# Patient Record
Sex: MALE | Race: ASIAN | NOT HISPANIC OR LATINO | URBAN - METROPOLITAN AREA
[De-identification: names, ages, dates, MRNs, and addresses within clinical notes are randomized per-mention and may not be internally consistent; named-entity substitution may affect disease eponyms.]

---

## 2020-01-01 ENCOUNTER — ANESTHESIA EVENT (OUTPATIENT)
Dept: RADIOLOGY | Facility: HOSPITAL | Age: 55
DRG: 003 | End: 2020-01-01
Payer: COMMERCIAL

## 2020-01-01 ENCOUNTER — RESEARCH ENCOUNTER (OUTPATIENT)
Dept: INFECTIOUS DISEASES | Facility: CLINIC | Age: 55
End: 2020-01-01

## 2020-01-01 ENCOUNTER — TELEPHONE (OUTPATIENT)
Dept: CARDIOTHORACIC SURGERY | Facility: CLINIC | Age: 55
End: 2020-01-01

## 2020-01-01 ENCOUNTER — ANESTHESIA EVENT (OUTPATIENT)
Dept: SURGERY | Facility: HOSPITAL | Age: 55
DRG: 003 | End: 2020-01-01
Payer: COMMERCIAL

## 2020-01-01 ENCOUNTER — ANESTHESIA (OUTPATIENT)
Dept: SURGERY | Facility: HOSPITAL | Age: 55
DRG: 003 | End: 2020-01-01
Payer: COMMERCIAL

## 2020-01-01 ENCOUNTER — ANESTHESIA (OUTPATIENT)
Dept: RADIOLOGY | Facility: HOSPITAL | Age: 55
DRG: 003 | End: 2020-01-01
Payer: COMMERCIAL

## 2020-01-01 ENCOUNTER — HOSPITAL ENCOUNTER (INPATIENT)
Facility: HOSPITAL | Age: 55
LOS: 91 days | DRG: 003 | End: 2020-07-10
Attending: EMERGENCY MEDICINE | Admitting: INTERNAL MEDICINE
Payer: COMMERCIAL

## 2020-01-01 VITALS
HEART RATE: 69 BPM | WEIGHT: 143.31 LBS | HEIGHT: 64 IN | RESPIRATION RATE: 3 BRPM | SYSTOLIC BLOOD PRESSURE: 62 MMHG | BODY MASS INDEX: 24.46 KG/M2 | DIASTOLIC BLOOD PRESSURE: 44 MMHG | TEMPERATURE: 99 F | OXYGEN SATURATION: 56 %

## 2020-01-01 DIAGNOSIS — R00.0 TACHYCARDIA: ICD-10-CM

## 2020-01-01 DIAGNOSIS — I49.9 ARRHYTHMIA: ICD-10-CM

## 2020-01-01 DIAGNOSIS — Z99.11 ENCOUNTER FOR WEANING FROM VENTILATOR: ICD-10-CM

## 2020-01-01 DIAGNOSIS — R94.31 EKG ABNORMALITIES: ICD-10-CM

## 2020-01-01 DIAGNOSIS — Z78.9 ON ENTERAL NUTRITION: ICD-10-CM

## 2020-01-01 DIAGNOSIS — E83.39 HYPOPHOSPHATEMIA: ICD-10-CM

## 2020-01-01 DIAGNOSIS — J06.9 ACUTE RESPIRATORY DISEASE DUE TO COVID-19 VIRUS: ICD-10-CM

## 2020-01-01 DIAGNOSIS — Z20.822 SUSPECTED COVID-19 VIRUS INFECTION: ICD-10-CM

## 2020-01-01 DIAGNOSIS — R06.03 RESPIRATORY DISTRESS: ICD-10-CM

## 2020-01-01 DIAGNOSIS — J93.9 PNEUMOTHORAX: ICD-10-CM

## 2020-01-01 DIAGNOSIS — R00.0 SINUS TACHYCARDIA: ICD-10-CM

## 2020-01-01 DIAGNOSIS — I10 HYPERTENSION: ICD-10-CM

## 2020-01-01 DIAGNOSIS — R00.1 BRADYCARDIA: ICD-10-CM

## 2020-01-01 DIAGNOSIS — J96.01 ACUTE HYPOXEMIC RESPIRATORY FAILURE: ICD-10-CM

## 2020-01-01 DIAGNOSIS — T88.4XXA DIFFICULT INTUBATION: ICD-10-CM

## 2020-01-01 DIAGNOSIS — U07.1 COVID-19: ICD-10-CM

## 2020-01-01 DIAGNOSIS — T38.0X5A ADRENAL CORTICAL STEROIDS CAUSING ADVERSE EFFECT IN THERAPEUTIC USE: ICD-10-CM

## 2020-01-01 DIAGNOSIS — Z71.89 GOALS OF CARE, COUNSELING/DISCUSSION: ICD-10-CM

## 2020-01-01 DIAGNOSIS — M79.89 LEFT LEG SWELLING: ICD-10-CM

## 2020-01-01 DIAGNOSIS — D62 ACUTE BLOOD LOSS ANEMIA: ICD-10-CM

## 2020-01-01 DIAGNOSIS — U07.1 ACUTE RESPIRATORY DISEASE DUE TO COVID-19 VIRUS: ICD-10-CM

## 2020-01-01 DIAGNOSIS — E87.5 HYPERKALEMIA: ICD-10-CM

## 2020-01-01 DIAGNOSIS — U07.1 ACUTE RESPIRATORY DISTRESS SYNDROME (ARDS) DUE TO COVID-19 VIRUS: ICD-10-CM

## 2020-01-01 DIAGNOSIS — T88.4XXD HARD TO INTUBATE, SUBSEQUENT ENCOUNTER: ICD-10-CM

## 2020-01-01 DIAGNOSIS — G93.41 ENCEPHALOPATHY, METABOLIC: ICD-10-CM

## 2020-01-01 DIAGNOSIS — R41.89 SEDATED: ICD-10-CM

## 2020-01-01 DIAGNOSIS — E87.6 HYPOKALEMIA: ICD-10-CM

## 2020-01-01 DIAGNOSIS — R57.9 SHOCK: ICD-10-CM

## 2020-01-01 DIAGNOSIS — U07.1 COVID-19 VIRUS DETECTED: Primary | ICD-10-CM

## 2020-01-01 DIAGNOSIS — J80 ACUTE RESPIRATORY DISTRESS SYNDROME (ARDS) DUE TO COVID-19 VIRUS: ICD-10-CM

## 2020-01-01 DIAGNOSIS — E11.9 TYPE 2 DIABETES MELLITUS WITHOUT COMPLICATION, WITHOUT LONG-TERM CURRENT USE OF INSULIN: ICD-10-CM

## 2020-01-01 DIAGNOSIS — R94.31 ST ELEVATION: ICD-10-CM

## 2020-01-01 DIAGNOSIS — R09.89 AIR HUNGER: ICD-10-CM

## 2020-01-01 DIAGNOSIS — I48.91 ATRIAL FIBRILLATION WITH TACHYCARDIC VENTRICULAR RATE: ICD-10-CM

## 2020-01-01 LAB
25(OH)D3+25(OH)D2 SERPL-MCNC: 16 NG/ML (ref 30–96)
ABO + RH BLD: NORMAL
ALBUMIN SERPL BCP-MCNC: 1.3 G/DL (ref 3.5–5.2)
ALBUMIN SERPL BCP-MCNC: 1.4 G/DL (ref 3.5–5.2)
ALBUMIN SERPL BCP-MCNC: 1.5 G/DL (ref 3.5–5.2)
ALBUMIN SERPL BCP-MCNC: 1.6 G/DL (ref 3.5–5.2)
ALBUMIN SERPL BCP-MCNC: 1.7 G/DL (ref 3.5–5.2)
ALBUMIN SERPL BCP-MCNC: 1.7 G/DL (ref 3.5–5.2)
ALBUMIN SERPL BCP-MCNC: 1.8 G/DL (ref 3.5–5.2)
ALBUMIN SERPL BCP-MCNC: 1.9 G/DL (ref 3.5–5.2)
ALBUMIN SERPL BCP-MCNC: 2 G/DL (ref 3.5–5.2)
ALBUMIN SERPL BCP-MCNC: 2.1 G/DL (ref 3.5–5.2)
ALBUMIN SERPL BCP-MCNC: 2.2 G/DL (ref 3.5–5.2)
ALBUMIN SERPL BCP-MCNC: 2.3 G/DL (ref 3.5–5.2)
ALBUMIN SERPL BCP-MCNC: 2.4 G/DL (ref 3.5–5.2)
ALBUMIN SERPL BCP-MCNC: 2.5 G/DL (ref 3.5–5.2)
ALBUMIN SERPL BCP-MCNC: 2.6 G/DL (ref 3.5–5.2)
ALBUMIN SERPL BCP-MCNC: 2.7 G/DL (ref 3.5–5.2)
ALBUMIN SERPL BCP-MCNC: 2.8 G/DL (ref 3.5–5.2)
ALBUMIN SERPL BCP-MCNC: 2.9 G/DL (ref 3.5–5.2)
ALBUMIN SERPL BCP-MCNC: 3 G/DL (ref 3.5–5.2)
ALBUMIN SERPL BCP-MCNC: 3.1 G/DL (ref 3.5–5.2)
ALBUMIN SERPL BCP-MCNC: 3.2 G/DL (ref 3.5–5.2)
ALBUMIN SERPL BCP-MCNC: 3.3 G/DL (ref 3.5–5.2)
ALBUMIN SERPL BCP-MCNC: 3.4 G/DL (ref 3.5–5.2)
ALBUMIN SERPL BCP-MCNC: 3.5 G/DL (ref 3.5–5.2)
ALBUMIN SERPL BCP-MCNC: 3.6 G/DL (ref 3.5–5.2)
ALBUMIN SERPL BCP-MCNC: 3.7 G/DL (ref 3.5–5.2)
ALBUMIN SERPL BCP-MCNC: 3.8 G/DL (ref 3.5–5.2)
ALBUMIN SERPL BCP-MCNC: 3.9 G/DL (ref 3.5–5.2)
ALBUMIN SERPL BCP-MCNC: 4 G/DL (ref 3.5–5.2)
ALBUMIN SERPL BCP-MCNC: 4.1 G/DL (ref 3.5–5.2)
ALBUMIN SERPL BCP-MCNC: 4.3 G/DL (ref 3.5–5.2)
ALLENS TEST: ABNORMAL
ALLENS TEST: NORMAL
ALP SERPL-CCNC: 100 U/L (ref 55–135)
ALP SERPL-CCNC: 102 U/L (ref 55–135)
ALP SERPL-CCNC: 102 U/L (ref 55–135)
ALP SERPL-CCNC: 104 U/L (ref 55–135)
ALP SERPL-CCNC: 105 U/L (ref 55–135)
ALP SERPL-CCNC: 105 U/L (ref 55–135)
ALP SERPL-CCNC: 106 U/L (ref 55–135)
ALP SERPL-CCNC: 107 U/L (ref 55–135)
ALP SERPL-CCNC: 107 U/L (ref 55–135)
ALP SERPL-CCNC: 108 U/L (ref 55–135)
ALP SERPL-CCNC: 109 U/L (ref 55–135)
ALP SERPL-CCNC: 110 U/L (ref 55–135)
ALP SERPL-CCNC: 111 U/L (ref 55–135)
ALP SERPL-CCNC: 112 U/L (ref 55–135)
ALP SERPL-CCNC: 113 U/L (ref 55–135)
ALP SERPL-CCNC: 114 U/L (ref 55–135)
ALP SERPL-CCNC: 114 U/L (ref 55–135)
ALP SERPL-CCNC: 115 U/L (ref 55–135)
ALP SERPL-CCNC: 116 U/L (ref 55–135)
ALP SERPL-CCNC: 117 U/L (ref 55–135)
ALP SERPL-CCNC: 118 U/L (ref 55–135)
ALP SERPL-CCNC: 119 U/L (ref 55–135)
ALP SERPL-CCNC: 120 U/L (ref 55–135)
ALP SERPL-CCNC: 121 U/L (ref 55–135)
ALP SERPL-CCNC: 122 U/L (ref 55–135)
ALP SERPL-CCNC: 123 U/L (ref 55–135)
ALP SERPL-CCNC: 124 U/L (ref 55–135)
ALP SERPL-CCNC: 124 U/L (ref 55–135)
ALP SERPL-CCNC: 125 U/L (ref 55–135)
ALP SERPL-CCNC: 125 U/L (ref 55–135)
ALP SERPL-CCNC: 126 U/L (ref 55–135)
ALP SERPL-CCNC: 127 U/L (ref 55–135)
ALP SERPL-CCNC: 128 U/L (ref 55–135)
ALP SERPL-CCNC: 128 U/L (ref 55–135)
ALP SERPL-CCNC: 129 U/L (ref 55–135)
ALP SERPL-CCNC: 130 U/L (ref 55–135)
ALP SERPL-CCNC: 131 U/L (ref 55–135)
ALP SERPL-CCNC: 131 U/L (ref 55–135)
ALP SERPL-CCNC: 132 U/L (ref 55–135)
ALP SERPL-CCNC: 132 U/L (ref 55–135)
ALP SERPL-CCNC: 133 U/L (ref 55–135)
ALP SERPL-CCNC: 135 U/L (ref 55–135)
ALP SERPL-CCNC: 137 U/L (ref 55–135)
ALP SERPL-CCNC: 138 U/L (ref 55–135)
ALP SERPL-CCNC: 139 U/L (ref 55–135)
ALP SERPL-CCNC: 140 U/L (ref 55–135)
ALP SERPL-CCNC: 140 U/L (ref 55–135)
ALP SERPL-CCNC: 142 U/L (ref 55–135)
ALP SERPL-CCNC: 143 U/L (ref 55–135)
ALP SERPL-CCNC: 144 U/L (ref 55–135)
ALP SERPL-CCNC: 146 U/L (ref 55–135)
ALP SERPL-CCNC: 146 U/L (ref 55–135)
ALP SERPL-CCNC: 147 U/L (ref 55–135)
ALP SERPL-CCNC: 148 U/L (ref 55–135)
ALP SERPL-CCNC: 149 U/L (ref 55–135)
ALP SERPL-CCNC: 150 U/L (ref 55–135)
ALP SERPL-CCNC: 151 U/L (ref 55–135)
ALP SERPL-CCNC: 153 U/L (ref 55–135)
ALP SERPL-CCNC: 153 U/L (ref 55–135)
ALP SERPL-CCNC: 154 U/L (ref 55–135)
ALP SERPL-CCNC: 154 U/L (ref 55–135)
ALP SERPL-CCNC: 155 U/L (ref 55–135)
ALP SERPL-CCNC: 155 U/L (ref 55–135)
ALP SERPL-CCNC: 156 U/L (ref 55–135)
ALP SERPL-CCNC: 159 U/L (ref 55–135)
ALP SERPL-CCNC: 159 U/L (ref 55–135)
ALP SERPL-CCNC: 160 U/L (ref 55–135)
ALP SERPL-CCNC: 161 U/L (ref 55–135)
ALP SERPL-CCNC: 163 U/L (ref 55–135)
ALP SERPL-CCNC: 164 U/L (ref 55–135)
ALP SERPL-CCNC: 165 U/L (ref 55–135)
ALP SERPL-CCNC: 169 U/L (ref 55–135)
ALP SERPL-CCNC: 170 U/L (ref 55–135)
ALP SERPL-CCNC: 171 U/L (ref 55–135)
ALP SERPL-CCNC: 172 U/L (ref 55–135)
ALP SERPL-CCNC: 173 U/L (ref 55–135)
ALP SERPL-CCNC: 174 U/L (ref 55–135)
ALP SERPL-CCNC: 175 U/L (ref 55–135)
ALP SERPL-CCNC: 176 U/L (ref 55–135)
ALP SERPL-CCNC: 176 U/L (ref 55–135)
ALP SERPL-CCNC: 177 U/L (ref 55–135)
ALP SERPL-CCNC: 177 U/L (ref 55–135)
ALP SERPL-CCNC: 178 U/L (ref 55–135)
ALP SERPL-CCNC: 180 U/L (ref 55–135)
ALP SERPL-CCNC: 181 U/L (ref 55–135)
ALP SERPL-CCNC: 182 U/L (ref 55–135)
ALP SERPL-CCNC: 183 U/L (ref 55–135)
ALP SERPL-CCNC: 184 U/L (ref 55–135)
ALP SERPL-CCNC: 185 U/L (ref 55–135)
ALP SERPL-CCNC: 186 U/L (ref 55–135)
ALP SERPL-CCNC: 187 U/L (ref 55–135)
ALP SERPL-CCNC: 188 U/L (ref 55–135)
ALP SERPL-CCNC: 194 U/L (ref 55–135)
ALP SERPL-CCNC: 195 U/L (ref 55–135)
ALP SERPL-CCNC: 196 U/L (ref 55–135)
ALP SERPL-CCNC: 198 U/L (ref 55–135)
ALP SERPL-CCNC: 198 U/L (ref 55–135)
ALP SERPL-CCNC: 199 U/L (ref 55–135)
ALP SERPL-CCNC: 199 U/L (ref 55–135)
ALP SERPL-CCNC: 200 U/L (ref 55–135)
ALP SERPL-CCNC: 201 U/L (ref 55–135)
ALP SERPL-CCNC: 201 U/L (ref 55–135)
ALP SERPL-CCNC: 202 U/L (ref 55–135)
ALP SERPL-CCNC: 202 U/L (ref 55–135)
ALP SERPL-CCNC: 204 U/L (ref 55–135)
ALP SERPL-CCNC: 206 U/L (ref 55–135)
ALP SERPL-CCNC: 207 U/L (ref 55–135)
ALP SERPL-CCNC: 208 U/L (ref 55–135)
ALP SERPL-CCNC: 209 U/L (ref 55–135)
ALP SERPL-CCNC: 212 U/L (ref 55–135)
ALP SERPL-CCNC: 213 U/L (ref 55–135)
ALP SERPL-CCNC: 214 U/L (ref 55–135)
ALP SERPL-CCNC: 217 U/L (ref 55–135)
ALP SERPL-CCNC: 217 U/L (ref 55–135)
ALP SERPL-CCNC: 219 U/L (ref 55–135)
ALP SERPL-CCNC: 220 U/L (ref 55–135)
ALP SERPL-CCNC: 222 U/L (ref 55–135)
ALP SERPL-CCNC: 225 U/L (ref 55–135)
ALP SERPL-CCNC: 229 U/L (ref 55–135)
ALP SERPL-CCNC: 230 U/L (ref 55–135)
ALP SERPL-CCNC: 230 U/L (ref 55–135)
ALP SERPL-CCNC: 233 U/L (ref 55–135)
ALP SERPL-CCNC: 241 U/L (ref 55–135)
ALP SERPL-CCNC: 249 U/L (ref 55–135)
ALP SERPL-CCNC: 249 U/L (ref 55–135)
ALP SERPL-CCNC: 250 U/L (ref 55–135)
ALP SERPL-CCNC: 257 U/L (ref 55–135)
ALP SERPL-CCNC: 265 U/L (ref 55–135)
ALP SERPL-CCNC: 271 U/L (ref 55–135)
ALP SERPL-CCNC: 61 U/L (ref 55–135)
ALP SERPL-CCNC: 65 U/L (ref 55–135)
ALP SERPL-CCNC: 67 U/L (ref 55–135)
ALP SERPL-CCNC: 67 U/L (ref 55–135)
ALP SERPL-CCNC: 71 U/L (ref 55–135)
ALP SERPL-CCNC: 71 U/L (ref 55–135)
ALP SERPL-CCNC: 72 U/L (ref 55–135)
ALP SERPL-CCNC: 75 U/L (ref 55–135)
ALP SERPL-CCNC: 80 U/L (ref 55–135)
ALP SERPL-CCNC: 81 U/L (ref 55–135)
ALP SERPL-CCNC: 82 U/L (ref 55–135)
ALP SERPL-CCNC: 82 U/L (ref 55–135)
ALP SERPL-CCNC: 85 U/L (ref 55–135)
ALP SERPL-CCNC: 86 U/L (ref 55–135)
ALP SERPL-CCNC: 88 U/L (ref 55–135)
ALP SERPL-CCNC: 89 U/L (ref 55–135)
ALP SERPL-CCNC: 89 U/L (ref 55–135)
ALP SERPL-CCNC: 90 U/L (ref 55–135)
ALP SERPL-CCNC: 90 U/L (ref 55–135)
ALP SERPL-CCNC: 93 U/L (ref 55–135)
ALP SERPL-CCNC: 93 U/L (ref 55–135)
ALP SERPL-CCNC: 95 U/L (ref 55–135)
ALP SERPL-CCNC: 96 U/L (ref 55–135)
ALP SERPL-CCNC: 96 U/L (ref 55–135)
ALP SERPL-CCNC: 97 U/L (ref 55–135)
ALP SERPL-CCNC: 98 U/L (ref 55–135)
ALP SERPL-CCNC: 98 U/L (ref 55–135)
ALP SERPL-CCNC: 99 U/L (ref 55–135)
ALT SERPL W/O P-5'-P-CCNC: 100 U/L (ref 10–44)
ALT SERPL W/O P-5'-P-CCNC: 105 U/L (ref 10–44)
ALT SERPL W/O P-5'-P-CCNC: 105 U/L (ref 10–44)
ALT SERPL W/O P-5'-P-CCNC: 106 U/L (ref 10–44)
ALT SERPL W/O P-5'-P-CCNC: 108 U/L (ref 10–44)
ALT SERPL W/O P-5'-P-CCNC: 110 U/L (ref 10–44)
ALT SERPL W/O P-5'-P-CCNC: 128 U/L (ref 10–44)
ALT SERPL W/O P-5'-P-CCNC: 15 U/L (ref 10–44)
ALT SERPL W/O P-5'-P-CCNC: 16 U/L (ref 10–44)
ALT SERPL W/O P-5'-P-CCNC: 17 U/L (ref 10–44)
ALT SERPL W/O P-5'-P-CCNC: 18 U/L (ref 10–44)
ALT SERPL W/O P-5'-P-CCNC: 18 U/L (ref 10–44)
ALT SERPL W/O P-5'-P-CCNC: 19 U/L (ref 10–44)
ALT SERPL W/O P-5'-P-CCNC: 20 U/L (ref 10–44)
ALT SERPL W/O P-5'-P-CCNC: 20 U/L (ref 10–44)
ALT SERPL W/O P-5'-P-CCNC: 21 U/L (ref 10–44)
ALT SERPL W/O P-5'-P-CCNC: 22 U/L (ref 10–44)
ALT SERPL W/O P-5'-P-CCNC: 23 U/L (ref 10–44)
ALT SERPL W/O P-5'-P-CCNC: 24 U/L (ref 10–44)
ALT SERPL W/O P-5'-P-CCNC: 25 U/L (ref 10–44)
ALT SERPL W/O P-5'-P-CCNC: 26 U/L (ref 10–44)
ALT SERPL W/O P-5'-P-CCNC: 27 U/L (ref 10–44)
ALT SERPL W/O P-5'-P-CCNC: 28 U/L (ref 10–44)
ALT SERPL W/O P-5'-P-CCNC: 29 U/L (ref 10–44)
ALT SERPL W/O P-5'-P-CCNC: 30 U/L (ref 10–44)
ALT SERPL W/O P-5'-P-CCNC: 31 U/L (ref 10–44)
ALT SERPL W/O P-5'-P-CCNC: 32 U/L (ref 10–44)
ALT SERPL W/O P-5'-P-CCNC: 33 U/L (ref 10–44)
ALT SERPL W/O P-5'-P-CCNC: 34 U/L (ref 10–44)
ALT SERPL W/O P-5'-P-CCNC: 35 U/L (ref 10–44)
ALT SERPL W/O P-5'-P-CCNC: 36 U/L (ref 10–44)
ALT SERPL W/O P-5'-P-CCNC: 37 U/L (ref 10–44)
ALT SERPL W/O P-5'-P-CCNC: 38 U/L (ref 10–44)
ALT SERPL W/O P-5'-P-CCNC: 39 U/L (ref 10–44)
ALT SERPL W/O P-5'-P-CCNC: 40 U/L (ref 10–44)
ALT SERPL W/O P-5'-P-CCNC: 41 U/L (ref 10–44)
ALT SERPL W/O P-5'-P-CCNC: 42 U/L (ref 10–44)
ALT SERPL W/O P-5'-P-CCNC: 43 U/L (ref 10–44)
ALT SERPL W/O P-5'-P-CCNC: 43 U/L (ref 10–44)
ALT SERPL W/O P-5'-P-CCNC: 44 U/L (ref 10–44)
ALT SERPL W/O P-5'-P-CCNC: 45 U/L (ref 10–44)
ALT SERPL W/O P-5'-P-CCNC: 46 U/L (ref 10–44)
ALT SERPL W/O P-5'-P-CCNC: 46 U/L (ref 10–44)
ALT SERPL W/O P-5'-P-CCNC: 47 U/L (ref 10–44)
ALT SERPL W/O P-5'-P-CCNC: 48 U/L (ref 10–44)
ALT SERPL W/O P-5'-P-CCNC: 49 U/L (ref 10–44)
ALT SERPL W/O P-5'-P-CCNC: 49 U/L (ref 10–44)
ALT SERPL W/O P-5'-P-CCNC: 50 U/L (ref 10–44)
ALT SERPL W/O P-5'-P-CCNC: 51 U/L (ref 10–44)
ALT SERPL W/O P-5'-P-CCNC: 52 U/L (ref 10–44)
ALT SERPL W/O P-5'-P-CCNC: 52 U/L (ref 10–44)
ALT SERPL W/O P-5'-P-CCNC: 53 U/L (ref 10–44)
ALT SERPL W/O P-5'-P-CCNC: 53 U/L (ref 10–44)
ALT SERPL W/O P-5'-P-CCNC: 54 U/L (ref 10–44)
ALT SERPL W/O P-5'-P-CCNC: 55 U/L (ref 10–44)
ALT SERPL W/O P-5'-P-CCNC: 56 U/L (ref 10–44)
ALT SERPL W/O P-5'-P-CCNC: 57 U/L (ref 10–44)
ALT SERPL W/O P-5'-P-CCNC: 59 U/L (ref 10–44)
ALT SERPL W/O P-5'-P-CCNC: 60 U/L (ref 10–44)
ALT SERPL W/O P-5'-P-CCNC: 61 U/L (ref 10–44)
ALT SERPL W/O P-5'-P-CCNC: 64 U/L (ref 10–44)
ALT SERPL W/O P-5'-P-CCNC: 66 U/L (ref 10–44)
ALT SERPL W/O P-5'-P-CCNC: 66 U/L (ref 10–44)
ALT SERPL W/O P-5'-P-CCNC: 67 U/L (ref 10–44)
ALT SERPL W/O P-5'-P-CCNC: 68 U/L (ref 10–44)
ALT SERPL W/O P-5'-P-CCNC: 68 U/L (ref 10–44)
ALT SERPL W/O P-5'-P-CCNC: 73 U/L (ref 10–44)
ALT SERPL W/O P-5'-P-CCNC: 74 U/L (ref 10–44)
ALT SERPL W/O P-5'-P-CCNC: 75 U/L (ref 10–44)
ALT SERPL W/O P-5'-P-CCNC: 76 U/L (ref 10–44)
ALT SERPL W/O P-5'-P-CCNC: 77 U/L (ref 10–44)
ALT SERPL W/O P-5'-P-CCNC: 77 U/L (ref 10–44)
ALT SERPL W/O P-5'-P-CCNC: 78 U/L (ref 10–44)
ALT SERPL W/O P-5'-P-CCNC: 78 U/L (ref 10–44)
ALT SERPL W/O P-5'-P-CCNC: 79 U/L (ref 10–44)
ALT SERPL W/O P-5'-P-CCNC: 82 U/L (ref 10–44)
ALT SERPL W/O P-5'-P-CCNC: 84 U/L (ref 10–44)
ALT SERPL W/O P-5'-P-CCNC: 93 U/L (ref 10–44)
ALT SERPL W/O P-5'-P-CCNC: 94 U/L (ref 10–44)
ALT SERPL W/O P-5'-P-CCNC: 95 U/L (ref 10–44)
ALT SERPL W/O P-5'-P-CCNC: 98 U/L (ref 10–44)
AMMONIA PLAS-SCNC: 43 UMOL/L (ref 10–50)
AMORPH CRY UR QL COMP ASSIST: ABNORMAL
AMORPH CRY UR QL COMP ASSIST: ABNORMAL
ANION GAP SERPL CALC-SCNC: 10 MMOL/L (ref 8–16)
ANION GAP SERPL CALC-SCNC: 11 MMOL/L (ref 8–16)
ANION GAP SERPL CALC-SCNC: 12 MMOL/L (ref 8–16)
ANION GAP SERPL CALC-SCNC: 13 MMOL/L (ref 8–16)
ANION GAP SERPL CALC-SCNC: 14 MMOL/L (ref 8–16)
ANION GAP SERPL CALC-SCNC: 15 MMOL/L (ref 8–16)
ANION GAP SERPL CALC-SCNC: 15 MMOL/L (ref 8–16)
ANION GAP SERPL CALC-SCNC: 16 MMOL/L (ref 8–16)
ANION GAP SERPL CALC-SCNC: 17 MMOL/L (ref 8–16)
ANION GAP SERPL CALC-SCNC: 17 MMOL/L (ref 8–16)
ANION GAP SERPL CALC-SCNC: 18 MMOL/L (ref 8–16)
ANION GAP SERPL CALC-SCNC: 4 MMOL/L (ref 8–16)
ANION GAP SERPL CALC-SCNC: 5 MMOL/L (ref 8–16)
ANION GAP SERPL CALC-SCNC: 6 MMOL/L (ref 8–16)
ANION GAP SERPL CALC-SCNC: 7 MMOL/L (ref 8–16)
ANION GAP SERPL CALC-SCNC: 8 MMOL/L (ref 8–16)
ANION GAP SERPL CALC-SCNC: 9 MMOL/L (ref 8–16)
ANISOCYTOSIS BLD QL SMEAR: SLIGHT
APTT BLDCRRT: 21.5 SEC (ref 21–32)
APTT BLDCRRT: 23.3 SEC (ref 21–32)
APTT BLDCRRT: 23.4 SEC (ref 21–32)
APTT BLDCRRT: 23.5 SEC (ref 21–32)
APTT BLDCRRT: 23.6 SEC (ref 21–32)
APTT BLDCRRT: 23.8 SEC (ref 21–32)
APTT BLDCRRT: 23.8 SEC (ref 21–32)
APTT BLDCRRT: 23.9 SEC (ref 21–32)
APTT BLDCRRT: 24.2 SEC (ref 21–32)
APTT BLDCRRT: 24.3 SEC (ref 21–32)
APTT BLDCRRT: 24.3 SEC (ref 21–32)
APTT BLDCRRT: 24.4 SEC (ref 21–32)
APTT BLDCRRT: 24.6 SEC (ref 21–32)
APTT BLDCRRT: 24.7 SEC (ref 21–32)
APTT BLDCRRT: 24.7 SEC (ref 21–32)
APTT BLDCRRT: 25.1 SEC (ref 21–32)
APTT BLDCRRT: 25.1 SEC (ref 21–32)
APTT BLDCRRT: 25.2 SEC (ref 21–32)
APTT BLDCRRT: 25.5 SEC (ref 21–32)
APTT BLDCRRT: 25.6 SEC (ref 21–32)
APTT BLDCRRT: 25.8 SEC (ref 21–32)
APTT BLDCRRT: 25.9 SEC (ref 21–32)
APTT BLDCRRT: 25.9 SEC (ref 21–32)
APTT BLDCRRT: 26.3 SEC (ref 21–32)
APTT BLDCRRT: 26.5 SEC (ref 21–32)
APTT BLDCRRT: 26.5 SEC (ref 21–32)
APTT BLDCRRT: 26.6 SEC (ref 21–32)
APTT BLDCRRT: 26.7 SEC (ref 21–32)
APTT BLDCRRT: 26.8 SEC (ref 21–32)
APTT BLDCRRT: 26.8 SEC (ref 21–32)
APTT BLDCRRT: 26.9 SEC (ref 21–32)
APTT BLDCRRT: 27 SEC (ref 21–32)
APTT BLDCRRT: 27 SEC (ref 21–32)
APTT BLDCRRT: 27.1 SEC (ref 21–32)
APTT BLDCRRT: 27.1 SEC (ref 21–32)
APTT BLDCRRT: 27.2 SEC (ref 21–32)
APTT BLDCRRT: 27.3 SEC (ref 21–32)
APTT BLDCRRT: 27.4 SEC (ref 21–32)
APTT BLDCRRT: 27.5 SEC (ref 21–32)
APTT BLDCRRT: 27.6 SEC (ref 21–32)
APTT BLDCRRT: 27.7 SEC (ref 21–32)
APTT BLDCRRT: 27.7 SEC (ref 21–32)
APTT BLDCRRT: 27.8 SEC (ref 21–32)
APTT BLDCRRT: 27.8 SEC (ref 21–32)
APTT BLDCRRT: 27.9 SEC (ref 21–32)
APTT BLDCRRT: 28 SEC (ref 21–32)
APTT BLDCRRT: 28.1 SEC (ref 21–32)
APTT BLDCRRT: 28.2 SEC (ref 21–32)
APTT BLDCRRT: 28.3 SEC (ref 21–32)
APTT BLDCRRT: 28.4 SEC (ref 21–32)
APTT BLDCRRT: 28.4 SEC (ref 21–32)
APTT BLDCRRT: 28.5 SEC (ref 21–32)
APTT BLDCRRT: 28.6 SEC (ref 21–32)
APTT BLDCRRT: 28.7 SEC (ref 21–32)
APTT BLDCRRT: 28.8 SEC (ref 21–32)
APTT BLDCRRT: 28.8 SEC (ref 21–32)
APTT BLDCRRT: 28.9 SEC (ref 21–32)
APTT BLDCRRT: 29 SEC (ref 21–32)
APTT BLDCRRT: 29.1 SEC (ref 21–32)
APTT BLDCRRT: 29.2 SEC (ref 21–32)
APTT BLDCRRT: 29.2 SEC (ref 21–32)
APTT BLDCRRT: 29.3 SEC (ref 21–32)
APTT BLDCRRT: 29.4 SEC (ref 21–32)
APTT BLDCRRT: 29.5 SEC (ref 21–32)
APTT BLDCRRT: 29.6 SEC (ref 21–32)
APTT BLDCRRT: 29.7 SEC (ref 21–32)
APTT BLDCRRT: 29.8 SEC (ref 21–32)
APTT BLDCRRT: 29.9 SEC (ref 21–32)
APTT BLDCRRT: 30 SEC (ref 21–32)
APTT BLDCRRT: 30.1 SEC (ref 21–32)
APTT BLDCRRT: 30.2 SEC (ref 21–32)
APTT BLDCRRT: 30.3 SEC (ref 21–32)
APTT BLDCRRT: 30.5 SEC (ref 21–32)
APTT BLDCRRT: 30.6 SEC (ref 21–32)
APTT BLDCRRT: 30.7 SEC (ref 21–32)
APTT BLDCRRT: 30.8 SEC (ref 21–32)
APTT BLDCRRT: 31 SEC (ref 21–32)
APTT BLDCRRT: 31 SEC (ref 21–32)
APTT BLDCRRT: 31.1 SEC (ref 21–32)
APTT BLDCRRT: 31.2 SEC (ref 21–32)
APTT BLDCRRT: 31.2 SEC (ref 21–32)
APTT BLDCRRT: 31.3 SEC (ref 21–32)
APTT BLDCRRT: 31.4 SEC (ref 21–32)
APTT BLDCRRT: 31.6 SEC (ref 21–32)
APTT BLDCRRT: 31.6 SEC (ref 21–32)
APTT BLDCRRT: 31.7 SEC (ref 21–32)
APTT BLDCRRT: 31.7 SEC (ref 21–32)
APTT BLDCRRT: 31.8 SEC (ref 21–32)
APTT BLDCRRT: 31.9 SEC (ref 21–32)
APTT BLDCRRT: 32 SEC (ref 21–32)
APTT BLDCRRT: 32.1 SEC (ref 21–32)
APTT BLDCRRT: 32.1 SEC (ref 21–32)
APTT BLDCRRT: 32.2 SEC (ref 21–32)
APTT BLDCRRT: 32.2 SEC (ref 21–32)
APTT BLDCRRT: 32.3 SEC (ref 21–32)
APTT BLDCRRT: 32.5 SEC (ref 21–32)
APTT BLDCRRT: 32.6 SEC (ref 21–32)
APTT BLDCRRT: 32.8 SEC (ref 21–32)
APTT BLDCRRT: 32.9 SEC (ref 21–32)
APTT BLDCRRT: 33 SEC (ref 21–32)
APTT BLDCRRT: 33.1 SEC (ref 21–32)
APTT BLDCRRT: 33.6 SEC (ref 21–32)
APTT BLDCRRT: 33.8 SEC (ref 21–32)
APTT BLDCRRT: 33.8 SEC (ref 21–32)
APTT BLDCRRT: 33.9 SEC (ref 21–32)
APTT BLDCRRT: 34 SEC (ref 21–32)
APTT BLDCRRT: 34 SEC (ref 21–32)
APTT BLDCRRT: 34.5 SEC (ref 21–32)
APTT BLDCRRT: 34.5 SEC (ref 21–32)
APTT BLDCRRT: 34.8 SEC (ref 21–32)
APTT BLDCRRT: 35.1 SEC (ref 21–32)
APTT BLDCRRT: 35.4 SEC (ref 21–32)
APTT BLDCRRT: 35.5 SEC (ref 21–32)
APTT BLDCRRT: 35.5 SEC (ref 21–32)
APTT BLDCRRT: 35.7 SEC (ref 21–32)
APTT BLDCRRT: 35.9 SEC (ref 21–32)
APTT BLDCRRT: 36.1 SEC (ref 21–32)
APTT BLDCRRT: 36.1 SEC (ref 21–32)
APTT BLDCRRT: 36.2 SEC (ref 21–32)
APTT BLDCRRT: 36.4 SEC (ref 21–32)
APTT BLDCRRT: 37 SEC (ref 21–32)
APTT BLDCRRT: 37.2 SEC (ref 21–32)
APTT BLDCRRT: 37.2 SEC (ref 21–32)
APTT BLDCRRT: 37.6 SEC (ref 21–32)
APTT BLDCRRT: 38 SEC (ref 21–32)
APTT BLDCRRT: 38.1 SEC (ref 21–32)
APTT BLDCRRT: 38.5 SEC (ref 21–32)
APTT BLDCRRT: 38.8 SEC (ref 21–32)
APTT BLDCRRT: 39.8 SEC (ref 21–32)
APTT BLDCRRT: 41 SEC (ref 21–32)
APTT BLDCRRT: 41.6 SEC (ref 21–32)
APTT BLDCRRT: 41.8 SEC (ref 21–32)
APTT BLDCRRT: 42.2 SEC (ref 21–32)
APTT BLDCRRT: 42.2 SEC (ref 21–32)
APTT BLDCRRT: 42.4 SEC (ref 21–32)
APTT BLDCRRT: 42.5 SEC (ref 21–32)
APTT BLDCRRT: 42.6 SEC (ref 21–32)
APTT BLDCRRT: 44.3 SEC (ref 21–32)
APTT BLDCRRT: 44.5 SEC (ref 21–32)
APTT BLDCRRT: 46.4 SEC (ref 21–32)
APTT BLDCRRT: 50.8 SEC (ref 21–32)
APTT BLDCRRT: 51.9 SEC (ref 21–32)
APTT BLDCRRT: 55.3 SEC (ref 21–32)
APTT BLDCRRT: 55.4 SEC (ref 21–32)
APTT BLDCRRT: 60.1 SEC (ref 21–32)
APTT BLDCRRT: 62.8 SEC (ref 21–32)
APTT BLDCRRT: 65.3 SEC (ref 21–32)
APTT BLDCRRT: 74.6 SEC (ref 21–32)
APTT BLDCRRT: >150 SEC (ref 21–32)
ASCENDING AORTA: 3.18 CM
AST SERPL-CCNC: 131 U/L (ref 10–40)
AST SERPL-CCNC: 18 U/L (ref 10–40)
AST SERPL-CCNC: 19 U/L (ref 10–40)
AST SERPL-CCNC: 20 U/L (ref 10–40)
AST SERPL-CCNC: 20 U/L (ref 10–40)
AST SERPL-CCNC: 21 U/L (ref 10–40)
AST SERPL-CCNC: 22 U/L (ref 10–40)
AST SERPL-CCNC: 23 U/L (ref 10–40)
AST SERPL-CCNC: 24 U/L (ref 10–40)
AST SERPL-CCNC: 25 U/L (ref 10–40)
AST SERPL-CCNC: 26 U/L (ref 10–40)
AST SERPL-CCNC: 27 U/L (ref 10–40)
AST SERPL-CCNC: 28 U/L (ref 10–40)
AST SERPL-CCNC: 29 U/L (ref 10–40)
AST SERPL-CCNC: 30 U/L (ref 10–40)
AST SERPL-CCNC: 31 U/L (ref 10–40)
AST SERPL-CCNC: 32 U/L (ref 10–40)
AST SERPL-CCNC: 33 U/L (ref 10–40)
AST SERPL-CCNC: 34 U/L (ref 10–40)
AST SERPL-CCNC: 35 U/L (ref 10–40)
AST SERPL-CCNC: 36 U/L (ref 10–40)
AST SERPL-CCNC: 37 U/L (ref 10–40)
AST SERPL-CCNC: 38 U/L (ref 10–40)
AST SERPL-CCNC: 39 U/L (ref 10–40)
AST SERPL-CCNC: 40 U/L (ref 10–40)
AST SERPL-CCNC: 40 U/L (ref 10–40)
AST SERPL-CCNC: 41 U/L (ref 10–40)
AST SERPL-CCNC: 42 U/L (ref 10–40)
AST SERPL-CCNC: 43 U/L (ref 10–40)
AST SERPL-CCNC: 44 U/L (ref 10–40)
AST SERPL-CCNC: 45 U/L (ref 10–40)
AST SERPL-CCNC: 46 U/L (ref 10–40)
AST SERPL-CCNC: 47 U/L (ref 10–40)
AST SERPL-CCNC: 48 U/L (ref 10–40)
AST SERPL-CCNC: 48 U/L (ref 10–40)
AST SERPL-CCNC: 49 U/L (ref 10–40)
AST SERPL-CCNC: 50 U/L (ref 10–40)
AST SERPL-CCNC: 51 U/L (ref 10–40)
AST SERPL-CCNC: 51 U/L (ref 10–40)
AST SERPL-CCNC: 52 U/L (ref 10–40)
AST SERPL-CCNC: 54 U/L (ref 10–40)
AST SERPL-CCNC: 54 U/L (ref 10–40)
AST SERPL-CCNC: 55 U/L (ref 10–40)
AST SERPL-CCNC: 55 U/L (ref 10–40)
AST SERPL-CCNC: 56 U/L (ref 10–40)
AST SERPL-CCNC: 57 U/L (ref 10–40)
AST SERPL-CCNC: 58 U/L (ref 10–40)
AST SERPL-CCNC: 59 U/L (ref 10–40)
AST SERPL-CCNC: 63 U/L (ref 10–40)
AST SERPL-CCNC: 63 U/L (ref 10–40)
AST SERPL-CCNC: 64 U/L (ref 10–40)
AST SERPL-CCNC: 66 U/L (ref 10–40)
AST SERPL-CCNC: 67 U/L (ref 10–40)
AST SERPL-CCNC: 68 U/L (ref 10–40)
AST SERPL-CCNC: 69 U/L (ref 10–40)
AST SERPL-CCNC: 69 U/L (ref 10–40)
AST SERPL-CCNC: 71 U/L (ref 10–40)
AST SERPL-CCNC: 72 U/L (ref 10–40)
AV INDEX (PROSTH): 0.62
AV INDEX (PROSTH): 0.68
AV MEAN GRADIENT: 3 MMHG
AV MEAN GRADIENT: 5 MMHG
AV PEAK GRADIENT: 5 MMHG
AV PEAK GRADIENT: 9 MMHG
AV VALVE AREA: 2.17 CM2
AV VALVE AREA: 2.55 CM2
AV VELOCITY RATIO: 0.61
AV VELOCITY RATIO: 0.68
BACTERIA #/AREA URNS AUTO: ABNORMAL /HPF
BACTERIA BLD CULT: NORMAL
BACTERIA SPEC AEROBE CULT: ABNORMAL
BACTERIA SPEC AEROBE CULT: ABNORMAL
BACTERIA SPEC AEROBE CULT: NO GROWTH
BACTERIA SPEC AEROBE CULT: NORMAL
BACTERIA UR CULT: NO GROWTH
BASO STIPL BLD QL SMEAR: ABNORMAL
BASOPHILS # BLD AUTO: 0.01 K/UL (ref 0–0.2)
BASOPHILS # BLD AUTO: 0.02 K/UL (ref 0–0.2)
BASOPHILS # BLD AUTO: 0.03 K/UL (ref 0–0.2)
BASOPHILS # BLD AUTO: 0.04 K/UL (ref 0–0.2)
BASOPHILS # BLD AUTO: 0.05 K/UL (ref 0–0.2)
BASOPHILS # BLD AUTO: 0.06 K/UL (ref 0–0.2)
BASOPHILS # BLD AUTO: 0.07 K/UL (ref 0–0.2)
BASOPHILS # BLD AUTO: 0.08 K/UL (ref 0–0.2)
BASOPHILS # BLD AUTO: 0.09 K/UL (ref 0–0.2)
BASOPHILS # BLD AUTO: 0.1 K/UL (ref 0–0.2)
BASOPHILS # BLD AUTO: 0.11 K/UL (ref 0–0.2)
BASOPHILS # BLD AUTO: 0.12 K/UL (ref 0–0.2)
BASOPHILS # BLD AUTO: 0.14 K/UL (ref 0–0.2)
BASOPHILS # BLD AUTO: 0.14 K/UL (ref 0–0.2)
BASOPHILS # BLD AUTO: 0.15 K/UL (ref 0–0.2)
BASOPHILS # BLD AUTO: ABNORMAL K/UL (ref 0–0.2)
BASOPHILS NFR BLD: 0 % (ref 0–1.9)
BASOPHILS NFR BLD: 0.1 % (ref 0–1.9)
BASOPHILS NFR BLD: 0.2 % (ref 0–1.9)
BASOPHILS NFR BLD: 0.3 % (ref 0–1.9)
BASOPHILS NFR BLD: 0.4 % (ref 0–1.9)
BASOPHILS NFR BLD: 0.5 % (ref 0–1.9)
BASOPHILS NFR BLD: 0.6 % (ref 0–1.9)
BASOPHILS NFR BLD: 0.7 % (ref 0–1.9)
BASOPHILS NFR BLD: 0.8 % (ref 0–1.9)
BASOPHILS NFR BLD: 0.9 % (ref 0–1.9)
BASOPHILS NFR BLD: 1 % (ref 0–1.9)
BASOPHILS NFR BLD: 2 % (ref 0–1.9)
BASOPHILS NFR BLD: 2 % (ref 0–1.9)
BILIRUB SERPL-MCNC: 0.2 MG/DL (ref 0.1–1)
BILIRUB SERPL-MCNC: 0.3 MG/DL (ref 0.1–1)
BILIRUB SERPL-MCNC: 0.4 MG/DL (ref 0.1–1)
BILIRUB SERPL-MCNC: 0.5 MG/DL (ref 0.1–1)
BILIRUB SERPL-MCNC: 0.6 MG/DL (ref 0.1–1)
BILIRUB SERPL-MCNC: 0.7 MG/DL (ref 0.1–1)
BILIRUB SERPL-MCNC: 0.8 MG/DL (ref 0.1–1)
BILIRUB SERPL-MCNC: 0.9 MG/DL (ref 0.1–1)
BILIRUB SERPL-MCNC: 1 MG/DL (ref 0.1–1)
BILIRUB SERPL-MCNC: 1.1 MG/DL (ref 0.1–1)
BILIRUB SERPL-MCNC: 1.2 MG/DL (ref 0.1–1)
BILIRUB SERPL-MCNC: 1.3 MG/DL (ref 0.1–1)
BILIRUB SERPL-MCNC: 1.4 MG/DL (ref 0.1–1)
BILIRUB SERPL-MCNC: 1.5 MG/DL (ref 0.1–1)
BILIRUB SERPL-MCNC: 1.6 MG/DL (ref 0.1–1)
BILIRUB SERPL-MCNC: 1.7 MG/DL (ref 0.1–1)
BILIRUB SERPL-MCNC: 1.8 MG/DL (ref 0.1–1)
BILIRUB SERPL-MCNC: 1.9 MG/DL (ref 0.1–1)
BILIRUB SERPL-MCNC: 2 MG/DL (ref 0.1–1)
BILIRUB SERPL-MCNC: 2.1 MG/DL (ref 0.1–1)
BILIRUB SERPL-MCNC: 2.2 MG/DL (ref 0.1–1)
BILIRUB SERPL-MCNC: 2.4 MG/DL (ref 0.1–1)
BILIRUB SERPL-MCNC: 2.6 MG/DL (ref 0.1–1)
BILIRUB UR QL STRIP: NEGATIVE
BLD GP AB SCN CELLS X3 SERPL QL: NORMAL
BLD PROD TYP BPU: NORMAL
BLOOD GROUP ANTIBODIES SERPL: NORMAL
BLOOD UNIT EXPIRATION DATE: NORMAL
BLOOD UNIT TYPE CODE: 5100
BLOOD UNIT TYPE CODE: 6200
BLOOD UNIT TYPE CODE: 6200
BLOOD UNIT TYPE CODE: 7300
BLOOD UNIT TYPE: NORMAL
BSA FOR ECHO PROCEDURE: 1.69 M2
BSA FOR ECHO PROCEDURE: 1.69 M2
BSA FOR ECHO PROCEDURE: 1.75 M2
BUN SERPL-MCNC: 10 MG/DL (ref 6–20)
BUN SERPL-MCNC: 11 MG/DL (ref 6–20)
BUN SERPL-MCNC: 13 MG/DL (ref 6–20)
BUN SERPL-MCNC: 14 MG/DL (ref 6–20)
BUN SERPL-MCNC: 15 MG/DL (ref 6–20)
BUN SERPL-MCNC: 16 MG/DL (ref 6–20)
BUN SERPL-MCNC: 17 MG/DL (ref 6–20)
BUN SERPL-MCNC: 18 MG/DL (ref 6–20)
BUN SERPL-MCNC: 19 MG/DL (ref 6–20)
BUN SERPL-MCNC: 21 MG/DL (ref 6–20)
BUN SERPL-MCNC: 21 MG/DL (ref 6–20)
BUN SERPL-MCNC: 23 MG/DL (ref 6–20)
BUN SERPL-MCNC: 23 MG/DL (ref 6–20)
BUN SERPL-MCNC: 24 MG/DL (ref 6–20)
BUN SERPL-MCNC: 25 MG/DL (ref 6–20)
BUN SERPL-MCNC: 26 MG/DL (ref 6–20)
BUN SERPL-MCNC: 27 MG/DL (ref 6–20)
BUN SERPL-MCNC: 28 MG/DL (ref 6–20)
BUN SERPL-MCNC: 29 MG/DL (ref 6–20)
BUN SERPL-MCNC: 30 MG/DL (ref 6–20)
BUN SERPL-MCNC: 31 MG/DL (ref 6–20)
BUN SERPL-MCNC: 32 MG/DL (ref 6–20)
BUN SERPL-MCNC: 33 MG/DL (ref 6–20)
BUN SERPL-MCNC: 35 MG/DL (ref 6–20)
BUN SERPL-MCNC: 36 MG/DL (ref 6–20)
BUN SERPL-MCNC: 37 MG/DL (ref 6–20)
BUN SERPL-MCNC: 37 MG/DL (ref 6–20)
BUN SERPL-MCNC: 38 MG/DL (ref 6–20)
BUN SERPL-MCNC: 39 MG/DL (ref 6–20)
BUN SERPL-MCNC: 40 MG/DL (ref 6–20)
BUN SERPL-MCNC: 41 MG/DL (ref 6–20)
BUN SERPL-MCNC: 41 MG/DL (ref 6–20)
BUN SERPL-MCNC: 42 MG/DL (ref 6–20)
BUN SERPL-MCNC: 43 MG/DL (ref 6–20)
BUN SERPL-MCNC: 44 MG/DL (ref 6–20)
BUN SERPL-MCNC: 45 MG/DL (ref 6–20)
BUN SERPL-MCNC: 46 MG/DL (ref 6–20)
BUN SERPL-MCNC: 47 MG/DL (ref 6–20)
BUN SERPL-MCNC: 48 MG/DL (ref 6–20)
BUN SERPL-MCNC: 49 MG/DL (ref 6–20)
BUN SERPL-MCNC: 50 MG/DL (ref 6–20)
BUN SERPL-MCNC: 51 MG/DL (ref 6–20)
BUN SERPL-MCNC: 52 MG/DL (ref 6–20)
BUN SERPL-MCNC: 52 MG/DL (ref 6–20)
BUN SERPL-MCNC: 53 MG/DL (ref 6–20)
BUN SERPL-MCNC: 54 MG/DL (ref 6–20)
BUN SERPL-MCNC: 55 MG/DL (ref 6–20)
BUN SERPL-MCNC: 56 MG/DL (ref 6–20)
BUN SERPL-MCNC: 57 MG/DL (ref 6–20)
BUN SERPL-MCNC: 57 MG/DL (ref 6–20)
BUN SERPL-MCNC: 59 MG/DL (ref 6–20)
BUN SERPL-MCNC: 6 MG/DL (ref 6–20)
BUN SERPL-MCNC: 6 MG/DL (ref 6–20)
BUN SERPL-MCNC: 60 MG/DL (ref 6–20)
BUN SERPL-MCNC: 60 MG/DL (ref 6–20)
BUN SERPL-MCNC: 61 MG/DL (ref 6–20)
BUN SERPL-MCNC: 62 MG/DL (ref 6–20)
BUN SERPL-MCNC: 63 MG/DL (ref 6–20)
BUN SERPL-MCNC: 64 MG/DL (ref 6–20)
BUN SERPL-MCNC: 65 MG/DL (ref 6–20)
BUN SERPL-MCNC: 65 MG/DL (ref 6–20)
BUN SERPL-MCNC: 66 MG/DL (ref 6–20)
BUN SERPL-MCNC: 67 MG/DL (ref 6–20)
BUN SERPL-MCNC: 68 MG/DL (ref 6–20)
BUN SERPL-MCNC: 69 MG/DL (ref 6–20)
BUN SERPL-MCNC: 7 MG/DL (ref 6–20)
BUN SERPL-MCNC: 7 MG/DL (ref 6–20)
BUN SERPL-MCNC: 70 MG/DL (ref 6–20)
BUN SERPL-MCNC: 71 MG/DL (ref 6–20)
BUN SERPL-MCNC: 72 MG/DL (ref 6–20)
BUN SERPL-MCNC: 73 MG/DL (ref 6–20)
BUN SERPL-MCNC: 74 MG/DL (ref 6–20)
BUN SERPL-MCNC: 75 MG/DL (ref 6–20)
BUN SERPL-MCNC: 75 MG/DL (ref 6–20)
BUN SERPL-MCNC: 76 MG/DL (ref 6–20)
BUN SERPL-MCNC: 77 MG/DL (ref 6–20)
BUN SERPL-MCNC: 79 MG/DL (ref 6–20)
BUN SERPL-MCNC: 8 MG/DL (ref 6–20)
BUN SERPL-MCNC: 9 MG/DL (ref 6–20)
BURR CELLS BLD QL SMEAR: ABNORMAL
CA-I BLDV-SCNC: 0.95 MMOL/L (ref 1.06–1.42)
CA-I BLDV-SCNC: 0.97 MMOL/L (ref 1.06–1.42)
CA-I BLDV-SCNC: 1.03 MMOL/L (ref 1.06–1.42)
CA-I BLDV-SCNC: 1.07 MMOL/L (ref 1.06–1.42)
CA-I BLDV-SCNC: 1.08 MMOL/L (ref 1.06–1.42)
CA-I BLDV-SCNC: 1.09 MMOL/L (ref 1.06–1.42)
CA-I BLDV-SCNC: 1.1 MMOL/L (ref 1.06–1.42)
CA-I BLDV-SCNC: 1.1 MMOL/L (ref 1.06–1.42)
CA-I BLDV-SCNC: 1.11 MMOL/L (ref 1.06–1.42)
CA-I BLDV-SCNC: 1.11 MMOL/L (ref 1.06–1.42)
CA-I BLDV-SCNC: 1.13 MMOL/L (ref 1.06–1.42)
CA-I BLDV-SCNC: 1.15 MMOL/L (ref 1.06–1.42)
CA-I BLDV-SCNC: 1.15 MMOL/L (ref 1.06–1.42)
CA-I BLDV-SCNC: 1.16 MMOL/L (ref 1.06–1.42)
CA-I BLDV-SCNC: 1.17 MMOL/L (ref 1.06–1.42)
CA-I BLDV-SCNC: 1.18 MMOL/L (ref 1.06–1.42)
CA-I BLDV-SCNC: 1.2 MMOL/L (ref 1.06–1.42)
CA-I BLDV-SCNC: 1.21 MMOL/L (ref 1.06–1.42)
CA-I BLDV-SCNC: 1.21 MMOL/L (ref 1.06–1.42)
CA-I BLDV-SCNC: 1.22 MMOL/L (ref 1.06–1.42)
CA-I BLDV-SCNC: 1.22 MMOL/L (ref 1.06–1.42)
CA-I BLDV-SCNC: 1.23 MMOL/L (ref 1.06–1.42)
CA-I BLDV-SCNC: 1.24 MMOL/L (ref 1.06–1.42)
CA-I BLDV-SCNC: 1.24 MMOL/L (ref 1.06–1.42)
CA-I BLDV-SCNC: 1.25 MMOL/L (ref 1.06–1.42)
CA-I BLDV-SCNC: 1.26 MMOL/L (ref 1.06–1.42)
CA-I BLDV-SCNC: 1.27 MMOL/L (ref 1.06–1.42)
CA-I BLDV-SCNC: 1.28 MMOL/L (ref 1.06–1.42)
CA-I BLDV-SCNC: 1.29 MMOL/L (ref 1.06–1.42)
CA-I BLDV-SCNC: 1.29 MMOL/L (ref 1.06–1.42)
CA-I BLDV-SCNC: 1.31 MMOL/L (ref 1.06–1.42)
CA-I BLDV-SCNC: 1.31 MMOL/L (ref 1.06–1.42)
CA-I BLDV-SCNC: 1.33 MMOL/L (ref 1.06–1.42)
CA-I BLDV-SCNC: 1.34 MMOL/L (ref 1.06–1.42)
CALCIUM SERPL-MCNC: 10 MG/DL (ref 8.7–10.5)
CALCIUM SERPL-MCNC: 10.1 MG/DL (ref 8.7–10.5)
CALCIUM SERPL-MCNC: 10.2 MG/DL (ref 8.7–10.5)
CALCIUM SERPL-MCNC: 10.3 MG/DL (ref 8.7–10.5)
CALCIUM SERPL-MCNC: 10.4 MG/DL (ref 8.7–10.5)
CALCIUM SERPL-MCNC: 10.5 MG/DL (ref 8.7–10.5)
CALCIUM SERPL-MCNC: 10.6 MG/DL (ref 8.7–10.5)
CALCIUM SERPL-MCNC: 10.7 MG/DL (ref 8.7–10.5)
CALCIUM SERPL-MCNC: 10.8 MG/DL (ref 8.7–10.5)
CALCIUM SERPL-MCNC: 10.8 MG/DL (ref 8.7–10.5)
CALCIUM SERPL-MCNC: 6.8 MG/DL (ref 8.7–10.5)
CALCIUM SERPL-MCNC: 7.3 MG/DL (ref 8.7–10.5)
CALCIUM SERPL-MCNC: 7.4 MG/DL (ref 8.7–10.5)
CALCIUM SERPL-MCNC: 7.5 MG/DL (ref 8.7–10.5)
CALCIUM SERPL-MCNC: 7.5 MG/DL (ref 8.7–10.5)
CALCIUM SERPL-MCNC: 7.6 MG/DL (ref 8.7–10.5)
CALCIUM SERPL-MCNC: 7.7 MG/DL (ref 8.7–10.5)
CALCIUM SERPL-MCNC: 7.7 MG/DL (ref 8.7–10.5)
CALCIUM SERPL-MCNC: 7.8 MG/DL (ref 8.7–10.5)
CALCIUM SERPL-MCNC: 7.9 MG/DL (ref 8.7–10.5)
CALCIUM SERPL-MCNC: 8 MG/DL (ref 8.7–10.5)
CALCIUM SERPL-MCNC: 8.1 MG/DL (ref 8.7–10.5)
CALCIUM SERPL-MCNC: 8.2 MG/DL (ref 8.7–10.5)
CALCIUM SERPL-MCNC: 8.3 MG/DL (ref 8.7–10.5)
CALCIUM SERPL-MCNC: 8.4 MG/DL (ref 8.7–10.5)
CALCIUM SERPL-MCNC: 8.5 MG/DL (ref 8.7–10.5)
CALCIUM SERPL-MCNC: 8.6 MG/DL (ref 8.7–10.5)
CALCIUM SERPL-MCNC: 8.7 MG/DL (ref 8.7–10.5)
CALCIUM SERPL-MCNC: 8.8 MG/DL (ref 8.7–10.5)
CALCIUM SERPL-MCNC: 8.8 MG/DL (ref 8.7–10.5)
CALCIUM SERPL-MCNC: 8.9 MG/DL (ref 8.7–10.5)
CALCIUM SERPL-MCNC: 9 MG/DL (ref 8.7–10.5)
CALCIUM SERPL-MCNC: 9.1 MG/DL (ref 8.7–10.5)
CALCIUM SERPL-MCNC: 9.2 MG/DL (ref 8.7–10.5)
CALCIUM SERPL-MCNC: 9.3 MG/DL (ref 8.7–10.5)
CALCIUM SERPL-MCNC: 9.4 MG/DL (ref 8.7–10.5)
CALCIUM SERPL-MCNC: 9.5 MG/DL (ref 8.7–10.5)
CALCIUM SERPL-MCNC: 9.6 MG/DL (ref 8.7–10.5)
CALCIUM SERPL-MCNC: 9.7 MG/DL (ref 8.7–10.5)
CALCIUM SERPL-MCNC: 9.8 MG/DL (ref 8.7–10.5)
CALCIUM SERPL-MCNC: 9.9 MG/DL (ref 8.7–10.5)
CHLORIDE SERPL-SCNC: 100 MMOL/L (ref 95–110)
CHLORIDE SERPL-SCNC: 101 MMOL/L (ref 95–110)
CHLORIDE SERPL-SCNC: 102 MMOL/L (ref 95–110)
CHLORIDE SERPL-SCNC: 103 MMOL/L (ref 95–110)
CHLORIDE SERPL-SCNC: 104 MMOL/L (ref 95–110)
CHLORIDE SERPL-SCNC: 105 MMOL/L (ref 95–110)
CHLORIDE SERPL-SCNC: 106 MMOL/L (ref 95–110)
CHLORIDE SERPL-SCNC: 107 MMOL/L (ref 95–110)
CHLORIDE SERPL-SCNC: 108 MMOL/L (ref 95–110)
CHLORIDE SERPL-SCNC: 109 MMOL/L (ref 95–110)
CHLORIDE SERPL-SCNC: 110 MMOL/L (ref 95–110)
CHLORIDE SERPL-SCNC: 111 MMOL/L (ref 95–110)
CHLORIDE SERPL-SCNC: 112 MMOL/L (ref 95–110)
CHLORIDE SERPL-SCNC: 113 MMOL/L (ref 95–110)
CHLORIDE SERPL-SCNC: 114 MMOL/L (ref 95–110)
CHLORIDE SERPL-SCNC: 115 MMOL/L (ref 95–110)
CHLORIDE SERPL-SCNC: 116 MMOL/L (ref 95–110)
CHLORIDE SERPL-SCNC: 116 MMOL/L (ref 95–110)
CHLORIDE SERPL-SCNC: 117 MMOL/L (ref 95–110)
CHLORIDE SERPL-SCNC: 72 MMOL/L (ref 95–110)
CHLORIDE SERPL-SCNC: 74 MMOL/L (ref 95–110)
CHLORIDE SERPL-SCNC: 77 MMOL/L (ref 95–110)
CHLORIDE SERPL-SCNC: 77 MMOL/L (ref 95–110)
CHLORIDE SERPL-SCNC: 78 MMOL/L (ref 95–110)
CHLORIDE SERPL-SCNC: 80 MMOL/L (ref 95–110)
CHLORIDE SERPL-SCNC: 81 MMOL/L (ref 95–110)
CHLORIDE SERPL-SCNC: 81 MMOL/L (ref 95–110)
CHLORIDE SERPL-SCNC: 85 MMOL/L (ref 95–110)
CHLORIDE SERPL-SCNC: 86 MMOL/L (ref 95–110)
CHLORIDE SERPL-SCNC: 87 MMOL/L (ref 95–110)
CHLORIDE SERPL-SCNC: 88 MMOL/L (ref 95–110)
CHLORIDE SERPL-SCNC: 89 MMOL/L (ref 95–110)
CHLORIDE SERPL-SCNC: 89 MMOL/L (ref 95–110)
CHLORIDE SERPL-SCNC: 90 MMOL/L (ref 95–110)
CHLORIDE SERPL-SCNC: 91 MMOL/L (ref 95–110)
CHLORIDE SERPL-SCNC: 92 MMOL/L (ref 95–110)
CHLORIDE SERPL-SCNC: 93 MMOL/L (ref 95–110)
CHLORIDE SERPL-SCNC: 94 MMOL/L (ref 95–110)
CHLORIDE SERPL-SCNC: 95 MMOL/L (ref 95–110)
CHLORIDE SERPL-SCNC: 96 MMOL/L (ref 95–110)
CHLORIDE SERPL-SCNC: 97 MMOL/L (ref 95–110)
CHLORIDE SERPL-SCNC: 98 MMOL/L (ref 95–110)
CHLORIDE SERPL-SCNC: 99 MMOL/L (ref 95–110)
CK SERPL-CCNC: 253 U/L (ref 20–200)
CLARITY UR REFRACT.AUTO: ABNORMAL
CLARITY UR REFRACT.AUTO: CLEAR
CLARITY UR REFRACT.AUTO: CLEAR
CO2 SERPL-SCNC: 19 MMOL/L (ref 23–29)
CO2 SERPL-SCNC: 21 MMOL/L (ref 23–29)
CO2 SERPL-SCNC: 21 MMOL/L (ref 23–29)
CO2 SERPL-SCNC: 22 MMOL/L (ref 23–29)
CO2 SERPL-SCNC: 22 MMOL/L (ref 23–29)
CO2 SERPL-SCNC: 23 MMOL/L (ref 23–29)
CO2 SERPL-SCNC: 24 MMOL/L (ref 23–29)
CO2 SERPL-SCNC: 25 MMOL/L (ref 23–29)
CO2 SERPL-SCNC: 26 MMOL/L (ref 23–29)
CO2 SERPL-SCNC: 27 MMOL/L (ref 23–29)
CO2 SERPL-SCNC: 28 MMOL/L (ref 23–29)
CO2 SERPL-SCNC: 29 MMOL/L (ref 23–29)
CO2 SERPL-SCNC: 30 MMOL/L (ref 23–29)
CO2 SERPL-SCNC: 31 MMOL/L (ref 23–29)
CO2 SERPL-SCNC: 32 MMOL/L (ref 23–29)
CO2 SERPL-SCNC: 33 MMOL/L (ref 23–29)
CO2 SERPL-SCNC: 34 MMOL/L (ref 23–29)
CO2 SERPL-SCNC: 35 MMOL/L (ref 23–29)
CO2 SERPL-SCNC: 36 MMOL/L (ref 23–29)
CO2 SERPL-SCNC: 37 MMOL/L (ref 23–29)
CO2 SERPL-SCNC: 38 MMOL/L (ref 23–29)
CO2 SERPL-SCNC: 40 MMOL/L (ref 23–29)
CO2 SERPL-SCNC: 40 MMOL/L (ref 23–29)
CO2 SERPL-SCNC: 41 MMOL/L (ref 23–29)
CO2 SERPL-SCNC: 41 MMOL/L (ref 23–29)
CO2 SERPL-SCNC: 42 MMOL/L (ref 23–29)
CO2 SERPL-SCNC: 43 MMOL/L (ref 23–29)
CO2 SERPL-SCNC: 46 MMOL/L (ref 23–29)
CO2 SERPL-SCNC: 47 MMOL/L (ref 23–29)
CO2 SERPL-SCNC: 48 MMOL/L (ref 23–29)
CO2 SERPL-SCNC: 50 MMOL/L (ref 23–29)
CO2 SERPL-SCNC: 50 MMOL/L (ref 23–29)
CODING SYSTEM: NORMAL
COLOR UR AUTO: YELLOW
CORTIS SERPL-MCNC: 15.1 UG/DL (ref 4.3–22.4)
CREAT SERPL-MCNC: 0.5 MG/DL (ref 0.5–1.4)
CREAT SERPL-MCNC: 0.6 MG/DL (ref 0.5–1.4)
CREAT SERPL-MCNC: 0.7 MG/DL (ref 0.5–1.4)
CREAT SERPL-MCNC: 0.8 MG/DL (ref 0.5–1.4)
CREAT SERPL-MCNC: 0.9 MG/DL (ref 0.5–1.4)
CREAT SERPL-MCNC: 1 MG/DL (ref 0.5–1.4)
CREAT SERPL-MCNC: 1.1 MG/DL (ref 0.5–1.4)
CREAT SERPL-MCNC: 1.2 MG/DL (ref 0.5–1.4)
CRP SERPL-MCNC: 101.4 MG/L (ref 0–8.2)
CRP SERPL-MCNC: 102.9 MG/L (ref 0–8.2)
CRP SERPL-MCNC: 104.8 MG/L (ref 0–8.2)
CRP SERPL-MCNC: 104.9 MG/L (ref 0–8.2)
CRP SERPL-MCNC: 107.4 MG/L (ref 0–8.2)
CRP SERPL-MCNC: 107.8 MG/L (ref 0–3.19)
CRP SERPL-MCNC: 107.8 MG/L (ref 0–8.2)
CRP SERPL-MCNC: 108.3 MG/L (ref 0–8.2)
CRP SERPL-MCNC: 109.6 MG/L (ref 0–8.2)
CRP SERPL-MCNC: 109.6 MG/L (ref 0–8.2)
CRP SERPL-MCNC: 121.2 MG/L (ref 0–8.2)
CRP SERPL-MCNC: 121.8 MG/L (ref 0–8.2)
CRP SERPL-MCNC: 123.9 MG/L (ref 0–8.2)
CRP SERPL-MCNC: 125 MG/L (ref 0–8.2)
CRP SERPL-MCNC: 127.7 MG/L (ref 0–8.2)
CRP SERPL-MCNC: 130.2 MG/L (ref 0–8.2)
CRP SERPL-MCNC: 150.5 MG/L (ref 0–8.2)
CRP SERPL-MCNC: 150.7 MG/L (ref 0–8.2)
CRP SERPL-MCNC: 154.4 MG/L (ref 0–8.2)
CRP SERPL-MCNC: 159.3 MG/L (ref 0–8.2)
CRP SERPL-MCNC: 183.3 MG/L (ref 0–8.2)
CRP SERPL-MCNC: 184.4 MG/L (ref 0–8.2)
CRP SERPL-MCNC: 186.6 MG/L (ref 0–8.2)
CRP SERPL-MCNC: 208.2 MG/L (ref 0–8.2)
CRP SERPL-MCNC: 219.6 MG/L (ref 0–8.2)
CRP SERPL-MCNC: 233.6 MG/L (ref 0–8.2)
CRP SERPL-MCNC: 238.7 MG/L (ref 0–8.2)
CRP SERPL-MCNC: 280.7 MG/L (ref 0–8.2)
CRP SERPL-MCNC: 29.1 MG/L (ref 0–8.2)
CRP SERPL-MCNC: 30.2 MG/L (ref 0–8.2)
CRP SERPL-MCNC: 31 MG/L (ref 0–8.2)
CRP SERPL-MCNC: 32.5 MG/L (ref 0–8.2)
CRP SERPL-MCNC: 324.5 MG/L (ref 0–8.2)
CRP SERPL-MCNC: 33.3 MG/L (ref 0–8.2)
CRP SERPL-MCNC: 37.7 MG/L (ref 0–8.2)
CRP SERPL-MCNC: 38.6 MG/L (ref 0–8.2)
CRP SERPL-MCNC: 39.3 MG/L (ref 0–8.2)
CRP SERPL-MCNC: 394.8 MG/L (ref 0–8.2)
CRP SERPL-MCNC: 42 MG/L (ref 0–8.2)
CRP SERPL-MCNC: 45.31 MG/L (ref 0–3.19)
CRP SERPL-MCNC: 45.8 MG/L (ref 0–8.2)
CRP SERPL-MCNC: 46.7 MG/L (ref 0–8.2)
CRP SERPL-MCNC: 47.7 MG/L (ref 0–8.2)
CRP SERPL-MCNC: 48.9 MG/L (ref 0–8.2)
CRP SERPL-MCNC: 49.5 MG/L (ref 0–8.2)
CRP SERPL-MCNC: 50.3 MG/L (ref 0–8.2)
CRP SERPL-MCNC: 50.7 MG/L (ref 0–8.2)
CRP SERPL-MCNC: 51 MG/L (ref 0–8.2)
CRP SERPL-MCNC: 54.4 MG/L (ref 0–8.2)
CRP SERPL-MCNC: 55 MG/L (ref 0–8.2)
CRP SERPL-MCNC: 59.3 MG/L (ref 0–8.2)
CRP SERPL-MCNC: 63.4 MG/L (ref 0–8.2)
CRP SERPL-MCNC: 66 MG/L (ref 0–8.2)
CRP SERPL-MCNC: 68.5 MG/L (ref 0–8.2)
CRP SERPL-MCNC: 71.1 MG/L (ref 0–8.2)
CRP SERPL-MCNC: 72 MG/L (ref 0–8.2)
CRP SERPL-MCNC: 74.2 MG/L (ref 0–8.2)
CRP SERPL-MCNC: 77 MG/L (ref 0–8.2)
CRP SERPL-MCNC: 79 MG/L (ref 0–8.2)
CRP SERPL-MCNC: 79.6 MG/L (ref 0–8.2)
CRP SERPL-MCNC: 80 MG/L (ref 0–8.2)
CRP SERPL-MCNC: 80 MG/L (ref 0–8.2)
CRP SERPL-MCNC: 80.1 MG/L (ref 0–8.2)
CRP SERPL-MCNC: 82.3 MG/L (ref 0–8.2)
CRP SERPL-MCNC: 84 MG/L (ref 0–8.2)
CRP SERPL-MCNC: 84.8 MG/L (ref 0–8.2)
CRP SERPL-MCNC: 86.1 MG/L (ref 0–8.2)
CRP SERPL-MCNC: 90.6 MG/L (ref 0–8.2)
CRP SERPL-MCNC: 93.5 MG/L (ref 0–8.2)
CRP SERPL-MCNC: 99.6 MG/L (ref 0–8.2)
CV ECHO LV RWT: 0.32 CM
CV ECHO LV RWT: 0.39 CM
D DIMER PPP IA.FEU-MCNC: 10.32 MG/L FEU
D DIMER PPP IA.FEU-MCNC: 10.38 MG/L FEU
D DIMER PPP IA.FEU-MCNC: 10.38 MG/L FEU
D DIMER PPP IA.FEU-MCNC: 10.5 MG/L FEU
D DIMER PPP IA.FEU-MCNC: 10.59 MG/L FEU
D DIMER PPP IA.FEU-MCNC: 10.63 MG/L FEU
D DIMER PPP IA.FEU-MCNC: 11.48 MG/L FEU
D DIMER PPP IA.FEU-MCNC: 11.51 MG/L FEU
D DIMER PPP IA.FEU-MCNC: 11.68 MG/L FEU
D DIMER PPP IA.FEU-MCNC: 12.11 MG/L FEU
D DIMER PPP IA.FEU-MCNC: 12.33 MG/L FEU
D DIMER PPP IA.FEU-MCNC: 12.74 MG/L FEU
D DIMER PPP IA.FEU-MCNC: 13.36 MG/L FEU
D DIMER PPP IA.FEU-MCNC: 13.36 MG/L FEU
D DIMER PPP IA.FEU-MCNC: 13.38 MG/L FEU
D DIMER PPP IA.FEU-MCNC: 13.47 MG/L FEU
D DIMER PPP IA.FEU-MCNC: 13.61 MG/L FEU
D DIMER PPP IA.FEU-MCNC: 13.82 MG/L FEU
D DIMER PPP IA.FEU-MCNC: 14 MG/L FEU
D DIMER PPP IA.FEU-MCNC: 14.14 MG/L FEU
D DIMER PPP IA.FEU-MCNC: 14.18 MG/L FEU
D DIMER PPP IA.FEU-MCNC: 14.31 MG/L FEU
D DIMER PPP IA.FEU-MCNC: 14.38 MG/L FEU
D DIMER PPP IA.FEU-MCNC: 14.82 MG/L FEU
D DIMER PPP IA.FEU-MCNC: 15.15 MG/L FEU
D DIMER PPP IA.FEU-MCNC: 15.6 MG/L FEU
D DIMER PPP IA.FEU-MCNC: 15.87 MG/L FEU
D DIMER PPP IA.FEU-MCNC: 19.83 MG/L FEU
D DIMER PPP IA.FEU-MCNC: 19.9 MG/L FEU
D DIMER PPP IA.FEU-MCNC: 21.35 MG/L FEU
D DIMER PPP IA.FEU-MCNC: 24.79 MG/L FEU
D DIMER PPP IA.FEU-MCNC: 25.66 MG/L FEU
D DIMER PPP IA.FEU-MCNC: 25.73 MG/L FEU
D DIMER PPP IA.FEU-MCNC: 3.22 MG/L FEU
D DIMER PPP IA.FEU-MCNC: 3.47 MG/L FEU
D DIMER PPP IA.FEU-MCNC: 3.59 MG/L FEU
D DIMER PPP IA.FEU-MCNC: 3.82 MG/L FEU
D DIMER PPP IA.FEU-MCNC: 3.98 MG/L FEU
D DIMER PPP IA.FEU-MCNC: 32.21 MG/L FEU
D DIMER PPP IA.FEU-MCNC: 32.36 MG/L FEU
D DIMER PPP IA.FEU-MCNC: 4 MG/L FEU
D DIMER PPP IA.FEU-MCNC: 4.09 MG/L FEU
D DIMER PPP IA.FEU-MCNC: 4.09 MG/L FEU
D DIMER PPP IA.FEU-MCNC: 4.37 MG/L FEU
D DIMER PPP IA.FEU-MCNC: 5.27 MG/L FEU
D DIMER PPP IA.FEU-MCNC: 5.32 MG/L FEU
D DIMER PPP IA.FEU-MCNC: 5.81 MG/L FEU
D DIMER PPP IA.FEU-MCNC: 6.24 MG/L FEU
D DIMER PPP IA.FEU-MCNC: 6.41 MG/L FEU
D DIMER PPP IA.FEU-MCNC: 6.71 MG/L FEU
D DIMER PPP IA.FEU-MCNC: 6.88 MG/L FEU
D DIMER PPP IA.FEU-MCNC: 7.01 MG/L FEU
D DIMER PPP IA.FEU-MCNC: 7.43 MG/L FEU
D DIMER PPP IA.FEU-MCNC: 8.63 MG/L FEU
D DIMER PPP IA.FEU-MCNC: 8.64 MG/L FEU
D DIMER PPP IA.FEU-MCNC: 8.9 MG/L FEU
D DIMER PPP IA.FEU-MCNC: 9.34 MG/L FEU
D DIMER PPP IA.FEU-MCNC: 9.47 MG/L FEU
D DIMER PPP IA.FEU-MCNC: 9.49 MG/L FEU
D DIMER PPP IA.FEU-MCNC: 9.54 MG/L FEU
D DIMER PPP IA.FEU-MCNC: 9.66 MG/L FEU
D DIMER PPP IA.FEU-MCNC: 9.77 MG/L FEU
D DIMER PPP IA.FEU-MCNC: >33 MG/L FEU
DACRYOCYTES BLD QL SMEAR: ABNORMAL
DAT IGG-SP REAG RBC-IMP: NORMAL
DAT IGG-SP REAG RBC-IMP: NORMAL
DELSYS: ABNORMAL
DIFFERENTIAL METHOD: ABNORMAL
DISPENSE STATUS: NORMAL
DOHLE BOD BLD QL SMEAR: PRESENT
DOP CALC AO PEAK VEL: 1.13 M/S
DOP CALC AO PEAK VEL: 1.54 M/S
DOP CALC AO VTI: 18.84 CM
DOP CALC AO VTI: 27.35 CM
DOP CALC LVOT AREA: 3.5 CM2
DOP CALC LVOT AREA: 3.8 CM2
DOP CALC LVOT DIAMETER: 2.11 CM
DOP CALC LVOT DIAMETER: 2.19 CM
DOP CALC LVOT PEAK VEL: 0.69 M/S
DOP CALC LVOT PEAK VEL: 1.05 M/S
DOP CALC LVOT STROKE VOLUME: 40.93 CM3
DOP CALC LVOT STROKE VOLUME: 69.73 CM3
DOP CALCLVOT PEAK VEL VTI: 11.71 CM
DOP CALCLVOT PEAK VEL VTI: 18.52 CM
E WAVE DECELERATION TIME: 156.43 MSEC
E/A RATIO: 1.64
E/E' RATIO: 7 M/S
ECHO LV POSTERIOR WALL: 0.79 CM (ref 0.6–1.1)
ECHO LV POSTERIOR WALL: 0.79 CM (ref 0.6–1.1)
EOSINOPHIL # BLD AUTO: 0 K/UL (ref 0–0.5)
EOSINOPHIL # BLD AUTO: 0.1 K/UL (ref 0–0.5)
EOSINOPHIL # BLD AUTO: 0.2 K/UL (ref 0–0.5)
EOSINOPHIL # BLD AUTO: 0.4 K/UL (ref 0–0.5)
EOSINOPHIL # BLD AUTO: 0.5 K/UL (ref 0–0.5)
EOSINOPHIL # BLD AUTO: 0.6 K/UL (ref 0–0.5)
EOSINOPHIL # BLD AUTO: 0.7 K/UL (ref 0–0.5)
EOSINOPHIL # BLD AUTO: 0.8 K/UL (ref 0–0.5)
EOSINOPHIL # BLD AUTO: 0.9 K/UL (ref 0–0.5)
EOSINOPHIL # BLD AUTO: 1 K/UL (ref 0–0.5)
EOSINOPHIL # BLD AUTO: 1.1 K/UL (ref 0–0.5)
EOSINOPHIL # BLD AUTO: 1.2 K/UL (ref 0–0.5)
EOSINOPHIL # BLD AUTO: 1.3 K/UL (ref 0–0.5)
EOSINOPHIL # BLD AUTO: 1.4 K/UL (ref 0–0.5)
EOSINOPHIL # BLD AUTO: 1.5 K/UL (ref 0–0.5)
EOSINOPHIL # BLD AUTO: 1.5 K/UL (ref 0–0.5)
EOSINOPHIL # BLD AUTO: 1.6 K/UL (ref 0–0.5)
EOSINOPHIL # BLD AUTO: 1.7 K/UL (ref 0–0.5)
EOSINOPHIL # BLD AUTO: 1.8 K/UL (ref 0–0.5)
EOSINOPHIL # BLD AUTO: 1.9 K/UL (ref 0–0.5)
EOSINOPHIL # BLD AUTO: 2 K/UL (ref 0–0.5)
EOSINOPHIL # BLD AUTO: 2.1 K/UL (ref 0–0.5)
EOSINOPHIL # BLD AUTO: 2.2 K/UL (ref 0–0.5)
EOSINOPHIL # BLD AUTO: 2.3 K/UL (ref 0–0.5)
EOSINOPHIL # BLD AUTO: 2.4 K/UL (ref 0–0.5)
EOSINOPHIL # BLD AUTO: 2.5 K/UL (ref 0–0.5)
EOSINOPHIL # BLD AUTO: ABNORMAL K/UL (ref 0–0.5)
EOSINOPHIL NFR BLD: 0 % (ref 0–8)
EOSINOPHIL NFR BLD: 0.1 % (ref 0–8)
EOSINOPHIL NFR BLD: 0.2 % (ref 0–8)
EOSINOPHIL NFR BLD: 0.2 % (ref 0–8)
EOSINOPHIL NFR BLD: 0.4 % (ref 0–8)
EOSINOPHIL NFR BLD: 0.5 % (ref 0–8)
EOSINOPHIL NFR BLD: 1 % (ref 0–8)
EOSINOPHIL NFR BLD: 1.1 % (ref 0–8)
EOSINOPHIL NFR BLD: 1.2 % (ref 0–8)
EOSINOPHIL NFR BLD: 1.3 % (ref 0–8)
EOSINOPHIL NFR BLD: 1.3 % (ref 0–8)
EOSINOPHIL NFR BLD: 1.5 % (ref 0–8)
EOSINOPHIL NFR BLD: 1.9 % (ref 0–8)
EOSINOPHIL NFR BLD: 10 % (ref 0–8)
EOSINOPHIL NFR BLD: 10 % (ref 0–8)
EOSINOPHIL NFR BLD: 10.3 % (ref 0–8)
EOSINOPHIL NFR BLD: 10.4 % (ref 0–8)
EOSINOPHIL NFR BLD: 10.6 % (ref 0–8)
EOSINOPHIL NFR BLD: 10.7 % (ref 0–8)
EOSINOPHIL NFR BLD: 10.7 % (ref 0–8)
EOSINOPHIL NFR BLD: 10.8 % (ref 0–8)
EOSINOPHIL NFR BLD: 11 % (ref 0–8)
EOSINOPHIL NFR BLD: 11 % (ref 0–8)
EOSINOPHIL NFR BLD: 11.2 % (ref 0–8)
EOSINOPHIL NFR BLD: 11.2 % (ref 0–8)
EOSINOPHIL NFR BLD: 11.3 % (ref 0–8)
EOSINOPHIL NFR BLD: 11.3 % (ref 0–8)
EOSINOPHIL NFR BLD: 11.5 % (ref 0–8)
EOSINOPHIL NFR BLD: 11.6 % (ref 0–8)
EOSINOPHIL NFR BLD: 11.6 % (ref 0–8)
EOSINOPHIL NFR BLD: 11.7 % (ref 0–8)
EOSINOPHIL NFR BLD: 11.7 % (ref 0–8)
EOSINOPHIL NFR BLD: 11.8 % (ref 0–8)
EOSINOPHIL NFR BLD: 11.8 % (ref 0–8)
EOSINOPHIL NFR BLD: 12 % (ref 0–8)
EOSINOPHIL NFR BLD: 12.2 % (ref 0–8)
EOSINOPHIL NFR BLD: 12.2 % (ref 0–8)
EOSINOPHIL NFR BLD: 12.3 % (ref 0–8)
EOSINOPHIL NFR BLD: 12.3 % (ref 0–8)
EOSINOPHIL NFR BLD: 12.5 % (ref 0–8)
EOSINOPHIL NFR BLD: 12.6 % (ref 0–8)
EOSINOPHIL NFR BLD: 12.7 % (ref 0–8)
EOSINOPHIL NFR BLD: 12.7 % (ref 0–8)
EOSINOPHIL NFR BLD: 12.9 % (ref 0–8)
EOSINOPHIL NFR BLD: 13 % (ref 0–8)
EOSINOPHIL NFR BLD: 13.1 % (ref 0–8)
EOSINOPHIL NFR BLD: 13.2 % (ref 0–8)
EOSINOPHIL NFR BLD: 13.3 % (ref 0–8)
EOSINOPHIL NFR BLD: 13.5 % (ref 0–8)
EOSINOPHIL NFR BLD: 13.6 % (ref 0–8)
EOSINOPHIL NFR BLD: 13.7 % (ref 0–8)
EOSINOPHIL NFR BLD: 13.8 % (ref 0–8)
EOSINOPHIL NFR BLD: 13.8 % (ref 0–8)
EOSINOPHIL NFR BLD: 13.9 % (ref 0–8)
EOSINOPHIL NFR BLD: 14 % (ref 0–8)
EOSINOPHIL NFR BLD: 14 % (ref 0–8)
EOSINOPHIL NFR BLD: 14.1 % (ref 0–8)
EOSINOPHIL NFR BLD: 14.1 % (ref 0–8)
EOSINOPHIL NFR BLD: 14.2 % (ref 0–8)
EOSINOPHIL NFR BLD: 14.3 % (ref 0–8)
EOSINOPHIL NFR BLD: 14.4 % (ref 0–8)
EOSINOPHIL NFR BLD: 14.5 % (ref 0–8)
EOSINOPHIL NFR BLD: 14.5 % (ref 0–8)
EOSINOPHIL NFR BLD: 14.6 % (ref 0–8)
EOSINOPHIL NFR BLD: 14.6 % (ref 0–8)
EOSINOPHIL NFR BLD: 14.8 % (ref 0–8)
EOSINOPHIL NFR BLD: 14.8 % (ref 0–8)
EOSINOPHIL NFR BLD: 14.9 % (ref 0–8)
EOSINOPHIL NFR BLD: 15 % (ref 0–8)
EOSINOPHIL NFR BLD: 15.1 % (ref 0–8)
EOSINOPHIL NFR BLD: 15.2 % (ref 0–8)
EOSINOPHIL NFR BLD: 15.2 % (ref 0–8)
EOSINOPHIL NFR BLD: 15.3 % (ref 0–8)
EOSINOPHIL NFR BLD: 15.7 % (ref 0–8)
EOSINOPHIL NFR BLD: 15.7 % (ref 0–8)
EOSINOPHIL NFR BLD: 16 % (ref 0–8)
EOSINOPHIL NFR BLD: 16 % (ref 0–8)
EOSINOPHIL NFR BLD: 16.1 % (ref 0–8)
EOSINOPHIL NFR BLD: 16.2 % (ref 0–8)
EOSINOPHIL NFR BLD: 16.2 % (ref 0–8)
EOSINOPHIL NFR BLD: 16.3 % (ref 0–8)
EOSINOPHIL NFR BLD: 16.4 % (ref 0–8)
EOSINOPHIL NFR BLD: 16.6 % (ref 0–8)
EOSINOPHIL NFR BLD: 16.7 % (ref 0–8)
EOSINOPHIL NFR BLD: 16.9 % (ref 0–8)
EOSINOPHIL NFR BLD: 17 % (ref 0–8)
EOSINOPHIL NFR BLD: 17.2 % (ref 0–8)
EOSINOPHIL NFR BLD: 17.8 % (ref 0–8)
EOSINOPHIL NFR BLD: 18.3 % (ref 0–8)
EOSINOPHIL NFR BLD: 2 % (ref 0–8)
EOSINOPHIL NFR BLD: 2.2 % (ref 0–8)
EOSINOPHIL NFR BLD: 2.7 % (ref 0–8)
EOSINOPHIL NFR BLD: 2.7 % (ref 0–8)
EOSINOPHIL NFR BLD: 2.9 % (ref 0–8)
EOSINOPHIL NFR BLD: 3 % (ref 0–8)
EOSINOPHIL NFR BLD: 3.1 % (ref 0–8)
EOSINOPHIL NFR BLD: 3.1 % (ref 0–8)
EOSINOPHIL NFR BLD: 3.2 % (ref 0–8)
EOSINOPHIL NFR BLD: 3.5 % (ref 0–8)
EOSINOPHIL NFR BLD: 3.5 % (ref 0–8)
EOSINOPHIL NFR BLD: 3.7 % (ref 0–8)
EOSINOPHIL NFR BLD: 3.8 % (ref 0–8)
EOSINOPHIL NFR BLD: 3.9 % (ref 0–8)
EOSINOPHIL NFR BLD: 3.9 % (ref 0–8)
EOSINOPHIL NFR BLD: 4 % (ref 0–8)
EOSINOPHIL NFR BLD: 4.1 % (ref 0–8)
EOSINOPHIL NFR BLD: 4.1 % (ref 0–8)
EOSINOPHIL NFR BLD: 4.2 % (ref 0–8)
EOSINOPHIL NFR BLD: 4.3 % (ref 0–8)
EOSINOPHIL NFR BLD: 4.3 % (ref 0–8)
EOSINOPHIL NFR BLD: 4.4 % (ref 0–8)
EOSINOPHIL NFR BLD: 4.4 % (ref 0–8)
EOSINOPHIL NFR BLD: 4.6 % (ref 0–8)
EOSINOPHIL NFR BLD: 4.7 % (ref 0–8)
EOSINOPHIL NFR BLD: 4.8 % (ref 0–8)
EOSINOPHIL NFR BLD: 4.8 % (ref 0–8)
EOSINOPHIL NFR BLD: 5 % (ref 0–8)
EOSINOPHIL NFR BLD: 5.5 % (ref 0–8)
EOSINOPHIL NFR BLD: 5.6 % (ref 0–8)
EOSINOPHIL NFR BLD: 5.6 % (ref 0–8)
EOSINOPHIL NFR BLD: 5.8 % (ref 0–8)
EOSINOPHIL NFR BLD: 5.8 % (ref 0–8)
EOSINOPHIL NFR BLD: 5.9 % (ref 0–8)
EOSINOPHIL NFR BLD: 6 % (ref 0–8)
EOSINOPHIL NFR BLD: 6.1 % (ref 0–8)
EOSINOPHIL NFR BLD: 6.2 % (ref 0–8)
EOSINOPHIL NFR BLD: 6.3 % (ref 0–8)
EOSINOPHIL NFR BLD: 6.3 % (ref 0–8)
EOSINOPHIL NFR BLD: 6.4 % (ref 0–8)
EOSINOPHIL NFR BLD: 6.5 % (ref 0–8)
EOSINOPHIL NFR BLD: 6.6 % (ref 0–8)
EOSINOPHIL NFR BLD: 6.7 % (ref 0–8)
EOSINOPHIL NFR BLD: 6.7 % (ref 0–8)
EOSINOPHIL NFR BLD: 6.8 % (ref 0–8)
EOSINOPHIL NFR BLD: 6.9 % (ref 0–8)
EOSINOPHIL NFR BLD: 6.9 % (ref 0–8)
EOSINOPHIL NFR BLD: 7 % (ref 0–8)
EOSINOPHIL NFR BLD: 7.1 % (ref 0–8)
EOSINOPHIL NFR BLD: 7.3 % (ref 0–8)
EOSINOPHIL NFR BLD: 7.4 % (ref 0–8)
EOSINOPHIL NFR BLD: 7.4 % (ref 0–8)
EOSINOPHIL NFR BLD: 7.5 % (ref 0–8)
EOSINOPHIL NFR BLD: 7.5 % (ref 0–8)
EOSINOPHIL NFR BLD: 7.7 % (ref 0–8)
EOSINOPHIL NFR BLD: 7.9 % (ref 0–8)
EOSINOPHIL NFR BLD: 8 % (ref 0–8)
EOSINOPHIL NFR BLD: 8.1 % (ref 0–8)
EOSINOPHIL NFR BLD: 8.2 % (ref 0–8)
EOSINOPHIL NFR BLD: 8.3 % (ref 0–8)
EOSINOPHIL NFR BLD: 8.3 % (ref 0–8)
EOSINOPHIL NFR BLD: 8.4 % (ref 0–8)
EOSINOPHIL NFR BLD: 8.5 % (ref 0–8)
EOSINOPHIL NFR BLD: 8.6 % (ref 0–8)
EOSINOPHIL NFR BLD: 8.7 % (ref 0–8)
EOSINOPHIL NFR BLD: 8.8 % (ref 0–8)
EOSINOPHIL NFR BLD: 8.8 % (ref 0–8)
EOSINOPHIL NFR BLD: 9 % (ref 0–8)
EOSINOPHIL NFR BLD: 9.1 % (ref 0–8)
EOSINOPHIL NFR BLD: 9.2 % (ref 0–8)
EOSINOPHIL NFR BLD: 9.3 % (ref 0–8)
EOSINOPHIL NFR BLD: 9.4 % (ref 0–8)
EOSINOPHIL NFR BLD: 9.5 % (ref 0–8)
EOSINOPHIL NFR BLD: 9.6 % (ref 0–8)
EOSINOPHIL NFR BLD: 9.9 % (ref 0–8)
ERYTHROCYTE [DISTWIDTH] IN BLOOD BY AUTOMATED COUNT: 13.2 % (ref 11.5–14.5)
ERYTHROCYTE [DISTWIDTH] IN BLOOD BY AUTOMATED COUNT: 13.2 % (ref 11.5–14.5)
ERYTHROCYTE [DISTWIDTH] IN BLOOD BY AUTOMATED COUNT: 13.3 % (ref 11.5–14.5)
ERYTHROCYTE [DISTWIDTH] IN BLOOD BY AUTOMATED COUNT: 13.3 % (ref 11.5–14.5)
ERYTHROCYTE [DISTWIDTH] IN BLOOD BY AUTOMATED COUNT: 13.4 % (ref 11.5–14.5)
ERYTHROCYTE [DISTWIDTH] IN BLOOD BY AUTOMATED COUNT: 13.5 % (ref 11.5–14.5)
ERYTHROCYTE [DISTWIDTH] IN BLOOD BY AUTOMATED COUNT: 13.6 % (ref 11.5–14.5)
ERYTHROCYTE [DISTWIDTH] IN BLOOD BY AUTOMATED COUNT: 13.7 % (ref 11.5–14.5)
ERYTHROCYTE [DISTWIDTH] IN BLOOD BY AUTOMATED COUNT: 13.8 % (ref 11.5–14.5)
ERYTHROCYTE [DISTWIDTH] IN BLOOD BY AUTOMATED COUNT: 13.9 % (ref 11.5–14.5)
ERYTHROCYTE [DISTWIDTH] IN BLOOD BY AUTOMATED COUNT: 14 % (ref 11.5–14.5)
ERYTHROCYTE [DISTWIDTH] IN BLOOD BY AUTOMATED COUNT: 14.1 % (ref 11.5–14.5)
ERYTHROCYTE [DISTWIDTH] IN BLOOD BY AUTOMATED COUNT: 14.2 % (ref 11.5–14.5)
ERYTHROCYTE [DISTWIDTH] IN BLOOD BY AUTOMATED COUNT: 14.3 % (ref 11.5–14.5)
ERYTHROCYTE [DISTWIDTH] IN BLOOD BY AUTOMATED COUNT: 14.4 % (ref 11.5–14.5)
ERYTHROCYTE [DISTWIDTH] IN BLOOD BY AUTOMATED COUNT: 14.5 % (ref 11.5–14.5)
ERYTHROCYTE [DISTWIDTH] IN BLOOD BY AUTOMATED COUNT: 14.6 % (ref 11.5–14.5)
ERYTHROCYTE [DISTWIDTH] IN BLOOD BY AUTOMATED COUNT: 14.7 % (ref 11.5–14.5)
ERYTHROCYTE [DISTWIDTH] IN BLOOD BY AUTOMATED COUNT: 14.8 % (ref 11.5–14.5)
ERYTHROCYTE [DISTWIDTH] IN BLOOD BY AUTOMATED COUNT: 14.9 % (ref 11.5–14.5)
ERYTHROCYTE [DISTWIDTH] IN BLOOD BY AUTOMATED COUNT: 15 % (ref 11.5–14.5)
ERYTHROCYTE [DISTWIDTH] IN BLOOD BY AUTOMATED COUNT: 15.1 % (ref 11.5–14.5)
ERYTHROCYTE [DISTWIDTH] IN BLOOD BY AUTOMATED COUNT: 15.3 % (ref 11.5–14.5)
ERYTHROCYTE [DISTWIDTH] IN BLOOD BY AUTOMATED COUNT: 15.4 % (ref 11.5–14.5)
ERYTHROCYTE [DISTWIDTH] IN BLOOD BY AUTOMATED COUNT: 15.5 % (ref 11.5–14.5)
ERYTHROCYTE [DISTWIDTH] IN BLOOD BY AUTOMATED COUNT: 15.6 % (ref 11.5–14.5)
ERYTHROCYTE [DISTWIDTH] IN BLOOD BY AUTOMATED COUNT: 15.7 % (ref 11.5–14.5)
ERYTHROCYTE [DISTWIDTH] IN BLOOD BY AUTOMATED COUNT: 15.8 % (ref 11.5–14.5)
ERYTHROCYTE [DISTWIDTH] IN BLOOD BY AUTOMATED COUNT: 15.9 % (ref 11.5–14.5)
ERYTHROCYTE [DISTWIDTH] IN BLOOD BY AUTOMATED COUNT: 16 % (ref 11.5–14.5)
ERYTHROCYTE [DISTWIDTH] IN BLOOD BY AUTOMATED COUNT: 16.1 % (ref 11.5–14.5)
ERYTHROCYTE [DISTWIDTH] IN BLOOD BY AUTOMATED COUNT: 16.2 % (ref 11.5–14.5)
ERYTHROCYTE [DISTWIDTH] IN BLOOD BY AUTOMATED COUNT: 16.2 % (ref 11.5–14.5)
ERYTHROCYTE [DISTWIDTH] IN BLOOD BY AUTOMATED COUNT: 16.3 % (ref 11.5–14.5)
ERYTHROCYTE [DISTWIDTH] IN BLOOD BY AUTOMATED COUNT: 16.3 % (ref 11.5–14.5)
ERYTHROCYTE [DISTWIDTH] IN BLOOD BY AUTOMATED COUNT: 16.4 % (ref 11.5–14.5)
ERYTHROCYTE [DISTWIDTH] IN BLOOD BY AUTOMATED COUNT: 16.5 % (ref 11.5–14.5)
ERYTHROCYTE [DISTWIDTH] IN BLOOD BY AUTOMATED COUNT: 16.9 % (ref 11.5–14.5)
ERYTHROCYTE [DISTWIDTH] IN BLOOD BY AUTOMATED COUNT: 17.1 % (ref 11.5–14.5)
ERYTHROCYTE [DISTWIDTH] IN BLOOD BY AUTOMATED COUNT: 17.2 % (ref 11.5–14.5)
ERYTHROCYTE [DISTWIDTH] IN BLOOD BY AUTOMATED COUNT: 17.3 % (ref 11.5–14.5)
ERYTHROCYTE [DISTWIDTH] IN BLOOD BY AUTOMATED COUNT: 17.4 % (ref 11.5–14.5)
ERYTHROCYTE [SEDIMENTATION RATE] IN BLOOD BY WESTERGREN METHOD: 24 MM/H
ERYTHROCYTE [SEDIMENTATION RATE] IN BLOOD BY WESTERGREN METHOD: 24 MM/H
ERYTHROCYTE [SEDIMENTATION RATE] IN BLOOD BY WESTERGREN METHOD: 26 MM/H
ERYTHROCYTE [SEDIMENTATION RATE] IN BLOOD BY WESTERGREN METHOD: 28 MM/H
ERYTHROCYTE [SEDIMENTATION RATE] IN BLOOD BY WESTERGREN METHOD: 35 MM/H
ERYTHROCYTE [SEDIMENTATION RATE] IN BLOOD BY WESTERGREN METHOD: 36 MM/H
ERYTHROCYTE [SEDIMENTATION RATE] IN BLOOD BY WESTERGREN METHOD: 40 MM/H
EST. GFR  (AFRICAN AMERICAN): >60 ML/MIN/1.73 M^2
EST. GFR  (NON AFRICAN AMERICAN): >60 ML/MIN/1.73 M^2
ESTIMATED AVG GLUCOSE: 143 MG/DL (ref 68–131)
ETCO2: 56
FACT X PPP CHRO-ACNC: 0.1 IU/ML (ref 0.3–0.7)
FACT X PPP CHRO-ACNC: 0.11 IU/ML (ref 0.3–0.7)
FACT X PPP CHRO-ACNC: 0.12 IU/ML (ref 0.3–0.7)
FACT X PPP CHRO-ACNC: 0.13 IU/ML (ref 0.3–0.7)
FACT X PPP CHRO-ACNC: 0.14 IU/ML (ref 0.3–0.7)
FACT X PPP CHRO-ACNC: 0.14 IU/ML (ref 0.3–0.7)
FACT X PPP CHRO-ACNC: 0.15 IU/ML (ref 0.3–0.7)
FACT X PPP CHRO-ACNC: 0.16 IU/ML (ref 0.3–0.7)
FACT X PPP CHRO-ACNC: 0.17 IU/ML (ref 0.3–0.7)
FACT X PPP CHRO-ACNC: 0.18 IU/ML (ref 0.3–0.7)
FACT X PPP CHRO-ACNC: 0.19 IU/ML (ref 0.3–0.7)
FACT X PPP CHRO-ACNC: 0.2 IU/ML (ref 0.3–0.7)
FACT X PPP CHRO-ACNC: 0.21 IU/ML (ref 0.3–0.7)
FACT X PPP CHRO-ACNC: 0.22 IU/ML (ref 0.3–0.7)
FACT X PPP CHRO-ACNC: 0.23 IU/ML (ref 0.3–0.7)
FACT X PPP CHRO-ACNC: 0.24 IU/ML (ref 0.3–0.7)
FACT X PPP CHRO-ACNC: 0.25 IU/ML (ref 0.3–0.7)
FACT X PPP CHRO-ACNC: 0.26 IU/ML (ref 0.3–0.7)
FACT X PPP CHRO-ACNC: 0.27 IU/ML (ref 0.3–0.7)
FACT X PPP CHRO-ACNC: 0.28 IU/ML (ref 0.3–0.7)
FACT X PPP CHRO-ACNC: 0.29 IU/ML (ref 0.3–0.7)
FACT X PPP CHRO-ACNC: 0.3 IU/ML (ref 0.3–0.7)
FACT X PPP CHRO-ACNC: 0.31 IU/ML (ref 0.3–0.7)
FACT X PPP CHRO-ACNC: 0.32 IU/ML (ref 0.3–0.7)
FACT X PPP CHRO-ACNC: 0.33 IU/ML (ref 0.3–0.7)
FACT X PPP CHRO-ACNC: 0.34 IU/ML (ref 0.3–0.7)
FACT X PPP CHRO-ACNC: 0.35 IU/ML (ref 0.3–0.7)
FACT X PPP CHRO-ACNC: 0.35 IU/ML (ref 0.3–0.7)
FACT X PPP CHRO-ACNC: 0.36 IU/ML (ref 0.3–0.7)
FACT X PPP CHRO-ACNC: 0.37 IU/ML (ref 0.3–0.7)
FACT X PPP CHRO-ACNC: 0.38 IU/ML (ref 0.3–0.7)
FACT X PPP CHRO-ACNC: 0.38 IU/ML (ref 0.3–0.7)
FACT X PPP CHRO-ACNC: 0.4 IU/ML (ref 0.3–0.7)
FACT X PPP CHRO-ACNC: 0.41 IU/ML (ref 0.3–0.7)
FACT X PPP CHRO-ACNC: 0.42 IU/ML (ref 0.3–0.7)
FACT X PPP CHRO-ACNC: 0.43 IU/ML (ref 0.3–0.7)
FACT X PPP CHRO-ACNC: 0.44 IU/ML (ref 0.3–0.7)
FACT X PPP CHRO-ACNC: 0.45 IU/ML (ref 0.3–0.7)
FACT X PPP CHRO-ACNC: 0.47 IU/ML (ref 0.3–0.7)
FACT X PPP CHRO-ACNC: 0.5 IU/ML (ref 0.3–0.7)
FACT X PPP CHRO-ACNC: 0.63 IU/ML (ref 0.3–0.7)
FACT X PPP CHRO-ACNC: 0.76 IU/ML (ref 0.3–0.7)
FACT X PPP CHRO-ACNC: 1.07 IU/ML (ref 0.3–0.7)
FACT X PPP CHRO-ACNC: <0.1 IU/ML (ref 0.3–0.7)
FACT X PPP CHRO-ACNC: >1.5 IU/ML (ref 0.3–0.7)
FERRITIN SERPL-MCNC: 1051 NG/ML (ref 20–300)
FERRITIN SERPL-MCNC: 1078 NG/ML (ref 20–300)
FERRITIN SERPL-MCNC: 1172 NG/ML (ref 20–300)
FERRITIN SERPL-MCNC: 1206 NG/ML (ref 20–300)
FERRITIN SERPL-MCNC: 1215 NG/ML (ref 20–300)
FERRITIN SERPL-MCNC: 1215 NG/ML (ref 20–300)
FERRITIN SERPL-MCNC: 1220 NG/ML (ref 20–300)
FERRITIN SERPL-MCNC: 1260 NG/ML (ref 20–300)
FERRITIN SERPL-MCNC: 1262 NG/ML (ref 20–300)
FERRITIN SERPL-MCNC: 1284 NG/ML (ref 20–300)
FERRITIN SERPL-MCNC: 1373 NG/ML (ref 20–300)
FERRITIN SERPL-MCNC: 1388 NG/ML (ref 20–300)
FERRITIN SERPL-MCNC: 1397 NG/ML (ref 20–300)
FERRITIN SERPL-MCNC: 1401 NG/ML (ref 20–300)
FERRITIN SERPL-MCNC: 1408 NG/ML (ref 20–300)
FERRITIN SERPL-MCNC: 1440 NG/ML (ref 20–300)
FERRITIN SERPL-MCNC: 1447 NG/ML (ref 20–300)
FERRITIN SERPL-MCNC: 1458 NG/ML (ref 20–300)
FERRITIN SERPL-MCNC: 1465 NG/ML (ref 20–300)
FERRITIN SERPL-MCNC: 1473 NG/ML (ref 20–300)
FERRITIN SERPL-MCNC: 1473 NG/ML (ref 20–300)
FERRITIN SERPL-MCNC: 1487 NG/ML (ref 20–300)
FERRITIN SERPL-MCNC: 1496 NG/ML (ref 20–300)
FERRITIN SERPL-MCNC: 1509 NG/ML (ref 20–300)
FERRITIN SERPL-MCNC: 1510 NG/ML (ref 20–300)
FERRITIN SERPL-MCNC: 1531 NG/ML (ref 20–300)
FERRITIN SERPL-MCNC: 1540 NG/ML (ref 20–300)
FERRITIN SERPL-MCNC: 1540 NG/ML (ref 20–300)
FERRITIN SERPL-MCNC: 1553 NG/ML (ref 20–300)
FERRITIN SERPL-MCNC: 1579 NG/ML (ref 20–300)
FERRITIN SERPL-MCNC: 1615 NG/ML (ref 20–300)
FERRITIN SERPL-MCNC: 1642 NG/ML (ref 20–300)
FERRITIN SERPL-MCNC: 1654 NG/ML (ref 20–300)
FERRITIN SERPL-MCNC: 1658 NG/ML (ref 20–300)
FERRITIN SERPL-MCNC: 1683 NG/ML (ref 20–300)
FERRITIN SERPL-MCNC: 1689 NG/ML (ref 20–300)
FERRITIN SERPL-MCNC: 1693 NG/ML (ref 20–300)
FERRITIN SERPL-MCNC: 1694 NG/ML (ref 20–300)
FERRITIN SERPL-MCNC: 1727 NG/ML (ref 20–300)
FERRITIN SERPL-MCNC: 1824 NG/ML (ref 20–300)
FERRITIN SERPL-MCNC: 1839 NG/ML (ref 20–300)
FERRITIN SERPL-MCNC: 1841 NG/ML (ref 20–300)
FERRITIN SERPL-MCNC: 1853 NG/ML (ref 20–300)
FERRITIN SERPL-MCNC: 1873 NG/ML (ref 20–300)
FERRITIN SERPL-MCNC: 1918 NG/ML (ref 20–300)
FERRITIN SERPL-MCNC: 1951 NG/ML (ref 20–300)
FERRITIN SERPL-MCNC: 1968 NG/ML (ref 20–300)
FERRITIN SERPL-MCNC: 2040 NG/ML (ref 20–300)
FERRITIN SERPL-MCNC: 2101 NG/ML (ref 20–300)
FERRITIN SERPL-MCNC: 2115 NG/ML (ref 20–300)
FERRITIN SERPL-MCNC: 2154 NG/ML (ref 20–300)
FERRITIN SERPL-MCNC: 2174 NG/ML (ref 20–300)
FERRITIN SERPL-MCNC: 2197 NG/ML (ref 20–300)
FERRITIN SERPL-MCNC: 2224 NG/ML (ref 20–300)
FERRITIN SERPL-MCNC: 2226 NG/ML (ref 20–300)
FERRITIN SERPL-MCNC: 2245 NG/ML (ref 20–300)
FERRITIN SERPL-MCNC: 2347 NG/ML (ref 20–300)
FERRITIN SERPL-MCNC: 2362 NG/ML (ref 20–300)
FERRITIN SERPL-MCNC: 2376 NG/ML (ref 20–300)
FERRITIN SERPL-MCNC: 2377 NG/ML (ref 20–300)
FERRITIN SERPL-MCNC: 2578 NG/ML (ref 20–300)
FERRITIN SERPL-MCNC: 2627 NG/ML (ref 20–300)
FERRITIN SERPL-MCNC: 2691 NG/ML (ref 20–300)
FERRITIN SERPL-MCNC: 909 NG/ML (ref 20–300)
FERRITIN SERPL-MCNC: 955 NG/ML (ref 20–300)
FIBRINOGEN PPP-MCNC: 100 MG/DL (ref 182–366)
FIBRINOGEN PPP-MCNC: 100 MG/DL (ref 182–366)
FIBRINOGEN PPP-MCNC: 103 MG/DL (ref 182–366)
FIBRINOGEN PPP-MCNC: 105 MG/DL (ref 182–366)
FIBRINOGEN PPP-MCNC: 107 MG/DL (ref 182–366)
FIBRINOGEN PPP-MCNC: 109 MG/DL (ref 182–366)
FIBRINOGEN PPP-MCNC: 117 MG/DL (ref 182–366)
FIBRINOGEN PPP-MCNC: 126 MG/DL (ref 182–366)
FIBRINOGEN PPP-MCNC: 129 MG/DL (ref 182–366)
FIBRINOGEN PPP-MCNC: 129 MG/DL (ref 182–366)
FIBRINOGEN PPP-MCNC: 131 MG/DL (ref 182–366)
FIBRINOGEN PPP-MCNC: 135 MG/DL (ref 182–366)
FIBRINOGEN PPP-MCNC: 136 MG/DL (ref 182–366)
FIBRINOGEN PPP-MCNC: 138 MG/DL (ref 182–366)
FIBRINOGEN PPP-MCNC: 140 MG/DL (ref 182–366)
FIBRINOGEN PPP-MCNC: 140 MG/DL (ref 182–366)
FIBRINOGEN PPP-MCNC: 141 MG/DL (ref 182–366)
FIBRINOGEN PPP-MCNC: 142 MG/DL (ref 182–366)
FIBRINOGEN PPP-MCNC: 144 MG/DL (ref 182–366)
FIBRINOGEN PPP-MCNC: 150 MG/DL (ref 182–366)
FIBRINOGEN PPP-MCNC: 152 MG/DL (ref 182–366)
FIBRINOGEN PPP-MCNC: 153 MG/DL (ref 182–366)
FIBRINOGEN PPP-MCNC: 153 MG/DL (ref 182–366)
FIBRINOGEN PPP-MCNC: 156 MG/DL (ref 182–366)
FIBRINOGEN PPP-MCNC: 157 MG/DL (ref 182–366)
FIBRINOGEN PPP-MCNC: 160 MG/DL (ref 182–366)
FIBRINOGEN PPP-MCNC: 162 MG/DL (ref 182–366)
FIBRINOGEN PPP-MCNC: 162 MG/DL (ref 182–366)
FIBRINOGEN PPP-MCNC: 167 MG/DL (ref 182–366)
FIBRINOGEN PPP-MCNC: 169 MG/DL (ref 182–366)
FIBRINOGEN PPP-MCNC: 169 MG/DL (ref 182–366)
FIBRINOGEN PPP-MCNC: 171 MG/DL (ref 182–366)
FIBRINOGEN PPP-MCNC: 171 MG/DL (ref 182–366)
FIBRINOGEN PPP-MCNC: 173 MG/DL (ref 182–366)
FIBRINOGEN PPP-MCNC: 174 MG/DL (ref 182–366)
FIBRINOGEN PPP-MCNC: 176 MG/DL (ref 182–366)
FIBRINOGEN PPP-MCNC: 177 MG/DL (ref 182–366)
FIBRINOGEN PPP-MCNC: 179 MG/DL (ref 182–366)
FIBRINOGEN PPP-MCNC: 179 MG/DL (ref 182–366)
FIBRINOGEN PPP-MCNC: 182 MG/DL (ref 182–366)
FIBRINOGEN PPP-MCNC: 182 MG/DL (ref 182–366)
FIBRINOGEN PPP-MCNC: 183 MG/DL (ref 182–366)
FIBRINOGEN PPP-MCNC: 185 MG/DL (ref 182–366)
FIBRINOGEN PPP-MCNC: 185 MG/DL (ref 182–366)
FIBRINOGEN PPP-MCNC: 188 MG/DL (ref 182–366)
FIBRINOGEN PPP-MCNC: 189 MG/DL (ref 182–366)
FIBRINOGEN PPP-MCNC: 190 MG/DL (ref 182–366)
FIBRINOGEN PPP-MCNC: 190 MG/DL (ref 182–366)
FIBRINOGEN PPP-MCNC: 194 MG/DL (ref 182–366)
FIBRINOGEN PPP-MCNC: 195 MG/DL (ref 182–366)
FIBRINOGEN PPP-MCNC: 196 MG/DL (ref 182–366)
FIBRINOGEN PPP-MCNC: 199 MG/DL (ref 182–366)
FIBRINOGEN PPP-MCNC: 200 MG/DL (ref 182–366)
FIBRINOGEN PPP-MCNC: 206 MG/DL (ref 182–366)
FIBRINOGEN PPP-MCNC: 207 MG/DL (ref 182–366)
FIBRINOGEN PPP-MCNC: 207 MG/DL (ref 182–366)
FIBRINOGEN PPP-MCNC: 208 MG/DL (ref 182–366)
FIBRINOGEN PPP-MCNC: 210 MG/DL (ref 182–366)
FIBRINOGEN PPP-MCNC: 211 MG/DL (ref 182–366)
FIBRINOGEN PPP-MCNC: 214 MG/DL (ref 182–366)
FIBRINOGEN PPP-MCNC: 214 MG/DL (ref 182–366)
FIBRINOGEN PPP-MCNC: 215 MG/DL (ref 182–366)
FIBRINOGEN PPP-MCNC: 215 MG/DL (ref 182–366)
FIBRINOGEN PPP-MCNC: 217 MG/DL (ref 182–366)
FIBRINOGEN PPP-MCNC: 219 MG/DL (ref 182–366)
FIBRINOGEN PPP-MCNC: 220 MG/DL (ref 182–366)
FIBRINOGEN PPP-MCNC: 221 MG/DL (ref 182–366)
FIBRINOGEN PPP-MCNC: 223 MG/DL (ref 182–366)
FIBRINOGEN PPP-MCNC: 224 MG/DL (ref 182–366)
FIBRINOGEN PPP-MCNC: 225 MG/DL (ref 182–366)
FIBRINOGEN PPP-MCNC: 226 MG/DL (ref 182–366)
FIBRINOGEN PPP-MCNC: 226 MG/DL (ref 182–366)
FIBRINOGEN PPP-MCNC: 227 MG/DL (ref 182–366)
FIBRINOGEN PPP-MCNC: 227 MG/DL (ref 182–366)
FIBRINOGEN PPP-MCNC: 228 MG/DL (ref 182–366)
FIBRINOGEN PPP-MCNC: 228 MG/DL (ref 182–366)
FIBRINOGEN PPP-MCNC: 230 MG/DL (ref 182–366)
FIBRINOGEN PPP-MCNC: 230 MG/DL (ref 182–366)
FIBRINOGEN PPP-MCNC: 231 MG/DL (ref 182–366)
FIBRINOGEN PPP-MCNC: 232 MG/DL (ref 182–366)
FIBRINOGEN PPP-MCNC: 233 MG/DL (ref 182–366)
FIBRINOGEN PPP-MCNC: 233 MG/DL (ref 182–366)
FIBRINOGEN PPP-MCNC: 234 MG/DL (ref 182–366)
FIBRINOGEN PPP-MCNC: 235 MG/DL (ref 182–366)
FIBRINOGEN PPP-MCNC: 236 MG/DL (ref 182–366)
FIBRINOGEN PPP-MCNC: 239 MG/DL (ref 182–366)
FIBRINOGEN PPP-MCNC: 240 MG/DL (ref 182–366)
FIBRINOGEN PPP-MCNC: 241 MG/DL (ref 182–366)
FIBRINOGEN PPP-MCNC: 248 MG/DL (ref 182–366)
FIBRINOGEN PPP-MCNC: 249 MG/DL (ref 182–366)
FIBRINOGEN PPP-MCNC: 249 MG/DL (ref 182–366)
FIBRINOGEN PPP-MCNC: 251 MG/DL (ref 182–366)
FIBRINOGEN PPP-MCNC: 252 MG/DL (ref 182–366)
FIBRINOGEN PPP-MCNC: 252 MG/DL (ref 182–366)
FIBRINOGEN PPP-MCNC: 253 MG/DL (ref 182–366)
FIBRINOGEN PPP-MCNC: 255 MG/DL (ref 182–366)
FIBRINOGEN PPP-MCNC: 257 MG/DL (ref 182–366)
FIBRINOGEN PPP-MCNC: 259 MG/DL (ref 182–366)
FIBRINOGEN PPP-MCNC: 264 MG/DL (ref 182–366)
FIBRINOGEN PPP-MCNC: 270 MG/DL (ref 182–366)
FIBRINOGEN PPP-MCNC: 274 MG/DL (ref 182–366)
FIBRINOGEN PPP-MCNC: 275 MG/DL (ref 182–366)
FIBRINOGEN PPP-MCNC: 277 MG/DL (ref 182–366)
FIBRINOGEN PPP-MCNC: 277 MG/DL (ref 182–366)
FIBRINOGEN PPP-MCNC: 279 MG/DL (ref 182–366)
FIBRINOGEN PPP-MCNC: 280 MG/DL (ref 182–366)
FIBRINOGEN PPP-MCNC: 282 MG/DL (ref 182–366)
FIBRINOGEN PPP-MCNC: 283 MG/DL (ref 182–366)
FIBRINOGEN PPP-MCNC: 287 MG/DL (ref 182–366)
FIBRINOGEN PPP-MCNC: 289 MG/DL (ref 182–366)
FIBRINOGEN PPP-MCNC: 290 MG/DL (ref 182–366)
FIBRINOGEN PPP-MCNC: 291 MG/DL (ref 182–366)
FIBRINOGEN PPP-MCNC: 296 MG/DL (ref 182–366)
FIBRINOGEN PPP-MCNC: 297 MG/DL (ref 182–366)
FIBRINOGEN PPP-MCNC: 300 MG/DL (ref 182–366)
FIBRINOGEN PPP-MCNC: 301 MG/DL (ref 182–366)
FIBRINOGEN PPP-MCNC: 302 MG/DL (ref 182–366)
FIBRINOGEN PPP-MCNC: 304 MG/DL (ref 182–366)
FIBRINOGEN PPP-MCNC: 305 MG/DL (ref 182–366)
FIBRINOGEN PPP-MCNC: 307 MG/DL (ref 182–366)
FIBRINOGEN PPP-MCNC: 309 MG/DL (ref 182–366)
FIBRINOGEN PPP-MCNC: 311 MG/DL (ref 182–366)
FIBRINOGEN PPP-MCNC: 312 MG/DL (ref 182–366)
FIBRINOGEN PPP-MCNC: 313 MG/DL (ref 182–366)
FIBRINOGEN PPP-MCNC: 314 MG/DL (ref 182–366)
FIBRINOGEN PPP-MCNC: 314 MG/DL (ref 182–366)
FIBRINOGEN PPP-MCNC: 315 MG/DL (ref 182–366)
FIBRINOGEN PPP-MCNC: 320 MG/DL (ref 182–366)
FIBRINOGEN PPP-MCNC: 321 MG/DL (ref 182–366)
FIBRINOGEN PPP-MCNC: 321 MG/DL (ref 182–366)
FIBRINOGEN PPP-MCNC: 325 MG/DL (ref 182–366)
FIBRINOGEN PPP-MCNC: 326 MG/DL (ref 182–366)
FIBRINOGEN PPP-MCNC: 326 MG/DL (ref 182–366)
FIBRINOGEN PPP-MCNC: 327 MG/DL (ref 182–366)
FIBRINOGEN PPP-MCNC: 329 MG/DL (ref 182–366)
FIBRINOGEN PPP-MCNC: 330 MG/DL (ref 182–366)
FIBRINOGEN PPP-MCNC: 331 MG/DL (ref 182–366)
FIBRINOGEN PPP-MCNC: 332 MG/DL (ref 182–366)
FIBRINOGEN PPP-MCNC: 334 MG/DL (ref 182–366)
FIBRINOGEN PPP-MCNC: 335 MG/DL (ref 182–366)
FIBRINOGEN PPP-MCNC: 337 MG/DL (ref 182–366)
FIBRINOGEN PPP-MCNC: 338 MG/DL (ref 182–366)
FIBRINOGEN PPP-MCNC: 338 MG/DL (ref 182–366)
FIBRINOGEN PPP-MCNC: 339 MG/DL (ref 182–366)
FIBRINOGEN PPP-MCNC: 339 MG/DL (ref 182–366)
FIBRINOGEN PPP-MCNC: 340 MG/DL (ref 182–366)
FIBRINOGEN PPP-MCNC: 340 MG/DL (ref 182–366)
FIBRINOGEN PPP-MCNC: 341 MG/DL (ref 182–366)
FIBRINOGEN PPP-MCNC: 341 MG/DL (ref 182–366)
FIBRINOGEN PPP-MCNC: 342 MG/DL (ref 182–366)
FIBRINOGEN PPP-MCNC: 344 MG/DL (ref 182–366)
FIBRINOGEN PPP-MCNC: 344 MG/DL (ref 182–366)
FIBRINOGEN PPP-MCNC: 345 MG/DL (ref 182–366)
FIBRINOGEN PPP-MCNC: 345 MG/DL (ref 182–366)
FIBRINOGEN PPP-MCNC: 346 MG/DL (ref 182–366)
FIBRINOGEN PPP-MCNC: 346 MG/DL (ref 182–366)
FIBRINOGEN PPP-MCNC: 348 MG/DL (ref 182–366)
FIBRINOGEN PPP-MCNC: 349 MG/DL (ref 182–366)
FIBRINOGEN PPP-MCNC: 352 MG/DL (ref 182–366)
FIBRINOGEN PPP-MCNC: 353 MG/DL (ref 182–366)
FIBRINOGEN PPP-MCNC: 354 MG/DL (ref 182–366)
FIBRINOGEN PPP-MCNC: 354 MG/DL (ref 182–366)
FIBRINOGEN PPP-MCNC: 357 MG/DL (ref 182–366)
FIBRINOGEN PPP-MCNC: 358 MG/DL (ref 182–366)
FIBRINOGEN PPP-MCNC: 361 MG/DL (ref 182–366)
FIBRINOGEN PPP-MCNC: 362 MG/DL (ref 182–366)
FIBRINOGEN PPP-MCNC: 363 MG/DL (ref 182–366)
FIBRINOGEN PPP-MCNC: 364 MG/DL (ref 182–366)
FIBRINOGEN PPP-MCNC: 365 MG/DL (ref 182–366)
FIBRINOGEN PPP-MCNC: 366 MG/DL (ref 182–366)
FIBRINOGEN PPP-MCNC: 367 MG/DL (ref 182–366)
FIBRINOGEN PPP-MCNC: 368 MG/DL (ref 182–366)
FIBRINOGEN PPP-MCNC: 369 MG/DL (ref 182–366)
FIBRINOGEN PPP-MCNC: 371 MG/DL (ref 182–366)
FIBRINOGEN PPP-MCNC: 373 MG/DL (ref 182–366)
FIBRINOGEN PPP-MCNC: 375 MG/DL (ref 182–366)
FIBRINOGEN PPP-MCNC: 375 MG/DL (ref 182–366)
FIBRINOGEN PPP-MCNC: 384 MG/DL (ref 182–366)
FIBRINOGEN PPP-MCNC: 387 MG/DL (ref 182–366)
FIBRINOGEN PPP-MCNC: 392 MG/DL (ref 182–366)
FIBRINOGEN PPP-MCNC: 394 MG/DL (ref 182–366)
FIBRINOGEN PPP-MCNC: 399 MG/DL (ref 182–366)
FIBRINOGEN PPP-MCNC: 399 MG/DL (ref 182–366)
FIBRINOGEN PPP-MCNC: 406 MG/DL (ref 182–366)
FIBRINOGEN PPP-MCNC: 406 MG/DL (ref 182–366)
FIBRINOGEN PPP-MCNC: 410 MG/DL (ref 182–366)
FIBRINOGEN PPP-MCNC: 411 MG/DL (ref 182–366)
FIBRINOGEN PPP-MCNC: 428 MG/DL (ref 182–366)
FIBRINOGEN PPP-MCNC: 432 MG/DL (ref 182–366)
FIBRINOGEN PPP-MCNC: 442 MG/DL (ref 182–366)
FIBRINOGEN PPP-MCNC: 444 MG/DL (ref 182–366)
FIBRINOGEN PPP-MCNC: 447 MG/DL (ref 182–366)
FIBRINOGEN PPP-MCNC: 454 MG/DL (ref 182–366)
FIBRINOGEN PPP-MCNC: 455 MG/DL (ref 182–366)
FIBRINOGEN PPP-MCNC: 455 MG/DL (ref 182–366)
FIBRINOGEN PPP-MCNC: 466 MG/DL (ref 182–366)
FIBRINOGEN PPP-MCNC: 468 MG/DL (ref 182–366)
FIBRINOGEN PPP-MCNC: 468 MG/DL (ref 182–366)
FIBRINOGEN PPP-MCNC: 476 MG/DL (ref 182–366)
FIBRINOGEN PPP-MCNC: 495 MG/DL (ref 182–366)
FIBRINOGEN PPP-MCNC: 508 MG/DL (ref 182–366)
FIBRINOGEN PPP-MCNC: 522 MG/DL (ref 182–366)
FIBRINOGEN PPP-MCNC: 527 MG/DL (ref 182–366)
FIBRINOGEN PPP-MCNC: 528 MG/DL (ref 182–366)
FIBRINOGEN PPP-MCNC: 551 MG/DL (ref 182–366)
FIBRINOGEN PPP-MCNC: 556 MG/DL (ref 182–366)
FIBRINOGEN PPP-MCNC: 580 MG/DL (ref 182–366)
FIBRINOGEN PPP-MCNC: 580 MG/DL (ref 182–366)
FIBRINOGEN PPP-MCNC: 582 MG/DL (ref 182–366)
FIBRINOGEN PPP-MCNC: 585 MG/DL (ref 182–366)
FIBRINOGEN PPP-MCNC: 587 MG/DL (ref 182–366)
FIBRINOGEN PPP-MCNC: 600 MG/DL (ref 182–366)
FIBRINOGEN PPP-MCNC: 608 MG/DL (ref 182–366)
FIBRINOGEN PPP-MCNC: 609 MG/DL (ref 182–366)
FIBRINOGEN PPP-MCNC: 614 MG/DL (ref 182–366)
FIBRINOGEN PPP-MCNC: 614 MG/DL (ref 182–366)
FIBRINOGEN PPP-MCNC: 618 MG/DL (ref 182–366)
FIBRINOGEN PPP-MCNC: 663 MG/DL (ref 182–366)
FIO2: 100
FIO2: 40
FIO2: 40
FIO2: 50
FIO2: 60
FIO2: 70
FIO2: 80
FIO2: 90
FRACTIONAL SHORTENING: 23 % (ref 28–44)
FRACTIONAL SHORTENING: 27 % (ref 28–44)
G6PD RBC-CCNT: 11.4 U/G HGB (ref 7–20.5)
GIANT PLATELETS BLD QL SMEAR: PRESENT
GLUCOSE SERPL-MCNC: 101 MG/DL (ref 70–110)
GLUCOSE SERPL-MCNC: 105 MG/DL (ref 70–110)
GLUCOSE SERPL-MCNC: 108 MG/DL (ref 70–110)
GLUCOSE SERPL-MCNC: 115 MG/DL (ref 70–110)
GLUCOSE SERPL-MCNC: 115 MG/DL (ref 70–110)
GLUCOSE SERPL-MCNC: 121 MG/DL (ref 70–110)
GLUCOSE SERPL-MCNC: 124 MG/DL (ref 70–110)
GLUCOSE SERPL-MCNC: 125 MG/DL (ref 70–110)
GLUCOSE SERPL-MCNC: 128 MG/DL (ref 70–110)
GLUCOSE SERPL-MCNC: 129 MG/DL (ref 70–110)
GLUCOSE SERPL-MCNC: 130 MG/DL (ref 70–110)
GLUCOSE SERPL-MCNC: 130 MG/DL (ref 70–110)
GLUCOSE SERPL-MCNC: 131 MG/DL (ref 70–110)
GLUCOSE SERPL-MCNC: 131 MG/DL (ref 70–110)
GLUCOSE SERPL-MCNC: 134 MG/DL (ref 70–110)
GLUCOSE SERPL-MCNC: 136 MG/DL (ref 70–110)
GLUCOSE SERPL-MCNC: 136 MG/DL (ref 70–110)
GLUCOSE SERPL-MCNC: 138 MG/DL (ref 70–110)
GLUCOSE SERPL-MCNC: 139 MG/DL (ref 70–110)
GLUCOSE SERPL-MCNC: 141 MG/DL (ref 70–110)
GLUCOSE SERPL-MCNC: 143 MG/DL (ref 70–110)
GLUCOSE SERPL-MCNC: 146 MG/DL (ref 70–110)
GLUCOSE SERPL-MCNC: 147 MG/DL (ref 70–110)
GLUCOSE SERPL-MCNC: 148 MG/DL (ref 70–110)
GLUCOSE SERPL-MCNC: 148 MG/DL (ref 70–110)
GLUCOSE SERPL-MCNC: 149 MG/DL (ref 70–110)
GLUCOSE SERPL-MCNC: 149 MG/DL (ref 70–110)
GLUCOSE SERPL-MCNC: 150 MG/DL (ref 70–110)
GLUCOSE SERPL-MCNC: 150 MG/DL (ref 70–110)
GLUCOSE SERPL-MCNC: 151 MG/DL (ref 70–110)
GLUCOSE SERPL-MCNC: 152 MG/DL (ref 70–110)
GLUCOSE SERPL-MCNC: 153 MG/DL (ref 70–110)
GLUCOSE SERPL-MCNC: 154 MG/DL (ref 70–110)
GLUCOSE SERPL-MCNC: 154 MG/DL (ref 70–110)
GLUCOSE SERPL-MCNC: 155 MG/DL (ref 70–110)
GLUCOSE SERPL-MCNC: 155 MG/DL (ref 70–110)
GLUCOSE SERPL-MCNC: 156 MG/DL (ref 70–110)
GLUCOSE SERPL-MCNC: 157 MG/DL (ref 70–110)
GLUCOSE SERPL-MCNC: 158 MG/DL (ref 70–110)
GLUCOSE SERPL-MCNC: 159 MG/DL (ref 70–110)
GLUCOSE SERPL-MCNC: 161 MG/DL (ref 70–110)
GLUCOSE SERPL-MCNC: 162 MG/DL (ref 70–110)
GLUCOSE SERPL-MCNC: 164 MG/DL (ref 70–110)
GLUCOSE SERPL-MCNC: 164 MG/DL (ref 70–110)
GLUCOSE SERPL-MCNC: 166 MG/DL (ref 70–110)
GLUCOSE SERPL-MCNC: 167 MG/DL (ref 70–110)
GLUCOSE SERPL-MCNC: 168 MG/DL (ref 70–110)
GLUCOSE SERPL-MCNC: 169 MG/DL (ref 70–110)
GLUCOSE SERPL-MCNC: 169 MG/DL (ref 70–110)
GLUCOSE SERPL-MCNC: 170 MG/DL (ref 70–110)
GLUCOSE SERPL-MCNC: 171 MG/DL (ref 70–110)
GLUCOSE SERPL-MCNC: 172 MG/DL (ref 70–110)
GLUCOSE SERPL-MCNC: 174 MG/DL (ref 70–110)
GLUCOSE SERPL-MCNC: 175 MG/DL (ref 70–110)
GLUCOSE SERPL-MCNC: 176 MG/DL (ref 70–110)
GLUCOSE SERPL-MCNC: 177 MG/DL (ref 70–110)
GLUCOSE SERPL-MCNC: 178 MG/DL (ref 70–110)
GLUCOSE SERPL-MCNC: 179 MG/DL (ref 70–110)
GLUCOSE SERPL-MCNC: 179 MG/DL (ref 70–110)
GLUCOSE SERPL-MCNC: 180 MG/DL (ref 70–110)
GLUCOSE SERPL-MCNC: 181 MG/DL (ref 70–110)
GLUCOSE SERPL-MCNC: 182 MG/DL (ref 70–110)
GLUCOSE SERPL-MCNC: 183 MG/DL (ref 70–110)
GLUCOSE SERPL-MCNC: 184 MG/DL (ref 70–110)
GLUCOSE SERPL-MCNC: 184 MG/DL (ref 70–110)
GLUCOSE SERPL-MCNC: 185 MG/DL (ref 70–110)
GLUCOSE SERPL-MCNC: 186 MG/DL (ref 70–110)
GLUCOSE SERPL-MCNC: 186 MG/DL (ref 70–110)
GLUCOSE SERPL-MCNC: 187 MG/DL (ref 70–110)
GLUCOSE SERPL-MCNC: 188 MG/DL (ref 70–110)
GLUCOSE SERPL-MCNC: 189 MG/DL (ref 70–110)
GLUCOSE SERPL-MCNC: 189 MG/DL (ref 70–110)
GLUCOSE SERPL-MCNC: 190 MG/DL (ref 70–110)
GLUCOSE SERPL-MCNC: 191 MG/DL (ref 70–110)
GLUCOSE SERPL-MCNC: 191 MG/DL (ref 70–110)
GLUCOSE SERPL-MCNC: 192 MG/DL (ref 70–110)
GLUCOSE SERPL-MCNC: 193 MG/DL (ref 70–110)
GLUCOSE SERPL-MCNC: 194 MG/DL (ref 70–110)
GLUCOSE SERPL-MCNC: 195 MG/DL (ref 70–110)
GLUCOSE SERPL-MCNC: 196 MG/DL (ref 70–110)
GLUCOSE SERPL-MCNC: 197 MG/DL (ref 70–110)
GLUCOSE SERPL-MCNC: 198 MG/DL (ref 70–110)
GLUCOSE SERPL-MCNC: 199 MG/DL (ref 70–110)
GLUCOSE SERPL-MCNC: 200 MG/DL (ref 70–110)
GLUCOSE SERPL-MCNC: 201 MG/DL (ref 70–110)
GLUCOSE SERPL-MCNC: 202 MG/DL (ref 70–110)
GLUCOSE SERPL-MCNC: 202 MG/DL (ref 70–110)
GLUCOSE SERPL-MCNC: 203 MG/DL (ref 70–110)
GLUCOSE SERPL-MCNC: 203 MG/DL (ref 70–110)
GLUCOSE SERPL-MCNC: 204 MG/DL (ref 70–110)
GLUCOSE SERPL-MCNC: 204 MG/DL (ref 70–110)
GLUCOSE SERPL-MCNC: 205 MG/DL (ref 70–110)
GLUCOSE SERPL-MCNC: 206 MG/DL (ref 70–110)
GLUCOSE SERPL-MCNC: 207 MG/DL (ref 70–110)
GLUCOSE SERPL-MCNC: 208 MG/DL (ref 70–110)
GLUCOSE SERPL-MCNC: 209 MG/DL (ref 70–110)
GLUCOSE SERPL-MCNC: 210 MG/DL (ref 70–110)
GLUCOSE SERPL-MCNC: 211 MG/DL (ref 70–110)
GLUCOSE SERPL-MCNC: 213 MG/DL (ref 70–110)
GLUCOSE SERPL-MCNC: 214 MG/DL (ref 70–110)
GLUCOSE SERPL-MCNC: 214 MG/DL (ref 70–110)
GLUCOSE SERPL-MCNC: 215 MG/DL (ref 70–110)
GLUCOSE SERPL-MCNC: 215 MG/DL (ref 70–110)
GLUCOSE SERPL-MCNC: 216 MG/DL (ref 70–110)
GLUCOSE SERPL-MCNC: 217 MG/DL (ref 70–110)
GLUCOSE SERPL-MCNC: 218 MG/DL (ref 70–110)
GLUCOSE SERPL-MCNC: 219 MG/DL (ref 70–110)
GLUCOSE SERPL-MCNC: 220 MG/DL (ref 70–110)
GLUCOSE SERPL-MCNC: 221 MG/DL (ref 70–110)
GLUCOSE SERPL-MCNC: 221 MG/DL (ref 70–110)
GLUCOSE SERPL-MCNC: 223 MG/DL (ref 70–110)
GLUCOSE SERPL-MCNC: 223 MG/DL (ref 70–110)
GLUCOSE SERPL-MCNC: 224 MG/DL (ref 70–110)
GLUCOSE SERPL-MCNC: 225 MG/DL (ref 70–110)
GLUCOSE SERPL-MCNC: 225 MG/DL (ref 70–110)
GLUCOSE SERPL-MCNC: 226 MG/DL (ref 70–110)
GLUCOSE SERPL-MCNC: 226 MG/DL (ref 70–110)
GLUCOSE SERPL-MCNC: 227 MG/DL (ref 70–110)
GLUCOSE SERPL-MCNC: 229 MG/DL (ref 70–110)
GLUCOSE SERPL-MCNC: 232 MG/DL (ref 70–110)
GLUCOSE SERPL-MCNC: 232 MG/DL (ref 70–110)
GLUCOSE SERPL-MCNC: 233 MG/DL (ref 70–110)
GLUCOSE SERPL-MCNC: 234 MG/DL (ref 70–110)
GLUCOSE SERPL-MCNC: 235 MG/DL (ref 70–110)
GLUCOSE SERPL-MCNC: 236 MG/DL (ref 70–110)
GLUCOSE SERPL-MCNC: 236 MG/DL (ref 70–110)
GLUCOSE SERPL-MCNC: 237 MG/DL (ref 70–110)
GLUCOSE SERPL-MCNC: 237 MG/DL (ref 70–110)
GLUCOSE SERPL-MCNC: 238 MG/DL (ref 70–110)
GLUCOSE SERPL-MCNC: 239 MG/DL (ref 70–110)
GLUCOSE SERPL-MCNC: 240 MG/DL (ref 70–110)
GLUCOSE SERPL-MCNC: 241 MG/DL (ref 70–110)
GLUCOSE SERPL-MCNC: 242 MG/DL (ref 70–110)
GLUCOSE SERPL-MCNC: 243 MG/DL (ref 70–110)
GLUCOSE SERPL-MCNC: 243 MG/DL (ref 70–110)
GLUCOSE SERPL-MCNC: 245 MG/DL (ref 70–110)
GLUCOSE SERPL-MCNC: 246 MG/DL (ref 70–110)
GLUCOSE SERPL-MCNC: 246 MG/DL (ref 70–110)
GLUCOSE SERPL-MCNC: 247 MG/DL (ref 70–110)
GLUCOSE SERPL-MCNC: 247 MG/DL (ref 70–110)
GLUCOSE SERPL-MCNC: 248 MG/DL (ref 70–110)
GLUCOSE SERPL-MCNC: 249 MG/DL (ref 70–110)
GLUCOSE SERPL-MCNC: 252 MG/DL (ref 70–110)
GLUCOSE SERPL-MCNC: 253 MG/DL (ref 70–110)
GLUCOSE SERPL-MCNC: 254 MG/DL (ref 70–110)
GLUCOSE SERPL-MCNC: 255 MG/DL (ref 70–110)
GLUCOSE SERPL-MCNC: 255 MG/DL (ref 70–110)
GLUCOSE SERPL-MCNC: 258 MG/DL (ref 70–110)
GLUCOSE SERPL-MCNC: 259 MG/DL (ref 70–110)
GLUCOSE SERPL-MCNC: 260 MG/DL (ref 70–110)
GLUCOSE SERPL-MCNC: 260 MG/DL (ref 70–110)
GLUCOSE SERPL-MCNC: 262 MG/DL (ref 70–110)
GLUCOSE SERPL-MCNC: 263 MG/DL (ref 70–110)
GLUCOSE SERPL-MCNC: 264 MG/DL (ref 70–110)
GLUCOSE SERPL-MCNC: 266 MG/DL (ref 70–110)
GLUCOSE SERPL-MCNC: 267 MG/DL (ref 70–110)
GLUCOSE SERPL-MCNC: 268 MG/DL (ref 70–110)
GLUCOSE SERPL-MCNC: 268 MG/DL (ref 70–110)
GLUCOSE SERPL-MCNC: 273 MG/DL (ref 70–110)
GLUCOSE SERPL-MCNC: 279 MG/DL (ref 70–110)
GLUCOSE SERPL-MCNC: 280 MG/DL (ref 70–110)
GLUCOSE SERPL-MCNC: 280 MG/DL (ref 70–110)
GLUCOSE SERPL-MCNC: 283 MG/DL (ref 70–110)
GLUCOSE SERPL-MCNC: 290 MG/DL (ref 70–110)
GLUCOSE SERPL-MCNC: 292 MG/DL (ref 70–110)
GLUCOSE SERPL-MCNC: 293 MG/DL (ref 70–110)
GLUCOSE SERPL-MCNC: 295 MG/DL (ref 70–110)
GLUCOSE SERPL-MCNC: 299 MG/DL (ref 70–110)
GLUCOSE SERPL-MCNC: 300 MG/DL (ref 70–110)
GLUCOSE SERPL-MCNC: 300 MG/DL (ref 70–110)
GLUCOSE SERPL-MCNC: 311 MG/DL (ref 70–110)
GLUCOSE SERPL-MCNC: 316 MG/DL (ref 70–110)
GLUCOSE SERPL-MCNC: 316 MG/DL (ref 70–110)
GLUCOSE SERPL-MCNC: 317 MG/DL (ref 70–110)
GLUCOSE SERPL-MCNC: 320 MG/DL (ref 70–110)
GLUCOSE SERPL-MCNC: 321 MG/DL (ref 70–110)
GLUCOSE SERPL-MCNC: 321 MG/DL (ref 70–110)
GLUCOSE SERPL-MCNC: 322 MG/DL (ref 70–110)
GLUCOSE SERPL-MCNC: 325 MG/DL (ref 70–110)
GLUCOSE SERPL-MCNC: 325 MG/DL (ref 70–110)
GLUCOSE SERPL-MCNC: 329 MG/DL (ref 70–110)
GLUCOSE SERPL-MCNC: 335 MG/DL (ref 70–110)
GLUCOSE SERPL-MCNC: 335 MG/DL (ref 70–110)
GLUCOSE SERPL-MCNC: 342 MG/DL (ref 70–110)
GLUCOSE SERPL-MCNC: 347 MG/DL (ref 70–110)
GLUCOSE SERPL-MCNC: 355 MG/DL (ref 70–110)
GLUCOSE SERPL-MCNC: 356 MG/DL (ref 70–110)
GLUCOSE SERPL-MCNC: 362 MG/DL (ref 70–110)
GLUCOSE SERPL-MCNC: 366 MG/DL (ref 70–110)
GLUCOSE SERPL-MCNC: 371 MG/DL (ref 70–110)
GLUCOSE SERPL-MCNC: 378 MG/DL (ref 70–110)
GLUCOSE SERPL-MCNC: 383 MG/DL (ref 70–110)
GLUCOSE SERPL-MCNC: 751 MG/DL (ref 70–110)
GLUCOSE UR QL STRIP: ABNORMAL
GLUCOSE UR QL STRIP: NEGATIVE
GLUCOSE UR QL STRIP: NEGATIVE
GRAM STN SPEC: ABNORMAL
GRAM STN SPEC: NORMAL
HBA1C MFR BLD HPLC: 6.6 % (ref 4–5.6)
HBV CORE IGM SERPL QL IA: NEGATIVE
HBV SURFACE AB SER-ACNC: NEGATIVE M[IU]/ML
HCO3 UR-SCNC: 15.3 MMOL/L (ref 24–28)
HCO3 UR-SCNC: 16.4 MMOL/L (ref 24–28)
HCO3 UR-SCNC: 16.5 MMOL/L (ref 24–28)
HCO3 UR-SCNC: 20.9 MMOL/L (ref 24–28)
HCO3 UR-SCNC: 21.6 MMOL/L (ref 24–28)
HCO3 UR-SCNC: 21.8 MMOL/L (ref 24–28)
HCO3 UR-SCNC: 21.8 MMOL/L (ref 24–28)
HCO3 UR-SCNC: 22.1 MMOL/L (ref 24–28)
HCO3 UR-SCNC: 22.3 MMOL/L (ref 24–28)
HCO3 UR-SCNC: 22.5 MMOL/L (ref 24–28)
HCO3 UR-SCNC: 22.9 MMOL/L (ref 24–28)
HCO3 UR-SCNC: 23.3 MMOL/L (ref 24–28)
HCO3 UR-SCNC: 23.7 MMOL/L (ref 24–28)
HCO3 UR-SCNC: 24.4 MMOL/L (ref 24–28)
HCO3 UR-SCNC: 24.5 MMOL/L (ref 24–28)
HCO3 UR-SCNC: 24.7 MMOL/L (ref 24–28)
HCO3 UR-SCNC: 24.8 MMOL/L (ref 24–28)
HCO3 UR-SCNC: 25 MMOL/L (ref 24–28)
HCO3 UR-SCNC: 25.4 MMOL/L (ref 24–28)
HCO3 UR-SCNC: 25.5 MMOL/L (ref 24–28)
HCO3 UR-SCNC: 25.5 MMOL/L (ref 24–28)
HCO3 UR-SCNC: 25.6 MMOL/L (ref 24–28)
HCO3 UR-SCNC: 25.7 MMOL/L (ref 24–28)
HCO3 UR-SCNC: 25.9 MMOL/L (ref 24–28)
HCO3 UR-SCNC: 26 MMOL/L (ref 24–28)
HCO3 UR-SCNC: 26.1 MMOL/L (ref 24–28)
HCO3 UR-SCNC: 26.1 MMOL/L (ref 24–28)
HCO3 UR-SCNC: 26.2 MMOL/L (ref 24–28)
HCO3 UR-SCNC: 26.2 MMOL/L (ref 24–28)
HCO3 UR-SCNC: 26.3 MMOL/L (ref 24–28)
HCO3 UR-SCNC: 26.4 MMOL/L (ref 24–28)
HCO3 UR-SCNC: 26.5 MMOL/L (ref 24–28)
HCO3 UR-SCNC: 26.5 MMOL/L (ref 24–28)
HCO3 UR-SCNC: 26.6 MMOL/L (ref 24–28)
HCO3 UR-SCNC: 26.7 MMOL/L (ref 24–28)
HCO3 UR-SCNC: 26.8 MMOL/L (ref 24–28)
HCO3 UR-SCNC: 26.8 MMOL/L (ref 24–28)
HCO3 UR-SCNC: 26.9 MMOL/L (ref 24–28)
HCO3 UR-SCNC: 26.9 MMOL/L (ref 24–28)
HCO3 UR-SCNC: 27 MMOL/L (ref 24–28)
HCO3 UR-SCNC: 27 MMOL/L (ref 24–28)
HCO3 UR-SCNC: 27.1 MMOL/L (ref 24–28)
HCO3 UR-SCNC: 27.2 MMOL/L (ref 24–28)
HCO3 UR-SCNC: 27.3 MMOL/L (ref 24–28)
HCO3 UR-SCNC: 27.4 MMOL/L (ref 24–28)
HCO3 UR-SCNC: 27.5 MMOL/L (ref 24–28)
HCO3 UR-SCNC: 27.6 MMOL/L (ref 24–28)
HCO3 UR-SCNC: 27.7 MMOL/L (ref 24–28)
HCO3 UR-SCNC: 27.8 MMOL/L (ref 24–28)
HCO3 UR-SCNC: 27.9 MMOL/L (ref 24–28)
HCO3 UR-SCNC: 28 MMOL/L (ref 24–28)
HCO3 UR-SCNC: 28.1 MMOL/L (ref 24–28)
HCO3 UR-SCNC: 28.2 MMOL/L (ref 24–28)
HCO3 UR-SCNC: 28.3 MMOL/L (ref 24–28)
HCO3 UR-SCNC: 28.4 MMOL/L (ref 24–28)
HCO3 UR-SCNC: 28.5 MMOL/L (ref 24–28)
HCO3 UR-SCNC: 28.6 MMOL/L (ref 24–28)
HCO3 UR-SCNC: 28.7 MMOL/L (ref 24–28)
HCO3 UR-SCNC: 28.8 MMOL/L (ref 24–28)
HCO3 UR-SCNC: 28.9 MMOL/L (ref 24–28)
HCO3 UR-SCNC: 29 MMOL/L (ref 24–28)
HCO3 UR-SCNC: 29.1 MMOL/L (ref 24–28)
HCO3 UR-SCNC: 29.2 MMOL/L (ref 24–28)
HCO3 UR-SCNC: 29.3 MMOL/L (ref 24–28)
HCO3 UR-SCNC: 29.4 MMOL/L (ref 24–28)
HCO3 UR-SCNC: 29.5 MMOL/L (ref 24–28)
HCO3 UR-SCNC: 29.6 MMOL/L (ref 24–28)
HCO3 UR-SCNC: 29.7 MMOL/L (ref 24–28)
HCO3 UR-SCNC: 29.8 MMOL/L (ref 24–28)
HCO3 UR-SCNC: 29.9 MMOL/L (ref 24–28)
HCO3 UR-SCNC: 30 MMOL/L (ref 24–28)
HCO3 UR-SCNC: 30.1 MMOL/L (ref 24–28)
HCO3 UR-SCNC: 30.2 MMOL/L (ref 24–28)
HCO3 UR-SCNC: 30.3 MMOL/L (ref 24–28)
HCO3 UR-SCNC: 30.4 MMOL/L (ref 24–28)
HCO3 UR-SCNC: 30.5 MMOL/L (ref 24–28)
HCO3 UR-SCNC: 30.6 MMOL/L (ref 24–28)
HCO3 UR-SCNC: 30.7 MMOL/L (ref 24–28)
HCO3 UR-SCNC: 30.8 MMOL/L (ref 24–28)
HCO3 UR-SCNC: 30.9 MMOL/L (ref 24–28)
HCO3 UR-SCNC: 31 MMOL/L (ref 24–28)
HCO3 UR-SCNC: 31.1 MMOL/L (ref 24–28)
HCO3 UR-SCNC: 31.2 MMOL/L (ref 24–28)
HCO3 UR-SCNC: 31.3 MMOL/L (ref 24–28)
HCO3 UR-SCNC: 31.3 MMOL/L (ref 24–28)
HCO3 UR-SCNC: 31.4 MMOL/L (ref 24–28)
HCO3 UR-SCNC: 31.5 MMOL/L (ref 24–28)
HCO3 UR-SCNC: 31.6 MMOL/L (ref 24–28)
HCO3 UR-SCNC: 31.7 MMOL/L (ref 24–28)
HCO3 UR-SCNC: 31.8 MMOL/L (ref 24–28)
HCO3 UR-SCNC: 31.9 MMOL/L (ref 24–28)
HCO3 UR-SCNC: 32 MMOL/L (ref 24–28)
HCO3 UR-SCNC: 32.1 MMOL/L (ref 24–28)
HCO3 UR-SCNC: 32.2 MMOL/L (ref 24–28)
HCO3 UR-SCNC: 32.3 MMOL/L (ref 24–28)
HCO3 UR-SCNC: 32.4 MMOL/L (ref 24–28)
HCO3 UR-SCNC: 32.5 MMOL/L (ref 24–28)
HCO3 UR-SCNC: 32.6 MMOL/L (ref 24–28)
HCO3 UR-SCNC: 32.7 MMOL/L (ref 24–28)
HCO3 UR-SCNC: 32.8 MMOL/L (ref 24–28)
HCO3 UR-SCNC: 32.9 MMOL/L (ref 24–28)
HCO3 UR-SCNC: 33 MMOL/L (ref 24–28)
HCO3 UR-SCNC: 33.1 MMOL/L (ref 24–28)
HCO3 UR-SCNC: 33.2 MMOL/L (ref 24–28)
HCO3 UR-SCNC: 33.3 MMOL/L (ref 24–28)
HCO3 UR-SCNC: 33.3 MMOL/L (ref 24–28)
HCO3 UR-SCNC: 33.4 MMOL/L (ref 24–28)
HCO3 UR-SCNC: 33.5 MMOL/L (ref 24–28)
HCO3 UR-SCNC: 33.6 MMOL/L (ref 24–28)
HCO3 UR-SCNC: 33.7 MMOL/L (ref 24–28)
HCO3 UR-SCNC: 33.8 MMOL/L (ref 24–28)
HCO3 UR-SCNC: 33.9 MMOL/L (ref 24–28)
HCO3 UR-SCNC: 34 MMOL/L (ref 24–28)
HCO3 UR-SCNC: 34.1 MMOL/L (ref 24–28)
HCO3 UR-SCNC: 34.2 MMOL/L (ref 24–28)
HCO3 UR-SCNC: 34.3 MMOL/L (ref 24–28)
HCO3 UR-SCNC: 34.4 MMOL/L (ref 24–28)
HCO3 UR-SCNC: 34.4 MMOL/L (ref 24–28)
HCO3 UR-SCNC: 34.5 MMOL/L (ref 24–28)
HCO3 UR-SCNC: 34.6 MMOL/L (ref 24–28)
HCO3 UR-SCNC: 34.7 MMOL/L (ref 24–28)
HCO3 UR-SCNC: 34.7 MMOL/L (ref 24–28)
HCO3 UR-SCNC: 34.8 MMOL/L (ref 24–28)
HCO3 UR-SCNC: 34.9 MMOL/L (ref 24–28)
HCO3 UR-SCNC: 35 MMOL/L (ref 24–28)
HCO3 UR-SCNC: 35 MMOL/L (ref 24–28)
HCO3 UR-SCNC: 35.1 MMOL/L (ref 24–28)
HCO3 UR-SCNC: 35.2 MMOL/L (ref 24–28)
HCO3 UR-SCNC: 35.2 MMOL/L (ref 24–28)
HCO3 UR-SCNC: 35.3 MMOL/L (ref 24–28)
HCO3 UR-SCNC: 35.4 MMOL/L (ref 24–28)
HCO3 UR-SCNC: 35.6 MMOL/L (ref 24–28)
HCO3 UR-SCNC: 35.7 MMOL/L (ref 24–28)
HCO3 UR-SCNC: 35.9 MMOL/L (ref 24–28)
HCO3 UR-SCNC: 36 MMOL/L (ref 24–28)
HCO3 UR-SCNC: 36.1 MMOL/L (ref 24–28)
HCO3 UR-SCNC: 36.2 MMOL/L (ref 24–28)
HCO3 UR-SCNC: 36.3 MMOL/L (ref 24–28)
HCO3 UR-SCNC: 36.3 MMOL/L (ref 24–28)
HCO3 UR-SCNC: 36.4 MMOL/L (ref 24–28)
HCO3 UR-SCNC: 36.5 MMOL/L (ref 24–28)
HCO3 UR-SCNC: 36.7 MMOL/L (ref 24–28)
HCO3 UR-SCNC: 36.7 MMOL/L (ref 24–28)
HCO3 UR-SCNC: 36.8 MMOL/L (ref 24–28)
HCO3 UR-SCNC: 36.8 MMOL/L (ref 24–28)
HCO3 UR-SCNC: 37 MMOL/L (ref 24–28)
HCO3 UR-SCNC: 37.1 MMOL/L (ref 24–28)
HCO3 UR-SCNC: 37.2 MMOL/L (ref 24–28)
HCO3 UR-SCNC: 37.3 MMOL/L (ref 24–28)
HCO3 UR-SCNC: 37.5 MMOL/L (ref 24–28)
HCO3 UR-SCNC: 37.6 MMOL/L (ref 24–28)
HCO3 UR-SCNC: 37.6 MMOL/L (ref 24–28)
HCO3 UR-SCNC: 37.7 MMOL/L (ref 24–28)
HCO3 UR-SCNC: 37.8 MMOL/L (ref 24–28)
HCO3 UR-SCNC: 37.8 MMOL/L (ref 24–28)
HCO3 UR-SCNC: 38 MMOL/L (ref 24–28)
HCO3 UR-SCNC: 38.1 MMOL/L (ref 24–28)
HCO3 UR-SCNC: 38.1 MMOL/L (ref 24–28)
HCO3 UR-SCNC: 38.2 MMOL/L (ref 24–28)
HCO3 UR-SCNC: 38.2 MMOL/L (ref 24–28)
HCO3 UR-SCNC: 38.3 MMOL/L (ref 24–28)
HCO3 UR-SCNC: 38.3 MMOL/L (ref 24–28)
HCO3 UR-SCNC: 38.4 MMOL/L (ref 24–28)
HCO3 UR-SCNC: 38.5 MMOL/L (ref 24–28)
HCO3 UR-SCNC: 38.6 MMOL/L (ref 24–28)
HCO3 UR-SCNC: 38.7 MMOL/L (ref 24–28)
HCO3 UR-SCNC: 38.7 MMOL/L (ref 24–28)
HCO3 UR-SCNC: 38.8 MMOL/L (ref 24–28)
HCO3 UR-SCNC: 38.9 MMOL/L (ref 24–28)
HCO3 UR-SCNC: 39 MMOL/L (ref 24–28)
HCO3 UR-SCNC: 39.1 MMOL/L (ref 24–28)
HCO3 UR-SCNC: 39.2 MMOL/L (ref 24–28)
HCO3 UR-SCNC: 39.2 MMOL/L (ref 24–28)
HCO3 UR-SCNC: 39.3 MMOL/L (ref 24–28)
HCO3 UR-SCNC: 39.4 MMOL/L (ref 24–28)
HCO3 UR-SCNC: 39.5 MMOL/L (ref 24–28)
HCO3 UR-SCNC: 39.5 MMOL/L (ref 24–28)
HCO3 UR-SCNC: 39.6 MMOL/L (ref 24–28)
HCO3 UR-SCNC: 39.7 MMOL/L (ref 24–28)
HCO3 UR-SCNC: 39.8 MMOL/L (ref 24–28)
HCO3 UR-SCNC: 39.9 MMOL/L (ref 24–28)
HCO3 UR-SCNC: 39.9 MMOL/L (ref 24–28)
HCO3 UR-SCNC: 40 MMOL/L (ref 24–28)
HCO3 UR-SCNC: 40 MMOL/L (ref 24–28)
HCO3 UR-SCNC: 40.1 MMOL/L (ref 24–28)
HCO3 UR-SCNC: 40.2 MMOL/L (ref 24–28)
HCO3 UR-SCNC: 40.3 MMOL/L (ref 24–28)
HCO3 UR-SCNC: 40.4 MMOL/L (ref 24–28)
HCO3 UR-SCNC: 40.5 MMOL/L (ref 24–28)
HCO3 UR-SCNC: 40.6 MMOL/L (ref 24–28)
HCO3 UR-SCNC: 40.7 MMOL/L (ref 24–28)
HCO3 UR-SCNC: 40.7 MMOL/L (ref 24–28)
HCO3 UR-SCNC: 40.8 MMOL/L (ref 24–28)
HCO3 UR-SCNC: 41 MMOL/L (ref 24–28)
HCO3 UR-SCNC: 41.1 MMOL/L (ref 24–28)
HCO3 UR-SCNC: 41.2 MMOL/L (ref 24–28)
HCO3 UR-SCNC: 41.4 MMOL/L (ref 24–28)
HCO3 UR-SCNC: 41.6 MMOL/L (ref 24–28)
HCO3 UR-SCNC: 41.7 MMOL/L (ref 24–28)
HCO3 UR-SCNC: 41.7 MMOL/L (ref 24–28)
HCO3 UR-SCNC: 41.9 MMOL/L (ref 24–28)
HCO3 UR-SCNC: 41.9 MMOL/L (ref 24–28)
HCO3 UR-SCNC: 42 MMOL/L (ref 24–28)
HCO3 UR-SCNC: 42.1 MMOL/L (ref 24–28)
HCO3 UR-SCNC: 42.1 MMOL/L (ref 24–28)
HCO3 UR-SCNC: 42.3 MMOL/L (ref 24–28)
HCO3 UR-SCNC: 42.7 MMOL/L (ref 24–28)
HCO3 UR-SCNC: 43 MMOL/L (ref 24–28)
HCO3 UR-SCNC: 43 MMOL/L (ref 24–28)
HCO3 UR-SCNC: 43.2 MMOL/L (ref 24–28)
HCO3 UR-SCNC: 43.3 MMOL/L (ref 24–28)
HCO3 UR-SCNC: 43.5 MMOL/L (ref 24–28)
HCO3 UR-SCNC: 43.6 MMOL/L (ref 24–28)
HCO3 UR-SCNC: 43.7 MMOL/L (ref 24–28)
HCO3 UR-SCNC: 44.2 MMOL/L (ref 24–28)
HCO3 UR-SCNC: 44.3 MMOL/L (ref 24–28)
HCO3 UR-SCNC: 44.4 MMOL/L (ref 24–28)
HCO3 UR-SCNC: 46.4 MMOL/L (ref 24–28)
HCO3 UR-SCNC: 46.7 MMOL/L (ref 24–28)
HCO3 UR-SCNC: 47.1 MMOL/L (ref 24–28)
HCO3 UR-SCNC: 47.3 MMOL/L (ref 24–28)
HCO3 UR-SCNC: 47.5 MMOL/L (ref 24–28)
HCO3 UR-SCNC: 48.1 MMOL/L (ref 24–28)
HCO3 UR-SCNC: 48.7 MMOL/L (ref 24–28)
HCO3 UR-SCNC: 49.7 MMOL/L (ref 24–28)
HCO3 UR-SCNC: 49.7 MMOL/L (ref 24–28)
HCO3 UR-SCNC: 50.5 MMOL/L (ref 24–28)
HCO3 UR-SCNC: 51 MMOL/L (ref 24–28)
HCO3 UR-SCNC: 51.1 MMOL/L (ref 24–28)
HCO3 UR-SCNC: 51.3 MMOL/L (ref 24–28)
HCO3 UR-SCNC: 51.7 MMOL/L (ref 24–28)
HCO3 UR-SCNC: 51.8 MMOL/L (ref 24–28)
HCO3 UR-SCNC: 52.1 MMOL/L (ref 24–28)
HCO3 UR-SCNC: 52.6 MMOL/L (ref 24–28)
HCO3 UR-SCNC: 52.9 MMOL/L (ref 24–28)
HCO3 UR-SCNC: 53.5 MMOL/L (ref 24–28)
HCO3 UR-SCNC: 53.6 MMOL/L (ref 24–28)
HCO3 UR-SCNC: 53.8 MMOL/L (ref 24–28)
HCO3 UR-SCNC: 54.1 MMOL/L (ref 24–28)
HCO3 UR-SCNC: 54.2 MMOL/L (ref 24–28)
HCO3 UR-SCNC: 54.5 MMOL/L (ref 24–28)
HCO3 UR-SCNC: 54.8 MMOL/L (ref 24–28)
HCO3 UR-SCNC: 55.9 MMOL/L (ref 24–28)
HCO3 UR-SCNC: 56 MMOL/L (ref 24–28)
HCO3 UR-SCNC: 56.5 MMOL/L (ref 24–28)
HCO3 UR-SCNC: 56.9 MMOL/L (ref 24–28)
HCO3 UR-SCNC: 57.1 MMOL/L (ref 24–28)
HCO3 UR-SCNC: 57.1 MMOL/L (ref 24–28)
HCO3 UR-SCNC: 57.3 MMOL/L (ref 24–28)
HCO3 UR-SCNC: 64.3 MMOL/L (ref 24–28)
HCO3 UR-SCNC: ABNORMAL MMOL/L
HCT VFR BLD AUTO: 21.3 % (ref 40–54)
HCT VFR BLD AUTO: 23.2 % (ref 40–54)
HCT VFR BLD AUTO: 23.4 % (ref 40–54)
HCT VFR BLD AUTO: 23.5 % (ref 40–54)
HCT VFR BLD AUTO: 23.8 % (ref 40–54)
HCT VFR BLD AUTO: 24.2 % (ref 40–54)
HCT VFR BLD AUTO: 24.6 % (ref 40–54)
HCT VFR BLD AUTO: 24.6 % (ref 40–54)
HCT VFR BLD AUTO: 24.7 % (ref 40–54)
HCT VFR BLD AUTO: 24.9 % (ref 40–54)
HCT VFR BLD AUTO: 25 % (ref 40–54)
HCT VFR BLD AUTO: 25 % (ref 40–54)
HCT VFR BLD AUTO: 25.1 % (ref 40–54)
HCT VFR BLD AUTO: 25.5 % (ref 40–54)
HCT VFR BLD AUTO: 25.5 % (ref 40–54)
HCT VFR BLD AUTO: 25.6 % (ref 40–54)
HCT VFR BLD AUTO: 25.8 % (ref 40–54)
HCT VFR BLD AUTO: 25.9 % (ref 40–54)
HCT VFR BLD AUTO: 26 % (ref 40–54)
HCT VFR BLD AUTO: 26 % (ref 40–54)
HCT VFR BLD AUTO: 26.1 % (ref 40–54)
HCT VFR BLD AUTO: 26.2 % (ref 40–54)
HCT VFR BLD AUTO: 26.3 % (ref 40–54)
HCT VFR BLD AUTO: 26.3 % (ref 40–54)
HCT VFR BLD AUTO: 26.5 % (ref 40–54)
HCT VFR BLD AUTO: 26.5 % (ref 40–54)
HCT VFR BLD AUTO: 26.6 % (ref 40–54)
HCT VFR BLD AUTO: 26.7 % (ref 40–54)
HCT VFR BLD AUTO: 26.8 % (ref 40–54)
HCT VFR BLD AUTO: 27 % (ref 40–54)
HCT VFR BLD AUTO: 27 % (ref 40–54)
HCT VFR BLD AUTO: 27.2 % (ref 40–54)
HCT VFR BLD AUTO: 27.3 % (ref 40–54)
HCT VFR BLD AUTO: 27.3 % (ref 40–54)
HCT VFR BLD AUTO: 27.5 % (ref 40–54)
HCT VFR BLD AUTO: 27.5 % (ref 40–54)
HCT VFR BLD AUTO: 27.6 % (ref 40–54)
HCT VFR BLD AUTO: 27.8 % (ref 40–54)
HCT VFR BLD AUTO: 28 % (ref 40–54)
HCT VFR BLD AUTO: 28 % (ref 40–54)
HCT VFR BLD AUTO: 28.1 % (ref 40–54)
HCT VFR BLD AUTO: 28.3 % (ref 40–54)
HCT VFR BLD AUTO: 28.3 % (ref 40–54)
HCT VFR BLD AUTO: 28.5 % (ref 40–54)
HCT VFR BLD AUTO: 28.6 % (ref 40–54)
HCT VFR BLD AUTO: 28.8 % (ref 40–54)
HCT VFR BLD AUTO: 28.9 % (ref 40–54)
HCT VFR BLD AUTO: 29 % (ref 40–54)
HCT VFR BLD AUTO: 29 % (ref 40–54)
HCT VFR BLD AUTO: 29.1 % (ref 40–54)
HCT VFR BLD AUTO: 29.1 % (ref 40–54)
HCT VFR BLD AUTO: 29.2 % (ref 40–54)
HCT VFR BLD AUTO: 29.3 % (ref 40–54)
HCT VFR BLD AUTO: 29.3 % (ref 40–54)
HCT VFR BLD AUTO: 29.4 % (ref 40–54)
HCT VFR BLD AUTO: 29.5 % (ref 40–54)
HCT VFR BLD AUTO: 29.6 % (ref 40–54)
HCT VFR BLD AUTO: 29.7 % (ref 40–54)
HCT VFR BLD AUTO: 29.8 % (ref 40–54)
HCT VFR BLD AUTO: 29.9 % (ref 40–54)
HCT VFR BLD AUTO: 30 % (ref 40–54)
HCT VFR BLD AUTO: 30.1 % (ref 40–54)
HCT VFR BLD AUTO: 30.2 % (ref 40–54)
HCT VFR BLD AUTO: 30.2 % (ref 40–54)
HCT VFR BLD AUTO: 30.3 % (ref 40–54)
HCT VFR BLD AUTO: 30.4 % (ref 40–54)
HCT VFR BLD AUTO: 30.5 % (ref 40–54)
HCT VFR BLD AUTO: 30.6 % (ref 40–54)
HCT VFR BLD AUTO: 30.7 % (ref 40–54)
HCT VFR BLD AUTO: 30.8 % (ref 40–54)
HCT VFR BLD AUTO: 30.9 % (ref 40–54)
HCT VFR BLD AUTO: 31 % (ref 40–54)
HCT VFR BLD AUTO: 31.1 % (ref 40–54)
HCT VFR BLD AUTO: 31.2 % (ref 40–54)
HCT VFR BLD AUTO: 31.3 % (ref 40–54)
HCT VFR BLD AUTO: 31.3 % (ref 40–54)
HCT VFR BLD AUTO: 31.4 % (ref 40–54)
HCT VFR BLD AUTO: 31.5 % (ref 40–54)
HCT VFR BLD AUTO: 31.6 % (ref 40–54)
HCT VFR BLD AUTO: 31.7 % (ref 40–54)
HCT VFR BLD AUTO: 31.8 % (ref 40–54)
HCT VFR BLD AUTO: 31.9 % (ref 40–54)
HCT VFR BLD AUTO: 31.9 % (ref 40–54)
HCT VFR BLD AUTO: 32 % (ref 40–54)
HCT VFR BLD AUTO: 32.1 % (ref 40–54)
HCT VFR BLD AUTO: 32.2 % (ref 40–54)
HCT VFR BLD AUTO: 32.3 % (ref 40–54)
HCT VFR BLD AUTO: 32.4 % (ref 40–54)
HCT VFR BLD AUTO: 32.5 % (ref 40–54)
HCT VFR BLD AUTO: 32.5 % (ref 40–54)
HCT VFR BLD AUTO: 32.6 % (ref 40–54)
HCT VFR BLD AUTO: 32.7 % (ref 40–54)
HCT VFR BLD AUTO: 32.7 % (ref 40–54)
HCT VFR BLD AUTO: 32.8 % (ref 40–54)
HCT VFR BLD AUTO: 32.9 % (ref 40–54)
HCT VFR BLD AUTO: 32.9 % (ref 40–54)
HCT VFR BLD AUTO: 33 % (ref 40–54)
HCT VFR BLD AUTO: 33 % (ref 40–54)
HCT VFR BLD AUTO: 33.1 % (ref 40–54)
HCT VFR BLD AUTO: 33.2 % (ref 40–54)
HCT VFR BLD AUTO: 33.3 % (ref 40–54)
HCT VFR BLD AUTO: 33.4 % (ref 40–54)
HCT VFR BLD AUTO: 33.5 % (ref 40–54)
HCT VFR BLD AUTO: 33.6 % (ref 40–54)
HCT VFR BLD AUTO: 33.7 % (ref 40–54)
HCT VFR BLD AUTO: 33.7 % (ref 40–54)
HCT VFR BLD AUTO: 33.8 % (ref 40–54)
HCT VFR BLD AUTO: 33.9 % (ref 40–54)
HCT VFR BLD AUTO: 34 % (ref 40–54)
HCT VFR BLD AUTO: 34 % (ref 40–54)
HCT VFR BLD AUTO: 34.1 % (ref 40–54)
HCT VFR BLD AUTO: 34.2 % (ref 40–54)
HCT VFR BLD AUTO: 34.3 % (ref 40–54)
HCT VFR BLD AUTO: 34.4 % (ref 40–54)
HCT VFR BLD AUTO: 34.5 % (ref 40–54)
HCT VFR BLD AUTO: 34.6 % (ref 40–54)
HCT VFR BLD AUTO: 34.7 % (ref 40–54)
HCT VFR BLD AUTO: 34.7 % (ref 40–54)
HCT VFR BLD AUTO: 34.8 % (ref 40–54)
HCT VFR BLD AUTO: 34.9 % (ref 40–54)
HCT VFR BLD AUTO: 35 % (ref 40–54)
HCT VFR BLD AUTO: 35 % (ref 40–54)
HCT VFR BLD AUTO: 35.1 % (ref 40–54)
HCT VFR BLD AUTO: 35.1 % (ref 40–54)
HCT VFR BLD AUTO: 35.3 % (ref 40–54)
HCT VFR BLD AUTO: 35.3 % (ref 40–54)
HCT VFR BLD AUTO: 35.4 % (ref 40–54)
HCT VFR BLD AUTO: 35.4 % (ref 40–54)
HCT VFR BLD AUTO: 35.5 % (ref 40–54)
HCT VFR BLD AUTO: 35.6 % (ref 40–54)
HCT VFR BLD AUTO: 35.6 % (ref 40–54)
HCT VFR BLD AUTO: 35.8 % (ref 40–54)
HCT VFR BLD AUTO: 35.9 % (ref 40–54)
HCT VFR BLD AUTO: 36.1 % (ref 40–54)
HCT VFR BLD AUTO: 36.2 % (ref 40–54)
HCT VFR BLD AUTO: 36.2 % (ref 40–54)
HCT VFR BLD AUTO: 36.5 % (ref 40–54)
HCT VFR BLD AUTO: 36.6 % (ref 40–54)
HCT VFR BLD AUTO: 36.7 % (ref 40–54)
HCT VFR BLD AUTO: 36.9 % (ref 40–54)
HCT VFR BLD AUTO: 37.1 % (ref 40–54)
HCT VFR BLD AUTO: 37.2 % (ref 40–54)
HCT VFR BLD AUTO: 37.5 % (ref 40–54)
HCT VFR BLD AUTO: 37.5 % (ref 40–54)
HCT VFR BLD AUTO: 37.8 % (ref 40–54)
HCT VFR BLD AUTO: 38.1 % (ref 40–54)
HCT VFR BLD AUTO: 39.5 % (ref 40–54)
HCT VFR BLD AUTO: 44 % (ref 40–54)
HCT VFR BLD CALC: 15 %PCV (ref 36–54)
HCT VFR BLD CALC: 20 %PCV (ref 36–54)
HCT VFR BLD CALC: 21 %PCV (ref 36–54)
HCT VFR BLD CALC: 22 %PCV (ref 36–54)
HCT VFR BLD CALC: 23 %PCV (ref 36–54)
HCT VFR BLD CALC: 23 %PCV (ref 36–54)
HCT VFR BLD CALC: 24 %PCV (ref 36–54)
HCT VFR BLD CALC: 25 %PCV (ref 36–54)
HCT VFR BLD CALC: 26 %PCV (ref 36–54)
HCT VFR BLD CALC: 27 %PCV (ref 36–54)
HCT VFR BLD CALC: 28 %PCV (ref 36–54)
HCT VFR BLD CALC: 29 %PCV (ref 36–54)
HCT VFR BLD CALC: 30 %PCV (ref 36–54)
HCT VFR BLD CALC: 31 %PCV (ref 36–54)
HCT VFR BLD CALC: 32 %PCV (ref 36–54)
HCT VFR BLD CALC: 33 %PCV (ref 36–54)
HCT VFR BLD CALC: 34 %PCV (ref 36–54)
HCT VFR BLD CALC: 35 %PCV (ref 36–54)
HCT VFR BLD CALC: 35 %PCV (ref 36–54)
HCT VFR BLD CALC: 36 %PCV (ref 36–54)
HCT VFR BLD CALC: 43 %PCV (ref 36–54)
HCV RNA SERPL NAA+PROBE-LOG IU: <1.08 LOG (10) IU/ML
HCV RNA SERPL QL NAA+PROBE: NOT DETECTED IU/ML
HCV RNA SPEC NAA+PROBE-ACNC: <12 IU/ML
HGB BLD-MCNC: 10 G/DL (ref 14–18)
HGB BLD-MCNC: 10.1 G/DL (ref 14–18)
HGB BLD-MCNC: 10.2 G/DL (ref 14–18)
HGB BLD-MCNC: 10.3 G/DL (ref 14–18)
HGB BLD-MCNC: 10.4 G/DL (ref 14–18)
HGB BLD-MCNC: 10.5 G/DL (ref 14–18)
HGB BLD-MCNC: 10.6 G/DL (ref 14–18)
HGB BLD-MCNC: 10.7 G/DL (ref 14–18)
HGB BLD-MCNC: 10.8 G/DL (ref 14–18)
HGB BLD-MCNC: 10.9 G/DL (ref 14–18)
HGB BLD-MCNC: 11 G/DL (ref 14–18)
HGB BLD-MCNC: 11.1 G/DL (ref 14–18)
HGB BLD-MCNC: 11.2 G/DL (ref 14–18)
HGB BLD-MCNC: 11.3 G/DL (ref 14–18)
HGB BLD-MCNC: 11.4 G/DL (ref 14–18)
HGB BLD-MCNC: 11.5 G/DL (ref 14–18)
HGB BLD-MCNC: 11.6 G/DL (ref 14–18)
HGB BLD-MCNC: 11.7 G/DL (ref 14–18)
HGB BLD-MCNC: 11.8 G/DL (ref 14–18)
HGB BLD-MCNC: 11.9 G/DL (ref 14–18)
HGB BLD-MCNC: 12 G/DL (ref 14–18)
HGB BLD-MCNC: 12 G/DL (ref 14–18)
HGB BLD-MCNC: 12.1 G/DL (ref 14–18)
HGB BLD-MCNC: 12.5 G/DL (ref 14–18)
HGB BLD-MCNC: 13.6 G/DL (ref 14–18)
HGB BLD-MCNC: 6.7 G/DL (ref 14–18)
HGB BLD-MCNC: 6.8 G/DL (ref 14–18)
HGB BLD-MCNC: 7.1 G/DL (ref 14–18)
HGB BLD-MCNC: 7.2 G/DL (ref 14–18)
HGB BLD-MCNC: 7.3 G/DL (ref 14–18)
HGB BLD-MCNC: 7.4 G/DL (ref 14–18)
HGB BLD-MCNC: 7.4 G/DL (ref 14–18)
HGB BLD-MCNC: 7.5 G/DL (ref 14–18)
HGB BLD-MCNC: 7.6 G/DL (ref 14–18)
HGB BLD-MCNC: 7.7 G/DL (ref 14–18)
HGB BLD-MCNC: 7.7 G/DL (ref 14–18)
HGB BLD-MCNC: 7.8 G/DL (ref 14–18)
HGB BLD-MCNC: 7.9 G/DL (ref 14–18)
HGB BLD-MCNC: 8 G/DL (ref 14–18)
HGB BLD-MCNC: 8.1 G/DL (ref 14–18)
HGB BLD-MCNC: 8.1 G/DL (ref 14–18)
HGB BLD-MCNC: 8.2 G/DL (ref 14–18)
HGB BLD-MCNC: 8.3 G/DL (ref 14–18)
HGB BLD-MCNC: 8.4 G/DL (ref 14–18)
HGB BLD-MCNC: 8.5 G/DL (ref 14–18)
HGB BLD-MCNC: 8.6 G/DL (ref 14–18)
HGB BLD-MCNC: 8.7 G/DL (ref 14–18)
HGB BLD-MCNC: 8.7 G/DL (ref 14–18)
HGB BLD-MCNC: 8.8 G/DL (ref 14–18)
HGB BLD-MCNC: 8.9 G/DL (ref 14–18)
HGB BLD-MCNC: 9 G/DL (ref 14–18)
HGB BLD-MCNC: 9.1 G/DL (ref 14–18)
HGB BLD-MCNC: 9.2 G/DL (ref 14–18)
HGB BLD-MCNC: 9.3 G/DL (ref 14–18)
HGB BLD-MCNC: 9.4 G/DL (ref 14–18)
HGB BLD-MCNC: 9.5 G/DL (ref 14–18)
HGB BLD-MCNC: 9.6 G/DL (ref 14–18)
HGB BLD-MCNC: 9.7 G/DL (ref 14–18)
HGB BLD-MCNC: 9.8 G/DL (ref 14–18)
HGB BLD-MCNC: 9.9 G/DL (ref 14–18)
HGB FREE PLAS-MCNC: 10 MG/DL
HGB FREE PLAS-MCNC: 10 MG/DL
HGB FREE PLAS-MCNC: 2.4 MG/DL (ref 0–9.7)
HGB FREE PLAS-MCNC: 20 MG/DL
HGB FREE PLAS-MCNC: 30 MG/DL
HGB FREE PLAS-MCNC: 40 MG/DL
HGB FREE PLAS-MCNC: 50 MG/DL
HGB FREE PLAS-MCNC: 60 MG/DL
HGB FREE PLAS-MCNC: 60 MG/DL
HGB FREE PLAS-MCNC: 70 MG/DL
HGB FREE PLAS-MCNC: 80 MG/DL
HGB UR QL STRIP: ABNORMAL
HIV 1+2 AB+HIV1 P24 AG SERPL QL IA: NEGATIVE
HIV1+2 IGG SERPL QL IA.RAPID: NORMAL
HYALINE CASTS UR QL AUTO: 0 /LPF
HYALINE CASTS UR QL AUTO: 0 /LPF
HYPOCHROMIA BLD QL SMEAR: ABNORMAL
IMM GRANULOCYTES # BLD AUTO: 0.07 K/UL (ref 0–0.04)
IMM GRANULOCYTES # BLD AUTO: 0.07 K/UL (ref 0–0.04)
IMM GRANULOCYTES # BLD AUTO: 0.09 K/UL (ref 0–0.04)
IMM GRANULOCYTES # BLD AUTO: 0.09 K/UL (ref 0–0.04)
IMM GRANULOCYTES # BLD AUTO: 0.1 K/UL (ref 0–0.04)
IMM GRANULOCYTES # BLD AUTO: 0.1 K/UL (ref 0–0.04)
IMM GRANULOCYTES # BLD AUTO: 0.11 K/UL (ref 0–0.04)
IMM GRANULOCYTES # BLD AUTO: 0.12 K/UL (ref 0–0.04)
IMM GRANULOCYTES # BLD AUTO: 0.13 K/UL (ref 0–0.04)
IMM GRANULOCYTES # BLD AUTO: 0.14 K/UL (ref 0–0.04)
IMM GRANULOCYTES # BLD AUTO: 0.14 K/UL (ref 0–0.04)
IMM GRANULOCYTES # BLD AUTO: 0.15 K/UL (ref 0–0.04)
IMM GRANULOCYTES # BLD AUTO: 0.16 K/UL (ref 0–0.04)
IMM GRANULOCYTES # BLD AUTO: 0.17 K/UL (ref 0–0.04)
IMM GRANULOCYTES # BLD AUTO: 0.18 K/UL (ref 0–0.04)
IMM GRANULOCYTES # BLD AUTO: 0.19 K/UL (ref 0–0.04)
IMM GRANULOCYTES # BLD AUTO: 0.2 K/UL (ref 0–0.04)
IMM GRANULOCYTES # BLD AUTO: 0.21 K/UL (ref 0–0.04)
IMM GRANULOCYTES # BLD AUTO: 0.22 K/UL (ref 0–0.04)
IMM GRANULOCYTES # BLD AUTO: 0.23 K/UL (ref 0–0.04)
IMM GRANULOCYTES # BLD AUTO: 0.24 K/UL (ref 0–0.04)
IMM GRANULOCYTES # BLD AUTO: 0.25 K/UL (ref 0–0.04)
IMM GRANULOCYTES # BLD AUTO: 0.26 K/UL (ref 0–0.04)
IMM GRANULOCYTES # BLD AUTO: 0.27 K/UL (ref 0–0.04)
IMM GRANULOCYTES # BLD AUTO: 0.28 K/UL (ref 0–0.04)
IMM GRANULOCYTES # BLD AUTO: 0.29 K/UL (ref 0–0.04)
IMM GRANULOCYTES # BLD AUTO: 0.3 K/UL (ref 0–0.04)
IMM GRANULOCYTES # BLD AUTO: 0.31 K/UL (ref 0–0.04)
IMM GRANULOCYTES # BLD AUTO: 0.32 K/UL (ref 0–0.04)
IMM GRANULOCYTES # BLD AUTO: 0.33 K/UL (ref 0–0.04)
IMM GRANULOCYTES # BLD AUTO: 0.33 K/UL (ref 0–0.04)
IMM GRANULOCYTES # BLD AUTO: 0.34 K/UL (ref 0–0.04)
IMM GRANULOCYTES # BLD AUTO: 0.35 K/UL (ref 0–0.04)
IMM GRANULOCYTES # BLD AUTO: 0.36 K/UL (ref 0–0.04)
IMM GRANULOCYTES # BLD AUTO: 0.37 K/UL (ref 0–0.04)
IMM GRANULOCYTES # BLD AUTO: 0.38 K/UL (ref 0–0.04)
IMM GRANULOCYTES # BLD AUTO: 0.38 K/UL (ref 0–0.04)
IMM GRANULOCYTES # BLD AUTO: 0.39 K/UL (ref 0–0.04)
IMM GRANULOCYTES # BLD AUTO: 0.4 K/UL (ref 0–0.04)
IMM GRANULOCYTES # BLD AUTO: 0.42 K/UL (ref 0–0.04)
IMM GRANULOCYTES # BLD AUTO: 0.42 K/UL (ref 0–0.04)
IMM GRANULOCYTES # BLD AUTO: 0.43 K/UL (ref 0–0.04)
IMM GRANULOCYTES # BLD AUTO: 0.44 K/UL (ref 0–0.04)
IMM GRANULOCYTES # BLD AUTO: 0.45 K/UL (ref 0–0.04)
IMM GRANULOCYTES # BLD AUTO: 0.45 K/UL (ref 0–0.04)
IMM GRANULOCYTES # BLD AUTO: 0.46 K/UL (ref 0–0.04)
IMM GRANULOCYTES # BLD AUTO: 0.46 K/UL (ref 0–0.04)
IMM GRANULOCYTES # BLD AUTO: 0.47 K/UL (ref 0–0.04)
IMM GRANULOCYTES # BLD AUTO: 0.47 K/UL (ref 0–0.04)
IMM GRANULOCYTES # BLD AUTO: 0.48 K/UL (ref 0–0.04)
IMM GRANULOCYTES # BLD AUTO: 0.48 K/UL (ref 0–0.04)
IMM GRANULOCYTES # BLD AUTO: 0.49 K/UL (ref 0–0.04)
IMM GRANULOCYTES # BLD AUTO: 0.49 K/UL (ref 0–0.04)
IMM GRANULOCYTES # BLD AUTO: 0.5 K/UL (ref 0–0.04)
IMM GRANULOCYTES # BLD AUTO: 0.52 K/UL (ref 0–0.04)
IMM GRANULOCYTES # BLD AUTO: 0.53 K/UL (ref 0–0.04)
IMM GRANULOCYTES # BLD AUTO: 0.54 K/UL (ref 0–0.04)
IMM GRANULOCYTES # BLD AUTO: 0.55 K/UL (ref 0–0.04)
IMM GRANULOCYTES # BLD AUTO: 0.55 K/UL (ref 0–0.04)
IMM GRANULOCYTES # BLD AUTO: 0.56 K/UL (ref 0–0.04)
IMM GRANULOCYTES # BLD AUTO: 0.56 K/UL (ref 0–0.04)
IMM GRANULOCYTES # BLD AUTO: 0.58 K/UL (ref 0–0.04)
IMM GRANULOCYTES # BLD AUTO: 0.59 K/UL (ref 0–0.04)
IMM GRANULOCYTES # BLD AUTO: 0.6 K/UL (ref 0–0.04)
IMM GRANULOCYTES # BLD AUTO: 0.6 K/UL (ref 0–0.04)
IMM GRANULOCYTES # BLD AUTO: 0.61 K/UL (ref 0–0.04)
IMM GRANULOCYTES # BLD AUTO: 0.62 K/UL (ref 0–0.04)
IMM GRANULOCYTES # BLD AUTO: 0.63 K/UL (ref 0–0.04)
IMM GRANULOCYTES # BLD AUTO: 0.65 K/UL (ref 0–0.04)
IMM GRANULOCYTES # BLD AUTO: 0.65 K/UL (ref 0–0.04)
IMM GRANULOCYTES # BLD AUTO: 0.67 K/UL (ref 0–0.04)
IMM GRANULOCYTES # BLD AUTO: 0.67 K/UL (ref 0–0.04)
IMM GRANULOCYTES # BLD AUTO: 0.68 K/UL (ref 0–0.04)
IMM GRANULOCYTES # BLD AUTO: 0.68 K/UL (ref 0–0.04)
IMM GRANULOCYTES # BLD AUTO: 0.69 K/UL (ref 0–0.04)
IMM GRANULOCYTES # BLD AUTO: 0.72 K/UL (ref 0–0.04)
IMM GRANULOCYTES # BLD AUTO: 0.73 K/UL (ref 0–0.04)
IMM GRANULOCYTES # BLD AUTO: 0.73 K/UL (ref 0–0.04)
IMM GRANULOCYTES # BLD AUTO: 0.74 K/UL (ref 0–0.04)
IMM GRANULOCYTES # BLD AUTO: 0.74 K/UL (ref 0–0.04)
IMM GRANULOCYTES # BLD AUTO: 0.77 K/UL (ref 0–0.04)
IMM GRANULOCYTES # BLD AUTO: 0.77 K/UL (ref 0–0.04)
IMM GRANULOCYTES # BLD AUTO: 0.78 K/UL (ref 0–0.04)
IMM GRANULOCYTES # BLD AUTO: 0.78 K/UL (ref 0–0.04)
IMM GRANULOCYTES # BLD AUTO: 0.79 K/UL (ref 0–0.04)
IMM GRANULOCYTES # BLD AUTO: 0.79 K/UL (ref 0–0.04)
IMM GRANULOCYTES # BLD AUTO: 0.82 K/UL (ref 0–0.04)
IMM GRANULOCYTES # BLD AUTO: ABNORMAL K/UL (ref 0–0.04)
IMM GRANULOCYTES NFR BLD AUTO: 0.6 % (ref 0–0.5)
IMM GRANULOCYTES NFR BLD AUTO: 0.6 % (ref 0–0.5)
IMM GRANULOCYTES NFR BLD AUTO: 0.7 % (ref 0–0.5)
IMM GRANULOCYTES NFR BLD AUTO: 0.8 % (ref 0–0.5)
IMM GRANULOCYTES NFR BLD AUTO: 0.9 % (ref 0–0.5)
IMM GRANULOCYTES NFR BLD AUTO: 1 % (ref 0–0.5)
IMM GRANULOCYTES NFR BLD AUTO: 1.1 % (ref 0–0.5)
IMM GRANULOCYTES NFR BLD AUTO: 1.2 % (ref 0–0.5)
IMM GRANULOCYTES NFR BLD AUTO: 1.3 % (ref 0–0.5)
IMM GRANULOCYTES NFR BLD AUTO: 1.4 % (ref 0–0.5)
IMM GRANULOCYTES NFR BLD AUTO: 1.5 % (ref 0–0.5)
IMM GRANULOCYTES NFR BLD AUTO: 1.6 % (ref 0–0.5)
IMM GRANULOCYTES NFR BLD AUTO: 1.7 % (ref 0–0.5)
IMM GRANULOCYTES NFR BLD AUTO: 1.8 % (ref 0–0.5)
IMM GRANULOCYTES NFR BLD AUTO: 1.9 % (ref 0–0.5)
IMM GRANULOCYTES NFR BLD AUTO: 2 % (ref 0–0.5)
IMM GRANULOCYTES NFR BLD AUTO: 2.1 % (ref 0–0.5)
IMM GRANULOCYTES NFR BLD AUTO: 2.2 % (ref 0–0.5)
IMM GRANULOCYTES NFR BLD AUTO: 2.3 % (ref 0–0.5)
IMM GRANULOCYTES NFR BLD AUTO: 2.4 % (ref 0–0.5)
IMM GRANULOCYTES NFR BLD AUTO: 2.5 % (ref 0–0.5)
IMM GRANULOCYTES NFR BLD AUTO: 2.6 % (ref 0–0.5)
IMM GRANULOCYTES NFR BLD AUTO: 2.7 % (ref 0–0.5)
IMM GRANULOCYTES NFR BLD AUTO: 2.8 % (ref 0–0.5)
IMM GRANULOCYTES NFR BLD AUTO: 2.8 % (ref 0–0.5)
IMM GRANULOCYTES NFR BLD AUTO: 2.9 % (ref 0–0.5)
IMM GRANULOCYTES NFR BLD AUTO: 3 % (ref 0–0.5)
IMM GRANULOCYTES NFR BLD AUTO: 3.2 % (ref 0–0.5)
IMM GRANULOCYTES NFR BLD AUTO: 3.3 % (ref 0–0.5)
IMM GRANULOCYTES NFR BLD AUTO: 3.4 % (ref 0–0.5)
IMM GRANULOCYTES NFR BLD AUTO: 3.5 % (ref 0–0.5)
IMM GRANULOCYTES NFR BLD AUTO: 3.6 % (ref 0–0.5)
IMM GRANULOCYTES NFR BLD AUTO: 3.7 % (ref 0–0.5)
IMM GRANULOCYTES NFR BLD AUTO: 3.8 % (ref 0–0.5)
IMM GRANULOCYTES NFR BLD AUTO: 3.9 % (ref 0–0.5)
IMM GRANULOCYTES NFR BLD AUTO: 4 % (ref 0–0.5)
IMM GRANULOCYTES NFR BLD AUTO: 4.1 % (ref 0–0.5)
IMM GRANULOCYTES NFR BLD AUTO: 4.2 % (ref 0–0.5)
IMM GRANULOCYTES NFR BLD AUTO: 4.4 % (ref 0–0.5)
IMM GRANULOCYTES NFR BLD AUTO: 4.4 % (ref 0–0.5)
IMM GRANULOCYTES NFR BLD AUTO: 4.5 % (ref 0–0.5)
IMM GRANULOCYTES NFR BLD AUTO: 4.6 % (ref 0–0.5)
IMM GRANULOCYTES NFR BLD AUTO: 4.7 % (ref 0–0.5)
IMM GRANULOCYTES NFR BLD AUTO: 4.7 % (ref 0–0.5)
IMM GRANULOCYTES NFR BLD AUTO: 4.8 % (ref 0–0.5)
IMM GRANULOCYTES NFR BLD AUTO: 4.8 % (ref 0–0.5)
IMM GRANULOCYTES NFR BLD AUTO: 4.9 % (ref 0–0.5)
IMM GRANULOCYTES NFR BLD AUTO: 5 % (ref 0–0.5)
IMM GRANULOCYTES NFR BLD AUTO: ABNORMAL % (ref 0–0.5)
INR PPP: 0.9 (ref 0.8–1.2)
INR PPP: 1 (ref 0.8–1.2)
INR PPP: 1.1 (ref 0.8–1.2)
INR PPP: 1.2 (ref 0.8–1.2)
INR PPP: 1.3 (ref 0.8–1.2)
INR PPP: 1.5 (ref 0.8–1.2)
INR PPP: 1.6 (ref 0.8–1.2)
INTERVENTRICULAR SEPTUM: 0.86 CM (ref 0.6–1.1)
INTERVENTRICULAR SEPTUM: 0.87 CM (ref 0.6–1.1)
IP: 30
IP: 40
IT: 0.6
KETONES UR QL STRIP: ABNORMAL
KETONES UR QL STRIP: NEGATIVE
LA MAJOR: 4.55 CM
LA MAJOR: 4.81 CM
LA MAJOR: 4.88 CM
LA MINOR: 4.17 CM
LA MINOR: 4.57 CM
LA MINOR: 5 CM
LA WIDTH: 3.58 CM
LA WIDTH: 3.8 CM
LA WIDTH: 4.16 CM
LACTATE SERPL-SCNC: 0.8 MMOL/L (ref 0.5–2.2)
LACTATE SERPL-SCNC: 0.8 MMOL/L (ref 0.5–2.2)
LACTATE SERPL-SCNC: 1 MMOL/L (ref 0.5–2.2)
LACTATE SERPL-SCNC: 1.2 MMOL/L (ref 0.5–2.2)
LACTATE SERPL-SCNC: 1.4 MMOL/L (ref 0.5–2.2)
LACTATE SERPL-SCNC: 2.5 MMOL/L (ref 0.5–2.2)
LACTATE SERPL-SCNC: 4.6 MMOL/L (ref 0.5–2.2)
LACTATE SERPL-SCNC: 6.1 MMOL/L (ref 0.5–2.2)
LDH SERPL L TO P-CCNC: 0.3 MMOL/L (ref 0.36–1.25)
LDH SERPL L TO P-CCNC: 0.31 MMOL/L (ref 0.36–1.25)
LDH SERPL L TO P-CCNC: 0.32 MMOL/L (ref 0.36–1.25)
LDH SERPL L TO P-CCNC: 0.32 MMOL/L (ref 0.36–1.25)
LDH SERPL L TO P-CCNC: 0.33 MMOL/L (ref 0.36–1.25)
LDH SERPL L TO P-CCNC: 0.33 MMOL/L (ref 0.36–1.25)
LDH SERPL L TO P-CCNC: 0.34 MMOL/L (ref 0.36–1.25)
LDH SERPL L TO P-CCNC: 0.35 MMOL/L (ref 0.36–1.25)
LDH SERPL L TO P-CCNC: 0.36 MMOL/L (ref 0.36–1.25)
LDH SERPL L TO P-CCNC: 0.37 MMOL/L (ref 0.36–1.25)
LDH SERPL L TO P-CCNC: 0.37 MMOL/L (ref 0.36–1.25)
LDH SERPL L TO P-CCNC: 0.38 MMOL/L (ref 0.36–1.25)
LDH SERPL L TO P-CCNC: 0.39 MMOL/L (ref 0.36–1.25)
LDH SERPL L TO P-CCNC: 0.4 MMOL/L (ref 0.36–1.25)
LDH SERPL L TO P-CCNC: 0.41 MMOL/L (ref 0.36–1.25)
LDH SERPL L TO P-CCNC: 0.42 MMOL/L (ref 0.36–1.25)
LDH SERPL L TO P-CCNC: 0.42 MMOL/L (ref 0.36–1.25)
LDH SERPL L TO P-CCNC: 0.43 MMOL/L (ref 0.36–1.25)
LDH SERPL L TO P-CCNC: 0.44 MMOL/L (ref 0.36–1.25)
LDH SERPL L TO P-CCNC: 0.45 MMOL/L (ref 0.36–1.25)
LDH SERPL L TO P-CCNC: 0.46 MMOL/L (ref 0.36–1.25)
LDH SERPL L TO P-CCNC: 0.47 MMOL/L (ref 0.36–1.25)
LDH SERPL L TO P-CCNC: 0.48 MMOL/L (ref 0.36–1.25)
LDH SERPL L TO P-CCNC: 0.48 MMOL/L (ref 0.36–1.25)
LDH SERPL L TO P-CCNC: 0.49 MMOL/L (ref 0.36–1.25)
LDH SERPL L TO P-CCNC: 0.5 MMOL/L (ref 0.36–1.25)
LDH SERPL L TO P-CCNC: 0.51 MMOL/L (ref 0.36–1.25)
LDH SERPL L TO P-CCNC: 0.52 MMOL/L (ref 0.36–1.25)
LDH SERPL L TO P-CCNC: 0.53 MMOL/L (ref 0.36–1.25)
LDH SERPL L TO P-CCNC: 0.53 MMOL/L (ref 0.36–1.25)
LDH SERPL L TO P-CCNC: 0.54 MMOL/L (ref 0.36–1.25)
LDH SERPL L TO P-CCNC: 0.55 MMOL/L (ref 0.36–1.25)
LDH SERPL L TO P-CCNC: 0.56 MMOL/L (ref 0.36–1.25)
LDH SERPL L TO P-CCNC: 0.57 MMOL/L (ref 0.36–1.25)
LDH SERPL L TO P-CCNC: 0.58 MMOL/L (ref 0.36–1.25)
LDH SERPL L TO P-CCNC: 0.59 MMOL/L (ref 0.36–1.25)
LDH SERPL L TO P-CCNC: 0.6 MMOL/L (ref 0.36–1.25)
LDH SERPL L TO P-CCNC: 0.6 MMOL/L (ref 0.36–1.25)
LDH SERPL L TO P-CCNC: 0.61 MMOL/L (ref 0.36–1.25)
LDH SERPL L TO P-CCNC: 0.61 MMOL/L (ref 0.36–1.25)
LDH SERPL L TO P-CCNC: 0.62 MMOL/L (ref 0.36–1.25)
LDH SERPL L TO P-CCNC: 0.63 MMOL/L (ref 0.36–1.25)
LDH SERPL L TO P-CCNC: 0.63 MMOL/L (ref 0.36–1.25)
LDH SERPL L TO P-CCNC: 0.64 MMOL/L (ref 0.36–1.25)
LDH SERPL L TO P-CCNC: 0.64 MMOL/L (ref 0.36–1.25)
LDH SERPL L TO P-CCNC: 0.65 MMOL/L (ref 0.36–1.25)
LDH SERPL L TO P-CCNC: 0.67 MMOL/L (ref 0.36–1.25)
LDH SERPL L TO P-CCNC: 0.67 MMOL/L (ref 0.36–1.25)
LDH SERPL L TO P-CCNC: 0.68 MMOL/L (ref 0.36–1.25)
LDH SERPL L TO P-CCNC: 0.68 MMOL/L (ref 0.36–1.25)
LDH SERPL L TO P-CCNC: 0.71 MMOL/L (ref 0.36–1.25)
LDH SERPL L TO P-CCNC: 0.71 MMOL/L (ref 0.36–1.25)
LDH SERPL L TO P-CCNC: 0.72 MMOL/L (ref 0.36–1.25)
LDH SERPL L TO P-CCNC: 0.73 MMOL/L (ref 0.36–1.25)
LDH SERPL L TO P-CCNC: 0.76 MMOL/L (ref 0.36–1.25)
LDH SERPL L TO P-CCNC: 0.76 MMOL/L (ref 0.36–1.25)
LDH SERPL L TO P-CCNC: 0.77 MMOL/L (ref 0.36–1.25)
LDH SERPL L TO P-CCNC: 0.77 MMOL/L (ref 0.36–1.25)
LDH SERPL L TO P-CCNC: 0.81 MMOL/L (ref 0.36–1.25)
LDH SERPL L TO P-CCNC: 0.86 MMOL/L (ref 0.36–1.25)
LDH SERPL L TO P-CCNC: 0.87 MMOL/L (ref 0.36–1.25)
LDH SERPL L TO P-CCNC: 0.95 MMOL/L (ref 0.36–1.25)
LDH SERPL L TO P-CCNC: 1.12 MMOL/L (ref 0.36–1.25)
LDH SERPL L TO P-CCNC: 1.21 MMOL/L (ref 0.36–1.25)
LDH SERPL L TO P-CCNC: 1.28 MMOL/L (ref 0.36–1.25)
LDH SERPL L TO P-CCNC: 1.28 MMOL/L (ref 0.36–1.25)
LDH SERPL L TO P-CCNC: 1.39 MMOL/L (ref 0.36–1.25)
LDH SERPL L TO P-CCNC: 1.88 MMOL/L (ref 0.36–1.25)
LDH SERPL L TO P-CCNC: 2.69 MMOL/L (ref 0.36–1.25)
LDH SERPL L TO P-CCNC: 3.15 MMOL/L (ref 0.36–1.25)
LDH SERPL L TO P-CCNC: 3.41 MMOL/L (ref 0.36–1.25)
LDH SERPL L TO P-CCNC: 326 U/L (ref 110–260)
LDH SERPL L TO P-CCNC: 351 U/L (ref 110–260)
LDH SERPL L TO P-CCNC: 370 U/L (ref 110–260)
LDH SERPL L TO P-CCNC: 373 U/L (ref 110–260)
LDH SERPL L TO P-CCNC: 376 U/L (ref 110–260)
LDH SERPL L TO P-CCNC: 376 U/L (ref 110–260)
LDH SERPL L TO P-CCNC: 385 U/L (ref 110–260)
LDH SERPL L TO P-CCNC: 387 U/L (ref 110–260)
LDH SERPL L TO P-CCNC: 387 U/L (ref 110–260)
LDH SERPL L TO P-CCNC: 396 U/L (ref 110–260)
LDH SERPL L TO P-CCNC: 399 U/L (ref 110–260)
LDH SERPL L TO P-CCNC: 4.6 MMOL/L (ref 0.36–1.25)
LDH SERPL L TO P-CCNC: 4.94 MMOL/L (ref 0.36–1.25)
LDH SERPL L TO P-CCNC: 404 U/L (ref 110–260)
LDH SERPL L TO P-CCNC: 405 U/L (ref 110–260)
LDH SERPL L TO P-CCNC: 415 U/L (ref 110–260)
LDH SERPL L TO P-CCNC: 416 U/L (ref 110–260)
LDH SERPL L TO P-CCNC: 417 U/L (ref 110–260)
LDH SERPL L TO P-CCNC: 422 U/L (ref 110–260)
LDH SERPL L TO P-CCNC: 423 U/L (ref 110–260)
LDH SERPL L TO P-CCNC: 425 U/L (ref 110–260)
LDH SERPL L TO P-CCNC: 427 U/L (ref 110–260)
LDH SERPL L TO P-CCNC: 432 U/L (ref 110–260)
LDH SERPL L TO P-CCNC: 433 U/L (ref 110–260)
LDH SERPL L TO P-CCNC: 444 U/L (ref 110–260)
LDH SERPL L TO P-CCNC: 450 U/L (ref 110–260)
LDH SERPL L TO P-CCNC: 452 U/L (ref 110–260)
LDH SERPL L TO P-CCNC: 458 U/L (ref 110–260)
LDH SERPL L TO P-CCNC: 462 U/L (ref 110–260)
LDH SERPL L TO P-CCNC: 468 U/L (ref 110–260)
LDH SERPL L TO P-CCNC: 474 U/L (ref 110–260)
LDH SERPL L TO P-CCNC: 475 U/L (ref 110–260)
LDH SERPL L TO P-CCNC: 476 U/L (ref 110–260)
LDH SERPL L TO P-CCNC: 479 U/L (ref 110–260)
LDH SERPL L TO P-CCNC: 481 U/L (ref 110–260)
LDH SERPL L TO P-CCNC: 488 U/L (ref 110–260)
LDH SERPL L TO P-CCNC: 490 U/L (ref 110–260)
LDH SERPL L TO P-CCNC: 490 U/L (ref 110–260)
LDH SERPL L TO P-CCNC: 498 U/L (ref 110–260)
LDH SERPL L TO P-CCNC: 5.57 MMOL/L (ref 0.36–1.25)
LDH SERPL L TO P-CCNC: 501 U/L (ref 110–260)
LDH SERPL L TO P-CCNC: 505 U/L (ref 110–260)
LDH SERPL L TO P-CCNC: 511 U/L (ref 110–260)
LDH SERPL L TO P-CCNC: 516 U/L (ref 110–260)
LDH SERPL L TO P-CCNC: 522 U/L (ref 110–260)
LDH SERPL L TO P-CCNC: 525 U/L (ref 110–260)
LDH SERPL L TO P-CCNC: 526 U/L (ref 110–260)
LDH SERPL L TO P-CCNC: 527 U/L (ref 110–260)
LDH SERPL L TO P-CCNC: 530 U/L (ref 110–260)
LDH SERPL L TO P-CCNC: 532 U/L (ref 110–260)
LDH SERPL L TO P-CCNC: 534 U/L (ref 110–260)
LDH SERPL L TO P-CCNC: 576 U/L (ref 110–260)
LDH SERPL L TO P-CCNC: 580 U/L (ref 110–260)
LDH SERPL L TO P-CCNC: 583 U/L (ref 110–260)
LDH SERPL L TO P-CCNC: 592 U/L (ref 110–260)
LDH SERPL L TO P-CCNC: 6.26 MMOL/L (ref 0.36–1.25)
LDH SERPL L TO P-CCNC: 625 U/L (ref 110–260)
LDH SERPL L TO P-CCNC: 628 U/L (ref 110–260)
LDH SERPL L TO P-CCNC: 628 U/L (ref 110–260)
LDH SERPL L TO P-CCNC: 649 U/L (ref 110–260)
LDH SERPL L TO P-CCNC: 658 U/L (ref 110–260)
LDH SERPL L TO P-CCNC: 676 U/L (ref 110–260)
LDH SERPL L TO P-CCNC: 692 U/L (ref 110–260)
LDH SERPL L TO P-CCNC: 709 U/L (ref 110–260)
LDH SERPL L TO P-CCNC: 712 U/L (ref 110–260)
LDH SERPL L TO P-CCNC: 719 U/L (ref 110–260)
LDH SERPL L TO P-CCNC: 720 U/L (ref 110–260)
LDH SERPL L TO P-CCNC: 757 U/L (ref 110–260)
LDH SERPL L TO P-CCNC: <0.3 MMOL/L (ref 0.36–1.25)
LEFT ATRIUM SIZE: 3.12 CM
LEFT ATRIUM SIZE: 3.5 CM
LEFT ATRIUM VOLUME INDEX: 28.6 ML/M2
LEFT ATRIUM VOLUME INDEX: 33.1 ML/M2
LEFT ATRIUM VOLUME: 48.01 CM3
LEFT ATRIUM VOLUME: 55.43 CM3
LEFT INTERNAL DIMENSION IN SYSTOLE: 3.09 CM (ref 2.1–4)
LEFT INTERNAL DIMENSION IN SYSTOLE: 3.64 CM (ref 2.1–4)
LEFT VENTRICLE DIASTOLIC VOLUME INDEX: 42.6 ML/M2
LEFT VENTRICLE DIASTOLIC VOLUME INDEX: 70.62 ML/M2
LEFT VENTRICLE DIASTOLIC VOLUME: 118.34 ML
LEFT VENTRICLE DIASTOLIC VOLUME: 71.39 ML
LEFT VENTRICLE MASS INDEX: 59 G/M2
LEFT VENTRICLE MASS INDEX: 85 G/M2
LEFT VENTRICLE SYSTOLIC VOLUME INDEX: 22.5 ML/M2
LEFT VENTRICLE SYSTOLIC VOLUME INDEX: 33.3 ML/M2
LEFT VENTRICLE SYSTOLIC VOLUME: 37.75 ML
LEFT VENTRICLE SYSTOLIC VOLUME: 55.83 ML
LEFT VENTRICULAR INTERNAL DIMENSION IN DIASTOLE: 4.03 CM (ref 3.5–6)
LEFT VENTRICULAR INTERNAL DIMENSION IN DIASTOLE: 5 CM (ref 3.5–6)
LEFT VENTRICULAR MASS: 142.38 G
LEFT VENTRICULAR MASS: 98.61 G
LEUKOCYTE ESTERASE UR QL STRIP: ABNORMAL
LEUKOCYTE ESTERASE UR QL STRIP: NEGATIVE
LEUKOCYTE ESTERASE UR QL STRIP: NEGATIVE
LV LATERAL E/E' RATIO: 6.42 M/S
LV SEPTAL E/E' RATIO: 7.7 M/S
LYMPHOCYTES # BLD AUTO: 0.3 K/UL (ref 1–4.8)
LYMPHOCYTES # BLD AUTO: 0.4 K/UL (ref 1–4.8)
LYMPHOCYTES # BLD AUTO: 0.5 K/UL (ref 1–4.8)
LYMPHOCYTES # BLD AUTO: 0.6 K/UL (ref 1–4.8)
LYMPHOCYTES # BLD AUTO: 0.7 K/UL (ref 1–4.8)
LYMPHOCYTES # BLD AUTO: 0.8 K/UL (ref 1–4.8)
LYMPHOCYTES # BLD AUTO: 0.9 K/UL (ref 1–4.8)
LYMPHOCYTES # BLD AUTO: 1 K/UL (ref 1–4.8)
LYMPHOCYTES # BLD AUTO: 1.1 K/UL (ref 1–4.8)
LYMPHOCYTES # BLD AUTO: 1.2 K/UL (ref 1–4.8)
LYMPHOCYTES # BLD AUTO: 1.3 K/UL (ref 1–4.8)
LYMPHOCYTES # BLD AUTO: 1.4 K/UL (ref 1–4.8)
LYMPHOCYTES # BLD AUTO: 1.5 K/UL (ref 1–4.8)
LYMPHOCYTES # BLD AUTO: 1.6 K/UL (ref 1–4.8)
LYMPHOCYTES # BLD AUTO: 1.7 K/UL (ref 1–4.8)
LYMPHOCYTES # BLD AUTO: 1.8 K/UL (ref 1–4.8)
LYMPHOCYTES # BLD AUTO: 1.9 K/UL (ref 1–4.8)
LYMPHOCYTES # BLD AUTO: 2 K/UL (ref 1–4.8)
LYMPHOCYTES # BLD AUTO: 2.1 K/UL (ref 1–4.8)
LYMPHOCYTES # BLD AUTO: 2.2 K/UL (ref 1–4.8)
LYMPHOCYTES # BLD AUTO: 2.3 K/UL (ref 1–4.8)
LYMPHOCYTES # BLD AUTO: 2.4 K/UL (ref 1–4.8)
LYMPHOCYTES # BLD AUTO: 2.5 K/UL (ref 1–4.8)
LYMPHOCYTES # BLD AUTO: 2.5 K/UL (ref 1–4.8)
LYMPHOCYTES # BLD AUTO: 2.6 K/UL (ref 1–4.8)
LYMPHOCYTES # BLD AUTO: 2.6 K/UL (ref 1–4.8)
LYMPHOCYTES # BLD AUTO: 2.9 K/UL (ref 1–4.8)
LYMPHOCYTES # BLD AUTO: 3.8 K/UL (ref 1–4.8)
LYMPHOCYTES # BLD AUTO: ABNORMAL K/UL (ref 1–4.8)
LYMPHOCYTES NFR BLD: 1 % (ref 18–48)
LYMPHOCYTES NFR BLD: 10 % (ref 18–48)
LYMPHOCYTES NFR BLD: 10.1 % (ref 18–48)
LYMPHOCYTES NFR BLD: 10.2 % (ref 18–48)
LYMPHOCYTES NFR BLD: 10.5 % (ref 18–48)
LYMPHOCYTES NFR BLD: 10.6 % (ref 18–48)
LYMPHOCYTES NFR BLD: 10.7 % (ref 18–48)
LYMPHOCYTES NFR BLD: 10.9 % (ref 18–48)
LYMPHOCYTES NFR BLD: 11 % (ref 18–48)
LYMPHOCYTES NFR BLD: 11.1 % (ref 18–48)
LYMPHOCYTES NFR BLD: 11.1 % (ref 18–48)
LYMPHOCYTES NFR BLD: 11.4 % (ref 18–48)
LYMPHOCYTES NFR BLD: 11.5 % (ref 18–48)
LYMPHOCYTES NFR BLD: 11.5 % (ref 18–48)
LYMPHOCYTES NFR BLD: 11.6 % (ref 18–48)
LYMPHOCYTES NFR BLD: 11.7 % (ref 18–48)
LYMPHOCYTES NFR BLD: 11.7 % (ref 18–48)
LYMPHOCYTES NFR BLD: 11.8 % (ref 18–48)
LYMPHOCYTES NFR BLD: 11.8 % (ref 18–48)
LYMPHOCYTES NFR BLD: 11.9 % (ref 18–48)
LYMPHOCYTES NFR BLD: 12 % (ref 18–48)
LYMPHOCYTES NFR BLD: 12 % (ref 18–48)
LYMPHOCYTES NFR BLD: 12.1 % (ref 18–48)
LYMPHOCYTES NFR BLD: 12.2 % (ref 18–48)
LYMPHOCYTES NFR BLD: 12.4 % (ref 18–48)
LYMPHOCYTES NFR BLD: 12.4 % (ref 18–48)
LYMPHOCYTES NFR BLD: 12.5 % (ref 18–48)
LYMPHOCYTES NFR BLD: 12.5 % (ref 18–48)
LYMPHOCYTES NFR BLD: 12.6 % (ref 18–48)
LYMPHOCYTES NFR BLD: 12.7 % (ref 18–48)
LYMPHOCYTES NFR BLD: 12.7 % (ref 18–48)
LYMPHOCYTES NFR BLD: 12.8 % (ref 18–48)
LYMPHOCYTES NFR BLD: 12.8 % (ref 18–48)
LYMPHOCYTES NFR BLD: 12.9 % (ref 18–48)
LYMPHOCYTES NFR BLD: 12.9 % (ref 18–48)
LYMPHOCYTES NFR BLD: 13 % (ref 18–48)
LYMPHOCYTES NFR BLD: 13.3 % (ref 18–48)
LYMPHOCYTES NFR BLD: 13.3 % (ref 18–48)
LYMPHOCYTES NFR BLD: 13.4 % (ref 18–48)
LYMPHOCYTES NFR BLD: 13.6 % (ref 18–48)
LYMPHOCYTES NFR BLD: 13.7 % (ref 18–48)
LYMPHOCYTES NFR BLD: 13.8 % (ref 18–48)
LYMPHOCYTES NFR BLD: 13.9 % (ref 18–48)
LYMPHOCYTES NFR BLD: 14 % (ref 18–48)
LYMPHOCYTES NFR BLD: 14 % (ref 18–48)
LYMPHOCYTES NFR BLD: 14.1 % (ref 18–48)
LYMPHOCYTES NFR BLD: 14.3 % (ref 18–48)
LYMPHOCYTES NFR BLD: 14.4 % (ref 18–48)
LYMPHOCYTES NFR BLD: 14.5 % (ref 18–48)
LYMPHOCYTES NFR BLD: 14.6 % (ref 18–48)
LYMPHOCYTES NFR BLD: 14.7 % (ref 18–48)
LYMPHOCYTES NFR BLD: 14.8 % (ref 18–48)
LYMPHOCYTES NFR BLD: 14.8 % (ref 18–48)
LYMPHOCYTES NFR BLD: 14.9 % (ref 18–48)
LYMPHOCYTES NFR BLD: 15 % (ref 18–48)
LYMPHOCYTES NFR BLD: 15.1 % (ref 18–48)
LYMPHOCYTES NFR BLD: 15.2 % (ref 18–48)
LYMPHOCYTES NFR BLD: 15.3 % (ref 18–48)
LYMPHOCYTES NFR BLD: 15.4 % (ref 18–48)
LYMPHOCYTES NFR BLD: 15.5 % (ref 18–48)
LYMPHOCYTES NFR BLD: 15.5 % (ref 18–48)
LYMPHOCYTES NFR BLD: 15.8 % (ref 18–48)
LYMPHOCYTES NFR BLD: 15.8 % (ref 18–48)
LYMPHOCYTES NFR BLD: 15.9 % (ref 18–48)
LYMPHOCYTES NFR BLD: 16 % (ref 18–48)
LYMPHOCYTES NFR BLD: 16.1 % (ref 18–48)
LYMPHOCYTES NFR BLD: 16.1 % (ref 18–48)
LYMPHOCYTES NFR BLD: 16.2 % (ref 18–48)
LYMPHOCYTES NFR BLD: 16.2 % (ref 18–48)
LYMPHOCYTES NFR BLD: 16.3 % (ref 18–48)
LYMPHOCYTES NFR BLD: 16.4 % (ref 18–48)
LYMPHOCYTES NFR BLD: 16.6 % (ref 18–48)
LYMPHOCYTES NFR BLD: 16.7 % (ref 18–48)
LYMPHOCYTES NFR BLD: 16.8 % (ref 18–48)
LYMPHOCYTES NFR BLD: 16.8 % (ref 18–48)
LYMPHOCYTES NFR BLD: 16.9 % (ref 18–48)
LYMPHOCYTES NFR BLD: 17 % (ref 18–48)
LYMPHOCYTES NFR BLD: 17.1 % (ref 18–48)
LYMPHOCYTES NFR BLD: 17.7 % (ref 18–48)
LYMPHOCYTES NFR BLD: 17.7 % (ref 18–48)
LYMPHOCYTES NFR BLD: 17.8 % (ref 18–48)
LYMPHOCYTES NFR BLD: 18 % (ref 18–48)
LYMPHOCYTES NFR BLD: 18.3 % (ref 18–48)
LYMPHOCYTES NFR BLD: 18.3 % (ref 18–48)
LYMPHOCYTES NFR BLD: 19.1 % (ref 18–48)
LYMPHOCYTES NFR BLD: 19.1 % (ref 18–48)
LYMPHOCYTES NFR BLD: 19.7 % (ref 18–48)
LYMPHOCYTES NFR BLD: 2 % (ref 18–48)
LYMPHOCYTES NFR BLD: 2.1 % (ref 18–48)
LYMPHOCYTES NFR BLD: 2.3 % (ref 18–48)
LYMPHOCYTES NFR BLD: 2.4 % (ref 18–48)
LYMPHOCYTES NFR BLD: 2.4 % (ref 18–48)
LYMPHOCYTES NFR BLD: 2.6 % (ref 18–48)
LYMPHOCYTES NFR BLD: 21.7 % (ref 18–48)
LYMPHOCYTES NFR BLD: 23 % (ref 18–48)
LYMPHOCYTES NFR BLD: 27 % (ref 18–48)
LYMPHOCYTES NFR BLD: 3 % (ref 18–48)
LYMPHOCYTES NFR BLD: 3.3 % (ref 18–48)
LYMPHOCYTES NFR BLD: 3.7 % (ref 18–48)
LYMPHOCYTES NFR BLD: 3.9 % (ref 18–48)
LYMPHOCYTES NFR BLD: 4 % (ref 18–48)
LYMPHOCYTES NFR BLD: 4.1 % (ref 18–48)
LYMPHOCYTES NFR BLD: 4.6 % (ref 18–48)
LYMPHOCYTES NFR BLD: 4.7 % (ref 18–48)
LYMPHOCYTES NFR BLD: 4.7 % (ref 18–48)
LYMPHOCYTES NFR BLD: 4.9 % (ref 18–48)
LYMPHOCYTES NFR BLD: 5 % (ref 18–48)
LYMPHOCYTES NFR BLD: 5.1 % (ref 18–48)
LYMPHOCYTES NFR BLD: 5.2 % (ref 18–48)
LYMPHOCYTES NFR BLD: 5.7 % (ref 18–48)
LYMPHOCYTES NFR BLD: 6 % (ref 18–48)
LYMPHOCYTES NFR BLD: 6.1 % (ref 18–48)
LYMPHOCYTES NFR BLD: 6.2 % (ref 18–48)
LYMPHOCYTES NFR BLD: 6.3 % (ref 18–48)
LYMPHOCYTES NFR BLD: 6.3 % (ref 18–48)
LYMPHOCYTES NFR BLD: 6.4 % (ref 18–48)
LYMPHOCYTES NFR BLD: 6.4 % (ref 18–48)
LYMPHOCYTES NFR BLD: 6.5 % (ref 18–48)
LYMPHOCYTES NFR BLD: 6.5 % (ref 18–48)
LYMPHOCYTES NFR BLD: 6.6 % (ref 18–48)
LYMPHOCYTES NFR BLD: 6.6 % (ref 18–48)
LYMPHOCYTES NFR BLD: 6.7 % (ref 18–48)
LYMPHOCYTES NFR BLD: 6.8 % (ref 18–48)
LYMPHOCYTES NFR BLD: 6.8 % (ref 18–48)
LYMPHOCYTES NFR BLD: 6.9 % (ref 18–48)
LYMPHOCYTES NFR BLD: 7 % (ref 18–48)
LYMPHOCYTES NFR BLD: 7.1 % (ref 18–48)
LYMPHOCYTES NFR BLD: 7.2 % (ref 18–48)
LYMPHOCYTES NFR BLD: 7.3 % (ref 18–48)
LYMPHOCYTES NFR BLD: 7.3 % (ref 18–48)
LYMPHOCYTES NFR BLD: 7.4 % (ref 18–48)
LYMPHOCYTES NFR BLD: 7.4 % (ref 18–48)
LYMPHOCYTES NFR BLD: 7.5 % (ref 18–48)
LYMPHOCYTES NFR BLD: 7.5 % (ref 18–48)
LYMPHOCYTES NFR BLD: 7.6 % (ref 18–48)
LYMPHOCYTES NFR BLD: 7.7 % (ref 18–48)
LYMPHOCYTES NFR BLD: 7.7 % (ref 18–48)
LYMPHOCYTES NFR BLD: 7.8 % (ref 18–48)
LYMPHOCYTES NFR BLD: 7.9 % (ref 18–48)
LYMPHOCYTES NFR BLD: 8 % (ref 18–48)
LYMPHOCYTES NFR BLD: 8.1 % (ref 18–48)
LYMPHOCYTES NFR BLD: 8.2 % (ref 18–48)
LYMPHOCYTES NFR BLD: 8.2 % (ref 18–48)
LYMPHOCYTES NFR BLD: 8.3 % (ref 18–48)
LYMPHOCYTES NFR BLD: 8.3 % (ref 18–48)
LYMPHOCYTES NFR BLD: 8.4 % (ref 18–48)
LYMPHOCYTES NFR BLD: 8.5 % (ref 18–48)
LYMPHOCYTES NFR BLD: 8.6 % (ref 18–48)
LYMPHOCYTES NFR BLD: 9 % (ref 18–48)
LYMPHOCYTES NFR BLD: 9.2 % (ref 18–48)
LYMPHOCYTES NFR BLD: 9.5 % (ref 18–48)
LYMPHOCYTES NFR BLD: 9.5 % (ref 18–48)
LYMPHOCYTES NFR BLD: 9.6 % (ref 18–48)
LYMPHOCYTES NFR BLD: 9.6 % (ref 18–48)
LYMPHOCYTES NFR BLD: 9.9 % (ref 18–48)
LYMPHOCYTES NFR BLD: 9.9 % (ref 18–48)
MAGNESIUM SERPL-MCNC: 1.7 MG/DL (ref 1.6–2.6)
MAGNESIUM SERPL-MCNC: 1.8 MG/DL (ref 1.6–2.6)
MAGNESIUM SERPL-MCNC: 1.9 MG/DL (ref 1.6–2.6)
MAGNESIUM SERPL-MCNC: 2 MG/DL (ref 1.6–2.6)
MAGNESIUM SERPL-MCNC: 2.1 MG/DL (ref 1.6–2.6)
MAGNESIUM SERPL-MCNC: 2.2 MG/DL (ref 1.6–2.6)
MAGNESIUM SERPL-MCNC: 2.3 MG/DL (ref 1.6–2.6)
MAGNESIUM SERPL-MCNC: 2.4 MG/DL (ref 1.6–2.6)
MAGNESIUM SERPL-MCNC: 2.5 MG/DL (ref 1.6–2.6)
MAGNESIUM SERPL-MCNC: 2.6 MG/DL (ref 1.6–2.6)
MAGNESIUM SERPL-MCNC: 2.7 MG/DL (ref 1.6–2.6)
MAGNESIUM SERPL-MCNC: 2.9 MG/DL (ref 1.6–2.6)
MAGNESIUM SERPL-MCNC: 2.9 MG/DL (ref 1.6–2.6)
MAGNESIUM SERPL-MCNC: 3 MG/DL (ref 1.6–2.6)
MAGNESIUM SERPL-MCNC: 3.2 MG/DL (ref 1.6–2.6)
MAP: 18
MAP: 19
MCH RBC QN AUTO: 27.6 PG (ref 27–31)
MCH RBC QN AUTO: 27.9 PG (ref 27–31)
MCH RBC QN AUTO: 28 PG (ref 27–31)
MCH RBC QN AUTO: 28.1 PG (ref 27–31)
MCH RBC QN AUTO: 28.2 PG (ref 27–31)
MCH RBC QN AUTO: 28.3 PG (ref 27–31)
MCH RBC QN AUTO: 28.4 PG (ref 27–31)
MCH RBC QN AUTO: 28.5 PG (ref 27–31)
MCH RBC QN AUTO: 28.6 PG (ref 27–31)
MCH RBC QN AUTO: 28.6 PG (ref 27–31)
MCH RBC QN AUTO: 28.7 PG (ref 27–31)
MCH RBC QN AUTO: 28.8 PG (ref 27–31)
MCH RBC QN AUTO: 28.9 PG (ref 27–31)
MCH RBC QN AUTO: 29 PG (ref 27–31)
MCH RBC QN AUTO: 29.1 PG (ref 27–31)
MCH RBC QN AUTO: 29.2 PG (ref 27–31)
MCH RBC QN AUTO: 29.3 PG (ref 27–31)
MCH RBC QN AUTO: 29.4 PG (ref 27–31)
MCH RBC QN AUTO: 29.5 PG (ref 27–31)
MCH RBC QN AUTO: 29.6 PG (ref 27–31)
MCH RBC QN AUTO: 29.7 PG (ref 27–31)
MCH RBC QN AUTO: 29.8 PG (ref 27–31)
MCH RBC QN AUTO: 29.9 PG (ref 27–31)
MCH RBC QN AUTO: 30 PG (ref 27–31)
MCH RBC QN AUTO: 30.1 PG (ref 27–31)
MCH RBC QN AUTO: 30.2 PG (ref 27–31)
MCH RBC QN AUTO: 30.3 PG (ref 27–31)
MCH RBC QN AUTO: 30.4 PG (ref 27–31)
MCH RBC QN AUTO: 30.5 PG (ref 27–31)
MCH RBC QN AUTO: 30.6 PG (ref 27–31)
MCH RBC QN AUTO: 30.7 PG (ref 27–31)
MCH RBC QN AUTO: 30.7 PG (ref 27–31)
MCH RBC QN AUTO: 30.8 PG (ref 27–31)
MCH RBC QN AUTO: 30.9 PG (ref 27–31)
MCH RBC QN AUTO: 31 PG (ref 27–31)
MCHC RBC AUTO-ENTMCNC: 27.9 G/DL (ref 32–36)
MCHC RBC AUTO-ENTMCNC: 28.7 G/DL (ref 32–36)
MCHC RBC AUTO-ENTMCNC: 28.8 G/DL (ref 32–36)
MCHC RBC AUTO-ENTMCNC: 28.8 G/DL (ref 32–36)
MCHC RBC AUTO-ENTMCNC: 28.9 G/DL (ref 32–36)
MCHC RBC AUTO-ENTMCNC: 28.9 G/DL (ref 32–36)
MCHC RBC AUTO-ENTMCNC: 29.1 G/DL (ref 32–36)
MCHC RBC AUTO-ENTMCNC: 29.3 G/DL (ref 32–36)
MCHC RBC AUTO-ENTMCNC: 29.4 G/DL (ref 32–36)
MCHC RBC AUTO-ENTMCNC: 29.5 G/DL (ref 32–36)
MCHC RBC AUTO-ENTMCNC: 29.6 G/DL (ref 32–36)
MCHC RBC AUTO-ENTMCNC: 29.6 G/DL (ref 32–36)
MCHC RBC AUTO-ENTMCNC: 29.7 G/DL (ref 32–36)
MCHC RBC AUTO-ENTMCNC: 29.8 G/DL (ref 32–36)
MCHC RBC AUTO-ENTMCNC: 29.9 G/DL (ref 32–36)
MCHC RBC AUTO-ENTMCNC: 29.9 G/DL (ref 32–36)
MCHC RBC AUTO-ENTMCNC: 30 G/DL (ref 32–36)
MCHC RBC AUTO-ENTMCNC: 30 G/DL (ref 32–36)
MCHC RBC AUTO-ENTMCNC: 30.1 G/DL (ref 32–36)
MCHC RBC AUTO-ENTMCNC: 30.2 G/DL (ref 32–36)
MCHC RBC AUTO-ENTMCNC: 30.3 G/DL (ref 32–36)
MCHC RBC AUTO-ENTMCNC: 30.4 G/DL (ref 32–36)
MCHC RBC AUTO-ENTMCNC: 30.5 G/DL (ref 32–36)
MCHC RBC AUTO-ENTMCNC: 30.6 G/DL (ref 32–36)
MCHC RBC AUTO-ENTMCNC: 30.7 G/DL (ref 32–36)
MCHC RBC AUTO-ENTMCNC: 30.8 G/DL (ref 32–36)
MCHC RBC AUTO-ENTMCNC: 30.9 G/DL (ref 32–36)
MCHC RBC AUTO-ENTMCNC: 31 G/DL (ref 32–36)
MCHC RBC AUTO-ENTMCNC: 31.1 G/DL (ref 32–36)
MCHC RBC AUTO-ENTMCNC: 31.2 G/DL (ref 32–36)
MCHC RBC AUTO-ENTMCNC: 31.3 G/DL (ref 32–36)
MCHC RBC AUTO-ENTMCNC: 31.4 G/DL (ref 32–36)
MCHC RBC AUTO-ENTMCNC: 31.5 G/DL (ref 32–36)
MCHC RBC AUTO-ENTMCNC: 31.6 G/DL (ref 32–36)
MCHC RBC AUTO-ENTMCNC: 31.7 G/DL (ref 32–36)
MCHC RBC AUTO-ENTMCNC: 31.8 G/DL (ref 32–36)
MCHC RBC AUTO-ENTMCNC: 31.9 G/DL (ref 32–36)
MCHC RBC AUTO-ENTMCNC: 32 G/DL (ref 32–36)
MCHC RBC AUTO-ENTMCNC: 32.1 G/DL (ref 32–36)
MCHC RBC AUTO-ENTMCNC: 32.2 G/DL (ref 32–36)
MCHC RBC AUTO-ENTMCNC: 32.3 G/DL (ref 32–36)
MCHC RBC AUTO-ENTMCNC: 32.4 G/DL (ref 32–36)
MCHC RBC AUTO-ENTMCNC: 32.5 G/DL (ref 32–36)
MCHC RBC AUTO-ENTMCNC: 32.6 G/DL (ref 32–36)
MCHC RBC AUTO-ENTMCNC: 32.6 G/DL (ref 32–36)
MCHC RBC AUTO-ENTMCNC: 32.7 G/DL (ref 32–36)
MCHC RBC AUTO-ENTMCNC: 32.8 G/DL (ref 32–36)
MCHC RBC AUTO-ENTMCNC: 32.9 G/DL (ref 32–36)
MCHC RBC AUTO-ENTMCNC: 33 G/DL (ref 32–36)
MCHC RBC AUTO-ENTMCNC: 33.1 G/DL (ref 32–36)
MCHC RBC AUTO-ENTMCNC: 33.2 G/DL (ref 32–36)
MCHC RBC AUTO-ENTMCNC: 33.2 G/DL (ref 32–36)
MCHC RBC AUTO-ENTMCNC: 33.3 G/DL (ref 32–36)
MCHC RBC AUTO-ENTMCNC: 33.4 G/DL (ref 32–36)
MCHC RBC AUTO-ENTMCNC: 33.7 G/DL (ref 32–36)
MCHC RBC AUTO-ENTMCNC: 33.7 G/DL (ref 32–36)
MCV RBC AUTO: 100 FL (ref 82–98)
MCV RBC AUTO: 101 FL (ref 82–98)
MCV RBC AUTO: 102 FL (ref 82–98)
MCV RBC AUTO: 86 FL (ref 82–98)
MCV RBC AUTO: 86 FL (ref 82–98)
MCV RBC AUTO: 87 FL (ref 82–98)
MCV RBC AUTO: 88 FL (ref 82–98)
MCV RBC AUTO: 89 FL (ref 82–98)
MCV RBC AUTO: 90 FL (ref 82–98)
MCV RBC AUTO: 91 FL (ref 82–98)
MCV RBC AUTO: 92 FL (ref 82–98)
MCV RBC AUTO: 93 FL (ref 82–98)
MCV RBC AUTO: 94 FL (ref 82–98)
MCV RBC AUTO: 95 FL (ref 82–98)
MCV RBC AUTO: 96 FL (ref 82–98)
MCV RBC AUTO: 97 FL (ref 82–98)
MCV RBC AUTO: 98 FL (ref 82–98)
MCV RBC AUTO: 99 FL (ref 82–98)
MCV RBC AUTO: 99 FL (ref 82–98)
METAMYELOCYTES NFR BLD MANUAL: 0.5 %
METAMYELOCYTES NFR BLD MANUAL: 0.7 %
METAMYELOCYTES NFR BLD MANUAL: 1 %
METAMYELOCYTES NFR BLD MANUAL: 2 %
METAMYELOCYTES NFR BLD MANUAL: 3 %
METAMYELOCYTES NFR BLD MANUAL: 4 %
METHEMOGLOBIN: 0.2 % (ref 0–3)
METHEMOGLOBIN: 0.5 % (ref 0–3)
METHEMOGLOBIN: 0.6 % (ref 0–3)
METHEMOGLOBIN: 0.8 % (ref 0–3)
METHEMOGLOBIN: 0.8 % (ref 0–3)
METHEMOGLOBIN: 1 % (ref 0–3)
MICROSCOPIC COMMENT: ABNORMAL
MICROSCOPIC COMMENT: NORMAL
MIN VOL: 13
MIN VOL: 7.26
MIN VOL: 7.29
MIN VOL: 9.31
MIN VOL: 9.75
MIN VOL: 9.9
MIN VOL: 9.91
MODE: ABNORMAL
MONOCYTES # BLD AUTO: 0.2 K/UL (ref 0.3–1)
MONOCYTES # BLD AUTO: 0.2 K/UL (ref 0.3–1)
MONOCYTES # BLD AUTO: 0.3 K/UL (ref 0.3–1)
MONOCYTES # BLD AUTO: 0.3 K/UL (ref 0.3–1)
MONOCYTES # BLD AUTO: 0.4 K/UL (ref 0.3–1)
MONOCYTES # BLD AUTO: 0.5 K/UL (ref 0.3–1)
MONOCYTES # BLD AUTO: 0.6 K/UL (ref 0.3–1)
MONOCYTES # BLD AUTO: 0.7 K/UL (ref 0.3–1)
MONOCYTES # BLD AUTO: 0.8 K/UL (ref 0.3–1)
MONOCYTES # BLD AUTO: 0.9 K/UL (ref 0.3–1)
MONOCYTES # BLD AUTO: 1 K/UL (ref 0.3–1)
MONOCYTES # BLD AUTO: 1.1 K/UL (ref 0.3–1)
MONOCYTES # BLD AUTO: 1.2 K/UL (ref 0.3–1)
MONOCYTES # BLD AUTO: 1.3 K/UL (ref 0.3–1)
MONOCYTES # BLD AUTO: 1.4 K/UL (ref 0.3–1)
MONOCYTES # BLD AUTO: 1.5 K/UL (ref 0.3–1)
MONOCYTES # BLD AUTO: 1.6 K/UL (ref 0.3–1)
MONOCYTES # BLD AUTO: 1.7 K/UL (ref 0.3–1)
MONOCYTES # BLD AUTO: 1.7 K/UL (ref 0.3–1)
MONOCYTES # BLD AUTO: 1.8 K/UL (ref 0.3–1)
MONOCYTES # BLD AUTO: 1.8 K/UL (ref 0.3–1)
MONOCYTES # BLD AUTO: 2 K/UL (ref 0.3–1)
MONOCYTES # BLD AUTO: ABNORMAL K/UL (ref 0.3–1)
MONOCYTES NFR BLD: 0 % (ref 4–15)
MONOCYTES NFR BLD: 1 % (ref 4–15)
MONOCYTES NFR BLD: 1.3 % (ref 4–15)
MONOCYTES NFR BLD: 1.9 % (ref 4–15)
MONOCYTES NFR BLD: 10 % (ref 4–15)
MONOCYTES NFR BLD: 10 % (ref 4–15)
MONOCYTES NFR BLD: 10.1 % (ref 4–15)
MONOCYTES NFR BLD: 10.2 % (ref 4–15)
MONOCYTES NFR BLD: 10.3 % (ref 4–15)
MONOCYTES NFR BLD: 10.6 % (ref 4–15)
MONOCYTES NFR BLD: 10.7 % (ref 4–15)
MONOCYTES NFR BLD: 11 % (ref 4–15)
MONOCYTES NFR BLD: 11.1 % (ref 4–15)
MONOCYTES NFR BLD: 11.2 % (ref 4–15)
MONOCYTES NFR BLD: 11.4 % (ref 4–15)
MONOCYTES NFR BLD: 12 % (ref 4–15)
MONOCYTES NFR BLD: 12 % (ref 4–15)
MONOCYTES NFR BLD: 12.7 % (ref 4–15)
MONOCYTES NFR BLD: 12.7 % (ref 4–15)
MONOCYTES NFR BLD: 13.9 % (ref 4–15)
MONOCYTES NFR BLD: 13.9 % (ref 4–15)
MONOCYTES NFR BLD: 14.9 % (ref 4–15)
MONOCYTES NFR BLD: 15.1 % (ref 4–15)
MONOCYTES NFR BLD: 15.4 % (ref 4–15)
MONOCYTES NFR BLD: 15.5 % (ref 4–15)
MONOCYTES NFR BLD: 15.5 % (ref 4–15)
MONOCYTES NFR BLD: 2 % (ref 4–15)
MONOCYTES NFR BLD: 2.4 % (ref 4–15)
MONOCYTES NFR BLD: 2.4 % (ref 4–15)
MONOCYTES NFR BLD: 2.5 % (ref 4–15)
MONOCYTES NFR BLD: 3 % (ref 4–15)
MONOCYTES NFR BLD: 3.4 % (ref 4–15)
MONOCYTES NFR BLD: 3.5 % (ref 4–15)
MONOCYTES NFR BLD: 3.6 % (ref 4–15)
MONOCYTES NFR BLD: 3.7 % (ref 4–15)
MONOCYTES NFR BLD: 3.7 % (ref 4–15)
MONOCYTES NFR BLD: 3.8 % (ref 4–15)
MONOCYTES NFR BLD: 3.8 % (ref 4–15)
MONOCYTES NFR BLD: 3.9 % (ref 4–15)
MONOCYTES NFR BLD: 4 % (ref 4–15)
MONOCYTES NFR BLD: 4.1 % (ref 4–15)
MONOCYTES NFR BLD: 4.3 % (ref 4–15)
MONOCYTES NFR BLD: 4.4 % (ref 4–15)
MONOCYTES NFR BLD: 4.5 % (ref 4–15)
MONOCYTES NFR BLD: 4.6 % (ref 4–15)
MONOCYTES NFR BLD: 4.6 % (ref 4–15)
MONOCYTES NFR BLD: 4.7 % (ref 4–15)
MONOCYTES NFR BLD: 4.8 % (ref 4–15)
MONOCYTES NFR BLD: 4.9 % (ref 4–15)
MONOCYTES NFR BLD: 4.9 % (ref 4–15)
MONOCYTES NFR BLD: 5 % (ref 4–15)
MONOCYTES NFR BLD: 5.1 % (ref 4–15)
MONOCYTES NFR BLD: 5.2 % (ref 4–15)
MONOCYTES NFR BLD: 5.3 % (ref 4–15)
MONOCYTES NFR BLD: 5.3 % (ref 4–15)
MONOCYTES NFR BLD: 5.5 % (ref 4–15)
MONOCYTES NFR BLD: 5.5 % (ref 4–15)
MONOCYTES NFR BLD: 5.6 % (ref 4–15)
MONOCYTES NFR BLD: 5.7 % (ref 4–15)
MONOCYTES NFR BLD: 5.7 % (ref 4–15)
MONOCYTES NFR BLD: 5.8 % (ref 4–15)
MONOCYTES NFR BLD: 5.9 % (ref 4–15)
MONOCYTES NFR BLD: 6 % (ref 4–15)
MONOCYTES NFR BLD: 6.1 % (ref 4–15)
MONOCYTES NFR BLD: 6.2 % (ref 4–15)
MONOCYTES NFR BLD: 6.3 % (ref 4–15)
MONOCYTES NFR BLD: 6.4 % (ref 4–15)
MONOCYTES NFR BLD: 6.5 % (ref 4–15)
MONOCYTES NFR BLD: 6.6 % (ref 4–15)
MONOCYTES NFR BLD: 6.7 % (ref 4–15)
MONOCYTES NFR BLD: 6.8 % (ref 4–15)
MONOCYTES NFR BLD: 6.9 % (ref 4–15)
MONOCYTES NFR BLD: 6.9 % (ref 4–15)
MONOCYTES NFR BLD: 7 % (ref 4–15)
MONOCYTES NFR BLD: 7.1 % (ref 4–15)
MONOCYTES NFR BLD: 7.2 % (ref 4–15)
MONOCYTES NFR BLD: 7.2 % (ref 4–15)
MONOCYTES NFR BLD: 7.3 % (ref 4–15)
MONOCYTES NFR BLD: 7.4 % (ref 4–15)
MONOCYTES NFR BLD: 7.5 % (ref 4–15)
MONOCYTES NFR BLD: 7.6 % (ref 4–15)
MONOCYTES NFR BLD: 7.7 % (ref 4–15)
MONOCYTES NFR BLD: 7.8 % (ref 4–15)
MONOCYTES NFR BLD: 7.8 % (ref 4–15)
MONOCYTES NFR BLD: 7.9 % (ref 4–15)
MONOCYTES NFR BLD: 8 % (ref 4–15)
MONOCYTES NFR BLD: 8.1 % (ref 4–15)
MONOCYTES NFR BLD: 8.1 % (ref 4–15)
MONOCYTES NFR BLD: 8.2 % (ref 4–15)
MONOCYTES NFR BLD: 8.3 % (ref 4–15)
MONOCYTES NFR BLD: 8.4 % (ref 4–15)
MONOCYTES NFR BLD: 8.5 % (ref 4–15)
MONOCYTES NFR BLD: 8.5 % (ref 4–15)
MONOCYTES NFR BLD: 8.6 % (ref 4–15)
MONOCYTES NFR BLD: 8.7 % (ref 4–15)
MONOCYTES NFR BLD: 8.8 % (ref 4–15)
MONOCYTES NFR BLD: 8.9 % (ref 4–15)
MONOCYTES NFR BLD: 9 % (ref 4–15)
MONOCYTES NFR BLD: 9.2 % (ref 4–15)
MONOCYTES NFR BLD: 9.2 % (ref 4–15)
MONOCYTES NFR BLD: 9.3 % (ref 4–15)
MONOCYTES NFR BLD: 9.4 % (ref 4–15)
MONOCYTES NFR BLD: 9.4 % (ref 4–15)
MONOCYTES NFR BLD: 9.5 % (ref 4–15)
MONOCYTES NFR BLD: 9.6 % (ref 4–15)
MONOCYTES NFR BLD: 9.7 % (ref 4–15)
MV PEAK A VEL: 0.47 M/S
MV PEAK E VEL: 0.77 M/S
MYELOCYTES NFR BLD MANUAL: 0.7 %
MYELOCYTES NFR BLD MANUAL: 1 %
MYELOCYTES NFR BLD MANUAL: 1.5 %
MYELOCYTES NFR BLD MANUAL: 10 %
MYELOCYTES NFR BLD MANUAL: 12 %
MYELOCYTES NFR BLD MANUAL: 2 %
MYELOCYTES NFR BLD MANUAL: 3 %
MYELOCYTES NFR BLD MANUAL: 4 %
MYELOCYTES NFR BLD MANUAL: 4.5 %
MYELOCYTES NFR BLD MANUAL: 5 %
MYELOCYTES NFR BLD MANUAL: 6 %
MYELOCYTES NFR BLD MANUAL: 7 %
MYELOCYTES NFR BLD MANUAL: 9 %
NEUTROPHILS # BLD AUTO: 10 K/UL (ref 1.8–7.7)
NEUTROPHILS # BLD AUTO: 10.1 K/UL (ref 1.8–7.7)
NEUTROPHILS # BLD AUTO: 10.2 K/UL (ref 1.8–7.7)
NEUTROPHILS # BLD AUTO: 10.3 K/UL (ref 1.8–7.7)
NEUTROPHILS # BLD AUTO: 10.3 K/UL (ref 1.8–7.7)
NEUTROPHILS # BLD AUTO: 10.4 K/UL (ref 1.8–7.7)
NEUTROPHILS # BLD AUTO: 10.5 K/UL (ref 1.8–7.7)
NEUTROPHILS # BLD AUTO: 10.5 K/UL (ref 1.8–7.7)
NEUTROPHILS # BLD AUTO: 10.6 K/UL (ref 1.8–7.7)
NEUTROPHILS # BLD AUTO: 10.7 K/UL (ref 1.8–7.7)
NEUTROPHILS # BLD AUTO: 10.8 K/UL (ref 1.8–7.7)
NEUTROPHILS # BLD AUTO: 10.9 K/UL (ref 1.8–7.7)
NEUTROPHILS # BLD AUTO: 11 K/UL (ref 1.8–7.7)
NEUTROPHILS # BLD AUTO: 11.1 K/UL (ref 1.8–7.7)
NEUTROPHILS # BLD AUTO: 11.2 K/UL (ref 1.8–7.7)
NEUTROPHILS # BLD AUTO: 11.3 K/UL (ref 1.8–7.7)
NEUTROPHILS # BLD AUTO: 11.4 K/UL (ref 1.8–7.7)
NEUTROPHILS # BLD AUTO: 11.4 K/UL (ref 1.8–7.7)
NEUTROPHILS # BLD AUTO: 11.5 K/UL (ref 1.8–7.7)
NEUTROPHILS # BLD AUTO: 11.6 K/UL (ref 1.8–7.7)
NEUTROPHILS # BLD AUTO: 11.6 K/UL (ref 1.8–7.7)
NEUTROPHILS # BLD AUTO: 11.8 K/UL (ref 1.8–7.7)
NEUTROPHILS # BLD AUTO: 11.8 K/UL (ref 1.8–7.7)
NEUTROPHILS # BLD AUTO: 11.9 K/UL (ref 1.8–7.7)
NEUTROPHILS # BLD AUTO: 12 K/UL (ref 1.8–7.7)
NEUTROPHILS # BLD AUTO: 12.1 K/UL (ref 1.8–7.7)
NEUTROPHILS # BLD AUTO: 12.2 K/UL (ref 1.8–7.7)
NEUTROPHILS # BLD AUTO: 12.2 K/UL (ref 1.8–7.7)
NEUTROPHILS # BLD AUTO: 12.3 K/UL (ref 1.8–7.7)
NEUTROPHILS # BLD AUTO: 12.6 K/UL (ref 1.8–7.7)
NEUTROPHILS # BLD AUTO: 12.7 K/UL (ref 1.8–7.7)
NEUTROPHILS # BLD AUTO: 12.7 K/UL (ref 1.8–7.7)
NEUTROPHILS # BLD AUTO: 13.2 K/UL (ref 1.8–7.7)
NEUTROPHILS # BLD AUTO: 13.7 K/UL (ref 1.8–7.7)
NEUTROPHILS # BLD AUTO: 14.2 K/UL (ref 1.8–7.7)
NEUTROPHILS # BLD AUTO: 14.2 K/UL (ref 1.8–7.7)
NEUTROPHILS # BLD AUTO: 14.5 K/UL (ref 1.8–7.7)
NEUTROPHILS # BLD AUTO: 15.1 K/UL (ref 1.8–7.7)
NEUTROPHILS # BLD AUTO: 15.4 K/UL (ref 1.8–7.7)
NEUTROPHILS # BLD AUTO: 15.8 K/UL (ref 1.8–7.7)
NEUTROPHILS # BLD AUTO: 15.9 K/UL (ref 1.8–7.7)
NEUTROPHILS # BLD AUTO: 16.3 K/UL (ref 1.8–7.7)
NEUTROPHILS # BLD AUTO: 19.4 K/UL (ref 1.8–7.7)
NEUTROPHILS # BLD AUTO: 19.9 K/UL (ref 1.8–7.7)
NEUTROPHILS # BLD AUTO: 20.1 K/UL (ref 1.8–7.7)
NEUTROPHILS # BLD AUTO: 21.6 K/UL (ref 1.8–7.7)
NEUTROPHILS # BLD AUTO: 21.6 K/UL (ref 1.8–7.7)
NEUTROPHILS # BLD AUTO: 5.3 K/UL (ref 1.8–7.7)
NEUTROPHILS # BLD AUTO: 5.6 K/UL (ref 1.8–7.7)
NEUTROPHILS # BLD AUTO: 5.6 K/UL (ref 1.8–7.7)
NEUTROPHILS # BLD AUTO: 5.8 K/UL (ref 1.8–7.7)
NEUTROPHILS # BLD AUTO: 5.8 K/UL (ref 1.8–7.7)
NEUTROPHILS # BLD AUTO: 5.9 K/UL (ref 1.8–7.7)
NEUTROPHILS # BLD AUTO: 6 K/UL (ref 1.8–7.7)
NEUTROPHILS # BLD AUTO: 6.4 K/UL (ref 1.8–7.7)
NEUTROPHILS # BLD AUTO: 6.7 K/UL (ref 1.8–7.7)
NEUTROPHILS # BLD AUTO: 6.8 K/UL (ref 1.8–7.7)
NEUTROPHILS # BLD AUTO: 6.8 K/UL (ref 1.8–7.7)
NEUTROPHILS # BLD AUTO: 6.9 K/UL (ref 1.8–7.7)
NEUTROPHILS # BLD AUTO: 6.9 K/UL (ref 1.8–7.7)
NEUTROPHILS # BLD AUTO: 7 K/UL (ref 1.8–7.7)
NEUTROPHILS # BLD AUTO: 7.1 K/UL (ref 1.8–7.7)
NEUTROPHILS # BLD AUTO: 7.1 K/UL (ref 1.8–7.7)
NEUTROPHILS # BLD AUTO: 7.2 K/UL (ref 1.8–7.7)
NEUTROPHILS # BLD AUTO: 7.3 K/UL (ref 1.8–7.7)
NEUTROPHILS # BLD AUTO: 7.3 K/UL (ref 1.8–7.7)
NEUTROPHILS # BLD AUTO: 7.5 K/UL (ref 1.8–7.7)
NEUTROPHILS # BLD AUTO: 7.5 K/UL (ref 1.8–7.7)
NEUTROPHILS # BLD AUTO: 7.6 K/UL (ref 1.8–7.7)
NEUTROPHILS # BLD AUTO: 7.6 K/UL (ref 1.8–7.7)
NEUTROPHILS # BLD AUTO: 7.7 K/UL (ref 1.8–7.7)
NEUTROPHILS # BLD AUTO: 7.8 K/UL (ref 1.8–7.7)
NEUTROPHILS # BLD AUTO: 7.9 K/UL (ref 1.8–7.7)
NEUTROPHILS # BLD AUTO: 8 K/UL (ref 1.8–7.7)
NEUTROPHILS # BLD AUTO: 8.1 K/UL (ref 1.8–7.7)
NEUTROPHILS # BLD AUTO: 8.2 K/UL (ref 1.8–7.7)
NEUTROPHILS # BLD AUTO: 8.3 K/UL (ref 1.8–7.7)
NEUTROPHILS # BLD AUTO: 8.4 K/UL (ref 1.8–7.7)
NEUTROPHILS # BLD AUTO: 8.5 K/UL (ref 1.8–7.7)
NEUTROPHILS # BLD AUTO: 8.6 K/UL (ref 1.8–7.7)
NEUTROPHILS # BLD AUTO: 8.7 K/UL (ref 1.8–7.7)
NEUTROPHILS # BLD AUTO: 8.8 K/UL (ref 1.8–7.7)
NEUTROPHILS # BLD AUTO: 8.9 K/UL (ref 1.8–7.7)
NEUTROPHILS # BLD AUTO: 9 K/UL (ref 1.8–7.7)
NEUTROPHILS # BLD AUTO: 9.1 K/UL (ref 1.8–7.7)
NEUTROPHILS # BLD AUTO: 9.2 K/UL (ref 1.8–7.7)
NEUTROPHILS # BLD AUTO: 9.3 K/UL (ref 1.8–7.7)
NEUTROPHILS # BLD AUTO: 9.4 K/UL (ref 1.8–7.7)
NEUTROPHILS # BLD AUTO: 9.5 K/UL (ref 1.8–7.7)
NEUTROPHILS # BLD AUTO: 9.6 K/UL (ref 1.8–7.7)
NEUTROPHILS # BLD AUTO: 9.7 K/UL (ref 1.8–7.7)
NEUTROPHILS # BLD AUTO: 9.8 K/UL (ref 1.8–7.7)
NEUTROPHILS # BLD AUTO: 9.8 K/UL (ref 1.8–7.7)
NEUTROPHILS # BLD AUTO: 9.9 K/UL (ref 1.8–7.7)
NEUTROPHILS # BLD AUTO: ABNORMAL K/UL (ref 1.8–7.7)
NEUTROPHILS NFR BLD: 53 % (ref 38–73)
NEUTROPHILS NFR BLD: 56.8 % (ref 38–73)
NEUTROPHILS NFR BLD: 57 % (ref 38–73)
NEUTROPHILS NFR BLD: 57 % (ref 38–73)
NEUTROPHILS NFR BLD: 57.8 % (ref 38–73)
NEUTROPHILS NFR BLD: 58 % (ref 38–73)
NEUTROPHILS NFR BLD: 58.1 % (ref 38–73)
NEUTROPHILS NFR BLD: 60 % (ref 38–73)
NEUTROPHILS NFR BLD: 60.1 % (ref 38–73)
NEUTROPHILS NFR BLD: 60.1 % (ref 38–73)
NEUTROPHILS NFR BLD: 60.3 % (ref 38–73)
NEUTROPHILS NFR BLD: 60.3 % (ref 38–73)
NEUTROPHILS NFR BLD: 60.4 % (ref 38–73)
NEUTROPHILS NFR BLD: 60.7 % (ref 38–73)
NEUTROPHILS NFR BLD: 60.9 % (ref 38–73)
NEUTROPHILS NFR BLD: 61 % (ref 38–73)
NEUTROPHILS NFR BLD: 61 % (ref 38–73)
NEUTROPHILS NFR BLD: 61.2 % (ref 38–73)
NEUTROPHILS NFR BLD: 61.4 % (ref 38–73)
NEUTROPHILS NFR BLD: 61.4 % (ref 38–73)
NEUTROPHILS NFR BLD: 61.6 % (ref 38–73)
NEUTROPHILS NFR BLD: 61.6 % (ref 38–73)
NEUTROPHILS NFR BLD: 61.7 % (ref 38–73)
NEUTROPHILS NFR BLD: 61.7 % (ref 38–73)
NEUTROPHILS NFR BLD: 61.8 % (ref 38–73)
NEUTROPHILS NFR BLD: 61.9 % (ref 38–73)
NEUTROPHILS NFR BLD: 62 % (ref 38–73)
NEUTROPHILS NFR BLD: 62.1 % (ref 38–73)
NEUTROPHILS NFR BLD: 62.2 % (ref 38–73)
NEUTROPHILS NFR BLD: 62.2 % (ref 38–73)
NEUTROPHILS NFR BLD: 62.3 % (ref 38–73)
NEUTROPHILS NFR BLD: 62.4 % (ref 38–73)
NEUTROPHILS NFR BLD: 62.5 % (ref 38–73)
NEUTROPHILS NFR BLD: 62.9 % (ref 38–73)
NEUTROPHILS NFR BLD: 62.9 % (ref 38–73)
NEUTROPHILS NFR BLD: 63 % (ref 38–73)
NEUTROPHILS NFR BLD: 63.2 % (ref 38–73)
NEUTROPHILS NFR BLD: 63.2 % (ref 38–73)
NEUTROPHILS NFR BLD: 63.5 % (ref 38–73)
NEUTROPHILS NFR BLD: 63.6 % (ref 38–73)
NEUTROPHILS NFR BLD: 63.6 % (ref 38–73)
NEUTROPHILS NFR BLD: 63.7 % (ref 38–73)
NEUTROPHILS NFR BLD: 64 % (ref 38–73)
NEUTROPHILS NFR BLD: 64.1 % (ref 38–73)
NEUTROPHILS NFR BLD: 64.2 % (ref 38–73)
NEUTROPHILS NFR BLD: 64.3 % (ref 38–73)
NEUTROPHILS NFR BLD: 64.4 % (ref 38–73)
NEUTROPHILS NFR BLD: 64.7 % (ref 38–73)
NEUTROPHILS NFR BLD: 64.8 % (ref 38–73)
NEUTROPHILS NFR BLD: 64.8 % (ref 38–73)
NEUTROPHILS NFR BLD: 64.9 % (ref 38–73)
NEUTROPHILS NFR BLD: 65 % (ref 38–73)
NEUTROPHILS NFR BLD: 65.1 % (ref 38–73)
NEUTROPHILS NFR BLD: 65.2 % (ref 38–73)
NEUTROPHILS NFR BLD: 65.3 % (ref 38–73)
NEUTROPHILS NFR BLD: 65.5 % (ref 38–73)
NEUTROPHILS NFR BLD: 65.5 % (ref 38–73)
NEUTROPHILS NFR BLD: 65.6 % (ref 38–73)
NEUTROPHILS NFR BLD: 65.7 % (ref 38–73)
NEUTROPHILS NFR BLD: 65.8 % (ref 38–73)
NEUTROPHILS NFR BLD: 65.8 % (ref 38–73)
NEUTROPHILS NFR BLD: 66 % (ref 38–73)
NEUTROPHILS NFR BLD: 66 % (ref 38–73)
NEUTROPHILS NFR BLD: 66.1 % (ref 38–73)
NEUTROPHILS NFR BLD: 66.1 % (ref 38–73)
NEUTROPHILS NFR BLD: 66.2 % (ref 38–73)
NEUTROPHILS NFR BLD: 66.4 % (ref 38–73)
NEUTROPHILS NFR BLD: 66.7 % (ref 38–73)
NEUTROPHILS NFR BLD: 66.7 % (ref 38–73)
NEUTROPHILS NFR BLD: 66.8 % (ref 38–73)
NEUTROPHILS NFR BLD: 66.9 % (ref 38–73)
NEUTROPHILS NFR BLD: 66.9 % (ref 38–73)
NEUTROPHILS NFR BLD: 67 % (ref 38–73)
NEUTROPHILS NFR BLD: 67.1 % (ref 38–73)
NEUTROPHILS NFR BLD: 67.1 % (ref 38–73)
NEUTROPHILS NFR BLD: 67.2 % (ref 38–73)
NEUTROPHILS NFR BLD: 67.3 % (ref 38–73)
NEUTROPHILS NFR BLD: 67.4 % (ref 38–73)
NEUTROPHILS NFR BLD: 67.5 % (ref 38–73)
NEUTROPHILS NFR BLD: 67.5 % (ref 38–73)
NEUTROPHILS NFR BLD: 67.6 % (ref 38–73)
NEUTROPHILS NFR BLD: 67.8 % (ref 38–73)
NEUTROPHILS NFR BLD: 68 % (ref 38–73)
NEUTROPHILS NFR BLD: 68.1 % (ref 38–73)
NEUTROPHILS NFR BLD: 68.1 % (ref 38–73)
NEUTROPHILS NFR BLD: 68.2 % (ref 38–73)
NEUTROPHILS NFR BLD: 68.2 % (ref 38–73)
NEUTROPHILS NFR BLD: 68.3 % (ref 38–73)
NEUTROPHILS NFR BLD: 68.4 % (ref 38–73)
NEUTROPHILS NFR BLD: 68.5 % (ref 38–73)
NEUTROPHILS NFR BLD: 68.6 % (ref 38–73)
NEUTROPHILS NFR BLD: 68.8 % (ref 38–73)
NEUTROPHILS NFR BLD: 68.9 % (ref 38–73)
NEUTROPHILS NFR BLD: 68.9 % (ref 38–73)
NEUTROPHILS NFR BLD: 69 % (ref 38–73)
NEUTROPHILS NFR BLD: 69.1 % (ref 38–73)
NEUTROPHILS NFR BLD: 69.2 % (ref 38–73)
NEUTROPHILS NFR BLD: 69.2 % (ref 38–73)
NEUTROPHILS NFR BLD: 69.3 % (ref 38–73)
NEUTROPHILS NFR BLD: 69.3 % (ref 38–73)
NEUTROPHILS NFR BLD: 69.4 % (ref 38–73)
NEUTROPHILS NFR BLD: 69.5 % (ref 38–73)
NEUTROPHILS NFR BLD: 69.6 % (ref 38–73)
NEUTROPHILS NFR BLD: 69.7 % (ref 38–73)
NEUTROPHILS NFR BLD: 69.9 % (ref 38–73)
NEUTROPHILS NFR BLD: 69.9 % (ref 38–73)
NEUTROPHILS NFR BLD: 70 % (ref 38–73)
NEUTROPHILS NFR BLD: 70.1 % (ref 38–73)
NEUTROPHILS NFR BLD: 70.4 % (ref 38–73)
NEUTROPHILS NFR BLD: 70.5 % (ref 38–73)
NEUTROPHILS NFR BLD: 70.5 % (ref 38–73)
NEUTROPHILS NFR BLD: 70.7 % (ref 38–73)
NEUTROPHILS NFR BLD: 71 % (ref 38–73)
NEUTROPHILS NFR BLD: 71.4 % (ref 38–73)
NEUTROPHILS NFR BLD: 71.5 % (ref 38–73)
NEUTROPHILS NFR BLD: 71.7 % (ref 38–73)
NEUTROPHILS NFR BLD: 71.8 % (ref 38–73)
NEUTROPHILS NFR BLD: 71.8 % (ref 38–73)
NEUTROPHILS NFR BLD: 72 % (ref 38–73)
NEUTROPHILS NFR BLD: 72 % (ref 38–73)
NEUTROPHILS NFR BLD: 72.3 % (ref 38–73)
NEUTROPHILS NFR BLD: 72.5 % (ref 38–73)
NEUTROPHILS NFR BLD: 72.7 % (ref 38–73)
NEUTROPHILS NFR BLD: 73 % (ref 38–73)
NEUTROPHILS NFR BLD: 73 % (ref 38–73)
NEUTROPHILS NFR BLD: 73.3 % (ref 38–73)
NEUTROPHILS NFR BLD: 73.5 % (ref 38–73)
NEUTROPHILS NFR BLD: 74 % (ref 38–73)
NEUTROPHILS NFR BLD: 74.1 % (ref 38–73)
NEUTROPHILS NFR BLD: 74.3 % (ref 38–73)
NEUTROPHILS NFR BLD: 74.3 % (ref 38–73)
NEUTROPHILS NFR BLD: 74.6 % (ref 38–73)
NEUTROPHILS NFR BLD: 75 % (ref 38–73)
NEUTROPHILS NFR BLD: 75.4 % (ref 38–73)
NEUTROPHILS NFR BLD: 75.5 % (ref 38–73)
NEUTROPHILS NFR BLD: 75.8 % (ref 38–73)
NEUTROPHILS NFR BLD: 75.8 % (ref 38–73)
NEUTROPHILS NFR BLD: 76 % (ref 38–73)
NEUTROPHILS NFR BLD: 77 % (ref 38–73)
NEUTROPHILS NFR BLD: 77.1 % (ref 38–73)
NEUTROPHILS NFR BLD: 77.2 % (ref 38–73)
NEUTROPHILS NFR BLD: 77.4 % (ref 38–73)
NEUTROPHILS NFR BLD: 77.5 % (ref 38–73)
NEUTROPHILS NFR BLD: 77.6 % (ref 38–73)
NEUTROPHILS NFR BLD: 77.7 % (ref 38–73)
NEUTROPHILS NFR BLD: 78 % (ref 38–73)
NEUTROPHILS NFR BLD: 78.6 % (ref 38–73)
NEUTROPHILS NFR BLD: 79 % (ref 38–73)
NEUTROPHILS NFR BLD: 79.2 % (ref 38–73)
NEUTROPHILS NFR BLD: 79.5 % (ref 38–73)
NEUTROPHILS NFR BLD: 79.7 % (ref 38–73)
NEUTROPHILS NFR BLD: 79.9 % (ref 38–73)
NEUTROPHILS NFR BLD: 80 % (ref 38–73)
NEUTROPHILS NFR BLD: 80.6 % (ref 38–73)
NEUTROPHILS NFR BLD: 80.6 % (ref 38–73)
NEUTROPHILS NFR BLD: 81 % (ref 38–73)
NEUTROPHILS NFR BLD: 81.2 % (ref 38–73)
NEUTROPHILS NFR BLD: 81.3 % (ref 38–73)
NEUTROPHILS NFR BLD: 81.4 % (ref 38–73)
NEUTROPHILS NFR BLD: 82 % (ref 38–73)
NEUTROPHILS NFR BLD: 82 % (ref 38–73)
NEUTROPHILS NFR BLD: 82.4 % (ref 38–73)
NEUTROPHILS NFR BLD: 82.5 % (ref 38–73)
NEUTROPHILS NFR BLD: 82.5 % (ref 38–73)
NEUTROPHILS NFR BLD: 82.9 % (ref 38–73)
NEUTROPHILS NFR BLD: 83 % (ref 38–73)
NEUTROPHILS NFR BLD: 83.2 % (ref 38–73)
NEUTROPHILS NFR BLD: 83.5 % (ref 38–73)
NEUTROPHILS NFR BLD: 83.7 % (ref 38–73)
NEUTROPHILS NFR BLD: 83.9 % (ref 38–73)
NEUTROPHILS NFR BLD: 84 % (ref 38–73)
NEUTROPHILS NFR BLD: 84 % (ref 38–73)
NEUTROPHILS NFR BLD: 84.1 % (ref 38–73)
NEUTROPHILS NFR BLD: 84.3 % (ref 38–73)
NEUTROPHILS NFR BLD: 84.7 % (ref 38–73)
NEUTROPHILS NFR BLD: 84.7 % (ref 38–73)
NEUTROPHILS NFR BLD: 84.8 % (ref 38–73)
NEUTROPHILS NFR BLD: 85 % (ref 38–73)
NEUTROPHILS NFR BLD: 85.2 % (ref 38–73)
NEUTROPHILS NFR BLD: 86 % (ref 38–73)
NEUTROPHILS NFR BLD: 86 % (ref 38–73)
NEUTROPHILS NFR BLD: 86.6 % (ref 38–73)
NEUTROPHILS NFR BLD: 86.7 % (ref 38–73)
NEUTROPHILS NFR BLD: 87 % (ref 38–73)
NEUTROPHILS NFR BLD: 87.3 % (ref 38–73)
NEUTROPHILS NFR BLD: 87.9 % (ref 38–73)
NEUTROPHILS NFR BLD: 88 % (ref 38–73)
NEUTROPHILS NFR BLD: 89.6 % (ref 38–73)
NEUTROPHILS NFR BLD: 89.9 % (ref 38–73)
NEUTROPHILS NFR BLD: 89.9 % (ref 38–73)
NEUTROPHILS NFR BLD: 90 % (ref 38–73)
NEUTROPHILS NFR BLD: 90.3 % (ref 38–73)
NEUTROPHILS NFR BLD: 91 % (ref 38–73)
NEUTROPHILS NFR BLD: 92 % (ref 38–73)
NEUTROPHILS NFR BLD: 92.2 % (ref 38–73)
NEUTROPHILS NFR BLD: 92.4 % (ref 38–73)
NEUTROPHILS NFR BLD: 92.8 % (ref 38–73)
NEUTROPHILS NFR BLD: 92.8 % (ref 38–73)
NEUTROPHILS NFR BLD: 93 % (ref 38–73)
NEUTS BAND NFR BLD MANUAL: 1 %
NEUTS BAND NFR BLD MANUAL: 10 %
NEUTS BAND NFR BLD MANUAL: 2 %
NEUTS BAND NFR BLD MANUAL: 3 %
NEUTS BAND NFR BLD MANUAL: 3.3 %
NEUTS BAND NFR BLD MANUAL: 4 %
NEUTS BAND NFR BLD MANUAL: 4.5 %
NEUTS BAND NFR BLD MANUAL: 5 %
NEUTS BAND NFR BLD MANUAL: 6 %
NEUTS BAND NFR BLD MANUAL: 7 %
NEUTS BAND NFR BLD MANUAL: 7 %
NEUTS BAND NFR BLD MANUAL: 9 %
NITRITE UR QL STRIP: NEGATIVE
NRBC BLD-RTO: 0 /100 WBC
NRBC BLD-RTO: 1 /100 WBC
NRBC BLD-RTO: 2 /100 WBC
NRBC BLD-RTO: 3 /100 WBC
NRBC BLD-RTO: 3 /100 WBC
NUM UNITS TRANS PACKED RBC: NORMAL
OVALOCYTES BLD QL SMEAR: ABNORMAL
PAPPENHEIMER BOD BLD QL SMEAR: PRESENT
PCO2 BLDA: 101.6 MMHG (ref 35–45)
PCO2 BLDA: 102 MMHG (ref 35–45)
PCO2 BLDA: 107.2 MMHG (ref 35–45)
PCO2 BLDA: 108.2 MMHG (ref 35–45)
PCO2 BLDA: 108.5 MMHG (ref 35–45)
PCO2 BLDA: 111.6 MMHG (ref 35–45)
PCO2 BLDA: 112.7 MMHG (ref 35–45)
PCO2 BLDA: 29.7 MMHG (ref 35–45)
PCO2 BLDA: 29.7 MMHG (ref 35–45)
PCO2 BLDA: 31.7 MMHG (ref 35–45)
PCO2 BLDA: 34.5 MMHG (ref 35–45)
PCO2 BLDA: 36.2 MMHG (ref 35–45)
PCO2 BLDA: 36.5 MMHG (ref 35–45)
PCO2 BLDA: 36.6 MMHG (ref 35–45)
PCO2 BLDA: 36.6 MMHG (ref 35–45)
PCO2 BLDA: 36.7 MMHG (ref 35–45)
PCO2 BLDA: 37.5 MMHG (ref 35–45)
PCO2 BLDA: 37.6 MMHG (ref 35–45)
PCO2 BLDA: 37.8 MMHG (ref 35–45)
PCO2 BLDA: 38 MMHG (ref 35–45)
PCO2 BLDA: 38.2 MMHG (ref 35–45)
PCO2 BLDA: 38.2 MMHG (ref 35–45)
PCO2 BLDA: 38.5 MMHG (ref 35–45)
PCO2 BLDA: 38.5 MMHG (ref 35–45)
PCO2 BLDA: 38.6 MMHG (ref 35–45)
PCO2 BLDA: 39.3 MMHG (ref 35–45)
PCO2 BLDA: 39.4 MMHG (ref 35–45)
PCO2 BLDA: 39.6 MMHG (ref 35–45)
PCO2 BLDA: 39.6 MMHG (ref 35–45)
PCO2 BLDA: 39.7 MMHG (ref 35–45)
PCO2 BLDA: 39.8 MMHG (ref 35–45)
PCO2 BLDA: 39.9 MMHG (ref 35–45)
PCO2 BLDA: 40.3 MMHG (ref 35–45)
PCO2 BLDA: 40.3 MMHG (ref 35–45)
PCO2 BLDA: 40.4 MMHG (ref 35–45)
PCO2 BLDA: 40.5 MMHG (ref 35–45)
PCO2 BLDA: 40.5 MMHG (ref 35–45)
PCO2 BLDA: 40.8 MMHG (ref 35–45)
PCO2 BLDA: 41.1 MMHG (ref 35–45)
PCO2 BLDA: 41.2 MMHG (ref 35–45)
PCO2 BLDA: 41.4 MMHG (ref 35–45)
PCO2 BLDA: 41.5 MMHG (ref 35–45)
PCO2 BLDA: 41.6 MMHG (ref 35–45)
PCO2 BLDA: 41.7 MMHG (ref 35–45)
PCO2 BLDA: 41.9 MMHG (ref 35–45)
PCO2 BLDA: 41.9 MMHG (ref 35–45)
PCO2 BLDA: 42 MMHG (ref 35–45)
PCO2 BLDA: 42.2 MMHG (ref 35–45)
PCO2 BLDA: 42.3 MMHG (ref 35–45)
PCO2 BLDA: 42.3 MMHG (ref 35–45)
PCO2 BLDA: 42.4 MMHG (ref 35–45)
PCO2 BLDA: 42.5 MMHG (ref 35–45)
PCO2 BLDA: 42.5 MMHG (ref 35–45)
PCO2 BLDA: 42.6 MMHG (ref 35–45)
PCO2 BLDA: 42.7 MMHG (ref 35–45)
PCO2 BLDA: 42.8 MMHG (ref 35–45)
PCO2 BLDA: 42.9 MMHG (ref 35–45)
PCO2 BLDA: 42.9 MMHG (ref 35–45)
PCO2 BLDA: 43 MMHG (ref 35–45)
PCO2 BLDA: 43.1 MMHG (ref 35–45)
PCO2 BLDA: 43.1 MMHG (ref 35–45)
PCO2 BLDA: 43.2 MMHG (ref 35–45)
PCO2 BLDA: 43.3 MMHG (ref 35–45)
PCO2 BLDA: 43.3 MMHG (ref 35–45)
PCO2 BLDA: 43.4 MMHG (ref 35–45)
PCO2 BLDA: 43.5 MMHG (ref 35–45)
PCO2 BLDA: 43.6 MMHG (ref 35–45)
PCO2 BLDA: 43.6 MMHG (ref 35–45)
PCO2 BLDA: 43.7 MMHG (ref 35–45)
PCO2 BLDA: 43.8 MMHG (ref 35–45)
PCO2 BLDA: 43.9 MMHG (ref 35–45)
PCO2 BLDA: 44 MMHG (ref 35–45)
PCO2 BLDA: 44.1 MMHG (ref 35–45)
PCO2 BLDA: 44.1 MMHG (ref 35–45)
PCO2 BLDA: 44.2 MMHG (ref 35–45)
PCO2 BLDA: 44.3 MMHG (ref 35–45)
PCO2 BLDA: 44.3 MMHG (ref 35–45)
PCO2 BLDA: 44.4 MMHG (ref 35–45)
PCO2 BLDA: 44.4 MMHG (ref 35–45)
PCO2 BLDA: 44.5 MMHG (ref 35–45)
PCO2 BLDA: 44.6 MMHG (ref 35–45)
PCO2 BLDA: 44.7 MMHG (ref 35–45)
PCO2 BLDA: 44.8 MMHG (ref 35–45)
PCO2 BLDA: 44.9 MMHG (ref 35–45)
PCO2 BLDA: 45 MMHG (ref 35–45)
PCO2 BLDA: 45 MMHG (ref 35–45)
PCO2 BLDA: 45.1 MMHG (ref 35–45)
PCO2 BLDA: 45.2 MMHG (ref 35–45)
PCO2 BLDA: 45.3 MMHG (ref 35–45)
PCO2 BLDA: 45.4 MMHG (ref 35–45)
PCO2 BLDA: 45.5 MMHG (ref 35–45)
PCO2 BLDA: 45.6 MMHG (ref 35–45)
PCO2 BLDA: 45.7 MMHG (ref 35–45)
PCO2 BLDA: 45.8 MMHG (ref 35–45)
PCO2 BLDA: 45.9 MMHG (ref 35–45)
PCO2 BLDA: 46 MMHG (ref 35–45)
PCO2 BLDA: 46.1 MMHG (ref 35–45)
PCO2 BLDA: 46.3 MMHG (ref 35–45)
PCO2 BLDA: 46.3 MMHG (ref 35–45)
PCO2 BLDA: 46.4 MMHG (ref 35–45)
PCO2 BLDA: 46.5 MMHG (ref 35–45)
PCO2 BLDA: 46.6 MMHG (ref 35–45)
PCO2 BLDA: 46.7 MMHG (ref 35–45)
PCO2 BLDA: 46.8 MMHG (ref 35–45)
PCO2 BLDA: 46.9 MMHG (ref 35–45)
PCO2 BLDA: 47 MMHG (ref 35–45)
PCO2 BLDA: 47.1 MMHG (ref 35–45)
PCO2 BLDA: 47.2 MMHG (ref 35–45)
PCO2 BLDA: 47.3 MMHG (ref 35–45)
PCO2 BLDA: 47.4 MMHG (ref 35–45)
PCO2 BLDA: 47.5 MMHG (ref 35–45)
PCO2 BLDA: 47.6 MMHG (ref 35–45)
PCO2 BLDA: 47.7 MMHG (ref 35–45)
PCO2 BLDA: 47.7 MMHG (ref 35–45)
PCO2 BLDA: 47.8 MMHG (ref 35–45)
PCO2 BLDA: 47.9 MMHG (ref 35–45)
PCO2 BLDA: 48 MMHG (ref 35–45)
PCO2 BLDA: 48.1 MMHG (ref 35–45)
PCO2 BLDA: 48.1 MMHG (ref 35–45)
PCO2 BLDA: 48.2 MMHG (ref 35–45)
PCO2 BLDA: 48.3 MMHG (ref 35–45)
PCO2 BLDA: 48.4 MMHG (ref 35–45)
PCO2 BLDA: 48.5 MMHG (ref 35–45)
PCO2 BLDA: 48.6 MMHG (ref 35–45)
PCO2 BLDA: 48.7 MMHG (ref 35–45)
PCO2 BLDA: 48.8 MMHG (ref 35–45)
PCO2 BLDA: 48.9 MMHG (ref 35–45)
PCO2 BLDA: 49 MMHG (ref 35–45)
PCO2 BLDA: 49.1 MMHG (ref 35–45)
PCO2 BLDA: 49.2 MMHG (ref 35–45)
PCO2 BLDA: 49.3 MMHG (ref 35–45)
PCO2 BLDA: 49.4 MMHG (ref 35–45)
PCO2 BLDA: 49.5 MMHG (ref 35–45)
PCO2 BLDA: 49.6 MMHG (ref 35–45)
PCO2 BLDA: 49.7 MMHG (ref 35–45)
PCO2 BLDA: 49.8 MMHG (ref 35–45)
PCO2 BLDA: 49.9 MMHG (ref 35–45)
PCO2 BLDA: 50 MMHG (ref 35–45)
PCO2 BLDA: 50.1 MMHG (ref 35–45)
PCO2 BLDA: 50.2 MMHG (ref 35–45)
PCO2 BLDA: 50.3 MMHG (ref 35–45)
PCO2 BLDA: 50.4 MMHG (ref 35–45)
PCO2 BLDA: 50.5 MMHG (ref 35–45)
PCO2 BLDA: 50.5 MMHG (ref 35–45)
PCO2 BLDA: 50.6 MMHG (ref 35–45)
PCO2 BLDA: 50.7 MMHG (ref 35–45)
PCO2 BLDA: 50.8 MMHG (ref 35–45)
PCO2 BLDA: 50.9 MMHG (ref 35–45)
PCO2 BLDA: 51 MMHG (ref 35–45)
PCO2 BLDA: 51.1 MMHG (ref 35–45)
PCO2 BLDA: 51.2 MMHG (ref 35–45)
PCO2 BLDA: 51.3 MMHG (ref 35–45)
PCO2 BLDA: 51.4 MMHG (ref 35–45)
PCO2 BLDA: 51.5 MMHG (ref 35–45)
PCO2 BLDA: 51.6 MMHG (ref 35–45)
PCO2 BLDA: 51.7 MMHG (ref 35–45)
PCO2 BLDA: 51.8 MMHG (ref 35–45)
PCO2 BLDA: 51.9 MMHG (ref 35–45)
PCO2 BLDA: 52 MMHG (ref 35–45)
PCO2 BLDA: 52.1 MMHG (ref 35–45)
PCO2 BLDA: 52.2 MMHG (ref 35–45)
PCO2 BLDA: 52.3 MMHG (ref 35–45)
PCO2 BLDA: 52.4 MMHG (ref 35–45)
PCO2 BLDA: 52.4 MMHG (ref 35–45)
PCO2 BLDA: 52.5 MMHG (ref 35–45)
PCO2 BLDA: 52.5 MMHG (ref 35–45)
PCO2 BLDA: 52.6 MMHG (ref 35–45)
PCO2 BLDA: 52.7 MMHG (ref 35–45)
PCO2 BLDA: 52.8 MMHG (ref 35–45)
PCO2 BLDA: 52.9 MMHG (ref 35–45)
PCO2 BLDA: 53 MMHG (ref 35–45)
PCO2 BLDA: 53.1 MMHG (ref 35–45)
PCO2 BLDA: 53.2 MMHG (ref 35–45)
PCO2 BLDA: 53.3 MMHG (ref 35–45)
PCO2 BLDA: 53.4 MMHG (ref 35–45)
PCO2 BLDA: 53.5 MMHG (ref 35–45)
PCO2 BLDA: 53.6 MMHG (ref 35–45)
PCO2 BLDA: 53.7 MMHG (ref 35–45)
PCO2 BLDA: 53.8 MMHG (ref 35–45)
PCO2 BLDA: 53.9 MMHG (ref 35–45)
PCO2 BLDA: 53.9 MMHG (ref 35–45)
PCO2 BLDA: 54 MMHG (ref 35–45)
PCO2 BLDA: 54.1 MMHG (ref 35–45)
PCO2 BLDA: 54.2 MMHG (ref 35–45)
PCO2 BLDA: 54.3 MMHG (ref 35–45)
PCO2 BLDA: 54.4 MMHG (ref 35–45)
PCO2 BLDA: 54.5 MMHG (ref 35–45)
PCO2 BLDA: 54.5 MMHG (ref 35–45)
PCO2 BLDA: 54.6 MMHG (ref 35–45)
PCO2 BLDA: 54.7 MMHG (ref 35–45)
PCO2 BLDA: 54.7 MMHG (ref 35–45)
PCO2 BLDA: 54.8 MMHG (ref 35–45)
PCO2 BLDA: 54.9 MMHG (ref 35–45)
PCO2 BLDA: 54.9 MMHG (ref 35–45)
PCO2 BLDA: 55 MMHG (ref 35–45)
PCO2 BLDA: 55.1 MMHG (ref 35–45)
PCO2 BLDA: 55.2 MMHG (ref 35–45)
PCO2 BLDA: 55.3 MMHG (ref 35–45)
PCO2 BLDA: 55.3 MMHG (ref 35–45)
PCO2 BLDA: 55.4 MMHG (ref 35–45)
PCO2 BLDA: 55.5 MMHG (ref 35–45)
PCO2 BLDA: 55.5 MMHG (ref 35–45)
PCO2 BLDA: 55.6 MMHG (ref 35–45)
PCO2 BLDA: 55.7 MMHG (ref 35–45)
PCO2 BLDA: 55.8 MMHG (ref 35–45)
PCO2 BLDA: 56 MMHG (ref 35–45)
PCO2 BLDA: 56.1 MMHG (ref 35–45)
PCO2 BLDA: 56.2 MMHG (ref 35–45)
PCO2 BLDA: 56.3 MMHG (ref 35–45)
PCO2 BLDA: 56.4 MMHG (ref 35–45)
PCO2 BLDA: 56.5 MMHG (ref 35–45)
PCO2 BLDA: 56.6 MMHG (ref 35–45)
PCO2 BLDA: 56.8 MMHG (ref 35–45)
PCO2 BLDA: 56.8 MMHG (ref 35–45)
PCO2 BLDA: 56.9 MMHG (ref 35–45)
PCO2 BLDA: 56.9 MMHG (ref 35–45)
PCO2 BLDA: 57.1 MMHG (ref 35–45)
PCO2 BLDA: 57.2 MMHG (ref 35–45)
PCO2 BLDA: 57.2 MMHG (ref 35–45)
PCO2 BLDA: 57.3 MMHG (ref 35–45)
PCO2 BLDA: 57.3 MMHG (ref 35–45)
PCO2 BLDA: 57.4 MMHG (ref 35–45)
PCO2 BLDA: 57.4 MMHG (ref 35–45)
PCO2 BLDA: 57.5 MMHG (ref 35–45)
PCO2 BLDA: 57.5 MMHG (ref 35–45)
PCO2 BLDA: 57.7 MMHG (ref 35–45)
PCO2 BLDA: 57.8 MMHG (ref 35–45)
PCO2 BLDA: 57.8 MMHG (ref 35–45)
PCO2 BLDA: 57.9 MMHG (ref 35–45)
PCO2 BLDA: 57.9 MMHG (ref 35–45)
PCO2 BLDA: 58 MMHG (ref 35–45)
PCO2 BLDA: 58.1 MMHG (ref 35–45)
PCO2 BLDA: 58.2 MMHG (ref 35–45)
PCO2 BLDA: 58.3 MMHG (ref 35–45)
PCO2 BLDA: 58.3 MMHG (ref 35–45)
PCO2 BLDA: 58.4 MMHG (ref 35–45)
PCO2 BLDA: 58.5 MMHG (ref 35–45)
PCO2 BLDA: 58.6 MMHG (ref 35–45)
PCO2 BLDA: 58.7 MMHG (ref 35–45)
PCO2 BLDA: 58.9 MMHG (ref 35–45)
PCO2 BLDA: 59 MMHG (ref 35–45)
PCO2 BLDA: 59 MMHG (ref 35–45)
PCO2 BLDA: 59.1 MMHG (ref 35–45)
PCO2 BLDA: 59.2 MMHG (ref 35–45)
PCO2 BLDA: 59.3 MMHG (ref 35–45)
PCO2 BLDA: 59.3 MMHG (ref 35–45)
PCO2 BLDA: 59.4 MMHG (ref 35–45)
PCO2 BLDA: 59.8 MMHG (ref 35–45)
PCO2 BLDA: 59.9 MMHG (ref 35–45)
PCO2 BLDA: 59.9 MMHG (ref 35–45)
PCO2 BLDA: 60.1 MMHG (ref 35–45)
PCO2 BLDA: 60.2 MMHG (ref 35–45)
PCO2 BLDA: 60.4 MMHG (ref 35–45)
PCO2 BLDA: 60.6 MMHG (ref 35–45)
PCO2 BLDA: 60.6 MMHG (ref 35–45)
PCO2 BLDA: 60.7 MMHG (ref 35–45)
PCO2 BLDA: 60.8 MMHG (ref 35–45)
PCO2 BLDA: 60.9 MMHG (ref 35–45)
PCO2 BLDA: 61 MMHG (ref 35–45)
PCO2 BLDA: 61.1 MMHG (ref 35–45)
PCO2 BLDA: 61.1 MMHG (ref 35–45)
PCO2 BLDA: 61.2 MMHG (ref 35–45)
PCO2 BLDA: 61.2 MMHG (ref 35–45)
PCO2 BLDA: 61.3 MMHG (ref 35–45)
PCO2 BLDA: 61.5 MMHG (ref 35–45)
PCO2 BLDA: 61.6 MMHG (ref 35–45)
PCO2 BLDA: 61.7 MMHG (ref 35–45)
PCO2 BLDA: 61.8 MMHG (ref 35–45)
PCO2 BLDA: 61.9 MMHG (ref 35–45)
PCO2 BLDA: 61.9 MMHG (ref 35–45)
PCO2 BLDA: 62 MMHG (ref 35–45)
PCO2 BLDA: 62.1 MMHG (ref 35–45)
PCO2 BLDA: 62.2 MMHG (ref 35–45)
PCO2 BLDA: 62.3 MMHG (ref 35–45)
PCO2 BLDA: 62.6 MMHG (ref 35–45)
PCO2 BLDA: 62.7 MMHG (ref 35–45)
PCO2 BLDA: 62.8 MMHG (ref 35–45)
PCO2 BLDA: 62.9 MMHG (ref 35–45)
PCO2 BLDA: 62.9 MMHG (ref 35–45)
PCO2 BLDA: 63.2 MMHG (ref 35–45)
PCO2 BLDA: 63.4 MMHG (ref 35–45)
PCO2 BLDA: 63.4 MMHG (ref 35–45)
PCO2 BLDA: 63.5 MMHG (ref 35–45)
PCO2 BLDA: 64.1 MMHG (ref 35–45)
PCO2 BLDA: 64.4 MMHG (ref 35–45)
PCO2 BLDA: 64.4 MMHG (ref 35–45)
PCO2 BLDA: 64.7 MMHG (ref 35–45)
PCO2 BLDA: 64.8 MMHG (ref 35–45)
PCO2 BLDA: 65 MMHG (ref 35–45)
PCO2 BLDA: 65.1 MMHG (ref 35–45)
PCO2 BLDA: 65.6 MMHG (ref 35–45)
PCO2 BLDA: 65.7 MMHG (ref 35–45)
PCO2 BLDA: 65.9 MMHG (ref 35–45)
PCO2 BLDA: 66.2 MMHG (ref 35–45)
PCO2 BLDA: 66.4 MMHG (ref 35–45)
PCO2 BLDA: 66.9 MMHG (ref 35–45)
PCO2 BLDA: 67 MMHG (ref 35–45)
PCO2 BLDA: 67.3 MMHG (ref 35–45)
PCO2 BLDA: 67.4 MMHG (ref 35–45)
PCO2 BLDA: 68.5 MMHG (ref 35–45)
PCO2 BLDA: 68.5 MMHG (ref 35–45)
PCO2 BLDA: 68.8 MMHG (ref 35–45)
PCO2 BLDA: 70 MMHG (ref 35–45)
PCO2 BLDA: 70.7 MMHG (ref 35–45)
PCO2 BLDA: 71.6 MMHG (ref 35–45)
PCO2 BLDA: 72.2 MMHG (ref 35–45)
PCO2 BLDA: 76.8 MMHG (ref 35–45)
PCO2 BLDA: 77.4 MMHG (ref 35–45)
PCO2 BLDA: 78.9 MMHG (ref 35–45)
PCO2 BLDA: 79.2 MMHG (ref 35–45)
PCO2 BLDA: 87.3 MMHG (ref 35–45)
PCO2 BLDA: 87.4 MMHG (ref 35–45)
PCO2 BLDA: 87.4 MMHG (ref 35–45)
PCO2 BLDA: 89.5 MMHG (ref 35–45)
PCO2 BLDA: 91.4 MMHG (ref 35–45)
PCO2 BLDA: 96.5 MMHG (ref 35–45)
PCO2 BLDA: 97.8 MMHG (ref 35–45)
PCO2 BLDA: >130 MMHG (ref 35–45)
PEEP: 10
PEEP: 12
PEEP: 16
PEEP: 18
PEEP: 20
PEEP: 5
PEEP: 8
PH SMN: 7.09 [PH] (ref 7.35–7.45)
PH SMN: 7.12 [PH] (ref 7.35–7.45)
PH SMN: 7.2 [PH] (ref 7.35–7.45)
PH SMN: 7.26 [PH] (ref 7.35–7.45)
PH SMN: 7.26 [PH] (ref 7.35–7.45)
PH SMN: 7.27 [PH] (ref 7.35–7.45)
PH SMN: 7.28 [PH] (ref 7.35–7.45)
PH SMN: 7.29 [PH] (ref 7.35–7.45)
PH SMN: 7.3 [PH] (ref 7.35–7.45)
PH SMN: 7.3 [PH] (ref 7.35–7.45)
PH SMN: 7.31 [PH] (ref 7.35–7.45)
PH SMN: 7.32 [PH] (ref 7.35–7.45)
PH SMN: 7.33 [PH] (ref 7.35–7.45)
PH SMN: 7.34 [PH] (ref 7.35–7.45)
PH SMN: 7.35 [PH] (ref 7.35–7.45)
PH SMN: 7.36 [PH] (ref 7.35–7.45)
PH SMN: 7.37 [PH] (ref 7.35–7.45)
PH SMN: 7.38 [PH] (ref 7.35–7.45)
PH SMN: 7.39 [PH] (ref 7.35–7.45)
PH SMN: 7.4 [PH] (ref 7.35–7.45)
PH SMN: 7.41 [PH] (ref 7.35–7.45)
PH SMN: 7.42 [PH] (ref 7.35–7.45)
PH SMN: 7.43 [PH] (ref 7.35–7.45)
PH SMN: 7.44 [PH] (ref 7.35–7.45)
PH SMN: 7.45 [PH] (ref 7.35–7.45)
PH SMN: 7.46 [PH] (ref 7.35–7.45)
PH SMN: 7.47 [PH] (ref 7.35–7.45)
PH SMN: 7.48 [PH] (ref 7.35–7.45)
PH SMN: 7.49 [PH] (ref 7.35–7.45)
PH SMN: 7.5 [PH] (ref 7.35–7.45)
PH SMN: 7.51 [PH] (ref 7.35–7.45)
PH SMN: 7.52 [PH] (ref 7.35–7.45)
PH SMN: 7.53 [PH] (ref 7.35–7.45)
PH SMN: 7.54 [PH] (ref 7.35–7.45)
PH SMN: 7.55 [PH] (ref 7.35–7.45)
PH SMN: 7.55 [PH] (ref 7.35–7.45)
PH SMN: 7.57 [PH] (ref 7.35–7.45)
PH SMN: 7.57 [PH] (ref 7.35–7.45)
PH SMN: 7.58 [PH] (ref 7.35–7.45)
PH SMN: 7.58 [PH] (ref 7.35–7.45)
PH SMN: 7.59 [PH] (ref 7.35–7.45)
PH SMN: 7.6 [PH] (ref 7.35–7.45)
PH SMN: 7.6 [PH] (ref 7.35–7.45)
PH SMN: 7.61 [PH] (ref 7.35–7.45)
PH SMN: 7.66 [PH] (ref 7.35–7.45)
PH SMN: 7.66 [PH] (ref 7.35–7.45)
PH UR STRIP: 5 [PH] (ref 5–8)
PH UR STRIP: 6 [PH] (ref 5–8)
PH UR STRIP: 6 [PH] (ref 5–8)
PHENOBARB SERPL-MCNC: 6.4 UG/DL (ref 15–40)
PHOSPHATE SERPL-MCNC: 1.1 MG/DL (ref 2.7–4.5)
PHOSPHATE SERPL-MCNC: 1.5 MG/DL (ref 2.7–4.5)
PHOSPHATE SERPL-MCNC: 1.6 MG/DL (ref 2.7–4.5)
PHOSPHATE SERPL-MCNC: 1.7 MG/DL (ref 2.7–4.5)
PHOSPHATE SERPL-MCNC: 1.9 MG/DL (ref 2.7–4.5)
PHOSPHATE SERPL-MCNC: 1.9 MG/DL (ref 2.7–4.5)
PHOSPHATE SERPL-MCNC: 2 MG/DL (ref 2.7–4.5)
PHOSPHATE SERPL-MCNC: 2 MG/DL (ref 2.7–4.5)
PHOSPHATE SERPL-MCNC: 2.1 MG/DL (ref 2.7–4.5)
PHOSPHATE SERPL-MCNC: 2.2 MG/DL (ref 2.7–4.5)
PHOSPHATE SERPL-MCNC: 2.3 MG/DL (ref 2.7–4.5)
PHOSPHATE SERPL-MCNC: 2.4 MG/DL (ref 2.7–4.5)
PHOSPHATE SERPL-MCNC: 2.5 MG/DL (ref 2.7–4.5)
PHOSPHATE SERPL-MCNC: 2.6 MG/DL (ref 2.7–4.5)
PHOSPHATE SERPL-MCNC: 2.7 MG/DL (ref 2.7–4.5)
PHOSPHATE SERPL-MCNC: 2.8 MG/DL (ref 2.7–4.5)
PHOSPHATE SERPL-MCNC: 2.9 MG/DL (ref 2.7–4.5)
PHOSPHATE SERPL-MCNC: 3 MG/DL (ref 2.7–4.5)
PHOSPHATE SERPL-MCNC: 3.1 MG/DL (ref 2.7–4.5)
PHOSPHATE SERPL-MCNC: 3.2 MG/DL (ref 2.7–4.5)
PHOSPHATE SERPL-MCNC: 3.3 MG/DL (ref 2.7–4.5)
PHOSPHATE SERPL-MCNC: 3.4 MG/DL (ref 2.7–4.5)
PHOSPHATE SERPL-MCNC: 3.5 MG/DL (ref 2.7–4.5)
PHOSPHATE SERPL-MCNC: 3.6 MG/DL (ref 2.7–4.5)
PHOSPHATE SERPL-MCNC: 3.7 MG/DL (ref 2.7–4.5)
PHOSPHATE SERPL-MCNC: 3.8 MG/DL (ref 2.7–4.5)
PHOSPHATE SERPL-MCNC: 3.9 MG/DL (ref 2.7–4.5)
PHOSPHATE SERPL-MCNC: 4 MG/DL (ref 2.7–4.5)
PHOSPHATE SERPL-MCNC: 4.1 MG/DL (ref 2.7–4.5)
PHOSPHATE SERPL-MCNC: 4.2 MG/DL (ref 2.7–4.5)
PHOSPHATE SERPL-MCNC: 4.3 MG/DL (ref 2.7–4.5)
PHOSPHATE SERPL-MCNC: 4.4 MG/DL (ref 2.7–4.5)
PHOSPHATE SERPL-MCNC: 4.5 MG/DL (ref 2.7–4.5)
PHOSPHATE SERPL-MCNC: 4.5 MG/DL (ref 2.7–4.5)
PHOSPHATE SERPL-MCNC: 4.6 MG/DL (ref 2.7–4.5)
PHOSPHATE SERPL-MCNC: 4.7 MG/DL (ref 2.7–4.5)
PHOSPHATE SERPL-MCNC: 4.8 MG/DL (ref 2.7–4.5)
PHOSPHATE SERPL-MCNC: 4.9 MG/DL (ref 2.7–4.5)
PHOSPHATE SERPL-MCNC: 4.9 MG/DL (ref 2.7–4.5)
PHOSPHATE SERPL-MCNC: 5 MG/DL (ref 2.7–4.5)
PHOSPHATE SERPL-MCNC: 5.6 MG/DL (ref 2.7–4.5)
PHOSPHATE SERPL-MCNC: 5.9 MG/DL (ref 2.7–4.5)
PHOSPHATE SERPL-MCNC: 6 MG/DL (ref 2.7–4.5)
PHOSPHATE SERPL-MCNC: <1 MG/DL (ref 2.7–4.5)
PIP: 20
PIP: 32
PIP: 34
PIP: 34
PIP: 35
PIP: 37
PIP: 37
PIP: 44
PIP: 49
PISA TR MAX VEL: 3.06 M/S
PISA TR MAX VEL: 3.08 M/S
PLATELET # BLD AUTO: 100 K/UL (ref 150–350)
PLATELET # BLD AUTO: 100 K/UL (ref 150–350)
PLATELET # BLD AUTO: 101 K/UL (ref 150–350)
PLATELET # BLD AUTO: 102 K/UL (ref 150–350)
PLATELET # BLD AUTO: 103 K/UL (ref 150–350)
PLATELET # BLD AUTO: 104 K/UL (ref 150–350)
PLATELET # BLD AUTO: 105 K/UL (ref 150–350)
PLATELET # BLD AUTO: 106 K/UL (ref 150–350)
PLATELET # BLD AUTO: 107 K/UL (ref 150–350)
PLATELET # BLD AUTO: 108 K/UL (ref 150–350)
PLATELET # BLD AUTO: 109 K/UL (ref 150–350)
PLATELET # BLD AUTO: 110 K/UL (ref 150–350)
PLATELET # BLD AUTO: 110 K/UL (ref 150–350)
PLATELET # BLD AUTO: 111 K/UL (ref 150–350)
PLATELET # BLD AUTO: 111 K/UL (ref 150–350)
PLATELET # BLD AUTO: 112 K/UL (ref 150–350)
PLATELET # BLD AUTO: 112 K/UL (ref 150–350)
PLATELET # BLD AUTO: 114 K/UL (ref 150–350)
PLATELET # BLD AUTO: 115 K/UL (ref 150–350)
PLATELET # BLD AUTO: 116 K/UL (ref 150–350)
PLATELET # BLD AUTO: 117 K/UL (ref 150–350)
PLATELET # BLD AUTO: 119 K/UL (ref 150–350)
PLATELET # BLD AUTO: 121 K/UL (ref 150–350)
PLATELET # BLD AUTO: 123 K/UL (ref 150–350)
PLATELET # BLD AUTO: 123 K/UL (ref 150–350)
PLATELET # BLD AUTO: 124 K/UL (ref 150–350)
PLATELET # BLD AUTO: 124 K/UL (ref 150–350)
PLATELET # BLD AUTO: 125 K/UL (ref 150–350)
PLATELET # BLD AUTO: 125 K/UL (ref 150–350)
PLATELET # BLD AUTO: 127 K/UL (ref 150–350)
PLATELET # BLD AUTO: 128 K/UL (ref 150–350)
PLATELET # BLD AUTO: 129 K/UL (ref 150–350)
PLATELET # BLD AUTO: 130 K/UL (ref 150–350)
PLATELET # BLD AUTO: 131 K/UL (ref 150–350)
PLATELET # BLD AUTO: 132 K/UL (ref 150–350)
PLATELET # BLD AUTO: 133 K/UL (ref 150–350)
PLATELET # BLD AUTO: 134 K/UL (ref 150–350)
PLATELET # BLD AUTO: 135 K/UL (ref 150–350)
PLATELET # BLD AUTO: 136 K/UL (ref 150–350)
PLATELET # BLD AUTO: 137 K/UL (ref 150–350)
PLATELET # BLD AUTO: 138 K/UL (ref 150–350)
PLATELET # BLD AUTO: 139 K/UL (ref 150–350)
PLATELET # BLD AUTO: 139 K/UL (ref 150–350)
PLATELET # BLD AUTO: 140 K/UL (ref 150–350)
PLATELET # BLD AUTO: 141 K/UL (ref 150–350)
PLATELET # BLD AUTO: 143 K/UL (ref 150–350)
PLATELET # BLD AUTO: 144 K/UL (ref 150–350)
PLATELET # BLD AUTO: 144 K/UL (ref 150–350)
PLATELET # BLD AUTO: 145 K/UL (ref 150–350)
PLATELET # BLD AUTO: 146 K/UL (ref 150–350)
PLATELET # BLD AUTO: 147 K/UL (ref 150–350)
PLATELET # BLD AUTO: 148 K/UL (ref 150–350)
PLATELET # BLD AUTO: 148 K/UL (ref 150–350)
PLATELET # BLD AUTO: 149 K/UL (ref 150–350)
PLATELET # BLD AUTO: 150 K/UL (ref 150–350)
PLATELET # BLD AUTO: 151 K/UL (ref 150–350)
PLATELET # BLD AUTO: 151 K/UL (ref 150–350)
PLATELET # BLD AUTO: 153 K/UL (ref 150–350)
PLATELET # BLD AUTO: 155 K/UL (ref 150–350)
PLATELET # BLD AUTO: 159 K/UL (ref 150–350)
PLATELET # BLD AUTO: 160 K/UL (ref 150–350)
PLATELET # BLD AUTO: 161 K/UL (ref 150–350)
PLATELET # BLD AUTO: 162 K/UL (ref 150–350)
PLATELET # BLD AUTO: 162 K/UL (ref 150–350)
PLATELET # BLD AUTO: 163 K/UL (ref 150–350)
PLATELET # BLD AUTO: 164 K/UL (ref 150–350)
PLATELET # BLD AUTO: 166 K/UL (ref 150–350)
PLATELET # BLD AUTO: 167 K/UL (ref 150–350)
PLATELET # BLD AUTO: 168 K/UL (ref 150–350)
PLATELET # BLD AUTO: 169 K/UL (ref 150–350)
PLATELET # BLD AUTO: 171 K/UL (ref 150–350)
PLATELET # BLD AUTO: 172 K/UL (ref 150–350)
PLATELET # BLD AUTO: 174 K/UL (ref 150–350)
PLATELET # BLD AUTO: 176 K/UL (ref 150–350)
PLATELET # BLD AUTO: 177 K/UL (ref 150–350)
PLATELET # BLD AUTO: 178 K/UL (ref 150–350)
PLATELET # BLD AUTO: 179 K/UL (ref 150–350)
PLATELET # BLD AUTO: 181 K/UL (ref 150–350)
PLATELET # BLD AUTO: 183 K/UL (ref 150–350)
PLATELET # BLD AUTO: 184 K/UL (ref 150–350)
PLATELET # BLD AUTO: 185 K/UL (ref 150–350)
PLATELET # BLD AUTO: 185 K/UL (ref 150–350)
PLATELET # BLD AUTO: 186 K/UL (ref 150–350)
PLATELET # BLD AUTO: 190 K/UL (ref 150–350)
PLATELET # BLD AUTO: 191 K/UL (ref 150–350)
PLATELET # BLD AUTO: 192 K/UL (ref 150–350)
PLATELET # BLD AUTO: 196 K/UL (ref 150–350)
PLATELET # BLD AUTO: 196 K/UL (ref 150–350)
PLATELET # BLD AUTO: 200 K/UL (ref 150–350)
PLATELET # BLD AUTO: 200 K/UL (ref 150–350)
PLATELET # BLD AUTO: 202 K/UL (ref 150–350)
PLATELET # BLD AUTO: 204 K/UL (ref 150–350)
PLATELET # BLD AUTO: 205 K/UL (ref 150–350)
PLATELET # BLD AUTO: 209 K/UL (ref 150–350)
PLATELET # BLD AUTO: 210 K/UL (ref 150–350)
PLATELET # BLD AUTO: 210 K/UL (ref 150–350)
PLATELET # BLD AUTO: 211 K/UL (ref 150–350)
PLATELET # BLD AUTO: 214 K/UL (ref 150–350)
PLATELET # BLD AUTO: 216 K/UL (ref 150–350)
PLATELET # BLD AUTO: 219 K/UL (ref 150–350)
PLATELET # BLD AUTO: 222 K/UL (ref 150–350)
PLATELET # BLD AUTO: 224 K/UL (ref 150–350)
PLATELET # BLD AUTO: 226 K/UL (ref 150–350)
PLATELET # BLD AUTO: 227 K/UL (ref 150–350)
PLATELET # BLD AUTO: 231 K/UL (ref 150–350)
PLATELET # BLD AUTO: 231 K/UL (ref 150–350)
PLATELET # BLD AUTO: 239 K/UL (ref 150–350)
PLATELET # BLD AUTO: 251 K/UL (ref 150–350)
PLATELET # BLD AUTO: 259 K/UL (ref 150–350)
PLATELET # BLD AUTO: 261 K/UL (ref 150–350)
PLATELET # BLD AUTO: 266 K/UL (ref 150–350)
PLATELET # BLD AUTO: 267 K/UL (ref 150–350)
PLATELET # BLD AUTO: 268 K/UL (ref 150–350)
PLATELET # BLD AUTO: 284 K/UL (ref 150–350)
PLATELET # BLD AUTO: 288 K/UL (ref 150–350)
PLATELET # BLD AUTO: 294 K/UL (ref 150–350)
PLATELET # BLD AUTO: 298 K/UL (ref 150–350)
PLATELET # BLD AUTO: 306 K/UL (ref 150–350)
PLATELET # BLD AUTO: 315 K/UL (ref 150–350)
PLATELET # BLD AUTO: 319 K/UL (ref 150–350)
PLATELET # BLD AUTO: 331 K/UL (ref 150–350)
PLATELET # BLD AUTO: 331 K/UL (ref 150–350)
PLATELET # BLD AUTO: 336 K/UL (ref 150–350)
PLATELET # BLD AUTO: 344 K/UL (ref 150–350)
PLATELET # BLD AUTO: 349 K/UL (ref 150–350)
PLATELET # BLD AUTO: 36 K/UL (ref 150–350)
PLATELET # BLD AUTO: 366 K/UL (ref 150–350)
PLATELET # BLD AUTO: 374 K/UL (ref 150–350)
PLATELET # BLD AUTO: 38 K/UL (ref 150–350)
PLATELET # BLD AUTO: 400 K/UL (ref 150–350)
PLATELET # BLD AUTO: 44 K/UL (ref 150–350)
PLATELET # BLD AUTO: 44 K/UL (ref 150–350)
PLATELET # BLD AUTO: 50 K/UL (ref 150–350)
PLATELET # BLD AUTO: 56 K/UL (ref 150–350)
PLATELET # BLD AUTO: 57 K/UL (ref 150–350)
PLATELET # BLD AUTO: 58 K/UL (ref 150–350)
PLATELET # BLD AUTO: 59 K/UL (ref 150–350)
PLATELET # BLD AUTO: 60 K/UL (ref 150–350)
PLATELET # BLD AUTO: 60 K/UL (ref 150–350)
PLATELET # BLD AUTO: 62 K/UL (ref 150–350)
PLATELET # BLD AUTO: 63 K/UL (ref 150–350)
PLATELET # BLD AUTO: 64 K/UL (ref 150–350)
PLATELET # BLD AUTO: 65 K/UL (ref 150–350)
PLATELET # BLD AUTO: 66 K/UL (ref 150–350)
PLATELET # BLD AUTO: 67 K/UL (ref 150–350)
PLATELET # BLD AUTO: 68 K/UL (ref 150–350)
PLATELET # BLD AUTO: 68 K/UL (ref 150–350)
PLATELET # BLD AUTO: 69 K/UL (ref 150–350)
PLATELET # BLD AUTO: 69 K/UL (ref 150–350)
PLATELET # BLD AUTO: 70 K/UL (ref 150–350)
PLATELET # BLD AUTO: 71 K/UL (ref 150–350)
PLATELET # BLD AUTO: 72 K/UL (ref 150–350)
PLATELET # BLD AUTO: 73 K/UL (ref 150–350)
PLATELET # BLD AUTO: 73 K/UL (ref 150–350)
PLATELET # BLD AUTO: 74 K/UL (ref 150–350)
PLATELET # BLD AUTO: 75 K/UL (ref 150–350)
PLATELET # BLD AUTO: 76 K/UL (ref 150–350)
PLATELET # BLD AUTO: 77 K/UL (ref 150–350)
PLATELET # BLD AUTO: 77 K/UL (ref 150–350)
PLATELET # BLD AUTO: 78 K/UL (ref 150–350)
PLATELET # BLD AUTO: 79 K/UL (ref 150–350)
PLATELET # BLD AUTO: 80 K/UL (ref 150–350)
PLATELET # BLD AUTO: 81 K/UL (ref 150–350)
PLATELET # BLD AUTO: 82 K/UL (ref 150–350)
PLATELET # BLD AUTO: 82 K/UL (ref 150–350)
PLATELET # BLD AUTO: 83 K/UL (ref 150–350)
PLATELET # BLD AUTO: 84 K/UL (ref 150–350)
PLATELET # BLD AUTO: 85 K/UL (ref 150–350)
PLATELET # BLD AUTO: 87 K/UL (ref 150–350)
PLATELET # BLD AUTO: 89 K/UL (ref 150–350)
PLATELET # BLD AUTO: 90 K/UL (ref 150–350)
PLATELET # BLD AUTO: 90 K/UL (ref 150–350)
PLATELET # BLD AUTO: 91 K/UL (ref 150–350)
PLATELET # BLD AUTO: 92 K/UL (ref 150–350)
PLATELET # BLD AUTO: 93 K/UL (ref 150–350)
PLATELET # BLD AUTO: 94 K/UL (ref 150–350)
PLATELET # BLD AUTO: 94 K/UL (ref 150–350)
PLATELET # BLD AUTO: 95 K/UL (ref 150–350)
PLATELET # BLD AUTO: 96 K/UL (ref 150–350)
PLATELET # BLD AUTO: 96 K/UL (ref 150–350)
PLATELET # BLD AUTO: 97 K/UL (ref 150–350)
PLATELET # BLD AUTO: 98 K/UL (ref 150–350)
PLATELET # BLD AUTO: 99 K/UL (ref 150–350)
PLATELET # BLD AUTO: 99 K/UL (ref 150–350)
PLATELET BLD QL SMEAR: ABNORMAL
PMV BLD AUTO: 10 FL (ref 9.2–12.9)
PMV BLD AUTO: 10.1 FL (ref 9.2–12.9)
PMV BLD AUTO: 10.2 FL (ref 9.2–12.9)
PMV BLD AUTO: 10.3 FL (ref 9.2–12.9)
PMV BLD AUTO: 10.4 FL (ref 9.2–12.9)
PMV BLD AUTO: 10.5 FL (ref 9.2–12.9)
PMV BLD AUTO: 10.6 FL (ref 9.2–12.9)
PMV BLD AUTO: 10.7 FL (ref 9.2–12.9)
PMV BLD AUTO: 10.8 FL (ref 9.2–12.9)
PMV BLD AUTO: 10.9 FL (ref 9.2–12.9)
PMV BLD AUTO: 11 FL (ref 9.2–12.9)
PMV BLD AUTO: 11.1 FL (ref 9.2–12.9)
PMV BLD AUTO: 11.2 FL (ref 9.2–12.9)
PMV BLD AUTO: 11.3 FL (ref 9.2–12.9)
PMV BLD AUTO: 11.4 FL (ref 9.2–12.9)
PMV BLD AUTO: 11.5 FL (ref 9.2–12.9)
PMV BLD AUTO: 11.6 FL (ref 9.2–12.9)
PMV BLD AUTO: 11.7 FL (ref 9.2–12.9)
PMV BLD AUTO: 11.8 FL (ref 9.2–12.9)
PMV BLD AUTO: 11.9 FL (ref 9.2–12.9)
PMV BLD AUTO: 12 FL (ref 9.2–12.9)
PMV BLD AUTO: 12.1 FL (ref 9.2–12.9)
PMV BLD AUTO: 12.2 FL (ref 9.2–12.9)
PMV BLD AUTO: 12.3 FL (ref 9.2–12.9)
PMV BLD AUTO: 12.4 FL (ref 9.2–12.9)
PMV BLD AUTO: 12.4 FL (ref 9.2–12.9)
PMV BLD AUTO: 12.5 FL (ref 9.2–12.9)
PMV BLD AUTO: 12.8 FL (ref 9.2–12.9)
PMV BLD AUTO: 12.9 FL (ref 9.2–12.9)
PMV BLD AUTO: 9.6 FL (ref 9.2–12.9)
PMV BLD AUTO: 9.9 FL (ref 9.2–12.9)
PO2 BLDA: 100 MMHG (ref 80–100)
PO2 BLDA: 101 MMHG (ref 80–100)
PO2 BLDA: 101 MMHG (ref 80–100)
PO2 BLDA: 102 MMHG (ref 80–100)
PO2 BLDA: 102 MMHG (ref 80–100)
PO2 BLDA: 103 MMHG (ref 80–100)
PO2 BLDA: 104 MMHG (ref 80–100)
PO2 BLDA: 105 MMHG (ref 80–100)
PO2 BLDA: 105 MMHG (ref 80–100)
PO2 BLDA: 106 MMHG (ref 80–100)
PO2 BLDA: 107 MMHG (ref 80–100)
PO2 BLDA: 108 MMHG (ref 80–100)
PO2 BLDA: 109 MMHG (ref 80–100)
PO2 BLDA: 110 MMHG (ref 80–100)
PO2 BLDA: 111 MMHG (ref 80–100)
PO2 BLDA: 112 MMHG (ref 80–100)
PO2 BLDA: 113 MMHG (ref 80–100)
PO2 BLDA: 114 MMHG (ref 80–100)
PO2 BLDA: 115 MMHG (ref 40–60)
PO2 BLDA: 115 MMHG (ref 80–100)
PO2 BLDA: 116 MMHG (ref 80–100)
PO2 BLDA: 118 MMHG (ref 80–100)
PO2 BLDA: 119 MMHG (ref 80–100)
PO2 BLDA: 120 MMHG (ref 80–100)
PO2 BLDA: 121 MMHG (ref 80–100)
PO2 BLDA: 122 MMHG (ref 80–100)
PO2 BLDA: 123 MMHG (ref 80–100)
PO2 BLDA: 124 MMHG (ref 80–100)
PO2 BLDA: 125 MMHG (ref 80–100)
PO2 BLDA: 126 MMHG (ref 80–100)
PO2 BLDA: 127 MMHG (ref 80–100)
PO2 BLDA: 128 MMHG (ref 80–100)
PO2 BLDA: 128 MMHG (ref 80–100)
PO2 BLDA: 129 MMHG (ref 80–100)
PO2 BLDA: 129 MMHG (ref 80–100)
PO2 BLDA: 130 MMHG (ref 80–100)
PO2 BLDA: 131 MMHG (ref 80–100)
PO2 BLDA: 131 MMHG (ref 80–100)
PO2 BLDA: 132 MMHG (ref 80–100)
PO2 BLDA: 132 MMHG (ref 80–100)
PO2 BLDA: 133 MMHG (ref 80–100)
PO2 BLDA: 136 MMHG (ref 80–100)
PO2 BLDA: 137 MMHG (ref 80–100)
PO2 BLDA: 137 MMHG (ref 80–100)
PO2 BLDA: 138 MMHG (ref 80–100)
PO2 BLDA: 139 MMHG (ref 80–100)
PO2 BLDA: 139 MMHG (ref 80–100)
PO2 BLDA: 140 MMHG (ref 80–100)
PO2 BLDA: 141 MMHG (ref 80–100)
PO2 BLDA: 141 MMHG (ref 80–100)
PO2 BLDA: 142 MMHG (ref 80–100)
PO2 BLDA: 143 MMHG (ref 80–100)
PO2 BLDA: 143 MMHG (ref 80–100)
PO2 BLDA: 144 MMHG (ref 80–100)
PO2 BLDA: 145 MMHG (ref 80–100)
PO2 BLDA: 148 MMHG (ref 80–100)
PO2 BLDA: 150 MMHG (ref 80–100)
PO2 BLDA: 150 MMHG (ref 80–100)
PO2 BLDA: 152 MMHG (ref 80–100)
PO2 BLDA: 153 MMHG (ref 80–100)
PO2 BLDA: 157 MMHG (ref 80–100)
PO2 BLDA: 157 MMHG (ref 80–100)
PO2 BLDA: 159 MMHG (ref 80–100)
PO2 BLDA: 160 MMHG (ref 80–100)
PO2 BLDA: 162 MMHG (ref 80–100)
PO2 BLDA: 165 MMHG (ref 80–100)
PO2 BLDA: 168 MMHG (ref 80–100)
PO2 BLDA: 168 MMHG (ref 80–100)
PO2 BLDA: 170 MMHG (ref 80–100)
PO2 BLDA: 170 MMHG (ref 80–100)
PO2 BLDA: 171 MMHG (ref 80–100)
PO2 BLDA: 173 MMHG (ref 80–100)
PO2 BLDA: 174 MMHG (ref 80–100)
PO2 BLDA: 174 MMHG (ref 80–100)
PO2 BLDA: 175 MMHG (ref 80–100)
PO2 BLDA: 175 MMHG (ref 80–100)
PO2 BLDA: 179 MMHG (ref 80–100)
PO2 BLDA: 180 MMHG (ref 80–100)
PO2 BLDA: 182 MMHG (ref 80–100)
PO2 BLDA: 183 MMHG (ref 80–100)
PO2 BLDA: 184 MMHG (ref 80–100)
PO2 BLDA: 184 MMHG (ref 80–100)
PO2 BLDA: 186 MMHG (ref 80–100)
PO2 BLDA: 186 MMHG (ref 80–100)
PO2 BLDA: 192 MMHG (ref 80–100)
PO2 BLDA: 195 MMHG (ref 80–100)
PO2 BLDA: 201 MMHG (ref 80–100)
PO2 BLDA: 203 MMHG (ref 80–100)
PO2 BLDA: 206 MMHG (ref 80–100)
PO2 BLDA: 217 MMHG (ref 80–100)
PO2 BLDA: 234 MMHG (ref 80–100)
PO2 BLDA: 236 MMHG (ref 80–100)
PO2 BLDA: 243 MMHG (ref 80–100)
PO2 BLDA: 25 MMHG (ref 40–60)
PO2 BLDA: 261 MMHG (ref 80–100)
PO2 BLDA: 278 MMHG (ref 80–100)
PO2 BLDA: 28 MMHG (ref 40–60)
PO2 BLDA: 28 MMHG (ref 80–100)
PO2 BLDA: 280 MMHG (ref 80–100)
PO2 BLDA: 283 MMHG (ref 80–100)
PO2 BLDA: 29 MMHG (ref 40–60)
PO2 BLDA: 29 MMHG (ref 40–60)
PO2 BLDA: 295 MMHG (ref 80–100)
PO2 BLDA: 30 MMHG (ref 40–60)
PO2 BLDA: 31 MMHG (ref 40–60)
PO2 BLDA: 31 MMHG (ref 40–60)
PO2 BLDA: 31 MMHG (ref 80–100)
PO2 BLDA: 32 MMHG (ref 40–60)
PO2 BLDA: 33 MMHG (ref 40–60)
PO2 BLDA: 33 MMHG (ref 80–100)
PO2 BLDA: 33 MMHG (ref 80–100)
PO2 BLDA: 335 MMHG (ref 80–100)
PO2 BLDA: 34 MMHG (ref 40–60)
PO2 BLDA: 340 MMHG (ref 80–100)
PO2 BLDA: 343 MMHG (ref 80–100)
PO2 BLDA: 35 MMHG (ref 40–60)
PO2 BLDA: 35 MMHG (ref 80–100)
PO2 BLDA: 36 MMHG (ref 40–60)
PO2 BLDA: 36 MMHG (ref 40–60)
PO2 BLDA: 362 MMHG (ref 80–100)
PO2 BLDA: 37 MMHG (ref 40–60)
PO2 BLDA: 38 MMHG (ref 40–60)
PO2 BLDA: 38 MMHG (ref 80–100)
PO2 BLDA: 39 MMHG (ref 40–60)
PO2 BLDA: 39 MMHG (ref 40–60)
PO2 BLDA: 392 MMHG (ref 80–100)
PO2 BLDA: 393 MMHG (ref 80–100)
PO2 BLDA: 40 MMHG (ref 40–60)
PO2 BLDA: 407 MMHG (ref 80–100)
PO2 BLDA: 41 MMHG (ref 40–60)
PO2 BLDA: 412 MMHG (ref 80–100)
PO2 BLDA: 414 MMHG (ref 80–100)
PO2 BLDA: 415 MMHG (ref 80–100)
PO2 BLDA: 42 MMHG (ref 40–60)
PO2 BLDA: 42 MMHG (ref 80–100)
PO2 BLDA: 421 MMHG (ref 80–100)
PO2 BLDA: 421 MMHG (ref 80–100)
PO2 BLDA: 423 MMHG (ref 80–100)
PO2 BLDA: 428 MMHG (ref 80–100)
PO2 BLDA: 428 MMHG (ref 80–100)
PO2 BLDA: 43 MMHG (ref 40–60)
PO2 BLDA: 43 MMHG (ref 80–100)
PO2 BLDA: 431 MMHG (ref 80–100)
PO2 BLDA: 44 MMHG (ref 40–60)
PO2 BLDA: 44 MMHG (ref 80–100)
PO2 BLDA: 44 MMHG (ref 80–100)
PO2 BLDA: 440 MMHG (ref 80–100)
PO2 BLDA: 449 MMHG (ref 80–100)
PO2 BLDA: 45 MMHG (ref 40–60)
PO2 BLDA: 45 MMHG (ref 80–100)
PO2 BLDA: 45 MMHG (ref 80–100)
PO2 BLDA: 451 MMHG (ref 80–100)
PO2 BLDA: 454 MMHG (ref 80–100)
PO2 BLDA: 454 MMHG (ref 80–100)
PO2 BLDA: 457 MMHG (ref 80–100)
PO2 BLDA: 459 MMHG (ref 80–100)
PO2 BLDA: 46 MMHG (ref 40–60)
PO2 BLDA: 46 MMHG (ref 80–100)
PO2 BLDA: 46 MMHG (ref 80–100)
PO2 BLDA: 461 MMHG (ref 80–100)
PO2 BLDA: 465 MMHG (ref 80–100)
PO2 BLDA: 466 MMHG (ref 80–100)
PO2 BLDA: 466 MMHG (ref 80–100)
PO2 BLDA: 468 MMHG (ref 80–100)
PO2 BLDA: 47 MMHG (ref 40–60)
PO2 BLDA: 47 MMHG (ref 80–100)
PO2 BLDA: 47 MMHG (ref 80–100)
PO2 BLDA: 470 MMHG (ref 80–100)
PO2 BLDA: 471 MMHG (ref 80–100)
PO2 BLDA: 474 MMHG (ref 80–100)
PO2 BLDA: 476 MMHG (ref 80–100)
PO2 BLDA: 48 MMHG (ref 40–60)
PO2 BLDA: 482 MMHG (ref 80–100)
PO2 BLDA: 482 MMHG (ref 80–100)
PO2 BLDA: 489 MMHG (ref 80–100)
PO2 BLDA: 49 MMHG (ref 40–60)
PO2 BLDA: 49 MMHG (ref 80–100)
PO2 BLDA: 49 MMHG (ref 80–100)
PO2 BLDA: 492 MMHG (ref 80–100)
PO2 BLDA: 494 MMHG (ref 80–100)
PO2 BLDA: 498 MMHG (ref 80–100)
PO2 BLDA: 50 MMHG (ref 80–100)
PO2 BLDA: 507 MMHG (ref 80–100)
PO2 BLDA: 509 MMHG (ref 80–100)
PO2 BLDA: 51 MMHG (ref 40–60)
PO2 BLDA: 51 MMHG (ref 80–100)
PO2 BLDA: 510 MMHG (ref 80–100)
PO2 BLDA: 512 MMHG (ref 80–100)
PO2 BLDA: 519 MMHG (ref 80–100)
PO2 BLDA: 52 MMHG (ref 40–60)
PO2 BLDA: 52 MMHG (ref 80–100)
PO2 BLDA: 521 MMHG (ref 80–100)
PO2 BLDA: 522 MMHG (ref 80–100)
PO2 BLDA: 53 MMHG (ref 80–100)
PO2 BLDA: 539 MMHG (ref 80–100)
PO2 BLDA: 54 MMHG (ref 80–100)
PO2 BLDA: 55 MMHG (ref 80–100)
PO2 BLDA: 56 MMHG (ref 80–100)
PO2 BLDA: 57 MMHG (ref 80–100)
PO2 BLDA: 58 MMHG (ref 80–100)
PO2 BLDA: 59 MMHG (ref 80–100)
PO2 BLDA: 60 MMHG (ref 80–100)
PO2 BLDA: 61 MMHG (ref 80–100)
PO2 BLDA: 62 MMHG (ref 80–100)
PO2 BLDA: 63 MMHG (ref 80–100)
PO2 BLDA: 64 MMHG (ref 80–100)
PO2 BLDA: 65 MMHG (ref 80–100)
PO2 BLDA: 66 MMHG (ref 80–100)
PO2 BLDA: 67 MMHG (ref 80–100)
PO2 BLDA: 68 MMHG (ref 40–60)
PO2 BLDA: 68 MMHG (ref 80–100)
PO2 BLDA: 69 MMHG (ref 80–100)
PO2 BLDA: 70 MMHG (ref 80–100)
PO2 BLDA: 71 MMHG (ref 80–100)
PO2 BLDA: 72 MMHG (ref 80–100)
PO2 BLDA: 73 MMHG (ref 80–100)
PO2 BLDA: 74 MMHG (ref 80–100)
PO2 BLDA: 75 MMHG (ref 80–100)
PO2 BLDA: 76 MMHG (ref 80–100)
PO2 BLDA: 77 MMHG (ref 80–100)
PO2 BLDA: 78 MMHG (ref 80–100)
PO2 BLDA: 79 MMHG (ref 80–100)
PO2 BLDA: 80 MMHG (ref 80–100)
PO2 BLDA: 81 MMHG (ref 80–100)
PO2 BLDA: 82 MMHG (ref 80–100)
PO2 BLDA: 83 MMHG (ref 80–100)
PO2 BLDA: 84 MMHG (ref 80–100)
PO2 BLDA: 85 MMHG (ref 80–100)
PO2 BLDA: 86 MMHG (ref 80–100)
PO2 BLDA: 87 MMHG (ref 80–100)
PO2 BLDA: 88 MMHG (ref 80–100)
PO2 BLDA: 89 MMHG (ref 80–100)
PO2 BLDA: 90 MMHG (ref 80–100)
PO2 BLDA: 91 MMHG (ref 80–100)
PO2 BLDA: 92 MMHG (ref 80–100)
PO2 BLDA: 93 MMHG (ref 80–100)
PO2 BLDA: 94 MMHG (ref 80–100)
PO2 BLDA: 95 MMHG (ref 80–100)
PO2 BLDA: 96 MMHG (ref 80–100)
PO2 BLDA: 97 MMHG (ref 80–100)
PO2 BLDA: 97 MMHG (ref 80–100)
PO2 BLDA: 98 MMHG (ref 80–100)
PO2 BLDA: 99 MMHG (ref 80–100)
POC ACTIVATED CLOTTING TIME K: 213 SEC (ref 74–137)
POC ACTIVATED CLOTTING TIME K: 230 SEC (ref 74–137)
POC ACTIVATED CLOTTING TIME K: 230 SEC (ref 74–137)
POC ACTIVATED CLOTTING TIME K: 246 SEC (ref 74–137)
POC BE: -1 MMOL/L
POC BE: -10 MMOL/L
POC BE: -11 MMOL/L
POC BE: -2 MMOL/L
POC BE: -3 MMOL/L
POC BE: -3 MMOL/L
POC BE: -4 MMOL/L
POC BE: -5 MMOL/L
POC BE: -5 MMOL/L
POC BE: -9 MMOL/L
POC BE: 0 MMOL/L
POC BE: 1 MMOL/L
POC BE: 10 MMOL/L
POC BE: 11 MMOL/L
POC BE: 12 MMOL/L
POC BE: 13 MMOL/L
POC BE: 14 MMOL/L
POC BE: 15 MMOL/L
POC BE: 16 MMOL/L
POC BE: 17 MMOL/L
POC BE: 18 MMOL/L
POC BE: 19 MMOL/L
POC BE: 2 MMOL/L
POC BE: 20 MMOL/L
POC BE: 21 MMOL/L
POC BE: 22 MMOL/L
POC BE: 23 MMOL/L
POC BE: 24 MMOL/L
POC BE: 25 MMOL/L
POC BE: 27 MMOL/L
POC BE: 27 MMOL/L
POC BE: 28 MMOL/L
POC BE: 28 MMOL/L
POC BE: 29 MMOL/L
POC BE: 29 MMOL/L
POC BE: 3 MMOL/L
POC BE: 4 MMOL/L
POC BE: 5 MMOL/L
POC BE: 6 MMOL/L
POC BE: 7 MMOL/L
POC BE: 8 MMOL/L
POC BE: 9 MMOL/L
POC BE: >30 MMOL/L
POC BE: ABNORMAL MMOL/L
POC IONIZED CALCIUM: 0.95 MMOL/L (ref 1.06–1.42)
POC IONIZED CALCIUM: 0.96 MMOL/L (ref 1.06–1.42)
POC IONIZED CALCIUM: 1.01 MMOL/L (ref 1.06–1.42)
POC IONIZED CALCIUM: 1.02 MMOL/L (ref 1.06–1.42)
POC IONIZED CALCIUM: 1.03 MMOL/L (ref 1.06–1.42)
POC IONIZED CALCIUM: 1.05 MMOL/L (ref 1.06–1.42)
POC IONIZED CALCIUM: 1.07 MMOL/L (ref 1.06–1.42)
POC IONIZED CALCIUM: 1.08 MMOL/L (ref 1.06–1.42)
POC IONIZED CALCIUM: 1.09 MMOL/L (ref 1.06–1.42)
POC IONIZED CALCIUM: 1.1 MMOL/L (ref 1.06–1.42)
POC IONIZED CALCIUM: 1.1 MMOL/L (ref 1.06–1.42)
POC IONIZED CALCIUM: 1.13 MMOL/L (ref 1.06–1.42)
POC IONIZED CALCIUM: 1.13 MMOL/L (ref 1.06–1.42)
POC IONIZED CALCIUM: 1.14 MMOL/L (ref 1.06–1.42)
POC IONIZED CALCIUM: 1.14 MMOL/L (ref 1.06–1.42)
POC IONIZED CALCIUM: 1.15 MMOL/L (ref 1.06–1.42)
POC IONIZED CALCIUM: 1.16 MMOL/L (ref 1.06–1.42)
POC IONIZED CALCIUM: 1.17 MMOL/L (ref 1.06–1.42)
POC IONIZED CALCIUM: 1.18 MMOL/L (ref 1.06–1.42)
POC IONIZED CALCIUM: 1.19 MMOL/L (ref 1.06–1.42)
POC IONIZED CALCIUM: 1.2 MMOL/L (ref 1.06–1.42)
POC IONIZED CALCIUM: 1.21 MMOL/L (ref 1.06–1.42)
POC IONIZED CALCIUM: 1.22 MMOL/L (ref 1.06–1.42)
POC IONIZED CALCIUM: 1.22 MMOL/L (ref 1.06–1.42)
POC IONIZED CALCIUM: 1.23 MMOL/L (ref 1.06–1.42)
POC IONIZED CALCIUM: 1.23 MMOL/L (ref 1.06–1.42)
POC IONIZED CALCIUM: 1.24 MMOL/L (ref 1.06–1.42)
POC IONIZED CALCIUM: 1.25 MMOL/L (ref 1.06–1.42)
POC IONIZED CALCIUM: 1.26 MMOL/L (ref 1.06–1.42)
POC IONIZED CALCIUM: 1.27 MMOL/L (ref 1.06–1.42)
POC IONIZED CALCIUM: 1.28 MMOL/L (ref 1.06–1.42)
POC IONIZED CALCIUM: 1.29 MMOL/L (ref 1.06–1.42)
POC IONIZED CALCIUM: 1.3 MMOL/L (ref 1.06–1.42)
POC IONIZED CALCIUM: 1.31 MMOL/L (ref 1.06–1.42)
POC IONIZED CALCIUM: 1.32 MMOL/L (ref 1.06–1.42)
POC IONIZED CALCIUM: 1.33 MMOL/L (ref 1.06–1.42)
POC IONIZED CALCIUM: 1.34 MMOL/L (ref 1.06–1.42)
POC IONIZED CALCIUM: 1.35 MMOL/L (ref 1.06–1.42)
POC IONIZED CALCIUM: 1.36 MMOL/L (ref 1.06–1.42)
POC IONIZED CALCIUM: 1.37 MMOL/L (ref 1.06–1.42)
POC IONIZED CALCIUM: 1.38 MMOL/L (ref 1.06–1.42)
POC IONIZED CALCIUM: 1.39 MMOL/L (ref 1.06–1.42)
POC IONIZED CALCIUM: 1.4 MMOL/L (ref 1.06–1.42)
POC IONIZED CALCIUM: 1.41 MMOL/L (ref 1.06–1.42)
POC IONIZED CALCIUM: 1.42 MMOL/L (ref 1.06–1.42)
POC IONIZED CALCIUM: 1.43 MMOL/L (ref 1.06–1.42)
POC IONIZED CALCIUM: 1.44 MMOL/L (ref 1.06–1.42)
POC IONIZED CALCIUM: 1.45 MMOL/L (ref 1.06–1.42)
POC IONIZED CALCIUM: 1.46 MMOL/L (ref 1.06–1.42)
POC IONIZED CALCIUM: 1.47 MMOL/L (ref 1.06–1.42)
POC IONIZED CALCIUM: 1.49 MMOL/L (ref 1.06–1.42)
POC SATURATED O2: 100 % (ref 95–100)
POC SATURATED O2: 47 % (ref 95–100)
POC SATURATED O2: 52 % (ref 95–100)
POC SATURATED O2: 53 % (ref 95–100)
POC SATURATED O2: 55 % (ref 95–100)
POC SATURATED O2: 58 % (ref 95–100)
POC SATURATED O2: 58 % (ref 95–100)
POC SATURATED O2: 59 % (ref 95–100)
POC SATURATED O2: 60 % (ref 95–100)
POC SATURATED O2: 61 % (ref 95–100)
POC SATURATED O2: 62 % (ref 95–100)
POC SATURATED O2: 63 % (ref 95–100)
POC SATURATED O2: 64 % (ref 95–100)
POC SATURATED O2: 64 % (ref 95–100)
POC SATURATED O2: 65 % (ref 95–100)
POC SATURATED O2: 66 % (ref 95–100)
POC SATURATED O2: 67 % (ref 95–100)
POC SATURATED O2: 67 % (ref 95–100)
POC SATURATED O2: 68 % (ref 95–100)
POC SATURATED O2: 69 % (ref 95–100)
POC SATURATED O2: 70 % (ref 95–100)
POC SATURATED O2: 71 % (ref 95–100)
POC SATURATED O2: 72 % (ref 95–100)
POC SATURATED O2: 73 % (ref 95–100)
POC SATURATED O2: 74 % (ref 95–100)
POC SATURATED O2: 75 % (ref 95–100)
POC SATURATED O2: 76 % (ref 95–100)
POC SATURATED O2: 77 % (ref 95–100)
POC SATURATED O2: 78 % (ref 95–100)
POC SATURATED O2: 79 % (ref 95–100)
POC SATURATED O2: 80 % (ref 95–100)
POC SATURATED O2: 81 % (ref 95–100)
POC SATURATED O2: 82 % (ref 95–100)
POC SATURATED O2: 83 % (ref 95–100)
POC SATURATED O2: 84 % (ref 95–100)
POC SATURATED O2: 85 % (ref 95–100)
POC SATURATED O2: 86 % (ref 95–100)
POC SATURATED O2: 87 % (ref 95–100)
POC SATURATED O2: 88 % (ref 95–100)
POC SATURATED O2: 89 % (ref 95–100)
POC SATURATED O2: 90 % (ref 95–100)
POC SATURATED O2: 91 % (ref 95–100)
POC SATURATED O2: 92 % (ref 95–100)
POC SATURATED O2: 93 % (ref 95–100)
POC SATURATED O2: 94 % (ref 95–100)
POC SATURATED O2: 95 % (ref 95–100)
POC SATURATED O2: 96 % (ref 95–100)
POC SATURATED O2: 97 % (ref 95–100)
POC SATURATED O2: 98 % (ref 95–100)
POC SATURATED O2: 99 % (ref 95–100)
POC SATURATED O2: ABNORMAL %
POC TCO2: 16 MMOL/L (ref 23–27)
POC TCO2: 17 MMOL/L (ref 23–27)
POC TCO2: 17 MMOL/L (ref 23–27)
POC TCO2: 22 MMOL/L (ref 23–27)
POC TCO2: 23 MMOL/L (ref 23–27)
POC TCO2: 24 MMOL/L (ref 23–27)
POC TCO2: 24 MMOL/L (ref 23–27)
POC TCO2: 25 MMOL/L (ref 23–27)
POC TCO2: 26 MMOL/L (ref 23–27)
POC TCO2: 26 MMOL/L (ref 24–29)
POC TCO2: 27 MMOL/L (ref 23–27)
POC TCO2: 27 MMOL/L (ref 24–29)
POC TCO2: 28 MMOL/L (ref 23–27)
POC TCO2: 28 MMOL/L (ref 24–29)
POC TCO2: 29 MMOL/L (ref 23–27)
POC TCO2: 29 MMOL/L (ref 24–29)
POC TCO2: 30 MMOL/L (ref 23–27)
POC TCO2: 30 MMOL/L (ref 24–29)
POC TCO2: 31 MMOL/L (ref 23–27)
POC TCO2: 31 MMOL/L (ref 24–29)
POC TCO2: 32 MMOL/L (ref 23–27)
POC TCO2: 32 MMOL/L (ref 24–29)
POC TCO2: 33 MMOL/L (ref 23–27)
POC TCO2: 33 MMOL/L (ref 24–29)
POC TCO2: 34 MMOL/L (ref 23–27)
POC TCO2: 34 MMOL/L (ref 24–29)
POC TCO2: 35 MMOL/L (ref 23–27)
POC TCO2: 35 MMOL/L (ref 24–29)
POC TCO2: 36 MMOL/L (ref 23–27)
POC TCO2: 36 MMOL/L (ref 24–29)
POC TCO2: 37 MMOL/L (ref 23–27)
POC TCO2: 37 MMOL/L (ref 24–29)
POC TCO2: 38 MMOL/L (ref 23–27)
POC TCO2: 38 MMOL/L (ref 24–29)
POC TCO2: 39 MMOL/L (ref 23–27)
POC TCO2: 39 MMOL/L (ref 24–29)
POC TCO2: 40 MMOL/L (ref 23–27)
POC TCO2: 40 MMOL/L (ref 24–29)
POC TCO2: 41 MMOL/L (ref 23–27)
POC TCO2: 41 MMOL/L (ref 24–29)
POC TCO2: 42 MMOL/L (ref 23–27)
POC TCO2: 42 MMOL/L (ref 24–29)
POC TCO2: 43 MMOL/L (ref 23–27)
POC TCO2: 43 MMOL/L (ref 24–29)
POC TCO2: 44 MMOL/L (ref 23–27)
POC TCO2: 44 MMOL/L (ref 24–29)
POC TCO2: 44 MMOL/L (ref 24–29)
POC TCO2: 45 MMOL/L (ref 23–27)
POC TCO2: 45 MMOL/L (ref 24–29)
POC TCO2: 46 MMOL/L (ref 23–27)
POC TCO2: 47 MMOL/L (ref 23–27)
POC TCO2: 47 MMOL/L (ref 23–27)
POC TCO2: 48 MMOL/L (ref 23–27)
POC TCO2: 48 MMOL/L (ref 23–27)
POC TCO2: 49 MMOL/L (ref 23–27)
POC TCO2: 49 MMOL/L (ref 24–29)
POC TCO2: 50 MMOL/L (ref 23–27)
POC TCO2: >50 MMOL/L (ref 23–27)
POC TCO2: >50 MMOL/L (ref 24–29)
POC TCO2: ABNORMAL MMOL/L
POCT GLUCOSE: 100 MG/DL (ref 70–110)
POCT GLUCOSE: 101 MG/DL (ref 70–110)
POCT GLUCOSE: 101 MG/DL (ref 70–110)
POCT GLUCOSE: 103 MG/DL (ref 70–110)
POCT GLUCOSE: 103 MG/DL (ref 70–110)
POCT GLUCOSE: 105 MG/DL (ref 70–110)
POCT GLUCOSE: 106 MG/DL (ref 70–110)
POCT GLUCOSE: 107 MG/DL (ref 70–110)
POCT GLUCOSE: 109 MG/DL (ref 70–110)
POCT GLUCOSE: 110 MG/DL (ref 70–110)
POCT GLUCOSE: 110 MG/DL (ref 70–110)
POCT GLUCOSE: 111 MG/DL (ref 70–110)
POCT GLUCOSE: 112 MG/DL (ref 70–110)
POCT GLUCOSE: 114 MG/DL (ref 70–110)
POCT GLUCOSE: 114 MG/DL (ref 70–110)
POCT GLUCOSE: 115 MG/DL (ref 70–110)
POCT GLUCOSE: 115 MG/DL (ref 70–110)
POCT GLUCOSE: 116 MG/DL (ref 70–110)
POCT GLUCOSE: 117 MG/DL (ref 70–110)
POCT GLUCOSE: 117 MG/DL (ref 70–110)
POCT GLUCOSE: 118 MG/DL (ref 70–110)
POCT GLUCOSE: 119 MG/DL (ref 70–110)
POCT GLUCOSE: 120 MG/DL (ref 70–110)
POCT GLUCOSE: 121 MG/DL (ref 70–110)
POCT GLUCOSE: 122 MG/DL (ref 70–110)
POCT GLUCOSE: 123 MG/DL (ref 70–110)
POCT GLUCOSE: 124 MG/DL (ref 70–110)
POCT GLUCOSE: 124 MG/DL (ref 70–110)
POCT GLUCOSE: 125 MG/DL (ref 70–110)
POCT GLUCOSE: 125 MG/DL (ref 70–110)
POCT GLUCOSE: 126 MG/DL (ref 70–110)
POCT GLUCOSE: 126 MG/DL (ref 70–110)
POCT GLUCOSE: 127 MG/DL (ref 70–110)
POCT GLUCOSE: 128 MG/DL (ref 70–110)
POCT GLUCOSE: 129 MG/DL (ref 70–110)
POCT GLUCOSE: 130 MG/DL (ref 70–110)
POCT GLUCOSE: 131 MG/DL (ref 70–110)
POCT GLUCOSE: 132 MG/DL (ref 70–110)
POCT GLUCOSE: 133 MG/DL (ref 70–110)
POCT GLUCOSE: 134 MG/DL (ref 70–110)
POCT GLUCOSE: 135 MG/DL (ref 70–110)
POCT GLUCOSE: 136 MG/DL (ref 70–110)
POCT GLUCOSE: 137 MG/DL (ref 70–110)
POCT GLUCOSE: 138 MG/DL (ref 70–110)
POCT GLUCOSE: 139 MG/DL (ref 70–110)
POCT GLUCOSE: 140 MG/DL (ref 70–110)
POCT GLUCOSE: 141 MG/DL (ref 70–110)
POCT GLUCOSE: 142 MG/DL (ref 70–110)
POCT GLUCOSE: 143 MG/DL (ref 70–110)
POCT GLUCOSE: 144 MG/DL (ref 70–110)
POCT GLUCOSE: 145 MG/DL (ref 70–110)
POCT GLUCOSE: 146 MG/DL (ref 70–110)
POCT GLUCOSE: 147 MG/DL (ref 70–110)
POCT GLUCOSE: 148 MG/DL (ref 70–110)
POCT GLUCOSE: 149 MG/DL (ref 70–110)
POCT GLUCOSE: 150 MG/DL (ref 70–110)
POCT GLUCOSE: 151 MG/DL (ref 70–110)
POCT GLUCOSE: 152 MG/DL (ref 70–110)
POCT GLUCOSE: 153 MG/DL (ref 70–110)
POCT GLUCOSE: 154 MG/DL (ref 70–110)
POCT GLUCOSE: 155 MG/DL (ref 70–110)
POCT GLUCOSE: 156 MG/DL (ref 70–110)
POCT GLUCOSE: 157 MG/DL (ref 70–110)
POCT GLUCOSE: 157 MG/DL (ref 70–110)
POCT GLUCOSE: 158 MG/DL (ref 70–110)
POCT GLUCOSE: 159 MG/DL (ref 70–110)
POCT GLUCOSE: 160 MG/DL (ref 70–110)
POCT GLUCOSE: 161 MG/DL (ref 70–110)
POCT GLUCOSE: 162 MG/DL (ref 70–110)
POCT GLUCOSE: 163 MG/DL (ref 70–110)
POCT GLUCOSE: 164 MG/DL (ref 70–110)
POCT GLUCOSE: 165 MG/DL (ref 70–110)
POCT GLUCOSE: 166 MG/DL (ref 70–110)
POCT GLUCOSE: 167 MG/DL (ref 70–110)
POCT GLUCOSE: 168 MG/DL (ref 70–110)
POCT GLUCOSE: 169 MG/DL (ref 70–110)
POCT GLUCOSE: 170 MG/DL (ref 70–110)
POCT GLUCOSE: 171 MG/DL (ref 70–110)
POCT GLUCOSE: 172 MG/DL (ref 70–110)
POCT GLUCOSE: 173 MG/DL (ref 70–110)
POCT GLUCOSE: 174 MG/DL (ref 70–110)
POCT GLUCOSE: 175 MG/DL (ref 70–110)
POCT GLUCOSE: 176 MG/DL (ref 70–110)
POCT GLUCOSE: 177 MG/DL (ref 70–110)
POCT GLUCOSE: 178 MG/DL (ref 70–110)
POCT GLUCOSE: 179 MG/DL (ref 70–110)
POCT GLUCOSE: 180 MG/DL (ref 70–110)
POCT GLUCOSE: 181 MG/DL (ref 70–110)
POCT GLUCOSE: 182 MG/DL (ref 70–110)
POCT GLUCOSE: 183 MG/DL (ref 70–110)
POCT GLUCOSE: 184 MG/DL (ref 70–110)
POCT GLUCOSE: 185 MG/DL (ref 70–110)
POCT GLUCOSE: 186 MG/DL (ref 70–110)
POCT GLUCOSE: 187 MG/DL (ref 70–110)
POCT GLUCOSE: 188 MG/DL (ref 70–110)
POCT GLUCOSE: 189 MG/DL (ref 70–110)
POCT GLUCOSE: 190 MG/DL (ref 70–110)
POCT GLUCOSE: 191 MG/DL (ref 70–110)
POCT GLUCOSE: 192 MG/DL (ref 70–110)
POCT GLUCOSE: 193 MG/DL (ref 70–110)
POCT GLUCOSE: 194 MG/DL (ref 70–110)
POCT GLUCOSE: 195 MG/DL (ref 70–110)
POCT GLUCOSE: 196 MG/DL (ref 70–110)
POCT GLUCOSE: 197 MG/DL (ref 70–110)
POCT GLUCOSE: 198 MG/DL (ref 70–110)
POCT GLUCOSE: 199 MG/DL (ref 70–110)
POCT GLUCOSE: 200 MG/DL (ref 70–110)
POCT GLUCOSE: 201 MG/DL (ref 70–110)
POCT GLUCOSE: 202 MG/DL (ref 70–110)
POCT GLUCOSE: 203 MG/DL (ref 70–110)
POCT GLUCOSE: 204 MG/DL (ref 70–110)
POCT GLUCOSE: 205 MG/DL (ref 70–110)
POCT GLUCOSE: 206 MG/DL (ref 70–110)
POCT GLUCOSE: 207 MG/DL (ref 70–110)
POCT GLUCOSE: 208 MG/DL (ref 70–110)
POCT GLUCOSE: 209 MG/DL (ref 70–110)
POCT GLUCOSE: 210 MG/DL (ref 70–110)
POCT GLUCOSE: 211 MG/DL (ref 70–110)
POCT GLUCOSE: 212 MG/DL (ref 70–110)
POCT GLUCOSE: 213 MG/DL (ref 70–110)
POCT GLUCOSE: 214 MG/DL (ref 70–110)
POCT GLUCOSE: 215 MG/DL (ref 70–110)
POCT GLUCOSE: 216 MG/DL (ref 70–110)
POCT GLUCOSE: 218 MG/DL (ref 70–110)
POCT GLUCOSE: 219 MG/DL (ref 70–110)
POCT GLUCOSE: 220 MG/DL (ref 70–110)
POCT GLUCOSE: 221 MG/DL (ref 70–110)
POCT GLUCOSE: 222 MG/DL (ref 70–110)
POCT GLUCOSE: 223 MG/DL (ref 70–110)
POCT GLUCOSE: 224 MG/DL (ref 70–110)
POCT GLUCOSE: 224 MG/DL (ref 70–110)
POCT GLUCOSE: 225 MG/DL (ref 70–110)
POCT GLUCOSE: 226 MG/DL (ref 70–110)
POCT GLUCOSE: 227 MG/DL (ref 70–110)
POCT GLUCOSE: 227 MG/DL (ref 70–110)
POCT GLUCOSE: 228 MG/DL (ref 70–110)
POCT GLUCOSE: 229 MG/DL (ref 70–110)
POCT GLUCOSE: 230 MG/DL (ref 70–110)
POCT GLUCOSE: 231 MG/DL (ref 70–110)
POCT GLUCOSE: 231 MG/DL (ref 70–110)
POCT GLUCOSE: 232 MG/DL (ref 70–110)
POCT GLUCOSE: 233 MG/DL (ref 70–110)
POCT GLUCOSE: 233 MG/DL (ref 70–110)
POCT GLUCOSE: 234 MG/DL (ref 70–110)
POCT GLUCOSE: 235 MG/DL (ref 70–110)
POCT GLUCOSE: 235 MG/DL (ref 70–110)
POCT GLUCOSE: 236 MG/DL (ref 70–110)
POCT GLUCOSE: 237 MG/DL (ref 70–110)
POCT GLUCOSE: 238 MG/DL (ref 70–110)
POCT GLUCOSE: 239 MG/DL (ref 70–110)
POCT GLUCOSE: 240 MG/DL (ref 70–110)
POCT GLUCOSE: 241 MG/DL (ref 70–110)
POCT GLUCOSE: 242 MG/DL (ref 70–110)
POCT GLUCOSE: 243 MG/DL (ref 70–110)
POCT GLUCOSE: 243 MG/DL (ref 70–110)
POCT GLUCOSE: 244 MG/DL (ref 70–110)
POCT GLUCOSE: 244 MG/DL (ref 70–110)
POCT GLUCOSE: 245 MG/DL (ref 70–110)
POCT GLUCOSE: 246 MG/DL (ref 70–110)
POCT GLUCOSE: 247 MG/DL (ref 70–110)
POCT GLUCOSE: 248 MG/DL (ref 70–110)
POCT GLUCOSE: 248 MG/DL (ref 70–110)
POCT GLUCOSE: 249 MG/DL (ref 70–110)
POCT GLUCOSE: 249 MG/DL (ref 70–110)
POCT GLUCOSE: 250 MG/DL (ref 70–110)
POCT GLUCOSE: 251 MG/DL (ref 70–110)
POCT GLUCOSE: 252 MG/DL (ref 70–110)
POCT GLUCOSE: 253 MG/DL (ref 70–110)
POCT GLUCOSE: 254 MG/DL (ref 70–110)
POCT GLUCOSE: 254 MG/DL (ref 70–110)
POCT GLUCOSE: 255 MG/DL (ref 70–110)
POCT GLUCOSE: 256 MG/DL (ref 70–110)
POCT GLUCOSE: 256 MG/DL (ref 70–110)
POCT GLUCOSE: 257 MG/DL (ref 70–110)
POCT GLUCOSE: 257 MG/DL (ref 70–110)
POCT GLUCOSE: 258 MG/DL (ref 70–110)
POCT GLUCOSE: 258 MG/DL (ref 70–110)
POCT GLUCOSE: 259 MG/DL (ref 70–110)
POCT GLUCOSE: 259 MG/DL (ref 70–110)
POCT GLUCOSE: 260 MG/DL (ref 70–110)
POCT GLUCOSE: 261 MG/DL (ref 70–110)
POCT GLUCOSE: 262 MG/DL (ref 70–110)
POCT GLUCOSE: 263 MG/DL (ref 70–110)
POCT GLUCOSE: 264 MG/DL (ref 70–110)
POCT GLUCOSE: 266 MG/DL (ref 70–110)
POCT GLUCOSE: 266 MG/DL (ref 70–110)
POCT GLUCOSE: 267 MG/DL (ref 70–110)
POCT GLUCOSE: 268 MG/DL (ref 70–110)
POCT GLUCOSE: 269 MG/DL (ref 70–110)
POCT GLUCOSE: 270 MG/DL (ref 70–110)
POCT GLUCOSE: 270 MG/DL (ref 70–110)
POCT GLUCOSE: 272 MG/DL (ref 70–110)
POCT GLUCOSE: 273 MG/DL (ref 70–110)
POCT GLUCOSE: 275 MG/DL (ref 70–110)
POCT GLUCOSE: 276 MG/DL (ref 70–110)
POCT GLUCOSE: 276 MG/DL (ref 70–110)
POCT GLUCOSE: 277 MG/DL (ref 70–110)
POCT GLUCOSE: 278 MG/DL (ref 70–110)
POCT GLUCOSE: 278 MG/DL (ref 70–110)
POCT GLUCOSE: 279 MG/DL (ref 70–110)
POCT GLUCOSE: 280 MG/DL (ref 70–110)
POCT GLUCOSE: 280 MG/DL (ref 70–110)
POCT GLUCOSE: 282 MG/DL (ref 70–110)
POCT GLUCOSE: 283 MG/DL (ref 70–110)
POCT GLUCOSE: 284 MG/DL (ref 70–110)
POCT GLUCOSE: 286 MG/DL (ref 70–110)
POCT GLUCOSE: 287 MG/DL (ref 70–110)
POCT GLUCOSE: 287 MG/DL (ref 70–110)
POCT GLUCOSE: 288 MG/DL (ref 70–110)
POCT GLUCOSE: 290 MG/DL (ref 70–110)
POCT GLUCOSE: 290 MG/DL (ref 70–110)
POCT GLUCOSE: 291 MG/DL (ref 70–110)
POCT GLUCOSE: 292 MG/DL (ref 70–110)
POCT GLUCOSE: 292 MG/DL (ref 70–110)
POCT GLUCOSE: 293 MG/DL (ref 70–110)
POCT GLUCOSE: 294 MG/DL (ref 70–110)
POCT GLUCOSE: 295 MG/DL (ref 70–110)
POCT GLUCOSE: 295 MG/DL (ref 70–110)
POCT GLUCOSE: 296 MG/DL (ref 70–110)
POCT GLUCOSE: 296 MG/DL (ref 70–110)
POCT GLUCOSE: 298 MG/DL (ref 70–110)
POCT GLUCOSE: 298 MG/DL (ref 70–110)
POCT GLUCOSE: 299 MG/DL (ref 70–110)
POCT GLUCOSE: 303 MG/DL (ref 70–110)
POCT GLUCOSE: 305 MG/DL (ref 70–110)
POCT GLUCOSE: 306 MG/DL (ref 70–110)
POCT GLUCOSE: 307 MG/DL (ref 70–110)
POCT GLUCOSE: 307 MG/DL (ref 70–110)
POCT GLUCOSE: 308 MG/DL (ref 70–110)
POCT GLUCOSE: 309 MG/DL (ref 70–110)
POCT GLUCOSE: 310 MG/DL (ref 70–110)
POCT GLUCOSE: 310 MG/DL (ref 70–110)
POCT GLUCOSE: 312 MG/DL (ref 70–110)
POCT GLUCOSE: 313 MG/DL (ref 70–110)
POCT GLUCOSE: 313 MG/DL (ref 70–110)
POCT GLUCOSE: 315 MG/DL (ref 70–110)
POCT GLUCOSE: 316 MG/DL (ref 70–110)
POCT GLUCOSE: 317 MG/DL (ref 70–110)
POCT GLUCOSE: 319 MG/DL (ref 70–110)
POCT GLUCOSE: 319 MG/DL (ref 70–110)
POCT GLUCOSE: 321 MG/DL (ref 70–110)
POCT GLUCOSE: 321 MG/DL (ref 70–110)
POCT GLUCOSE: 322 MG/DL (ref 70–110)
POCT GLUCOSE: 326 MG/DL (ref 70–110)
POCT GLUCOSE: 327 MG/DL (ref 70–110)
POCT GLUCOSE: 328 MG/DL (ref 70–110)
POCT GLUCOSE: 336 MG/DL (ref 70–110)
POCT GLUCOSE: 341 MG/DL (ref 70–110)
POCT GLUCOSE: 343 MG/DL (ref 70–110)
POCT GLUCOSE: 344 MG/DL (ref 70–110)
POCT GLUCOSE: 346 MG/DL (ref 70–110)
POCT GLUCOSE: 351 MG/DL (ref 70–110)
POCT GLUCOSE: 357 MG/DL (ref 70–110)
POCT GLUCOSE: 365 MG/DL (ref 70–110)
POCT GLUCOSE: 383 MG/DL (ref 70–110)
POCT GLUCOSE: 388 MG/DL (ref 70–110)
POCT GLUCOSE: 396 MG/DL (ref 70–110)
POCT GLUCOSE: 401 MG/DL (ref 70–110)
POCT GLUCOSE: 86 MG/DL (ref 70–110)
POCT GLUCOSE: 91 MG/DL (ref 70–110)
POCT GLUCOSE: 91 MG/DL (ref 70–110)
POCT GLUCOSE: 92 MG/DL (ref 70–110)
POCT GLUCOSE: 93 MG/DL (ref 70–110)
POCT GLUCOSE: 93 MG/DL (ref 70–110)
POCT GLUCOSE: 94 MG/DL (ref 70–110)
POCT GLUCOSE: 98 MG/DL (ref 70–110)
POIKILOCYTOSIS BLD QL SMEAR: SLIGHT
POLYCHROMASIA BLD QL SMEAR: ABNORMAL
POTASSIUM BLD-SCNC: 2.3 MMOL/L (ref 3.5–5.1)
POTASSIUM BLD-SCNC: 2.4 MMOL/L (ref 3.5–5.1)
POTASSIUM BLD-SCNC: 2.5 MMOL/L (ref 3.5–5.1)
POTASSIUM BLD-SCNC: 2.5 MMOL/L (ref 3.5–5.1)
POTASSIUM BLD-SCNC: 2.7 MMOL/L (ref 3.5–5.1)
POTASSIUM BLD-SCNC: 2.7 MMOL/L (ref 3.5–5.1)
POTASSIUM BLD-SCNC: 2.8 MMOL/L (ref 3.5–5.1)
POTASSIUM BLD-SCNC: 2.9 MMOL/L (ref 3.5–5.1)
POTASSIUM BLD-SCNC: 3 MMOL/L (ref 3.5–5.1)
POTASSIUM BLD-SCNC: 3.1 MMOL/L (ref 3.5–5.1)
POTASSIUM BLD-SCNC: 3.2 MMOL/L (ref 3.5–5.1)
POTASSIUM BLD-SCNC: 3.3 MMOL/L (ref 3.5–5.1)
POTASSIUM BLD-SCNC: 3.4 MMOL/L (ref 3.5–5.1)
POTASSIUM BLD-SCNC: 3.5 MMOL/L (ref 3.5–5.1)
POTASSIUM BLD-SCNC: 3.6 MMOL/L (ref 3.5–5.1)
POTASSIUM BLD-SCNC: 3.7 MMOL/L (ref 3.5–5.1)
POTASSIUM BLD-SCNC: 3.8 MMOL/L (ref 3.5–5.1)
POTASSIUM BLD-SCNC: 3.9 MMOL/L (ref 3.5–5.1)
POTASSIUM BLD-SCNC: 4 MMOL/L (ref 3.5–5.1)
POTASSIUM BLD-SCNC: 4.1 MMOL/L (ref 3.5–5.1)
POTASSIUM BLD-SCNC: 4.2 MMOL/L (ref 3.5–5.1)
POTASSIUM BLD-SCNC: 4.3 MMOL/L (ref 3.5–5.1)
POTASSIUM BLD-SCNC: 4.4 MMOL/L (ref 3.5–5.1)
POTASSIUM BLD-SCNC: 4.5 MMOL/L (ref 3.5–5.1)
POTASSIUM BLD-SCNC: 4.6 MMOL/L (ref 3.5–5.1)
POTASSIUM BLD-SCNC: 4.7 MMOL/L (ref 3.5–5.1)
POTASSIUM BLD-SCNC: 4.8 MMOL/L (ref 3.5–5.1)
POTASSIUM BLD-SCNC: 4.9 MMOL/L (ref 3.5–5.1)
POTASSIUM BLD-SCNC: 5 MMOL/L (ref 3.5–5.1)
POTASSIUM BLD-SCNC: 5 MMOL/L (ref 3.5–5.1)
POTASSIUM BLD-SCNC: 5.1 MMOL/L (ref 3.5–5.1)
POTASSIUM BLD-SCNC: 5.3 MMOL/L (ref 3.5–5.1)
POTASSIUM BLD-SCNC: 5.6 MMOL/L (ref 3.5–5.1)
POTASSIUM SERPL-SCNC: 2.5 MMOL/L (ref 3.5–5.1)
POTASSIUM SERPL-SCNC: 2.6 MMOL/L (ref 3.5–5.1)
POTASSIUM SERPL-SCNC: 2.9 MMOL/L (ref 3.5–5.1)
POTASSIUM SERPL-SCNC: 3.1 MMOL/L (ref 3.5–5.1)
POTASSIUM SERPL-SCNC: 3.1 MMOL/L (ref 3.5–5.1)
POTASSIUM SERPL-SCNC: 3.2 MMOL/L (ref 3.5–5.1)
POTASSIUM SERPL-SCNC: 3.3 MMOL/L (ref 3.5–5.1)
POTASSIUM SERPL-SCNC: 3.4 MMOL/L (ref 3.5–5.1)
POTASSIUM SERPL-SCNC: 3.5 MMOL/L (ref 3.5–5.1)
POTASSIUM SERPL-SCNC: 3.6 MMOL/L (ref 3.5–5.1)
POTASSIUM SERPL-SCNC: 3.7 MMOL/L (ref 3.5–5.1)
POTASSIUM SERPL-SCNC: 3.8 MMOL/L (ref 3.5–5.1)
POTASSIUM SERPL-SCNC: 3.9 MMOL/L (ref 3.5–5.1)
POTASSIUM SERPL-SCNC: 4 MMOL/L (ref 3.5–5.1)
POTASSIUM SERPL-SCNC: 4.1 MMOL/L (ref 3.5–5.1)
POTASSIUM SERPL-SCNC: 4.2 MMOL/L (ref 3.5–5.1)
POTASSIUM SERPL-SCNC: 4.3 MMOL/L (ref 3.5–5.1)
POTASSIUM SERPL-SCNC: 4.4 MMOL/L (ref 3.5–5.1)
POTASSIUM SERPL-SCNC: 4.5 MMOL/L (ref 3.5–5.1)
POTASSIUM SERPL-SCNC: 4.6 MMOL/L (ref 3.5–5.1)
POTASSIUM SERPL-SCNC: 4.7 MMOL/L (ref 3.5–5.1)
POTASSIUM SERPL-SCNC: 4.8 MMOL/L (ref 3.5–5.1)
POTASSIUM SERPL-SCNC: 4.9 MMOL/L (ref 3.5–5.1)
POTASSIUM SERPL-SCNC: 5 MMOL/L (ref 3.5–5.1)
POTASSIUM SERPL-SCNC: 5.1 MMOL/L (ref 3.5–5.1)
POTASSIUM SERPL-SCNC: 5.2 MMOL/L (ref 3.5–5.1)
POTASSIUM SERPL-SCNC: 5.3 MMOL/L (ref 3.5–5.1)
POTASSIUM SERPL-SCNC: 5.4 MMOL/L (ref 3.5–5.1)
POTASSIUM SERPL-SCNC: 5.5 MMOL/L (ref 3.5–5.1)
POTASSIUM SERPL-SCNC: 5.5 MMOL/L (ref 3.5–5.1)
POTASSIUM SERPL-SCNC: 5.6 MMOL/L (ref 3.5–5.1)
POTASSIUM SERPL-SCNC: 5.7 MMOL/L (ref 3.5–5.1)
POTASSIUM SERPL-SCNC: 5.8 MMOL/L (ref 3.5–5.1)
POTASSIUM SERPL-SCNC: 5.9 MMOL/L (ref 3.5–5.1)
POTASSIUM SERPL-SCNC: 6 MMOL/L (ref 3.5–5.1)
PREALB SERPL-MCNC: 20 MG/DL (ref 20–43)
PROCALCITONIN SERPL IA-MCNC: 0.27 NG/ML
PROMYELOCYTES NFR BLD MANUAL: 0.5 %
PROMYELOCYTES NFR BLD MANUAL: 0.7 %
PROMYELOCYTES NFR BLD MANUAL: 1 %
PROMYELOCYTES NFR BLD MANUAL: 2 %
PROT SERPL-MCNC: 4.5 G/DL (ref 6–8.4)
PROT SERPL-MCNC: 4.6 G/DL (ref 6–8.4)
PROT SERPL-MCNC: 4.8 G/DL (ref 6–8.4)
PROT SERPL-MCNC: 5 G/DL (ref 6–8.4)
PROT SERPL-MCNC: 5.1 G/DL (ref 6–8.4)
PROT SERPL-MCNC: 5.1 G/DL (ref 6–8.4)
PROT SERPL-MCNC: 5.3 G/DL (ref 6–8.4)
PROT SERPL-MCNC: 5.3 G/DL (ref 6–8.4)
PROT SERPL-MCNC: 5.4 G/DL (ref 6–8.4)
PROT SERPL-MCNC: 5.5 G/DL (ref 6–8.4)
PROT SERPL-MCNC: 5.6 G/DL (ref 6–8.4)
PROT SERPL-MCNC: 5.7 G/DL (ref 6–8.4)
PROT SERPL-MCNC: 5.8 G/DL (ref 6–8.4)
PROT SERPL-MCNC: 5.9 G/DL (ref 6–8.4)
PROT SERPL-MCNC: 6 G/DL (ref 6–8.4)
PROT SERPL-MCNC: 6.1 G/DL (ref 6–8.4)
PROT SERPL-MCNC: 6.2 G/DL (ref 6–8.4)
PROT SERPL-MCNC: 6.3 G/DL (ref 6–8.4)
PROT SERPL-MCNC: 6.4 G/DL (ref 6–8.4)
PROT SERPL-MCNC: 6.5 G/DL (ref 6–8.4)
PROT SERPL-MCNC: 6.6 G/DL (ref 6–8.4)
PROT SERPL-MCNC: 6.7 G/DL (ref 6–8.4)
PROT SERPL-MCNC: 6.8 G/DL (ref 6–8.4)
PROT SERPL-MCNC: 6.9 G/DL (ref 6–8.4)
PROT SERPL-MCNC: 7 G/DL (ref 6–8.4)
PROT SERPL-MCNC: 7.1 G/DL (ref 6–8.4)
PROT SERPL-MCNC: 7.2 G/DL (ref 6–8.4)
PROT SERPL-MCNC: 7.3 G/DL (ref 6–8.4)
PROT SERPL-MCNC: 7.4 G/DL (ref 6–8.4)
PROT UR QL STRIP: ABNORMAL
PROT UR QL STRIP: ABNORMAL
PROT UR QL STRIP: NEGATIVE
PROTHROMBIN TIME: 10 SEC (ref 9–12.5)
PROTHROMBIN TIME: 10.1 SEC (ref 9–12.5)
PROTHROMBIN TIME: 10.2 SEC (ref 9–12.5)
PROTHROMBIN TIME: 10.3 SEC (ref 9–12.5)
PROTHROMBIN TIME: 10.4 SEC (ref 9–12.5)
PROTHROMBIN TIME: 10.5 SEC (ref 9–12.5)
PROTHROMBIN TIME: 10.6 SEC (ref 9–12.5)
PROTHROMBIN TIME: 10.7 SEC (ref 9–12.5)
PROTHROMBIN TIME: 10.8 SEC (ref 9–12.5)
PROTHROMBIN TIME: 10.9 SEC (ref 9–12.5)
PROTHROMBIN TIME: 11 SEC (ref 9–12.5)
PROTHROMBIN TIME: 11.1 SEC (ref 9–12.5)
PROTHROMBIN TIME: 11.2 SEC (ref 9–12.5)
PROTHROMBIN TIME: 11.3 SEC (ref 9–12.5)
PROTHROMBIN TIME: 11.4 SEC (ref 9–12.5)
PROTHROMBIN TIME: 11.5 SEC (ref 9–12.5)
PROTHROMBIN TIME: 11.6 SEC (ref 9–12.5)
PROTHROMBIN TIME: 11.7 SEC (ref 9–12.5)
PROTHROMBIN TIME: 11.8 SEC (ref 9–12.5)
PROTHROMBIN TIME: 11.9 SEC (ref 9–12.5)
PROTHROMBIN TIME: 12 SEC (ref 9–12.5)
PROTHROMBIN TIME: 12.1 SEC (ref 9–12.5)
PROTHROMBIN TIME: 12.2 SEC (ref 9–12.5)
PROTHROMBIN TIME: 12.4 SEC (ref 9–12.5)
PROTHROMBIN TIME: 12.5 SEC (ref 9–12.5)
PROTHROMBIN TIME: 12.5 SEC (ref 9–12.5)
PROTHROMBIN TIME: 12.6 SEC (ref 9–12.5)
PROTHROMBIN TIME: 12.8 SEC (ref 9–12.5)
PROTHROMBIN TIME: 12.9 SEC (ref 9–12.5)
PROTHROMBIN TIME: 12.9 SEC (ref 9–12.5)
PROTHROMBIN TIME: 13.3 SEC (ref 9–12.5)
PROTHROMBIN TIME: 15.1 SEC (ref 9–12.5)
PROTHROMBIN TIME: 15.4 SEC (ref 9–12.5)
PROTHROMBIN TIME: 9.3 SEC (ref 9–12.5)
PROTHROMBIN TIME: 9.4 SEC (ref 9–12.5)
PROTHROMBIN TIME: 9.6 SEC (ref 9–12.5)
PROTHROMBIN TIME: 9.7 SEC (ref 9–12.5)
PROTHROMBIN TIME: 9.9 SEC (ref 9–12.5)
PS: 25
PS: 30
RA MAJOR: 4.59 CM
RA MAJOR: 4.65 CM
RA MAJOR: 5.35 CM
RA PRESSURE: 8 MMHG
RA WIDTH: 3.52 CM
RA WIDTH: 3.84 CM
RA WIDTH: 4.55 CM
RBC # BLD AUTO: 2.18 M/UL (ref 4.6–6.2)
RBC # BLD AUTO: 2.39 M/UL (ref 4.6–6.2)
RBC # BLD AUTO: 2.46 M/UL (ref 4.6–6.2)
RBC # BLD AUTO: 2.5 M/UL (ref 4.6–6.2)
RBC # BLD AUTO: 2.51 M/UL (ref 4.6–6.2)
RBC # BLD AUTO: 2.52 M/UL (ref 4.6–6.2)
RBC # BLD AUTO: 2.53 M/UL (ref 4.6–6.2)
RBC # BLD AUTO: 2.53 M/UL (ref 4.6–6.2)
RBC # BLD AUTO: 2.55 M/UL (ref 4.6–6.2)
RBC # BLD AUTO: 2.61 M/UL (ref 4.6–6.2)
RBC # BLD AUTO: 2.67 M/UL (ref 4.6–6.2)
RBC # BLD AUTO: 2.68 M/UL (ref 4.6–6.2)
RBC # BLD AUTO: 2.69 M/UL (ref 4.6–6.2)
RBC # BLD AUTO: 2.71 M/UL (ref 4.6–6.2)
RBC # BLD AUTO: 2.72 M/UL (ref 4.6–6.2)
RBC # BLD AUTO: 2.73 M/UL (ref 4.6–6.2)
RBC # BLD AUTO: 2.74 M/UL (ref 4.6–6.2)
RBC # BLD AUTO: 2.76 M/UL (ref 4.6–6.2)
RBC # BLD AUTO: 2.77 M/UL (ref 4.6–6.2)
RBC # BLD AUTO: 2.77 M/UL (ref 4.6–6.2)
RBC # BLD AUTO: 2.79 M/UL (ref 4.6–6.2)
RBC # BLD AUTO: 2.8 M/UL (ref 4.6–6.2)
RBC # BLD AUTO: 2.81 M/UL (ref 4.6–6.2)
RBC # BLD AUTO: 2.83 M/UL (ref 4.6–6.2)
RBC # BLD AUTO: 2.85 M/UL (ref 4.6–6.2)
RBC # BLD AUTO: 2.86 M/UL (ref 4.6–6.2)
RBC # BLD AUTO: 2.89 M/UL (ref 4.6–6.2)
RBC # BLD AUTO: 2.89 M/UL (ref 4.6–6.2)
RBC # BLD AUTO: 2.9 M/UL (ref 4.6–6.2)
RBC # BLD AUTO: 2.91 M/UL (ref 4.6–6.2)
RBC # BLD AUTO: 2.91 M/UL (ref 4.6–6.2)
RBC # BLD AUTO: 2.93 M/UL (ref 4.6–6.2)
RBC # BLD AUTO: 2.95 M/UL (ref 4.6–6.2)
RBC # BLD AUTO: 2.96 M/UL (ref 4.6–6.2)
RBC # BLD AUTO: 2.97 M/UL (ref 4.6–6.2)
RBC # BLD AUTO: 2.98 M/UL (ref 4.6–6.2)
RBC # BLD AUTO: 3 M/UL (ref 4.6–6.2)
RBC # BLD AUTO: 3 M/UL (ref 4.6–6.2)
RBC # BLD AUTO: 3.01 M/UL (ref 4.6–6.2)
RBC # BLD AUTO: 3.01 M/UL (ref 4.6–6.2)
RBC # BLD AUTO: 3.02 M/UL (ref 4.6–6.2)
RBC # BLD AUTO: 3.03 M/UL (ref 4.6–6.2)
RBC # BLD AUTO: 3.04 M/UL (ref 4.6–6.2)
RBC # BLD AUTO: 3.04 M/UL (ref 4.6–6.2)
RBC # BLD AUTO: 3.06 M/UL (ref 4.6–6.2)
RBC # BLD AUTO: 3.08 M/UL (ref 4.6–6.2)
RBC # BLD AUTO: 3.09 M/UL (ref 4.6–6.2)
RBC # BLD AUTO: 3.1 M/UL (ref 4.6–6.2)
RBC # BLD AUTO: 3.1 M/UL (ref 4.6–6.2)
RBC # BLD AUTO: 3.11 M/UL (ref 4.6–6.2)
RBC # BLD AUTO: 3.12 M/UL (ref 4.6–6.2)
RBC # BLD AUTO: 3.12 M/UL (ref 4.6–6.2)
RBC # BLD AUTO: 3.13 M/UL (ref 4.6–6.2)
RBC # BLD AUTO: 3.14 M/UL (ref 4.6–6.2)
RBC # BLD AUTO: 3.15 M/UL (ref 4.6–6.2)
RBC # BLD AUTO: 3.16 M/UL (ref 4.6–6.2)
RBC # BLD AUTO: 3.17 M/UL (ref 4.6–6.2)
RBC # BLD AUTO: 3.18 M/UL (ref 4.6–6.2)
RBC # BLD AUTO: 3.19 M/UL (ref 4.6–6.2)
RBC # BLD AUTO: 3.2 M/UL (ref 4.6–6.2)
RBC # BLD AUTO: 3.21 M/UL (ref 4.6–6.2)
RBC # BLD AUTO: 3.22 M/UL (ref 4.6–6.2)
RBC # BLD AUTO: 3.23 M/UL (ref 4.6–6.2)
RBC # BLD AUTO: 3.24 M/UL (ref 4.6–6.2)
RBC # BLD AUTO: 3.24 M/UL (ref 4.6–6.2)
RBC # BLD AUTO: 3.25 M/UL (ref 4.6–6.2)
RBC # BLD AUTO: 3.26 M/UL (ref 4.6–6.2)
RBC # BLD AUTO: 3.27 M/UL (ref 4.6–6.2)
RBC # BLD AUTO: 3.28 M/UL (ref 4.6–6.2)
RBC # BLD AUTO: 3.29 M/UL (ref 4.6–6.2)
RBC # BLD AUTO: 3.3 M/UL (ref 4.6–6.2)
RBC # BLD AUTO: 3.31 M/UL (ref 4.6–6.2)
RBC # BLD AUTO: 3.32 M/UL (ref 4.6–6.2)
RBC # BLD AUTO: 3.33 M/UL (ref 4.6–6.2)
RBC # BLD AUTO: 3.34 M/UL (ref 4.6–6.2)
RBC # BLD AUTO: 3.35 M/UL (ref 4.6–6.2)
RBC # BLD AUTO: 3.36 M/UL (ref 4.6–6.2)
RBC # BLD AUTO: 3.37 M/UL (ref 4.6–6.2)
RBC # BLD AUTO: 3.38 M/UL (ref 4.6–6.2)
RBC # BLD AUTO: 3.38 M/UL (ref 4.6–6.2)
RBC # BLD AUTO: 3.39 M/UL (ref 4.6–6.2)
RBC # BLD AUTO: 3.4 M/UL (ref 4.6–6.2)
RBC # BLD AUTO: 3.41 M/UL (ref 4.6–6.2)
RBC # BLD AUTO: 3.42 M/UL (ref 4.6–6.2)
RBC # BLD AUTO: 3.43 M/UL (ref 4.6–6.2)
RBC # BLD AUTO: 3.43 M/UL (ref 4.6–6.2)
RBC # BLD AUTO: 3.44 M/UL (ref 4.6–6.2)
RBC # BLD AUTO: 3.45 M/UL (ref 4.6–6.2)
RBC # BLD AUTO: 3.45 M/UL (ref 4.6–6.2)
RBC # BLD AUTO: 3.46 M/UL (ref 4.6–6.2)
RBC # BLD AUTO: 3.47 M/UL (ref 4.6–6.2)
RBC # BLD AUTO: 3.47 M/UL (ref 4.6–6.2)
RBC # BLD AUTO: 3.48 M/UL (ref 4.6–6.2)
RBC # BLD AUTO: 3.48 M/UL (ref 4.6–6.2)
RBC # BLD AUTO: 3.49 M/UL (ref 4.6–6.2)
RBC # BLD AUTO: 3.49 M/UL (ref 4.6–6.2)
RBC # BLD AUTO: 3.5 M/UL (ref 4.6–6.2)
RBC # BLD AUTO: 3.51 M/UL (ref 4.6–6.2)
RBC # BLD AUTO: 3.52 M/UL (ref 4.6–6.2)
RBC # BLD AUTO: 3.53 M/UL (ref 4.6–6.2)
RBC # BLD AUTO: 3.54 M/UL (ref 4.6–6.2)
RBC # BLD AUTO: 3.55 M/UL (ref 4.6–6.2)
RBC # BLD AUTO: 3.56 M/UL (ref 4.6–6.2)
RBC # BLD AUTO: 3.57 M/UL (ref 4.6–6.2)
RBC # BLD AUTO: 3.57 M/UL (ref 4.6–6.2)
RBC # BLD AUTO: 3.58 M/UL (ref 4.6–6.2)
RBC # BLD AUTO: 3.59 M/UL (ref 4.6–6.2)
RBC # BLD AUTO: 3.59 M/UL (ref 4.6–6.2)
RBC # BLD AUTO: 3.6 M/UL (ref 4.6–6.2)
RBC # BLD AUTO: 3.61 M/UL (ref 4.6–6.2)
RBC # BLD AUTO: 3.62 M/UL (ref 4.6–6.2)
RBC # BLD AUTO: 3.63 M/UL (ref 4.6–6.2)
RBC # BLD AUTO: 3.65 M/UL (ref 4.6–6.2)
RBC # BLD AUTO: 3.66 M/UL (ref 4.6–6.2)
RBC # BLD AUTO: 3.66 M/UL (ref 4.6–6.2)
RBC # BLD AUTO: 3.67 M/UL (ref 4.6–6.2)
RBC # BLD AUTO: 3.67 M/UL (ref 4.6–6.2)
RBC # BLD AUTO: 3.68 M/UL (ref 4.6–6.2)
RBC # BLD AUTO: 3.68 M/UL (ref 4.6–6.2)
RBC # BLD AUTO: 3.69 M/UL (ref 4.6–6.2)
RBC # BLD AUTO: 3.72 M/UL (ref 4.6–6.2)
RBC # BLD AUTO: 3.72 M/UL (ref 4.6–6.2)
RBC # BLD AUTO: 3.73 M/UL (ref 4.6–6.2)
RBC # BLD AUTO: 3.74 M/UL (ref 4.6–6.2)
RBC # BLD AUTO: 3.75 M/UL (ref 4.6–6.2)
RBC # BLD AUTO: 3.76 M/UL (ref 4.6–6.2)
RBC # BLD AUTO: 3.76 M/UL (ref 4.6–6.2)
RBC # BLD AUTO: 3.78 M/UL (ref 4.6–6.2)
RBC # BLD AUTO: 3.8 M/UL (ref 4.6–6.2)
RBC # BLD AUTO: 3.8 M/UL (ref 4.6–6.2)
RBC # BLD AUTO: 3.81 M/UL (ref 4.6–6.2)
RBC # BLD AUTO: 3.83 M/UL (ref 4.6–6.2)
RBC # BLD AUTO: 3.84 M/UL (ref 4.6–6.2)
RBC # BLD AUTO: 3.85 M/UL (ref 4.6–6.2)
RBC # BLD AUTO: 3.85 M/UL (ref 4.6–6.2)
RBC # BLD AUTO: 3.86 M/UL (ref 4.6–6.2)
RBC # BLD AUTO: 3.86 M/UL (ref 4.6–6.2)
RBC # BLD AUTO: 3.87 M/UL (ref 4.6–6.2)
RBC # BLD AUTO: 3.87 M/UL (ref 4.6–6.2)
RBC # BLD AUTO: 3.88 M/UL (ref 4.6–6.2)
RBC # BLD AUTO: 3.89 M/UL (ref 4.6–6.2)
RBC # BLD AUTO: 3.9 M/UL (ref 4.6–6.2)
RBC # BLD AUTO: 3.91 M/UL (ref 4.6–6.2)
RBC # BLD AUTO: 3.96 M/UL (ref 4.6–6.2)
RBC # BLD AUTO: 3.97 M/UL (ref 4.6–6.2)
RBC # BLD AUTO: 3.98 M/UL (ref 4.6–6.2)
RBC # BLD AUTO: 3.98 M/UL (ref 4.6–6.2)
RBC # BLD AUTO: 4.01 M/UL (ref 4.6–6.2)
RBC # BLD AUTO: 4.03 M/UL (ref 4.6–6.2)
RBC # BLD AUTO: 4.06 M/UL (ref 4.6–6.2)
RBC # BLD AUTO: 4.11 M/UL (ref 4.6–6.2)
RBC # BLD AUTO: 4.19 M/UL (ref 4.6–6.2)
RBC # BLD AUTO: 4.26 M/UL (ref 4.6–6.2)
RBC # BLD AUTO: 4.83 M/UL (ref 4.6–6.2)
RBC #/AREA URNS AUTO: 1 /HPF (ref 0–4)
RBC #/AREA URNS AUTO: 13 /HPF (ref 0–4)
RBC #/AREA URNS AUTO: 15 /HPF (ref 0–4)
RBC #/AREA URNS AUTO: 25 /HPF (ref 0–4)
RBC #/AREA URNS AUTO: 89 /HPF (ref 0–4)
RIGHT VENTRICULAR END-DIASTOLIC DIMENSION: 2.64 CM
RIGHT VENTRICULAR END-DIASTOLIC DIMENSION: 3.4 CM
RIGHT VENTRICULAR END-DIASTOLIC DIMENSION: 4.57 CM
SAMPLE: ABNORMAL
SAMPLE: NORMAL
SARS-COV-2 RDRP RESP QL NAA+PROBE: POSITIVE
SARS-COV-2 RNA RESP QL NAA+PROBE: DETECTED
SARS-COV-2 RNA RESP QL NAA+PROBE: DETECTED
SARS-COV-2 RNA RESP QL NAA+PROBE: NOT DETECTED
SCHISTOCYTES BLD QL SMEAR: ABNORMAL
SINUS: 3.18 CM
SINUS: 3.68 CM
SITE: ABNORMAL
SITE: NORMAL
SMUDGE CELLS BLD QL SMEAR: PRESENT
SODIUM BLD-SCNC: 128 MMOL/L (ref 136–145)
SODIUM BLD-SCNC: 129 MMOL/L (ref 136–145)
SODIUM BLD-SCNC: 130 MMOL/L (ref 136–145)
SODIUM BLD-SCNC: 131 MMOL/L (ref 136–145)
SODIUM BLD-SCNC: 132 MMOL/L (ref 136–145)
SODIUM BLD-SCNC: 133 MMOL/L (ref 136–145)
SODIUM BLD-SCNC: 134 MMOL/L (ref 136–145)
SODIUM BLD-SCNC: 135 MMOL/L (ref 136–145)
SODIUM BLD-SCNC: 136 MMOL/L (ref 136–145)
SODIUM BLD-SCNC: 137 MMOL/L (ref 136–145)
SODIUM BLD-SCNC: 138 MMOL/L (ref 136–145)
SODIUM BLD-SCNC: 139 MMOL/L (ref 136–145)
SODIUM BLD-SCNC: 140 MMOL/L (ref 136–145)
SODIUM BLD-SCNC: 141 MMOL/L (ref 136–145)
SODIUM BLD-SCNC: 142 MMOL/L (ref 136–145)
SODIUM BLD-SCNC: 143 MMOL/L (ref 136–145)
SODIUM BLD-SCNC: 144 MMOL/L (ref 136–145)
SODIUM BLD-SCNC: 145 MMOL/L (ref 136–145)
SODIUM BLD-SCNC: 146 MMOL/L (ref 136–145)
SODIUM BLD-SCNC: 147 MMOL/L (ref 136–145)
SODIUM BLD-SCNC: 148 MMOL/L (ref 136–145)
SODIUM BLD-SCNC: 149 MMOL/L (ref 136–145)
SODIUM BLD-SCNC: 150 MMOL/L (ref 136–145)
SODIUM BLD-SCNC: 151 MMOL/L (ref 136–145)
SODIUM BLD-SCNC: 152 MMOL/L (ref 136–145)
SODIUM BLD-SCNC: 153 MMOL/L (ref 136–145)
SODIUM BLD-SCNC: 153 MMOL/L (ref 136–145)
SODIUM BLD-SCNC: 154 MMOL/L (ref 136–145)
SODIUM SERPL-SCNC: 120 MMOL/L (ref 136–145)
SODIUM SERPL-SCNC: 120 MMOL/L (ref 136–145)
SODIUM SERPL-SCNC: 123 MMOL/L (ref 136–145)
SODIUM SERPL-SCNC: 129 MMOL/L (ref 136–145)
SODIUM SERPL-SCNC: 130 MMOL/L (ref 136–145)
SODIUM SERPL-SCNC: 131 MMOL/L (ref 136–145)
SODIUM SERPL-SCNC: 131 MMOL/L (ref 136–145)
SODIUM SERPL-SCNC: 132 MMOL/L (ref 136–145)
SODIUM SERPL-SCNC: 132 MMOL/L (ref 136–145)
SODIUM SERPL-SCNC: 133 MMOL/L (ref 136–145)
SODIUM SERPL-SCNC: 134 MMOL/L (ref 136–145)
SODIUM SERPL-SCNC: 135 MMOL/L (ref 136–145)
SODIUM SERPL-SCNC: 136 MMOL/L (ref 136–145)
SODIUM SERPL-SCNC: 137 MMOL/L (ref 136–145)
SODIUM SERPL-SCNC: 138 MMOL/L (ref 136–145)
SODIUM SERPL-SCNC: 139 MMOL/L (ref 136–145)
SODIUM SERPL-SCNC: 140 MMOL/L (ref 136–145)
SODIUM SERPL-SCNC: 141 MMOL/L (ref 136–145)
SODIUM SERPL-SCNC: 142 MMOL/L (ref 136–145)
SODIUM SERPL-SCNC: 143 MMOL/L (ref 136–145)
SODIUM SERPL-SCNC: 144 MMOL/L (ref 136–145)
SODIUM SERPL-SCNC: 145 MMOL/L (ref 136–145)
SODIUM SERPL-SCNC: 146 MMOL/L (ref 136–145)
SODIUM SERPL-SCNC: 147 MMOL/L (ref 136–145)
SODIUM SERPL-SCNC: 148 MMOL/L (ref 136–145)
SODIUM SERPL-SCNC: 149 MMOL/L (ref 136–145)
SODIUM SERPL-SCNC: 150 MMOL/L (ref 136–145)
SODIUM SERPL-SCNC: 151 MMOL/L (ref 136–145)
SODIUM SERPL-SCNC: 152 MMOL/L (ref 136–145)
SODIUM SERPL-SCNC: 153 MMOL/L (ref 136–145)
SODIUM SERPL-SCNC: 154 MMOL/L (ref 136–145)
SODIUM SERPL-SCNC: 155 MMOL/L (ref 136–145)
SP GR UR STRIP: 1.01 (ref 1–1.03)
SP GR UR STRIP: 1.03 (ref 1–1.03)
SP02: 100
SP02: 100
SP02: 89
SP02: 89
SP02: 90
SP02: 90
SP02: 92
SP02: 93
SP02: 95
SP02: 95
SP02: 96
SP02: 97
SP02: 97
SP02: 98
SP02: 99
SPHEROCYTES BLD QL SMEAR: ABNORMAL
SPONT RATE: 35
SQUAMOUS #/AREA URNS AUTO: 0 /HPF
SQUAMOUS #/AREA URNS AUTO: 1 /HPF
STJ: 2.81 CM
STJ: 2.91 CM
T4 FREE SERPL-MCNC: 1.08 NG/DL (ref 0.71–1.51)
T4 SERPL-MCNC: 5.9 UG/DL (ref 4.5–11.5)
TARGETS BLD QL SMEAR: ABNORMAL
TDI LATERAL: 0.12 M/S
TDI SEPTAL: 0.1 M/S
TDI: 0.11 M/S
TOXIC GRANULES BLD QL SMEAR: PRESENT
TR MAX PG: 37 MMHG
TR MAX PG: 38 MMHG
TRANS ERYTHROCYTES VOL PATIENT: NORMAL ML
TRANS PLATPHERESIS VOL PATIENT: NORMAL ML
TRICUSPID ANNULAR PLANE SYSTOLIC EXCURSION: 2.27 CM
TRIGL SERPL-MCNC: 102 MG/DL (ref 30–150)
TRIGL SERPL-MCNC: 138 MG/DL (ref 30–150)
TRIGL SERPL-MCNC: 144 MG/DL (ref 30–150)
TRIGL SERPL-MCNC: 150 MG/DL (ref 30–150)
TRIGL SERPL-MCNC: 150 MG/DL (ref 30–150)
TRIGL SERPL-MCNC: 180 MG/DL (ref 30–150)
TRIGL SERPL-MCNC: 191 MG/DL (ref 30–150)
TRIGL SERPL-MCNC: 191 MG/DL (ref 30–150)
TRIGL SERPL-MCNC: 200 MG/DL (ref 30–150)
TRIGL SERPL-MCNC: 212 MG/DL (ref 30–150)
TRIGL SERPL-MCNC: 212 MG/DL (ref 30–150)
TRIGL SERPL-MCNC: 216 MG/DL (ref 30–150)
TRIGL SERPL-MCNC: 226 MG/DL (ref 30–150)
TRIGL SERPL-MCNC: 226 MG/DL (ref 30–150)
TRIGL SERPL-MCNC: 228 MG/DL (ref 30–150)
TRIGL SERPL-MCNC: 230 MG/DL (ref 30–150)
TRIGL SERPL-MCNC: 230 MG/DL (ref 30–150)
TRIGL SERPL-MCNC: 238 MG/DL (ref 30–150)
TRIGL SERPL-MCNC: 247 MG/DL (ref 30–150)
TRIGL SERPL-MCNC: 253 MG/DL (ref 30–150)
TRIGL SERPL-MCNC: 296 MG/DL (ref 30–150)
TRIGL SERPL-MCNC: 300 MG/DL (ref 30–150)
TRIGL SERPL-MCNC: 300 MG/DL (ref 30–150)
TRIGL SERPL-MCNC: 331 MG/DL (ref 30–150)
TRIGL SERPL-MCNC: 390 MG/DL (ref 30–150)
TROPONIN I SERPL DL<=0.01 NG/ML-MCNC: 0.01 NG/ML (ref 0–0.03)
TROPONIN I SERPL DL<=0.01 NG/ML-MCNC: 0.01 NG/ML (ref 0–0.03)
TROPONIN I SERPL DL<=0.01 NG/ML-MCNC: 0.02 NG/ML (ref 0–0.03)
TROPONIN I SERPL DL<=0.01 NG/ML-MCNC: 0.03 NG/ML (ref 0–0.03)
TROPONIN I SERPL DL<=0.01 NG/ML-MCNC: 0.06 NG/ML (ref 0–0.03)
TROPONIN I SERPL DL<=0.01 NG/ML-MCNC: 1.16 NG/ML (ref 0–0.03)
TROPONIN I SERPL DL<=0.01 NG/ML-MCNC: <0.006 NG/ML (ref 0–0.03)
TROPONIN I SERPL DL<=0.01 NG/ML-MCNC: <0.006 NG/ML (ref 0–0.03)
TSH SERPL DL<=0.005 MIU/L-ACNC: 0.15 UIU/ML (ref 0.4–4)
TV REST PULMONARY ARTERY PRESSURE: 46 MMHG
UNIT NUMBER: NORMAL
URN SPEC COLLECT METH UR: ABNORMAL
VANCOMYCIN SERPL-MCNC: 13.1 UG/ML
VANCOMYCIN SERPL-MCNC: 14.5 UG/ML
VANCOMYCIN SERPL-MCNC: 16.1 UG/ML
VANCOMYCIN SERPL-MCNC: 17.1 UG/ML
VANCOMYCIN SERPL-MCNC: 17.6 UG/ML
VANCOMYCIN SERPL-MCNC: 18.1 UG/ML
VANCOMYCIN SERPL-MCNC: 18.3 UG/ML
VANCOMYCIN SERPL-MCNC: 19.5 UG/ML
VANCOMYCIN SERPL-MCNC: 20.8 UG/ML
VANCOMYCIN SERPL-MCNC: 25.5 UG/ML
VANCOMYCIN TROUGH SERPL-MCNC: 10 UG/ML (ref 10–22)
VANCOMYCIN TROUGH SERPL-MCNC: 10.4 UG/ML (ref 10–22)
VANCOMYCIN TROUGH SERPL-MCNC: 12.4 UG/ML (ref 10–22)
VANCOMYCIN TROUGH SERPL-MCNC: 12.9 UG/ML (ref 10–22)
VANCOMYCIN TROUGH SERPL-MCNC: 13 UG/ML (ref 10–22)
VANCOMYCIN TROUGH SERPL-MCNC: 13 UG/ML (ref 10–22)
VANCOMYCIN TROUGH SERPL-MCNC: 13.5 UG/ML (ref 10–22)
VANCOMYCIN TROUGH SERPL-MCNC: 13.8 UG/ML (ref 10–22)
VANCOMYCIN TROUGH SERPL-MCNC: 14.3 UG/ML (ref 10–22)
VANCOMYCIN TROUGH SERPL-MCNC: 14.4 UG/ML (ref 10–22)
VANCOMYCIN TROUGH SERPL-MCNC: 14.9 UG/ML (ref 10–22)
VANCOMYCIN TROUGH SERPL-MCNC: 17.9 UG/ML (ref 10–22)
VANCOMYCIN TROUGH SERPL-MCNC: 18.7 UG/ML (ref 10–22)
VANCOMYCIN TROUGH SERPL-MCNC: 18.9 UG/ML (ref 10–22)
VANCOMYCIN TROUGH SERPL-MCNC: 20.5 UG/ML (ref 10–22)
VANCOMYCIN TROUGH SERPL-MCNC: 20.9 UG/ML (ref 10–22)
VANCOMYCIN TROUGH SERPL-MCNC: 30.9 UG/ML (ref 10–22)
VANCOMYCIN TROUGH SERPL-MCNC: 9.2 UG/ML (ref 10–22)
VANCOMYCIN TROUGH SERPL-MCNC: 9.7 UG/ML (ref 10–22)
VT: 250
VT: 250
VT: 260
VT: 280
VT: 300
VT: 36
VT: 360
VT: 373
VT: 409
WBC # BLD AUTO: 10.02 K/UL (ref 3.9–12.7)
WBC # BLD AUTO: 10.11 K/UL (ref 3.9–12.7)
WBC # BLD AUTO: 10.16 K/UL (ref 3.9–12.7)
WBC # BLD AUTO: 10.2 K/UL (ref 3.9–12.7)
WBC # BLD AUTO: 10.25 K/UL (ref 3.9–12.7)
WBC # BLD AUTO: 10.33 K/UL (ref 3.9–12.7)
WBC # BLD AUTO: 10.34 K/UL (ref 3.9–12.7)
WBC # BLD AUTO: 10.38 K/UL (ref 3.9–12.7)
WBC # BLD AUTO: 10.41 K/UL (ref 3.9–12.7)
WBC # BLD AUTO: 10.45 K/UL (ref 3.9–12.7)
WBC # BLD AUTO: 10.49 K/UL (ref 3.9–12.7)
WBC # BLD AUTO: 10.74 K/UL (ref 3.9–12.7)
WBC # BLD AUTO: 10.76 K/UL (ref 3.9–12.7)
WBC # BLD AUTO: 10.79 K/UL (ref 3.9–12.7)
WBC # BLD AUTO: 10.8 K/UL (ref 3.9–12.7)
WBC # BLD AUTO: 10.91 K/UL (ref 3.9–12.7)
WBC # BLD AUTO: 10.93 K/UL (ref 3.9–12.7)
WBC # BLD AUTO: 11.02 K/UL (ref 3.9–12.7)
WBC # BLD AUTO: 11.2 K/UL (ref 3.9–12.7)
WBC # BLD AUTO: 11.25 K/UL (ref 3.9–12.7)
WBC # BLD AUTO: 11.28 K/UL (ref 3.9–12.7)
WBC # BLD AUTO: 11.29 K/UL (ref 3.9–12.7)
WBC # BLD AUTO: 11.37 K/UL (ref 3.9–12.7)
WBC # BLD AUTO: 11.39 K/UL (ref 3.9–12.7)
WBC # BLD AUTO: 11.56 K/UL (ref 3.9–12.7)
WBC # BLD AUTO: 11.57 K/UL (ref 3.9–12.7)
WBC # BLD AUTO: 11.64 K/UL (ref 3.9–12.7)
WBC # BLD AUTO: 11.69 K/UL (ref 3.9–12.7)
WBC # BLD AUTO: 11.69 K/UL (ref 3.9–12.7)
WBC # BLD AUTO: 11.74 K/UL (ref 3.9–12.7)
WBC # BLD AUTO: 11.81 K/UL (ref 3.9–12.7)
WBC # BLD AUTO: 11.87 K/UL (ref 3.9–12.7)
WBC # BLD AUTO: 11.89 K/UL (ref 3.9–12.7)
WBC # BLD AUTO: 11.91 K/UL (ref 3.9–12.7)
WBC # BLD AUTO: 11.91 K/UL (ref 3.9–12.7)
WBC # BLD AUTO: 11.95 K/UL (ref 3.9–12.7)
WBC # BLD AUTO: 11.95 K/UL (ref 3.9–12.7)
WBC # BLD AUTO: 11.97 K/UL (ref 3.9–12.7)
WBC # BLD AUTO: 12.02 K/UL (ref 3.9–12.7)
WBC # BLD AUTO: 12.03 K/UL (ref 3.9–12.7)
WBC # BLD AUTO: 12.05 K/UL (ref 3.9–12.7)
WBC # BLD AUTO: 12.1 K/UL (ref 3.9–12.7)
WBC # BLD AUTO: 12.11 K/UL (ref 3.9–12.7)
WBC # BLD AUTO: 12.17 K/UL (ref 3.9–12.7)
WBC # BLD AUTO: 12.21 K/UL (ref 3.9–12.7)
WBC # BLD AUTO: 12.23 K/UL (ref 3.9–12.7)
WBC # BLD AUTO: 12.25 K/UL (ref 3.9–12.7)
WBC # BLD AUTO: 12.25 K/UL (ref 3.9–12.7)
WBC # BLD AUTO: 12.27 K/UL (ref 3.9–12.7)
WBC # BLD AUTO: 12.28 K/UL (ref 3.9–12.7)
WBC # BLD AUTO: 12.4 K/UL (ref 3.9–12.7)
WBC # BLD AUTO: 12.42 K/UL (ref 3.9–12.7)
WBC # BLD AUTO: 12.43 K/UL (ref 3.9–12.7)
WBC # BLD AUTO: 12.46 K/UL (ref 3.9–12.7)
WBC # BLD AUTO: 12.47 K/UL (ref 3.9–12.7)
WBC # BLD AUTO: 12.48 K/UL (ref 3.9–12.7)
WBC # BLD AUTO: 12.5 K/UL (ref 3.9–12.7)
WBC # BLD AUTO: 12.53 K/UL (ref 3.9–12.7)
WBC # BLD AUTO: 12.56 K/UL (ref 3.9–12.7)
WBC # BLD AUTO: 12.59 K/UL (ref 3.9–12.7)
WBC # BLD AUTO: 12.6 K/UL (ref 3.9–12.7)
WBC # BLD AUTO: 12.61 K/UL (ref 3.9–12.7)
WBC # BLD AUTO: 12.67 K/UL (ref 3.9–12.7)
WBC # BLD AUTO: 12.69 K/UL (ref 3.9–12.7)
WBC # BLD AUTO: 12.74 K/UL (ref 3.9–12.7)
WBC # BLD AUTO: 12.75 K/UL (ref 3.9–12.7)
WBC # BLD AUTO: 12.76 K/UL (ref 3.9–12.7)
WBC # BLD AUTO: 12.78 K/UL (ref 3.9–12.7)
WBC # BLD AUTO: 12.78 K/UL (ref 3.9–12.7)
WBC # BLD AUTO: 12.79 K/UL (ref 3.9–12.7)
WBC # BLD AUTO: 12.8 K/UL (ref 3.9–12.7)
WBC # BLD AUTO: 12.81 K/UL (ref 3.9–12.7)
WBC # BLD AUTO: 12.82 K/UL (ref 3.9–12.7)
WBC # BLD AUTO: 12.83 K/UL (ref 3.9–12.7)
WBC # BLD AUTO: 12.85 K/UL (ref 3.9–12.7)
WBC # BLD AUTO: 12.85 K/UL (ref 3.9–12.7)
WBC # BLD AUTO: 12.89 K/UL (ref 3.9–12.7)
WBC # BLD AUTO: 12.95 K/UL (ref 3.9–12.7)
WBC # BLD AUTO: 12.96 K/UL (ref 3.9–12.7)
WBC # BLD AUTO: 12.97 K/UL (ref 3.9–12.7)
WBC # BLD AUTO: 13 K/UL (ref 3.9–12.7)
WBC # BLD AUTO: 13.01 K/UL (ref 3.9–12.7)
WBC # BLD AUTO: 13.06 K/UL (ref 3.9–12.7)
WBC # BLD AUTO: 13.06 K/UL (ref 3.9–12.7)
WBC # BLD AUTO: 13.1 K/UL (ref 3.9–12.7)
WBC # BLD AUTO: 13.11 K/UL (ref 3.9–12.7)
WBC # BLD AUTO: 13.12 K/UL (ref 3.9–12.7)
WBC # BLD AUTO: 13.17 K/UL (ref 3.9–12.7)
WBC # BLD AUTO: 13.22 K/UL (ref 3.9–12.7)
WBC # BLD AUTO: 13.24 K/UL (ref 3.9–12.7)
WBC # BLD AUTO: 13.28 K/UL (ref 3.9–12.7)
WBC # BLD AUTO: 13.28 K/UL (ref 3.9–12.7)
WBC # BLD AUTO: 13.3 K/UL (ref 3.9–12.7)
WBC # BLD AUTO: 13.3 K/UL (ref 3.9–12.7)
WBC # BLD AUTO: 13.32 K/UL (ref 3.9–12.7)
WBC # BLD AUTO: 13.33 K/UL (ref 3.9–12.7)
WBC # BLD AUTO: 13.36 K/UL (ref 3.9–12.7)
WBC # BLD AUTO: 13.42 K/UL (ref 3.9–12.7)
WBC # BLD AUTO: 13.43 K/UL (ref 3.9–12.7)
WBC # BLD AUTO: 13.44 K/UL (ref 3.9–12.7)
WBC # BLD AUTO: 13.47 K/UL (ref 3.9–12.7)
WBC # BLD AUTO: 13.49 K/UL (ref 3.9–12.7)
WBC # BLD AUTO: 13.5 K/UL (ref 3.9–12.7)
WBC # BLD AUTO: 13.5 K/UL (ref 3.9–12.7)
WBC # BLD AUTO: 13.53 K/UL (ref 3.9–12.7)
WBC # BLD AUTO: 13.57 K/UL (ref 3.9–12.7)
WBC # BLD AUTO: 13.58 K/UL (ref 3.9–12.7)
WBC # BLD AUTO: 13.59 K/UL (ref 3.9–12.7)
WBC # BLD AUTO: 13.6 K/UL (ref 3.9–12.7)
WBC # BLD AUTO: 13.62 K/UL (ref 3.9–12.7)
WBC # BLD AUTO: 13.63 K/UL (ref 3.9–12.7)
WBC # BLD AUTO: 13.64 K/UL (ref 3.9–12.7)
WBC # BLD AUTO: 13.65 K/UL (ref 3.9–12.7)
WBC # BLD AUTO: 13.66 K/UL (ref 3.9–12.7)
WBC # BLD AUTO: 13.72 K/UL (ref 3.9–12.7)
WBC # BLD AUTO: 13.73 K/UL (ref 3.9–12.7)
WBC # BLD AUTO: 13.73 K/UL (ref 3.9–12.7)
WBC # BLD AUTO: 13.74 K/UL (ref 3.9–12.7)
WBC # BLD AUTO: 13.74 K/UL (ref 3.9–12.7)
WBC # BLD AUTO: 13.75 K/UL (ref 3.9–12.7)
WBC # BLD AUTO: 13.79 K/UL (ref 3.9–12.7)
WBC # BLD AUTO: 13.8 K/UL (ref 3.9–12.7)
WBC # BLD AUTO: 13.82 K/UL (ref 3.9–12.7)
WBC # BLD AUTO: 13.84 K/UL (ref 3.9–12.7)
WBC # BLD AUTO: 13.86 K/UL (ref 3.9–12.7)
WBC # BLD AUTO: 13.88 K/UL (ref 3.9–12.7)
WBC # BLD AUTO: 13.88 K/UL (ref 3.9–12.7)
WBC # BLD AUTO: 13.89 K/UL (ref 3.9–12.7)
WBC # BLD AUTO: 13.91 K/UL (ref 3.9–12.7)
WBC # BLD AUTO: 13.93 K/UL (ref 3.9–12.7)
WBC # BLD AUTO: 13.94 K/UL (ref 3.9–12.7)
WBC # BLD AUTO: 13.97 K/UL (ref 3.9–12.7)
WBC # BLD AUTO: 13.99 K/UL (ref 3.9–12.7)
WBC # BLD AUTO: 14.01 K/UL (ref 3.9–12.7)
WBC # BLD AUTO: 14.02 K/UL (ref 3.9–12.7)
WBC # BLD AUTO: 14.07 K/UL (ref 3.9–12.7)
WBC # BLD AUTO: 14.07 K/UL (ref 3.9–12.7)
WBC # BLD AUTO: 14.1 K/UL (ref 3.9–12.7)
WBC # BLD AUTO: 14.1 K/UL (ref 3.9–12.7)
WBC # BLD AUTO: 14.14 K/UL (ref 3.9–12.7)
WBC # BLD AUTO: 14.19 K/UL (ref 3.9–12.7)
WBC # BLD AUTO: 14.2 K/UL (ref 3.9–12.7)
WBC # BLD AUTO: 14.25 K/UL (ref 3.9–12.7)
WBC # BLD AUTO: 14.26 K/UL (ref 3.9–12.7)
WBC # BLD AUTO: 14.31 K/UL (ref 3.9–12.7)
WBC # BLD AUTO: 14.31 K/UL (ref 3.9–12.7)
WBC # BLD AUTO: 14.35 K/UL (ref 3.9–12.7)
WBC # BLD AUTO: 14.36 K/UL (ref 3.9–12.7)
WBC # BLD AUTO: 14.37 K/UL (ref 3.9–12.7)
WBC # BLD AUTO: 14.38 K/UL (ref 3.9–12.7)
WBC # BLD AUTO: 14.4 K/UL (ref 3.9–12.7)
WBC # BLD AUTO: 14.4 K/UL (ref 3.9–12.7)
WBC # BLD AUTO: 14.48 K/UL (ref 3.9–12.7)
WBC # BLD AUTO: 14.49 K/UL (ref 3.9–12.7)
WBC # BLD AUTO: 14.52 K/UL (ref 3.9–12.7)
WBC # BLD AUTO: 14.62 K/UL (ref 3.9–12.7)
WBC # BLD AUTO: 14.63 K/UL (ref 3.9–12.7)
WBC # BLD AUTO: 14.63 K/UL (ref 3.9–12.7)
WBC # BLD AUTO: 14.64 K/UL (ref 3.9–12.7)
WBC # BLD AUTO: 14.64 K/UL (ref 3.9–12.7)
WBC # BLD AUTO: 14.65 K/UL (ref 3.9–12.7)
WBC # BLD AUTO: 14.76 K/UL (ref 3.9–12.7)
WBC # BLD AUTO: 14.81 K/UL (ref 3.9–12.7)
WBC # BLD AUTO: 14.84 K/UL (ref 3.9–12.7)
WBC # BLD AUTO: 14.84 K/UL (ref 3.9–12.7)
WBC # BLD AUTO: 14.85 K/UL (ref 3.9–12.7)
WBC # BLD AUTO: 14.86 K/UL (ref 3.9–12.7)
WBC # BLD AUTO: 14.87 K/UL (ref 3.9–12.7)
WBC # BLD AUTO: 14.89 K/UL (ref 3.9–12.7)
WBC # BLD AUTO: 14.92 K/UL (ref 3.9–12.7)
WBC # BLD AUTO: 14.92 K/UL (ref 3.9–12.7)
WBC # BLD AUTO: 14.96 K/UL (ref 3.9–12.7)
WBC # BLD AUTO: 15.03 K/UL (ref 3.9–12.7)
WBC # BLD AUTO: 15.03 K/UL (ref 3.9–12.7)
WBC # BLD AUTO: 15.04 K/UL (ref 3.9–12.7)
WBC # BLD AUTO: 15.05 K/UL (ref 3.9–12.7)
WBC # BLD AUTO: 15.05 K/UL (ref 3.9–12.7)
WBC # BLD AUTO: 15.07 K/UL (ref 3.9–12.7)
WBC # BLD AUTO: 15.11 K/UL (ref 3.9–12.7)
WBC # BLD AUTO: 15.13 K/UL (ref 3.9–12.7)
WBC # BLD AUTO: 15.16 K/UL (ref 3.9–12.7)
WBC # BLD AUTO: 15.18 K/UL (ref 3.9–12.7)
WBC # BLD AUTO: 15.24 K/UL (ref 3.9–12.7)
WBC # BLD AUTO: 15.33 K/UL (ref 3.9–12.7)
WBC # BLD AUTO: 15.36 K/UL (ref 3.9–12.7)
WBC # BLD AUTO: 15.38 K/UL (ref 3.9–12.7)
WBC # BLD AUTO: 15.4 K/UL (ref 3.9–12.7)
WBC # BLD AUTO: 15.52 K/UL (ref 3.9–12.7)
WBC # BLD AUTO: 15.54 K/UL (ref 3.9–12.7)
WBC # BLD AUTO: 15.56 K/UL (ref 3.9–12.7)
WBC # BLD AUTO: 15.63 K/UL (ref 3.9–12.7)
WBC # BLD AUTO: 15.68 K/UL (ref 3.9–12.7)
WBC # BLD AUTO: 15.68 K/UL (ref 3.9–12.7)
WBC # BLD AUTO: 15.69 K/UL (ref 3.9–12.7)
WBC # BLD AUTO: 15.7 K/UL (ref 3.9–12.7)
WBC # BLD AUTO: 15.76 K/UL (ref 3.9–12.7)
WBC # BLD AUTO: 15.89 K/UL (ref 3.9–12.7)
WBC # BLD AUTO: 15.92 K/UL (ref 3.9–12.7)
WBC # BLD AUTO: 16.08 K/UL (ref 3.9–12.7)
WBC # BLD AUTO: 16.23 K/UL (ref 3.9–12.7)
WBC # BLD AUTO: 16.32 K/UL (ref 3.9–12.7)
WBC # BLD AUTO: 16.36 K/UL (ref 3.9–12.7)
WBC # BLD AUTO: 16.36 K/UL (ref 3.9–12.7)
WBC # BLD AUTO: 16.69 K/UL (ref 3.9–12.7)
WBC # BLD AUTO: 16.72 K/UL (ref 3.9–12.7)
WBC # BLD AUTO: 16.74 K/UL (ref 3.9–12.7)
WBC # BLD AUTO: 16.75 K/UL (ref 3.9–12.7)
WBC # BLD AUTO: 16.79 K/UL (ref 3.9–12.7)
WBC # BLD AUTO: 16.79 K/UL (ref 3.9–12.7)
WBC # BLD AUTO: 16.86 K/UL (ref 3.9–12.7)
WBC # BLD AUTO: 16.89 K/UL (ref 3.9–12.7)
WBC # BLD AUTO: 17.05 K/UL (ref 3.9–12.7)
WBC # BLD AUTO: 17.13 K/UL (ref 3.9–12.7)
WBC # BLD AUTO: 17.13 K/UL (ref 3.9–12.7)
WBC # BLD AUTO: 17.29 K/UL (ref 3.9–12.7)
WBC # BLD AUTO: 17.33 K/UL (ref 3.9–12.7)
WBC # BLD AUTO: 17.4 K/UL (ref 3.9–12.7)
WBC # BLD AUTO: 17.45 K/UL (ref 3.9–12.7)
WBC # BLD AUTO: 17.48 K/UL (ref 3.9–12.7)
WBC # BLD AUTO: 17.51 K/UL (ref 3.9–12.7)
WBC # BLD AUTO: 17.53 K/UL (ref 3.9–12.7)
WBC # BLD AUTO: 17.61 K/UL (ref 3.9–12.7)
WBC # BLD AUTO: 17.66 K/UL (ref 3.9–12.7)
WBC # BLD AUTO: 17.94 K/UL (ref 3.9–12.7)
WBC # BLD AUTO: 17.97 K/UL (ref 3.9–12.7)
WBC # BLD AUTO: 18.03 K/UL (ref 3.9–12.7)
WBC # BLD AUTO: 18.16 K/UL (ref 3.9–12.7)
WBC # BLD AUTO: 18.29 K/UL (ref 3.9–12.7)
WBC # BLD AUTO: 18.31 K/UL (ref 3.9–12.7)
WBC # BLD AUTO: 18.46 K/UL (ref 3.9–12.7)
WBC # BLD AUTO: 18.58 K/UL (ref 3.9–12.7)
WBC # BLD AUTO: 19.18 K/UL (ref 3.9–12.7)
WBC # BLD AUTO: 19.18 K/UL (ref 3.9–12.7)
WBC # BLD AUTO: 19.42 K/UL (ref 3.9–12.7)
WBC # BLD AUTO: 19.43 K/UL (ref 3.9–12.7)
WBC # BLD AUTO: 19.77 K/UL (ref 3.9–12.7)
WBC # BLD AUTO: 19.91 K/UL (ref 3.9–12.7)
WBC # BLD AUTO: 21.19 K/UL (ref 3.9–12.7)
WBC # BLD AUTO: 22.19 K/UL (ref 3.9–12.7)
WBC # BLD AUTO: 22.95 K/UL (ref 3.9–12.7)
WBC # BLD AUTO: 23.3 K/UL (ref 3.9–12.7)
WBC # BLD AUTO: 23.3 K/UL (ref 3.9–12.7)
WBC # BLD AUTO: 23.76 K/UL (ref 3.9–12.7)
WBC # BLD AUTO: 7.3 K/UL (ref 3.9–12.7)
WBC # BLD AUTO: 8.01 K/UL (ref 3.9–12.7)
WBC # BLD AUTO: 8.07 K/UL (ref 3.9–12.7)
WBC # BLD AUTO: 8.09 K/UL (ref 3.9–12.7)
WBC # BLD AUTO: 8.09 K/UL (ref 3.9–12.7)
WBC # BLD AUTO: 8.1 K/UL (ref 3.9–12.7)
WBC # BLD AUTO: 8.2 K/UL (ref 3.9–12.7)
WBC # BLD AUTO: 8.28 K/UL (ref 3.9–12.7)
WBC # BLD AUTO: 8.28 K/UL (ref 3.9–12.7)
WBC # BLD AUTO: 8.3 K/UL (ref 3.9–12.7)
WBC # BLD AUTO: 8.39 K/UL (ref 3.9–12.7)
WBC # BLD AUTO: 8.55 K/UL (ref 3.9–12.7)
WBC # BLD AUTO: 8.61 K/UL (ref 3.9–12.7)
WBC # BLD AUTO: 8.85 K/UL (ref 3.9–12.7)
WBC # BLD AUTO: 8.86 K/UL (ref 3.9–12.7)
WBC # BLD AUTO: 8.88 K/UL (ref 3.9–12.7)
WBC # BLD AUTO: 9 K/UL (ref 3.9–12.7)
WBC # BLD AUTO: 9.04 K/UL (ref 3.9–12.7)
WBC # BLD AUTO: 9.09 K/UL (ref 3.9–12.7)
WBC # BLD AUTO: 9.19 K/UL (ref 3.9–12.7)
WBC # BLD AUTO: 9.21 K/UL (ref 3.9–12.7)
WBC # BLD AUTO: 9.26 K/UL (ref 3.9–12.7)
WBC # BLD AUTO: 9.3 K/UL (ref 3.9–12.7)
WBC # BLD AUTO: 9.31 K/UL (ref 3.9–12.7)
WBC # BLD AUTO: 9.38 K/UL (ref 3.9–12.7)
WBC # BLD AUTO: 9.45 K/UL (ref 3.9–12.7)
WBC # BLD AUTO: 9.61 K/UL (ref 3.9–12.7)
WBC # BLD AUTO: 9.67 K/UL (ref 3.9–12.7)
WBC # BLD AUTO: 9.81 K/UL (ref 3.9–12.7)
WBC # BLD AUTO: 9.81 K/UL (ref 3.9–12.7)
WBC #/AREA URNS AUTO: 0 /HPF (ref 0–5)
WBC #/AREA URNS AUTO: 12 /HPF (ref 0–5)
WBC #/AREA URNS AUTO: 2 /HPF (ref 0–5)
WBC #/AREA URNS AUTO: 3 /HPF (ref 0–5)
WBC #/AREA URNS AUTO: 8 /HPF (ref 0–5)
WBC CLUMPS UR QL AUTO: ABNORMAL
YEAST UR QL AUTO: ABNORMAL
YEAST UR QL AUTO: ABNORMAL

## 2020-01-01 PROCEDURE — 99291 CRITICAL CARE FIRST HOUR: CPT | Mod: ,,, | Performed by: SURGERY

## 2020-01-01 PROCEDURE — 99900026 HC AIRWAY MAINTENANCE (STAT)

## 2020-01-01 PROCEDURE — 25000003 PHARM REV CODE 250: Performed by: INTERNAL MEDICINE

## 2020-01-01 PROCEDURE — 82330 ASSAY OF CALCIUM: CPT

## 2020-01-01 PROCEDURE — 94003 VENT MGMT INPAT SUBQ DAY: CPT

## 2020-01-01 PROCEDURE — 27200188 HC TRANSDUCER, ART ADULT/PEDS

## 2020-01-01 PROCEDURE — 86850 RBC ANTIBODY SCREEN: CPT

## 2020-01-01 PROCEDURE — 85379 FIBRIN DEGRADATION QUANT: CPT

## 2020-01-01 PROCEDURE — 83735 ASSAY OF MAGNESIUM: CPT

## 2020-01-01 PROCEDURE — 99291 PR CRITICAL CARE, E/M 30-74 MINUTES: ICD-10-PCS | Mod: ,,, | Performed by: NURSE PRACTITIONER

## 2020-01-01 PROCEDURE — 63700000 PHARM REV CODE 250 ALT 637 W/O HCPCS: Performed by: STUDENT IN AN ORGANIZED HEALTH CARE EDUCATION/TRAINING PROGRAM

## 2020-01-01 PROCEDURE — 25000003 PHARM REV CODE 250: Performed by: STUDENT IN AN ORGANIZED HEALTH CARE EDUCATION/TRAINING PROGRAM

## 2020-01-01 PROCEDURE — C9113 INJ PANTOPRAZOLE SODIUM, VIA: HCPCS | Performed by: STUDENT IN AN ORGANIZED HEALTH CARE EDUCATION/TRAINING PROGRAM

## 2020-01-01 PROCEDURE — 99900035 HC TECH TIME PER 15 MIN (STAT)

## 2020-01-01 PROCEDURE — 25000003 PHARM REV CODE 250: Performed by: THORACIC SURGERY (CARDIOTHORACIC VASCULAR SURGERY)

## 2020-01-01 PROCEDURE — 94761 N-INVAS EAR/PLS OXIMETRY MLT: CPT

## 2020-01-01 PROCEDURE — 63600367 HC NITRIC OXIDE PER HOUR

## 2020-01-01 PROCEDURE — 33948 PR ECMO/ECLS DAILY MGMT-VENOUS: ICD-10-PCS | Mod: ,,, | Performed by: THORACIC SURGERY (CARDIOTHORACIC VASCULAR SURGERY)

## 2020-01-01 PROCEDURE — 83735 ASSAY OF MAGNESIUM: CPT | Mod: 91

## 2020-01-01 PROCEDURE — U0003 INFECTIOUS AGENT DETECTION BY NUCLEIC ACID (DNA OR RNA); SEVERE ACUTE RESPIRATORY SYNDROME CORONAVIRUS 2 (SARS-COV-2) (CORONAVIRUS DISEASE [COVID-19]), AMPLIFIED PROBE TECHNIQUE, MAKING USE OF HIGH THROUGHPUT TECHNOLOGIES AS DESCRIBED BY CMS-2020-01-R: HCPCS

## 2020-01-01 PROCEDURE — 80053 COMPREHEN METABOLIC PANEL: CPT | Mod: 91

## 2020-01-01 PROCEDURE — 20000000 HC ICU ROOM

## 2020-01-01 PROCEDURE — 84132 ASSAY OF SERUM POTASSIUM: CPT

## 2020-01-01 PROCEDURE — 99291 PR CRITICAL CARE, E/M 30-74 MINUTES: ICD-10-PCS | Mod: ,,, | Performed by: ANESTHESIOLOGY

## 2020-01-01 PROCEDURE — 27000221 HC OXYGEN, UP TO 24 HOURS

## 2020-01-01 PROCEDURE — 37799 UNLISTED PX VASCULAR SURGERY: CPT

## 2020-01-01 PROCEDURE — 80184 ASSAY OF PHENOBARBITAL: CPT

## 2020-01-01 PROCEDURE — 36592 COLLECT BLOOD FROM PICC: CPT

## 2020-01-01 PROCEDURE — 63600175 PHARM REV CODE 636 W HCPCS: Performed by: INTERNAL MEDICINE

## 2020-01-01 PROCEDURE — 85384 FIBRINOGEN ACTIVITY: CPT | Mod: 91

## 2020-01-01 PROCEDURE — P9047 ALBUMIN (HUMAN), 25%, 50ML: HCPCS | Mod: JG | Performed by: STUDENT IN AN ORGANIZED HEALTH CARE EDUCATION/TRAINING PROGRAM

## 2020-01-01 PROCEDURE — 27201673 HC ANCILLARY CANNULA

## 2020-01-01 PROCEDURE — P9016 RBC LEUKOCYTES REDUCED: HCPCS

## 2020-01-01 PROCEDURE — 63700000 PHARM REV CODE 250 ALT 637 W/O HCPCS: Performed by: ANESTHESIOLOGY

## 2020-01-01 PROCEDURE — 99232 SBSQ HOSP IP/OBS MODERATE 35: CPT | Mod: ,,, | Performed by: NURSE PRACTITIONER

## 2020-01-01 PROCEDURE — 82803 BLOOD GASES ANY COMBINATION: CPT

## 2020-01-01 PROCEDURE — 63600175 PHARM REV CODE 636 W HCPCS: Performed by: NURSE PRACTITIONER

## 2020-01-01 PROCEDURE — 85014 HEMATOCRIT: CPT

## 2020-01-01 PROCEDURE — 85730 THROMBOPLASTIN TIME PARTIAL: CPT | Mod: 91

## 2020-01-01 PROCEDURE — 36430 TRANSFUSION BLD/BLD COMPNT: CPT

## 2020-01-01 PROCEDURE — 25000003 PHARM REV CODE 250

## 2020-01-01 PROCEDURE — 94640 AIRWAY INHALATION TREATMENT: CPT

## 2020-01-01 PROCEDURE — 83605 ASSAY OF LACTIC ACID: CPT

## 2020-01-01 PROCEDURE — 84295 ASSAY OF SERUM SODIUM: CPT

## 2020-01-01 PROCEDURE — 63600175 PHARM REV CODE 636 W HCPCS: Performed by: PEDIATRICS

## 2020-01-01 PROCEDURE — 36000706: Performed by: SURGERY

## 2020-01-01 PROCEDURE — 82728 ASSAY OF FERRITIN: CPT

## 2020-01-01 PROCEDURE — 99223 PR INITIAL HOSPITAL CARE,LEVL III: ICD-10-PCS | Mod: ,,, | Performed by: EMERGENCY MEDICINE

## 2020-01-01 PROCEDURE — 63600175 PHARM REV CODE 636 W HCPCS: Performed by: THORACIC SURGERY (CARDIOTHORACIC VASCULAR SURGERY)

## 2020-01-01 PROCEDURE — 25000003 PHARM REV CODE 250: Performed by: ANESTHESIOLOGY

## 2020-01-01 PROCEDURE — 85025 COMPLETE CBC W/AUTO DIFF WBC: CPT

## 2020-01-01 PROCEDURE — 80053 COMPREHEN METABOLIC PANEL: CPT

## 2020-01-01 PROCEDURE — 99232 PR SUBSEQUENT HOSPITAL CARE,LEVL II: ICD-10-PCS | Mod: ,,, | Performed by: NURSE PRACTITIONER

## 2020-01-01 PROCEDURE — 86140 C-REACTIVE PROTEIN: CPT

## 2020-01-01 PROCEDURE — 85025 COMPLETE CBC W/AUTO DIFF WBC: CPT | Mod: 91

## 2020-01-01 PROCEDURE — 27202057 HC OXYGENATOR - CHANGE OUT

## 2020-01-01 PROCEDURE — 63600175 PHARM REV CODE 636 W HCPCS: Performed by: STUDENT IN AN ORGANIZED HEALTH CARE EDUCATION/TRAINING PROGRAM

## 2020-01-01 PROCEDURE — 83615 LACTATE (LD) (LDH) ENZYME: CPT

## 2020-01-01 PROCEDURE — 84100 ASSAY OF PHOSPHORUS: CPT | Mod: 91

## 2020-01-01 PROCEDURE — 33948 ECMO/ECLS DAILY MGMT-VENOUS: CPT

## 2020-01-01 PROCEDURE — 85007 BL SMEAR W/DIFF WBC COUNT: CPT | Mod: 91

## 2020-01-01 PROCEDURE — 93010 ELECTROCARDIOGRAM REPORT: CPT | Mod: ,,, | Performed by: INTERNAL MEDICINE

## 2020-01-01 PROCEDURE — 63600175 PHARM REV CODE 636 W HCPCS: Performed by: ANESTHESIOLOGY

## 2020-01-01 PROCEDURE — 63600175 PHARM REV CODE 636 W HCPCS: Mod: JG | Performed by: SURGERY

## 2020-01-01 PROCEDURE — 85384 FIBRINOGEN ACTIVITY: CPT

## 2020-01-01 PROCEDURE — 27200966 HC CLOSED SUCTION SYSTEM

## 2020-01-01 PROCEDURE — 63600175 PHARM REV CODE 636 W HCPCS: Mod: JG | Performed by: STUDENT IN AN ORGANIZED HEALTH CARE EDUCATION/TRAINING PROGRAM

## 2020-01-01 PROCEDURE — 99291 PR CRITICAL CARE, E/M 30-74 MINUTES: ICD-10-PCS | Mod: ,,, | Performed by: INTERNAL MEDICINE

## 2020-01-01 PROCEDURE — 99291 CRITICAL CARE FIRST HOUR: CPT | Mod: ,,, | Performed by: ANESTHESIOLOGY

## 2020-01-01 PROCEDURE — 85730 THROMBOPLASTIN TIME PARTIAL: CPT

## 2020-01-01 PROCEDURE — 84478 ASSAY OF TRIGLYCERIDES: CPT

## 2020-01-01 PROCEDURE — 33948 ECMO/ECLS DAILY MGMT-VENOUS: CPT | Mod: ,,, | Performed by: THORACIC SURGERY (CARDIOTHORACIC VASCULAR SURGERY)

## 2020-01-01 PROCEDURE — 27200661

## 2020-01-01 PROCEDURE — 25000003 PHARM REV CODE 250: Performed by: HOSPITALIST

## 2020-01-01 PROCEDURE — 85520 HEPARIN ASSAY: CPT

## 2020-01-01 PROCEDURE — 85520 HEPARIN ASSAY: CPT | Mod: 91

## 2020-01-01 PROCEDURE — 85007 BL SMEAR W/DIFF WBC COUNT: CPT

## 2020-01-01 PROCEDURE — 25000003 PHARM REV CODE 250: Performed by: NURSE PRACTITIONER

## 2020-01-01 PROCEDURE — 86870 RBC ANTIBODY IDENTIFICATION: CPT

## 2020-01-01 PROCEDURE — 99233 PR SUBSEQUENT HOSPITAL CARE,LEVL III: ICD-10-PCS | Mod: ,,, | Performed by: SURGERY

## 2020-01-01 PROCEDURE — 99232 SBSQ HOSP IP/OBS MODERATE 35: CPT | Mod: ,,, | Performed by: SURGERY

## 2020-01-01 PROCEDURE — 85610 PROTHROMBIN TIME: CPT | Mod: 91

## 2020-01-01 PROCEDURE — 25000242 PHARM REV CODE 250 ALT 637 W/ HCPCS: Performed by: PHYSICIAN ASSISTANT

## 2020-01-01 PROCEDURE — 25000242 PHARM REV CODE 250 ALT 637 W/ HCPCS: Performed by: THORACIC SURGERY (CARDIOTHORACIC VASCULAR SURGERY)

## 2020-01-01 PROCEDURE — 85610 PROTHROMBIN TIME: CPT

## 2020-01-01 PROCEDURE — 84100 ASSAY OF PHOSPHORUS: CPT

## 2020-01-01 PROCEDURE — P9012 CRYOPRECIPITATE EACH UNIT: HCPCS

## 2020-01-01 PROCEDURE — 83051 HEMOGLOBIN PLASMA: CPT

## 2020-01-01 PROCEDURE — 87040 BLOOD CULTURE FOR BACTERIA: CPT

## 2020-01-01 PROCEDURE — 36415 COLL VENOUS BLD VENIPUNCTURE: CPT

## 2020-01-01 PROCEDURE — 99291 CRITICAL CARE FIRST HOUR: CPT | Mod: ,,, | Performed by: NURSE PRACTITIONER

## 2020-01-01 PROCEDURE — 33946 ECMO/ECLS INITIATION VENOUS: CPT

## 2020-01-01 PROCEDURE — 80202 ASSAY OF VANCOMYCIN: CPT | Mod: 91

## 2020-01-01 PROCEDURE — 27100092 HC HIGH FLOW DELIVERY CANNULA

## 2020-01-01 PROCEDURE — 99291 CRITICAL CARE FIRST HOUR: CPT | Mod: ,,, | Performed by: INTERNAL MEDICINE

## 2020-01-01 PROCEDURE — 25000003 PHARM REV CODE 250: Performed by: SURGERY

## 2020-01-01 PROCEDURE — 94770 HC EXHALED C02 TEST: CPT

## 2020-01-01 PROCEDURE — 80048 BASIC METABOLIC PNL TOTAL CA: CPT

## 2020-01-01 PROCEDURE — 25000003 PHARM REV CODE 250: Performed by: PEDIATRICS

## 2020-01-01 PROCEDURE — 85347 COAGULATION TIME ACTIVATED: CPT

## 2020-01-01 PROCEDURE — 83051 HEMOGLOBIN PLASMA: CPT | Mod: 91

## 2020-01-01 PROCEDURE — 87040 BLOOD CULTURE FOR BACTERIA: CPT | Mod: 59

## 2020-01-01 PROCEDURE — 33958 ECMO/ECLS REPOS PERPH CNULA: CPT | Mod: ,,, | Performed by: THORACIC SURGERY (CARDIOTHORACIC VASCULAR SURGERY)

## 2020-01-01 PROCEDURE — 63600175 PHARM REV CODE 636 W HCPCS: Performed by: EMERGENCY MEDICINE

## 2020-01-01 PROCEDURE — 25000003 PHARM REV CODE 250: Performed by: EMERGENCY MEDICINE

## 2020-01-01 PROCEDURE — 99291 PR CRITICAL CARE, E/M 30-74 MINUTES: ICD-10-PCS | Mod: ,,, | Performed by: SURGERY

## 2020-01-01 PROCEDURE — 84484 ASSAY OF TROPONIN QUANT: CPT | Mod: 91

## 2020-01-01 PROCEDURE — C1751 CATH, INF, PER/CENT/MIDLINE: HCPCS

## 2020-01-01 PROCEDURE — 99233 PR SUBSEQUENT HOSPITAL CARE,LEVL III: ICD-10-PCS | Mod: ,,, | Performed by: NURSE PRACTITIONER

## 2020-01-01 PROCEDURE — 85027 COMPLETE CBC AUTOMATED: CPT | Mod: 91

## 2020-01-01 PROCEDURE — 99291 CRITICAL CARE FIRST HOUR: CPT | Mod: ,,, | Performed by: PEDIATRICS

## 2020-01-01 PROCEDURE — 85027 COMPLETE CBC AUTOMATED: CPT

## 2020-01-01 PROCEDURE — P9045 ALBUMIN (HUMAN), 5%, 250 ML: HCPCS | Mod: JG

## 2020-01-01 PROCEDURE — 99233 SBSQ HOSP IP/OBS HIGH 50: CPT | Mod: ,,, | Performed by: SURGERY

## 2020-01-01 PROCEDURE — 87205 SMEAR GRAM STAIN: CPT

## 2020-01-01 PROCEDURE — 99233 SBSQ HOSP IP/OBS HIGH 50: CPT | Mod: ,,, | Performed by: NURSE PRACTITIONER

## 2020-01-01 PROCEDURE — 81001 URINALYSIS AUTO W/SCOPE: CPT

## 2020-01-01 PROCEDURE — 63600175 PHARM REV CODE 636 W HCPCS: Mod: JG | Performed by: ANESTHESIOLOGY

## 2020-01-01 PROCEDURE — 86705 HEP B CORE ANTIBODY IGM: CPT

## 2020-01-01 PROCEDURE — 95720 PR EEG, W/VIDEO, CONT RECORD, I&R, >12<26 HRS: ICD-10-PCS | Mod: ,,, | Performed by: PSYCHIATRY & NEUROLOGY

## 2020-01-01 PROCEDURE — S0028 INJECTION, FAMOTIDINE, 20 MG: HCPCS | Performed by: STUDENT IN AN ORGANIZED HEALTH CARE EDUCATION/TRAINING PROGRAM

## 2020-01-01 PROCEDURE — 36556 INSERT NON-TUNNEL CV CATH: CPT

## 2020-01-01 PROCEDURE — 93463 DRUG ADMIN & HEMODYNMIC MEAS: CPT

## 2020-01-01 PROCEDURE — P9045 ALBUMIN (HUMAN), 5%, 250 ML: HCPCS | Mod: JG | Performed by: STUDENT IN AN ORGANIZED HEALTH CARE EDUCATION/TRAINING PROGRAM

## 2020-01-01 PROCEDURE — 93010 EKG 12-LEAD: ICD-10-PCS | Mod: ,,, | Performed by: INTERNAL MEDICINE

## 2020-01-01 PROCEDURE — 63700000 PHARM REV CODE 250 ALT 637 W/O HCPCS: Performed by: THORACIC SURGERY (CARDIOTHORACIC VASCULAR SURGERY)

## 2020-01-01 PROCEDURE — 80202 ASSAY OF VANCOMYCIN: CPT

## 2020-01-01 PROCEDURE — P9035 PLATELET PHERES LEUKOREDUCED: HCPCS

## 2020-01-01 PROCEDURE — 83050 HGB METHEMOGLOBIN QUAN: CPT

## 2020-01-01 PROCEDURE — 93005 ELECTROCARDIOGRAM TRACING: CPT

## 2020-01-01 PROCEDURE — 99233 SBSQ HOSP IP/OBS HIGH 50: CPT | Mod: ,,, | Performed by: INTERNAL MEDICINE

## 2020-01-01 PROCEDURE — 63600175 PHARM REV CODE 636 W HCPCS: Performed by: SURGERY

## 2020-01-01 PROCEDURE — 82800 BLOOD PH: CPT

## 2020-01-01 PROCEDURE — 31600 PLANNED TRACHEOSTOMY: CPT | Mod: ,,, | Performed by: SURGERY

## 2020-01-01 PROCEDURE — P9047 ALBUMIN (HUMAN), 25%, 50ML: HCPCS | Mod: JG | Performed by: THORACIC SURGERY (CARDIOTHORACIC VASCULAR SURGERY)

## 2020-01-01 PROCEDURE — 36600 WITHDRAWAL OF ARTERIAL BLOOD: CPT

## 2020-01-01 PROCEDURE — 87070 CULTURE OTHR SPECIMN AEROBIC: CPT

## 2020-01-01 PROCEDURE — 99233 PR SUBSEQUENT HOSPITAL CARE,LEVL III: ICD-10-PCS | Mod: ,,, | Performed by: INTERNAL MEDICINE

## 2020-01-01 PROCEDURE — 84295 ASSAY OF SERUM SODIUM: CPT | Mod: 91

## 2020-01-01 PROCEDURE — 99291 CRITICAL CARE FIRST HOUR: CPT | Mod: ,,, | Performed by: PHYSICIAN ASSISTANT

## 2020-01-01 PROCEDURE — 82565 ASSAY OF CREATININE: CPT

## 2020-01-01 PROCEDURE — 99292 PR CRITICAL CARE, ADDL 30 MIN: ICD-10-PCS | Mod: ,,, | Performed by: INTERNAL MEDICINE

## 2020-01-01 PROCEDURE — 94002 VENT MGMT INPAT INIT DAY: CPT

## 2020-01-01 PROCEDURE — 31500 INSERT EMERGENCY AIRWAY: CPT

## 2020-01-01 PROCEDURE — 63600175 PHARM REV CODE 636 W HCPCS: Performed by: GENERAL PRACTICE

## 2020-01-01 PROCEDURE — 86965 POOLING BLOOD PLATELETS: CPT

## 2020-01-01 PROCEDURE — 33966 ECMO/ECLS RMVL PRPH CANNULA, PERC, 6 YR & OLDER: ICD-10-PCS | Mod: ,,, | Performed by: THORACIC SURGERY (CARDIOTHORACIC VASCULAR SURGERY)

## 2020-01-01 PROCEDURE — 36573 INSJ PICC RS&I 5 YR+: CPT

## 2020-01-01 PROCEDURE — 99291 PR CRITICAL CARE, E/M 30-74 MINUTES: ICD-10-PCS | Mod: ,,, | Performed by: STUDENT IN AN ORGANIZED HEALTH CARE EDUCATION/TRAINING PROGRAM

## 2020-01-01 PROCEDURE — 99291 PR CRITICAL CARE, E/M 30-74 MINUTES: ICD-10-PCS | Mod: ,,, | Performed by: PEDIATRICS

## 2020-01-01 PROCEDURE — 36620 INSERTION CATHETER ARTERY: CPT

## 2020-01-01 PROCEDURE — 99292 CRITICAL CARE ADDL 30 MIN: CPT | Mod: ,,, | Performed by: INTERNAL MEDICINE

## 2020-01-01 PROCEDURE — 86922 COMPATIBILITY TEST ANTIGLOB: CPT

## 2020-01-01 PROCEDURE — 82306 VITAMIN D 25 HYDROXY: CPT

## 2020-01-01 PROCEDURE — 25000003 PHARM REV CODE 250: Performed by: GENERAL PRACTICE

## 2020-01-01 PROCEDURE — 31622 DX BRONCHOSCOPE/WASH: CPT

## 2020-01-01 PROCEDURE — 51702 INSERT TEMP BLADDER CATH: CPT

## 2020-01-01 PROCEDURE — 63600175 PHARM REV CODE 636 W HCPCS: Performed by: CLINICAL NURSE SPECIALIST

## 2020-01-01 PROCEDURE — 99292 CRITICAL CARE ADDL 30 MIN: CPT | Mod: ,,, | Performed by: ANESTHESIOLOGY

## 2020-01-01 PROCEDURE — 94668 MNPJ CHEST WALL SBSQ: CPT

## 2020-01-01 PROCEDURE — 99223 PR INITIAL HOSPITAL CARE,LEVL III: ICD-10-PCS | Mod: ,,, | Performed by: INTERNAL MEDICINE

## 2020-01-01 PROCEDURE — 25000242 PHARM REV CODE 250 ALT 637 W/ HCPCS: Performed by: STUDENT IN AN ORGANIZED HEALTH CARE EDUCATION/TRAINING PROGRAM

## 2020-01-01 PROCEDURE — 63600175 PHARM REV CODE 636 W HCPCS: Mod: JG | Performed by: THORACIC SURGERY (CARDIOTHORACIC VASCULAR SURGERY)

## 2020-01-01 PROCEDURE — 86920 COMPATIBILITY TEST SPIN: CPT

## 2020-01-01 PROCEDURE — P9045 ALBUMIN (HUMAN), 5%, 250 ML: HCPCS | Mod: JG | Performed by: THORACIC SURGERY (CARDIOTHORACIC VASCULAR SURGERY)

## 2020-01-01 PROCEDURE — 27100171 HC OXYGEN HIGH FLOW UP TO 24 HOURS

## 2020-01-01 PROCEDURE — 31500 PR INSERT, EMERGENCY ENDOTRACH AIRWAY: ICD-10-PCS | Mod: ,,, | Performed by: EMERGENCY MEDICINE

## 2020-01-01 PROCEDURE — 33966 ECMO/ECLS RMVL PRPH CANNULA: CPT | Mod: ,,, | Performed by: THORACIC SURGERY (CARDIOTHORACIC VASCULAR SURGERY)

## 2020-01-01 PROCEDURE — 86703 HIV-1/HIV-2 1 RESULT ANTBDY: CPT

## 2020-01-01 PROCEDURE — 87086 URINE CULTURE/COLONY COUNT: CPT

## 2020-01-01 PROCEDURE — 93312 ECHO TRANSESOPHAGEAL: CPT | Performed by: ANESTHESIOLOGY

## 2020-01-01 PROCEDURE — 96365 THER/PROPH/DIAG IV INF INIT: CPT

## 2020-01-01 PROCEDURE — D9220A PRA ANESTHESIA: Mod: ,,, | Performed by: ANESTHESIOLOGY

## 2020-01-01 PROCEDURE — 95822 EEG COMA OR SLEEP ONLY: CPT | Mod: 26,,, | Performed by: PSYCHIATRY & NEUROLOGY

## 2020-01-01 PROCEDURE — 95720 EEG PHY/QHP EA INCR W/VEEG: CPT | Mod: ,,, | Performed by: PSYCHIATRY & NEUROLOGY

## 2020-01-01 PROCEDURE — 63600175 PHARM REV CODE 636 W HCPCS

## 2020-01-01 PROCEDURE — 86901 BLOOD TYPING SEROLOGIC RH(D): CPT

## 2020-01-01 PROCEDURE — 12000002 HC ACUTE/MED SURGE SEMI-PRIVATE ROOM

## 2020-01-01 PROCEDURE — C9113 INJ PANTOPRAZOLE SODIUM, VIA: HCPCS | Performed by: THORACIC SURGERY (CARDIOTHORACIC VASCULAR SURGERY)

## 2020-01-01 PROCEDURE — 63600175 PHARM REV CODE 636 W HCPCS: Mod: JG

## 2020-01-01 PROCEDURE — 99291 CRITICAL CARE FIRST HOUR: CPT | Mod: ,,, | Performed by: CLINICAL NURSE SPECIALIST

## 2020-01-01 PROCEDURE — 63600175 PHARM REV CODE 636 W HCPCS: Performed by: HOSPITALIST

## 2020-01-01 PROCEDURE — 99223 1ST HOSP IP/OBS HIGH 75: CPT | Mod: ,,, | Performed by: INTERNAL MEDICINE

## 2020-01-01 PROCEDURE — 25000003 PHARM REV CODE 250: Performed by: PHYSICIAN ASSISTANT

## 2020-01-01 PROCEDURE — P9045 ALBUMIN (HUMAN), 5%, 250 ML: HCPCS | Mod: JG | Performed by: ANESTHESIOLOGY

## 2020-01-01 PROCEDURE — 63600175 PHARM REV CODE 636 W HCPCS: Performed by: PHYSICIAN ASSISTANT

## 2020-01-01 PROCEDURE — 99292 CRITICAL CARE ADDL 30 MIN: CPT | Mod: ,,, | Performed by: NURSE PRACTITIONER

## 2020-01-01 PROCEDURE — 93010 EKG 12-LEAD: ICD-10-PCS | Mod: 76,,, | Performed by: INTERNAL MEDICINE

## 2020-01-01 PROCEDURE — 97803 MED NUTRITION INDIV SUBSEQ: CPT

## 2020-01-01 PROCEDURE — 27400108 HC CENTRIMAG BLOOD PUMP IMPLANT KIT

## 2020-01-01 PROCEDURE — 99291 PR CRITICAL CARE, E/M 30-74 MINUTES: ICD-10-PCS | Mod: ,,, | Performed by: PHYSICIAN ASSISTANT

## 2020-01-01 PROCEDURE — 96372 THER/PROPH/DIAG INJ SC/IM: CPT

## 2020-01-01 PROCEDURE — 36620 INSERTION CATHETER ARTERY: CPT | Mod: ,,, | Performed by: GENERAL PRACTICE

## 2020-01-01 PROCEDURE — 95711 VEEG 2-12 HR UNMONITORED: CPT

## 2020-01-01 PROCEDURE — 31624 PR BRONCHOSCOPY,DIAG2STIC W LAVAGE: ICD-10-PCS | Mod: ,,, | Performed by: SURGERY

## 2020-01-01 PROCEDURE — 43752 NASAL/OROGASTRIC W/TUBE PLMT: CPT

## 2020-01-01 PROCEDURE — 87522 HEPATITIS C REVRS TRNSCRPJ: CPT

## 2020-01-01 PROCEDURE — 84484 ASSAY OF TROPONIN QUANT: CPT

## 2020-01-01 PROCEDURE — 37000008 HC ANESTHESIA 1ST 15 MINUTES: Performed by: SURGERY

## 2020-01-01 PROCEDURE — 95700 EEG CONT REC W/VID EEG TECH: CPT

## 2020-01-01 PROCEDURE — 87106 FUNGI IDENTIFICATION YEAST: CPT

## 2020-01-01 PROCEDURE — 63600175 PHARM REV CODE 636 W HCPCS: Performed by: UROLOGY

## 2020-01-01 PROCEDURE — 95718 EEG PHYS/QHP 2-12 HR W/VEEG: CPT | Mod: ,,, | Performed by: PSYCHIATRY & NEUROLOGY

## 2020-01-01 PROCEDURE — C9399 UNCLASSIFIED DRUGS OR BIOLOG: HCPCS | Performed by: STUDENT IN AN ORGANIZED HEALTH CARE EDUCATION/TRAINING PROGRAM

## 2020-01-01 PROCEDURE — 31600 PLANNED TRACHEOSTOMY: CPT

## 2020-01-01 PROCEDURE — 82140 ASSAY OF AMMONIA: CPT

## 2020-01-01 PROCEDURE — 36556 CENTRAL LINE: ICD-10-PCS | Mod: 59,,, | Performed by: ANESTHESIOLOGY

## 2020-01-01 PROCEDURE — 83036 HEMOGLOBIN GLYCOSYLATED A1C: CPT

## 2020-01-01 PROCEDURE — 84145 PROCALCITONIN (PCT): CPT

## 2020-01-01 PROCEDURE — 25000003 PHARM REV CODE 250: Performed by: CLINICAL NURSE SPECIALIST

## 2020-01-01 PROCEDURE — 94660 CPAP INITIATION&MGMT: CPT

## 2020-01-01 PROCEDURE — U0002 COVID-19 LAB TEST NON-CDC: HCPCS

## 2020-01-01 PROCEDURE — C1894 INTRO/SHEATH, NON-LASER: HCPCS

## 2020-01-01 PROCEDURE — 99291 CRITICAL CARE FIRST HOUR: CPT | Mod: ,,, | Performed by: STUDENT IN AN ORGANIZED HEALTH CARE EDUCATION/TRAINING PROGRAM

## 2020-01-01 PROCEDURE — 36620 PR INSERT CATH,ART,PERCUT,SHORTTERM: ICD-10-PCS | Mod: ,,, | Performed by: NURSE PRACTITIONER

## 2020-01-01 PROCEDURE — D9220A PRA ANESTHESIA: ICD-10-PCS | Mod: ,,, | Performed by: ANESTHESIOLOGY

## 2020-01-01 PROCEDURE — 93312 TEE: ICD-10-PCS | Mod: 26,59,, | Performed by: ANESTHESIOLOGY

## 2020-01-01 PROCEDURE — 99291 PR CRITICAL CARE, E/M 30-74 MINUTES: ICD-10-PCS | Mod: 25,,, | Performed by: EMERGENCY MEDICINE

## 2020-01-01 PROCEDURE — 36620 ARTERIAL LINE: ICD-10-PCS | Mod: ,,, | Performed by: GENERAL PRACTICE

## 2020-01-01 PROCEDURE — 31628 BRONCHOSCOPY/LUNG BX EACH: CPT

## 2020-01-01 PROCEDURE — P9021 RED BLOOD CELLS UNIT: HCPCS

## 2020-01-01 PROCEDURE — 51701 INSERT BLADDER CATHETER: CPT

## 2020-01-01 PROCEDURE — 99223 PR INITIAL HOSPITAL CARE,LEVL III: ICD-10-PCS | Mod: ,,, | Performed by: NURSE PRACTITIONER

## 2020-01-01 PROCEDURE — 95714 VEEG EA 12-26 HR UNMNTR: CPT

## 2020-01-01 PROCEDURE — 99292 CRITICAL CARE ADDL 30 MIN: CPT | Mod: 25,,, | Performed by: INTERNAL MEDICINE

## 2020-01-01 PROCEDURE — 99291 PR CRITICAL CARE, E/M 30-74 MINUTES: ICD-10-PCS | Mod: 25,,, | Performed by: NURSE PRACTITIONER

## 2020-01-01 PROCEDURE — 99223 1ST HOSP IP/OBS HIGH 75: CPT | Mod: ,,, | Performed by: EMERGENCY MEDICINE

## 2020-01-01 PROCEDURE — 99223 1ST HOSP IP/OBS HIGH 75: CPT | Mod: ,,, | Performed by: NURSE PRACTITIONER

## 2020-01-01 PROCEDURE — 25000242 PHARM REV CODE 250 ALT 637 W/ HCPCS: Performed by: SURGERY

## 2020-01-01 PROCEDURE — 82330 ASSAY OF CALCIUM: CPT | Mod: 91

## 2020-01-01 PROCEDURE — 33954 ECMO/ECLS INSJ PRPH CANNULA: CPT | Mod: ,,, | Performed by: THORACIC SURGERY (CARDIOTHORACIC VASCULAR SURGERY)

## 2020-01-01 PROCEDURE — 99222 PR INITIAL HOSPITAL CARE,LEVL II: ICD-10-PCS | Mod: ,,, | Performed by: NURSE PRACTITIONER

## 2020-01-01 PROCEDURE — 37000009 HC ANESTHESIA EA ADD 15 MINS: Performed by: SURGERY

## 2020-01-01 PROCEDURE — P9040 RBC LEUKOREDUCED IRRADIATED: HCPCS

## 2020-01-01 PROCEDURE — 84134 ASSAY OF PREALBUMIN: CPT

## 2020-01-01 PROCEDURE — 99233 PR SUBSEQUENT HOSPITAL CARE,LEVL III: ICD-10-PCS | Mod: ,,, | Performed by: ANESTHESIOLOGY

## 2020-01-01 PROCEDURE — 99223 1ST HOSP IP/OBS HIGH 75: CPT | Mod: ,,, | Performed by: PSYCHIATRY & NEUROLOGY

## 2020-01-01 PROCEDURE — 43255 EGD CONTROL BLEEDING ANY: CPT | Performed by: INTERNAL MEDICINE

## 2020-01-01 PROCEDURE — 93971 EXTREMITY STUDY: CPT

## 2020-01-01 PROCEDURE — 99292 PR CRITICAL CARE, ADDL 30 MIN: ICD-10-PCS | Mod: 25,,, | Performed by: INTERNAL MEDICINE

## 2020-01-01 PROCEDURE — 86141 C-REACTIVE PROTEIN HS: CPT

## 2020-01-01 PROCEDURE — 36000707: Performed by: SURGERY

## 2020-01-01 PROCEDURE — 86703 HIV-1/HIV-2 1 RESULT ANTBDY: CPT | Mod: 91

## 2020-01-01 PROCEDURE — 33946 ECMO/ECLS INITIATION VENOUS: CPT | Mod: ,,, | Performed by: THORACIC SURGERY (CARDIOTHORACIC VASCULAR SURGERY)

## 2020-01-01 PROCEDURE — 99292 PR CRITICAL CARE, ADDL 30 MIN: ICD-10-PCS | Mod: ,,, | Performed by: ANESTHESIOLOGY

## 2020-01-01 PROCEDURE — 43255 EGD CONTROL BLEEDING ANY: CPT | Mod: ,,, | Performed by: INTERNAL MEDICINE

## 2020-01-01 PROCEDURE — 43255 PR EGD, FLEX, W/CTRL BLEED, ANY METHOD: ICD-10-PCS | Mod: ,,, | Performed by: INTERNAL MEDICINE

## 2020-01-01 PROCEDURE — 31720 CLEARANCE OF AIRWAYS: CPT

## 2020-01-01 PROCEDURE — 86880 COOMBS TEST DIRECT: CPT

## 2020-01-01 PROCEDURE — 84443 ASSAY THYROID STIM HORMONE: CPT

## 2020-01-01 PROCEDURE — 36556 INSERT NON-TUNNEL CV CATH: CPT | Mod: 59,,, | Performed by: ANESTHESIOLOGY

## 2020-01-01 PROCEDURE — 36620 INSERTION CATHETER ARTERY: CPT | Mod: ,,, | Performed by: ANESTHESIOLOGY

## 2020-01-01 PROCEDURE — 96375 TX/PRO/DX INJ NEW DRUG ADDON: CPT

## 2020-01-01 PROCEDURE — 82533 TOTAL CORTISOL: CPT

## 2020-01-01 PROCEDURE — 32556 CHEST TUBE INSERTION: ICD-10-PCS | Mod: LT,,, | Performed by: INTERNAL MEDICINE

## 2020-01-01 PROCEDURE — 84439 ASSAY OF FREE THYROXINE: CPT

## 2020-01-01 PROCEDURE — 99233 SBSQ HOSP IP/OBS HIGH 50: CPT | Mod: ,,, | Performed by: ANESTHESIOLOGY

## 2020-01-01 PROCEDURE — 86978 RBC PRETREATMENT SERUM: CPT | Mod: 91

## 2020-01-01 PROCEDURE — P9045 ALBUMIN (HUMAN), 5%, 250 ML: HCPCS | Mod: JG | Performed by: SURGERY

## 2020-01-01 PROCEDURE — 99291 PR CRITICAL CARE, E/M 30-74 MINUTES: ICD-10-PCS | Mod: ,,, | Performed by: CLINICAL NURSE SPECIALIST

## 2020-01-01 PROCEDURE — 63600175 PHARM REV CODE 636 W HCPCS: Mod: JG | Performed by: INTERNAL MEDICINE

## 2020-01-01 PROCEDURE — 36620 ARTERIAL: ICD-10-PCS | Mod: ,,, | Performed by: ANESTHESIOLOGY

## 2020-01-01 PROCEDURE — 99291 CRITICAL CARE FIRST HOUR: CPT | Mod: 25,,, | Performed by: NURSE PRACTITIONER

## 2020-01-01 PROCEDURE — 99223 PR INITIAL HOSPITAL CARE,LEVL III: ICD-10-PCS | Mod: ,,, | Performed by: PSYCHIATRY & NEUROLOGY

## 2020-01-01 PROCEDURE — 95718 PR EEG, W/VIDEO, CONT RECORD, I&R, 2-12 HRS: ICD-10-PCS | Mod: ,,, | Performed by: PSYCHIATRY & NEUROLOGY

## 2020-01-01 PROCEDURE — 36620 INSERTION CATHETER ARTERY: CPT | Mod: ,,, | Performed by: NURSE PRACTITIONER

## 2020-01-01 PROCEDURE — 99292 PR CRITICAL CARE, ADDL 30 MIN: ICD-10-PCS | Mod: ,,, | Performed by: NURSE PRACTITIONER

## 2020-01-01 PROCEDURE — 99222 1ST HOSP IP/OBS MODERATE 55: CPT | Mod: ,,, | Performed by: NURSE PRACTITIONER

## 2020-01-01 PROCEDURE — 27200997: Performed by: INTERNAL MEDICINE

## 2020-01-01 PROCEDURE — 82550 ASSAY OF CK (CPK): CPT

## 2020-01-01 PROCEDURE — 93010 ELECTROCARDIOGRAM REPORT: CPT | Mod: 76,,, | Performed by: INTERNAL MEDICINE

## 2020-01-01 PROCEDURE — 33954 PR ECMO/ECLS INSERT PERIPH CANNULA OPEN 6 YRS & OLDER: ICD-10-PCS | Mod: ,,, | Performed by: THORACIC SURGERY (CARDIOTHORACIC VASCULAR SURGERY)

## 2020-01-01 PROCEDURE — 33958: ICD-10-PCS | Mod: ,,, | Performed by: THORACIC SURGERY (CARDIOTHORACIC VASCULAR SURGERY)

## 2020-01-01 PROCEDURE — 31500 INSERT EMERGENCY AIRWAY: CPT | Mod: ,,, | Performed by: EMERGENCY MEDICINE

## 2020-01-01 PROCEDURE — 96361 HYDRATE IV INFUSION ADD-ON: CPT

## 2020-01-01 PROCEDURE — 32556 INSERT CATH PLEURA W/O IMAGE: CPT | Mod: LT,,, | Performed by: INTERNAL MEDICINE

## 2020-01-01 PROCEDURE — 31624 DX BRONCHOSCOPE/LAVAGE: CPT | Mod: ,,, | Performed by: SURGERY

## 2020-01-01 PROCEDURE — 99232 PR SUBSEQUENT HOSPITAL CARE,LEVL II: ICD-10-PCS | Mod: ,,, | Performed by: SURGERY

## 2020-01-01 PROCEDURE — 31600 PR TRACHEOSTOMY, PLANNED: ICD-10-PCS | Mod: ,,, | Performed by: SURGERY

## 2020-01-01 PROCEDURE — 99233 SBSQ HOSP IP/OBS HIGH 50: CPT | Mod: ,,, | Performed by: PSYCHIATRY & NEUROLOGY

## 2020-01-01 PROCEDURE — 33946 PR ECMO/ECLS INITIATION VENOUS: ICD-10-PCS | Mod: ,,, | Performed by: THORACIC SURGERY (CARDIOTHORACIC VASCULAR SURGERY)

## 2020-01-01 PROCEDURE — 99291 CRITICAL CARE FIRST HOUR: CPT | Mod: 25

## 2020-01-01 PROCEDURE — 27201028 HC NEEDLE, SCLERO: Performed by: INTERNAL MEDICINE

## 2020-01-01 PROCEDURE — 76937 US GUIDE VASCULAR ACCESS: CPT

## 2020-01-01 PROCEDURE — 86706 HEP B SURFACE ANTIBODY: CPT

## 2020-01-01 PROCEDURE — 99900025 HC BRONCHOSCOPY-ASST (STAT)

## 2020-01-01 PROCEDURE — 99291 CRITICAL CARE FIRST HOUR: CPT | Mod: 25,,, | Performed by: EMERGENCY MEDICINE

## 2020-01-01 PROCEDURE — 84436 ASSAY OF TOTAL THYROXINE: CPT

## 2020-01-01 PROCEDURE — 82962 GLUCOSE BLOOD TEST: CPT

## 2020-01-01 PROCEDURE — 94667 MNPJ CHEST WALL 1ST: CPT

## 2020-01-01 PROCEDURE — 95822 PR EEG,COMA/SLEEP RECORD ONLY: ICD-10-PCS | Mod: 26,,, | Performed by: PSYCHIATRY & NEUROLOGY

## 2020-01-01 PROCEDURE — 99233 PR SUBSEQUENT HOSPITAL CARE,LEVL III: ICD-10-PCS | Mod: ,,, | Performed by: PSYCHIATRY & NEUROLOGY

## 2020-01-01 PROCEDURE — 27000190 HC CPAP FULL FACE MASK W/VALVE

## 2020-01-01 PROCEDURE — 82955 ASSAY OF G6PD ENZYME: CPT

## 2020-01-01 PROCEDURE — 95813 EEG EXTND MNTR 61-119 MIN: CPT

## 2020-01-01 RX ORDER — ACETAZOLAMIDE 500 MG/5ML
250 INJECTION, POWDER, LYOPHILIZED, FOR SOLUTION INTRAVENOUS EVERY 12 HOURS
Status: COMPLETED | OUTPATIENT
Start: 2020-01-01 | End: 2020-01-01

## 2020-01-01 RX ORDER — HYDROMORPHONE HYDROCHLORIDE 1 MG/ML
2 INJECTION, SOLUTION INTRAMUSCULAR; INTRAVENOUS; SUBCUTANEOUS ONCE
Status: DISCONTINUED | OUTPATIENT
Start: 2020-01-01 | End: 2020-01-01

## 2020-01-01 RX ORDER — ALBUMIN HUMAN 250 G/1000ML
12.5 SOLUTION INTRAVENOUS ONCE
Status: DISCONTINUED | OUTPATIENT
Start: 2020-01-01 | End: 2020-01-01

## 2020-01-01 RX ORDER — HYDROCODONE BITARTRATE AND ACETAMINOPHEN 500; 5 MG/1; MG/1
TABLET ORAL
Status: DISCONTINUED | OUTPATIENT
Start: 2020-01-01 | End: 2020-01-01

## 2020-01-01 RX ORDER — INSULIN ASPART 100 [IU]/ML
0-5 INJECTION, SOLUTION INTRAVENOUS; SUBCUTANEOUS EVERY 4 HOURS PRN
Status: DISCONTINUED | OUTPATIENT
Start: 2020-01-01 | End: 2020-01-01

## 2020-01-01 RX ORDER — ALBUMIN HUMAN 50 G/1000ML
12.5 SOLUTION INTRAVENOUS ONCE
Status: COMPLETED | OUTPATIENT
Start: 2020-01-01 | End: 2020-01-01

## 2020-01-01 RX ORDER — IBUPROFEN 200 MG
16 TABLET ORAL
Status: DISCONTINUED | OUTPATIENT
Start: 2020-01-01 | End: 2020-01-01 | Stop reason: HOSPADM

## 2020-01-01 RX ORDER — LORAZEPAM 2 MG/ML
2 INJECTION INTRAMUSCULAR ONCE
Status: COMPLETED | OUTPATIENT
Start: 2020-01-01 | End: 2020-01-01

## 2020-01-01 RX ORDER — AZITHROMYCIN 250 MG/1
500 TABLET, FILM COATED ORAL ONCE
Status: DISCONTINUED | OUTPATIENT
Start: 2020-01-01 | End: 2020-01-01

## 2020-01-01 RX ORDER — ROCURONIUM BROMIDE 10 MG/ML
INJECTION, SOLUTION INTRAVENOUS
Status: DISPENSED
Start: 2020-01-01 | End: 2020-01-01

## 2020-01-01 RX ORDER — INSULIN ASPART 100 [IU]/ML
6 INJECTION, SOLUTION INTRAVENOUS; SUBCUTANEOUS
Status: DISCONTINUED | OUTPATIENT
Start: 2020-01-01 | End: 2020-01-01

## 2020-01-01 RX ORDER — INSULIN ASPART 100 [IU]/ML
2 INJECTION, SOLUTION INTRAVENOUS; SUBCUTANEOUS
Status: DISCONTINUED | OUTPATIENT
Start: 2020-01-01 | End: 2020-01-01

## 2020-01-01 RX ORDER — SODIUM,POTASSIUM PHOSPHATES 280-250MG
2 POWDER IN PACKET (EA) ORAL ONCE
Status: DISCONTINUED | OUTPATIENT
Start: 2020-01-01 | End: 2020-01-01

## 2020-01-01 RX ORDER — ACETAMINOPHEN 325 MG/1
650 TABLET ORAL EVERY 6 HOURS PRN
Status: DISCONTINUED | OUTPATIENT
Start: 2020-01-01 | End: 2020-01-01 | Stop reason: HOSPADM

## 2020-01-01 RX ORDER — INSULIN ASPART 100 [IU]/ML
4 INJECTION, SOLUTION INTRAVENOUS; SUBCUTANEOUS
Status: DISCONTINUED | OUTPATIENT
Start: 2020-01-01 | End: 2020-01-01

## 2020-01-01 RX ORDER — DEXMEDETOMIDINE HYDROCHLORIDE 4 UG/ML
0.1 INJECTION, SOLUTION INTRAVENOUS CONTINUOUS
Status: DISCONTINUED | OUTPATIENT
Start: 2020-01-01 | End: 2020-01-01

## 2020-01-01 RX ORDER — ALBUMIN HUMAN 50 G/1000ML
25 SOLUTION INTRAVENOUS ONCE
Status: COMPLETED | OUTPATIENT
Start: 2020-01-01 | End: 2020-01-01

## 2020-01-01 RX ORDER — HEPARIN SODIUM 10000 [USP'U]/100ML
700 INJECTION, SOLUTION INTRAVENOUS CONTINUOUS
Status: DISCONTINUED | OUTPATIENT
Start: 2020-01-01 | End: 2020-01-01

## 2020-01-01 RX ORDER — POTASSIUM CHLORIDE 20 MEQ/15ML
40 SOLUTION ORAL ONCE
Status: DISCONTINUED | OUTPATIENT
Start: 2020-01-01 | End: 2020-01-01

## 2020-01-01 RX ORDER — POTASSIUM CHLORIDE 20 MEQ/15ML
60 SOLUTION ORAL ONCE
Status: COMPLETED | OUTPATIENT
Start: 2020-01-01 | End: 2020-01-01

## 2020-01-01 RX ORDER — CYANOCOBALAMIN 1000 UG/ML
100 INJECTION, SOLUTION INTRAMUSCULAR; SUBCUTANEOUS ONCE
Status: COMPLETED | OUTPATIENT
Start: 2020-01-01 | End: 2020-01-01

## 2020-01-01 RX ORDER — KETAMINE HYDROCHLORIDE 50 MG/ML
100 INJECTION, SOLUTION INTRAMUSCULAR; INTRAVENOUS ONCE
Status: COMPLETED | OUTPATIENT
Start: 2020-01-01 | End: 2020-01-01

## 2020-01-01 RX ORDER — SODIUM CHLORIDE FOR INHALATION 3 %
4 VIAL, NEBULIZER (ML) INHALATION 2 TIMES DAILY
Status: DISCONTINUED | OUTPATIENT
Start: 2020-01-01 | End: 2020-01-01

## 2020-01-01 RX ORDER — INSULIN ASPART 100 [IU]/ML
3 INJECTION, SOLUTION INTRAVENOUS; SUBCUTANEOUS
Status: DISCONTINUED | OUTPATIENT
Start: 2020-01-01 | End: 2020-01-01

## 2020-01-01 RX ORDER — OXYCODONE HCL 5 MG/5 ML
5 SOLUTION, ORAL ORAL EVERY 4 HOURS
Status: DISCONTINUED | OUTPATIENT
Start: 2020-01-01 | End: 2020-01-01

## 2020-01-01 RX ORDER — SENNOSIDES 8.6 MG/1
8.6 TABLET ORAL DAILY PRN
Status: DISCONTINUED | OUTPATIENT
Start: 2020-01-01 | End: 2020-01-01

## 2020-01-01 RX ORDER — FENTANYL CITRATE 50 UG/ML
50 INJECTION, SOLUTION INTRAMUSCULAR; INTRAVENOUS ONCE
Status: COMPLETED | OUTPATIENT
Start: 2020-01-01 | End: 2020-01-01

## 2020-01-01 RX ORDER — HYDROMORPHONE HYDROCHLORIDE 1 MG/ML
0.5 INJECTION, SOLUTION INTRAMUSCULAR; INTRAVENOUS; SUBCUTANEOUS ONCE
Status: COMPLETED | OUTPATIENT
Start: 2020-01-01 | End: 2020-01-01

## 2020-01-01 RX ORDER — DIAZEPAM 5 MG/ML
5 INJECTION, SOLUTION INTRAMUSCULAR; INTRAVENOUS EVERY 12 HOURS
Status: DISCONTINUED | OUTPATIENT
Start: 2020-01-01 | End: 2020-01-01

## 2020-01-01 RX ORDER — PHENYLEPHRINE HCL IN 0.9% NACL 1 MG/10 ML
SYRINGE (ML) INTRAVENOUS
Status: COMPLETED
Start: 2020-01-01 | End: 2020-01-01

## 2020-01-01 RX ORDER — ROSUVASTATIN CALCIUM 10 MG/1
5 TABLET, COATED ORAL DAILY
COMMUNITY

## 2020-01-01 RX ORDER — POTASSIUM CHLORIDE 20 MEQ/15ML
40 SOLUTION ORAL
Status: DISCONTINUED | OUTPATIENT
Start: 2020-01-01 | End: 2020-01-01

## 2020-01-01 RX ORDER — SENNOSIDES 8.6 MG/1
8.6 TABLET ORAL DAILY
Status: DISCONTINUED | OUTPATIENT
Start: 2020-01-01 | End: 2020-01-01

## 2020-01-01 RX ORDER — DEXTROSE MONOHYDRATE 100 MG/ML
INJECTION, SOLUTION INTRAVENOUS CONTINUOUS PRN
Status: DISCONTINUED | OUTPATIENT
Start: 2020-01-01 | End: 2020-01-01

## 2020-01-01 RX ORDER — FUROSEMIDE 10 MG/ML
40 INJECTION INTRAMUSCULAR; INTRAVENOUS 2 TIMES DAILY
Status: DISCONTINUED | OUTPATIENT
Start: 2020-01-01 | End: 2020-01-01

## 2020-01-01 RX ORDER — FUROSEMIDE 10 MG/ML
2 INJECTION INTRAMUSCULAR; INTRAVENOUS ONCE
Status: COMPLETED | OUTPATIENT
Start: 2020-01-01 | End: 2020-01-01

## 2020-01-01 RX ORDER — ALBUMIN HUMAN 250 G/1000ML
25 SOLUTION INTRAVENOUS ONCE
Status: COMPLETED | OUTPATIENT
Start: 2020-01-01 | End: 2020-01-01

## 2020-01-01 RX ORDER — ALBUMIN HUMAN 50 G/1000ML
SOLUTION INTRAVENOUS
Status: COMPLETED
Start: 2020-01-01 | End: 2020-01-01

## 2020-01-01 RX ORDER — FAMOTIDINE 20 MG/1
20 TABLET, FILM COATED ORAL 2 TIMES DAILY
Status: DISCONTINUED | OUTPATIENT
Start: 2020-01-01 | End: 2020-01-01

## 2020-01-01 RX ORDER — FENTANYL CITRATE 50 UG/ML
25 INJECTION, SOLUTION INTRAMUSCULAR; INTRAVENOUS
Status: DISCONTINUED | OUTPATIENT
Start: 2020-01-01 | End: 2020-01-01 | Stop reason: HOSPADM

## 2020-01-01 RX ORDER — POTASSIUM CHLORIDE 1.5 G/1.58G
40 POWDER, FOR SOLUTION ORAL
Status: DISCONTINUED | OUTPATIENT
Start: 2020-01-01 | End: 2020-01-01

## 2020-01-01 RX ORDER — PROPOFOL 10 MG/ML
5 INJECTION, EMULSION INTRAVENOUS CONTINUOUS
Status: DISCONTINUED | OUTPATIENT
Start: 2020-01-01 | End: 2020-01-01 | Stop reason: HOSPADM

## 2020-01-01 RX ORDER — ROCURONIUM BROMIDE 10 MG/ML
100 INJECTION, SOLUTION INTRAVENOUS ONCE
Status: COMPLETED | OUTPATIENT
Start: 2020-01-01 | End: 2020-01-01

## 2020-01-01 RX ORDER — ALBUMIN HUMAN 50 G/1000ML
50 SOLUTION INTRAVENOUS ONCE
Status: COMPLETED | OUTPATIENT
Start: 2020-01-01 | End: 2020-01-01

## 2020-01-01 RX ORDER — DIAZEPAM 5 MG/ML
5 INJECTION, SOLUTION INTRAMUSCULAR; INTRAVENOUS EVERY 4 HOURS PRN
Status: DISCONTINUED | OUTPATIENT
Start: 2020-01-01 | End: 2020-01-01

## 2020-01-01 RX ORDER — DIAZEPAM 2 MG/1
2 TABLET ORAL 2 TIMES DAILY
Status: DISCONTINUED | OUTPATIENT
Start: 2020-01-01 | End: 2020-01-01

## 2020-01-01 RX ORDER — NOREPINEPHRINE BITARTRATE/D5W 4MG/250ML
0.02 PLASTIC BAG, INJECTION (ML) INTRAVENOUS CONTINUOUS
Status: DISCONTINUED | OUTPATIENT
Start: 2020-01-01 | End: 2020-01-01

## 2020-01-01 RX ORDER — IPRATROPIUM BROMIDE AND ALBUTEROL SULFATE 2.5; .5 MG/3ML; MG/3ML
3 SOLUTION RESPIRATORY (INHALATION) EVERY 6 HOURS
Status: DISCONTINUED | OUTPATIENT
Start: 2020-01-01 | End: 2020-01-01

## 2020-01-01 RX ORDER — FENTANYL CITRATE 50 UG/ML
25 INJECTION, SOLUTION INTRAMUSCULAR; INTRAVENOUS
Status: DISCONTINUED | OUTPATIENT
Start: 2020-01-01 | End: 2020-01-01

## 2020-01-01 RX ORDER — PROPOFOL 10 MG/ML
INJECTION, EMULSION INTRAVENOUS
Status: COMPLETED
Start: 2020-01-01 | End: 2020-01-01

## 2020-01-01 RX ORDER — CEFEPIME HYDROCHLORIDE 2 G/1
2 INJECTION, POWDER, FOR SOLUTION INTRAVENOUS
Status: DISCONTINUED | OUTPATIENT
Start: 2020-01-01 | End: 2020-01-01

## 2020-01-01 RX ORDER — FENTANYL CITRATE/PF 250MCG/5ML
50 SYRINGE (ML) INTRAVENOUS ONCE
Status: DISCONTINUED | OUTPATIENT
Start: 2020-01-01 | End: 2020-01-01

## 2020-01-01 RX ORDER — QUETIAPINE FUMARATE 25 MG/1
50 TABLET, FILM COATED ORAL NIGHTLY
Status: DISCONTINUED | OUTPATIENT
Start: 2020-01-01 | End: 2020-01-01

## 2020-01-01 RX ORDER — DIAZEPAM 5 MG/5ML
SOLUTION ORAL
Status: DISCONTINUED
Start: 2020-01-01 | End: 2020-01-01 | Stop reason: WASHOUT

## 2020-01-01 RX ORDER — POTASSIUM CHLORIDE 29.8 MG/ML
40 INJECTION INTRAVENOUS
Status: DISCONTINUED | OUTPATIENT
Start: 2020-01-01 | End: 2020-01-01

## 2020-01-01 RX ORDER — AMLODIPINE BESYLATE 5 MG/1
5 TABLET ORAL ONCE
Status: COMPLETED | OUTPATIENT
Start: 2020-01-01 | End: 2020-01-01

## 2020-01-01 RX ORDER — INSULIN ASPART 100 [IU]/ML
4 INJECTION, SOLUTION INTRAVENOUS; SUBCUTANEOUS
Status: CANCELLED | OUTPATIENT
Start: 2020-01-01

## 2020-01-01 RX ORDER — FENTANYL CITRATE 50 UG/ML
INJECTION, SOLUTION INTRAMUSCULAR; INTRAVENOUS
Status: COMPLETED
Start: 2020-01-01 | End: 2020-01-01

## 2020-01-01 RX ORDER — HYDRALAZINE HYDROCHLORIDE 20 MG/ML
INJECTION INTRAMUSCULAR; INTRAVENOUS
Status: DISPENSED
Start: 2020-01-01 | End: 2020-01-01

## 2020-01-01 RX ORDER — FUROSEMIDE 10 MG/ML
40 INJECTION INTRAMUSCULAR; INTRAVENOUS ONCE
Status: COMPLETED | OUTPATIENT
Start: 2020-01-01 | End: 2020-01-01

## 2020-01-01 RX ORDER — INSULIN ASPART 100 [IU]/ML
1-10 INJECTION, SOLUTION INTRAVENOUS; SUBCUTANEOUS EVERY 4 HOURS PRN
Status: DISCONTINUED | OUTPATIENT
Start: 2020-01-01 | End: 2020-01-01

## 2020-01-01 RX ORDER — SODIUM CHLORIDE 0.9 % (FLUSH) 0.9 %
10 SYRINGE (ML) INJECTION
Status: DISCONTINUED | OUTPATIENT
Start: 2020-01-01 | End: 2020-01-01

## 2020-01-01 RX ORDER — QUETIAPINE FUMARATE 100 MG/1
100 TABLET, FILM COATED ORAL EVERY 12 HOURS
Status: DISCONTINUED | OUTPATIENT
Start: 2020-01-01 | End: 2020-01-01

## 2020-01-01 RX ORDER — ALBUMIN HUMAN 50 G/1000ML
SOLUTION INTRAVENOUS
Status: DISPENSED
Start: 2020-01-01 | End: 2020-01-01

## 2020-01-01 RX ORDER — TELMISARTAN 40 MG/1
40 TABLET ORAL DAILY
COMMUNITY

## 2020-01-01 RX ORDER — LABETALOL HCL 20 MG/4 ML
10 SYRINGE (ML) INTRAVENOUS ONCE
Status: COMPLETED | OUTPATIENT
Start: 2020-01-01 | End: 2020-01-01

## 2020-01-01 RX ORDER — ACETAZOLAMIDE 500 MG/5ML
250 INJECTION, POWDER, LYOPHILIZED, FOR SOLUTION INTRAVENOUS ONCE
Status: DISCONTINUED | OUTPATIENT
Start: 2020-01-01 | End: 2020-01-01

## 2020-01-01 RX ORDER — AZITHROMYCIN 250 MG/1
250 TABLET, FILM COATED ORAL DAILY
Status: DISCONTINUED | OUTPATIENT
Start: 2020-01-01 | End: 2020-01-01

## 2020-01-01 RX ORDER — PROPOFOL 10 MG/ML
5 INJECTION, EMULSION INTRAVENOUS CONTINUOUS
Status: DISPENSED | OUTPATIENT
Start: 2020-01-01 | End: 2020-01-01

## 2020-01-01 RX ORDER — SODIUM CHLORIDE 0.9 % (FLUSH) 0.9 %
10 SYRINGE (ML) INJECTION
Status: DISCONTINUED | OUTPATIENT
Start: 2020-01-01 | End: 2020-01-01 | Stop reason: HOSPADM

## 2020-01-01 RX ORDER — INSULIN ASPART 100 [IU]/ML
8 INJECTION, SOLUTION INTRAVENOUS; SUBCUTANEOUS
Status: DISCONTINUED | OUTPATIENT
Start: 2020-01-01 | End: 2020-01-01 | Stop reason: HOSPADM

## 2020-01-01 RX ORDER — HEPARIN SODIUM 10000 [USP'U]/100ML
1200 INJECTION, SOLUTION INTRAVENOUS CONTINUOUS
Status: DISCONTINUED | OUTPATIENT
Start: 2020-01-01 | End: 2020-01-01

## 2020-01-01 RX ORDER — ROCURONIUM BROMIDE 10 MG/ML
INJECTION, SOLUTION INTRAVENOUS
Status: COMPLETED
Start: 2020-01-01 | End: 2020-01-01

## 2020-01-01 RX ORDER — VANCOMYCIN HCL IN 5 % DEXTROSE 1G/250ML
1000 PLASTIC BAG, INJECTION (ML) INTRAVENOUS
Status: DISCONTINUED | OUTPATIENT
Start: 2020-01-01 | End: 2020-01-01

## 2020-01-01 RX ORDER — POTASSIUM CHLORIDE 29.8 MG/ML
60 INJECTION INTRAVENOUS ONCE
Status: COMPLETED | OUTPATIENT
Start: 2020-01-01 | End: 2020-01-01

## 2020-01-01 RX ORDER — ROCURONIUM BROMIDE 10 MG/ML
10 INJECTION, SOLUTION INTRAVENOUS ONCE
Status: COMPLETED | OUTPATIENT
Start: 2020-01-01 | End: 2020-01-01

## 2020-01-01 RX ORDER — FENOFIBRATE 160 MG/1
160 TABLET ORAL DAILY
COMMUNITY

## 2020-01-01 RX ORDER — DIAZEPAM 5 MG/5ML
2.5 SOLUTION ORAL 2 TIMES DAILY
Status: DISCONTINUED | OUTPATIENT
Start: 2020-01-01 | End: 2020-01-01

## 2020-01-01 RX ORDER — QUETIAPINE FUMARATE 25 MG/1
50 TABLET, FILM COATED ORAL 2 TIMES DAILY
Status: DISCONTINUED | OUTPATIENT
Start: 2020-01-01 | End: 2020-01-01

## 2020-01-01 RX ORDER — GLUCAGON 1 MG
1 KIT INJECTION
Status: DISCONTINUED | OUTPATIENT
Start: 2020-01-01 | End: 2020-01-01 | Stop reason: HOSPADM

## 2020-01-01 RX ORDER — SODIUM,POTASSIUM PHOSPHATES 280-250MG
1 POWDER IN PACKET (EA) ORAL ONCE
Status: COMPLETED | OUTPATIENT
Start: 2020-01-01 | End: 2020-01-01

## 2020-01-01 RX ORDER — HYDROXYCHLOROQUINE SULFATE 200 MG/1
400 TABLET, FILM COATED ORAL DAILY
Status: COMPLETED | OUTPATIENT
Start: 2020-01-01 | End: 2020-01-01

## 2020-01-01 RX ORDER — LORAZEPAM 2 MG/ML
INJECTION INTRAMUSCULAR
Status: COMPLETED
Start: 2020-01-01 | End: 2020-01-01

## 2020-01-01 RX ORDER — QUETIAPINE FUMARATE 25 MG/1
100 TABLET, FILM COATED ORAL 2 TIMES DAILY
Status: DISCONTINUED | OUTPATIENT
Start: 2020-01-01 | End: 2020-01-01

## 2020-01-01 RX ORDER — FENTANYL CITRATE-0.9 % NACL/PF 10 MCG/ML
PLASTIC BAG, INJECTION (ML) INTRAVENOUS CONTINUOUS
Status: DISCONTINUED | OUTPATIENT
Start: 2020-01-01 | End: 2020-01-01

## 2020-01-01 RX ORDER — IBUPROFEN 200 MG
16 TABLET ORAL
Status: DISCONTINUED | OUTPATIENT
Start: 2020-01-01 | End: 2020-01-01

## 2020-01-01 RX ORDER — CEFEPIME HYDROCHLORIDE 2 G/1
2 INJECTION, POWDER, FOR SOLUTION INTRAVENOUS
Status: COMPLETED | OUTPATIENT
Start: 2020-01-01 | End: 2020-01-01

## 2020-01-01 RX ORDER — PHENYLEPHRINE HCL IN 0.9% NACL 20MG/250ML
0.5 PLASTIC BAG, INJECTION (ML) INTRAVENOUS CONTINUOUS
Status: DISCONTINUED | OUTPATIENT
Start: 2020-01-01 | End: 2020-01-01

## 2020-01-01 RX ORDER — GLUCAGON 1 MG
1 KIT INJECTION
Status: DISCONTINUED | OUTPATIENT
Start: 2020-01-01 | End: 2020-01-01

## 2020-01-01 RX ORDER — DEXTROSE MONOHYDRATE 50 MG/ML
INJECTION, SOLUTION INTRAVENOUS CONTINUOUS
Status: DISCONTINUED | OUTPATIENT
Start: 2020-01-01 | End: 2020-01-01

## 2020-01-01 RX ORDER — PANTOPRAZOLE SODIUM 40 MG/10ML
40 INJECTION, POWDER, LYOPHILIZED, FOR SOLUTION INTRAVENOUS EVERY 12 HOURS
Status: DISCONTINUED | OUTPATIENT
Start: 2020-01-01 | End: 2020-01-01

## 2020-01-01 RX ORDER — ATROPINE SULFATE 0.1 MG/ML
INJECTION INTRAVENOUS
Status: DISPENSED
Start: 2020-01-01 | End: 2020-01-01

## 2020-01-01 RX ORDER — DIAZEPAM 5 MG/ML
5 INJECTION, SOLUTION INTRAMUSCULAR; INTRAVENOUS 3 TIMES DAILY
Status: DISCONTINUED | OUTPATIENT
Start: 2020-01-01 | End: 2020-01-01

## 2020-01-01 RX ORDER — METOPROLOL TARTRATE 1 MG/ML
INJECTION, SOLUTION INTRAVENOUS
Status: DISPENSED
Start: 2020-01-01 | End: 2020-01-01

## 2020-01-01 RX ORDER — ALBUMIN HUMAN 250 G/1000ML
SOLUTION INTRAVENOUS
Status: DISCONTINUED
Start: 2020-01-01 | End: 2020-01-01 | Stop reason: WASHOUT

## 2020-01-01 RX ORDER — FUROSEMIDE 10 MG/ML
5 INJECTION INTRAMUSCULAR; INTRAVENOUS ONCE
Status: COMPLETED | OUTPATIENT
Start: 2020-01-01 | End: 2020-01-01

## 2020-01-01 RX ORDER — PROPOFOL 10 MG/ML
5 INJECTION, EMULSION INTRAVENOUS CONTINUOUS
Status: DISCONTINUED | OUTPATIENT
Start: 2020-01-01 | End: 2020-01-01

## 2020-01-01 RX ORDER — FENTANYL 12.5 UG/1
1 PATCH TRANSDERMAL
Status: DISCONTINUED | OUTPATIENT
Start: 2020-01-01 | End: 2020-01-01

## 2020-01-01 RX ORDER — INSULIN ASPART 100 [IU]/ML
5 INJECTION, SOLUTION INTRAVENOUS; SUBCUTANEOUS
Status: DISCONTINUED | OUTPATIENT
Start: 2020-01-01 | End: 2020-01-01

## 2020-01-01 RX ORDER — SUCCINYLCHOLINE CHLORIDE 20 MG/ML
1 INJECTION INTRAMUSCULAR; INTRAVENOUS ONCE
Status: DISCONTINUED | OUTPATIENT
Start: 2020-01-01 | End: 2020-01-01

## 2020-01-01 RX ORDER — AMLODIPINE BESYLATE 5 MG/1
5 TABLET ORAL DAILY
Status: DISCONTINUED | OUTPATIENT
Start: 2020-01-01 | End: 2020-01-01

## 2020-01-01 RX ORDER — HYDRALAZINE HYDROCHLORIDE 20 MG/ML
10 INJECTION INTRAMUSCULAR; INTRAVENOUS ONCE
Status: COMPLETED | OUTPATIENT
Start: 2020-01-01 | End: 2020-01-01

## 2020-01-01 RX ORDER — PANTOPRAZOLE SODIUM 40 MG/1
40 FOR SUSPENSION ORAL 2 TIMES DAILY
Status: DISCONTINUED | OUTPATIENT
Start: 2020-01-01 | End: 2020-01-01 | Stop reason: HOSPADM

## 2020-01-01 RX ORDER — METOPROLOL TARTRATE 50 MG/1
50 TABLET ORAL 2 TIMES DAILY
COMMUNITY

## 2020-01-01 RX ORDER — POLYETHYLENE GLYCOL 3350 17 G/17G
17 POWDER, FOR SOLUTION ORAL DAILY
Status: DISCONTINUED | OUTPATIENT
Start: 2020-01-01 | End: 2020-01-01 | Stop reason: HOSPADM

## 2020-01-01 RX ORDER — POTASSIUM CHLORIDE 20 MEQ/15ML
40 SOLUTION ORAL ONCE
Status: COMPLETED | OUTPATIENT
Start: 2020-01-01 | End: 2020-01-01

## 2020-01-01 RX ORDER — FENTANYL CITRATE 50 UG/ML
25 INJECTION, SOLUTION INTRAMUSCULAR; INTRAVENOUS EVERY 10 MIN PRN
Status: COMPLETED | OUTPATIENT
Start: 2020-01-01 | End: 2020-01-01

## 2020-01-01 RX ORDER — NAPROXEN SODIUM 220 MG/1
81 TABLET, FILM COATED ORAL DAILY
Status: DISCONTINUED | OUTPATIENT
Start: 2020-01-01 | End: 2020-01-01

## 2020-01-01 RX ORDER — PROPOFOL 10 MG/ML
VIAL (ML) INTRAVENOUS
Status: DISCONTINUED | OUTPATIENT
Start: 2020-01-01 | End: 2020-01-01

## 2020-01-01 RX ORDER — HEPARIN SODIUM,PORCINE/D5W 25000/250
18 INTRAVENOUS SOLUTION INTRAVENOUS CONTINUOUS
Status: DISCONTINUED | OUTPATIENT
Start: 2020-01-01 | End: 2020-01-01

## 2020-01-01 RX ORDER — DIAZEPAM 10 MG/2ML
5 INJECTION INTRAMUSCULAR ONCE
Status: COMPLETED | OUTPATIENT
Start: 2020-01-01 | End: 2020-01-01

## 2020-01-01 RX ORDER — ATORVASTATIN CALCIUM 20 MG/1
40 TABLET, FILM COATED ORAL NIGHTLY
Status: DISCONTINUED | OUTPATIENT
Start: 2020-01-01 | End: 2020-01-01

## 2020-01-01 RX ORDER — ROCURONIUM BROMIDE 10 MG/ML
50 INJECTION, SOLUTION INTRAVENOUS ONCE
Status: COMPLETED | OUTPATIENT
Start: 2020-01-01 | End: 2020-01-01

## 2020-01-01 RX ORDER — LIDOCAINE HYDROCHLORIDE AND EPINEPHRINE 10; 10 MG/ML; UG/ML
INJECTION, SOLUTION INFILTRATION; PERINEURAL
Status: COMPLETED | OUTPATIENT
Start: 2020-01-01 | End: 2020-01-01

## 2020-01-01 RX ORDER — CARBOXYMETHYLCELLULOSE SODIUM 10 MG/ML
2 GEL OPHTHALMIC
Status: DISCONTINUED | OUTPATIENT
Start: 2020-01-01 | End: 2020-01-01

## 2020-01-01 RX ORDER — GLUCAGON 1 MG
1 KIT INJECTION
Status: CANCELLED | OUTPATIENT
Start: 2020-01-01

## 2020-01-01 RX ORDER — ROCURONIUM BROMIDE 10 MG/ML
20 INJECTION, SOLUTION INTRAVENOUS ONCE
Status: COMPLETED | OUTPATIENT
Start: 2020-01-01 | End: 2020-01-01

## 2020-01-01 RX ORDER — MAGNESIUM SULFATE HEPTAHYDRATE 40 MG/ML
2 INJECTION, SOLUTION INTRAVENOUS
Status: DISCONTINUED | OUTPATIENT
Start: 2020-01-01 | End: 2020-01-01 | Stop reason: HOSPADM

## 2020-01-01 RX ORDER — PSEUDOEPHEDRINE/ACETAMINOPHEN 30MG-500MG
100 TABLET ORAL ONCE
Status: COMPLETED | OUTPATIENT
Start: 2020-01-01 | End: 2020-01-01

## 2020-01-01 RX ORDER — CHOLECALCIFEROL (VITAMIN D3) 25 MCG
1000 TABLET ORAL DAILY
Status: DISCONTINUED | OUTPATIENT
Start: 2020-01-01 | End: 2020-01-01 | Stop reason: HOSPADM

## 2020-01-01 RX ORDER — POTASSIUM CHLORIDE 20 MEQ/15ML
40 SOLUTION ORAL 2 TIMES DAILY
Status: DISCONTINUED | OUTPATIENT
Start: 2020-01-01 | End: 2020-01-01

## 2020-01-01 RX ORDER — LIDOCAINE HYDROCHLORIDE 10 MG/ML
INJECTION INFILTRATION; PERINEURAL
Status: COMPLETED
Start: 2020-01-01 | End: 2020-01-01

## 2020-01-01 RX ORDER — HEPARIN SODIUM,PORCINE/D5W 25000/250
11 INTRAVENOUS SOLUTION INTRAVENOUS CONTINUOUS
Status: DISCONTINUED | OUTPATIENT
Start: 2020-01-01 | End: 2020-01-01

## 2020-01-01 RX ORDER — LANOLIN ALCOHOL/MO/W.PET/CERES
800 CREAM (GRAM) TOPICAL
Status: DISCONTINUED | OUTPATIENT
Start: 2020-01-01 | End: 2020-01-01

## 2020-01-01 RX ORDER — DEXMEDETOMIDINE HYDROCHLORIDE 4 UG/ML
0.2 INJECTION, SOLUTION INTRAVENOUS CONTINUOUS
Status: DISCONTINUED | OUTPATIENT
Start: 2020-01-01 | End: 2020-01-01

## 2020-01-01 RX ORDER — CARBOXYMETHYLCELLULOSE SODIUM 10 MG/ML
2 GEL OPHTHALMIC 2 TIMES DAILY
Status: DISCONTINUED | OUTPATIENT
Start: 2020-01-01 | End: 2020-01-01

## 2020-01-01 RX ORDER — CEFTRIAXONE 1 G/1
1 INJECTION, POWDER, FOR SOLUTION INTRAMUSCULAR; INTRAVENOUS
Status: DISCONTINUED | OUTPATIENT
Start: 2020-01-01 | End: 2020-01-01

## 2020-01-01 RX ORDER — SODIUM CHLORIDE FOR INHALATION 3 %
VIAL, NEBULIZER (ML) INHALATION
Status: DISCONTINUED
Start: 2020-01-01 | End: 2020-01-01 | Stop reason: WASHOUT

## 2020-01-01 RX ORDER — DIAZEPAM 5 MG/5ML
0.5 SOLUTION ORAL NIGHTLY
Status: DISCONTINUED | OUTPATIENT
Start: 2020-01-01 | End: 2020-01-01

## 2020-01-01 RX ORDER — AZITHROMYCIN 250 MG/1
250 TABLET, FILM COATED ORAL NIGHTLY
Status: DISCONTINUED | OUTPATIENT
Start: 2020-01-01 | End: 2020-01-01

## 2020-01-01 RX ORDER — HYDROMORPHONE HYDROCHLORIDE 1 MG/ML
0.5 INJECTION, SOLUTION INTRAMUSCULAR; INTRAVENOUS; SUBCUTANEOUS
Status: DISCONTINUED | OUTPATIENT
Start: 2020-01-01 | End: 2020-01-01

## 2020-01-01 RX ORDER — DIAZEPAM 5 MG/5ML
1 SOLUTION ORAL 2 TIMES DAILY
Status: DISCONTINUED | OUTPATIENT
Start: 2020-01-01 | End: 2020-01-01

## 2020-01-01 RX ORDER — SODIUM,POTASSIUM PHOSPHATES 280-250MG
2 POWDER IN PACKET (EA) ORAL
Status: DISCONTINUED | OUTPATIENT
Start: 2020-01-01 | End: 2020-01-01 | Stop reason: HOSPADM

## 2020-01-01 RX ORDER — PHENYLEPHRINE HCL IN 0.9% NACL 1 MG/10 ML
SYRINGE (ML) INTRAVENOUS
Status: DISPENSED
Start: 2020-01-01 | End: 2020-01-01

## 2020-01-01 RX ORDER — LORAZEPAM 2 MG/ML
2 INJECTION INTRAMUSCULAR
Status: DISCONTINUED | OUTPATIENT
Start: 2020-01-01 | End: 2020-01-01

## 2020-01-01 RX ORDER — SUCRALFATE 1 G/10ML
1 SUSPENSION ORAL EVERY 6 HOURS
Status: DISCONTINUED | OUTPATIENT
Start: 2020-01-01 | End: 2020-01-01 | Stop reason: HOSPADM

## 2020-01-01 RX ORDER — FENTANYL CITRATE-0.9 % NACL/PF 10 MCG/ML
PLASTIC BAG, INJECTION (ML) INTRAVENOUS CONTINUOUS
Status: DISPENSED | OUTPATIENT
Start: 2020-01-01 | End: 2020-01-01

## 2020-01-01 RX ORDER — DIAZEPAM 5 MG/5ML
0.5 SOLUTION ORAL 2 TIMES DAILY
Status: DISCONTINUED | OUTPATIENT
Start: 2020-01-01 | End: 2020-01-01

## 2020-01-01 RX ORDER — FENTANYL CITRATE-0.9 % NACL/PF 10 MCG/ML
25 PLASTIC BAG, INJECTION (ML) INTRAVENOUS CONTINUOUS
Status: DISCONTINUED | OUTPATIENT
Start: 2020-01-01 | End: 2020-01-01 | Stop reason: HOSPADM

## 2020-01-01 RX ORDER — NOREPINEPHRINE BITARTRATE/D5W 4MG/250ML
PLASTIC BAG, INJECTION (ML) INTRAVENOUS
Status: COMPLETED
Start: 2020-01-01 | End: 2020-01-01

## 2020-01-01 RX ORDER — INSULIN ASPART 100 [IU]/ML
1-10 INJECTION, SOLUTION INTRAVENOUS; SUBCUTANEOUS
Status: DISCONTINUED | OUTPATIENT
Start: 2020-01-01 | End: 2020-01-01

## 2020-01-01 RX ORDER — LABETALOL HCL 20 MG/4 ML
10 SYRINGE (ML) INTRAVENOUS ONCE
Status: DISCONTINUED | OUTPATIENT
Start: 2020-01-01 | End: 2020-01-01

## 2020-01-01 RX ORDER — NAPROXEN SODIUM 220 MG/1
81 TABLET, FILM COATED ORAL DAILY
COMMUNITY

## 2020-01-01 RX ORDER — HEPARIN SODIUM 10000 [USP'U]/100ML
800 INJECTION, SOLUTION INTRAVENOUS CONTINUOUS
Status: DISCONTINUED | OUTPATIENT
Start: 2020-01-01 | End: 2020-01-01

## 2020-01-01 RX ORDER — INDOMETHACIN 25 MG/1
CAPSULE ORAL
Status: DISCONTINUED | OUTPATIENT
Start: 2020-01-01 | End: 2020-01-01

## 2020-01-01 RX ORDER — LABETALOL HCL 20 MG/4 ML
10 SYRINGE (ML) INTRAVENOUS EVERY 4 HOURS PRN
Status: DISCONTINUED | OUTPATIENT
Start: 2020-01-01 | End: 2020-01-01 | Stop reason: HOSPADM

## 2020-01-01 RX ORDER — LABETALOL HCL 20 MG/4 ML
10 SYRINGE (ML) INTRAVENOUS EVERY 4 HOURS PRN
Status: DISCONTINUED | OUTPATIENT
Start: 2020-01-01 | End: 2020-01-01

## 2020-01-01 RX ORDER — DIAZEPAM 10 MG/2ML
5 INJECTION INTRAMUSCULAR 3 TIMES DAILY
Status: DISCONTINUED | OUTPATIENT
Start: 2020-01-01 | End: 2020-01-01

## 2020-01-01 RX ORDER — NICARDIPINE HYDROCHLORIDE 0.2 MG/ML
1 INJECTION INTRAVENOUS CONTINUOUS
Status: DISCONTINUED | OUTPATIENT
Start: 2020-01-01 | End: 2020-01-01

## 2020-01-01 RX ORDER — ALBUMIN HUMAN 250 G/1000ML
250 SOLUTION INTRAVENOUS ONCE
Status: DISCONTINUED | OUTPATIENT
Start: 2020-01-01 | End: 2020-01-01

## 2020-01-01 RX ORDER — FUROSEMIDE 10 MG/ML
INJECTION INTRAMUSCULAR; INTRAVENOUS
Status: COMPLETED
Start: 2020-01-01 | End: 2020-01-01

## 2020-01-01 RX ORDER — POTASSIUM CHLORIDE 7.45 MG/ML
10 INJECTION INTRAVENOUS
Status: COMPLETED | OUTPATIENT
Start: 2020-01-01 | End: 2020-01-01

## 2020-01-01 RX ORDER — POTASSIUM CHLORIDE 29.8 MG/ML
40 INJECTION INTRAVENOUS ONCE
Status: COMPLETED | OUTPATIENT
Start: 2020-01-01 | End: 2020-01-01

## 2020-01-01 RX ORDER — DIAZEPAM 2 MG/1
2 TABLET ORAL EVERY 8 HOURS
Status: DISCONTINUED | OUTPATIENT
Start: 2020-01-01 | End: 2020-01-01

## 2020-01-01 RX ORDER — MAGNESIUM SULFATE HEPTAHYDRATE 40 MG/ML
4 INJECTION, SOLUTION INTRAVENOUS
Status: DISCONTINUED | OUTPATIENT
Start: 2020-01-01 | End: 2020-01-01

## 2020-01-01 RX ORDER — VASOPRESSIN 20 [USP'U]/ML
INJECTION, SOLUTION INTRAMUSCULAR; SUBCUTANEOUS
Status: COMPLETED
Start: 2020-01-01 | End: 2020-01-01

## 2020-01-01 RX ORDER — FLUDROCORTISONE ACETATE 0.1 MG/1
100 TABLET ORAL DAILY
Status: DISCONTINUED | OUTPATIENT
Start: 2020-01-01 | End: 2020-01-01

## 2020-01-01 RX ORDER — SODIUM CHLORIDE 9 MG/ML
500 INJECTION, SOLUTION INTRAVENOUS
Status: COMPLETED | OUTPATIENT
Start: 2020-01-01 | End: 2020-01-01

## 2020-01-01 RX ORDER — PROPOFOL 10 MG/ML
INJECTION, EMULSION INTRAVENOUS
Status: DISPENSED
Start: 2020-01-01 | End: 2020-01-01

## 2020-01-01 RX ORDER — HEPARIN SODIUM 10000 [USP'U]/100ML
600 INJECTION, SOLUTION INTRAVENOUS CONTINUOUS
Status: DISCONTINUED | OUTPATIENT
Start: 2020-01-01 | End: 2020-01-01

## 2020-01-01 RX ORDER — DEXTROSE 50 % IN WATER (D50W) INTRAVENOUS SYRINGE
25
Status: DISCONTINUED | OUTPATIENT
Start: 2020-01-01 | End: 2020-01-01

## 2020-01-01 RX ORDER — IPRATROPIUM BROMIDE AND ALBUTEROL SULFATE 2.5; .5 MG/3ML; MG/3ML
3 SOLUTION RESPIRATORY (INHALATION) ONCE
Status: COMPLETED | OUTPATIENT
Start: 2020-01-01 | End: 2020-01-01

## 2020-01-01 RX ORDER — FENTANYL CITRATE 50 UG/ML
100 INJECTION, SOLUTION INTRAMUSCULAR; INTRAVENOUS
Status: COMPLETED | OUTPATIENT
Start: 2020-01-01 | End: 2020-01-01

## 2020-01-01 RX ORDER — DEXMEDETOMIDINE HYDROCHLORIDE 4 UG/ML
0.1 INJECTION, SOLUTION INTRAVENOUS CONTINUOUS
Status: DISCONTINUED | OUTPATIENT
Start: 2020-01-01 | End: 2020-01-01 | Stop reason: HOSPADM

## 2020-01-01 RX ORDER — GLUCAGON 1 MG
KIT INJECTION
Status: COMPLETED | OUTPATIENT
Start: 2020-01-01 | End: 2020-01-01

## 2020-01-01 RX ORDER — MIDODRINE HYDROCHLORIDE 5 MG/1
10 TABLET ORAL 3 TIMES DAILY
Status: DISCONTINUED | OUTPATIENT
Start: 2020-01-01 | End: 2020-01-01 | Stop reason: HOSPADM

## 2020-01-01 RX ORDER — METOPROLOL TARTRATE 1 MG/ML
5 INJECTION, SOLUTION INTRAVENOUS ONCE
Status: DISCONTINUED | OUTPATIENT
Start: 2020-01-01 | End: 2020-01-01

## 2020-01-01 RX ORDER — FENTANYL 100 UG/H
1 PATCH TRANSDERMAL
Status: DISCONTINUED | OUTPATIENT
Start: 2020-01-01 | End: 2020-01-01

## 2020-01-01 RX ORDER — QUETIAPINE FUMARATE 100 MG/1
100 TABLET, FILM COATED ORAL ONCE
Status: COMPLETED | OUTPATIENT
Start: 2020-01-01 | End: 2020-01-01

## 2020-01-01 RX ORDER — QUETIAPINE FUMARATE 100 MG/1
100 TABLET, FILM COATED ORAL 2 TIMES DAILY
Status: DISCONTINUED | OUTPATIENT
Start: 2020-01-01 | End: 2020-01-01

## 2020-01-01 RX ORDER — INSULIN ASPART 100 [IU]/ML
0-5 INJECTION, SOLUTION INTRAVENOUS; SUBCUTANEOUS EVERY 4 HOURS PRN
Status: DISCONTINUED | OUTPATIENT
Start: 2020-01-01 | End: 2020-01-01 | Stop reason: HOSPADM

## 2020-01-01 RX ORDER — QUETIAPINE FUMARATE 200 MG/1
200 TABLET, FILM COATED ORAL 2 TIMES DAILY
Status: DISCONTINUED | OUTPATIENT
Start: 2020-01-01 | End: 2020-01-01

## 2020-01-01 RX ORDER — LACTULOSE 10 G/15ML
30 SOLUTION ORAL ONCE
Status: COMPLETED | OUTPATIENT
Start: 2020-01-01 | End: 2020-01-01

## 2020-01-01 RX ORDER — MUPIROCIN 20 MG/G
OINTMENT TOPICAL 2 TIMES DAILY
Status: COMPLETED | OUTPATIENT
Start: 2020-01-01 | End: 2020-01-01

## 2020-01-01 RX ORDER — POTASSIUM CHLORIDE 20 MEQ/15ML
20 SOLUTION ORAL 2 TIMES DAILY
Status: DISCONTINUED | OUTPATIENT
Start: 2020-01-01 | End: 2020-01-01

## 2020-01-01 RX ORDER — ONDANSETRON 2 MG/ML
INJECTION INTRAMUSCULAR; INTRAVENOUS
Status: DISCONTINUED | OUTPATIENT
Start: 2020-01-01 | End: 2020-01-01

## 2020-01-01 RX ORDER — MIDAZOLAM HYDROCHLORIDE 1 MG/ML
2 INJECTION INTRAMUSCULAR; INTRAVENOUS ONCE
Status: DISCONTINUED | OUTPATIENT
Start: 2020-01-01 | End: 2020-01-01

## 2020-01-01 RX ORDER — LORAZEPAM 2 MG/ML
2 INJECTION INTRAMUSCULAR ONCE
Status: DISCONTINUED | OUTPATIENT
Start: 2020-01-01 | End: 2020-01-01

## 2020-01-01 RX ORDER — VANCOMYCIN HCL IN 5 % DEXTROSE 1G/250ML
15 PLASTIC BAG, INJECTION (ML) INTRAVENOUS
Status: DISCONTINUED | OUTPATIENT
Start: 2020-01-01 | End: 2020-01-01

## 2020-01-01 RX ORDER — AMOXICILLIN 250 MG
1 CAPSULE ORAL 2 TIMES DAILY
Status: DISCONTINUED | OUTPATIENT
Start: 2020-01-01 | End: 2020-01-01 | Stop reason: HOSPADM

## 2020-01-01 RX ORDER — HEPARIN SODIUM,PORCINE/D5W 25000/250
12 INTRAVENOUS SOLUTION INTRAVENOUS CONTINUOUS
Status: DISCONTINUED | OUTPATIENT
Start: 2020-01-01 | End: 2020-01-01

## 2020-01-01 RX ORDER — CHLORHEXIDINE GLUCONATE ORAL RINSE 1.2 MG/ML
15 SOLUTION DENTAL 2 TIMES DAILY
Status: DISCONTINUED | OUTPATIENT
Start: 2020-01-01 | End: 2020-01-01

## 2020-01-01 RX ORDER — FUROSEMIDE 10 MG/ML
INJECTION INTRAMUSCULAR; INTRAVENOUS
Status: DISPENSED
Start: 2020-01-01 | End: 2020-01-01

## 2020-01-01 RX ORDER — DIAZEPAM 2 MG/1
2 TABLET ORAL EVERY 12 HOURS
Status: DISCONTINUED | OUTPATIENT
Start: 2020-01-01 | End: 2020-01-01

## 2020-01-01 RX ORDER — FENTANYL CITRATE 50 UG/ML
100 INJECTION, SOLUTION INTRAMUSCULAR; INTRAVENOUS ONCE
Status: COMPLETED | OUTPATIENT
Start: 2020-01-01 | End: 2020-01-01

## 2020-01-01 RX ORDER — PANTOPRAZOLE SODIUM 40 MG/10ML
40 INJECTION, POWDER, LYOPHILIZED, FOR SOLUTION INTRAVENOUS 2 TIMES DAILY
Status: DISCONTINUED | OUTPATIENT
Start: 2020-01-01 | End: 2020-01-01

## 2020-01-01 RX ORDER — ROCURONIUM BROMIDE 10 MG/ML
INJECTION, SOLUTION INTRAVENOUS
Status: DISCONTINUED | OUTPATIENT
Start: 2020-01-01 | End: 2020-01-01

## 2020-01-01 RX ORDER — TRAZODONE HYDROCHLORIDE 50 MG/1
50 TABLET ORAL NIGHTLY
Status: DISCONTINUED | OUTPATIENT
Start: 2020-01-01 | End: 2020-01-01

## 2020-01-01 RX ORDER — DIAZEPAM 5 MG/1
5 TABLET ORAL EVERY 8 HOURS
Status: DISCONTINUED | OUTPATIENT
Start: 2020-01-01 | End: 2020-01-01

## 2020-01-01 RX ORDER — PROPRANOLOL HYDROCHLORIDE 20 MG/5ML
20 SOLUTION ORAL 3 TIMES DAILY
Status: DISCONTINUED | OUTPATIENT
Start: 2020-01-01 | End: 2020-01-01

## 2020-01-01 RX ORDER — ALBUMIN HUMAN 50 G/1000ML
25 SOLUTION INTRAVENOUS ONCE
Status: DISCONTINUED | OUTPATIENT
Start: 2020-01-01 | End: 2020-01-01

## 2020-01-01 RX ORDER — HEPARIN SODIUM 10000 [USP'U]/100ML
650 INJECTION, SOLUTION INTRAVENOUS CONTINUOUS
Status: DISCONTINUED | OUTPATIENT
Start: 2020-01-01 | End: 2020-01-01

## 2020-01-01 RX ORDER — POLYETHYLENE GLYCOL 3350 17 G/17G
17 POWDER, FOR SOLUTION ORAL DAILY
Status: DISCONTINUED | OUTPATIENT
Start: 2020-01-01 | End: 2020-01-01

## 2020-01-01 RX ORDER — NICARDIPINE HYDROCHLORIDE 0.2 MG/ML
2.5 INJECTION INTRAVENOUS CONTINUOUS
Status: DISCONTINUED | OUTPATIENT
Start: 2020-01-01 | End: 2020-01-01

## 2020-01-01 RX ORDER — QUETIAPINE FUMARATE 100 MG/1
100 TABLET, FILM COATED ORAL NIGHTLY
Status: DISCONTINUED | OUTPATIENT
Start: 2020-01-01 | End: 2020-01-01

## 2020-01-01 RX ORDER — SENNOSIDES 8.6 MG/1
8.6 TABLET ORAL 2 TIMES DAILY
Status: DISCONTINUED | OUTPATIENT
Start: 2020-01-01 | End: 2020-01-01

## 2020-01-01 RX ORDER — MORPHINE SULFATE 2 MG/ML
2 INJECTION, SOLUTION INTRAMUSCULAR; INTRAVENOUS EVERY 4 HOURS PRN
Status: DISCONTINUED | OUTPATIENT
Start: 2020-01-01 | End: 2020-01-01

## 2020-01-01 RX ORDER — MODAFINIL 100 MG/1
200 TABLET ORAL ONCE
Status: COMPLETED | OUTPATIENT
Start: 2020-01-01 | End: 2020-01-01

## 2020-01-01 RX ORDER — POTASSIUM CHLORIDE 20 MEQ/1
20 TABLET, EXTENDED RELEASE ORAL 2 TIMES DAILY
Status: DISCONTINUED | OUTPATIENT
Start: 2020-01-01 | End: 2020-01-01

## 2020-01-01 RX ORDER — SYRING-NEEDL,DISP,INSUL,0.3 ML 29 G X1/2"
296 SYRINGE, EMPTY DISPOSABLE MISCELLANEOUS ONCE
Status: COMPLETED | OUTPATIENT
Start: 2020-01-01 | End: 2020-01-01

## 2020-01-01 RX ORDER — LORAZEPAM 2 MG/ML
2 INJECTION INTRAMUSCULAR EVERY 4 HOURS PRN
Status: DISCONTINUED | OUTPATIENT
Start: 2020-01-01 | End: 2020-01-01

## 2020-01-01 RX ORDER — HYDROXYCHLOROQUINE SULFATE 200 MG/1
400 TABLET, FILM COATED ORAL 2 TIMES DAILY
Status: DISPENSED | OUTPATIENT
Start: 2020-01-01 | End: 2020-01-01

## 2020-01-01 RX ORDER — MIDAZOLAM HYDROCHLORIDE 1 MG/ML
INJECTION INTRAMUSCULAR; INTRAVENOUS
Status: COMPLETED
Start: 2020-01-01 | End: 2020-01-01

## 2020-01-01 RX ORDER — LABETALOL HCL 20 MG/4 ML
20 SYRINGE (ML) INTRAVENOUS
Status: DISCONTINUED | OUTPATIENT
Start: 2020-01-01 | End: 2020-01-01

## 2020-01-01 RX ORDER — DEXTROSE 50 % IN WATER (D50W) INTRAVENOUS SYRINGE
12.5
Status: DISCONTINUED | OUTPATIENT
Start: 2020-01-01 | End: 2020-01-01

## 2020-01-01 RX ORDER — QUETIAPINE FUMARATE 25 MG/1
50 TABLET, FILM COATED ORAL EVERY 12 HOURS
Status: DISCONTINUED | OUTPATIENT
Start: 2020-01-01 | End: 2020-01-01

## 2020-01-01 RX ORDER — IBUPROFEN 200 MG
24 TABLET ORAL
Status: DISCONTINUED | OUTPATIENT
Start: 2020-01-01 | End: 2020-01-01 | Stop reason: HOSPADM

## 2020-01-01 RX ORDER — DEXTROSE MONOHYDRATE 100 MG/ML
INJECTION, SOLUTION INTRAVENOUS CONTINUOUS PRN
Status: CANCELLED | OUTPATIENT
Start: 2020-01-01

## 2020-01-01 RX ORDER — IBUPROFEN 200 MG
24 TABLET ORAL
Status: DISCONTINUED | OUTPATIENT
Start: 2020-01-01 | End: 2020-01-01

## 2020-01-01 RX ORDER — DIAZEPAM 10 MG/2ML
5 INJECTION INTRAMUSCULAR ONCE
Status: DISCONTINUED | OUTPATIENT
Start: 2020-01-01 | End: 2020-01-01

## 2020-01-01 RX ORDER — FAMOTIDINE 10 MG/ML
20 INJECTION INTRAVENOUS 2 TIMES DAILY
Status: DISCONTINUED | OUTPATIENT
Start: 2020-01-01 | End: 2020-01-01

## 2020-01-01 RX ORDER — HEPARIN SODIUM,PORCINE/D5W 25000/250
14 INTRAVENOUS SOLUTION INTRAVENOUS CONTINUOUS
Status: DISCONTINUED | OUTPATIENT
Start: 2020-01-01 | End: 2020-01-01

## 2020-01-01 RX ORDER — INSULIN ASPART 100 [IU]/ML
0-4 INJECTION, SOLUTION INTRAVENOUS; SUBCUTANEOUS
Status: DISCONTINUED | OUTPATIENT
Start: 2020-01-01 | End: 2020-01-01

## 2020-01-01 RX ORDER — LACTULOSE 10 G/15ML
20 SOLUTION ORAL 2 TIMES DAILY
Status: DISCONTINUED | OUTPATIENT
Start: 2020-01-01 | End: 2020-01-01

## 2020-01-01 RX ORDER — EPINEPHRINE 0.1 MG/ML
INJECTION INTRAVENOUS
Status: DISPENSED
Start: 2020-01-01 | End: 2020-01-01

## 2020-01-01 RX ORDER — NICARDIPINE HYDROCHLORIDE 0.2 MG/ML
INJECTION INTRAVENOUS
Status: COMPLETED
Start: 2020-01-01 | End: 2020-01-01

## 2020-01-01 RX ORDER — PHENYLEPHRINE HCL IN 0.9% NACL 1 MG/10 ML
100 SYRINGE (ML) INTRAVENOUS ONCE
Status: COMPLETED | OUTPATIENT
Start: 2020-01-01 | End: 2020-01-01

## 2020-01-01 RX ORDER — EPINEPHRINE 0.1 MG/ML
INJECTION INTRAVENOUS
Status: COMPLETED
Start: 2020-01-01 | End: 2020-01-01

## 2020-01-01 RX ORDER — TALC
6 POWDER (GRAM) TOPICAL NIGHTLY
Status: DISCONTINUED | OUTPATIENT
Start: 2020-01-01 | End: 2020-01-01

## 2020-01-01 RX ORDER — DEXMEDETOMIDINE HYDROCHLORIDE 4 UG/ML
0.4 INJECTION, SOLUTION INTRAVENOUS CONTINUOUS
Status: DISCONTINUED | OUTPATIENT
Start: 2020-01-01 | End: 2020-01-01

## 2020-01-01 RX ORDER — HEPARIN SODIUM 5000 [USP'U]/ML
5000 INJECTION, SOLUTION INTRAVENOUS; SUBCUTANEOUS EVERY 8 HOURS
Status: DISCONTINUED | OUTPATIENT
Start: 2020-01-01 | End: 2020-01-01 | Stop reason: HOSPADM

## 2020-01-01 RX ORDER — ROCURONIUM BROMIDE 10 MG/ML
1 INJECTION, SOLUTION INTRAVENOUS ONCE
Status: COMPLETED | OUTPATIENT
Start: 2020-01-01 | End: 2020-01-01

## 2020-01-01 RX ORDER — FUROSEMIDE 10 MG/ML
10 INJECTION INTRAMUSCULAR; INTRAVENOUS ONCE
Status: COMPLETED | OUTPATIENT
Start: 2020-01-01 | End: 2020-01-01

## 2020-01-01 RX ORDER — INSULIN ASPART 100 [IU]/ML
1-10 INJECTION, SOLUTION INTRAVENOUS; SUBCUTANEOUS EVERY 6 HOURS PRN
Status: DISCONTINUED | OUTPATIENT
Start: 2020-01-01 | End: 2020-01-01

## 2020-01-01 RX ORDER — HYDROMORPHONE HYDROCHLORIDE 1 MG/ML
1 INJECTION, SOLUTION INTRAMUSCULAR; INTRAVENOUS; SUBCUTANEOUS ONCE
Status: COMPLETED | OUTPATIENT
Start: 2020-01-01 | End: 2020-01-01

## 2020-01-01 RX ORDER — MAGNESIUM SULFATE HEPTAHYDRATE 40 MG/ML
4 INJECTION, SOLUTION INTRAVENOUS
Status: DISCONTINUED | OUTPATIENT
Start: 2020-01-01 | End: 2020-01-01 | Stop reason: HOSPADM

## 2020-01-01 RX ORDER — HYDROMORPHONE HYDROCHLORIDE 1 MG/ML
0.5 INJECTION, SOLUTION INTRAMUSCULAR; INTRAVENOUS; SUBCUTANEOUS ONCE
Status: DISCONTINUED | OUTPATIENT
Start: 2020-01-01 | End: 2020-01-01

## 2020-01-01 RX ORDER — FENTANYL CITRATE 50 UG/ML
25 INJECTION, SOLUTION INTRAMUSCULAR; INTRAVENOUS EVERY 4 HOURS PRN
Status: DISCONTINUED | OUTPATIENT
Start: 2020-01-01 | End: 2020-01-01

## 2020-01-01 RX ORDER — FENTANYL CITRATE 50 UG/ML
25 INJECTION, SOLUTION INTRAMUSCULAR; INTRAVENOUS ONCE
Status: COMPLETED | OUTPATIENT
Start: 2020-01-01 | End: 2020-01-01

## 2020-01-01 RX ORDER — MIDAZOLAM HYDROCHLORIDE 1 MG/ML
2 INJECTION INTRAMUSCULAR; INTRAVENOUS ONCE
Status: COMPLETED | OUTPATIENT
Start: 2020-01-01 | End: 2020-01-01

## 2020-01-01 RX ORDER — POTASSIUM CHLORIDE 14.9 MG/ML
60 INJECTION INTRAVENOUS
Status: DISCONTINUED | OUTPATIENT
Start: 2020-01-01 | End: 2020-01-01

## 2020-01-01 RX ORDER — DIAZEPAM 10 MG/2ML
5 INJECTION INTRAMUSCULAR 2 TIMES DAILY
Status: DISCONTINUED | OUTPATIENT
Start: 2020-01-01 | End: 2020-01-01

## 2020-01-01 RX ORDER — FLUCONAZOLE 2 MG/ML
200 INJECTION, SOLUTION INTRAVENOUS
Status: DISCONTINUED | OUTPATIENT
Start: 2020-01-01 | End: 2020-01-01

## 2020-01-01 RX ORDER — HEPARIN SODIUM 1000 [USP'U]/ML
INJECTION, SOLUTION INTRAVENOUS; SUBCUTANEOUS
Status: DISCONTINUED | OUTPATIENT
Start: 2020-01-01 | End: 2020-01-01

## 2020-01-01 RX ORDER — POTASSIUM CHLORIDE 14.9 MG/ML
20 INJECTION INTRAVENOUS ONCE
Status: COMPLETED | OUTPATIENT
Start: 2020-01-01 | End: 2020-01-01

## 2020-01-01 RX ORDER — ENOXAPARIN SODIUM 100 MG/ML
40 INJECTION SUBCUTANEOUS EVERY 24 HOURS
Status: DISCONTINUED | OUTPATIENT
Start: 2020-01-01 | End: 2020-01-01

## 2020-01-01 RX ORDER — CEFTRIAXONE 1 G/1
1 INJECTION, POWDER, FOR SOLUTION INTRAMUSCULAR; INTRAVENOUS
Status: COMPLETED | OUTPATIENT
Start: 2020-01-01 | End: 2020-01-01

## 2020-01-01 RX ORDER — PHENOBARBITAL 20 MG/5ML
60 ELIXIR ORAL 2 TIMES DAILY
Status: DISCONTINUED | OUTPATIENT
Start: 2020-01-01 | End: 2020-01-01

## 2020-01-01 RX ORDER — ALPRAZOLAM 0.25 MG/1
0.25 TABLET ORAL ONCE
Status: COMPLETED | OUTPATIENT
Start: 2020-01-01 | End: 2020-01-01

## 2020-01-01 RX ORDER — FENTANYL CITRATE 50 UG/ML
INJECTION, SOLUTION INTRAMUSCULAR; INTRAVENOUS
Status: DISPENSED
Start: 2020-01-01 | End: 2020-01-01

## 2020-01-01 RX ORDER — DEXTROSE MONOHYDRATE 100 MG/ML
INJECTION, SOLUTION INTRAVENOUS CONTINUOUS PRN
Status: DISCONTINUED | OUTPATIENT
Start: 2020-01-01 | End: 2020-01-01 | Stop reason: HOSPADM

## 2020-01-01 RX ORDER — ALBUMIN HUMAN 50 G/1000ML
12.5 SOLUTION INTRAVENOUS ONCE
Status: DISCONTINUED | OUTPATIENT
Start: 2020-01-01 | End: 2020-01-01

## 2020-01-01 RX ADMIN — Medication 10 ML: at 08:06

## 2020-01-01 RX ADMIN — Medication 0.14 MCG/KG/MIN: at 10:07

## 2020-01-01 RX ADMIN — FENTANYL CITRATE 25 MCG: 50 INJECTION INTRAMUSCULAR; INTRAVENOUS at 01:05

## 2020-01-01 RX ADMIN — HEPARIN SODIUM 950 UNITS/HR: 10000 INJECTION, SOLUTION INTRAVENOUS at 12:05

## 2020-01-01 RX ADMIN — FAMOTIDINE 20 MG: 20 TABLET, FILM COATED ORAL at 08:04

## 2020-01-01 RX ADMIN — Medication 1 PACKET: at 03:06

## 2020-01-01 RX ADMIN — PANTOPRAZOLE SODIUM 40 MG: 40 GRANULE, DELAYED RELEASE ORAL at 08:06

## 2020-01-01 RX ADMIN — VANCOMYCIN HYDROCHLORIDE 1000 MG: 1 INJECTION, POWDER, LYOPHILIZED, FOR SOLUTION INTRAVENOUS at 06:07

## 2020-01-01 RX ADMIN — HEPARIN SODIUM 5000 UNITS: 5000 INJECTION INTRAVENOUS; SUBCUTANEOUS at 02:07

## 2020-01-01 RX ADMIN — QUETIAPINE FUMARATE 50 MG: 25 TABLET, FILM COATED ORAL at 08:06

## 2020-01-01 RX ADMIN — VANCOMYCIN HYDROCHLORIDE 1250 MG: 1.25 INJECTION, POWDER, LYOPHILIZED, FOR SOLUTION INTRAVENOUS at 10:05

## 2020-01-01 RX ADMIN — DIAZEPAM 5 MG: 5 INJECTION, SOLUTION INTRAMUSCULAR; INTRAVENOUS at 08:04

## 2020-01-01 RX ADMIN — INSULIN ASPART 3 UNITS: 100 INJECTION, SOLUTION INTRAVENOUS; SUBCUTANEOUS at 10:04

## 2020-01-01 RX ADMIN — SENNOSIDES 8.6 MG: 8.6 TABLET, FILM COATED ORAL at 08:04

## 2020-01-01 RX ADMIN — IPRATROPIUM BROMIDE AND ALBUTEROL SULFATE 3 ML: 2.5; .5 SOLUTION RESPIRATORY (INHALATION) at 07:05

## 2020-01-01 RX ADMIN — SUCRALFATE 1 G: 1 SUSPENSION ORAL at 05:05

## 2020-01-01 RX ADMIN — HEPARIN SODIUM 5000 UNITS: 5000 INJECTION INTRAVENOUS; SUBCUTANEOUS at 09:06

## 2020-01-01 RX ADMIN — CHLORHEXIDINE GLUCONATE 0.12% ORAL RINSE 15 ML: 1.2 LIQUID ORAL at 09:04

## 2020-01-01 RX ADMIN — POTASSIUM CHLORIDE 20 MEQ: 20 SOLUTION ORAL at 08:05

## 2020-01-01 RX ADMIN — PROPOFOL 35 MCG/KG/MIN: 10 INJECTION, EMULSION INTRAVENOUS at 04:04

## 2020-01-01 RX ADMIN — Medication 10 ML: at 09:06

## 2020-01-01 RX ADMIN — CEFEPIME 2 G: 2 INJECTION, POWDER, FOR SOLUTION INTRAVENOUS at 11:05

## 2020-01-01 RX ADMIN — CHLORHEXIDINE GLUCONATE 0.12% ORAL RINSE 15 ML: 1.2 LIQUID ORAL at 08:05

## 2020-01-01 RX ADMIN — Medication 0.12 MCG/KG/MIN: at 03:04

## 2020-01-01 RX ADMIN — INSULIN ASPART 6 UNITS: 100 INJECTION, SOLUTION INTRAVENOUS; SUBCUTANEOUS at 04:06

## 2020-01-01 RX ADMIN — PANTOPRAZOLE SODIUM 40 MG: 40 INJECTION, POWDER, FOR SOLUTION INTRAVENOUS at 09:05

## 2020-01-01 RX ADMIN — CISATRACURIUM BESYLATE 1 MCG/KG/MIN: 10 INJECTION, SOLUTION INTRAVENOUS at 03:04

## 2020-01-01 RX ADMIN — POLYETHYLENE GLYCOL 3350 17 G: 17 POWDER, FOR SOLUTION ORAL at 08:05

## 2020-01-01 RX ADMIN — PIPERACILLIN AND TAZOBACTAM 4.5 G: 4; .5 INJECTION, POWDER, FOR SOLUTION INTRAVENOUS at 05:05

## 2020-01-01 RX ADMIN — POTASSIUM CHLORIDE 40 MEQ: 20 SOLUTION ORAL at 05:05

## 2020-01-01 RX ADMIN — INSULIN ASPART 3 UNITS: 100 INJECTION, SOLUTION INTRAVENOUS; SUBCUTANEOUS at 06:07

## 2020-01-01 RX ADMIN — Medication 300 MG: at 10:06

## 2020-01-01 RX ADMIN — DIAZEPAM 5 MG: 5 INJECTION, SOLUTION INTRAMUSCULAR; INTRAVENOUS at 02:04

## 2020-01-01 RX ADMIN — NICARDIPINE HYDROCHLORIDE 2 MG/HR: 0.2 INJECTION, SOLUTION INTRAVENOUS at 03:05

## 2020-01-01 RX ADMIN — Medication 800 MG: at 08:05

## 2020-01-01 RX ADMIN — ASPIRIN 81 MG CHEWABLE TABLET 81 MG: 81 TABLET CHEWABLE at 09:05

## 2020-01-01 RX ADMIN — NICARDIPINE HYDROCHLORIDE 2.5 MG/HR: 0.2 INJECTION, SOLUTION INTRAVENOUS at 02:04

## 2020-01-01 RX ADMIN — POTASSIUM CHLORIDE 40 MEQ: 20 SOLUTION ORAL at 08:05

## 2020-01-01 RX ADMIN — NICARDIPINE HYDROCHLORIDE 6 MG/HR: 0.2 INJECTION, SOLUTION INTRAVENOUS at 11:05

## 2020-01-01 RX ADMIN — QUETIAPINE FUMARATE 100 MG: 100 TABLET ORAL at 08:07

## 2020-01-01 RX ADMIN — MELATONIN 1000 UNITS: at 09:06

## 2020-01-01 RX ADMIN — DEXMEDETOMIDINE HYDROCHLORIDE IN 0.9% SODIUM CHLORIDE 0.6 MCG/KG/HR: 400 INJECTION INTRAVENOUS at 03:05

## 2020-01-01 RX ADMIN — FLUCONAZOLE 200 MG: 2 INJECTION, SOLUTION INTRAVENOUS at 01:05

## 2020-01-01 RX ADMIN — PANTOPRAZOLE SODIUM 40 MG: 40 INJECTION, POWDER, FOR SOLUTION INTRAVENOUS at 08:05

## 2020-01-01 RX ADMIN — FLUDROCORTISONE ACETATE 100 MCG: 0.1 TABLET ORAL at 08:04

## 2020-01-01 RX ADMIN — IPRATROPIUM BROMIDE AND ALBUTEROL SULFATE 3 ML: 2.5; .5 SOLUTION RESPIRATORY (INHALATION) at 08:05

## 2020-01-01 RX ADMIN — METHYLPREDNISOLONE SODIUM SUCCINATE 40 MG: 40 INJECTION, POWDER, FOR SOLUTION INTRAMUSCULAR; INTRAVENOUS at 08:04

## 2020-01-01 RX ADMIN — CARBOXYMETHYLCELLULOSE SODIUM 2 DROP: 10 GEL OPHTHALMIC at 12:04

## 2020-01-01 RX ADMIN — INSULIN ASPART 4 UNITS: 100 INJECTION, SOLUTION INTRAVENOUS; SUBCUTANEOUS at 08:04

## 2020-01-01 RX ADMIN — DIAZEPAM 2 MG: 2 TABLET ORAL at 01:05

## 2020-01-01 RX ADMIN — Medication 200 MCG/HR: at 02:07

## 2020-01-01 RX ADMIN — CEFEPIME 2 G: 2 INJECTION, POWDER, FOR SOLUTION INTRAVENOUS at 12:04

## 2020-01-01 RX ADMIN — Medication 1 PACKET: at 02:05

## 2020-01-01 RX ADMIN — POLYETHYLENE GLYCOL 3350 17 G: 17 POWDER, FOR SOLUTION ORAL at 08:06

## 2020-01-01 RX ADMIN — FENTANYL CITRATE 25 MCG: 50 INJECTION INTRAMUSCULAR; INTRAVENOUS at 01:06

## 2020-01-01 RX ADMIN — Medication 300 MG: at 08:06

## 2020-01-01 RX ADMIN — SUCRALFATE 1 G: 1 SUSPENSION ORAL at 06:06

## 2020-01-01 RX ADMIN — CARBOXYMETHYLCELLULOSE SODIUM 2 DROP: 10 GEL OPHTHALMIC at 08:04

## 2020-01-01 RX ADMIN — MAGNESIUM SULFATE HEPTAHYDRATE 2 G: 40 INJECTION, SOLUTION INTRAVENOUS at 09:05

## 2020-01-01 RX ADMIN — Medication 10 ML: at 08:05

## 2020-01-01 RX ADMIN — POTASSIUM CHLORIDE 20 MEQ: 20 SOLUTION ORAL at 09:05

## 2020-01-01 RX ADMIN — FENTANYL CITRATE 25 MCG: 50 INJECTION INTRAMUSCULAR; INTRAVENOUS at 02:06

## 2020-01-01 RX ADMIN — PIPERACILLIN AND TAZOBACTAM 4.5 G: 4; .5 INJECTION, POWDER, FOR SOLUTION INTRAVENOUS at 04:05

## 2020-01-01 RX ADMIN — HYDROCORTISONE SODIUM SUCCINATE 100 MG: 100 INJECTION, POWDER, FOR SOLUTION INTRAMUSCULAR; INTRAVENOUS at 09:06

## 2020-01-01 RX ADMIN — INSULIN ASPART 4 UNITS: 100 INJECTION, SOLUTION INTRAVENOUS; SUBCUTANEOUS at 10:04

## 2020-01-01 RX ADMIN — MINERAL OIL AND WHITE PETROLATUM: 150; 830 OINTMENT OPHTHALMIC at 09:05

## 2020-01-01 RX ADMIN — FUROSEMIDE 5 MG: 10 INJECTION, SOLUTION INTRAMUSCULAR; INTRAVENOUS at 10:05

## 2020-01-01 RX ADMIN — VANCOMYCIN HYDROCHLORIDE 1000 MG: 1 INJECTION, POWDER, LYOPHILIZED, FOR SOLUTION INTRAVENOUS at 07:04

## 2020-01-01 RX ADMIN — SUCRALFATE 1 G: 1 SUSPENSION ORAL at 11:05

## 2020-01-01 RX ADMIN — SODIUM BICARBONATE 50 ML: 84 INJECTION, SOLUTION INTRAVENOUS at 11:04

## 2020-01-01 RX ADMIN — DEXMEDETOMIDINE HYDROCHLORIDE IN 0.9% SODIUM CHLORIDE 0.9 MCG/KG/HR: 400 INJECTION INTRAVENOUS at 06:05

## 2020-01-01 RX ADMIN — ASPIRIN 81 MG CHEWABLE TABLET 81 MG: 81 TABLET CHEWABLE at 08:06

## 2020-01-01 RX ADMIN — VANCOMYCIN HYDROCHLORIDE 1250 MG: 1.25 INJECTION, POWDER, LYOPHILIZED, FOR SOLUTION INTRAVENOUS at 08:04

## 2020-01-01 RX ADMIN — CHLORHEXIDINE GLUCONATE 0.12% ORAL RINSE 15 ML: 1.2 LIQUID ORAL at 08:06

## 2020-01-01 RX ADMIN — Medication 1 PACKET: at 08:05

## 2020-01-01 RX ADMIN — SUCRALFATE 1 G: 1 SUSPENSION ORAL at 05:07

## 2020-01-01 RX ADMIN — PIPERACILLIN SODIUM,TAZOBACTAM SODIUM 4.5 G: 4; .5 INJECTION, POWDER, FOR SOLUTION INTRAVENOUS at 05:05

## 2020-01-01 RX ADMIN — INSULIN ASPART 6 UNITS: 100 INJECTION, SOLUTION INTRAVENOUS; SUBCUTANEOUS at 11:06

## 2020-01-01 RX ADMIN — DEXMEDETOMIDINE HYDROCHLORIDE 1.4 MCG/KG/HR: 100 INJECTION, SOLUTION, CONCENTRATE INTRAVENOUS at 10:04

## 2020-01-01 RX ADMIN — FENTANYL CITRATE 25 MCG: 50 INJECTION INTRAMUSCULAR; INTRAVENOUS at 05:06

## 2020-01-01 RX ADMIN — Medication 0.6 MCG: at 10:04

## 2020-01-01 RX ADMIN — PROPOFOL 35 MCG/KG/MIN: 10 INJECTION, EMULSION INTRAVENOUS at 12:04

## 2020-01-01 RX ADMIN — Medication 1 PACKET: at 02:06

## 2020-01-01 RX ADMIN — ASPIRIN 81 MG CHEWABLE TABLET 81 MG: 81 TABLET CHEWABLE at 08:05

## 2020-01-01 RX ADMIN — IPRATROPIUM BROMIDE AND ALBUTEROL SULFATE 3 ML: 2.5; .5 SOLUTION RESPIRATORY (INHALATION) at 01:04

## 2020-01-01 RX ADMIN — METOPROLOL TARTRATE 12.5 MG: 25 TABLET ORAL at 09:06

## 2020-01-01 RX ADMIN — SUCRALFATE 1 G: 1 SUSPENSION ORAL at 11:06

## 2020-01-01 RX ADMIN — MINERAL OIL AND WHITE PETROLATUM: 150; 830 OINTMENT OPHTHALMIC at 10:05

## 2020-01-01 RX ADMIN — POTASSIUM CHLORIDE 40 MEQ: 20 SOLUTION ORAL at 03:05

## 2020-01-01 RX ADMIN — VECURONIUM BROMIDE FOR INJECTION 2 MCG/KG/MIN: 1 INJECTION, POWDER, LYOPHILIZED, FOR SOLUTION INTRAVENOUS at 12:04

## 2020-01-01 RX ADMIN — Medication 1 PACKET: at 09:06

## 2020-01-01 RX ADMIN — INSULIN ASPART 6 UNITS: 100 INJECTION, SOLUTION INTRAVENOUS; SUBCUTANEOUS at 07:06

## 2020-01-01 RX ADMIN — QUETIAPINE FUMARATE 50 MG: 25 TABLET ORAL at 09:05

## 2020-01-01 RX ADMIN — SUCRALFATE 1 G: 1 SUSPENSION ORAL at 05:06

## 2020-01-01 RX ADMIN — HEPARIN SODIUM 5000 UNITS: 5000 INJECTION INTRAVENOUS; SUBCUTANEOUS at 05:07

## 2020-01-01 RX ADMIN — Medication 25 MG: at 09:05

## 2020-01-01 RX ADMIN — PIPERACILLIN SODIUM,TAZOBACTAM SODIUM 4.5 G: 4; .5 INJECTION, POWDER, FOR SOLUTION INTRAVENOUS at 04:05

## 2020-01-01 RX ADMIN — Medication 10 MG: at 03:04

## 2020-01-01 RX ADMIN — DEXTROSE: 5 SOLUTION INTRAVENOUS at 09:04

## 2020-01-01 RX ADMIN — PROPOFOL 50 MCG/KG/MIN: 10 INJECTION, EMULSION INTRAVENOUS at 11:04

## 2020-01-01 RX ADMIN — POTASSIUM CHLORIDE 40 MEQ: 20 SOLUTION ORAL at 08:06

## 2020-01-01 RX ADMIN — ACETAMINOPHEN 650 MG: 325 TABLET ORAL at 07:06

## 2020-01-01 RX ADMIN — POTASSIUM CHLORIDE 40 MEQ: 20 SOLUTION ORAL at 01:05

## 2020-01-01 RX ADMIN — SUCRALFATE 1 G: 1 SUSPENSION ORAL at 02:07

## 2020-01-01 RX ADMIN — POTASSIUM & SODIUM PHOSPHATES POWDER PACK 280-160-250 MG 2 PACKET: 280-160-250 PACK at 03:06

## 2020-01-01 RX ADMIN — CARBOXYMETHYLCELLULOSE SODIUM: 10 GEL OPHTHALMIC at 08:04

## 2020-01-01 RX ADMIN — POTASSIUM CHLORIDE 40 MEQ: 29.8 INJECTION, SOLUTION INTRAVENOUS at 06:05

## 2020-01-01 RX ADMIN — SODIUM CHLORIDE 500 ML: 0.9 INJECTION, SOLUTION INTRAVENOUS at 10:06

## 2020-01-01 RX ADMIN — POLYETHYLENE GLYCOL 3350 17 G: 17 POWDER, FOR SOLUTION ORAL at 09:06

## 2020-01-01 RX ADMIN — AZITHROMYCIN MONOHYDRATE 250 MG: 250 TABLET ORAL at 08:04

## 2020-01-01 RX ADMIN — AMLODIPINE BESYLATE 5 MG: 5 TABLET ORAL at 09:05

## 2020-01-01 RX ADMIN — Medication 0.3 MCG/KG/MIN: at 11:06

## 2020-01-01 RX ADMIN — FENTANYL CITRATE 25 MCG: 50 INJECTION INTRAMUSCULAR; INTRAVENOUS at 07:05

## 2020-01-01 RX ADMIN — FENTANYL CITRATE 25 MCG: 50 INJECTION INTRAMUSCULAR; INTRAVENOUS at 08:05

## 2020-01-01 RX ADMIN — INSULIN ASPART 3 UNITS: 100 INJECTION, SOLUTION INTRAVENOUS; SUBCUTANEOUS at 12:06

## 2020-01-01 RX ADMIN — QUETIAPINE FUMARATE 200 MG: 200 TABLET ORAL at 09:06

## 2020-01-01 RX ADMIN — VANCOMYCIN HYDROCHLORIDE 1250 MG: 1.25 INJECTION, POWDER, LYOPHILIZED, FOR SOLUTION INTRAVENOUS at 11:05

## 2020-01-01 RX ADMIN — PROPOFOL 40 MCG/KG/MIN: 10 INJECTION, EMULSION INTRAVENOUS at 02:04

## 2020-01-01 RX ADMIN — ASPIRIN 81 MG CHEWABLE TABLET 81 MG: 81 TABLET CHEWABLE at 08:04

## 2020-01-01 RX ADMIN — INSULIN ASPART 6 UNITS: 100 INJECTION, SOLUTION INTRAVENOUS; SUBCUTANEOUS at 03:06

## 2020-01-01 RX ADMIN — HYDROMORPHONE HYDROCHLORIDE 0.5 MG: 1 INJECTION, SOLUTION INTRAMUSCULAR; INTRAVENOUS; SUBCUTANEOUS at 10:06

## 2020-01-01 RX ADMIN — IPRATROPIUM BROMIDE AND ALBUTEROL SULFATE 3 ML: 2.5; .5 SOLUTION RESPIRATORY (INHALATION) at 01:05

## 2020-01-01 RX ADMIN — FENTANYL CITRATE 25 MCG: 50 INJECTION INTRAMUSCULAR; INTRAVENOUS at 04:06

## 2020-01-01 RX ADMIN — FENTANYL CITRATE 25 MCG: 50 INJECTION INTRAMUSCULAR; INTRAVENOUS at 12:06

## 2020-01-01 RX ADMIN — HYDROMORPHONE HYDROCHLORIDE 0.5 MG: 1 INJECTION, SOLUTION INTRAMUSCULAR; INTRAVENOUS; SUBCUTANEOUS at 12:06

## 2020-01-01 RX ADMIN — INSULIN ASPART 10 UNITS: 100 INJECTION, SOLUTION INTRAVENOUS; SUBCUTANEOUS at 03:05

## 2020-01-01 RX ADMIN — Medication 0.15 MCG/KG/MIN: at 02:04

## 2020-01-01 RX ADMIN — PROPOFOL 35 MCG/KG/MIN: 10 INJECTION, EMULSION INTRAVENOUS at 08:07

## 2020-01-01 RX ADMIN — NICARDIPINE HYDROCHLORIDE 10 MG/HR: 0.2 INJECTION, SOLUTION INTRAVENOUS at 11:04

## 2020-01-01 RX ADMIN — Medication 10 ML: at 08:07

## 2020-01-01 RX ADMIN — CHLORHEXIDINE GLUCONATE 0.12% ORAL RINSE 15 ML: 1.2 LIQUID ORAL at 08:04

## 2020-01-01 RX ADMIN — SODIUM CHLORIDE 1 UNITS/HR: 9 INJECTION, SOLUTION INTRAVENOUS at 10:06

## 2020-01-01 RX ADMIN — POLYETHYLENE GLYCOL 3350 17 G: 17 POWDER, FOR SOLUTION ORAL at 08:04

## 2020-01-01 RX ADMIN — Medication 300 MG: at 03:05

## 2020-01-01 RX ADMIN — PIPERACILLIN AND TAZOBACTAM 4.5 G: 4; .5 INJECTION, POWDER, FOR SOLUTION INTRAVENOUS at 06:06

## 2020-01-01 RX ADMIN — DIAZEPAM 2 MG: 2 TABLET ORAL at 10:05

## 2020-01-01 RX ADMIN — SODIUM CHLORIDE 0.8 MCG/KG/MIN: 9 INJECTION, SOLUTION INTRAVENOUS at 08:07

## 2020-01-01 RX ADMIN — CEFEPIME 2 G: 2 INJECTION, POWDER, FOR SOLUTION INTRAVENOUS at 08:06

## 2020-01-01 RX ADMIN — INSULIN ASPART 5 UNITS: 100 INJECTION, SOLUTION INTRAVENOUS; SUBCUTANEOUS at 05:06

## 2020-01-01 RX ADMIN — CEFEPIME 2 G: 2 INJECTION, POWDER, FOR SOLUTION INTRAVENOUS at 03:05

## 2020-01-01 RX ADMIN — FAMOTIDINE 20 MG: 10 INJECTION INTRAVENOUS at 09:04

## 2020-01-01 RX ADMIN — Medication 1 PACKET: at 08:06

## 2020-01-01 RX ADMIN — POTASSIUM & SODIUM PHOSPHATES POWDER PACK 280-160-250 MG 2 PACKET: 280-160-250 PACK at 05:07

## 2020-01-01 RX ADMIN — IPRATROPIUM BROMIDE AND ALBUTEROL SULFATE 3 ML: 2.5; .5 SOLUTION RESPIRATORY (INHALATION) at 12:05

## 2020-01-01 RX ADMIN — INSULIN ASPART 2 UNITS: 100 INJECTION, SOLUTION INTRAVENOUS; SUBCUTANEOUS at 02:04

## 2020-01-01 RX ADMIN — HYDROMORPHONE HYDROCHLORIDE 4 MG/HR: 2 INJECTION INTRAMUSCULAR; INTRAVENOUS; SUBCUTANEOUS at 07:04

## 2020-01-01 RX ADMIN — METOPROLOL TARTRATE 12.5 MG: 25 TABLET ORAL at 08:06

## 2020-01-01 RX ADMIN — VANCOMYCIN HYDROCHLORIDE 1000 MG: 1 INJECTION, POWDER, LYOPHILIZED, FOR SOLUTION INTRAVENOUS at 08:06

## 2020-01-01 RX ADMIN — METHYLPREDNISOLONE SODIUM SUCCINATE 40 MG: 40 INJECTION, POWDER, FOR SOLUTION INTRAMUSCULAR; INTRAVENOUS at 11:04

## 2020-01-01 RX ADMIN — INSULIN ASPART 4 UNITS: 100 INJECTION, SOLUTION INTRAVENOUS; SUBCUTANEOUS at 04:06

## 2020-01-01 RX ADMIN — CHLORHEXIDINE GLUCONATE 0.12% ORAL RINSE 15 ML: 1.2 LIQUID ORAL at 09:05

## 2020-01-01 RX ADMIN — IPRATROPIUM BROMIDE AND ALBUTEROL SULFATE 3 ML: 2.5; .5 SOLUTION RESPIRATORY (INHALATION) at 07:04

## 2020-01-01 RX ADMIN — IPRATROPIUM BROMIDE AND ALBUTEROL SULFATE 3 ML: 2.5; .5 SOLUTION RESPIRATORY (INHALATION) at 08:04

## 2020-01-01 RX ADMIN — INSULIN ASPART 5 UNITS: 100 INJECTION, SOLUTION INTRAVENOUS; SUBCUTANEOUS at 04:06

## 2020-01-01 RX ADMIN — PANTOPRAZOLE SODIUM 40 MG: 40 INJECTION, POWDER, FOR SOLUTION INTRAVENOUS at 08:04

## 2020-01-01 RX ADMIN — FENTANYL 1 PATCH: 12 PATCH, EXTENDED RELEASE TRANSDERMAL at 03:06

## 2020-01-01 RX ADMIN — PANTOPRAZOLE SODIUM 40 MG: 40 GRANULE, DELAYED RELEASE ORAL at 08:07

## 2020-01-01 RX ADMIN — HEPARIN SODIUM AND DEXTROSE 1200 UNITS/HR: 10000; 5 INJECTION INTRAVENOUS at 08:04

## 2020-01-01 RX ADMIN — FENTANYL CITRATE 50 MCG: 50 INJECTION INTRAMUSCULAR; INTRAVENOUS at 05:05

## 2020-01-01 RX ADMIN — INSULIN ASPART 8 UNITS: 100 INJECTION, SOLUTION INTRAVENOUS; SUBCUTANEOUS at 04:04

## 2020-01-01 RX ADMIN — MELATONIN 1000 UNITS: at 08:06

## 2020-01-01 RX ADMIN — HEPARIN SODIUM 600 UNITS/HR: 10000 INJECTION, SOLUTION INTRAVENOUS at 04:06

## 2020-01-01 RX ADMIN — FENTANYL CITRATE 25 MCG: 50 INJECTION INTRAMUSCULAR; INTRAVENOUS at 10:06

## 2020-01-01 RX ADMIN — HEPARIN SODIUM 5000 UNITS: 5000 INJECTION INTRAVENOUS; SUBCUTANEOUS at 10:06

## 2020-01-01 RX ADMIN — PROPOFOL 50 MCG/KG/MIN: 10 INJECTION, EMULSION INTRAVENOUS at 03:04

## 2020-01-01 RX ADMIN — HYDROXYCHLOROQUINE SULFATE 400 MG: 200 TABLET ORAL at 09:04

## 2020-01-01 RX ADMIN — HEPARIN SODIUM 5000 UNITS: 5000 INJECTION INTRAVENOUS; SUBCUTANEOUS at 02:06

## 2020-01-01 RX ADMIN — PROPOFOL 50 MCG/KG/MIN: 10 INJECTION, EMULSION INTRAVENOUS at 05:04

## 2020-01-01 RX ADMIN — FENTANYL CITRATE 25 MCG: 50 INJECTION INTRAMUSCULAR; INTRAVENOUS at 07:06

## 2020-01-01 RX ADMIN — CHLOROTHIAZIDE SODIUM 250 MG: 500 INJECTION, POWDER, LYOPHILIZED, FOR SOLUTION INTRAVENOUS at 12:05

## 2020-01-01 RX ADMIN — ACETAZOLAMIDE SODIUM 250 MG: 500 INJECTION, POWDER, LYOPHILIZED, FOR SOLUTION INTRAVENOUS at 09:05

## 2020-01-01 RX ADMIN — CEFEPIME 2 G: 2 INJECTION, POWDER, FOR SOLUTION INTRAVENOUS at 05:04

## 2020-01-01 RX ADMIN — MINERAL OIL AND WHITE PETROLATUM: 150; 830 OINTMENT OPHTHALMIC at 08:05

## 2020-01-01 RX ADMIN — HEPARIN SODIUM 5000 UNITS: 5000 INJECTION INTRAVENOUS; SUBCUTANEOUS at 09:07

## 2020-01-01 RX ADMIN — INSULIN ASPART 2 UNITS: 100 INJECTION, SOLUTION INTRAVENOUS; SUBCUTANEOUS at 08:06

## 2020-01-01 RX ADMIN — HYDROMORPHONE HYDROCHLORIDE 2 MG/HR: 2 INJECTION INTRAMUSCULAR; INTRAVENOUS; SUBCUTANEOUS at 02:04

## 2020-01-01 RX ADMIN — HYDROMORPHONE HYDROCHLORIDE 3 MG/HR: 2 INJECTION INTRAMUSCULAR; INTRAVENOUS; SUBCUTANEOUS at 07:04

## 2020-01-01 RX ADMIN — SODIUM PHOSPHATE, MONOBASIC, MONOHYDRATE 15 MMOL: 276; 142 INJECTION, SOLUTION INTRAVENOUS at 06:04

## 2020-01-01 RX ADMIN — ALBUMIN (HUMAN) 25 G: 12.5 SOLUTION INTRAVENOUS at 12:05

## 2020-01-01 RX ADMIN — FLUCONAZOLE 200 MG: 2 INJECTION, SOLUTION INTRAVENOUS at 12:05

## 2020-01-01 RX ADMIN — SUCRALFATE 1 G: 1 SUSPENSION ORAL at 12:05

## 2020-01-01 RX ADMIN — MAGNESIUM SULFATE HEPTAHYDRATE 2 G: 40 INJECTION, SOLUTION INTRAVENOUS at 11:04

## 2020-01-01 RX ADMIN — DEXMEDETOMIDINE HYDROCHLORIDE 0.7 MCG/KG/HR: 4 INJECTION, SOLUTION INTRAVENOUS at 10:06

## 2020-01-01 RX ADMIN — Medication 6 MG: at 08:05

## 2020-01-01 RX ADMIN — INSULIN ASPART 5 UNITS: 100 INJECTION, SOLUTION INTRAVENOUS; SUBCUTANEOUS at 01:06

## 2020-01-01 RX ADMIN — ACETAZOLAMIDE SODIUM 250 MG: 500 INJECTION, POWDER, LYOPHILIZED, FOR SOLUTION INTRAVENOUS at 04:05

## 2020-01-01 RX ADMIN — IPRATROPIUM BROMIDE AND ALBUTEROL SULFATE 3 ML: 2.5; .5 SOLUTION RESPIRATORY (INHALATION) at 02:05

## 2020-01-01 RX ADMIN — Medication 200 MCG/HR: at 08:07

## 2020-01-01 RX ADMIN — SUCRALFATE 1 G: 1 SUSPENSION ORAL at 01:06

## 2020-01-01 RX ADMIN — SUCRALFATE 1 G: 1 SUSPENSION ORAL at 12:06

## 2020-01-01 RX ADMIN — AMLODIPINE BESYLATE 5 MG: 5 TABLET ORAL at 08:05

## 2020-01-01 RX ADMIN — DEXMEDETOMIDINE HYDROCHLORIDE 0.4 MCG/KG/HR: 4 INJECTION, SOLUTION INTRAVENOUS at 08:06

## 2020-01-01 RX ADMIN — SENNOSIDES AND DOCUSATE SODIUM 1 TABLET: 8.6; 5 TABLET ORAL at 08:07

## 2020-01-01 RX ADMIN — INSULIN ASPART 2 UNITS: 100 INJECTION, SOLUTION INTRAVENOUS; SUBCUTANEOUS at 12:06

## 2020-01-01 RX ADMIN — PIPERACILLIN AND TAZOBACTAM 4.5 G: 4; .5 INJECTION, POWDER, LYOPHILIZED, FOR SOLUTION INTRAVENOUS at 02:07

## 2020-01-01 RX ADMIN — DEXMEDETOMIDINE HYDROCHLORIDE 1.4 MCG/KG/HR: 100 INJECTION, SOLUTION, CONCENTRATE INTRAVENOUS at 07:04

## 2020-01-01 RX ADMIN — METHYLPREDNISOLONE SODIUM SUCCINATE 40 MG: 40 INJECTION, POWDER, FOR SOLUTION INTRAMUSCULAR; INTRAVENOUS at 09:04

## 2020-01-01 RX ADMIN — DEXMEDETOMIDINE HYDROCHLORIDE 0.8 MCG/KG/HR: 4 INJECTION, SOLUTION INTRAVENOUS at 08:06

## 2020-01-01 RX ADMIN — NOREPINEPHRINE BITARTRATE 0.9 MCG/KG/MIN: 1 INJECTION, SOLUTION, CONCENTRATE INTRAVENOUS at 02:07

## 2020-01-01 RX ADMIN — FUROSEMIDE 40 MG: 10 INJECTION, SOLUTION INTRAMUSCULAR; INTRAVENOUS at 08:04

## 2020-01-01 RX ADMIN — DEXMEDETOMIDINE HYDROCHLORIDE 1.4 MCG/KG/HR: 100 INJECTION, SOLUTION, CONCENTRATE INTRAVENOUS at 09:04

## 2020-01-01 RX ADMIN — DEXMEDETOMIDINE HYDROCHLORIDE 1.4 MCG/KG/HR: 4 INJECTION, SOLUTION INTRAVENOUS at 02:07

## 2020-01-01 RX ADMIN — DEXMEDETOMIDINE HYDROCHLORIDE IN 0.9% SODIUM CHLORIDE 0.4 MCG/KG/HR: 400 INJECTION INTRAVENOUS at 01:05

## 2020-01-01 RX ADMIN — POLYETHYLENE GLYCOL 3350 17 G: 17 POWDER, FOR SOLUTION ORAL at 08:07

## 2020-01-01 RX ADMIN — SODIUM CHLORIDE 1 MCG/KG/MIN: 9 INJECTION, SOLUTION INTRAVENOUS at 05:07

## 2020-01-01 RX ADMIN — KETAMINE HYDROCHLORIDE 20 MCG/KG/MIN: 50 INJECTION INTRAMUSCULAR; INTRAVENOUS at 01:04

## 2020-01-01 RX ADMIN — QUETIAPINE FUMARATE 200 MG: 200 TABLET ORAL at 08:06

## 2020-01-01 RX ADMIN — CISATRACURIUM BESYLATE 2.5 MCG/KG/MIN: 10 INJECTION, SOLUTION INTRAVENOUS at 10:04

## 2020-01-01 RX ADMIN — PIPERACILLIN AND TAZOBACTAM 4.5 G: 4; .5 INJECTION, POWDER, FOR SOLUTION INTRAVENOUS at 12:05

## 2020-01-01 RX ADMIN — FENTANYL CITRATE 2500 MCG: 50 INJECTION INTRAMUSCULAR; INTRAVENOUS at 03:04

## 2020-01-01 RX ADMIN — Medication 6 MG: at 09:05

## 2020-01-01 RX ADMIN — DEXMEDETOMIDINE HYDROCHLORIDE 1.2 MCG/KG/HR: 4 INJECTION, SOLUTION INTRAVENOUS at 10:06

## 2020-01-01 RX ADMIN — MAGNESIUM SULFATE HEPTAHYDRATE 2 G: 40 INJECTION, SOLUTION INTRAVENOUS at 06:06

## 2020-01-01 RX ADMIN — PROPOFOL 40 MCG/KG/MIN: 10 INJECTION, EMULSION INTRAVENOUS at 01:04

## 2020-01-01 RX ADMIN — FENTANYL CITRATE 25 MCG: 50 INJECTION INTRAMUSCULAR; INTRAVENOUS at 10:05

## 2020-01-01 RX ADMIN — POTASSIUM CHLORIDE 10 MEQ: 7.46 INJECTION, SOLUTION INTRAVENOUS at 07:04

## 2020-01-01 RX ADMIN — POTASSIUM CHLORIDE 40 MEQ: 20 SOLUTION ORAL at 10:05

## 2020-01-01 RX ADMIN — NICARDIPINE HYDROCHLORIDE 5 MG/HR: 0.2 INJECTION, SOLUTION INTRAVENOUS at 03:04

## 2020-01-01 RX ADMIN — PHENOBARBITAL 60 MG: 20 ELIXIR ORAL at 08:04

## 2020-01-01 RX ADMIN — POTASSIUM CHLORIDE 40 MEQ: 20 SOLUTION ORAL at 09:06

## 2020-01-01 RX ADMIN — PIPERACILLIN AND TAZOBACTAM 4.5 G: 4; .5 INJECTION, POWDER, FOR SOLUTION INTRAVENOUS at 05:06

## 2020-01-01 RX ADMIN — SODIUM PHOSPHATE, MONOBASIC, MONOHYDRATE 30 MMOL: 276; 142 INJECTION, SOLUTION INTRAVENOUS at 06:04

## 2020-01-01 RX ADMIN — CEFEPIME 2 G: 2 INJECTION, POWDER, FOR SOLUTION INTRAVENOUS at 08:04

## 2020-01-01 RX ADMIN — VANCOMYCIN HYDROCHLORIDE 1250 MG: 1.25 INJECTION, POWDER, LYOPHILIZED, FOR SOLUTION INTRAVENOUS at 09:05

## 2020-01-01 RX ADMIN — FAMOTIDINE 20 MG: 20 TABLET, FILM COATED ORAL at 09:04

## 2020-01-01 RX ADMIN — INSULIN ASPART 5 UNITS: 100 INJECTION, SOLUTION INTRAVENOUS; SUBCUTANEOUS at 09:06

## 2020-01-01 RX ADMIN — DEXMEDETOMIDINE HYDROCHLORIDE 0.8 MCG/KG/HR: 4 INJECTION, SOLUTION INTRAVENOUS at 12:06

## 2020-01-01 RX ADMIN — DEXMEDETOMIDINE HYDROCHLORIDE 0.4 MCG/KG/HR: 4 INJECTION, SOLUTION INTRAVENOUS at 10:06

## 2020-01-01 RX ADMIN — POTASSIUM CHLORIDE 40 MEQ: 20 SOLUTION ORAL at 10:06

## 2020-01-01 RX ADMIN — Medication 0.18 MCG/KG/MIN: at 11:04

## 2020-01-01 RX ADMIN — Medication 10 MG: at 12:06

## 2020-01-01 RX ADMIN — Medication 0.2 MCG/KG/MIN: at 06:04

## 2020-01-01 RX ADMIN — INSULIN ASPART 6 UNITS: 100 INJECTION, SOLUTION INTRAVENOUS; SUBCUTANEOUS at 06:06

## 2020-01-01 RX ADMIN — Medication 800 MG: at 09:05

## 2020-01-01 RX ADMIN — INSULIN ASPART 5 UNITS: 100 INJECTION, SOLUTION INTRAVENOUS; SUBCUTANEOUS at 07:06

## 2020-01-01 RX ADMIN — NICARDIPINE HYDROCHLORIDE 5 MG/HR: 0.2 INJECTION, SOLUTION INTRAVENOUS at 10:04

## 2020-01-01 RX ADMIN — DIAZEPAM 2 MG: 2 TABLET ORAL at 05:05

## 2020-01-01 RX ADMIN — NICARDIPINE HYDROCHLORIDE 5 MG/HR: 0.2 INJECTION, SOLUTION INTRAVENOUS at 05:05

## 2020-01-01 RX ADMIN — ROCURONIUM BROMIDE 100 MG: 10 INJECTION, SOLUTION INTRAVENOUS at 02:04

## 2020-01-01 RX ADMIN — PROPOFOL 50 MCG/KG/MIN: 10 INJECTION, EMULSION INTRAVENOUS at 12:04

## 2020-01-01 RX ADMIN — DEXMEDETOMIDINE HYDROCHLORIDE 1.2 MCG/KG/HR: 4 INJECTION, SOLUTION INTRAVENOUS at 02:06

## 2020-01-01 RX ADMIN — Medication 0.06 MCG/KG/MIN: at 06:04

## 2020-01-01 RX ADMIN — DEXMEDETOMIDINE HYDROCHLORIDE 1.2 MCG/KG/HR: 4 INJECTION, SOLUTION INTRAVENOUS at 01:06

## 2020-01-01 RX ADMIN — CARBOXYMETHYLCELLULOSE SODIUM 2 DROP: 10 GEL OPHTHALMIC at 09:04

## 2020-01-01 RX ADMIN — VECURONIUM BROMIDE FOR INJECTION 2 MCG/KG/MIN: 1 INJECTION, POWDER, LYOPHILIZED, FOR SOLUTION INTRAVENOUS at 10:04

## 2020-01-01 RX ADMIN — INSULIN ASPART 4 UNITS: 100 INJECTION, SOLUTION INTRAVENOUS; SUBCUTANEOUS at 10:06

## 2020-01-01 RX ADMIN — PANTOPRAZOLE SODIUM 40 MG: 40 INJECTION, POWDER, FOR SOLUTION INTRAVENOUS at 10:05

## 2020-01-01 RX ADMIN — FUROSEMIDE 40 MG: 10 INJECTION, SOLUTION INTRAMUSCULAR; INTRAVENOUS at 09:04

## 2020-01-01 RX ADMIN — DEXMEDETOMIDINE HYDROCHLORIDE 0.4 MCG/KG/HR: 4 INJECTION, SOLUTION INTRAVENOUS at 06:06

## 2020-01-01 RX ADMIN — DIAZEPAM 2 MG: 2 TABLET ORAL at 02:05

## 2020-01-01 RX ADMIN — POLYETHYLENE GLYCOL 3350 17 G: 17 POWDER, FOR SOLUTION ORAL at 09:05

## 2020-01-01 RX ADMIN — FLUCONAZOLE 200 MG: 2 INJECTION, SOLUTION INTRAVENOUS at 01:04

## 2020-01-01 RX ADMIN — PROPOFOL 40 MCG/KG/MIN: 10 INJECTION, EMULSION INTRAVENOUS at 08:05

## 2020-01-01 RX ADMIN — ACETAZOLAMIDE SODIUM 250 MG: 500 INJECTION, POWDER, LYOPHILIZED, FOR SOLUTION INTRAVENOUS at 10:05

## 2020-01-01 RX ADMIN — DEXMEDETOMIDINE HYDROCHLORIDE 0.8 MCG/KG/HR: 4 INJECTION, SOLUTION INTRAVENOUS at 01:07

## 2020-01-01 RX ADMIN — Medication 0.26 MCG/KG/MIN: at 04:06

## 2020-01-01 RX ADMIN — INSULIN ASPART 6 UNITS: 100 INJECTION, SOLUTION INTRAVENOUS; SUBCUTANEOUS at 08:06

## 2020-01-01 RX ADMIN — HYDROCORTISONE SODIUM SUCCINATE 100 MG: 100 INJECTION, POWDER, FOR SOLUTION INTRAMUSCULAR; INTRAVENOUS at 06:04

## 2020-01-01 RX ADMIN — INSULIN ASPART 5 UNITS: 100 INJECTION, SOLUTION INTRAVENOUS; SUBCUTANEOUS at 12:06

## 2020-01-01 RX ADMIN — DEXMEDETOMIDINE HYDROCHLORIDE 0.4 MCG/KG/HR: 4 INJECTION, SOLUTION INTRAVENOUS at 07:06

## 2020-01-01 RX ADMIN — PROPOFOL 50 MCG/KG/MIN: 10 INJECTION, EMULSION INTRAVENOUS at 02:05

## 2020-01-01 RX ADMIN — HYDROCORTISONE SODIUM SUCCINATE 100 MG: 100 INJECTION, POWDER, FOR SOLUTION INTRAMUSCULAR; INTRAVENOUS at 01:06

## 2020-01-01 RX ADMIN — FENTANYL CITRATE 25 MCG: 50 INJECTION INTRAMUSCULAR; INTRAVENOUS at 09:05

## 2020-01-01 RX ADMIN — INSULIN ASPART 2 UNITS: 100 INJECTION, SOLUTION INTRAVENOUS; SUBCUTANEOUS at 05:07

## 2020-01-01 RX ADMIN — INSULIN ASPART 1 UNITS: 100 INJECTION, SOLUTION INTRAVENOUS; SUBCUTANEOUS at 08:06

## 2020-01-01 RX ADMIN — Medication 0.07 MCG/KG/MIN: at 09:04

## 2020-01-01 RX ADMIN — MIDODRINE HYDROCHLORIDE 10 MG: 5 TABLET ORAL at 09:07

## 2020-01-01 RX ADMIN — PROPRANOLOL HYDROCHLORIDE 20 MG: 20 SOLUTION ORAL at 10:06

## 2020-01-01 RX ADMIN — DEXMEDETOMIDINE HYDROCHLORIDE IN 0.9% SODIUM CHLORIDE 0.8 MCG/KG/HR: 400 INJECTION INTRAVENOUS at 12:05

## 2020-01-01 RX ADMIN — HYDROMORPHONE HYDROCHLORIDE 1 MG: 1 INJECTION, SOLUTION INTRAMUSCULAR; INTRAVENOUS; SUBCUTANEOUS at 07:06

## 2020-01-01 RX ADMIN — DEXMEDETOMIDINE HYDROCHLORIDE 1 MCG/KG/HR: 100 INJECTION, SOLUTION, CONCENTRATE INTRAVENOUS at 10:04

## 2020-01-01 RX ADMIN — FENTANYL CITRATE 25 MCG: 50 INJECTION INTRAMUSCULAR; INTRAVENOUS at 12:05

## 2020-01-01 RX ADMIN — POTASSIUM & SODIUM PHOSPHATES POWDER PACK 280-160-250 MG 2 PACKET: 280-160-250 PACK at 09:06

## 2020-01-01 RX ADMIN — INSULIN ASPART 6 UNITS: 100 INJECTION, SOLUTION INTRAVENOUS; SUBCUTANEOUS at 12:06

## 2020-01-01 RX ADMIN — VANCOMYCIN HYDROCHLORIDE 1250 MG: 1.25 INJECTION, POWDER, LYOPHILIZED, FOR SOLUTION INTRAVENOUS at 08:05

## 2020-01-01 RX ADMIN — SUCRALFATE 1 G: 1 SUSPENSION ORAL at 12:07

## 2020-01-01 RX ADMIN — SODIUM CHLORIDE 1.8 UNITS/HR: 9 INJECTION, SOLUTION INTRAVENOUS at 06:05

## 2020-01-01 RX ADMIN — FENTANYL CITRATE 25 MCG: 50 INJECTION INTRAMUSCULAR; INTRAVENOUS at 11:06

## 2020-01-01 RX ADMIN — MELATONIN 1000 UNITS: at 08:07

## 2020-01-01 RX ADMIN — POTASSIUM CHLORIDE 20 MEQ: 200 INJECTION, SOLUTION INTRAVENOUS at 08:07

## 2020-01-01 RX ADMIN — PANTOPRAZOLE SODIUM 40 MG: 40 GRANULE, DELAYED RELEASE ORAL at 09:06

## 2020-01-01 RX ADMIN — PROPRANOLOL HYDROCHLORIDE 20 MG: 20 SOLUTION ORAL at 02:06

## 2020-01-01 RX ADMIN — INSULIN ASPART 2 UNITS: 100 INJECTION, SOLUTION INTRAVENOUS; SUBCUTANEOUS at 04:06

## 2020-01-01 RX ADMIN — FENTANYL 1 PATCH: 12 PATCH, EXTENDED RELEASE TRANSDERMAL at 02:05

## 2020-01-01 RX ADMIN — PROPOFOL 30 MCG/KG/MIN: 10 INJECTION, EMULSION INTRAVENOUS at 04:05

## 2020-01-01 RX ADMIN — SODIUM CHLORIDE 5 UNITS/HR: 9 INJECTION, SOLUTION INTRAVENOUS at 09:05

## 2020-01-01 RX ADMIN — DEXTROSE MONOHYDRATE 8 MG/HR: 50 INJECTION, SOLUTION INTRAVENOUS at 05:05

## 2020-01-01 RX ADMIN — POTASSIUM CHLORIDE 40 MEQ: 29.8 INJECTION, SOLUTION INTRAVENOUS at 11:05

## 2020-01-01 RX ADMIN — INSULIN ASPART 1 UNITS: 100 INJECTION, SOLUTION INTRAVENOUS; SUBCUTANEOUS at 11:06

## 2020-01-01 RX ADMIN — DIAZEPAM 0.5 MG: 5 SOLUTION ORAL at 09:05

## 2020-01-01 RX ADMIN — INSULIN ASPART 6 UNITS: 100 INJECTION, SOLUTION INTRAVENOUS; SUBCUTANEOUS at 09:06

## 2020-01-01 RX ADMIN — CEFEPIME 2 G: 2 INJECTION, POWDER, FOR SOLUTION INTRAVENOUS at 11:04

## 2020-01-01 RX ADMIN — DEXMEDETOMIDINE HYDROCHLORIDE 0.8 MCG/KG/HR: 4 INJECTION, SOLUTION INTRAVENOUS at 11:07

## 2020-01-01 RX ADMIN — Medication 100 MG: at 09:05

## 2020-01-01 RX ADMIN — DEXMEDETOMIDINE HYDROCHLORIDE 0.6 MCG/KG/HR: 4 INJECTION, SOLUTION INTRAVENOUS at 07:06

## 2020-01-01 RX ADMIN — HYPROMELLOSE 2910 1 DROP: 5 SOLUTION OPHTHALMIC at 11:05

## 2020-01-01 RX ADMIN — VANCOMYCIN HYDROCHLORIDE 1000 MG: 1 INJECTION, POWDER, LYOPHILIZED, FOR SOLUTION INTRAVENOUS at 11:05

## 2020-01-01 RX ADMIN — POTASSIUM CHLORIDE 40 MEQ: 29.8 INJECTION, SOLUTION INTRAVENOUS at 02:04

## 2020-01-01 RX ADMIN — NICARDIPINE HYDROCHLORIDE 1 MG/HR: 0.2 INJECTION, SOLUTION INTRAVENOUS at 06:05

## 2020-01-01 RX ADMIN — INSULIN ASPART 2 UNITS: 100 INJECTION, SOLUTION INTRAVENOUS; SUBCUTANEOUS at 03:04

## 2020-01-01 RX ADMIN — Medication 1 PACKET: at 04:06

## 2020-01-01 RX ADMIN — ALBUMIN (HUMAN) 25 G: 12.5 SOLUTION INTRAVENOUS at 10:04

## 2020-01-01 RX ADMIN — ROCURONIUM BROMIDE 50 MG: 10 INJECTION, SOLUTION INTRAVENOUS at 09:04

## 2020-01-01 RX ADMIN — SUCRALFATE 1 G: 1 SUSPENSION ORAL at 06:05

## 2020-01-01 RX ADMIN — Medication 87.5 MCG/HR: at 11:06

## 2020-01-01 RX ADMIN — FOLIC ACID 1 MG: 5 INJECTION, SOLUTION INTRAMUSCULAR; INTRAVENOUS; SUBCUTANEOUS at 09:05

## 2020-01-01 RX ADMIN — HUMAN ALBUMIN MICROSPHERES AND PERFLUTREN 0.66 MG: 10; .22 INJECTION, SOLUTION INTRAVENOUS at 03:05

## 2020-01-01 RX ADMIN — SUCRALFATE 1 G: 1 SUSPENSION ORAL at 07:05

## 2020-01-01 RX ADMIN — DEXMEDETOMIDINE HYDROCHLORIDE 1 MCG/KG/HR: 4 INJECTION, SOLUTION INTRAVENOUS at 12:07

## 2020-01-01 RX ADMIN — INSULIN ASPART 5 UNITS: 100 INJECTION, SOLUTION INTRAVENOUS; SUBCUTANEOUS at 08:06

## 2020-01-01 RX ADMIN — HYDROMORPHONE HYDROCHLORIDE 4 MG/HR: 2 INJECTION INTRAMUSCULAR; INTRAVENOUS; SUBCUTANEOUS at 02:04

## 2020-01-01 RX ADMIN — ALPRAZOLAM 0.25 MG: 0.25 TABLET ORAL at 06:05

## 2020-01-01 RX ADMIN — VANCOMYCIN HYDROCHLORIDE 1000 MG: 1 INJECTION, POWDER, LYOPHILIZED, FOR SOLUTION INTRAVENOUS at 06:06

## 2020-01-01 RX ADMIN — CARBOXYMETHYLCELLULOSE SODIUM 2 DROP: 10 GEL OPHTHALMIC at 01:04

## 2020-01-01 RX ADMIN — INSULIN ASPART 2 UNITS: 100 INJECTION, SOLUTION INTRAVENOUS; SUBCUTANEOUS at 09:04

## 2020-01-01 RX ADMIN — NOREPINEPHRINE BITARTRATE 0.8 MCG/KG/MIN: 1 INJECTION, SOLUTION, CONCENTRATE INTRAVENOUS at 03:07

## 2020-01-01 RX ADMIN — POTASSIUM CHLORIDE 10 MEQ: 7.46 INJECTION, SOLUTION INTRAVENOUS at 09:04

## 2020-01-01 RX ADMIN — HYDROMORPHONE HYDROCHLORIDE 0.5 MG: 1 INJECTION, SOLUTION INTRAMUSCULAR; INTRAVENOUS; SUBCUTANEOUS at 01:06

## 2020-01-01 RX ADMIN — HEPARIN SODIUM AND DEXTROSE 950 UNITS/HR: 10000; 5 INJECTION INTRAVENOUS at 09:06

## 2020-01-01 RX ADMIN — VASOPRESSIN 0.04 UNITS/MIN: 20 INJECTION INTRAVENOUS at 07:06

## 2020-01-01 RX ADMIN — CHLORHEXIDINE GLUCONATE 0.12% ORAL RINSE 15 ML: 1.2 LIQUID ORAL at 10:04

## 2020-01-01 RX ADMIN — PIPERACILLIN AND TAZOBACTAM 4.5 G: 4; .5 INJECTION, POWDER, FOR SOLUTION INTRAVENOUS at 09:06

## 2020-01-01 RX ADMIN — Medication 0.04 MCG/KG/MIN: at 05:04

## 2020-01-01 RX ADMIN — VANCOMYCIN HYDROCHLORIDE 750 MG: 750 INJECTION, POWDER, LYOPHILIZED, FOR SOLUTION INTRAVENOUS at 09:06

## 2020-01-01 RX ADMIN — INSULIN ASPART 1 UNITS: 100 INJECTION, SOLUTION INTRAVENOUS; SUBCUTANEOUS at 12:06

## 2020-01-01 RX ADMIN — INSULIN ASPART 1 UNITS: 100 INJECTION, SOLUTION INTRAVENOUS; SUBCUTANEOUS at 12:07

## 2020-01-01 RX ADMIN — VANCOMYCIN HYDROCHLORIDE 750 MG: 750 INJECTION, POWDER, LYOPHILIZED, FOR SOLUTION INTRAVENOUS at 02:06

## 2020-01-01 RX ADMIN — VASOPRESSIN 0.03 UNITS/MIN: 20 INJECTION INTRAVENOUS at 09:05

## 2020-01-01 RX ADMIN — SODIUM CHLORIDE 2.5 MG/HR: 9 INJECTION, SOLUTION INTRAVENOUS at 07:06

## 2020-01-01 RX ADMIN — AZITHROMYCIN MONOHYDRATE 250 MG: 250 TABLET ORAL at 09:04

## 2020-01-01 RX ADMIN — HYDROCORTISONE SODIUM SUCCINATE 100 MG: 100 INJECTION, POWDER, FOR SOLUTION INTRAMUSCULAR; INTRAVENOUS at 06:06

## 2020-01-01 RX ADMIN — QUETIAPINE FUMARATE 100 MG: 100 TABLET ORAL at 09:07

## 2020-01-01 RX ADMIN — MAGNESIUM CITRATE 296 ML: 1.75 LIQUID ORAL at 03:04

## 2020-01-01 RX ADMIN — FENTANYL CITRATE 25 MCG: 50 INJECTION INTRAMUSCULAR; INTRAVENOUS at 03:05

## 2020-01-01 RX ADMIN — VANCOMYCIN HYDROCHLORIDE 1000 MG: 1 INJECTION, POWDER, LYOPHILIZED, FOR SOLUTION INTRAVENOUS at 10:05

## 2020-01-01 RX ADMIN — FENTANYL CITRATE 25 MCG: 50 INJECTION INTRAMUSCULAR; INTRAVENOUS at 06:06

## 2020-01-01 RX ADMIN — FENTANYL CITRATE 25 MCG: 50 INJECTION INTRAMUSCULAR; INTRAVENOUS at 09:06

## 2020-01-01 RX ADMIN — VANCOMYCIN HYDROCHLORIDE 1000 MG: 1 INJECTION, POWDER, LYOPHILIZED, FOR SOLUTION INTRAVENOUS at 09:04

## 2020-01-01 RX ADMIN — DEXTROSE MONOHYDRATE 8 MG/HR: 50 INJECTION, SOLUTION INTRAVENOUS at 12:05

## 2020-01-01 RX ADMIN — SENNOSIDES AND DOCUSATE SODIUM 1 TABLET: 8.6; 5 TABLET ORAL at 10:07

## 2020-01-01 RX ADMIN — PIPERACILLIN AND TAZOBACTAM 4.5 G: 4; .5 INJECTION, POWDER, FOR SOLUTION INTRAVENOUS at 01:05

## 2020-01-01 RX ADMIN — POTASSIUM CHLORIDE 20 MEQ: 20 SOLUTION ORAL at 08:06

## 2020-01-01 RX ADMIN — CALCIUM GLUCONATE 1 G: 98 INJECTION, SOLUTION INTRAVENOUS at 10:04

## 2020-01-01 RX ADMIN — INSULIN ASPART 1 UNITS: 100 INJECTION, SOLUTION INTRAVENOUS; SUBCUTANEOUS at 11:05

## 2020-01-01 RX ADMIN — Medication 200 MG: at 08:05

## 2020-01-01 RX ADMIN — Medication 800 MG: at 04:05

## 2020-01-01 RX ADMIN — HEPARIN SODIUM 850 UNITS/HR: 10000 INJECTION, SOLUTION INTRAVENOUS at 01:05

## 2020-01-01 RX ADMIN — INSULIN ASPART 6 UNITS: 100 INJECTION, SOLUTION INTRAVENOUS; SUBCUTANEOUS at 05:04

## 2020-01-01 RX ADMIN — SODIUM CHLORIDE 0.8 UNITS/HR: 9 INJECTION, SOLUTION INTRAVENOUS at 07:06

## 2020-01-01 RX ADMIN — CARBOXYMETHYLCELLULOSE SODIUM 2 DROP: 10 GEL OPHTHALMIC at 05:04

## 2020-01-01 RX ADMIN — INSULIN ASPART 4 UNITS: 100 INJECTION, SOLUTION INTRAVENOUS; SUBCUTANEOUS at 03:05

## 2020-01-01 RX ADMIN — LACTULOSE 20 G: 20 SOLUTION ORAL at 08:04

## 2020-01-01 RX ADMIN — ENOXAPARIN SODIUM 40 MG: 100 INJECTION SUBCUTANEOUS at 09:04

## 2020-01-01 RX ADMIN — SUCRALFATE 1 G: 1 SUSPENSION ORAL at 01:05

## 2020-01-01 RX ADMIN — KETAMINE HYDROCHLORIDE 100 MG: 50 INJECTION INTRAMUSCULAR; INTRAVENOUS at 10:04

## 2020-01-01 RX ADMIN — PROPOFOL 35 MCG/KG/MIN: 10 INJECTION, EMULSION INTRAVENOUS at 02:04

## 2020-01-01 RX ADMIN — INSULIN ASPART 4 UNITS: 100 INJECTION, SOLUTION INTRAVENOUS; SUBCUTANEOUS at 11:04

## 2020-01-01 RX ADMIN — CALCIUM CHLORIDE 1 G: 100 INJECTION, SOLUTION INTRAVENOUS at 11:04

## 2020-01-01 RX ADMIN — SUCRALFATE 1 G: 1 SUSPENSION ORAL at 06:07

## 2020-01-01 RX ADMIN — DIAZEPAM 5 MG: 5 INJECTION, SOLUTION INTRAMUSCULAR; INTRAVENOUS at 04:04

## 2020-01-01 RX ADMIN — HYDROMORPHONE HYDROCHLORIDE 4 MG/HR: 2 INJECTION INTRAMUSCULAR; INTRAVENOUS; SUBCUTANEOUS at 06:04

## 2020-01-01 RX ADMIN — VANCOMYCIN HYDROCHLORIDE 1000 MG: 1 INJECTION, POWDER, LYOPHILIZED, FOR SOLUTION INTRAVENOUS at 09:06

## 2020-01-01 RX ADMIN — TRAZODONE HYDROCHLORIDE 50 MG: 50 TABLET ORAL at 08:05

## 2020-01-01 RX ADMIN — VANCOMYCIN HYDROCHLORIDE 1000 MG: 1 INJECTION, POWDER, LYOPHILIZED, FOR SOLUTION INTRAVENOUS at 08:04

## 2020-01-01 RX ADMIN — FENTANYL CITRATE 25 MCG: 50 INJECTION INTRAMUSCULAR; INTRAVENOUS at 05:05

## 2020-01-01 RX ADMIN — Medication 0.26 MCG/KG/MIN: at 06:04

## 2020-01-01 RX ADMIN — POTASSIUM CHLORIDE 40 MEQ: 29.8 INJECTION, SOLUTION INTRAVENOUS at 03:05

## 2020-01-01 RX ADMIN — SODIUM CHLORIDE 2.5 MG/HR: 9 INJECTION, SOLUTION INTRAVENOUS at 10:06

## 2020-01-01 RX ADMIN — SODIUM CHLORIDE 2.5 MG/HR: 9 INJECTION, SOLUTION INTRAVENOUS at 11:06

## 2020-01-01 RX ADMIN — HEPARIN SODIUM 5000 UNITS: 5000 INJECTION INTRAVENOUS; SUBCUTANEOUS at 06:06

## 2020-01-01 RX ADMIN — VANCOMYCIN HYDROCHLORIDE 750 MG: 750 INJECTION, POWDER, LYOPHILIZED, FOR SOLUTION INTRAVENOUS at 07:06

## 2020-01-01 RX ADMIN — POTASSIUM PHOSPHATE, MONOBASIC AND POTASSIUM PHOSPHATE, DIBASIC 30 MMOL: 224; 236 INJECTION, SOLUTION, CONCENTRATE INTRAVENOUS at 03:04

## 2020-01-01 RX ADMIN — FENTANYL CITRATE 25 MCG: 50 INJECTION INTRAMUSCULAR; INTRAVENOUS at 02:05

## 2020-01-01 RX ADMIN — PROPOFOL 50 MCG/KG/MIN: 10 INJECTION, EMULSION INTRAVENOUS at 09:04

## 2020-01-01 RX ADMIN — INSULIN ASPART 4 UNITS: 100 INJECTION, SOLUTION INTRAVENOUS; SUBCUTANEOUS at 06:06

## 2020-01-01 RX ADMIN — DEXMEDETOMIDINE HYDROCHLORIDE IN 0.9% SODIUM CHLORIDE 0.8 MCG/KG/HR: 400 INJECTION INTRAVENOUS at 03:05

## 2020-01-01 RX ADMIN — FENTANYL CITRATE 25 MCG: 50 INJECTION INTRAMUSCULAR; INTRAVENOUS at 08:06

## 2020-01-01 RX ADMIN — HEPARIN SODIUM AND DEXTROSE 800 UNITS/HR: 10000; 5 INJECTION INTRAVENOUS at 07:06

## 2020-01-01 RX ADMIN — CARBOXYMETHYLCELLULOSE SODIUM 2 DROP: 10 GEL OPHTHALMIC at 06:04

## 2020-01-01 RX ADMIN — HYDROMORPHONE HYDROCHLORIDE 2 MG/HR: 2 INJECTION INTRAMUSCULAR; INTRAVENOUS; SUBCUTANEOUS at 03:04

## 2020-01-01 RX ADMIN — SODIUM PHOSPHATE, MONOBASIC, MONOHYDRATE 15 MMOL: 276; 142 INJECTION, SOLUTION INTRAVENOUS at 05:05

## 2020-01-01 RX ADMIN — CALCIUM GLUCONATE 1 G: 98 INJECTION, SOLUTION INTRAVENOUS at 05:05

## 2020-01-01 RX ADMIN — Medication 0.02 MCG/KG/MIN: at 05:04

## 2020-01-01 RX ADMIN — DEXMEDETOMIDINE HYDROCHLORIDE 0.8 MCG/KG/HR: 4 INJECTION, SOLUTION INTRAVENOUS at 03:06

## 2020-01-01 RX ADMIN — FLUDROCORTISONE ACETATE 100 MCG: 0.1 TABLET ORAL at 08:06

## 2020-01-01 RX ADMIN — POTASSIUM PHOSPHATE, MONOBASIC AND POTASSIUM PHOSPHATE, DIBASIC 20 MMOL: 224; 236 INJECTION, SOLUTION, CONCENTRATE INTRAVENOUS at 01:04

## 2020-01-01 RX ADMIN — HEPARIN SODIUM AND DEXTROSE 16 UNITS/KG/HR: 10000; 5 INJECTION INTRAVENOUS at 12:04

## 2020-01-01 RX ADMIN — ACETAMINOPHEN 650 MG: 325 TABLET ORAL at 08:04

## 2020-01-01 RX ADMIN — CISATRACURIUM BESYLATE 2.5 MCG/KG/MIN: 10 INJECTION, SOLUTION INTRAVENOUS at 05:04

## 2020-01-01 RX ADMIN — INSULIN ASPART 8 UNITS: 100 INJECTION, SOLUTION INTRAVENOUS; SUBCUTANEOUS at 09:04

## 2020-01-01 RX ADMIN — NICARDIPINE HYDROCHLORIDE 5 MG/HR: 0.2 INJECTION, SOLUTION INTRAVENOUS at 06:05

## 2020-01-01 RX ADMIN — DEXMEDETOMIDINE HYDROCHLORIDE IN 0.9% SODIUM CHLORIDE 0.5 MCG/KG/HR: 400 INJECTION INTRAVENOUS at 11:05

## 2020-01-01 RX ADMIN — PROPOFOL 50 MCG/KG/MIN: 10 INJECTION, EMULSION INTRAVENOUS at 07:04

## 2020-01-01 RX ADMIN — HEPARIN SODIUM 5000 UNITS: 5000 INJECTION INTRAVENOUS; SUBCUTANEOUS at 06:07

## 2020-01-01 RX ADMIN — INSULIN ASPART 4 UNITS: 100 INJECTION, SOLUTION INTRAVENOUS; SUBCUTANEOUS at 08:05

## 2020-01-01 RX ADMIN — DEXMEDETOMIDINE HYDROCHLORIDE 0.2 MCG/KG/HR: 100 INJECTION, SOLUTION, CONCENTRATE INTRAVENOUS at 04:04

## 2020-01-01 RX ADMIN — ACETAZOLAMIDE SODIUM 250 MG: 500 INJECTION, POWDER, LYOPHILIZED, FOR SOLUTION INTRAVENOUS at 08:04

## 2020-01-01 RX ADMIN — INSULIN ASPART 2 UNITS: 100 INJECTION, SOLUTION INTRAVENOUS; SUBCUTANEOUS at 07:06

## 2020-01-01 RX ADMIN — INSULIN ASPART 6 UNITS: 100 INJECTION, SOLUTION INTRAVENOUS; SUBCUTANEOUS at 12:04

## 2020-01-01 RX ADMIN — DEXMEDETOMIDINE HYDROCHLORIDE 1.4 MCG/KG/HR: 100 INJECTION, SOLUTION, CONCENTRATE INTRAVENOUS at 03:04

## 2020-01-01 RX ADMIN — VASOPRESSIN 0.04 UNITS/MIN: 20 INJECTION INTRAVENOUS at 06:04

## 2020-01-01 RX ADMIN — ACETAMINOPHEN 650 MG: 325 TABLET ORAL at 02:06

## 2020-01-01 RX ADMIN — NICARDIPINE HYDROCHLORIDE 5 MG/HR: 0.2 INJECTION, SOLUTION INTRAVENOUS at 01:04

## 2020-01-01 RX ADMIN — FUROSEMIDE 40 MG: 10 INJECTION, SOLUTION INTRAMUSCULAR; INTRAVENOUS at 10:04

## 2020-01-01 RX ADMIN — ACETAMINOPHEN 650 MG: 325 TABLET ORAL at 11:05

## 2020-01-01 RX ADMIN — SUCRALFATE 1 G: 1 SUSPENSION ORAL at 08:05

## 2020-01-01 RX ADMIN — MUPIROCIN: 20 OINTMENT TOPICAL at 10:04

## 2020-01-01 RX ADMIN — CEFEPIME 2 G: 2 INJECTION, POWDER, FOR SOLUTION INTRAVENOUS at 09:07

## 2020-01-01 RX ADMIN — VECURONIUM BROMIDE FOR INJECTION 1 MCG/KG/MIN: 1 INJECTION, POWDER, LYOPHILIZED, FOR SOLUTION INTRAVENOUS at 08:04

## 2020-01-01 RX ADMIN — HEPARIN SODIUM 950 UNITS/HR: 10000 INJECTION, SOLUTION INTRAVENOUS at 04:05

## 2020-01-01 RX ADMIN — HEPARIN SODIUM 600 UNITS/HR: 10000 INJECTION, SOLUTION INTRAVENOUS at 05:05

## 2020-01-01 RX ADMIN — DIAZEPAM 1 MG: 5 SOLUTION ORAL at 02:05

## 2020-01-01 RX ADMIN — QUETIAPINE FUMARATE 50 MG: 25 TABLET ORAL at 10:05

## 2020-01-01 RX ADMIN — QUETIAPINE FUMARATE 50 MG: 25 TABLET ORAL at 08:05

## 2020-01-01 RX ADMIN — INSULIN ASPART 8 UNITS: 100 INJECTION, SOLUTION INTRAVENOUS; SUBCUTANEOUS at 12:04

## 2020-01-01 RX ADMIN — CEFEPIME 2 G: 2 INJECTION, POWDER, FOR SOLUTION INTRAVENOUS at 09:04

## 2020-01-01 RX ADMIN — CEFEPIME 2 G: 2 INJECTION, POWDER, FOR SOLUTION INTRAVENOUS at 08:07

## 2020-01-01 RX ADMIN — INSULIN ASPART 8 UNITS: 100 INJECTION, SOLUTION INTRAVENOUS; SUBCUTANEOUS at 08:07

## 2020-01-01 RX ADMIN — Medication 0.04 MCG/KG/MIN: at 05:06

## 2020-01-01 RX ADMIN — INSULIN ASPART 3 UNITS: 100 INJECTION, SOLUTION INTRAVENOUS; SUBCUTANEOUS at 12:07

## 2020-01-01 RX ADMIN — VANCOMYCIN HYDROCHLORIDE 1250 MG: 1.25 INJECTION, POWDER, LYOPHILIZED, FOR SOLUTION INTRAVENOUS at 02:04

## 2020-01-01 RX ADMIN — HEPARIN SODIUM 1300 UNITS/HR: 10000 INJECTION, SOLUTION INTRAVENOUS at 04:04

## 2020-01-01 RX ADMIN — ALBUMIN HUMAN 25 G: 50 SOLUTION INTRAVENOUS at 12:04

## 2020-01-01 RX ADMIN — PANTOPRAZOLE SODIUM 40 MG: 40 GRANULE, DELAYED RELEASE ORAL at 09:07

## 2020-01-01 RX ADMIN — DEXMEDETOMIDINE HYDROCHLORIDE 0.1 MCG/KG/HR: 4 INJECTION, SOLUTION INTRAVENOUS at 07:06

## 2020-01-01 RX ADMIN — IPRATROPIUM BROMIDE AND ALBUTEROL SULFATE 3 ML: 2.5; .5 SOLUTION RESPIRATORY (INHALATION) at 10:04

## 2020-01-01 RX ADMIN — INSULIN ASPART 4 UNITS: 100 INJECTION, SOLUTION INTRAVENOUS; SUBCUTANEOUS at 07:04

## 2020-01-01 RX ADMIN — Medication 75 MCG/HR: at 08:07

## 2020-01-01 RX ADMIN — QUETIAPINE FUMARATE 100 MG: 100 TABLET ORAL at 08:06

## 2020-01-01 RX ADMIN — CISATRACURIUM BESYLATE 5 MCG/KG/MIN: 10 INJECTION, SOLUTION INTRAVENOUS at 06:04

## 2020-01-01 RX ADMIN — FENTANYL CITRATE 250 MCG/HR: 50 INJECTION INTRAMUSCULAR; INTRAVENOUS at 12:04

## 2020-01-01 RX ADMIN — HEPARIN SODIUM 900 UNITS/HR: 10000 INJECTION, SOLUTION INTRAVENOUS at 02:04

## 2020-01-01 RX ADMIN — POTASSIUM CHLORIDE 40 MEQ: 29.8 INJECTION, SOLUTION INTRAVENOUS at 10:05

## 2020-01-01 RX ADMIN — QUETIAPINE FUMARATE 50 MG: 25 TABLET, FILM COATED ORAL at 08:05

## 2020-01-01 RX ADMIN — Medication 800 MG: at 06:05

## 2020-01-01 RX ADMIN — MIDAZOLAM HYDROCHLORIDE 2 MG: 1 INJECTION, SOLUTION INTRAMUSCULAR; INTRAVENOUS at 02:04

## 2020-01-01 RX ADMIN — PROPOFOL 50 MCG/KG/MIN: 10 INJECTION, EMULSION INTRAVENOUS at 06:04

## 2020-01-01 RX ADMIN — QUETIAPINE FUMARATE 100 MG: 100 TABLET ORAL at 08:05

## 2020-01-01 RX ADMIN — POTASSIUM PHOSPHATE, MONOBASIC AND POTASSIUM PHOSPHATE, DIBASIC 20 MMOL: 224; 236 INJECTION, SOLUTION, CONCENTRATE INTRAVENOUS at 04:04

## 2020-01-01 RX ADMIN — PIPERACILLIN AND TAZOBACTAM 4.5 G: 4; .5 INJECTION, POWDER, FOR SOLUTION INTRAVENOUS at 02:06

## 2020-01-01 RX ADMIN — VANCOMYCIN HYDROCHLORIDE 1250 MG: 1.25 INJECTION, POWDER, LYOPHILIZED, FOR SOLUTION INTRAVENOUS at 06:04

## 2020-01-01 RX ADMIN — FUROSEMIDE 2 MG: 10 INJECTION, SOLUTION INTRAMUSCULAR; INTRAVENOUS at 08:04

## 2020-01-01 RX ADMIN — LACTULOSE 30 G: 20 SOLUTION ORAL at 08:04

## 2020-01-01 RX ADMIN — ALBUMIN (HUMAN) 12.5 G: 12.5 SOLUTION INTRAVENOUS at 03:05

## 2020-01-01 RX ADMIN — FUROSEMIDE 40 MG: 10 INJECTION, SOLUTION INTRAMUSCULAR; INTRAVENOUS at 11:04

## 2020-01-01 RX ADMIN — PROPOFOL 45 MCG/KG/MIN: 10 INJECTION, EMULSION INTRAVENOUS at 08:07

## 2020-01-01 RX ADMIN — LACTULOSE 20 G: 20 SOLUTION ORAL at 09:04

## 2020-01-01 RX ADMIN — HEPARIN SODIUM AND DEXTROSE 14 UNITS/KG/HR: 10000; 5 INJECTION INTRAVENOUS at 02:04

## 2020-01-01 RX ADMIN — INSULIN ASPART 1 UNITS: 100 INJECTION, SOLUTION INTRAVENOUS; SUBCUTANEOUS at 10:07

## 2020-01-01 RX ADMIN — POTASSIUM CHLORIDE 20 MEQ: 20 SOLUTION ORAL at 09:06

## 2020-01-01 RX ADMIN — MINERAL OIL AND WHITE PETROLATUM: 150; 830 OINTMENT OPHTHALMIC at 12:04

## 2020-01-01 RX ADMIN — SENNOSIDES AND DOCUSATE SODIUM 1 TABLET: 8.6; 5 TABLET ORAL at 09:07

## 2020-01-01 RX ADMIN — IPRATROPIUM BROMIDE AND ALBUTEROL SULFATE 3 ML: .5; 3 SOLUTION RESPIRATORY (INHALATION) at 04:04

## 2020-01-01 RX ADMIN — INSULIN ASPART 8 UNITS: 100 INJECTION, SOLUTION INTRAVENOUS; SUBCUTANEOUS at 08:05

## 2020-01-01 RX ADMIN — PANTOPRAZOLE SODIUM 40 MG: 40 INJECTION, POWDER, FOR SOLUTION INTRAVENOUS at 09:04

## 2020-01-01 RX ADMIN — DEXTROSE MONOHYDRATE 8 MG/HR: 50 INJECTION, SOLUTION INTRAVENOUS at 04:05

## 2020-01-01 RX ADMIN — VANCOMYCIN HYDROCHLORIDE 500 MG: 500 INJECTION, POWDER, LYOPHILIZED, FOR SOLUTION INTRAVENOUS at 08:04

## 2020-01-01 RX ADMIN — HEPARIN SODIUM AND DEXTROSE 850 UNITS/HR: 10000; 5 INJECTION INTRAVENOUS at 11:06

## 2020-01-01 RX ADMIN — DIAZEPAM 0.5 MG: 5 SOLUTION ORAL at 08:05

## 2020-01-01 RX ADMIN — DIAZEPAM 2 MG: 2 TABLET ORAL at 03:05

## 2020-01-01 RX ADMIN — HEPARIN SODIUM AND DEXTROSE 800 UNITS/HR: 10000; 5 INJECTION INTRAVENOUS at 03:06

## 2020-01-01 RX ADMIN — LIDOCAINE HYDROCHLORIDE 100 MG: 10 INJECTION, SOLUTION INFILTRATION; PERINEURAL at 01:06

## 2020-01-01 RX ADMIN — INSULIN ASPART 2 UNITS: 100 INJECTION, SOLUTION INTRAVENOUS; SUBCUTANEOUS at 02:06

## 2020-01-01 RX ADMIN — CEFEPIME 2 G: 2 INJECTION, POWDER, FOR SOLUTION INTRAVENOUS at 02:04

## 2020-01-01 RX ADMIN — HYDROMORPHONE HYDROCHLORIDE 4 MG/HR: 2 INJECTION INTRAMUSCULAR; INTRAVENOUS; SUBCUTANEOUS at 08:04

## 2020-01-01 RX ADMIN — INSULIN ASPART 1 UNITS: 100 INJECTION, SOLUTION INTRAVENOUS; SUBCUTANEOUS at 01:07

## 2020-01-01 RX ADMIN — PIPERACILLIN AND TAZOBACTAM 4.5 G: 4; .5 INJECTION, POWDER, FOR SOLUTION INTRAVENOUS at 08:05

## 2020-01-01 RX ADMIN — HEPARIN SODIUM AND DEXTROSE 22 UNITS/KG/HR: 10000; 5 INJECTION INTRAVENOUS at 12:04

## 2020-01-01 RX ADMIN — DEXMEDETOMIDINE HYDROCHLORIDE 1.4 MCG/KG/HR: 100 INJECTION, SOLUTION, CONCENTRATE INTRAVENOUS at 11:04

## 2020-01-01 RX ADMIN — DEXMEDETOMIDINE HYDROCHLORIDE IN 0.9% SODIUM CHLORIDE 1 MCG/KG/HR: 400 INJECTION INTRAVENOUS at 08:05

## 2020-01-01 RX ADMIN — Medication 0.5 MCG/KG/MIN: at 09:06

## 2020-01-01 RX ADMIN — MAGNESIUM SULFATE HEPTAHYDRATE 2 G: 40 INJECTION, SOLUTION INTRAVENOUS at 02:05

## 2020-01-01 RX ADMIN — HYDROMORPHONE HYDROCHLORIDE 2 MG/HR: 2 INJECTION INTRAMUSCULAR; INTRAVENOUS; SUBCUTANEOUS at 08:04

## 2020-01-01 RX ADMIN — INSULIN ASPART 4 UNITS: 100 INJECTION, SOLUTION INTRAVENOUS; SUBCUTANEOUS at 12:07

## 2020-01-01 RX ADMIN — DEXMEDETOMIDINE HYDROCHLORIDE 1.4 MCG/KG/HR: 100 INJECTION, SOLUTION, CONCENTRATE INTRAVENOUS at 02:04

## 2020-01-01 RX ADMIN — SODIUM PHOSPHATE, MONOBASIC, MONOHYDRATE 30 MMOL: 276; 142 INJECTION, SOLUTION INTRAVENOUS at 05:04

## 2020-01-01 RX ADMIN — PROPOFOL 40 MCG/KG/MIN: 10 INJECTION, EMULSION INTRAVENOUS at 09:04

## 2020-01-01 RX ADMIN — Medication 0.2 MCG/KG/MIN: at 03:07

## 2020-01-01 RX ADMIN — ATORVASTATIN CALCIUM 40 MG: 20 TABLET, FILM COATED ORAL at 09:04

## 2020-01-01 RX ADMIN — SUCRALFATE 1 G: 1 SUSPENSION ORAL at 02:05

## 2020-01-01 RX ADMIN — EPINEPHRINE 0.03 MCG/KG/MIN: 1 INJECTION INTRAMUSCULAR; INTRAVENOUS; SUBCUTANEOUS at 02:04

## 2020-01-01 RX ADMIN — MUPIROCIN: 20 OINTMENT TOPICAL at 08:04

## 2020-01-01 RX ADMIN — DEXMEDETOMIDINE HYDROCHLORIDE 0.1 MCG/KG/HR: 4 INJECTION, SOLUTION INTRAVENOUS at 12:06

## 2020-01-01 RX ADMIN — INSULIN ASPART 3 UNITS: 100 INJECTION, SOLUTION INTRAVENOUS; SUBCUTANEOUS at 02:06

## 2020-01-01 RX ADMIN — POTASSIUM CHLORIDE 10 MEQ: 7.46 INJECTION, SOLUTION INTRAVENOUS at 11:04

## 2020-01-01 RX ADMIN — SODIUM CHLORIDE 3.1 UNITS/HR: 9 INJECTION, SOLUTION INTRAVENOUS at 10:04

## 2020-01-01 RX ADMIN — ALBUMIN (HUMAN) 25 G: 25 SOLUTION INTRAVENOUS at 09:05

## 2020-01-01 RX ADMIN — INSULIN ASPART 2 UNITS: 100 INJECTION, SOLUTION INTRAVENOUS; SUBCUTANEOUS at 03:06

## 2020-01-01 RX ADMIN — PROPOFOL 50 MCG/KG/MIN: 10 INJECTION, EMULSION INTRAVENOUS at 02:04

## 2020-01-01 RX ADMIN — PROPOFOL 30 MCG/KG/MIN: 10 INJECTION, EMULSION INTRAVENOUS at 11:05

## 2020-01-01 RX ADMIN — INSULIN ASPART 2 UNITS: 100 INJECTION, SOLUTION INTRAVENOUS; SUBCUTANEOUS at 11:06

## 2020-01-01 RX ADMIN — MELATONIN 1000 UNITS: at 11:05

## 2020-01-01 RX ADMIN — PIPERACILLIN AND TAZOBACTAM 4.5 G: 4; .5 INJECTION, POWDER, FOR SOLUTION INTRAVENOUS at 10:05

## 2020-01-01 RX ADMIN — DEXTROSE MONOHYDRATE 8 MG/HR: 50 INJECTION, SOLUTION INTRAVENOUS at 11:05

## 2020-01-01 RX ADMIN — DEXTROSE MONOHYDRATE 30 MMOL: 50 INJECTION, SOLUTION INTRAVENOUS at 10:06

## 2020-01-01 RX ADMIN — POTASSIUM PHOSPHATE, MONOBASIC AND POTASSIUM PHOSPHATE, DIBASIC 30 MMOL: 224; 236 INJECTION, SOLUTION, CONCENTRATE INTRAVENOUS at 09:04

## 2020-01-01 RX ADMIN — AMLODIPINE BESYLATE 5 MG: 5 TABLET ORAL at 01:06

## 2020-01-01 RX ADMIN — INSULIN ASPART 1 UNITS: 100 INJECTION, SOLUTION INTRAVENOUS; SUBCUTANEOUS at 07:05

## 2020-01-01 RX ADMIN — INSULIN ASPART 1 UNITS: 100 INJECTION, SOLUTION INTRAVENOUS; SUBCUTANEOUS at 04:06

## 2020-01-01 RX ADMIN — HYDROMORPHONE HYDROCHLORIDE 4 MG/HR: 2 INJECTION INTRAMUSCULAR; INTRAVENOUS; SUBCUTANEOUS at 03:04

## 2020-01-01 RX ADMIN — DEXMEDETOMIDINE HYDROCHLORIDE 1 MCG/KG/HR: 100 INJECTION, SOLUTION, CONCENTRATE INTRAVENOUS at 04:04

## 2020-01-01 RX ADMIN — Medication 200 MCG/HR: at 03:07

## 2020-01-01 RX ADMIN — Medication 100 MCG: at 11:06

## 2020-01-01 RX ADMIN — LORAZEPAM 2 MG: 2 INJECTION, SOLUTION INTRAMUSCULAR; INTRAVENOUS at 04:04

## 2020-01-01 RX ADMIN — PIPERACILLIN AND TAZOBACTAM 4.5 G: 4; .5 INJECTION, POWDER, FOR SOLUTION INTRAVENOUS at 01:06

## 2020-01-01 RX ADMIN — DEXTROSE MONOHYDRATE 8 MG/HR: 50 INJECTION, SOLUTION INTRAVENOUS at 01:05

## 2020-01-01 RX ADMIN — HEPARIN SODIUM 900 UNITS/HR: 10000 INJECTION, SOLUTION INTRAVENOUS at 02:05

## 2020-01-01 RX ADMIN — ASPIRIN 81 MG CHEWABLE TABLET 81 MG: 81 TABLET CHEWABLE at 09:06

## 2020-01-01 RX ADMIN — Medication 1 PACKET: at 04:05

## 2020-01-01 RX ADMIN — DEXMEDETOMIDINE HYDROCHLORIDE 0.4 MCG/KG/HR: 4 INJECTION, SOLUTION INTRAVENOUS at 03:06

## 2020-01-01 RX ADMIN — DEXMEDETOMIDINE HYDROCHLORIDE 0.6 MCG/KG/HR: 4 INJECTION, SOLUTION INTRAVENOUS at 04:06

## 2020-01-01 RX ADMIN — MELATONIN 1000 UNITS: at 08:05

## 2020-01-01 RX ADMIN — INSULIN ASPART 3 UNITS: 100 INJECTION, SOLUTION INTRAVENOUS; SUBCUTANEOUS at 11:04

## 2020-01-01 RX ADMIN — INSULIN ASPART 4 UNITS: 100 INJECTION, SOLUTION INTRAVENOUS; SUBCUTANEOUS at 02:06

## 2020-01-01 RX ADMIN — PHENOBARBITAL 60 MG: 20 ELIXIR ORAL at 12:04

## 2020-01-01 RX ADMIN — FAMOTIDINE 20 MG: 10 INJECTION INTRAVENOUS at 08:04

## 2020-01-01 RX ADMIN — HYDROMORPHONE HYDROCHLORIDE 1 MG/HR: 2 INJECTION INTRAMUSCULAR; INTRAVENOUS; SUBCUTANEOUS at 03:04

## 2020-01-01 RX ADMIN — CARBOXYMETHYLCELLULOSE SODIUM 2 DROP: 10 GEL OPHTHALMIC at 04:05

## 2020-01-01 RX ADMIN — METOPROLOL TARTRATE 12.5 MG: 25 TABLET ORAL at 07:06

## 2020-01-01 RX ADMIN — MODAFINIL 200 MG: 100 TABLET ORAL at 11:05

## 2020-01-01 RX ADMIN — POTASSIUM & SODIUM PHOSPHATES POWDER PACK 280-160-250 MG 2 PACKET: 280-160-250 PACK at 09:05

## 2020-01-01 RX ADMIN — HYDROMORPHONE HYDROCHLORIDE 4 MG/HR: 2 INJECTION INTRAMUSCULAR; INTRAVENOUS; SUBCUTANEOUS at 09:04

## 2020-01-01 RX ADMIN — POTASSIUM & SODIUM PHOSPHATES POWDER PACK 280-160-250 MG 2 PACKET: 280-160-250 PACK at 08:07

## 2020-01-01 RX ADMIN — INSULIN ASPART 8 UNITS: 100 INJECTION, SOLUTION INTRAVENOUS; SUBCUTANEOUS at 12:07

## 2020-01-01 RX ADMIN — POTASSIUM CHLORIDE 20 MEQ: 200 INJECTION, SOLUTION INTRAVENOUS at 01:05

## 2020-01-01 RX ADMIN — ALBUMIN HUMAN 12.5 G: 50 SOLUTION INTRAVENOUS at 04:05

## 2020-01-01 RX ADMIN — INSULIN ASPART 2 UNITS: 100 INJECTION, SOLUTION INTRAVENOUS; SUBCUTANEOUS at 12:05

## 2020-01-01 RX ADMIN — CALCIUM GLUCONATE 1 G: 98 INJECTION, SOLUTION INTRAVENOUS at 05:07

## 2020-01-01 RX ADMIN — Medication 20 MG: at 01:04

## 2020-01-01 RX ADMIN — PIPERACILLIN SODIUM,TAZOBACTAM SODIUM 4.5 G: 4; .5 INJECTION, POWDER, FOR SOLUTION INTRAVENOUS at 08:05

## 2020-01-01 RX ADMIN — HYDROMORPHONE HYDROCHLORIDE 2 MG/HR: 2 INJECTION INTRAMUSCULAR; INTRAVENOUS; SUBCUTANEOUS at 06:04

## 2020-01-01 RX ADMIN — CARBOXYMETHYLCELLULOSE SODIUM 2 DROP: 10 GEL OPHTHALMIC at 04:04

## 2020-01-01 RX ADMIN — Medication 200 MCG/HR: at 10:06

## 2020-01-01 RX ADMIN — Medication 10 ML: at 12:06

## 2020-01-01 RX ADMIN — DEXMEDETOMIDINE HYDROCHLORIDE 1.4 MCG/KG/HR: 4 INJECTION, SOLUTION INTRAVENOUS at 04:07

## 2020-01-01 RX ADMIN — FENTANYL CITRATE 25 MCG: 50 INJECTION INTRAMUSCULAR; INTRAVENOUS at 04:05

## 2020-01-01 RX ADMIN — INSULIN ASPART 4 UNITS: 100 INJECTION, SOLUTION INTRAVENOUS; SUBCUTANEOUS at 12:06

## 2020-01-01 RX ADMIN — HEPARIN SODIUM 850 UNITS/HR: 10000 INJECTION, SOLUTION INTRAVENOUS at 05:05

## 2020-01-01 RX ADMIN — KETAMINE HYDROCHLORIDE 20 MCG/KG/MIN: 50 INJECTION INTRAMUSCULAR; INTRAVENOUS at 10:04

## 2020-01-01 RX ADMIN — DEXMEDETOMIDINE HYDROCHLORIDE IN 0.9% SODIUM CHLORIDE 0.4 MCG/KG/HR: 400 INJECTION INTRAVENOUS at 10:05

## 2020-01-01 RX ADMIN — CEFEPIME 2 G: 2 INJECTION, POWDER, FOR SOLUTION INTRAVENOUS at 04:05

## 2020-01-01 RX ADMIN — INSULIN ASPART 4 UNITS: 100 INJECTION, SOLUTION INTRAVENOUS; SUBCUTANEOUS at 07:05

## 2020-01-01 RX ADMIN — PROPOFOL 50 MCG/KG/MIN: 10 INJECTION, EMULSION INTRAVENOUS at 08:04

## 2020-01-01 RX ADMIN — SODIUM CHLORIDE 3 MG/HR: 9 INJECTION, SOLUTION INTRAVENOUS at 07:05

## 2020-01-01 RX ADMIN — HEPARIN SODIUM 600 UNITS/HR: 10000 INJECTION, SOLUTION INTRAVENOUS at 02:05

## 2020-01-01 RX ADMIN — PROPOFOL 35 MCG/KG/MIN: 10 INJECTION, EMULSION INTRAVENOUS at 10:04

## 2020-01-01 RX ADMIN — DEXMEDETOMIDINE HYDROCHLORIDE 0.6 MCG/KG/HR: 4 INJECTION, SOLUTION INTRAVENOUS at 11:06

## 2020-01-01 RX ADMIN — ALBUMIN (HUMAN) 25 G: 12.5 SOLUTION INTRAVENOUS at 12:04

## 2020-01-01 RX ADMIN — POTASSIUM & SODIUM PHOSPHATES POWDER PACK 280-160-250 MG 2 PACKET: 280-160-250 PACK at 08:05

## 2020-01-01 RX ADMIN — DIAZEPAM 5 MG: 10 INJECTION, SOLUTION INTRAMUSCULAR; INTRAVENOUS at 01:04

## 2020-01-01 RX ADMIN — Medication 100 MG: at 02:05

## 2020-01-01 RX ADMIN — POTASSIUM PHOSPHATE, MONOBASIC AND POTASSIUM PHOSPHATE, DIBASIC 20 MMOL: 224; 236 INJECTION, SOLUTION, CONCENTRATE INTRAVENOUS at 06:04

## 2020-01-01 RX ADMIN — NOREPINEPHRINE BITARTRATE 0.64 MCG/KG/MIN: 1 INJECTION, SOLUTION, CONCENTRATE INTRAVENOUS at 05:04

## 2020-01-01 RX ADMIN — HEPARIN SODIUM AND DEXTROSE 22 UNITS/KG/HR: 10000; 5 INJECTION INTRAVENOUS at 07:04

## 2020-01-01 RX ADMIN — FENTANYL CITRATE 25 MCG: 50 INJECTION INTRAMUSCULAR; INTRAVENOUS at 06:05

## 2020-01-01 RX ADMIN — PROPOFOL 50 MCG/KG/MIN: 10 INJECTION, EMULSION INTRAVENOUS at 01:04

## 2020-01-01 RX ADMIN — HYDROMORPHONE HYDROCHLORIDE 4 MG/HR: 2 INJECTION INTRAMUSCULAR; INTRAVENOUS; SUBCUTANEOUS at 01:04

## 2020-01-01 RX ADMIN — HEPARIN SODIUM AND DEXTROSE 12 UNITS/KG/HR: 10000; 5 INJECTION INTRAVENOUS at 03:04

## 2020-01-01 RX ADMIN — INSULIN ASPART 6 UNITS: 100 INJECTION, SOLUTION INTRAVENOUS; SUBCUTANEOUS at 10:04

## 2020-01-01 RX ADMIN — INSULIN ASPART 4 UNITS: 100 INJECTION, SOLUTION INTRAVENOUS; SUBCUTANEOUS at 03:07

## 2020-01-01 RX ADMIN — HEPARIN SODIUM 5000 UNITS: 5000 INJECTION INTRAVENOUS; SUBCUTANEOUS at 01:07

## 2020-01-01 RX ADMIN — CALCIUM GLUCONATE 1 G: 98 INJECTION, SOLUTION INTRAVENOUS at 06:04

## 2020-01-01 RX ADMIN — ACETAMINOPHEN 650 MG: 325 TABLET ORAL at 03:04

## 2020-01-01 RX ADMIN — INSULIN ASPART 4 UNITS: 100 INJECTION, SOLUTION INTRAVENOUS; SUBCUTANEOUS at 06:04

## 2020-01-01 RX ADMIN — NICARDIPINE HYDROCHLORIDE 10 MG/HR: 0.2 INJECTION, SOLUTION INTRAVENOUS at 09:04

## 2020-01-01 RX ADMIN — PROPOFOL 50 MCG/KG/MIN: 10 INJECTION, EMULSION INTRAVENOUS at 11:05

## 2020-01-01 RX ADMIN — Medication 0.02 MCG/KG/MIN: at 02:07

## 2020-01-01 RX ADMIN — Medication 0.06 MCG/KG/MIN: at 11:06

## 2020-01-01 RX ADMIN — INSULIN ASPART 2 UNITS: 100 INJECTION, SOLUTION INTRAVENOUS; SUBCUTANEOUS at 08:04

## 2020-01-01 RX ADMIN — KETAMINE HYDROCHLORIDE 2.5 MCG/KG/MIN: 50 INJECTION INTRAMUSCULAR; INTRAVENOUS at 12:04

## 2020-01-01 RX ADMIN — DEXMEDETOMIDINE HYDROCHLORIDE 1.4 MCG/KG/HR: 4 INJECTION, SOLUTION INTRAVENOUS at 03:07

## 2020-01-01 RX ADMIN — DIAZEPAM 5 MG: 5 INJECTION, SOLUTION INTRAMUSCULAR; INTRAVENOUS at 03:04

## 2020-01-01 RX ADMIN — INSULIN ASPART 4 UNITS: 100 INJECTION, SOLUTION INTRAVENOUS; SUBCUTANEOUS at 08:06

## 2020-01-01 RX ADMIN — INSULIN ASPART 3 UNITS: 100 INJECTION, SOLUTION INTRAVENOUS; SUBCUTANEOUS at 03:07

## 2020-01-01 RX ADMIN — DEXMEDETOMIDINE HYDROCHLORIDE IN 0.9% SODIUM CHLORIDE 0.8 MCG/KG/HR: 400 INJECTION INTRAVENOUS at 10:05

## 2020-01-01 RX ADMIN — PIPERACILLIN AND TAZOBACTAM 4.5 G: 4; .5 INJECTION, POWDER, LYOPHILIZED, FOR SOLUTION INTRAVENOUS at 11:07

## 2020-01-01 RX ADMIN — INSULIN ASPART 8 UNITS: 100 INJECTION, SOLUTION INTRAVENOUS; SUBCUTANEOUS at 05:04

## 2020-01-01 RX ADMIN — INSULIN ASPART 6 UNITS: 100 INJECTION, SOLUTION INTRAVENOUS; SUBCUTANEOUS at 08:05

## 2020-01-01 RX ADMIN — PROPOFOL 30 MCG/KG/MIN: 10 INJECTION, EMULSION INTRAVENOUS at 06:05

## 2020-01-01 RX ADMIN — CEFTRIAXONE 2 G: 2 INJECTION, SOLUTION INTRAVENOUS at 09:04

## 2020-01-01 RX ADMIN — Medication 10 ML: at 09:05

## 2020-01-01 RX ADMIN — DEXMEDETOMIDINE HYDROCHLORIDE IN 0.9% SODIUM CHLORIDE 0.4 MCG/KG/HR: 400 INJECTION INTRAVENOUS at 03:05

## 2020-01-01 RX ADMIN — INSULIN ASPART 4 UNITS: 100 INJECTION, SOLUTION INTRAVENOUS; SUBCUTANEOUS at 03:04

## 2020-01-01 RX ADMIN — DIAZEPAM 5 MG: 10 INJECTION, SOLUTION INTRAMUSCULAR; INTRAVENOUS at 10:04

## 2020-01-01 RX ADMIN — SODIUM CHLORIDE 2.5 MG/HR: 9 INJECTION, SOLUTION INTRAVENOUS at 09:06

## 2020-01-01 RX ADMIN — DEXMEDETOMIDINE HYDROCHLORIDE 1.4 MCG/KG/HR: 100 INJECTION, SOLUTION, CONCENTRATE INTRAVENOUS at 04:04

## 2020-01-01 RX ADMIN — HEPARIN SODIUM AND DEXTROSE 600 UNITS/HR: 10000; 5 INJECTION INTRAVENOUS at 05:06

## 2020-01-01 RX ADMIN — DEXMEDETOMIDINE HYDROCHLORIDE 1 MCG/KG/HR: 4 INJECTION, SOLUTION INTRAVENOUS at 02:06

## 2020-01-01 RX ADMIN — METOPROLOL TARTRATE 12.5 MG: 25 TABLET ORAL at 08:07

## 2020-01-01 RX ADMIN — Medication 300 MG: at 08:05

## 2020-01-01 RX ADMIN — POTASSIUM CHLORIDE 40 MEQ: 20 SOLUTION ORAL at 06:06

## 2020-01-01 RX ADMIN — CEFTRIAXONE SODIUM 1 G: 1 INJECTION, POWDER, FOR SOLUTION INTRAMUSCULAR; INTRAVENOUS at 06:04

## 2020-01-01 RX ADMIN — VANCOMYCIN HYDROCHLORIDE 750 MG: 750 INJECTION, POWDER, LYOPHILIZED, FOR SOLUTION INTRAVENOUS at 10:06

## 2020-01-01 RX ADMIN — Medication 25 MCG/HR: at 09:06

## 2020-01-01 RX ADMIN — POTASSIUM CHLORIDE 40 MEQ: 20 SOLUTION ORAL at 06:05

## 2020-01-01 RX ADMIN — POTASSIUM CHLORIDE 40 MEQ: 20 SOLUTION ORAL at 08:04

## 2020-01-01 RX ADMIN — INSULIN ASPART 1 UNITS: 100 INJECTION, SOLUTION INTRAVENOUS; SUBCUTANEOUS at 04:07

## 2020-01-01 RX ADMIN — POTASSIUM CHLORIDE 40 MEQ: 20 SOLUTION ORAL at 02:05

## 2020-01-01 RX ADMIN — PIPERACILLIN AND TAZOBACTAM 4.5 G: 4; .5 INJECTION, POWDER, FOR SOLUTION INTRAVENOUS at 06:05

## 2020-01-01 RX ADMIN — HEPARIN SODIUM AND DEXTROSE 14 UNITS/KG/HR: 10000; 5 INJECTION INTRAVENOUS at 05:04

## 2020-01-01 RX ADMIN — HEPARIN SODIUM 5000 UNITS: 5000 INJECTION INTRAVENOUS; SUBCUTANEOUS at 05:06

## 2020-01-01 RX ADMIN — INSULIN ASPART 1 UNITS: 100 INJECTION, SOLUTION INTRAVENOUS; SUBCUTANEOUS at 10:06

## 2020-01-01 RX ADMIN — TRAZODONE HYDROCHLORIDE 25 MG: 50 TABLET ORAL at 08:05

## 2020-01-01 RX ADMIN — DEXMEDETOMIDINE HYDROCHLORIDE IN 0.9% SODIUM CHLORIDE 0.5 MCG/KG/HR: 400 INJECTION INTRAVENOUS at 05:05

## 2020-01-01 RX ADMIN — MAGNESIUM SULFATE HEPTAHYDRATE 2 G: 40 INJECTION, SOLUTION INTRAVENOUS at 10:06

## 2020-01-01 RX ADMIN — PROPOFOL 50 MCG/KG/MIN: 10 INJECTION, EMULSION INTRAVENOUS at 10:04

## 2020-01-01 RX ADMIN — NICARDIPINE HYDROCHLORIDE 2 MG/HR: 0.2 INJECTION, SOLUTION INTRAVENOUS at 06:05

## 2020-01-01 RX ADMIN — DEXMEDETOMIDINE HYDROCHLORIDE IN 0.9% SODIUM CHLORIDE 1.4 MCG/KG/HR: 400 INJECTION INTRAVENOUS at 07:05

## 2020-01-01 RX ADMIN — Medication 1 PACKET: at 09:05

## 2020-01-01 RX ADMIN — DEXTROSE 50 ML: 10 SOLUTION INTRAVENOUS at 11:06

## 2020-01-01 RX ADMIN — INSULIN ASPART 1 UNITS: 100 INJECTION, SOLUTION INTRAVENOUS; SUBCUTANEOUS at 04:05

## 2020-01-01 RX ADMIN — FENTANYL CITRATE 25 MCG: 50 INJECTION INTRAMUSCULAR; INTRAVENOUS at 11:05

## 2020-01-01 RX ADMIN — Medication 0.18 MCG/KG/MIN: at 05:04

## 2020-01-01 RX ADMIN — HEPARIN SODIUM 10000 UNITS: 1000 INJECTION, SOLUTION INTRAVENOUS; SUBCUTANEOUS at 11:04

## 2020-01-01 RX ADMIN — VANCOMYCIN HYDROCHLORIDE 1000 MG: 1 INJECTION, POWDER, LYOPHILIZED, FOR SOLUTION INTRAVENOUS at 01:07

## 2020-01-01 RX ADMIN — HYDROMORPHONE HYDROCHLORIDE 4 MG/HR: 2 INJECTION INTRAMUSCULAR; INTRAVENOUS; SUBCUTANEOUS at 12:04

## 2020-01-01 RX ADMIN — KETAMINE HYDROCHLORIDE 20 MCG/KG/MIN: 50 INJECTION INTRAMUSCULAR; INTRAVENOUS at 05:04

## 2020-01-01 RX ADMIN — Medication 300 MG: at 09:06

## 2020-01-01 RX ADMIN — GLUCAGON HYDROCHLORIDE 0.25 MG: KIT at 11:05

## 2020-01-01 RX ADMIN — CEFEPIME 2 G: 2 INJECTION, POWDER, FOR SOLUTION INTRAVENOUS at 07:05

## 2020-01-01 RX ADMIN — ATORVASTATIN CALCIUM 40 MG: 20 TABLET, FILM COATED ORAL at 08:04

## 2020-01-01 RX ADMIN — HYPROMELLOSE 2910 1 DROP: 5 SOLUTION OPHTHALMIC at 08:05

## 2020-01-01 RX ADMIN — VANCOMYCIN HYDROCHLORIDE 1000 MG: 1 INJECTION, POWDER, LYOPHILIZED, FOR SOLUTION INTRAVENOUS at 02:04

## 2020-01-01 RX ADMIN — INSULIN ASPART 4 UNITS: 100 INJECTION, SOLUTION INTRAVENOUS; SUBCUTANEOUS at 03:06

## 2020-01-01 RX ADMIN — PROPOFOL 40 MCG/KG/MIN: 10 INJECTION, EMULSION INTRAVENOUS at 03:04

## 2020-01-01 RX ADMIN — FUROSEMIDE 5 MG: 10 INJECTION, SOLUTION INTRAMUSCULAR; INTRAVENOUS at 05:05

## 2020-01-01 RX ADMIN — ALBUMIN (HUMAN) 12.5 G: 12.5 SOLUTION INTRAVENOUS at 07:06

## 2020-01-01 RX ADMIN — KETAMINE HYDROCHLORIDE 15 MCG/KG/MIN: 50 INJECTION INTRAMUSCULAR; INTRAVENOUS at 09:04

## 2020-01-01 RX ADMIN — VASOPRESSIN 0.04 UNITS/MIN: 20 INJECTION INTRAVENOUS at 11:06

## 2020-01-01 RX ADMIN — SODIUM CHLORIDE 2.5 MG/HR: 9 INJECTION, SOLUTION INTRAVENOUS at 06:06

## 2020-01-01 RX ADMIN — INSULIN ASPART 8 UNITS: 100 INJECTION, SOLUTION INTRAVENOUS; SUBCUTANEOUS at 11:05

## 2020-01-01 RX ADMIN — POTASSIUM CHLORIDE 60 MEQ: 29.8 INJECTION, SOLUTION INTRAVENOUS at 09:06

## 2020-01-01 RX ADMIN — NICARDIPINE HYDROCHLORIDE 1 MG/HR: 0.2 INJECTION, SOLUTION INTRAVENOUS at 05:04

## 2020-01-01 RX ADMIN — HEPARIN SODIUM AND DEXTROSE 800 UNITS/HR: 10000; 5 INJECTION INTRAVENOUS at 09:06

## 2020-01-01 RX ADMIN — POTASSIUM CHLORIDE 40 MEQ: 20 SOLUTION ORAL at 12:06

## 2020-01-01 RX ADMIN — POTASSIUM CHLORIDE 40 MEQ: 20 SOLUTION ORAL at 10:04

## 2020-01-01 RX ADMIN — MAGNESIUM SULFATE HEPTAHYDRATE 2 G: 40 INJECTION, SOLUTION INTRAVENOUS at 05:07

## 2020-01-01 RX ADMIN — Medication 125 MCG/HR: at 09:06

## 2020-01-01 RX ADMIN — INSULIN ASPART 6 UNITS: 100 INJECTION, SOLUTION INTRAVENOUS; SUBCUTANEOUS at 08:04

## 2020-01-01 RX ADMIN — QUETIAPINE FUMARATE 100 MG: 100 TABLET ORAL at 09:06

## 2020-01-01 RX ADMIN — DEXMEDETOMIDINE HYDROCHLORIDE IN 0.9% SODIUM CHLORIDE 1 MCG/KG/HR: 400 INJECTION INTRAVENOUS at 06:05

## 2020-01-01 RX ADMIN — HEPARIN SODIUM 800 UNITS/HR: 10000 INJECTION, SOLUTION INTRAVENOUS at 04:05

## 2020-01-01 RX ADMIN — INSULIN ASPART 4 UNITS: 100 INJECTION, SOLUTION INTRAVENOUS; SUBCUTANEOUS at 11:05

## 2020-01-01 RX ADMIN — DEXMEDETOMIDINE HYDROCHLORIDE IN 0.9% SODIUM CHLORIDE 0.6 MCG/KG/HR: 400 INJECTION INTRAVENOUS at 05:05

## 2020-01-01 RX ADMIN — ALBUMIN (HUMAN) 12.5 G: 12.5 SOLUTION INTRAVENOUS at 09:05

## 2020-01-01 RX ADMIN — INSULIN ASPART 3 UNITS: 100 INJECTION, SOLUTION INTRAVENOUS; SUBCUTANEOUS at 08:04

## 2020-01-01 RX ADMIN — POTASSIUM CHLORIDE 40 MEQ: 29.8 INJECTION, SOLUTION INTRAVENOUS at 09:04

## 2020-01-01 RX ADMIN — POTASSIUM CHLORIDE 40 MEQ: 20 SOLUTION ORAL at 11:06

## 2020-01-01 RX ADMIN — SODIUM CHLORIDE 2.5 UNITS/HR: 9 INJECTION, SOLUTION INTRAVENOUS at 07:06

## 2020-01-01 RX ADMIN — SUCRALFATE 1 G: 1 SUSPENSION ORAL at 11:07

## 2020-01-01 RX ADMIN — IPRATROPIUM BROMIDE AND ALBUTEROL SULFATE 3 ML: 2.5; .5 SOLUTION RESPIRATORY (INHALATION) at 03:05

## 2020-01-01 RX ADMIN — VANCOMYCIN HYDROCHLORIDE 750 MG: 750 INJECTION, POWDER, LYOPHILIZED, FOR SOLUTION INTRAVENOUS at 08:06

## 2020-01-01 RX ADMIN — DEXMEDETOMIDINE HYDROCHLORIDE IN 0.9% SODIUM CHLORIDE 0.8 MCG/KG/HR: 400 INJECTION INTRAVENOUS at 06:05

## 2020-01-01 RX ADMIN — ACETAZOLAMIDE SODIUM 250 MG: 500 INJECTION, POWDER, LYOPHILIZED, FOR SOLUTION INTRAVENOUS at 09:04

## 2020-01-01 RX ADMIN — DEXMEDETOMIDINE HYDROCHLORIDE 0.1 MCG/KG/HR: 4 INJECTION, SOLUTION INTRAVENOUS at 09:06

## 2020-01-01 RX ADMIN — FENTANYL 1 PATCH: 12 PATCH, EXTENDED RELEASE TRANSDERMAL at 04:06

## 2020-01-01 RX ADMIN — Medication 200 MCG/HR: at 11:07

## 2020-01-01 RX ADMIN — INSULIN ASPART 2 UNITS: 100 INJECTION, SOLUTION INTRAVENOUS; SUBCUTANEOUS at 12:04

## 2020-01-01 RX ADMIN — SODIUM CHLORIDE 2.6 UNITS/HR: 9 INJECTION, SOLUTION INTRAVENOUS at 02:05

## 2020-01-01 RX ADMIN — DIAZEPAM 5 MG: 5 TABLET ORAL at 01:05

## 2020-01-01 RX ADMIN — Medication 0.45 MCG/KG/MIN: at 02:04

## 2020-01-01 RX ADMIN — DEXMEDETOMIDINE HYDROCHLORIDE 1.4 MCG/KG/HR: 4 INJECTION, SOLUTION INTRAVENOUS at 06:06

## 2020-01-01 RX ADMIN — DEXMEDETOMIDINE HYDROCHLORIDE 0.5 MCG/KG/HR: 4 INJECTION, SOLUTION INTRAVENOUS at 04:06

## 2020-01-01 RX ADMIN — INSULIN ASPART 2 UNITS: 100 INJECTION, SOLUTION INTRAVENOUS; SUBCUTANEOUS at 09:06

## 2020-01-01 RX ADMIN — INSULIN ASPART 2 UNITS: 100 INJECTION, SOLUTION INTRAVENOUS; SUBCUTANEOUS at 10:06

## 2020-01-01 RX ADMIN — DEXMEDETOMIDINE HYDROCHLORIDE 1.4 MCG/KG/HR: 4 INJECTION, SOLUTION INTRAVENOUS at 07:06

## 2020-01-01 RX ADMIN — CHLOROTHIAZIDE SODIUM 250 MG: 500 INJECTION, POWDER, LYOPHILIZED, FOR SOLUTION INTRAVENOUS at 06:05

## 2020-01-01 RX ADMIN — INSULIN ASPART 4 UNITS: 100 INJECTION, SOLUTION INTRAVENOUS; SUBCUTANEOUS at 05:06

## 2020-01-01 RX ADMIN — INSULIN ASPART 4 UNITS: 100 INJECTION, SOLUTION INTRAVENOUS; SUBCUTANEOUS at 12:04

## 2020-01-01 RX ADMIN — FLUCONAZOLE 200 MG: 2 INJECTION, SOLUTION INTRAVENOUS at 02:05

## 2020-01-01 RX ADMIN — VASOPRESSIN 0.04 UNITS/MIN: 20 INJECTION INTRAVENOUS at 06:06

## 2020-01-01 RX ADMIN — DIAZEPAM 2 MG: 2 TABLET ORAL at 09:05

## 2020-01-01 RX ADMIN — DEXMEDETOMIDINE HYDROCHLORIDE 1 MCG/KG/HR: 4 INJECTION, SOLUTION INTRAVENOUS at 11:06

## 2020-01-01 RX ADMIN — CHLORHEXIDINE GLUCONATE 0.12% ORAL RINSE 15 ML: 1.2 LIQUID ORAL at 09:06

## 2020-01-01 RX ADMIN — HYDROMORPHONE HYDROCHLORIDE 2 MG/HR: 2 INJECTION INTRAMUSCULAR; INTRAVENOUS; SUBCUTANEOUS at 04:04

## 2020-01-01 RX ADMIN — SODIUM CHLORIDE 1 UNITS/HR: 9 INJECTION, SOLUTION INTRAVENOUS at 02:05

## 2020-01-01 RX ADMIN — POTASSIUM CHLORIDE 40 MEQ: 20 SOLUTION ORAL at 04:05

## 2020-01-01 RX ADMIN — PIPERACILLIN AND TAZOBACTAM 4.5 G: 4; .5 INJECTION, POWDER, LYOPHILIZED, FOR SOLUTION INTRAVENOUS at 05:07

## 2020-01-01 RX ADMIN — HEPARIN SODIUM AND DEXTROSE 700 UNITS/HR: 10000; 5 INJECTION INTRAVENOUS at 08:06

## 2020-01-01 RX ADMIN — CHLORHEXIDINE GLUCONATE 0.12% ORAL RINSE 15 ML: 1.2 LIQUID ORAL at 04:04

## 2020-01-01 RX ADMIN — SODIUM CHLORIDE 2 UNITS/HR: 9 INJECTION, SOLUTION INTRAVENOUS at 11:04

## 2020-01-01 RX ADMIN — INSULIN ASPART 4 UNITS: 100 INJECTION, SOLUTION INTRAVENOUS; SUBCUTANEOUS at 11:06

## 2020-01-01 RX ADMIN — POTASSIUM CHLORIDE 40 MEQ: 29.8 INJECTION, SOLUTION INTRAVENOUS at 08:04

## 2020-01-01 RX ADMIN — VASOPRESSIN 0.04 UNITS/MIN: 20 INJECTION INTRAVENOUS at 10:06

## 2020-01-01 RX ADMIN — DEXMEDETOMIDINE HYDROCHLORIDE 0.4 MCG/KG/HR: 4 INJECTION, SOLUTION INTRAVENOUS at 02:06

## 2020-01-01 RX ADMIN — Medication 300 MG: at 09:05

## 2020-01-01 RX ADMIN — POTASSIUM CHLORIDE 40 MEQ: 29.8 INJECTION, SOLUTION INTRAVENOUS at 05:04

## 2020-01-01 RX ADMIN — DEXMEDETOMIDINE HYDROCHLORIDE 0.1 MCG/KG/HR: 4 INJECTION, SOLUTION INTRAVENOUS at 08:05

## 2020-01-01 RX ADMIN — FUROSEMIDE 5 MG: 10 INJECTION INTRAMUSCULAR; INTRAVENOUS at 05:05

## 2020-01-01 RX ADMIN — HYDROMORPHONE HYDROCHLORIDE 2 MG/HR: 2 INJECTION INTRAMUSCULAR; INTRAVENOUS; SUBCUTANEOUS at 01:04

## 2020-01-01 RX ADMIN — VASOPRESSIN 0.02 UNITS/MIN: 20 INJECTION INTRAVENOUS at 02:05

## 2020-01-01 RX ADMIN — FENTANYL 1 PATCH: 12 PATCH, EXTENDED RELEASE TRANSDERMAL at 02:06

## 2020-01-01 RX ADMIN — MELATONIN 1000 UNITS: at 09:05

## 2020-01-01 RX ADMIN — INSULIN ASPART 8 UNITS: 100 INJECTION, SOLUTION INTRAVENOUS; SUBCUTANEOUS at 04:05

## 2020-01-01 RX ADMIN — DEXMEDETOMIDINE HYDROCHLORIDE 1.2 MCG/KG/HR: 4 INJECTION, SOLUTION INTRAVENOUS at 04:06

## 2020-01-01 RX ADMIN — VANCOMYCIN HYDROCHLORIDE 1000 MG: 1 INJECTION, POWDER, LYOPHILIZED, FOR SOLUTION INTRAVENOUS at 12:04

## 2020-01-01 RX ADMIN — TRAZODONE HYDROCHLORIDE 50 MG: 50 TABLET ORAL at 06:05

## 2020-01-01 RX ADMIN — DEXMEDETOMIDINE HYDROCHLORIDE 1.4 MCG/KG/HR: 100 INJECTION, SOLUTION, CONCENTRATE INTRAVENOUS at 06:04

## 2020-01-01 RX ADMIN — TRAZODONE HYDROCHLORIDE 50 MG: 50 TABLET ORAL at 09:05

## 2020-01-01 RX ADMIN — INSULIN ASPART 4 UNITS: 100 INJECTION, SOLUTION INTRAVENOUS; SUBCUTANEOUS at 05:04

## 2020-01-01 RX ADMIN — LORAZEPAM 2 MG: 2 INJECTION, SOLUTION INTRAMUSCULAR; INTRAVENOUS at 09:04

## 2020-01-01 RX ADMIN — POTASSIUM CHLORIDE 40 MEQ: 20 SOLUTION ORAL at 09:04

## 2020-01-01 RX ADMIN — HEPARIN SODIUM 1000 UNITS/HR: 10000 INJECTION, SOLUTION INTRAVENOUS at 06:05

## 2020-01-01 RX ADMIN — DEXMEDETOMIDINE HYDROCHLORIDE 4 MCG/KG/HR: 100 INJECTION, SOLUTION, CONCENTRATE INTRAVENOUS at 12:04

## 2020-01-01 RX ADMIN — DIAZEPAM 5 MG: 5 TABLET ORAL at 10:05

## 2020-01-01 RX ADMIN — PROPOFOL 40 MCG/KG/MIN: 10 INJECTION, EMULSION INTRAVENOUS at 03:07

## 2020-01-01 RX ADMIN — DIAZEPAM 1 MG: 5 SOLUTION ORAL at 10:05

## 2020-01-01 RX ADMIN — POTASSIUM CHLORIDE 40 MEQ: 20 SOLUTION ORAL at 09:05

## 2020-01-01 RX ADMIN — DEXMEDETOMIDINE HYDROCHLORIDE 1.4 MCG/KG/HR: 100 INJECTION, SOLUTION, CONCENTRATE INTRAVENOUS at 01:04

## 2020-01-01 RX ADMIN — PROPOFOL 40 MCG/KG/MIN: 10 INJECTION, EMULSION INTRAVENOUS at 10:07

## 2020-01-01 RX ADMIN — Medication 37.5 MG: at 08:05

## 2020-01-01 RX ADMIN — DEXMEDETOMIDINE HYDROCHLORIDE 0.8 MCG/KG/HR: 4 INJECTION, SOLUTION INTRAVENOUS at 11:06

## 2020-01-01 RX ADMIN — Medication 0.22 MCG/KG/MIN: at 02:06

## 2020-01-01 RX ADMIN — MINERAL OIL AND WHITE PETROLATUM: 150; 830 OINTMENT OPHTHALMIC at 08:04

## 2020-01-01 RX ADMIN — HYPROMELLOSE 2910 1 DROP: 5 SOLUTION OPHTHALMIC at 10:05

## 2020-01-01 RX ADMIN — Medication 12.5 MG: at 08:05

## 2020-01-01 RX ADMIN — DIAZEPAM 5 MG: 5 TABLET ORAL at 05:05

## 2020-01-01 RX ADMIN — DEXTROSE: 5 SOLUTION INTRAVENOUS at 05:04

## 2020-01-01 RX ADMIN — DEXTROSE: 5 SOLUTION INTRAVENOUS at 02:04

## 2020-01-01 RX ADMIN — DEXMEDETOMIDINE HYDROCHLORIDE 1.4 MCG/KG/HR: 4 INJECTION, SOLUTION INTRAVENOUS at 12:07

## 2020-01-01 RX ADMIN — INSULIN ASPART 6 UNITS: 100 INJECTION, SOLUTION INTRAVENOUS; SUBCUTANEOUS at 03:04

## 2020-01-01 RX ADMIN — PIPERACILLIN SODIUM,TAZOBACTAM SODIUM 4.5 G: 4; .5 INJECTION, POWDER, FOR SOLUTION INTRAVENOUS at 01:05

## 2020-01-01 RX ADMIN — INSULIN DETEMIR 5 UNITS: 100 INJECTION, SOLUTION SUBCUTANEOUS at 08:04

## 2020-01-01 RX ADMIN — QUETIAPINE 100 MG: 100 TABLET, FILM COATED ORAL at 09:06

## 2020-01-01 RX ADMIN — NICARDIPINE HYDROCHLORIDE 1 MG/HR: 0.2 INJECTION, SOLUTION INTRAVENOUS at 02:04

## 2020-01-01 RX ADMIN — ALBUMIN (HUMAN) 25 G: 12.5 SOLUTION INTRAVENOUS at 08:06

## 2020-01-01 RX ADMIN — CEFEPIME 2 G: 2 INJECTION, POWDER, FOR SOLUTION INTRAVENOUS at 03:04

## 2020-01-01 RX ADMIN — ALBUMIN (HUMAN) 12.5 G: 12.5 SOLUTION INTRAVENOUS at 12:05

## 2020-01-01 RX ADMIN — Medication 0.04 MCG/KG/MIN: at 03:04

## 2020-01-01 RX ADMIN — FENTANYL 1 PATCH: 12 PATCH, EXTENDED RELEASE TRANSDERMAL at 03:05

## 2020-01-01 RX ADMIN — VANCOMYCIN HYDROCHLORIDE 1000 MG: 1 INJECTION, POWDER, LYOPHILIZED, FOR SOLUTION INTRAVENOUS at 01:04

## 2020-01-01 RX ADMIN — NICARDIPINE HYDROCHLORIDE 1 MG/HR: 0.2 INJECTION, SOLUTION INTRAVENOUS at 12:06

## 2020-01-01 RX ADMIN — Medication 0.22 MCG/KG/MIN: at 09:06

## 2020-01-01 RX ADMIN — Medication 4 MCG/KG/MIN: at 10:06

## 2020-01-01 RX ADMIN — Medication 10 ML: at 10:06

## 2020-01-01 RX ADMIN — PROPOFOL 30 MCG/KG/MIN: 10 INJECTION, EMULSION INTRAVENOUS at 08:04

## 2020-01-01 RX ADMIN — PROPOFOL 40 MCG/KG/MIN: 10 INJECTION, EMULSION INTRAVENOUS at 06:04

## 2020-01-01 RX ADMIN — PROPOFOL 35 MCG/KG/MIN: 10 INJECTION, EMULSION INTRAVENOUS at 05:07

## 2020-01-01 RX ADMIN — TRAZODONE HYDROCHLORIDE 25 MG: 50 TABLET ORAL at 09:05

## 2020-01-01 RX ADMIN — HEPARIN SODIUM 800 UNITS/HR: 10000 INJECTION, SOLUTION INTRAVENOUS at 03:05

## 2020-01-01 RX ADMIN — POLYETHYLENE GLYCOL 3350 17 G: 17 POWDER, FOR SOLUTION ORAL at 10:06

## 2020-01-01 RX ADMIN — HYDROXYCHLOROQUINE SULFATE 400 MG: 200 TABLET, FILM COATED ORAL at 09:04

## 2020-01-01 RX ADMIN — Medication 0.02 MCG/KG/MIN: at 02:06

## 2020-01-01 RX ADMIN — INSULIN ASPART 4 UNITS: 100 INJECTION, SOLUTION INTRAVENOUS; SUBCUTANEOUS at 12:05

## 2020-01-01 RX ADMIN — NICARDIPINE HYDROCHLORIDE 1 MG/HR: 0.2 INJECTION, SOLUTION INTRAVENOUS at 07:05

## 2020-01-01 RX ADMIN — HEPARIN SODIUM 700 UNITS/HR: 10000 INJECTION, SOLUTION INTRAVENOUS at 10:05

## 2020-01-01 RX ADMIN — INSULIN ASPART 3 UNITS: 100 INJECTION, SOLUTION INTRAVENOUS; SUBCUTANEOUS at 12:04

## 2020-01-01 RX ADMIN — CEFEPIME 2 G: 2 INJECTION, POWDER, FOR SOLUTION INTRAVENOUS at 01:04

## 2020-01-01 RX ADMIN — PROPOFOL 35 MCG/KG/MIN: 10 INJECTION, EMULSION INTRAVENOUS at 10:07

## 2020-01-01 RX ADMIN — NICARDIPINE HYDROCHLORIDE 7.5 MG/HR: 0.2 INJECTION, SOLUTION INTRAVENOUS at 05:05

## 2020-01-01 RX ADMIN — INSULIN ASPART 4 UNITS: 100 INJECTION, SOLUTION INTRAVENOUS; SUBCUTANEOUS at 04:04

## 2020-01-01 RX ADMIN — PROPOFOL 50 MCG/KG/MIN: 10 INJECTION, EMULSION INTRAVENOUS at 04:04

## 2020-01-01 RX ADMIN — INSULIN ASPART 3 UNITS: 100 INJECTION, SOLUTION INTRAVENOUS; SUBCUTANEOUS at 08:07

## 2020-01-01 RX ADMIN — POTASSIUM CHLORIDE 40 MEQ: 20 SOLUTION ORAL at 04:06

## 2020-01-01 RX ADMIN — KETAMINE HYDROCHLORIDE 20 MCG/KG/MIN: 50 INJECTION INTRAMUSCULAR; INTRAVENOUS at 06:04

## 2020-01-01 RX ADMIN — DEXMEDETOMIDINE HYDROCHLORIDE 0.5 MCG/KG/HR: 100 INJECTION, SOLUTION, CONCENTRATE INTRAVENOUS at 10:04

## 2020-01-01 RX ADMIN — HEPARIN SODIUM 950 UNITS/HR: 10000 INJECTION, SOLUTION INTRAVENOUS at 09:05

## 2020-01-01 RX ADMIN — DEXMEDETOMIDINE HYDROCHLORIDE IN 0.9% SODIUM CHLORIDE 0.4 MCG/KG/HR: 400 INJECTION INTRAVENOUS at 06:05

## 2020-01-01 RX ADMIN — POTASSIUM CHLORIDE 40 MEQ: 20 SOLUTION ORAL at 11:05

## 2020-01-01 RX ADMIN — FENTANYL CITRATE 2500 MCG: 50 INJECTION INTRAMUSCULAR; INTRAVENOUS at 11:04

## 2020-01-01 RX ADMIN — SODIUM CHLORIDE 1 MCG/KG/MIN: 9 INJECTION, SOLUTION INTRAVENOUS at 12:07

## 2020-01-01 RX ADMIN — INSULIN ASPART 1 UNITS: 100 INJECTION, SOLUTION INTRAVENOUS; SUBCUTANEOUS at 07:06

## 2020-01-01 RX ADMIN — HYDROMORPHONE HYDROCHLORIDE 4 MG/HR: 2 INJECTION INTRAMUSCULAR; INTRAVENOUS; SUBCUTANEOUS at 10:04

## 2020-01-01 RX ADMIN — SENNOSIDES 8.6 MG: 8.6 TABLET, FILM COATED ORAL at 09:04

## 2020-01-01 RX ADMIN — DEXMEDETOMIDINE HYDROCHLORIDE IN 0.9% SODIUM CHLORIDE 1.4 MCG/KG/HR: 400 INJECTION INTRAVENOUS at 03:05

## 2020-01-01 RX ADMIN — INSULIN ASPART 1 UNITS: 100 INJECTION, SOLUTION INTRAVENOUS; SUBCUTANEOUS at 08:07

## 2020-01-01 RX ADMIN — CEFEPIME 2 G: 2 INJECTION, POWDER, FOR SOLUTION INTRAVENOUS at 09:06

## 2020-01-01 RX ADMIN — SODIUM CHLORIDE 0.7 UNITS/HR: 9 INJECTION, SOLUTION INTRAVENOUS at 05:05

## 2020-01-01 RX ADMIN — ACETAZOLAMIDE SODIUM 250 MG: 500 INJECTION, POWDER, LYOPHILIZED, FOR SOLUTION INTRAVENOUS at 08:05

## 2020-01-01 RX ADMIN — DEXTROSE MONOHYDRATE 8 MG/HR: 50 INJECTION, SOLUTION INTRAVENOUS at 09:05

## 2020-01-01 RX ADMIN — DEXMEDETOMIDINE HYDROCHLORIDE 1 MCG/KG/HR: 4 INJECTION, SOLUTION INTRAVENOUS at 08:07

## 2020-01-01 RX ADMIN — HEPARIN SODIUM 900 UNITS/HR: 10000 INJECTION, SOLUTION INTRAVENOUS at 11:05

## 2020-01-01 RX ADMIN — CARBOXYMETHYLCELLULOSE SODIUM: 10 GEL OPHTHALMIC at 05:04

## 2020-01-01 RX ADMIN — IPRATROPIUM BROMIDE AND ALBUTEROL SULFATE 3 ML: 2.5; .5 SOLUTION RESPIRATORY (INHALATION) at 09:05

## 2020-01-01 RX ADMIN — FLUCONAZOLE 200 MG: 2 INJECTION, SOLUTION INTRAVENOUS at 05:05

## 2020-01-01 RX ADMIN — HYPROMELLOSE 2910 1 DROP: 5 SOLUTION OPHTHALMIC at 09:06

## 2020-01-01 RX ADMIN — ROCURONIUM BROMIDE 10 MG: 10 INJECTION, SOLUTION INTRAVENOUS at 10:04

## 2020-01-01 RX ADMIN — HYDROMORPHONE HYDROCHLORIDE 1 MG/HR: 2 INJECTION INTRAMUSCULAR; INTRAVENOUS; SUBCUTANEOUS at 06:04

## 2020-01-01 RX ADMIN — POTASSIUM CHLORIDE 60 MEQ: 200 INJECTION, SOLUTION INTRAVENOUS at 05:04

## 2020-01-01 RX ADMIN — DEXMEDETOMIDINE HYDROCHLORIDE 0.8 MCG/KG/HR: 4 INJECTION, SOLUTION INTRAVENOUS at 09:06

## 2020-01-01 RX ADMIN — INSULIN ASPART 4 UNITS: 100 INJECTION, SOLUTION INTRAVENOUS; SUBCUTANEOUS at 07:06

## 2020-01-01 RX ADMIN — VANCOMYCIN HYDROCHLORIDE 1250 MG: 1.25 INJECTION, POWDER, LYOPHILIZED, FOR SOLUTION INTRAVENOUS at 12:05

## 2020-01-01 RX ADMIN — PROPOFOL 45 MCG/KG/MIN: 10 INJECTION, EMULSION INTRAVENOUS at 05:04

## 2020-01-01 RX ADMIN — HEPARIN SODIUM 1100 UNITS/HR: 10000 INJECTION, SOLUTION INTRAVENOUS at 07:04

## 2020-01-01 RX ADMIN — HEPARIN SODIUM AND DEXTROSE 18 UNITS/KG/HR: 10000; 5 INJECTION INTRAVENOUS at 07:04

## 2020-01-01 RX ADMIN — HYDROCORTISONE SODIUM SUCCINATE 100 MG: 100 INJECTION, POWDER, FOR SOLUTION INTRAMUSCULAR; INTRAVENOUS at 02:06

## 2020-01-01 RX ADMIN — ROCURONIUM BROMIDE 50 MG: 10 INJECTION, SOLUTION INTRAVENOUS at 02:05

## 2020-01-01 RX ADMIN — VANCOMYCIN HYDROCHLORIDE 1250 MG: 1.25 INJECTION, POWDER, LYOPHILIZED, FOR SOLUTION INTRAVENOUS at 09:04

## 2020-01-01 RX ADMIN — ALBUMIN (HUMAN) 25 G: 12.5 SOLUTION INTRAVENOUS at 10:05

## 2020-01-01 RX ADMIN — VASOPRESSIN 0.04 UNITS/MIN: 20 INJECTION INTRAVENOUS at 12:06

## 2020-01-01 RX ADMIN — HYDROXYCHLOROQUINE SULFATE 400 MG: 200 TABLET ORAL at 08:04

## 2020-01-01 RX ADMIN — ALBUMIN (HUMAN) 12.5 G: 12.5 SOLUTION INTRAVENOUS at 06:04

## 2020-01-01 RX ADMIN — INSULIN ASPART 2 UNITS: 100 INJECTION, SOLUTION INTRAVENOUS; SUBCUTANEOUS at 04:04

## 2020-01-01 RX ADMIN — PROPOFOL 40 MCG/KG/MIN: 10 INJECTION, EMULSION INTRAVENOUS at 07:05

## 2020-01-01 RX ADMIN — MELATONIN 1000 UNITS: at 10:07

## 2020-01-01 RX ADMIN — SODIUM CHLORIDE 5 MG/HR: 9 INJECTION, SOLUTION INTRAVENOUS at 06:05

## 2020-01-01 RX ADMIN — INSULIN ASPART 1 UNITS: 100 INJECTION, SOLUTION INTRAVENOUS; SUBCUTANEOUS at 09:04

## 2020-01-01 RX ADMIN — SODIUM PHOSPHATE, MONOBASIC, MONOHYDRATE 15 MMOL: 276; 142 INJECTION, SOLUTION INTRAVENOUS at 11:04

## 2020-01-01 RX ADMIN — IPRATROPIUM BROMIDE AND ALBUTEROL SULFATE 3 ML: 2.5; .5 SOLUTION RESPIRATORY (INHALATION) at 12:04

## 2020-01-01 RX ADMIN — INSULIN ASPART 8 UNITS: 100 INJECTION, SOLUTION INTRAVENOUS; SUBCUTANEOUS at 08:04

## 2020-01-01 RX ADMIN — SUCRALFATE 1 G: 1 SUSPENSION ORAL at 08:07

## 2020-01-01 RX ADMIN — SUCRALFATE 1 G: 1 SUSPENSION ORAL at 04:07

## 2020-01-01 RX ADMIN — INSULIN ASPART 2 UNITS: 100 INJECTION, SOLUTION INTRAVENOUS; SUBCUTANEOUS at 12:07

## 2020-01-01 RX ADMIN — DEXMEDETOMIDINE HYDROCHLORIDE 0.8 MCG/KG/HR: 4 INJECTION, SOLUTION INTRAVENOUS at 01:06

## 2020-01-01 RX ADMIN — PIPERACILLIN AND TAZOBACTAM 4.5 G: 4; .5 INJECTION, POWDER, LYOPHILIZED, FOR SOLUTION INTRAVENOUS at 09:07

## 2020-01-01 RX ADMIN — HEPARIN SODIUM AND DEXTROSE 18 UNITS/KG/HR: 10000; 5 INJECTION INTRAVENOUS at 05:04

## 2020-01-01 RX ADMIN — Medication 75 MCG/HR: at 03:07

## 2020-01-01 RX ADMIN — VECURONIUM BROMIDE FOR INJECTION 1 MCG/KG/MIN: 1 INJECTION, POWDER, LYOPHILIZED, FOR SOLUTION INTRAVENOUS at 06:04

## 2020-01-01 RX ADMIN — HYDROMORPHONE HYDROCHLORIDE 3 MG/HR: 2 INJECTION INTRAMUSCULAR; INTRAVENOUS; SUBCUTANEOUS at 05:04

## 2020-01-01 RX ADMIN — QUETIAPINE FUMARATE 100 MG: 100 TABLET ORAL at 11:06

## 2020-01-01 RX ADMIN — PIPERACILLIN SODIUM,TAZOBACTAM SODIUM 4.5 G: 4; .5 INJECTION, POWDER, FOR SOLUTION INTRAVENOUS at 09:05

## 2020-01-01 RX ADMIN — INSULIN ASPART 3 UNITS: 100 INJECTION, SOLUTION INTRAVENOUS; SUBCUTANEOUS at 08:06

## 2020-01-01 RX ADMIN — INSULIN ASPART 3 UNITS: 100 INJECTION, SOLUTION INTRAVENOUS; SUBCUTANEOUS at 09:04

## 2020-01-01 RX ADMIN — POTASSIUM CHLORIDE 60 MEQ: 20 SOLUTION ORAL at 02:05

## 2020-01-01 RX ADMIN — Medication 200 MG: at 09:05

## 2020-01-01 RX ADMIN — DEXTROSE MONOHYDRATE 8 MG/HR: 50 INJECTION, SOLUTION INTRAVENOUS at 06:05

## 2020-01-01 RX ADMIN — ALBUMIN (HUMAN) 25 G: 12.5 SOLUTION INTRAVENOUS at 03:05

## 2020-01-01 RX ADMIN — CEFEPIME 2 G: 2 INJECTION, POWDER, FOR SOLUTION INTRAVENOUS at 04:04

## 2020-01-01 RX ADMIN — PROPOFOL 5 MCG/KG/MIN: 10 INJECTION, EMULSION INTRAVENOUS at 03:05

## 2020-01-01 RX ADMIN — POTASSIUM & SODIUM PHOSPHATES POWDER PACK 280-160-250 MG 2 PACKET: 280-160-250 PACK at 02:05

## 2020-01-01 RX ADMIN — POTASSIUM CHLORIDE 40 MEQ: 20 SOLUTION ORAL at 01:06

## 2020-01-01 RX ADMIN — DEXTROSE MONOHYDRATE 8 MG/HR: 50 INJECTION, SOLUTION INTRAVENOUS at 10:05

## 2020-01-01 RX ADMIN — INSULIN ASPART 1 UNITS: 100 INJECTION, SOLUTION INTRAVENOUS; SUBCUTANEOUS at 03:05

## 2020-01-01 RX ADMIN — ROCURONIUM BROMIDE 20 MG: 10 INJECTION, SOLUTION INTRAVENOUS at 08:04

## 2020-01-01 RX ADMIN — HYPROMELLOSE 2910 1 DROP: 5 SOLUTION OPHTHALMIC at 10:07

## 2020-01-01 RX ADMIN — HEPARIN SODIUM AND DEXTROSE 20 UNITS/KG/HR: 10000; 5 INJECTION INTRAVENOUS at 08:04

## 2020-01-01 RX ADMIN — DEXMEDETOMIDINE HYDROCHLORIDE 1.2 MCG/KG/HR: 4 INJECTION, SOLUTION INTRAVENOUS at 11:07

## 2020-01-01 RX ADMIN — INSULIN ASPART 2 UNITS: 100 INJECTION, SOLUTION INTRAVENOUS; SUBCUTANEOUS at 07:05

## 2020-01-01 RX ADMIN — HYDROMORPHONE HYDROCHLORIDE 2 MG/HR: 2 INJECTION INTRAMUSCULAR; INTRAVENOUS; SUBCUTANEOUS at 10:04

## 2020-01-01 RX ADMIN — INSULIN ASPART 6 UNITS: 100 INJECTION, SOLUTION INTRAVENOUS; SUBCUTANEOUS at 05:05

## 2020-01-01 RX ADMIN — CARBOXYMETHYLCELLULOSE SODIUM 2 DROP: 10 GEL OPHTHALMIC at 01:05

## 2020-01-01 RX ADMIN — PANTOPRAZOLE SODIUM 40 MG: 40 GRANULE, DELAYED RELEASE ORAL at 10:07

## 2020-01-01 RX ADMIN — POTASSIUM CHLORIDE 40 MEQ: 29.8 INJECTION, SOLUTION INTRAVENOUS at 08:05

## 2020-01-01 RX ADMIN — DEXMEDETOMIDINE HYDROCHLORIDE 0.5 MCG/KG/HR: 4 INJECTION, SOLUTION INTRAVENOUS at 02:06

## 2020-01-01 RX ADMIN — PIPERACILLIN AND TAZOBACTAM 4.5 G: 4; .5 INJECTION, POWDER, FOR SOLUTION INTRAVENOUS at 09:05

## 2020-01-01 RX ADMIN — NICARDIPINE HYDROCHLORIDE 4 MG/HR: 0.2 INJECTION, SOLUTION INTRAVENOUS at 04:05

## 2020-01-01 RX ADMIN — FENTANYL 1 PATCH: 12 PATCH, EXTENDED RELEASE TRANSDERMAL at 07:05

## 2020-01-01 RX ADMIN — MAGNESIUM SULFATE HEPTAHYDRATE 2 G: 40 INJECTION, SOLUTION INTRAVENOUS at 10:07

## 2020-01-01 RX ADMIN — Medication 0.04 MCG/KG/MIN: at 08:07

## 2020-01-01 RX ADMIN — POTASSIUM CHLORIDE 40 MEQ: 20 SOLUTION ORAL at 03:06

## 2020-01-01 RX ADMIN — HEPARIN SODIUM AND DEXTROSE 1200 UNITS/HR: 10000; 5 INJECTION INTRAVENOUS at 06:04

## 2020-01-01 RX ADMIN — ACETAMINOPHEN 650 MG: 325 TABLET ORAL at 09:06

## 2020-01-01 RX ADMIN — INSULIN ASPART 6 UNITS: 100 INJECTION, SOLUTION INTRAVENOUS; SUBCUTANEOUS at 11:04

## 2020-01-01 RX ADMIN — LORAZEPAM 2 MG: 2 INJECTION, SOLUTION INTRAMUSCULAR; INTRAVENOUS at 08:04

## 2020-01-01 RX ADMIN — DEXMEDETOMIDINE HYDROCHLORIDE 1.1 MCG/KG/HR: 4 INJECTION, SOLUTION INTRAVENOUS at 11:07

## 2020-01-01 RX ADMIN — FUROSEMIDE 5 MG/HR: 10 INJECTION, SOLUTION INTRAMUSCULAR; INTRAVENOUS at 03:04

## 2020-01-01 RX ADMIN — PROPOFOL 40 MCG/KG/MIN: 10 INJECTION, EMULSION INTRAVENOUS at 02:05

## 2020-01-01 RX ADMIN — INSULIN ASPART 4 UNITS: 100 INJECTION, SOLUTION INTRAVENOUS; SUBCUTANEOUS at 02:05

## 2020-01-01 RX ADMIN — INSULIN ASPART 3 UNITS: 100 INJECTION, SOLUTION INTRAVENOUS; SUBCUTANEOUS at 11:06

## 2020-01-01 RX ADMIN — POTASSIUM PHOSPHATE, MONOBASIC AND POTASSIUM PHOSPHATE, DIBASIC 20 MMOL: 224; 236 INJECTION, SOLUTION, CONCENTRATE INTRAVENOUS at 10:04

## 2020-01-01 RX ADMIN — INSULIN ASPART 4 UNITS: 100 INJECTION, SOLUTION INTRAVENOUS; SUBCUTANEOUS at 09:04

## 2020-01-01 RX ADMIN — INSULIN ASPART 2 UNITS: 100 INJECTION, SOLUTION INTRAVENOUS; SUBCUTANEOUS at 01:06

## 2020-01-01 RX ADMIN — DEXMEDETOMIDINE HYDROCHLORIDE 1.4 MCG/KG/HR: 100 INJECTION, SOLUTION, CONCENTRATE INTRAVENOUS at 05:04

## 2020-01-01 RX ADMIN — POTASSIUM CHLORIDE 40 MEQ: 1.5 POWDER, FOR SOLUTION ORAL at 06:07

## 2020-01-01 RX ADMIN — FENTANYL CITRATE 200 MCG/HR: 50 INJECTION INTRAMUSCULAR; INTRAVENOUS at 10:04

## 2020-01-01 RX ADMIN — DIAZEPAM 2 MG: 2 TABLET ORAL at 08:05

## 2020-01-01 RX ADMIN — SODIUM PHOSPHATE, MONOBASIC, MONOHYDRATE 15 MMOL: 276; 142 INJECTION, SOLUTION INTRAVENOUS at 08:05

## 2020-01-01 RX ADMIN — CYANOCOBALAMIN 100 MCG: 1000 INJECTION, SOLUTION INTRAMUSCULAR at 09:05

## 2020-01-01 RX ADMIN — SODIUM CHLORIDE 2 MG/HR: 9 INJECTION, SOLUTION INTRAVENOUS at 10:05

## 2020-01-01 RX ADMIN — MIDODRINE HYDROCHLORIDE 10 MG: 5 TABLET ORAL at 03:07

## 2020-01-01 RX ADMIN — INSULIN ASPART 2 UNITS: 100 INJECTION, SOLUTION INTRAVENOUS; SUBCUTANEOUS at 01:04

## 2020-01-01 RX ADMIN — Medication 0.08 MCG/KG/MIN: at 06:04

## 2020-01-01 RX ADMIN — DEXMEDETOMIDINE HYDROCHLORIDE 0.2 MCG/KG/HR: 4 INJECTION, SOLUTION INTRAVENOUS at 07:05

## 2020-01-01 RX ADMIN — AZITHROMYCIN 500 MG: 500 INJECTION, POWDER, LYOPHILIZED, FOR SOLUTION INTRAVENOUS at 06:04

## 2020-01-01 RX ADMIN — DEXMEDETOMIDINE HYDROCHLORIDE 1 MCG/KG/HR: 100 INJECTION, SOLUTION, CONCENTRATE INTRAVENOUS at 09:04

## 2020-01-01 RX ADMIN — HYDROMORPHONE HYDROCHLORIDE 4 MG/HR: 2 INJECTION INTRAMUSCULAR; INTRAVENOUS; SUBCUTANEOUS at 04:04

## 2020-01-01 RX ADMIN — FENTANYL CITRATE 25 MCG: 50 INJECTION INTRAMUSCULAR; INTRAVENOUS at 02:04

## 2020-01-01 RX ADMIN — INSULIN ASPART 5 UNITS: 100 INJECTION, SOLUTION INTRAVENOUS; SUBCUTANEOUS at 03:06

## 2020-01-01 RX ADMIN — PIPERACILLIN AND TAZOBACTAM 4.5 G: 4; .5 INJECTION, POWDER, LYOPHILIZED, FOR SOLUTION INTRAVENOUS at 01:07

## 2020-01-01 RX ADMIN — POTASSIUM & SODIUM PHOSPHATES POWDER PACK 280-160-250 MG 2 PACKET: 280-160-250 PACK at 03:05

## 2020-01-01 RX ADMIN — ACETAMINOPHEN 650 MG: 325 TABLET ORAL at 11:04

## 2020-01-01 RX ADMIN — ALBUMIN (HUMAN) 25 G: 12.5 SOLUTION INTRAVENOUS at 11:05

## 2020-01-01 RX ADMIN — HEPARIN SODIUM AND DEXTROSE 18 UNITS/KG/HR: 10000; 5 INJECTION INTRAVENOUS at 11:04

## 2020-01-01 RX ADMIN — FENTANYL CITRATE 100 MCG: 50 INJECTION, SOLUTION INTRAMUSCULAR; INTRAVENOUS at 03:05

## 2020-01-01 RX ADMIN — INSULIN ASPART 2 UNITS: 100 INJECTION, SOLUTION INTRAVENOUS; SUBCUTANEOUS at 08:07

## 2020-01-01 RX ADMIN — PROPOFOL 40 MCG/KG/MIN: 10 INJECTION, EMULSION INTRAVENOUS at 05:05

## 2020-01-01 RX ADMIN — ACETAMINOPHEN 650 MG: 325 TABLET ORAL at 01:05

## 2020-01-01 RX ADMIN — DEXMEDETOMIDINE HYDROCHLORIDE IN 0.9% SODIUM CHLORIDE 0.4 MCG/KG/HR: 400 INJECTION INTRAVENOUS at 04:05

## 2020-01-01 RX ADMIN — ALBUMIN (HUMAN) 12.5 G: 12.5 SOLUTION INTRAVENOUS at 02:06

## 2020-01-01 RX ADMIN — ACETAMINOPHEN 650 MG: 325 TABLET ORAL at 04:05

## 2020-01-01 RX ADMIN — PROPOFOL 30 MCG/KG/MIN: 10 INJECTION, EMULSION INTRAVENOUS at 04:04

## 2020-01-01 RX ADMIN — VECURONIUM BROMIDE FOR INJECTION 2 MCG/KG/MIN: 1 INJECTION, POWDER, LYOPHILIZED, FOR SOLUTION INTRAVENOUS at 07:04

## 2020-01-01 RX ADMIN — HYDROMORPHONE HYDROCHLORIDE 4 MG/HR: 2 INJECTION INTRAMUSCULAR; INTRAVENOUS; SUBCUTANEOUS at 11:04

## 2020-01-01 RX ADMIN — VASOPRESSIN 0.04 UNITS/MIN: 20 INJECTION INTRAVENOUS at 10:05

## 2020-01-01 RX ADMIN — DEXMEDETOMIDINE HYDROCHLORIDE 1.4 MCG/KG/HR: 100 INJECTION, SOLUTION, CONCENTRATE INTRAVENOUS at 08:04

## 2020-01-01 RX ADMIN — SODIUM CHLORIDE 2.5 MG/HR: 9 INJECTION, SOLUTION INTRAVENOUS at 04:06

## 2020-01-01 RX ADMIN — PROPOFOL 40 MCG/KG/MIN: 10 INJECTION, EMULSION INTRAVENOUS at 11:07

## 2020-01-01 RX ADMIN — POTASSIUM CHLORIDE 40 MEQ: 400 INJECTION, SOLUTION INTRAVENOUS at 07:06

## 2020-01-01 RX ADMIN — Medication 25 MG: at 10:05

## 2020-01-01 RX ADMIN — SODIUM CHLORIDE 2.5 MG/HR: 9 INJECTION, SOLUTION INTRAVENOUS at 05:06

## 2020-01-01 RX ADMIN — NICARDIPINE HYDROCHLORIDE 2 MG/HR: 0.2 INJECTION, SOLUTION INTRAVENOUS at 04:05

## 2020-01-01 RX ADMIN — ALBUMIN (HUMAN) 12.5 G: 12.5 SOLUTION INTRAVENOUS at 11:05

## 2020-01-01 RX ADMIN — MAGNESIUM SULFATE HEPTAHYDRATE 2 G: 40 INJECTION, SOLUTION INTRAVENOUS at 04:05

## 2020-01-01 RX ADMIN — Medication 0.16 MCG/KG/MIN: at 06:04

## 2020-01-01 RX ADMIN — CEFEPIME 2 G: 2 INJECTION, POWDER, FOR SOLUTION INTRAVENOUS at 01:05

## 2020-01-01 RX ADMIN — INSULIN ASPART 6 UNITS: 100 INJECTION, SOLUTION INTRAVENOUS; SUBCUTANEOUS at 08:07

## 2020-01-01 RX ADMIN — INSULIN ASPART 3 UNITS: 100 INJECTION, SOLUTION INTRAVENOUS; SUBCUTANEOUS at 04:06

## 2020-01-01 RX ADMIN — DEXMEDETOMIDINE HYDROCHLORIDE IN 0.9% SODIUM CHLORIDE 0.4 MCG/KG/HR: 400 INJECTION INTRAVENOUS at 09:05

## 2020-01-01 RX ADMIN — EPINEPHRINE 0.16 MCG/KG/MIN: 1 INJECTION INTRAMUSCULAR; INTRAVENOUS; SUBCUTANEOUS at 07:04

## 2020-01-01 RX ADMIN — HYDROMORPHONE HYDROCHLORIDE 3.5 MG/HR: 2 INJECTION INTRAMUSCULAR; INTRAVENOUS; SUBCUTANEOUS at 05:04

## 2020-01-01 RX ADMIN — Medication 0.04 MCG/KG/MIN: at 08:06

## 2020-01-01 RX ADMIN — ALBUMIN (HUMAN) 12.5 G: 12.5 SOLUTION INTRAVENOUS at 01:05

## 2020-01-01 RX ADMIN — ALBUMIN (HUMAN) 12.5 G: 12.5 SOLUTION INTRAVENOUS at 09:04

## 2020-01-01 RX ADMIN — PIPERACILLIN AND TAZOBACTAM 4.5 G: 4; .5 INJECTION, POWDER, FOR SOLUTION INTRAVENOUS at 02:05

## 2020-01-01 RX ADMIN — ALBUMIN (HUMAN) 12.5 G: 12.5 SOLUTION INTRAVENOUS at 04:06

## 2020-01-01 RX ADMIN — VASOPRESSIN 0.01 UNITS/MIN: 20 INJECTION INTRAVENOUS at 06:05

## 2020-01-01 RX ADMIN — FENTANYL CITRATE 50 MCG: 50 INJECTION INTRAMUSCULAR; INTRAVENOUS at 08:04

## 2020-01-01 RX ADMIN — VANCOMYCIN HYDROCHLORIDE 1000 MG: 1 INJECTION, POWDER, LYOPHILIZED, FOR SOLUTION INTRAVENOUS at 10:04

## 2020-01-01 RX ADMIN — Medication 200 MCG/HR: at 10:07

## 2020-01-01 RX ADMIN — DEXMEDETOMIDINE HYDROCHLORIDE 0.1 MCG/KG/HR: 4 INJECTION, SOLUTION INTRAVENOUS at 10:06

## 2020-01-01 RX ADMIN — PANTOPRAZOLE SODIUM 40 MG: 40 INJECTION, POWDER, FOR SOLUTION INTRAVENOUS at 08:06

## 2020-01-01 RX ADMIN — VECURONIUM BROMIDE FOR INJECTION 1.5 MCG/KG/MIN: 1 INJECTION, POWDER, LYOPHILIZED, FOR SOLUTION INTRAVENOUS at 01:04

## 2020-01-01 RX ADMIN — NICARDIPINE HYDROCHLORIDE 2.5 MG/HR: 0.2 INJECTION, SOLUTION INTRAVENOUS at 11:04

## 2020-01-01 RX ADMIN — ACETAZOLAMIDE SODIUM 250 MG: 500 INJECTION, POWDER, LYOPHILIZED, FOR SOLUTION INTRAVENOUS at 02:05

## 2020-01-01 RX ADMIN — HYDROCORTISONE SODIUM SUCCINATE 100 MG: 100 INJECTION, POWDER, FOR SOLUTION INTRAMUSCULAR; INTRAVENOUS at 01:04

## 2020-01-01 RX ADMIN — CARBOXYMETHYLCELLULOSE SODIUM 2 DROP: 10 GEL OPHTHALMIC at 02:04

## 2020-01-01 RX ADMIN — PROPOFOL 30 MCG/KG/MIN: 10 INJECTION, EMULSION INTRAVENOUS at 12:04

## 2020-01-01 RX ADMIN — DIBASIC SODIUM PHOSPHATE, MONOBASIC POTASSIUM PHOSPHATE AND MONOBASIC SODIUM PHOSPHATE 2 TABLET: 852; 155; 130 TABLET ORAL at 08:04

## 2020-01-01 RX ADMIN — INSULIN ASPART 2 UNITS: 100 INJECTION, SOLUTION INTRAVENOUS; SUBCUTANEOUS at 01:07

## 2020-01-01 RX ADMIN — VANCOMYCIN HYDROCHLORIDE 1000 MG: 1 INJECTION, POWDER, LYOPHILIZED, FOR SOLUTION INTRAVENOUS at 03:04

## 2020-01-01 RX ADMIN — MIDAZOLAM HYDROCHLORIDE 2 MG: 1 INJECTION INTRAMUSCULAR; INTRAVENOUS at 02:04

## 2020-01-01 RX ADMIN — SODIUM CHLORIDE 500 ML: 0.9 INJECTION, SOLUTION INTRAVENOUS at 09:07

## 2020-01-01 RX ADMIN — VASOPRESSIN 0.04 UNITS/MIN: 20 INJECTION INTRAVENOUS at 03:05

## 2020-01-01 RX ADMIN — POTASSIUM & SODIUM PHOSPHATES POWDER PACK 280-160-250 MG 1 PACKET: 280-160-250 PACK at 09:04

## 2020-01-01 RX ADMIN — Medication 10 MG: at 01:05

## 2020-01-01 RX ADMIN — ROCURONIUM BROMIDE 64 MG: 10 INJECTION, SOLUTION INTRAVENOUS at 10:04

## 2020-01-01 RX ADMIN — PROPOFOL 30 MCG/KG/MIN: 10 INJECTION, EMULSION INTRAVENOUS at 01:05

## 2020-01-01 RX ADMIN — LORAZEPAM 4 MG: 2 INJECTION, SOLUTION INTRAMUSCULAR; INTRAVENOUS at 06:04

## 2020-01-01 RX ADMIN — FLUCONAZOLE 200 MG: 2 INJECTION, SOLUTION INTRAVENOUS at 12:04

## 2020-01-01 RX ADMIN — INSULIN ASPART 5 UNITS: 100 INJECTION, SOLUTION INTRAVENOUS; SUBCUTANEOUS at 11:06

## 2020-01-01 RX ADMIN — HYDROMORPHONE HYDROCHLORIDE 2 MG/HR: 2 INJECTION INTRAMUSCULAR; INTRAVENOUS; SUBCUTANEOUS at 09:04

## 2020-01-01 RX ADMIN — CEFEPIME 2 G: 2 INJECTION, POWDER, FOR SOLUTION INTRAVENOUS at 08:05

## 2020-01-01 RX ADMIN — NICARDIPINE HYDROCHLORIDE 1 MG/HR: 0.2 INJECTION, SOLUTION INTRAVENOUS at 11:05

## 2020-01-01 RX ADMIN — DEXTROSE MONOHYDRATE 8 MG/HR: 50 INJECTION, SOLUTION INTRAVENOUS at 08:05

## 2020-01-01 RX ADMIN — EPINEPHRINE 0.03 MCG/KG/MIN: 1 INJECTION INTRAMUSCULAR; INTRAVENOUS; SUBCUTANEOUS at 07:06

## 2020-01-01 RX ADMIN — Medication 0.16 MCG/KG/MIN: at 01:07

## 2020-01-01 RX ADMIN — FENTANYL CITRATE 50 MCG: 50 INJECTION INTRAMUSCULAR; INTRAVENOUS at 02:05

## 2020-01-01 RX ADMIN — POTASSIUM CHLORIDE 40 MEQ: 29.8 INJECTION, SOLUTION INTRAVENOUS at 06:04

## 2020-01-01 RX ADMIN — THIAMINE HYDROCHLORIDE 100 MG: 100 INJECTION, SOLUTION INTRAMUSCULAR; INTRAVENOUS at 10:05

## 2020-01-01 RX ADMIN — CISATRACURIUM BESYLATE 5 MCG/KG/MIN: 10 INJECTION, SOLUTION INTRAVENOUS at 03:04

## 2020-01-01 RX ADMIN — PROPOFOL 5 MCG/KG/MIN: 10 INJECTION, EMULSION INTRAVENOUS at 04:05

## 2020-01-01 RX ADMIN — FUROSEMIDE 2 MG: 10 INJECTION, SOLUTION INTRAMUSCULAR; INTRAVENOUS at 10:05

## 2020-01-01 RX ADMIN — Medication 200 MG: at 05:05

## 2020-01-01 RX ADMIN — PROPOFOL 30 MCG/KG/MIN: 10 INJECTION, EMULSION INTRAVENOUS at 09:05

## 2020-01-01 RX ADMIN — MAGNESIUM SULFATE HEPTAHYDRATE 2 G: 40 INJECTION, SOLUTION INTRAVENOUS at 08:05

## 2020-01-01 RX ADMIN — CEFEPIME 2 G: 2 INJECTION, POWDER, FOR SOLUTION INTRAVENOUS at 07:04

## 2020-01-01 RX ADMIN — FUROSEMIDE 10 MG: 10 INJECTION, SOLUTION INTRAMUSCULAR; INTRAVENOUS at 06:05

## 2020-01-01 RX ADMIN — DEXMEDETOMIDINE HYDROCHLORIDE IN 0.9% SODIUM CHLORIDE 0.6 MCG/KG/HR: 400 INJECTION INTRAVENOUS at 08:05

## 2020-01-01 RX ADMIN — Medication 25 MG: at 08:05

## 2020-01-01 RX ADMIN — SODIUM CHLORIDE 1 UNITS/HR: 9 INJECTION, SOLUTION INTRAVENOUS at 04:05

## 2020-01-01 RX ADMIN — DEXMEDETOMIDINE HYDROCHLORIDE 0.1 MCG/KG/HR: 4 INJECTION, SOLUTION INTRAVENOUS at 06:05

## 2020-01-01 RX ADMIN — HEPARIN SODIUM AND DEXTROSE 16 UNITS/KG/HR: 10000; 5 INJECTION INTRAVENOUS at 03:04

## 2020-01-01 RX ADMIN — HYDROMORPHONE HYDROCHLORIDE 0.5 MG: 1 INJECTION, SOLUTION INTRAMUSCULAR; INTRAVENOUS; SUBCUTANEOUS at 03:06

## 2020-01-01 RX ADMIN — CEFTRIAXONE SODIUM 1 G: 1 INJECTION, POWDER, FOR SOLUTION INTRAMUSCULAR; INTRAVENOUS at 09:04

## 2020-01-01 RX ADMIN — NOREPINEPHRINE BITARTRATE 0.52 MCG/KG/MIN: 1 INJECTION, SOLUTION, CONCENTRATE INTRAVENOUS at 12:04

## 2020-01-01 RX ADMIN — Medication 800 MG: at 05:05

## 2020-01-01 RX ADMIN — HYDROMORPHONE HYDROCHLORIDE 3 MG/HR: 2 INJECTION INTRAMUSCULAR; INTRAVENOUS; SUBCUTANEOUS at 08:04

## 2020-01-01 RX ADMIN — DEXMEDETOMIDINE HYDROCHLORIDE 1.2 MCG/KG/HR: 4 INJECTION, SOLUTION INTRAVENOUS at 06:07

## 2020-01-01 RX ADMIN — Medication 0.3 MCG/KG/MIN: at 11:07

## 2020-01-01 RX ADMIN — FUROSEMIDE 1 MG/HR: 10 INJECTION, SOLUTION INTRAMUSCULAR; INTRAVENOUS at 03:04

## 2020-01-01 RX ADMIN — INSULIN ASPART 1 UNITS: 100 INJECTION, SOLUTION INTRAVENOUS; SUBCUTANEOUS at 09:07

## 2020-01-01 RX ADMIN — PROPOFOL 50 MCG/KG/MIN: 10 INJECTION, EMULSION INTRAVENOUS at 06:05

## 2020-01-01 RX ADMIN — MIDODRINE HYDROCHLORIDE 10 MG: 5 TABLET ORAL at 10:07

## 2020-01-01 RX ADMIN — ACETAMINOPHEN 650 MG: 325 TABLET ORAL at 09:04

## 2020-01-01 RX ADMIN — PROPOFOL 45 MCG/KG/MIN: 10 INJECTION, EMULSION INTRAVENOUS at 11:04

## 2020-01-01 RX ADMIN — CARBOXYMETHYLCELLULOSE SODIUM 2 DROP: 10 GEL OPHTHALMIC at 10:04

## 2020-01-01 RX ADMIN — FENTANYL CITRATE 50 MCG: 50 INJECTION, SOLUTION INTRAMUSCULAR; INTRAVENOUS at 08:04

## 2020-01-01 RX ADMIN — Medication 150 MCG/HR: at 10:07

## 2020-01-01 RX ADMIN — HEPARIN SODIUM 700 UNITS/HR: 10000 INJECTION, SOLUTION INTRAVENOUS at 05:05

## 2020-01-01 RX ADMIN — NICARDIPINE HYDROCHLORIDE 1.5 MG/HR: 0.2 INJECTION, SOLUTION INTRAVENOUS at 01:04

## 2020-01-01 RX ADMIN — NICARDIPINE HYDROCHLORIDE 7 MG/HR: 0.2 INJECTION, SOLUTION INTRAVENOUS at 05:05

## 2020-01-01 RX ADMIN — FUROSEMIDE 2 MG: 10 INJECTION INTRAMUSCULAR; INTRAVENOUS at 08:04

## 2020-01-01 RX ADMIN — DEXMEDETOMIDINE HYDROCHLORIDE 0.2 MCG/KG/HR: 100 INJECTION, SOLUTION, CONCENTRATE INTRAVENOUS at 05:04

## 2020-01-01 RX ADMIN — CALCIUM GLUCONATE 2 G: 94 INJECTION, SOLUTION INTRAVENOUS at 08:04

## 2020-01-01 RX ADMIN — SODIUM CHLORIDE 2.3 UNITS/HR: 9 INJECTION, SOLUTION INTRAVENOUS at 12:05

## 2020-01-01 RX ADMIN — INSULIN ASPART 3 UNITS: 100 INJECTION, SOLUTION INTRAVENOUS; SUBCUTANEOUS at 07:05

## 2020-01-01 RX ADMIN — Medication 100 ML: at 03:04

## 2020-01-01 RX ADMIN — DEXMEDETOMIDINE HYDROCHLORIDE 0.7 MCG/KG/HR: 4 INJECTION, SOLUTION INTRAVENOUS at 11:06

## 2020-01-01 RX ADMIN — DEXMEDETOMIDINE HYDROCHLORIDE 0.6 MCG/KG/HR: 4 INJECTION, SOLUTION INTRAVENOUS at 08:06

## 2020-01-01 RX ADMIN — DEXMEDETOMIDINE HYDROCHLORIDE IN 0.9% SODIUM CHLORIDE 1 MCG/KG/HR: 400 INJECTION INTRAVENOUS at 05:05

## 2020-01-01 RX ADMIN — MIDODRINE HYDROCHLORIDE 10 MG: 5 TABLET ORAL at 04:07

## 2020-01-01 RX ADMIN — SODIUM CHLORIDE 1 MG/HR: 9 INJECTION, SOLUTION INTRAVENOUS at 07:05

## 2020-01-01 RX ADMIN — NICARDIPINE HYDROCHLORIDE 2.5 MG/HR: 0.2 INJECTION, SOLUTION INTRAVENOUS at 05:05

## 2020-01-01 RX ADMIN — POTASSIUM CHLORIDE 40 MEQ: 20 SOLUTION ORAL at 04:07

## 2020-01-01 RX ADMIN — Medication 0.9 MCG/KG/MIN: at 01:07

## 2020-01-01 RX ADMIN — FENTANYL CITRATE 2500 MCG: 50 INJECTION INTRAMUSCULAR; INTRAVENOUS at 08:04

## 2020-01-01 RX ADMIN — EPINEPHRINE 0.08 MCG/KG/MIN: 1 INJECTION INTRAMUSCULAR; INTRAVENOUS; SUBCUTANEOUS at 11:04

## 2020-01-01 RX ADMIN — ALBUMIN HUMAN 25 G: 50 SOLUTION INTRAVENOUS at 10:05

## 2020-01-01 RX ADMIN — IPRATROPIUM BROMIDE AND ALBUTEROL SULFATE 3 ML: 2.5; .5 SOLUTION RESPIRATORY (INHALATION) at 02:04

## 2020-01-01 RX ADMIN — DEXMEDETOMIDINE HYDROCHLORIDE 0.2 MCG/KG/HR: 4 INJECTION, SOLUTION INTRAVENOUS at 10:05

## 2020-01-01 RX ADMIN — FENTANYL CITRATE 25 MCG: 50 INJECTION INTRAMUSCULAR; INTRAVENOUS at 10:04

## 2020-01-01 RX ADMIN — VASOPRESSIN 0.04 UNITS/MIN: 20 INJECTION INTRAVENOUS at 12:05

## 2020-01-01 RX ADMIN — VECURONIUM BROMIDE FOR INJECTION 2 MCG/KG/MIN: 1 INJECTION, POWDER, LYOPHILIZED, FOR SOLUTION INTRAVENOUS at 02:04

## 2020-01-01 RX ADMIN — DEXMEDETOMIDINE HYDROCHLORIDE 0.7 MCG/KG/HR: 4 INJECTION, SOLUTION INTRAVENOUS at 12:06

## 2020-01-01 RX ADMIN — POLYETHYLENE GLYCOL 3350 17 G: 17 POWDER, FOR SOLUTION ORAL at 10:07

## 2020-01-01 RX ADMIN — MAGNESIUM SULFATE HEPTAHYDRATE 2 G: 40 INJECTION, SOLUTION INTRAVENOUS at 05:04

## 2020-01-01 RX ADMIN — HYDRALAZINE HYDROCHLORIDE 10 MG: 20 INJECTION INTRAMUSCULAR; INTRAVENOUS at 04:04

## 2020-01-01 RX ADMIN — HEPARIN SODIUM 5000 UNITS: 5000 INJECTION INTRAVENOUS; SUBCUTANEOUS at 01:06

## 2020-01-01 RX ADMIN — MIDODRINE HYDROCHLORIDE 10 MG: 5 TABLET ORAL at 08:07

## 2020-01-01 RX ADMIN — MUPIROCIN: 20 OINTMENT TOPICAL at 09:04

## 2020-01-01 RX ADMIN — ALBUMIN HUMAN 25 G: 50 SOLUTION INTRAVENOUS at 08:06

## 2020-01-01 RX ADMIN — SODIUM CHLORIDE 1 MG/HR: 9 INJECTION, SOLUTION INTRAVENOUS at 06:05

## 2020-01-01 RX ADMIN — HYDROCORTISONE SODIUM SUCCINATE 100 MG: 100 INJECTION, POWDER, FOR SOLUTION INTRAMUSCULAR; INTRAVENOUS at 03:06

## 2020-01-01 RX ADMIN — VECURONIUM BROMIDE FOR INJECTION 0.5 MCG/KG/MIN: 1 INJECTION, POWDER, LYOPHILIZED, FOR SOLUTION INTRAVENOUS at 04:04

## 2020-01-01 RX ADMIN — Medication 200 MCG/HR: at 04:07

## 2020-01-01 RX ADMIN — DEXMEDETOMIDINE HYDROCHLORIDE 1 MCG/KG/HR: 100 INJECTION, SOLUTION, CONCENTRATE INTRAVENOUS at 02:04

## 2020-01-01 RX ADMIN — Medication 75 MCG/HR: at 02:06

## 2020-01-01 RX ADMIN — NICARDIPINE HYDROCHLORIDE 1 MG/HR: 0.2 INJECTION, SOLUTION INTRAVENOUS at 12:05

## 2020-01-01 RX ADMIN — HEPARIN SODIUM AND DEXTROSE 900 UNITS/HR: 10000; 5 INJECTION INTRAVENOUS at 07:06

## 2020-01-01 RX ADMIN — SODIUM CHLORIDE 0.9 UNITS/HR: 9 INJECTION, SOLUTION INTRAVENOUS at 06:05

## 2020-01-01 RX ADMIN — Medication 1 PACKET: at 03:05

## 2020-01-01 RX ADMIN — INSULIN ASPART 6 UNITS: 100 INJECTION, SOLUTION INTRAVENOUS; SUBCUTANEOUS at 07:04

## 2020-01-01 RX ADMIN — PHENOBARBITAL 60 MG: 20 ELIXIR ORAL at 09:04

## 2020-01-01 RX ADMIN — HEPARIN SODIUM AND DEXTROSE 20 UNITS/KG/HR: 10000; 5 INJECTION INTRAVENOUS at 05:04

## 2020-01-01 RX ADMIN — SODIUM CHLORIDE 5 MG/HR: 9 INJECTION, SOLUTION INTRAVENOUS at 07:05

## 2020-01-01 RX ADMIN — FUROSEMIDE 2 MG/HR: 10 INJECTION, SOLUTION INTRAMUSCULAR; INTRAVENOUS at 05:04

## 2020-01-01 RX ADMIN — POTASSIUM PHOSPHATE, MONOBASIC AND POTASSIUM PHOSPHATE, DIBASIC 30 MMOL: 224; 236 INJECTION, SOLUTION, CONCENTRATE INTRAVENOUS at 08:04

## 2020-01-01 RX ADMIN — INSULIN ASPART 3 UNITS: 100 INJECTION, SOLUTION INTRAVENOUS; SUBCUTANEOUS at 04:04

## 2020-01-01 RX ADMIN — FENTANYL CITRATE 25 MCG: 50 INJECTION INTRAMUSCULAR; INTRAVENOUS at 03:06

## 2020-01-01 RX ADMIN — HYDROMORPHONE HYDROCHLORIDE 2 MG/HR: 2 INJECTION INTRAMUSCULAR; INTRAVENOUS; SUBCUTANEOUS at 07:04

## 2020-01-01 RX ADMIN — HYDROCORTISONE SODIUM SUCCINATE 100 MG: 100 INJECTION, POWDER, FOR SOLUTION INTRAMUSCULAR; INTRAVENOUS at 10:06

## 2020-01-01 RX ADMIN — POTASSIUM CHLORIDE 40 MEQ: 29.8 INJECTION, SOLUTION INTRAVENOUS at 12:04

## 2020-01-01 RX ADMIN — PROPRANOLOL HYDROCHLORIDE 20 MG: 20 SOLUTION ORAL at 11:06

## 2020-01-01 RX ADMIN — DEXMEDETOMIDINE HYDROCHLORIDE 0.2 MCG/KG/HR: 100 INJECTION, SOLUTION, CONCENTRATE INTRAVENOUS at 09:04

## 2020-01-01 RX ADMIN — ACETAMINOPHEN 650 MG: 325 TABLET ORAL at 10:06

## 2020-01-01 RX ADMIN — EPINEPHRINE 0.04 MCG/KG/MIN: 1 INJECTION INTRAMUSCULAR; INTRAVENOUS; SUBCUTANEOUS at 10:06

## 2020-01-01 RX ADMIN — PROPOFOL 30 MCG/KG/MIN: 10 INJECTION, EMULSION INTRAVENOUS at 03:04

## 2020-01-01 RX ADMIN — SUCRALFATE 1 G: 1 SUSPENSION ORAL at 01:07

## 2020-01-01 RX ADMIN — HEPARIN SODIUM AND DEXTROSE 850 UNITS/HR: 10000; 5 INJECTION INTRAVENOUS at 05:06

## 2020-01-01 RX ADMIN — DEXMEDETOMIDINE HYDROCHLORIDE 1.3 MCG/KG/HR: 4 INJECTION, SOLUTION INTRAVENOUS at 07:06

## 2020-01-01 RX ADMIN — INSULIN ASPART 8 UNITS: 100 INJECTION, SOLUTION INTRAVENOUS; SUBCUTANEOUS at 06:07

## 2020-01-01 RX ADMIN — CEFTRIAXONE 2 G: 2 INJECTION, SOLUTION INTRAVENOUS at 11:04

## 2020-01-01 RX ADMIN — ALBUMIN (HUMAN) 12.5 G: 12.5 SOLUTION INTRAVENOUS at 05:05

## 2020-01-01 RX ADMIN — HEPARIN SODIUM 5000 UNITS: 5000 INJECTION INTRAVENOUS; SUBCUTANEOUS at 03:07

## 2020-01-01 RX ADMIN — FENTANYL CITRATE 50 MCG: 50 INJECTION INTRAMUSCULAR; INTRAVENOUS at 07:05

## 2020-01-01 RX ADMIN — DEXMEDETOMIDINE HYDROCHLORIDE IN 0.9% SODIUM CHLORIDE 0.8 MCG/KG/HR: 400 INJECTION INTRAVENOUS at 09:05

## 2020-01-01 RX ADMIN — ACETAZOLAMIDE SODIUM 250 MG: 500 INJECTION, POWDER, LYOPHILIZED, FOR SOLUTION INTRAVENOUS at 06:05

## 2020-01-01 RX ADMIN — Medication 800 MG: at 03:05

## 2020-01-01 RX ADMIN — CISATRACURIUM BESYLATE 2.5 MCG/KG/MIN: 10 INJECTION, SOLUTION INTRAVENOUS at 01:04

## 2020-01-01 RX ADMIN — HYDROMORPHONE HYDROCHLORIDE 0.5 MG: 1 INJECTION, SOLUTION INTRAMUSCULAR; INTRAVENOUS; SUBCUTANEOUS at 11:06

## 2020-01-01 RX ADMIN — VASOPRESSIN 0.04 UNITS/MIN: 20 INJECTION INTRAVENOUS at 02:05

## 2020-01-01 RX ADMIN — MIDAZOLAM HYDROCHLORIDE 2 MG: 1 INJECTION, SOLUTION INTRAMUSCULAR; INTRAVENOUS at 01:04

## 2020-01-01 RX ADMIN — HEPARIN SODIUM 5000 UNITS: 5000 INJECTION INTRAVENOUS; SUBCUTANEOUS at 12:06

## 2020-01-01 RX ADMIN — DEXMEDETOMIDINE HYDROCHLORIDE IN 0.9% SODIUM CHLORIDE 1 MCG/KG/HR: 400 INJECTION INTRAVENOUS at 03:05

## 2020-01-01 RX ADMIN — HYDROCORTISONE SODIUM SUCCINATE 100 MG: 100 INJECTION, POWDER, FOR SOLUTION INTRAMUSCULAR; INTRAVENOUS at 08:04

## 2020-01-01 RX ADMIN — ALBUMIN (HUMAN) 12.5 G: 12.5 SOLUTION INTRAVENOUS at 04:05

## 2020-01-01 RX ADMIN — VASOPRESSIN 0.04 UNITS/MIN: 20 INJECTION INTRAVENOUS at 10:04

## 2020-01-01 RX ADMIN — MAGNESIUM SULFATE HEPTAHYDRATE 2 G: 40 INJECTION, SOLUTION INTRAVENOUS at 01:05

## 2020-01-01 RX ADMIN — Medication 25 MCG/HR: at 05:06

## 2020-01-01 RX ADMIN — VECURONIUM BROMIDE FOR INJECTION 2 MCG/KG/MIN: 1 INJECTION, POWDER, LYOPHILIZED, FOR SOLUTION INTRAVENOUS at 04:04

## 2020-01-01 RX ADMIN — DEXMEDETOMIDINE HYDROCHLORIDE IN 0.9% SODIUM CHLORIDE 0.8 MCG/KG/HR: 400 INJECTION INTRAVENOUS at 02:05

## 2020-01-01 RX ADMIN — VECURONIUM BROMIDE FOR INJECTION 2 MCG/KG/MIN: 1 INJECTION, POWDER, LYOPHILIZED, FOR SOLUTION INTRAVENOUS at 05:04

## 2020-01-01 RX ADMIN — Medication 800 MG: at 09:06

## 2020-01-01 RX ADMIN — FUROSEMIDE 40 MG: 10 INJECTION, SOLUTION INTRAMUSCULAR; INTRAVENOUS at 05:04

## 2020-01-01 RX ADMIN — SUCRALFATE 1 G: 1 SUSPENSION ORAL at 02:06

## 2020-01-01 RX ADMIN — Medication 0.08 MCG/KG/MIN: at 10:04

## 2020-01-01 RX ADMIN — NOREPINEPHRINE BITARTRATE 0.8 MCG/KG/MIN: 1 INJECTION, SOLUTION, CONCENTRATE INTRAVENOUS at 04:07

## 2020-01-01 RX ADMIN — MINERAL OIL AND WHITE PETROLATUM: 150; 830 OINTMENT OPHTHALMIC at 09:04

## 2020-01-01 RX ADMIN — QUETIAPINE FUMARATE 50 MG: 25 TABLET ORAL at 01:05

## 2020-01-01 RX ADMIN — DEXMEDETOMIDINE HYDROCHLORIDE 0.6 MCG/KG/HR: 4 INJECTION, SOLUTION INTRAVENOUS at 03:06

## 2020-01-01 RX ADMIN — ALBUMIN (HUMAN) 25 G: 12.5 SOLUTION INTRAVENOUS at 03:06

## 2020-01-01 RX ADMIN — FENTANYL CITRATE 50 MCG: 50 INJECTION INTRAMUSCULAR; INTRAVENOUS at 11:06

## 2020-01-01 RX ADMIN — INSULIN ASPART 2 UNITS: 100 INJECTION, SOLUTION INTRAVENOUS; SUBCUTANEOUS at 04:07

## 2020-01-01 RX ADMIN — HEPARIN SODIUM AND DEXTROSE 14 UNITS/KG/HR: 10000; 5 INJECTION INTRAVENOUS at 09:04

## 2020-01-01 RX ADMIN — PROPOFOL 30 MCG/KG/MIN: 10 INJECTION, EMULSION INTRAVENOUS at 10:05

## 2020-01-01 RX ADMIN — MAGNESIUM SULFATE HEPTAHYDRATE 2 G: 40 INJECTION, SOLUTION INTRAVENOUS at 09:06

## 2020-01-01 RX ADMIN — HEPARIN SODIUM AND DEXTROSE 18 UNITS/KG/HR: 10000; 5 INJECTION INTRAVENOUS at 10:04

## 2020-01-01 RX ADMIN — PROPOFOL 5 MCG/KG/MIN: 10 INJECTION, EMULSION INTRAVENOUS at 04:04

## 2020-01-01 RX ADMIN — INSULIN ASPART 2 UNITS: 100 INJECTION, SOLUTION INTRAVENOUS; SUBCUTANEOUS at 08:05

## 2020-01-01 RX ADMIN — DEXMEDETOMIDINE HYDROCHLORIDE 1.4 MCG/KG/HR: 100 INJECTION, SOLUTION, CONCENTRATE INTRAVENOUS at 12:04

## 2020-01-01 RX ADMIN — SODIUM CHLORIDE 3 MG/HR: 9 INJECTION, SOLUTION INTRAVENOUS at 12:05

## 2020-01-01 RX ADMIN — FUROSEMIDE 40 MG: 10 INJECTION, SOLUTION INTRAMUSCULAR; INTRAVENOUS at 05:07

## 2020-01-01 RX ADMIN — PROPOFOL 30 MCG/KG/MIN: 10 INJECTION, EMULSION INTRAVENOUS at 02:05

## 2020-01-01 RX ADMIN — PROPOFOL 5 MCG/KG/MIN: 10 INJECTION, EMULSION INTRAVENOUS at 10:04

## 2020-01-01 RX ADMIN — PANTOPRAZOLE SODIUM 40 MG: 40 INJECTION, POWDER, FOR SOLUTION INTRAVENOUS at 01:04

## 2020-01-01 RX ADMIN — PROPOFOL 35 MCG/KG/MIN: 10 INJECTION, EMULSION INTRAVENOUS at 02:07

## 2020-01-01 RX ADMIN — POTASSIUM CHLORIDE 40 MEQ: 20 SOLUTION ORAL at 05:06

## 2020-01-01 RX ADMIN — INSULIN ASPART 6 UNITS: 100 INJECTION, SOLUTION INTRAVENOUS; SUBCUTANEOUS at 04:04

## 2020-01-01 RX ADMIN — Medication 0.07 MCG/KG/MIN: at 10:04

## 2020-01-01 RX ADMIN — SODIUM PHOSPHATE, MONOBASIC, MONOHYDRATE 15 MMOL: 276; 142 INJECTION, SOLUTION INTRAVENOUS at 06:05

## 2020-01-01 RX ADMIN — Medication 0.02 MCG/KG/MIN: at 07:07

## 2020-01-01 RX ADMIN — CEFEPIME 2 G: 2 INJECTION, POWDER, FOR SOLUTION INTRAVENOUS at 06:04

## 2020-01-01 RX ADMIN — SODIUM CHLORIDE 5 MG/HR: 9 INJECTION, SOLUTION INTRAVENOUS at 05:05

## 2020-01-01 RX ADMIN — DEXMEDETOMIDINE HYDROCHLORIDE 0.4 MCG/KG/HR: 4 INJECTION, SOLUTION INTRAVENOUS at 11:06

## 2020-01-01 RX ADMIN — FLUDROCORTISONE ACETATE 100 MCG: 0.1 TABLET ORAL at 09:06

## 2020-01-01 RX ADMIN — ACETAZOLAMIDE SODIUM 250 MG: 500 INJECTION, POWDER, LYOPHILIZED, FOR SOLUTION INTRAVENOUS at 11:05

## 2020-01-01 RX ADMIN — PROPOFOL 40 MCG/KG/MIN: 10 INJECTION, EMULSION INTRAVENOUS at 12:05

## 2020-01-01 RX ADMIN — DIAZEPAM 0.5 MG: 5 SOLUTION ORAL at 10:05

## 2020-01-01 RX ADMIN — HYDROMORPHONE HYDROCHLORIDE 1.5 MG/HR: 2 INJECTION INTRAMUSCULAR; INTRAVENOUS; SUBCUTANEOUS at 11:04

## 2020-01-01 RX ADMIN — ALBUMIN (HUMAN) 25 G: 12.5 SOLUTION INTRAVENOUS at 10:06

## 2020-01-01 RX ADMIN — SODIUM PHOSPHATE, MONOBASIC, MONOHYDRATE AND SODIUM PHOSPHATE, DIBASIC, ANHYDROUS 20.01 MMOL: 276; 142 INJECTION, SOLUTION INTRAVENOUS at 03:04

## 2020-01-01 RX ADMIN — DEXTROSE MONOHYDRATE 8 MG/HR: 50 INJECTION, SOLUTION INTRAVENOUS at 02:05

## 2020-01-01 RX ADMIN — ALBUMIN (HUMAN) 50 G: 12.5 SOLUTION INTRAVENOUS at 03:05

## 2020-01-01 RX ADMIN — PROPOFOL 5 MCG/KG/MIN: 10 INJECTION, EMULSION INTRAVENOUS at 12:07

## 2020-01-01 RX ADMIN — DEXMEDETOMIDINE HYDROCHLORIDE 1.1 MCG/KG/HR: 4 INJECTION, SOLUTION INTRAVENOUS at 03:07

## 2020-01-01 RX ADMIN — HEPARIN SODIUM 1000 UNITS/HR: 10000 INJECTION, SOLUTION INTRAVENOUS at 10:05

## 2020-01-01 RX ADMIN — PROPOFOL 10 MCG/KG/MIN: 10 INJECTION, EMULSION INTRAVENOUS at 08:05

## 2020-01-01 RX ADMIN — Medication 1 PACKET: at 05:06

## 2020-01-01 RX ADMIN — VASOPRESSIN 0.04 UNITS/MIN: 20 INJECTION INTRAVENOUS at 02:04

## 2020-01-01 RX ADMIN — HYDROCORTISONE SODIUM SUCCINATE 100 MG: 100 INJECTION, POWDER, FOR SOLUTION INTRAMUSCULAR; INTRAVENOUS at 05:06

## 2020-01-01 RX ADMIN — DEXMEDETOMIDINE HYDROCHLORIDE 1.3 MCG/KG/HR: 4 INJECTION, SOLUTION INTRAVENOUS at 12:06

## 2020-01-01 RX ADMIN — DEXMEDETOMIDINE HYDROCHLORIDE 0.6 MCG/KG/HR: 4 INJECTION, SOLUTION INTRAVENOUS at 05:06

## 2020-01-01 RX ADMIN — SODIUM CHLORIDE 500 ML: 0.9 INJECTION, SOLUTION INTRAVENOUS at 07:04

## 2020-01-01 RX ADMIN — AMLODIPINE BESYLATE 5 MG: 5 TABLET ORAL at 06:05

## 2020-01-01 RX ADMIN — DEXMEDETOMIDINE HYDROCHLORIDE 0.4 MCG/KG/HR: 4 INJECTION, SOLUTION INTRAVENOUS at 09:06

## 2020-01-01 RX ADMIN — DEXMEDETOMIDINE HYDROCHLORIDE IN 0.9% SODIUM CHLORIDE 0.8 MCG/KG/HR: 400 INJECTION INTRAVENOUS at 01:05

## 2020-01-01 RX ADMIN — HYDROMORPHONE HYDROCHLORIDE 3.5 MG/HR: 2 INJECTION INTRAMUSCULAR; INTRAVENOUS; SUBCUTANEOUS at 10:04

## 2020-01-01 RX ADMIN — ACETAZOLAMIDE 250 MG: 500 INJECTION, POWDER, LYOPHILIZED, FOR SOLUTION INTRAVENOUS at 12:05

## 2020-01-01 RX ADMIN — HYPROMELLOSE 2910 1 DROP: 5 SOLUTION OPHTHALMIC at 07:06

## 2020-01-01 RX ADMIN — NICARDIPINE HYDROCHLORIDE 5 MG/HR: 0.2 INJECTION, SOLUTION INTRAVENOUS at 04:06

## 2020-01-01 RX ADMIN — ALBUMIN (HUMAN) 12.5 G: 12.5 SOLUTION INTRAVENOUS at 07:04

## 2020-01-01 RX ADMIN — FENTANYL CITRATE 100 MCG: 50 INJECTION INTRAMUSCULAR; INTRAVENOUS at 12:04

## 2020-01-01 RX ADMIN — POTASSIUM CHLORIDE 40 MEQ: 1.5 POWDER, FOR SOLUTION ORAL at 10:07

## 2020-01-01 RX ADMIN — NICARDIPINE HYDROCHLORIDE 1 MG/HR: 0.2 INJECTION, SOLUTION INTRAVENOUS at 04:04

## 2020-01-01 RX ADMIN — PIPERACILLIN SODIUM,TAZOBACTAM SODIUM 4.5 G: 4; .5 INJECTION, POWDER, FOR SOLUTION INTRAVENOUS at 12:05

## 2020-01-01 RX ADMIN — VANCOMYCIN HYDROCHLORIDE 750 MG: 750 INJECTION, POWDER, LYOPHILIZED, FOR SOLUTION INTRAVENOUS at 11:06

## 2020-01-01 RX ADMIN — DEXMEDETOMIDINE HYDROCHLORIDE 0.5 MCG/KG/HR: 4 INJECTION, SOLUTION INTRAVENOUS at 10:06

## 2020-01-01 RX ADMIN — FENTANYL CITRATE 2500 MCG: 50 INJECTION INTRAMUSCULAR; INTRAVENOUS at 02:04

## 2020-01-01 RX ADMIN — CEFTRIAXONE 2 G: 2 INJECTION, SOLUTION INTRAVENOUS at 08:04

## 2020-01-01 RX ADMIN — ALBUMIN (HUMAN) 25 G: 12.5 SOLUTION INTRAVENOUS at 11:06

## 2020-01-01 RX ADMIN — HEPARIN SODIUM 5000 UNITS: 5000 INJECTION INTRAVENOUS; SUBCUTANEOUS at 10:07

## 2020-01-01 RX ADMIN — KETAMINE HYDROCHLORIDE 20 MCG/KG/MIN: 50 INJECTION INTRAMUSCULAR; INTRAVENOUS at 07:04

## 2020-01-01 RX ADMIN — PHENOBARBITAL 60 MG: 20 ELIXIR ORAL at 11:04

## 2020-01-01 RX ADMIN — OXYCODONE HYDROCHLORIDE 5 MG: 5 SOLUTION ORAL at 10:05

## 2020-01-01 RX ADMIN — DEXMEDETOMIDINE HYDROCHLORIDE 0.1 MCG/KG/HR: 4 INJECTION, SOLUTION INTRAVENOUS at 06:06

## 2020-01-01 RX ADMIN — QUETIAPINE FUMARATE 100 MG: 100 TABLET ORAL at 09:05

## 2020-01-01 RX ADMIN — DEXMEDETOMIDINE HYDROCHLORIDE IN 0.9% SODIUM CHLORIDE 0.2 MCG/KG/HR: 400 INJECTION INTRAVENOUS at 04:05

## 2020-01-01 RX ADMIN — Medication 10 ML: at 10:07

## 2020-01-01 RX ADMIN — INSULIN ASPART 6 UNITS: 100 INJECTION, SOLUTION INTRAVENOUS; SUBCUTANEOUS at 12:07

## 2020-01-01 RX ADMIN — INSULIN ASPART 3 UNITS: 100 INJECTION, SOLUTION INTRAVENOUS; SUBCUTANEOUS at 11:05

## 2020-01-01 RX ADMIN — HYDROMORPHONE HYDROCHLORIDE 4 MG/HR: 2 INJECTION INTRAMUSCULAR; INTRAVENOUS; SUBCUTANEOUS at 05:04

## 2020-01-01 RX ADMIN — NICARDIPINE HYDROCHLORIDE 5 MG/HR: 0.2 INJECTION, SOLUTION INTRAVENOUS at 07:05

## 2020-01-01 RX ADMIN — HEPARIN SODIUM AND DEXTROSE 22 UNITS/KG/HR: 10000; 5 INJECTION INTRAVENOUS at 09:04

## 2020-01-01 RX ADMIN — EPINEPHRINE 0.16 MCG/KG/MIN: 1 INJECTION INTRAMUSCULAR; INTRAVENOUS; SUBCUTANEOUS at 07:06

## 2020-01-01 RX ADMIN — PROPOFOL 50 MG: 10 INJECTION, EMULSION INTRAVENOUS at 03:05

## 2020-01-01 RX ADMIN — VECURONIUM BROMIDE FOR INJECTION 1.5 MCG/KG/MIN: 1 INJECTION, POWDER, LYOPHILIZED, FOR SOLUTION INTRAVENOUS at 05:04

## 2020-01-01 RX ADMIN — HEPARIN SODIUM AND DEXTROSE 16 UNITS/KG/HR: 10000; 5 INJECTION INTRAVENOUS at 02:04

## 2020-01-01 RX ADMIN — HYDROMORPHONE HYDROCHLORIDE 1.5 MG/HR: 2 INJECTION INTRAMUSCULAR; INTRAVENOUS; SUBCUTANEOUS at 01:04

## 2020-01-01 RX ADMIN — HEPARIN SODIUM 600 UNITS/HR: 10000 INJECTION, SOLUTION INTRAVENOUS at 08:05

## 2020-01-01 RX ADMIN — POTASSIUM & SODIUM PHOSPHATES POWDER PACK 280-160-250 MG 2 PACKET: 280-160-250 PACK at 12:06

## 2020-01-01 RX ADMIN — Medication 10 MG: at 05:06

## 2020-01-01 RX ADMIN — POTASSIUM CHLORIDE 40 MEQ: 29.8 INJECTION, SOLUTION INTRAVENOUS at 04:05

## 2020-01-01 RX ADMIN — Medication 800 MG: at 02:06

## 2020-01-01 RX ADMIN — HEPARIN SODIUM AND DEXTROSE 700 UNITS/HR: 10000; 5 INJECTION INTRAVENOUS at 06:06

## 2020-01-01 RX ADMIN — VASOPRESSIN 0.04 UNITS/MIN: 20 INJECTION INTRAVENOUS at 01:05

## 2020-01-01 RX ADMIN — DEXMEDETOMIDINE HYDROCHLORIDE 1.2 MCG/KG/HR: 4 INJECTION, SOLUTION INTRAVENOUS at 12:07

## 2020-01-01 RX ADMIN — ALBUMIN (HUMAN) 25 G: 12.5 SOLUTION INTRAVENOUS at 01:05

## 2020-01-01 RX ADMIN — SODIUM PHOSPHATE, MONOBASIC, MONOHYDRATE 15 MMOL: 276; 142 INJECTION, SOLUTION INTRAVENOUS at 03:05

## 2020-01-01 RX ADMIN — MINERAL OIL AND WHITE PETROLATUM: 150; 830 OINTMENT OPHTHALMIC at 11:05

## 2020-01-01 RX ADMIN — CEFEPIME 2 G: 2 INJECTION, POWDER, FOR SOLUTION INTRAVENOUS at 09:05

## 2020-01-01 RX ADMIN — DEXMEDETOMIDINE HYDROCHLORIDE IN 0.9% SODIUM CHLORIDE 0.8 MCG/KG/HR: 400 INJECTION INTRAVENOUS at 11:05

## 2020-01-01 RX ADMIN — HEPARIN SODIUM AND DEXTROSE 18 UNITS/KG/HR: 10000; 5 INJECTION INTRAVENOUS at 02:04

## 2020-01-01 RX ADMIN — DIAZEPAM 5 MG: 5 INJECTION, SOLUTION INTRAMUSCULAR; INTRAVENOUS at 11:04

## 2020-01-01 RX ADMIN — FENTANYL CITRATE 25 MCG: 50 INJECTION, SOLUTION INTRAMUSCULAR; INTRAVENOUS at 01:06

## 2020-01-01 RX ADMIN — DEXMEDETOMIDINE HYDROCHLORIDE 0.2 MCG/KG/HR: 4 INJECTION, SOLUTION INTRAVENOUS at 12:06

## 2020-01-01 RX ADMIN — PROPOFOL 10 MCG/KG/MIN: 10 INJECTION, EMULSION INTRAVENOUS at 10:05

## 2020-01-01 RX ADMIN — INSULIN ASPART 5 UNITS: 100 INJECTION, SOLUTION INTRAVENOUS; SUBCUTANEOUS at 05:07

## 2020-01-01 RX ADMIN — NICARDIPINE HYDROCHLORIDE 6 MG/HR: 0.2 INJECTION, SOLUTION INTRAVENOUS at 05:05

## 2020-01-01 RX ADMIN — INSULIN ASPART 10 UNITS: 100 INJECTION, SOLUTION INTRAVENOUS; SUBCUTANEOUS at 04:04

## 2020-01-01 RX ADMIN — LIDOCAINE HYDROCHLORIDE AND EPINEPHRINE 5 ML: 10; 10 INJECTION, SOLUTION INFILTRATION; PERINEURAL at 11:05

## 2020-01-01 RX ADMIN — DIAZEPAM 2 MG: 2 TABLET ORAL at 11:05

## 2020-01-01 RX ADMIN — NICARDIPINE HYDROCHLORIDE 6 MG/HR: 0.2 INJECTION, SOLUTION INTRAVENOUS at 12:05

## 2020-01-01 RX ADMIN — Medication 0.5 MCG/KG/MIN: at 04:06

## 2020-01-01 RX ADMIN — KETAMINE HYDROCHLORIDE 20 MCG/KG/MIN: 50 INJECTION INTRAMUSCULAR; INTRAVENOUS at 03:04

## 2020-01-01 RX ADMIN — QUETIAPINE FUMARATE 50 MG: 25 TABLET, FILM COATED ORAL at 09:06

## 2020-01-01 RX ADMIN — PROPOFOL 35 MCG/KG/MIN: 10 INJECTION, EMULSION INTRAVENOUS at 08:04

## 2020-01-01 RX ADMIN — NICARDIPINE HYDROCHLORIDE 2.5 MG/HR: 0.2 INJECTION, SOLUTION INTRAVENOUS at 12:05

## 2020-01-01 RX ADMIN — FUROSEMIDE 40 MG: 10 INJECTION, SOLUTION INTRAMUSCULAR; INTRAVENOUS at 08:07

## 2020-01-01 RX ADMIN — PROPOFOL 25 MG: 10 INJECTION, EMULSION INTRAVENOUS at 02:05

## 2020-01-01 RX ADMIN — HEPARIN SODIUM AND DEXTROSE 12 UNITS/KG/HR: 10000; 5 INJECTION INTRAVENOUS at 01:04

## 2020-01-01 RX ADMIN — HEPARIN SODIUM 5000 UNITS: 5000 INJECTION INTRAVENOUS; SUBCUTANEOUS at 08:07

## 2020-01-01 RX ADMIN — PROPOFOL 20 MCG/KG/MIN: 10 INJECTION, EMULSION INTRAVENOUS at 09:04

## 2020-01-01 RX ADMIN — INSULIN ASPART 10 UNITS: 100 INJECTION, SOLUTION INTRAVENOUS; SUBCUTANEOUS at 01:04

## 2020-01-01 RX ADMIN — HEPARIN SODIUM 1000 UNITS/HR: 10000 INJECTION, SOLUTION INTRAVENOUS at 07:05

## 2020-01-01 RX ADMIN — DEXMEDETOMIDINE HYDROCHLORIDE IN 0.9% SODIUM CHLORIDE 0.8 MCG/KG/HR: 400 INJECTION INTRAVENOUS at 04:05

## 2020-01-01 RX ADMIN — MINERAL OIL AND WHITE PETROLATUM: 150; 830 OINTMENT OPHTHALMIC at 10:04

## 2020-01-01 RX ADMIN — DEXMEDETOMIDINE HYDROCHLORIDE 1.1 MCG/KG/HR: 4 INJECTION, SOLUTION INTRAVENOUS at 06:07

## 2020-01-01 RX ADMIN — Medication 1 MCG/KG/MIN: at 06:06

## 2020-01-01 RX ADMIN — POTASSIUM & SODIUM PHOSPHATES POWDER PACK 280-160-250 MG 1 PACKET: 280-160-250 PACK at 09:07

## 2020-01-01 RX ADMIN — DEXMEDETOMIDINE HYDROCHLORIDE IN 0.9% SODIUM CHLORIDE 0.2 MCG/KG/HR: 400 INJECTION INTRAVENOUS at 09:05

## 2020-01-01 RX ADMIN — POTASSIUM CHLORIDE 40 MEQ: 400 INJECTION, SOLUTION INTRAVENOUS at 06:06

## 2020-01-01 RX ADMIN — Medication 10 MG: at 04:05

## 2020-01-01 RX ADMIN — ASPIRIN 81 MG CHEWABLE TABLET 81 MG: 81 TABLET CHEWABLE at 12:04

## 2020-01-01 RX ADMIN — Medication 10 MG: at 03:06

## 2020-01-01 RX ADMIN — Medication 10 MG: at 12:05

## 2020-04-10 PROBLEM — U07.1 COVID-19 VIRUS DETECTED: Status: ACTIVE | Noted: 2020-01-01

## 2020-04-10 PROBLEM — R06.03 RESPIRATORY DISTRESS: Status: ACTIVE | Noted: 2020-01-01

## 2020-04-10 PROBLEM — I10 HYPERTENSION: Status: ACTIVE | Noted: 2020-01-01

## 2020-04-10 NOTE — ED TRIAGE NOTES
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 LOC: The patient is awake, alert, and oriented to place, time, situation. Affect is appropriate.  Speech is appropriate and clear.     APPEARANCE: Patient resting comfortably in no acute distress.  Patient is clean and well groomed.    SKIN: The skin is warm and dry; color consistent with ethnicity.  Patient has normal skin turgor and moist mucus membranes.  Skin intact; no breakdown or bruising noted.     MUSCULOSKELETAL: Patient moving upper and lower extremities without difficulty.  Denies weakness.     RESPIRATORY: Airway is open and patent. Respirations spontaneous, even, and labored. SOB, on 15L NRB No accessory muscle use noted. Denies cough.     CARDIAC:  Normal rhythm and rate noted.  No peripheral edema noted. No complaints of chest pain.      ABDOMEN: Soft and non tender to palpation.  No distention noted.     NEUROLOGIC: Eyes open spontaneously.  Behavior appropriate to situation.  Follows commands; facial expression symmetrical.  Purposeful motor response noted; normal sensation in all extremities.

## 2020-04-10 NOTE — ED PROVIDER NOTES
Encounter Date: 4/10/2020  Pt seen by provider at 5:34 PM.     History     Chief Complaint   Patient presents with    Shortness of Breath     pt works on [email or web address]. Pt has been around covid + sob.  Pt reports sob for the past two days. stating 60 on rm. Pt on 9L stating 91%. Denies fever      55 y/o M with HTN presents via EMS for fever, shortness of breat hand hypoxia. Pt is from [facility], he works as a  on [employer]. 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 He was doing well until 2 days ago when he began feeling feverish and noted a dry cough. He went to medical- at that time per notes from MD on cruise ship pt was in no respiratory distress with O2 sat 98% on room air. He was tested for influenza and told to isolate in his room. Yesterday began feeling worse with cough, fever and chills. Today feeling anxious and much more short of breath- per notes from ship 86% in high flow NC (73-80% on room air)  Pt also notes diarrhea today. As well as decreased appetite. Denies decreased sense of smell.      PMH- HTN    Review of patient's allergies indicates:  No Known Allergies  Past Medical History:   Diagnosis Date    Hypertension      History reviewed. No pertinent surgical history.  History reviewed. No pertinent family history.  Social History     Tobacco Use    Smoking status: Never Smoker   Substance Use Topics    Alcohol use: Not on file    Drug use: Not on file     Review of Systems    Physical Exam     Initial Vitals   BP Pulse Resp Temp SpO2   04/10/20 1725 04/10/20 1725 04/10/20 1910 04/10/20 1725 04/10/20 1725   112/86 78 (!) 40 98 °F (36.7 °C) (!) 91 %      MAP       --                Physical Exam    Nursing note and vitals reviewed.  Constitutional: He appears well-developed and well-nourished. He appears distressed.   HENT:   Head: Normocephalic and atraumatic.   Eyes: Conjunctivae are normal.   Neck: Normal range of motion.  Neck supple. No JVD present.   Cardiovascular: Normal rate.   Pulmonary/Chest: He is in respiratory distress.   Abdominal: Soft. He exhibits no distension. There is no tenderness. There is no rebound.   Musculoskeletal: He exhibits no edema or tenderness.   Neurological: He is alert and oriented to person, place, and time. GCS score is 15. GCS eye subscore is 4. GCS verbal subscore is 5. GCS motor subscore is 6.   Skin: Skin is warm and dry.         ED Course   Critical Care  Date/Time: 4/10/2020 7:50 PM  Performed by: Lia Livingston MD  Authorized by: Lia Livingston MD   Direct patient critical care time: 20 minutes  Additional history critical care time: 5 minutes  Ordering / reviewing critical care time: 5 minutes  Documentation critical care time: 5 minutes  Consulting other physicians critical care time: 5 minutes  Total critical care time (exclusive of procedural time) : 40 minutes  Critical care was necessary to treat or prevent imminent or life-threatening deterioration of the following conditions: respiratory failure.  Critical care was time spent personally by me on the following activities: discussions with consultants, evaluation of patient's response to treatment, ordering and review of laboratory studies, pulse oximetry, re-evaluation of patient's condition and examination of patient.    Intubation  Date/Time: 4/10/2020 10:58 PM  Location procedure was performed: [facility] EMERGENCY DEPARTMENT  Performed by: Lia Livingston MD  Authorized by: Lia Livingston MD   Pre-operative diagnosis: acute hypoxic respiratory failure  Post-operative diagnosis: acute hypoxic respiratory failure  Consent Done: Yes  Consent: Verbal consent obtained.  Indications: respiratory distress,  respiratory failure and  hypoxemia  Patient status: paralyzed (RSI)  Preoxygenation: BVM  Sedatives: ketmine  Paralytic: rocuronium  Complications: Yes  Comments: Several attempts at intubation by 2 providers (WILLARD Rizvi  Carlyle) . Able to visualize cords but not able pass tube due to swelling of base of tongue. Unable to pass bougie. Pt desatted to <30% and not able to oxygenate via bagging and emergent cric performed hypoxic unable to intubate or oxygenate and pt bradying down to 50s. Transverse incision over cric membrane. Membrane palpated and incised, bougoe passed and ETT passed. Color change with Co2 detector. BS lt >rt. CXR ordered. Increasing O2 sat with with bagging.        Labs Reviewed   CBC W/ AUTO DIFFERENTIAL - Abnormal; Notable for the following components:       Result Value    WBC 12.79 (*)     Hemoglobin 13.6 (*)     Mean Corpuscular Hemoglobin Conc 30.9 (*)     Immature Granulocytes 0.8 (*)     Gran # (ANC) 11.6 (*)     Immature Grans (Abs) 0.10 (*)     Lymph # 0.9 (*)     Gran% 90.3 (*)     Lymph% 6.7 (*)     Mono% 2.0 (*)     All other components within normal limits   C-REACTIVE PROTEIN - Abnormal; Notable for the following components:    .7 (*)     All other components within normal limits    Narrative:     ADD ON D DIMER AND PROCALCITONIN PER DR JOSEFINA GARCIA/ORDER#   [id number] @ 19:55 4/10/2020   FERRITIN - Abnormal; Notable for the following components:    Ferritin 1,465 (*)     All other components within normal limits    Narrative:     ADD ON D DIMER AND PROCALCITONIN PER DR JOSEFINA GARCIA/ORDER#   [id number] AND [id number] @ 19:55 4/10/2020   LACTIC ACID, PLASMA - Abnormal; Notable for the following components:    Lactate (Lactic Acid) 4.6 (*)     All other components within normal limits   TROPONIN I - Abnormal; Notable for the following components:    Troponin I 0.031 (*)     All other components within normal limits   SARS-COV-2 RNA AMPLIFICATION, QUAL - Abnormal; Notable for the following components:    SARS-CoV-2 RNA, Amplification, Qual Positive (*)     All other components within normal limits    Narrative:     What symptom criteria does the patient meet?->Cough  What symptom  criteria does the patient meet?->Difficulty  breathing   URINALYSIS, REFLEX TO URINE CULTURE - Abnormal; Notable for the following components:    Ketones, UA Trace (*)     Occult Blood UA 1+ (*)     All other components within normal limits    Narrative:     Preferred Collection Type->Urine, Clean Catch   COMPREHENSIVE METABOLIC PANEL - Abnormal; Notable for the following components:    Sodium 120 (*)     Potassium 3.4 (*)     Chloride 87 (*)     CO2 19 (*)     Glucose 751 (*)     Calcium 6.8 (*)     Total Protein 5.4 (*)     Albumin 2.0 (*)     AST 44 (*)     All other components within normal limits   CK - Abnormal; Notable for the following components:     (*)     All other components within normal limits   D DIMER, QUANTITATIVE - Abnormal; Notable for the following components:    D-Dimer 15.15 (*)     All other components within normal limits    Narrative:     ADD ON D DIMER AND PROCALCITONIN PER DR JOSEFINA GARCIA/ORDER#   [id number] @ 19:55 4/10/2020   PROCALCITONIN - Abnormal; Notable for the following components:    Procalcitonin 0.27 (*)     All other components within normal limits    Narrative:     ADD ON D DIMER AND PROCALCITONIN PER DR JOSEFINA GARCIA/ORDER#   [id number] @ 19:55 4/10/2020   COMPREHENSIVE METABOLIC PANEL - Abnormal; Notable for the following components:    CO2 21 (*)     Glucose 161 (*)     Calcium 8.2 (*)     Albumin 2.4 (*)     AST 55 (*)     ALT 50 (*)     All other components within normal limits    Narrative:     Repeat for glucose of >700 with POCT blood sugar of 181   POCT GLUCOSE - Abnormal; Notable for the following components:    POCT Glucose 181 (*)     All other components within normal limits   CULTURE, BLOOD   CULTURE, BLOOD   D DIMER, QUANTITATIVE   PROCALCITONIN   URINALYSIS MICROSCOPIC    Narrative:     Preferred Collection Type->Urine, Clean Catch   B-TYPE NATRIURETIC PEPTIDE   G6PD,QUANTITATIVE   HIV 1 / 2 ANTIBODY   HEPATITIS B SURFACE  ANTIBODY   HEPATITIS B CORE ANTIBODY, IGM   HEPATITIS C RNA, QUANTITATIVE, PCR   ISTAT CHEM8     EKG Readings: (Independently Interpreted)   Sinustachycardia, rate 101, no acute ischemic changes       Imaging Results           X-Ray Chest AP Portable (Final result)  Result time 04/10/20 23:24:24    Final result by Yifan Sharma MD (04/10/20 23:24:24)                 Impression:      1. Improved positioning of the endotracheal tube.  Recommend retracting the endotracheal tube 2 cm for better positioning.  2. Status post NG tube placement.  3. Bilateral diffuse pulmonary infiltrates and consolidation with improved aeration of the right lung compared to the prior study.  4.  This report was flagged in Epic as abnormal.      Electronically signed by: Yifan Sharma  Date:    04/10/2020  Time:    23:24             Narrative:    EXAMINATION:  XR CHEST AP PORTABLE    CLINICAL HISTORY:  Failed or difficult intubation, initial encounter    TECHNIQUE:  Single frontal view of the chest was performed.    COMPARISON:  04/10/2020    FINDINGS:  Endotracheal tube present with tip near the almita and proximal left mainstem bronchus.  Recommend retracting the endotracheal tube 2 cm for better placement.    NG tube present.    Diffuse infiltrates and consolidation bilaterally.    No effusion or pneumothorax.                               X-Ray Chest AP Portable (Final result)  Result time 04/10/20 23:02:54    Final result by Cornell Charlton MD (04/10/20 23:02:54)                 Impression:      Endotracheal tube tip in the left mainstem bronchus 5 cm past the almita.  Recommend pull back a total of 7 cm for more optimal positioning.    Interval complete opacification of the right hemithorax consistent with right lung atelectasis following left bronchial intubation.  Recommend reassessment of right lung re-expansion following adjustment of ET tube.    COMMUNICATION  This critical result was discovered/received on 4/10/2020 at  22:51.    The critical information above was relayed directly by Cornell Charlton MD by telephone to Lia Livingston on 4/10/2020 at 23:02.      Electronically signed by: Cornell Charlton MD  Date:    04/10/2020  Time:    23:02             Narrative:    EXAMINATION:  XR CHEST AP PORTABLE    CLINICAL HISTORY:  Failed or difficult intubation, initial encounter    TECHNIQUE:  Single frontal view of the chest was performed.    COMPARISON:  April 10, 2020 at 18:13 hours.    FINDINGS:  Endotracheal tube is been placed with the tip extending down the left mainstem bronchus 5 cm past the almita.    There is been interval collapse of the right lung with complete opacification of the right hemithorax.  Left lung remains expanded with diffuse infiltrate, similar to prior.  No pneumothorax.    Cardiomediastinal silhouette appears similar to prior.                                X-Ray Chest AP Portable (Final result)  Result time 04/10/20 18:26:30    Final result by Siddhartha Chacon MD (04/10/20 18:26:30)                 Impression:      Findings concerning for bilateral multifocal pneumonia from inflammatory or infectious process including atypical bacterial or viral etiologies.    This report was flagged in Epic as abnormal.      Electronically signed by: Siddhartha Chacon MD  Date:    04/10/2020  Time:    18:26             Narrative:    EXAMINATION:  XR CHEST AP PORTABLE    CLINICAL HISTORY:  Suspected Covid-19 Virus Infection;    TECHNIQUE:  Single frontal view of the chest was performed.    COMPARISON:  None    FINDINGS:  Monitoring leads and tubing overlie the chest.  Cardiomediastinal silhouette is midline and within normal limits for age allowing for AP portable technique.    The lungs are symmetrically well expanded with bilateral diffuse patchy mixed alveolar and interstitial opacities.  No large pleural effusion or pneumothorax.  No acute osseous process seen.  PA and lateral views can be obtained.                               X-Rays:   Independently Interpreted Readings:   Chest X-Ray: Bilateral multifocal opacities, no pleural effusion, no cardiomegaly     Medical Decision Making:   History:   I obtained history from: EMS provider.       <> Summary of History: See hpi  Old Records Summarized: other records.  Initial Assessment:   55 y/o M [place] [employer] with 2 days fever, increasing sob, cough and diarrhea  Pt hypoxic despite 15L NRB, bipap started- will reassess  Likely covid, pneumonia  rapid covid sent  Sepsis work up initiated.    Independently Interpreted Test(s):   I have ordered and independently interpreted X-rays - see prior notes.  I have ordered and independently interpreted EKG Reading(s) - see prior notes  Clinical Tests:   Lab Tests: Ordered and Reviewed  Radiological Study: Ordered and Reviewed  Medical Tests: Ordered and Reviewed  ED Management:  7:30 PM  Multifocal pna on CXR. Pt improved with bipap- O2 sat 92-94%  Rocephin and ceftriaxone ordered  Critical care consulted for admission    9:14 PM  Glucose 751 on chemistry, POC glucose 181, will send istat to recheck sodium as well    9:45 PM  Pt reassess. 82% on bipap with rr 40-50s. Discussed with CC will intubate acute hypoxic respiratory failure.    11:30 PM  Propofol gtt ordered. CXR with Lt main stem. Tube pulled back 5cm. OGT placed and repeat CXR ordered. Critical care informed of events. They will follow up repeat CXR.Admit to critical care.  Other:   I have discussed this case with another health care provider.       <> Summary of the Discussion: Critical care medicine         Clinical Impression:       ICD-10-CM ICD-9-CM   1. COVID-19 virus detected U07.1    2. Suspected Covid-19 Virus Infection R68.89    3. Acute respiratory disease due to COVID-19 virus U07.1     J06.9    4. Difficult intubation T88.4XXA 999.9         Disposition:   Disposition: Admitted  Condition: Serious     ED Disposition Condition    Admit                            Lia Livingston MD  04/11/20 0144

## 2020-04-10 NOTE — LETTER
June 26, 2020         [address]  [facility] [address]  Phone: [phone]  Fax: [phone]       Patient: Ranjana Sanchez   YOB: 1965  Date of Visit: 06/26/2020    [facility] [employer]:    Patience Sanchez has been at [facility] from for 04/10/2020 to present. His hospital course has been complicated by multisystem organ failure with numerous interventions attempted to support him through his illness with no avail. His condition has progressively worsened despite optimal medical therapy and he has required multiple forms of life support by way of ECMO and intubation on ventilator, however; physically he continues to decline. In the ICU, currently he is requiring multiple pressors (i.e. vasopressin, phenylephrine, epinephrine). It is unlikely patient will survive travel back to [facility] at this time and it is advised that patient's family be allowed to travel to [place], [place] to visit their loved one prior to his expiration. If you have any questions or concerns, or if I can be of further assistance, please do not hesitate to contact me.    Sincerely,        Joseph Huang MD

## 2020-04-11 PROBLEM — R41.89 SEDATED: Status: ACTIVE | Noted: 2020-01-01

## 2020-04-11 PROBLEM — R57.9 SHOCK: Status: ACTIVE | Noted: 2020-01-01

## 2020-04-11 PROBLEM — J96.01 ACUTE HYPOXEMIC RESPIRATORY FAILURE: Status: ACTIVE | Noted: 2020-01-01

## 2020-04-11 NOTE — CONSULTS
[facility]  Otorhinolaryngology-Head & Neck Surgery  Consult Note    Patient Name: Ranjana Anderson  MRN: [id number]  Code Status: Full Code  Admission Date: 4/10/2020  Hospital Length of Stay: 1 days  Attending Physician: Jaylon Tim MD  Primary Care Provider: To Obtain Unable    Patient information was obtained from patient and ER records.     Inpatient consult to ENT  Consult performed by: Mckay Luna Jr., MD  Consult ordered by: Phil Marcial MD        Subjective:     Chief Complaint: COVID + patient s/p emergent cricothyrotomy    History of Present Illness: 53 yo male COVID+ who presented with SOB. Decompensated in the ER. Attempts at intubation were made which were ultimately unsuccessful. Patient underwent emergency cricothyrotomy by ED staff and was admitted to ICU. ENT consulted for further airway recommendations.    Medications:  Continuous Infusions:   cisatracurium (NIMBEX) infusion 1 mcg/kg/min (04/11/20 1200)    fentanyl 200 mcg/hr (04/11/20 1200)    heparin (porcine) in D5W 12 Units/kg/hr (04/11/20 1200)    nitric oxide gas      norepinephrine bitartrate-D5W 0.02 mcg/kg/min (04/11/20 1247)    propofoL 35 mcg/kg/min (04/11/20 1200)     Scheduled Meds:   azithromycin  250 mg Per NG tube QHS    carboxymethylcellulose sodium  2 drop Ophthalmic BID    ceFEPime (MAXIPIME) IVPB  2 g Intravenous Q8H    chlorhexidine  15 mL Mouth/Throat BID    famotidine (PF)  20 mg Intravenous BID    hydroxychloroquine  400 mg Oral BID    Followed by    [START ON 4/12/2020] hydroxychloroquine  400 mg Oral Daily    potassium phosphate IVPB  30 mmol Intravenous Once     PRN Meds:Dextrose 10% Bolus, Dextrose 10% Bolus, glucagon (human recombinant), glucose, glucose, heparin (PORCINE), heparin (PORCINE), sodium chloride 0.9%     No current facility-administered medications on file prior to encounter.      Current Outpatient Medications on File Prior to Encounter   Medication  Sig    metoprolol tartrate (LOPRESSOR) 50 MG tablet Take 50 mg by mouth 2 (two) times daily.       Review of patient's allergies indicates:  No Known Allergies    Past Medical History:   Diagnosis Date    Hypertension      History reviewed. No pertinent surgical history.  Family History     None        Tobacco Use    Smoking status: Never Smoker   Substance and Sexual Activity    Alcohol use: Not on file    Drug use: Not on file    Sexual activity: Not on file     Review of Systems   Unable to perform ROS: Intubated     Objective:     Vital Signs (Most Recent):  Temp: 98 °F (36.7 °C) (04/11/20 1130)  Pulse: (!) 56 (04/11/20 1200)  Resp: (!) 26 (04/11/20 1200)  BP: 119/67 (04/11/20 1200)  SpO2: 97 % (04/11/20 1200) Vital Signs (24h Range):  Temp:  [98 °F (36.7 °C)-98.8 °F (37.1 °C)] 98 °F (36.7 °C)  Pulse:  [] 56  Resp:  [20-40] 26  SpO2:  [83 %-100 %] 97 %  BP: ()/(50-86) 119/67  Arterial Line BP: ()/(47-90) 122/51     Weight: 63.5 kg (140 lb)  Body mass index is 24.03 kg/m².    Date 04/11/20 0700 - 04/12/20 0659   Shift 1368-2065 5287-4402 0835-6038 24 Hour Total   INTAKE   P.O. 0   0   I.V.(mL/kg) 395.9(6.2)   395.9(6.2)   NG/   180   IV Piggyback 500   500   Shift Total(mL/kg) 1075.9(16.9)   1075.9(16.9)   OUTPUT   Urine(mL/kg/hr) 1660   1660   Shift Total(mL/kg) 1660(26.1)   1660(26.1)   Weight (kg) 63.5 63.5 63.5 63.5       Physical Exam  deferred    Significant Labs:  All pertinent labs from the last 24 hours have been reviewed.    Significant Diagnostics:  I have reviewed and interpreted all pertinent imaging results/findings within the past 24 hours.    Assessment/Plan:     Acute hypoxemic respiratory failure  S/p emergent cricothyrotomy, now on vent. Normally, patients who undergo emergent cric are converted to a tracheostomy within 72 hours to avoid theoretical risk of subglottic stenosis. The data supporting this practice is scant and of low quality- it seems that the risk of  subglottic stenosis is probably relatively low. However, we recognize that it may be difficult to care for the patient and to prone him if necessary with his airway as is.     -recommend airway evaluation by anesthesia, possible oral intubation via glide scope if possible at the bedside to avoid a tracheostomy  -if oral intubation is not possible, recommend leaving endotracheal tube in cricothyrotomy site for the time being. If Mr. Witt looks to be improving from a respiratory stand point, we may consider converting to a tracheostomy at a later date if necessary  -discussed with Dr. العراقي, anesthesia, and ICU team  -appreciate ICU and consulting services with their help in the care of this complex patient        VTE Risk Mitigation (From admission, onward)         Ordered     heparin 25,000 units in dextrose 5% 250 mL (100 units/mL) infusion LOW INTENSITY nomogram - [facility]  Continuous     Question:  Heparin Infusion Adjustment (DO NOT MODIFY ANSWER)  Answer:  \\[email or web address]\epic\Images\Pharmacy\HeparinInfusions\heparin LOW INTENSITY nomogram for [facility] [id number].[email or web address]    04/11/20 0215     heparin 25,000 units in dextrose 5% (100 units/ml) IV bolus from bag - ADDITIONAL PRN BOLUS - 60 units/kg (max bolus 4000 units)  As needed (PRN)     Question:  Heparin Infusion Adjustment (DO NOT MODIFY ANSWER)  Answer:  \\[email or web address]\epic\Images\Pharmacy\HeparinInfusions\heparin LOW INTENSITY nomogram for [facility] [id number].pdf    04/11/20 0215     heparin 25,000 units in dextrose 5% (100 units/ml) IV bolus from bag - ADDITIONAL PRN BOLUS - 30 units/kg (max bolus 4000 units)  As needed (PRN)     Question:  Heparin Infusion Adjustment (DO NOT MODIFY ANSWER)  Answer:  [email or web address]\epic\Images\Pharmacy\HeparinInfusions\heparin LOW INTENSITY nomogram for [facility] [id number].pdf    04/11/20 0215     Place sequential compression device  Until discontinued      04/10/20 2115                Thank you for your consult. I will follow-up with patient. Please contact  us if you have any additional questions.    Mckay Luna Jr., MD  Otorhinolaryngology-Head & Neck Surgery  [facility]

## 2020-04-11 NOTE — EICU
Rounding (Video Assessment):  Yes    Intervention Initiated From:  Bedside    Vicki Communicated with Bedside Nurse regarding:  Time-Out    Comments:     eLert for TLC and arterial line placement. Privileges verified, time out performed. Successful placement of R Femoral TLC performed. While attempting to place R radial A-line, pt begam coughing and became dyssynchronous with vent, pt O2 sats 60s and required Ambu-bag oxygenation for respiratory support. Pt paralyzed with Rocuronium. Nimbex gtt and Nitric Oxide initiated. Once stabilized, L radial A-line successfully placed. See LDA/time out flowsheet for additional information.     Start: 0121  End: 0245

## 2020-04-11 NOTE — SUBJECTIVE & OBJECTIVE
Interval History/Significant Events: remains intubated sedated and paralyzed unable to prone     Review of Systems   Unable to perform ROS: Intubated     Objective:     Vital Signs (Most Recent):  Temp: 98.2 °F (36.8 °C) (04/11/20 0730)  Pulse: 75 (04/11/20 0800)  Resp: (!) 26 (04/11/20 0800)  BP: 113/68 (04/11/20 0800)  SpO2: 100 % (04/11/20 0807) Vital Signs (24h Range):  Temp:  [98 °F (36.7 °C)-98.8 °F (37.1 °C)] 98.2 °F (36.8 °C)  Pulse:  [] 75  Resp:  [20-40] 26  SpO2:  [83 %-100 %] 100 %  BP: ()/(50-86) 113/68  Arterial Line BP: ()/(53-70) 108/53   Weight: 63.5 kg (140 lb)  Body mass index is 24.03 kg/m².      Intake/Output Summary (Last 24 hours) at 4/11/2020 0818  Last data filed at 4/11/2020 0800  Gross per 24 hour   Intake 1075.6 ml   Output --   Net 1075.6 ml       Physical Exam   Constitutional: He appears well-developed and well-nourished.   HENT:   Head: Normocephalic.   crich in place    Eyes: EOM are normal.   Cardiovascular: Normal rate and regular rhythm.   Pulmonary/Chest: Effort normal.   Abdominal: Soft.   Neurological:   Sedated    Skin: Skin is warm.   Nursing note and vitals reviewed.      Vents:  Vent Mode: A/C (04/11/20 0010)  Ventilator Initiated: Yes (04/10/20 2247)  Set Rate: 20 BPM (04/11/20 0010)  Vt Set: 430 mL (04/11/20 0010)  PEEP/CPAP: 14 cmH20 (04/11/20 0010)  Oxygen Concentration (%): 80 (04/11/20 0800)  Peak Airway Pressure: 55 cmH2O (04/11/20 0010)  Plateau Pressure: 0 cmH20 (04/11/20 0010)  Total Ve: 13.9 mL (04/11/20 0010)  F/VT Ratio<105 (RSBI): (!) 51.55 (04/10/20 2247)  Lines/Drains/Airways     Central Venous Catheter Line            Percutaneous Central Line Insertion/Assessment - Triple Lumen  04/11/20 0137 right femoral vein less than 1 day          Drain                 NG/OG Tube 04/10/20 2306 orogastric 14 Fr. Left mouth less than 1 day         Urethral Catheter 04/10/20 2329 Double-lumen less than 1 day          Airway                 Airway -  Non-Surgical 04/10/20 2302 Endotracheal Tube less than 1 day          Arterial Line                 Arterial Line 04/11/20 0242 Left Radial less than 1 day          Peripheral Intravenous Line                 Peripheral IV - Single Lumen 04/10/20 0000 20 G Left Forearm 1 day         Peripheral IV - Single Lumen 04/10/20 1810 20 G Left Antecubital less than 1 day              Significant Labs:    CBC/Anemia Profile:  Recent Labs   Lab 04/10/20  1810 04/11/20  0420   WBC 12.79* 13.65*   HGB 13.6* 11.6*   HCT 44.0 36.2*    251   MCV 91 91   RDW 13.2 13.5   FERRITIN 1,465*  --         Chemistries:  Recent Labs   Lab 04/10/20  1950 04/10/20  2139 04/11/20  0420   * 136 136   K 3.4* 3.7 4.1   CL 87* 101 101   CO2 19* 21* 21*   BUN 8 8 11   CREATININE 0.8 0.8 0.8   CALCIUM 6.8* 8.2* 7.6*   ALBUMIN 2.0* 2.4* 2.1*   PROT 5.4* 6.7 6.0   BILITOT 0.4 0.5 0.3   ALKPHOS 67 88 89   ALT 42 50* 93*   AST 44* 55* 131*   MG  --   --  2.0   PHOS  --   --  1.6*       All pertinent labs within the past 24 hours have been reviewed.    Significant Imaging:  I have reviewed all pertinent imaging results/findings within the past 24 hours.  I have reviewed and interpreted all pertinent imaging results/findings within the past 24 hours.

## 2020-04-11 NOTE — PROGRESS NOTES
[facility]  Critical Care Medicine  Progress Note    Patient Name: Ranjana Anderson  MRN: [id number]  Admission Date: 4/10/2020  Hospital Length of Stay: 1 days  Code Status: Full Code  Attending Provider: Jaylon Tim MD  Primary Care Provider: To Obtain Unable   Principal Problem: COVID-19 virus detected    Subjective:     HPI:  54y.o male with PMH of HTN brought to ER via EMS for increasing SOB with known covid-19 exposure. 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 They have not had travelers since 3/14. 2 days ago he started having fever and sob. He was seen by the ship's physician and was found to have crackles in his bases bilaterally, but his oxygen saturation was in the 90's. He had a flu test which was negative. He was quarantined in a room and states that he got worse with increasing SOB. He was brought to the ER and found to be 78% on RA. He was started on NRB and initially was doing much better, but his tachypnea did not improve, so he was placed on bipap. His sat is currently 94%.    Hospital/ICU Course:  Patient was admitted to the MICU agyer a difficult intubation in the ER s/p crich. Covid positive with ards, sedated and paralyzed  Unable to prone secondary to crich. Left ainsyem intubation which was pulled off, on NO and levophed.     Interval History/Significant Events: remains intubated sedated and paralyzed unable to prone     Review of Systems   Unable to perform ROS: Intubated     Objective:     Vital Signs (Most Recent):  Temp: 98.2 °F (36.8 °C) (04/11/20 0730)  Pulse: 75 (04/11/20 0800)  Resp: (!) 26 (04/11/20 0800)  BP: 113/68 (04/11/20 0800)  SpO2: 100 % (04/11/20 0807) Vital Signs (24h Range):  Temp:  [98 °F (36.7 °C)-98.8 °F (37.1 °C)] 98.2 °F (36.8 °C)  Pulse:  [] 75  Resp:  [20-40] 26  SpO2:  [83 %-100 %] 100 %  BP: ()/(50-86) 113/68  Arterial Line BP: ()/(53-70) 108/53   Weight: 63.5 kg (140 lb)  Body mass index is 24.03 kg/m².      Intake/Output  Summary (Last 24 hours) at 4/11/2020 0818  Last data filed at 4/11/2020 0800  Gross per 24 hour   Intake 1075.6 ml   Output --   Net 1075.6 ml       Physical Exam   Constitutional: He appears well-developed and well-nourished.   HENT:   Head: Normocephalic.   crich in place    Eyes: EOM are normal.   Cardiovascular: Normal rate and regular rhythm.   Pulmonary/Chest: Effort normal.   Abdominal: Soft.   Neurological:   Sedated    Skin: Skin is warm.   Nursing note and vitals reviewed.      Vents:  Vent Mode: A/C (04/11/20 0010)  Ventilator Initiated: Yes (04/10/20 2247)  Set Rate: 20 BPM (04/11/20 0010)  Vt Set: 430 mL (04/11/20 0010)  PEEP/CPAP: 14 cmH20 (04/11/20 0010)  Oxygen Concentration (%): 80 (04/11/20 0800)  Peak Airway Pressure: 55 cmH2O (04/11/20 0010)  Plateau Pressure: 0 cmH20 (04/11/20 0010)  Total Ve: 13.9 mL (04/11/20 0010)  F/VT Ratio<105 (RSBI): (!) 51.55 (04/10/20 2247)  Lines/Drains/Airways     Central Venous Catheter Line            Percutaneous Central Line Insertion/Assessment - Triple Lumen  04/11/20 0137 right femoral vein less than 1 day          Drain                 NG/OG Tube 04/10/20 2306 orogastric 14 Fr. Left mouth less than 1 day         Urethral Catheter 04/10/20 2329 Double-lumen less than 1 day          Airway                 Airway - Non-Surgical 04/10/20 2302 Endotracheal Tube less than 1 day          Arterial Line                 Arterial Line 04/11/20 0242 Left Radial less than 1 day          Peripheral Intravenous Line                 Peripheral IV - Single Lumen 04/10/20 0000 20 G Left Forearm 1 day         Peripheral IV - Single Lumen 04/10/20 1810 20 G Left Antecubital less than 1 day              Significant Labs:    CBC/Anemia Profile:  Recent Labs   Lab 04/10/20  1810 04/11/20  0420   WBC 12.79* 13.65*   HGB 13.6* 11.6*   HCT 44.0 36.2*    251   MCV 91 91   RDW 13.2 13.5   FERRITIN 1,465*  --         Chemistries:  Recent Labs   Lab 04/10/20  1950 04/10/20  9049  04/11/20  0420   * 136 136   K 3.4* 3.7 4.1   CL 87* 101 101   CO2 19* 21* 21*   BUN 8 8 11   CREATININE 0.8 0.8 0.8   CALCIUM 6.8* 8.2* 7.6*   ALBUMIN 2.0* 2.4* 2.1*   PROT 5.4* 6.7 6.0   BILITOT 0.4 0.5 0.3   ALKPHOS 67 88 89   ALT 42 50* 93*   AST 44* 55* 131*   MG  --   --  2.0   PHOS  --   --  1.6*       All pertinent labs within the past 24 hours have been reviewed.    Significant Imaging:  I have reviewed all pertinent imaging results/findings within the past 24 hours.  I have reviewed and interpreted all pertinent imaging results/findings within the past 24 hours.      ABG  Recent Labs   Lab 04/11/20  0906   PH 7.334*   PO2 143*   PCO2 43.9   HCO3 23.3*   BE -3     Assessment/Plan:     Neuro  Sedated  - on propfol and fentanyl and nimbex  - daily sbt/ sat       Cardiac/Vascular  Hypertension  - now in shock after intubation  - hold metoprolol     Other  Shock  - on pressors  .-cefepime started will follow cultures   - MAP goal > 65       COVID-19 virus detected  Respiratory distress   Acute hypoxemic respiratory failure    - Patient intubated with crich, will call ENT today for a better airway securement   - sedated and paralyzed unable to prone due to crich  -Initiate PLAQUENIL and check for compassionate use of anti-retrovirals.   -Initiate Methylprednisolone 40 mg daily for five days.   -Daily CRP, d-dimer, ferritin, and CK-MB to trend inflammatory response.   -Strict I/O's and goal net neutral status to help optimize vent mechanics.   -Special isolation and aspiration precautions ordered.   - will wean off NO and get ABG this am           Critical Care Daily Checklist:    A: Awake: RASS Goal/Actual Goal:    Actual: Barrow Agitation Sedation Scale (RASS): Restless   B: Spontaneous Breathing Trial Performed?     C: SAT & SBT Coordinated?  yes                      D: Delirium: CAM-ICU Overall CAM-ICU: Positive   E: Early Mobility Performed? Yes   F: Feeding Goal:    Status:     Current Diet Order    Procedures    Diet NPO      AS: Analgesia/Sedation propogol and fentanyl    T: Thromboembolic Prophylaxis heparin   H: HOB > 300 Yes   U: Stress Ulcer Prophylaxis (if needed) famotidine   G: Glucose Control    B: Bowel Function     I: Indwelling Catheter (Lines & Torres) Necessity Torres and central and dave    D: De-escalation of Antimicrobials/Pharmacotherapies non    Plan for the day/ETD Wean NO, ENT consult,     Code Status:  Family/Goals of Care: Full Code  No family informations will try to find       Critical secondary to Patient has a condition that poses threat to life and bodily function: Severe Respiratory Distress      Critical care was time spent personally by me on the following activities: development of treatment plan with patient or surrogate and bedside caregivers, discussions with consultants, evaluation of patient's response to treatment, examination of patient, ordering and performing treatments and interventions, ordering and review of laboratory studies, ordering and review of radiographic studies, pulse oximetry, re-evaluation of patient's condition. This critical care time did not overlap with that of any other provider or involve time for any procedures.    Patient seen and examined this morning. Discussed with Team - staff attestation to follow.    Patient with no emergency contact in the chart, MD from cruise ship supposed to call today will ask for emergency contact from them if able        Dottie Andrade MD  Critical Care Medicine  [facility]

## 2020-04-11 NOTE — CONSULTS
Consult acknowledged. Pt to be admitted to CMICU. H&P to follow.    Kalee Mei,   PGY2  Critical Care Medicine

## 2020-04-11 NOTE — PROCEDURES
"Ranjana Anderson is a 54 y.o. male patient.    Temp: 98 °F (36.7 °C) (04/11/20 1130)  Pulse: 70 (04/11/20 1500)  Resp: (!) 26 (04/11/20 1500)  BP: 119/67 (04/11/20 1200)  SpO2: (!) 90 % (04/11/20 1503)  Weight: 63.5 kg (140 lb) (04/10/20 1725)  Height: 5' 4" (162.6 cm) (04/10/20 1725)       Intubation  Performed by: Richi Houser MD  Authorized by: Richi Houser MD     Intubation:     Induction:  Intravenous    Intubated:  Postinduction    Mask Ventilation:  N/a    Method of Intubation:  Video laryngoscopy, bougie and fiberoptic    Blade:  Glidescope 3    Laryngeal View Grade: Grade I - full view of chords      Difficult Airway Encountered?: Yes      Complications:  None    Airway Device:  Oral endotracheal tube    Airway Device Size:  7.5    Style/Cuff Inflation:  Cuffed    Inflation Amount (mL):  5    Tube secured:  25    Secured at:  The lips    Placement Verified By:  Revisualization with laryngoscopy    Complicating Factors:  None  Notes:      Very complex case.  Pt with cricothyrotomy due to unable to intubate with Glidescope in ED last PM.  With Dr. العراقي at bedside, glottis visualized with Glidescope per Dr. Houser.  Airway suctioned, attempted to view below cords with fiberoptic.  Unable to make bend.  Bougie placed in glottis below cords, ETT advanced over bougie.  Cricothyrotomy removed by Dr. اعلراقي.  ETT placed at ~25 cm.  Good return of tidal volumes.  Brochoscope passed down tube to confirm placement.          Richi Houser  4/11/2020  "

## 2020-04-11 NOTE — HPI
Mr. LUANA Sanchez is a 54 y/o M with PMHx of HTN brought to ER via EMS for increasing SOB with known COVID-19 exposure. 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 They have not had travelers since 3/14. 2 days ago he started having fever and SOB. He was seen by the ship's physician and was found to have crackles in his bases bilaterally, but his oxygen saturation was in the 90's. He had a flu test which was negative. He was quarantined in a room and states that he got worse with increasing SOB. He was brought to the ER and found to be 78% on RA. He was started on NRB and initially was doing much better, but his tachypnea did not improve, so he was placed on BiPAP with oxygen saturation improvement.

## 2020-04-11 NOTE — HPI
55 yo male COVID+ who presented with SOB. Decompensated in the ER. Attempts at intubation were made which were ultimately unsuccessful. Patient underwent emergency cricothyrotomy by ED staff and was admitted to ICU. ENT consulted for further airway recommendations.

## 2020-04-11 NOTE — H&P
[facility]  COVID-19 or Persons Under Investigation ICU H&P:    Patient Name: Ranjana Anderson  MRN: [id number]  Admission Date: 4/10/2020   Code Status:   Attending: Lia Livingston MD     HPI:  54y.o male with PMH of HTN brought to ER via EMS for increasing SOB with known covid-19 exposure. 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 They have not had travelers since 3/14. 2 days ago he started having fever and sob. He was seen by the ship's physician and was found to have crackles in his bases bilaterally, but his oxygen saturation was in the 90's. He had a flu test which was negative. He was quarantined in a room and states that he got worse with increasing SOB. He was brought to the ER and found to be 78% on RA. He was started on NRB and initially was doing much better, but his tachypnea did not improve, so he was placed on bipap. His sat is currently 94%.      Disclaimer: Limited review of systems and physical exam per team attending and/or nursing staff due to patient being in special isolation.     Review of Systems:  Constitutional: c/o fever and chills over the past 2 days  Eyes: no visual changes  ENT: no nasal congestion or sore throat  Respiratory: c/o dry cough and increasing shortness of breath  Cardiovascular: no chest pain or palpitations  Gastrointestinal: no nausea or vomiting, no abdominal pain, c/o diarrhea x1 this morning  Genitourinary: no hematuria or dysuria  Integument/Breast: no rash or pruritis  Hematologic/Lymphatic: no easy bruising or lymphadenopathy  Musculoskeletal: no arthralgias or myalgias  Neurological: no seizures or tremors  Endocrine: no heat or cold intolerance    Vitals:   Vitals:    04/10/20 1725 04/10/20 1910 04/10/20 1915 04/10/20 2015   BP: 112/86  130/66 (!) 140/70   BP Location: Right arm      Pulse: 78 106 108 96   Resp:  (!) 40     Temp: 98 °F (36.7 °C)      TempSrc: Oral      SpO2: (!) 91% (!) 93% (!) 93% (!) 93%   Weight: 63.5 kg (140  "lb)      Height: 5' 4" (1.626 m)          Oxygen Concentration (%):  [60] 60     Date 04/10/20 0700 - 04/11/20 0659   Shift 1678-7213 5386-7446 9019-5479 24 Hour Total   INTAKE   IV Piggyback  250  250   Shift Total(mL/kg)  250(3.9)  250(3.9)   OUTPUT   Shift Total(mL/kg)       Weight (kg)  63.5 63.5 63.5         Physical Exam:  General appearance: sitting up in bed, anxious appearing and tachypneic with bipap in use  Head: PERRL, MMM  Neck: no JVD, no bruits  Lungs:  Crackles mid to base, tachypnea with RR 32  Heart: rrr, no m/r/g  Abdomen: SNTND: BS+  Extremities: no cyanosis or edema, or clubbing  Pulses: 2+ BL DP/PT  Skin: No rashes, no erythema   Neurologic: AAOx3    Labs: All labs within the last 24 hours were reviewed.     Imaging: All imaging within the last 24 hours was reviewed.     ASSESSMENT & PLAN:   This is a 54 y.o. admitted on 4/10/2020 for respiratory failure secondary to confirmed/suspected COVID-19 currently requiring bipap for respiratory support.     Neuro:   - awake and alert     Cardiac/Circulatory:   -Maintain MAP's >65 with vasopressor support if necessary.   - on metoprolol at home for management of HTN     Pulmonary:   -Continue bipap with ARDS targeted strategies.  -Initiate PLAQUENIL and check for compassionate use of anti-retrovirals.   -Initiate Methylprednisolone 40 mg daily for five days.   -Daily CRP, d-dimer, ferritin, and CK-MB to trend inflammatory response.   -Strict I/O's and goal net neutral status to help optimize vent mechanics.   -Special isolation and aspiration precautions ordered.     Renal:   -Monitoring urine output with consideration for dialysis initiation if necessary.    -Trending renal function daily.   -Strict I/O's.     Gastrointestinal:   -Trending liver function daily.     Endocrine:   - repeat CMP for elevated glucose with blood sugar of 181 and no history of DM  -Goal glucose 140-180  -POCT glucose checks Q6H while NPO.   -Sliding scale insulin and/or " insulin drip ordered, if necessary for goal.       Hematology/Oncology:   -Trending CBC daily and monitor for signs of bleeding.   -Transfuse for Hgb <7.       Critical Care Time: 40 minutes  Critical care was time spent personally by me on the following activities: evaluating this patient's organ dysfunction, development of treatment plan, discussing treatment plan with patient or surrogate and bedside caregivers, discussions with consultants, evaluation of patient's response to treatment, examination of patient, ordering and performing treatments and interventions, ordering and review of laboratory studies, ordering and review of radiographic studies, re-evaluation of patient's condition. This critical care time did not overlap with that of any other provider or involve time for any procedures.

## 2020-04-11 NOTE — PROCEDURES
"Ranjana Anderson is a 54 y.o. male patient.    Temp: 98.8 °F (37.1 °C) (04/11/20 0015)  Pulse: 91 (04/11/20 0315)  Resp: (!) 26 (04/11/20 0315)  BP: 115/72 (04/11/20 0245)  SpO2: 97 % (04/11/20 0315)  Weight: 63.5 kg (140 lb) (04/10/20 1725)  Height: 5' 4" (162.6 cm) (04/10/20 1725)       Arterial Line  Date/Time: 4/11/2020 3:30 AM  Location procedure was performed: [facility] CARDIAC MEDICAL ICU ([facility])  Performed by: Noel Kasper DO  Authorized by: Noel Kasper DO   Assisting provider: Jaylon Tim MD  Pre-op Diagnosis: Acute Hypoxemic Respiratory Failure  Post-operative diagnosis: Acute Hypoxemic Respiratory Failure  Consent Done: Emergent Situation  Preparation: Patient was prepped and draped in the usual sterile fashion.  Indications: multiple ABGs, respiratory failure and hemodynamic monitoring  Location: left radial  Patient sedated: yes  Analgesia: see MAR for details  Needle gauge: 20  Number of attempts: 2  Complications: Yes (Patient became severely hypoxic (60s) during attempt at right radial A-line; procedure aborted and patient stabilized prior to attempt on left radial a-line)  Estimated blood loss (mL): 2  Specimens: No  Implants: No  Post-procedure: line sutured and dressing applied  Post-procedure CMS: unchanged  Patient tolerance: Patient tolerated the procedure well with no immediate complications        Noel Kasper DO  PGY-2 Emergency Medicine  3:32 AM    "

## 2020-04-11 NOTE — ASSESSMENT & PLAN NOTE
Respiratory distress   Acute hypoxemic respiratory failure    - Patient intubated with crich, will call ENT today for a better airway securement   - sedated and paralyzed unable to prone due to crich  -Initiate PLAQUENIL and check for compassionate use of anti-retrovirals.   -Initiate Methylprednisolone 40 mg daily for five days.   -Daily CRP, d-dimer, ferritin, and CK-MB to trend inflammatory response.   -Strict I/O's and goal net neutral status to help optimize vent mechanics.   -Special isolation and aspiration precautions ordered.   - will wean off NO and get ABG this am

## 2020-04-11 NOTE — ASSESSMENT & PLAN NOTE
S/p emergent cricothyrotomy, now on vent. Normally, patients who undergo emergent cric are converted to a tracheostomy within 72 hours to avoid theoretical risk of subglottic stenosis. The data supporting this practice is scant and of low quality- it seems that the risk of subglottic stenosis is probably relatively low. However, we recognize that it may be difficult to care for the patient and to prone him if necessary with his airway as is.     -recommend airway evaluation by anesthesia, possible oral intubation via glide scope if possible at the bedside to avoid a tracheostomy  -if oral intubation is not possible, recommend leaving endotracheal tube in cricothyrotomy site for the time being. If Mr. Witt looks to be improving from a respiratory stand point, we may consider converting to a tracheostomy at a later date if necessary  -discussed with Dr. العراقي, anesthesia, and ICU team  -appreciate ICU and consulting services with their help in the care of this complex patient

## 2020-04-11 NOTE — PROCEDURES
"Ranjana Anderson is a 54 y.o. male patient admitted for acute hypoxemic respiratory failure 2/2 covid-19    Temp: 98.8 °F (37.1 °C) (04/11/20 0015)  Pulse: 91 (04/11/20 0315)  Resp: (!) 26 (04/11/20 0315)  BP: 115/72 (04/11/20 0245)  SpO2: 97 % (04/11/20 0315)  Weight: 63.5 kg (140 lb) (04/10/20 1725)  Height: 5' 4" (162.6 cm) (04/10/20 1725)       Central Line  Date/Time: 4/11/2020 3:27 AM  Location procedure was performed: [facility] CARDIAC MEDICAL ICU ([facility])  Performed by: Noel Kasper DO  Supervising provider: nicolette  Assisting provider: Jaylon Tim MD  Pre-operative Diagnosis: Acute Hypoxemic Respiratory Failure  Post-operative diagnosis: Acute Hypoxemic Respiratory Failure  Consent Done: Emergent Situation  Time out: Immediately prior to procedure a "time out" was called to verify the correct patient, procedure, equipment, support staff and site/side marked as required.  Indications: med administration and vascular access  Anesthesia: see MAR for details  Preparation: skin prepped with ChloraPrep  Skin prep agent dried: skin prep agent completely dried prior to procedure  Sterile barriers: all five maximum sterile barriers used - cap, mask, sterile gown, sterile gloves, and large sterile sheet  Hand hygiene: hand hygiene performed prior to central venous catheter insertion  Location details: right femoral  Site selection rationale: Patient with cricothyrodectomy for intubation making IJ/subclavian poor choic  Catheter type: triple lumen  Catheter size: 7.5 Fr  Catheter Length: 20cm    Ultrasound guidance: yes  Vessel Caliber: patent, compressibility normal  Needle advanced into vessel with real time Ultrasound guidance.  Guidewire confirmed in vessel.  Manometry: No   Number of attempts: 1  Complications: none  Estimated blood loss (mL): 1  Specimens: No  Implants: No  Post-procedure: line sutured  Complications: No        Noel Kasper DO  PGY-2 Emergency Medicine  3:29 AM    "

## 2020-04-11 NOTE — HOSPITAL COURSE
Patient was admitted to the MICU after a difficult intubation in the ER s/p crich. COVID + with ARDS, sedated and paralyzed. Unable to prone secondary to crich. Left mainstem intubation which was pulled off, on NO and levophed. ET tube replaced on 4/11 family contacted in [place]. Proned on 4/12 tolerated well and continued to prone on 4/19. 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 He was on and off ketamine as well.  EEG ordered to rule out seizures as he also had seizure like activity that resolved with ativan. Currently changing sedation, was placed on phenobarbital which should start having effect 4/20. Patient proned x 10 (last on 4/22). Still not tolerating ventilator weaning. Remains on sedation, vasopressors, and paralytic. ID now following patient for remdesivir administration; family aware and consented. Patient not significantly improving despite efforts. Son contacted, patient made DNR. Patient evaluated for ECMO on 4/24. Placed on ECMO on 4/25 and transferred to Dr Ayon's service with DNR rescinded.    Some physiologic improvement on VV ECMO. Oxygenation at goal, lactate clearing, hemodynamics at goal without vasoactive support currently. Tolerated wean of vent Fi and Monika thus far. Peeling back sedatives steadily with goal of neuro exam ASAP. Metabolic alkalosis correcting with encouragement from diamox. Patient was weaned off from ECMO support on 06/24/2020. However, during this process he has undergone a stroke and currently is not responding to verbal commands. The patient is able to open his eyes and track, however no purposeful movements can be ascertained from the patient. Extensive notes from neurology have been documented in the patient's medical record. Extensive discussions had been carried out between the family members and periodic updates were provided. Periodic  updates were also provided to [employer] which is his employer. The family was also notified that if any further deterioration happens after completion of ECMO we would not be providing new ECMO support to this patient.    The patient was transferred to the MICU team on 6/25 after ECMO decannulation. Fever with worsening shock. Cultured and started on broad spectrum antibiotics. Frequent episodes of bradycardia to the 20's but recovers without intervention. Heart rate variability ( Up to 160 at times) continues and intermittently requiring beta blocker. Acute worsening on 7/7 with new fevers concerning for new underlying infection. Vasopressor requirements drastically increasing. Patient with very poor lung compliance and CXR with worsening bilateral infiltrates. Consulted palliative care for assistance with GOC discussion. Family unable to travel from [place] to visit due to flight restrictions in place by their home country. Patient too unstable to travel back to [place] either via boat or plane. Discussed with son that patient is dying despite everything we are doing. He states understanding. Plan to not escalate care but continue management for now.

## 2020-04-11 NOTE — RESPIRATORY THERAPY
MD had multiple attempts intubating difficult airway. MD unable to intubate orally so decision made to perform cricothyrotomy. Pt intubated with 7.0 et tube originally placed 19@skin. Post cxr, et tube pulled back to 14cm per MD request. MD then pulled et tube back to 13cm@skin. Pt placed on vent at documented settings. Will continue to monitor.

## 2020-04-11 NOTE — CARE UPDATE
Received patient from ED and placed on CMICU ventilator, see flow sheet for details. ETT secured and patent. AMBU bag and mask at bedside. Awaiting orders and will continue to monitor.

## 2020-04-11 NOTE — PLAN OF CARE
Cm unable to obtain discharge planning assessment.  Patient intubated and paralyzed.  No emergency contact information available other then [employer].

## 2020-04-11 NOTE — CARE UPDATE
Patient wife contacted through Facebook with RN haylee , patient son tena only person who can speak english wife is navin royal we used her account to contact them . Son and wife are unaware of patient being intubated or admitted, they were very surprised but appreciative. Update was given that he is intubated and being treated for acute respiratory failure 2/2 to [facility]. Haylee RN will update chart for facetime number and she is having a facetime call with family now.     Will call patient family daily

## 2020-04-11 NOTE — SUBJECTIVE & OBJECTIVE
Medications:  Continuous Infusions:   cisatracurium (NIMBEX) infusion 1 mcg/kg/min (04/11/20 1200)    fentanyl 200 mcg/hr (04/11/20 1200)    heparin (porcine) in D5W 12 Units/kg/hr (04/11/20 1200)    nitric oxide gas      norepinephrine bitartrate-D5W 0.02 mcg/kg/min (04/11/20 1247)    propofoL 35 mcg/kg/min (04/11/20 1200)     Scheduled Meds:   azithromycin  250 mg Per NG tube QHS    carboxymethylcellulose sodium  2 drop Ophthalmic BID    ceFEPime (MAXIPIME) IVPB  2 g Intravenous Q8H    chlorhexidine  15 mL Mouth/Throat BID    famotidine (PF)  20 mg Intravenous BID    hydroxychloroquine  400 mg Oral BID    Followed by    [START ON 4/12/2020] hydroxychloroquine  400 mg Oral Daily    potassium phosphate IVPB  30 mmol Intravenous Once     PRN Meds:Dextrose 10% Bolus, Dextrose 10% Bolus, glucagon (human recombinant), glucose, glucose, heparin (PORCINE), heparin (PORCINE), sodium chloride 0.9%     No current facility-administered medications on file prior to encounter.      Current Outpatient Medications on File Prior to Encounter   Medication Sig    metoprolol tartrate (LOPRESSOR) 50 MG tablet Take 50 mg by mouth 2 (two) times daily.       Review of patient's allergies indicates:  No Known Allergies    Past Medical History:   Diagnosis Date    Hypertension      History reviewed. No pertinent surgical history.  Family History     None        Tobacco Use    Smoking status: Never Smoker   Substance and Sexual Activity    Alcohol use: Not on file    Drug use: Not on file    Sexual activity: Not on file     Review of Systems   Unable to perform ROS: Intubated     Objective:     Vital Signs (Most Recent):  Temp: 98 °F (36.7 °C) (04/11/20 1130)  Pulse: (!) 56 (04/11/20 1200)  Resp: (!) 26 (04/11/20 1200)  BP: 119/67 (04/11/20 1200)  SpO2: 97 % (04/11/20 1200) Vital Signs (24h Range):  Temp:  [98 °F (36.7 °C)-98.8 °F (37.1 °C)] 98 °F (36.7 °C)  Pulse:  [] 56  Resp:  [20-40] 26  SpO2:  [83 %-100 %] 97  %  BP: ()/(50-86) 119/67  Arterial Line BP: ()/(47-90) 122/51     Weight: 63.5 kg (140 lb)  Body mass index is 24.03 kg/m².    Date 04/11/20 0700 - 04/12/20 0659   Shift 4663-5708 3549-5114 9726-0034 24 Hour Total   INTAKE   P.O. 0   0   I.V.(mL/kg) 395.9(6.2)   395.9(6.2)   NG/   180   IV Piggyback 500   500   Shift Total(mL/kg) 1075.9(16.9)   1075.9(16.9)   OUTPUT   Urine(mL/kg/hr) 1660   1660   Shift Total(mL/kg) 1660(26.1)   1660(26.1)   Weight (kg) 63.5 63.5 63.5 63.5       Physical Exam  deferred    Significant Labs:  All pertinent labs from the last 24 hours have been reviewed.    Significant Diagnostics:  I have reviewed and interpreted all pertinent imaging results/findings within the past 24 hours.

## 2020-04-11 NOTE — RESPIRATORY THERAPY
Pt transported from ED to 6074 on transport vent. Et tube secured by commercial tube alcaraz. Ambu bag and spare O2 tank at bedside. Report given to Mary ÁLVAREZ RRT.

## 2020-04-12 NOTE — NURSING
Patient proned.  Anesthesia and RT @ the [facility] monitoring the ETT.  1 MD, 2 RNs and 1 PCT present. Patient tolerated proning well.  Current vent settings - PEEP 8, FiO2 60%.

## 2020-04-12 NOTE — SUBJECTIVE & OBJECTIVE
Interval History/Significant Events: Patient overnight with no acute vents, was able to wean vent setting mildly , remains paralyzed and sedated  Review of Systems   Unable to perform ROS: Intubated     Objective:     Vital Signs (Most Recent):  Temp: 98.8 °F (37.1 °C) (04/12/20 0200)  Pulse: (!) 126 (04/12/20 0700)  Resp: (!) 26 (04/12/20 0700)  BP: 137/82 (04/12/20 0400)  SpO2: 96 % (04/12/20 0700) Vital Signs (24h Range):  Temp:  [97.7 °F (36.5 °C)-98.8 °F (37.1 °C)] 98.8 °F (37.1 °C)  Pulse:  [] 126  Resp:  [15-26] 26  SpO2:  [83 %-100 %] 96 %  BP: (102-137)/(63-82) 137/82  Arterial Line BP: ()/(46-90) 108/57   Weight: 63.5 kg (140 lb)  Body mass index is 24.03 kg/m².      Intake/Output Summary (Last 24 hours) at 4/12/2020 0803  Last data filed at 4/12/2020 0700  Gross per 24 hour   Intake 2648.32 ml   Output 3010 ml   Net -361.68 ml       Physical Exam   Constitutional: He appears well-developed and well-nourished.   HENT:   Head: Normocephalic.   crich removed and dressing clean dry and intact    Eyes: EOM are normal.   Cardiovascular: Normal rate and regular rhythm.   Pulmonary/Chest: Effort normal.   Abdominal: Soft.   Neurological:   Sedated    Skin: Skin is warm.   Nursing note and vitals reviewed.      Vents:  Vent Mode: A/C (04/12/20 0200)  Ventilator Initiated: Yes (04/10/20 2247)  Set Rate: 26 BPM (04/12/20 0200)  Vt Set: 360 mL (04/12/20 0200)  PEEP/CPAP: 14 cmH20 (04/12/20 0200)  Oxygen Concentration (%): 60 (04/12/20 0700)  Peak Airway Pressure: 38 cmH2O (04/12/20 0200)  Plateau Pressure: 42 cmH20 (04/12/20 0200)  Total Ve: 9.5 mL (04/12/20 0200)  F/VT Ratio<105 (RSBI): (!) 70.84 (04/12/20 0200)  Lines/Drains/Airways     Central Venous Catheter Line            Percutaneous Central Line Insertion/Assessment - Triple Lumen  04/11/20 0137 right femoral vein 1 day          Drain                 Urethral Catheter 04/10/20 2329 Double-lumen 1 day          Airway                 Airway -  Non-Surgical 04/11/20 1525 Endotracheal Tube less than 1 day          Arterial Line                 Arterial Line 04/11/20 0242 Left Radial 1 day          Peripheral Intravenous Line                 Peripheral IV - Single Lumen 04/10/20 0000 20 G Left Forearm 2 days         Peripheral IV - Single Lumen 04/10/20 1810 20 G Left Antecubital 1 day              Significant Labs:    CBC/Anemia Profile:  Recent Labs   Lab 04/10/20  1810 04/11/20  0420 04/12/20  0143   WBC 12.79* 13.65* 10.91   HGB 13.6* 11.6* 12.1*   HCT 44.0 36.2* 36.9*    251 266   MCV 91 91 88   RDW 13.2 13.5 13.6   FERRITIN 1,465*  --   --         Chemistries:  Recent Labs   Lab 04/10/20  2139 04/11/20  0420 04/11/20  2233 04/12/20  0143 04/12/20  0520    136 137  --  137   K 3.7 4.1 3.3*  --  4.5    101 102  --  101   CO2 21* 21* 26  --  25   BUN 8 11 7  --  7   CREATININE 0.8 0.8 0.6  --  0.7   CALCIUM 8.2* 7.6* 7.9*  --  7.4*   ALBUMIN 2.4* 2.1*  --   --  1.8*   PROT 6.7 6.0  --   --  6.1   BILITOT 0.5 0.3  --   --  0.3   ALKPHOS 88 89  --   --  81   ALT 50* 93*  --   --  55*   AST 55* 131*  --   --  30   MG  --  2.0  --  2.0  --    PHOS  --  1.6* 2.2*  --  2.6*       CMP:   Recent Labs   Lab 04/10/20  2139 04/11/20  0420 04/11/20  2233 04/12/20  0520    136 137 137   K 3.7 4.1 3.3* 4.5    101 102 101   CO2 21* 21* 26 25   * 273* 227* 258*   BUN 8 11 7 7   CREATININE 0.8 0.8 0.6 0.7   CALCIUM 8.2* 7.6* 7.9* 7.4*   PROT 6.7 6.0  --  6.1   ALBUMIN 2.4* 2.1*  --  1.8*   BILITOT 0.5 0.3  --  0.3   ALKPHOS 88 89  --  81   AST 55* 131*  --  30   ALT 50* 93*  --  55*   ANIONGAP 14 14 9 11   EGFRNONAA >60.0 >60.0 >60.0 >60.0       Significant Imaging:  I have reviewed all pertinent imaging results/findings within the past 24 hours.  I have reviewed and interpreted all pertinent imaging results/findings within the past 24 hours.

## 2020-04-12 NOTE — ASSESSMENT & PLAN NOTE
- of pressors 4/12  .-On cefepime, and azithromycin and plaquenil, WBC trending down   - MAP goal > 65

## 2020-04-12 NOTE — PROGRESS NOTES
[facility]  Critical Care Medicine  Progress Note    Patient Name: Ranjana Anderson  MRN: [id number]  Admission Date: 4/10/2020  Hospital Length of Stay: 2 days  Code Status: Full Code  Attending Provider: Maricarmen Chavez MD  Primary Care Provider: To Obtain Unable   Principal Problem: COVID-19 virus detected    Subjective:     HPI:  54y.o male with PMH of HTN brought to ER via EMS for increasing SOB with known covid-19 exposure. 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 They have not had travelers since 3/14. 2 days ago he started having fever and sob. He was seen by the ship's physician and was found to have crackles in his bases bilaterally, but his oxygen saturation was in the 90's. He had a flu test which was negative. He was quarantined in a room and states that he got worse with increasing SOB. He was brought to the ER and found to be 78% on RA. He was started on NRB and initially was doing much better, but his tachypnea did not improve, so he was placed on bipap. His sat is currently 94%.    Hospital/ICU Course:  Patient was admitted to the MICU agyer a difficult intubation in the ER s/p crich. Covid positive with ards, sedated and paralyzed  Unable to prone secondary to crich. Left mainsyem intubation which was pulled off, on NO and levophed. ET tube replaced on 4/11 family contacted in [place].     Interval History/Significant Events: Patient overnight with no acute vents, was able to wean vent setting mildly , remains paralyzed and sedated  Review of Systems   Unable to perform ROS: Intubated     Objective:     Vital Signs (Most Recent):  Temp: 98.8 °F (37.1 °C) (04/12/20 0200)  Pulse: (!) 126 (04/12/20 0700)  Resp: (!) 26 (04/12/20 0700)  BP: 137/82 (04/12/20 0400)  SpO2: 96 % (04/12/20 0700) Vital Signs (24h Range):  Temp:  [97.7 °F (36.5 °C)-98.8 °F (37.1 °C)] 98.8 °F (37.1 °C)  Pulse:  [] 126  Resp:  [15-26] 26  SpO2:  [83 %-100 %] 96 %  BP: (102-137)/(63-82) 137/82  Arterial  Line BP: ()/(46-90) 108/57   Weight: 63.5 kg (140 lb)  Body mass index is 24.03 kg/m².      Intake/Output Summary (Last 24 hours) at 4/12/2020 0803  Last data filed at 4/12/2020 0700  Gross per 24 hour   Intake 2648.32 ml   Output 3010 ml   Net -361.68 ml       Physical Exam   Constitutional: He appears well-developed and well-nourished.   HENT:   Head: Normocephalic.   crich removed and dressing clean dry and intact    Eyes: EOM are normal.   Cardiovascular: Normal rate and regular rhythm.   Pulmonary/Chest: Effort normal.   Abdominal: Soft.   Neurological:   Sedated    Skin: Skin is warm.   Nursing note and vitals reviewed.      Vents:  Vent Mode: A/C (04/12/20 0200)  Ventilator Initiated: Yes (04/10/20 2247)  Set Rate: 26 BPM (04/12/20 0200)  Vt Set: 360 mL (04/12/20 0200)  PEEP/CPAP: 14 cmH20 (04/12/20 0200)  Oxygen Concentration (%): 60 (04/12/20 0700)  Peak Airway Pressure: 38 cmH2O (04/12/20 0200)  Plateau Pressure: 42 cmH20 (04/12/20 0200)  Total Ve: 9.5 mL (04/12/20 0200)  F/VT Ratio<105 (RSBI): (!) 70.84 (04/12/20 0200)  Lines/Drains/Airways     Central Venous Catheter Line            Percutaneous Central Line Insertion/Assessment - Triple Lumen  04/11/20 0137 right femoral vein 1 day          Drain                 Urethral Catheter 04/10/20 2329 Double-lumen 1 day          Airway                 Airway - Non-Surgical 04/11/20 1525 Endotracheal Tube less than 1 day          Arterial Line                 Arterial Line 04/11/20 0242 Left Radial 1 day          Peripheral Intravenous Line                 Peripheral IV - Single Lumen 04/10/20 0000 20 G Left Forearm 2 days         Peripheral IV - Single Lumen 04/10/20 1810 20 G Left Antecubital 1 day              Significant Labs:    CBC/Anemia Profile:  Recent Labs   Lab 04/10/20  1810 04/11/20  0420 04/12/20  0143   WBC 12.79* 13.65* 10.91   HGB 13.6* 11.6* 12.1*   HCT 44.0 36.2* 36.9*    251 266   MCV 91 91 88   RDW 13.2 13.5 13.6   FERRITIN  1,465*  --   --         Chemistries:  Recent Labs   Lab 04/10/20  2139 04/11/20  0420 04/11/20  2233 04/12/20  0143 04/12/20  0520    136 137  --  137   K 3.7 4.1 3.3*  --  4.5    101 102  --  101   CO2 21* 21* 26  --  25   BUN 8 11 7  --  7   CREATININE 0.8 0.8 0.6  --  0.7   CALCIUM 8.2* 7.6* 7.9*  --  7.4*   ALBUMIN 2.4* 2.1*  --   --  1.8*   PROT 6.7 6.0  --   --  6.1   BILITOT 0.5 0.3  --   --  0.3   ALKPHOS 88 89  --   --  81   ALT 50* 93*  --   --  55*   AST 55* 131*  --   --  30   MG  --  2.0  --  2.0  --    PHOS  --  1.6* 2.2*  --  2.6*       CMP:   Recent Labs   Lab 04/10/20  2139 04/11/20  0420 04/11/20  2233 04/12/20  0520    136 137 137   K 3.7 4.1 3.3* 4.5    101 102 101   CO2 21* 21* 26 25   * 273* 227* 258*   BUN 8 11 7 7   CREATININE 0.8 0.8 0.6 0.7   CALCIUM 8.2* 7.6* 7.9* 7.4*   PROT 6.7 6.0  --  6.1   ALBUMIN 2.4* 2.1*  --  1.8*   BILITOT 0.5 0.3  --  0.3   ALKPHOS 88 89  --  81   AST 55* 131*  --  30   ALT 50* 93*  --  55*   ANIONGAP 14 14 9 11   EGFRNONAA >60.0 >60.0 >60.0 >60.0       Significant Imaging:  I have reviewed all pertinent imaging results/findings within the past 24 hours.  I have reviewed and interpreted all pertinent imaging results/findings within the past 24 hours.      ABG  Recent Labs   Lab 04/11/20 2032   PH 7.413   PO2 88   PCO2 39.8   HCO3 25.4   BE 1     Assessment/Plan:     Neuro  Sedated  - on propfol and fentanyl and nimbex  - daily sbt/ sat       Cardiac/Vascular  Hypertension  -was in shock after intubation   - he is off levophed this am    Other  * COVID-19 virus detected  Respiratory distress   Acute hypoxemic respiratory failure    - Patient intubated with crich which was switched to ETT on 4/11  - sedated and paralyzed off NO   - on  PLAQUENIL   -was not started on Methylprednisolone if any signs of septic shock will startt  -Daily CRP, d-dimer, ferritin, and CK-MB to trend inflammatory response.   -Strict I/O's and goal net  neutral status to help optimize vent mechanics.   -Special isolation and aspiration precautions ordered.   - plan to place NGT tube today and start trickle feeds  - will prone today        Shock  - of pressors 4/12  -On cefepime, and azithromycin and plaquenil, WBC trending down   - however had fever today will broaden to vancomycin and get new cultures  - MAP goal > 65          Critical Care Daily Checklist:    A: Awake: RASS Goal/Actual Goal:    Actual: [place] Agitation Sedation Scale (RASS): Unarousable   B: Spontaneous Breathing Trial Performed?     C: SAT & SBT Coordinated?  yes                      D: Delirium: CAM-ICU Overall CAM-ICU: Positive   E: Early Mobility Performed? Yes   F: Feeding Goal:    Status:     Current Diet Order   Procedures    Diet NPO      AS: Analgesia/Sedation Propofol and fentanyl    T: Thromboembolic Prophylaxis Heparin    H: HOB > 300 Yes   U: Stress Ulcer Prophylaxis (if needed) famotidine   G: Glucose Control SSI   B: Bowel Function     I: Indwelling Catheter (Lines & Torres) Necessity Torres and IJ and dave    D: De-escalation of Antimicrobials/Pharmacotherapies None     Plan for the day/ETD update family wean vent     Code Status:  Family/Goals of Care: Full Code  Patient family discussing code status        Critical secondary to Patient has a condition that poses threat to life and bodily function: Severe Respiratory Distress      Critical care was time spent personally by me on the following activities: development of treatment plan with patient or surrogate and bedside caregivers, discussions with consultants, evaluation of patient's response to treatment, examination of patient, ordering and performing treatments and interventions, ordering and review of laboratory studies, ordering and review of radiographic studies, pulse oximetry, re-evaluation of patient's condition. This critical care time did not overlap with that of any other provider or involve time for any  procedures.       Patient seen and examined this morning. Discussed with Dr. Chavez  - staff attestation to follow.      Dottie Andrade MD  Critical Care Medicine  [facility]

## 2020-04-12 NOTE — PROGRESS NOTES
Pharmacokinetic Initial Assessment: IV Vancomycin    Assessment/Plan:    - Initiate intravenous vancomycin with a loading dose of 1250 mg once followed by a maintenance dose of vancomycin 1000mg IV every 12 hours  - Desired empiric serum trough concentration is 15 to 20 mcg/mL.  - Draw vancomycin trough level 30 min prior to third dose on 4/13 at approximately 11:00.    Pharmacy will continue to follow and monitor vancomycin.      Please contact pharmacy at extension [phone] with any questions regarding this assessment.     Thank you for the consult,   Daiana Vega       Patient brief summary:  Ranjana Anderson is a 54 y.o. male initiated on antimicrobial therapy with IV Vancomycin for treatment of suspected bacteremia.    Drug Allergies:   Review of patient's allergies indicates:  No Known Allergies    Actual Body Weight:   63.5 kg    Renal Function:   Estimated Creatinine Clearance: 98.7 mL/min (based on SCr of 0.7 mg/dL).,     Dialysis Method (if applicable):  N/A    CBC (last 72 hours):  Recent Labs   Lab Result Units 04/10/20  1810 04/11/20  0420 04/12/20  0143 04/12/20  0819   WBC K/uL 12.79* 13.65* 10.91  --    Hemoglobin g/dL 13.6* 11.6* 12.1*  --    Hemoglobin A1C %  --   --   --  6.6*   Hematocrit % 44.0 36.2* 36.9*  --    Platelets K/uL 268 251 266  --    Gran% % 90.3* 92.4* 89.6*  --    Lymph% % 6.7* 3.3* 7.1*  --    Mono% % 2.0* 2.4* 1.9*  --    Eosinophil% % 0.0 0.0 0.1  --    Basophil% % 0.2 0.1 0.2  --    Differential Method  Automated Automated Automated  --        Metabolic Panel (last 72 hours):  Recent Labs   Lab Result Units 04/10/20  1810 04/10/20  1950 04/10/20  2139 04/11/20 0420 04/11/20 2233 04/12/20  0143 04/12/20  0520   Sodium mmol/L  --  120* 136 136 137  --  137   Potassium mmol/L  --  3.4* 3.7 4.1 3.3*  --  4.5   Chloride mmol/L  --  87* 101 101 102  --  101   CO2 mmol/L  --  19* 21* 21* 26  --  25   Glucose mg/dL  --  751* 161* 273* 227*  --  258*   Glucose, UA  Negative  --    --   --   --   --   --    BUN, Bld mg/dL  --  8 8 11 7  --  7   Creatinine mg/dL  --  0.8 0.8 0.8 0.6  --  0.7   Albumin g/dL  --  2.0* 2.4* 2.1*  --   --  1.8*   Total Bilirubin mg/dL  --  0.4 0.5 0.3  --   --  0.3   Alkaline Phosphatase U/L  --  67 88 89  --   --  81   AST U/L  --  44* 55* 131*  --   --  30   ALT U/L  --  42 50* 93*  --   --  55*   Magnesium mg/dL  --   --   --  2.0  --  2.0  --    Phosphorus mg/dL  --   --   --  1.6* 2.2*  --  2.6*       Drug levels (last 3 results):  No results for input(s): VANCOMYCINRA, VANCOMYCINPE, VANCOMYCINTR in the last 72 hours.    Microbiologic Results:  Microbiology Results (last 7 days)     Procedure Component Value Units Date/Time    Culture, Respiratory with Gram Stain [[id number]]     Order Status:  No result Specimen:  Respiratory from Sputum     Blood culture [[id number]]     Order Status:  No result Specimen:  Blood     Blood culture [[id number]]     Order Status:  No result Specimen:  Blood     Blood culture (site 2) [[id number]] Collected:  04/10/20 2139    Order Status:  Completed Specimen:  Blood from Peripheral, Hand, Right Updated:  04/11/20 2222     Blood Culture, Routine No Growth to date      No Growth to date    Narrative:       Site # 2, aerobic only    Blood culture (site 1) [[id number]] Collected:  04/10/20 2139    Order Status:  Completed Specimen:  Blood from Peripheral, Hand, Left Updated:  04/11/20 2222     Blood Culture, Routine No Growth to date      No Growth to date    Narrative:       Site # 1, aerobic and anaerobic

## 2020-04-12 NOTE — NURSING
Patient over-breathing the vent.  Current RR 28.  HR also increasing to 117 bpm.  Fentanyl increased to 250mcg/hr.

## 2020-04-12 NOTE — ASSESSMENT & PLAN NOTE
Respiratory distress   Acute hypoxemic respiratory failure    - Patient intubated with crich which was switched to ETT on 4/11  - sedated and paralyzed off NO   - on  PLAQUENIL   -was not started on Methylprednisolone if any signs of septic shock will startt  -Daily CRP, d-dimer, ferritin, and CK-MB to trend inflammatory response.   -Strict I/O's and goal net neutral status to help optimize vent mechanics.   -Special isolation and aspiration precautions ordered.   - plan to place NGT tube today and start trickle feeds

## 2020-04-12 NOTE — NURSING
End of shift note -     Patient proned at 12:20pm.  Current vent settings - PEEP 8, FiO2 60% with an O2 sat of 91%.     Patient SR/ST today (mostly ST with HRs in the 100s-110s).  Levo on for a short period of time prior to proning patient (see MAR).  Propofol/fentanyl/heparin/nimbex/ABX infusing per eMAR.      Temp this AM.  BC x 2 and UA with reflex ordered and sent.  Still need a respiratory culture, but scant secretions noted from ETT.  No BM but patient passing gas.  Torres intact with adequate UO - slightly concentrated in color.  BBGs elevated today - insulin given per SS.  Trickle TF started at 10cc/hr to right nare NGT.      CC team spoke with family today and updated them on POC.

## 2020-04-12 NOTE — CARE UPDATE
Internal Medicine Telemed Plan of Care Note:     Called Mr. Ranjana Anderson  family member ( tena )listed in chart to discuss patient's current clinical status. Briefly, patient is currently admitted for hypoxic respiratory failure, positive COVID-19 infection.      Addressed family's questions and concerns in addition to updating the current clinical status of the patient.  Family member verbalized understanding of situation and plan. Mr. Ranjana Anderson wife was close to son and all her questions were answered as well. They would like to facetime him again once he is supinated'       Dottie Andrade M.D.  Internal Medicine PGY-3

## 2020-04-12 NOTE — NURSING
VSS at the end of the shift. Patient remained to be paralyzed and sedated. On sinus tachycardia. Patient is off levo. Mechanical ventilator set at 60% FIO2, 26 BUR, 360 TV, PEEP 14. No desaturations within the shift. FC in place. No BM within the shift. Will continue to monitor.

## 2020-04-13 NOTE — PROGRESS NOTES
"  [facility]  Adult Nutrition  Consult Note    SUMMARY     Recommendations    1. As tolerated, increase TF rate (of Isosource 1.5) to 50 mL/hr to provide 1800 kcals, 82 g of protein, 917 mL fluid.   2. Noted pt's A1C 6.6. If diabetic formula warranted, change TF regimen to Glucerna 1.5 @ 50 mL/hr to provide 1800 kcals, 99 g of protein, 911 mL fluid.   3. RD to monitor & follow-up.    Goals: Meet % EEN, EPN by RD f/u date  Nutrition Goal Status: new  Communication of RD Recs: reviewed with RN    Reason for Assessment    Reason For Assessment: new tube feeding  Diagnosis: other (see comments)(COVID19)  Relevant Medical History: HTN, DM  Interdisciplinary Rounds: did not attend    General Information Comments: RD working remotely, +COVID19. Intubated/sedated, planning to supine this AM. TFs initiated yeseterday, pt tolerating at trickle. Planning to increase rate once pt supine. Unsure of PO intake PTA/UBW, no weight history in chart. Due to recent hospital wide restrictions to limit the transfer of COVID-19, no NFPE performed at this time. NFPE to be performed at a later date.  Nutrition Discharge Planning: Unable to determine    Nutrition/Diet History    Factors Affecting Nutritional Intake: NPO, on mechanical ventilation    Anthropometrics    Temp: 100 °F (37.8 °C)  Height Method: Stated  Height: 5' 4" (162.6 cm)  Height (inches): 64 in  Weight Method: Stated  Weight: 63.5 kg (139 lb 15.9 oz)  Weight (lb): 139.99 lb  Ideal Body Weight (IBW), Male: 130 lb  % Ideal Body Weight, Male (lb): 107.68 %  BMI (Calculated): 24  BMI Grade: 18.5-24.9 - normal  Usual Body Weight (UBW), kg: (JAMMIE)    Lab/Procedures/Meds    Pertinent Labs Reviewed: reviewed  Pertinent Labs Comments: Na 134, A1C 6.6  Pertinent Medications Reviewed: reviewed  Pertinent Medications Comments: Nimbex, Fentanyl, Heparin, Levophed, Dilaudid, Propofol    Estimated/Assessed Needs    Weight Used For Calorie Calculations: 63.5 kg " (139 lb 15.9 oz)     Energy Calorie Requirements (kcal): 1851 kcal/d  Energy Need Method: [facility]     Protein Requirements: 76-95 g/d (1.2-1.5 g/kg)  Weight Used For Protein Calculations: 63.5 kg (139 lb 15.9 oz)     Estimated Fluid Requirement Method: other (see comments)(Per MD or 1 mL/kcal)     CHO Requirement: 50% total kcals    Nutrition Prescription Ordered    Current Diet Order: NPO  Current Nutrition Support Formula Ordered: Isosource 1.5  Current Nutrition Support Rate Ordered: 10 mL/hr    Evaluation of Received Nutrient/Fluid Intake    Enteral Calories (kcal): 360  Enteral Protein (gm): 17  Enteral (Free Water) Fluid (mL): 184    Other Calories (kcal): 504 (Propofol)    Energy Calories Required: not meeting needs  Protein Required: not meeting needs  Fluid Required: other (see comments)(Per MD or 1 mL/kcal)    Comments: LBM: unknown    Tolerance: tolerating    Nutrition Risk    Level of Risk/Frequency of Follow-up: (2x/week)     Assessment and Plan    Nutrition Problem  Inadequate energy intake    Related to (etiology):   Inability to consume sufficient energy     Signs and Symptoms (as evidenced by):   NPO    Interventions(treatment strategy):  Collaboration of nutrition care w/ other providers     Nutrition Diagnosis Status:   New     Monitor and Evaluation    Food and Nutrient Intake: energy intake, food and beverage intake, enteral nutrition intake  Food and Nutrient Adminstration: diet order, enteral and parenteral nutrition administration  Physical Activity and Function: nutrition-related ADLs and IADLs  Anthropometric Measurements: weight change, weight  Biochemical Data, Medical Tests and Procedures: inflammatory profile, lipid profile, glucose/endocrine profile, gastrointestinal profile, electrolyte and renal panel  Nutrition-Focused Physical Findings: overall appearance     Nutrition Follow-Up    RD Follow-up?: Yes

## 2020-04-13 NOTE — NURSING
Patient returned to the supine position.  RT and Anesthesia at [facility] monitoring ETT (25cm @ teeth).  2 RNs and 3 Anesthesiologists assisted with supination.  Patient tolerated turn well.

## 2020-04-13 NOTE — ASSESSMENT & PLAN NOTE
- of pressors 4/12  .-On cefepime, and azithromycin and plaquenil, WBC now normal  - no growth to date on cultures   - MAP goal > 65

## 2020-04-13 NOTE — PROGRESS NOTES
Pharmacokinetic Assessment Follow Up: IV Vancomycin    Vancomycin Regimen Assessment & Plan:  - Vancomycin trough collected today resulted as 9.2 ug/mL.  - Trough is slightly sub therapeutic for goal trough 10-20 ug/mL, but I anticipate accumulation with repeated doses since this level was collected prior to the 3rd dose and therefore doesn't reflect a steady state level.  - Continue vancomycin 1000 mg IV q12h for now. Collect a 48h surveillance trough on 4/15 @1400.    Drug levels (last 3 results):  Recent Labs   Lab Result Units 04/13/20  1400   Vancomycin-Trough ug/mL 9.2*     Pharmacy will continue to follow and monitor vancomycin.    Please contact pharmacy at extension [phone] for questions regarding this assessment.    Thank you for the consult,   He Wei     Patient brief summary:  Ranjana Anderson is a 54 y.o. male initiated on antimicrobial therapy with IV Vancomycin for treatment of bacteremia    Drug Allergies:   Review of patient's allergies indicates:  No Known Allergies    Actual Body Weight:   63.5 kg    Renal Function:   Estimated Creatinine Clearance: 115.1 mL/min (based on SCr of 0.6 mg/dL).,     Dialysis Method (if applicable):  N/A

## 2020-04-13 NOTE — NURSING
Patient more tachy on telemetry monitor (current ).  Also more hypertensive with SBP in the 150s-160s.  Spoke with Dr. Bravo with CC.  MD to discuss with Pharmacy.  Dilaudid gtt increased to 4mg.  2mg bolus given for comfort.

## 2020-04-13 NOTE — NURSING
Patient returned to the prone position at 1300.  Patient tolerated turn well.  Preventative mepilex dressings in place.

## 2020-04-13 NOTE — PROGRESS NOTES
Pharmacokinetic Assessment Follow Up: IV Vancomycin    Vancomycin serum concentration assessment(s):    The trough level was drawn correctly and can be used to guide therapy at this time. The measurement is below the desired definitive target range of 15 to 20 mcg/mL.    Vancomycin Regimen Plan:    Change regimen to Vancomycin 1750 mg IV every 24 hours with next serum trough concentration measured at 2000 prior to 3rd dose on 04/15    Drug levels (last 3 results):  No results for input(s): VANCOMYCINRA, VANCOMYCINPE, VANCOMYCINTR, VANCOTROUGH in the last 72 hours.    Pharmacy will continue to follow and monitor vancomycin.    Please contact pharmacy at extension [phone] for questions regarding this assessment.    Thank you for the consult,   Devon Sanchez       Patient brief summary:  Ranjana Anderson is a 54 y.o. male initiated on antimicrobial therapy with IV Vancomycin for treatment of pneumonia    The patient's current regimen is 1750mg every 24 hours    Drug Allergies:   Review of patient's allergies indicates:  No Known Allergies    Actual Body Weight:   63.5kg    Renal Function:   Estimated Creatinine Clearance: 115.1 mL/min (based on SCr of 0.6 mg/dL).,     Dialysis Method (if applicable):  N/A    CBC (last 72 hours):  Recent Labs   Lab Result Units 04/10/20  1810 04/11/20  0420 04/12/20  0143 04/12/20  0819   WBC K/uL 12.79* 13.65* 10.91  --    Hemoglobin g/dL 13.6* 11.6* 12.1*  --    Hemoglobin A1C %  --   --   --  6.6*   Hematocrit % 44.0 36.2* 36.9*  --    Platelets K/uL 268 251 266  --    Gran% % 90.3* 92.4* 89.6*  --    Lymph% % 6.7* 3.3* 7.1*  --    Mono% % 2.0* 2.4* 1.9*  --    Eosinophil% % 0.0 0.0 0.1  --    Basophil% % 0.2 0.1 0.2  --    Differential Method  Automated Automated Automated  --        Metabolic Panel (last 72 hours):  Recent Labs   Lab Result Units 04/10/20  1810 04/10/20  1950 04/10/20  2139 04/11/20  0420 04/11/20  2233 04/12/20  0143 04/12/20  0520 04/12/20  1227 04/12/20  2220    Sodium mmol/L  --  120* 136 136 137  --  137  --  136   Potassium mmol/L  --  3.4* 3.7 4.1 3.3*  --  4.5  --  4.1   Chloride mmol/L  --  87* 101 101 102  --  101  --  102   CO2 mmol/L  --  19* 21* 21* 26  --  25  --  25   Glucose mg/dL  --  751* 161* 273* 227*  --  258*  --  181*   Glucose, UA  Negative  --   --   --   --   --   --  1+*  --    BUN, Bld mg/dL  --  8 8 11 7  --  7  --  6   Creatinine mg/dL  --  0.8 0.8 0.8 0.6  --  0.7  --  0.6   Albumin g/dL  --  2.0* 2.4* 2.1*  --   --  1.8*  --   --    Total Bilirubin mg/dL  --  0.4 0.5 0.3  --   --  0.3  --   --    Alkaline Phosphatase U/L  --  67 88 89  --   --  81  --   --    AST U/L  --  44* 55* 131*  --   --  30  --   --    ALT U/L  --  42 50* 93*  --   --  55*  --   --    Magnesium mg/dL  --   --   --  2.0  --  2.0  --   --  1.9   Phosphorus mg/dL  --   --   --  1.6* 2.2*  --  2.6*  --  1.7*       Vancomycin Administrations:  vancomycin given in the last 96 hours                   vancomycin 1.25 g in dextrose 5% 250 mL IVPB (ready to mix) (mg) 1,250 mg New Bag 04/12/20 1444                Microbiologic Results:  Microbiology Results (last 7 days)     Procedure Component Value Units Date/Time    Blood culture (site 1) [[id number]] Collected:  04/10/20 2139    Order Status:  Completed Specimen:  Blood from Peripheral, Hand, Left Updated:  04/12/20 2222     Blood Culture, Routine No Growth to date      No Growth to date      No Growth to date    Narrative:       Site # 1, aerobic and anaerobic    Blood culture (site 2) [[id number]] Collected:  04/10/20 2139    Order Status:  Completed Specimen:  Blood from Peripheral, Hand, Right Updated:  04/12/20 2222     Blood Culture, Routine No Growth to date      No Growth to date      No Growth to date    Narrative:       Site # 2, aerobic only    Blood culture [[id number]] Collected:  04/12/20 1140    Order Status:  Completed Specimen:  Blood from Peripheral, Hand, Right Updated:  04/12/20 2115     Blood Culture, Routine  No Growth to date    Blood culture [[id number]] Collected:  04/12/20 1110    Order Status:  Completed Specimen:  Blood from Peripheral, Upper Arm, Right Updated:  04/12/20 2115     Blood Culture, Routine No Growth to date    Culture, Respiratory with Gram Stain [[id number]]     Order Status:  No result Specimen:  Respiratory from Sputum

## 2020-04-13 NOTE — ASSESSMENT & PLAN NOTE
Respiratory distress   Acute hypoxemic respiratory failure    - Patient intubated with crich which was switched to ETT on 4/11  - proning days 4/12, 4/13  - sedated and paralyzed  - on  PLAQUENIL   -was not started on Methylprednisolone if any signs of septic shock will startt  -Daily CRP, d-dimer, ferritin, and CK-MB to trend inflammatory response.   -Strict I/O's and goal net neutral status to help optimize vent mechanics.   -Special isolation and aspiration precautions ordered.   -  trickle feeds

## 2020-04-13 NOTE — PROCEDURES
"Ranjana Anderson is a 54 y.o. male patient.    Temp: 98.4 °F (36.9 °C) (04/13/20 1100)  Pulse: (!) 126 (04/13/20 1233)  Resp: (!) 28 (04/13/20 1233)  BP: (!) 119/59 (04/13/20 0800)  SpO2: 97 % (04/13/20 1233)  Weight: 63.5 kg (139 lb 15.9 oz) (04/13/20 0800)  Height: 5' 4" (162.6 cm) (04/13/20 0800)     Procedure Note  Supine Positioning  Critical Care Medicine       Chief Complaint: COVID-19 virus detected  MRN: [id number]  LOS: 3  Sex: male  Age: 54 y.o.      Last ABG:   Recent Labs   Lab 04/13/20  1154   PH 7.342*   PO2 68*   PCO2 59.1*   HCO3 32.0*   BE 6       Vital Signs (Most Recent):   Temp: 98.4 °F (36.9 °C) (04/13/20 1100)  Pulse: (!) 126 (04/13/20 1233)  Resp: (!) 28 (04/13/20 1233)  BP: (!) 119/59 (04/13/20 0800)  SpO2: 97 % (04/13/20 1233)    Ventilator Settings:  Vent Mode: A/C  Oxygen Concentration (%):  [50-70] 70  Resp Rate Total:  [26 br/min-28 br/min] 28 br/min  Vt Set:  [300 mL-360 mL] 300 mL  PEEP/CPAP:  [8 uuW74-99 cmH20] 16 cmH20  Mean Airway Pressure:  [14 zpH51-55 cmH20] 22 cmH20        Indication: Severe, refractory ARDS. High risk for deterioration during supination procedure in need of direct monitoring by Critical Care personnel.     Prior to beginning the procedure, all appropriate equipment and personnel were gathered in the room. Two individuals were placed on each side of the patient and two respiratory therapists stood at the head of the bed and was dedicated to the management of the head of the patient, the endotracheal tube, and the ventilator lines. The person at the head of the bed coordinated the steps of the supination procedure at the direction of Critical Care team members at the bedside. The patient was first log-rolled 90 degrees onto their side away from the direction of their central venous catheter. Cardiac electrodes were then moved from the patient's posterior back to the anterior. Once properly positioned in the bed, another 90 degree log roll was performed placing " the patient into the supine position. The patient's arms were placed alongside the body. Continuous pulse oximetry and blood pressure monitoring was performed without any desaturation or hemodynamic instability. The patient tolerated the procedure well.     Total Critical Care time (uninterrupted not including procedures): 31 minutes    Procedures    Dimitry Montez  4/13/2020

## 2020-04-13 NOTE — PLAN OF CARE
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 His home address is: [address] [email or web address] [address] [facility] [address].      Agnieszka Anderson, wife, international ph# is [phone].    [employer] [facility] ph# [phone]/F# [phone]; Dr. Ray is  on [employer].  Other contact ph# [phone]       04/13/20 1627   Discharge Reassessment   Assessment Type Discharge Planning Assessment  (Pt works on a [employer] line; he is from [facility])   Provided patient/caregiver education on the expected discharge date and the discharge plan No   Do you have any problems affording any of your prescribed medications? TBD   Discharge Plan A Other   Discharge Plan B Other   DME Needed Upon Discharge    (TBD)   Patient choice form signed by patient/caregiver N/A   Anticipated Discharge Disposition Home   Describe the patient's ability to walk at the present time. Minor restrictions or changes   Post-Acute Status   Post-Acute Authorization Other   Other Status See Comments  (Pt is from [facility])   Discharge Delays (!) Patient and Family Barriers  (Pt works on [employer]. He is a team . He is from [facility].)     Junior Wick LMSW  [facility] -  Dept  X [phone]

## 2020-04-13 NOTE — NURSING
Patient remained afebrile. Paralyzed and sedated. On sinus tachycardia. No BP issues noted. Proned. Plan to supine patient at 8 am. Ventilator settings at 60% FIO2, 26 BUR, 8 PEEP and 360 TV. No desaturations observed. Tube feeding continued at 10cc/hr. Plan to start increasing feeding rate once supine. Blood glucose level monitored. Indicated insulin according to sliding scale administered. Will continue to monitor.

## 2020-04-13 NOTE — PROCEDURES
"Ranjana Anderson is a 54 y.o. male patient.    Temp: 100 °F (37.8 °C) (04/13/20 0800)  Pulse: (!) 123 (04/13/20 0900)  Resp: (!) 26 (04/13/20 0900)  BP: (!) 119/59 (04/13/20 0800)  SpO2: (!) 91 % (04/13/20 0900)  Weight: 63.5 kg (139 lb 15.9 oz) (04/13/20 0800)  Height: 5' 4" (162.6 cm) (04/13/20 0800)       Procedures   Procedure Note  Prone Positioning  Critical Care Medicine       Chief Complaint: COVID-19 virus detected  MRN: [id number]  LOS: 3  Sex: male  Age: 54 y.o.      Last ABG:   Recent Labs   Lab 04/11/20 2032   PH 7.413   PO2 88   PCO2 39.8   HCO3 25.4   BE 1       Vital Signs (Most Recent):   Temp: 100 °F (37.8 °C) (04/13/20 0800)  Pulse: (!) 123 (04/13/20 0900)  Resp: (!) 26 (04/13/20 0900)  BP: (!) 119/59 (04/13/20 0800)  SpO2: (!) 91 % (04/13/20 0900)    Ventilator Settings:  Vent Mode: A/C  Oxygen Concentration (%):  [] 60  Resp Rate Total:  [26 br/min] 26 br/min  Vt Set:  [360 mL] 360 mL  PEEP/CPAP:  [8 cmH20] 8 cmH20  Mean Airway Pressure:  [14 cmH20] 14 cmH20    Indication: Severe, refractory ARDS. High risk for deterioration during proning procedure in need of direct monitoring by Critical Care personnel.     Prior to beginning the procedure, all appropriate equipment and personnel were gathered in the room. Two individuals were placed on each side of the patient and one person stood at the head of the bed and was dedicated to the management of the head of the patient, the endotracheal tube, and the ventilator lines. The person at the head of the bed  coordinated the steps of the proning procedure with team team at the direction of Critical Care team members at the bedside. The patient was first log-rolled 90 degrees onto their side towards the direction of their central venous catheter. Cardiac electrodes were then moved from the patient's anterior to the posterior. The patient?s knees, forehead, chest, and iliac crests were protected using adhesive pads and/or foam pads to reduce the " risk of skin breakdown. Once properly positioned in the bed, another 90 degree log roll was performed placing the patient into the prone position. The patient's arms were placed alongside the body. The patient's head should be turned alternately to the right or left every 2 hours. The patient tolerated the procedure well.     Total Critical Care time (uninterrupted not including procedures): 35        Bill Blackmon  4/13/2020

## 2020-04-13 NOTE — PROCEDURES
"Ranjana Anderson is a 54 y.o. male patient.    Temp: 100 °F (37.8 °C) (04/13/20 0800)  Pulse: (!) 123 (04/13/20 0800)  Resp: (!) 26 (04/13/20 0800)  BP: (!) 119/59 (04/13/20 0800)  SpO2: (!) 88 % (04/13/20 0800)  Weight: 63.5 kg (140 lb) (04/10/20 1725)  Height: 5' 4" (162.6 cm) (04/10/20 1725)     Procedure Note  Supine Positioning  Critical Care Medicine       Chief Complaint: COVID-19 virus detected  MRN: [id number]  LOS: 3  Sex: male  Age: 54 y.o.      Last ABG:   Recent Labs   Lab 04/11/20 2032   PH 7.413   PO2 88   PCO2 39.8   HCO3 25.4   BE 1       Vital Signs (Most Recent):   Temp: 100 °F (37.8 °C) (04/13/20 0800)  Pulse: (!) 123 (04/13/20 0800)  Resp: (!) 26 (04/13/20 0800)  BP: (!) 119/59 (04/13/20 0800)  SpO2: (!) 88 % (04/13/20 0800)    Ventilator Settings:  Vent Mode: A/C  Oxygen Concentration (%):  [] 60  Resp Rate Total:  [26 br/min] 26 br/min  Vt Set:  [360 mL] 360 mL  PEEP/CPAP:  [8 czB34-19 cmH20] 8 cmH20  Mean Airway Pressure:  [14 ybK67-55 cmH20] 14 cmH20        Indication: Severe, refractory ARDS. High risk for deterioration during supination procedure in need of direct monitoring by Critical Care personnel.     Prior to beginning the procedure, all appropriate equipment and personnel were gathered in the room. Two individuals were placed on each side of the patient and two respiratory therapists stood at the head of the bed and was dedicated to the management of the head of the patient, the endotracheal tube, and the ventilator lines. The person at the head of the bed coordinated the steps of the supination procedure at the direction of Critical Care team members at the bedside. The patient was first log-rolled 90 degrees onto their side away from the direction of their central venous catheter. Cardiac electrodes were then moved from the patient's posterior back to the anterior. Once properly positioned in the bed, another 90 degree log roll was performed placing the patient into the " supine position. The patient's arms were placed alongside the body. Continuous pulse oximetry and blood pressure monitoring was performed without any desaturation or hemodynamic instability. The patient tolerated the procedure well.     Total Critical Care time (uninterrupted not including procedures): 31 min    Procedures    Dimitry Montez  4/13/2020

## 2020-04-13 NOTE — SUBJECTIVE & OBJECTIVE
Interval History/Significant Events: supinated overnight, remains to be on high ventilatory support , reproned in the am .     Review of Systems   Unable to perform ROS: Intubated     Objective:     Vital Signs (Most Recent):  Temp: 98.4 °F (36.9 °C) (04/13/20 1100)  Pulse: (!) 126 (04/13/20 1233)  Resp: (!) 28 (04/13/20 1233)  BP: (!) 119/59 (04/13/20 0800)  SpO2: 97 % (04/13/20 1233) Vital Signs (24h Range):  Temp:  [98.4 °F (36.9 °C)-100 °F (37.8 °C)] 98.4 °F (36.9 °C)  Pulse:  [] 126  Resp:  [26-28] 28  SpO2:  [88 %-97 %] 97 %  BP: (119)/(59) 119/59  Arterial Line BP: ()/(51-74) 97/58   Weight: 63.5 kg (139 lb 15.9 oz)  Body mass index is 24.03 kg/m².      Intake/Output Summary (Last 24 hours) at 4/13/2020 1319  Last data filed at 4/13/2020 1200  Gross per 24 hour   Intake 2471.5 ml   Output 2050 ml   Net 421.5 ml       Physical Exam   Constitutional: He appears well-developed and well-nourished.   HENT:   Head: Normocephalic.   crich removed and dressing clean dry and intact    Eyes: EOM are normal.   Cardiovascular: Normal rate and regular rhythm.   Pulmonary/Chest: Effort normal.   Abdominal: Soft.   Neurological:   Sedated    Skin: Skin is warm.   Nursing note and vitals reviewed.      Vents:  Vent Mode: A/C (04/13/20 1233)  Ventilator Initiated: Yes (04/10/20 2247)  Set Rate: 28 BPM (04/13/20 1233)  Vt Set: 300 mL (04/13/20 1233)  PEEP/CPAP: 16 cmH20 (04/13/20 1233)  Oxygen Concentration (%): 70 (04/13/20 1233)  Peak Airway Pressure: 41 cmH2O (04/13/20 1233)  Plateau Pressure: 39 cmH20 (04/13/20 1233)  Total Ve: 8.49 mL (04/13/20 1233)  F/VT Ratio<105 (RSBI): (!) 92.41 (04/13/20 1233)  Lines/Drains/Airways     Central Venous Catheter Line            Percutaneous Central Line Insertion/Assessment - Triple Lumen  04/11/20 0137 right femoral vein 2 days          Drain                 Urethral Catheter 04/10/20 2329 Double-lumen 2 days         NG/OG Tube 04/12/20 1000 18 Fr. Right nostril 1 day           Airway                 Airway - Non-Surgical 04/11/20 1525 Endotracheal Tube 1 day          Arterial Line                 Arterial Line 04/11/20 0242 Left Radial 2 days          Peripheral Intravenous Line                 Peripheral IV - Single Lumen 04/10/20 0000 20 G Left Forearm 3 days         Peripheral IV - Single Lumen 04/10/20 1810 20 G Left Antecubital 2 days              Significant Labs:    CBC/Anemia Profile:  Recent Labs   Lab 04/12/20 0143 04/13/20  0323   WBC 10.91 8.88   HGB 12.1* 11.1*   HCT 36.9* 35.3*    288   MCV 88 91   RDW 13.6 14.0        Chemistries:  Recent Labs   Lab 04/11/20 2233 04/12/20 0143 04/12/20 0520 04/12/20 2221 04/13/20 0323     --  137 136 134*   K 3.3*  --  4.5 4.1 3.9     --  101 102 100   CO2 26  --  25 25 27   BUN 7  --  7 6 6   CREATININE 0.6  --  0.7 0.6 0.6   CALCIUM 7.9*  --  7.4* 7.5* 7.4*   ALBUMIN  --   --  1.8*  --  1.4*   PROT  --   --  6.1  --  5.5*   BILITOT  --   --  0.3  --  0.4   ALKPHOS  --   --  81  --  75   ALT  --   --  55*  --  35   AST  --   --  30  --  26   MG  --  2.0  --  1.9 1.9   PHOS 2.2*  --  2.6* 1.7*  --        CMP:   Recent Labs   Lab 04/12/20 0520 04/12/20 2221 04/13/20 0323    136 134*   K 4.5 4.1 3.9    102 100   CO2 25 25 27   * 181* 229*   BUN 7 6 6   CREATININE 0.7 0.6 0.6   CALCIUM 7.4* 7.5* 7.4*   PROT 6.1  --  5.5*   ALBUMIN 1.8*  --  1.4*   BILITOT 0.3  --  0.4   ALKPHOS 81  --  75   AST 30  --  26   ALT 55*  --  35   ANIONGAP 11 9 7*   EGFRNONAA >60.0 >60.0 >60.0       Significant Imaging:  I have reviewed all pertinent imaging results/findings within the past 24 hours.  I have reviewed and interpreted all pertinent imaging results/findings within the past 24 hours.

## 2020-04-13 NOTE — NURSING
End of shift note -      Patient supinated this AM at 0815 and re-proned at 1300.  Current vent settings - PEEP 10, FiO2 40% with an O2 sat of 100%.      Patient SR/ST today.  Levo on for less than 1 hour today.  Propofol/dilaudid/heparin/nimbex/ABX infusing per eMAR.       Afebrile all shift. Still in need a respiratory culture, but very little secretions noted from ETT.  No BM.  Torres intact with adequate UO.  BBGs elevated again today - insulin given per SS.  TF rate increased to 20cc/hr.  Minimal residuals (70cc and 10cc)     CC team spoke with family today and updated them on POC.  RN skyped with patient's son.

## 2020-04-13 NOTE — PROGRESS NOTES
[facility]  Critical Care Medicine  Progress Note    Patient Name: Ranjana Anderson  MRN: [id number]  Admission Date: 4/10/2020  Hospital Length of Stay: 3 days  Code Status: Full Code  Attending Provider: Tatyana Judd MD  Primary Care Provider: To Obtain Unable   Principal Problem: COVID-19 virus detected    Subjective:     HPI:  54y.o male with PMH of HTN brought to ER via EMS for increasing SOB with known covid-19 exposure. 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 They have not had travelers since 3/14. 2 days ago he started having fever and sob. He was seen by the ship's physician and was found to have crackles in his bases bilaterally, but his oxygen saturation was in the 90's. He had a flu test which was negative. He was quarantined in a room and states that he got worse with increasing SOB. He was brought to the ER and found to be 78% on RA. He was started on NRB and initially was doing much better, but his tachypnea did not improve, so he was placed on bipap. His sat is currently 94%.    Hospital/ICU Course:  Patient was admitted to the MICU agyer a difficult intubation in the ER s/p crich. Covid positive with ards, sedated and paralyzed  Unable to prone secondary to crich. Left mainsyem intubation which was pulled off, on NO and levophed. ET tube replaced on 4/11 family contacted in [place].     Interval History/Significant Events: supinated overnight, remains to be on high ventilatory support , reproned in the am .     Review of Systems   Unable to perform ROS: Intubated     Objective:     Vital Signs (Most Recent):  Temp: 98.4 °F (36.9 °C) (04/13/20 1100)  Pulse: (!) 126 (04/13/20 1233)  Resp: (!) 28 (04/13/20 1233)  BP: (!) 119/59 (04/13/20 0800)  SpO2: 97 % (04/13/20 1233) Vital Signs (24h Range):  Temp:  [98.4 °F (36.9 °C)-100 °F (37.8 °C)] 98.4 °F (36.9 °C)  Pulse:  [] 126  Resp:  [26-28] 28  SpO2:  [88 %-97 %] 97 %  BP: (119)/(59) 119/59  Arterial Line BP:  ()/(51-74) 97/58   Weight: 63.5 kg (139 lb 15.9 oz)  Body mass index is 24.03 kg/m².      Intake/Output Summary (Last 24 hours) at 4/13/2020 1319  Last data filed at 4/13/2020 1200  Gross per 24 hour   Intake 2471.5 ml   Output 2050 ml   Net 421.5 ml       Physical Exam   Constitutional: He appears well-developed and well-nourished.   HENT:   Head: Normocephalic.   crich removed and dressing clean dry and intact    Eyes: EOM are normal.   Cardiovascular: Normal rate and regular rhythm.   Pulmonary/Chest: Effort normal.   Abdominal: Soft.   Neurological:   Sedated    Skin: Skin is warm.   Nursing note and vitals reviewed.      Vents:  Vent Mode: A/C (04/13/20 1233)  Ventilator Initiated: Yes (04/10/20 2247)  Set Rate: 28 BPM (04/13/20 1233)  Vt Set: 300 mL (04/13/20 1233)  PEEP/CPAP: 16 cmH20 (04/13/20 1233)  Oxygen Concentration (%): 70 (04/13/20 1233)  Peak Airway Pressure: 41 cmH2O (04/13/20 1233)  Plateau Pressure: 39 cmH20 (04/13/20 1233)  Total Ve: 8.49 mL (04/13/20 1233)  F/VT Ratio<105 (RSBI): (!) 92.41 (04/13/20 1233)  Lines/Drains/Airways     Central Venous Catheter Line            Percutaneous Central Line Insertion/Assessment - Triple Lumen  04/11/20 0137 right femoral vein 2 days          Drain                 Urethral Catheter 04/10/20 2329 Double-lumen 2 days         NG/OG Tube 04/12/20 1000 18 Fr. Right nostril 1 day          Airway                 Airway - Non-Surgical 04/11/20 1525 Endotracheal Tube 1 day          Arterial Line                 Arterial Line 04/11/20 0242 Left Radial 2 days          Peripheral Intravenous Line                 Peripheral IV - Single Lumen 04/10/20 0000 20 G Left Forearm 3 days         Peripheral IV - Single Lumen 04/10/20 1810 20 G Left Antecubital 2 days              Significant Labs:    CBC/Anemia Profile:  Recent Labs   Lab 04/12/20  0143 04/13/20  0323   WBC 10.91 8.88   HGB 12.1* 11.1*   HCT 36.9* 35.3*    288   MCV 88 91   RDW 13.6 14.0         Chemistries:  Recent Labs   Lab 04/11/20 2233 04/12/20  0143 04/12/20  0520 04/12/20 2221 04/13/20  0323     --  137 136 134*   K 3.3*  --  4.5 4.1 3.9     --  101 102 100   CO2 26  --  25 25 27   BUN 7  --  7 6 6   CREATININE 0.6  --  0.7 0.6 0.6   CALCIUM 7.9*  --  7.4* 7.5* 7.4*   ALBUMIN  --   --  1.8*  --  1.4*   PROT  --   --  6.1  --  5.5*   BILITOT  --   --  0.3  --  0.4   ALKPHOS  --   --  81  --  75   ALT  --   --  55*  --  35   AST  --   --  30  --  26   MG  --  2.0  --  1.9 1.9   PHOS 2.2*  --  2.6* 1.7*  --        CMP:   Recent Labs   Lab 04/12/20  0520 04/12/20 2221 04/13/20  0323    136 134*   K 4.5 4.1 3.9    102 100   CO2 25 25 27   * 181* 229*   BUN 7 6 6   CREATININE 0.7 0.6 0.6   CALCIUM 7.4* 7.5* 7.4*   PROT 6.1  --  5.5*   ALBUMIN 1.8*  --  1.4*   BILITOT 0.3  --  0.4   ALKPHOS 81  --  75   AST 30  --  26   ALT 55*  --  35   ANIONGAP 11 9 7*   EGFRNONAA >60.0 >60.0 >60.0       Significant Imaging:  I have reviewed all pertinent imaging results/findings within the past 24 hours.  I have reviewed and interpreted all pertinent imaging results/findings within the past 24 hours.      ABG  Recent Labs   Lab 04/13/20  1154   PH 7.342*   PO2 68*   PCO2 59.1*   HCO3 32.0*   BE 6     Assessment/Plan:     Neuro  Sedated  - on propfol and fentanyl and nimbex  - daily sbt/ sat       Cardiac/Vascular  Hypertension  -was in shock after intubation   - he is off levophed     Other  * COVID-19 virus detected  Respiratory distress   Acute hypoxemic respiratory failure    - Patient intubated with crich which was switched to ETT on 4/11  - proning days 4/12, 4/13  - sedated and paralyzed  - on  PLAQUENIL   -was not started on Methylprednisolone if any signs of septic shock will startt  -Daily CRP, d-dimer, ferritin, and CK-MB to trend inflammatory response.   -Strict I/O's and goal net neutral status to help optimize vent mechanics.   -Special isolation and aspiration precautions  ordered.   -  trickle feeds       Shock  - of pressors 4/12  .-On cefepime, and azithromycin and plaquenil, WBC now normal  - no growth to date on cultures   - MAP goal > 65          Critical Care Daily Checklist:    A: Awake: RASS Goal/Actual Goal:    Actual: Barrow Agitation Sedation Scale (RASS): Unarousable   B: Spontaneous Breathing Trial Performed?     C: SAT & SBT Coordinated?  yes                      D: Delirium: CAM-ICU Overall CAM-ICU: Positive   E: Early Mobility Performed? Yes   F: Feeding Goal: Goals: Meet % EEN, EPN by RD f/u date  Status: Nutrition Goal Status: new   Current Diet Order   Procedures    Diet NPO      AS: Analgesia/Sedation Fentanyl propofol and nimbex   T: Thromboembolic Prophylaxis Heparin    H: HOB > 300 Yes   U: Stress Ulcer Prophylaxis (if needed) famotidine   G: Glucose Control SSI   B: Bowel Function     I: Indwelling Catheter (Lines & Torres) Necessity Torres and IJ   D: De-escalation of Antimicrobials/Pharmacotherapies none    Plan for the day/ETD Prone and update family     Code Status:  Family/Goals of Care: Full Code         Critical secondary to Patient has a condition that poses threat to life and bodily function: Severe Respiratory Distress      Critical care was time spent personally by me on the following activities: development of treatment plan with patient or surrogate and bedside caregivers, discussions with consultants, evaluation of patient's response to treatment, examination of patient, ordering and performing treatments and interventions, ordering and review of laboratory studies, ordering and review of radiographic studies, pulse oximetry, re-evaluation of patient's condition. This critical care time did not overlap with that of any other provider or involve time for any procedures.    Patient seen and examined this morning. Discussed with Dr. Judd  - staff attestation to follow.         Dottie Andrade MD  Critical Care Medicine  [facility]

## 2020-04-13 NOTE — CARE UPDATE
Internal Medicine Telemed Plan of Care Note:     Called Mr. Ranjana Anderson  family member ( tena )listed in chart to discuss patient's current clinical status. Briefly, patient is currently admitted for hypoxic respiratory failure, positive COVID-19 infection.      Addressed family's questions and concerns in addition to updating the current clinical status of the patient.  Family member verbalized understanding of situation and plan. Mr. Ranjana Anderson wife was close to son and all her questions were answered as well. They were able to skype with patient.       Dottie Andrade M.D.  Internal Medicine PGY-3  [id number]

## 2020-04-13 NOTE — PLAN OF CARE
Recommendations     1. As tolerated, increase TF rate (of Isosource 1.5) to 50 mL/hr to provide 1800 kcals, 82 g of protein, 917 mL fluid.   2. Noted pt's A1C 6.6. If diabetic formula warranted, change TF regimen to Glucerna 1.5 @ 50 mL/hr to provide 1800 kcals, 99 g of protein, 911 mL fluid.   3. RD to monitor & follow-up.     Goals: Meet % EEN, EPN by RD f/u date  Nutrition Goal Status: new  Communication of MEHRAN Recs: reviewed with RN

## 2020-04-14 NOTE — ASSESSMENT & PLAN NOTE
Respiratory distress   Acute hypoxemic respiratory failure    - Patient intubated with crich which was switched to ETT on 4/11  - proning days 4/12, 4/13,4/14  - sedated and paralyzed  - on  PLAQUENIL   -methyprednisone for 5 days   -Daily CRP, d-dimer, ferritin, and CK-MB to trend inflammatory response.   -Strict I/O's and goal net neutral status to help optimize vent mechanics.   -Special isolation and aspiration precautions ordered.   -  trickle feeds

## 2020-04-14 NOTE — PROGRESS NOTES
[facility]  Critical Care Medicine  Progress Note    Patient Name: Ranjana Sanchez  MRN: [id number]  Admission Date: 4/10/2020  Hospital Length of Stay: 4 days  Code Status: Full Code  Attending Provider: Tatyana Judd MD  Primary Care Provider: To Obtain Unable   Principal Problem: COVID-19 virus detected    Subjective:     HPI:  54y.o male with PMH of HTN brought to ER via EMS for increasing SOB with known covid-19 exposure. 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 They have not had travelers since 3/14. 2 days ago he started having fever and sob. He was seen by the ship's physician and was found to have crackles in his bases bilaterally, but his oxygen saturation was in the 90's. He had a flu test which was negative. He was quarantined in a room and states that he got worse with increasing SOB. He was brought to the ER and found to be 78% on RA. He was started on NRB and initially was doing much better, but his tachypnea did not improve, so he was placed on bipap. His sat is currently 94%.    Hospital/ICU Course:  Patient was admitted to the MICU agyer a difficult intubation in the ER s/p crich. Covid positive with ards, sedated and paralyzed  Unable to prone secondary to crich. Left mainsyem intubation which was pulled off, on NO and levophed. ET tube replaced on 4/11 family contacted in [place]. Proned on 4/12 tolerated well and continued to prone on 4/14    Interval History/Significant Events:No acute events overnight , patinet did well with proning , afebrile, on 0.01 pressors     Review of Systems   Unable to perform ROS: Intubated     Objective:     Vital Signs (Most Recent):  Temp: 97.9 °F (36.6 °C) (04/14/20 0500)  Pulse: 62 (04/14/20 0600)  Resp: (!) 28 (04/14/20 0600)  BP: (!) 119/59 (04/13/20 0800)  SpO2: 99 % (04/14/20 0600) Vital Signs (24h Range):  Temp:  [97.9 °F (36.6 °C)-100 °F (37.8 °C)] 97.9 °F (36.6 °C)  Pulse:  [] 62  Resp:  [26-40] 28  SpO2:  [88 %-100 %] 99  %  BP: (119)/(59) 119/59  Arterial Line BP: ()/(49-98) 140/90   Weight: 63.5 kg (139 lb 15.9 oz)  Body mass index is 24.03 kg/m².      Intake/Output Summary (Last 24 hours) at 4/14/2020 0737  Last data filed at 4/14/2020 0637  Gross per 24 hour   Intake 2385.62 ml   Output 3070 ml   Net -684.38 ml       Physical Exam   Constitutional: He appears well-developed and well-nourished.   HENT:   Head: Normocephalic.   crich removed and dressing clean dry and intact    Eyes: EOM are normal.   Cardiovascular: Normal rate and regular rhythm.   Pulmonary/Chest: Effort normal.   Abdominal: Soft.   Neurological:   Sedated    Skin: Skin is warm.   Nursing note and vitals reviewed.      Vents:  Vent Mode: A/C (04/14/20 0105)  Ventilator Initiated: Yes (04/10/20 2247)  Set Rate: 28 BPM (04/14/20 0105)  Vt Set: 360 mL (04/14/20 0105)  PEEP/CPAP: 10 cmH20 (04/14/20 0105)  Oxygen Concentration (%): 40 (04/14/20 0600)  Peak Airway Pressure: 54 cmH2O (04/14/20 0105)  Plateau Pressure: 27 cmH20 (04/14/20 0105)  Total Ve: 9.65 mL (04/14/20 0105)  F/VT Ratio<105 (RSBI): (!) 75.27 (04/14/20 0105)  Lines/Drains/Airways     Central Venous Catheter Line            Percutaneous Central Line Insertion/Assessment - Triple Lumen  04/11/20 0137 right femoral vein 3 days          Drain                 Urethral Catheter 04/10/20 2329 Double-lumen 3 days         NG/OG Tube 04/12/20 1000 18 Fr. Right nostril 1 day          Airway                 Airway - Non-Surgical 04/11/20 1525 Endotracheal Tube 2 days          Arterial Line                 Arterial Line 04/11/20 0242 Left Radial 3 days          Peripheral Intravenous Line                 Peripheral IV - Single Lumen 04/10/20 0000 20 G Left Forearm 4 days         Peripheral IV - Single Lumen 04/14/20 0546 20 G Right Wrist less than 1 day              Significant Labs:    CBC/Anemia Profile:  Recent Labs   Lab 04/13/20  0323 04/14/20  0342   WBC 8.88 10.79   HGB 11.1* 11.8*   HCT 35.3* 36.5*     315   MCV 91 90   RDW 14.0 13.6        Chemistries:  Recent Labs   Lab 04/12/20  2221 04/13/20  0323 04/13/20  1701 04/14/20  0342    134*  --  136   K 4.1 3.9  --  4.8    100  --  97   CO2 25 27  --  28   BUN 6 6  --  10   CREATININE 0.6 0.6  --  0.7   CALCIUM 7.5* 7.4*  --  7.8*   ALBUMIN  --  1.4*  --  1.4*   PROT  --  5.5*  --  6.2   BILITOT  --  0.4  --  0.4   ALKPHOS  --  75  --  86   ALT  --  35  --  37   AST  --  26  --  35   MG 1.9 1.9  --  2.2   PHOS 1.7*  --  3.3 1.7*       ABGs:   Recent Labs   Lab 04/13/20  1154   PH 7.342*   PCO2 59.1*   HCO3 32.0*   POCSATURATED 92*   BE 6       Significant Imaging:  I have reviewed all pertinent imaging results/findings within the past 24 hours.  I have reviewed and interpreted all pertinent imaging results/findings within the past 24 hours.      ABG  Recent Labs   Lab 04/13/20  1154   PH 7.342*   PO2 68*   PCO2 59.1*   HCO3 32.0*   BE 6     Assessment/Plan:     Neuro  Sedated  - on propfol and dialudid  and nimbex  - daily sbt/ sat       Cardiac/Vascular  Hypertension  -was in shock after intubation       Other  * COVID-19 virus detected  Respiratory distress   Acute hypoxemic respiratory failure    - Patient intubated with crich which was switched to ETT on 4/11  - proning days 4/12, 4/13,4/14  - sedated and paralyzed  - on  PLAQUENIL   -methyprednisone for 5 days   -Daily CRP, d-dimer, ferritin, and CK-MB to trend inflammatory response.   -Strict I/O's and goal net neutral status to help optimize vent mechanics.   -Special isolation and aspiration precautions ordered.   -  trickle feeds       Shock  - remains on a touch of pressors no fevers or  White count   .-On cefepime, and azithromycin and plaquenil  - no growth to date on cultures   - MAP goal > 65          Critical Care Daily Checklist:    A: Awake: RASS Goal/Actual Goal:    Actual: Barrow Agitation Sedation Scale (RASS): Unarousable   B: Spontaneous Breathing Trial Performed?     C:  SAT & SBT Coordinated?  yes                     D: Delirium: CAM-ICU Overall CAM-ICU: Positive   E: Early Mobility Performed? Yes   F: Feeding Goal: Goals: Meet % EEN, EPN by RD f/u date  Status: Nutrition Goal Status: new   Current Diet Order   Procedures    Diet NPO      AS: Analgesia/Sedation Dilaudid, propofol    T: Thromboembolic Prophylaxis heparin   H: HOB > 300 Yes   U: Stress Ulcer Prophylaxis (if needed) famotidine   G: Glucose Control SSI changed to q4   B: Bowel Function     I: Indwelling Catheter (Lines & Torres) Necessity IJ and a line    D: De-escalation of Antimicrobials/Pharmacotherapies none    Plan for the day/ETD Change TF, watch sugars, reprone     Code Status:  Family/Goals of Care: Full Code         Critical secondary to Patient has a condition that poses threat to life and bodily function: Acute Renal Failure      Critical care was time spent personally by me on the following activities: development of treatment plan with patient or surrogate and bedside caregivers, discussions with consultants, evaluation of patient's response to treatment, examination of patient, ordering and performing treatments and interventions, ordering and review of laboratory studies, ordering and review of radiographic studies, pulse oximetry, re-evaluation of patient's condition. This critical care time did not overlap with that of any other provider or involve time for any procedures.     Patient seen and examined this morning. Discussed with Dr. Judd  - staff attestation to follow.      Dottie Andrade MD  Critical Care Medicine  [facility]

## 2020-04-14 NOTE — NURSING
Spoke with fellow and resident about plan to reassess respiratory status at 1200. Will d/c paralytic if there is no need to reprone pt.

## 2020-04-14 NOTE — NURSING
"Patient remained paralyzed and sedated. Afebrile. BP was noted labile. Patient was kept on low dose levo. Turning the pressor off results to significant blood pressure drop. Patient remained on prone position. Ventilator is currently set at 40% FIO2, PEEP 8, 360 TV, and 28 BUR. Plan to put patient on supine position at 8am. Blood glucose levels were noted elevated. Insulin administered as per sliding scale. Will continue to monitor.    A concern about the patient's identification was raised to the ER admitting section. The last name was incorrectly spelled as "Pandy" in Epic. As per day shift nurse, it is supposed to be "Daniel". The ER admitting section requested to also verify with the family if the patient's first name is correctly spelled. Will inform the day shift to facilitate verification.  "

## 2020-04-14 NOTE — NURSING
Oral suctioned pt, reacted with persistent coughing over the vent. Became tachycardic into 140s, hypertensive, and desaturating into the mid 80s. Team notified. Pt maxed on sedation. FiO2 increased to 60% to maintain oxygen saturation > 88.

## 2020-04-14 NOTE — CONSULTS
Procedure Note  Supine Positioning  Critical Care Medicine       Chief Complaint: COVID-19 virus detected  MRN: [id number]  LOS: 4  Sex: male  Age: 54 y.o.      Last ABG:   Recent Labs   Lab 04/13/20  1154   PH 7.342*   PO2 68*   PCO2 59.1*   HCO3 32.0*   BE 6       Vital Signs (Most Recent):   Temp: 97.9 °F (36.6 °C) (04/14/20 0701)  Pulse: 76 (04/14/20 0809)  Resp: (!) 31 (04/14/20 0809)  BP: (!) 119/59 (04/13/20 0800)  SpO2: 100 % (04/14/20 0809)    Ventilator Settings:  Vent Mode: A/C  Oxygen Concentration (%):  [40-70] 40  Resp Rate Total:  [26 br/min-30 br/min] 28 br/min  Vt Set:  [300 mL-360 mL] 360 mL  PEEP/CPAP:  [8 ieX61-58 cmH20] 8 cmH20  Mean Airway Pressure:  [14 rwB58-35 cmH20] 14 cmH20        Indication: Severe, refractory ARDS. High risk for deterioration during supination procedure in need of direct monitoring by Critical Care personnel.     Prior to beginning the procedure, all appropriate equipment and personnel were gathered in the room. Two individuals were placed on each side of the patient and two respiratory therapists stood at the head of the bed and was dedicated to the management of the head of the patient, the endotracheal tube, and the ventilator lines. The person at the head of the bed coordinated the steps of the supination procedure at the direction of Critical Care team members at the bedside. The patient was first log-rolled 90 degrees onto their side away from the direction of their central venous catheter. Cardiac electrodes were then moved from the patient's posterior back to the anterior. Once properly positioned in the bed, another 90 degree log roll was performed placing the patient into the supine position. The patient's arms were placed alongside the body. Continuous pulse oximetry and blood pressure monitoring was performed without any desaturation or hemodynamic instability. The patient tolerated the procedure well.     Total Critical Care time (uninterrupted not including  procedures): 32 mins    Mark Yanes MD

## 2020-04-14 NOTE — NURSING
Supinated pt at 0815, RN 2x, anesthesia, RTT at bedside. Post supination pt was tachycardic into 120s and hypertensive. Team aware. Increased sedation. Pt now within normal limits.

## 2020-04-14 NOTE — ASSESSMENT & PLAN NOTE
- remains on a touch of pressors no fevers or  White count   .-On cefepime, and azithromycin and plaquenil  - no growth to date on cultures   - MAP goal > 65

## 2020-04-14 NOTE — NURSING
Pt remains intubated and sedated. Supinated at 0800 and paralytic turned off at 1200. Pt requiring increased sedation after dc paralytic. Afebrile. BP labile, on and off levo to maintain MAP > 65. Ventilator currently 40% Fio2 and 8 of PEEP. Pt received 40 mg lasix with UO of 1250. Blood glucose elevated, insuline administered per sliding scale. Facetimed with family to provide update.

## 2020-04-14 NOTE — SUBJECTIVE & OBJECTIVE
Interval History/Significant Events:No acute events overnight , gilbert did well with proning , afebrile, on 0.01 pressors     Review of Systems   Unable to perform ROS: Intubated     Objective:     Vital Signs (Most Recent):  Temp: 97.9 °F (36.6 °C) (04/14/20 0500)  Pulse: 62 (04/14/20 0600)  Resp: (!) 28 (04/14/20 0600)  BP: (!) 119/59 (04/13/20 0800)  SpO2: 99 % (04/14/20 0600) Vital Signs (24h Range):  Temp:  [97.9 °F (36.6 °C)-100 °F (37.8 °C)] 97.9 °F (36.6 °C)  Pulse:  [] 62  Resp:  [26-40] 28  SpO2:  [88 %-100 %] 99 %  BP: (119)/(59) 119/59  Arterial Line BP: ()/(49-98) 140/90   Weight: 63.5 kg (139 lb 15.9 oz)  Body mass index is 24.03 kg/m².      Intake/Output Summary (Last 24 hours) at 4/14/2020 0737  Last data filed at 4/14/2020 0637  Gross per 24 hour   Intake 2385.62 ml   Output 3070 ml   Net -684.38 ml       Physical Exam   Constitutional: He appears well-developed and well-nourished.   HENT:   Head: Normocephalic.   crich removed and dressing clean dry and intact    Eyes: EOM are normal.   Cardiovascular: Normal rate and regular rhythm.   Pulmonary/Chest: Effort normal.   Abdominal: Soft.   Neurological:   Sedated    Skin: Skin is warm.   Nursing note and vitals reviewed.      Vents:  Vent Mode: A/C (04/14/20 0105)  Ventilator Initiated: Yes (04/10/20 2247)  Set Rate: 28 BPM (04/14/20 0105)  Vt Set: 360 mL (04/14/20 0105)  PEEP/CPAP: 10 cmH20 (04/14/20 0105)  Oxygen Concentration (%): 40 (04/14/20 0600)  Peak Airway Pressure: 54 cmH2O (04/14/20 0105)  Plateau Pressure: 27 cmH20 (04/14/20 0105)  Total Ve: 9.65 mL (04/14/20 0105)  F/VT Ratio<105 (RSBI): (!) 75.27 (04/14/20 0105)  Lines/Drains/Airways     Central Venous Catheter Line            Percutaneous Central Line Insertion/Assessment - Triple Lumen  04/11/20 0137 right femoral vein 3 days          Drain                 Urethral Catheter 04/10/20 2329 Double-lumen 3 days         NG/OG Tube 04/12/20 1000 18 Fr. Right nostril 1 day           Airway                 Airway - Non-Surgical 04/11/20 1525 Endotracheal Tube 2 days          Arterial Line                 Arterial Line 04/11/20 0242 Left Radial 3 days          Peripheral Intravenous Line                 Peripheral IV - Single Lumen 04/10/20 0000 20 G Left Forearm 4 days         Peripheral IV - Single Lumen 04/14/20 0546 20 G Right Wrist less than 1 day              Significant Labs:    CBC/Anemia Profile:  Recent Labs   Lab 04/13/20  0323 04/14/20  0342   WBC 8.88 10.79   HGB 11.1* 11.8*   HCT 35.3* 36.5*    315   MCV 91 90   RDW 14.0 13.6        Chemistries:  Recent Labs   Lab 04/12/20  2221 04/13/20  0323 04/13/20  1701 04/14/20  0342    134*  --  136   K 4.1 3.9  --  4.8    100  --  97   CO2 25 27  --  28   BUN 6 6  --  10   CREATININE 0.6 0.6  --  0.7   CALCIUM 7.5* 7.4*  --  7.8*   ALBUMIN  --  1.4*  --  1.4*   PROT  --  5.5*  --  6.2   BILITOT  --  0.4  --  0.4   ALKPHOS  --  75  --  86   ALT  --  35  --  37   AST  --  26  --  35   MG 1.9 1.9  --  2.2   PHOS 1.7*  --  3.3 1.7*       ABGs:   Recent Labs   Lab 04/13/20  1154   PH 7.342*   PCO2 59.1*   HCO3 32.0*   POCSATURATED 92*   BE 6       Significant Imaging:  I have reviewed all pertinent imaging results/findings within the past 24 hours.  I have reviewed and interpreted all pertinent imaging results/findings within the past 24 hours.

## 2020-04-14 NOTE — CARE UPDATE
Internal Medicine Telemed Plan of Care Note:     Called Mr. Ranjana Anderson  family member ( tena )listed in chart to discuss patient's current clinical status. Briefly, patient is currently admitted for hypoxic respiratory failure, positive COVID-19 infection.      Addressed family's questions and concerns in addition to updating the current clinical status of the patient.  Family member verbalized understanding of situation and plan. Mr. Ranjana Anderson wife was close to son and all her questions were answered as well. They were able to skype with patient.       Dottie Andrade M.D.  Internal Medicine PGY-3  [id number]

## 2020-04-14 NOTE — NURSING
Pt had another bout of tachycardia, htn, and desaturation. Oral suctioned tan thick grity secretions. RT at bedside, suctioned ETT and secretions appeared more blood tinged. Post suction pt seems comfortable. Vitals stable. Fio2 decreased to 40% via MD.

## 2020-04-15 PROBLEM — E11.9 TYPE 2 DIABETES MELLITUS WITHOUT COMPLICATION, WITHOUT LONG-TERM CURRENT USE OF INSULIN: Status: ACTIVE | Noted: 2020-01-01

## 2020-04-15 NOTE — ASSESSMENT & PLAN NOTE
Respiratory distress   Acute hypoxemic respiratory failure    - Patient intubated with crich which was switched to ETT on 4/11  - proning days 4/12, 4/13,4/14  - sedated   - on  PLAQUENIL   -methyprednisone for 5 days   -Daily CRP, d-dimer, ferritin, and CK-MB to trend inflammatory response.   -Strict I/O's and goal net neutral status to help optimize vent mechanics.   -Special isolation and aspiration precautions ordered.   -  trickle feeds  - will evaluate today whether he remains net even or negative without lasix , if his urine output decreases will give lasix

## 2020-04-15 NOTE — PLAN OF CARE
CMICU DAILY GOALS       A: Awake    RASS: Goal -  -5  Actual - RASS (Barrow Agitation-Sedation Scale): -5-->unarousable   Restraint necessity: no restraints present   B: Breath   SBT: Not attempted   C: Coordinate A & B, analgesics/sedatives   Pain: managed     SAT: Fail  D: Delirium   CAM-ICU: Overall CAM-ICU: Positive  E: Early Mobility   MOVE Screen: Fail   Activity: Activity Management: activity adjusted per tolerance  FAS: Feeding/Nutrition   Diet order: TF restarted per MD Raymond verbal order, 0 residuals  Fluid restriction:  n/a water boluses as scheduled   T: Thrombus   DVT prophylaxis: VTE Required Core Measure: Pharmacological prophylaxis initiated/maintained SCDs also present  H: HOB Elevation   Head of Bed (HOB): HOB at 60 degrees  U: Ulcer Prophylaxis   GI: yes  G: Glucose control   managed    S: Skin   Bundle compliance: yes   Bathing/Skin Care: bath, complete, bath, chlorhexidine, dressed/undressed, incontinence care, linen changed Date: 04/15/20 day shift with primary RN  B: Bowel Function   Stool softener added today, hypoactive BS, no BM, soft, non-distended stomach    I: Indwelling Catheters   Torres necessity:      Urethral Catheter 04/10/20 2329 Double-lumen-Reason for Continuing Urinary Catheterization: Critically ill in ICU requiring intensive monitoring   CVC necessity: Yes   IPAD offered: Not appropriate  D: De-escalation Antibx   N/a   Plan for the day   EEG implemented, per team no sz activity noted, unable to wean sedation-- when weaning sedation: HTN, Tachycardia, tachypnea, and decrease SpO2 present.   Family/Goals of care/Code Status   Code Status: Full Code     No acute events throughout day, VS and assessment per flow sheet, patient progressing towards goals as tolerated, plan of care reviewed with Ranjana Sanchez and family, all concerns addressed, will continue to monitor.

## 2020-04-15 NOTE — CARE UPDATE
Internal Medicine Telemed Plan of Care Note:     Called Mr. Ranjana Anderson  family member ( tena )listed in chart to discuss patient's current clinical status. Briefly, patient is currently admitted for hypoxic respiratory failure, positive COVID-19 infection.      Addressed family's questions and concerns in addition to updating the current clinical status of the patient.  Family member verbalized understanding of situation and plan. Mr. Ranjana Anderson wife was close to son and all her questions were answered as well.         Dottie Andrade MD, MPH  Internal Medicine PGY3  CCM [id number]

## 2020-04-15 NOTE — PROGRESS NOTES
[facility]  Critical Care Medicine  Progress Note    Patient Name: Ranjana Sanchez  MRN: [id number]  Admission Date: 4/10/2020  Hospital Length of Stay: 5 days  Code Status: Full Code  Attending Provider: Jorge Pulliam MD  Primary Care Provider: Primary Doctor No   Principal Problem: COVID-19 virus detected    Subjective:     HPI:  54y.o male with PMH of HTN brought to ER via EMS for increasing SOB with known covid-19 exposure. 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 They have not had travelers since 3/14. 2 days ago he started having fever and sob. He was seen by the ship's physician and was found to have crackles in his bases bilaterally, but his oxygen saturation was in the 90's. He had a flu test which was negative. He was quarantined in a room and states that he got worse with increasing SOB. He was brought to the ER and found to be 78% on RA. He was started on NRB and initially was doing much better, but his tachypnea did not improve, so he was placed on bipap. His sat is currently 94%.    Hospital/ICU Course:  Patient was admitted to the MICU agyer a difficult intubation in the ER s/p crich. Covid positive with ards, sedated and paralyzed  Unable to prone secondary to crich. Left mainsyem intubation which was pulled off, on NO and levophed. ET tube replaced on 4/11 family contacted in [place]. Proned on 4/12 tolerated well and continued to prone on 4/14    Interval History/Significant Events: Patient with some zafar tachy arrhythmias when any of his sedation is weaned or when he starts coughing , was restarted on precedex this am to get him comfortable and decrease his tachypnea    Review of Systems   Unable to perform ROS: Intubated     Objective:     Vital Signs (Most Recent):  Temp: 97.9 °F (36.6 °C) (04/15/20 0700)  Pulse: (!) 134 (04/15/20 0745)  Resp: (!) 34 (04/15/20 0745)  BP: (!) 119/59 (04/13/20 0800)  SpO2: (!) 92 % (04/15/20 0745) Vital Signs (24h Range):  Temp:  [97.7 °F  (36.5 °C)-98 °F (36.7 °C)] 97.9 °F (36.6 °C)  Pulse:  [] 134  Resp:  [28-43] 34  SpO2:  [77 %-100 %] 92 %  Arterial Line BP: ()/(48-96) 139/74   Weight: 63.5 kg (139 lb 15.9 oz)  Body mass index is 24.03 kg/m².      Intake/Output Summary (Last 24 hours) at 4/15/2020 0754  Last data filed at 4/15/2020 0650  Gross per 24 hour   Intake 1640.89 ml   Output 3360 ml   Net -1719.11 ml       Physical Exam   Constitutional: He appears well-developed and well-nourished.   HENT:   Head: Normocephalic.   crich removed and dressing clean dry and intact    Eyes: EOM are normal.   Cardiovascular: Normal rate and regular rhythm.   Pulmonary/Chest: Effort normal.   Abdominal: Soft.   Neurological:   Sedated    Skin: Skin is warm.   Nursing note and vitals reviewed.      Vents:  Vent Mode: A/C (04/15/20 0140)  Ventilator Initiated: Yes (04/10/20 2247)  Set Rate: 28 BPM (04/15/20 0140)  Vt Set: 360 mL (04/15/20 0140)  PEEP/CPAP: 8 cmH20 (04/15/20 0140)  Oxygen Concentration (%): 40 (04/15/20 0745)  Peak Airway Pressure: 34 cmH2O (04/15/20 0140)  Plateau Pressure: 28 cmH20 (04/15/20 0140)  Total Ve: 10.3 mL (04/15/20 0140)  F/VT Ratio<105 (RSBI): (!) 76.29 (04/15/20 0140)  Lines/Drains/Airways     Central Venous Catheter Line            Percutaneous Central Line Insertion/Assessment - Triple Lumen  04/11/20 0137 right femoral vein 4 days          Drain                 Urethral Catheter 04/10/20 2329 Double-lumen 4 days         NG/OG Tube 04/12/20 1000 18 Fr. Right nostril 2 days          Airway                 Airway - Non-Surgical 04/11/20 1525 Endotracheal Tube 3 days          Arterial Line                 Arterial Line 04/11/20 0242 Left Radial 4 days          Peripheral Intravenous Line                 Peripheral IV - Single Lumen 04/14/20 0546 20 G Right Wrist 1 day              Significant Labs:    CBC/Anemia Profile:  Recent Labs   Lab 04/14/20  0342 04/15/20  0407   WBC 10.79 11.97   HGB 11.8* 11.3*   HCT 36.5*  35.6*    344   MCV 90 90   RDW 13.6 13.2        Chemistries:  Recent Labs   Lab 04/14/20  0342 04/14/20  2102 04/15/20  0407    138 138   K 4.8 4.4 4.6   CL 97 96 96   CO2 28 30* 28   BUN 10 14 15   CREATININE 0.7 0.7 0.7   CALCIUM 7.8* 8.4* 8.0*   ALBUMIN 1.4*  --  1.5*   PROT 6.2  --  6.1   BILITOT 0.4  --  0.4   ALKPHOS 86  --  97   ALT 37  --  42   AST 35  --  44*   MG 2.2 2.3 2.4   PHOS 1.7* 2.1* 1.7*       BMP:   Recent Labs   Lab 04/15/20  0407   *      K 4.6   CL 96   CO2 28   BUN 15   CREATININE 0.7   CALCIUM 8.0*   MG 2.4     All pertinent labs within the past 24 hours have been reviewed.    Significant Imaging:  I have reviewed all pertinent imaging results/findings within the past 24 hours.  I have reviewed and interpreted all pertinent imaging results/findings within the past 24 hours.      ABG  Recent Labs   Lab 04/14/20  0925   PH 7.477*   PO2 47*   PCO2 44.9   HCO3 33.2*   BE 10     Assessment/Plan:     Neuro  Sedated  - on propfol and dialudid  and precedex  - daily sbt/ sat   - had a couple episodes of seizure like activity with right upward gaze, gave 2 of ativan and resolved, if occurs again will hardeep EEG before another dose of ativan       Cardiac/Vascular  Hypertension  -was in shock after intubation   - holding home toprol XL 50 mg       Endocrine  Type 2 diabetes mellitus without complication, without long-term current use of insulin  -HbA1c 6  - Home DM regimen:  Diet controlled  - Start Detemir 5 units  Given his sugars are increasing   - SSI with POCT accuchecks and Diabetic diet      Other  * COVID-19 virus detected  Respiratory distress   Acute hypoxemic respiratory failure    - Patient intubated with crich which was switched to ETT on 4/11  - proning days 4/12, 4/13,4/14  - sedated   - on  PLAQUENIL   -methyprednisone for 5 days   -Daily CRP, d-dimer, ferritin, and CK-MB to trend inflammatory response.   -Strict I/O's and goal net neutral status to help optimize  vent mechanics.   -Special isolation and aspiration precautions ordered.   -  trickle feeds  - will evaluate today whether he remains net even or negative without lasix , if his urine output decreases will give lasix        Shock  - remains on a touch of pressors no fevers or  White count   .-On cefepime, and azithromycin and plaquenil, switched to rocephin to complete a full course,   - no growth to date on cultures , stopped vancomycin 4/14  - MAP goal > 65          Critical Care Daily Checklist:    A: Awake: RASS Goal/Actual Goal:    Actual: Barrow Agitation Sedation Scale (RASS): (P) Unarousable   B: Spontaneous Breathing Trial Performed?     C: SAT & SBT Coordinated?  yes                      D: Delirium: CAM-ICU Overall CAM-ICU: (P) Positive   E: Early Mobility Performed? Yes   F: Feeding Goal: Goals: Meet % EEN, EPN by RD f/u date  Status: Nutrition Goal Status: new   Current Diet Order   Procedures    Diet NPO      AS: Analgesia/Sedation Dilaudid , precedex and propofol    T: Thromboembolic Prophylaxis heparin   H: HOB > 300 Yes   U: Stress Ulcer Prophylaxis (if needed) famotidine   G: Glucose Control detemir and SSI   B: Bowel Function     I: Indwelling Catheter (Lines & Torres) Necessity Torres dave and  IJ    D: De-escalation of Antimicrobials/Pharmacotherapies -    Plan for the day/ETD Paralyze if remains to have sedation problems     Code Status:  Family/Goals of Care: Full Code         Critical secondary to Patient has a condition that poses threat to life and bodily function: Severe Respiratory Distress      Critical care was time spent personally by me on the following activities: development of treatment plan with patient or surrogate and bedside caregivers, discussions with consultants, evaluation of patient's response to treatment, examination of patient, ordering and performing treatments and interventions, ordering and review of laboratory studies, ordering and review of radiographic  studies, pulse oximetry, re-evaluation of patient's condition. This critical care time did not overlap with that of any other provider or involve time for any procedures.    Patient seen and examined this morning. Discussed with Dr. Pulliam - staff attestation to follow.         Dottie Andrade MD  Critical Care Medicine  [facility]

## 2020-04-15 NOTE — PROGRESS NOTES
Per patient son & wife during conversation with MD Raymond okay to give information to [employer]. GUS Carroll telephone witness.

## 2020-04-15 NOTE — NURSING
Patient went back to bradycardia with HR on low 50's. Precedex was put on hold. The rest of the drips were titrated accordingly (see flowsheet for rate dose changes). Will continue to monitor.

## 2020-04-15 NOTE — PROGRESS NOTES
04/15/20 1230   Vital Signs   Temp (!) 95.2 °F (35.1 °C)   Temp src Rectal   bear hugger applied

## 2020-04-15 NOTE — NURSING
[facility] MD (Dr. Kasper) made his rounds and ordered to titrate the propofol down. 10 minutes after, patient's HR went up to 150's. SBP increased to 180's. Patient was noted coughing excessively. O2 saturation dropped to 82%. 100% FIO2 boluses given. Precedex restarted. Propofol and dilaudid drips titrated. Levophed was put on hold. Will continue to monitor. Dr. Kasper was made aware.

## 2020-04-15 NOTE — CARE UPDATE
EEG placed and patient had generalized tremors contacted Epilepsy  Sarina PETRONA with staff Dr. Brown looked at EEG and saw no epileptic activity, Patient de-sated and became hypotensive and tachycardic again. Will turn back on propofol and take EEG off. Dr. Rivera PCC aware

## 2020-04-15 NOTE — NURSING
Patient went tachycardic with HR of 130's and systolic blood pressure increased to 150's. Patient was noted coughing, in distress, with O2 sat of 82%. 100% FIO2 bolus given. FIO2 increased from 40% to 60%. Precedex drip was restarted. Propofol dilaudid and levophed drips were titrated accordingly. Will attempt to wean patient again once more stable. Respiratory therapist and Dr. Kasper was made aware.

## 2020-04-15 NOTE — PROCEDURES
DATE OF STUDY:  4/15/20     EEG NUMBER:  [id number]      REFERRING PHYSICIAN:  Dr. Reyes      This EEG was performed to assess for subclinical seizures.     ELECTROENCEPHALOGRAM REPORT     METHODOLOGY:  Electroencephalographic (EEG) is recorded with electrodes placed according to the International 10-20 placement system.  Thirty two (32) channels of digital signal (sampling rate of 512/sec), including T1 and T2, were simultaneously recorded from the scalp and may include EKG, EMG, and/or eye monitors.  Recording band pass was 0.1 to 512 Hz.  Digital video recording of the patient is simultaneously recorded with the EEG.  The patient is instructed to report clinical symptoms which may occur during the recording session.  EEG   and video recording are stored and archived in digital format.  Activation procedures, which include photic stimulation, hyperventilation and instructing patients to perform simple tasks, are done in selected patients.     The EEG is displayed on a monitor screen and can be reviewed using different montages.  Computer-assisted analysis is employed to detect spike and electrographic seizure activity.  The entire record is submitted for computer analysis.  The entire recording is visually reviewed, and the times identified by computer analysis as being spikes or seizures are reviewed again.     Compressed spectral analysis (CSA) is also performed on the activity recorded from each individual channel.  This is displayed as a power display of frequencies from 0 to 30 Hz over time.  The CSA is reviewed looking for asymmetries in power between homologous areas of the scalp, then compared with the original EEG recording.     [employer] software was also utilized in the review of this study.  This software suite analyzes the EEG recording in multiple domains.  Coherence and rhythmicity are computed to identify EEG sections which may contain organized seizures.  Each channel undergoes analysis to detect  the presence of spike and sharp waves which have special and morphological characteristics of epileptic activity.  The routine EEG recording is converted from special into frequency domain.  This is then displayed comparing homologous areas to identify areas of significant   asymmetry.  Algorithm to identify non-cortically generated artifact is used to separate artifact from the EEG.      EEG FINDINGS:  The recording was obtained with a number of standard bipolar and referential montages during obtunded state.  During this state the background was diffusely disorganized and comprises of low amplitude fast activity with intermixed delta range activity. Spontaneous variability and reactivity were noted. No clear state changes were appreciated.  There were no interictal epileptiform abnormalities and no clinical or electrographic seizures were recorded.       The EKG channel revealed sinus tachycardia .     IMPRESSION:  This is an abnormal EEG during obtunded state.  Diffuse disorganized suppression of the background was noted.     CLINICAL CORRELATION:  The patient is a 54-year-old male who is being evaluated for episodes of gaze deviation.  The patient is currently maintained on intravenous infusions of propofol.  This is an abnormal EEG during obtunded state.  Diffuse suppression and disorganization of the background was noted suggestive of severe encephalopathy nonspecific to the cause.  There is no evidence of an epileptic process on this recording.  No seizures were recorded during this study.

## 2020-04-15 NOTE — PHYSICIAN QUERY
PT Name: Ranjana Sanchez  MR #: [id number]    Physician Query Form - Perfusion Diagnosis Clarification     CDS/: Nancy Spivey RN, CDS              Contact information: [email or web address]  This form is a permanent document in the medical record.     Query Date: April 15, 2020    By submitting this query, we are merely seeking further clarification of documentation. Please utilize your independent clinical judgment when addressing the question(s) below.    The medical record contains the following:    Indicators   Supporting Clinical Findings   Location in Medical Record   x Acute Illness (e.g. AMI, Sepsis, etc.) --COVID19 pneumonia/ARDS  --Difficult intubation- s/p crich in ER    --Sedated         - on propfol and fentanyl and nimbex  --Shock         - now in shock after intubation         - on pressors    --Broad IV antibiotics for shock although I don't suspect a co-infection         - of pressors 4/12    --Shock: off levo this am, monitor cultures. Deescalate vanc/cef to rocephin for cap coverage.          -remains on a touch of pressors no fevers or  White count    CCM H&P      CCM Note 4/11            CCM Note 4/12        CCM Note 4/14    Acidosis documented      ABGs / Labs     x Vital Signs BP: 130/66->138/54->79/52-->82/58 (on Propofol @ 40mcg/kg/min and Fentanyl 2.5mg IV)->106/54 (on Propofol @ 40mcg/kg/min,  Fentanyl 200 mcg/hr, Nimbex @ 1mcg/kg/min and Levophed @0.02 mcg/kg/min)   VS Flowsheet 4/10    Hypotension or Low Blood Pressure documented      Altered Mental Status or Confusion      Diaphoresis, Cold Extremities or Cyanosis      Oliguria     x Medication/Treatment:  -Vasopressors  -Inotropic Drugs  -IV Fluids  -Cardiac Assist Devices  -Hemodynamic Monitoring  -Blood/Blood Products --Pt paralyzed with Rocuronium. Nimbex gtt and Nitric Oxide initiated.    --Levo on for less than 1 hour today.  Propofol/dilaudid/heparin/nimbex/ABX infusing per eMAR.      --propofol gtt  (30-50mcg/kg/min) started 4/10  --Fentanyl gtt (200-250 mcg/hr) from 4/12- 4/13  --nimbex gtt (2-2.5 mcg/kg/min) from 4/11-4/14  --Dilaudid gtt 4mg/hr (started 4/12)  --levophed gtt (0.02-0.04mcg/kg/min) started 4/11 intermittently   Nrsg eICU Note 4/11 @ 121a    Nrsg Note 4/13 @ 617pm      MAR   x Other: --Patient remained paralyzed and sedated. Afebrile. BP was noted labile. Patient was kept on low dose levo.    --PEEP: 18->20->10->16   Nrsg Note 4/14 @550am      Vent POC Flowsheet 4/10->4/15     Provider, please specify diagnosis or diagnoses associated with above clinical findings.      Please clarify the Shock diagnosis:    [   ] Other Shock (please specify): _____________     [  x ] Shock Unspecified     [   ] Shock Ruled Out; Hypotension due to sedative and paralytic medications Ruled In     [   ] Other Condition (please specify): ______________     [  ] Clinically Undetermined         Please document in your progress notes daily for the duration of treatment until resolved and include in your discharge summary.

## 2020-04-15 NOTE — NURSING
Patient is currently stable with HR 74, /65, MAP 89, O2 sat 93%. Ventilator set at 40% FIO2, 8 PEEP. Precedex was put on hold. Propofol drip at 30 mcg/kg/min. Dilaudid drip at 3 mg/hour. Levophed drip at .04 mcg/kg/min. Dr. Kasper was made aware.

## 2020-04-15 NOTE — ASSESSMENT & PLAN NOTE
-HbA1c 6  - Home DM regimen:  Diet controlled  - Start Detemir 5 units  Given his sugars are increasing   - SSI with POCT accuchecks and Diabetic diet

## 2020-04-15 NOTE — PROGRESS NOTES
04/15/20 1600        Wound 04/10/20 0000 Other (comment) upper Throat   Date First Assessed/Time First Assessed: 04/10/20 0000   Primary Wound Type: Other (comment)  Orientation: upper  Location: Throat   Wound WDL ex   Dressing Appearance Dry;Intact;Clean;Dried drainage   Drainage Amount Scant   Drainage Characteristics/Odor Yellow;No odor;Creamy;Serosanguineous   Appearance Tonsina   Periwound Area Intact   Wound Edges Approximated   Dressing Changed   per Jackelyn Sadler ENT MD- change crich site with gauze & tegaderm, wiping away secretions prn. readbackx2

## 2020-04-15 NOTE — ASSESSMENT & PLAN NOTE
- remains on a touch of pressors no fevers or  White count   .-On cefepime, and azithromycin and plaquenil, switched to rocephin to complete a full course,   - no growth to date on cultures , stopped vancomycin 4/14  - MAP goal > 65

## 2020-04-15 NOTE — SUBJECTIVE & OBJECTIVE
Interval History/Significant Events: Patient with some zafar tachy arrhythmias when any of his sedation is weaned or when he starts coughing , was restarted on precedex this am to get him comfortable and decrease his tachypnea    Review of Systems   Unable to perform ROS: Intubated     Objective:     Vital Signs (Most Recent):  Temp: 97.9 °F (36.6 °C) (04/15/20 0700)  Pulse: (!) 134 (04/15/20 0745)  Resp: (!) 34 (04/15/20 0745)  BP: (!) 119/59 (04/13/20 0800)  SpO2: (!) 92 % (04/15/20 0745) Vital Signs (24h Range):  Temp:  [97.7 °F (36.5 °C)-98 °F (36.7 °C)] 97.9 °F (36.6 °C)  Pulse:  [] 134  Resp:  [28-43] 34  SpO2:  [77 %-100 %] 92 %  Arterial Line BP: ()/(48-96) 139/74   Weight: 63.5 kg (139 lb 15.9 oz)  Body mass index is 24.03 kg/m².      Intake/Output Summary (Last 24 hours) at 4/15/2020 0754  Last data filed at 4/15/2020 0650  Gross per 24 hour   Intake 1640.89 ml   Output 3360 ml   Net -1719.11 ml       Physical Exam   Constitutional: He appears well-developed and well-nourished.   HENT:   Head: Normocephalic.   crich removed and dressing clean dry and intact    Eyes: EOM are normal.   Cardiovascular: Normal rate and regular rhythm.   Pulmonary/Chest: Effort normal.   Abdominal: Soft.   Neurological:   Sedated    Skin: Skin is warm.   Nursing note and vitals reviewed.      Vents:  Vent Mode: A/C (04/15/20 0140)  Ventilator Initiated: Yes (04/10/20 2247)  Set Rate: 28 BPM (04/15/20 0140)  Vt Set: 360 mL (04/15/20 0140)  PEEP/CPAP: 8 cmH20 (04/15/20 0140)  Oxygen Concentration (%): 40 (04/15/20 0745)  Peak Airway Pressure: 34 cmH2O (04/15/20 0140)  Plateau Pressure: 28 cmH20 (04/15/20 0140)  Total Ve: 10.3 mL (04/15/20 0140)  F/VT Ratio<105 (RSBI): (!) 76.29 (04/15/20 0140)  Lines/Drains/Airways     Central Venous Catheter Line            Percutaneous Central Line Insertion/Assessment - Triple Lumen  04/11/20 0137 right femoral vein 4 days          Drain                 Urethral Catheter 04/10/20  2329 Double-lumen 4 days         NG/OG Tube 04/12/20 1000 18 Fr. Right nostril 2 days          Airway                 Airway - Non-Surgical 04/11/20 1525 Endotracheal Tube 3 days          Arterial Line                 Arterial Line 04/11/20 0242 Left Radial 4 days          Peripheral Intravenous Line                 Peripheral IV - Single Lumen 04/14/20 0546 20 G Right Wrist 1 day              Significant Labs:    CBC/Anemia Profile:  Recent Labs   Lab 04/14/20  0342 04/15/20  0407   WBC 10.79 11.97   HGB 11.8* 11.3*   HCT 36.5* 35.6*    344   MCV 90 90   RDW 13.6 13.2        Chemistries:  Recent Labs   Lab 04/14/20  0342 04/14/20  2102 04/15/20  0407    138 138   K 4.8 4.4 4.6   CL 97 96 96   CO2 28 30* 28   BUN 10 14 15   CREATININE 0.7 0.7 0.7   CALCIUM 7.8* 8.4* 8.0*   ALBUMIN 1.4*  --  1.5*   PROT 6.2  --  6.1   BILITOT 0.4  --  0.4   ALKPHOS 86  --  97   ALT 37  --  42   AST 35  --  44*   MG 2.2 2.3 2.4   PHOS 1.7* 2.1* 1.7*       BMP:   Recent Labs   Lab 04/15/20  0407   *      K 4.6   CL 96   CO2 28   BUN 15   CREATININE 0.7   CALCIUM 8.0*   MG 2.4     All pertinent labs within the past 24 hours have been reviewed.    Significant Imaging:  I have reviewed all pertinent imaging results/findings within the past 24 hours.  I have reviewed and interpreted all pertinent imaging results/findings within the past 24 hours.

## 2020-04-15 NOTE — NURSING
Patient went back to bradycardia with HR at 57. . Precedex was put on hold. The rest of the drips were titrated accordingly (see flowsheet for rate dose changes). Will continue to monitor.

## 2020-04-15 NOTE — ASSESSMENT & PLAN NOTE
- on propfol and dialudid  and precedex  - daily sbt/ sat   - had a couple episodes of seizure like activity with right upward gaze, gave 2 of ativan and resolved, if occurs again will hardeep EEG before another dose of ativan   - 4/16 patient still with problems with sedation started on ketamine, will praralyze and prone today to help wean off his vent

## 2020-04-15 NOTE — NURSING
Alteration in vital signs was noted after ET tube alcaraz exchange. HR went up to 133. SBP increased to 150's to 170's. Precedex drip resumed. Propofol drip rate increased. Levophed was titrated accordingly. CCS was made aware. Dr. Kasper witnessed the event. Will continue to monitor

## 2020-04-15 NOTE — PROGRESS NOTES
0700: HD unstable- decrease SpO2, increase HR & SBP- see flowsheets. sz like activity in lower extremities- non-purposeful twitching in legs, right upward gaze- sedation titrated, ativan given, MD raymond at bedside to assess- EEG ordered.  -See flowsheets & MAR for VS & med changes  1205: MD Raymond updated EKG implemented, to be assessed.

## 2020-04-16 NOTE — ASSESSMENT & PLAN NOTE
- remains on a touch of pressors no fevers or  White count   .-On cefepime, and azithromycin and plaquenil, switched to rocephin   - no growth to date on cultures , stopped vancomycin 4/14  - MAP goal > 65

## 2020-04-16 NOTE — PROGRESS NOTES
MD Raymond with CCS notified of increasing gastric residuals amounting to over 200cc as well as new bloody drainage coming from NGT output once gastric contents were fully cleared from stomach. Orders received to hold tube feeding at this time. Also notified of continued uncontrolled BGs with sliding scale insuling - new orders received to initiate insulin drip. VSS at this time. Planning to prone patient at noon.

## 2020-04-16 NOTE — PROGRESS NOTES
[facility]  Critical Care Medicine  Progress Note    Patient Name: Ranjana Sanchez  MRN: [id number]  Admission Date: 4/10/2020  Hospital Length of Stay: 6 days  Code Status: Full Code  Attending Provider: Jorge Pulliam MD  Primary Care Provider: Primary Doctor No   Principal Problem: COVID-19 virus detected    Subjective:     HPI:  54y.o male with PMH of HTN brought to ER via EMS for increasing SOB with known covid-19 exposure. 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 They have not had travelers since 3/14. 2 days ago he started having fever and sob. He was seen by the ship's physician and was found to have crackles in his bases bilaterally, but his oxygen saturation was in the 90's. He had a flu test which was negative. He was quarantined in a room and states that he got worse with increasing SOB. He was brought to the ER and found to be 78% on RA. He was started on NRB and initially was doing much better, but his tachypnea did not improve, so he was placed on bipap. His sat is currently 94%.    Hospital/ICU Course:  Patient was admitted to the MICU agyer a difficult intubation in the ER s/p crich. Covid positive with ards, sedated and paralyzed  Unable to prone secondary to crich. Left mainsyem intubation which was pulled off, on NO and levophed. ET tube replaced on 4/11 family contacted in [place]. Proned on 4/12 tolerated well and continued to prone on 4/14    Interval History/Significant Events: Remained to be hard to sedate overnight with problems with oxygenation when awake, started on ketamine overnight     Review of Systems  Objective:     Vital Signs (Most Recent):  Temp: 99 °F (37.2 °C) (04/16/20 0701)  Pulse: 70 (04/16/20 0930)  Resp: (!) 28 (04/16/20 0900)  BP: (!) 112/54 (04/16/20 0930)  SpO2: (!) 92 % (04/16/20 0900) Vital Signs (24h Range):  Temp:  [95.2 °F (35.1 °C)-99.9 °F (37.7 °C)] 99 °F (37.2 °C)  Pulse:  [] 70  Resp:  [24-96] 28  SpO2:  [88 %-99 %] 92 %  BP:  (111-112)/(54) 112/54  Arterial Line BP: ()/(47-85) 114/59   Weight: 63 kg (139 lb)  Body mass index is 23.86 kg/m².      Intake/Output Summary (Last 24 hours) at 4/16/2020 1018  Last data filed at 4/16/2020 1000  Gross per 24 hour   Intake 3072.59 ml   Output 2915 ml   Net 157.59 ml       Physical Exam   Constitutional: He appears well-developed and well-nourished.   HENT:   Head: Normocephalic.   crich removed and dressing clean dry and intact    Eyes: EOM are normal.   Cardiovascular: Normal rate and regular rhythm.   Pulmonary/Chest: Effort normal.   Abdominal: Soft.   Neurological:   Sedated    Skin: Skin is warm.   Nursing note and vitals reviewed.      Vents:  Vent Mode: A/C (04/16/20 0158)  Ventilator Initiated: Yes (04/10/20 2247)  Set Rate: 24 BPM (04/16/20 0158)  Vt Set: 360 mL (04/16/20 0158)  PEEP/CPAP: 8 cmH20 (04/16/20 0158)  Oxygen Concentration (%): 80 (04/16/20 0900)  Peak Airway Pressure: 34 cmH2O (04/16/20 0158)  Plateau Pressure: 41 cmH20 (04/16/20 0158)  Total Ve: 9.99 mL (04/16/20 0158)  F/VT Ratio<105 (RSBI): (!) 75.47 (04/16/20 0158)  Lines/Drains/Airways     Central Venous Catheter Line            Percutaneous Central Line Insertion/Assessment - Triple Lumen  04/11/20 0137 right femoral vein 5 days          Drain                 Urethral Catheter 04/10/20 2329 Double-lumen 5 days         NG/OG Tube 04/12/20 1000 18 Fr. Right nostril 4 days          Airway                 Airway - Non-Surgical 04/11/20 1525 Endotracheal Tube 4 days          Arterial Line                 Arterial Line 04/11/20 0242 Left Radial 5 days          Peripheral Intravenous Line                 Peripheral IV - Single Lumen 04/14/20 0546 20 G Right Wrist 2 days              Significant Labs:    CBC/Anemia Profile:  Recent Labs   Lab 04/15/20  0407 04/16/20  0317   WBC 11.97 13.01*   HGB 11.3* 11.0*   HCT 35.6* 36.1*    400*   MCV 90 92   RDW 13.2 14.0        Chemistries:  Recent Labs   Lab 04/15/20  0400  04/15/20  1710 04/16/20  0317    137 137   K 4.6 4.6 4.4   CL 96 94* 93*   CO2 28 32* 34*   BUN 15 16 15   CREATININE 0.7 0.7 0.8   CALCIUM 8.0* 8.0* 8.2*   ALBUMIN 1.5* 1.6* 1.8*   PROT 6.1 6.3 6.5   BILITOT 0.4 0.4 0.4   ALKPHOS 97 106 117   ALT 42 48* 51*   AST 44* 49* 51*   MG 2.4 2.3 2.5   PHOS 1.7* 4.0 2.7       All pertinent labs within the past 24 hours have been reviewed.    Significant Imaging:  I have reviewed all pertinent imaging results/findings within the past 24 hours.  I have reviewed and interpreted all pertinent imaging results/findings within the past 24 hours.      ABG  Recent Labs   Lab 04/14/20  0925   PH 7.477*   PO2 47*   PCO2 44.9   HCO3 33.2*   BE 10     Assessment/Plan:     Neuro  Sedated  - on propfol and dialudid  and precedex  - daily sbt/ sat   - had a couple episodes of seizure like activity with right upward gaze, gave 2 of ativan and resolved, if occurs again will hardeep EEG before another dose of ativan   - 4/16 patient still with problems with sedation started on ketamine, will praralyze and prone today to help wean off his vent          Cardiac/Vascular  Hypertension  -was in shock after intubation   - holding home toprol XL 50 mg       Endocrine  Type 2 diabetes mellitus without complication, without long-term current use of insulin  -HbA1c 6  - Home DM regimen:  Diet controlled  - Start Detemir 5 units  Given his sugars are increasing   - SSI with POCT accuchecks and Diabetic diet      Other  * COVID-19 virus detected  Respiratory distress   Acute hypoxemic respiratory failure    - Patient intubated with crich which was switched to ETT on 4/11  - proning days 4/12, 4/13,4/14  - sedated   - on  PLAQUENIL   -methyprednisone for 5 days   -Daily CRP, d-dimer, ferritin, and CK-MB to trend inflammatory response.   -Strict I/O's and goal net neutral status to help optimize vent mechanics.   -Special isolation and aspiration precautions ordered.   -  trickle feeds  - lasix today    - will continue rocephin for now if he has any increase in pressors, fever or hypothermia will broaden out         Shock  - remains on a touch of pressors no fevers or  White count   .-On cefepime, and azithromycin and plaquenil, switched to rocephin   - no growth to date on cultures , stopped vancomycin 4/14  - MAP goal > 65           Critical secondary to Patient has a condition that poses threat to life and bodily function: Severe Respiratory Distress      Critical care was time spent personally by me on the following activities: development of treatment plan with patient or surrogate and bedside caregivers, discussions with consultants, evaluation of patient's response to treatment, examination of patient, ordering and performing treatments and interventions, ordering and review of laboratory studies, ordering and review of radiographic studies, pulse oximetry, re-evaluation of patient's condition. This critical care time did not overlap with that of any other provider or involve time for any procedures.     Patient seen and examined this morning. Discussed with Dr. Pulliam   - staff attestation to follow.        Dottie Andrade MD  Critical Care Medicine  [facility]

## 2020-04-16 NOTE — CARE UPDATE
Internal Medicine Telemed Plan of Care Note:     Called Mr. Ranjana Anderson  family member ( da )listed in chart to discuss patient's current clinical status. Briefly, patient is currently admitted for hypoxic respiratory failure, positive COVID-19 infection.      Addressed family's questions and concerns in addition to updating the current clinical status of the patient.  Family member verbalized understanding of situation and plan. Mr. Ranjana Anderson wife was close to son and all her questions were answered as well.      Da provided me with his fathers medications and these were added to his chart         Dottie Andrade MD, MPH  Internal Medicine PGY3  [facility]M [id number]

## 2020-04-16 NOTE — PLAN OF CARE
CMICU DAILY GOALS       A: Awake    RASS: Goal -    Actual - RASS ([place] Agitation-Sedation Scale): -5-->unarousable   Restraint necessity: Clinical Justification: Removing medical devices  B: Breath   SBT: Not attempted   C: Coordinate A & B, analgesics/sedatives   Pain: managed    SAT: Fail  D: Delirium   CAM-ICU: Overall CAM-ICU: Positive  E: Early Mobility   MOVE Screen: Fail   Activity: Activity Management: bedrest maintained per order  FAS: Feeding/Nutrition   Diet order: Diet/Nutrition Received: NPO, tube feeding,   Fluid restriction:    T: Thrombus   DVT prophylaxis: VTE Required Core Measure: Pharmacological prophylaxis initiated/maintained  H: HOB Elevation   Head of Bed (HOB): HOB at 30-45 degrees  U: Ulcer Prophylaxis   GI: yes  G: Glucose control   managed Glycemic Management: blood glucose monitoring  S: Skin   Bundle compliance: yes   Bathing/Skin Care: bath, complete, bath, chlorhexidine, dressed/undressed, incontinence care, linen changed Date: 04/15/20 AM Bath  B: Bowel Function   constipation ; Bowel regimen may need to be increased.   I: Indwelling Catheters   Torres necessity:      Urethral Catheter 04/10/20 2329 Double-lumen-Reason for Continuing Urinary Catheterization: Critically ill in ICU requiring intensive monitoring   CVC necessity: Yes   IPAD offered: Not appropriate  D: De-escalation Antibx   No  Plan for the day   Maintain ventilator-patient synchrony.  Family/Goals of care/Code Status   Code Status: Full Code     No acute events throughout day, VS and assessment per flow sheet, patient progressing towards goals as tolerated, plan of care reviewed with Ranjana Sanchez and family, all concerns addressed, will continue to monitor.

## 2020-04-16 NOTE — CONSULTS
Wound care consulted for right nare, upper throat and left arm.  PMH:  No past medical problems  Patient is Covid-19 positive.  The chart was reviewed and skin issues discussed with unit nurse Genna   The upper throat wound is an approximated incision where an emergency crich was performed.  Incision is intact without redness/drainage.  The right nare has a purple/maroon intact area where the NG tube was pressed against the nare when the patient was prone.   The left arm is a fluid filled raised area, intact, moist  Recommendation:  The upper throat incision should be left open  The right nare should have pressure relief from the NG tube with a securement device, Sween 24 lotion to the area daily.  The left arm- foam dressing 2 x week  Continue pressure prevention measures  Nursing to continue care, wound care signing off.  Please reconsult if needed  DARRYL Benitez RN, [facility]  [phone]

## 2020-04-16 NOTE — SUBJECTIVE & OBJECTIVE
Interval History/Significant Events: Remained to be hard to sedate overnight with problems with oxygenation when awake, started on ketamine overnight     Review of Systems  Objective:     Vital Signs (Most Recent):  Temp: 99 °F (37.2 °C) (04/16/20 0701)  Pulse: 70 (04/16/20 0930)  Resp: (!) 28 (04/16/20 0900)  BP: (!) 112/54 (04/16/20 0930)  SpO2: (!) 92 % (04/16/20 0900) Vital Signs (24h Range):  Temp:  [95.2 °F (35.1 °C)-99.9 °F (37.7 °C)] 99 °F (37.2 °C)  Pulse:  [] 70  Resp:  [24-96] 28  SpO2:  [88 %-99 %] 92 %  BP: (111-112)/(54) 112/54  Arterial Line BP: ()/(47-85) 114/59   Weight: 63 kg (139 lb)  Body mass index is 23.86 kg/m².      Intake/Output Summary (Last 24 hours) at 4/16/2020 1018  Last data filed at 4/16/2020 1000  Gross per 24 hour   Intake 3072.59 ml   Output 2915 ml   Net 157.59 ml       Physical Exam   Constitutional: He appears well-developed and well-nourished.   HENT:   Head: Normocephalic.   crich removed and dressing clean dry and intact    Eyes: EOM are normal.   Cardiovascular: Normal rate and regular rhythm.   Pulmonary/Chest: Effort normal.   Abdominal: Soft.   Neurological:   Sedated    Skin: Skin is warm.   Nursing note and vitals reviewed.      Vents:  Vent Mode: A/C (04/16/20 0158)  Ventilator Initiated: Yes (04/10/20 2247)  Set Rate: 24 BPM (04/16/20 0158)  Vt Set: 360 mL (04/16/20 0158)  PEEP/CPAP: 8 cmH20 (04/16/20 0158)  Oxygen Concentration (%): 80 (04/16/20 0900)  Peak Airway Pressure: 34 cmH2O (04/16/20 0158)  Plateau Pressure: 41 cmH20 (04/16/20 0158)  Total Ve: 9.99 mL (04/16/20 0158)  F/VT Ratio<105 (RSBI): (!) 75.47 (04/16/20 0158)  Lines/Drains/Airways     Central Venous Catheter Line            Percutaneous Central Line Insertion/Assessment - Triple Lumen  04/11/20 0137 right femoral vein 5 days          Drain                 Urethral Catheter 04/10/20 2329 Double-lumen 5 days         NG/OG Tube 04/12/20 1000 18 Fr. Right nostril 4 days          Airway                  Airway - Non-Surgical 04/11/20 1525 Endotracheal Tube 4 days          Arterial Line                 Arterial Line 04/11/20 0242 Left Radial 5 days          Peripheral Intravenous Line                 Peripheral IV - Single Lumen 04/14/20 0546 20 G Right Wrist 2 days              Significant Labs:    CBC/Anemia Profile:  Recent Labs   Lab 04/15/20  0407 04/16/20  0317   WBC 11.97 13.01*   HGB 11.3* 11.0*   HCT 35.6* 36.1*    400*   MCV 90 92   RDW 13.2 14.0        Chemistries:  Recent Labs   Lab 04/15/20  0407 04/15/20  1710 04/16/20  0317    137 137   K 4.6 4.6 4.4   CL 96 94* 93*   CO2 28 32* 34*   BUN 15 16 15   CREATININE 0.7 0.7 0.8   CALCIUM 8.0* 8.0* 8.2*   ALBUMIN 1.5* 1.6* 1.8*   PROT 6.1 6.3 6.5   BILITOT 0.4 0.4 0.4   ALKPHOS 97 106 117   ALT 42 48* 51*   AST 44* 49* 51*   MG 2.4 2.3 2.5   PHOS 1.7* 4.0 2.7       All pertinent labs within the past 24 hours have been reviewed.    Significant Imaging:  I have reviewed all pertinent imaging results/findings within the past 24 hours.  I have reviewed and interpreted all pertinent imaging results/findings within the past 24 hours.

## 2020-04-16 NOTE — ASSESSMENT & PLAN NOTE
Respiratory distress   Acute hypoxemic respiratory failure    - Patient intubated with crich which was switched to ETT on 4/11  - proning days 4/12, 4/13,4/14  - sedated   - on  PLAQUENIL   -methyprednisone for 5 days   -Daily CRP, d-dimer, ferritin, and CK-MB to trend inflammatory response.   -Strict I/O's and goal net neutral status to help optimize vent mechanics.   -Special isolation and aspiration precautions ordered.   -  trickle feeds  - lasix today   - will continue rocephin for now if he has any increase in pressors, fever or hypothermia will broaden out

## 2020-04-16 NOTE — PLAN OF CARE
04/16/20 1132   Discharge Reassessment   Assessment Type Discharge Planning Reassessment   Do you have any problems affording any of your prescribed medications? No   Discharge Plan A Skilled Nursing Facility  (pending prognosis, progress and recs)   Discharge Plan B Rehab   DME Needed Upon Discharge  other (see comments)  (TBD)   Anticipated Discharge Disposition SNF     Patient from [facility] per medical record.  Post-acute care will depend on possible travel restrictions versus post acute recommendations when medically stable.  Will monitor.      Payor: [facility] / Plan: [facility] CHOICE PLUS / Product Type: Commercial /

## 2020-04-16 NOTE — PROGRESS NOTES
Procedure Note  Prone Positioning  Critical Care Medicine       Chief Complaint: COVID-19 virus detected  MRN: [id number]  LOS: 6  Sex: male  Age: 54 y.o.      Last ABG:   Recent Labs   Lab 04/16/20  1048   PH 7.359   PO2 51*   PCO2 68.5*   HCO3 38.6*   BE 13       Vital Signs (Most Recent):   Temp: 98.9 °F (37.2 °C) (04/16/20 1100)  Pulse: 106 (04/16/20 1200)  Resp: (!) 28 (04/16/20 1200)  BP: (!) 112/54 (04/16/20 0930)  SpO2: (!) 92 % (04/16/20 1200)    Ventilator Settings:  Vent Mode: A/C  Oxygen Concentration (%):  [] 100  Resp Rate Total:  [24 br/min-54 br/min] 28 br/min  Vt Set:  [360 mL] 360 mL  PEEP/CPAP:  [8 cmH20] 8 cmH20  Mean Airway Pressure:  [15 gyE30-57 cmH20] 16 cmH20    Indication: Severe, refractory ARDS. High risk for deterioration during proning procedure in need of direct monitoring by Critical Care personnel.     Prior to beginning the procedure, all appropriate equipment and personnel were gathered in the room. Two individuals were placed on each side of the patient and one person stood at the head of the bed and was dedicated to the management of the head of the patient, the endotracheal tube, and the ventilator lines. The person at the head of the bed  coordinated the steps of the proning procedure with team team at the direction of Critical Care team members at the bedside. The patient was first log-rolled 90 degrees onto their side towards the direction of their central venous catheter. Cardiac electrodes were then moved from the patient's anterior to the posterior. The patient?s knees, forehead, chest, and iliac crests were protected using adhesive pads and/or foam pads to reduce the risk of skin breakdown. Once properly positioned in the bed, another 90 degree log roll was performed placing the patient into the prone position. The patient's arms were placed alongside the body. The patient's head should be turned alternately to the right or left every 2 hours. The patient tolerated  the procedure well.     Total Critical Care time (uninterrupted not including procedures): 30

## 2020-04-17 PROBLEM — E87.0 HYPERNATREMIA: Status: ACTIVE | Noted: 2020-01-01

## 2020-04-17 PROBLEM — R94.31 NONSPECIFIC ABNORMAL ELECTROCARDIOGRAM (ECG) (EKG): Status: ACTIVE | Noted: 2020-01-01

## 2020-04-17 PROBLEM — R00.1 BRADYCARDIA: Status: ACTIVE | Noted: 2020-01-01

## 2020-04-17 NOTE — CARE UPDATE
Called Mr. Ranjana Sanchez's son  (Da ) listed in chart to discuss patient's current clinical status. He was updated on his father's current situation. He asked to see his Dad on the IPAD, video call was initiated. All questions and concerns were addressed.    Kalee Mei DO  PGY2  Critical Care Medicine

## 2020-04-17 NOTE — ASSESSMENT & PLAN NOTE
53 yo M currently admitted to ICU for acute hypoxic respiratory failure 2/2 COVID-19. On morning of 4/17 (while patient was proned), patient was noted to have ST changes on EKG. Rectal temp 92.8. Patient was bradycardic down to 40s. 12-lead EKG showed ST changes in leads I, aVL. Troponin negative > 0.058. No changes in pressor requirements.    Recommendations:  - Patient does not meet criteria for STEMI  - EKG findings are likely hypothermia-induced changes (Ruvalcaba wave, bradycardia, QT prolongation)  - Correct hypothermia and address underlying cause of hypothermia  - Maintain Mg > 2, K > 4  - No indications for ACS protocol at this time

## 2020-04-17 NOTE — CONSULTS
[facility]  Cardiology  Consult Note    Patient Name: Ranjana Sanchez  MRN: [id number]  Admission Date: 4/10/2020  Hospital Length of Stay: 7 days  Code Status: Full Code   Attending Provider: Jorge Pulliam MD   Consulting Provider: Phil Jules MD  Primary Care Physician: Primary Doctor No  Principal Problem:COVID-19 virus detected    Patient information was obtained from patient and ER records.     Inpatient consult to Cardiology  Consult performed by: Phil Jules MD  Consult ordered by: Kalee Mei DO        Subjective:     Chief Complaint:  shortness of breath + fever     HPI:   Mr. Sanchez is a 54 year old male with HTN that is currently admitted for acute hypoxic respiratory failure. Patient is positive for COVID-19 so consult was performed by reviewing notes, labs, and imaging to minimize exposure to virus.    Patient [place] a  [place]. He was seen by the physician on board for fever and SOB. He was noted to have bibasilar crackles, but SpO2 90s on RA. Flu negative. He was quarantined in his room but developed worsening SOB. He was brought to [facility] via [facility] for further evaluation. Upon arrival, he was found to be hypoxic down to 78%. He was initially placed on NRB, titrated up to BiPAP, attempted intubation but had difficulty, and ultimately underwent cricothyrotomy. Patient tested positive for COVID-19.    Past Medical History:   Diagnosis Date    Diabetes mellitus     Hypertension        History reviewed. No pertinent surgical history.    Review of patient's allergies indicates:  No Known Allergies    No current facility-administered medications on file prior to encounter.      Current Outpatient Medications on File Prior to Encounter   Medication Sig    aspirin 81 MG Chew Take 81 mg by mouth once daily.    fenofibrate 160 MG Tab Take 160 mg by mouth once daily.    metoprolol tartrate (LOPRESSOR) 50 MG tablet Take 50 mg by mouth 2 (two) times daily.     rosuvastatin (CRESTOR) 10 MG tablet Take 5 mg by mouth once daily.    telmisartan (MICARDIS) 40 MG Tab Take 40 mg by mouth once daily.     Family History     None        Tobacco Use    Smoking status: Never Smoker   Substance and Sexual Activity    Alcohol use: Not on file    Drug use: Not on file    Sexual activity: Not on file     Review of Systems   Unable to perform ROS: intubated     Objective:     Vital Signs (Most Recent):  Temp: 99 °F (37.2 °C) (04/17/20 1400)  Pulse: 66 (04/17/20 1400)  Resp: (!) 35 (04/17/20 1400)  BP: 104/63 (04/17/20 0900)  SpO2: 98 % (04/17/20 1400) Vital Signs (24h Range):  Temp:  [92.8 °F (33.8 °C)-99 °F (37.2 °C)] 99 °F (37.2 °C)  Pulse:  [46-80] 66  Resp:  [12-35] 35  SpO2:  [88 %-100 %] 98 %  BP: ()/(58-83) 104/63  Arterial Line BP: ()/(44-85) 84/44     Weight: 63 kg (138 lb 14.2 oz)  Body mass index is 23.84 kg/m².    SpO2: 98 %  O2 Device (Oxygen Therapy): ventilator      Intake/Output Summary (Last 24 hours) at 4/17/2020 1504  Last data filed at 4/17/2020 1300  Gross per 24 hour   Intake 2813.01 ml   Output 4260 ml   Net -1446.99 ml       Lines/Drains/Airways     Central Venous Catheter Line            Percutaneous Central Line Insertion/Assessment - Triple Lumen  04/11/20 0137 right femoral vein 6 days          Drain                 Urethral Catheter 04/10/20 2329 Double-lumen 6 days         NG/OG Tube 04/12/20 1000 18 Fr. Right nostril 5 days          Airway                 Airway - Non-Surgical 04/11/20 1525 Endotracheal Tube 5 days          Arterial Line                 Arterial Line 04/11/20 0242 Left Radial 6 days          Peripheral Intravenous Line                 Peripheral IV - Single Lumen 04/14/20 0546 20 G Right Wrist 3 days                Physical Exam   Nursing note and vitals reviewed.  Physical exam not performed to limit exposure to COVID-19    Significant Labs:   CMP   Recent Labs   Lab 04/15/20  1710 04/16/20  0317 04/17/20  0422  04/17/20  1040    137 150* 151*   K 4.6 4.4 4.6 4.5   CL 94* 93* 104 104   CO2 32* 34* 37* 41*   * 207* 136* 167*   BUN 16 15 13 14   CREATININE 0.7 0.8 0.6 0.7   CALCIUM 8.0* 8.2* 8.7 8.6*   PROT 6.3 6.5 6.5  --    ALBUMIN 1.6* 1.8* 1.5*  --    BILITOT 0.4 0.4 0.3  --    ALKPHOS 106 117 126  --    AST 49* 51* 31  --    ALT 48* 51* 45*  --    ANIONGAP 11 10 9 6*   ESTGFRAFRICA >60.0 >60.0 >60.0 >60.0   EGFRNONAA >60.0 >60.0 >60.0 >60.0   , CBC   Recent Labs   Lab 04/16/20  0317 04/17/20  0427   WBC 13.01* 11.28   HGB 11.0* 11.0*   HCT 36.1* 37.5*   * 319    and Troponin   Recent Labs   Lab 04/15/20  1650 04/17/20  0213 04/17/20  1040   TROPONINI 0.007 <0.006 0.058*       Significant Imaging: Echocardiogram:   Transthoracic echo (TTE) complete (Cupid Only):   Results for orders placed or performed during the hospital encounter of 04/10/20   Echo Color Flow Doppler? Yes   Result Value Ref Range    Ascending aorta 3.18 cm    STJ 2.91 cm    AV mean gradient 3 mmHg    Ao peak sunday 1.13 m/s    Ao VTI 18.84 cm    IVS 0.86 0.6 - 1.1 cm    LA size 3.12 cm    Left Atrium Major Axis 4.55 cm    Left Atrium Minor Axis 4.17 cm    LVIDD 4.03 3.5 - 6.0 cm    LVIDS 3.09 2.1 - 4.0 cm    LVOT diameter 2.11 cm    LVOT peak VTI 11.71 cm    PW 0.79 0.6 - 1.1 cm    RA Major Axis 4.59 cm    RA Width 3.52 cm    RVDD 3.40 cm    Sinus 3.18 cm    TR Max Sunday 3.06 m/s    LA WIDTH 4.16 cm    LV Diastolic Volume 71.39 mL    LV Systolic Volume 37.75 mL    LVOT peak sunday 0.69 m/s    FS 23 %    LA volume 48.01 cm3    LV mass 98.61 g    Left Ventricle Relative Wall Thickness 0.39 cm    AV valve area 2.17 cm2    AV Velocity Ratio 0.61     AV index (prosthetic) 0.62     LVOT area 3.5 cm2    LVOT stroke volume 40.93 cm3    AV peak gradient 5 mmHg    LV Systolic Volume Index 22.5 mL/m2    LV Diastolic Volume Index 42.60 mL/m2    LA Volume Index 28.6 mL/m2    LV Mass Index 59 g/m2    Triscuspid Valve Regurgitation Peak Gradient 37 mmHg     BSA 1.69 m2    Narrative    · Low normal left ventricular systolic function. The estimated ejection   fraction is 50%.  · Mildly reduced right ventricular systolic function.  · Indeterminate left ventricular diastolic function.  · Mild tricuspid regurgitation.  · Mechanically ventilated; cannot use inferior caval vein diameter to   estimate central venous pressure.        Assessment and Plan:     Nonspecific abnormal electrocardiogram (ECG) (EKG)  55 yo M currently admitted to ICU for acute hypoxic respiratory failure 2/2 COVID-19. On morning of 4/17 (while patient was proned), patient was noted to have ST changes on EKG. Rectal temp 92.8. Patient was bradycardic down to 40s. 12-lead EKG showed ST changes in leads I, aVL. Troponin negative > 0.058. No changes in pressor requirements.    Recommendations:  - Patient does not meet criteria for STEMI  - EKG findings are likely hypothermia-induced changes (Ruvalcaba wave, bradycardia, QT prolongation)  - Correct hypothermia and address underlying cause of hypothermia  - Maintain Mg > 2, K > 4  - No indications for ACS protocol at this time        VTE Risk Mitigation (From admission, onward)         Ordered     Apply sequential compression device to lower extremities (if no contraindications). Medical venous thromboembolus prophylaxis is preferred.  Until discontinued      04/16/20 0721     heparin 25,000 units in dextrose 5% 250 mL (100 units/mL) infusion LOW INTENSITY nomogram - [facility]  Continuous     Question:  Heparin Infusion Adjustment (DO NOT MODIFY ANSWER)  Answer:  [email or web address]\Images\Pharmacy\HeparinInfusions\heparin LOW INTENSITY nomogram for [facility] [id number]    04/11/20 0215     heparin 25,000 units in dextrose 5% (100 units/ml) IV bolus from bag - ADDITIONAL PRN BOLUS - 60 units/kg (max bolus 4000 units)  As needed (PRN)     Question:  Heparin Infusion Adjustment (DO NOT MODIFY ANSWER)  Answer:  [email or web address]\Images\Pharmacy\HeparinInfusions\heparin LOW  INTENSITY nomogram for [facility] [id number]    04/11/20 0215     heparin 25,000 units in dextrose 5% (100 units/ml) IV bolus from bag - ADDITIONAL PRN BOLUS - 30 units/kg (max bolus 4000 units)  As needed (PRN)     Question:  Heparin Infusion Adjustment (DO NOT MODIFY ANSWER)  Answer:  [email or web address]\Images\Pharmacy\HeparinInfusions\heparin LOW INTENSITY nomogram for [facility] [id number]    04/11/20 0215                Thank you for your consult. I will sign off. Please contact us if you have any additional questions.    Phil Jules MD  Cardiology   [facility]

## 2020-04-17 NOTE — PROGRESS NOTES
Team updated on newly noted ST elevation on EKG - 12 lead EKG obtained. Also notified team of patients rectal temp of 92.5 degrees - heating blanket is currently in place. Lastly, notified team about patients gastric residual of almost 200cc of gastric content prior to AM NGT med administration - NGT placed on low intermittent wall suction. Patient supinated at 0830, tolerated well. VSS at this time. WCTM.

## 2020-04-17 NOTE — ASSESSMENT & PLAN NOTE
- HbA1c 6  - Home DM regimen:  Diet controlled  - Started on Insulin ggt on 4/16 due to uncontrolled BG  - Continue insulin ggt until shock resolves

## 2020-04-17 NOTE — ASSESSMENT & PLAN NOTE
- Now off pressors, MAP goal > 65   - Completed treatment with Plaquenil and Azithromycin.  - Previously on Cefepime, now on Azithromycin #7  - Blood cultures NGTD. F/u repeat cultures  - Stopped vancomycin 4/14  - Lactic acid wnl

## 2020-04-17 NOTE — CONSULTS
Procedure Note  Supine Positioning  Critical Care Medicine       Chief Complaint: COVID-19 virus detected  MRN: [id number]  LOS: 7  Sex: male  Age: 54 y.o.      Last ABG:   Recent Labs   Lab 04/16/20  1233   PH 7.270*   PO2 68*   PCO2 96.5*   HCO3 44.3*   BE 17       Vital Signs (Most Recent):   Temp: (!) 92.8 °F (33.8 °C) (04/17/20 0701)  Pulse: (!) 50 (04/17/20 0800)  Resp: (!) 28 (04/17/20 0800)  BP: (!) 90/58 (04/17/20 0800)  SpO2: 99 % (04/17/20 0800)    Ventilator Settings:  Vent Mode: A/C  Oxygen Concentration (%):  [] 50  Resp Rate Total:  [24 br/min-29 br/min] 28 br/min  Vt Set:  [260 mL-360 mL] 260 mL  PEEP/CPAP:  [8 cmH20-10 cmH20] 10 cmH20  Mean Airway Pressure:  [13 btF01-77 cmH20] 15 cmH20        Indication: Severe, refractory ARDS. High risk for deterioration during supination procedure in need of direct monitoring by Critical Care personnel.     Prior to beginning the procedure, all appropriate equipment and personnel were gathered in the room. Two individuals were placed on each side of the patient and two respiratory therapists stood at the head of the bed and was dedicated to the management of the head of the patient, the endotracheal tube, and the ventilator lines. The person at the head of the bed coordinated the steps of the supination procedure at the direction of Critical Care team members at the bedside. The patient was first log-rolled 90 degrees onto their side away from the direction of their central venous catheter. Cardiac electrodes were then moved from the patient's posterior back to the anterior. Once properly positioned in the bed, another 90 degree log roll was performed placing the patient into the supine position. The patient's arms were placed alongside the body. Continuous pulse oximetry and blood pressure monitoring was performed without any desaturation or hemodynamic instability. The patient tolerated the procedure well.     Total Critical Care time (uninterrupted not  including procedures): 35 minutes

## 2020-04-17 NOTE — ASSESSMENT & PLAN NOTE
- Likely 2/2 to ARDS due to COVID-19  - ARDSNET Protocol  - F/u ABG to determine need for reproning

## 2020-04-17 NOTE — HOSPITAL COURSE
Admitted to MICU for acute hypoxic respiratory failure 2/2 COVID-19. ET placed on 4/11 to allow for proning. Has been intermittently proned since ET intubation.

## 2020-04-17 NOTE — ASSESSMENT & PLAN NOTE
Patient with Na of 150 on BMP today.    Plan  - F/u repeat BMP to recheck  - Start D5W if still elevated or trending up.  - Current free water deficit 2.7L

## 2020-04-17 NOTE — SUBJECTIVE & OBJECTIVE
Past Medical History:   Diagnosis Date    Diabetes mellitus     Hypertension        History reviewed. No pertinent surgical history.    Review of patient's allergies indicates:  No Known Allergies    No current facility-administered medications on file prior to encounter.      Current Outpatient Medications on File Prior to Encounter   Medication Sig    aspirin 81 MG Chew Take 81 mg by mouth once daily.    fenofibrate 160 MG Tab Take 160 mg by mouth once daily.    metoprolol tartrate (LOPRESSOR) 50 MG tablet Take 50 mg by mouth 2 (two) times daily.    rosuvastatin (CRESTOR) 10 MG tablet Take 5 mg by mouth once daily.    telmisartan (MICARDIS) 40 MG Tab Take 40 mg by mouth once daily.     Family History     None        Tobacco Use    Smoking status: Never Smoker   Substance and Sexual Activity    Alcohol use: Not on file    Drug use: Not on file    Sexual activity: Not on file     Review of Systems   Unable to perform ROS: intubated     Objective:     Vital Signs (Most Recent):  Temp: 99 °F (37.2 °C) (04/17/20 1400)  Pulse: 66 (04/17/20 1400)  Resp: (!) 35 (04/17/20 1400)  BP: 104/63 (04/17/20 0900)  SpO2: 98 % (04/17/20 1400) Vital Signs (24h Range):  Temp:  [92.8 °F (33.8 °C)-99 °F (37.2 °C)] 99 °F (37.2 °C)  Pulse:  [46-80] 66  Resp:  [12-35] 35  SpO2:  [88 %-100 %] 98 %  BP: ()/(58-83) 104/63  Arterial Line BP: ()/(44-85) 84/44     Weight: 63 kg (138 lb 14.2 oz)  Body mass index is 23.84 kg/m².    SpO2: 98 %  O2 Device (Oxygen Therapy): ventilator      Intake/Output Summary (Last 24 hours) at 4/17/2020 1504  Last data filed at 4/17/2020 1300  Gross per 24 hour   Intake 2813.01 ml   Output 4260 ml   Net -1446.99 ml       Lines/Drains/Airways     Central Venous Catheter Line            Percutaneous Central Line Insertion/Assessment - Triple Lumen  04/11/20 0137 right femoral vein 6 days          Drain                 Urethral Catheter 04/10/20 2329 Double-lumen 6 days         NG/OG Tube  04/12/20 1000 18 Fr. Right nostril 5 days          Airway                 Airway - Non-Surgical 04/11/20 1525 Endotracheal Tube 5 days          Arterial Line                 Arterial Line 04/11/20 0242 Left Radial 6 days          Peripheral Intravenous Line                 Peripheral IV - Single Lumen 04/14/20 0546 20 G Right Wrist 3 days                Physical Exam   Nursing note and vitals reviewed.  Physical exam not performed to limit exposure to COVID-19    Significant Labs:   CMP   Recent Labs   Lab 04/15/20  1710 04/16/20  0317 04/17/20  0427 04/17/20  1040    137 150* 151*   K 4.6 4.4 4.6 4.5   CL 94* 93* 104 104   CO2 32* 34* 37* 41*   * 207* 136* 167*   BUN 16 15 13 14   CREATININE 0.7 0.8 0.6 0.7   CALCIUM 8.0* 8.2* 8.7 8.6*   PROT 6.3 6.5 6.5  --    ALBUMIN 1.6* 1.8* 1.5*  --    BILITOT 0.4 0.4 0.3  --    ALKPHOS 106 117 126  --    AST 49* 51* 31  --    ALT 48* 51* 45*  --    ANIONGAP 11 10 9 6*   ESTGFRAFRICA >60.0 >60.0 >60.0 >60.0   EGFRNONAA >60.0 >60.0 >60.0 >60.0   , CBC   Recent Labs   Lab 04/16/20  0317 04/17/20  0427   WBC 13.01* 11.28   HGB 11.0* 11.0*   HCT 36.1* 37.5*   * 319    and Troponin   Recent Labs   Lab 04/15/20  1650 04/17/20  0213 04/17/20  1040   TROPONINI 0.007 <0.006 0.058*       Significant Imaging: Echocardiogram:   Transthoracic echo (TTE) complete (Cupid Only):   Results for orders placed or performed during the hospital encounter of 04/10/20   Echo Color Flow Doppler? Yes   Result Value Ref Range    Ascending aorta 3.18 cm    STJ 2.91 cm    AV mean gradient 3 mmHg    Ao peak sunday 1.13 m/s    Ao VTI 18.84 cm    IVS 0.86 0.6 - 1.1 cm    LA size 3.12 cm    Left Atrium Major Axis 4.55 cm    Left Atrium Minor Axis 4.17 cm    LVIDD 4.03 3.5 - 6.0 cm    LVIDS 3.09 2.1 - 4.0 cm    LVOT diameter 2.11 cm    LVOT peak VTI 11.71 cm    PW 0.79 0.6 - 1.1 cm    RA Major Axis 4.59 cm    RA Width 3.52 cm    RVDD 3.40 cm    Sinus 3.18 cm    TR Max Sunday 3.06 m/s    LA WIDTH  4.16 cm    LV Diastolic Volume 71.39 mL    LV Systolic Volume 37.75 mL    LVOT peak abran 0.69 m/s    FS 23 %    LA volume 48.01 cm3    LV mass 98.61 g    Left Ventricle Relative Wall Thickness 0.39 cm    AV valve area 2.17 cm2    AV Velocity Ratio 0.61     AV index (prosthetic) 0.62     LVOT area 3.5 cm2    LVOT stroke volume 40.93 cm3    AV peak gradient 5 mmHg    LV Systolic Volume Index 22.5 mL/m2    LV Diastolic Volume Index 42.60 mL/m2    LA Volume Index 28.6 mL/m2    LV Mass Index 59 g/m2    Triscuspid Valve Regurgitation Peak Gradient 37 mmHg    BSA 1.69 m2    Narrative    · Low normal left ventricular systolic function. The estimated ejection   fraction is 50%.  · Mildly reduced right ventricular systolic function.  · Indeterminate left ventricular diastolic function.  · Mild tricuspid regurgitation.  · Mechanically ventilated; cannot use inferior caval vein diameter to   estimate central venous pressure.

## 2020-04-17 NOTE — PROGRESS NOTES
"  [facility]  Adult Nutrition  Consult Note    SUMMARY     Recommendations    1. When medically feasible, resume enteral nutrition. Rec'd Glucerna 1.5 @ 50 mL/hr to provide 1800 kcals, 99 g of protein, 911 mL fluid.   2. RD to monitor & follow-up.    Goals: Meet % EEN, EPN by RD f/u date  Nutrition Goal Status: goal not met  Communication of RD Recs: reviewed with RN    Reason for Assessment    Reason For Assessment: RD follow-up  Diagnosis: other (see comments)(COVID19)  Relevant Medical History: HTN, DM  Interdisciplinary Rounds: did not attend    General Information Comments: RD working remotely, +COVID19. Remains intubated, sedated. TFs held, NGT placed to suction. Pt supined this AM. Unsure of PO intake PTA/UBW, no weight history in chart. Due to recent hospital wide restrictions to limit the transfer of COVID-19, no NFPE performed at this time. NFPE to be performed at a later date.  Nutrition Discharge Planning: Unable to determine    Nutrition/Diet History    Factors Affecting Nutritional Intake: NPO, on mechanical ventilation    Anthropometrics    Temp: (!) 94.1 °F (34.5 °C)  Height Method: Stated  Height: 5' 4" (162.6 cm)  Height (inches): 64 in  Weight Method: Stated  Weight: 63 kg (138 lb 14.2 oz)  Weight (lb): 138.89 lb  Ideal Body Weight (IBW), Male: 130 lb  % Ideal Body Weight, Male (lb): 106.84 %  BMI (Calculated): 23.8  BMI Grade: 18.5-24.9 - normal  Usual Body Weight (UBW), kg: (JAMMIE)    Lab/Procedures/Meds    Pertinent Labs Reviewed: reviewed  Pertinent Labs Comments: Na 150  Pertinent Medications Reviewed: reviewed  Pertinent Medications Comments: Precedex, Heparin, Dilaudid, Insulin, Levophed, Propofol, Norcuron    Estimated/Assessed Needs    Weight Used For Calorie Calculations: 63 kg (138 lb 14.2 oz)     Energy Calorie Requirements (kcal): 1851 kcal/d  Energy Need Method: [facility]     Protein Requirements: 76-95 g/d (1.2-1.5 g/kg)  Weight Used For Protein " Calculations: 63.5 kg (139 lb 15.9 oz)     Estimated Fluid Requirement Method: other (see comments)(Per MD or 1 mL/kcal)     CHO Requirement: 50% total kcals    Nutrition Prescription Ordered    Current Diet Order: NPO  Current Nutrition Support Formula Ordered: Diabetisource AC    Evaluation of Received Nutrient/Fluid Intake    Other Calories (kcal): 504 (Propofol)    Comments: LBM: unknown    Nutrition Risk    Level of Risk/Frequency of Follow-up: (2x/week)     Assessment and Plan    Nutrition Problem  Inadequate energy intake     Related to (etiology):   Inability to consume sufficient energy      Signs and Symptoms (as evidenced by):   NPO     Interventions(treatment strategy):  Collaboration of nutrition care w/ other providers      Nutrition Diagnosis Status:   Continues     Monitor and Evaluation    Food and Nutrient Intake: energy intake, food and beverage intake, enteral nutrition intake  Food and Nutrient Adminstration: diet order, enteral and parenteral nutrition administration  Physical Activity and Function: nutrition-related ADLs and IADLs  Anthropometric Measurements: weight change, weight  Biochemical Data, Medical Tests and Procedures: inflammatory profile, lipid profile, glucose/endocrine profile, gastrointestinal profile, electrolyte and renal panel  Nutrition-Focused Physical Findings: overall appearance     Nutrition Follow-Up    RD Follow-up?: Yes

## 2020-04-17 NOTE — ASSESSMENT & PLAN NOTE
Patient with HR in 40s to mid 50's  Suspect likely due to hypothermia (T 93.6F) vs. Sedative medications (Precedex) vs. Sepsis  EKG with HR 50s, normal axis, normal intervals, ? ST elevation 1 & AVL (2mm)  ECHO 4/16: EF 50%, mild RV dysfunction  Troponin < 0.06 wnl    Plan  - Apply Heating pads and warming blanket  - Wean off sedation and paralytics as tolerated  - F/u repeat blood cultures and lactic acid   - F/u repeat EKG  - Continue Heparin ggt, ASA 81mg & Atorvastatin 40mg  - F/u TSH & T4  - Consult to cardiology for further input.

## 2020-04-17 NOTE — HPI
Mr. Sanchez is a 54 year old male with HTN that is currently admitted for acute hypoxic respiratory failure. Patient is positive for COVID-19 so consult was performed by reviewing notes, labs, and imaging to minimize exposure to virus.    Patient is a  on a [employer] [place]. He was seen by the physician on board for fever and SOB. He was noted to have bibasilar crackles, but SpO2 90s on RA. Flu negative. He was quarantined in his room but developed worsening SOB. He was [place] [facility] [place] evaluation. Upon arrival, he was found to be hypoxic down to 78%. He was initially placed on NRB, titrated up to BiPAP, attempted intubation but had difficulty, and ultimately underwent cricothyrotomy. Patient tested positive for COVID-19.

## 2020-04-17 NOTE — ASSESSMENT & PLAN NOTE
- On propofol, dialudid Precedex and ketamine  - Had a couple episodes of seizure like activity with right upward gaze, gave 2 of ativan and resolved, if occurs again will hardeep EEG before another dose of ativan   - 4/16 patient with problems with sedation started on ketamine, paralyzed and sedated  - 4/17 supinated this morning, plan to wean off paralytics

## 2020-04-17 NOTE — ASSESSMENT & PLAN NOTE
Respiratory distress   Acute hypoxemic respiratory failure    - Patient intubated with crich which was switched to ETT on 4/11  - proning days 4/12, 4/13,4/14, 4/16  - Completed treatment with Plaquenil and Azithromycin  - Methyprednisone for 5 days, completed  - Daily CRP, d-dimer, ferritin, and CK-MB to trend inflammatory response.   - Strict I/O's and goal net neutral status to help optimize vent mechanics.   - Special isolation and aspiration precautions ordered.   - Will continue rocephin for now

## 2020-04-17 NOTE — SUBJECTIVE & OBJECTIVE
Interval History/Significant Events: Continues to require pronning.Plan to supinate this morning.    Review of Systems   Unable to perform ROS: Intubated     Objective:     Vital Signs (Most Recent):  Temp: (!) 92.8 °F (33.8 °C) (04/17/20 0701)  Pulse: (!) 50 (04/17/20 0800)  Resp: (!) 28 (04/17/20 0800)  BP: (!) 90/58 (04/17/20 0800)  SpO2: 99 % (04/17/20 0800) Vital Signs (24h Range):  Temp:  [92.8 °F (33.8 °C)-98.9 °F (37.2 °C)] 92.8 °F (33.8 °C)  Pulse:  [] 50  Resp:  [12-29] 28  SpO2:  [88 %-100 %] 99 %  BP: ()/(54-83) 90/58  Arterial Line BP: ()/(49-85) 93/53   Weight: 63 kg (139 lb)  Body mass index is 23.86 kg/m².      Intake/Output Summary (Last 24 hours) at 4/17/2020 0850  Last data filed at 4/17/2020 0800  Gross per 24 hour   Intake 3122.7 ml   Output 6150 ml   Net -3027.3 ml       Physical Exam   Constitutional: No distress. He is sedated, chemically paralyzed and intubated.   HENT:   Head: Normocephalic.   crich removed and dressing clean dry and intact    Eyes: EOM are normal.   Cardiovascular: Normal rate and regular rhythm.   Pulmonary/Chest: Effort normal. He is intubated.   Abdominal: Soft.   Neurological:   Sedated    Skin: Skin is warm. He is not diaphoretic.   Nursing note and vitals reviewed.      Vents:  Vent Mode: A/C (04/17/20 0725)  Ventilator Initiated: Yes (04/10/20 2247)  Set Rate: 28 BPM (04/17/20 0725)  Vt Set: 260 mL (04/17/20 0725)  PEEP/CPAP: 10 cmH20 (04/17/20 0725)  Oxygen Concentration (%): 50 (04/17/20 0800)  Peak Airway Pressure: 31 cmH2O (04/17/20 0725)  Plateau Pressure: 28 cmH20 (04/17/20 0725)  Total Ve: 7.33 mL (04/17/20 0725)  F/VT Ratio<105 (RSBI): 107.28 (04/17/20 0725)  Lines/Drains/Airways     Central Venous Catheter Line            Percutaneous Central Line Insertion/Assessment - Triple Lumen  04/11/20 0137 right femoral vein 6 days          Drain                 Urethral Catheter 04/10/20 2329 Double-lumen 6 days         NG/OG Tube 04/12/20 1000 18  Fr. Right nostril 4 days          Airway                 Airway - Non-Surgical 04/11/20 1525 Endotracheal Tube 5 days          Arterial Line                 Arterial Line 04/11/20 0242 Left Radial 6 days          Peripheral Intravenous Line                 Peripheral IV - Single Lumen 04/14/20 0546 20 G Right Wrist 3 days              Significant Labs:    CBC/Anemia Profile:  Recent Labs   Lab 04/16/20 0317 04/17/20  0427   WBC 13.01* 11.28   HGB 11.0* 11.0*   HCT 36.1* 37.5*   * 319   MCV 92 96   RDW 14.0 14.0        Chemistries:  Recent Labs   Lab 04/15/20  1710 04/16/20 0317 04/17/20  0427    137 150*   K 4.6 4.4 4.6   CL 94* 93* 104   CO2 32* 34* 37*   BUN 16 15 13   CREATININE 0.7 0.8 0.6   CALCIUM 8.0* 8.2* 8.7   ALBUMIN 1.6* 1.8* 1.5*   PROT 6.3 6.5 6.5   BILITOT 0.4 0.4 0.3   ALKPHOS 106 117 126   ALT 48* 51* 45*   AST 49* 51* 31   MG 2.3 2.5 2.9*   PHOS 4.0 2.7 3.4       ABGs:   Recent Labs   Lab 04/16/20  2158   PH 7.359   PCO2 89.5*   HCO3 50.5*   POCSATURATED 91*   BE 25     Blood Culture: No results for input(s): LABBLOO in the last 48 hours.  Coagulation:   Recent Labs   Lab 04/17/20 0427   APTT 65.3*     Lactic Acid: No results for input(s): LACTATE in the last 48 hours.  Respiratory Culture: No results for input(s): GSRESP, RESPIRATORYC in the last 48 hours.    Significant Imaging:  n/a

## 2020-04-17 NOTE — PROGRESS NOTES
[facility]  Critical Care Medicine  Progress Note    Patient Name: Ranjana Sanchez  MRN: [id number]  Admission Date: 4/10/2020  Hospital Length of Stay: 7 days  Code Status: Full Code  Attending Provider: Jorge Pulliam MD  Primary Care Provider: Primary Doctor No   Principal Problem: COVID-19 virus detected    Subjective:     HPI:  54y.o male with PMH of HTN brought to ER via EMS for increasing SOB with known covid-19 exposure. 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 They have not had travelers since 3/14. 2 days ago he started having fever and sob. He was seen by the ship's physician and was found to have crackles in his bases bilaterally, but his oxygen saturation was in the 90's. He had a flu test which was negative. He was quarantined in a room and states that he got worse with increasing SOB. He was brought to the ER and found to be 78% on RA. He was started on NRB and initially was doing much better, but his tachypnea did not improve, so he was placed on bipap. His sat is currently 94%.    Hospital/ICU Course:  Patient was admitted to the MICU agyer a difficult intubation in the ER s/p crich. Covid positive with ards, sedated and paralyzed  Unable to prone secondary to crich. Left mainsyem intubation which was pulled off, on NO and levophed. ET tube replaced on 4/11 family contacted in [place]. Proned on 4/12 tolerated well and continued to prone on 4/14.     Interval History/Significant Events: Continues to require pronning.Plan to supinate this morning.    Review of Systems   Unable to perform ROS: Intubated     Objective:     Vital Signs (Most Recent):  Temp: (!) 92.8 °F (33.8 °C) (04/17/20 0701)  Pulse: (!) 50 (04/17/20 0800)  Resp: (!) 28 (04/17/20 0800)  BP: (!) 90/58 (04/17/20 0800)  SpO2: 99 % (04/17/20 0800) Vital Signs (24h Range):  Temp:  [92.8 °F (33.8 °C)-98.9 °F (37.2 °C)] 92.8 °F (33.8 °C)  Pulse:  [] 50  Resp:  [12-29] 28  SpO2:  [88 %-100 %] 99 %  BP:  ()/(54-83) 90/58  Arterial Line BP: ()/(49-85) 93/53   Weight: 63 kg (139 lb)  Body mass index is 23.86 kg/m².      Intake/Output Summary (Last 24 hours) at 4/17/2020 0850  Last data filed at 4/17/2020 0800  Gross per 24 hour   Intake 3122.7 ml   Output 6150 ml   Net -3027.3 ml       Physical Exam   Constitutional: No distress. He is sedated, chemically paralyzed and intubated.   HENT:   Head: Normocephalic.   crich removed and dressing clean dry and intact    Eyes: EOM are normal.   Cardiovascular: Normal rate and regular rhythm.   Pulmonary/Chest: Effort normal. He is intubated.   Abdominal: Soft.   Neurological:   Sedated    Skin: Skin is warm. He is not diaphoretic.   Nursing note and vitals reviewed.      Vents:  Vent Mode: A/C (04/17/20 0725)  Ventilator Initiated: Yes (04/10/20 2247)  Set Rate: 28 BPM (04/17/20 0725)  Vt Set: 260 mL (04/17/20 0725)  PEEP/CPAP: 10 cmH20 (04/17/20 0725)  Oxygen Concentration (%): 50 (04/17/20 0800)  Peak Airway Pressure: 31 cmH2O (04/17/20 0725)  Plateau Pressure: 28 cmH20 (04/17/20 0725)  Total Ve: 7.33 mL (04/17/20 0725)  F/VT Ratio<105 (RSBI): 107.28 (04/17/20 0725)  Lines/Drains/Airways     Central Venous Catheter Line            Percutaneous Central Line Insertion/Assessment - Triple Lumen  04/11/20 0137 right femoral vein 6 days          Drain                 Urethral Catheter 04/10/20 2329 Double-lumen 6 days         NG/OG Tube 04/12/20 1000 18 Fr. Right nostril 4 days          Airway                 Airway - Non-Surgical 04/11/20 1525 Endotracheal Tube 5 days          Arterial Line                 Arterial Line 04/11/20 0242 Left Radial 6 days          Peripheral Intravenous Line                 Peripheral IV - Single Lumen 04/14/20 0546 20 G Right Wrist 3 days              Significant Labs:    CBC/Anemia Profile:  Recent Labs   Lab 04/16/20  0317 04/17/20  0427   WBC 13.01* 11.28   HGB 11.0* 11.0*   HCT 36.1* 37.5*   * 319   MCV 92 96   RDW 14.0 14.0         Chemistries:  Recent Labs   Lab 04/15/20  1710 04/16/20  0317 04/17/20  0427    137 150*   K 4.6 4.4 4.6   CL 94* 93* 104   CO2 32* 34* 37*   BUN 16 15 13   CREATININE 0.7 0.8 0.6   CALCIUM 8.0* 8.2* 8.7   ALBUMIN 1.6* 1.8* 1.5*   PROT 6.3 6.5 6.5   BILITOT 0.4 0.4 0.3   ALKPHOS 106 117 126   ALT 48* 51* 45*   AST 49* 51* 31   MG 2.3 2.5 2.9*   PHOS 4.0 2.7 3.4       ABGs:   Recent Labs   Lab 04/16/20  2158   PH 7.359   PCO2 89.5*   HCO3 50.5*   POCSATURATED 91*   BE 25     Blood Culture: No results for input(s): LABBLOO in the last 48 hours.  Coagulation:   Recent Labs   Lab 04/17/20 0427   APTT 65.3*     Lactic Acid: No results for input(s): LACTATE in the last 48 hours.  Respiratory Culture: No results for input(s): GSRESP, RESPIRATORYC in the last 48 hours.    Significant Imaging:  n/a      ABG  Recent Labs   Lab 04/16/20  2158   PH 7.359   PO2 70*   PCO2 89.5*   HCO3 50.5*   BE 25     Assessment/Plan:     Neuro  Sedated  - On propofol, dialudid Precedex and ketamine  - Had a couple episodes of seizure like activity with right upward gaze, gave 2 of ativan and resolved, if occurs again will hardeep EEG before another dose of ativan   - 4/16 patient with problems with sedation started on ketamine, paralyzed and sedated  - 4/17 supinated this morning, plan to wean off paralytics      Pulmonary  Acute hypoxemic respiratory failure  - Likely 2/2 to ARDS due to COVID-19  - ARDSNET Protocol  - F/u ABG to determine need for reproning    Cardiac/Vascular  Bradycardia  Patient with HR in 40s to mid 50's  Suspect likely due to hypothermia (T 93.6F) vs. Sedative medications (Precedex) vs. Sepsis  EKG with HR 50s, normal axis, normal intervals, ? ST elevation 1 & AVL (2mm)  ECHO 4/16: EF 50%, mild RV dysfunction  Troponin < 0.06 wnl    Plan  - Apply Heating pads and warming blanket  - Wean off sedation and paralytics as tolerated  - F/u repeat blood cultures and lactic acid   - F/u repeat EKG  - Continue Heparin  ggt, ASA 81mg & Atorvastatin 40mg  - F/u TSH & T4  - Consult to cardiology for further input.      Hypertension  - Was in shock after intubation   - holding home toprol XL 50 mg       Renal/  Hypernatremia  Patient with Na of 150 on BMP today.    Plan  - F/u repeat BMP to recheck  - Start D5W if still elevated or trending up.  - Current free water deficit 2.7L    Endocrine  Type 2 diabetes mellitus without complication, without long-term current use of insulin  - HbA1c 6  - Home DM regimen:  Diet controlled  - Started on Insulin ggt on 4/16 due to uncontrolled BG  - Continue insulin ggt until shock resolves    Other  * COVID-19 virus detected  Respiratory distress   Acute hypoxemic respiratory failure    - Patient intubated with crich which was switched to ETT on 4/11  - proning days 4/12, 4/13,4/14, 4/16  - Completed treatment with Plaquenil and Azithromycin  - Methyprednisone for 5 days, completed  - Daily CRP, d-dimer, ferritin, and CK-MB to trend inflammatory response.   - Strict I/O's and goal net neutral status to help optimize vent mechanics.   - Special isolation and aspiration precautions ordered.   - Will continue rocephin for now        Shock  - Now off pressors, MAP goal > 65   - Completed treatment with Plaquenil and Azithromycin.  - Previously on Cefepime, now on Azithromycin #7  - Blood cultures NGTD. F/u repeat cultures  - Stopped vancomycin 4/14  - Lactic acid wnl    Critical secondary to Patient has a condition that poses threat to life and bodily function: Severe Respiratory Distress      Critical care was time spent personally by me on the following activities: development of treatment plan with patient or surrogate and bedside caregivers, discussions with consultants, evaluation of patient's response to treatment, examination of patient, ordering and performing treatments and interventions, ordering and review of laboratory studies, ordering and review of radiographic studies, pulse oximetry,  re-evaluation of patient's condition. This critical care time did not overlap with that of any other provider or involve time for any procedures.     Kalee Mei,   Critical Care Medicine  [facility]

## 2020-04-18 NOTE — ASSESSMENT & PLAN NOTE
- On propofol, dialudid Precedex and ketamine ( will discuss phenobarbital  Today to help wean off propfol )  - Had a couple episodes of seizure like activity with right upward gaze, gave 2 of ativan and resolved, EEg was negative for seizures

## 2020-04-18 NOTE — CONSULTS
Procedure Note  Supine Positioning  Critical Care Medicine       Chief Complaint: COVID-19 virus detected  MRN: [id number]  LOS: 8  Sex: male  Age: 54 y.o.      Last ABG:   Recent Labs   Lab 04/18/20 0224   PH 7.394   PO2 86   PCO2 70.7*   HCO3 43.2*   BE 18       Vital Signs (Most Recent):   Temp: 98.8 °F (37.1 °C) (04/18/20 0824)  Pulse: 95 (04/18/20 0824)  Resp: (!) 36 (04/18/20 0824)  BP: (!) 100/59 (04/18/20 0800)  SpO2: 100 % (04/18/20 0824)    Ventilator Settings:  Vent Mode: A/C  Oxygen Concentration (%):  [] 60  Resp Rate Total:  [26 br/min-35 br/min] 35 br/min  Vt Set:  [260 mL] 260 mL  PEEP/CPAP:  [10 gvC44-27 cmH20] 12 cmH20  Mean Airway Pressure:  [16 fzH18-08 cmH20] 18 cmH20        Indication: Severe, refractory ARDS. High risk for deterioration during supination procedure in need of direct monitoring by Critical Care personnel.     Prior to beginning the procedure, all appropriate equipment and personnel were gathered in the room. Two individuals were placed on each side of the patient and two respiratory therapists stood at the head of the bed and was dedicated to the management of the head of the patient, the endotracheal tube, and the ventilator lines. The person at the head of the bed coordinated the steps of the supination procedure at the direction of Critical Care team members at the bedside. The patient was first log-rolled 90 degrees onto their side away from the direction of their central venous catheter. Cardiac electrodes were then moved from the patient's posterior back to the anterior. Once properly positioned in the bed, another 90 degree log roll was performed placing the patient into the supine position. The patient's arms were placed alongside the body. Continuous pulse oximetry and blood pressure monitoring was performed without any desaturation or hemodynamic instability. The patient tolerated the procedure well.     Total Critical Care time (uninterrupted not including  procedures): 30 minutes

## 2020-04-18 NOTE — PROGRESS NOTES
Pharmacokinetic Initial Assessment: IV Vancomycin    Assessment/Plan:    · Initiate intravenous vancomycin with loading dose of 1250 mg once followed by a maintenance dose of vancomycin 1000mg IV every 12 hours.  · Desired empiric serum trough concentration is 15 to 20 mcg/mL.  · Draw vancomycin trough level 30 min prior to third dose on 4/19 at approximately 1930.    Pharmacy will continue to follow and monitor vancomycin.      Please contact pharmacy at extension [phone] with any questions regarding this assessment.     Thank you for the consult,   Bree Cisneros       Patient brief summary:  Ranjana Sanchez is a 54 y.o. male initiated on antimicrobial therapy with IV Vancomycin for treatment of suspected lower respiratory infection    Drug Allergies:   Review of patient's allergies indicates:  No Known Allergies    Actual Body Weight:   63 kg    Renal Function:   Estimated Creatinine Clearance: 98.7 mL/min (based on SCr of 0.7 mg/dL).,     Dialysis Method (if applicable):  N/A    CBC (last 72 hours):  Recent Labs   Lab Result Units 04/16/20 0317 04/17/20  0427 04/18/20  0346 04/18/20  1745   WBC K/uL 13.01* 11.28 10.16 12.89*   Hemoglobin g/dL 11.0* 11.0* 9.8* 11.4*   Hematocrit % 36.1* 37.5* 33.4* 38.1*   Platelets K/uL 400* 319 349 374*   Gran% % 87.0* 88.0* 93.0* 90.0*   Lymph% % 6.0* 5.0* 5.0* 3.0*   Mono% % 4.0 0.0* 1.0* 2.0*   Eosinophil% % 0.0 0.0 1.0 3.0   Basophil% % 0.0 0.0 0.0 0.0   Differential Method  Manual Manual Manual Manual       Metabolic Panel (last 72 hours):  Recent Labs   Lab Result Units 04/16/20  0317 04/17/20  0427 04/17/20  1040 04/17/20  1730 04/17/20  2250 04/18/20  0346 04/18/20  1200   Sodium mmol/L 137 150* 151* 147* 147* 145  145 141   Potassium mmol/L 4.4 4.6 4.5  --  4.6 4.2  --    Chloride mmol/L 93* 104 104  --  102 103  --    CO2 mmol/L 34* 37* 41*  --  38* 36*  --    Glucose mg/dL 207* 136* 167*  --  210* 130*  --    BUN, Bld mg/dL 15 13 14  --  13 13  --    Creatinine  mg/dL 0.8 0.6 0.7  --  0.7 0.7  --    Albumin g/dL 1.8* 1.5*  --   --   --  1.3*  --    Total Bilirubin mg/dL 0.4 0.3  --   --   --  0.2  --    Alkaline Phosphatase U/L 117 126  --   --   --  119  --    AST U/L 51* 31  --   --   --  26  --    ALT U/L 51* 45*  --   --   --  34  --    Magnesium mg/dL 2.5 2.9*  --   --   --  2.4  --    Phosphorus mg/dL 2.7 3.4  --   --   --  2.6*  --        Drug levels (last 3 results):  No results for input(s): VANCOMYCINRA, VANCOMYCINPE, VANCOMYCINTR in the last 72 hours.    Microbiologic Results:  Microbiology Results (last 7 days)     Procedure Component Value Units Date/Time    Blood culture [[id number]] Collected:  04/18/20 1804    Order Status:  Sent Specimen:  Blood from Peripheral, Upper Arm, Right Updated:  04/18/20 1804    Blood culture [[id number]]     Order Status:  No result Specimen:  Blood     Culture, Respiratory with Gram Stain [[id number]]     Order Status:  No result Specimen:  Respiratory from Endotracheal Aspirate     Blood culture [[id number]] Collected:  04/17/20 1055    Order Status:  Completed Specimen:  Blood from Peripheral, Antecubital, Right Updated:  04/18/20 1212     Blood Culture, Routine No Growth to date      No Growth to date    Blood culture [[id number]] Collected:  04/17/20 1105    Order Status:  Completed Specimen:  Blood from Peripheral, Forearm, Right Updated:  04/18/20 1212     Blood Culture, Routine No Growth to date      No Growth to date    Blood culture [[id number]] Collected:  04/12/20 1140    Order Status:  Completed Specimen:  Blood from Peripheral, Hand, Right Updated:  04/16/20 1412     Blood Culture, Routine No Growth after 4 days.     Blood culture [[id number]] Collected:  04/12/20 1110    Order Status:  Completed Specimen:  Blood from Peripheral, Upper Arm, Right Updated:  04/16/20 1412     Blood Culture, Routine No Growth after 4 days.     Culture, Respiratory with Gram Stain [[id number]] Collected:  04/14/20 0922    Order Status:   Completed Specimen:  Respiratory from Sputum Updated:  04/16/20 1037     Respiratory Culture Normal respiratory samia     Gram Stain (Respiratory) <10 epithelial cells per low power field.     Gram Stain (Respiratory) Rare WBC's     Gram Stain (Respiratory) No organisms seen    Blood culture (site 2) [[id number]] Collected:  04/10/20 2139    Order Status:  Completed Specimen:  Blood from Peripheral, Hand, Right Updated:  04/14/20 2222     Blood Culture, Routine No Growth after 4 days.     Narrative:       Site # 2, aerobic only    Blood culture (site 1) [[id number]] Collected:  04/10/20 2139    Order Status:  Completed Specimen:  Blood from Peripheral, Hand, Left Updated:  04/14/20 2222     Blood Culture, Routine No Growth after 4 days.     Narrative:       Site # 1, aerobic and anaerobic

## 2020-04-18 NOTE — NURSING
Around 1430, pt noted to be increasingly tachycardic, hypertensive, and breathing above ventilator set rate with accessory muscle use. MDs called and verbal okay received to turn back on paralytic with anticipation of proning pt at 1600. HR increased to 130s and systolic BP increased to >250. Pt febrile; PRN acetaminophen administered. MD Christiano and MD Jamin present at bedside. Cardene gtt and IVP hydralazine ordered and administered; see MAR. Pt also had episodes of desaturation into mid-80s, FiO2 increased to 100% and then weaned to 90% on ventilator. Per MD Christiano, ketamine gtt stopped. Pt remains tachycardic with systolic BP in 150s on maximum cardene at this time. Will continue to closely monitor.

## 2020-04-18 NOTE — PROGRESS NOTES
[facility]  Critical Care Medicine  Progress Note    Patient Name: Ranjana Sanchez  MRN: [id number]  Admission Date: 4/10/2020  Hospital Length of Stay: 8 days  Code Status: Full Code  Attending Provider: Tatyana Judd MD  Primary Care Provider: Primary Doctor No   Principal Problem: COVID-19 virus detected    Subjective:     HPI:  54y.o male with PMH of HTN brought to ER via EMS for increasing SOB with known covid-19 exposure. 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 They have not had travelers since 3/14. 2 days ago he started having fever and sob. He was seen by the ship's physician and was found to have crackles in his bases bilaterally, but his oxygen saturation was in the 90's. He had a flu test which was negative. He was quarantined in a room and states that he got worse with increasing SOB. He was brought to the ER and found to be 78% on RA. He was started on NRB and initially was doing much better, but his tachypnea did not improve, so he was placed on bipap. His sat is currently 94%.    Hospital/ICU Course:  Patient was admitted to the MICU agyer a difficult intubation in the ER s/p crich. Covid positive with ards, sedated and paralyzed  Unable to prone secondary to crich. Left mainsyem intubation which was pulled off, on NO and levophed. ET tube replaced on 4/11 family contacted in [place]. Proned on 4/12 tolerated well and continued to prone on 4/14.     Interval History/Significant Events: supinated this am, permissive hypercapnic. Will reprone at 1600 due to P/F of 100   Review of Systems  Objective:     Vital Signs (Most Recent):  Temp: 97.9 °F (36.6 °C) (04/18/20 1000)  Pulse: 98 (04/18/20 1000)  Resp: (!) 35 (04/18/20 1000)  BP: 130/61 (04/18/20 1000)  SpO2: 95 % (04/18/20 1000) Vital Signs (24h Range):  Temp:  [95 °F (35 °C)-99.7 °F (37.6 °C)] 97.9 °F (36.6 °C)  Pulse:  [59-98] 98  Resp:  [28-36] 35  SpO2:  [88 %-100 %] 95 %  BP: ()/(53-70) 130/61  Arterial Line BP:  ()/(44-65) 148/59   Weight: 63 kg (138 lb 14.2 oz)  Body mass index is 23.84 kg/m².      Intake/Output Summary (Last 24 hours) at 4/18/2020 1056  Last data filed at 4/18/2020 1000  Gross per 24 hour   Intake 2587.84 ml   Output 1970 ml   Net 617.84 ml       Physical Exam   Constitutional: No distress. He is sedated, chemically paralyzed and intubated.   HENT:   Head: Normocephalic.   crich removed and dressing clean dry and intact    Eyes: EOM are normal.   Cardiovascular: Normal rate and regular rhythm.   Pulmonary/Chest: Effort normal. He is intubated.   Abdominal: Soft.   Neurological:   Sedated    Skin: Skin is warm. He is not diaphoretic.   Has bruising in his right arm    Nursing note and vitals reviewed.      Vents:  Vent Mode: A/C (04/18/20 0958)  Ventilator Initiated: Yes (04/10/20 2247)  Set Rate: 35 BPM (04/18/20 0958)  Vt Set: 260 mL (04/18/20 0958)  PEEP/CPAP: 12 cmH20 (04/18/20 0958)  Oxygen Concentration (%): 60 (04/18/20 1000)  Peak Airway Pressure: 35 cmH2O (04/18/20 0958)  Plateau Pressure: 31 cmH20 (04/18/20 0958)  Total Ve: 9.26 mL (04/18/20 0958)  F/VT Ratio<105 (RSBI): 132.58 (04/18/20 0958)  Lines/Drains/Airways     Central Venous Catheter Line            Percutaneous Central Line Insertion/Assessment - Triple Lumen  04/11/20 0137 right femoral vein 7 days          Drain                 Urethral Catheter 04/10/20 2329 Double-lumen 7 days         NG/OG Tube 04/12/20 1000 18 Fr. Right nostril 6 days          Airway                 Airway - Non-Surgical 04/11/20 1525 Endotracheal Tube 6 days          Arterial Line                 Arterial Line 04/11/20 0242 Left Radial 7 days          Peripheral Intravenous Line                 Peripheral IV - Single Lumen 04/14/20 0546 20 G Right Wrist 4 days              Significant Labs:    CBC/Anemia Profile:  Recent Labs   Lab 04/17/20  0427 04/18/20  0346   WBC 11.28 10.16   HGB 11.0* 9.8*   HCT 37.5* 33.4*    349   MCV 96 98   RDW 14.0 14.5         Chemistries:  Recent Labs   Lab 04/17/20 0427 04/17/20  1040 04/17/20 1730 04/17/20 2250 04/18/20  0346   * 151* 147* 147* 145  145   K 4.6 4.5  --  4.6 4.2    104  --  102 103   CO2 37* 41*  --  38* 36*   BUN 13 14  --  13 13   CREATININE 0.6 0.7  --  0.7 0.7   CALCIUM 8.7 8.6*  --  8.2* 8.1*   ALBUMIN 1.5*  --   --   --  1.3*   PROT 6.5  --   --   --  5.8*   BILITOT 0.3  --   --   --  0.2   ALKPHOS 126  --   --   --  119   ALT 45*  --   --   --  34   AST 31  --   --   --  26   MG 2.9*  --   --   --  2.4   PHOS 3.4  --   --   --  2.6*       CMP:   Recent Labs   Lab 04/17/20 0427 04/17/20 1040 04/17/20 1730 04/17/20 2250 04/18/20  0346   * 151* 147* 147* 145  145   K 4.6 4.5  --  4.6 4.2    104  --  102 103   CO2 37* 41*  --  38* 36*   * 167*  --  210* 130*   BUN 13 14  --  13 13   CREATININE 0.6 0.7  --  0.7 0.7   CALCIUM 8.7 8.6*  --  8.2* 8.1*   PROT 6.5  --   --   --  5.8*   ALBUMIN 1.5*  --   --   --  1.3*   BILITOT 0.3  --   --   --  0.2   ALKPHOS 126  --   --   --  119   AST 31  --   --   --  26   ALT 45*  --   --   --  34   ANIONGAP 9 6*  --  7* 6*   EGFRNONAA >60.0 >60.0  --  >60.0 >60.0     All pertinent labs within the past 24 hours have been reviewed.    Significant Imaging:  I have reviewed all pertinent imaging results/findings within the past 24 hours.  I have reviewed and interpreted all pertinent imaging results/findings within the past 24 hours.      ABG  Recent Labs   Lab 04/18/20  0958   PH 7.353   PO2 59*   PCO2 76.8*   HCO3 42.7*   BE 17     Assessment/Plan:     Neuro  Sedated  - On propofol, dialudid Precedex and ketamine ( will discuss phenobarbital  Today to help wean off propfol )  - Had a couple episodes of seizure like activity with right upward gaze, gave 2 of ativan and resolved, EEg was negative for seizures        Pulmonary  Acute hypoxemic respiratory failure  - Likely 2/2 to ARDS due to COVID-19  - ARDSNET  Protocol      Cardiac/Vascular  Bradycardia  Patient with HR in 40s to mid 50's  Suspect likely due to hypothermia (T 93.6F) vs. Sedative medications (Precedex) vs. Sepsis  EKG with HR 50s, normal axis, normal intervals, ? ST elevation 1 & AVL (2mm)  ECHO 4/16: EF 50%, mild RV dysfunction  Troponin < 0.06 wnl    Plan  - Apply Heating pads and warming blanket  - Wean off sedation and paralytics as tolerated  - F/u repeat blood cultures and lactic acid   - F/u repeat EKG  - Continue Heparin ggt, ASA 81mg & Atorvastatin 40mg  - F/u TSH & T4  - Cardiology consulted and suggest treatment for hypothermia       Hypertension  - Was in shock after intubation   - holding home toprol XL 50 mg       Renal/  Hypernatremia  Patient with Na of 150 on BMP today.    Plan  - F/u repeat BMP to recheck  - Started D5W now off  - continue with free water boluses    Endocrine  Type 2 diabetes mellitus without complication, without long-term current use of insulin  - HbA1c 6  - Home DM regimen:  Diet controlled  - Started on Insulin ggt on 4/16 due to uncontrolled BG, on 4/18 with euglycemia switched to detemir and SSI       Other  * COVID-19 virus detected  Respiratory distress   Acute hypoxemic respiratory failure    - Patient intubated with crich which was switched to ETT on 4/11  - proning days 4/12, 4/13,4/14, 4/16, 4/17,4/18  - Completed treatment with Plaquenil and Azithromycin  - Methyprednisone for 5 days, completed  - Daily CRP, d-dimer, ferritin, and CK-MB to trend inflammatory response.   - Strict I/O's and goal net neutral status to help optimize vent mechanics.   - Special isolation and aspiration precautions ordered.   - Will continue rocephin for now        Shock  - Now off pressors, MAP goal > 65   - Completed treatment with Plaquenil and Azithromycin.  - Previously on Cefepime, now on Azithromycin #7  - Blood cultures NGTD. F/u repeat cultures  - Stopped vancomycin 4/14  - Lactic acid wnl         Critical Care Daily  Checklist:    A: Awake: RASS Goal/Actual Goal:    Actual: [place] Agitation Sedation Scale (RASS): Unarousable   B: Spontaneous Breathing Trial Performed? Spon. Breathing Trial Initiated?: Not initiated (04/16/20 1045)   C: SAT & SBT Coordinated?  yes                      D: Delirium: CAM-ICU Overall CAM-ICU: Positive   E: Early Mobility Performed? Yes   F: Feeding Goal: Goals: Meet % EEN, EPN by RD f/u date  Status: Nutrition Goal Status: goal not met   Current Diet Order   Procedures    Diet NPO      AS: Analgesia/Sedation Prop, ketamine, dilaudid    T: Thromboembolic Prophylaxis heparin   H: HOB > 300 Yes   U: Stress Ulcer Prophylaxis (if needed) famotidine   G: Glucose Control SSI   B: Bowel Function     I: Indwelling Catheter (Lines & Torres) Necessity Torres and IJ   D: De-escalation of Antimicrobials/Pharmacotherapies none    Plan for the day/ETD Wean sedation     Code Status:  Family/Goals of Care: Full Code         Critical secondary to Patient has a condition that poses threat to life and bodily function: Severe Respiratory Distress      Critical care was time spent personally by me on the following activities: development of treatment plan with patient or surrogate and bedside caregivers, discussions with consultants, evaluation of patient's response to treatment, examination of patient, ordering and performing treatments and interventions, ordering and review of laboratory studies, ordering and review of radiographic studies, pulse oximetry, re-evaluation of patient's condition. This critical care time did not overlap with that of any other provider or involve time for any procedures.    Patient seen and examined this morning. Discussed with Dr. Judd   - staff attestation to follow.         Dottie Andrade MD  Critical Care Medicine  [facility]

## 2020-04-18 NOTE — ASSESSMENT & PLAN NOTE
- HbA1c 6  - Home DM regimen:  Diet controlled  - Started on Insulin ggt on 4/16 due to uncontrolled BG, on 4/18 with euglycemia switched to detemir and SSI

## 2020-04-18 NOTE — SUBJECTIVE & OBJECTIVE
Interval History/Significant Events: supinated this am, permissive hypercapnic. Will reprone at 1600 due to P/F of 100   Review of Systems  Objective:     Vital Signs (Most Recent):  Temp: 97.9 °F (36.6 °C) (04/18/20 1000)  Pulse: 98 (04/18/20 1000)  Resp: (!) 35 (04/18/20 1000)  BP: 130/61 (04/18/20 1000)  SpO2: 95 % (04/18/20 1000) Vital Signs (24h Range):  Temp:  [95 °F (35 °C)-99.7 °F (37.6 °C)] 97.9 °F (36.6 °C)  Pulse:  [59-98] 98  Resp:  [28-36] 35  SpO2:  [88 %-100 %] 95 %  BP: ()/(53-70) 130/61  Arterial Line BP: ()/(44-65) 148/59   Weight: 63 kg (138 lb 14.2 oz)  Body mass index is 23.84 kg/m².      Intake/Output Summary (Last 24 hours) at 4/18/2020 1056  Last data filed at 4/18/2020 1000  Gross per 24 hour   Intake 2587.84 ml   Output 1970 ml   Net 617.84 ml       Physical Exam   Constitutional: No distress. He is sedated, chemically paralyzed and intubated.   HENT:   Head: Normocephalic.   crich removed and dressing clean dry and intact    Eyes: EOM are normal.   Cardiovascular: Normal rate and regular rhythm.   Pulmonary/Chest: Effort normal. He is intubated.   Abdominal: Soft.   Neurological:   Sedated    Skin: Skin is warm. He is not diaphoretic.   Has bruising in his right arm    Nursing note and vitals reviewed.      Vents:  Vent Mode: A/C (04/18/20 0958)  Ventilator Initiated: Yes (04/10/20 2247)  Set Rate: 35 BPM (04/18/20 0958)  Vt Set: 260 mL (04/18/20 0958)  PEEP/CPAP: 12 cmH20 (04/18/20 0958)  Oxygen Concentration (%): 60 (04/18/20 1000)  Peak Airway Pressure: 35 cmH2O (04/18/20 0958)  Plateau Pressure: 31 cmH20 (04/18/20 0958)  Total Ve: 9.26 mL (04/18/20 0958)  F/VT Ratio<105 (RSBI): 132.58 (04/18/20 0958)  Lines/Drains/Airways     Central Venous Catheter Line            Percutaneous Central Line Insertion/Assessment - Triple Lumen  04/11/20 0137 right femoral vein 7 days          Drain                 Urethral Catheter 04/10/20 2329 Double-lumen 7 days         NG/OG Tube  04/12/20 1000 18 Fr. Right nostril 6 days          Airway                 Airway - Non-Surgical 04/11/20 1525 Endotracheal Tube 6 days          Arterial Line                 Arterial Line 04/11/20 0242 Left Radial 7 days          Peripheral Intravenous Line                 Peripheral IV - Single Lumen 04/14/20 0546 20 G Right Wrist 4 days              Significant Labs:    CBC/Anemia Profile:  Recent Labs   Lab 04/17/20 0427 04/18/20  0346   WBC 11.28 10.16   HGB 11.0* 9.8*   HCT 37.5* 33.4*    349   MCV 96 98   RDW 14.0 14.5        Chemistries:  Recent Labs   Lab 04/17/20 0427 04/17/20  1040 04/17/20  1730 04/17/20 2250 04/18/20  0346   * 151* 147* 147* 145  145   K 4.6 4.5  --  4.6 4.2    104  --  102 103   CO2 37* 41*  --  38* 36*   BUN 13 14  --  13 13   CREATININE 0.6 0.7  --  0.7 0.7   CALCIUM 8.7 8.6*  --  8.2* 8.1*   ALBUMIN 1.5*  --   --   --  1.3*   PROT 6.5  --   --   --  5.8*   BILITOT 0.3  --   --   --  0.2   ALKPHOS 126  --   --   --  119   ALT 45*  --   --   --  34   AST 31  --   --   --  26   MG 2.9*  --   --   --  2.4   PHOS 3.4  --   --   --  2.6*       CMP:   Recent Labs   Lab 04/17/20 0427 04/17/20  1040 04/17/20  1730 04/17/20 2250 04/18/20  0346   * 151* 147* 147* 145  145   K 4.6 4.5  --  4.6 4.2    104  --  102 103   CO2 37* 41*  --  38* 36*   * 167*  --  210* 130*   BUN 13 14  --  13 13   CREATININE 0.6 0.7  --  0.7 0.7   CALCIUM 8.7 8.6*  --  8.2* 8.1*   PROT 6.5  --   --   --  5.8*   ALBUMIN 1.5*  --   --   --  1.3*   BILITOT 0.3  --   --   --  0.2   ALKPHOS 126  --   --   --  119   AST 31  --   --   --  26   ALT 45*  --   --   --  34   ANIONGAP 9 6*  --  7* 6*   EGFRNONAA >60.0 >60.0  --  >60.0 >60.0     All pertinent labs within the past 24 hours have been reviewed.    Significant Imaging:  I have reviewed all pertinent imaging results/findings within the past 24 hours.  I have reviewed and interpreted all pertinent imaging results/findings  within the past 24 hours.

## 2020-04-18 NOTE — ASSESSMENT & PLAN NOTE
Patient with HR in 40s to mid 50's  Suspect likely due to hypothermia (T 93.6F) vs. Sedative medications (Precedex) vs. Sepsis  EKG with HR 50s, normal axis, normal intervals, ? ST elevation 1 & AVL (2mm)  ECHO 4/16: EF 50%, mild RV dysfunction  Troponin < 0.06 wnl    Plan  - Apply Heating pads and warming blanket  - Wean off sedation and paralytics as tolerated  - F/u repeat blood cultures and lactic acid   - F/u repeat EKG  - Continue Heparin ggt, ASA 81mg & Atorvastatin 40mg  - F/u TSH & T4  - Cardiology consulted and suggest treatment for hypothermia

## 2020-04-18 NOTE — ASSESSMENT & PLAN NOTE
Patient with Na of 150 on BMP today.    Plan  - F/u repeat BMP to recheck  - Started D5W now off  - continue with free water boluses

## 2020-04-18 NOTE — ASSESSMENT & PLAN NOTE
Respiratory distress   Acute hypoxemic respiratory failure    - Patient intubated with crich which was switched to ETT on 4/11  - proning days 4/12, 4/13,4/14, 4/16, 4/17,4/18  - Completed treatment with Plaquenil and Azithromycin  - Methyprednisone for 5 days, completed  - Daily CRP, d-dimer, ferritin, and CK-MB to trend inflammatory response.   - Strict I/O's and goal net neutral status to help optimize vent mechanics.   - Special isolation and aspiration precautions ordered.   - Will continue rocephin for now

## 2020-04-18 NOTE — NURSING
Pt supinated from proned position with RT, RNs, and prone team at bedside. No acute events upon supination. Will continue to monitor.

## 2020-04-18 NOTE — CARE UPDATE
Patient supinated per prone team. ETT secured @26. Patient tolerated well. Will continue to monitor.

## 2020-04-18 NOTE — PLAN OF CARE
Problem: Adult Inpatient Plan of Care  Goal: Plan of Care Review  Outcome: Ongoing, Progressing  Flowsheets (Taken 4/18/2020 1515)  Plan of Care Reviewed With: patient; family  Goal: Patient-Specific Goal (Individualization)  Outcome: Ongoing, Progressing  Flowsheets (Taken 4/18/2020 1515)  Individualized Care Needs: Padding of bony prominences for frequent proning/supination  Anxieties, Fears or Concerns: JAMMIE; intubated/medically sedated and paralyzed  Patient-Specific Goals (Include Timeframe): Wean ventilator settings and sedation as tolerated; improvement in ABG  Goal: Absence of Hospital-Acquired Illness or Injury  Outcome: Ongoing, Progressing     Problem: Nutrition Impairment (Mechanical Ventilation, Invasive)  Goal: Optimal Nutrition Delivery  Outcome: Ongoing, Progressing     Problem: Skin and Tissue Injury (Mechanical Ventilation, Invasive)  Goal: Absence of Device-Related Skin and Tissue Injury  Outcome: Ongoing, Progressing     Problem: Inability to Wean (Mechanical Ventilation, Invasive)  Goal: Mechanical Ventilation Liberation  Outcome: Ongoing, Not Progressing     Pt remains intubated and mechanically ventilated at this time; FiO2 = 90%, PEEP = 12, rate = 35, TV = 260. Pt supinated from proned position this morning. Insulin and D5 gtts discontinued per MD orders. Pt medically sedated and paralyzed with vecuronium, propofol, precedex, and dilaudid gtts at this time; ketamine stopped per MD order following episode of instability; see previous note. Heparin and cardene gtts also infusing. VS unstable throughout shift. Pt noted to be increasingly tachycardic and hypertensive; IVP hydralazine administered per MD order and cardene gtt started for BP control. Plan to prone pt at 1600; however, pt too unstable. Continuing to closely monitor pt. VS per flow sheets, plan of care reviewed with pt's family.

## 2020-04-19 PROBLEM — I49.5 TACHYCARDIA-BRADYCARDIA: Status: ACTIVE | Noted: 2020-01-01

## 2020-04-19 NOTE — PROGRESS NOTES
Pt proned at 2030. 3 RNs, 1 RT, and Leigh Cody NP at the bedside. FiO2 increased to 100% during pronation. NAD noted. Current vent settings FiO2 40% PEEP 12. SBP 190s after pronation. Cardene restarted for SBP less than 180. WCTM.

## 2020-04-19 NOTE — ASSESSMENT & PLAN NOTE
- HbA1c 6  - Home DM regimen:  Diet controlled  - Started on Insulin ggt on 4/16 due to uncontrolled BG, on 4/18 with euglycemia switched to detemir and SSI ( TF SSI)

## 2020-04-19 NOTE — ASSESSMENT & PLAN NOTE
Patient with HR in 40s to mid 50's and sometimes in the 130-140   Bradycardia suspect likely due to hypothermia (T 93.6F) vs. Sedative medications (Precedex) vs. Sepsis  EKG with HR 50s, normal axis, normal intervals, ? ST elevation 1 & AVL (2mm)  ECHO 4/16: EF 50%, mild RV dysfunction  Troponin < 0.06 wnl    Plan  - Apply Heating pads and warming blanket when cold   - Wean off sedation and paralytics as tolerated  - F/u repeat blood cultures   - F/u repeat EKG  - Continue Heparin ggt, ASA 81mg & Atorvastatin 40mg  - F/u TSH & T4  - Cardiology consulted and suggest treatment for hypothermia and no other interventions  - considering  withdrawal from alcohol as patient responds well to ativan

## 2020-04-19 NOTE — PROGRESS NOTES
Pharmacokinetic Assessment Follow Up: IV Vancomycin    Vancomycin Regimen Assessment & Plan:  - Continue vancomycin 1000 mg IV q12h.   - Collect trough on 4/20 @0730 prior to 4th total dose, goal 15-20 ug/mL.    Drug levels (last 3 results):  No results for input(s): VANCOMYCINRA, VANCOMYCINPE, VANCOMYCINTR, VANCOTROUGH in the last 72 hours.    Pharmacy will continue to follow and monitor vancomycin.    Please contact pharmacy at [email or web address] [phone] for questions regarding this assessment.    Thank you for the consult,   He Wei       Patient brief summary:  Ranjana Sanchez is a 54 y.o. male initiated on antimicrobial therapy with IV Vancomycin for treatment of lower respiratory infection    Drug Allergies:   Review of patient's allergies indicates:  No Known Allergies    Actual Body Weight:   63 kg    Renal Function:   Estimated Creatinine Clearance: 115.1 mL/min (based on SCr of 0.6 mg/dL).,     Dialysis Method (if applicable):  N/A

## 2020-04-19 NOTE — SUBJECTIVE & OBJECTIVE
Interval History/Significant Events: LINDSAY although in the am he started getting hypertensive and tachycardic despite max sedation, ativan given and valium started.     Review of Systems  Objective:     Vital Signs (Most Recent):  Temp: 96.8 °F (36 °C) (04/19/20 1000)  Pulse: 75 (04/19/20 1000)  Resp: (!) 35 (04/19/20 1000)  BP: (!) 99/57 (04/19/20 1000)  SpO2: 99 % (04/19/20 1000) Vital Signs (24h Range):  Temp:  [96.8 °F (36 °C)-100.8 °F (38.2 °C)] 96.8 °F (36 °C)  Pulse:  [] 75  Resp:  [0-53] 35  SpO2:  [88 %-100 %] 99 %  BP: ()/(50-84) 99/57  Arterial Line BP: ()/(48-76) 104/48   Weight: 63 kg (138 lb 14.2 oz)  Body mass index is 23.84 kg/m².      Intake/Output Summary (Last 24 hours) at 4/19/2020 1028  Last data filed at 4/19/2020 1000  Gross per 24 hour   Intake 3935.69 ml   Output 3960 ml   Net -24.31 ml       Physical Exam   Constitutional: No distress. He is sedated, chemically paralyzed and intubated.   HENT:   Head: Normocephalic.   crich removed and dressing clean dry and intact    Eyes: EOM are normal.   Cardiovascular: Normal rate and regular rhythm.   Pulmonary/Chest: Effort normal. He is intubated.   Abdominal: Soft.   Neurological:   Sedated    Skin: Skin is warm. He is not diaphoretic.   Has bruising in his right arm    Nursing note and vitals reviewed.      Vents:  Vent Mode: A/C (04/19/20 0900)  Ventilator Initiated: Yes (04/10/20 2247)  Set Rate: 35 BPM (04/19/20 0900)  Vt Set: 260 mL (04/19/20 0900)  PEEP/CPAP: 12 cmH20 (04/19/20 0900)  Oxygen Concentration (%): 60 (04/19/20 1000)  Peak Airway Pressure: 36 cmH2O (04/19/20 0900)  Plateau Pressure: 34 cmH20 (04/19/20 0900)  Total Ve: 9.25 mL (04/19/20 0900)  F/VT Ratio<105 (RSBI): 132.08 (04/19/20 0900)  Lines/Drains/Airways     Central Venous Catheter Line            Percutaneous Central Line Insertion/Assessment - Triple Lumen  04/11/20 0137 right femoral vein 8 days          Drain                 NG/OG Tube 04/12/20 1000 18  Fr. Right nostril 7 days         Urethral Catheter 04/18/20 1854 less than 1 day          Airway                 Airway - Non-Surgical 04/11/20 1525 Endotracheal Tube 7 days          Arterial Line                 Arterial Line 04/11/20 0242 Left Radial 8 days          Peripheral Intravenous Line                 Peripheral IV - Single Lumen 04/14/20 0546 20 G Right Wrist 5 days              Significant Labs:    CBC/Anemia Profile:  Recent Labs   Lab 04/18/20 0346 04/18/20 1745 04/19/20  0338   WBC 10.16 12.89* 12.96*   HGB 9.8* 11.4* 10.2*   HCT 33.4* 38.1* 34.3*    374* 366*   MCV 98 98 97   RDW 14.5 14.4 13.9        Chemistries:  Recent Labs   Lab 04/18/20 0346 04/18/20 1745 04/19/20  0020 04/19/20  0338 04/19/20  0545     145   < > 139 136 135* 135*   K 4.2  --  4.3  --  4.7  --      --  96  --  92*  --    CO2 36*  --  34*  --  34*  --    BUN 13  --  9  --  10  --    CREATININE 0.7  --  0.7  --  0.6  --    CALCIUM 8.1*  --  8.3*  --  8.6*  --    ALBUMIN 1.3*  --   --   --  1.5*  --    PROT 5.8*  --   --   --  6.3  --    BILITOT 0.2  --   --   --  0.5  --    ALKPHOS 119  --   --   --  176*  --    ALT 34  --   --   --  42  --    AST 26  --   --   --  36  --    MG 2.4  --  1.7  --  2.0  --    PHOS 2.6*  --   --   --  3.3  --     < > = values in this interval not displayed.       All pertinent labs within the past 24 hours have been reviewed.    Significant Imaging:  I have reviewed all pertinent imaging results/findings within the past 24 hours.  I have reviewed and interpreted all pertinent imaging results/findings within the past 24 hours.

## 2020-04-19 NOTE — PROGRESS NOTES
[facility]  Critical Care Medicine  Progress Note    Patient Name: Ranjana Sanchez  MRN: [id number]  Admission Date: 4/10/2020  Hospital Length of Stay: 9 days  Code Status: Full Code  Attending Provider: Emily Blount MD  Primary Care Provider: Primary Doctor No   Principal Problem: COVID-19 virus detected    Subjective:     HPI:  54y.o male with PMH of HTN brought to ER via EMS for increasing SOB with known covid-19 exposure. 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 They have not had travelers since 3/14. 2 days ago he started having fever and sob. He was seen by the ship's physician and was found to have crackles in his bases bilaterally, but his oxygen saturation was in the 90's. He had a flu test which was negative. He was quarantined in a room and states that he got worse with increasing SOB. He was brought to the ER and found to be 78% on RA. He was started on NRB and initially was doing much better, but his tachypnea did not improve, so he was placed on bipap. His sat is currently 94%.    Hospital/ICU Course:  Patient was admitted to the MICU agyer a difficult intubation in the ER s/p crich. Covid positive with ards, sedated and paralyzed  Unable to prone secondary to crich. Left mainsyem intubation which was pulled off, on NO and levophed. ET tube replaced on 4/11 family contacted in [place]. Proned on 4/12 tolerated well and continued to prone on 4/19. 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 He was on and off ketamine as well.  EEG ordered to rule out seizures as he also had seizure like activity that resolved with ativan. Currently changing sedation, was placed on phenobarbital which should start having effect 4/20    Interval History/Significant Events: NAEON although in the am he started getting hypertensive and tachycardic despite max sedation, ativan given  and valium started.     Review of Systems  Objective:     Vital Signs (Most Recent):  Temp: 96.8 °F (36 °C) (04/19/20 1000)  Pulse: 75 (04/19/20 1000)  Resp: (!) 35 (04/19/20 1000)  BP: (!) 99/57 (04/19/20 1000)  SpO2: 99 % (04/19/20 1000) Vital Signs (24h Range):  Temp:  [96.8 °F (36 °C)-100.8 °F (38.2 °C)] 96.8 °F (36 °C)  Pulse:  [] 75  Resp:  [0-53] 35  SpO2:  [88 %-100 %] 99 %  BP: ()/(50-84) 99/57  Arterial Line BP: ()/(48-76) 104/48   Weight: 63 kg (138 lb 14.2 oz)  Body mass index is 23.84 kg/m².      Intake/Output Summary (Last 24 hours) at 4/19/2020 1028  Last data filed at 4/19/2020 1000  Gross per 24 hour   Intake 3935.69 ml   Output 3960 ml   Net -24.31 ml       Physical Exam   Constitutional: No distress. He is sedated, chemically paralyzed and intubated.   HENT:   Head: Normocephalic.   crich removed and dressing clean dry and intact    Eyes: EOM are normal.   Cardiovascular: Normal rate and regular rhythm.   Pulmonary/Chest: Effort normal. He is intubated.   Abdominal: Soft.   Neurological:   Sedated    Skin: Skin is warm. He is not diaphoretic.   Has bruising in his right arm    Nursing note and vitals reviewed.      Vents:  Vent Mode: A/C (04/19/20 0900)  Ventilator Initiated: Yes (04/10/20 2247)  Set Rate: 35 BPM (04/19/20 0900)  Vt Set: 260 mL (04/19/20 0900)  PEEP/CPAP: 12 cmH20 (04/19/20 0900)  Oxygen Concentration (%): 60 (04/19/20 1000)  Peak Airway Pressure: 36 cmH2O (04/19/20 0900)  Plateau Pressure: 34 cmH20 (04/19/20 0900)  Total Ve: 9.25 mL (04/19/20 0900)  F/VT Ratio<105 (RSBI): 132.08 (04/19/20 0900)  Lines/Drains/Airways     Central Venous Catheter Line            Percutaneous Central Line Insertion/Assessment - Triple Lumen  04/11/20 0137 right femoral vein 8 days          Drain                 NG/OG Tube 04/12/20 1000 18 Fr. Right nostril 7 days         Urethral Catheter 04/18/20 1854 less than 1 day          Airway                 Airway - Non-Surgical 04/11/20  1525 Endotracheal Tube 7 days          Arterial Line                 Arterial Line 04/11/20 0242 Left Radial 8 days          Peripheral Intravenous Line                 Peripheral IV - Single Lumen 04/14/20 0546 20 G Right Wrist 5 days              Significant Labs:    CBC/Anemia Profile:  Recent Labs   Lab 04/18/20 0346 04/18/20 1745 04/19/20  0338   WBC 10.16 12.89* 12.96*   HGB 9.8* 11.4* 10.2*   HCT 33.4* 38.1* 34.3*    374* 366*   MCV 98 98 97   RDW 14.5 14.4 13.9        Chemistries:  Recent Labs   Lab 04/18/20 0346 04/18/20 1745 04/19/20  0020 04/19/20  0338 04/19/20  0545     145   < > 139 136 135* 135*   K 4.2  --  4.3  --  4.7  --      --  96  --  92*  --    CO2 36*  --  34*  --  34*  --    BUN 13  --  9  --  10  --    CREATININE 0.7  --  0.7  --  0.6  --    CALCIUM 8.1*  --  8.3*  --  8.6*  --    ALBUMIN 1.3*  --   --   --  1.5*  --    PROT 5.8*  --   --   --  6.3  --    BILITOT 0.2  --   --   --  0.5  --    ALKPHOS 119  --   --   --  176*  --    ALT 34  --   --   --  42  --    AST 26  --   --   --  36  --    MG 2.4  --  1.7  --  2.0  --    PHOS 2.6*  --   --   --  3.3  --     < > = values in this interval not displayed.       All pertinent labs within the past 24 hours have been reviewed.    Significant Imaging:  I have reviewed all pertinent imaging results/findings within the past 24 hours.  I have reviewed and interpreted all pertinent imaging results/findings within the past 24 hours.      ABG  Recent Labs   Lab 04/19/20  0206   PH 7.328*   PO2 77*   PCO2 78.9*   HCO3 41.4*   BE 15     Assessment/Plan:     Neuro  Sedated  - On propofol, dialudid Precedex and phenobarbital , weaned ketamine 4/18  - Had a couple episodes of seizure like activity with right upward gaze, gave 2 of ativan and resolved, EEg was negative for seizures  - started on valium suspect he could have alcohol withdrawal         Pulmonary  Acute hypoxemic respiratory failure  - Likely 2/2 to ARDS due to  COVID-19  - ARDSNET Protocol      Cardiac/Vascular  Tachycardia-bradycardia  Patient with HR in 40s to mid 50's and sometimes in the 130-140   Bradycardia suspect likely due to hypothermia (T 93.6F) vs. Sedative medications (Precedex) vs. Sepsis  EKG with HR 50s, normal axis, normal intervals, ? ST elevation 1 & AVL (2mm)  ECHO 4/16: EF 50%, mild RV dysfunction  Troponin < 0.06 wnl    Plan  - Apply Heating pads and warming blanket when cold   - Wean off sedation and paralytics as tolerated  - F/u repeat blood cultures   - F/u repeat EKG  - Continue Heparin ggt, ASA 81mg & Atorvastatin 40mg  - F/u TSH & T4  - Cardiology consulted and suggest treatment for hypothermia and no other interventions  - considering  withdrawal from alcohol as patient responds well to ativan       Hypertension  - Was in shock after intubation   - holding home toprol XL 50 mg       Renal/  Hypernatremia  Patient with Na of 150 on BMP today.    Plan  - F/u repeat BMP to recheck  - Started D5W now off  - continue with free water boluses    Endocrine  Type 2 diabetes mellitus without complication, without long-term current use of insulin  - HbA1c 6  - Home DM regimen:  Diet controlled  - Started on Insulin ggt on 4/16 due to uncontrolled BG, on 4/18 with euglycemia switched to detemir and SSI ( TF SSI)         Other  * COVID-19 virus detected  Respiratory distress   Acute hypoxemic respiratory failure    - Patient intubated with crich which was switched to ETT on 4/11  - proning days 4/12, 4/13,4/14, 4/16, 4/17,4/18  - Completed treatment with Plaquenil and Azithromycin  - Methyprednisone for 5 days, completed  - Daily CRP, d-dimer, ferritin, and CK-MB to trend inflammatory response.   - Strict I/O's and goal net neutral status to help optimize vent mechanics.   - Special isolation and aspiration precautions ordered.   - Will continue rocephin for now        Shock  - Now off pressors, MAP goal > 65   - Completed treatment with Plaquenil and  Azithromycin.  - On cefepime and vanc   - Blood cultures NGTD. F/u repeat cultures           Critical Care Daily Checklist:    A: Awake: RASS Goal/Actual Goal:    Actual: [place] Agitation Sedation Scale (RASS): Unarousable   B: Spontaneous Breathing Trial Performed? Spon. Breathing Trial Initiated?: Not initiated (04/19/20 0900)   C: SAT & SBT Coordinated?  yes                      D: Delirium: CAM-ICU Overall CAM-ICU: Positive   E: Early Mobility Performed? Yes   F: Feeding Goal: Goals: Meet % EEN, EPN by RD f/u date  Status: Nutrition Goal Status: goal not met   Current Diet Order   Procedures    Diet NPO      AS: Analgesia/Sedation Precedex, dilaudid, phenobarbital, propofol    T: Thromboembolic Prophylaxis heparin   H: HOB > 300 Yes   U: Stress Ulcer Prophylaxis (if needed) famoitdine    G: Glucose Control Detemir , SSI    B: Bowel Function     I: Indwelling Catheter (Lines & Torres) Necessity Central and dave    D: De-escalation of Antimicrobials/Pharmacotherapies -     Plan for the day/ETD Valium and help wean off pressors     Code Status:  Family/Goals of Care: Full Code         Critical secondary to Patient has a condition that poses threat to life and bodily function: Severe Respiratory Distress      Critical care was time spent personally by me on the following activities: development of treatment plan with patient or surrogate and bedside caregivers, discussions with consultants, evaluation of patient's response to treatment, examination of patient, ordering and performing treatments and interventions, ordering and review of laboratory studies, ordering and review of radiographic studies, pulse oximetry, re-evaluation of patient's condition. This critical care time did not overlap with that of any other provider or involve time for any procedures.    Patient seen and examined this morning. Discussed with Dr. Blount  - staff attestation to follow.         Dottie Andrade MD  Critical Care  Medicine  [facility]

## 2020-04-19 NOTE — ASSESSMENT & PLAN NOTE
- On propofol, dialudid Precedex and phenobarbital , weaned ketamine 4/18  - Had a couple episodes of seizure like activity with right upward gaze, gave 2 of ativan and resolved, EEg was negative for seizures  - started on valium suspect he could have alcohol withdrawal

## 2020-04-19 NOTE — NURSING
Pt supinated from proned position with RT, RNs, PCT, and anesthesiologist present at bedside. FiO2 increased to 100% on ventilator during supination. No acute events during or following supination; will continue to monitor.

## 2020-04-19 NOTE — SIGNIFICANT EVENT
Procedure Note  Prone Positioning  Critical Care Medicine       Chief Complaint: COVID-19 virus detected  MRN: [id number]  LOS: 8  Sex: male  Age: 54 y.o.      Last ABG:   Recent Labs   Lab 04/18/20  0958   PH 7.353   PO2 59*   PCO2 76.8*   HCO3 42.7*   BE 17       Vital Signs (Most Recent):   Temp: 98.6 °F (37 °C) (04/18/20 2022)  Pulse: 102 (04/18/20 2022)  Resp: (!) 35 (04/18/20 2022)  BP: 130/73 (04/18/20 2000)  SpO2: (!) 93 % (04/18/20 2022)    Ventilator Settings:  Vent Mode: A/C  Oxygen Concentration (%):  [60-90] 70  Resp Rate Total:  [35 br/min-42 br/min] 35 br/min  Vt Set:  [260 mL] 260 mL  PEEP/CPAP:  [12 cmH20] 12 cmH20  Mean Airway Pressure:  [17 ojU63-54 cmH20] 19 cmH20    Indication: Severe, refractory ARDS. High risk for deterioration during proning procedure in need of direct monitoring by Critical Care personnel.     Prior to beginning the procedure, all appropriate equipment and personnel were gathered in the room. Two individuals were placed on each side of the patient and one person stood at the head of the bed and was dedicated to the management of the head of the patient, the endotracheal tube, and the ventilator lines. The person at the head of the bed  coordinated the steps of the proning procedure with team team at the direction of Critical Care team members at the bedside. The patient was first log-rolled 90 degrees onto their side towards the direction of their central venous catheter. Cardiac electrodes were then moved from the patient's anterior to the posterior. The patient?s knees, forehead, chest, and iliac crests were protected using adhesive pads and/or foam pads to reduce the risk of skin breakdown. Once properly positioned in the bed, another 90 degree log roll was performed placing the patient into the prone position. The patient's arms were placed alongside the body. The patient's head should be turned alternately to the right or left every 2 hours. The patient tolerated the  procedure well.     Total Critical Care time (uninterrupted not including procedures): 20 minutes    Leigh Cody NP  Critical Care Medicine

## 2020-04-19 NOTE — CARE UPDATE
Patient supinated with Anesthesia @ head of the bed. Patient tolerated well. ETT secured 25@lips. Will continue to monitor.

## 2020-04-19 NOTE — PROGRESS NOTES
Procedure Note  Supine Positioning  Critical Care Medicine       Chief Complaint: COVID-19 virus detected  MRN: [id number]  LOS: 9  Sex: male  Age: 54 y.o.      Last ABG:   Recent Labs   Lab 04/19/20  1444   PH 7.394   PO2 65*   PCO2 77.4*   HCO3 47.3*   BE 22       Vital Signs (Most Recent):   Temp: 96.4 °F (35.8 °C) (04/19/20 1500)  Pulse: 66 (04/19/20 1600)  Resp: (!) 35 (04/19/20 1600)  BP: 105/60 (04/19/20 1400)  SpO2: 97 % (04/19/20 1600)    Ventilator Settings:  Vent Mode: A/C  Oxygen Concentration (%):  [60-90] 60  Resp Rate Total:  [35 br/min] 35 br/min  Vt Set:  [260 mL] 260 mL  PEEP/CPAP:  [12 cmH20] 12 cmH20  Mean Airway Pressure:  [19 prL65-97 cmH20] 20 cmH20    Indication: Severe, refractory ARDS. High risk for deterioration during proning procedure in need of direct monitoring by Critical Care personnel.     Prior to beginning the procedure, all appropriate equipment and personnel were gathered in the room. Two individuals were placed on each side of the patient and two persons stood at the head of the bed and was dedicated to the management of the head of the patient, the endotracheal tube, and the ventilator lines. The persons at the head of the bed  coordinated the steps of the proning procedure with team at the direction of Critical Care team members at the bedside. The patient was first log-rolled 90 degrees onto their side towards the direction of their central venous catheter. Cardiac electrodes were then moved from the patient's posterior to the anterior. The patients sacrum was protected using adhesive pads and/or foam pads to reduce the risk of skin breakdown. Once properly positioned in the bed, another 90 degree log roll was performed placing the patient into the supine position. The patient's arms were placed alongside the body. The patient's head should be turned alternately to the right or left every 2 hours. The patient tolerated the procedure well.     Total Critical Care time  (uninterrupted not including procedures): 35 minutes

## 2020-04-19 NOTE — ASSESSMENT & PLAN NOTE
- Now off pressors, MAP goal > 65   - Completed treatment with Plaquenil and Azithromycin.  - On cefepime and vanc   - Blood cultures NGTD. F/u repeat cultures

## 2020-04-19 NOTE — PROGRESS NOTES
Procedure Note  Supine Positioning  Critical Care Medicine       Chief Complaint: COVID-19 virus detected  MRN: [id number]  LOS: 9  Sex: male  Age: 54 y.o.      Last ABG:   Recent Labs   Lab 04/19/20  0206   PH 7.328*   PO2 77*   PCO2 78.9*   HCO3 41.4*   BE 15       Vital Signs (Most Recent):   Temp: 96.8 °F (36 °C) (04/19/20 1000)  Pulse: 75 (04/19/20 1000)  Resp: (!) 35 (04/19/20 1000)  BP: (!) 99/57 (04/19/20 1000)  SpO2: 99 % (04/19/20 1000)    Ventilator Settings:  Vent Mode: A/C  Oxygen Concentration (%):  [60-90] 60  Resp Rate Total:  [35 br/min-42 br/min] 35 br/min  Vt Set:  [260 mL] 260 mL  PEEP/CPAP:  [12 cmH20] 12 cmH20  Mean Airway Pressure:  [17 toJ78-22 cmH20] 19 cmH20    Indication: Severe, refractory ARDS. High risk for deterioration during proning procedure in need of direct monitoring by Critical Care personnel.     Prior to beginning the procedure, all appropriate equipment and personnel were gathered in the room. Two individuals were placed on each side of the patient and two persons stood at the head of the bed and was dedicated to the management of the head of the patient, the endotracheal tube, and the ventilator lines. The persons at the head of the bed  coordinated the steps of the proning procedure with team at the direction of Critical Care team members at the bedside. The patient was first log-rolled 90 degrees onto their side towards the direction of their central venous catheter. Cardiac electrodes were then moved from the patient's posterior to the anterior. The patients sacrum was protected using adhesive pads and/or foam pads to reduce the risk of skin breakdown. Once properly positioned in the bed, another 90 degree log roll was performed placing the patient into the supine position. The patient's arms were placed alongside the body. The patient's head should be turned alternately to the right or left every 2 hours. The patient tolerated the procedure well.     Total Critical Care  time (uninterrupted not including procedures): 35

## 2020-04-19 NOTE — PLAN OF CARE
Problem: Adult Inpatient Plan of Care  Goal: Plan of Care Review  4/19/2020 1530 by Vee Brown RN  Outcome: Ongoing, Progressing  4/19/2020 1530 by Vee Brown RN  Outcome: Ongoing, Progressing  Flowsheets (Taken 4/19/2020 1530)  Plan of Care Reviewed With: patient; family  Goal: Patient-Specific Goal (Individualization)  4/19/2020 1530 by Vee Brown RN  Outcome: Ongoing, Progressing  Flowsheets (Taken 4/19/2020 1530)  Individualized Care Needs: Padding of bony prominences for frequent proning/supination  Anxieties, Fears or Concerns: JAMMIE; intubated/medically sedated and paralyzed  Patient-Specific Goals (Include Timeframe): Wean ventilator settings and sedation as tolerated; improvement in ABG  4/19/2020 1530 by Vee Brown RN  Outcome: Ongoing, Progressing  Goal: Absence of Hospital-Acquired Illness or Injury  4/19/2020 1530 by Vee Brown RN  Outcome: Ongoing, Progressing  4/19/2020 1530 by Vee Brown RN  Outcome: Ongoing, Progressing     Problem: Nutrition Impairment (Mechanical Ventilation, Invasive)  Goal: Optimal Nutrition Delivery  Outcome: Ongoing, Progressing  Intervention: Optimize Nutrition Delivery  Flowsheets (Taken 4/19/2020 1530)  Nutrition Support Management: tube feeding rate increased     Problem: Skin and Tissue Injury (Mechanical Ventilation, Invasive)  Goal: Absence of Device-Related Skin and Tissue Injury  4/19/2020 1530 by Vee Brown RN  Outcome: Ongoing, Progressing  4/19/2020 1530 by Vee Brown RN  Outcome: Ongoing, Progressing     Pt remains intubated and mechanically ventilated at this time; FiO2 = 60%, PEEP = 12, rate = 35, TV = 260. Pt supinated this morning. Pt medically sedated and paralyzed with vecuronium, propofol, precedex, and dilaudid gtts at this time. Levophed gtt infusing to maintain acceptable BP; vaso and cardene gtts off at this time. VS variable throughout shift; see flow sheets. Valium IVP added by MD to regimen for agitation  control. Pt hypothermic during shift; samir connergger placed on pt with temperature improvement. Plan to prone pt at 1600. VS per flow sheets, plan of care reviewed with pt's family, will continue to monitor.

## 2020-04-19 NOTE — CARE UPDATE
Internal Medicine Telemed Plan of Care Note:     Called Mr. Ranjana Anderson  family member ( da )listed in chart to discuss patient's current clinical status. Briefly, patient is currently admitted for hypoxic respiratory failure, positive COVID-19 infection.      Addressed family's questions and concerns in addition to updating the current clinical status of the patient.  Family member verbalized understanding of situation and plan. Mr. Ranjana Anderson wife was close to son and all her questions were answered as well.      Da updated that I will be going off service and that if he needs anything to send me a message on whats wong if he can not get ahold of the ICU team         Dottie Andrade MD, MPH  Internal Medicine PGY3  CCM [id number]

## 2020-04-19 NOTE — NURSING
Pt proned from supinated position with RT, RNs, and anesthesiologist present at bedside. FiO2 increased to 100% on ventilator during pronation. No acute events during or following pronation; will continue to monitor.

## 2020-04-19 NOTE — PROGRESS NOTES
Pt remains intubated and mechanically ventilated at this time. Pt proned at 2030. Current vent settings FiO2 70% PEEP 12  rate 35. Cardene restarted at beginning of shift due to SBP in 190s. Pt weaned off cardene drip. Pt currently on levo at .06 and vaso .04 for MAP greater than 65. See flowsheets for VS and assessments. POC reviewed with pt's family. SPENCER.

## 2020-04-20 PROBLEM — E87.0 HYPERNATREMIA: Status: RESOLVED | Noted: 2020-01-01 | Resolved: 2020-01-01

## 2020-04-20 NOTE — PLAN OF CARE
04/20/20 1751   Discharge Reassessment   Assessment Type Discharge Planning Reassessment   Do you have any problems affording any of your prescribed medications? No   Discharge Plan A Skilled Nursing Facility   Discharge Plan B Rehab   DME Needed Upon Discharge  other (see comments)  (TBD)   Anticipated Discharge Disposition SNF   Can the patient/caregiver answer the patient profile reliably? No, cognitively impaired       Alfred Camejo RN MSN  Critical Care-   Ext. [phone]

## 2020-04-20 NOTE — ASSESSMENT & PLAN NOTE
- HbA1c 6  - Home DM regimen:  Diet controlled  - Started on Insulin ggt on 4/16 due to uncontrolled BG, on 4/18 with euglycemia switched to detemir and SSI (TF SSI)

## 2020-04-20 NOTE — RESEARCH
I spoke to the patient's son about the use of remdisivir with the patient.  Discussed pros and cons.  Discussed potential side effects.  Discuss privacy rules as relates to study drugs.  Patient will discuss with his family.  I will plan to call him again tomorrow morning.

## 2020-04-20 NOTE — PROCEDURES
"Ranjana Sanchez is a 54 y.o. male patient.    Temp: 99.9 °F (37.7 °C) (04/19/20 2015)  Pulse: (!) 148 (04/19/20 2015)  Resp: (!) 35 (04/19/20 2015)  BP: (!) 155/68 (04/19/20 2015)  SpO2: (!) 89 % (04/19/20 2015)  Weight: 63 kg (138 lb 14.2 oz) (04/17/20 1000)  Height: 5' 4" (162.6 cm) (04/17/20 1000)       Arterial Line  Date/Time: 4/19/2020 9:56 PM  Location procedure was performed: [facility])  Performed by: Leigh Cdoy NP  Authorized by: Leigh Cody NP   Pre-op Diagnosis: ARDS from covid 19  Post-operative diagnosis: ards from covid 19  Consent Done: Emergent Situation  Preparation: Patient was prepped and draped in the usual sterile fashion.  Indications: respiratory failure and hemodynamic monitoring  Location: right radial  Patient sedated: yes  Sedatives: ketamine and propofol  Analgesia: hydromorphone  Damir's test normal: yes  Needle gauge: 20  Seldinger technique: Seldinger technique used  Number of attempts: 4  Complications: No  Estimated blood loss (mL): 4  Specimens: No  Implants: No  Post-procedure: line sutured and dressing applied  Post-procedure CMS: unchanged  Patient tolerance: Patient tolerated the procedure well with no immediate complications          Leigh Cody  4/19/2020  "

## 2020-04-20 NOTE — PLAN OF CARE
Pt remains intubated, sedated and paralyzed. ACVC+/35/260/60%/+12, remains hypercapneic ABG 7.31/87.4/62/44/87%, O2 sats per monitor 100%. Tmax 100.6, medicated w/ Tylenol w/ effect. HR and BP labile, tachy up to 150s, now SR 60, hypertensive requiring cardene gtt, now off and on levo for a MAP goal of 65. New R rad a line placed. Tolerating TFs at goal, no BM this shift. Pt diuresed ~ 1800cc this shift. Plan to supinate @ 0800 this am.

## 2020-04-20 NOTE — NURSING
MD notified of decrease o2 sat. Orders to increase fi02 to 80%. MD came to bedside and assessed. Team aware of HTN and tachycardia after supination.

## 2020-04-20 NOTE — ASSESSMENT & PLAN NOTE
Respiratory distress   Acute hypoxemic respiratory failure    - Patient intubated with crich which was switched to ETT on 4/11  - proning days 4/12, 4/13,4/14, 4/16, 4/17,4/18, 4/19, 4/20  - remains hypercapnic despite lowering TV to 4 mL/kg and increased RR  - Completed treatment with Plaquenil and Azithromycin  - Methyprednisone for 5 days, completed  - Daily CRP, d-dimer, ferritin, and CK-MB to trend inflammatory response.   - Strict I/O's and goal net neutral status to help optimize vent mechanics.   - Special isolation and aspiration precautions ordered.   - Will continue rocephin for now   - q6h albuterol nebs ordered

## 2020-04-20 NOTE — CARE UPDATE
Patient's family contacted regarding current health status. Family made aware by CCM team and ID team that Remdesivir is to be initiated. All questions were answered and family told to call back with any questions or concerns.

## 2020-04-20 NOTE — NURSING
Team made aware of hypotension. Orders to restart levophed for a sys goal of >100. Orders carried out. [facility]

## 2020-04-20 NOTE — SUBJECTIVE & OBJECTIVE
Interval History/Significant Events: Patient supine this morning, not tolerating well. Plan to re-prone. Off of vasopressors, now on cardene. Sedated and paralyzed.    Review of Systems   Unable to perform ROS: Intubated     Objective:     Vital Signs (Most Recent):  Temp: 99 °F (37.2 °C) (04/20/20 1200)  Pulse: (!) 138 (04/20/20 1200)  Resp: (!) 35 (04/20/20 1200)  BP: (!) 91/52 (04/20/20 0700)  SpO2: (!) 92 % (04/20/20 1200) Vital Signs (24h Range):  Temp:  [94.8 °F (34.9 °C)-100.6 °F (38.1 °C)] 99 °F (37.2 °C)  Pulse:  [] 138  Resp:  [35-51] 35  SpO2:  [88 %-100 %] 92 %  BP: ()/(52-83) 91/52  Arterial Line BP: ()/(43-82) 169/63   Weight: 63 kg (138 lb 14.2 oz)  Body mass index is 23.84 kg/m².      Intake/Output Summary (Last 24 hours) at 4/20/2020 1252  Last data filed at 4/20/2020 1226  Gross per 24 hour   Intake 4135.04 ml   Output 3575 ml   Net 560.04 ml       Physical Exam   Constitutional: No distress. He is sedated, chemically paralyzed and intubated.   HENT:   Head: Normocephalic.   crich removed and dressing clean dry and intact    Eyes: EOM are normal.   Cardiovascular: Normal rate and regular rhythm.   Pulmonary/Chest: Effort normal. He is intubated.   Abdominal: Soft.   Neurological:   Sedated    Skin: Skin is warm. He is not diaphoretic.   Nursing note and vitals reviewed.      Vents:  Vent Mode: A/C (04/20/20 0924)  Ventilator Initiated: Yes (04/10/20 2247)  Set Rate: 35 BPM (04/20/20 0924)  Vt Set: 260 mL (04/20/20 0924)  PEEP/CPAP: 12 cmH20 (04/20/20 0924)  Oxygen Concentration (%): 80 (04/20/20 1200)  Peak Airway Pressure: 38 cmH2O (04/20/20 0924)  Plateau Pressure: 34 cmH20 (04/20/20 0924)  Total Ve: 9.32 mL (04/20/20 0924)  F/VT Ratio<105 (RSBI): 131.58 (04/20/20 0924)  Lines/Drains/Airways     Central Venous Catheter Line            Percutaneous Central Line Insertion/Assessment - Triple Lumen  04/11/20 0137 right femoral vein 9 days          Drain                 NG/OG Tube  04/12/20 1000 18 Fr. Right nostril 8 days         Urethral Catheter 04/18/20 1854 1 day          Airway                 Airway - Non-Surgical 04/11/20 1525 Endotracheal Tube 8 days          Arterial Line                 Arterial Line 04/19/20 2130 Right Radial less than 1 day          Peripheral Intravenous Line                 Peripheral IV - Single Lumen 04/19/20 1120 20 G Anterior;Left Forearm 1 day         Peripheral IV - Single Lumen 04/19/20 1505 22 G Anterior;Right Upper Arm less than 1 day              Significant Labs:    CBC/Anemia Profile:  Recent Labs   Lab 04/18/20 1745 04/19/20 0338 04/20/20  0413   WBC 12.89* 12.96* 12.27   HGB 11.4* 10.2* 9.5*   HCT 38.1* 34.3* 32.6*   * 366* 336   MCV 98 97 98   RDW 14.4 13.9 13.5        Chemistries:  Recent Labs   Lab 04/18/20 1745 04/19/20 0338 04/19/20  0545 04/19/20  1150 04/20/20  0413      < > 135* 135* 135* 136   K 4.3  --  4.7  --   --  4.2   CL 96  --  92*  --   --  90*   CO2 34*  --  34*  --   --  37*   BUN 9  --  10  --   --  8   CREATININE 0.7  --  0.6  --   --  0.6   CALCIUM 8.3*  --  8.6*  --   --  8.3*   ALBUMIN  --   --  1.5*  --   --  1.5*   PROT  --   --  6.3  --   --  6.2   BILITOT  --   --  0.5  --   --  0.3   ALKPHOS  --   --  176*  --   --  169*   ALT  --   --  42  --   --  54*   AST  --   --  36  --   --  46*   MG 1.7  --  2.0  --   --  2.1   PHOS  --   --  3.3  --   --  2.2*    < > = values in this interval not displayed.       All pertinent labs within the past 24 hours have been reviewed.    Significant Imaging:  I have reviewed all pertinent imaging results/findings within the past 24 hours.

## 2020-04-20 NOTE — PROGRESS NOTES
Pharmacokinetic Assessment Follow Up: IV Vancomycin    Vancomycin Regimen Assessment & Plan:    - Vancomycin trough level resulted subtherapeutic as 10.4 mg/dl.  Trough was drawn ~2 hours early.  Goal 15-20 mg/dl.  - Administer vancomycin 1500 mg x 1 dose and increase maintenance regimen to 1250 mg every 12 hours.    - Collect trough on 4/21 at 20:30 prior to 4th total dose.     Drug levels (last 3 results):  Recent Labs   Lab Result Units 04/20/20  0630   Vancomycin-Trough ug/mL 10.4       Pharmacy will continue to follow and monitor vancomycin.    Please contact pharmacy at extension [phone] for questions regarding this assessment.    Thank you for the consult,   Daiana Vega, PharmD, BCCCP       Patient brief summary:  Ranjana Sanchez is a 54 y.o. male initiated on antimicrobial therapy with IV Vancomycin for treatment of lower respiratory infection    Drug Allergies:   Review of patient's allergies indicates:  No Known Allergies    Actual Body Weight:   63 kg    Renal Function:   Estimated Creatinine Clearance: 115.1 mL/min (based on SCr of 0.6 mg/dL).,     Dialysis Method (if applicable):  N/A

## 2020-04-20 NOTE — CONSULTS
Procedure Note  Supine Positioning  Critical Care Medicine       Chief Complaint: COVID-19 virus detected  MRN: [id number]  LOS: 10  Sex: male  Age: 54 y.o.      Last ABG:   Recent Labs   Lab 04/20/20  0022   PH 7.312*   PO2 62*   PCO2 87.4*   HCO3 44.2*   BE 18       Vital Signs (Most Recent):   Temp: 97.5 °F (36.4 °C) (04/20/20 0745)  Pulse: (!) 59 (04/20/20 0745)  Resp: (!) 35 (04/20/20 0745)  BP: (!) 91/52 (04/20/20 0700)  SpO2: 100 % (04/20/20 0745)    Ventilator Settings:  Vent Mode: A/C  Oxygen Concentration (%):  [60-70] 60  Resp Rate Total:  [35 br/min] 35 br/min  Vt Set:  [260 mL] 260 mL  PEEP/CPAP:  [12 cmH20] 12 cmH20  Mean Airway Pressure:  [19 sqL81-38 cmH20] 19 cmH20        Indication: Severe, refractory ARDS. High risk for deterioration during supination procedure in need of direct monitoring by Critical Care personnel.     Prior to beginning the procedure, all appropriate equipment and personnel were gathered in the room. Two individuals were placed on each side of the patient and two respiratory therapists stood at the head of the bed and was dedicated to the management of the head of the patient, the endotracheal tube, and the ventilator lines. The person at the head of the bed coordinated the steps of the supination procedure at the direction of Critical Care team members at the bedside. The patient was first log-rolled 90 degrees onto their side away from the direction of their central venous catheter. Cardiac electrodes were then moved from the patient's posterior back to the anterior. Once properly positioned in the bed, another 90 degree log roll was performed placing the patient into the supine position. The patient's arms were placed alongside the body. Continuous pulse oximetry and blood pressure monitoring was performed without any desaturation or hemodynamic instability. The patient tolerated the procedure well.     Total Critical Care time (uninterrupted not including procedures): 31  reed Yanes MD

## 2020-04-20 NOTE — ASSESSMENT & PLAN NOTE
- On propofol, dialudid, Precedex and phenobarbital , weaned ketamine 4/18; paralyzed with vac  - Had a couple episodes of seizure like activity with right upward gaze, gave 2 of ativan and resolved, EEG was negative for seizures  - started on valium suspect he could have alcohol withdrawal

## 2020-04-20 NOTE — ASSESSMENT & PLAN NOTE
Patient with HR in 40s to mid 50's and sometimes in the 130-140   Bradycardia suspect likely due to hypothermia (T 93.6F) vs. Sedative medications (Precedex) vs. Sepsis  EKG with HR 50s, normal axis, normal intervals, ? ST elevation 1 & AVL (2mm)  ECHO 4/16: EF 50%, mild RV dysfunction  Troponin < 0.06 wnl    Plan  - Apply Heating pads and warming blanket when cold   - Wean off sedation and paralytics as tolerated  - F/u repeat blood cultures (4/18: NGTD)  - EKG (4/18) with no ST elevation or T wave prominence seen in earlier EKGs  - Continue Heparin gtt, ASA 81mg & Atorvastatin 40mg  - T4 normal, TSH slightly low  - Cardiology consulted and suggest treatment for hypothermia and no other interventions  - considering withdrawal from alcohol as patient responds well to ativan

## 2020-04-20 NOTE — NURSING
New R rad A line placed per RUBY Posada. Pt tolerated well. Remains tachycardic w/ -150, NIBP correlating w/ ~ 10 mmHg difference. L rad dave d/c'd w/ cath tip intact. Manual pressure held x 10 min until hemostasis achieved, pressure dressing applied. + distal pulse, distal cap refill < 3 sec. Medicated w/ Tylenol 650 mg per NGT for temp. [facility].

## 2020-04-20 NOTE — NURSING
L rad a line dampened w/ ~ 40 mmHg discrepancy from NIBP, unable to draw back blood, manipulated and trouble shooted without success. [facility] aware.

## 2020-04-20 NOTE — EICU
Rounding (Video Assessment):  No    Intervention Initiated From:  Bedside    Vicki Communicated with Bedside Nurse regarding:  Time-Out    Nurse Notified:  Yes    Doctor Notified:  No    Comments: Jennifer noted for time out for right radial codie placement. CHENG Cody NP @ bedside. Codie placed. Pt tolerated well

## 2020-04-20 NOTE — PROGRESS NOTES
[facility]  Critical Care Medicine  Progress Note    Patient Name: Ranjana Sanchez  MRN: [id number]  Admission Date: 4/10/2020  Hospital Length of Stay: 10 days  Code Status: Full Code  Attending Provider: Tatyana Judd MD  Primary Care Provider: Primary Doctor No   Principal Problem: COVID-19 virus detected    Subjective:     HPI:  54y.o male with PMH of HTN brought to ER via EMS for increasing SOB with known covid-19 exposure. 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 They have not had travelers since 3/14. 2 days ago he started having fever and sob. He was seen by the ship's physician and was found to have crackles in his bases bilaterally, but his oxygen saturation was in the 90's. He had a flu test which was negative. He was quarantined in a room and states that he got worse with increasing SOB. He was brought to the ER and found to be 78% on RA. He was started on NRB and initially was doing much better, but his tachypnea did not improve, so he was placed on bipap. His sat is currently 94%.    Hospital/ICU Course:  Patient was admitted to the MICU afyer a difficult intubation in the ER s/p crich. Covid positive with ards, sedated and paralyzed  Unable to prone secondary to crich. Left mainstem intubation which was pulled off, on NO and levophed. ET tube replaced on 4/11 family contacted in [place]. Proned on 4/12 tolerated well and continued to prone on 4/19. 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 He was on and off ketamine as well.  EEG ordered to rule out seizures as he also had seizure like activity that resolved with ativan. Currently changing sedation, was placed on phenobarbital which should start having effect 4/20. Patient proned x 8 (last on 4/20). Still not tolerating ventilator weaning. Remains on sedation, cardene, and paralytic.     Interval  History/Significant Events: Patient supine this morning, not tolerating well. Plan to re-prone. Off of vasopressors, now on cardene. Sedated and paralyzed.    Review of Systems   Unable to perform ROS: Intubated     Objective:     Vital Signs (Most Recent):  Temp: 99 °F (37.2 °C) (04/20/20 1200)  Pulse: (!) 138 (04/20/20 1200)  Resp: (!) 35 (04/20/20 1200)  BP: (!) 91/52 (04/20/20 0700)  SpO2: (!) 92 % (04/20/20 1200) Vital Signs (24h Range):  Temp:  [94.8 °F (34.9 °C)-100.6 °F (38.1 °C)] 99 °F (37.2 °C)  Pulse:  [] 138  Resp:  [35-51] 35  SpO2:  [88 %-100 %] 92 %  BP: ()/(52-83) 91/52  Arterial Line BP: ()/(43-82) 169/63   Weight: 63 kg (138 lb 14.2 oz)  Body mass index is 23.84 kg/m².      Intake/Output Summary (Last 24 hours) at 4/20/2020 1252  Last data filed at 4/20/2020 1226  Gross per 24 hour   Intake 4135.04 ml   Output 3575 ml   Net 560.04 ml       Physical Exam   Constitutional: No distress. He is sedated, chemically paralyzed and intubated.   HENT:   Head: Normocephalic.   crich removed and dressing clean dry and intact    Eyes: EOM are normal.   Cardiovascular: Normal rate and regular rhythm.   Pulmonary/Chest: Effort normal. He is intubated.   Abdominal: Soft.   Neurological:   Sedated    Skin: Skin is warm. He is not diaphoretic.   Nursing note and vitals reviewed.      Vents:  Vent Mode: A/C (04/20/20 0924)  Ventilator Initiated: Yes (04/10/20 2247)  Set Rate: 35 BPM (04/20/20 0924)  Vt Set: 260 mL (04/20/20 0924)  PEEP/CPAP: 12 cmH20 (04/20/20 0924)  Oxygen Concentration (%): 80 (04/20/20 1200)  Peak Airway Pressure: 38 cmH2O (04/20/20 0924)  Plateau Pressure: 34 cmH20 (04/20/20 0924)  Total Ve: 9.32 mL (04/20/20 0924)  F/VT Ratio<105 (RSBI): 131.58 (04/20/20 0924)  Lines/Drains/Airways     Central Venous Catheter Line            Percutaneous Central Line Insertion/Assessment - Triple Lumen  04/11/20 0137 right femoral vein 9 days          Drain                 NG/OG Tube 04/12/20  1000 18 Fr. Right nostril 8 days         Urethral Catheter 04/18/20 1854 1 day          Airway                 Airway - Non-Surgical 04/11/20 1525 Endotracheal Tube 8 days          Arterial Line                 Arterial Line 04/19/20 2130 Right Radial less than 1 day          Peripheral Intravenous Line                 Peripheral IV - Single Lumen 04/19/20 1120 20 G Anterior;Left Forearm 1 day         Peripheral IV - Single Lumen 04/19/20 1505 22 G Anterior;Right Upper Arm less than 1 day              Significant Labs:    CBC/Anemia Profile:  Recent Labs   Lab 04/18/20 1745 04/19/20 0338 04/20/20  0413   WBC 12.89* 12.96* 12.27   HGB 11.4* 10.2* 9.5*   HCT 38.1* 34.3* 32.6*   * 366* 336   MCV 98 97 98   RDW 14.4 13.9 13.5        Chemistries:  Recent Labs   Lab 04/18/20 1745 04/19/20 0338 04/19/20  0545 04/19/20  1150 04/20/20  0413      < > 135* 135* 135* 136   K 4.3  --  4.7  --   --  4.2   CL 96  --  92*  --   --  90*   CO2 34*  --  34*  --   --  37*   BUN 9  --  10  --   --  8   CREATININE 0.7  --  0.6  --   --  0.6   CALCIUM 8.3*  --  8.6*  --   --  8.3*   ALBUMIN  --   --  1.5*  --   --  1.5*   PROT  --   --  6.3  --   --  6.2   BILITOT  --   --  0.5  --   --  0.3   ALKPHOS  --   --  176*  --   --  169*   ALT  --   --  42  --   --  54*   AST  --   --  36  --   --  46*   MG 1.7  --  2.0  --   --  2.1   PHOS  --   --  3.3  --   --  2.2*    < > = values in this interval not displayed.       All pertinent labs within the past 24 hours have been reviewed.    Significant Imaging:  I have reviewed all pertinent imaging results/findings within the past 24 hours.      ABG  Recent Labs   Lab 04/20/20  0022   PH 7.312*   PO2 62*   PCO2 87.4*   HCO3 44.2*   BE 18     Assessment/Plan:     Neuro  Sedated  - On propofol, dialudid, Precedex and phenobarbital , weaned ketamine 4/18; paralyzed with vac  - Had a couple episodes of seizure like activity with right upward gaze, gave 2 of ativan and resolved, EEG  was negative for seizures  - started on valium suspect he could have alcohol withdrawal        Pulmonary  Acute hypoxemic respiratory failure  - Likely 2/2 to ARDS due to COVID-19  - ARDSNET Protocol    --see COVID    Cardiac/Vascular  Tachycardia-bradycardia  Patient with HR in 40s to mid 50's and sometimes in the 130-140   Bradycardia suspect likely due to hypothermia (T 93.6F) vs. Sedative medications (Precedex) vs. Sepsis  EKG with HR 50s, normal axis, normal intervals, ? ST elevation 1 & AVL (2mm)  ECHO 4/16: EF 50%, mild RV dysfunction  Troponin < 0.06 wnl    Plan  - Apply Heating pads and warming blanket when cold   - Wean off sedation and paralytics as tolerated  - F/u repeat blood cultures (4/18: NGTD)  - EKG (4/18) with no ST elevation or T wave prominence seen in earlier EKGs  - Continue Heparin gtt, ASA 81mg & Atorvastatin 40mg  - T4 normal, TSH slightly low  - Cardiology consulted and suggest treatment for hypothermia and no other interventions  - considering withdrawal from alcohol as patient responds well to ativan      Hypertension  - Was in shock after intubation   - holding home toprol XL 50 mg  - currently on cardene gtt       Endocrine  Type 2 diabetes mellitus without complication, without long-term current use of insulin  - HbA1c 6  - Home DM regimen:  Diet controlled  - Started on Insulin ggt on 4/16 due to uncontrolled BG, on 4/18 with euglycemia switched to detemir and SSI (TF SSI)        Other  * COVID-19 virus detected  Respiratory distress   Acute hypoxemic respiratory failure    - Patient intubated with crich which was switched to ETT on 4/11  - proning days 4/12, 4/13,4/14, 4/16, 4/17,4/18, 4/19, 4/20  - remains hypercapnic despite lowering TV to 4 mL/kg and increased RR  - Completed treatment with Plaquenil and Azithromycin  - Methyprednisone for 5 days, completed  - Daily CRP, d-dimer, ferritin, and CK-MB to trend inflammatory response.   - Strict I/O's and goal net neutral status to  help optimize vent mechanics.   - Special isolation and aspiration precautions ordered.   - Will continue rocephin for now   - q6h albuterol nebs ordered       Shock  - Now off pressors, MAP goal > 65   - Completed treatment with Plaquenil and Azithromycin.  - On cefepime and vanc   - Blood cultures NGTD. F/u repeat cultures           Critical Care Daily Checklist:    A: Awake: RASS Goal/Actual Goal:    Actual: [place] Agitation Sedation Scale (RASS): Unarousable   B: Spontaneous Breathing Trial Performed? Spon. Breathing Trial Initiated?: Not initiated (04/19/20 0900)   C: SAT & SBT Coordinated?  done                      D: Delirium: CAM-ICU Overall CAM-ICU: Positive   E: Early Mobility Performed? No   F: Feeding Goal: Goals: Meet % EEN, EPN by RD f/u date  Status: Nutrition Goal Status: goal not met   Current Diet Order   Procedures    Diet NPO      AS: Analgesia/Sedation done   T: Thromboembolic Prophylaxis Heparin gtt   H: HOB > 300 Yes   U: Stress Ulcer Prophylaxis (if needed) famotidine   G: Glucose Control done   B: Bowel Function     I: Indwelling Catheter (Lines & Torres) Necessity necessary   D: De-escalation of Antimicrobials/Pharmacotherapies done    Plan for the day/ETD Cont course    Code Status:  Family/Goals of Care: Full Code  Cont course     Critical Care Time: 70 minutes  Critical secondary to Patient has a condition that poses threat to life and bodily function: remains intubaed and sedated.     Critical care was time spent personally by me on the following activities: development of treatment plan with patient or surrogate and bedside caregivers, discussions with consultants, evaluation of patient's response to treatment, examination of patient, ordering and performing treatments and interventions, ordering and review of laboratory studies, ordering and review of radiographic studies, pulse oximetry, re-evaluation of patient's condition. This critical care time did not overlap with that  of any other provider or involve time for any procedures.     Larry Martel NP  Critical Care Medicine  [facility]

## 2020-04-20 NOTE — CARE UPDATE
Pt on levophed gtt and SB while prone at beginning of shift. Once supinated ~0830 pt became ST requiring Cardene gtt for sys <180. Oxygen requirements increased on ventilator from 50% FiO2 to 80% FiO2. Pt proned ~1420. cardene gtt weaned off, then patient requiring levophed gtt again. MD notified of possible ST elevation and orders carried out for troponin.     Remains intubated 25 @ teeth, vent settings 35/260/60%/12peep. Sedated and paralyzed. BIS monitoring between 20-50. R eye red and crusty. Eyes,Lips and tongue appear swollen. Accu checks q4h, glucose elevated. TF at goal of 40. Max residual 100. Urinated 1650ml. Lines= Torres, TLC, Art line, PIV, OGT, ETT.

## 2020-04-20 NOTE — CONSULTS
Procedure Note  Supine Positioning  Critical Care Medicine       Chief Complaint: COVID-19 virus detected  MRN: [id number]  LOS: 10  Sex: male  Age: 54 y.o.      Last ABG:   Recent Labs   Lab 04/20/20  0022   PH 7.312*   PO2 62*   PCO2 87.4*   HCO3 44.2*   BE 18       Vital Signs (Most Recent):   Temp: 99 °F (37.2 °C) (04/20/20 1200)  Pulse: (!) 137 (04/20/20 1300)  Resp: (!) 35 (04/20/20 1300)  BP: (!) 91/52 (04/20/20 0700)  SpO2: (!) 93 % (04/20/20 1300)    Ventilator Settings:  Vent Mode: A/C  Oxygen Concentration (%):  [50-80] 80  Resp Rate Total:  [35 br/min] 35 br/min  Vt Set:  [260 mL] 260 mL  PEEP/CPAP:  [12 cmH20] 12 cmH20  Mean Airway Pressure:  [19 ekP52-65 cmH20] 20 cmH20        Indication: Severe, refractory ARDS. High risk for deterioration during supination procedure in need of direct monitoring by Critical Care personnel.     Prior to beginning the procedure, all appropriate equipment and personnel were gathered in the room. Two individuals were placed on each side of the patient and two respiratory therapists stood at the head of the bed and was dedicated to the management of the head of the patient, the endotracheal tube, and the ventilator lines. The person at the head of the bed coordinated the steps of the supination procedure at the direction of Critical Care team members at the bedside. The patient was first log-rolled 90 degrees onto their side away from the direction of their central venous catheter. Cardiac electrodes were then moved from the patient's posterior back to the anterior. Once properly positioned in the bed, another 90 degree log roll was performed placing the patient into the supine position. The patient's arms were placed alongside the body. Continuous pulse oximetry and blood pressure monitoring was performed without any desaturation or hemodynamic instability. The patient tolerated the procedure well.     Total Critical Care time (uninterrupted not including procedures):  31min

## 2020-04-21 NOTE — PLAN OF CARE
Recommendations     1. Change current TF regimen to Glucerna 1.5 @ 50 mL/hr to provide 1800 kcals, 99 g of protein, 911 mL fluid.               - If Diabetisource to continue, increase to goal rate of 60 mL/hr.  2. RD to monitor & follow-up.     Goals: Meet % EEN, EPN by RD f/u date  Nutrition Goal Status: progressing towards goal  Communication of RD Recs: reviewed with RN

## 2020-04-21 NOTE — RESEARCH
Short Title: Remdesivir Expanded Access  IRB# [id number]  PI: Dr. Twan Hackett  Site: [id number]  Subject #     SCREENING VISIT    Patient presented for evaluation of enrollment in the [place] Expanded Access study, a study designed to evaluate Remdesivir in subjects with COVID-19.    Date of Visit: 4/21/20  Present for Visit and Consent Discussion: Dr. Jonny Hackett, Destini Matthew MA, Mr. Parrish Sanchez  Subject has capacity to consent per evaluation: no    Prior to the Informed Consent (ICF) being signed, or any protocol required testing, procedure, or intervention being performed, the following was done or discussed:    · Purpose of the Study, Qualifications to Participate: yes  · Risks, Benefits, Alternative Treatments: yes  · Confidentiality and HIPAA Authorization for Release of Medical Records for the research trial/subject's right/study related injury: yes  · Voluntary Participation and Withdrawal from the research trial at any time: yes  · Patient and/or LAR has been offered the opportunity to ask questions regarding the study and all questions were answered satisfactorily: yes  · Patient and/or LAR verbalizes understanding of the study/procedures and agrees to participate: yes  · LAR agrees to participation, signed document and scanned or sent photo of signed consent form: yes  · LAR unable to physically sign document and site witness present to confirm consent: yes  · Copy of consent to LAR via Epic MyChart or other means, specify : yes  · Copy in patient's chart and original uploaded to [employer]: yes    Research staff obtaining consent: Dr. Jonny Hackett      In accordance with the study protocol the following procedures were completed before study drug administration and will be monitored daily for the course of the study:     · Informed Consent  · Eligibility criteria review completed by Twan Hackett  · Protocol Version: [id number]  · Inclusion Criteria/ Exclusion Criteria Met? yes  · Demographic Information  reviewed:  · Medical history reviewed  · Concomitant medication reviewed  · Clinical Support, Limitations and Infection Control Assessed  · Requiring Vasopressor or Inotropic Support: yes  · Other information or clinical course, exposure history, current clinical status: 53 y/o with a history of diabetes mellitus who works as a  on a cruise [employer].  He was admitted with respiratory distress.  · COVID-19 Disease Status  · Date Assessed on April 10, 2020  · Currently Hospitalized? yes  · Currently in ICU or CCU? yes  · First Hospital Admission date for COVID-19: April 10, 2020  · COVID-19 Symptom Onset: April 9, 2020  · Was SARS-CoV-2 confirmed by PCR test?yes  · Radiographic Evidence of Pulmonary Infiltrates?yes  · If yes, please describe:mixed alveolar and interstitial opacities  · Physical exam completed   · Vital Signs Collected  · Laboratory sample collection   · Urinalysis collection      Physical Exam   Recent Labs   Lab 04/21/20  0346   WBC 17.13*  17.13*   RBC 3.51*  3.51*   HGB 10.4*  10.4*   HCT 34.1*  34.1*     331   MCV 97  97   MCH 29.6  29.6   MCHC 30.5*  30.5*          Twan N Nnedu  Infectious Disease Research

## 2020-04-21 NOTE — PLAN OF CARE
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  CM recs is snf vs Rehab.. SW will continue to follow as pt remains in critical care status.    Junior Wick LMSW  [facility] -  Dept.  [phone]

## 2020-04-21 NOTE — SUBJECTIVE & OBJECTIVE
Interval History/Significant Events: Patient supine this morning at 0800. Patient remains on paralytics, sedation, and vasopressors. ID following, Plan to start remdesivir today.    Review of Systems   Unable to perform ROS: Intubated     Objective:     Vital Signs (Most Recent):  Temp: 97.9 °F (36.6 °C) (04/21/20 1215)  Pulse: 60 (04/21/20 1200)  Resp: (!) 35 (04/21/20 1200)  BP: (!) 90/50 (04/21/20 1005)  SpO2: 98 % (04/21/20 1200) Vital Signs (24h Range):  Temp:  [96.3 °F (35.7 °C)-98.6 °F (37 °C)] 97.9 °F (36.6 °C)  Pulse:  [] 60  Resp:  [17-35] 35  SpO2:  [87 %-100 %] 98 %  BP: ()/(50-83) 90/50  Arterial Line BP: ()/(39-81) 122/46   Weight: 63 kg (139 lb)  Body mass index is 23.86 kg/m².      Intake/Output Summary (Last 24 hours) at 4/21/2020 1317  Last data filed at 4/21/2020 1200  Gross per 24 hour   Intake 4244.15 ml   Output 2800 ml   Net 1444.15 ml       Physical Exam   Constitutional: No distress. He is sedated, chemically paralyzed and intubated.   HENT:   Head: Normocephalic.   crich removed and dressing clean dry and intact    Eyes: EOM are normal.   Cardiovascular: Normal rate and regular rhythm.   Pulmonary/Chest: Effort normal. He is intubated.   Abdominal: Soft.   Neurological:   Sedated    Skin: Skin is warm. He is not diaphoretic.   Nursing note and vitals reviewed.      Vents:  Vent Mode: A/C (04/21/20 1005)  Ventilator Initiated: Yes (04/10/20 2247)  Set Rate: 35 BPM (04/21/20 1005)  Vt Set: 260 mL (04/21/20 1005)  PEEP/CPAP: 12 cmH20 (04/21/20 1005)  Oxygen Concentration (%): 80 (04/21/20 1200)  Peak Airway Pressure: 41 cmH2O (04/21/20 1005)  Plateau Pressure: 33 cmH20 (04/21/20 1005)  Total Ve: 9.53 mL (04/21/20 1005)  F/VT Ratio<105 (RSBI): 128.68 (04/21/20 1005)  Lines/Drains/Airways     Central Venous Catheter Line            Percutaneous Central Line Insertion/Assessment - Triple Lumen  04/11/20 0137 right femoral vein 10 days          Drain                 NG/OG Tube  04/12/20 1000 18 Fr. Right nostril 9 days         Urethral Catheter 04/18/20 1854 2 days          Airway                 Airway - Non-Surgical 04/11/20 1525 Endotracheal Tube 9 days          Arterial Line                 Arterial Line 04/19/20 2130 Right Radial 1 day          Peripheral Intravenous Line                 Peripheral IV - Single Lumen 04/19/20 1505 22 G Anterior;Right Upper Arm 1 day              Significant Labs:    CBC/Anemia Profile:  Recent Labs   Lab 04/20/20 0413 04/21/20  0346   WBC 12.27 17.13*  17.13*   HGB 9.5* 10.4*  10.4*   HCT 32.6* 34.1*  34.1*    331  331   MCV 98 97  97   RDW 13.5 13.4  13.4        Chemistries:  Recent Labs   Lab 04/20/20 0413 04/21/20  0346    136   K 4.2 4.1   CL 90* 86*   CO2 37* 41*   BUN 8 13   CREATININE 0.6 0.6   CALCIUM 8.3* 8.2*   ALBUMIN 1.5* 1.6*   PROT 6.2 6.7   BILITOT 0.3 0.6   ALKPHOS 169* 195*   ALT 54* 82*   AST 46* 71*   MG 2.1 2.1   PHOS 2.2* 2.0*       All pertinent labs within the past 24 hours have been reviewed.    Significant Imaging:  I have reviewed all pertinent imaging results/findings within the past 24 hours.

## 2020-04-21 NOTE — CONSULTS
Procedure Note  Supine Positioning  Critical Care Medicine       Chief Complaint: COVID-19 virus detected  MRN: [id number]  LOS: 11  Sex: male  Age: 54 y.o.      Last ABG:   Recent Labs   Lab 04/20/20  0022   PH 7.312*   PO2 62*   PCO2 87.4*   HCO3 44.2*   BE 18       Vital Signs (Most Recent):   Temp: 97.3 °F (36.3 °C) (04/21/20 0700)  Pulse: 82 (04/21/20 0800)  Resp: (!) 35 (04/21/20 0800)  BP: (!) 174/83 (04/20/20 1925)  SpO2: 100 % (04/21/20 0800)    Ventilator Settings:  Vent Mode: A/C  Oxygen Concentration (%):  [60-80] 80  Resp Rate Total:  [35 br/min-37 br/min] 35 br/min  Vt Set:  [260 mL] 260 mL  PEEP/CPAP:  [12 cmH20] 12 cmH20  Mean Airway Pressure:  [19 giW35-88 cmH20] 20 cmH20        Indication: Severe, refractory ARDS. High risk for deterioration during supination procedure in need of direct monitoring by Critical Care personnel.     Prior to beginning the procedure, all appropriate equipment and personnel were gathered in the room. Two individuals were placed on each side of the patient and two respiratory therapists stood at the head of the bed and was dedicated to the management of the head of the patient, the endotracheal tube, and the ventilator lines. The person at the head of the bed coordinated the steps of the supination procedure at the direction of Critical Care team members at the bedside. The patient was first log-rolled 90 degrees onto their side away from the direction of their central venous catheter. Cardiac electrodes were then moved from the patient's posterior back to the anterior. Once properly positioned in the bed, another 90 degree log roll was performed placing the patient into the supine position. The patient's arms were placed alongside the body. Continuous pulse oximetry and blood pressure monitoring was performed without any desaturation or hemodynamic instability. The patient tolerated the procedure well.     Total Critical Care time (uninterrupted not including procedures):  30 min

## 2020-04-21 NOTE — CARE UPDATE
Family spoken with via Skype. Family updated on patient's current status. Family aware that patient is now receiving nitric oxide and remdesivir per ID. Verbalized to family that the patient may need to be proned again. All questions were answered regarding patient care.

## 2020-04-21 NOTE — CARE UPDATE
Pt proned and supinated today. Remains sedated and paralyzed. Intubated, vent settings remain the same, except Fio2 increased to 80%. Enema given today d/t constipation, BM occurred. Flexi in place.Started Remdesivir. Afebrile. TF at goal, minimal residuals. Blood glucose remains elevated in upper 200-300's. Family notified per team. [facility].

## 2020-04-21 NOTE — PROGRESS NOTES
[facility]  Critical Care Medicine  Progress Note    Patient Name: Ranjana Sanchez  MRN: [id number]  Admission Date: 4/10/2020  Hospital Length of Stay: 11 days  Code Status: Full Code  Attending Provider: Tatyana Judd MD  Primary Care Provider: Primary Doctor No   Principal Problem: COVID-19 virus detected    Subjective:     HPI:  54y.o male with PMH of HTN brought to ER via EMS for increasing SOB with known covid-19 exposure. 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 They have not had travelers since 3/14. 2 days ago he started having fever and sob. He was seen by the ship's physician and was found to have crackles in his bases bilaterally, but his oxygen saturation was in the 90's. He had a flu test which was negative. He was quarantined in a room and states that he got worse with increasing SOB. He was brought to the ER and found to be 78% on RA. He was started on NRB and initially was doing much better, but his tachypnea did not improve, so he was placed on bipap. His sat is currently 94%.    Hospital/ICU Course:  Patient was admitted to the MICU afyer a difficult intubation in the ER s/p crich. Covid positive with ards, sedated and paralyzed  Unable to prone secondary to crich. Left mainstem intubation which was pulled off, on NO and levophed. ET tube replaced on 4/11 family contacted in [place]. Proned on 4/12 tolerated well and continued to prone on 4/19. 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 He was on and off ketamine as well.  EEG ordered to rule out seizures as he also had seizure like activity that resolved with ativan. Currently changing sedation, was placed on phenobarbital which should start having effect 4/20. Patient proned x 8 (last on 4/20). Still not tolerating ventilator weaning. Remains on sedation, vasopressors, and paralytic. ID now following  patient for possible remdesivir; family aware and consented.    Interval History/Significant Events: Patient supine this morning at 0800. Patient remains on paralytics, sedation, and vasopressors. ID following, Plan to start remdesivir today.    Review of Systems   Unable to perform ROS: Intubated     Objective:     Vital Signs (Most Recent):  Temp: 97.9 °F (36.6 °C) (04/21/20 1215)  Pulse: 60 (04/21/20 1200)  Resp: (!) 35 (04/21/20 1200)  BP: (!) 90/50 (04/21/20 1005)  SpO2: 98 % (04/21/20 1200) Vital Signs (24h Range):  Temp:  [96.3 °F (35.7 °C)-98.6 °F (37 °C)] 97.9 °F (36.6 °C)  Pulse:  [] 60  Resp:  [17-35] 35  SpO2:  [87 %-100 %] 98 %  BP: ()/(50-83) 90/50  Arterial Line BP: ()/(39-81) 122/46   Weight: 63 kg (139 lb)  Body mass index is 23.86 kg/m².      Intake/Output Summary (Last 24 hours) at 4/21/2020 1317  Last data filed at 4/21/2020 1200  Gross per 24 hour   Intake 4244.15 ml   Output 2800 ml   Net 1444.15 ml       Physical Exam   Constitutional: No distress. He is sedated, chemically paralyzed and intubated.   HENT:   Head: Normocephalic.   crich removed and dressing clean dry and intact    Eyes: EOM are normal.   Cardiovascular: Normal rate and regular rhythm.   Pulmonary/Chest: Effort normal. He is intubated.   Abdominal: Soft.   Neurological:   Sedated    Skin: Skin is warm. He is not diaphoretic.   Nursing note and vitals reviewed.      Vents:  Vent Mode: A/C (04/21/20 1005)  Ventilator Initiated: Yes (04/10/20 2247)  Set Rate: 35 BPM (04/21/20 1005)  Vt Set: 260 mL (04/21/20 1005)  PEEP/CPAP: 12 cmH20 (04/21/20 1005)  Oxygen Concentration (%): 80 (04/21/20 1200)  Peak Airway Pressure: 41 cmH2O (04/21/20 1005)  Plateau Pressure: 33 cmH20 (04/21/20 1005)  Total Ve: 9.53 mL (04/21/20 1005)  F/VT Ratio<105 (RSBI): 128.68 (04/21/20 1005)  Lines/Drains/Airways     Central Venous Catheter Line            Percutaneous Central Line Insertion/Assessment - Triple Lumen  04/11/20 0137 right  femoral vein 10 days          Drain                 NG/OG Tube 04/12/20 1000 18 Fr. Right nostril 9 days         Urethral Catheter 04/18/20 1854 2 days          Airway                 Airway - Non-Surgical 04/11/20 1525 Endotracheal Tube 9 days          Arterial Line                 Arterial Line 04/19/20 2130 Right Radial 1 day          Peripheral Intravenous Line                 Peripheral IV - Single Lumen 04/19/20 1505 22 G Anterior;Right Upper Arm 1 day              Significant Labs:    CBC/Anemia Profile:  Recent Labs   Lab 04/20/20  0413 04/21/20  0346   WBC 12.27 17.13*  17.13*   HGB 9.5* 10.4*  10.4*   HCT 32.6* 34.1*  34.1*    331  331   MCV 98 97  97   RDW 13.5 13.4  13.4        Chemistries:  Recent Labs   Lab 04/20/20 0413 04/21/20  0346    136   K 4.2 4.1   CL 90* 86*   CO2 37* 41*   BUN 8 13   CREATININE 0.6 0.6   CALCIUM 8.3* 8.2*   ALBUMIN 1.5* 1.6*   PROT 6.2 6.7   BILITOT 0.3 0.6   ALKPHOS 169* 195*   ALT 54* 82*   AST 46* 71*   MG 2.1 2.1   PHOS 2.2* 2.0*       All pertinent labs within the past 24 hours have been reviewed.    Significant Imaging:  I have reviewed all pertinent imaging results/findings within the past 24 hours.      ABG  Recent Labs   Lab 04/20/20  0022   PH 7.312*   PO2 62*   PCO2 87.4*   HCO3 44.2*   BE 18     Assessment/Plan:     Neuro  Sedated  - On propofol, dialudid, Precedex and phenobarbital , weaned ketamine 4/18; paralyzed with vecuronium  - Had a couple episodes of seizure like activity with right upward gaze, gave 2 of ativan and resolved, EEG was negative for seizures.  - started on valium suspect he could have alcohol withdrawal        Pulmonary  Acute hypoxemic respiratory failure  - Likely 2/2 to ARDS due to COVID-19  - ARDSNET Protocol    --see COVID    Cardiac/Vascular  Tachycardia-bradycardia  Patient with HR in 40s to mid 50's and sometimes in the 130-140   Bradycardia suspect likely due to hypothermia (T 93.6F) vs. Sedative medications  (Precedex) vs. Sepsis  EKG with HR 50s, normal axis, normal intervals, ? ST elevation 1 & AVL (2mm)  ECHO 4/16: EF 50%, mild RV dysfunction  Troponin < 0.06 wnl    Plan  - Apply Heating pads and warming blanket when cold   - Wean off sedation and paralytics as tolerated  - F/u repeat blood cultures (4/18: NGTD)  - EKG (4/18) with no ST elevation or T wave prominence seen in earlier EKGs  - Continue Heparin gtt, ASA 81mg & Atorvastatin 40mg  - T4 normal, TSH slightly low  - Cardiology consulted and suggest treatment for hypothermia and no other interventions  - considering withdrawal from alcohol as patient responds well to ativan      Hypertension  - Was in shock after intubation   - holding home toprol XL 50 mg  - currently off cardene gtt       Endocrine  Type 2 diabetes mellitus without complication, without long-term current use of insulin  - HbA1c 6  - Home DM regimen:  Diet controlled  - Started on Insulin ggt on 4/16 due to uncontrolled BG, on 4/18 with euglycemia switched to detemir and SSI (TF SSI)        Other  * COVID-19 virus detected  Respiratory distress   Acute hypoxemic respiratory failure    - Patient intubated with crich which was switched to ETT on 4/11  - proning days 4/12, 4/13,4/14, 4/16, 4/17,4/18, 4/19, 4/20  - nitric oxide initiated on 4/21; patient now supine; will f/up ABG post NO initiation to assess need for proning  - remains hypercapnic despite lowering TV to 4 mL/kg and increased RR  - Completed treatment with Plaquenil and Azithromycin  - Methyprednisone for 5 days, completed  - Daily CRP, d-dimer, ferritin, to trend inflammatory response.   - Strict I/O's and goal net neutral status to help optimize vent mechanics.   - Special isolation and aspiration precautions ordered.   - Will continue rocephin for now   - q6h albuterol nebs ordered  - ID following with plans to obtain consent for remdesivir.       Shock  - Now off pressors, MAP goal > 65   - Completed treatment with Plaquenil  and Azithromycin.  - On cefepime and vanc   - Blood cultures NGTD. F/u repeat cultures           Critical Care Daily Checklist:    A: Awake: RASS Goal/Actual Goal:    Actual: [place] Agitation Sedation Scale (RASS): Unarousable   B: Spontaneous Breathing Trial Performed? Spon. Breathing Trial Initiated?: Not initiated (04/19/20 0900)   C: SAT & SBT Coordinated?  Not currently indicated                      D: Delirium: CAM-ICU Overall CAM-ICU: Positive   E: Early Mobility Performed? No   F: Feeding Goal: Goals: Meet % EEN, EPN by RD f/u date  Status: Nutrition Goal Status: progressing towards goal   Current Diet Order   Procedures    Diet NPO      AS: Analgesia/Sedation done   T: Thromboembolic Prophylaxis done   H: HOB > 300 Yes   U: Stress Ulcer Prophylaxis (if needed) done   G: Glucose Control q6h   B: Bowel Function     I: Indwelling Catheter (Lines & Torres) Necessity necessary   D: De-escalation of Antimicrobials/Pharmacotherapies done    Plan for the day/ETD Cont course    Code Status:  Family/Goals of Care: Full Code  Cont course     Critical Care Time: 70 minutes  Critical secondary to Patient has a condition that poses threat to life and bodily function: patient remains intubated, sedated, and paralyzed on vasopressors.     Critical care was time spent personally by me on the following activities: development of treatment plan with patient or surrogate and bedside caregivers, discussions with consultants, evaluation of patient's response to treatment, examination of patient, ordering and performing treatments and interventions, ordering and review of laboratory studies, ordering and review of radiographic studies, pulse oximetry, re-evaluation of patient's condition. This critical care time did not overlap with that of any other provider or involve time for any procedures.     Larry Martel, NP  Critical Care Medicine  [facility]

## 2020-04-21 NOTE — ASSESSMENT & PLAN NOTE
Respiratory distress   Acute hypoxemic respiratory failure    - Patient intubated with crich which was switched to ETT on 4/11  - proning days 4/12, 4/13,4/14, 4/16, 4/17,4/18, 4/19, 4/20  - nitric oxide initiated on 4/21; patient now supine; will f/up ABG post NO initiation to assess need for proning  - remains hypercapnic despite lowering TV to 4 mL/kg and increased RR  - Completed treatment with Plaquenil and Azithromycin  - Methyprednisone for 5 days, completed  - Daily CRP, d-dimer, ferritin, to trend inflammatory response.   - Strict I/O's and goal net neutral status to help optimize vent mechanics.   - Special isolation and aspiration precautions ordered.   - Will continue rocephin for now   - q6h albuterol nebs ordered  - ID following with plans to obtain consent for remdesivir.

## 2020-04-21 NOTE — ASSESSMENT & PLAN NOTE
- On propofol, dialudid, Precedex and phenobarbital , weaned ketamine 4/18; paralyzed with vecuronium  - Had a couple episodes of seizure like activity with right upward gaze, gave 2 of ativan and resolved, EEG was negative for seizures.  - started on valium suspect he could have alcohol withdrawal

## 2020-04-21 NOTE — NURSING
Turned pts head to R @ 0115 w/ RT and PCT assist. R rad art BP up to 140s - 150s, began weaning levo gtt. Attempted to wean FiO2 down to 60%, pt became tachycardic, hypertensive, requiring cardene gtt to be restarted for SBP > 180. O2 sats down to 93% on monitor. FiO2 increased tp 80%. Pt now in ST 110s, art bp 170s on Cardene 10 mg/hr, O2 sats 96% on 80% FiO2. See flowsheets for full VS and titrations. [facility].

## 2020-04-21 NOTE — PROGRESS NOTES
Short Title: Remdesivir Expanded Access  IRB# [id number]  PI: Dr. Twan Hackett  Site: [id number]  Subject # [id number]     SCREENING VISIT     Patient presented for evaluation of enrollment in the [place] Expanded Access study, a study designed to evaluate Remdesivir in subjects with COVID-19.     Date of Visit: 4/21/20  Present for Visit and Consent Discussion: Dr. Jonny Hackett, Destini Matthew MA, Mr. Parrish Sanchez  Subject has capacity to consent per evaluation: no     Prior to the Informed Consent (ICF) being signed, or any protocol required testing, procedure, or intervention being performed, the following was done or discussed:     · Purpose of the Study, Qualifications to Participate: yes  · Risks, Benefits, Alternative Treatments: yes  · Confidentiality and HIPAA Authorization for Release of Medical Records for the research trial/subject's right/study related injury: yes  · Voluntary Participation and Withdrawal from the research trial at any time: yes  · Patient and/or LAR has been offered the opportunity to ask questions regarding the study and all questions were answered satisfactorily: yes  · Patient and/or LAR verbalizes understanding of the study/procedures and agrees to participate: yes  · LAR agrees to participation, signed document and scanned or sent photo of signed consent form: yes  · LAR unable to physically sign document and site witness present to confirm consent: yes  · Copy of consent to LAR via Epic MyChart or other means, specify : yes  · Copy in patient's chart and original uploaded to Epic: yes     Research staff obtaining consent: Dr. Jonny Hackett        In accordance with the study protocol the following procedures were completed before study drug administration and will be monitored daily for the course of the study:      · Informed Consent  · Eligibility criteria review completed by Twan Hackett  ? Protocol Version: [id number]  ? Inclusion Criteria/ Exclusion Criteria Met? yes  · Demographic  Information reviewed:  · Medical history reviewed  · Concomitant medication reviewed  · Clinical Support, Limitations and Infection Control Assessed  ? Requiring Vasopressor or Inotropic Support: yes  ? Other information or clinical course, exposure history, current clinical status: 53 y/o with a history of diabetes mellitus who works as a  on a cruise ship.  He was admitted with respiratory distress.  · COVID-19 Disease Status  ? Date Assessed on April 10, 2020  ? Currently Hospitalized? yes  ? Currently in ICU or CCU? yes  ? First Hospital Admission date for COVID-19: April 10, 2020  ? COVID-19 Symptom Onset: April 9, 2020  ? Was SARS-CoV-2 confirmed by PCR test?yes  ? Radiographic Evidence of Pulmonary Infiltrates?yes  § If yes, please describe:mixed alveolar and interstitial opacities  · Physical exam completed   · Vital Signs Collected  · Laboratory sample collection   · Urinalysis collection        Physical Exam       Recent Labs   Lab 04/21/20  0346   WBC 17.13*  17.13*   RBC 3.51*  3.51*   HGB 10.4*  10.4*   HCT 34.1*  34.1*     331   MCV 97  97   MCH 29.6  29.6   MCHC 30.5*  30.5*            Twan N Nnedu  Infectious Disease Research

## 2020-04-21 NOTE — PROGRESS NOTES
"  [facility]  Adult Nutrition  Consult Note    SUMMARY     Recommendations    1. Change current TF regimen to Glucerna 1.5 @ 50 mL/hr to provide 1800 kcals, 99 g of protein, 911 mL fluid.    - If Diabetisource to continue, increase to goal rate of 60 mL/hr.  2. RD to monitor & follow-up.    Goals: Meet % EEN, EPN by RD f/u date  Nutrition Goal Status: progressing towards goal  Communication of RD Recs: reviewed with RN    Reason for Assessment    Reason For Assessment: RD follow-up  Diagnosis: other (see comments)(COVID19)  Relevant Medical History: HTN, DM  Interdisciplinary Rounds: did not attend    General Information Comments: RD working remotely, +COVID19. Pt remains intubated/sedated, tolerating TFs. Planning to supinate this AM. Unsure of PO intake PTA/UBW, no weight history in chart. Due to recent hospital wide restrictions to limit the transfer of COVID-19, no NFPE performed at this time. NFPE to be performed at a later date.  Nutrition Discharge Planning: Unable to determine    Nutrition/Diet History    Factors Affecting Nutritional Intake: NPO, on mechanical ventilation    Anthropometrics    Temp: 97.3 °F (36.3 °C)  Height Method: Stated  Height: 5' 4" (162.6 cm)  Height (inches): 64 in  Weight Method: Stated  Weight: 63 kg (138 lb 14.2 oz)  Weight (lb): 138.89 lb  Ideal Body Weight (IBW), Male: 130 lb  % Ideal Body Weight, Male (lb): 106.84 %  BMI (Calculated): 23.8  BMI Grade: 18.5-24.9 - normal  Usual Body Weight (UBW), kg: (JAMMIE)    Lab/Procedures/Meds    Pertinent Labs Reviewed: reviewed  Pertinent Labs Comments: Gluc 317  Pertinent Medications Reviewed: reviewed  Pertinent Medications Comments: Precedex, Heparin, Dilaudid, Nicardipine, Levophed, Propofol    Estimated/Assessed Needs    Weight Used For Calorie Calculations: 63 kg (138 lb 14.2 oz)     Energy Calorie Requirements (kcal): 1851 kcal/d  Energy Need Method: [facility]     Protein Requirements: 76-95 g/d (1.2-1.5 " g/kg)  Weight Used For Protein Calculations: 63 kg (138 lb 14.2 oz)     Estimated Fluid Requirement Method: other (see comments)(Per MD or 1 mL/kcal)     CHO Requirement: 50% total kcals    Nutrition Prescription Ordered    Current Diet Order: NPO  Current Nutrition Support Formula Ordered: Diabetisourchandana CARSON  Current Nutrition Support Rate Ordered: 40 mL/hr    Evaluation of Received Nutrient/Fluid Intake    Enteral Calories (kcal): 1152  Enteral Protein (gm): 58  Enteral (Free Water) Fluid (mL): 785    Other Calories (kcal): 504 (Propofol)    Total Calories (kcal): 1656    % Kcal Needs: 89%  % Protein Needs: 76%    Energy Calories Required: not meeting needs  Protein Required: not meeting needs  Fluid Required: other (see comments)(Per MD or 1 mL/kcal)    Comments: LBM: unknown    Tolerance: tolerating    Nutrition Risk    Level of Risk/Frequency of Follow-up: (2x/week)     Assessment and Plan    Nutrition Problem  Inadequate energy intake     Related to (etiology):   Inability to consume sufficient energy      Signs and Symptoms (as evidenced by):   NPO     Interventions(treatment strategy):  Collaboration of nutrition care w/ other providers      Nutrition Diagnosis Status:   Continues     Monitor and Evaluation    Food and Nutrient Intake: energy intake, food and beverage intake, enteral nutrition intake  Food and Nutrient Adminstration: diet order, enteral and parenteral nutrition administration  Physical Activity and Function: nutrition-related ADLs and IADLs  Anthropometric Measurements: weight change, weight  Biochemical Data, Medical Tests and Procedures: inflammatory profile, lipid profile, glucose/endocrine profile, gastrointestinal profile, electrolyte and renal panel  Nutrition-Focused Physical Findings: overall appearance     Nutrition Follow-Up    RD Follow-up?: Yes

## 2020-04-21 NOTE — NURSING
During initial assessment, pt became tachycardic and hypertensive. HR up to 140s and SBP 240s. Levo off, Cardene gtt restarted and titrating to SBP < 180 per orders. BIS monitor showing high 40s - low 50s.Sedation increased to max dose of Propofol gtt, Precedex gtt, and Dilaudid gtt. Remains paralyzed on Vec. O2 sats trending downward, increased FiO2 to 80%. Team @ BS. [facility].

## 2020-04-21 NOTE — PLAN OF CARE
Pt remains intubated/sedated/paralyzed on 80% fiO2. ACVC+/35/80%/260/+12. HR and BP labile, intermittently requiring cardene gtt for hypertension and levophed for hypotension. Propofol, Precedex and dilaudid gtts maxed out for sedation, Vecuronium for paralytic. Ativan x 1 given for sedation. BIS 20s-40s. Heparin gtt therapeutic. FTG w/ ~ 1400cc Op. Tolerating Tfs @ goal, no BM this shift. Plan to supinate @ 0800 and GOC discussion with family.

## 2020-04-21 NOTE — CONSULTS
Procedure Note  Prone Positioning  Critical Care Medicine       Chief Complaint: COVID-19 virus detected  MRN: [id number]  LOS: 11  Sex: male  Age: 54 y.o.      Last ABG:   Recent Labs   Lab 04/20/20  0022   PH 7.312*   PO2 62*   PCO2 87.4*   HCO3 44.2*   BE 18       Vital Signs (Most Recent):   Temp: 96.8 °F (36 °C) (04/21/20 1449)  Pulse: 98 (04/21/20 1500)  Resp: (!) 35 (04/21/20 1500)  BP: (!) 90/50 (04/21/20 1005)  SpO2: (!) 90 % (04/21/20 1500)    Ventilator Settings:  Vent Mode: A/C  Oxygen Concentration (%):  [60-90] 90  Resp Rate Total:  [35 br/min-37 br/min] 35 br/min  Vt Set:  [260 mL] 260 mL  PEEP/CPAP:  [12 cmH20] 12 cmH20  Mean Airway Pressure:  [20 mrW65-37 cmH20] 21 cmH20    Indication: Severe, refractory ARDS. High risk for deterioration during proning procedure in need of direct monitoring by Critical Care personnel.     Prior to beginning the procedure, all appropriate equipment and personnel were gathered in the room. Two individuals were placed on each side of the patient and one person stood at the head of the bed and was dedicated to the management of the head of the patient, the endotracheal tube, and the ventilator lines. The person at the head of the bed  coordinated the steps of the proning procedure with team team at the direction of Critical Care team members at the bedside. The patient was first log-rolled 90 degrees onto their side towards the direction of their central venous catheter. Cardiac electrodes were then moved from the patient's anterior to the posterior. The patient?s knees, forehead, chest, and iliac crests were protected using adhesive pads and/or foam pads to reduce the risk of skin breakdown. Once properly positioned in the bed, another 90 degree log roll was performed placing the patient into the prone position. The patient's arms were placed alongside the body. The patient's head should be turned alternately to the right or left every 2 hours. The patient tolerated the  procedure well.     Total Critical Care time (uninterrupted not including procedures): 30 minutes

## 2020-04-22 PROBLEM — Z71.89 GOALS OF CARE, COUNSELING/DISCUSSION: Status: ACTIVE | Noted: 2020-01-01

## 2020-04-22 NOTE — PROGRESS NOTES
0105: HR-67 BP-106/46(62) N1tjs-493%  turned head to left side  0120: HR-86 BP-169/65(95) S7sqg-657%  Levo OFF   0130: HR-105 BP-178/68(100) P2izm-217%  Cardene@5  0145: HR-130 BP-167/62(90) X4tbf-36%  Cardene@15  0200: HR-141 BP-159/63(89) B3udz-02%  IV Versed 2mg given per MD order.   0220: HR-141 BP-143/59(81) L3phe-28%  Cardene@10  0230: HR-144 BP-137/58(80) L0iev-27%  IV versed 2mg given per MD order. Cardene OFF   0300: HR-138 BP-138/58(80) I3yrk-51%  turned head back to right side  0315: HR-142 BP-141/59(81) E9rfp-24%  PRN IV Labetalol 10mg given  0330: HR-106 BP-106/50(69) S6nmt-64%  MD called to bedside  0350: HR-112 BP-114/45(64) L5tcf-02%  ABG obtained (pH-7.26 / pCO2-112.7 / pO2-67 / Bicarb-51.1)  0400: HR-114 BP-108/43(60) U7epg-92%  Dr. SHERRIE Larry at bedside. MD notified of ABG. Vent settings made, increased Tv-300 and decreased flow to 40L/min.   0530: HR-97 BP-101/42(56) B4chk-01% Repeat ABG. (pH-7.36 / pCO2-87.3 / PO2-82 / Bicarb-49.7   0545: HR-94 BP-102/44(58) Z6arn-509%  Dr. Estrada notified of ABG. Decreased Tv-280 per MD order.

## 2020-04-22 NOTE — CARE UPDATE
Patient's son called and given updates regarding patient's prognosis and current health. The patient was made DNR.

## 2020-04-22 NOTE — PROGRESS NOTES
[facility]  Critical Care Medicine  Progress Note    Patient Name: Ranjana Sanchez  MRN: [id number]  Admission Date: 4/10/2020  Hospital Length of Stay: 12 days  Code Status: Full Code  Attending Provider: Tatyana Judd MD  Primary Care Provider: Primary Doctor No   Principal Problem: COVID-19 virus detected    Subjective:     HPI:  54y.o male with PMH of HTN brought to ER via EMS for increasing SOB with known covid-19 exposure. 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 They have not had travelers since 3/14. 2 days ago he started having fever and sob. He was seen by the ship's physician and was found to have crackles in his bases bilaterally, but his oxygen saturation was in the 90's. He had a flu test which was negative. He was quarantined in a room and states that he got worse with increasing SOB. He was brought to the ER and found to be 78% on RA. He was started on NRB and initially was doing much better, but his tachypnea did not improve, so he was placed on bipap. His sat is currently 94%.    Hospital/ICU Course:  Patient was admitted to the MICU afyer a difficult intubation in the ER s/p crich. Covid positive with ards, sedated and paralyzed  Unable to prone secondary to crich. Left mainstem intubation which was pulled off, on NO and levophed. ET tube replaced on 4/11 family contacted in [place]. Proned on 4/12 tolerated well and continued to prone on 4/19. 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 He was on and off ketamine as well.  EEG ordered to rule out seizures as he also had seizure like activity that resolved with ativan. Currently changing sedation, was placed on phenobarbital which should start having effect 4/20. Patient proned x 9 (last on 4/21). Still not tolerating ventilator weaning. Remains on sedation, vasopressors, and paralytic. ID now following  patient for possible remdesivir; family aware and consented. Patient not significantly improving despite efforts. Plan to contact son today regarding goals of care discussion.    Interval History/Significant Events: Patient proned and then supine at 0830. Patient remains off/on cardene/vasopressors. Vent requirements increased. Plan for GOC discussion with family via Skype today.    Review of Systems   Unable to perform ROS: Intubated     Objective:     Vital Signs (Most Recent):  Temp: 98.7 °F (37.1 °C) (04/22/20 0900)  Pulse: (!) 128 (04/22/20 0900)  Resp: (!) 35 (04/22/20 0900)  BP: (!) 90/50 (04/21/20 1005)  SpO2: (!) 88 % (04/22/20 0900) Vital Signs (24h Range):  Temp:  [96.3 °F (35.7 °C)-100.2 °F (37.9 °C)] 98.7 °F (37.1 °C)  Pulse:  [] 128  Resp:  [] 35  SpO2:  [88 %-100 %] 88 %  Arterial Line BP: ()/(41-78) 187/69   Weight: 63 kg (139 lb)  Body mass index is 23.86 kg/m².      Intake/Output Summary (Last 24 hours) at 4/22/2020 1053  Last data filed at 4/22/2020 0921  Gross per 24 hour   Intake 4478.93 ml   Output 3870 ml   Net 608.93 ml       Physical Exam   Constitutional: No distress. He is sedated, chemically paralyzed and intubated.   HENT:   Head: Normocephalic.   crich removed and dressing clean dry and intact    Eyes: EOM are normal.   Cardiovascular: Normal rate and regular rhythm.   Pulmonary/Chest: Effort normal. He is intubated.   Abdominal: Soft.   Neurological:   Sedated    Skin: Skin is warm. He is not diaphoretic.   Nursing note and vitals reviewed.      Vents:  Vent Mode: A/C (04/22/20 0717)  Ventilator Initiated: Yes (04/10/20 2247)  Set Rate: 35 BPM (04/22/20 0717)  Vt Set: 280 mL (04/22/20 0717)  PEEP/CPAP: 12 cmH20 (04/22/20 0717)  Oxygen Concentration (%): 80 (04/22/20 0900)  Peak Airway Pressure: 41 cmH2O (04/22/20 0717)  Plateau Pressure: 34 cmH20 (04/22/20 0717)  Total Ve: 9.85 mL (04/22/20 0717)  F/VT Ratio<105 (RSBI): 124.11 (04/22/20 0717)  Lines/Drains/Airways      Central Venous Catheter Line            Percutaneous Central Line Insertion/Assessment - Triple Lumen  04/11/20 0137 right femoral vein 11 days          Drain                 NG/OG Tube 04/12/20 1000 18 Fr. Right nostril 10 days         Urethral Catheter 04/18/20 1854 3 days         Rectal Tube 04/21/20 1540 fecal management system less than 1 day          Airway                 Airway - Non-Surgical 04/11/20 1525 Endotracheal Tube 10 days          Arterial Line                 Arterial Line 04/19/20 2130 Right Radial 2 days          Peripheral Intravenous Line                 Peripheral IV - Single Lumen 04/19/20 1505 22 G Anterior;Right Upper Arm 2 days              Significant Labs:    CBC/Anemia Profile:  Recent Labs   Lab 04/21/20  0346 04/22/20  0410   WBC 17.13*  17.13* 14.25*   HGB 10.4*  10.4* 8.4*   HCT 34.1*  34.1* 29.2*     331 239   MCV 97  97 99*   RDW 13.4  13.4 13.3   FERRITIN  --  1,579*        Chemistries:  Recent Labs   Lab 04/21/20  0346 04/22/20  0410    135*   K 4.1 4.4   CL 86* 87*   CO2 41* 43*   BUN 13 15   CREATININE 0.6 0.7   CALCIUM 8.2* 8.1*   ALBUMIN 1.6* 1.4*   PROT 6.7 5.7*   BILITOT 0.6 0.6   ALKPHOS 195* 188*   ALT 82* 79*   AST 71* 66*   MG 2.1 2.1   PHOS 2.0* 2.4*       All pertinent labs within the past 24 hours have been reviewed.    Significant Imaging:  I have reviewed all pertinent imaging results/findings within the past 24 hours.      ABG  Recent Labs   Lab 04/22/20  0520   PH 7.363   PO2 82   PCO2 87.3*   HCO3 49.7*   BE 24     Assessment/Plan:     Neuro  Sedated  - On propofol, dialudid, Precedex and phenobarbital , weaned ketamine 4/18; paralyzed with vecuronium  - Had a couple episodes of seizure like activity with right upward gaze, gave 2 of ativan and resolved, EEG was negative for seizures.  - started on valium suspect he could have alcohol withdrawal        Pulmonary  Acute hypoxemic respiratory failure  - Likely 2/2 to ARDS due to COVID-19  -  ARDSNET Protocol    --see COVID    Cardiac/Vascular  Tachycardia-bradycardia  Patient with HR in 40s to mid 50's and sometimes in the 130-140   Bradycardia suspect likely due to hypothermia (T 93.6F) vs. Sedative medications (Precedex) vs. Sepsis  EKG with HR 50s, normal axis, normal intervals, ? ST elevation 1 & AVL (2mm)  ECHO 4/16: EF 50%, mild RV dysfunction  Troponin < 0.06 wnl    Plan  - Apply Heating pads and warming blanket when cold   - Wean off sedation and paralytics as tolerated  - F/u repeat blood cultures (4/18: NGTD)  - EKG (4/18) with no ST elevation or T wave prominence seen in earlier EKGs  - Continue Heparin gtt, ASA 81mg & Atorvastatin 40mg  - T4 normal, TSH slightly low  - Cardiology consulted and suggest treatment for hypothermia and no other interventions  - considering withdrawal from alcohol as patient responds well to ativan      Hypertension  - Was in shock after intubation   - holding home toprol XL 50 mg  - currently on cardene gtt      Endocrine  Type 2 diabetes mellitus without complication, without long-term current use of insulin  - HbA1c 6  - Home DM regimen:  Diet controlled  - Started on Insulin gtt on 4/16 due to uncontrolled BG, on 4/18 with euglycemia switched to detemir (increased dosing) and SSI (TF SSI)        Other  * COVID-19 virus detected  Respiratory distress   Acute hypoxemic respiratory failure    - Patient intubated with crich which was switched to ETT on 4/11  - proning days 4/12, 4/13,4/14, 4/16, 4/17,4/18, 4/19, 4/20  - nitric oxide initiated on 4/21; patient now supine; will f/up ABG post NO initiation to assess need for proning  - remains hypercapnic despite lowering TV to 4 mL/kg and increased RR  - Completed treatment with Plaquenil and Azithromycin  - Methyprednisone for 5 days, completed  - Daily CRP, d-dimer, ferritin, to trend inflammatory response.   - Strict I/O's and goal net neutral status to help optimize vent mechanics.   - Special isolation and  aspiration precautions ordered.   - Will continue rocephin for now   - q6h albuterol nebs ordered  - ID following giving remdesivir.  -optimizing fluid status. X 1 spot dose Lasix given on 4/22       Goals of care, counseling/discussion  Patient's prognosis remains poor despite efforts to optimize health. Plan to speak with patient's family (namely son) to discuss goals of care.    Shock  - Now off pressors, MAP goal > 65   - Completed treatment with Plaquenil and Azithromycin.  - On cefepime and vanc   - Blood cultures NGTD. F/u repeat cultures           Critical Care Daily Checklist:    A: Awake: RASS Goal/Actual Goal: RASS Goal: -5-->unarousable  Actual: [place] Agitation Sedation Scale (RASS): Unarousable   B: Spontaneous Breathing Trial Performed? Spon. Breathing Trial Initiated?: Not initiated (04/19/20 0900)   C: SAT & SBT Coordinated?  Not appropriate at this time                      D: Delirium: CAM-ICU Overall CAM-ICU: Positive   E: Early Mobility Performed? No   F: Feeding Goal: Goals: Meet % EEN, EPN by RD f/u date  Status: Nutrition Goal Status: progressing towards goal   Current Diet Order   Procedures    Diet NPO      AS: Analgesia/Sedation done   T: Thromboembolic Prophylaxis done   H: HOB > 300 Yes   U: Stress Ulcer Prophylaxis (if needed) Done   G: Glucose Control Q6h; increased SSI   B: Bowel Function Stool Occurrence: 1   I: Indwelling Catheter (Lines & Torres) Necessity necessary   D: De-escalation of Antimicrobials/Pharmacotherapies done    Plan for the day/ETD Cont course    Code Status:  Family/Goals of Care: Full Code  GOC discussion     Critical Care Time: 70 minutes  Critical secondary to Patient has a condition that poses threat to life and bodily function: patient remains intubated, sedated, paralyzed.   Critical care was time spent personally by me on the following activities: development of treatment plan with patient or surrogate and bedside caregivers, discussions with  consultants, evaluation of patient's response to treatment, examination of patient, ordering and performing treatments and interventions, ordering and review of laboratory studies, ordering and review of radiographic studies, pulse oximetry, re-evaluation of patient's condition. This critical care time did not overlap with that of any other provider or involve time for any procedures.     Larry Martel NP  Critical Care Medicine  [facility]

## 2020-04-22 NOTE — CONSULTS
Wound care consult received for re-assessment of L arm wound. Pt positive Covid 19. Upon assessment, noted L arm with gray discolored skin and what appears to be absorbed blistering possibly from infiltration.  Affected skin is taunt and not spongy. Recommend to applied Betadine to affected area BID to reduce bioburden and opening of healing blisters.  Notified Nurse Anabel and Larry Martel NP of assessment and recommendations.  Nursing to continue to maintain pressure injury prevention interventions to include waffle overlay and heel boots. Wound care team to sign off. Please reconsult for any further needs. [phone]

## 2020-04-22 NOTE — PROGRESS NOTES
Pharmacokinetic Assessment Follow Up: IV Vancomycin    Vancomycin Regimen Assessment & Plan:     - Vancomycin trough level resulted within goal as 14.4 mg/dl.  Trough was drawn appropriately. Goal 10-20 mg/dl.  - Continue current regimen of 1250 mg every 12 hours.    - A surveillance trough is ordered for 4/23 at 08:30.     Drug levels (last 3 results):  Recent Labs   Lab Result Units 04/20/20  0630 04/21/20 2031   Vancomycin-Trough ug/mL 10.4 14.4       Pharmacy will continue to follow and monitor vancomycin.    Please contact pharmacy at extension [phone] for questions regarding this assessment.    Thank you for the consult,   Daiana Vega, PharmD, BCCCP       Patient brief summary:  Ranjana Sanchez is a 54 y.o. male initiated on antimicrobial therapy with IV Vancomycin for treatment of lower respiratory infection.    Drug Allergies:   Review of patient's allergies indicates:  No Known Allergies    Actual Body Weight:   63 kg    Renal Function:   Estimated Creatinine Clearance: 115.1 mL/min (based on SCr of 0.6 mg/dL).,     Dialysis Method (if applicable):  N/A

## 2020-04-22 NOTE — SUBJECTIVE & OBJECTIVE
Interval History/Significant Events: Patient proned and then supine at 0830. Patient remains off/on cardene/vasopressors. Vent requirements increased. Plan for GOC discussion with family via Skype today.    Review of Systems   Unable to perform ROS: Intubated     Objective:     Vital Signs (Most Recent):  Temp: 98.7 °F (37.1 °C) (04/22/20 0900)  Pulse: (!) 128 (04/22/20 0900)  Resp: (!) 35 (04/22/20 0900)  BP: (!) 90/50 (04/21/20 1005)  SpO2: (!) 88 % (04/22/20 0900) Vital Signs (24h Range):  Temp:  [96.3 °F (35.7 °C)-100.2 °F (37.9 °C)] 98.7 °F (37.1 °C)  Pulse:  [] 128  Resp:  [] 35  SpO2:  [88 %-100 %] 88 %  Arterial Line BP: ()/(41-78) 187/69   Weight: 63 kg (139 lb)  Body mass index is 23.86 kg/m².      Intake/Output Summary (Last 24 hours) at 4/22/2020 1053  Last data filed at 4/22/2020 0921  Gross per 24 hour   Intake 4478.93 ml   Output 3870 ml   Net 608.93 ml       Physical Exam   Constitutional: No distress. He is sedated, chemically paralyzed and intubated.   HENT:   Head: Normocephalic.   crich removed and dressing clean dry and intact    Eyes: EOM are normal.   Cardiovascular: Normal rate and regular rhythm.   Pulmonary/Chest: Effort normal. He is intubated.   Abdominal: Soft.   Neurological:   Sedated    Skin: Skin is warm. He is not diaphoretic.   Nursing note and vitals reviewed.      Vents:  Vent Mode: A/C (04/22/20 0717)  Ventilator Initiated: Yes (04/10/20 2247)  Set Rate: 35 BPM (04/22/20 0717)  Vt Set: 280 mL (04/22/20 0717)  PEEP/CPAP: 12 cmH20 (04/22/20 0717)  Oxygen Concentration (%): 80 (04/22/20 0900)  Peak Airway Pressure: 41 cmH2O (04/22/20 0717)  Plateau Pressure: 34 cmH20 (04/22/20 0717)  Total Ve: 9.85 mL (04/22/20 0717)  F/VT Ratio<105 (RSBI): 124.11 (04/22/20 0717)  Lines/Drains/Airways     Central Venous Catheter Line            Percutaneous Central Line Insertion/Assessment - Triple Lumen  04/11/20 0137 right femoral vein 11 days          Drain                  NG/OG Tube 04/12/20 1000 18 Fr. Right nostril 10 days         Urethral Catheter 04/18/20 1854 3 days         Rectal Tube 04/21/20 1540 fecal management system less than 1 day          Airway                 Airway - Non-Surgical 04/11/20 1525 Endotracheal Tube 10 days          Arterial Line                 Arterial Line 04/19/20 2130 Right Radial 2 days          Peripheral Intravenous Line                 Peripheral IV - Single Lumen 04/19/20 1505 22 G Anterior;Right Upper Arm 2 days              Significant Labs:    CBC/Anemia Profile:  Recent Labs   Lab 04/21/20  0346 04/22/20  0410   WBC 17.13*  17.13* 14.25*   HGB 10.4*  10.4* 8.4*   HCT 34.1*  34.1* 29.2*     331 239   MCV 97  97 99*   RDW 13.4  13.4 13.3   FERRITIN  --  1,579*        Chemistries:  Recent Labs   Lab 04/21/20 0346 04/22/20  0410    135*   K 4.1 4.4   CL 86* 87*   CO2 41* 43*   BUN 13 15   CREATININE 0.6 0.7   CALCIUM 8.2* 8.1*   ALBUMIN 1.6* 1.4*   PROT 6.7 5.7*   BILITOT 0.6 0.6   ALKPHOS 195* 188*   ALT 82* 79*   AST 71* 66*   MG 2.1 2.1   PHOS 2.0* 2.4*       All pertinent labs within the past 24 hours have been reviewed.    Significant Imaging:  I have reviewed all pertinent imaging results/findings within the past 24 hours.

## 2020-04-22 NOTE — ASSESSMENT & PLAN NOTE
Patient's prognosis remains poor despite efforts to optimize health. Plan to speak with patient's family (namely son) to discuss goals of care.

## 2020-04-22 NOTE — ASSESSMENT & PLAN NOTE
Respiratory distress   Acute hypoxemic respiratory failure    - Patient intubated with crich which was switched to ETT on 4/11  - proning days 4/12, 4/13,4/14, 4/16, 4/17,4/18, 4/19, 4/20  - nitric oxide initiated on 4/21; patient now supine; will f/up ABG post NO initiation to assess need for proning  - remains hypercapnic despite lowering TV to 4 mL/kg and increased RR  - Completed treatment with Plaquenil and Azithromycin  - Methyprednisone for 5 days, completed  - Daily CRP, d-dimer, ferritin, to trend inflammatory response.   - Strict I/O's and goal net neutral status to help optimize vent mechanics.   - Special isolation and aspiration precautions ordered.   - Will continue rocephin for now   - q6h albuterol nebs ordered  - ID following giving remdesivir.  -optimizing fluid status. X 1 spot dose Lasix given on 4/22

## 2020-04-22 NOTE — NURSING
Notified wound care regarding patients left arm. Appears to be worse than yesterday. More grey and red around the site.

## 2020-04-22 NOTE — CONSULTS
Procedure Note  Supine Positioning  Critical Care Medicine       Chief Complaint: COVID-19 virus detected  MRN: [id number]  LOS: 12  Sex: male  Age: 54 y.o.      Last ABG:   Recent Labs   Lab 04/22/20 0520   PH 7.363   PO2 82   PCO2 87.3*   HCO3 49.7*   BE 24       Vital Signs (Most Recent):   Temp: 99 °F (37.2 °C) (04/22/20 0605)  Pulse: 72 (04/22/20 0717)  Resp: (!) 35 (04/22/20 0717)  BP: (!) 90/50 (04/21/20 1005)  SpO2: 100 % (04/22/20 0717)    Ventilator Settings:  Vent Mode: A/C  Oxygen Concentration (%):  [60-90] 60  Resp Rate Total:  [35 br/min] 35 br/min  Vt Set:  [260 mL-300 mL] 280 mL  PEEP/CPAP:  [12 cmH20] 12 cmH20  Mean Airway Pressure:  [20 wsC41-98 cmH20] 23 cmH20        Indication: Severe, refractory ARDS. High risk for deterioration during supination procedure in need of direct monitoring by Critical Care personnel.     Prior to beginning the procedure, all appropriate equipment and personnel were gathered in the room. Two individuals were placed on each side of the patient and two respiratory therapists stood at the head of the bed and was dedicated to the management of the head of the patient, the endotracheal tube, and the ventilator lines. The person at the head of the bed coordinated the steps of the supination procedure at the direction of Critical Care team members at the bedside. The patient was first log-rolled 90 degrees onto their side away from the direction of their central venous catheter. Cardiac electrodes were then moved from the patient's posterior back to the anterior. Once properly positioned in the bed, another 90 degree log roll was performed placing the patient into the supine position. The patient's arms were placed alongside the body. Continuous pulse oximetry and blood pressure monitoring was performed without any desaturation or hemodynamic instability. The patient tolerated the procedure well.     Total Critical Care time (uninterrupted not including procedures): 35  minutes

## 2020-04-22 NOTE — ASSESSMENT & PLAN NOTE
- HbA1c 6  - Home DM regimen:  Diet controlled  - Started on Insulin gtt on 4/16 due to uncontrolled BG, on 4/18 with euglycemia switched to detemir (increased dosing) and SSI (TF SSI)

## 2020-04-23 PROBLEM — S41.101S: Status: ACTIVE | Noted: 2020-01-01

## 2020-04-23 NOTE — ASSESSMENT & PLAN NOTE
- HbA1c 6  - Home DM regimen:  Diet controlled  - Started on detemir (increased dosing) and SSI (TF SSI); BG now controlled

## 2020-04-23 NOTE — PROGRESS NOTES
[facility]  Critical Care Medicine  Progress Note    Patient Name: Ranjana Sanchez  MRN: [id number]  Admission Date: 4/10/2020  Hospital Length of Stay: 13 days  Code Status: DNR  Attending Provider: Tatyana Judd MD  Primary Care Provider: Primary Doctor No   Principal Problem: COVID-19 virus detected    Subjective:     HPI:  54y.o male with PMH of HTN brought to ER via EMS for increasing SOB with known covid-19 exposure. 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 They have not had travelers since 3/14. 2 days ago he started having fever and sob. He was seen by the ship's physician and was found to have crackles in his bases bilaterally, but his oxygen saturation was in the 90's. He had a flu test which was negative. He was quarantined in a room and states that he got worse with increasing SOB. He was brought to the ER and found to be 78% on RA. He was started on NRB and initially was doing much better, but his tachypnea did not improve, so he was placed on bipap. His sat is currently 94%.    Hospital/ICU Course:  Patient was admitted to the MICU afyer a difficult intubation in the ER s/p crich. Covid positive with ards, sedated and paralyzed  Unable to prone secondary to crich. Left mainstem intubation which was pulled off, on NO and levophed. ET tube replaced on 4/11 family contacted in [place]. Proned on 4/12 tolerated well and continued to prone on 4/19. 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 He was on and off ketamine as well.  EEG ordered to rule out seizures as he also had seizure like activity that resolved with ativan. Currently changing sedation, was placed on phenobarbital which should start having effect 4/20. Patient proned x 10 (last on 4/22). Still not tolerating ventilator weaning. Remains on sedation, vasopressors, and paralytic. ID now following  patient for remdesivir administration; family aware and consented. Patient not significantly improving despite efforts. Son contacted, patient made DNR.      Interval History/Significant Events: Patient supine at 0800. Remains on 80%/PEEP 10, plan to prone later today if no improvement.    Review of Systems   Unable to perform ROS: Intubated     Objective:     Vital Signs (Most Recent):  Temp: 97.9 °F (36.6 °C) (04/23/20 0730)  Pulse: (!) 120 (04/23/20 0827)  Resp: (!) 35 (04/23/20 0827)  BP: (!) 154/77 (04/23/20 0800)  SpO2: (!) 90 % (04/23/20 0827) Vital Signs (24h Range):  Temp:  [97.5 °F (36.4 °C)-98.7 °F (37.1 °C)] 97.9 °F (36.6 °C)  Pulse:  [] 120  Resp:  [35] 35  SpO2:  [88 %-100 %] 90 %  BP: ()/(52-77) 154/77  Arterial Line BP: ()/(45-69) 136/57   Weight: 63 kg (139 lb)  Body mass index is 23.86 kg/m².      Intake/Output Summary (Last 24 hours) at 4/23/2020 0840  Last data filed at 4/23/2020 0800  Gross per 24 hour   Intake 4667.81 ml   Output 4555 ml   Net 112.81 ml       Physical Exam   Constitutional: No distress. He is sedated, chemically paralyzed and intubated.   HENT:   Head: Normocephalic.   crich removed and dressing clean dry and intact    Eyes: EOM are normal.   Cardiovascular: Tachycardia present.   Pulmonary/Chest: Effort normal. He is intubated.   Abdominal: Soft.   Neurological:   Sedated    Skin: Skin is warm. He is not diaphoretic.   RUE being assessed by wound care. Possible previous extravasation of medication. Continue to monitor.   Nursing note and vitals reviewed.      Vents:  Vent Mode: A/C (04/23/20 0723)  Ventilator Initiated: Yes (04/10/20 2247)  Set Rate: 35 BPM (04/23/20 0723)  Vt Set: 280 mL (04/23/20 0723)  PEEP/CPAP: 10 cmH20 (04/23/20 0723)  Oxygen Concentration (%): 80 (04/23/20 0827)  Peak Airway Pressure: 40 cmH2O (04/23/20 0723)  Plateau Pressure: 34 cmH20 (04/23/20 0723)  Total Ve: 9.69 mL (04/23/20 0723)  F/VT Ratio<105 (RSBI): 127.27 (04/23/20  0723)  Lines/Drains/Airways     Central Venous Catheter Line            Percutaneous Central Line Insertion/Assessment - Triple Lumen  04/11/20 0137 right femoral vein 12 days          Drain                 NG/OG Tube 04/12/20 1000 18 Fr. Right nostril 10 days         Urethral Catheter 04/18/20 1854 4 days         Rectal Tube 04/21/20 1540 fecal management system 1 day          Airway                 Airway - Non-Surgical 04/11/20 1525 Endotracheal Tube 11 days          Arterial Line                 Arterial Line 04/19/20 2130 Right Radial 3 days          Peripheral Intravenous Line                 Peripheral IV - Single Lumen 04/19/20 1505 22 G Anterior;Right Upper Arm 3 days              Significant Labs:    CBC/Anemia Profile:  Recent Labs   Lab 04/22/20  0410 04/23/20  0310   WBC 14.25* 12.53   HGB 8.4* 7.8*   HCT 29.2* 27.2*    224   MCV 99* 98   RDW 13.3 13.6   FERRITIN 1,579*  --         Chemistries:  Recent Labs   Lab 04/22/20  0410 04/23/20  0310   * 138   K 4.4 3.4*   CL 87* 85*   CO2 43* 42*   BUN 15 15   CREATININE 0.7 0.6   CALCIUM 8.1* 8.1*   ALBUMIN 1.4* 1.3*   PROT 5.7* 5.5*   BILITOT 0.6 0.4   ALKPHOS 188* 178*   ALT 79* 61*   AST 66* 42*   MG 2.1 2.0   PHOS 2.4* 2.3*       All pertinent labs within the past 24 hours have been reviewed.    Significant Imaging:  I have reviewed all pertinent imaging results/findings within the past 24 hours.      ABG  Recent Labs   Lab 04/23/20  0339   PH 7.339*   PO2 55*   PCO2 97.8*   HCO3 52.6*   BE 27     Assessment/Plan:     Neuro  Sedated  - On propofol, dialudid, Precedex and phenobarbital , weaned ketamine 4/18; paralyzed with vecuronium  - Had a couple episodes of seizure like activity with right upward gaze, gave 2 of ativan and resolved, EEG was negative for seizures.  - started on valium suspect he could have alcohol withdrawal        Pulmonary  Acute hypoxemic respiratory failure  - Likely 2/2 to ARDS due to COVID-19  - ARDSNET  Protocol    --see COVID    Cardiac/Vascular  Tachycardia-bradycardia  Patient with HR in 40s to mid 50's and sometimes in the 130-140   Bradycardia suspect likely due to hypothermia (T 93.6F) vs. Sedative medications (Precedex) vs. Sepsis  EKG with HR 50s, normal axis, normal intervals, ? ST elevation 1 & AVL (2mm)  ECHO 4/16: EF 50%, mild RV dysfunction  Troponin < 0.06 wnl    Plan  - Apply Heating pads and warming blanket when cold   - Wean off sedation and paralytics as tolerated  - F/u repeat blood cultures (4/18: NGTD)  - EKG (4/18) with no ST elevation or T wave prominence seen in earlier EKGs  - Continue Heparin gtt, ASA 81mg & Atorvastatin 40mg  - T4 normal, TSH slightly low  - Cardiology consulted and suggest treatment for hypothermia and no other interventions  - considering withdrawal from alcohol as patient responds well to ativan      Hypertension  - Was in shock after intubation   - holding home toprol XL 50 mg  - currently off/on cardene gtt (changes with prone/supine)      Endocrine  Type 2 diabetes mellitus without complication, without long-term current use of insulin  - HbA1c 6  - Home DM regimen:  Diet controlled  - Started on detemir (increased dosing) and SSI (TF SSI); BG now controlled        Other  * COVID-19 virus detected  Respiratory distress   Acute hypoxemic respiratory failure    - Patient intubated with crich which was switched to ETT on 4/11  - proning days 4/12, 4/13,4/14, 4/16, 4/17,4/18, 4/19, 4/20  - nitric oxide initiated on 4/21 @ 20 PPM; patient now prone x 10 (last day 4/23); will f/up ABG post NO initiation to assess need for proning  - remains hypercapnic despite lowering TV to 4 mL/kg and increased RR  - Completed treatment with Plaquenil and Azithromycin  - Methyprednisone for 5 days, completed  - Daily CRP, d-dimer, ferritin, to trend inflammatory response.   - Strict I/O's and goal net neutral status to help optimize vent mechanics.   - Special isolation and aspiration  precautions ordered.   - Will continue rocephin for now   - q6h albuterol nebs ordered  - ID following giving remdesivir.  -optimizing fluid status. X 1 spot dose Lasix given on 4/22       Arm wound, right, sequela  --wound to RUE being seen by wound care. Thought to be 2/2 extravasation of medication through PIV. Continue to monitor. RUE with +1 palpable pulses.    Goals of care, counseling/discussion  Patient's prognosis remains poor despite efforts to optimize health.YOSEFon spoken with regarding GOC. Patient now DNR.    Shock  - Now off pressors, MAP goal > 65   - Completed treatment with Plaquenil and Azithromycin.  - On cefepime and vanc   - Blood cultures NGTD. F/u repeat cultures           Critical Care Daily Checklist:    A: Awake: RASS Goal/Actual Goal: RASS Goal: -5-->unarousable  Actual: [place] Agitation Sedation Scale (RASS): Unarousable   B: Spontaneous Breathing Trial Performed? Spon. Breathing Trial Initiated?: Not initiated (04/19/20 0900)   C: SAT & SBT Coordinated?  done                      D: Delirium: CAM-ICU Overall CAM-ICU: Positive   E: Early Mobility Performed? No   F: Feeding Goal: Goals: Meet % EEN, EPN by RD f/u date  Status: Nutrition Goal Status: progressing towards goal   Current Diet Order   Procedures    Diet NPO      AS: Analgesia/Sedation done   T: Thromboembolic Prophylaxis done   H: HOB > 300 Yes   U: Stress Ulcer Prophylaxis (if needed) done   G: Glucose Control Q6h; ssi and detemir   B: Bowel Function Stool Occurrence: 1   I: Indwelling Catheter (Lines & Torres) Necessity necessary   D: De-escalation of Antimicrobials/Pharmacotherapies done    Plan for the day/ETD Cont course; possible prone    Code Status:  Family/Goals of Care: DNR  Cont course     Critical Care Time: 70 minutes  Critical secondary to Patient has a condition that poses threat to life and bodily function:remains intubated, paralyzed, sedated on vasopressors..     Critical care was time spent personally  by me on the following activities: development of treatment plan with patient or surrogate and bedside caregivers, discussions with consultants, evaluation of patient's response to treatment, examination of patient, ordering and performing treatments and interventions, ordering and review of laboratory studies, ordering and review of radiographic studies, pulse oximetry, re-evaluation of patient's condition. This critical care time did not overlap with that of any other provider or involve time for any procedures.     Larry Martel NP  Critical Care Medicine  [facility]

## 2020-04-23 NOTE — ASSESSMENT & PLAN NOTE
Patient's prognosis remains poor despite efforts to optimize health.Arnie spoken with regarding GOC. Patient now DNR.

## 2020-04-23 NOTE — CONSULTS
Procedure Note  Supine Positioning  Critical Care Medicine       Chief Complaint: COVID-19 virus detected  MRN: [id number]  LOS: 13  Sex: male  Age: 54 y.o.      Last ABG:   Recent Labs   Lab 04/23/20  0339   PH 7.339*   PO2 55*   PCO2 97.8*   HCO3 52.6*   BE 27       Vital Signs (Most Recent):   Temp: 97.9 °F (36.6 °C) (04/23/20 0730)  Pulse: 88 (04/23/20 0730)  Resp: (!) 35 (04/23/20 0730)  BP: (!) 99/57 (04/23/20 0730)  SpO2: 100 % (04/23/20 0730)    Ventilator Settings:  Vent Mode: A/C  Oxygen Concentration (%):  [] 60  Resp Rate Total:  [35 br/min] 35 br/min  Vt Set:  [280 mL] 280 mL  PEEP/CPAP:  [10 zmP21-38 cmH20] 10 cmH20  Mean Airway Pressure:  [18 kgZ78-15 cmH20] 18 cmH20        Indication: Severe, refractory ARDS. High risk for deterioration during supination procedure in need of direct monitoring by Critical Care personnel.     Prior to beginning the procedure, all appropriate equipment and personnel were gathered in the room. Two individuals were placed on each side of the patient and two respiratory therapists stood at the head of the bed and was dedicated to the management of the head of the patient, the endotracheal tube, and the ventilator lines. The person at the head of the bed coordinated the steps of the supination procedure at the direction of Critical Care team members at the bedside. The patient was first log-rolled 90 degrees onto their side away from the direction of their central venous catheter. Cardiac electrodes were then moved from the patient's posterior back to the anterior. Once properly positioned in the bed, another 90 degree log roll was performed placing the patient into the supine position. The patient's arms were placed alongside the body. Continuous pulse oximetry and blood pressure monitoring was performed without any desaturation or hemodynamic instability. The patient tolerated the procedure well.     Total Critical Care time (uninterrupted not including procedures):  20 minutes

## 2020-04-23 NOTE — NURSING
PT remains intubated/sedated/paralyzed and proned on 20 ppm of nitric.  Bis monitor labile in readings with artifact 20-50 despite changing strip. HR NS/ST 80-120s, SBP also labile high 90s on levo to 160s, with cardene gtt orders if sbp >180. Did not need to start cardene.  Pt afebrile.   FiO2 changed to 60% after AM ABG, peep 10 and . 20 ppm of nitric. Swollen eyes, lips, tongue noted with bloody nasal discharge noted. crich site giovanni, no bleeding noted though patient remains proned.  TF at goal of 50 to NGT, flexi in place, parks in place.  L arm wound care done per order -on tue/thurs, daytime bath. sween to nares. Accu check @4 with cbg 200-300.  VSS on levo, precedex, propofol, heparin, vecuroinium, dilaudid per mar. WCTM.

## 2020-04-23 NOTE — CONSULTS
Pharmacokinetic Assessment Follow Up: IV Vancomycin    Vancomycin Regimen Assessment & Plan:    - Vancomycin trough appropriately collected this morning resulted as 14.9 ug/mL, within goal trough range of 10-20 ug/mL.  - Renal function appears stable. Continue vancomycin 1250 mg IV q12h.   - MICU team planning to complete vancomycin course after doses given on 4/24, end date on order. No need for additional vancomycin levels in light of this planned duration of therapy.  - Pharmacy will sign off, please re-consult as needed.    Drug levels (last 3 results):  Recent Labs   Lab Result Units 04/21/20 2031 04/23/20  0834   Vancomycin-Trough ug/mL 14.4 14.9     Pharmacy will continue to follow and monitor vancomycin.    Please contact pharmacy at extension [phone] for questions regarding this assessment.    Thank you for the consult,   He Wei     Patient brief summary:  Ranjana Sanchez is a 54 y.o. male initiated on antimicrobial therapy with IV Vancomycin for treatment of lower respiratory infection    Drug Allergies:   Review of patient's allergies indicates:  No Known Allergies    Actual Body Weight:   63 kg    Renal Function:   Estimated Creatinine Clearance: 115.1 mL/min (based on SCr of 0.6 mg/dL).,     Dialysis Method (if applicable):  N/A

## 2020-04-23 NOTE — ASSESSMENT & PLAN NOTE
- Was in shock after intubation   - holding home toprol XL 50 mg  - currently off/on cardene gtt (changes with prone/supine)

## 2020-04-23 NOTE — SUBJECTIVE & OBJECTIVE
Interval History/Significant Events: Patient supine at 0800. Remains on 80%/PEEP 10, plan to prone later today if no improvement.    Review of Systems   Unable to perform ROS: Intubated     Objective:     Vital Signs (Most Recent):  Temp: 97.9 °F (36.6 °C) (04/23/20 0730)  Pulse: (!) 120 (04/23/20 0827)  Resp: (!) 35 (04/23/20 0827)  BP: (!) 154/77 (04/23/20 0800)  SpO2: (!) 90 % (04/23/20 0827) Vital Signs (24h Range):  Temp:  [97.5 °F (36.4 °C)-98.7 °F (37.1 °C)] 97.9 °F (36.6 °C)  Pulse:  [] 120  Resp:  [35] 35  SpO2:  [88 %-100 %] 90 %  BP: ()/(52-77) 154/77  Arterial Line BP: ()/(45-69) 136/57   Weight: 63 kg (139 lb)  Body mass index is 23.86 kg/m².      Intake/Output Summary (Last 24 hours) at 4/23/2020 0840  Last data filed at 4/23/2020 0800  Gross per 24 hour   Intake 4667.81 ml   Output 4555 ml   Net 112.81 ml       Physical Exam   Constitutional: No distress. He is sedated, chemically paralyzed and intubated.   HENT:   Head: Normocephalic.   crich removed and dressing clean dry and intact    Eyes: EOM are normal.   Cardiovascular: Tachycardia present.   Pulmonary/Chest: Effort normal. He is intubated.   Abdominal: Soft.   Neurological:   Sedated    Skin: Skin is warm. He is not diaphoretic.   RUE being assessed by wound care. Possible previous extravasation of medication. Continue to monitor.   Nursing note and vitals reviewed.      Vents:  Vent Mode: A/C (04/23/20 0723)  Ventilator Initiated: Yes (04/10/20 2247)  Set Rate: 35 BPM (04/23/20 0723)  Vt Set: 280 mL (04/23/20 0723)  PEEP/CPAP: 10 cmH20 (04/23/20 0723)  Oxygen Concentration (%): 80 (04/23/20 0827)  Peak Airway Pressure: 40 cmH2O (04/23/20 0723)  Plateau Pressure: 34 cmH20 (04/23/20 0723)  Total Ve: 9.69 mL (04/23/20 0723)  F/VT Ratio<105 (RSBI): 127.27 (04/23/20 0723)  Lines/Drains/Airways     Central Venous Catheter Line            Percutaneous Central Line Insertion/Assessment - Triple Lumen  04/11/20 0137 right femoral  vein 12 days          Drain                 NG/OG Tube 04/12/20 1000 18 Fr. Right nostril 10 days         Urethral Catheter 04/18/20 1854 4 days         Rectal Tube 04/21/20 1540 fecal management system 1 day          Airway                 Airway - Non-Surgical 04/11/20 1525 Endotracheal Tube 11 days          Arterial Line                 Arterial Line 04/19/20 2130 Right Radial 3 days          Peripheral Intravenous Line                 Peripheral IV - Single Lumen 04/19/20 1505 22 G Anterior;Right Upper Arm 3 days              Significant Labs:    CBC/Anemia Profile:  Recent Labs   Lab 04/22/20  0410 04/23/20  0310   WBC 14.25* 12.53   HGB 8.4* 7.8*   HCT 29.2* 27.2*    224   MCV 99* 98   RDW 13.3 13.6   FERRITIN 1,579*  --         Chemistries:  Recent Labs   Lab 04/22/20 0410 04/23/20  0310   * 138   K 4.4 3.4*   CL 87* 85*   CO2 43* 42*   BUN 15 15   CREATININE 0.7 0.6   CALCIUM 8.1* 8.1*   ALBUMIN 1.4* 1.3*   PROT 5.7* 5.5*   BILITOT 0.6 0.4   ALKPHOS 188* 178*   ALT 79* 61*   AST 66* 42*   MG 2.1 2.0   PHOS 2.4* 2.3*       All pertinent labs within the past 24 hours have been reviewed.    Significant Imaging:  I have reviewed all pertinent imaging results/findings within the past 24 hours.

## 2020-04-23 NOTE — ASSESSMENT & PLAN NOTE
Respiratory distress   Acute hypoxemic respiratory failure    - Patient intubated with crich which was switched to ETT on 4/11  - proning days 4/12, 4/13,4/14, 4/16, 4/17,4/18, 4/19, 4/20  - nitric oxide initiated on 4/21 @ 20 PPM; patient now prone x 10 (last day 4/23); will f/up ABG post NO initiation to assess need for proning  - remains hypercapnic despite lowering TV to 4 mL/kg and increased RR  - Completed treatment with Plaquenil and Azithromycin  - Methyprednisone for 5 days, completed  - Daily CRP, d-dimer, ferritin, to trend inflammatory response.   - Strict I/O's and goal net neutral status to help optimize vent mechanics.   - Special isolation and aspiration precautions ordered.   - Will continue rocephin for now   - q6h albuterol nebs ordered  - ID following giving remdesivir.  -optimizing fluid status. X 1 spot dose Lasix given on 4/22

## 2020-04-23 NOTE — CONSULTS
Procedure Note  Prone Positioning  Critical Care Medicine       Chief Complaint: COVID-19 virus detected  MRN: [id number]  LOS: 13  Sex: male  Age: 54 y.o.      Last ABG:   Recent Labs   Lab 04/23/20  0339   PH 7.339*   PO2 55*   PCO2 97.8*   HCO3 52.6*   BE 27       Vital Signs (Most Recent):   Temp: 96.5 °F (35.8 °C) (04/23/20 1500)  Pulse: (!) 120 (04/23/20 1500)  Resp: (!) 37 (04/23/20 1500)  BP: 120/64 (04/23/20 1500)  SpO2: (!) 91 % (04/23/20 1500)    Ventilator Settings:  Vent Mode: A/C  Oxygen Concentration (%):  [50-80] 80  Resp Rate Total:  [35 br/min-37 br/min] 36 br/min  Vt Set:  [280 mL] 280 mL  PEEP/CPAP:  [10 cmH20] 10 cmH20  Mean Airway Pressure:  [18 zqF39-90 cmH20] 20 cmH20    Indication: Severe, refractory ARDS. High risk for deterioration during proning procedure in need of direct monitoring by Critical Care personnel.     Prior to beginning the procedure, all appropriate equipment and personnel were gathered in the room. Two individuals were placed on each side of the patient and one person stood at the head of the bed and was dedicated to the management of the head of the patient, the endotracheal tube, and the ventilator lines. The person at the head of the bed  coordinated the steps of the proning procedure with team team at the direction of Critical Care team members at the bedside. The patient was first log-rolled 90 degrees onto their side towards the direction of their central venous catheter. Cardiac electrodes were then moved from the patient's anterior to the posterior. The patient?s knees, forehead, chest, and iliac crests were protected using adhesive pads and/or foam pads to reduce the risk of skin breakdown. Once properly positioned in the bed, another 90 degree log roll was performed placing the patient into the prone position. The patient's arms were placed alongside the body. The patient's head should be turned alternately to the right or left every 2 hours. The patient tolerated  the procedure well.     Total Critical Care time (uninterrupted not including procedures): 30 minutes

## 2020-04-23 NOTE — ASSESSMENT & PLAN NOTE
--wound to RUE being seen by wound care. Thought to be 2/2 extravasation of medication through PIV. Continue to monitor. RUE with +1 palpable pulses.

## 2020-04-24 NOTE — PLAN OF CARE
Pt remains intubated and on levo gtt.  Pt not medically stable for discharg and requiring continuous critical care.      SW will continue to follow and implement dc plan once medically ready.    Junior Wick LMSW  [facility] -  Dept.  [phone]

## 2020-04-24 NOTE — SUBJECTIVE & OBJECTIVE
Interval History/Significant Events: On maximal vent settings overnight    Review of Systems   Unable to perform ROS: Intubated     Objective:     Vital Signs (Most Recent):  Temp: 97.7 °F (36.5 °C) (04/24/20 1500)  Pulse: 97 (04/24/20 1600)  Resp: (!) 35 (04/24/20 1615)  BP: (!) 116/58 (04/24/20 1600)  SpO2: 95 % (04/24/20 1600) Vital Signs (24h Range):  Temp:  [97.4 °F (36.3 °C)-98.3 °F (36.8 °C)] 97.7 °F (36.5 °C)  Pulse:  [] 97  Resp:  [35-73] 35  SpO2:  [85 %-98 %] 95 %  BP: ()/(54-69) 116/58  Arterial Line BP: ()/(45-71) 97/47   Weight: 63 kg (139 lb)  Body mass index is 23.86 kg/m².      Intake/Output Summary (Last 24 hours) at 4/24/2020 1651  Last data filed at 4/24/2020 1626  Gross per 24 hour   Intake 4328.95 ml   Output 5675 ml   Net -1346.05 ml       Physical Exam   Constitutional: He appears well-developed. He is sedated, chemically paralyzed and intubated.   Prone   HENT:   Head: Normocephalic.   Cardiovascular: Regular rhythm and intact distal pulses.   Pulmonary/Chest: He is intubated.   Abdominal: Soft.   Neurological: He is unresponsive. GCS eye subscore is 1. GCS verbal subscore is 1. GCS motor subscore is 1.   Skin: Skin is warm.   Nursing note and vitals reviewed.      Vents:  Vent Mode: A/C (04/24/20 1255)  Ventilator Initiated: Yes (04/10/20 2247)  Set Rate: 35 BPM (04/24/20 1255)  Vt Set: 250 mL (04/24/20 1255)  PEEP/CPAP: 10 cmH20 (04/24/20 1255)  Oxygen Concentration (%): 90 (04/24/20 1600)  Peak Airway Pressure: 52 cmH2O (04/24/20 1255)  Plateau Pressure: 38 cmH20 (04/24/20 1255)  Total Ve: 9.25 mL (04/24/20 1255)  F/VT Ratio<105 (RSBI): 132.58 (04/24/20 1255)  Lines/Drains/Airways     Central Venous Catheter Line            Percutaneous Central Line Insertion/Assessment - Triple Lumen  04/11/20 0137 right femoral vein 13 days          Drain                 NG/OG Tube 04/12/20 1000 18 Fr. Right nostril 12 days         Urethral Catheter 04/18/20 2164 5 days         Rectal  Tube 04/21/20 1540 fecal management system 3 days          Airway                 Airway - Non-Surgical 04/11/20 1525 Endotracheal Tube 13 days          Arterial Line                 Arterial Line 04/19/20 2130 Right Radial 4 days          Peripheral Intravenous Line                 Peripheral IV - Single Lumen 04/24/20 1445 20 G Anterior;Right Forearm less than 1 day              Significant Labs:    CBC/Anemia Profile:  Recent Labs   Lab 04/23/20  0310 04/24/20  0300   WBC 12.53 11.87   HGB 7.8* 7.2*   HCT 27.2* 25.0*    226   MCV 98 100*   RDW 13.6 13.9   FERRITIN  --  1,841*        Chemistries:  Recent Labs   Lab 04/23/20  0310 04/23/20  1205 04/24/20  0300    137 137   K 3.4* 4.7 4.2   CL 85* 81* 80*   CO2 42* 47* 50*   BUN 15 17 15   CREATININE 0.6 0.7 0.6   CALCIUM 8.1* 8.1* 8.1*   ALBUMIN 1.3*  --  1.3*   PROT 5.5*  --  5.5*   BILITOT 0.4  --  0.3   ALKPHOS 178*  --  195*   ALT 61*  --  51*   AST 42*  --  42*   MG 2.0  --  2.2   PHOS 2.3*  --  2.2*       All pertinent labs within the past 24 hours have been reviewed.    Significant Imaging:  I have reviewed and interpreted all pertinent imaging results/findings within the past 24 hours.

## 2020-04-24 NOTE — SUBJECTIVE & OBJECTIVE
No current facility-administered medications on file prior to encounter.      Current Outpatient Medications on File Prior to Encounter   Medication Sig    aspirin 81 MG Chew Take 81 mg by mouth once daily.    fenofibrate 160 MG Tab Take 160 mg by mouth once daily.    metoprolol tartrate (LOPRESSOR) 50 MG tablet Take 50 mg by mouth 2 (two) times daily.    rosuvastatin (CRESTOR) 10 MG tablet Take 5 mg by mouth once daily.    telmisartan (MICARDIS) 40 MG Tab Take 40 mg by mouth once daily.       Review of patient's allergies indicates:  No Known Allergies    Past Medical History:   Diagnosis Date    Diabetes mellitus     Hypertension      History reviewed. No pertinent surgical history.  Family History     None        Tobacco Use    Smoking status: Never Smoker   Substance and Sexual Activity    Alcohol use: Not on file    Drug use: Not on file    Sexual activity: Not on file     Review of Systems   Unable to perform ROS: Intubated     Objective:     Vital Signs (Most Recent):  Temp: 97.9 °F (36.6 °C) (04/24/20 0700)  Pulse: 108 (04/24/20 1100)  Resp: (!) 35 (04/24/20 1100)  BP: (!) 105/56 (04/24/20 0900)  SpO2: (!) 91 % (04/24/20 1100) Vital Signs (24h Range):  Temp:  [96.5 °F (35.8 °C)-98.3 °F (36.8 °C)] 97.9 °F (36.6 °C)  Pulse:  [105-140] 108  Resp:  [35-37] 35  SpO2:  [85 %-96 %] 91 %  BP: ()/(44-71) 105/56  Arterial Line BP: ()/(45-76) 110/53     Weight: 63 kg (139 lb)  Body mass index is 23.86 kg/m².    SpO2: (!) 91 %  O2 Device (Oxygen Therapy): ventilator     Intake/Output - Last 3 Shifts       04/22 0700 - 04/23 0659 04/23 0700 - 04/24 0659 04/24 0700 - 04/25 0659    P.O.   0    I.V. (mL/kg) 2599.1 (41.3) 2526.4 (40.1) 594.7 (9.4)    NG/GT 1320 1325 170    IV Piggyback 500 600 250    Total Intake(mL/kg) 4419.1 (70.1) 4451.4 (70.7) 1014.7 (16.1)    Urine (mL/kg/hr) 3860 (2.6) 4125 (2.7) 675 (2.4)    Drains   10    Stool 640 450 80    Total Output 4500 4575 765    Net -80.9 -123.6 +249.7            Stool Occurrence 1 x             Lines/Drains/Airways     Central Venous Catheter Line            Percutaneous Central Line Insertion/Assessment - Triple Lumen  04/11/20 0137 right femoral vein 13 days          Drain                 NG/OG Tube 04/12/20 1000 18 Fr. Right nostril 12 days         Urethral Catheter 04/18/20 1854 5 days         Rectal Tube 04/21/20 1540 fecal management system 2 days          Airway                 Airway - Non-Surgical 04/11/20 1525 Endotracheal Tube 12 days          Arterial Line                 Arterial Line 04/19/20 2130 Right Radial 4 days          Peripheral Intravenous Line                 Peripheral IV - Single Lumen 04/19/20 1505 22 G Anterior;Right Upper Arm 4 days              Physical Exam   Constitutional: No distress. He is sedated, chemically paralyzed and intubated.   HENT:   Head: Normocephalic.   crich removed and dressing clean dry and intact    Eyes: EOM are normal.   Cardiovascular: Tachycardia present.   Pulmonary/Chest: Effort normal. He is intubated.   Abdominal: Soft.   Neurological:   Sedated    Skin: Skin is warm. He is not diaphoretic.   RUE being assessed by wound care. Possible previous extravasation of medication. Continue to monitor.   Nursing note and vitals reviewed.      Significant Labs:  ABGs:   Recent Labs   Lab 04/24/20  0613   PH 7.287*   PCO2 >130.0*   PO2 53*   HCO3 UNAVAILABLE   POCSATURATED UNAVAILABLE   BE UNAVAILABLE     CBC:   Recent Labs   Lab 04/24/20  0300   WBC 11.87   RBC 2.50*   HGB 7.2*   HCT 25.0*      *   MCH 28.8   MCHC 28.8*     CMP:   Recent Labs   Lab 04/24/20  0300   *   CALCIUM 8.1*   ALBUMIN 1.3*   PROT 5.5*      K 4.2   CO2 50*   CL 80*   BUN 15   CREATININE 0.6   ALKPHOS 195*   ALT 51*   AST 42*   BILITOT 0.3     Coagulation:   Recent Labs   Lab 04/24/20  0300   INR 1.0       Significant Diagnostics:  I have reviewed all pertinent imaging results/findings within the past 24 hours.

## 2020-04-24 NOTE — PROGRESS NOTES
Right UA PIV did not flush, removed. RN attempted to place new PIV 4 times. All unsuccessful. Charge RN to be notified and attempt with ultrasound if available. All high priority medicines infusing to right femoral central line. WCTM

## 2020-04-24 NOTE — PROGRESS NOTES
Marvin Bruno MD   Anesthesiologist   Critical Care Medicine   Consults   Signed                       []Hide copied text    []Abhilash for details  Procedure Note  Supine Positioning  Critical Care Medicine         Chief Complaint: COVID-19 virus detected  MRN: [id number]  LOS: 13  Sex: male  Age: 54 y.o.        Vitals:    04/24/20 0900   BP: (!) 105/56   Pulse: (!) 130   Resp: (!) 35   Temp:        Ventilator Settings:  Vent Mode: A/C  Oxygen Concentration (%):  [] 60  Resp Rate Total:  [35 br/min] 35 br/min  Vt Set:  [280 mL] 280 mL  PEEP/CPAP:  [10 quM26-11 cmH20] 10 cmH20  Mean Airway Pressure:  [18 hdS64-67 cmH20] 18 cmH20           Indication: Severe, refractory ARDS. High risk for deterioration during supination procedure in need of direct monitoring by Critical Care personnel.      Prior to beginning the procedure, all appropriate equipment and personnel were gathered in the room. Two individuals were placed on each side of the patient and two respiratory therapists stood at the head of the bed and was dedicated to the management of the head of the patient, the endotracheal tube, and the ventilator lines. The person at the head of the bed coordinated the steps of the supination procedure at the direction of Critical Care team members at the bedside. The patient was first log-rolled 90 degrees onto their side away from the direction of their central venous catheter. Cardiac electrodes were then moved from the patient's posterior back to the anterior. Once properly positioned in the bed, another 90 degree log roll was performed placing the patient into the supine position. The patient's arms were placed alongside the body. Continuous pulse oximetry and blood pressure monitoring was performed without any desaturation or hemodynamic instability. The patient tolerated the procedure well.      Total Critical Care time (uninterrupted not including procedures): 30 minutes

## 2020-04-24 NOTE — HPI
54y.o male with a medical history of HTN brought to ER via EMS for increasing SOB with known covid-19 exposure. 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 They have not had travelers since 3/14. He was seen by the ship's physician after complaining of fever and shortness of breath and was found to have crackles in bilateral bases with acceptable O2 sats.  He was placed under quarantined, had increased SOB, and was brought to the ER with O2 sats of 78% on RA. He was started on NRB and initially was doing much better, but his tachypnea did not improve, so he was placed on bipap.     Patient was admitted to the MICU after a difficult intubation in the ER s/p crich. Covid positive with ards, sedated and paralyzed  Unable to prone secondary to crich. Left mainstem intubation which was pulled off, on Monika and levophed. ET tube replaced on 4/11. Proned on 4/12 tolerated well and continued to prone on 4/19. EEG ordered showed seizure like activity that resolved with ativan.  Patient proned x 10 (last on 4/22). Still not tolerating ventilator weaning. Remains on sedation, vasopressors, and paralytic. ID now following patient for remdesivir administration; family aware and consented. Patient not significantly improving despite efforts. Son contacted, patient made DNR.  CTS was consulted for possible ECMO.

## 2020-04-24 NOTE — PLAN OF CARE
04/24/20 0755   Discharge Reassessment   Assessment Type Discharge Planning Reassessment   Do you have any problems affording any of your prescribed medications? No   Discharge Plan A Skilled Nursing Facility   Discharge Plan B Long-term acute care facility (LTAC)   DME Needed Upon Discharge  other (see comments)  (TBD)   Anticipated Discharge Disposition SNF   Can the patient/caregiver answer the patient profile reliably? No, cognitively impaired       Alfred Camejo RN MSN  Critical Care-   Ext. [phone]

## 2020-04-24 NOTE — CONSULTS
[facility]  Cardiothoracic Surgery  Consult Note    Patient Name: Ranjana Sanchez  MRN: [id number]  Admission Date: 4/10/2020  Attending Physician: Jaylon Tim MD  Referring Provider: Self, Aaareferral    Patient information was obtained from past medical records, Critical Care Team and ER records.     Inpatient consult to Cardiothoracic Surgery  Consult performed by: Shabnam Trivedi NP  Consult ordered by: Fiona Winterbottom, APRN, CNS  Reason for consult: ECMO        Subjective:     Principal Problem: COVID-19 virus detected    History of Present Illness: 54y.o male with a medical history of HTN brought to ER via EMS for increasing SOB with known covid-19 exposure. 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 They have not had travelers since 3/14. He was seen by the ship's physician after complaining of fever and shortness of breath and was found to have crackles in bilateral bases with acceptable O2 sats.  He was placed under quarantined, had increased SOB, and was brought to the ER with O2 sats of 78% on RA. He was started on NRB and initially was doing much better, but his tachypnea did not improve, so he was placed on bipap.     Patient was admitted to the MICU after a difficult intubation in the ER s/p crich. Covid positive with ards, sedated and paralyzed  Unable to prone secondary to crich. Left mainstem intubation which was pulled off, on Monika and levophed. ET tube replaced on 4/11. Proned on 4/12 tolerated well and continued to prone on 4/19. .  EEG ordered showed seizure like activity that resolved with ativan.  Patient proned x 10 (last on 4/22). Still not tolerating ventilator weaning. Remains on sedation, vasopressors, and paralytic. ID now following patient for remdesivir administration; family aware and consented. Patient not significantly improving despite efforts. Son contacted, patient made DNR.  CTS was consulted for possible ECMO.    No current facility-administered  medications on file prior to encounter.      Current Outpatient Medications on File Prior to Encounter   Medication Sig    aspirin 81 MG Chew Take 81 mg by mouth once daily.    fenofibrate 160 MG Tab Take 160 mg by mouth once daily.    metoprolol tartrate (LOPRESSOR) 50 MG tablet Take 50 mg by mouth 2 (two) times daily.    rosuvastatin (CRESTOR) 10 MG tablet Take 5 mg by mouth once daily.    telmisartan (MICARDIS) 40 MG Tab Take 40 mg by mouth once daily.       Review of patient's allergies indicates:  No Known Allergies    Past Medical History:   Diagnosis Date    Diabetes mellitus     Hypertension      History reviewed. No pertinent surgical history.  Family History     None        Tobacco Use    Smoking status: Never Smoker   Substance and Sexual Activity    Alcohol use: Not on file    Drug use: Not on file    Sexual activity: Not on file     Review of Systems   Unable to perform ROS: Intubated     Objective:     Vital Signs (Most Recent):  Temp: 97.9 °F (36.6 °C) (04/24/20 0700)  Pulse: 108 (04/24/20 1100)  Resp: (!) 35 (04/24/20 1100)  BP: (!) 105/56 (04/24/20 0900)  SpO2: (!) 91 % (04/24/20 1100) Vital Signs (24h Range):  Temp:  [96.5 °F (35.8 °C)-98.3 °F (36.8 °C)] 97.9 °F (36.6 °C)  Pulse:  [105-140] 108  Resp:  [35-37] 35  SpO2:  [85 %-96 %] 91 %  BP: ()/(44-71) 105/56  Arterial Line BP: ()/(45-76) 110/53     Weight: 63 kg (139 lb)  Body mass index is 23.86 kg/m².    SpO2: (!) 91 %  O2 Device (Oxygen Therapy): ventilator     Intake/Output - Last 3 Shifts       04/22 0700 - 04/23 0659 04/23 0700 - 04/24 0659 04/24 0700 - 04/25 0659    P.O.   0    I.V. (mL/kg) 2599.1 (41.3) 2526.4 (40.1) 594.7 (9.4)    NG/GT 1320 1325 170    IV Piggyback 500 600 250    Total Intake(mL/kg) 4419.1 (70.1) 4451.4 (70.7) 1014.7 (16.1)    Urine (mL/kg/hr) 3860 (2.6) 4125 (2.7) 675 (2.4)    Drains   10    Stool 640 450 80    Total Output 4500 4575 765    Net -80.9 -123.6 +249.7           Stool Occurrence 1 x              Lines/Drains/Airways     Central Venous Catheter Line            Percutaneous Central Line Insertion/Assessment - Triple Lumen  04/11/20 0137 right femoral vein 13 days          Drain                 NG/OG Tube 04/12/20 1000 18 Fr. Right nostril 12 days         Urethral Catheter 04/18/20 1854 5 days         Rectal Tube 04/21/20 1540 fecal management system 2 days          Airway                 Airway - Non-Surgical 04/11/20 1525 Endotracheal Tube 12 days          Arterial Line                 Arterial Line 04/19/20 2130 Right Radial 4 days          Peripheral Intravenous Line                 Peripheral IV - Single Lumen 04/19/20 1505 22 G Anterior;Right Upper Arm 4 days              Physical Exam   Constitutional: No distress. He is sedated, chemically paralyzed and intubated.   HENT:   Head: Normocephalic.   crich removed and dressing clean dry and intact    Eyes: EOM are normal.   Cardiovascular: Tachycardia present.   Pulmonary/Chest: Effort normal. He is intubated.   Abdominal: Soft.   Neurological:   Sedated    Skin: Skin is warm. He is not diaphoretic.   RUE being assessed by wound care. Possible previous extravasation of medication. Continue to monitor.   Nursing note and vitals reviewed.      Significant Labs:  ABGs:   Recent Labs   Lab 04/24/20  0613   PH 7.287*   PCO2 >130.0*   PO2 53*   HCO3 UNAVAILABLE   POCSATURATED UNAVAILABLE   BE UNAVAILABLE     CBC:   Recent Labs   Lab 04/24/20  0300   WBC 11.87   RBC 2.50*   HGB 7.2*   HCT 25.0*      *   MCH 28.8   MCHC 28.8*     CMP:   Recent Labs   Lab 04/24/20  0300   *   CALCIUM 8.1*   ALBUMIN 1.3*   PROT 5.5*      K 4.2   CO2 50*   CL 80*   BUN 15   CREATININE 0.6   ALKPHOS 195*   ALT 51*   AST 42*   BILITOT 0.3     Coagulation:   Recent Labs   Lab 04/24/20  0300   INR 1.0       Significant Diagnostics:  I have reviewed all pertinent imaging results/findings within the past 24 hours.    Assessment/Plan:   * COVID-19 virus  detected  54y.o male with a medical history of HTN who is COVID +, intubated, paralyzed, sedated on vasopressors.  CTS consulted for possible ECMO.  --Patient is currently a DNR.  Dicussed with critical care team who will discuss treatment options with patients family   --Dr. Ayon to review and give final recommendations        Thank you for your consult. I will follow-up with patient. Please contact us if you have any additional questions.    Shabnam Trivedi NP  Cardiothoracic Surgery  [facility]

## 2020-04-24 NOTE — RESPIRATORY THERAPY
Supinated pt at this time. RNs and Anaesthesia at bedside. Pt ETT moved from 25 to 23 during supination but was pushed back in to 25cm.   Will continue to monitor.

## 2020-04-24 NOTE — ASSESSMENT & PLAN NOTE
54y.o male with a medical history of HTN who is COVID +, intubated, paralyzed, sedated on vasopressors.  CTS consulted for possible ECMO.  --Patient is currently a DNR.  Dicussed with critical care team who will discuss treatment options with patients family   --Dr. Ayno to review and give final recommendations

## 2020-04-24 NOTE — PROGRESS NOTES
[facility]  Critical Care Medicine  Progress Note    Patient Name: Ranjana Sanchez  MRN: [id number]  Admission Date: 4/10/2020  Hospital Length of Stay: 14 days  Code Status: Full Code  Attending Provider: Jaylon Tim MD  Primary Care Provider: Primary Doctor No   Principal Problem: COVID-19 virus detected    Subjective:     HPI:  54y.o male with PMH of HTN brought to ER via EMS for increasing SOB with known covid-19 exposure. 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 They have not had travelers since 3/14. 2 days ago he started having fever and sob. He was seen by the ship's physician and was found to have crackles in his bases bilaterally, but his oxygen saturation was in the 90's. He had a flu test which was negative. He was quarantined in a room and states that he got worse with increasing SOB. He was brought to the ER and found to be 78% on RA. He was started on NRB and initially was doing much better, but his tachypnea did not improve, so he was placed on bipap. His sat is currently 94%.    Hospital/ICU Course:  Patient was admitted to the MICU afyer a difficult intubation in the ER s/p crich. Covid positive with ards, sedated and paralyzed  Unable to prone secondary to crich. Left mainstem intubation which was pulled off, on NO and levophed. ET tube replaced on 4/11 family contacted in [place]. Proned on 4/12 tolerated well and continued to prone on 4/19. 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 He was on and off ketamine as well.  EEG ordered to rule out seizures as he also had seizure like activity that resolved with ativan. Currently changing sedation, was placed on phenobarbital which should start having effect 4/20. Patient proned x 10 (last on 4/22). Still not tolerating ventilator weaning. Remains on sedation, vasopressors, and paralytic. ID now following  patient for remdesivir administration; family aware and consented. Patient not significantly improving despite efforts. Son contacted, patient made DNR. Patient evaluated for ECMO with a plan to attempt on 4/25     Interval History/Significant Events: On maximal vent settings overnight    Review of Systems   Unable to perform ROS: Intubated     Objective:     Vital Signs (Most Recent):  Temp: 97.7 °F (36.5 °C) (04/24/20 1500)  Pulse: 97 (04/24/20 1600)  Resp: (!) 35 (04/24/20 1615)  BP: (!) 116/58 (04/24/20 1600)  SpO2: 95 % (04/24/20 1600) Vital Signs (24h Range):  Temp:  [97.4 °F (36.3 °C)-98.3 °F (36.8 °C)] 97.7 °F (36.5 °C)  Pulse:  [] 97  Resp:  [35-73] 35  SpO2:  [85 %-98 %] 95 %  BP: ()/(54-69) 116/58  Arterial Line BP: ()/(45-71) 97/47   Weight: 63 kg (139 lb)  Body mass index is 23.86 kg/m².      Intake/Output Summary (Last 24 hours) at 4/24/2020 1651  Last data filed at 4/24/2020 1626  Gross per 24 hour   Intake 4328.95 ml   Output 5675 ml   Net -1346.05 ml       Physical Exam   Constitutional: He appears well-developed. He is sedated, chemically paralyzed and intubated.   Prone   HENT:   Head: Normocephalic.   Cardiovascular: Regular rhythm and intact distal pulses.   Pulmonary/Chest: He is intubated.   Abdominal: Soft.   Neurological: He is unresponsive. GCS eye subscore is 1. GCS verbal subscore is 1. GCS motor subscore is 1.   Skin: Skin is warm.   Nursing note and vitals reviewed.      Vents:  Vent Mode: A/C (04/24/20 1255)  Ventilator Initiated: Yes (04/10/20 2247)  Set Rate: 35 BPM (04/24/20 1255)  Vt Set: 250 mL (04/24/20 1255)  PEEP/CPAP: 10 cmH20 (04/24/20 1255)  Oxygen Concentration (%): 90 (04/24/20 1600)  Peak Airway Pressure: 52 cmH2O (04/24/20 1255)  Plateau Pressure: 38 cmH20 (04/24/20 1255)  Total Ve: 9.25 mL (04/24/20 1255)  F/VT Ratio<105 (RSBI): 132.58 (04/24/20 1255)  Lines/Drains/Airways     Central Venous Catheter Line            Percutaneous Central Line  Insertion/Assessment - Triple Lumen  04/11/20 0137 right femoral vein 13 days          Drain                 NG/OG Tube 04/12/20 1000 18 Fr. Right nostril 12 days         Urethral Catheter 04/18/20 1854 5 days         Rectal Tube 04/21/20 1540 fecal management system 3 days          Airway                 Airway - Non-Surgical 04/11/20 1525 Endotracheal Tube 13 days          Arterial Line                 Arterial Line 04/19/20 2130 Right Radial 4 days          Peripheral Intravenous Line                 Peripheral IV - Single Lumen 04/24/20 1445 20 G Anterior;Right Forearm less than 1 day              Significant Labs:    CBC/Anemia Profile:  Recent Labs   Lab 04/23/20  0310 04/24/20  0300   WBC 12.53 11.87   HGB 7.8* 7.2*   HCT 27.2* 25.0*    226   MCV 98 100*   RDW 13.6 13.9   FERRITIN  --  1,841*        Chemistries:  Recent Labs   Lab 04/23/20  0310 04/23/20  1205 04/24/20  0300    137 137   K 3.4* 4.7 4.2   CL 85* 81* 80*   CO2 42* 47* 50*   BUN 15 17 15   CREATININE 0.6 0.7 0.6   CALCIUM 8.1* 8.1* 8.1*   ALBUMIN 1.3*  --  1.3*   PROT 5.5*  --  5.5*   BILITOT 0.4  --  0.3   ALKPHOS 178*  --  195*   ALT 61*  --  51*   AST 42*  --  42*   MG 2.0  --  2.2   PHOS 2.3*  --  2.2*       All pertinent labs within the past 24 hours have been reviewed.    Significant Imaging:  I have reviewed and interpreted all pertinent imaging results/findings within the past 24 hours.      ABG  Recent Labs   Lab 04/24/20  0613   PH 7.287*   PO2 53*   PCO2 >130.0*   HCO3 UNAVAILABLE   BE UNAVAILABLE     Assessment/Plan:     Neuro  Sedated  - On propofol, dialudid, Precedex and phenobarbital , weaned ketamine 4/18; paralyzed with vecuronium  - Had a couple episodes of seizure like activity with right upward gaze, gave 2 of ativan and resolved, EEG was negative for seizures.  - started on valium suspect he could have alcohol withdrawal        Pulmonary  Acute hypoxemic respiratory failure  - Likely 2/2 to ARDS due to  COVID-19  - ARDSNET Protocol    --see COVID    Cardiac/Vascular  Bradycardia suspect likely due to hypothermia (T 93.6F) vs. Sedative medications (Precedex) vs. Sepsis  EKG with HR 50s,    ECHO 4/16: EF 50%, mild RV dysfunction  Troponin < 0.06 wnl    Plan  - Apply Heating pads and warming blanket when cold   - Wean off sedation and paralytics as tolerated  - F/u repeat blood cultures (4/18: NGTD)  - EKG (4/18) with no ST elevation or T wave prominence seen in earlier EKGs  - Continue Heparin gtt, ASA 81mg & Atorvastatin 40mg  - T4 normal, TSH slightly low  - Cardiology consulted and suggest treatment for hypothermia and no other interventions  - considering withdrawal from alcohol as patient responds well to ativan      Hypertension  - Was in shock after intubation   - holding home toprol XL 50 mg  - currently off/on cardene gtt (changes with prone/supine)    Endocrine  Type 2 diabetes mellitus without complication, without long-term current use of insulin  - HbA1c 6  - Home DM regimen:  Diet controlled  - Started on detemir (increased dosing) and SSI (TF SSI); BG now controlled      Other  * COVID-19 virus detected  Respiratory distress   Acute hypoxemic respiratory failure    - Patient intubated with crich which was switched to ETT on 4/11  - proning days 4/12, 4/13,4/14, 4/16, 4/17,4/18, 4/19, 4/20, 4/23  - nitric oxide initiated on 4/21 @ 20 PPM; patient now prone x 10 (last day 4/23); will f/up ABG post NO initiation to assess need for proning  - remains hypercapnic despite lowering TV to 4 mL/kg and increased RR  - Completed treatment with Plaquenil and Azithromycin  - Methyprednisone for 5 days, completed  - Daily CRP, d-dimer, ferritin, to trend inflammatory response.   - Strict I/O's and goal net neutral status to help optimize vent mechanics.   - Special isolation and aspiration precautions ordered.   - Will continue rocephin for now   - q6h albuterol nebs ordered  - ID following giving  remdesivir.  -optimizing fluid status. X 1 spot dose Lasix given on 4/22       Arm wound, right, sequela  --wound to RUE being seen by wound care. Thought to be 2/2 extravasation of medication through PIV. Continue to monitor. RUE with +1 palpable pulses.    Goals of care, counseling/discussion  Patient's prognosis remains poor despite efforts to optimize health.Arnie spoken with regarding GOC.  DNR changed after consulting CTS for ECMO    Shock  - Levophed for MAP goal > 65   - Completed treatment with Plaquenil and Azithromycin.  - Blood cultures NGTD. F/u repeat cultures  -DC antbiotics       Critical Care Daily Checklist:    A: Awake: RASS Goal/Actual Goal: RASS Goal: -5-->unarousable  Actual: [place] Agitation Sedation Scale (RASS): Unarousable   B: Spontaneous Breathing Trial Performed? Spon. Breathing Trial Initiated?: Not initiated (04/24/20 0818)   C: SAT & SBT Coordinated?  NA                     D: Delirium: CAM-ICU Overall CAM-ICU: Positive   E: Early Mobility Performed? No   F: Feeding Goal: Goals: Meet % EEN, EPN by RD f/u date  Status: Nutrition Goal Status: progressing towards goal   Current Diet Order   Procedures    Diet NPO      AS: Analgesia/Sedation Continuous infusion   T: Thromboembolic Prophylaxis Heparin infusion   H: HOB > 300 Yes   U: Stress Ulcer Prophylaxis (if needed) Pepcid   G: Glucose Control ssi   B: Bowel Function Stool Occurrence: (flexi seal in place)   I: Indwelling Catheter (Lines & Torres) Necessity Will need to be changed soon   D: De-escalation of Antimicrobials/Pharmacotherapies Dc    Plan for the day/ETD Consult CTS for ECMO    Code Status:  Family/Goals of Care: Full Code  Updated by Fellow     Critical Care Time: 70 minutes  Critical secondary to Patient has a condition that poses threat to life and bodily function: Respiratory failure      Critical care was time spent personally by me on the following activities: development of treatment plan with patient or  surrogate and bedside caregivers, discussions with consultants, evaluation of patient's response to treatment, examination of patient, ordering and performing treatments and interventions, ordering and review of laboratory studies, ordering and review of radiographic studies, pulse oximetry, re-evaluation of patient's condition. This critical care time did not overlap with that of any other provider or involve time for any procedures.     Fiona Winterbottom, APRN, CNS  Critical Care Medicine  [facility]

## 2020-04-24 NOTE — PROGRESS NOTES
Pt. Supinated with RNx3, RTx1 and Anesthesia x2 at 1000 this morning. ETT had to be repositioned from 23cm back to 25cm once supinated. SpO2 decreased to 85% despite increasing FiO2 to 100%. F. Winterbottom, NP called to bedside along with attending, Dr. Dodd. N.O. Increased to 40ppm from 20ppm. Current SpO2 90% (goal > 88% per team). Levophed gtt had to be increased from 0.24mcg/kg/min to 0.6mcg/kg/min to maintain SBP >100. Team aware. CXRay ordered. CTS consulted for possible ECMO. Levophed gtt concentrated. WCTM

## 2020-04-24 NOTE — PROGRESS NOTES
No acute events this shift. See VS and assessments in flowsheets. See below for updates on today's progress.    Patient remains intubated and chemically sedated/paralyzed. Supinated at 1000 today, Levophed and Ventilator w/ N.O. Requirements increased (see notes for previous details). CTS consulted for VV ECMO placement tomorrow. Patient's son agreed to procedure and rescinded DNR. Full Code orders placed, paperwork pending with team. SBP > 100 per a-line with titration of Levophed gtt. Remdesivir dose administered per MAR. Lasix administered, Torres intact with UO=2.5 L today. Flexi intact, Lactulose administered, 500cc dark brown output. Detemir and SSI administered to control BG. Generalized edema 2-3+ throughout. SCDs and Heparin gtt continued. Wound care provided with full bath.     POC communicated and review with patient and patient's son. All questions and concerns addressed. Bed in low, locked position. Call light within reach. WCTM.

## 2020-04-25 PROBLEM — U07.1 ACUTE RESPIRATORY DISTRESS SYNDROME (ARDS) DUE TO COVID-19 VIRUS: Status: ACTIVE | Noted: 2020-01-01

## 2020-04-25 PROBLEM — J80 ACUTE RESPIRATORY DISTRESS SYNDROME (ARDS) DUE TO COVID-19 VIRUS: Status: ACTIVE | Noted: 2020-01-01

## 2020-04-25 PROBLEM — R57.9 SHOCK, UNSPECIFIED: Status: ACTIVE | Noted: 2020-01-01

## 2020-04-25 PROBLEM — R94.31 NONSPECIFIC ABNORMAL ELECTROCARDIOGRAM (ECG) (EKG): Status: RESOLVED | Noted: 2020-01-01 | Resolved: 2020-01-01

## 2020-04-25 PROBLEM — I49.5 TACHYCARDIA-BRADYCARDIA: Status: RESOLVED | Noted: 2020-01-01 | Resolved: 2020-01-01

## 2020-04-25 PROBLEM — R06.03 RESPIRATORY DISTRESS: Status: RESOLVED | Noted: 2020-01-01 | Resolved: 2020-01-01

## 2020-04-25 PROBLEM — I10 HYPERTENSION: Status: RESOLVED | Noted: 2020-01-01 | Resolved: 2020-01-01

## 2020-04-25 NOTE — ASSESSMENT & PLAN NOTE
VV-ECMO cannulation 4/25/2020:  Flows ~5.3lpm @ 3800 RPM  Sweep: 100% Fi 4 lpm (SvO2 goal > 65; pCO2 50-60 temporarily)  Excellent systemic oxygenation with minimal signs of recirculation  No line chatter after resuscitation following cannulae placement    Vent:  Rest mode PIP 24 PEEP 12 Rate 12 I:E 2:3   Wean FiO2 as able; expect room air possible with excellent ECMO flows  Continue Monika; wean from 40 to 20 ppm by tomorrow

## 2020-04-25 NOTE — ASSESSMENT & PLAN NOTE
Discontinue phenobarbital  Reduce frequency of diazepam  Reduce hydromorphone   Keep other sedatives the same until paralysis has worn off

## 2020-04-25 NOTE — EICU
Intervention Initiated From:  Bedside    Vicki Communicated with Bedside Nurse regarding:  Time-Out    Nurse Notified:  Yes    Doctor Notified:  Yes Dr. Ayon    Comments: Time out completed for insertion. Entire body prepped per Dr. Ayon. Anesthesia team ( Dr. Green, Dr. Gayle, Dr. White*, Cardiology resident Dr. Miller, Adrian Zepeda from  Perfusion all  Present.  Multiple units  PRBC requested per Dr. Ayon..  1030 KAVYA probe inserted per anesthesia  Resident  Dr. Carias  1036 2nd body prep done  1035 Epinephrine drip started  1038 Sterile field over entire torso  1043 Dr. green inserted:Right IJ Needle inserted, blood return, glide wire inserterd. Removed needle Dilator inserted. Small puncture made at the entry site. Dilator removed. Cordis inserted. Aspirated then flushed.   1047 US used to access right femoral vein  1050Dr. Miller under Dr. Ayon guidance.  Needle inserted into right Femoral vein with US guidance. Blood retun, verifiedvenous blood with tubing. Glide wire inserted   1051 Needle removed, verified placement  Of wire withUS  1052 Cordis 8 Lithuanian inserted. Glide wire removed intact. Aspirated anf  Flushed line.Heparin 64648 units IVP given.  1103 Glide wire inserted into right  Femoral vein per Dr. Ayon   1105  Heparin 20413 IVP ordered  1106 Dilator number 1 inserted in and out. Glide wire held  1106 2nd dilator inserted then removed  1107 3rd dilator inserted into fight femoral  Vein then removed   1107 4th dilator  Inserted then removed  1108 5th dilator with drager inserted into RFV then removed  1109 27 Burkinan cannula with wire removed. Cannula clampled with 2 clamps  1111 Wire inserted through Right IJ cordis out  1112 1st dilator in and out  1113 2nc dilator in and out still holding wire  1113 3rd dilator in and out continue to hold glide wire  1114 25 Burkinan Cannula and catheter inserted. Removed outer catheter. Wire removed. Cannula clampled  1116  Perfusionist at foot of bed preparing for connection to ECMO  1127 Attached tubing from  ECMO to Right IJ   1128 ECMO started with parameters indicated by Dr. Ayon  1129 RFV cannula connected to ECMO. Removed cordis.  1130 Epin drip decreased  1131 RFV cannula sutured per Dr. Ayon Then sutured right IJ cannula  1134 Epi increased.  Orders given   1136  Vent peep  Decreased to 8  1137 1 amp  NaHC03 given IVP  1140 Right femoral vein cannula secured on leg  1141 LabileB/P titrating Epinephrine. Albumin infusing through ECMO  1142  Cardiology fellow continue to view KAVYA.   1145 Dressing to be applied to RFV and  Rt IJ. ICU staff to continue care  1145 Time 1 hr 25  Minutes.

## 2020-04-25 NOTE — PROGRESS NOTES
[facility]  Critical Care Medicine  Progress Note    Patient Name: Ranjana Sanchez  MRN: [id number]  Admission Date: 4/10/2020  Hospital Length of Stay: 15 days  Code Status: Full Code  Attending Provider: Francis Ayon MD  Primary Care Provider: Primary Doctor No   Principal Problem: Acute respiratory distress syndrome (ARDS) due to COVID-19 virus    Subjective:     HPI:  54y.o male with PMH of HTN brought to ER via EMS for increasing SOB with known covid-19 exposure. 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 They have not had travelers since 3/14. 2 days ago he started having fever and sob. He was seen by the ship's physician and was found to have crackles in his bases bilaterally, but his oxygen saturation was in the 90's. He had a flu test which was negative. He was quarantined in a room and states that he got worse with increasing SOB. He was brought to the ER and found to be 78% on RA. He was started on NRB and initially was doing much better, but his tachypnea did not improve, so he was placed on bipap. His sat is currently 94%.    Hospital/ICU Course:  Patient was admitted to the MICU afyer a difficult intubation in the ER s/p crich. Covid positive with ards, sedated and paralyzed  Unable to prone secondary to crich. Left mainstem intubation which was pulled off, on NO and levophed. ET tube replaced on 4/11 family contacted in [place]. Proned on 4/12 tolerated well and continued to prone on 4/19. 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 He was on and off ketamine as well.  EEG ordered to rule out seizures as he also had seizure like activity that resolved with ativan. Currently changing sedation, was placed on phenobarbital which should start having effect 4/20. Patient proned x 10 (last on 4/22). Still not tolerating ventilator weaning. Remains on sedation,  vasopressors, and paralytic. ID now following patient for remdesivir administration; family aware and consented. Patient not significantly improving despite efforts. Son contacted, patient made DNR. Patient evaluated for ECMO on 4/24. PLace on ECMO on 4/25 and transferred to [facility] History/Significant Events: On maximum ventilator settings and nitric with worsening lung compliance    Review of Systems   Unable to perform ROS: Intubated     Objective:     Vital Signs (Most Recent):  Temp: 98.2 °F (36.8 °C) (04/25/20 1700)  Pulse: (!) 123 (04/25/20 1700)  Resp: (!) 90 (04/25/20 1515)  BP: (!) 160/80 (04/25/20 1430)  SpO2: 100 % (04/25/20 1700) Vital Signs (24h Range):  Temp:  [97.8 °F (36.6 °C)-100.7 °F (38.2 °C)] 98.2 °F (36.8 °C)  Pulse:  [105-136] 123  Resp:  [0-140] 90  SpO2:  [75 %-100 %] 100 %  BP: (102-160)/(54-80) 160/80  Arterial Line BP: ()/() 158/62   Weight: 63 kg (139 lb)  Body mass index is 23.86 kg/m².      Intake/Output Summary (Last 24 hours) at 4/25/2020 1750  Last data filed at 4/25/2020 1741  Gross per 24 hour   Intake 7282.33 ml   Output 5711 ml   Net 1571.33 ml       Physical Exam   Constitutional: He appears well-developed. He is sedated and chemically paralyzed.   HENT:   Head: Normocephalic.   Eyes: Pupils are equal, round, and reactive to light.   Cardiovascular: Intact distal pulses.   Pulmonary/Chest:   Intubated   Abdominal: Soft. Normal appearance. Bowel sounds are decreased.   Neurological: He is unresponsive. GCS eye subscore is 1. GCS verbal subscore is 1. GCS motor subscore is 1.   Paralyzed and sedated   Skin: Skin is warm and dry.   Nursing note and vitals reviewed.      Vents:  Vent Mode: A/C (04/25/20 0115)  Ventilator Initiated: Yes (04/10/20 9269)  Set Rate: 35 BPM (04/25/20 0115)  Vt Set: 400 mL (04/25/20 0115)  PEEP/CPAP: 10 cmH20 (04/25/20 0115)  Oxygen Concentration (%): 80 (04/25/20 1700)  Peak Airway Pressure: 65 cmH2O (04/25/20  0115)  Plateau Pressure: 0 cmH20 (04/25/20 0115)  Total Ve: 15.6 mL (04/25/20 0115)  F/VT Ratio<105 (RSBI): (!) 78.48 (04/25/20 0115)  Lines/Drains/Airways     Central Venous Catheter Line                 ECMO Cannula 04/25/20 1120 right internal jugular;right femoral vein less than 1 day    Introducer 04/25/20 1030 less than 1 day    Introducer 04/25/20 1040 less than 1 day          Drain                 NG/OG Tube 04/12/20 1000 18 Fr. Right nostril 13 days         Urethral Catheter 04/18/20 1854 6 days         Rectal Tube 04/21/20 1540 fecal management system 4 days          Airway                 Airway - Non-Surgical 04/11/20 1525 Endotracheal Tube 14 days          Arterial Line                 Arterial Line 04/19/20 2130 Right Radial 5 days          Peripheral Intravenous Line                 Peripheral IV - Single Lumen 04/24/20 1445 20 G Anterior;Right Forearm 1 day         Peripheral IV - Single Lumen 04/25/20 0941 20 G Right Hand less than 1 day              Significant Labs:    CBC/Anemia Profile:  Recent Labs   Lab 04/24/20  0300 04/25/20  0400 04/25/20  1210 04/25/20  1600   WBC 11.87 17.51* 15.05* 14.96*   HGB 7.2* 6.8* 8.8* 11.4*   HCT 25.0* 23.5* 27.8* 35.1*    231 127* 137*   * 98 93 91   RDW 13.9 13.8 14.0 13.9   FERRITIN 1,841*  --   --   --         Chemistries:  Recent Labs   Lab 04/25/20  0400 04/25/20  0806 04/25/20  1210 04/25/20  1600    134* 134* 137   K 3.5 3.6 3.8 2.5*   CL 72* 74* 77* 78*   CO2 46* 50* 48* 48*   BUN 18 19 19 17   CREATININE 0.7 0.7 0.7 0.7   CALCIUM 7.9* 7.6* 8.1* 8.2*   ALBUMIN 1.4* 1.4* 1.4* 1.6*   PROT 5.7* 5.7* 4.6* 5.3*   BILITOT 0.5 0.5 0.7 1.1*   ALKPHOS 198* 206* 153* 160*   ALT 42 39 30 32   AST 50* 57* 48* 50*   MG 2.0  --  1.7 1.7   PHOS 1.1*  --  2.3* <1.0*       All pertinent labs within the past 24 hours have been reviewed.    Significant Imaging:  I have reviewed and interpreted all pertinent imaging results/findings within the past 24  hours.      ABG  Recent Labs   Lab 04/25/20  1159   PH 7.462*   PO2 43   PCO2 66.4*   HCO3 47.5*   BE 24     Assessment/Plan:       * Acute respiratory distress syndrome (ARDS) due to COVID-19 virus  - Patient intubated with crich which was switched to ETT on 4/11  - Ventilator management per ARDSnet  - proning for 11 days (last day 4/23 with minimal improvement in condition  - nitric oxide initiated on 4/21 @ 20 PPM, increased to 40ppm on 4/24  - remains hypercapnic despite lowering TV to 4 mL/kg and increased RR  - Completed treatment with Plaquenil and Azithromycin  - Methyprednisone for 5 days, completed  - Daily CRP, d-dimer, ferritin, to trend inflammatory response.   - Strict I/O's and goal net neutral status to help optimize vent mechanics.   - Special isolation and aspiration precautions ordered.   -- q6h albuterol nebs ordered  - ID following giving remdesivir.  -optimizing fluid status.  - ECMO per CTS    Sedated  - On propofol, dialudid, Precedex and phenobarbital , weaned ketamine 4/18; paralyzed with vecuronium  - Early in hospital course the patient had a couple episodes of seizure like activity with right upward gaze, gave 2 of ativan and resolved, EEG was negative for seizures.  - started on valium suspect he could have alcohol withdrawal. Now dc'd       Shock  - Epinephrine and vaso for MAP goal > 65   - Completed treatment with Plaquenil and Azithromycin.  - On cefepime and vanc   - Blood cultures NGTD. F/u repeat cultures      Type 2 diabetes mellitus without complication, without long-term current use of insulin  - HbA1c 6  - Home DM regimen:  Diet controlled  - Detemir dc'd and now on SSI      Electrolyte abnormalities  Hypokalemia and Hypophosphatemia  - Replete electroltyes  - Trend CMP    Arm wound, right, sequela  --wound to RUE being seen by wound care. Thought to be 2/2 extravasation of medication through PIV. Continue to monitor. RUE with +1 palpable pulses.    Goals of care,  counseling/discussion  Patient's prognosis remains poor despite efforts to optimize health.Spoke with family regarding GOC. Patient was made a  DNR, however he was accepted for ECMO as a salvage therapy, therefore his code status was changed,  ECMO initiated on the morning of 4/25. patient transferred to Dr Ayon's service      Critical Care Time: 70 minutes  Critical secondary to Patient has a condition that poses threat to life and bodily function Acute respiratory distress syndrome (ARDS) due to COVID-19 virus     Critical care was time spent personally by me on the following activities: development of treatment plan with patient or surrogate and bedside caregivers, discussions with consultants, evaluation of patient's response to treatment, examination of patient, ordering and performing treatments and interventions, ordering and review of laboratory studies, ordering and review of radiographic studies, pulse oximetry, re-evaluation of patient's condition. This critical care time did not overlap with that of any other provider or involve time for any procedures.     Fiona Winterbottom, APRN, CNS  Critical Care Medicine  [facility]

## 2020-04-25 NOTE — SUBJECTIVE & OBJECTIVE
Interval History/Significant Events: On maximum ventilator settings and nitric with worsening lung compliance    Review of Systems   Unable to perform ROS: Intubated     Objective:     Vital Signs (Most Recent):  Temp: 98.2 °F (36.8 °C) (04/25/20 1700)  Pulse: (!) 123 (04/25/20 1700)  Resp: (!) 90 (04/25/20 1515)  BP: (!) 160/80 (04/25/20 1430)  SpO2: 100 % (04/25/20 1700) Vital Signs (24h Range):  Temp:  [97.8 °F (36.6 °C)-100.7 °F (38.2 °C)] 98.2 °F (36.8 °C)  Pulse:  [105-136] 123  Resp:  [0-140] 90  SpO2:  [75 %-100 %] 100 %  BP: (102-160)/(54-80) 160/80  Arterial Line BP: ()/() 158/62   Weight: 63 kg (139 lb)  Body mass index is 23.86 kg/m².      Intake/Output Summary (Last 24 hours) at 4/25/2020 1750  Last data filed at 4/25/2020 1741  Gross per 24 hour   Intake 7282.33 ml   Output 5711 ml   Net 1571.33 ml       Physical Exam   Constitutional: He appears well-developed. He is sedated and chemically paralyzed.   HENT:   Head: Normocephalic.   Eyes: Pupils are equal, round, and reactive to light.   Cardiovascular: Intact distal pulses.   Pulmonary/Chest:   Intubated   Abdominal: Soft. Normal appearance. Bowel sounds are decreased.   Neurological: He is unresponsive. GCS eye subscore is 1. GCS verbal subscore is 1. GCS motor subscore is 1.   Paralyzed and sedated   Skin: Skin is warm and dry.   Nursing note and vitals reviewed.      Vents:  Vent Mode: A/C (04/25/20 0115)  Ventilator Initiated: Yes (04/10/20 2247)  Set Rate: 35 BPM (04/25/20 0115)  Vt Set: 400 mL (04/25/20 0115)  PEEP/CPAP: 10 cmH20 (04/25/20 0115)  Oxygen Concentration (%): 80 (04/25/20 1700)  Peak Airway Pressure: 65 cmH2O (04/25/20 0115)  Plateau Pressure: 0 cmH20 (04/25/20 0115)  Total Ve: 15.6 mL (04/25/20 0115)  F/VT Ratio<105 (RSBI): (!) 78.48 (04/25/20 0115)  Lines/Drains/Airways     Central Venous Catheter Line                 ECMO Cannula 04/25/20 1120 right internal jugular;right femoral vein less than 1 day    Introducer  04/25/20 1030 less than 1 day    Introducer 04/25/20 1040 less than 1 day          Drain                 NG/OG Tube 04/12/20 1000 18 Fr. Right nostril 13 days         Urethral Catheter 04/18/20 1854 6 days         Rectal Tube 04/21/20 1540 fecal management system 4 days          Airway                 Airway - Non-Surgical 04/11/20 1525 Endotracheal Tube 14 days          Arterial Line                 Arterial Line 04/19/20 2130 Right Radial 5 days          Peripheral Intravenous Line                 Peripheral IV - Single Lumen 04/24/20 1445 20 G Anterior;Right Forearm 1 day         Peripheral IV - Single Lumen 04/25/20 0941 20 G Right Hand less than 1 day              Significant Labs:    CBC/Anemia Profile:  Recent Labs   Lab 04/24/20  0300 04/25/20  0400 04/25/20  1210 04/25/20  1600   WBC 11.87 17.51* 15.05* 14.96*   HGB 7.2* 6.8* 8.8* 11.4*   HCT 25.0* 23.5* 27.8* 35.1*    231 127* 137*   * 98 93 91   RDW 13.9 13.8 14.0 13.9   FERRITIN 1,841*  --   --   --         Chemistries:  Recent Labs   Lab 04/25/20  0400 04/25/20  0806 04/25/20  1210 04/25/20  1600    134* 134* 137   K 3.5 3.6 3.8 2.5*   CL 72* 74* 77* 78*   CO2 46* 50* 48* 48*   BUN 18 19 19 17   CREATININE 0.7 0.7 0.7 0.7   CALCIUM 7.9* 7.6* 8.1* 8.2*   ALBUMIN 1.4* 1.4* 1.4* 1.6*   PROT 5.7* 5.7* 4.6* 5.3*   BILITOT 0.5 0.5 0.7 1.1*   ALKPHOS 198* 206* 153* 160*   ALT 42 39 30 32   AST 50* 57* 48* 50*   MG 2.0  --  1.7 1.7   PHOS 1.1*  --  2.3* <1.0*       All pertinent labs within the past 24 hours have been reviewed.    Significant Imaging:  I have reviewed and interpreted all pertinent imaging results/findings within the past 24 hours.

## 2020-04-25 NOTE — ANESTHESIA PREPROCEDURE EVALUATION
04/25/2020  Ranjana Sanchez is a 54 y.o., male       Pre-operative evaluation for * No procedures listed *    Ranjana Sanchez is a 54 y.o. male presenting for V-V ECMO cannulation    Patient Active Problem List   Diagnosis    Respiratory distress    Hypertension    COVID-19 virus detected    Acute hypoxemic respiratory failure    Shock    Sedated    Acute respiratory disease due to COVID-19 virus    Difficult intubation    Type 2 diabetes mellitus without complication, without long-term current use of insulin    Tachycardia-bradycardia    Nonspecific abnormal electrocardiogram (ECG) (EKG)    Goals of care, counseling/discussion    Arm wound, right, sequela       Review of patient's allergies indicates:  No Known Allergies    No current facility-administered medications on file prior to encounter.      Current Outpatient Medications on File Prior to Encounter   Medication Sig Dispense Refill    aspirin 81 MG Chew Take 81 mg by mouth once daily.      fenofibrate 160 MG Tab Take 160 mg by mouth once daily.      metoprolol tartrate (LOPRESSOR) 50 MG tablet Take 50 mg by mouth 2 (two) times daily.      rosuvastatin (CRESTOR) 10 MG tablet Take 5 mg by mouth once daily.      telmisartan (MICARDIS) 40 MG Tab Take 40 mg by mouth once daily.         History reviewed. No pertinent surgical history.    Social History     Socioeconomic History    Marital status:      Spouse name: Not on file    Number of children: Not on file    Years of education: Not on file    Highest education level: Not on file   Occupational History    Not on file   Social Needs    Financial resource strain: Not on file    Food insecurity:     Worry: Not on file     Inability: Not on file    Transportation needs:     Medical: Not on file     Non-medical: Not on file   Tobacco Use    Smoking status: Never  Smoker   Substance and Sexual Activity    Alcohol use: Not on file    Drug use: Not on file    Sexual activity: Not on file   Lifestyle    Physical activity:     Days per week: Not on file     Minutes per session: Not on file    Stress: Not on file   Relationships    Social connections:     Talks on phone: Not on file     Gets together: Not on file     Attends Protestant service: Not on file     Active member of club or organization: Not on file     Attends meetings of clubs or organizations: Not on file     Relationship status: Not on file   Other Topics Concern    Not on file   Social History Narrative    Not on file         CBC:   Recent Labs     20  040   WBC 11.87 17.51*   RBC 2.50* 2.39*   HGB 7.2* 6.8*   HCT 25.0* 23.5*    231   * 98   MCH 28.8 28.5   MCHC 28.8* 28.9*       CMP:   Recent Labs     20  0806      < > 136 134*   K 4.2   < > 3.5 3.6   CL 80*   < > 72* 74*   CO2 50*   < > 46* 50*   BUN 15   < > 18 19   CREATININE 0.6   < > 0.7 0.7   *   < > 263* 239*   MG 2.2  --  2.0  --    PHOS 2.2*  --  1.1*  --    CALCIUM 8.1*   < > 7.9* 7.6*   ALBUMIN 1.3*  --  1.4* 1.4*   PROT 5.5*  --  5.7* 5.7*   ALKPHOS 195*  --  198* 206*   ALT 51*  --  42 39   AST 42*  --  50* 57*   BILITOT 0.3  --  0.5 0.5    < > = values in this interval not displayed.       INR  Recent Labs     20  03120  040   INR 1.0 1.0 1.0   APTT  --   --  38.8*           Diagnostic Studies:      EKD Echo:  No results found for this or any previous visit.      Anesthesia Evaluation    I have reviewed the Patient Summary Reports.     I have reviewed the Medications.     Review of Systems         Anesthesia Plan  Type of Anesthesia, risks & benefits discussed:  Anesthesia Type:  general  Patient's Preference:   Intra-op Monitoring Plan: standard ASA monitors, central line and arterial line  Intra-op Monitoring Plan  Comments:   Post Op Pain Control Plan:   Post Op Pain Control Plan Comments:   Induction:   IV  Beta Blocker:         Informed Consent: Patient representative understands risks and agrees with Anesthesia plan.  Questions answered. Anesthesia consent signed with patient.  ASA Score: 5     Day of Surgery Review of History & Physical:    H&P update referred to the surgeon.     Anesthesia Plan Notes: Phone consent from patient's family in [place].  Limited physical exam secondary to infectious disease protocols.        Ready For Surgery From Anesthesia Perspective.

## 2020-04-25 NOTE — HPI
53 yo male with COVID ARDS failing maximal mechanical ventilatory support. Placed on VV ecmo, 27 Fr drainage line from right femoral vein and 23 Fr return cannula in right IJ. Cannulation procedure well tolerated, greatly improved oxygenation immediately upon starting ECMO.

## 2020-04-25 NOTE — ANESTHESIA PROCEDURE NOTES
KAVYA    Diagnosis: hypoxemia  Patient location during procedure: ICU  Surgery related to: VV ECMO Cannulation  Exam type: Baseline  Staffing  Anesthesiologist: Jose Alberto White MD  Performed: fellow and anesthesiologist   Preanesthetic Checklist  Completed: patient identified, surgical consent, pre-op evaluation, timeout performed, risks and benefits discussed, monitors and equipment checked, anesthesia consent given, oxygen available, suction available, hand hygiene performed and patient being monitored  Setup & Induction  Probe Insertion: easy  Exam: complete      Findings  Impression  Other Findings  Hyperdynamic biventricular systolic function.   Valve anatomy grossly normal.  No AI.  Trace MR and TR. No PI.   Fem cannula terminated at the IVC-RA junction. Good flows post placement.  R IJ cannula terminated 1cm into the RA from the SVC. Good flows post placement.  Unchanged function post ECMO initiation.  Small L pleural effusion.        Probe Removal      Exam     Left Heart  Left Atruim: normal (men 3.0-4.0; women 2.7-3.8)    Left Ventricle: cm, normal (men 4.2-5.9; women 3.8-5.2)    LVAD:no  Estimated Ejection Fraction: > 55% normal            Right Heart  Right Ventricle: normal  Right Ventricle Function: normal  Right Atria: cm and normal    Intra Atrial Septum  PFO: no shunt by color flow doppler  no IAS aneurysm  no lipomatous hypertrophy  no Atrial Septal Defect (Asd)    Right Ventricle  Size: normal    Aortic Valve:  Morphology: trileaflet  Regurgitation: no aortic valve regurgitation     Mitral Valve:  Morphology:normal  Prolapse: none  Flail: no flail  Jet Description: trace    Tricuspid Valve:  Morphology: normal  TRICUSPID REGURGITATION: trace.    Pulmonic Valve:  Morphology:normal  Regurgitation(color flow): none    Great Vessels  IABP: no  Aorta    Descending aorta IABP: no    Effusions  Effusions: left pleural    Summary  Findings discussed with surgeon.    Other Findings   Hyperdynamic  biventricular systolic function.   Valve anatomy grossly normal.  No AI.  Trace MR and TR. No PI.   Fem cannula terminated at the IVC-RA junction. Good flows post placement.  R IJ cannula terminated 1cm into the RA from the SVC. Good flows post placement.  Unchanged function post ECMO initiation.  Small L pleural effusion.

## 2020-04-25 NOTE — ASSESSMENT & PLAN NOTE
- On propofol, dialudid, Precedex and phenobarbital , weaned ketamine 4/18; paralyzed with vecuronium  - Early in hospital course the patient had a couple episodes of seizure like activity with right upward gaze, gave 2 of ativan and resolved, EEG was negative for seizures.  - started on valium suspect he could have alcohol withdrawal. Now dc'd

## 2020-04-25 NOTE — ANESTHESIA PROCEDURE NOTES
Central Line    Diagnosis: Respiratory failure  Patient location during procedure: ICU  Procedure start time: 4/25/2020 10:40 AM  Timeout: 4/25/2020 10:40 AM  Procedure end time: 4/25/2020 10:50 AM    Staffing  Authorizing Provider: Jose Alberto White MD  Performing Provider: Jack Joseph MD    Staffing  Anesthesiologist: Jose Alberto White MD  Performed: anesthesiologist   Anesthesiologist was present at the time of the procedure.  Preanesthetic Checklist  Completed: patient identified, site marked, surgical consent, pre-op evaluation, timeout performed, IV checked, risks and benefits discussed, monitors and equipment checked and anesthesia consent given  Indication   Indication: vascular access     Anesthesia   general anesthesia    Central Line   Skin Prep: skin prepped with Betadine, skin prep agent completely dried prior to procedure  maximum sterile barriers used during central venous catheter insertion  hand hygiene performed prior to central venous catheter insertion  Location: right, femoral.   Catheter type: introducer  Catheter Size: 8.5 Fr  Ultrasound: vascular probe with ultrasound  Vessel Caliber: medium, patent  Vascular Doppler:  not done, compressibility normal  Needle advanced into vessel with real time Ultrasound guidance.  Guidewire confirmed in vessel.   Manometry: Venous cannualation confirmed by visual estimation of blood vessel pressure using manometry.  Insertion Attempts: 1   Securement:blood return through all ports    Post-Procedure   Adverse Events:none    Guidewire Guidewire removed intact.

## 2020-04-25 NOTE — PROCEDURES
"Ranjana Sanchez is a 54 y.o. male patient.    Temp: 100.1 °F (37.8 °C) (04/25/20 0800)  Pulse: 105 (04/25/20 0845)  Resp: (!) 35 (04/25/20 0845)  BP: 110/60 (04/25/20 0845)  SpO2: (!) 89 % (04/25/20 0845)  Weight: 63 kg (139 lb) (04/21/20 1005)  Height: 5' 4" (162.6 cm) (04/24/20 0102)       Central Line  Date/Time: 4/24/2020 8:55 AM  Location procedure was performed: [facility]  Performed by: Serene Johnson MD  Supervising provider: Uvaldo Hightower MD  Consent Done: Yes  Time out: Immediately prior to procedure a "time out" was called to verify the correct patient, procedure, equipment, support staff and site/side marked as required.  Indications: med administration and vascular access  Anesthesia: see MAR for details  Preparation: skin prepped with ChloraPrep  Location details: left internal jugular  Catheter type: trialysis  Assessment: placement verified by x-ray,  no pneumothorax on x-ray and successful placement  Complications: none          Serene Johnson  4/25/2020  "

## 2020-04-25 NOTE — ASSESSMENT & PLAN NOTE
- Patient intubated with crich which was switched to ETT on 4/11  - proning for 11 days (last day 4/23 with minimal improvement in condition  - nitric oxide initiated on 4/21 @ 20 PPM, increased to 40ppm on 4/24  - remains hypercapnic despite lowering TV to 4 mL/kg and increased RR  - Completed treatment with Plaquenil and Azithromycin  - Methyprednisone for 5 days, completed  - Daily CRP, d-dimer, ferritin, to trend inflammatory response.   - Strict I/O's and goal net neutral status to help optimize vent mechanics.   - Special isolation and aspiration precautions ordered.   -- q6h albuterol nebs ordered  - ID following giving remdesivir.  -optimizing fluid status.  - ECMO per CTS

## 2020-04-25 NOTE — PROGRESS NOTES
Pharmacokinetic Initial Assessment: IV Vancomycin    Assessment/Plan:    Re-start Vancomycin 1250 mg every 12 hours    Desired empiric serum trough concentration is 10 to 20 mcg/mL   Draw vancomycin trough level 30 min prior to next  dose on 4/25/20 at approximately 0730    Pharmacy will continue to follow and monitor vancomycin.      Please contact pharmacy at extension [phone] with any questions regarding this assessment.     Thank you for the consult,   Radha Garcia       Patient brief summary:  Ranjana Sanchez is a 54 y.o. male initiated on antimicrobial therapy with IV Vancomycin for treatment of suspected bacteremia    Drug Allergies:   Review of patient's allergies indicates:  No Known Allergies    Actual Body Weight:   63 kg    Renal Function:   Estimated Creatinine Clearance: 115.1 mL/min (based on SCr of 0.6 mg/dL).,     Dialysis Method (if applicable):  N/A    CBC (last 72 hours):  Recent Labs   Lab Result Units 04/22/20  0410 04/23/20  0310 04/24/20  0300   WBC K/uL 14.25* 12.53 11.87   Hemoglobin g/dL 8.4* 7.8* 7.2*   Hematocrit % 29.2* 27.2* 25.0*   Platelets K/uL 239 224 226   Gran% % 90.0* 81.0* 82.5*   Lymph% % 3.0* 4.0* 6.5*   Mono% % 0.0* 6.0 5.0   Eosinophil% % 2.0 3.0 1.0   Basophil% % 0.0 0.0 0.0   Differential Method  Manual Manual Manual       Metabolic Panel (last 72 hours):  Recent Labs   Lab Result Units 04/22/20  0410 04/23/20  0310 04/23/20  1205 04/24/20  0300   Sodium mmol/L 135* 138 137 137   Potassium mmol/L 4.4 3.4* 4.7 4.2   Chloride mmol/L 87* 85* 81* 80*   CO2 mmol/L 43* 42* 47* 50*   Glucose mg/dL 329* 241* 295* 241*   BUN, Bld mg/dL 15 15 17 15   Creatinine mg/dL 0.7 0.6 0.7 0.6   Albumin g/dL 1.4* 1.3*  --  1.3*   Total Bilirubin mg/dL 0.6 0.4  --  0.3   Alkaline Phosphatase U/L 188* 178*  --  195*   AST U/L 66* 42*  --  42*   ALT U/L 79* 61*  --  51*   Magnesium mg/dL 2.1 2.0  --  2.2   Phosphorus mg/dL 2.4* 2.3*  --  2.2*       Drug levels (last 3 results):  Recent Labs    Lab Result Units 04/21/20 2031 04/23/20  0834   Vancomycin-Trough ug/mL 14.4 14.9       Microbiologic Results:  Microbiology Results (last 7 days)     Procedure Component Value Units Date/Time    Blood culture [[id number]] Collected:  04/18/20 1804    Order Status:  Completed Specimen:  Blood from Peripheral, Upper Arm, Right Updated:  04/22/20 2312     Blood Culture, Routine No Growth after 4 days.     Blood culture [[id number]] Collected:  04/18/20 1833    Order Status:  Completed Specimen:  Blood from Peripheral, Antecubital, Left Updated:  04/22/20 2012     Blood Culture, Routine No Growth after 4 days.     Culture, Respiratory with Gram Stain [[id number]]  (Abnormal) Collected:  04/19/20 1717    Order Status:  Completed Specimen:  Respiratory from Endotracheal Aspirate Updated:  04/21/20 1242     Respiratory Culture No S aureus or Pseudomonas isolated.      CANDIDA ALBICANS  Few       Gram Stain (Respiratory) <10 epithelial cells per low power field.     Gram Stain (Respiratory) Few WBC's     Gram Stain (Respiratory) Rare yeast    Blood culture [[id number]] Collected:  04/17/20 1055    Order Status:  Completed Specimen:  Blood from Peripheral, Antecubital, Right Updated:  04/21/20 1212     Blood Culture, Routine No Growth after 4 days.     Blood culture [[id number]] Collected:  04/17/20 1105    Order Status:  Completed Specimen:  Blood from Peripheral, Forearm, Right Updated:  04/21/20 1212     Blood Culture, Routine No Growth after 4 days.     Urine culture [[id number]] Collected:  04/18/20 1858    Order Status:  Completed Specimen:  Urine Updated:  04/20/20 0123     Urine Culture, Routine No growth    Narrative:       Preferred Collection Type->Urine, Clean Catch

## 2020-04-25 NOTE — ANESTHESIA PROCEDURE NOTES
Central Line    Diagnosis: Respiratory failure  Patient location during procedure: ICU  Procedure start time: 4/25/2020 10:30 AM  Timeout: 4/25/2020 10:30 AM  Procedure end time: 4/25/2020 10:40 AM    Staffing  Authorizing Provider: Jose Alberto White MD  Performing Provider: Jack Joseph MD    Staffing  Anesthesiologist: Jose Alberto White MD  Performed: anesthesiologist   Anesthesiologist was present at the time of the procedure.  Preanesthetic Checklist  Completed: patient identified, site marked, surgical consent, pre-op evaluation, timeout performed, IV checked, risks and benefits discussed, monitors and equipment checked and anesthesia consent given  Indication   Indication: vascular access     Anesthesia   general anesthesia    Central Line   Skin Prep: skin prepped with ChloraPrep, skin prep agent completely dried prior to procedure  maximum sterile barriers used during central venous catheter insertion  hand hygiene performed prior to central venous catheter insertion  Location: right, internal jugular.   Catheter type: introducer  Catheter Size: 9 Fr  Ultrasound: vascular probe with ultrasound  Vessel Caliber: medium, patent  Vascular Doppler:  not done, compressibility normal  Needle advanced into vessel with real time Ultrasound guidance.  Guidewire confirmed in vessel.   Manometry: Venous cannualation confirmed by visual estimation of blood vessel pressure using manometry.  Insertion Attempts: 1   Securement:blood return through all ports    Post-Procedure   Adverse Events:none    Guidewire Guidewire removed intact.

## 2020-04-25 NOTE — ANESTHESIA POSTPROCEDURE EVALUATION
Anesthesia Post Evaluation    Patient: Ranjana Sanchez    Procedure(s) Performed: * No procedures listed *    Final Anesthesia Type: general    Patient location during evaluation: ICU  Patient participation: No - Unable to Participate, Coma/Other Inability to Communicate  Level of consciousness: sedated  Post-procedure vital signs: reviewed and stable  Pain management: adequate  Airway patency: patent    PONV status at discharge: No PONV  Anesthetic complications: no      Cardiovascular status: hemodynamically stable  Respiratory status: intubated and ventilator  Hydration status: hypovolemic  Follow-up not needed.          Vitals Value Taken Time   /61 4/25/2020 12:21 PM   Temp 37.2 °C (99 °F) 4/25/2020  9:45 AM   Pulse 125 4/25/2020 12:24 PM   Resp 72 4/25/2020 12:24 PM   SpO2 96 % 4/25/2020 12:24 PM   Vitals shown include unvalidated device data.      No case tracking events are documented in the log.      Pain/Liz Score: Pain Rating Prior to Med Admin: 2 (4/25/2020  8:03 AM)

## 2020-04-25 NOTE — PROGRESS NOTES
[facility]  Critical Care - Surgery  Progress Note    Patient Name: Ranjana Sanchez  MRN: [id number]  Admission Date: 4/10/2020  Hospital Length of Stay: 15 days  Code Status: Full Code  Attending Provider: Willie Forte MD  Primary Care Provider: Primary Doctor No   Principal Problem: COVID-19 virus detected    Subjective:     Brief HPI:  55 yo male with COVID ARDS failing maximal mechanical ventilatory support. Placed on VV ecmo, 27 Fr drainage line from right femoral vein and 23 Fr return cannula in right IJ. Cannulation procedure well tolerated, greatly improved oxygenation immediately upon starting ECMO.          Objective:     Vital Signs (Most Recent):  Temp: 98.9 °F (37.2 °C) (04/25/20 1230)  Pulse: (!) 114 (04/25/20 1400)  Resp: (!) 36 (04/25/20 1400)  BP: (!) 149/77 (04/25/20 1400)  SpO2: 100 % (04/25/20 1400) Vital Signs (24h Range):  Temp:  [97.7 °F (36.5 °C)-100.7 °F (38.2 °C)] 98.9 °F (37.2 °C)  Pulse:  [] 114  Resp:  [0-140] 36  SpO2:  [75 %-100 %] 100 %  BP: (102-149)/(54-80) 149/77  Arterial Line BP: ()/() 153/66     Weight: 63 kg (139 lb)  Body mass index is 23.86 kg/m².      Intake/Output Summary (Last 24 hours) at 4/25/2020 1412  Last data filed at 4/25/2020 1400  Gross per 24 hour   Intake 6257.91 ml   Output 5410 ml   Net 847.91 ml       Physical Exam   Constitutional: He appears ill. He is sedated, chemically paralyzed and intubated.   HENT:   Head: Normocephalic and atraumatic.   Eyes: Pupils are equal, round, and reactive to light.   Neck: Normal range of motion.   Cardiovascular: Regular rhythm and normal heart sounds.   Sinus tachycardia   Pulmonary/Chest: He is intubated. He is in respiratory distress. He has rales.   Abdominal: Soft. He exhibits no distension.   Musculoskeletal: He exhibits edema.   Neurological: He is unresponsive.   Skin: Skin is warm and dry.   Nursing note and vitals reviewed.      Vents:  Vent Mode: A/C (04/25/20  0115)  Ventilator Initiated: Yes (04/10/20 2247)  Set Rate: 35 BPM (04/25/20 0115)  Vt Set: 400 mL (04/25/20 0115)  PEEP/CPAP: 10 cmH20 (04/25/20 0115)  Oxygen Concentration (%): 100 (04/25/20 1400)  Peak Airway Pressure: 65 cmH2O (04/25/20 0115)  Plateau Pressure: 0 cmH20 (04/25/20 0115)  Total Ve: 15.6 mL (04/25/20 0115)  F/VT Ratio<105 (RSBI): (!) 78.48 (04/25/20 0115)    Lines/Drains/Airways     Central Venous Catheter Line                 ECMO Cannula 04/25/20 1120 right internal jugular;right femoral vein less than 1 day    Introducer 04/25/20 1030 less than 1 day    Introducer 04/25/20 1040 less than 1 day          Drain                 NG/OG Tube 04/12/20 1000 18 Fr. Right nostril 13 days         Urethral Catheter 04/18/20 1854 6 days         Rectal Tube 04/21/20 1540 fecal management system 3 days          Airway                 Airway - Non-Surgical 04/11/20 1525 Endotracheal Tube 13 days          Arterial Line                 Arterial Line 04/19/20 2130 Right Radial 5 days          Peripheral Intravenous Line                 Peripheral IV - Single Lumen 04/24/20 1445 20 G Anterior;Right Forearm less than 1 day         Peripheral IV - Single Lumen 04/25/20 0941 20 G Right Hand less than 1 day                Significant Labs:    CBC/Anemia Profile:  Recent Labs   Lab 04/24/20  0300 04/25/20  0400 04/25/20  1210   WBC 11.87 17.51* 15.05*   HGB 7.2* 6.8* 8.8*   HCT 25.0* 23.5* 27.8*    231 127*   * 98 93   RDW 13.9 13.8 14.0   FERRITIN 1,841*  --   --         Chemistries:  Recent Labs   Lab 04/24/20  0300  04/25/20  0400 04/25/20  0806 04/25/20  1210      < > 136 134* 134*   K 4.2   < > 3.5 3.6 3.8   CL 80*   < > 72* 74* 77*   CO2 50*   < > 46* 50* 48*   BUN 15   < > 18 19 19   CREATININE 0.6   < > 0.7 0.7 0.7   CALCIUM 8.1*   < > 7.9* 7.6* 8.1*   ALBUMIN 1.3*  --  1.4* 1.4* 1.4*   PROT 5.5*  --  5.7* 5.7* 4.6*   BILITOT 0.3  --  0.5 0.5 0.7   ALKPHOS 195*  --  198* 206* 153*   ALT 51*  --   42 39 30   AST 42*  --  50* 57* 48*   MG 2.2  --  2.0  --  1.7   PHOS 2.2*  --  1.1*  --  2.3*    < > = values in this interval not displayed.       All pertinent labs within the past 24 hours have been reviewed.    Significant Imaging:  I have reviewed all pertinent imaging results/findings within the past 24 hours.    Assessment/Plan:     Shock, unspecified  MAP goal > 65 mmHg  Epi 0.04-0.06; Vaso second line; NE third line  Plan to add nicardipine to Epi if hypertension encountered    Type 2 diabetes mellitus without complication, without long-term current use of insulin  SQ insulin regimen achieving goal of 140-180    Sedated  Discontinue phenobarbital  Reduce frequency of diazepam  Reduce hydromorphone   Keep other sedatives the same until paralysis has worn off    Acute hypoxemic respiratory failure  VV-ECMO cannulation 4/25/2020:  Flows ~5.3lpm @ 3800 RPM  Sweep: 100% Fi 4 lpm (SvO2 goal > 65; pCO2 50-60 temporarily)  Excellent systemic oxygenation with minimal signs of recirculation  No line chatter after resuscitation following cannulae placement    Vent:  Rest mode PIP 24 PEEP 12 Rate 12 I:E 2:3   Wean FiO2 as able; expect room air possible with excellent ECMO flows  Continue Monika; wean from 40 to 20 ppm by tomorrow    Renal/  Lasix gtt titration for goal I=O over 24 hrs  Diamox for grossly elevated serum bicarbonate and BE; pH goal < 7.6, adjust ECMO sweep gas as needed    ID  Broad spectrum abx while cannulas in place    Hematology  Heparin gtt; goal anti-Xa 0.3-0.4; minimal intensity nomogram  Goal Hct ~ 30    GI  PPI BID for GI ppx        Critical Care Time: 150 minutes  Critical secondary to Patient has a condition that poses threat to life and bodily function: Severe Respiratory Distress     Critical care was time spent personally by me on the following activities: development of treatment plan with patient or surrogate and bedside caregivers, discussions with consultants, evaluation of patient's  response to treatment, examination of patient, ordering and performing treatments and interventions, ordering and review of laboratory studies, ordering and review of radiographic studies, pulse oximetry, re-evaluation of patient's condition.  This critical care time did not overlap with that of any other provider or involve time for any procedures.     Jack Joseph MD  Critical Care - Surgery  [facility]

## 2020-04-25 NOTE — PROGRESS NOTES
Pt. Successfully transferred to 3080 from [facility]. All gtts remain intact. Ventilator w/ N.O. Intact without complication. Pt. Started to have frequent PVCs after arrival, concerned for low potassium levels d/t high UO today. RN notified Dr. DARRYL Hightower. BMP STAT ordered, CMP changed to BID. F. Winterbottom stated to start EPI gtt with efforts to turn off Levo gtt, and to add VASO only if necessary, but not initially. Family to be notified of patient transfer but was not prior d/t consideration of time difference with family living in [facility].    SPENCER

## 2020-04-25 NOTE — SUBJECTIVE & OBJECTIVE
Objective:     Vital Signs (Most Recent):  Temp: 98.9 °F (37.2 °C) (04/25/20 1230)  Pulse: (!) 114 (04/25/20 1400)  Resp: (!) 36 (04/25/20 1400)  BP: (!) 149/77 (04/25/20 1400)  SpO2: 100 % (04/25/20 1400) Vital Signs (24h Range):  Temp:  [97.7 °F (36.5 °C)-100.7 °F (38.2 °C)] 98.9 °F (37.2 °C)  Pulse:  [] 114  Resp:  [0-140] 36  SpO2:  [75 %-100 %] 100 %  BP: (102-149)/(54-80) 149/77  Arterial Line BP: ()/() 153/66     Weight: 63 kg (139 lb)  Body mass index is 23.86 kg/m².      Intake/Output Summary (Last 24 hours) at 4/25/2020 1412  Last data filed at 4/25/2020 1400  Gross per 24 hour   Intake 6257.91 ml   Output 5410 ml   Net 847.91 ml       Physical Exam   Constitutional: He appears ill. He is sedated, chemically paralyzed and intubated.   HENT:   Head: Normocephalic and atraumatic.   Eyes: Pupils are equal, round, and reactive to light.   Neck: Normal range of motion.   Cardiovascular: Regular rhythm and normal heart sounds.   Sinus tachycardia   Pulmonary/Chest: He is intubated. He is in respiratory distress. He has rales.   Abdominal: Soft. He exhibits no distension.   Musculoskeletal: He exhibits edema.   Neurological: He is unresponsive.   Skin: Skin is warm and dry.   Nursing note and vitals reviewed.      Vents:  Vent Mode: A/C (04/25/20 0115)  Ventilator Initiated: Yes (04/10/20 2247)  Set Rate: 35 BPM (04/25/20 0115)  Vt Set: 400 mL (04/25/20 0115)  PEEP/CPAP: 10 cmH20 (04/25/20 0115)  Oxygen Concentration (%): 100 (04/25/20 1400)  Peak Airway Pressure: 65 cmH2O (04/25/20 0115)  Plateau Pressure: 0 cmH20 (04/25/20 0115)  Total Ve: 15.6 mL (04/25/20 0115)  F/VT Ratio<105 (RSBI): (!) 78.48 (04/25/20 0115)    Lines/Drains/Airways     Central Venous Catheter Line                 ECMO Cannula 04/25/20 1120 right internal jugular;right femoral vein less than 1 day    Introducer 04/25/20 1030 less than 1 day    Introducer 04/25/20 1040 less than 1 day          Drain                  NG/OG Tube 04/12/20 1000 18 Fr. Right nostril 13 days         Urethral Catheter 04/18/20 1854 6 days         Rectal Tube 04/21/20 1540 fecal management system 3 days          Airway                 Airway - Non-Surgical 04/11/20 1525 Endotracheal Tube 13 days          Arterial Line                 Arterial Line 04/19/20 2130 Right Radial 5 days          Peripheral Intravenous Line                 Peripheral IV - Single Lumen 04/24/20 1445 20 G Anterior;Right Forearm less than 1 day         Peripheral IV - Single Lumen 04/25/20 0941 20 G Right Hand less than 1 day                Significant Labs:    CBC/Anemia Profile:  Recent Labs   Lab 04/24/20  0300 04/25/20  0400 04/25/20  1210   WBC 11.87 17.51* 15.05*   HGB 7.2* 6.8* 8.8*   HCT 25.0* 23.5* 27.8*    231 127*   * 98 93   RDW 13.9 13.8 14.0   FERRITIN 1,841*  --   --         Chemistries:  Recent Labs   Lab 04/24/20  0300  04/25/20  0400 04/25/20  0806 04/25/20  1210      < > 136 134* 134*   K 4.2   < > 3.5 3.6 3.8   CL 80*   < > 72* 74* 77*   CO2 50*   < > 46* 50* 48*   BUN 15   < > 18 19 19   CREATININE 0.6   < > 0.7 0.7 0.7   CALCIUM 8.1*   < > 7.9* 7.6* 8.1*   ALBUMIN 1.3*  --  1.4* 1.4* 1.4*   PROT 5.5*  --  5.7* 5.7* 4.6*   BILITOT 0.3  --  0.5 0.5 0.7   ALKPHOS 195*  --  198* 206* 153*   ALT 51*  --  42 39 30   AST 42*  --  50* 57* 48*   MG 2.2  --  2.0  --  1.7   PHOS 2.2*  --  1.1*  --  2.3*    < > = values in this interval not displayed.       All pertinent labs within the past 24 hours have been reviewed.    Significant Imaging:  I have reviewed all pertinent imaging results/findings within the past 24 hours.

## 2020-04-25 NOTE — ASSESSMENT & PLAN NOTE
Patient's prognosis remains poor despite efforts to optimize health.Spoke with family regarding GOC. Patient was made a  DNR, however he was accepted for ECMO as a salvage therapy, therefore his code status was changed,

## 2020-04-25 NOTE — PROCEDURES
"Ranjana Sanchez is a 54 y.o. male patient.    Temp: 99 °F (37.2 °C) (04/25/20 0945)  Pulse: (!) 132 (04/25/20 1216)  Resp: (!) 65 (04/25/20 1216)  BP: (!) 106/59 (04/25/20 1215)  SpO2: (!) 90 % (04/25/20 1216)  Weight: 63 kg (139 lb) (04/21/20 1005)  Height: 5' 4" (162.6 cm) (04/24/20 0102)       Procedures   DATE OF PROCEDURE:  04/25/2020     ATTENDING SURGEON:  Francis Ayon MD     FELLOW: Anne Miller MD     PREOPERATIVE DIAGNOSES:  1.  Acute hypoxemic respiratory failure.  2.  COVID-19 Virus positive.     POSTOPERATIVE DIAGNOSES:  1.  Acute hypoxemic respiratory failure.  2.  COVID-19 Virus positive.     OPERATION PERFORMED:    Right femoral vein and right internal jugular vein cannulation for venovenous ECMO using a short 23-Armenian cannula on the right internal jugular vein for return, and a long 27-Armenian cannula on the right femoral vein for drainage.     ANESTHESIA: Intubated, sedated, paralysed     ESTIMATED BLOOD LOSS:  100 mL.     BRIEF HISTORY:  Mr. Sanchez is a 54-year-old gentleman with a medical history of HTN brought to ER via EMS for increasing SOB with known covid-19 exposure. He has had a prolonged intensive care unit course with one organ failure and has developed severe ARDS with very poor compliant lungs despite proning, 4cc/kg TV, and Monika patient with plateau pressures of 39 and sats 90% on 95% FIO2, 10 PEEP. Previously attempted high PEEP with no significant lung recruitment. Multidisciplinary discussion was held to discuss rescue ECMO and the patient was deemed an appropriate candidate. Plan was discussed with family and the patient's DNR was reversed. Plan was established to place patient on VV ECMO with completley rest lung, weaning of paralytics.     PROCEDURE IN DETAIL:    After obtaining informed and written consent, the patient's bilateral groins and right neck were prepped and draped in the usual sterile fashion. Right femoral and right internal jugular venous access was " obtained was obtained by our anesthesia colleagues (see separate procedure notes) using ultrasound-guided technique.  The cardiac anesthesiologist was   in the room and provided KAVYA guidance to ensure that the wires were in the right atrium.     Right femoral vein cannulation was performed first. The patient was continued on his previously established systemic heparinization. Serial dilation was performed and a 27-Swedish long drainage cannula was advanced over the wire. The right internal jugular vein was addressed next. Stiff and serial dilation was performed and a short 23-Swedish cannula return was inserted. Both positions in the right atrium and IVC were confirmed using KAVYA.  The cannulas were connected to the circuit, and venovenous ECMO was initiated. Both cannulas were secured with multiple 1-0 Ethibond sutures. Sterile dressings were placed.    The patient tolerated the procedure well, there were no complications.  At the conclusion of the case, sponge and instrument counts were correct. Dr. Ayon was present and scrubbed in for the entirety of the case    Anne Miller MD  Cardiac Surgery Resident, PGY7  Cardiothoracic Surgery  [facility] I was present for the complete procedure and personally talked to family:  Son and wife over the phone and informed them about the success of the procedure.  All team members involved in the care were notified.

## 2020-04-25 NOTE — PROGRESS NOTES
Dr. Ayon and team at bedside to place EMCO. EICU at bedside. Perfusion at bedside, PRBCs to arrive from blood bank.

## 2020-04-25 NOTE — NURSING
Pt. Remains intubated, sedated and paralyzed. O2 sats 88% at times, vent changes made per MD, currently on 90% FIO2 and 10 PEEP, sats 90%. Levophed off. Epi, vaso, precedex, propofol, dilaudid, heparin gtts infusing. Plan for V-V ecmo today.

## 2020-04-25 NOTE — PROGRESS NOTES
VV ECMO started to by Dr. Ayon. No other acute events this shift. See VS and assessments in flowsheets. See below for updates on today's progress.    Patient remains intubated and sedated. Paralytic turned off. VV ECMO placed at bedside with Dr. Ayon and team at 1030 today. Cannula sites CDI. 4U PRBC's and 25g Albumin given. Lasix gtt started, UO > 3L, MD stated to turn off at this time. Electrolytes replaced per orders. Flexi intact with ~400cc loose BMs.     Family updated via skype by team. All questions and concerns addressed. WCTM

## 2020-04-25 NOTE — ASSESSMENT & PLAN NOTE
MAP goal > 65 mmHg  Epi 0.04-0.06; Vaso second line; NE third line  Plan to add nicardipine to Epi if hypertension encountered

## 2020-04-25 NOTE — PROGRESS NOTES
ECMO Specialists shift report    Date: 04/25/2020  ECMO Specialist:  Tatyana Yangmallory    Pump parameters:  RPM: 3800  Flow:  5.0-5.2  Sweep:  4  FiO2:  100    Oxygenator status:  Clots: none  Fibrin: none    Pressure trends:  P1: 180  P2: 140  Delta: 40    Volume status:  Chugging noted? None  CVP: NA  MAP:  80's  MD notified (name):  Opal    Anticoagulation:  ACT/aPTT/Xa parameters: .3 - .5  ACT/aPTT/Xa trends this shift:  Out of range    Cannula size / status / placement:  Arterial:   Venous 1: 23 FR/ RIJ  Venous 2: 27 FF / RFV  Dual lumen:      Additional Comments:  Patient cannulated today without complications.  Flows maintained at 5.0 -5.2 LPM throughout shift.  ABG's reported to Dr. Joseph.  Sweep was decreased to 4 LPM to maintain PCO2 50-60.

## 2020-04-25 NOTE — ASSESSMENT & PLAN NOTE
- Epinephrine and vaso for MAP goal > 65   - Completed treatment with Plaquenil and Azithromycin.  - On cefepime and vanc   - Blood cultures NGTD. F/u repeat cultures

## 2020-04-25 NOTE — PROGRESS NOTES
Attempted to call family in [place] for anesthesia consent for ECMO.  unable to make outgoing international call due to problem with phone lines overnight. Will try again later this morning.    Juan Luis Enciso MD  PGY-3, Anesthesiology

## 2020-04-25 NOTE — PROGRESS NOTES
04/25/2020  Maikel Zepeda    Current provider:  Willie Forte MD      I, Maikel Zepeda, rounded on Ranjana Sanchez to ensure all mechanical assist device settings (ECMO) were appropriate and all parameters were within limits.  I was able to ensure all back up equipment was present, the staff had no issues, and the Perfusion Department daily rounding was complete.    1:21 PM

## 2020-04-25 NOTE — ASSESSMENT & PLAN NOTE
Lasix gtt titration for goal I=O over 24 hrs  Diamox for grossly elevated serum bicarbonate and BE; pH goal < 7.6, adjust ECMO sweep gas as needed

## 2020-04-25 NOTE — CONSULTS
NIAS unable to see pt due to high consult volume.  If peripheral access is still needed please contact anesthesia on-call.

## 2020-04-25 NOTE — ADDENDUM NOTE
Addendum  created 04/25/20 1358 by Jose Alberto White MD    Attestation recorded in Intraprocedure, Intraprocedure Attestations filed

## 2020-04-25 NOTE — SIGNIFICANT EVENT
ECMO Initiation Note  Date:  04/25/2020   Cannulating Physician: Nany   Perfusionist: Tatyana Lawrence   ECMO Specialist Tatyana Lawrence   Location in hospital: 3080   Mode: VA/VV/VAV/other? VV   ECPR? (CPR during cannulation) N   Time on support: 1120 AM         Cannulation:  Arterial/return cannula size: 23   Insertion site: RIJ   Insertion depth: NA   : [employer]       Venous/drainage cannula size: 27   Insertion site: RFV   Insertion depth: NA   : Clipsure         Circuit details:  Oxygenator : Maquet/Getinge   Lot number: [id number]   Oxygenator model: Quadrox iD   Circuit size:  3/8   Pump : Gusman   Lot number: [id number]   Pump size: Centrimag       Prime solution: Isolyte   Blood prime? Y/N   Blood product type and unit ID #: RBC's         Initiation checklist:   Patient:  x Patient identity confirmed, procedure confirmed   x Blood type confirmed   x Anticoagulation method discussed and administered as directed per surgeon    x Cannulation method confirmed and cannulas handed to field       Utilities:  x Components of ECMO circuit checked for package integrity/expiration    x Power connections secure, power on, backup battery charged    x Source of gas(es) confirmed and hoses leak-free   x Gas lines and filter connections secure   x Flowmeters/ functional   x LAUDA connected, water to fill michele, functioning, and lines unobstructed    x LAUDA temperature(s) set to appropriate parameters       Accessories:  x All accessories mounted, holders secure   x Oxygenator water tested for leaks   x Alarms set (Low/High flow & Low/High pressure)   x Flowmeter in correct direction, placement checked, and operational    x CDI/Biotrend on and connected    x Flash light available   x Tubing clamps (8) available   x Emergency supplies available (Back up circuit)   x ECMO cart stocked      Circuit:  x Arterial pump flow direction  checked, head rotation smooth and quiet   x Pressure lines zeroed    x All stopcocks and tubing connections securely connected and tie banded as necessary    x All vented caps removed and replaced with non-vented caps   x Tubing direction traced and checked for kinks   x Circuit primed and de-aired (including transducers, stopcocks, and pigtails)   x Cannula connections air free      Emergent initiation:   Emergent initiation: circuit air free; gas flow confirmed; anticoagulation discussed; vent setting discussed

## 2020-04-26 NOTE — OP NOTE
Date of service:  04/26/2020    Preop diagnosis:  1.  Severe fulminant viral pneumonia-COVID 19  2.  Respiratory failure-on ventilator since 04/10/2020  3.  Septic shock  4.  Multiple pressors and inotropes:  High doses of Epinephrine ,norepinephrine and vasopressin  5.  On maximum nitric oxide -40 ppm  6.  Severe hypokalemia potassium 2.5  7.  Severe hypophosphatemia  8.  Type 2 diabetes mellitus  9.  History of hypertension  10.  On multiple sedation and paralytics  11.  Emergency cricoidectomy    Postop diagnosis:  SAME    Operation:  1.  Urgent placement of peripheral VV ECMO:  27 F Long multi stage Right femoral vein cannula for drainage and return to right IJ 23 short venous cannula  2.  Initiation of VV ECMO  3.  Fluid resuscitation with 2 units of PRBC    Staff surgeon:Francis Ayon  First assistant:  Anne Miller  Anesthesia Staff:  Luna Garcia and Aretha  Critical care attending: Sonny Lopez  Anesthesia: Propofol  Time out:  Performed    Estimated blood loss:  150 cc    Indication of operation:  This is a 54-year-old gentleman who presented with severe fulminant viral pneumonia from COVID-19 infection for the last 14 days patient had been intubated and paralyzed on the ventilator.  Assessment for initiation of VV ECMO was done and patient was found to be a candidate for support.  Extensive discussion with all team members and family was carried out.  Family understood the risks, benefits and potential complications associated with ECMO.  ECMO consent form was electronically signed over the phone.  Due to patient's young age and single organ dysfunction it appears to be a reasonable option to provide for the ECMO support.    Operation:  This procedure was done on the 3rd floor ICU in bed 3080.  Patient was supine.  Patient was prepped and draped in the usual sterile fashion.  The examination was carried out by anesthesia services.  The examination revealed hyperdynamic circulation with  preserved cardiac function.  Access to the venous system in the right IJ and the right femoral veins were provided under ultrasound guidance by anesthesia service.  Once that was obtained patient was given 36821 unit bolus of heparin and an ACT greater than 300 was obtained . Patient also had systemic heparin going through his circulation prior to initiation of ECMO.  Using Seldinger technique the right femoral vein was accessed and on KAVYA examination the guidewire was noticed in the SVC.  Gradual dilation was carried out to enable placement of a 27 [place] multi stage venous cannula.  Once this was achieved and appropriate positioning of the distal tip of the cannula visualized on KAVYA at the IVC and right atrial junction, a wet to wet connection of drainage line to the ECMO pump was carried out.  Once this was achieved attention was directed towards the right IJ access site.  Using Seldinger technique gradual dilation of the tract was carried out to enable placement of a 23 [place] short venous cannula.  Once the venous cannula was placed the return tubing from the ECMO circuit was connected in a wet to wet fashion. The ECMO pump was primed with blood before initiation of support. Clamps on the tubing was then removed and the ECMO pump initiated.  Centrimag pump was used.  Pump was started at 2000 RPMs and gradually increased to 3800 RPMs to achieve a flow of 5.2 lit flow. As soon as the flows were maintained significant improved in oxygenation and oxygen saturation was noted.    With improvement in oxygenation a significant improvement in hemodynamics was seen, resulting in a rapid weaning of inotropes and vasopressors.  Within 30 min of support epinephrine was down to 0.0 6 and vasopressin at 0.04 units.  Cannulation sites were sutured and cannulas were secured to the skin at multiple sites.  Sterile dressing was applied.  Terminal count of needles sponges instrument was found to be correct. ABG showed a  significant improvement in blood gases with significant improvement in hypercarbia and elevation of oxygen saturation.    Medicare attestation:  I was present for all parts of the procedure.

## 2020-04-26 NOTE — PLAN OF CARE
Dr. Ayon and team at bedside and updated frequently throughout the shift. Patient remains on ECMO (3700RPM and 5.0 flow). Vent: 50% and 10 PEEP. We were concerned about low Vt (17-low 20s) but Dr. Ayon did not want to make any ventilator changes. Cardene and Epi were weaned completely off. Dilaudid is currently being weaned down and should be completely d/c by tomorrow AM. Heparin at 1000u/hr titrating to anti-Xa. Patient remains unresponsive. Propofol is at 50mcg/kg/min. ECMO cannula sites were assessed frequently with no changes.    Skin Note. Two IV infiltration sites were cleansed with sterile water and betadine. Covered with foams. Pressure sore in right nare was covered with skin barrier cream and monitored closely. No new pressure-related injuries. Heel boots and foam pads in place.

## 2020-04-26 NOTE — SUBJECTIVE & OBJECTIVE
Interval History:    VV ECMO initiated yesterday with improvement in oxygenation. Paralytics discontinued yesterday.  Dilaudid gtt slowly being weaned, at 2.0 currently. Epi has been slowly weaned down to 0.01. Vaso and levo have been off. Sweep increased from 7 to 8 for CO2 in 50s, though markedly improved from prior to cannulation. CXR with B/L white outs this AM. Vent weaning in process, FiO2 now 50% with PaO2 in 200s on AM gas. Monika decreased to 20. TF have been held but will be started today. Continues to have robust UOP, lasix off. K 3.2 needing aggressive replacement.    Medications:  Continuous Infusions:   dextrose 10 % in water (D10W)      epinephrine Stopped (04/26/20 1038)    furosemide (LASIX) 10 mg/mL infusion (titrating) Stopped (04/25/20 1817)    heparin (porcine) in 5 % dex 1,200 Units/hr (04/26/20 1037)    HYDROmorphone 1.5 mg/hr (04/26/20 1028)    nicardipine 5 mg/hr (04/26/20 1028)    nitric oxide gas      norepinephrine bitartrate-D5W Stopped (04/25/20 0300)    propofoL 50 mcg/kg/min (04/26/20 1028)    vasopressin (PITRESSIN) infusion Stopped (04/25/20 1445)     Scheduled Meds:   acetaZOLAMIDE  250 mg Intravenous Q12H    albuterol-ipratropium  3 mL Nebulization Q6H    carboxymethylcellulose sodium  2 drop Ophthalmic TID    ceFEPime (MAXIPIME) IVPB  2 g Intravenous Q8H    chlorhexidine  15 mL Mouth/Throat BID    fentaNYL  50 mcg Intravenous Once    fluconazole (DIFLUCAN) IVPB  200 mg Intravenous Q24H    INV remdesivir infusion  100 mg Intravenous Q24H    mupirocin   Nasal BID    pantoprazole  40 mg Intravenous BID    polyethylene glycol  17 g Per NG tube Daily    vancomycin (VANCOCIN) IVPB  1,250 mg Intravenous Q12H        Objective:     Vital Signs (Most Recent):  Temp: 98.1 °F (36.7 °C) (04/26/20 1000)  Pulse: (!) 124 (04/26/20 1000)  Resp: 12 (04/26/20 0900)  BP: 130/73 (04/26/20 0800)  SpO2: 96 % (04/26/20 1000) Vital Signs (24h Range):  Temp:  [98.1 °F (36.7 °C)-98.9 °F  (37.2 °C)] 98.1 °F (36.7 °C)  Pulse:  [109-139] 124  Resp:  [1-90] 12  SpO2:  [87 %-100 %] 96 %  BP: (106-160)/(59-80) 130/73  Arterial Line BP: (100-181)/() 156/60     Weight: 63 kg (139 lb)  Body mass index is 23.86 kg/m².    SpO2: 96 %  O2 Device (Oxygen Therapy): ventilator    Intake/Output - Last 3 Shifts       04/24 0700 - 04/25 0659 04/25 0700 - 04/26 0659 04/26 0700 - 04/27 0659    P.O. 0 0     I.V. (mL/kg) 2358.1 (37.4) 4379.5 (69.5)     Blood  2472.5     NG/ 245     IV Piggyback 500 1750     Total Intake(mL/kg) 3348.1 (53.1) 8847 (140.4)     Urine (mL/kg/hr) 5425 (3.6) 8100 (5.4) 1080 (4.5)    Drains 135      Stool 230 480     Blood  14 4    Total Output 5790 8594 1084    Net -2441.9 +253 -1084           Stool Occurrence  1 x           Lines/Drains/Airways     Central Venous Catheter Line            Introducer 04/25/20 1030 1 day    Introducer 04/25/20 1040 1 day         ECMO Cannula 04/25/20 1120 right internal jugular;right femoral vein less than 1 day          Drain                 NG/OG Tube 04/12/20 1000 18 Fr. Right nostril 14 days         Urethral Catheter 04/18/20 1854 7 days         Rectal Tube 04/21/20 1540 fecal management system 4 days          Airway                 Airway - Non-Surgical 04/11/20 1525 Endotracheal Tube 14 days          Arterial Line                 Arterial Line 04/19/20 2130 Right Radial 6 days          Peripheral Intravenous Line                 Peripheral IV - Single Lumen 04/24/20 1445 20 G Anterior;Right Forearm 1 day         Peripheral IV - Single Lumen 04/25/20 0941 20 G Right Hand 1 day                Physical Exam  Constitutional: No distress. He is sedated, and intubated.   Head: Normocephalic; crich removed and dressing clean dry and intact    Cardiovascular: Tachycardia 120s present.   Pulmonary/Chest: intubated FiO2 60%, sweet 8.   Abdominal: Soft.   Neurological: Sedated    Skin: Skin is warm. He is not diaphoretic. L neck and L groin cannulas intact,  min to no flashing, no clots detected in pump, all sutures intact and secure  Nursing note and vitals reviewed.    Significant Labs:  ABGs:   Recent Labs   Lab 04/26/20  0813   PH 7.538*   PCO2 45.2*   PO2 494*   HCO3 38.4*   POCSATURATED 100   BE 16     BMP:   Recent Labs   Lab 04/26/20  0946   *   *   K 4.1   CL 95   CO2 34*   BUN 14   CREATININE 0.6   CALCIUM 8.9   MG 1.8     Cardiac markers: No results for input(s): CKMB, CPKMB, TROPONINT, TROPONINI, MYOGLOBIN in the last 48 hours.  CBC:   Recent Labs   Lab 04/26/20  0946   WBC 11.39   RBC 3.75*   HGB 11.2*   HCT 34.5*   PLT 93*   MCV 92   MCH 29.9   MCHC 32.5     CMP:   Recent Labs   Lab 04/26/20  0946   *   CALCIUM 8.9   ALBUMIN 2.3*   PROT 5.6*   *   K 4.1   CO2 34*   CL 95   BUN 14   CREATININE 0.6   ALKPHOS 125   ALT 25   AST 44*   BILITOT 1.0     Coagulation:   Recent Labs   Lab 04/26/20  0756 04/26/20  0946   INR  --  1.1   APTT 37.0*  --      Lactic Acid:   Recent Labs   Lab 04/25/20  2016   LACTATE 1.4     All pertinent labs from the last 24 hours have been reviewed.    Significant Diagnostics:  CXR: I have reviewed all pertinent results/findings within the past 24 hours

## 2020-04-26 NOTE — PROGRESS NOTES
[facility]  Critical Care Medicine  Progress Note    Patient Name: Ranjana Sanchez  MRN: [id number]  Admission Date: 4/10/2020  Hospital Length of Stay: 16 days  Code Status: Full Code  Attending Provider: Francis Ayon MD  Primary Care Provider: Primary Doctor No   Principal Problem: Acute respiratory distress syndrome (ARDS) due to COVID-19 virus    Subjective:     HPI:  54y.o male with PMH of HTN brought to ER via EMS for increasing SOB with known covid-19 exposure. 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 They have not had travelers since 3/14. 2 days ago he started having fever and sob. He was seen by the ship's physician and was found to have crackles in his bases bilaterally, but his oxygen saturation was in the 90's. He had a flu test which was negative. He was quarantined in a room and states that he got worse with increasing SOB. He was brought to the ER and found to be 78% on RA. He was started on NRB and initially was doing much better, but his tachypnea did not improve, so he was placed on bipap. His sat is currently 94%.    Hospital/ICU Course:  Patient was admitted to the MICU afyer a difficult intubation in the ER s/p crich. Covid positive with ards, sedated and paralyzed  Unable to prone secondary to crich. Left mainstem intubation which was pulled off, on NO and levophed. ET tube replaced on 4/11 family contacted in [place]. Proned on 4/12 tolerated well and continued to prone on 4/19. 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 He was on and off ketamine as well.  EEG ordered to rule out seizures as he also had seizure like activity that resolved with ativan. Currently changing sedation, was placed on phenobarbital which should start having effect 4/20. Patient proned x 10 (last on 4/22). Still not tolerating ventilator weaning. Remains on sedation,  vasopressors, and paralytic. ID now following patient for remdesivir administration; family aware and consented. Patient not significantly improving despite efforts. Son contacted, patient made DNR. Patient evaluated for ECMO on 4/24. PLace on ECMO on 4/25 and transferred to Dr Kike marquez.  4/26: Physiologic improvement on VV ECMO. Oxygenation at goal, lactate clearing, hemodynamics at goal without vasoactive support currently. Tolerated wean of vent Fi and Monika thus far. Peeling back sedatives steadily with goal of neuro exam ASAP. Metabolic alkalosis correcting with encouragement from diamox.            Objective:     Vital Signs (Most Recent):  Temp: 98.1 °F (36.7 °C) (04/26/20 1300)  Pulse: (!) 115 (04/26/20 1331)  Resp: 12 (04/26/20 1331)  BP: 120/70 (04/26/20 1300)  SpO2: (!) 94 % (04/26/20 1300) Vital Signs (24h Range):  Temp:  [98.1 °F (36.7 °C)-98.2 °F (36.8 °C)] 98.1 °F (36.7 °C)  Pulse:  [109-139] 115  Resp:  [1-90] 12  SpO2:  [94 %-100 %] 94 %  BP: (120-160)/(70-80) 120/70  Arterial Line BP: (135-167)/(51-70) 135/55     Weight: 63 kg (139 lb)  Body mass index is 23.86 kg/m².      Intake/Output Summary (Last 24 hours) at 4/26/2020 1348  Last data filed at 4/26/2020 1300  Gross per 24 hour   Intake 6062.22 ml   Output 9431 ml   Net -3368.78 ml       Physical Exam   Constitutional: He appears ill. He is sedated, chemically paralyzed and intubated.   HENT:   Head: Normocephalic and atraumatic.   Eyes: Pupils are equal, round, and reactive to light.   Neck: Normal range of motion.   Cardiovascular: Regular rhythm and normal heart sounds.   Sinus tachycardia   Pulmonary/Chest: He is intubated. He is in respiratory distress. He has rales.   Abdominal: Soft. He exhibits no distension.   Musculoskeletal: He exhibits edema.   Neurological: He is unresponsive.   Skin: Skin is warm and dry.   Nursing note and vitals reviewed.      Vents:  Vent Mode: A/C (04/26/20 3774)  Ventilator Initiated: Yes (04/10/20  2247)  Set Rate: 12 BPM (04/26/20 0509)  Vt Set: 400 mL (04/25/20 0115)  PEEP/CPAP: 10 cmH20 (04/26/20 0509)  Oxygen Concentration (%): 50 (04/26/20 1300)  Peak Airway Pressure: 23 cmH2O (04/26/20 0509)  Plateau Pressure: 45 cmH20 (04/26/20 0509)  Total Ve: 0.77 mL (04/26/20 0509)  F/VT Ratio<105 (RSBI): 333.33 (04/26/20 0148)    Lines/Drains/Airways     Central Venous Catheter Line                 ECMO Cannula 04/25/20 1120 right internal jugular;right femoral vein 1 day    Introducer 04/25/20 1030 1 day    Introducer 04/25/20 1040 1 day          Drain                 NG/OG Tube 04/12/20 1000 18 Fr. Right nostril 14 days         Urethral Catheter 04/18/20 1854 7 days         Rectal Tube 04/21/20 1540 fecal management system 4 days          Airway                 Airway - Non-Surgical 04/11/20 1525 Endotracheal Tube 14 days          Arterial Line                 Arterial Line 04/19/20 2130 Right Radial 6 days          Peripheral Intravenous Line                 Peripheral IV - Single Lumen 04/24/20 1445 20 G Anterior;Right Forearm 1 day         Peripheral IV - Single Lumen 04/25/20 0941 20 G Right Hand 1 day                Significant Labs:    CBC/Anemia Profile:  Recent Labs   Lab 04/25/20 2348 04/26/20  0250 04/26/20  0352 04/26/20  0801 04/26/20  0946   WBC 12.96*  --  12.40  --  11.39   HGB 11.4*  --  11.2*  --  11.2*   HCT 34.6*  --  35.0* 33* 34.5*   *  --  107*  --  93*   MCV 91  --  91  --  92   RDW 14.0  --  14.2  --  14.6*   FERRITIN  --  1,388*  --   --   --         Chemistries:  Recent Labs   Lab 04/25/20 2348 04/26/20  0352 04/26/20  0946    139 134*   K 3.1* 3.2* 4.1   CL 88* 91* 95   CO2 40* 40* 34*   BUN 16 15 14   CREATININE 0.7 0.6 0.6   CALCIUM 8.6* 8.7 8.9   ALBUMIN 2.3* 2.6* 2.3*   PROT 5.7* 6.0 5.6*   BILITOT 1.0 1.1* 1.0   ALKPHOS 135 140* 125   ALT 27 27 25   AST 42* 44* 44*   MG 2.2 2.1 1.8   PHOS 1.5* 2.0* 2.7       All pertinent labs within the past 24 hours have been  reviewed.    Significant Imaging:  I have reviewed all pertinent imaging results/findings within the past 24 hours.    Review of Systems   Unable to perform ROS: Intubated           ABG  Recent Labs   Lab 04/26/20  0813   PH 7.538*   PO2 494*   PCO2 45.2*   HCO3 38.4*   BE 16     Assessment/Plan:      Shock, unspecified  MAP goal now 75-85 mmHg  Epi first line; Vaso second line; NE third line  Plan to add nicardipine if hypertension encountered     Type 2 diabetes mellitus without complication, without long-term current use of insulin  SQ insulin regimen achieving goal of 140-180     Sedated  Phenobarbital D/Cd  Reduce frequency of diazepam; tapers off tomorrow  Reduce hydromorphone steadily  Continues on propofol     Acute hypoxemic respiratory failure  VV-ECMO cannulation 4/25/2020:  Flows ~4.5 lpm @ 3400 RPM  Sweep: 100% Fi 8 lpm (SvO2 goal > 65; pCO2 40-50 today)  Excellent systemic oxygenation with minimal signs of recirculation  Minimal line chatter  ?trace fibrin deposition in oxygenator, circuit otherwise prestine     Vent:  Rest mode PIP 24 PEEP 10 Rate 12 I:E 2:3   Wean FiO2 as able; expect room air possible with excellent ECMO flows  Continue Monika wean; expect 2-3 days to turn off completely     Renal/  Goal euvolemia; robust urine output not requiring diuretics  Diamox for grossly elevated serum bicarbonate and BE; pH goal < 7.6, adjust ECMO sweep gas as needed re: pH  Scheduled potassium replacement orders placed, 40meq BID     ID  Broad spectrum abx while cannulas in place     Hematology  Heparin gtt; goal anti-Xa 0.3-0.4; currently therapeutic, adjusted by non-weight based physician order  Goal Hct ~ 30     GI  PPI BID for GI ppx  Trickle feeds started today           Critical Care Time: 60 minutes  Critical secondary to Patient has a condition that poses threat to life and bodily function: Severe Respiratory Distress     Critical care was time spent personally by me on the following activities:  development of treatment plan with patient or surrogate and bedside caregivers, discussions with consultants, evaluation of patient's response to treatment, examination of patient, ordering and performing treatments and interventions, ordering and review of laboratory studies, ordering and review of radiographic studies, pulse oximetry, re-evaluation of patient's condition.  This critical care time did not overlap with that of any other provider or involve time for any procedures.     Jack Joseph MD  Critical Care - Surgery  [facility]

## 2020-04-26 NOTE — ASSESSMENT & PLAN NOTE
- Phenobarb D/Cd; Diazepam taper to off tomorrow; steadily reducing hydromorphone gtt; continues on propofol

## 2020-04-26 NOTE — PROGRESS NOTES
Notified Dr. Ayon that patient's PTT is 37 and anti Xa is 0.34, heparin infusing at 1200 units/hr. MD stated to increase heparin infusion to 1400 units/hr. Also notified Dr. Ayon that epi is infusing at 0.03 mcg/kg/min (non-titirating) and cardene is infusing at 5 mg/hr (25 mL/hr) with MAPs 86-88. MD stated to wean epi to 0.02 mcg/kg/min and continue to titrate cardene for MAPs <85.

## 2020-04-26 NOTE — ASSESSMENT & PLAN NOTE
Acute respiratory distress syndrome (ARDS) due to COVID-19 virus  - Patient intubated with crich which was switched to ETT on 4/11  - Monika down to 25, weaning to 20  - RPMs reduced for lower flows  - Sweep increased from 7 to 8 for CO2s in 50s on ABG  - Vent weaning with FiO2 coming down to 50%     Sedation  - On propofol, dialudid  - Weaning dilaudid gtt by 0.05 q6hrs, goal off tomorrow AM  - Reordering valium BID dosing, goal to be QD by tomorrow if tolerated      Shock  - Epinephrine at 0.01, weaning to off for MAP goal 75-80  - Vaso and Levo are off     FEN/GI  Hypokalemia and Hypophosphatemia  - Repleting K aggressively, 3.2 this AM  - Consider scheduled K administration given robust UOP and continued losses  - Starting TF at 10 today  - Avoid turning patient to eval BMS today     Anticoagulation      - Heparin gtt decreased to 1200 U/hr      - Goal aXa 0.3 being checked Q6hrs

## 2020-04-26 NOTE — PROGRESS NOTES
Pharmacokinetic Assessment Follow Up: IV Vancomycin    Vancomycin serum concentration assessment(s):    Vancomycin trough resulted at 13.8, which can be used to guide therapy. Plan to continue current dose of vancomycin 1250mg IV Q 12 hours.   Follow up trough ordered on 4.28 at 730.   Goal trough 10-20.     Drug levels (last 3 results):  Recent Labs   Lab Result Units 04/25/20  2050   Vancomycin-Trough ug/mL 13.8       Pharmacy will continue to follow and monitor vancomycin.    Please contact pharmacy at extension [phone] for questions regarding this assessment.    Thank you for the consult,   Kiana Osullivan       Patient brief summary:  Ranjana Sanchez is a 54 y.o. male initiated on antimicrobial therapy with IV Vancomycin for treatment of bacteremia    The patient's current regimen is 1250mg IV Q 12 hours.     Drug Allergies:   Review of patient's allergies indicates:  No Known Allergies    Actual Body Weight:   63 kg    Renal Function:   Estimated Creatinine Clearance: 115.1 mL/min (based on SCr of 0.6 mg/dL).,     Dialysis Method (if applicable):  N/A    CBC (last 72 hours):  Recent Labs   Lab Result Units 04/24/20  0300 04/25/20  0400 04/25/20  1210 04/25/20  1600 04/25/20  2016 04/25/20  2348 04/26/20  0352 04/26/20  0946   WBC K/uL 11.87 17.51* 15.05* 14.96* 15.18* 12.96* 12.40 11.39   Hemoglobin g/dL 7.2* 6.8* 8.8* 11.4* 12.0* 11.4* 11.2* 11.2*   Hematocrit % 25.0* 23.5* 27.8* 35.1* 37.1* 34.6* 35.0* 34.5*   Platelets K/uL 226 231 127* 137* 133* 114* 107* 93*   Gran% % 82.5* 82.0* 83.0* 83.0* 80.0* 88.0* 83.0* 83.0*   Lymph% % 6.5* 6.0* 5.0* 6.0* 8.0* 6.0* 10.0* 6.0*   Mono% % 5.0 5.0 6.0 4.0 5.0 3.0* 6.0 7.0   Eosinophil% % 1.0 1.0 0.0 0.0 0.0 0.0 0.0 0.0   Basophil% % 0.0 0.0 1.0 0.0 0.0 2.0* 0.0 0.0   Differential Method  Manual Manual Manual Manual Manual Manual Manual Manual       Metabolic Panel (last 72 hours):  Recent Labs   Lab Result Units 04/24/20  0300 04/24/20  1853 04/25/20  0400  04/25/20  0806 04/25/20  1210 04/25/20  1600 04/25/20 2016 04/25/20  2348 04/26/20  0352 04/26/20  0946   Sodium mmol/L 137 139 136 134* 134* 137 139 138 139 134*   Potassium mmol/L 4.2 3.3* 3.5 3.6 3.8 2.5* 3.7 3.1* 3.2* 4.1   Chloride mmol/L 80* 77* 72* 74* 77* 78* 81* 88* 91* 95   CO2 mmol/L 50* 48* 46* 50* 48* 48* 48* 40* 40* 34*   Glucose mg/dL 241* 146* 263* 239* 321* 202* 267* 241* 128* 249*   BUN, Bld mg/dL 15 13 18 19 19 17 16 16 15 14   Creatinine mg/dL 0.6 0.6 0.7 0.7 0.7 0.7 0.6 0.7 0.6 0.6   Albumin g/dL 1.3*  --  1.4* 1.4* 1.4* 1.6* 2.0* 2.3* 2.6* 2.3*   Total Bilirubin mg/dL 0.3  --  0.5 0.5 0.7 1.1* 1.2* 1.0 1.1* 1.0   Alkaline Phosphatase U/L 195*  --  198* 206* 153* 160* 161* 135 140* 125   AST U/L 42*  --  50* 57* 48* 50* 50* 42* 44* 44*   ALT U/L 51*  --  42 39 30 32 32 27 27 25   Magnesium mg/dL 2.2  --  2.0  --  1.7 1.7 2.5 2.2 2.1 1.8   Phosphorus mg/dL 2.2*  --  1.1*  --  2.3* <1.0* 2.6* 1.5* 2.0* 2.7       Vancomycin Administrations:  vancomycin given in the last 96 hours                   vancomycin 1.25 g in dextrose 5% 250 mL IVPB (ready to mix) (mg) 1,250 mg New Bag 04/26/20 0815     1,250 mg New Bag 04/25/20 2145     1,250 mg New Bag  0803     1,250 mg New Bag 04/24/20 2010                Microbiologic Results:  Microbiology Results (last 7 days)     Procedure Component Value Units Date/Time    Blood culture [[id number]] Collected:  04/25/20 0930    Order Status:  Completed Specimen:  Blood from Peripheral, Hand, Right Updated:  04/26/20 1212     Blood Culture, Routine No Growth to date      No Growth to date    Narrative:       Blood cultures x 2 different sites. 4 bottles total. Please  draw cultures before administering antibiotics.    Blood culture [[id number]] Collected:  04/25/20 0945    Order Status:  Completed Specimen:  Blood from Peripheral, Hand, Left Updated:  04/26/20 1212     Blood Culture, Routine No Growth to date      No Growth to date    Narrative:       Blood cultures  from 2 different sites. 4 bottles total.  Please draw before starting antibiotics.    Blood culture [[id number]] Collected:  04/18/20 1804    Order Status:  Completed Specimen:  Blood from Peripheral, Upper Arm, Right Updated:  04/22/20 2312     Blood Culture, Routine No Growth after 4 days.     Blood culture [[id number]] Collected:  04/18/20 1833    Order Status:  Completed Specimen:  Blood from Peripheral, Antecubital, Left Updated:  04/22/20 2012     Blood Culture, Routine No Growth after 4 days.     Culture, Respiratory with Gram Stain [[id number]]  (Abnormal) Collected:  04/19/20 1717    Order Status:  Completed Specimen:  Respiratory from Endotracheal Aspirate Updated:  04/21/20 1242     Respiratory Culture No S aureus or Pseudomonas isolated.      CANDIDA ALBICANS  Few       Gram Stain (Respiratory) <10 epithelial cells per low power field.     Gram Stain (Respiratory) Few WBC's     Gram Stain (Respiratory) Rare yeast    Blood culture [[id number]] Collected:  04/17/20 1055    Order Status:  Completed Specimen:  Blood from Peripheral, Antecubital, Right Updated:  04/21/20 1212     Blood Culture, Routine No Growth after 4 days.     Blood culture [[id number]] Collected:  04/17/20 1105    Order Status:  Completed Specimen:  Blood from Peripheral, Forearm, Right Updated:  04/21/20 1212     Blood Culture, Routine No Growth after 4 days.     Urine culture [[id number]] Collected:  04/18/20 1858    Order Status:  Completed Specimen:  Urine Updated:  04/20/20 0123     Urine Culture, Routine No growth    Narrative:       Preferred Collection Type->Urine, Clean Catch

## 2020-04-26 NOTE — PROGRESS NOTES
Dr. Ayon at bedside. We will not be turning patient today to asses BMS. He addressed the chest quivering but made no changes. We are titrating Heparin per AntiXa. Decreased to 1200 u/hr. Ordered to d/c Epi d/t MAPs in the mid-80s. Our goal MAP is 75-80. Beginning trickle tube feeds @10. Wean vent down to 50%FiO2 and Nitric to 25. Dilaudid is to be weaned down by .05 q6 and to be completely off by tomorrow AM. Xerofoam was applied to the tongue to prevent excessive dryness. Diazepam will be reordered. Will continue to monitor and update.

## 2020-04-26 NOTE — PROGRESS NOTES
[facility]  Cardiothoracic Surgery  Daily ECMO NOTE    Patient Name: Ranjana Sanchez  MRN: [id number]  Admission Date: 4/10/2020  Hospital Length of Stay: 16 days   Type of ECMO: VV  Day of ECMO Support: 1  Code Status: Full Code   Attending Physician: Francis Ayon MD   Referring Provider: Self, Aaareferral  Principal Problem:Acute respiratory distress syndrome (ARDS) due to COVID-19 virus    Subjective:   Interval History:    VV ECMO initiated yesterday with improvement in oxygenation. Paralytics discontinued yesterday.  Dilaudid gtt slowly being weaned, at 2.0 currently. Epi has been slowly weaned down to 0.01. Vaso and levo have been off. Sweep increased from 7 to 8 for CO2 in 50s, though markedly improved from prior to cannulation. CXR with B/L white outs this AM. Vent weaning in process, FiO2 now 50% with PaO2 in 200s on AM gas. Monika decreased to 20. TF have been held but will be started today. Continues to have robust UOP, lasix off. K 3.2 needing aggressive replacement.    Medications:  Continuous Infusions:   dextrose 10 % in water (D10W)      epinephrine Stopped (04/26/20 1038)    furosemide (LASIX) 10 mg/mL infusion (titrating) Stopped (04/25/20 1817)    heparin (porcine) in 5 % dex 1,200 Units/hr (04/26/20 1037)    HYDROmorphone 1.5 mg/hr (04/26/20 1028)    nicardipine 5 mg/hr (04/26/20 1028)    nitric oxide gas      norepinephrine bitartrate-D5W Stopped (04/25/20 0300)    propofoL 50 mcg/kg/min (04/26/20 1028)    vasopressin (PITRESSIN) infusion Stopped (04/25/20 1445)     Scheduled Meds:   acetaZOLAMIDE  250 mg Intravenous Q12H    albuterol-ipratropium  3 mL Nebulization Q6H    carboxymethylcellulose sodium  2 drop Ophthalmic TID    ceFEPime (MAXIPIME) IVPB  2 g Intravenous Q8H    chlorhexidine  15 mL Mouth/Throat BID    fentaNYL  50 mcg Intravenous Once    fluconazole (DIFLUCAN) IVPB  200 mg Intravenous Q24H    INV remdesivir infusion  100 mg Intravenous Q24H     mupirocin   Nasal BID    pantoprazole  40 mg Intravenous BID    polyethylene glycol  17 g Per NG tube Daily    vancomycin (VANCOCIN) IVPB  1,250 mg Intravenous Q12H        Objective:     Vital Signs (Most Recent):  Temp: 98.1 °F (36.7 °C) (04/26/20 1000)  Pulse: (!) 124 (04/26/20 1000)  Resp: 12 (04/26/20 0900)  BP: 130/73 (04/26/20 0800)  SpO2: 96 % (04/26/20 1000) Vital Signs (24h Range):  Temp:  [98.1 °F (36.7 °C)-98.9 °F (37.2 °C)] 98.1 °F (36.7 °C)  Pulse:  [109-139] 124  Resp:  [1-90] 12  SpO2:  [87 %-100 %] 96 %  BP: (106-160)/(59-80) 130/73  Arterial Line BP: (100-181)/() 156/60     Weight: 63 kg (139 lb)  Body mass index is 23.86 kg/m².    SpO2: 96 %  O2 Device (Oxygen Therapy): ventilator    Intake/Output - Last 3 Shifts       04/24 0700 - 04/25 0659 04/25 0700 - 04/26 0659 04/26 0700 - 04/27 0659    P.O. 0 0     I.V. (mL/kg) 2358.1 (37.4) 4379.5 (69.5)     Blood  2472.5     NG/ 245     IV Piggyback 500 1750     Total Intake(mL/kg) 3348.1 (53.1) 8847 (140.4)     Urine (mL/kg/hr) 5425 (3.6) 8100 (5.4) 1080 (4.5)    Drains 135      Stool 230 480     Blood  14 4    Total Output 5790 8594 1084    Net -2441.9 +253 -1084           Stool Occurrence  1 x           Lines/Drains/Airways     Central Venous Catheter Line            Introducer 04/25/20 1030 1 day    Introducer 04/25/20 1040 1 day         ECMO Cannula 04/25/20 1120 right internal jugular;right femoral vein less than 1 day          Drain                 NG/OG Tube 04/12/20 1000 18 Fr. Right nostril 14 days         Urethral Catheter 04/18/20 1854 7 days         Rectal Tube 04/21/20 1540 fecal management system 4 days          Airway                 Airway - Non-Surgical 04/11/20 1525 Endotracheal Tube 14 days          Arterial Line                 Arterial Line 04/19/20 2130 Right Radial 6 days          Peripheral Intravenous Line                 Peripheral IV - Single Lumen 04/24/20 1445 20 G Anterior;Right Forearm 1 day          Peripheral IV - Single Lumen 04/25/20 0941 20 G Right Hand 1 day                Physical Exam  Constitutional: No distress. He is sedated, and intubated.   Head: Normocephalic; crich removed and dressing clean dry and intact    Cardiovascular: Tachycardia 120s present.   Pulmonary/Chest: intubated FiO2 60%, sweet 8.   Abdominal: Soft.   Neurological: Sedated    Skin: Skin is warm. He is not diaphoretic. L neck and L groin cannulas intact, min to no flashing, no clots detected in pump, all sutures intact and secure  Nursing note and vitals reviewed.    Significant Labs:  ABGs:   Recent Labs   Lab 04/26/20  0813   PH 7.538*   PCO2 45.2*   PO2 494*   HCO3 38.4*   POCSATURATED 100   BE 16     BMP:   Recent Labs   Lab 04/26/20  0946   *   *   K 4.1   CL 95   CO2 34*   BUN 14   CREATININE 0.6   CALCIUM 8.9   MG 1.8     Cardiac markers: No results for input(s): CKMB, CPKMB, TROPONINT, TROPONINI, MYOGLOBIN in the last 48 hours.  CBC:   Recent Labs   Lab 04/26/20  0946   WBC 11.39   RBC 3.75*   HGB 11.2*   HCT 34.5*   PLT 93*   MCV 92   MCH 29.9   MCHC 32.5     CMP:   Recent Labs   Lab 04/26/20  0946   *   CALCIUM 8.9   ALBUMIN 2.3*   PROT 5.6*   *   K 4.1   CO2 34*   CL 95   BUN 14   CREATININE 0.6   ALKPHOS 125   ALT 25   AST 44*   BILITOT 1.0     Coagulation:   Recent Labs   Lab 04/26/20  0756 04/26/20  0946   INR  --  1.1   APTT 37.0*  --      Lactic Acid:   Recent Labs   Lab 04/25/20  2016   LACTATE 1.4     All pertinent labs from the last 24 hours have been reviewed.    Significant Diagnostics:  CXR: I have reviewed all pertinent results/findings within the past 24 hours    Assessment/Plan:   Acute respiratory distress syndrome (ARDS) due to COVID-19 virus  - Patient intubated with crich which was switched to ETT on 4/11  - Monika down to 25, weaning to 20  - RPMs reduced for lower flows  - Sweep increased from 7 to 8 for CO2s in 50s on ABG  - Vent weaning with FiO2 coming down to  50%     Sedation  - On propofol, dialudid  - Weaning dilaudid gtt by 0.05 q6hrs, goal off tomorrow AM  - Reordering valium BID dosing, goal to be QD by tomorrow if tolerated      Shock  - Epinephrine  at 0.01, weaning to off for MAP goal 75-80  - Vaso and Levo are off     FEN/GI  Hypokalemia and Hypophosphatemia  - Repleting K aggressively, 3.2 this AM  - Consider scheduled K administration given robust UOP and continued losses  - Starting TF at 10 today  - Avoid turning patient to eval BMS today     Anticoagulation      - Heparin gtt decreased to 1200 U/hr      - Goal aXa 0.3 being checked Q6hrs    Anne Miller MD  Cardiothoracic Surgery  [facility]      VV ECMO circuit checked and all parameters reviewed. Anticoagulation was managed and all cannulation exit sites along with review of labs and radiology studies performed. Speed and functioning of the pump along with oxygenator quality reviewed.Adjustment made and communication with treatment teams and Dr. Lopez carried out. All team members aware of treatment plan and all in agreement with the plan.Will also start trickle feeds today.

## 2020-04-26 NOTE — SUBJECTIVE & OBJECTIVE
Objective:     Vital Signs (Most Recent):  Temp: 98.1 °F (36.7 °C) (04/26/20 1300)  Pulse: (!) 115 (04/26/20 1331)  Resp: 12 (04/26/20 1331)  BP: 120/70 (04/26/20 1300)  SpO2: (!) 94 % (04/26/20 1300) Vital Signs (24h Range):  Temp:  [98.1 °F (36.7 °C)-98.2 °F (36.8 °C)] 98.1 °F (36.7 °C)  Pulse:  [109-139] 115  Resp:  [1-90] 12  SpO2:  [94 %-100 %] 94 %  BP: (120-160)/(70-80) 120/70  Arterial Line BP: (135-167)/(51-70) 135/55     Weight: 63 kg (139 lb)  Body mass index is 23.86 kg/m².      Intake/Output Summary (Last 24 hours) at 4/26/2020 1348  Last data filed at 4/26/2020 1300  Gross per 24 hour   Intake 6062.22 ml   Output 9431 ml   Net -3368.78 ml       Physical Exam   Constitutional: He appears ill. He is sedated, chemically paralyzed and intubated.   HENT:   Head: Normocephalic and atraumatic.   Eyes: Pupils are equal, round, and reactive to light.   Neck: Normal range of motion.   Cardiovascular: Regular rhythm and normal heart sounds.   Sinus tachycardia   Pulmonary/Chest: He is intubated. He is in respiratory distress. He has rales.   Abdominal: Soft. He exhibits no distension.   Musculoskeletal: He exhibits edema.   Neurological: He is unresponsive.   Skin: Skin is warm and dry.   Nursing note and vitals reviewed.      Vents:  Vent Mode: A/C (04/26/20 0509)  Ventilator Initiated: Yes (04/10/20 2247)  Set Rate: 12 BPM (04/26/20 0509)  Vt Set: 400 mL (04/25/20 0115)  PEEP/CPAP: 10 cmH20 (04/26/20 0509)  Oxygen Concentration (%): 50 (04/26/20 1300)  Peak Airway Pressure: 23 cmH2O (04/26/20 0509)  Plateau Pressure: 45 cmH20 (04/26/20 0509)  Total Ve: 0.77 mL (04/26/20 0509)  F/VT Ratio<105 (RSBI): 333.33 (04/26/20 0148)    Lines/Drains/Airways     Central Venous Catheter Line                 ECMO Cannula 04/25/20 1120 right internal jugular;right femoral vein 1 day    Introducer 04/25/20 1030 1 day    Introducer 04/25/20 1040 1 day          Drain                 NG/OG Tube 04/12/20 1000 18 Fr. Right  nostril 14 days         Urethral Catheter 04/18/20 1854 7 days         Rectal Tube 04/21/20 1540 fecal management system 4 days          Airway                 Airway - Non-Surgical 04/11/20 1525 Endotracheal Tube 14 days          Arterial Line                 Arterial Line 04/19/20 2130 Right Radial 6 days          Peripheral Intravenous Line                 Peripheral IV - Single Lumen 04/24/20 1445 20 G Anterior;Right Forearm 1 day         Peripheral IV - Single Lumen 04/25/20 0941 20 G Right Hand 1 day                Significant Labs:    CBC/Anemia Profile:  Recent Labs   Lab 04/25/20  2348 04/26/20  0250 04/26/20  0352 04/26/20  0801 04/26/20  0946   WBC 12.96*  --  12.40  --  11.39   HGB 11.4*  --  11.2*  --  11.2*   HCT 34.6*  --  35.0* 33* 34.5*   *  --  107*  --  93*   MCV 91  --  91  --  92   RDW 14.0  --  14.2  --  14.6*   FERRITIN  --  1,388*  --   --   --         Chemistries:  Recent Labs   Lab 04/25/20  2348 04/26/20  0352 04/26/20  0946    139 134*   K 3.1* 3.2* 4.1   CL 88* 91* 95   CO2 40* 40* 34*   BUN 16 15 14   CREATININE 0.7 0.6 0.6   CALCIUM 8.6* 8.7 8.9   ALBUMIN 2.3* 2.6* 2.3*   PROT 5.7* 6.0 5.6*   BILITOT 1.0 1.1* 1.0   ALKPHOS 135 140* 125   ALT 27 27 25   AST 42* 44* 44*   MG 2.2 2.1 1.8   PHOS 1.5* 2.0* 2.7       All pertinent labs within the past 24 hours have been reviewed.    Significant Imaging:  I have reviewed all pertinent imaging results/findings within the past 24 hours.    Review of Systems   Unable to perform ROS: Intubated

## 2020-04-26 NOTE — PROGRESS NOTES
ECMO Specialists shift report    Date: 04/26/2020  ECMO Specialist:  Tatyana Pintopretty    Pump parameters:  RPM: 3700  Flow:  5.0  Sweep: 8  FiO2:  100    Oxygenator status:  Clots: pre oxygenator  Fibrin: a couple in line at connectors    Pressure trends:  P1: 160-170  P2: 130-140  Delta P: 28-30's    Volume status:  Chugging noted? none  CVP: none  MAP:  70-80's   MD notified (name):  Nany    Anticoagulation:  ACT/aPTT/Xa parameters: 0.3-0.4  ACT/aPTT/Xa trends this shift: 0.4    Cannula size / status / placement:  Arterial:   Venous 1: 23 FR in RIJ (not able to measure)  Venous 2: 27 FR in RFV (not able to measure)   Dual lumen:      Additional Comments:  Patient was stable today.  All labs and ABG results were reported to Dr. Ayon.  Sweep was increased to maintain PCO2 in the 40's.  Flows maintained around 5LPM.   Heparin is being titrated according to anti Xa.

## 2020-04-26 NOTE — PROGRESS NOTES
ECMO Specialists shift report    Date: 04/26/2020  ECMO Specialist:  Cyrus Mccall    Pump parameters:  RPM:                3800  Flow:                      5.20  Sweep:                  6  FiO2:                 100    Oxygenator status:  Clots:                 none  Fibrin:                none    Pressure trends:  P1:                    178  P2:                    140  Delta P:               38    Volume status:  Chugging noted?     yes  CVP:                          3  MAP:                        76  MD notified (name):  Dr Ayon    Anticoagulation:  ACT/aPTT/Xa parameters:        ;  0.3-0.4  ACT/aPTT/Xa trends this shift: 31, 30   /     0.28, 0.2    Cannula size / status / placement:  Arterial:   Venous 1:    23 [place] in RIJ  But placement is unknown since the bandaging and no post xray.  Venous 2:    27 [place] in RFV. But placement is unkown.  Dual lumen:      Additional Comments:      The patient is stable but required some volume with 500 ml of 5% Albumen.  Additionally, the Xa and aPTT were not  In range.   Heparin was increase per Nany Yañez.

## 2020-04-27 NOTE — PROGRESS NOTES
ECMO Specialists shift report    Date: 04/27/2020  ECMO Specialist:  Jorge Yeboah    Pump parameters:  RPM: 3500   Flow:  4.57  Sweep:  8LPM  FiO2:  100    Oxygenator status:  Clots: Pre-oxy  Fibrin: multiple sites     Pressure trends:  P1: 144  P2: 113   Delta P: 31    Volume status:  Chugging noted not at time of documentation  CVP: 3   MAP:  77-84  MD notified (name):  Nany    Anticoagulation:  ACT/aPTT/Xa parameters: 0.3-0.4  ACT/aPTT/Xa trends this shift: 0.24, 0.4    Cannula size / status / placement:  Arterial:   Venous 1: RIJV  Venous 2:RFV  Dual lumen:      Additional Comments:    Patient resting comfortable on VV ECMO.  Any changes documented within flowsheet.  Cannula sites were cleaned and re-dressed.  Anesthesia checked ETT and placed new arterial line without issue. Urine output is good.  All Lab results were reported to Dr. Ayon.

## 2020-04-27 NOTE — CARE UPDATE
Pt intubated.   Ventilator Settings: AC/PC: Rate: 12, FiO2: 50%, PEEP: 10.   NO: 25 PPM.   SpO2 ~94%.   Q6H ABGs obtained per Ecmo WILLARD Blair, RRT.     Pt unarousable.   Pt not following commands.   Pt medically sedated.   MD aware.   RASS -5.     Q1H Neurovascular exams completed.   See flowsheet for details.     HOB flat per MD order.     Propofol gtt titrated per comfort.     Goal to maintain 78-85 per MD order.   Cardene gtt titrated to maintain goal.     CVP 3, HOB Flat.   UOP ~150cc/hr.     Q6H APTT/Anti Xa monitoring.   Goal to maintain Anti Xa- 0.4-0.5.   MD updated with results.   Heparin gtt @1000units/hr.     ECMO  VV ECMO  RIJ/R Femoral Vein   RPM: 3500        Flow:  4.57  Sweep:  8LPM  FiO2:  100    Slight chugging noted within line.   MD aware.     Dressings changed per ICU RN.   Q2H site assessments.     Pulses dopplerable.     TF @10cc/hr per NGT.   NGT flushed PRN.   10cc residual output noted.     Plan of care reviewed with pt and son.   Questions answered per ICU RN.   See flowsheet for details.

## 2020-04-27 NOTE — CARE UPDATE
JACKIE Ayon MD and WILLARD lAexandra MD at bedside.   MDs notified of pts current gtts, rates, VS, labs, UOP/CVP, ventilator settings, ECMO settings.   ECMO Flow decreased to 3500 RPM.   VSS.   ECMO Flow stable.   Anesthesia consulted for possible ET tube exchange and arterial line replacement.   TF increased to 20cc/hr per MD order.   Plan of care reviewed.   See flowsheet for details.

## 2020-04-27 NOTE — PLAN OF CARE
04/27/20 1321   Discharge Reassessment   Assessment Type Discharge Planning Reassessment   Do you have any problems affording any of your prescribed medications? TBD   Discharge Plan A Skilled Nursing Facility   Discharge Plan B Long-term acute care facility (LTAC)

## 2020-04-27 NOTE — ASSESSMENT & PLAN NOTE
Acute respiratory distress syndrome (ARDS) due to COVID-19 virus  - Patient intubated with crich which was switched to ETT on 4/11  - Monika 20, weaning  - RPM 3700 with great flows in upper 4  - Sweep at 8 for CO2s in 50s on ABG, well compensated  - Vent weaning slowly, current Fi 50%, PEEP 10     Sedation  - On propofol  - Weaned dilaudid gtt to off earlier this morning  - Valium BID dosing, with eventual wean to QD      Shock  - Epinephrine off, cardene at 8 for MAP goal 75-80  - Vaso and Levo off >48hrs    FEN/GI  Hypokalemia and Hypophosphatemia  - Repleted K, 4.4 this AM  - Will increase TF to 20     Anticoagulation      - Heparin gtt increased to 1200 U/hr      - Goal aXa 0.3 Q6hrs

## 2020-04-27 NOTE — CARE UPDATE
Anesthesia at bedside for possible ET tube exchange and arterial line replacement.   ICU Charge notified.   JACKIE Ayon MD and WILLARD Alexandra MD notified.  WILLARD Garvin MD and SINA Montes MD updated on plan of care.   Time out completed per Anesthesia and ICU RN.   See flowsheet for details.

## 2020-04-27 NOTE — CARE UPDATE
Spoke to pts son via [employer].   Son updated on pts/fathers plan of care.   Questions answered per ICU RN.   See flowsheet for details.

## 2020-04-27 NOTE — SUBJECTIVE & OBJECTIVE
Interval History:    No acute events overnight, afebrile, VSS. Doing well off epi on cardene currently 8 with MAPs in upper 80s. Continues robust UOP. Dilaudid gtt off this AM. PCO2 in 50s but pH well compensated. Vent FiO2 50% PEEP 10. TF at 10 well tolerated overnight. Hep gtt increased to 1200 for goal aXa 0.3. K 4.4 this AM.    Medications:  Continuous Infusions:   dextrose 10 % in water (D10W)      epinephrine      furosemide (LASIX) 2 mg/mL continuous infusion (titrating) Stopped (04/26/20 1900)    heparin (porcine) in 5 % dex 1,100 Units/hr (04/27/20 1500)    HYDROmorphone Stopped (04/27/20 0500)    nicardipine 2.5 mg/hr (04/27/20 1500)    nitric oxide gas      norepinephrine bitartrate-D5W Stopped (04/25/20 0300)    propofoL 50 mcg/kg/min (04/27/20 1500)    vasopressin (PITRESSIN) infusion Stopped (04/25/20 1445)     Scheduled Meds:   albuterol-ipratropium  3 mL Nebulization Q6H    carboxymethylcellulose sodium  2 drop Ophthalmic TID    ceFEPime (MAXIPIME) IVPB  2 g Intravenous Q8H    chlorhexidine  15 mL Mouth/Throat BID    fluconazole (DIFLUCAN) IVPB  200 mg Intravenous Q24H    INV remdesivir infusion  100 mg Intravenous Q24H    mupirocin   Nasal BID    pantoprazole  40 mg Intravenous BID    polyethylene glycol  17 g Per NG tube Daily    potassium chloride 10%  40 mEq Per NG tube BID    vancomycin (VANCOCIN) IVPB  1,000 mg Intravenous Q12H    white petrolatum-mineral oiL   Both Eyes BID        Objective:     Vital Signs (Most Recent):  Temp: 98.1 °F (36.7 °C) (04/27/20 1500)  Pulse: (!) 131 (04/27/20 1500)  Resp: (!) 24 (04/27/20 1254)  BP: (!) 105/59 (04/27/20 1500)  SpO2: 99 % (04/27/20 1500) Vital Signs (24h Range):  Temp:  [97.9 °F (36.6 °C)-98.1 °F (36.7 °C)] 98.1 °F (36.7 °C)  Pulse:  [117-154] 131  Resp:  [21-24] 24  SpO2:  [92 %-100 %] 99 %  BP: ()/(54-59) 105/59  Arterial Line BP: ()/(53-97) 124/63     Weight: 63 kg (139 lb)  Body mass index is 23.86  kg/m².    SpO2: 99 %  O2 Device (Oxygen Therapy): ventilator    Intake/Output - Last 3 Shifts       04/25 0700 - 04/26 0659 04/26 0700 - 04/27 0659 04/27 0700 - 04/28 0659    P.O. 0      I.V. (mL/kg) 4379.5 (69.5) 3742 (59.4)     Blood 2472.5      NG/ 300 295    IV Piggyback 1750      Total Intake(mL/kg) 8847 (140.4) 4042 (64.2) 295 (4.7)    Urine (mL/kg/hr) 8100 (5.4) 5990 (4) 1875 (3.3)    Drains   40    Stool 480  200    Blood 14 8     Total Output 8594 5998 2115    Net +253 -1956 -1820           Stool Occurrence 1 x            Lines/Drains/Airways     Central Venous Catheter Line            Percutaneous Central Line Insertion/Assessment - Triple Lumen  04/24/20 0900 left internal jugular 3 days         ECMO Cannula 04/25/20 1120 right internal jugular 2 days         ECMO Cannula 04/25/20 1120 right internal jugular;right femoral vein 2 days          Drain                 NG/OG Tube 04/12/20 1000 18 Fr. Right nostril 15 days         Urethral Catheter 04/18/20 1854 8 days         Rectal Tube 04/21/20 1540 fecal management system 6 days          Airway                 Airway - Non-Surgical 04/11/20 1525 Endotracheal Tube 16 days          Arterial Line                 Arterial Line 04/27/20 1100 Right Brachial less than 1 day          Peripheral Intravenous Line                 Peripheral IV - Single Lumen 04/24/20 1445 20 G Anterior;Right Forearm 3 days         Peripheral IV - Single Lumen 04/25/20 0941 20 G Right Hand 2 days                Physical Exam    Constitutional: No distress. He is sedated, and intubated.   Head: Normocephalic; crich removed and dressing clean dry and intact    Cardiovascular: Tachycardia 130s .   Pulmonary/Chest: intubated FiO2 50%, ECMO RPM 3700, sweep 8.   Abdominal: Soft.   Neurological: Sedated    Skin: Skin is warm. He is not diaphoretic. L neck and L groin cannulas intact, min to no flashing, very few clots pre-oxygenator, all sutures intact and secure  Nursing note and  vitals reviewed.    Significant Labs:  ABGs:   Recent Labs   Lab 04/27/20  1207   PH 7.355   PCO2 44.8   PO2 60*   HCO3 25.0   POCSATURATED 89*   BE 0     BMP:   Recent Labs   Lab 04/27/20  0945   *   *   K 4.8      CO2 25   BUN 16   CREATININE 0.7   CALCIUM 8.4*   MG 2.1     Cardiac markers: No results for input(s): CKMB, CPKMB, TROPONINT, TROPONINI, MYOGLOBIN in the last 48 hours.  CBC:   Recent Labs   Lab 04/27/20  0945 04/27/20  1207   WBC 12.47  --    RBC 4.01*  --    HGB 12.0*  --    HCT 37.8* 34*   PLT 57*  --    MCV 94  --    MCH 29.9  --    MCHC 31.7*  --      CMP:   Recent Labs   Lab 04/27/20  0945   *   CALCIUM 8.4*   ALBUMIN 2.5*   PROT 6.3   *   K 4.8   CO2 25      BUN 16   CREATININE 0.7   ALKPHOS 123   ALT 23   AST 29   BILITOT 0.7     Coagulation:   Recent Labs   Lab 04/27/20  0945 04/27/20  1200   INR 1.0  --    APTT  --  41.6*     Lactic Acid:   Recent Labs   Lab 04/25/20  2016   LACTATE 1.4     All pertinent labs from the last 24 hours have been reviewed.    Significant Diagnostics:  CXR: I have reviewed all pertinent results/findings within the past 24 hours

## 2020-04-27 NOTE — PROGRESS NOTES
"04/26/2020  Maikel Zepeda    Current provider:  Francis Ayon MD      As floor rounding has been requested to be limited during COVID-19 patient quarantine by Infectious Disease and leadership, only "electronic" rounding has been performed on this mechanical assist device patient.  Electronic rounding includes verifying in Epic that current settings and measurements for the device have been documented appropriately and that they are within limits.  Additionally, this rounding verifies that the EQUIPMENT section of the VAD flowsheet is documented in Epic, specifically checking the presence of emergency equipment and controller self test.  Any discrepancies will be related to the care team.   In addition, I spoke with Tatyana, ECMO Specialist, and discussed patient care, any issues, concerns, and any special needs. Cannulation cannula cart was pulled off 3rd floor, wiped down, and brought back to ECT storage. Eileen and Gabriella were contacted concerning restocking and reordering disposables, for the cart, used during ECMO initiation.    In emergency, the nursing units have been notified to contact the perfusion department either by:  Calling [phone] from 630am to 4pm, or by contacting the hospital  from 4pm to 630am and asking to speak with the perfusionist on call.    Maikel Zepeda  8:43 PM  "

## 2020-04-27 NOTE — CARE UPDATE
JACKIE Ayon MD called per ICU RN.   MD notified of pts current gtts, rates, VS, UOP/CVP, labs, ventilator settings, ECMO settings.   Heparin gtt increased to 1200units/hr per Anti Xa monitoring.   Goal to maintain Anti Xa 0.4-0.5.   Plan of care reviewed with pt.   See flowsheet for details.

## 2020-04-27 NOTE — PROCEDURES
"Ranjana Sanchez is a 54 y.o. male patient.    Temp: 98.1 °F (36.7 °C) (04/27/20 1100)  Pulse: (!) 152 (04/27/20 1100)  Resp: (!) 21 (04/27/20 0733)  BP: (!) 108/58 (04/27/20 1100)  SpO2: 97 % (04/27/20 1100)  Weight: 63 kg (139 lb) (04/21/20 1005)  Height: 5' 4" (162.6 cm) (04/24/20 0102)       Arterial    Diagnosis: respiratory failure    Patient location during procedure: ICU  Procedure start time: 4/27/2020 10:40 AM  Timeout: 4/27/2020 10:38 AM  Procedure end time: 4/27/2020 10:47 AM    Staffing  Authorizing Provider: Cristian Garvin MD  Performing Provider: Cristian Garvin MD    Anesthesiologist was present at the time of the procedure.    Preanesthetic Checklist  Completed: patient identified, surgical consent, pre-op evaluation, timeout performed, IV checked, risks and benefits discussed, monitors and equipment checked and anesthesia consent givenArterial  Skin Prep: chlorhexidine gluconate and isopropyl alcohol  Local Infiltration: lidocaine  Orientation: right  Location: brachial  Catheter Size: 20 G  Catheter placement by Ultrasound guidance. Heme positive aspiration all ports.  Vessel Caliber: medium, patent, compressibility normal  Needle advanced into vessel with real time Ultrasound guidance.  Sterile sheath used.Insertion Attempts: 1  Assessment  Dressing: secured with tape and tegaderm  Patient: Tolerated well  Additional Notes  biopatch placed             Cristian Garvin  4/27/2020  "

## 2020-04-27 NOTE — PROGRESS NOTES
ECMO Specialists shift report    Date: 04/27/2020  ECMO Specialist:  Maikel Deluna    Pump parameters:  RPM: 3700  Flow:  4.8  Sweep:  8  FiO2:  100%    Oxygenator status:  Clots: few pre oxy  Fibrin: pre oxy    Pressure trends:  P1: 165-170  P2: 130-133  Delta P: 32-37    Volume status:  Chugging noted: slight chugging  CVP: 2-4  MAP: 70-90  MD notified (name):  Nany    Anticoagulation:  ACT/aPTT/Xa parameters: 0.3-0.4  ACT/aPTT/Xa trends this shift: 0.34-0.44    Cannula size / status / placement:  Arterial:   Venous 1: RIJV  Venous 2: RFV  Dual lumen:      Additional Comments:     Heparin not adjusted during the night. Flow went from 5 to 4.8 at 3700 RPM, all lab values within parameters, Pupils are 2 and 4 but reactive Sparkle spoke to Dr. Ayon and gave an update at 04:25 No blood products given

## 2020-04-27 NOTE — PROGRESS NOTES
[facility]  Cardiothoracic Surgery  Daily ECMO Note    Patient Name: Ranjana Sanchez  MRN: [id number]  Admission Date: 4/10/2020  Hospital Length of Stay: 17 days  Code Status: Full Code   TYPE OF ECMO: VV ECMO  DAY OF ECMO SUPPORT: 2   Attending Physician: Francis Ayon MD   Referring Provider: Self, Aaareferral  Principal Problem:Acute respiratory distress syndrome (ARDS) due to COVID-19 virus    Subjective:   Interval History:    No acute events overnight, afebrile, VSS. Doing well off epi on cardene currently 8 with MAPs in upper 80s. Continues robust UOP. Dilaudid gtt off this AM. PCO2 in 50s but pH well compensated. Vent FiO2 50% PEEP 10. TF at 10 well tolerated overnight. Hep gtt increased to 1200 for goal aXa 0.3. K 4.4 this AM.    Medications:  Continuous Infusions:   dextrose 10 % in water (D10W)      epinephrine      furosemide (LASIX) 2 mg/mL continuous infusion (titrating) Stopped (04/26/20 1900)    heparin (porcine) in 5 % dex 1,100 Units/hr (04/27/20 1500)    HYDROmorphone Stopped (04/27/20 0500)    nicardipine 2.5 mg/hr (04/27/20 1500)    nitric oxide gas      norepinephrine bitartrate-D5W Stopped (04/25/20 0300)    propofoL 50 mcg/kg/min (04/27/20 1500)    vasopressin (PITRESSIN) infusion Stopped (04/25/20 1445)     Scheduled Meds:   albuterol-ipratropium  3 mL Nebulization Q6H    carboxymethylcellulose sodium  2 drop Ophthalmic TID    ceFEPime (MAXIPIME) IVPB  2 g Intravenous Q8H    chlorhexidine  15 mL Mouth/Throat BID    fluconazole (DIFLUCAN) IVPB  200 mg Intravenous Q24H    INV remdesivir infusion  100 mg Intravenous Q24H    mupirocin   Nasal BID    pantoprazole  40 mg Intravenous BID    polyethylene glycol  17 g Per NG tube Daily    potassium chloride 10%  40 mEq Per NG tube BID    vancomycin (VANCOCIN) IVPB  1,000 mg Intravenous Q12H    white petrolatum-mineral oiL   Both Eyes BID        Objective:     Vital Signs (Most Recent):  Temp: 98.1 °F  (36.7 °C) (04/27/20 1500)  Pulse: (!) 131 (04/27/20 1500)  Resp: (!) 24 (04/27/20 1254)  BP: (!) 105/59 (04/27/20 1500)  SpO2: 99 % (04/27/20 1500) Vital Signs (24h Range):  Temp:  [97.9 °F (36.6 °C)-98.1 °F (36.7 °C)] 98.1 °F (36.7 °C)  Pulse:  [117-154] 131  Resp:  [21-24] 24  SpO2:  [92 %-100 %] 99 %  BP: ()/(54-59) 105/59  Arterial Line BP: ()/(53-97) 124/63     Weight: 63 kg (139 lb)  Body mass index is 23.86 kg/m².    SpO2: 99 %  O2 Device (Oxygen Therapy): ventilator    Intake/Output - Last 3 Shifts       04/25 0700 - 04/26 0659 04/26 0700 - 04/27 0659 04/27 0700 - 04/28 0659    P.O. 0      I.V. (mL/kg) 4379.5 (69.5) 3742 (59.4)     Blood 2472.5      NG/ 300 295    IV Piggyback 1750      Total Intake(mL/kg) 8847 (140.4) 4042 (64.2) 295 (4.7)    Urine (mL/kg/hr) 8100 (5.4) 5990 (4) 1875 (3.3)    Drains   40    Stool 480  200    Blood 14 8     Total Output 8594 5998 2115    Net +253 -1956 -1820           Stool Occurrence 1 x            Lines/Drains/Airways     Central Venous Catheter Line            Percutaneous Central Line Insertion/Assessment - Triple Lumen  04/24/20 0900 left internal jugular 3 days         ECMO Cannula 04/25/20 1120 right internal jugular 2 days         ECMO Cannula 04/25/20 1120 right internal jugular;right femoral vein 2 days          Drain                 NG/OG Tube 04/12/20 1000 18 Fr. Right nostril 15 days         Urethral Catheter 04/18/20 1854 8 days         Rectal Tube 04/21/20 1540 fecal management system 6 days          Airway                 Airway - Non-Surgical 04/11/20 1525 Endotracheal Tube 16 days          Arterial Line                 Arterial Line 04/27/20 1100 Right Brachial less than 1 day          Peripheral Intravenous Line                 Peripheral IV - Single Lumen 04/24/20 1445 20 G Anterior;Right Forearm 3 days         Peripheral IV - Single Lumen 04/25/20 0941 20 G Right Hand 2 days                Physical Exam    Constitutional: No distress.  He is sedated, and intubated.   Head: Normocephalic; crich removed and dressing clean dry and intact    Cardiovascular: Tachycardia 130s .   Pulmonary/Chest: intubated FiO2 50%, ECMO RPM 3700, sweep 8.   Abdominal: Soft.   Neurological: Sedated    Skin: Skin is warm. He is not diaphoretic. L neck and L groin cannulas intact, min to no flashing, very few clots pre-oxygenator, all sutures intact and secure  Nursing note and vitals reviewed.    Significant Labs:  ABGs:   Recent Labs   Lab 04/27/20  1207   PH 7.355   PCO2 44.8   PO2 60*   HCO3 25.0   POCSATURATED 89*   BE 0     BMP:   Recent Labs   Lab 04/27/20  0945   *   *   K 4.8      CO2 25   BUN 16   CREATININE 0.7   CALCIUM 8.4*   MG 2.1     Cardiac markers: No results for input(s): CKMB, CPKMB, TROPONINT, TROPONINI, MYOGLOBIN in the last 48 hours.  CBC:   Recent Labs   Lab 04/27/20  0945 04/27/20  1207   WBC 12.47  --    RBC 4.01*  --    HGB 12.0*  --    HCT 37.8* 34*   PLT 57*  --    MCV 94  --    MCH 29.9  --    MCHC 31.7*  --      CMP:   Recent Labs   Lab 04/27/20  0945   *   CALCIUM 8.4*   ALBUMIN 2.5*   PROT 6.3   *   K 4.8   CO2 25      BUN 16   CREATININE 0.7   ALKPHOS 123   ALT 23   AST 29   BILITOT 0.7     Coagulation:   Recent Labs   Lab 04/27/20  0945 04/27/20  1200   INR 1.0  --    APTT  --  41.6*     Lactic Acid:   Recent Labs   Lab 04/25/20  2016   LACTATE 1.4     All pertinent labs from the last 24 hours have been reviewed.    Significant Diagnostics:  CXR: I have reviewed all pertinent results/findings within the past 24 hours      Assessment/Plan:     Acute respiratory disease due to COVID-19 virus  Acute respiratory distress syndrome (ARDS) due to COVID-19 virus  - Patient intubated with crich which was switched to ETT on 4/11  - Monika 20, weaning  - RPM 3700 with great flows in upper 4  - Sweep at 8 for CO2s in 50s on ABG, well compensated  - Vent weaning slowly, current Fi 50%, PEEP 10     Sedation  - On  propofol  - Weaned dilaudid gtt to off earlier this morning  - Valium BID dosing, with eventual wean to QD      Shock  - Epinephrine  off, cardene at 8 for MAP goal 75-80  - Vaso and Levo off >48hrs    FEN/GI  Hypokalemia and Hypophosphatemia  - Repleted K, 4.4 this AM  - Will increase TF to 20     Anticoagulation      - Heparin gtt increased to 1200 U/hr      - Goal aXa 0.3 Q6hrs    Anne Miller MD  Cardiothoracic Surgery  [facility]    VV ECMO circuit checked and all parameters reviewed. Anticoagulation was managed and all cannulation exit sites along with review of labs and radiology studies performed. Speed and functioning of the pump along with oxygenator quality reviewed. Spent 45 min coordinating and providing care with the nursing, ECMO techs and critical care team. Concerned about drop in platelet count. Would need tracheostomy at some point. Dr Alexandra aware and will plan in next few days. No cuff leak in the ET tube. Increasing tube feeds. All questions and concerns wee addressed.

## 2020-04-28 NOTE — PROGRESS NOTES
[facility]  Cardiothoracic Surgery  ECMO Progress Note    Patient Name: Ranjana Sanchez  MRN: [id number]  Admission Date: 4/10/2020  Hospital Length of Stay: 18 days  Code Status: Full Code   Attending Physician: Francis Ayon MD   TYPE OF ECMO: VV  DAY OF ECMO SUPPORT: 4  Referring Provider: Self, Aaareferral  Principal Problem:Acute respiratory distress syndrome (ARDS) due to COVID-19 virus    Subjective:   Interval History:    No acute events overnight, afebrile, VSS. Cardene at 3 with MAPs 70s-80s. 4.5L UOP, net negative 2L. PCO2 in 30s so sweep lowered. Vent FiO2 50% PEEP 10. TF at 25 well tolerated overnight. Hep gtt 1100 for goal aXa 0.3. K 3.2 this AM.    Medications:  Continuous Infusions:   dextrose 10 % in water (D10W)      furosemide (LASIX) 2 mg/mL continuous infusion (titrating) 1 mg/hr (04/28/20 1551)    heparin (porcine) in 5 % dex 1,200 Units/hr (04/28/20 1600)    nicardipine 1 mg/hr (04/28/20 1530)    nitric oxide gas      norepinephrine bitartrate-D5W Stopped (04/25/20 0300)    propofoL 45 mcg/kg/min (04/28/20 1600)     Scheduled Meds:   albuterol-ipratropium  3 mL Nebulization Q6H    carboxymethylcellulose sodium  2 drop Ophthalmic TID    ceFEPime (MAXIPIME) IVPB  2 g Intravenous Q8H    chlorhexidine  15 mL Mouth/Throat BID    fluconazole (DIFLUCAN) IVPB  200 mg Intravenous Q24H    INV remdesivir infusion  100 mg Intravenous Q24H    mupirocin   Nasal BID    pantoprazole  40 mg Intravenous BID    polyethylene glycol  17 g Per NG tube Daily    potassium chloride 10%  40 mEq Per NG tube BID    vancomycin (VANCOCIN) IVPB  1,000 mg Intravenous Q12H    white petrolatum-mineral oiL   Both Eyes BID        Objective:     Vital Signs (Most Recent):  Temp: 98.1 °F (36.7 °C) (04/28/20 1615)  Pulse: (!) 127 (04/28/20 1615)  Resp: (!) 27 (04/28/20 1416)  BP: 115/65 (04/28/20 1400)  SpO2: 98 % (04/28/20 1615) Vital Signs (24h Range):  Temp:  [97.5 °F (36.4 °C)-98.1 °F  (36.7 °C)] 98.1 °F (36.7 °C)  Pulse:  [113-148] 127  Resp:  [23-27] 27  SpO2:  [92 %-100 %] 98 %  BP: (103-129)/(55-68) 115/65  Arterial Line BP: ()/(51-64) 121/61     Weight: 63 kg (139 lb)  Body mass index is 23.86 kg/m².    SpO2: 98 %  O2 Device (Oxygen Therapy): ventilator    Intake/Output - Last 3 Shifts       04/26 0700 - 04/27 0659 04/27 0700 - 04/28 0659 04/28 0700 - 04/29 0659    P.O.       I.V. (mL/kg) 3742 (59.4) 1904 (30.2) 549 (8.7)    Blood   500    NG/ 755 350    IV Piggyback  250 350    Total Intake(mL/kg) 4042 (64.2) 2909 (46.2) 1749 (27.8)    Urine (mL/kg/hr) 5990 (4) 4725 (3.1) 1280 (2.1)    Drains  40     Stool  325     Blood 8 2 5    Total Output 5998 5092 1285    Net -1956 -2183 +464                 Lines/Drains/Airways     Central Venous Catheter Line            Percutaneous Central Line Insertion/Assessment - Triple Lumen  04/24/20 0900 left internal jugular 4 days         ECMO Cannula 04/25/20 1120 right femoral vein 3 days         ECMO Cannula 04/25/20 1120 right internal jugular 3 days          Drain                 NG/OG Tube 04/12/20 1000 18 Fr. Right nostril 16 days         Urethral Catheter 04/18/20 1854 9 days          Airway                 Airway - Non-Surgical 04/11/20 1525 Endotracheal Tube 17 days          Arterial Line                 Arterial Line 04/27/20 1100 Right Brachial 1 day          Peripheral Intravenous Line                 Peripheral IV - Single Lumen 04/25/20 0941 20 G Right Hand 3 days         Peripheral IV - Single Lumen 04/28/20 1424 20 G Anterior;Left Upper Arm less than 1 day                Physical Exam    Constitutional: No distress. He is sedated, and intubated.   Head: Normocephalic; crich removed and dressing clean dry and intact    Cardiovascular: Tachycardia 130s .   Pulmonary/Chest: intubated FiO2 50%, ECMO RPM 3400, sweep 8.   Abdominal: Soft.   Neurological: Sedated    Skin: Skin is warm. He is not diaphoretic. L neck and L groin cannulas  intact, min to no flashing, few clots pre-oxygenator, all sutures intact and secure  Nursing note and vitals reviewed.    Significant Labs:  ABGs:   Recent Labs   Lab 04/28/20  1603   PH 7.341*   PCO2 47.1*   PO2 72*   HCO3 25.5   POCSATURATED 93*   BE 0     BMP:   Recent Labs   Lab 04/28/20  1001   *      K 4.9      CO2 24   BUN 21*   CREATININE 0.6   CALCIUM 8.5*   MG 2.0     Cardiac markers: No results for input(s): CKMB, CPKMB, TROPONINT, TROPONINI, MYOGLOBIN in the last 48 hours.  CBC:   Recent Labs   Lab 04/28/20  1001  04/28/20  1603   WBC 8.20  --   --    RBC 3.67*  --   --    HGB 10.8*  --   --    HCT 34.7*   < > 28*   PLT 38*  --   --    MCV 95  --   --    MCH 29.4  --   --    MCHC 31.1*  --   --     < > = values in this interval not displayed.     CMP:   Recent Labs   Lab 04/28/20  1001   *   CALCIUM 8.5*   ALBUMIN 2.5*   PROT 6.0      K 4.9   CO2 24      BUN 21*   CREATININE 0.6   ALKPHOS 112   ALT 17   AST 23   BILITOT 0.6     Coagulation:   Recent Labs   Lab 04/28/20  1000   INR 1.0   APTT 38.5*  38.5*     Lactic Acid:   No results for input(s): LACTATE in the last 48 hours.  All pertinent labs from the last 24 hours have been reviewed.    Significant Diagnostics:  CXR: I have reviewed all pertinent results/findings within the past 24 hours      Assessment/Plan:     Acute respiratory disease due to COVID-19 virus  Acute respiratory distress syndrome (ARDS) due to COVID-19 virus  - Crich was switched to ETT on 4/11  - Monika weaned to 15  - RPM 3400 with flows in mid 4s; wean RPMs for goal flow > 4.0 to minimize platelet consumption  - Platelets 44K, transfuse for under 30K  - Sweep at 8 with CO2s in 30s on ABG, so will wean  - Vent weaned, current Fi 50%, PEEP 10; will wean oxygenator as able     Sedation  - On propofol  - Off dilaudid gtt since 4/27 AM  - Valium weaned to off     Shock  - Cardene at 3 for MAP goal 75-80  - Vaso and Levo  off >48hrs    FEN/GI  Hypokalemia and Hypophosphatemia  - Repleted K, 3.2 this AM on BID scheduled dosing, replace PRN  - TF at 25; can slowly advance to goal     Anticoagulation      - Heparin gtt at 1100 U/hr      - Goal aXa 0.3 Q6hrs    Prophylaxis      - BID protonix IV      - Vanc/Cefepime/Diflucan for ECMO    Anne Miller MD  Cardiothoracic Surgery  [facility]    VV ECMO circuit checked and all parameters reviewed. Anticoagulation was managed and all cannulation exit sites along with review of labs and radiology studies performed. Speed and functioning of the pump along with oxygenator quality reviewed.  Patient tolerating tube feeds well and would increase the tube feeds towards goal rate.  Communicating with the family about the progress.  45 min spent in coordinating care between the critical care team, Dr. Alexandra and the nursing staff.  Patient continues to ooze from his tongue and we are using Xeroform.  Attempts will be made to place the tongue back inside pass the swelling in the tongue has significantly reduced over the last few days.

## 2020-04-28 NOTE — PROGRESS NOTES
ECMO Specialists shift report    Date: 04/28/2020  ECMO Specialist:  Tatyana Howard    Pump parameters:  RPM: 3400  Flow:  4.63  Sweep:  8  FiO2:  100  Oxygenator status:  Clots: small , Pre-Oxygenator  Fibrin: Pre and Post and at connectors    Pressure trends:  P1: 140  P2: 110  Delta P: 130    Volume status:  Chugging noted? None  CVP: 2-3  MAP:  75-80  MD notified (name):  Nany    Anticoagulation:  ACT/aPTT/Xa parameters: 0.3-0.4  ACT/aPTT/Xa trends this shift: 0.28-0.34    Cannula size / status / placement:  Arterial:   Venous 1: 23 FR / RIJ / 4 cm from insertion to end of coils  Venous 2: 27 FR / RFV / 10 cm from insertion to end of coils  Dual lumen:      Additional Comments:  Patient was stable throughout shift. Heparin was adjusted for anti Xa being on the lower side of parameters.  Dr. Ayon aware.  1 unit of Platelets given (500cc) for a falling platelet level of 36.  ABG with electrolytes were changed to Q6 hours and ABG with Lactate to Q12.  Dr. Ayon changed the CO2 parameters to 30 - 40 to achieve a more normal pH.  Sweep was increased to 8 lpm.

## 2020-04-28 NOTE — SUBJECTIVE & OBJECTIVE
Interval History:    No acute events overnight, afebrile, VSS. Cardene at 3 with MAPs 70s-80s. 4.5L UOP, net negative 2L. PCO2 in 30s so sweep lowered. Vent FiO2 50% PEEP 10. TF at 25 well tolerated overnight. Hep gtt 1100 for goal aXa 0.3. K 3.2 this AM.    Medications:  Continuous Infusions:   dextrose 10 % in water (D10W)      furosemide (LASIX) 2 mg/mL continuous infusion (titrating) 1 mg/hr (04/28/20 1551)    heparin (porcine) in 5 % dex 1,200 Units/hr (04/28/20 1600)    nicardipine 1 mg/hr (04/28/20 1530)    nitric oxide gas      norepinephrine bitartrate-D5W Stopped (04/25/20 0300)    propofoL 45 mcg/kg/min (04/28/20 1600)     Scheduled Meds:   albuterol-ipratropium  3 mL Nebulization Q6H    carboxymethylcellulose sodium  2 drop Ophthalmic TID    ceFEPime (MAXIPIME) IVPB  2 g Intravenous Q8H    chlorhexidine  15 mL Mouth/Throat BID    fluconazole (DIFLUCAN) IVPB  200 mg Intravenous Q24H    INV remdesivir infusion  100 mg Intravenous Q24H    mupirocin   Nasal BID    pantoprazole  40 mg Intravenous BID    polyethylene glycol  17 g Per NG tube Daily    potassium chloride 10%  40 mEq Per NG tube BID    vancomycin (VANCOCIN) IVPB  1,000 mg Intravenous Q12H    white petrolatum-mineral oiL   Both Eyes BID        Objective:     Vital Signs (Most Recent):  Temp: 98.1 °F (36.7 °C) (04/28/20 1615)  Pulse: (!) 127 (04/28/20 1615)  Resp: (!) 27 (04/28/20 1416)  BP: 115/65 (04/28/20 1400)  SpO2: 98 % (04/28/20 1615) Vital Signs (24h Range):  Temp:  [97.5 °F (36.4 °C)-98.1 °F (36.7 °C)] 98.1 °F (36.7 °C)  Pulse:  [113-148] 127  Resp:  [23-27] 27  SpO2:  [92 %-100 %] 98 %  BP: (103-129)/(55-68) 115/65  Arterial Line BP: ()/(51-64) 121/61     Weight: 63 kg (139 lb)  Body mass index is 23.86 kg/m².    SpO2: 98 %  O2 Device (Oxygen Therapy): ventilator    Intake/Output - Last 3 Shifts       04/26 0700 - 04/27 0659 04/27 0700 - 04/28 0659 04/28 0700 - 04/29 0659    P.O.       I.V. (mL/kg) 3742 (59.4)  1904 (30.2) 549 (8.7)    Blood   500    NG/ 755 350    IV Piggyback  250 350    Total Intake(mL/kg) 4042 (64.2) 2909 (46.2) 1749 (27.8)    Urine (mL/kg/hr) 5990 (4) 4725 (3.1) 1280 (2.1)    Drains  40     Stool  325     Blood 8 2 5    Total Output 5998 5092 1285    Net -1956 -2183 +464                 Lines/Drains/Airways     Central Venous Catheter Line            Percutaneous Central Line Insertion/Assessment - Triple Lumen  04/24/20 0900 left internal jugular 4 days         ECMO Cannula 04/25/20 1120 right femoral vein 3 days         ECMO Cannula 04/25/20 1120 right internal jugular 3 days          Drain                 NG/OG Tube 04/12/20 1000 18 Fr. Right nostril 16 days         Urethral Catheter 04/18/20 1854 9 days          Airway                 Airway - Non-Surgical 04/11/20 1525 Endotracheal Tube 17 days          Arterial Line                 Arterial Line 04/27/20 1100 Right Brachial 1 day          Peripheral Intravenous Line                 Peripheral IV - Single Lumen 04/25/20 0941 20 G Right Hand 3 days         Peripheral IV - Single Lumen 04/28/20 1424 20 G Anterior;Left Upper Arm less than 1 day                Physical Exam    Constitutional: No distress. He is sedated, and intubated.   Head: Normocephalic; crich removed and dressing clean dry and intact    Cardiovascular: Tachycardia 130s .   Pulmonary/Chest: intubated FiO2 50%, ECMO RPM 3400, sweep 8.   Abdominal: Soft.   Neurological: Sedated    Skin: Skin is warm. He is not diaphoretic. L neck and L groin cannulas intact, min to no flashing, few clots pre-oxygenator, all sutures intact and secure  Nursing note and vitals reviewed.    Significant Labs:  ABGs:   Recent Labs   Lab 04/28/20  1603   PH 7.341*   PCO2 47.1*   PO2 72*   HCO3 25.5   POCSATURATED 93*   BE 0     BMP:   Recent Labs   Lab 04/28/20  1001   *      K 4.9      CO2 24   BUN 21*   CREATININE 0.6   CALCIUM 8.5*   MG 2.0     Cardiac markers: No results for  input(s): CKMB, CPKMB, TROPONINT, TROPONINI, MYOGLOBIN in the last 48 hours.  CBC:   Recent Labs   Lab 04/28/20  1001  04/28/20  1603   WBC 8.20  --   --    RBC 3.67*  --   --    HGB 10.8*  --   --    HCT 34.7*   < > 28*   PLT 38*  --   --    MCV 95  --   --    MCH 29.4  --   --    MCHC 31.1*  --   --     < > = values in this interval not displayed.     CMP:   Recent Labs   Lab 04/28/20  1001   *   CALCIUM 8.5*   ALBUMIN 2.5*   PROT 6.0      K 4.9   CO2 24      BUN 21*   CREATININE 0.6   ALKPHOS 112   ALT 17   AST 23   BILITOT 0.6     Coagulation:   Recent Labs   Lab 04/28/20  1000   INR 1.0   APTT 38.5*  38.5*     Lactic Acid:   No results for input(s): LACTATE in the last 48 hours.  All pertinent labs from the last 24 hours have been reviewed.    Significant Diagnostics:  CXR: I have reviewed all pertinent results/findings within the past 24 hours

## 2020-04-28 NOTE — ASSESSMENT & PLAN NOTE
Acute respiratory distress syndrome (ARDS) due to COVID-19 virus  - Crich was switched to ETT on 4/11  - Monika weaned to 15  - RPM 3400 with flows in mid 4s; wean RPMs for goal flow > 4.0 to minimize platelet consumption  - Platelets 44K, transfuse for under 30K  - Sweep at 8 with CO2s in 30s on ABG, so will wean  - Vent weaned, current Fi 50%, PEEP 10; will wean oxygenator as able     Sedation  - On propofol  - Off dilaudid gtt since 4/27 AM  - Valium weaned to off     Shock  - Cardene at 3 for MAP goal 75-80  - Vaso and Levo off >48hrs    FEN/GI  Hypokalemia and Hypophosphatemia  - Repleted K, 3.2 this AM on BID scheduled dosing, replace PRN  - TF at 25; can slowly advance to goal     Anticoagulation      - Heparin gtt at 1100 U/hr      - Goal aXa 0.3 Q6hrs    Prophylaxis      - BID protonix IV      - Vanc/Cefepime/Diflucan for ECMO

## 2020-04-28 NOTE — PROGRESS NOTES
Pharmacokinetic Assessment Follow Up: IV Vancomycin    Vancomycin serum concentration assessment(s):    Vancomycin trough level resulted at 14.3 mcg/mL drawn appropriately prior to 2nd dose of new regimen. Goal level is 10 to 20 mcg/mL.     Drug levels (last 3 results):  Recent Labs   Lab Result Units 04/25/20 2050 04/26/20  1931 04/28/20  0730   Vancomycin-Trough ug/mL 13.8 18.7 14.3     Vancomycin Regimen Plan:    Continue vancomycin 1000 mg IV every 12 hours with next serum trough concentration measured at 4/30 0730.    Pharmacy will continue to follow and monitor vancomycin.    Please contact pharmacy at extension [phone] for questions regarding this assessment.    Thank you for the consult,   Sonya Banks, PharmD, BCCCP             Patient brief summary:  Ranjana Sanchez is a 54 y.o. male initiated on antimicrobial therapy with IV vancomycin for treatment of sepsis    Drug Allergies:   Review of patient's allergies indicates:  No Known Allergies    Actual Body Weight:   63 kg     Renal Function:   Estimated Creatinine Clearance: 115.1 mL/min (based on SCr of 0.6 mg/dL).    Dialysis Method (if applicable):  N/A    CBC (last 72 hours):  Recent Labs   Lab Result Units 04/25/20  1210 04/25/20  1600 04/25/20  2016 04/25/20  2348 04/26/20  0352 04/26/20  0946 04/26/20  1611 04/26/20  2200 04/27/20  0402 04/27/20  0945 04/27/20  1515 04/27/20  2200 04/28/20  0400   WBC K/uL 15.05* 14.96* 15.18* 12.96* 12.40 11.39 9.67 10.38 11.25 12.47 9.45 8.61 9.31   Hemoglobin g/dL 8.8* 11.4* 12.0* 11.4* 11.2* 11.2* 11.3* 11.9* 12.5* 12.0* 11.5* 11.6* 11.6*   Hematocrit % 27.8* 35.1* 37.1* 34.6* 35.0* 34.5* 34.6* 37.5* 39.5* 37.8* 36.6* 36.7* 37.2*   Platelets K/uL 127* 137* 133* 114* 107* 93* 79* 81* 72* 57* 50* 44* 44*   Gran% % 83.0* 83.0* 80.0* 88.0* 83.0* 83.0* 80.0* 73.0 88.0* 92.0* 81.0* 88.0* 81.3*   Lymph% % 5.0* 6.0* 8.0* 6.0* 10.0* 6.0* 4.0* 7.0* 2.0* 1.0* 5.0* 4.0* 6.6*   Mono% % 6.0 4.0 5.0 3.0* 6.0 7.0 3.0*  6.0 1.0* 3.0* 1.0* 1.3* 5.3   Eosinophil% % 0.0 0.0 0.0 0.0 0.0 0.0 0.0 2.0 0.0 0.0 1.0 1.3 1.3   Basophil% % 1.0 0.0 0.0 2.0* 0.0 0.0 1.0 0.0 0.0 0.0 0.0 0.0 0.8   Differential Method  Manual Manual Manual Manual Manual Manual Manual Manual Manual Manual Manual Manual Automated       Metabolic Panel (last 72 hours):  Recent Labs   Lab Result Units 04/25/20  1210 04/25/20  1600 04/25/20  2016 04/25/20  2348 04/26/20  0352 04/26/20  0946 04/26/20  1611 04/26/20  2200 04/27/20  0402 04/27/20  0945 04/27/20  1515 04/27/20  2200 04/28/20  0400   Sodium mmol/L 134* 137 139 138 139 134* 136 134* 136 133* 139 134* 137   Potassium mmol/L 3.8 2.5* 3.7 3.1* 3.2* 4.1 3.6 4.7 4.4 4.8 4.1 4.6 4.1   Chloride mmol/L 77* 78* 81* 88* 91* 95 98 99 102 102 106 104 105   CO2 mmol/L 48* 48* 48* 40* 40* 34* 30* 27 24 25 25 24 25   Glucose mg/dL 321* 202* 267* 241* 128* 249* 139* 293* 161* 260* 171* 280* 219*   BUN, Bld mg/dL 19 17 16 16 15 14 15 16 17 16 15 16 18   Creatinine mg/dL 0.7 0.7 0.6 0.7 0.6 0.6 0.6 0.7 0.6 0.7 0.6 0.7 0.6   Albumin g/dL 1.4* 1.6* 2.0* 2.3* 2.6* 2.3* 2.3* 2.3* 2.3* 2.5* 2.2* 2.2* 2.4*   Total Bilirubin mg/dL 0.7 1.1* 1.2* 1.0 1.1* 1.0 0.7 0.7 0.6 0.7 0.6 0.7 0.7   Alkaline Phosphatase U/L 153* 160* 161* 135 140* 125 123 131 139* 123 111 116 123   AST U/L 48* 50* 50* 42* 44* 44* 36 38 40 29 22 23 23   ALT U/L 30 32 32 27 27 25 24 24 26 23 21 21 19   Magnesium mg/dL 1.7 1.7 2.5 2.2 2.1 1.8 2.4 2.1 2.2 2.1 2.0 1.9 2.0   Phosphorus mg/dL 2.3* <1.0* 2.6* 1.5* 2.0* 2.7 2.3* 3.9 4.4 4.6* 3.9 3.3 3.1       Vancomycin Administrations:  vancomycin given in the last 96 hours                   vancomycin in dextrose 5 % 1 gram/250 mL IVPB 1,000 mg (mg) 1,000 mg New Bag 04/28/20 0806     1,000 mg New Bag 04/27/20 2051    vancomycin 1.25 g in dextrose 5% 250 mL IVPB (ready to mix) (mg) 1,250 mg New Bag 04/27/20 0800     1,250 mg New Bag 04/26/20 2100     1,250 mg New Bag  0815     1,250 mg New Bag 04/25/20 2145     1,250 mg  New Bag  0803     1,250 mg New Bag 04/24/20 2010                Microbiologic Results:  Microbiology Results (last 7 days)     Procedure Component Value Units Date/Time    Blood culture [[id number]] Collected:  04/25/20 0930    Order Status:  Completed Specimen:  Blood from Peripheral, Hand, Right Updated:  04/27/20 1212     Blood Culture, Routine No Growth to date      No Growth to date      No Growth to date    Narrative:       Blood cultures x 2 different sites. 4 bottles total. Please  draw cultures before administering antibiotics.    Blood culture [[id number]] Collected:  04/25/20 0945    Order Status:  Completed Specimen:  Blood from Peripheral, Hand, Left Updated:  04/27/20 1212     Blood Culture, Routine No Growth to date      No Growth to date      No Growth to date    Narrative:       Blood cultures from 2 different sites. 4 bottles total.  Please draw before starting antibiotics.    Blood culture [[id number]] Collected:  04/18/20 1804    Order Status:  Completed Specimen:  Blood from Peripheral, Upper Arm, Right Updated:  04/22/20 2312     Blood Culture, Routine No Growth after 4 days.     Blood culture [[id number]] Collected:  04/18/20 1833    Order Status:  Completed Specimen:  Blood from Peripheral, Antecubital, Left Updated:  04/22/20 2012     Blood Culture, Routine No Growth after 4 days.     Culture, Respiratory with Gram Stain [[id number]]  (Abnormal) Collected:  04/19/20 1717    Order Status:  Completed Specimen:  Respiratory from Endotracheal Aspirate Updated:  04/21/20 1242     Respiratory Culture No S aureus or Pseudomonas isolated.      CANDIDA ALBICANS  Few       Gram Stain (Respiratory) <10 epithelial cells per low power field.     Gram Stain (Respiratory) Few WBC's     Gram Stain (Respiratory) Rare yeast    Blood culture [[id number]] Collected:  04/17/20 1055    Order Status:  Completed Specimen:  Blood from Peripheral, Antecubital, Right Updated:  04/21/20 1212     Blood Culture, Routine No  Growth after 4 days.     Blood culture [[id number]] Collected:  04/17/20 1105    Order Status:  Completed Specimen:  Blood from Peripheral, Forearm, Right Updated:  04/21/20 1212     Blood Culture, Routine No Growth after 4 days.

## 2020-04-28 NOTE — PLAN OF CARE
Recommendations    1. With current propofol rate, recommend increasing Peptamen Intense VHP to goal rate of 30 mL/hr to provide 1220 kcal and 66 gm protein.     2. When propofol rate decreased/discontinued, recommend Peptamen Intense VHP to goal rate of 45 mL/hr to provide 1080 kcal and 99 gm protein.     3. RD following.     Goals: Meet % EEN, EPN by RD f/u date  Nutrition Goal Status: progressing towards goal

## 2020-04-28 NOTE — PROGRESS NOTES
[facility] shift report    Date: 04/28/2020  EC[facility] Specialist:  Maikel Deluna    Pump parameters:  RPM: 3400  Flow:  4.45  Sweep:  8  FiO2:  100%    Oxygenator status:  Clots: pre oxy  Fibrin: connections and pre oxy    Pressure trends:  P1: 136-140  P2: 105-108  Delta P: 29-32    Volume status:  Chugging noted once Albumin given  CVP: 2-4  MAP:  70-84  MD notified (name):  Nany    Anticoagulation:  ACT/aPTT/Xa parameters: 0.3-0.4  ACT/aPTT/Xa trends this shift: 0.36    Cannula size / status / placement:  Arterial:   Venous 1: RIJV @ 4cm  Venous 2:RFV @ 10 cm  Dual lumen:      Additional Comments:    All labs within parameters but platelets but Dr. Ayon said transfuse if platelets drop below 30. Cannula sites measured while dressing was open.

## 2020-04-28 NOTE — PROGRESS NOTES
"[facility]  Adult Nutrition  Progress Note    SUMMARY       Recommendations    1. With current propofol rate, recommend increasing Peptamen Intense VHP to goal rate of 30 mL/hr to provide 1220 kcal and 66 gm protein.     2. When propofol rate decreased/discontinued, recommend Peptamen Intense VHP to goal rate of 45 mL/hr to provide 1080 kcal and 99 gm protein.     3. RD following.     Goals: Meet % EEN, EPN by RD f/u date  Nutrition Goal Status: progressing towards goal  Communication of RD Recs: (POC)    Reason for Assessment    Reason For Assessment: RD follow-up  Diagnosis: other (see comments)(COVID19)  Relevant Medical History: HTN, DM  Interdisciplinary Rounds: did not attend  General Information Comments: Remote access, spoke with RN via phone. Pt intubated, sedated. Tolerating TF of 25 mL/hr. Unable to assess for PO intake and UBW PTA. Due to recent hospital wide restrictions to limit the transfer of COVID-19, no NFPE performed at this time. NFPE to be performed at a later date. Unable to assessfor malnutrition at this time.   Nutrition Discharge Planning: Unable to determine    Nutrition Risk Screen    Nutrition Risk Screen: tube feeding or parenteral nutrition    Nutrition/Diet History    Factors Affecting Nutritional Intake: NPO, on mechanical ventilation    Anthropometrics    Temp: 98.1 °F (36.7 °C)  Height Method: Stated  Height: 5' 4" (162.6 cm)  Height (inches): 64 in  Weight Method: Stated  Weight: 63 kg (139 lb)  Weight (lb): 139 lb  Ideal Body Weight (IBW), Male: 130 lb  % Ideal Body Weight, Male (lb): 106.92 %  BMI (Calculated): 23.8  BMI Grade: 18.5-24.9 - normal  Usual Body Weight (UBW), kg: (JAMMIE)     Lab/Procedures/Meds    Pertinent Labs Reviewed: reviewed  Pertinent Labs Comments: glucose 219, .2  Pertinent Medications Reviewed: reviewed  Pertinent Medications Comments: lasix, propofol    Estimated/Assessed Needs    Weight Used For Calorie Calculations: 63 kg " (138 lb 14.2 oz)  Energy Calorie Requirements (kcal): 1273 kcal/day  Energy Need Method: [facility]  Protein Requirements: 76-95 g/d (1.2-1.5 g/kg)  Weight Used For Protein Calculations: 63 kg (138 lb 14.2 oz)     Estimated Fluid Requirement Method: other (see comments)(Per MD or 1 mL/kcal)  RDA Method (mL): 1273  CHO Requirement: 50% total kcals  Nutrition Prescription Ordered    Current Diet Order: NPO  Current Nutrition Support Formula Ordered: Peptamen Intense VHP  Current Nutrition Support Rate Ordered: 25 (ml)  Current Nutrition Support Frequency Ordered: mL/hr    Evaluation of Received Nutrient/Fluid Intake    Enteral Calories (kcal): 600  Enteral Protein (gm): 55  Enteral (Free Water) Fluid (mL): 0  Other Calories (kcal): 504(Propofol)  Total Calories (kcal): 1100  % Kcal Needs: 86  % Protein Needs: 72  I/O: -2.033L x 24 hrs, -7.801L since admit  Energy Calories Required: meeting needs  Protein Required: not meeting needs  Fluid Required: (per MD)  Comments: LBM 4/25  Tolerance: tolerating  % Intake of Estimated Energy Needs: Other: 72-86%  % Meal Intake: NPO    Nutrition Risk    Level of Risk/Frequency of Follow-up: (f/u 1 x wk)     Assessment and Plan    Nutrition Problem  Inadequate energy intake     Related to (etiology):   Inability to consume sufficient energy      Signs and Symptoms (as evidenced by):   NPO    Interventions (treatment strategy):  Collaboration of nutrition care w/ other providers   Enteral Nutrition     Nutrition Diagnosis Status:   Improving     Monitor and Evaluation    Food and Nutrient Intake: energy intake, food and beverage intake, enteral nutrition intake  Food and Nutrient Adminstration: diet order, enteral and parenteral nutrition administration  Physical Activity and Function: nutrition-related ADLs and IADLs  Anthropometric Measurements: weight change, weight  Biochemical Data, Medical Tests and Procedures: inflammatory profile, lipid profile, glucose/endocrine profile,  gastrointestinal profile, electrolyte and renal panel  Nutrition-Focused Physical Findings: overall appearance     Nutrition Follow-Up    RD Follow-up?: Yes

## 2020-04-29 NOTE — PROGRESS NOTES
[facility] shift report    Date: 04/29/2020  EC[facility] Specialist:  Maikel Deluna    Pump parameters:  RPM: 3400  Flow:  4.63  Sweep:  8  FiO2:  100%    Oxygenator status:  Clots: pre oxygenator  Fibrin: connections and pre oxygenator    Pressure trends:  P1: 144-148  P2: 115-118  Delta P: 28-30    Volume status:  Chugging noted Yes gave Albumin and PRBC  CVP: 2-4  MAP:  78-88  MD notified (name):  Nany    Anticoagulation:  ACT/aPTT/Xa parameters: 0.3-0.4  ACT/aPTT/Xa trends this shift: 0.23-0.32    Cannula size / status / placement:  Arterial:   Venous 1: RIJV 23 Fr @ 4cm  Venous 2:RFV 27 Fr @ 10cm  Dual lumen:      Additional Comments:    Goal is to wean Nitric to 10; had to give Albumin and PRBC during the shift, Anti Xa went down after platelet administration, rechecked with morning labs.

## 2020-04-29 NOTE — PROGRESS NOTES
Dr. Ayon @ bedside to check on  Pt. Updated on all current gtts, I&Os, and VS. Nitric decreased to 10ppm. ECMO sweep increased to 8.5 and RPMs decreased to 3800 per his order. Facetime call with son to update on plan of care.

## 2020-04-29 NOTE — PROGRESS NOTES
[facility]  Cardiothoracic Surgery  Daily ECMO Progress Note    Patient Name: Ranjana Sanchez  MRN: [id number]  Admission Date: 4/10/2020  Hospital Length of Stay: 19 days  TYPE OF ECMO: VV  ECMO DAY SUPPORT: 3    Code Status: Full Code   Attending Physician: Francis Ayon MD   Referring Provider: Self, Aaareferral  Principal Problem:Acute respiratory distress syndrome (ARDS) due to COVID-19 virus    Subjective:   Interval History:    Transfused 2U pRBC and 1U platelets last night. Dropped saturations with PaO2 51 on ABG therefore RPMs increased to 3900. Cardene at 4 with MAPs 85s. 4.7L UOP, net negative 200mL. Sweep 8, vent FiO2 50% PEEP 10. TF at 35 well tolerated. Hep gtt 1300 for goal aXa 0.3. K 4.4 this AM.    Medications:  Continuous Infusions:   dextrose 10 % in water (D10W)      furosemide (LASIX) 2 mg/mL continuous infusion (titrating) 1 mg/hr (04/28/20 1800)    heparin (porcine) in 5 % dex 1,300 Units/hr (04/29/20 0600)    nicardipine 5 mg/hr (04/29/20 0700)    nitric oxide gas      norepinephrine bitartrate-D5W Stopped (04/25/20 0300)    propofoL 50 mcg/kg/min (04/29/20 0700)     Scheduled Meds:   albuterol-ipratropium  3 mL Nebulization Q6H    carboxymethylcellulose sodium  2 drop Ophthalmic TID    ceFEPime (MAXIPIME) IVPB  2 g Intravenous Q8H    chlorhexidine  15 mL Mouth/Throat BID    fluconazole (DIFLUCAN) IVPB  200 mg Intravenous Q24H    INV remdesivir infusion  100 mg Intravenous Q24H    mupirocin   Nasal BID    pantoprazole  40 mg Intravenous BID    polyethylene glycol  17 g Per NG tube Daily    potassium chloride 10%  40 mEq Per NG tube BID    vancomycin (VANCOCIN) IVPB  1,000 mg Intravenous Q12H    white petrolatum-mineral oiL   Both Eyes BID        Objective:     Vital Signs (Most Recent):  Temp: 98.2 °F (36.8 °C) (04/29/20 0730)  Pulse: (!) 129 (04/29/20 0800)  Resp: (!) 26 (04/29/20 0731)  BP: 115/65 (04/28/20 1400)  SpO2: (!) 90 % (04/29/20 0800)  Vital Signs (24h Range):  Temp:  [97.5 °F (36.4 °C)-98.2 °F (36.8 °C)] 98.2 °F (36.8 °C)  Pulse:  [113-148] 129  Resp:  [26-28] 26  SpO2:  [86 %-100 %] 90 %  BP: (103-122)/(59-68) 115/65  Arterial Line BP: ()/(51-64) 123/61     Weight: 70.1 kg (154 lb 8.7 oz)  Body mass index is 26.53 kg/m².    SpO2: (!) 90 %  O2 Device (Oxygen Therapy): ventilator    Intake/Output - Last 3 Shifts       04/27 0700 - 04/28 0659 04/28 0700 - 04/29 0659 04/29 0700 - 04/30 0659    I.V. (mL/kg) 1904 (26.7) 2146 (30.6)     Blood  1250     NG/ 975     IV Piggyback 250 350     Total Intake(mL/kg) 2909 (40.8) 4721 (67.3)     Urine (mL/kg/hr) 4725 (2.8) 4750 (2.8) 300 (3)    Drains 40      Stool 325      Blood 2 7     Total Output 5092 4757 300    Net -2183 -36 -300                 Lines/Drains/Airways     Central Venous Catheter Line            Percutaneous Central Line Insertion/Assessment - Triple Lumen  04/24/20 0900 left internal jugular 4 days         ECMO Cannula 04/25/20 1120 right femoral vein 3 days         ECMO Cannula 04/25/20 1120 right internal jugular 3 days          Drain                 NG/OG Tube 04/12/20 1000 18 Fr. Right nostril 16 days         Urethral Catheter 04/18/20 1854 10 days          Airway                 Airway - Non-Surgical 04/11/20 1525 Endotracheal Tube 17 days          Arterial Line                 Arterial Line 04/27/20 1100 Right Brachial 1 day          Peripheral Intravenous Line                 Peripheral IV - Single Lumen 04/25/20 0941 20 G Right Hand 3 days         Peripheral IV - Single Lumen 04/28/20 1424 20 G Anterior;Left Upper Arm less than 1 day                Physical Exam    Constitutional: No distress. He is sedated, and intubated.   Head: Normocephalic; crich removed and dressing clean dry and intact    Cardiovascular: Tachycardia 130s .   Pulmonary/Chest: intubated  vent FiO2 50% PEEP 10, ECMO RPM 3900, sweep 8, FiO2 100%.   Abdominal: Soft.   Neurological: Sedated    Skin: Skin  is warm. He is not diaphoretic. L neck and L groin cannulas intact, min to no flashing, few clots pre-oxygenator, all sutures intact and secure  Nursing note and vitals reviewed.    Significant Labs:  ABGs:   Recent Labs   Lab 04/29/20  0748   PH 7.363   PCO2 46.5*   PO2 42   HCO3 26.5   POCSATURATED 76*   BE 1     BMP:   Recent Labs   Lab 04/29/20  0620   *      K 4.4      CO2 27   BUN 26*   CREATININE 0.6   CALCIUM 9.3   MG 2.0     Cardiac markers: No results for input(s): CKMB, CPKMB, TROPONINT, TROPONINI, MYOGLOBIN in the last 48 hours.  CBC:   Recent Labs   Lab 04/29/20  0620   WBC 9.61   RBC 3.88*   HGB 11.3*   HCT 35.6*   PLT 85*   MCV 92   MCH 29.1   MCHC 31.7*     CMP:   Recent Labs   Lab 04/29/20  0620   *   CALCIUM 9.3   ALBUMIN 3.1*   PROT 6.5      K 4.4   CO2 27      BUN 26*   CREATININE 0.6   ALKPHOS 115   ALT 20   AST 21   BILITOT 1.0     Coagulation:   Recent Labs   Lab 04/29/20  0620   INR 1.0   APTT 38.5*     Lactic Acid:   No results for input(s): LACTATE in the last 48 hours.  All pertinent labs from the last 24 hours have been reviewed.    Significant Diagnostics:  CXR: I have reviewed all pertinent results/findings within the past 24 hours      Assessment/Plan:     Acute respiratory disease due to COVID-19 virus  Acute respiratory distress syndrome (ARDS) due to COVID-19 virus  - Crich was switched to ETT on 4/11  - Monika weaned to 15  - RPM increased to 3900 for desaturations, will keep here for now; goal flow > 4.0  - Got 2U pRBC and 1U platelets overnight, Platelets 85K this AM; transfuse for under 30K  - Sweep at 8 with CO2s in 40s on ABG  - Vent Fi 50%, PEEP 10, [place] 15 no wean today     Sedation  - On propofol  - Off dilaudid gtt and valium     Shock  - Cardene at 4 for MAP goal 75-80  - Vaso and Levo off >48hrs    FEN/GI  Hypokalemia and Hypophosphatemia  - Repleted K, 4.4 this AM on BID scheduled dosing, replace PRN  - TF at 35; will reduce to 30 per  nutrition recs given propofol calories     Anticoagulation      - Heparin gtt at 1300 U/hr      - Goal aXa 0.3 Q6hrs    Prophylaxis      - BID protonix IV      - Vanc/Cefepime/Diflucan for ECMO    Anne Miller MD  Cardiothoracic Surgery  [facility]      VV ECMO circuit checked and all parameters reviewed. Anticoagulation was managed and all cannulation exit sites along with review of labs and radiology studies performed. Speed and functioning of the pump along with oxygenator quality reviewed.Communication between different teams and adjustment of electrolyteds and tube feeds were also carried out.

## 2020-04-29 NOTE — SUBJECTIVE & OBJECTIVE
Interval History:    Transfused 2U pRBC and 1U platelets last night. Dropped saturations with PaO2 51 on ABG therefore RPMs increased to 3900. Cardene at 4 with MAPs 85s. 4.7L UOP, net negative 200mL. Sweep 8, vent FiO2 50% PEEP 10. TF at 35 well tolerated. Hep gtt 1300 for goal aXa 0.3. K 4.4 this AM.    Medications:  Continuous Infusions:   dextrose 10 % in water (D10W)      furosemide (LASIX) 2 mg/mL continuous infusion (titrating) 1 mg/hr (04/28/20 1800)    heparin (porcine) in 5 % dex 1,300 Units/hr (04/29/20 0600)    nicardipine 5 mg/hr (04/29/20 0700)    nitric oxide gas      norepinephrine bitartrate-D5W Stopped (04/25/20 0300)    propofoL 50 mcg/kg/min (04/29/20 0700)     Scheduled Meds:   albuterol-ipratropium  3 mL Nebulization Q6H    carboxymethylcellulose sodium  2 drop Ophthalmic TID    ceFEPime (MAXIPIME) IVPB  2 g Intravenous Q8H    chlorhexidine  15 mL Mouth/Throat BID    fluconazole (DIFLUCAN) IVPB  200 mg Intravenous Q24H    INV remdesivir infusion  100 mg Intravenous Q24H    mupirocin   Nasal BID    pantoprazole  40 mg Intravenous BID    polyethylene glycol  17 g Per NG tube Daily    potassium chloride 10%  40 mEq Per NG tube BID    vancomycin (VANCOCIN) IVPB  1,000 mg Intravenous Q12H    white petrolatum-mineral oiL   Both Eyes BID        Objective:     Vital Signs (Most Recent):  Temp: 98.2 °F (36.8 °C) (04/29/20 0730)  Pulse: (!) 129 (04/29/20 0800)  Resp: (!) 26 (04/29/20 0731)  BP: 115/65 (04/28/20 1400)  SpO2: (!) 90 % (04/29/20 0800) Vital Signs (24h Range):  Temp:  [97.5 °F (36.4 °C)-98.2 °F (36.8 °C)] 98.2 °F (36.8 °C)  Pulse:  [113-148] 129  Resp:  [26-28] 26  SpO2:  [86 %-100 %] 90 %  BP: (103-122)/(59-68) 115/65  Arterial Line BP: ()/(51-64) 123/61     Weight: 70.1 kg (154 lb 8.7 oz)  Body mass index is 26.53 kg/m².    SpO2: (!) 90 %  O2 Device (Oxygen Therapy): ventilator    Intake/Output - Last 3 Shifts       04/27 0700 - 04/28 0659 04/28 0700 - 04/29 0659  04/29 0700 - 04/30 0659    I.V. (mL/kg) 1904 (26.7) 2146 (30.6)     Blood  1250     NG/ 975     IV Piggyback 250 350     Total Intake(mL/kg) 2909 (40.8) 4721 (67.3)     Urine (mL/kg/hr) 4725 (2.8) 4750 (2.8) 300 (3)    Drains 40      Stool 325      Blood 2 7     Total Output 5092 4757 300    Net -2183 -36 -300                 Lines/Drains/Airways     Central Venous Catheter Line            Percutaneous Central Line Insertion/Assessment - Triple Lumen  04/24/20 0900 left internal jugular 4 days         ECMO Cannula 04/25/20 1120 right femoral vein 3 days         ECMO Cannula 04/25/20 1120 right internal jugular 3 days          Drain                 NG/OG Tube 04/12/20 1000 18 Fr. Right nostril 16 days         Urethral Catheter 04/18/20 1854 10 days          Airway                 Airway - Non-Surgical 04/11/20 1525 Endotracheal Tube 17 days          Arterial Line                 Arterial Line 04/27/20 1100 Right Brachial 1 day          Peripheral Intravenous Line                 Peripheral IV - Single Lumen 04/25/20 0941 20 G Right Hand 3 days         Peripheral IV - Single Lumen 04/28/20 1424 20 G Anterior;Left Upper Arm less than 1 day                Physical Exam    Constitutional: No distress. He is sedated, and intubated.   Head: Normocephalic; crich removed and dressing clean dry and intact    Cardiovascular: Tachycardia 130s .   Pulmonary/Chest: intubated vent FiO2 50% PEEP 10, ECMO RPM 3900, sweep 8, FiO2 100%.   Abdominal: Soft.   Neurological: Sedated    Skin: Skin is warm. He is not diaphoretic. L neck and L groin cannulas intact, min to no flashing, few clots pre-oxygenator, all sutures intact and secure  Nursing note and vitals reviewed.    Significant Labs:  ABGs:   Recent Labs   Lab 04/29/20  0748   PH 7.363   PCO2 46.5*   PO2 42   HCO3 26.5   POCSATURATED 76*   BE 1     BMP:   Recent Labs   Lab 04/29/20  0620   *      K 4.4      CO2 27   BUN 26*   CREATININE 0.6   CALCIUM 9.3    MG 2.0     Cardiac markers: No results for input(s): CKMB, CPKMB, TROPONINT, TROPONINI, MYOGLOBIN in the last 48 hours.  CBC:   Recent Labs   Lab 04/29/20  0620   WBC 9.61   RBC 3.88*   HGB 11.3*   HCT 35.6*   PLT 85*   MCV 92   MCH 29.1   MCHC 31.7*     CMP:   Recent Labs   Lab 04/29/20 0620   *   CALCIUM 9.3   ALBUMIN 3.1*   PROT 6.5      K 4.4   CO2 27      BUN 26*   CREATININE 0.6   ALKPHOS 115   ALT 20   AST 21   BILITOT 1.0     Coagulation:   Recent Labs   Lab 04/29/20  0620   INR 1.0   APTT 38.5*     Lactic Acid:   No results for input(s): LACTATE in the last 48 hours.  All pertinent labs from the last 24 hours have been reviewed.    Significant Diagnostics:  CXR: I have reviewed all pertinent results/findings within the past 24 hours

## 2020-04-29 NOTE — ASSESSMENT & PLAN NOTE
Acute respiratory distress syndrome (ARDS) due to COVID-19 virus  - Crich was switched to ETT on 4/11  - Monika weaned to 15  - RPM increased to 3900 for desaturations, will keep here for now; goal flow > 4.0  - Got 2U pRBC and 1U platelets overnight, Platelets 85K this AM; transfuse for under 30K  - Sweep at 8 with CO2s in 40s on ABG  - Vent Fi 50%, PEEP 10, [place] 15 no wean today     Sedation  - On propofol  - Off dilaudid gtt and valium     Shock  - Cardene at 4 for MAP goal 75-80  - Vaso and Levo off >48hrs    FEN/GI  Hypokalemia and Hypophosphatemia  - Repleted K, 4.4 this AM on BID scheduled dosing, replace PRN  - TF at 35; will reduce to 30 per nutrition recs given propofol calories     Anticoagulation      - Heparin gtt at 1300 U/hr      - Goal aXa 0.3 Q6hrs    Prophylaxis      - BID protonix IV      - Vanc/Cefepime/Diflucan for ECMO

## 2020-04-29 NOTE — PROGRESS NOTES
ECMO Specialists shift report    Date: 04/29/2020  ECMO Specialist:  Jorge Yeboah    Pump parameters:  RPM: 3800   Flow:  5.19  Sweep:  8.5  FiO2:  100    Oxygenator status:  Clots: pre-oxy   Fibrin: pre-oxy    Pressure trends:  P1: 177  P2: 141  Delta P: 35    Volume status:  Chugging noted slight sway  CVP: 3-4  MAP:  77-85  MD notified (name):  Dr. Ayon    Anticoagulation:  ACT/aPTT/Xa parameters: 0.3-0.4  ACT/aPTT/Xa trends this shift: 0.38,0.41    Cannula size / status / placement:  Arterial:   Venous 1: 23F/RIJ/4cm   Venous 2:27F/RFV/10cm  Dual lumen:      Additional Comments:  Patient resting on VV ECMO with documented settings.  Patient remained stable during the shift.  RPM and Sweep were changed(see flowsheet for details).

## 2020-04-30 NOTE — NURSING
Dr. Ayon updated on most recent labs, CVP, UOP, gtts, ABGs, & ECMO settings.  Notified Dr. Ayon of Anti-Xa results and that the patient has increased bleeding from mouth with notable clamping down onto ETT and tongue with teeth.  New orders to decrease Heparin gtt to 900 units/hr, and transfuse 1 pack of platelets.  New orders for 20mg of rocuronium.  Will administer 20mg of vicky and RT will place bite block around ETT.

## 2020-04-30 NOTE — CARE UPDATE
JACKIE Ayon MD at bedside.  MD to pull R groin ECMO cannula back 2cm.   ICU RNs at bedside.   ECMO Tech at bedside.   Perfusionist at bedside.   Monitors set.   Alarms checked.     Cannula pulled back 2cm.   Less flashing noted in cannula.   Site CDI.   Dressing changed per ICU RN.   ABG obtained.   RPMs decreased to 3500 per MD order.     ABG to be obtained in 30 minutes.   Plan of care reviewed with pt.     See flowsheet for details.

## 2020-04-30 NOTE — CONSULTS
Wound care consult received. Patient is sedated and intubated with ECMO ongoing. Yesterday patient was noted to be biting on the ETT and her tongue causing joe bleeding, Respiratory was able to place a bite block in an attempt to prevent the biting.   Wound care recommends ENT consult if further evaluation of the area is desired. Wound care cannot be performed to tongue of patient who is intubated and the tongue should continue to heal once the mechanism of injury is removed.   Please reconsult wound care if needed. Nursing to continue care.   Megan FRYE, BSN, RN, COCN, [facility]  [phone]

## 2020-04-30 NOTE — PROGRESS NOTES
ECMO Specialists shift report    Date: 04/30/2020  ECMO Specialist:  Tatyana Pintorobertvalentina    Pump parameters:  RPM: 3700  Flow:  5.0  Sweep: 8.5   FiO2:  100    Oxygenator status:  Clots: small, pre oxygenator   Fibrin: A lot of strands pre oxygenator and a few connection sites.     Pressure trends:  P1: 160's   P2: 130's   Delta P: 30    Volume status:  Chugging noted? None  CVP: 2  MAP:  75-85  MD notified (name):  Nany     Anticoagulation:  ACT/aPTT/Xa parameters: 0.25-0.30  ACT/aPTT/Xa trends this shift: 0.26    Cannula size / status / placement:  Arterial:   Venous 1: 23 FR / RIJV / 4cm  Venous 2: 27 FR / RFV / 12 cm (cannula pulled out 2 cm today by Dr. Ayon)   Dual lumen:      Additional Comments:  Patient was stable throughout shift.  Minimal intervention required. Dr. Ayon removed the cannula in the RFV by 2 cm this morning due to recirculation.  It is now measuring 12cm from insertion site to the end of the coils.  RPM's were decreased to 3700 for a flow of 4.9-5.0 LPM.  All ABG results were reported to Dr. Ayon.

## 2020-04-30 NOTE — CARE UPDATE
Pt intubated.   Ventilator Settings: AC/PC: Rate: 12, FiO2: 50%, PEEP: 10.   NO: 10 PPM.   SpO2 ~94%.   Q6H ABGs obtained per Ecmo LADONNA Blair, RRT.      Pt unarousable.   Pt not following commands.   Pt medically sedated.   MD aware.   RASS -5.      Q1H Neurovascular exams completed.   See flowsheet for details.      HOB flat per MD order.      Propofol gtt titrated per comfort.      Goal to maintain 78-85 per MD order.   Cardene gtt titrated to maintain goal.      CVP 3, HOB Flat.   UOP ~250cc/hr.   Goal to maintain pt net negative 750cc/12hrs.      Q6H APTT/Anti Xa monitoring.   Goal to maintain Anti Xa- 0.25-0.3.   MD updated with results.   Heparin gtt @1000units/hr.      ECMO  VV ECMO  RIJ/R Femoral Vein   RPM: 3800        Flow:  5.1  Sweep:  8.5LPM  FiO2:  100     Dressings changed per ICU RN.   Q2H site assessments.      Pulses dopplerable.      TF @30cc/hr per NGT.   NGT flushed PRN.   75cc residual output noted.      Plan of care reviewed with pt and son.   Questions answered per ICU RN.   See flowsheet for details.

## 2020-04-30 NOTE — PROGRESS NOTES
Pharmacokinetic Assessment Follow Up: IV Vancomycin    Vancomycin serum concentration assessment(s):    Vancomycin level resulted at 16.1 mcg/mL approximately 10 hours after the previous dose. Additional level ordered by provider resulted at 10 mcg/mL approximately 5.5 hours later. Goal level is 10 to 20 mcg/mL.     Drug levels (last 3 results):  Recent Labs   Lab Result Units 04/28/20  0730 04/30/20  0606 04/30/20  1130   Vancomycin, Random ug/mL  --  16.1  --    Vancomycin-Trough ug/mL 14.3  --  10.0     Vancomycin Regimen Plan:    Continue vancomycin 1000 mg IV every 12 hours with next serum trough concentration measured on 5/4 1130 for surveillance.     Pharmacy will continue to follow and monitor vancomycin.    Please contact pharmacy at extension [phone] for questions regarding this assessment.    Thank you for the consult,   Sonya Banks, PharmD, [facility]             Patient brief summary:  Ranjana Sanchez is a 54 y.o. male initiated on antimicrobial therapy with IV vancomycin for treatment of sepsis    Drug Allergies:   Review of patient's allergies indicates:  No Known Allergies    Actual Body Weight:   70.1 kg     Renal Function:   Estimated Creatinine Clearance: 98.7 mL/min (based on SCr of 0.7 mg/dL).    Dialysis Method (if applicable):  N/A    CBC (last 72 hours):  Recent Labs   Lab Result Units 04/27/20  1515 04/27/20  2200 04/28/20  0400 04/28/20  1001 04/28/20  1600 04/28/20  2156 04/29/20  0200 04/29/20  0620 04/29/20  1200 04/29/20  1804 04/30/20  0021 04/30/20  0606 04/30/20  1130   WBC K/uL 9.45 8.61 9.31 8.20 8.01 8.39 9.21 9.61 10.76 9.09 8.30 8.28 8.28   Hemoglobin g/dL 11.5* 11.6* 11.6* 10.8* 10.3* 9.7* 9.0* 11.3* 11.2* 10.8* 9.8* 11.2* 11.4*   Hematocrit % 36.6* 36.7* 37.2* 34.7* 33.6* 31.0* 29.3* 35.6* 35.8* 33.7* 30.6* 35.1* 35.4*   Platelets K/uL 50* 44* 44* 38* 36* 110* 91* 85* 79* 75* 102* 97* 95*   Gran% % 81.0* 88.0* 81.3* 87.0* 75.0* 81.0* 79.5* 85.0* 83.2* 77.1* 74.0* 84.0*  74.0*   Lymph% % 5.0* 4.0* 6.6* 8.0* 8.0* 5.0* 8.5* 6.2* 7.2* 10.6* 11.0* 4.0* 10.0*   Mono% % 1.0* 1.3* 5.3 1.0* 10.0 4.0 2.5* 3.7* 4.6 5.7 3.0* 4.0 4.0   Eosinophil% % 1.0 1.3 1.3 0.0 0.0 1.0 1.5 1.9 1.2 2.0 0.0 3.0 7.0   Basophil% % 0.0 0.0 0.8 0.0 0.0 1.0 0.0 0.5 0.8 0.6 0.0 0.0 1.0   Differential Method  Manual Manual Automated Manual Manual Manual Manual Automated Automated Automated Manual Manual Manual       Metabolic Panel (last 72 hours):  Recent Labs   Lab Result Units 04/27/20  1515 04/27/20  2200 04/28/20  0400 04/28/20  1001 04/28/20  1600 04/28/20  2156 04/29/20  0200 04/29/20  0620 04/29/20  1200 04/29/20  1804 04/30/20  0021 04/30/20  0606 04/30/20  1130   Sodium mmol/L 139 134* 137 136 140 140 143 144 145 147* 146* 147* 148*   Potassium mmol/L 4.1 4.6 4.1 4.9 4.4 4.1 4.4 4.4 4.5 4.1 4.2 3.8 5.3*   Chloride mmol/L 106 104 105 106 107 106 109 109 109 112* 113* 110 112*   CO2 mmol/L 25 24 25 24 25 28 25 27 28 28 28 28 29   Glucose mg/dL 171* 280* 219* 232* 205* 248* 223* 235* 238* 234* 240* 266* 262*   BUN, Bld mg/dL 15 16 18 21* 26* 31* 27* 26* 27* 31* 32* 33* 35*   Creatinine mg/dL 0.6 0.7 0.6 0.6 0.6 0.6 0.6 0.6 0.6 0.6 0.6 0.7 0.7   Albumin g/dL 2.2* 2.2* 2.4* 2.5* 2.3* 2.6* 3.4* 3.1* 2.9* 2.7* 2.4* 2.6* 2.6*   Total Bilirubin mg/dL 0.6 0.7 0.7 0.6 0.5 0.5 0.8 1.0 0.8 0.7 0.6 0.7 0.7   Alkaline Phosphatase U/L 111 116 123 112 108 116 111 115 122 128 109 120 126   AST U/L 22 23 23 23 21 23 26 21 24 28 25 27 26   ALT U/L 21 21 19 17 18 19 19 20 19 21 23 22 23   Magnesium mg/dL 2.0 1.9 2.0 2.0 2.0 2.0 2.1 2.0 2.2 2.1 2.1 2.0 2.0   Phosphorus mg/dL 3.9 3.3 3.1 3.0 3.1 2.7 2.8 3.1 1.7* 1.7* 1.9* 3.4 3.4       Vancomycin Administrations:  vancomycin given in the last 96 hours                   vancomycin in dextrose 5 % 1 gram/250 mL IVPB 1,000 mg (mg) 1,000 mg New Bag 04/30/20 1200     1,000 mg New Bag 04/29/20 2030     1,000 mg New Bag  0819     1,000 mg New Bag 04/28/20 2100     1,000 mg New Bag   0806     1,000 mg New Bag 04/27/20 2051                Microbiologic Results:  Microbiology Results (last 7 days)     Procedure Component Value Units Date/Time    Blood culture [[id number]] Collected:  04/25/20 0930    Order Status:  Completed Specimen:  Blood from Peripheral, Hand, Right Updated:  04/29/20 1212     Blood Culture, Routine No Growth after 4 days.     Narrative:       Blood cultures x 2 different sites. 4 bottles total. Please  draw cultures before administering antibiotics.    Blood culture [[id number]] Collected:  04/25/20 0945    Order Status:  Completed Specimen:  Blood from Peripheral, Hand, Left Updated:  04/29/20 1212     Blood Culture, Routine No Growth after 4 days.     Narrative:       Blood cultures from 2 different sites. 4 bottles total.  Please draw before starting antibiotics.

## 2020-04-30 NOTE — PLAN OF CARE
04/30/20 1135   Discharge Reassessment   Assessment Type Discharge Planning Reassessment   Do you have any problems affording any of your prescribed medications? TBD   Discharge Plan A Long-term acute care facility (LTAC)   Discharge Plan B Skilled Nursing Facility   Tubed, sedated, ECMO.

## 2020-05-01 NOTE — ASSESSMENT & PLAN NOTE
Acute respiratory distress syndrome (ARDS) due to COVID-19 virus  - Crich was switched to ETT on 4/11  - Monika weaned to 10, will wean to 8 today  - RPM 3700 for desaturations; goal flow > 4.0  - thrombocytopenia: transfuse for under 30K  - Sweep at 8.5  - Vent Fi 50%, PEEP 10  - Considering tracheostomy     Tongue laceration      -ENT consulted and evaluated patient, laceration superficial, not bleeding this AM      -recs: bite block vs paralysis, will monitor    Sedation  - On propofol  - Off dilaudid gtt and valium     Shock  - Cardene for MAP goal 75-80  - Vaso and Levo off >48hrs    FEN/GI  Hypokalemia and Hypophosphatemia  - Repleted K, on BID scheduled dosing, replace PRN  - TF at 30     Anticoagulation      - Heparin gtt at 1000 U/hr      - Goal aXa 0.3 Q6hrs    Prophylaxis      - BID protonix IV      - Vanc/Cefepime/Diflucan for ECMO

## 2020-05-01 NOTE — PROGRESS NOTES
[facility]  Cardiothoracic Surgery  Daily ECMO Progress Note    Patient Name: Ranjana Sanchez  MRN: [id number]  Admission Date: 4/10/2020  Hospital Length of Stay: 21 days  Type of ECMO: VV  Day of ECMO Support: 6  Code Status: Full Code   Attending Physician: Francis Ayon MD   Referring Provider: Self, Aaareferral  Principal Problem:Acute respiratory distress syndrome (ARDS) due to COVID-19 virus    Subjective:   Interval History:    No transfusions overnight, Hb 1 point down, platelets stable. No pressors or cardene overnight. 6.5L UOP, net negative 1.7L. ENT consulted for tongue biting and bleeding. RPM 3700 with flows high 4, sweep remains at 8.5. Monika at 10ppm. Heparin 1000u/hr.    Medications:  Continuous Infusions:   dextrose 10 % in water (D10W)      heparin (porcine) in 5 % dex 750 Units/hr (05/01/20 1800)    nicardipine Stopped (04/30/20 1800)    nitric oxide gas      norepinephrine bitartrate-D5W Stopped (04/25/20 0300)    propofoL 40 mcg/kg/min (05/01/20 1800)    vasopressin (PITRESSIN) infusion 0.02 Units/min (05/01/20 1800)     Scheduled Meds:   albuterol-ipratropium  3 mL Nebulization Q6H    chlorhexidine  15 mL Mouth/Throat BID    fluconazole (DIFLUCAN) IVPB  200 mg Intravenous Q24H    pantoprazole  40 mg Intravenous BID    polyethylene glycol  17 g Per NG tube Daily    vancomycin (VANCOCIN) IVPB  1,000 mg Intravenous Q12H    white petrolatum-mineral oiL   Both Eyes BID        Objective:     Vital Signs (Most Recent):  Temp: 98.1 °F (36.7 °C) (05/01/20 1800)  Pulse: (!) 126 (05/01/20 1800)  Resp: 20 (05/01/20 1240)  BP: 95/63 (05/01/20 1800)  SpO2: 97 % (05/01/20 1800) Vital Signs (24h Range):  Temp:  [97.2 °F (36.2 °C)-98.2 °F (36.8 °C)] 98.1 °F (36.7 °C)  Pulse:  [] 126  Resp:  [20-27] 20  SpO2:  [87 %-98 %] 97 %  BP: ()/(57-78) 95/63  Arterial Line BP: ()/(53-69) 91/56     Weight: 68 kg (149 lb 14.6 oz)  Body mass index is 25.73  kg/m².    SpO2: 97 %  O2 Device (Oxygen Therapy): ventilator    Intake/Output - Last 3 Shifts       04/29 0700 - 04/30 0659 04/30 0700 - 05/01 0659 05/01 0700 - 05/02 0659    I.V. (mL/kg) 1438 (20.5) 2313.1 (34) 683 (10)    Blood 594  1137.5    NG/ 1840 920    IV Piggyback  500     Total Intake(mL/kg) 2692 (38.4) 4653.1 (68.4) 2740.5 (40.3)    Urine (mL/kg/hr) 3490 (2.1) 6460 (4) 3325 (4.2)    Drains  75     Stool   0    Blood 2 6 4    Total Output 3492 6541 3329    Net -800 -1887.9 -588.5           Stool Occurrence   1 x          Lines/Drains/Airways     Central Venous Catheter Line            Percutaneous Central Line Insertion/Assessment - Triple Lumen  04/24/20 0900 left internal jugular 7 days         ECMO Cannula 04/25/20 1120 right femoral vein 6 days         ECMO Cannula 04/25/20 1120 right internal jugular 6 days          Drain                 NG/OG Tube 04/12/20 1000 18 Fr. Right nostril 19 days         Urethral Catheter 04/18/20 1854 12 days          Airway                 Airway - Non-Surgical 04/11/20 1525 Endotracheal Tube 20 days          Arterial Line                 Arterial Line 04/27/20 1100 Right Brachial 4 days          Peripheral Intravenous Line                 Peripheral IV - Single Lumen 04/28/20 1424 20 G Anterior;Left Upper Arm 3 days         Peripheral IV - Single Lumen 04/30/20 0550 18 G Right Wrist 1 day         Peripheral IV - Single Lumen 04/30/20 0550 20 G Left Hand 1 day                Physical Exam    Constitutional: No distress. He is sedated, and intubated.   Head: Normocephalic; crich removed and dressing clean dry and intact    Cardiovascular: Tachycardia 130s .   Pulmonary/Chest: intubated vent FiO2 50% PEEP 10, ECMO RPM 3700, sweep 8.5, FiO2 100%.   Abdominal: Soft.   Neurological: Sedated    Skin: Skin is warm. He is not diaphoretic. L neck and L groin cannulas intact, min to no flashing, few clots pre-oxygenator, all sutures intact and secure  Nursing note and vitals  reviewed.    Significant Labs:  ABGs:   Recent Labs   Lab 05/01/20  1415   PH 7.408   PCO2 47.2*   PO2 70*   HCO3 29.8*   POCSATURATED 94*   BE 5     BMP:   Recent Labs   Lab 05/01/20  1515   *      K 4.7      CO2 29   BUN 50*   CREATININE 0.5   CALCIUM 8.8   MG 2.0     Cardiac markers: No results for input(s): CKMB, CPKMB, TROPONINT, TROPONINI, MYOGLOBIN in the last 48 hours.  CBC:   Recent Labs   Lab 05/01/20  1515   WBC 9.04   RBC 3.19*   HGB 9.6*   HCT 30.0*   PLT 73*   MCV 94   MCH 30.1   MCHC 32.0     CMP:   Recent Labs   Lab 05/01/20  1515   *   CALCIUM 8.8   ALBUMIN 2.1*   PROT 5.4*      K 4.7   CO2 29      BUN 50*   CREATININE 0.5   ALKPHOS 121   ALT 27   AST 30   BILITOT 0.3     Coagulation:   Recent Labs   Lab 05/01/20  1515   INR 1.1   APTT 27.9     Lactic Acid:   No results for input(s): LACTATE in the last 48 hours.  All pertinent labs from the last 24 hours have been reviewed.    Significant Diagnostics:  CXR: I have reviewed all pertinent results/findings within the past 24 hours      Assessment/Plan:     Acute respiratory disease due to COVID-19 virus  Acute respiratory distress syndrome (ARDS) due to COVID-19 virus  - Crich was switched to ETT on 4/11  - Monika weaned to 10, will wean to 8 today  - RPM 3700 for desaturations; goal flow > 4.0  - thrombocytopenia: transfuse for under 30K  - Sweep at 8.5  - Vent Fi 50%, PEEP 10  - Considering tracheostomy     Tongue laceration      -ENT consulted and evaluated patient, laceration superficial, not bleeding this AM      -recs: bite block vs paralysis, will monitor    Sedation  - On propofol  - Off dilaudid gtt and valium     Shock  - Cardene for MAP goal 75-80  - Vaso and Levo off >48hrs    FEN/GI  Hypokalemia and Hypophosphatemia  - Repleted K, on BID scheduled dosing, replace PRN  - TF at 30     Anticoagulation      - Heparin gtt at 1000 U/hr      - Goal aXa 0.3 Q6hrs    Prophylaxis      - BID protonix IV      -  Vanc/Cefepime/Diflucan for ECMO    Anne Miller MD  Cardiothoracic Surgery  [facility]    VV ECMO circuit checked and all parameters reviewed. Anticoagulation was managed and all cannulation exit sites along with review of labs and radiology studies performed. Speed and functioning of the pump along with oxygenator quality reviewed.  Excellent hemodynamics and excellent support on ECMO with minimal amount of hemolysis as exhibited by very stable LDH level.  Extensive communication carried out between various teams which included the critical care the nursing staff including the surgical team members.  Perfusion team by the bedside and in agreement with the plan.

## 2020-05-01 NOTE — SUBJECTIVE & OBJECTIVE
Interval History:    Transfused another unit pRBC and 1 platelet. Afebrile. ENT to be consulted for blood loss from tongue biting    Medications:  Continuous Infusions:   dextrose 10 % in water (D10W)      heparin (porcine) in 5 % dex 750 Units/hr (05/01/20 1800)    nicardipine Stopped (04/30/20 1800)    nitric oxide gas      norepinephrine bitartrate-D5W Stopped (04/25/20 0300)    propofoL 40 mcg/kg/min (05/01/20 1800)    vasopressin (PITRESSIN) infusion 0.02 Units/min (05/01/20 1800)     Scheduled Meds:   albuterol-ipratropium  3 mL Nebulization Q6H    chlorhexidine  15 mL Mouth/Throat BID    fluconazole (DIFLUCAN) IVPB  200 mg Intravenous Q24H    pantoprazole  40 mg Intravenous BID    polyethylene glycol  17 g Per NG tube Daily    vancomycin (VANCOCIN) IVPB  1,000 mg Intravenous Q12H    white petrolatum-mineral oiL   Both Eyes BID        Objective:     Vital Signs (Most Recent):  Temp: 98.1 °F (36.7 °C) (05/01/20 1800)  Pulse: (!) 126 (05/01/20 1800)  Resp: 20 (05/01/20 1240)  BP: 95/63 (05/01/20 1800)  SpO2: 97 % (05/01/20 1800) Vital Signs (24h Range):  Temp:  [97.2 °F (36.2 °C)-98.2 °F (36.8 °C)] 98.1 °F (36.7 °C)  Pulse:  [] 126  Resp:  [20-27] 20  SpO2:  [87 %-98 %] 97 %  BP: ()/(57-78) 95/63  Arterial Line BP: ()/(53-69) 91/56     Weight: 68 kg (149 lb 14.6 oz)  Body mass index is 25.73 kg/m².    SpO2: 97 %  O2 Device (Oxygen Therapy): ventilator    Intake/Output - Last 3 Shifts       04/29 0700 - 04/30 0659 04/30 0700 - 05/01 0659 05/01 0700 - 05/02 0659    I.V. (mL/kg) 1438 (20.5) 2313.1 (34) 683 (10)    Blood 594  1137.5    NG/ 1840 920    IV Piggyback  500     Total Intake(mL/kg) 2692 (38.4) 4653.1 (68.4) 2740.5 (40.3)    Urine (mL/kg/hr) 3490 (2.1) 6460 (4) 3325 (4.2)    Drains  75     Stool   0    Blood 2 6 4    Total Output 3492 6541 3329    Net -800 -1887.9 -588.5           Stool Occurrence   1 x          Lines/Drains/Airways     Central Venous Catheter Line             Percutaneous Central Line Insertion/Assessment - Triple Lumen  04/24/20 0900 left internal jugular 7 days         ECMO Cannula 04/25/20 1120 right femoral vein 6 days         ECMO Cannula 04/25/20 1120 right internal jugular 6 days          Drain                 NG/OG Tube 04/12/20 1000 18 Fr. Right nostril 19 days         Urethral Catheter 04/18/20 1854 12 days          Airway                 Airway - Non-Surgical 04/11/20 1525 Endotracheal Tube 20 days          Arterial Line                 Arterial Line 04/27/20 1100 Right Brachial 4 days          Peripheral Intravenous Line                 Peripheral IV - Single Lumen 04/28/20 1424 20 G Anterior;Left Upper Arm 3 days         Peripheral IV - Single Lumen 04/30/20 0550 18 G Right Wrist 1 day         Peripheral IV - Single Lumen 04/30/20 0550 20 G Left Hand 1 day                Physical Exam    Constitutional: No distress. He is sedated, and intubated.   Head: Normocephalic; crich removed and dressing clean dry and intact    Cardiovascular: Tachycardia 130s .   Pulmonary/Chest: intubated vent FiO2 50% PEEP 10, ECMO RPM 3800, sweep 8.5, FiO2 100%.   Abdominal: Soft.   Neurological: Sedated    Skin: Skin is warm. He is not diaphoretic. L neck and L groin cannulas intact, min to no flashing, few clots pre-oxygenator, all sutures intact and secure  Nursing note and vitals reviewed.    Significant Labs:  ABGs:   Recent Labs   Lab 05/01/20  1415   PH 7.408   PCO2 47.2*   PO2 70*   HCO3 29.8*   POCSATURATED 94*   BE 5     BMP:   Recent Labs   Lab 05/01/20  1515   *      K 4.7      CO2 29   BUN 50*   CREATININE 0.5   CALCIUM 8.8   MG 2.0     Cardiac markers: No results for input(s): CKMB, CPKMB, TROPONINT, TROPONINI, MYOGLOBIN in the last 48 hours.  CBC:   Recent Labs   Lab 05/01/20  1515   WBC 9.04   RBC 3.19*   HGB 9.6*   HCT 30.0*   PLT 73*   MCV 94   MCH 30.1   MCHC 32.0     CMP:   Recent Labs   Lab 05/01/20  1515   *   CALCIUM 8.8    ALBUMIN 2.1*   PROT 5.4*      K 4.7   CO2 29      BUN 50*   CREATININE 0.5   ALKPHOS 121   ALT 27   AST 30   BILITOT 0.3     Coagulation:   Recent Labs   Lab 05/01/20  1515   INR 1.1   APTT 27.9     Lactic Acid:   No results for input(s): LACTATE in the last 48 hours.  All pertinent labs from the last 24 hours have been reviewed.    Significant Diagnostics:  CXR: I have reviewed all pertinent results/findings within the past 24 hours

## 2020-05-01 NOTE — PROGRESS NOTES
ECMO Specialists shift report    Date: 05/01/2020  ECMO Specialist:  Tatyana Pintorobertvalentina    Pump parameters:  RPM: 3500  Flow:  4.5  Sweep:  9  FiO2:  100    Oxygenator status:  Clots: Pre Oxygenator  Fibrin: Pre Oxygenator    Pressure trends:  P1: 130's  P2: 110's  Delta P: 20-30    Volume status:  Chugging noted? None  CVP: NA  MAP: 60-80's  MD notified (name):  Nany     Anticoagulation:  ACT/aPTT/Xa parameters: 0.25-0.3  ACT/aPTT/Xa trends this shift: 0.27    Cannula size / status / placement:  Arterial:   Venous 1:  23 Fr / RIJ / 4 cm  Venous 2:  27 FR / RFV / 12 cm   Dual lumen:      Additional Comments:  Patient stable on VV ECMO.  No flow issues today.  Sweep was increased to 9 for a PCO2 of 50, RPM's were decreased to 3500 by Dr. Ayon.  1 Unit of RBC's given for a dropping Hematocrit.  Heparin was decreased to achieve Anti Xa goals.  All ABG results were reported to Dr. Ayon.

## 2020-05-01 NOTE — PROGRESS NOTES
[facility]  Cardiothoracic Surgery  Daily ECMO Progress Note    Patient Name: Ranjana Sanchez  MRN: [id number]  Admission Date: 4/10/2020  Hospital Length of Stay: 21 days  Type of ECMO: VV  Day of ECMO Support: 5  Code Status: Full Code   Attending Physician: Francis Ayon MD   Referring Provider: Self, Aaareferral  Principal Problem:Acute respiratory distress syndrome (ARDS) due to COVID-19 virus    Subjective:   Interval History:    Transfused another unit pRBC and 1 platelet. Afebrile. ENT to be consulted for blood loss from tongue biting    Medications:  Continuous Infusions:   dextrose 10 % in water (D10W)      heparin (porcine) in 5 % dex 750 Units/hr (05/01/20 1800)    nicardipine Stopped (04/30/20 1800)    nitric oxide gas      norepinephrine bitartrate-D5W Stopped (04/25/20 0300)    propofoL 40 mcg/kg/min (05/01/20 1800)    vasopressin (PITRESSIN) infusion 0.02 Units/min (05/01/20 1800)     Scheduled Meds:   albuterol-ipratropium  3 mL Nebulization Q6H    chlorhexidine  15 mL Mouth/Throat BID    fluconazole (DIFLUCAN) IVPB  200 mg Intravenous Q24H    pantoprazole  40 mg Intravenous BID    polyethylene glycol  17 g Per NG tube Daily    vancomycin (VANCOCIN) IVPB  1,000 mg Intravenous Q12H    white petrolatum-mineral oiL   Both Eyes BID        Objective:     Vital Signs (Most Recent):  Temp: 98.1 °F (36.7 °C) (05/01/20 1800)  Pulse: (!) 126 (05/01/20 1800)  Resp: 20 (05/01/20 1240)  BP: 95/63 (05/01/20 1800)  SpO2: 97 % (05/01/20 1800) Vital Signs (24h Range):  Temp:  [97.2 °F (36.2 °C)-98.2 °F (36.8 °C)] 98.1 °F (36.7 °C)  Pulse:  [] 126  Resp:  [20-27] 20  SpO2:  [87 %-98 %] 97 %  BP: ()/(57-78) 95/63  Arterial Line BP: ()/(53-69) 91/56     Weight: 68 kg (149 lb 14.6 oz)  Body mass index is 25.73 kg/m².    SpO2: 97 %  O2 Device (Oxygen Therapy): ventilator    Intake/Output - Last 3 Shifts       04/29 0700 - 04/30 0659 04/30 0700 - 05/01 0659 05/01  0700 - 05/02 0659    I.V. (mL/kg) 1438 (20.5) 2313.1 (34) 683 (10)    Blood 594  1137.5    NG/ 1840 920    IV Piggyback  500     Total Intake(mL/kg) 2692 (38.4) 4653.1 (68.4) 2740.5 (40.3)    Urine (mL/kg/hr) 3490 (2.1) 6460 (4) 3325 (4.2)    Drains  75     Stool   0    Blood 2 6 4    Total Output 3492 6541 3329    Net -800 -1887.9 -588.5           Stool Occurrence   1 x          Lines/Drains/Airways     Central Venous Catheter Line            Percutaneous Central Line Insertion/Assessment - Triple Lumen  04/24/20 0900 left internal jugular 7 days         ECMO Cannula 04/25/20 1120 right femoral vein 6 days         ECMO Cannula 04/25/20 1120 right internal jugular 6 days          Drain                 NG/OG Tube 04/12/20 1000 18 Fr. Right nostril 19 days         Urethral Catheter 04/18/20 1854 12 days          Airway                 Airway - Non-Surgical 04/11/20 1525 Endotracheal Tube 20 days          Arterial Line                 Arterial Line 04/27/20 1100 Right Brachial 4 days          Peripheral Intravenous Line                 Peripheral IV - Single Lumen 04/28/20 1424 20 G Anterior;Left Upper Arm 3 days         Peripheral IV - Single Lumen 04/30/20 0550 18 G Right Wrist 1 day         Peripheral IV - Single Lumen 04/30/20 0550 20 G Left Hand 1 day                Physical Exam    Constitutional: No distress. He is sedated, and intubated.   Head: Normocephalic; crich removed and dressing clean dry and intact    Cardiovascular: Tachycardia 130s .   Pulmonary/Chest: intubated vent FiO2 50% PEEP 10, ECMO RPM 3800, sweep 8.5, FiO2 100%.   Abdominal: Soft.   Neurological: Sedated    Skin: Skin is warm. He is not diaphoretic. L neck and L groin cannulas intact, min to no flashing, few clots pre-oxygenator, all sutures intact and secure  Nursing note and vitals reviewed.    Significant Labs:  ABGs:   Recent Labs   Lab 05/01/20  1415   PH 7.408   PCO2 47.2*   PO2 70*   HCO3 29.8*   POCSATURATED 94*   BE 5     BMP:    Recent Labs   Lab 05/01/20  1515   *      K 4.7      CO2 29   BUN 50*   CREATININE 0.5   CALCIUM 8.8   MG 2.0     Cardiac markers: No results for input(s): CKMB, CPKMB, TROPONINT, TROPONINI, MYOGLOBIN in the last 48 hours.  CBC:   Recent Labs   Lab 05/01/20  1515   WBC 9.04   RBC 3.19*   HGB 9.6*   HCT 30.0*   PLT 73*   MCV 94   MCH 30.1   MCHC 32.0     CMP:   Recent Labs   Lab 05/01/20  1515   *   CALCIUM 8.8   ALBUMIN 2.1*   PROT 5.4*      K 4.7   CO2 29      BUN 50*   CREATININE 0.5   ALKPHOS 121   ALT 27   AST 30   BILITOT 0.3     Coagulation:   Recent Labs   Lab 05/01/20  1515   INR 1.1   APTT 27.9     Lactic Acid:   No results for input(s): LACTATE in the last 48 hours.  All pertinent labs from the last 24 hours have been reviewed.    Significant Diagnostics:  CXR: I have reviewed all pertinent results/findings within the past 24 hours      Assessment/Plan:     Acute respiratory disease due to COVID-19 virus  Acute respiratory distress syndrome (ARDS) due to COVID-19 virus  - Crich was switched to ETT on 4/11  - Monika 15  - RPM 3800 for desaturations; goal flow > 4.0  - Got 1U pRBC and 1U platelets; transfuse for under 30K  - Sweep at 8.5  - Vent Fi 50%, PEEP 10     Sedation  - On propofol  - Off dilaudid gtt and valium     Shock  - Cardene for MAP goal 75-80  - Vaso and Levo off >48hrs    FEN/GI  Hypokalemia and Hypophosphatemia  - Repleted K, 4.4 this AM on BID scheduled dosing, replace PRN  - TF at 30     Anticoagulation      - Heparin gtt at 1300 U/hr      - Goal aXa 0.3 Q6hrs    Prophylaxis      - BID protonix IV      - Vanc/Cefepime/Diflucan for ECMO    Anne Miller MD  Cardiothoracic Surgery  [facility]      VV ECMO circuit checked and all parameters reviewed. Anticoagulation was managed and all cannulation exit sites along with review of labs and radiology studies performed. Speed and functioning of the pump along with oxygenator quality  reviewed.  Patient care was coordinated between different team members which included surgical team, ICU team and the nursing staff.  Family has been informed with the current situation of the patient.

## 2020-05-01 NOTE — PLAN OF CARE
Problem: Adult Inpatient Plan of Care  Goal: Plan of Care Review  Outcome: Ongoing, Progressing    No acute events overnight. VSS. Pt remains intubated AC PC 50% FiO2, 10 of peep, Rate of 12, NiO 10. Gtts: Heparin at 1000 u/hr, Propofol at 10 mcg/kg/min. ECMO RPM 3700, Flow 5-5.1, sweep 8.5, FiO2 100% . UOP excellent overnight. CVP 2-6. See flowsheets for assessments and intake and output. Labs trended. Phosphorus replaced. 250cc Free water flushes q4h. POCT accuchecks performed q4h, with supplemental insulin given. Tube feeds remain at 30cc/hr. Pt turned q2h.

## 2020-05-01 NOTE — PROGRESS NOTES
ECMO Specialists shift report    Date: 05/01/2020  ECMO Specialist:  Ashley Francisco    Pump parameters:  RPM: 3700  Flow:  4.9-5  Sweep: 8.5  FiO2:  100    Oxygenator status:  Clots: Pre  Fibrin: Through out circuit and oxygenator    Pressure trends:  P1: 164  P2: 133  Delta P: 31    Volume status:  Chugging noted? some  CVP: 0-1  MAP: 79-80   MD notified (name): Nany    Anticoagulation:  ACT/aPTT/Xa parameters: .25-.3  ACT/aPTT/Xa trends this shift: .3    Cannula size / status / placement:  Arterial:   Venous 1: 23 RIJ @4cm  Venous 2:27 RFV@12cm  Dual lumen:      Additional Comments: No issues. Maintained flows.

## 2020-05-01 NOTE — ASSESSMENT & PLAN NOTE
Acute respiratory distress syndrome (ARDS) due to COVID-19 virus  - Crich was switched to ETT on 4/11  - Monika 15  - RPM 3800 for desaturations; goal flow > 4.0  - Got 1U pRBC and 1U platelets; transfuse for under 30K  - Sweep at 8.5  - Vent Fi 50%, PEEP 10     Sedation  - On propofol  - Off dilaudid gtt and valium     Shock  - Cardene for MAP goal 75-80  - Vaso and Levo off >48hrs    FEN/GI  Hypokalemia and Hypophosphatemia  - Repleted K, 4.4 this AM on BID scheduled dosing, replace PRN  - TF at 30     Anticoagulation      - Heparin gtt at 1300 U/hr      - Goal aXa 0.3 Q6hrs    Prophylaxis      - BID protonix IV      - Vanc/Cefepime/Diflucan for ECMO

## 2020-05-01 NOTE — PLAN OF CARE
Patient seen and examined. Noted to have ventral tongue laceration that was not deep and mildly oozing on manipulations. Do not believe role for sutures in this particular case. Nursing also states bleeding has not been an issue in past 24 hrs. Recommend paralysis vs. Hard plastic bite block to keep teeth from biting tongue. Could also used Afrin soaked Kerlex underneath tongue if bleeding persist as patient is currently on ventilator with ETT in place. [facility] for further concerns.

## 2020-05-01 NOTE — SUBJECTIVE & OBJECTIVE
Interval History:    No transfusions overnight, Hb 1 point down, platelets stable. No pressors or cardene overnight. 6.5L UOP, net negative 1.7L. ENT consulted for tongue biting and bleeding. RPM 3700 with flows high 4, sweep remains at 8.5. Monika at 10ppm. Heparin 1000u/hr.    Medications:  Continuous Infusions:   dextrose 10 % in water (D10W)      heparin (porcine) in 5 % dex 750 Units/hr (05/01/20 1800)    nicardipine Stopped (04/30/20 1800)    nitric oxide gas      norepinephrine bitartrate-D5W Stopped (04/25/20 0300)    propofoL 40 mcg/kg/min (05/01/20 1800)    vasopressin (PITRESSIN) infusion 0.02 Units/min (05/01/20 1800)     Scheduled Meds:   albuterol-ipratropium  3 mL Nebulization Q6H    chlorhexidine  15 mL Mouth/Throat BID    fluconazole (DIFLUCAN) IVPB  200 mg Intravenous Q24H    pantoprazole  40 mg Intravenous BID    polyethylene glycol  17 g Per NG tube Daily    vancomycin (VANCOCIN) IVPB  1,000 mg Intravenous Q12H    white petrolatum-mineral oiL   Both Eyes BID        Objective:     Vital Signs (Most Recent):  Temp: 98.1 °F (36.7 °C) (05/01/20 1800)  Pulse: (!) 126 (05/01/20 1800)  Resp: 20 (05/01/20 1240)  BP: 95/63 (05/01/20 1800)  SpO2: 97 % (05/01/20 1800) Vital Signs (24h Range):  Temp:  [97.2 °F (36.2 °C)-98.2 °F (36.8 °C)] 98.1 °F (36.7 °C)  Pulse:  [] 126  Resp:  [20-27] 20  SpO2:  [87 %-98 %] 97 %  BP: ()/(57-78) 95/63  Arterial Line BP: ()/(53-69) 91/56     Weight: 68 kg (149 lb 14.6 oz)  Body mass index is 25.73 kg/m².    SpO2: 97 %  O2 Device (Oxygen Therapy): ventilator    Intake/Output - Last 3 Shifts       04/29 0700 - 04/30 0659 04/30 0700 - 05/01 0659 05/01 0700 - 05/02 0659    I.V. (mL/kg) 1438 (20.5) 2313.1 (34) 683 (10)    Blood 594  1137.5    NG/ 1840 920    IV Piggyback  500     Total Intake(mL/kg) 2692 (38.4) 4653.1 (68.4) 2740.5 (40.3)    Urine (mL/kg/hr) 3490 (2.1) 6460 (4) 3325 (4.2)    Drains  75     Stool   0    Blood 2 6 4    Total  Output 7413 6508 3329    Net -800 -1887.9 -588.5           Stool Occurrence   1 x          Lines/Drains/Airways     Central Venous Catheter Line            Percutaneous Central Line Insertion/Assessment - Triple Lumen  04/24/20 0900 left internal jugular 7 days         ECMO Cannula 04/25/20 1120 right femoral vein 6 days         ECMO Cannula 04/25/20 1120 right internal jugular 6 days          Drain                 NG/OG Tube 04/12/20 1000 18 Fr. Right nostril 19 days         Urethral Catheter 04/18/20 1854 12 days          Airway                 Airway - Non-Surgical 04/11/20 1525 Endotracheal Tube 20 days          Arterial Line                 Arterial Line 04/27/20 1100 Right Brachial 4 days          Peripheral Intravenous Line                 Peripheral IV - Single Lumen 04/28/20 1424 20 G Anterior;Left Upper Arm 3 days         Peripheral IV - Single Lumen 04/30/20 0550 18 G Right Wrist 1 day         Peripheral IV - Single Lumen 04/30/20 0550 20 G Left Hand 1 day                Physical Exam    Constitutional: No distress. He is sedated, and intubated.   Head: Normocephalic; crich removed and dressing clean dry and intact    Cardiovascular: Tachycardia 130s .   Pulmonary/Chest: intubated vent FiO2 50% PEEP 10, ECMO RPM 3700, sweep 8.5, FiO2 100%.   Abdominal: Soft.   Neurological: Sedated    Skin: Skin is warm. He is not diaphoretic. L neck and L groin cannulas intact, min to no flashing, few clots pre-oxygenator, all sutures intact and secure  Nursing note and vitals reviewed.    Significant Labs:  ABGs:   Recent Labs   Lab 05/01/20  1415   PH 7.408   PCO2 47.2*   PO2 70*   HCO3 29.8*   POCSATURATED 94*   BE 5     BMP:   Recent Labs   Lab 05/01/20  1515   *      K 4.7      CO2 29   BUN 50*   CREATININE 0.5   CALCIUM 8.8   MG 2.0     Cardiac markers: No results for input(s): CKMB, CPKMB, TROPONINT, TROPONINI, MYOGLOBIN in the last 48 hours.  CBC:   Recent Labs   Lab 05/01/20  1515   WBC 9.04    RBC 3.19*   HGB 9.6*   HCT 30.0*   PLT 73*   MCV 94   MCH 30.1   MCHC 32.0     CMP:   Recent Labs   Lab 05/01/20  1515   *   CALCIUM 8.8   ALBUMIN 2.1*   PROT 5.4*      K 4.7   CO2 29      BUN 50*   CREATININE 0.5   ALKPHOS 121   ALT 27   AST 30   BILITOT 0.3     Coagulation:   Recent Labs   Lab 05/01/20  1515   INR 1.1   APTT 27.9     Lactic Acid:   No results for input(s): LACTATE in the last 48 hours.  All pertinent labs from the last 24 hours have been reviewed.    Significant Diagnostics:  CXR: I have reviewed all pertinent results/findings within the past 24 hours

## 2020-05-01 NOTE — CARE UPDATE
JACKIE Ayon MD at bedside.   MD notified of pts current VS, neuro status, gtts, rates, CVP, UOP, TF, labs, ventilator settings and ECMO settings.     NO decreased to 8PPM per MD order.     Anti Xa goal updated: 0.2-0.3.   Heparin gtt decreased to 7.5units/hr.    ECMO RPM decreased to 3500 per MD order.   Flows: 4.5-4.9    Plan of care reviewed with pt.   Questions answered per ICU RN.   See flowsheet for details.

## 2020-05-01 NOTE — CARE UPDATE
Pt intubated.   Ventilator Settings: AC/PC: Rate: 12, FiO2: 50%, PEEP: 10.   NO: 10 PPM.   SpO2 ~97%.   Q6H ABGs obtained per Ecmo LADONNA Blair, RRT.      Pt withdraws to pain.   Pt not following commands.   Pt medically sedated.   MD aware.   RASS -4.      Q4H Neurovascular exams completed.   See flowsheet for details.      HOB flat per MD order.      Propofol gtt titrated per comfort.      Goal to maintain 78-85 per MD order.      CVP 4, HOB Flat.   UOP ~250cc/hr.   Goal to maintain pt net negative 750cc/12hrs.      Q6H APTT/Anti Xa monitoring.   Goal to maintain Anti Xa- 0.25-0.3.   MD updated with results.   Heparin gtt @1000units/hr.      ECMO  VV ECMO  RIJ/R Femoral Vein   RPM: 3700         Flow:  5  Sweep:  8.5LPM  FiO2:  100     Dressings changed per ICU RN.   Q2H site assessments.      Pulses dopplerable.      TF @30cc/hr per NGT.   Q4H 250cc FWB per MD order.   40cc residual output noted.      Plan of care reviewed with pt and son.   Questions answered per ICU RN.   See flowsheet for details.

## 2020-05-02 PROBLEM — D62 ACUTE BLOOD LOSS ANEMIA: Status: ACTIVE | Noted: 2020-01-01

## 2020-05-02 NOTE — NURSING
Dr Hall called regarding lower MAPs in 60s. Slight chug on ECMO cannulas. CBC sent for possible transfusion.

## 2020-05-02 NOTE — NURSING
"      SICU PLAN OF CARE NOTE    Dx: Acute respiratory distress syndrome (ARDS) due to COVID-19 virus s/p Right femoral vein and right internal jugular vein cannulation for venovenous ECMO using a short 23-[place]an cannula on the right internal jugular vein for return, and a long 27-[place] cannula on the right femoral vein for drainage.     Shift Events: Continued GIB with 6 tarry, black stools this shift. Hgb down to 7.4. Total of 2 units PRBC initiated on this shift. Protonix gtt. NPO; TF held and NGT to LIWS. Heparin decreased to 400 units/hr. Sulcrafate started. GI consult. Plan for EGD in am. Vaso 0.04 units/min. Propofol weaned to 30 mcg/kg/min. Insulin gtt started for hyperglycemia. No changes to ECMO.     Goals of Care: Monitor stools and hgb. Transfuse when needed. NPO. Maintain MAP 75-85.     Neuro: Sedated; no response to stimulus    Vital Signs: /75 (BP Location: Left arm, Patient Position: Lying)   Pulse (!) 119 Comment: Simultaneous filing. User may not have seen previous data.  Temp 98.1 °F (36.7 °C) (Core Esophageal)   Resp (!) 27   Ht 5' 4" (1.626 m)   Wt 66.2 kg (145 lb 15.1 oz)   SpO2 97% Comment: Simultaneous filing. User may not have seen previous data.  BMI 25.05 kg/m²     Respiratory: Ventilator AC PC 50% 10+; ECMO     Diet: NPO    Gtts: Propfol, Insulin, Vasopressin, Heparin and Protonix    Urine Output: Voids Spontaneously 1635 cc/shift    VAD ECMO Sweep 8.5. RPM 3500      Labs/Accuchecks: q6h labs. Accuchecks q1h    Skin: multiple areas of breakdown. See eMAR for details. Frequent turns for incontinence. Unable to fully turn due to ECMO and hemodynamic instability. Elbows and heels offloaded. Foam to buttocks and waffle cushion on mattress.     "

## 2020-05-02 NOTE — HPI
Mr. Sanchez is a 55yo M admitted with respiratory failure due to COVID infection now on ECMO. GI consulted for melena.    History from chart review and primary team as patient intubated and sedated. He was admitted 4/10 with COVID infection, failed maximal ventilatory support, now on ECMO. Has been on heparin gtt. Two days ago, started having melena. Also noted to have bit his tongue, evaluated by ENT, and bleeding from tongue laceration appears to have slowed. Thrombocytopenia to 30s on 4/28, now improved. Has been requiring blood transfusions - about 1-2U PRBCs per day in the last three days. Was on tube feeds this morning, turned off and small amount of blood noted with LIWS. On vasopressin with relatively stable rate the last few days. Was on IV PPI bid, now on drip.

## 2020-05-02 NOTE — TREATMENT PLAN
GI Treatment Plan    Interval History  Noted to have drop in Hgb to 7.4. 5 bloody BMs.    Plan  - will plan for EGD early tomorrow morning  - continue PPI drip  - hold heparin if safe to do so  - monitor H&H and transfuse per primary team    Thank you for involving us in the care of Ranjana Sanchez. We will continue to follow. Please call with any additional questions, concerns or changes in the patient's clinical status.      Eddie Denise MD  Gastroenterology Fellow, PGY4  [facility]

## 2020-05-02 NOTE — PROGRESS NOTES
Dr. Ayon notified of most recent H&H 8.5 and 26.5, tarry BM, moderate in size also. Pt on Heparin gtt @ 750 Units/hr, anti-Xa 0.3, aPTT 28, INR 1.1, per MD decrease Heparin gtt to 600 Units/hr, transfuse 1 unit PRBC over 2 hrs, and 1 pack platelet (platelet count 74). Orders placed, will carry out and reassess.

## 2020-05-02 NOTE — PROGRESS NOTES
Dr. Ayon notified of am lab results, orders to transfuse 1 unit PRBCs, increase Heparin gtt to 700 Units/hr. Also notified of pt appearing to work harder to breathe, use of abdominal muscles, tachypneic RR 24-27, IVP Fentanyl 25 mcg given with symptoms improving only for 10 mins. Per MD increase Propofol gtt to 50 mcg/kg/min and administer 50 mcg of Fentanyl IVP now, orders placed and carried out.

## 2020-05-02 NOTE — ASSESSMENT & PLAN NOTE
Acute respiratory distress syndrome (ARDS) due to COVID-19 virus  - Crich was switched to ETT on 4/11  - Monika weaned to 4  - RPM 3500 for desaturations; goal flow > 4.0  - thrombocytopenia: transfuse for under 30K  - Sweep at 8.5  - Vent Fi 50%, PEEP 10  - Considering tracheostomy     Tongue laceration      -ENT consulted and evaluated patient, laceration superficial, not bleeding this AM      -recs: bite block vs paralysis, will monitor    Sedation  - On propofol  - Off dilaudid gtt and valium     Hypotension  - on vaso .04    FEN/GI  - TF at 30  -GI consult for possible scope: re-evaluate elsie     Anticoagulation      - Heparin gtt at 700 U/hr      - Goal aXa 0.2 Q6hrs    Prophylaxis      - protonix gtt      - Vanc/Cefepime/Diflucan for ECMO

## 2020-05-02 NOTE — NURSING
Dr. Hall called with 1400 labs. Hgb decreased from 8 @ 1200 to 7.4 @1400. Pt has had total of 5 tarry black BMs so far this shift. Orders received to transfuse a total of 2 units PRBC and stop TF. Dr. Hall to update Dr. Ayon. Awaiting further directives.     1550: Directives received to decrease Heparin gtt to 400 units/h. Continue to checks Xa and PTT but do not titrate heparin

## 2020-05-02 NOTE — PROGRESS NOTES
"05/02/2020  Maikel Zepeda    Current provider:  Francis Ayon MD      As floor rounding has been requested to be limited during COVID-19 patient quarantine by Infectious Disease and leadership, only "electronic" rounding has been performed on this mechanical assist device patient.  Electronic rounding includes verifying in Epic that current settings and measurements for the device have been documented appropriately and that they are within limits.  Additionally, this rounding verifies that the EQUIPMENT section of the VAD flowsheet is documented in Epic, specifically checking the presence of emergency equipment and controller self test.  Any discrepancies will be related to the care team.    In emergency, the nursing units have been notified to contact the perfusion department either by:  Calling [phone] from 630am to 4pm, or by contacting the hospital  from 4pm to 630am and asking to speak with the perfusionist on call.    Maikel Zepeda  2:53 PM  "

## 2020-05-02 NOTE — CONSULTS
[facility]  Gastroenterology  Consult Note    Patient Name: Ranjana Sanchez  MRN: [id number]  Admission Date: 4/10/2020  Hospital Length of Stay: 22 days  Code Status: Full Code   Attending Provider: Francis Ayon MD   Consulting Provider: Eddie Denise MD  Primary Care Physician: Primary Doctor No  Principal Problem:Acute respiratory distress syndrome (ARDS) due to COVID-19 virus    Inpatient consult to Gastroenterology  Consult performed by: Eddie Denise MD  Consult ordered by: Michael Barton MD        Subjective:     HPI:  Mr. Sanchez is a 55yo M admitted with respiratory failure due to COVID infection now on ECMO. GI consulted for melena.    History from chart review and primary team as patient intubated and sedated. He was admitted 4/10 with COVID infection, failed maximal ventilatory support, now on ECMO. Has been on heparin gtt. Two days ago, started having melena. Also noted to have bit his tongue, evaluated by ENT, and bleeding from tongue laceration appears to have slowed. Thrombocytopenia to 30s on 4/28, now improved. Has been requiring blood transfusions - about 1-2U PRBCs per day in the last three days. Was on tube feeds this morning, turned off and small amount of blood noted with LIWS. On vasopressin with relatively stable rate the last few days. Was on IV PPI bid, now on drip.    Past Medical History:   Diagnosis Date    Diabetes mellitus     Hypertension        History reviewed. No pertinent surgical history.    Review of patient's allergies indicates:  No Known Allergies  Family History     None        Tobacco Use    Smoking status: Never Smoker   Substance and Sexual Activity    Alcohol use: Not on file    Drug use: Not on file    Sexual activity: Not on file     Review of Systems   Unable to perform ROS: Intubated     Objective:     Vital Signs (Most Recent):  Temp: 98.1 °F (36.7 °C) (05/02/20 1200)  Pulse: (!) 123 (05/02/20 1200)  Resp: (!) 26 (05/02/20  0717)  BP: 106/65 (05/02/20 1100)  SpO2: (!) 88 % (05/02/20 1200) Vital Signs (24h Range):  Temp:  [97.7 °F (36.5 °C)-98.2 °F (36.8 °C)] 98.1 °F (36.7 °C)  Pulse:  [122-134] 123  Resp:  [20-27] 26  SpO2:  [88 %-100 %] 88 %  BP: ()/(55-81) 106/65  Arterial Line BP: ()/(51-70) 95/53     Weight: 66.2 kg (145 lb 15.1 oz) (05/02/20 0300)  Body mass index is 25.05 kg/m².      Intake/Output Summary (Last 24 hours) at 5/2/2020 1206  Last data filed at 5/2/2020 1100  Gross per 24 hour   Intake 4897.5 ml   Output 5344 ml   Net -446.5 ml       Lines/Drains/Airways     Central Venous Catheter Line            Percutaneous Central Line Insertion/Assessment - Triple Lumen  04/24/20 0900 left internal jugular 8 days         ECMO Cannula 04/25/20 1120 right femoral vein 7 days         ECMO Cannula 04/25/20 1120 right internal jugular 7 days          Drain                 NG/OG Tube 04/12/20 1000 18 Fr. Right nostril 20 days         Urethral Catheter 04/18/20 1854 13 days          Airway                 Airway - Non-Surgical 04/11/20 1525 Endotracheal Tube 20 days          Arterial Line                 Arterial Line 04/27/20 1100 Right Brachial 5 days          Peripheral Intravenous Line                 Peripheral IV - Single Lumen 04/30/20 0550 18 G Right Wrist 2 days         Peripheral IV - Single Lumen 04/30/20 0550 20 G Left Hand 2 days                Physical Exam   Constitutional: He is sedated and intubated.   HENT:   Intubated. NG tube in place, small amount of red blood output, on LIWS   Neck: Neck supple.   Cardiovascular: Tachycardia present.   Pulmonary/Chest: He is intubated.   On ECMO   Abdominal: Soft. Bowel sounds are normal. He exhibits no distension.   Neurological:   Sedated   Skin: Skin is warm.   Nursing note and vitals reviewed.      Significant Labs:  All pertinent lab results from the last 24 hours have been reviewed.    Significant Imaging:  Imaging results within the past 24 hours have been  reviewed.    Assessment/Plan:     Acute blood loss anemia  Mr. Sanchez is a 53yo M admitted with respiratory failure due to COVID infection now on ECMO. GI consulted for melena.    Probably upper GI or upper airway (tongue laceration) bleed in setting of recent thrombocytopenia, heparin gtt and NG tube. Appears to be hemodynamically stable in terms of vasopressin requirements. Recent severe thrombocytopenia few days ago now corrected. Requiring 1-2U PRBCs per day the last few days. BUN increasing in relation to Cr.    DDx includes esophagitis from NG tube, stress ulcer, NG tube trauma.  Endoscopic therapy for these is limited and EGD would serve more to confirm source of bleed. In setting of patient's clinical status (respiratory failure on ECMO), will monitor for now.  If he continues to bleed / doesn't respond to transfusions, will re-consider EGD tomorrow  Hold or reduce dose of heparin gtt if possible  Continue IV PPI drip for now  Hold tube feeds at midnight          Thank you for your consult. I will follow-up with patient. Please contact us if you have any additional questions.    Eddie Denise MD  Gastroenterology  [facility]

## 2020-05-02 NOTE — PROGRESS NOTES
ECMO Specialists shift report    Date: 05/02/2020  ECMO Specialist:  Ashley Francisco    Pump parameters:  RPM: 3500  Flow:  4.6  Sweep:  9  FiO2:  100    Oxygenator status:  Clots: Pre  Fibrin: Through out    Pressure trends:  P1: 131  P2: 112  Delta P: 19    Volume status:  Chugging noted? No  CVP: 0  MAP:  70-80  MD notified (name): Nany    Anticoagulation:  ACT/aPTT/Xa parameters: .25 .3  ACT/aPTT/Xa trends this shift:.3    Cannula size / status / placement:  Arterial:   Venous 1: 23F@4  RIJV  Venous 2:27F@12 RFV  Dual lumen:      Additional Comments: Breathing hard.  Flows stable. PRBCs and Plts given. See RNs notes.

## 2020-05-02 NOTE — ASSESSMENT & PLAN NOTE
Mr. Sanchez is a 55yo M admitted with respiratory failure due to COVID infection now on ECMO. GI consulted for melena.    Probably upper GI or upper airway (tongue laceration) bleed in setting of recent thrombocytopenia, heparin gtt and NG tube. Appears to be hemodynamically stable in terms of vasopressin requirements. Recent severe thrombocytopenia few days ago now corrected. Requiring 1-2U PRBCs per day the last few days. BUN increasing in relation to Cr.    DDx includes esophagitis from NG tube, stress ulcer, NG tube trauma.  Endoscopic therapy for these is limited and EGD would serve more to confirm source of bleed. In setting of patient's clinical status (respiratory failure on ECMO), will monitor for now.  If he continues to bleed / doesn't respond to transfusions, will re-consider EGD tomorrow  Hold or reduce dose of heparin gtt if possible  Continue IV PPI drip for now  Hold tube feeds at midnight

## 2020-05-02 NOTE — NURSING
Dr. Hall & Qi @ bedside. Updated on most recent labs, ABG, gtts, vent and ECMO settings, and issue of tarry, black stools. NGT placed to LIWS. GI consulted. Awaiting decision on whether to hold heparin.

## 2020-05-02 NOTE — PROGRESS NOTES
[facility]  Cardiothoracic Surgery  Daily ECMO Progress Note    Patient Name: Ranjana Sanchez  MRN: [id number]  Admission Date: 4/10/2020  Hospital Length of Stay: 22 days  Type of ECMO Support: V-V  Day of ECMO Support: 7  Code Status: Full Code   Attending Physician: Francis Ayon MD   Referring Provider: Self, Aaareferral  Principal Problem:Acute respiratory distress syndrome (ARDS) due to COVID-19 virus      Subjective:     Post-Op Info:  * No surgery found *         Interval History: melanic stools overnight; 2u pRBCs; ECMO with good flows;    ROS  Medications:  Continuous Infusions:   heparin (porcine) in 5 % dex 700 Units/hr (05/02/20 1200)    insulin (HUMAN R) infusion (adults) 2 Units/hr (05/02/20 1200)    nicardipine Stopped (04/30/20 1800)    nitric oxide gas      norepinephrine bitartrate-D5W Stopped (04/25/20 0300)    pantoprozole (PROTONIX) IV infusion 8 mg/hr (05/02/20 1200)    propofoL 50 mcg/kg/min (05/02/20 1200)    vasopressin (PITRESSIN) infusion 0.04 Units/min (05/02/20 1232)     Scheduled Meds:   albuterol-ipratropium  3 mL Nebulization Q6H    chlorhexidine  15 mL Mouth/Throat BID    fluconazole (DIFLUCAN) IVPB  200 mg Intravenous Q24H    polyethylene glycol  17 g Per NG tube Daily    vancomycin (VANCOCIN) IVPB  1,000 mg Intravenous Q12H    white petrolatum-mineral oiL   Both Eyes BID     PRN Meds:acetaminophen, calcium gluconate IVPB, calcium gluconate IVPB, calcium gluconate IVPB, Dextrose 10% Bolus, Dextrose 10% Bolus, fentaNYL, glucose, glucose, magnesium sulfate IVPB, magnesium sulfate IVPB, potassium chloride in water **AND** potassium chloride in water, sodium chloride 0.9%, sodium phosphate IVPB, sodium phosphate IVPB, sodium phosphate IVPB     Objective:     Vital Signs (Most Recent):  Temp: 98.1 °F (36.7 °C) (05/02/20 1245)  Pulse: (!) 127 (05/02/20 1245)  Resp: (!) 26 (05/02/20 0717)  BP: 106/62 (05/02/20 1200)  SpO2: 99 % (05/02/20 1245) Vital  Signs (24h Range):  Temp:  [97.7 °F (36.5 °C)-98.2 °F (36.8 °C)] 98.1 °F (36.7 °C)  Pulse:  [122-134] 127  Resp:  [20-27] 26  SpO2:  [88 %-100 %] 99 %  BP: ()/(55-81) 106/62  Arterial Line BP: ()/(50-70) 84/50     Weight: 66.2 kg (145 lb 15.1 oz)  Body mass index is 25.05 kg/m².    SpO2: 99 %  O2 Device (Oxygen Therapy): ventilator    Intake/Output - Last 3 Shifts       04/30 0700 - 05/01 0659 05/01 0700 - 05/02 0659 05/02 0700 - 05/03 0659    I.V. (mL/kg) 2313.1 (34) 1395 (21.1)     Blood  2057.5     NG/GT 1840 2000 620    IV Piggyback 500      Total Intake(mL/kg) 4653.1 (68.4) 5452.5 (82.4) 620 (9.4)    Urine (mL/kg/hr) 6460 (4) 5700 (3.6) 1040 (2.7)    Drains 75      Stool  0 0    Blood 6 7     Total Output 6541 5707 1040    Net -1887.9 -254.5 -420           Stool Occurrence  2 x 1 x          Lines/Drains/Airways     Central Venous Catheter Line            Percutaneous Central Line Insertion/Assessment - Triple Lumen  04/24/20 0900 left internal jugular 8 days         ECMO Cannula 04/25/20 1120 right femoral vein 7 days         ECMO Cannula 04/25/20 1120 right internal jugular 7 days          Drain                 NG/OG Tube 04/12/20 1000 18 Fr. Right nostril 20 days         Urethral Catheter 04/18/20 1854 13 days          Airway                 Airway - Non-Surgical 04/11/20 1525 Endotracheal Tube 20 days          Arterial Line                 Arterial Line 04/27/20 1100 Right Brachial 5 days          Peripheral Intravenous Line                 Peripheral IV - Single Lumen 04/30/20 0550 18 G Right Wrist 2 days         Peripheral IV - Single Lumen 04/30/20 0550 20 G Left Hand 2 days                Physical Exam  Inspiratory pressure of 15 with only ~40cc tidal volume    Significant Labs:  BMP:   Recent Labs   Lab 05/02/20  0800   *      K 4.5      CO2 29   BUN 55*   CREATININE 0.6   CALCIUM 8.1*   MG 1.9     CBC:   Recent Labs   Lab 05/02/20  0800   WBC 11.56   RBC 3.06*   HGB 9.0*    HCT 28.1*   *   MCV 92   MCH 29.4   MCHC 32.0       Significant Diagnostics:  CXR: severe bilateral opacities but stable    Assessment/Plan:     Acute respiratory disease due to COVID-19 virus  Acute respiratory distress syndrome (ARDS) due to COVID-19 virus  - Crich was switched to ETT on 4/11  - Monika weaned to 4  - RPM 3500 for desaturations; goal flow > 4.0  - thrombocytopenia: transfuse for under 30K  - Sweep at 8.5  - Vent Fi 50%, PEEP 10  - Considering tracheostomy     Tongue laceration      -ENT consulted and evaluated patient, laceration superficial, not bleeding this AM      -recs: bite block vs paralysis, will monitor    Sedation  - On propofol  - Off dilaudid gtt and valium     Hypotension  - on vaso .04    FEN/GI  - TF at 30  -GI consult for possible scope: re-evaluate elsie     Anticoagulation      - Heparin gtt at 700 U/hr      - Goal aXa 0.2 Q6hrs    Prophylaxis      - protonix gtt      - Vanc/Cefepime/Diflucan for ECMO    COVID-19 virus detected  54y.o male with a medical history of HTN who is COVID +, intubated, paralyzed, sedated on vasopressors.  CTS consulted for possible ECMO.  --Patient is currently a DNR.  Dicussed with critical care team who will discuss treatment options with patients family   --Dr. Ayon to review and give final recommendations        Rafael Nj MD  Cardiothoracic Surgery  [facility]    VV ECMO circuit checked and all parameters reviewed. Anticoagulation was managed and all cannulation exit sites along with review of labs and radiology studies performed. Speed and functioning of the pump along with oxygenator quality reviewed.  Patient appears to be bleeding through the GI tract.  Continuous fall in hemoglobin with bloody bowel movement noticed.  Concerns for GI bleeding and patient is initiated on Protonix drip.  GI consult has been placed and I have personally talked to Dr. Smalls.  Dr. Smalls will re-evaluate the patient tomorrow for an  EGD if patient continues to have GI bleeding.  In the meantime we would lower anticoagulation and stop the tube feeds.  Patient appears to be hemodynamically stable however, needed to be started on low-dose vasopressin.  Platelet counts were low and a unit of platelets will be transfused.

## 2020-05-02 NOTE — SUBJECTIVE & OBJECTIVE
Past Medical History:   Diagnosis Date    Diabetes mellitus     Hypertension        History reviewed. No pertinent surgical history.    Review of patient's allergies indicates:  No Known Allergies  Family History     None        Tobacco Use    Smoking status: Never Smoker   Substance and Sexual Activity    Alcohol use: Not on file    Drug use: Not on file    Sexual activity: Not on file     Review of Systems   Unable to perform ROS: Intubated     Objective:     Vital Signs (Most Recent):  Temp: 98.1 °F (36.7 °C) (05/02/20 1200)  Pulse: (!) 123 (05/02/20 1200)  Resp: (!) 26 (05/02/20 0717)  BP: 106/65 (05/02/20 1100)  SpO2: (!) 88 % (05/02/20 1200) Vital Signs (24h Range):  Temp:  [97.7 °F (36.5 °C)-98.2 °F (36.8 °C)] 98.1 °F (36.7 °C)  Pulse:  [122-134] 123  Resp:  [20-27] 26  SpO2:  [88 %-100 %] 88 %  BP: ()/(55-81) 106/65  Arterial Line BP: ()/(51-70) 95/53     Weight: 66.2 kg (145 lb 15.1 oz) (05/02/20 0300)  Body mass index is 25.05 kg/m².      Intake/Output Summary (Last 24 hours) at 5/2/2020 1206  Last data filed at 5/2/2020 1100  Gross per 24 hour   Intake 4897.5 ml   Output 5344 ml   Net -446.5 ml       Lines/Drains/Airways     Central Venous Catheter Line            Percutaneous Central Line Insertion/Assessment - Triple Lumen  04/24/20 0900 left internal jugular 8 days         ECMO Cannula 04/25/20 1120 right femoral vein 7 days         ECMO Cannula 04/25/20 1120 right internal jugular 7 days          Drain                 NG/OG Tube 04/12/20 1000 18 Fr. Right nostril 20 days         Urethral Catheter 04/18/20 1854 13 days          Airway                 Airway - Non-Surgical 04/11/20 1525 Endotracheal Tube 20 days          Arterial Line                 Arterial Line 04/27/20 1100 Right Brachial 5 days          Peripheral Intravenous Line                 Peripheral IV - Single Lumen 04/30/20 0550 18 G Right Wrist 2 days         Peripheral IV - Single Lumen 04/30/20 0550 20 G Left Hand 2  days                Physical Exam   Constitutional: He is sedated and intubated.   HENT:   Intubated. NG tube in place, small amount of red blood output, on LIWS   Neck: Neck supple.   Cardiovascular: Tachycardia present.   Pulmonary/Chest: He is intubated.   On ECMO   Abdominal: Soft. Bowel sounds are normal. He exhibits no distension.   Neurological:   Sedated   Skin: Skin is warm.   Nursing note and vitals reviewed.      Significant Labs:  All pertinent lab results from the last 24 hours have been reviewed.    Significant Imaging:  Imaging results within the past 24 hours have been reviewed.

## 2020-05-02 NOTE — SUBJECTIVE & OBJECTIVE
Interval History: melanic stools overnight; 2u pRBCs; ECMO with good flows;    ROS  Medications:  Continuous Infusions:   heparin (porcine) in 5 % dex 700 Units/hr (05/02/20 1200)    insulin (HUMAN R) infusion (adults) 2 Units/hr (05/02/20 1200)    nicardipine Stopped (04/30/20 1800)    nitric oxide gas      norepinephrine bitartrate-D5W Stopped (04/25/20 0300)    pantoprozole (PROTONIX) IV infusion 8 mg/hr (05/02/20 1200)    propofoL 50 mcg/kg/min (05/02/20 1200)    vasopressin (PITRESSIN) infusion 0.04 Units/min (05/02/20 1232)     Scheduled Meds:   albuterol-ipratropium  3 mL Nebulization Q6H    chlorhexidine  15 mL Mouth/Throat BID    fluconazole (DIFLUCAN) IVPB  200 mg Intravenous Q24H    polyethylene glycol  17 g Per NG tube Daily    vancomycin (VANCOCIN) IVPB  1,000 mg Intravenous Q12H    white petrolatum-mineral oiL   Both Eyes BID     PRN Meds:acetaminophen, calcium gluconate IVPB, calcium gluconate IVPB, calcium gluconate IVPB, Dextrose 10% Bolus, Dextrose 10% Bolus, fentaNYL, glucose, glucose, magnesium sulfate IVPB, magnesium sulfate IVPB, potassium chloride in water **AND** potassium chloride in water, sodium chloride 0.9%, sodium phosphate IVPB, sodium phosphate IVPB, sodium phosphate IVPB     Objective:     Vital Signs (Most Recent):  Temp: 98.1 °F (36.7 °C) (05/02/20 1245)  Pulse: (!) 127 (05/02/20 1245)  Resp: (!) 26 (05/02/20 0717)  BP: 106/62 (05/02/20 1200)  SpO2: 99 % (05/02/20 1245) Vital Signs (24h Range):  Temp:  [97.7 °F (36.5 °C)-98.2 °F (36.8 °C)] 98.1 °F (36.7 °C)  Pulse:  [122-134] 127  Resp:  [20-27] 26  SpO2:  [88 %-100 %] 99 %  BP: ()/(55-81) 106/62  Arterial Line BP: ()/(50-70) 84/50     Weight: 66.2 kg (145 lb 15.1 oz)  Body mass index is 25.05 kg/m².    SpO2: 99 %  O2 Device (Oxygen Therapy): ventilator    Intake/Output - Last 3 Shifts       04/30 0700 - 05/01 0659 05/01 0700 - 05/02 0659 05/02 0700 - 05/03 0659    I.V. (mL/kg) 2313.1 (34) 1395 (21.1)      Blood  2057.5     NG/GT 1840 2000 620    IV Piggyback 500      Total Intake(mL/kg) 4653.1 (68.4) 5452.5 (82.4) 620 (9.4)    Urine (mL/kg/hr) 6460 (4) 5700 (3.6) 1040 (2.7)    Drains 75      Stool  0 0    Blood 6 7     Total Output 6541 5707 1040    Net -1887.9 -254.5 -420           Stool Occurrence  2 x 1 x          Lines/Drains/Airways     Central Venous Catheter Line            Percutaneous Central Line Insertion/Assessment - Triple Lumen  04/24/20 0900 left internal jugular 8 days         ECMO Cannula 04/25/20 1120 right femoral vein 7 days         ECMO Cannula 04/25/20 1120 right internal jugular 7 days          Drain                 NG/OG Tube 04/12/20 1000 18 Fr. Right nostril 20 days         Urethral Catheter 04/18/20 1854 13 days          Airway                 Airway - Non-Surgical 04/11/20 1525 Endotracheal Tube 20 days          Arterial Line                 Arterial Line 04/27/20 1100 Right Brachial 5 days          Peripheral Intravenous Line                 Peripheral IV - Single Lumen 04/30/20 0550 18 G Right Wrist 2 days         Peripheral IV - Single Lumen 04/30/20 0550 20 G Left Hand 2 days                Physical Exam  Inspiratory pressure of 15 with only ~40cc tidal volume    Significant Labs:  BMP:   Recent Labs   Lab 05/02/20  0800   *      K 4.5      CO2 29   BUN 55*   CREATININE 0.6   CALCIUM 8.1*   MG 1.9     CBC:   Recent Labs   Lab 05/02/20  0800   WBC 11.56   RBC 3.06*   HGB 9.0*   HCT 28.1*   *   MCV 92   MCH 29.4   MCHC 32.0       Significant Diagnostics:  CXR: severe bilateral opacities but stable

## 2020-05-03 NOTE — [employer] PATIENT INSTRUCTIONS
Discharge Summary/Instructions after an Endoscopic Procedure  Patient Name: Ranjana Sanchez  Patient MRN: [id number]  Patient   YOB: 1965  Ian, May 3, 2020  Wilman Smalls MD  RESTRICTIONS:  During your procedure today, you received medications for sedation.  These   medications may affect your judgment, balance and coordination.  Therefore,   for 24 hours, you have the following restrictions:   - DO NOT drive a car, operate machinery, make legal/financial decisions,   sign important papers or drink alcohol.    ACTIVITY:  Today: no heavy lifting, straining or running due to procedural   sedation/anesthesia.  The following day: return to full activity including work.  DIET:  Eat and drink normally unless instructed otherwise.     TREATMENT FOR COMMON SIDE EFFECTS:  - Mild abdominal pain, nausea, belching, bloating or excessive gas:  rest,   eat lightly and use a heating pad.  - Sore Throat: treat with throat lozenges and/or gargle with warm salt   water.  - Because air was used during the procedure, expelling large amounts of air   from your rectum or belching is normal.  - If a bowel prep was taken, you may not have a bowel movement for 1-3 days.    This is normal.  SYMPTOMS TO WATCH FOR AND REPORT TO YOUR PHYSICIAN:  1. Abdominal pain or bloating, other than gas cramps.  2. Chest pain.  3. Back pain.  4. Signs of infection such as: chills or fever occurring within 24 hours   after the procedure.  5. Rectal bleeding, which would show as bright red, maroon, or black stools.   (A tablespoon of blood from the rectum is not serious, especially if   hemorrhoids are present.)  6. Vomiting.  7. Weakness or dizziness.  GO DIRECTLY TO THE NEAREST EMERGENCY ROOM IF YOU HAVE ANY OF THE FOLLOWING:      Difficulty breathing              Chills and/or fever over 101 F   Persistent vomiting and/or vomiting blood   Severe abdominal pain   Severe chest pain   Black, tarry stools   Bleeding- more than one  tablespoon   Any other symptom or condition that you feel may need urgent attention  Your doctor recommends these additional instructions:  If any biopsies were taken, your doctors clinic will contact you in 1 to 2   weeks with any results.  - Return patient to ICU for ongoing care.   - Resume previous diet.   - Continue present medications.  for recurrent bleeding, can go straight to IR for angiogram and embolization   as the clip should show exactly the bleeding site  For questions, problems or results please call your physician - Wilman Smalls MD at Work:  [phone].  [facility], EMERGENCY ROOM PHONE NUMBER: [phone]  IF A COMPLICATION OR EMERGENCY SITUATION ARISES AND YOU ARE UNABLE TO REACH   YOUR PHYSICIAN - GO DIRECTLY TO THE EMERGENCY ROOM.  Wilman Smalls MD  5/3/2020 12:16:53 PM  This report has been verified and signed electronically.  [employer]

## 2020-05-03 NOTE — PROGRESS NOTES
[facility]  Cardiothoracic Surgery  Daily ECMO Progress Note    Patient Name: Ranjana Sanchez  MRN: [id number]  Admission Date: 4/10/2020  Hospital Length of Stay: 23 days  Code Status: Full Code   Attending Physician: Francis Ayon MD   Referring Provider: Francis Ayon MD  Principal Problem:Acute respiratory distress syndrome (ARDS) due to COVID-19 virus      Subjective:     Post-Op Info:  Procedure(s) (LRB):  EGD (ESOPHAGOGASTRODUODENOSCOPY) (N/A)   Day of Surgery     Interval History: melenic stools overnight; several transfusions    ROS  Medications:  Continuous Infusions:   heparin (porcine) in 5 % dex 400 Units/hr (05/03/20 1200)    insulin (HUMAN R) infusion (adults) 2.3 Units/hr (05/03/20 1300)    nicardipine Stopped (04/30/20 1800)    nitric oxide gas      norepinephrine bitartrate-D5W Stopped (04/25/20 0300)    pantoprozole (PROTONIX) IV infusion 8 mg/hr (05/03/20 1300)    propofoL 40 mcg/kg/min (05/03/20 1300)    vasopressin (PITRESSIN) infusion 0.04 Units/min (05/03/20 1256)     Scheduled Meds:   albuterol-ipratropium  3 mL Nebulization Q6H    ceFEPime (MAXIPIME) IVPB  2 g Intravenous Q8H    chlorhexidine  15 mL Mouth/Throat BID    fluconazole (DIFLUCAN) IVPB  200 mg Intravenous Q24H    polyethylene glycol  17 g Per NG tube Daily    sucralfate  1 g Per NG tube Q6H    vancomycin (VANCOCIN) IVPB  1,000 mg Intravenous Q12H    white petrolatum-mineral oiL   Both Eyes BID     PRN Meds:acetaminophen, calcium gluconate IVPB, calcium gluconate IVPB, calcium gluconate IVPB, Dextrose 10% Bolus, Dextrose 10% Bolus, fentaNYL, glucose, glucose, magnesium sulfate IVPB, magnesium sulfate IVPB, potassium chloride in water **AND** potassium chloride in water, sodium chloride 0.9%, sodium phosphate IVPB, sodium phosphate IVPB, sodium phosphate IVPB     Objective:     Vital Signs (Most Recent):  Temp: 98.1 °F (36.7 °C) (05/03/20 1300)  Pulse: (!) 134 (05/03/20 1300)  Resp: (!) 27  (05/03/20 1000)  BP: (!) 109/57 (05/03/20 1300)  SpO2: 98 % (05/03/20 1300) Vital Signs (24h Range):  Temp:  [96.3 °F (35.7 °C)-98.1 °F (36.7 °C)] 98.1 °F (36.7 °C)  Pulse:  [109-140] 134  Resp:  [23-27] 27  SpO2:  [87 %-100 %] 98 %  BP: ()/(57-76) 109/57  Arterial Line BP: ()/(42-66) 90/53     Weight: 66.8 kg (147 lb 4.3 oz)  Body mass index is 25.28 kg/m².    SpO2: 98 %  O2 Device (Oxygen Therapy): ventilator    Intake/Output - Last 3 Shifts       05/01 0700 - 05/02 0659 05/02 0700 - 05/03 0659 05/03 0700 - 05/04 0659    I.V. (mL/kg) 1395 (21.1) 2046.2 (30.6) 250 (3.7)    Blood 2057.5 1050 669    NG/GT 2000 1230     IV Piggyback       Total Intake(mL/kg) 5452.5 (82.4) 4326.2 (64.8) 919 (13.8)    Urine (mL/kg/hr) 5700 (3.6) 3070 (1.9) 865 (1.9)    Drains  200     Stool 0 0 0    Blood 7      Total Output 5707 3270 865    Net -254.5 +1056.2 +54           Stool Occurrence 2 x 8 x 1 x          Lines/Drains/Airways     Central Venous Catheter Line            Percutaneous Central Line Insertion/Assessment - Triple Lumen  04/24/20 0900 left internal jugular 9 days         ECMO Cannula 04/25/20 1120 right femoral vein 8 days         ECMO Cannula 04/25/20 1120 right internal jugular 8 days          Drain                 NG/OG Tube 04/12/20 1000 18 Fr. Right nostril 21 days         Urethral Catheter 04/18/20 1854 14 days          Airway                 Airway - Non-Surgical 04/11/20 1525 Endotracheal Tube 21 days          Arterial Line                 Arterial Line 04/27/20 1100 Right Brachial 6 days          Peripheral Intravenous Line                 Peripheral IV - Single Lumen 04/30/20 0550 18 G Right Wrist 3 days         Peripheral IV - Single Lumen 04/30/20 0550 20 G Left Hand 3 days                Physical Exam  On vent 50%, peep 10; Pi of 12 with TV of 40; ecmo: fi 1 and sweep of 8.5    Significant Labs:  BMP:   Recent Labs   Lab 05/03/20  1309   *      K 4.7      CO2 28   BUN 44*    CREATININE 0.6   CALCIUM 7.3*   MG 2.0     CBC:   Recent Labs   Lab 05/03/20  1309 05/03/20  1310   WBC 21.19*  --    RBC 2.77*  --    HGB 8.3*  --    HCT 25.1* 22*   *  --    MCV 91  --    MCH 30.0  --    MCHC 33.1  --      LFTs:   Recent Labs   Lab 05/03/20  1309   ALT 22   AST 23   ALKPHOS 90   BILITOT 0.3   PROT 4.5*   ALBUMIN 1.8*       Significant Diagnostics:  Cxr: bilateral opacities; stable    Assessment/Plan:     Acute respiratory disease due to COVID-19 virus  Acute respiratory distress syndrome (ARDS) due to COVID-19 virus  - Crich was switched to ETT on 4/11  - Monika weaned to 4  - RPM 3500 for desaturations; goal flow > 4.0  - thrombocytopenia: transfuse for under 30K  - Sweep at 8.5  - Vent Fi 50%, PEEP 10  - Considering tracheostomy     Tongue laceration      -ENT consulted and evaluated patient, laceration superficial, not bleeding this AM      -recs: bite block vs paralysis, will monitor    Sedation  - On propofol  - Off dilaudid gtt and valium     Hypotension  - on vaso .04    FEN/GI  -TF at 30  -GI scope: clips placed at duodenal vessel     Anticoagulation      - Heparin gtt at 400 U/hr      - Goal aXa 0.2 Q6hrs    Prophylaxis      - protonix gtt      - abx for ECMO    Rafael Nj MD  Cardiothoracic Surgery  [facility]

## 2020-05-03 NOTE — PROGRESS NOTES
2250: Pt had another liquid BM, turning pt to clean up to the left side, ECMO specialist watching lines, float RN watching R IJ cannula, upon turning ECMO started alarming,chatter noted in lines, flows decreased from 4.7 to 1.4, ECMO specialist decreased RPMs from 3500 to 3000 and  mL IsoLyte, pt returned supine, repositioned R leg to completely flat, cannulas measured @ 4 cm RIJ and 12 cm R Fem. VSS throughout, HR 120s, MAP 70, sats 100. Dr. Calle called and notified, last H&H result @ 2200 after first bloody BM 9.5 and 28.9, per MD transfuse 1 PRBC and administer Albumin if clinically indicated.

## 2020-05-03 NOTE — ASSESSMENT & PLAN NOTE
Acute respiratory distress syndrome (ARDS) due to COVID-19 virus  - Crich was switched to ETT on 4/11  - Monika weaned to 4  - RPM 3500 for desaturations; goal flow > 4.0  - thrombocytopenia: transfuse for under 30K  - Sweep at 8.5  - Vent Fi 50%, PEEP 10  - Considering tracheostomy     Tongue laceration      -ENT consulted and evaluated patient, laceration superficial, not bleeding this AM      -recs: bite block vs paralysis, will monitor    Sedation  - On propofol  - Off dilaudid gtt and valium     Hypotension  - on vaso .04    FEN/GI  -TF at 30  -GI scope: clips placed at duodenal vessel     Anticoagulation      - Heparin gtt at 400 U/hr      - Goal aXa 0.2 Q6hrs    Prophylaxis      - protonix gtt      - abx for ECMO

## 2020-05-03 NOTE — PROGRESS NOTES
Pt remains intubated and on full ECMO support FiO2 100%, sweep 8, 3500 RPMs, flows 4.6-4.7, chugging/chatter noted, see prev note for events. Vent settings Fio2 50% and PEEP 10, RR 12. Nitric @ 4 PPM, sats %. HR 110s-130, RR 20-28, MAP 70s-80s, pt MAP dropped to mid 50s 3X, recovered on his own, variability noted. CVP 0-1, mL/hr, net pos 1.3 L per 24 hrs. Received 1 PRBC overnight for low flows and chattering cannulas. 3 tarry black, large and loose BMs overnight, does not tolerate movement with incontinence care. Skin: multiple wounds noted to lips, frequent oral care done, moisturizer applied, DTI/sore to upper lip, JUANITA, scab noted to R nare, top, cleansed and JUANITA. Eyes dry and red, lubriphresh applied, eyes covered with tegaderm, unable to keep eyes closed otherwise. No skin breakdown noted to sacrum or back of heels, foam X2 covering wounds to L FA. AM labs reviewed, plan for EGD this am.

## 2020-05-03 NOTE — NURSING
"      SICU PLAN OF CARE NOTE    Dx: Acute respiratory distress syndrome (ARDS) due to COVID-19 virus s/p Right femoral vein and right internal jugular vein cannulation for venovenous ECMO using a short 23-Slovenian cannula on the right internal jugular vein for return, and a long 27-Slovenian cannula on the right femoral vein for drainage    Shift Events: Total blood products received this shift: 2 PRBC, 1 Platelet. Heparin paused temporarily prior to EGD which found to have bleeding lesion in 2nd/3rd portion of duodenum. Epi injections and 4 hemostatic clips placed. Since procedure patient has had one dark liquid stool. Post procedure hgb 8.3. The second of 2 units of PRBC was transfused at this time. New NGT placed per GI MD. Orders received from Eliezer Busby and Rafael to resume Heparin gtt and TF. Unable to fully stop sedation for thorough neuro exam, as patient becomes extremely tachypneic and abdominal muscle use increases. Propofol weaned as low as 20 mcg and I was able to illicit withdrawal to pain response with deep nailbed pressure to bilateral upper and lower extremities. Cough and gag present. Low flow alarms on ECMO with turns for incontinence care (primarily when pt turned to cannulated side). Low flows correct as pt is placed back supine with no further intervention.      Goals of Care: Continue to monitor BMs and Hgb. Transfuse as necessary. Continue to attempt to wean sedation as tolerated for thorough neuro exam. Wean ECMO as able. Advance TF to goal of 30 cc/h.      Neuro: Sedated and Moves All Extremities. Withdraws to deep pressure. +gag and cough    Vital Signs: /68 (BP Location: Left arm, Patient Position: Lying)   Pulse (!) 117 Comment: Simultaneous filing. User may not have seen previous data.  Temp 98.2 °F (36.8 °C) (Core Esophageal)   Resp (!) 25   Ht 5' 4" (1.626 m)   Wt 66.8 kg (147 lb 4.3 oz)   SpO2 97% Comment: Simultaneous filing. User may not have seen previous data.  BMI " 25.28 kg/m²     Respiratory: Ventilator. ACPC rate 12, 50% +10    Diet: Tube Feeds Peptamen VHP goal 30cc/h    Gtts: Propfol, Insulin, Vasopressin, Heparin and Protonix    Urine Output: Urinary Catheter  1530 cc/shift    Drains: NG/OG Tube 50 cc /  shift    ECMO RPM 3500. Flows >4. Sweep 8.5     Labs/Accuchecks: Labs q6h and PRN. Accuchecks q1h    Skin: multiple areas of breakdown see documentation in flowsheets. Repositioning with incontinence care. Foam to sacrum. Heels and elbows offloaded. Foam dressings to L arm breakdown. NGT rotated to opposite nare

## 2020-05-03 NOTE — PROGRESS NOTES
Updated Dr. Ayon on all shift events and lab results, per MD transfuse 1 pack platelets, 1 unit of PRBCs and restart pt on Cefepime. Orders placed and carried out.

## 2020-05-03 NOTE — SUBJECTIVE & OBJECTIVE
Interval History: melenic stools overnight; several transfusions    ROS  Medications:  Continuous Infusions:   heparin (porcine) in 5 % dex 400 Units/hr (05/03/20 1200)    insulin (HUMAN R) infusion (adults) 2.3 Units/hr (05/03/20 1300)    nicardipine Stopped (04/30/20 1800)    nitric oxide gas      norepinephrine bitartrate-D5W Stopped (04/25/20 0300)    pantoprozole (PROTONIX) IV infusion 8 mg/hr (05/03/20 1300)    propofoL 40 mcg/kg/min (05/03/20 1300)    vasopressin (PITRESSIN) infusion 0.04 Units/min (05/03/20 1256)     Scheduled Meds:   albuterol-ipratropium  3 mL Nebulization Q6H    ceFEPime (MAXIPIME) IVPB  2 g Intravenous Q8H    chlorhexidine  15 mL Mouth/Throat BID    fluconazole (DIFLUCAN) IVPB  200 mg Intravenous Q24H    polyethylene glycol  17 g Per NG tube Daily    sucralfate  1 g Per NG tube Q6H    vancomycin (VANCOCIN) IVPB  1,000 mg Intravenous Q12H    white petrolatum-mineral oiL   Both Eyes BID     PRN Meds:acetaminophen, calcium gluconate IVPB, calcium gluconate IVPB, calcium gluconate IVPB, Dextrose 10% Bolus, Dextrose 10% Bolus, fentaNYL, glucose, glucose, magnesium sulfate IVPB, magnesium sulfate IVPB, potassium chloride in water **AND** potassium chloride in water, sodium chloride 0.9%, sodium phosphate IVPB, sodium phosphate IVPB, sodium phosphate IVPB     Objective:     Vital Signs (Most Recent):  Temp: 98.1 °F (36.7 °C) (05/03/20 1300)  Pulse: (!) 134 (05/03/20 1300)  Resp: (!) 27 (05/03/20 1000)  BP: (!) 109/57 (05/03/20 1300)  SpO2: 98 % (05/03/20 1300) Vital Signs (24h Range):  Temp:  [96.3 °F (35.7 °C)-98.1 °F (36.7 °C)] 98.1 °F (36.7 °C)  Pulse:  [109-140] 134  Resp:  [23-27] 27  SpO2:  [87 %-100 %] 98 %  BP: ()/(57-76) 109/57  Arterial Line BP: ()/(42-66) 90/53     Weight: 66.8 kg (147 lb 4.3 oz)  Body mass index is 25.28 kg/m².    SpO2: 98 %  O2 Device (Oxygen Therapy): ventilator    Intake/Output - Last 3 Shifts       05/01 0700 - 05/02 0659 05/02 0700 -  05/03 0659 05/03 0700 - 05/04 0659    I.V. (mL/kg) 1395 (21.1) 2046.2 (30.6) 250 (3.7)    Blood 2057.5 1050 669    NG/GT 2000 1230     IV Piggyback       Total Intake(mL/kg) 5452.5 (82.4) 4326.2 (64.8) 919 (13.8)    Urine (mL/kg/hr) 5700 (3.6) 3070 (1.9) 865 (1.9)    Drains  200     Stool 0 0 0    Blood 7      Total Output 5707 3270 865    Net -254.5 +1056.2 +54           Stool Occurrence 2 x 8 x 1 x          Lines/Drains/Airways     Central Venous Catheter Line            Percutaneous Central Line Insertion/Assessment - Triple Lumen  04/24/20 0900 left internal jugular 9 days         ECMO Cannula 04/25/20 1120 right femoral vein 8 days         ECMO Cannula 04/25/20 1120 right internal jugular 8 days          Drain                 NG/OG Tube 04/12/20 1000 18 Fr. Right nostril 21 days         Urethral Catheter 04/18/20 1854 14 days          Airway                 Airway - Non-Surgical 04/11/20 1525 Endotracheal Tube 21 days          Arterial Line                 Arterial Line 04/27/20 1100 Right Brachial 6 days          Peripheral Intravenous Line                 Peripheral IV - Single Lumen 04/30/20 0550 18 G Right Wrist 3 days         Peripheral IV - Single Lumen 04/30/20 0550 20 G Left Hand 3 days                Physical Exam  On vent 50%, peep 10; Pi of 12 with TV of 40; ecmo: fi 1 and sweep of 8.5    Significant Labs:  BMP:   Recent Labs   Lab 05/03/20  1309   *      K 4.7      CO2 28   BUN 44*   CREATININE 0.6   CALCIUM 7.3*   MG 2.0     CBC:   Recent Labs   Lab 05/03/20  1309 05/03/20  1310   WBC 21.19*  --    RBC 2.77*  --    HGB 8.3*  --    HCT 25.1* 22*   *  --    MCV 91  --    MCH 30.0  --    MCHC 33.1  --      LFTs:   Recent Labs   Lab 05/03/20  1309   ALT 22   AST 23   ALKPHOS 90   BILITOT 0.3   PROT 4.5*   ALBUMIN 1.8*       Significant Diagnostics:  Cxr: bilateral opacities; stable

## 2020-05-03 NOTE — PROGRESS NOTES
ECMO Specialists shift report    Date: 05/03/2020  ECMO Specialist:  Jorge Yeboah    Pump parameters:  RPM: 3500  Flow:  4.80  Sweep:  8.5LPM  FiO2:  100    Oxygenator status:  Clots: Pre-oxy  Fibrin: Pre-Oxy    Pressure trends:  P1: 140  P2: 116   Delta P:24    Volume status:  Chugging noted no  CVP: 0-2  MAP:  71  MD notified (name):  Nany/Osasona    Anticoagulation:  ACT/aPTT/Xa parameters: 0.25-0.3  ACT/aPTT/Xa trends this shift: 0.11, >0.10    Cannula size / status / placement:  Arterial:   Venous 1: 23F/RIJV  Venous 2: 27F/RFV  Dual lumen:      Additional Comments:    Patient resting on VV ECMO.  Sweep and flow were maintained during the shift.  Patient had multiple blood products given during the shift.  EGD was done with a small bleed noted and occluded.  No complications during EGD.   Patient continues to have small black tar like stools.  ECMO will alarm during turning toward right side with a drop in flow.  No adverse effects were noted during temporary drop in flow.  Urine output remains good.  X-ray shows lungs to be whiteout with small bronchogram's noted.  Will continue to monitor.

## 2020-05-03 NOTE — TREATMENT PLAN
GI Treatment Plan    Interval History  EGD completed. Mild Shatzki ring. Gastric erosion. Actively bleeding Dieulafoy lesion in 2nd/3rd portion of duodenum. Injected with epi and four hemostatic clips placed. No bleeding at end of procedure. NG tube placed.    Plan  - s/p EGD (see procedure note)  - if patient re-bleeds, recommend IR for angiogram and embolization of area marked by clips  - if heparin gtt is needed, recommend lowest dose possible today  - updated primary team  - attempted to call patient's son in [place], but poor signal and unable to communicate plan/findings    Thank you for involving us in the care of Ranjana Sanchez. Please call with any additional questions, concerns or changes in the patient's clinical status.      Eddie Denise MD  Gastroenterology Fellow, PGY4  [facility]

## 2020-05-03 NOTE — PROGRESS NOTES
ECMO Specialists shift report    Date: 05/03/2020  ECMO Specialist:  Ashley Francisco    Pump parameters:  RPM: 3500  Flow:  4.7  Sweep: 8.5  FiO2:  100    Oxygenator status:  Clots: Pre  Fibrin: Yes through out    Pressure trends:  P1: 138  P2: 117  Delta P: 21    Volume status:  Chugging noted?Yes/dry  CVP: 0  MAP:  70s-80s  MD notified (name): Nany    Anticoagulation:  ACT/aPTT/Xa parameters: .25 .3  ACT/aPTT/Xa trends this shift:.1    Cannula size / status / placement:  Arterial:   Venous 1: 23@4  RIJV  Venous 2: 27@12 RFV  Dual lumen:      Additional Comments: 1050 Lg. Liquid BM (dark tar).  When cleaning up and moving Pt. Flows dropped.  I went down on RPMs to 3000 and pushed 200 of [place].  Brought the RPMs back slowly to 3500.  No more chugging/cavatation.  1 unit of PRBCs given around 2330.  At 0210 when changing another liquid BM flows dropped again.  Brought RPMs down and no need for volume.  Slowly went back to 3500 RPMs.  Both cannulas secure and in place. Pt. still agitated through shift.  Please see RNs notes.

## 2020-05-04 NOTE — SUBJECTIVE & OBJECTIVE
Interval History:   No acute events overnight, received 6U pRBC over last 24hrs for GI bleed. Scoped by GI with epinephrine injection and clip placement for an actively bleeding D2/D3 Diuelafoy lesion. Hep gtt at 600/hr. Intermittent vaso.    Medications:  Continuous Infusions:   furosemide (LASIX) 2 mg/mL continuous infusion (non-titrating) 1 mg/hr (05/04/20 1000)    heparin (porcine) in 5 % dex 600 Units/hr (05/04/20 1000)    insulin (HUMAN R) infusion (adults) 1.4 Units/hr (05/04/20 1000)    nicardipine Stopped (04/30/20 1800)    nitric oxide gas      norepinephrine bitartrate-D5W Stopped (04/25/20 0300)    pantoprozole (PROTONIX) IV infusion 8 mg/hr (05/04/20 1000)    propofoL 30 mcg/kg/min (05/04/20 1000)    vasopressin (PITRESSIN) infusion 0.02 Units/min (05/04/20 1000)     Scheduled Meds:   albuterol-ipratropium  3 mL Nebulization Q6H    ceFEPime (MAXIPIME) IVPB  2 g Intravenous Q8H    chlorhexidine  15 mL Mouth/Throat BID    fluconazole (DIFLUCAN) IVPB  200 mg Intravenous Q24H    polyethylene glycol  17 g Per NG tube Daily    sucralfate  1 g Per NG tube Q6H    vancomycin (VANCOCIN) IVPB  1,000 mg Intravenous Q12H    white petrolatum-mineral oiL   Both Eyes BID        Objective:     Vital Signs (Most Recent):  Temp: 98.2 °F (36.8 °C) (05/04/20 1000)  Pulse: (!) 125 (05/04/20 1100)  Resp: (!) 22 (05/04/20 0727)  BP: 126/75 (05/04/20 0400)  SpO2: (!) 94 % (05/04/20 1100) Vital Signs (24h Range):  Temp:  [96.4 °F (35.8 °C)-98.2 °F (36.8 °C)] 98.2 °F (36.8 °C)  Pulse:  [112-140] 125  Resp:  [22-27] 22  SpO2:  [92 %-100 %] 94 %  BP: (108-141)/(57-75) 126/75  Arterial Line BP: ()/(49-67) 117/64     Weight: 68.4 kg (150 lb 12.7 oz)  Body mass index is 25.88 kg/m².    SpO2: (!) 94 %  O2 Device (Oxygen Therapy): ventilator    Intake/Output - Last 3 Shifts       05/02 0700 - 05/03 0659 05/03 0700 - 05/04 0659 05/04 0700 - 05/05 0659    I.V. (mL/kg) 2046.2 (30.6) 2161 (31.6)     Blood 1050 1719      NG/GT 1230 1270 370    Total Intake(mL/kg) 4326.2 (64.8) 5150 (75.3) 370 (5.4)    Urine (mL/kg/hr) 3070 (1.9) 3100 (1.9) 420 (1.4)    Drains 200 50     Stool 0 0     Blood  2 3    Total Output 3270 3152 423    Net +1056.2 +1998 -53           Stool Occurrence 8 x 6 x           Lines/Drains/Airways     Central Venous Catheter Line            Percutaneous Central Line Insertion/Assessment - Triple Lumen  04/24/20 0900 left internal jugular 10 days         ECMO Cannula 04/25/20 1120 right femoral vein 9 days         ECMO Cannula 04/25/20 1120 right internal jugular 9 days          Drain                 Urethral Catheter 04/18/20 1854 15 days         NG/OG Tube 05/03/20 1215 [place] sump;nasogastric 18 Fr. Left nostril less than 1 day          Airway                 Airway - Non-Surgical 04/11/20 1525 Endotracheal Tube 22 days          Arterial Line                 Arterial Line 04/27/20 1100 Right Brachial 7 days          Peripheral Intravenous Line                 Peripheral IV - Single Lumen 05/03/20 1700 20 G Right;Medial Forearm less than 1 day         Peripheral IV - Single Lumen 05/04/20 0300 20 G Anterior;Right Wrist less than 1 day                Physical Exam    Constitutional: He is sedated, and intubated.   Head: Normocephalic   Cardiovascular: Tachycardia 120s   Pulmonary/Chest: intubated vent FiO2 50% PEEP 10, ECMO RPM 3700, sweep 8.5, FiO2 100%, flows mid 4  Skin: L neck and L groin cannulas intact, min to no flashing, few clots pre-oxygenator, all sutures intact and secure  Nursing note and vitals reviewed.    Significant Labs:  BMP:   Recent Labs   Lab 05/04/20  1000   *   *   K 4.1      CO2 25   BUN 23*   CREATININE 0.5   CALCIUM 7.5*   MG 2.1     CBC:   Recent Labs   Lab 05/04/20  1000   WBC 13.64*   RBC 3.44*   HGB 10.5*   HCT 31.6*   PLT 76*   MCV 92   MCH 30.5   MCHC 33.2     LFTs:   Recent Labs   Lab 05/04/20  1000   ALT 32   AST 43*   ALKPHOS 123   BILITOT 0.4   PROT 4.8*    ALBUMIN 1.9*     Significant Diagnostics:  Cxr: bilateral opacities; but somewhat improved aeration from XR 2 days ago

## 2020-05-04 NOTE — PROGRESS NOTES
Pt received 2 PRBCs overnight, Vaso weaned to off, however restarted this am, 5 mg Lasix given, UOP  mL/hr, net positive 1.8L, CVP 2-3. ECMO speed 3500, flows 4.6-4.7, sweep @ 8.5, low flow alarm once this am with turning. Nitric decreased to 2 PPM per MD order, Dr. Ayon updated this am on shift events and lab results, order to start Lasix gtt 2 1 mg/hr. Plan to trach pt when possible.

## 2020-05-04 NOTE — PROGRESS NOTES
ECMO Specialists shift report    Date: 05/04/2020  ECMO Specialist:  Maikel Deluna    Pump parameters:  RPM: 3500  Flow:  4.65  Sweep:  8.5  FiO2:  100    Oxygenator status:  Clots: pre oxy  Fibrin: pre oxy    Pressure trends:  P1: 142-146  P2: 120-127  Delta P: 21-25    Volume status:  Chugging notednone  CVP: 1-3  MAP:  60-70  MD notified (name):  Nany    Anticoagulation:  ACT/aPTT/Xa parameters: 0.25-0.3  ACT/aPTT/Xa trends this shift: 0.16    Cannula size / status / placement:  Arterial:   Venous 1: RIJV 23 Fr @4cm  Venous 2:RFV 27 Fr @12 cm    Dual lumen:      Additional Comments:      Patient got two units of bloood and heparin increased to 6

## 2020-05-04 NOTE — PROGRESS NOTES
ECMO Specialists shift report    Date: 05/04/2020  ECMO Specialist:  Tatyana Pintopretty    Pump parameters:  RPM: 3500  Flow:  4.7  Sweep:  8.5  FiO2:  100    Oxygenator status:  Clots: Pre Oxy  Fibrin: Pre Oxy    Pressure trends:  P1: 140's  P2: 120's  Delta P: 20's    Volume status:  Chugging noted? None  CVP:  2  MAP:  80's  MD notified (name):  Nany     Anticoagulation:  ACT/aPTT/Xa parameters: 0.25-0.30  ACT/aPTT/Xa trends this shift: 0.22-0.27    Cannula size / status / placement:  Arterial:   Venous 1: 23 FR / RIJ / 4 cm  Venous 2: 27 FR / RFV / 12 cm  Dual lumen:      Additional Comments:  Patient was stable throughout shift.  No changes made.  ABG's reported to Dr. Ayon. Patient is scheduled for Trach tomorrow morning.

## 2020-05-04 NOTE — PROGRESS NOTES
Wound care performed to LUE as ordered, cleansed with sterile saline, betadine applied, covered with foam. 2 wounds noted, bottom wound within demarcated lines, red, slough, upper FA wound irregular, red, brown, also with slough. Wound to nare, under nose and lips cleansed with saline, JUANITA. Arms elevated on pillows, heel protector boots in place, repositioned during incontinence care, waffle overlay inflated, pt remains flat, supine due to ECMO.

## 2020-05-04 NOTE — PLAN OF CARE
Patient with MAP within range, on continuous vasopressin. Patient sinus tachy, Dr. Ayon aware. Patient maintaining o2sats above 92%. Patient with VV ECMO cannulas intact, checked throughout the day with perfusion at bedside. Patient on ventilator with fio2 50%. Patient's family called and updated on plan of care for the day per RN. Patient will have tracheostomy at bedside tomorrow. Anesthesia consent obtained. Dr. Ayon updated throughout the day on patient's assessment, vital signs, urine output, lab results, and ABG results. Will continue to monitor patient.    Problem: Adult Inpatient Plan of Care  Goal: Absence of Hospital-Acquired Illness or Injury  Outcome: Ongoing, Progressing  Goal: Rounds/Family Conference  Outcome: Ongoing, Progressing     Problem: Adult Inpatient Plan of Care  Goal: Absence of Hospital-Acquired Illness or Injury  Outcome: Ongoing, Progressing  Goal: Rounds/Family Conference  Outcome: Ongoing, Progressing     Problem: Adult Inpatient Plan of Care  Goal: Absence of Hospital-Acquired Illness or Injury  Outcome: Ongoing, Progressing  Goal: Rounds/Family Conference  Outcome: Ongoing, Progressing     Problem: Adult Inpatient Plan of Care  Goal: Absence of Hospital-Acquired Illness or Injury  Outcome: Ongoing, Progressing  Goal: Rounds/Family Conference  Outcome: Ongoing, Progressing     Problem: Adult Inpatient Plan of Care  Goal: Absence of Hospital-Acquired Illness or Injury  Outcome: Ongoing, Progressing  Goal: Rounds/Family Conference  Outcome: Ongoing, Progressing     Problem: Adult Inpatient Plan of Care  Goal: Absence of Hospital-Acquired Illness or Injury  Outcome: Ongoing, Progressing  Goal: Rounds/Family Conference  Outcome: Ongoing, Progressing     Problem: Adult Inpatient Plan of Care  Goal: Absence of Hospital-Acquired Illness or Injury  Outcome: Ongoing, Progressing  Goal: Rounds/Family Conference  Outcome: Ongoing, Progressing     Problem: Adult Inpatient Plan of Care  Goal:  Absence of Hospital-Acquired Illness or Injury  Outcome: Ongoing, Progressing  Goal: Rounds/Family Conference  Outcome: Ongoing, Progressing     Problem: Adult Inpatient Plan of Care  Goal: Absence of Hospital-Acquired Illness or Injury  Outcome: Ongoing, Progressing  Goal: Rounds/Family Conference  Outcome: Ongoing, Progressing     Problem: Adult Inpatient Plan of Care  Goal: Absence of Hospital-Acquired Illness or Injury  Outcome: Ongoing, Progressing  Goal: Rounds/Family Conference  Outcome: Ongoing, Progressing     Problem: Adult Inpatient Plan of Care  Goal: Absence of Hospital-Acquired Illness or Injury  Outcome: Ongoing, Progressing  Goal: Rounds/Family Conference  Outcome: Ongoing, Progressing     Problem: Adult Inpatient Plan of Care  Goal: Absence of Hospital-Acquired Illness or Injury  Outcome: Ongoing, Progressing  Goal: Rounds/Family Conference  Outcome: Ongoing, Progressing     Problem: Adult Inpatient Plan of Care  Goal: Absence of Hospital-Acquired Illness or Injury  Outcome: Ongoing, Progressing  Goal: Rounds/Family Conference  Outcome: Ongoing, Progressing     Problem: Adult Inpatient Plan of Care  Goal: Absence of Hospital-Acquired Illness or Injury  Outcome: Ongoing, Progressing  Goal: Rounds/Family Conference  Outcome: Ongoing, Progressing     Problem: Adult Inpatient Plan of Care  Goal: Absence of Hospital-Acquired Illness or Injury  Outcome: Ongoing, Progressing  Goal: Rounds/Family Conference  Outcome: Ongoing, Progressing     Problem: Adult Inpatient Plan of Care  Goal: Absence of Hospital-Acquired Illness or Injury  Outcome: Ongoing, Progressing  Goal: Rounds/Family Conference  Outcome: Ongoing, Progressing     Problem: Adult Inpatient Plan of Care  Goal: Absence of Hospital-Acquired Illness or Injury  Outcome: Ongoing, Progressing  Goal: Rounds/Family Conference  Outcome: Ongoing, Progressing     Problem: Adult Inpatient Plan of Care  Goal: Absence of Hospital-Acquired Illness or  Injury  Outcome: Ongoing, Progressing  Goal: Rounds/Family Conference  Outcome: Ongoing, Progressing     Problem: Adult Inpatient Plan of Care  Goal: Absence of Hospital-Acquired Illness or Injury  Outcome: Ongoing, Progressing  Goal: Rounds/Family Conference  Outcome: Ongoing, Progressing     Problem: Adult Inpatient Plan of Care  Goal: Absence of Hospital-Acquired Illness or Injury  Outcome: Ongoing, Progressing  Goal: Rounds/Family Conference  Outcome: Ongoing, Progressing     Problem: Adult Inpatient Plan of Care  Goal: Absence of Hospital-Acquired Illness or Injury  Outcome: Ongoing, Progressing  Goal: Rounds/Family Conference  Outcome: Ongoing, Progressing     Problem: Adult Inpatient Plan of Care  Goal: Absence of Hospital-Acquired Illness or Injury  Outcome: Ongoing, Progressing  Goal: Rounds/Family Conference  Outcome: Ongoing, Progressing     Problem: Adult Inpatient Plan of Care  Goal: Absence of Hospital-Acquired Illness or Injury  Outcome: Ongoing, Progressing  Goal: Rounds/Family Conference  Outcome: Ongoing, Progressing     Problem: Adult Inpatient Plan of Care  Goal: Absence of Hospital-Acquired Illness or Injury  Outcome: Ongoing, Progressing  Goal: Rounds/Family Conference  Outcome: Ongoing, Progressing     Problem: Adult Inpatient Plan of Care  Goal: Absence of Hospital-Acquired Illness or Injury  Outcome: Ongoing, Progressing  Goal: Rounds/Family Conference  Outcome: Ongoing, Progressing     Problem: Adult Inpatient Plan of Care  Goal: Absence of Hospital-Acquired Illness or Injury  Outcome: Ongoing, Progressing  Goal: Rounds/Family Conference  Outcome: Ongoing, Progressing

## 2020-05-04 NOTE — PROGRESS NOTES
[facility]  Cardiothoracic Surgery  Daily ECMO Progress Note    Patient Name: Ranjana Sanchez  MRN: [id number]  Admission Date: 4/10/2020  Hospital Length of Stay: 24 days  Type of ECMO Support: V-V  Day of ECMO Support: 8  Code Status: Full Code   Attending Physician: Francis Ayon MD   Referring Provider: Francis Ayon MD  Principal Problem:Acute respiratory distress syndrome (ARDS) due to COVID-19 virus    Subjective:   Interval History:   No acute events overnight, received 6U pRBC over last 24hrs for GI bleed. Scoped by GI with epinephrine injection and clip placement for an actively bleeding D2/D3 Diuelafoy lesion. Hep gtt at 600/hr. Intermittent vaso.    Medications:  Continuous Infusions:   furosemide (LASIX) 2 mg/mL continuous infusion (non-titrating) 1 mg/hr (05/04/20 1000)    heparin (porcine) in 5 % dex 600 Units/hr (05/04/20 1000)    insulin (HUMAN R) infusion (adults) 1.4 Units/hr (05/04/20 1000)    nicardipine Stopped (04/30/20 1800)    nitric oxide gas      norepinephrine bitartrate-D5W Stopped (04/25/20 0300)    pantoprozole (PROTONIX) IV infusion 8 mg/hr (05/04/20 1000)    propofoL 30 mcg/kg/min (05/04/20 1000)    vasopressin (PITRESSIN) infusion 0.02 Units/min (05/04/20 1000)     Scheduled Meds:   albuterol-ipratropium  3 mL Nebulization Q6H    ceFEPime (MAXIPIME) IVPB  2 g Intravenous Q8H    chlorhexidine  15 mL Mouth/Throat BID    fluconazole (DIFLUCAN) IVPB  200 mg Intravenous Q24H    polyethylene glycol  17 g Per NG tube Daily    sucralfate  1 g Per NG tube Q6H    vancomycin (VANCOCIN) IVPB  1,000 mg Intravenous Q12H    white petrolatum-mineral oiL   Both Eyes BID        Objective:     Vital Signs (Most Recent):  Temp: 98.2 °F (36.8 °C) (05/04/20 1000)  Pulse: (!) 125 (05/04/20 1100)  Resp: (!) 22 (05/04/20 0727)  BP: 126/75 (05/04/20 0400)  SpO2: (!) 94 % (05/04/20 1100) Vital Signs (24h Range):  Temp:  [96.4 °F (35.8 °C)-98.2 °F (36.8 °C)] 98.2 °F  (36.8 °C)  Pulse:  [112-140] 125  Resp:  [22-27] 22  SpO2:  [92 %-100 %] 94 %  BP: (108-141)/(57-75) 126/75  Arterial Line BP: ()/(49-67) 117/64     Weight: 68.4 kg (150 lb 12.7 oz)  Body mass index is 25.88 kg/m².    SpO2: (!) 94 %  O2 Device (Oxygen Therapy): ventilator    Intake/Output - Last 3 Shifts       05/02 0700 - 05/03 0659 05/03 0700 - 05/04 0659 05/04 0700 - 05/05 0659    I.V. (mL/kg) 2046.2 (30.6) 2161 (31.6)     Blood 1050 1719     NG/GT 1230 1270 370    Total Intake(mL/kg) 4326.2 (64.8) 5150 (75.3) 370 (5.4)    Urine (mL/kg/hr) 3070 (1.9) 3100 (1.9) 420 (1.4)    Drains 200 50     Stool 0 0     Blood  2 3    Total Output 3270 3152 423    Net +1056.2 +1998 -53           Stool Occurrence 8 x 6 x           Lines/Drains/Airways     Central Venous Catheter Line            Percutaneous Central Line Insertion/Assessment - Triple Lumen  04/24/20 0900 left internal jugular 10 days         ECMO Cannula 04/25/20 1120 right femoral vein 9 days         ECMO Cannula 04/25/20 1120 right internal jugular 9 days          Drain                 Urethral Catheter 04/18/20 1854 15 days         NG/OG Tube 05/03/20 1215 Gilliam sump;nasogastric 18 Fr. Left nostril less than 1 day          Airway                 Airway - Non-Surgical 04/11/20 1525 Endotracheal Tube 22 days          Arterial Line                 Arterial Line 04/27/20 1100 Right Brachial 7 days          Peripheral Intravenous Line                 Peripheral IV - Single Lumen 05/03/20 1700 20 G Right;Medial Forearm less than 1 day         Peripheral IV - Single Lumen 05/04/20 0300 20 G Anterior;Right Wrist less than 1 day                Physical Exam    Constitutional: He is sedated, and intubated.   Head: Normocephalic   Cardiovascular: Tachycardia 120s   Pulmonary/Chest: intubated vent FiO2 50% PEEP 10, ECMO RPM 3700, sweep 8.5, FiO2 100%, flows mid 4  Skin: L neck and L groin cannulas intact, min to no flashing, few clots pre-oxygenator, all sutures  intact and secure  Nursing note and vitals reviewed.    Significant Labs:  BMP:   Recent Labs   Lab 05/04/20  1000   *   *   K 4.1      CO2 25   BUN 23*   CREATININE 0.5   CALCIUM 7.5*   MG 2.1     CBC:   Recent Labs   Lab 05/04/20  1000   WBC 13.64*   RBC 3.44*   HGB 10.5*   HCT 31.6*   PLT 76*   MCV 92   MCH 30.5   MCHC 33.2     LFTs:   Recent Labs   Lab 05/04/20  1000   ALT 32   AST 43*   ALKPHOS 123   BILITOT 0.4   PROT 4.8*   ALBUMIN 1.9*     Significant Diagnostics:  Cxr: bilateral opacities; but somewhat improved aeration from XR 2 days ago    Assessment/Plan:     Acute respiratory distress syndrome (ARDS) due to COVID-19 virus  - Crich switched to ETT on 4/11  - Monika weaned to 2  - RPM 3700; goal flow > 4.0  - thrombocytopenia: transfuse for under 30K  - Sweep at 8.5  - Oxygenator Fi 100%  - Vent Fi 50%, PEEP 10  - Considering tracheostomy     Tongue laceration      -ENT consulted and evaluated patient, laceration superficial, not bleeding      -recs: bite block vs paralysis, will monitor    UGI Bleed  - Scoped by GI 5/3 with epinephrine injection and clip placement for an actively bleeding D2/D3 Diuelafoy lesion  - Second look scope with GI today  - on/off low-dose vaso overnight    Anticoagulation      - Heparin gtt at 600 U/hr      - Goal aXa 0.2 Q6hrs    Sedation  - On propofol  - Off dilaudid gtt and valium     FEN/GI  -TF at 30 (held for scope)    Prophylaxis      - protonix gtt      - abx for ECMO    Anne Miller MD  Cardiothoracic Surgery  [facility]    VV ECMO circuit checked and all parameters reviewed. Anticoagulation was managed and all cannulation exit sites along with review of labs and radiology studies performed. Speed and functioning of the pump along with oxygenator quality reviewed.  Patient underwent upper endoscopy which showed a bleeding arterial lesion in the 2nd part of the duodenum.  This lesion was clipped and injected with epinephrine with  good resolution.  Patient appears to be not bleeding at this stage.  Per recommendation of the GI team would restart tube feeds and increased anticoagulation.  Coordination of care was provided between various team members which included the surgical team, ICU team and nursing and perfusion Staff.

## 2020-05-04 NOTE — PLAN OF CARE
05/04/20 1337   Discharge Reassessment   Assessment Type Discharge Planning Reassessment   Provided patient/caregiver education on the expected discharge date and the discharge plan No   Do you have any problems affording any of your prescribed medications? TBD   Discharge Plan A Long-term acute care facility (LTAC)   DME Needed Upon Discharge  none

## 2020-05-04 NOTE — ANESTHESIA PREPROCEDURE EVALUATION
[facility]  Anesthesia Pre-Operative Evaluation         Patient Name: Ranjana Sanchez  YOB: 1965  MRN: [id number]    SUBJECTIVE:        05/04/2020    Ranjana Sanchez is a 54 y.o. male with a very complicated past medical history. He is a  on the [employer] line and his family lives in [facility]. He has a history of HTN and DM2. He initially presented to [facility] ED via EMS 2/2 increased SOB in the setting of known covid-19 exposure. In the ED he was found to be hypoxic despite supplemental oxygen therapy. A difficult airway was encountered during intubation and an emergency crich had to be performed. He was admitted to the ICU where he ultimately failed maximal mechanical ventilatory support and was placed on VV ECMO. Developed GIB 5/3, s/p EGD.     He is currently sedated with propofol at 30 mcg/kg/min and on slight vasopressor support with vasopressin at 0.04 units/min.     Patient now presents for the above procedure(s).    LDA:        ECMO Cannula 04/25/20 1120 right femoral vein (Active)   Site Assessment Clean;Dry;Intact;No redness;No swelling 5/2/2020  7:00 PM   Current cm Marking at Skin 12 cm 5/2/2020  7:00 PM   Dressing Type Biopatch in place;Transparent (Tegaderm) 5/2/2020  7:00 PM   Dressing Status Clean;Dry;Intact 5/2/2020  7:00 PM   Dressing Intervention Integrity maintained 5/2/2020  7:00 PM   Daily Line Review Performed 5/2/2020  7:00 PM   Number of days: 7            ECMO Cannula 04/25/20 1120 right internal jugular (Active)   Site Assessment Clean;Dry;Intact;No redness;No swelling 5/2/2020  7:00 PM   Current cm Marking at Skin 4 cm 5/2/2020  7:00 PM   Dressing Type Biopatch in place;Transparent (Tegaderm) 5/2/2020  7:00 PM   Dressing Status Clean;Dry;Intact 5/2/2020  7:00 PM   Dressing Intervention Integrity maintained 5/2/2020  7:00 PM   Daily Line Review Performed 5/2/2020  7:00 PM   Number of days: 7       Percutaneous Central Line  Insertion/Assessment - Triple Lumen  04/24/20 0900 left internal jugular (Active)   Verification by X-ray Yes 5/2/2020  7:15 AM   Site Assessment No drainage;No redness;No swelling;No warmth 5/2/2020  7:15 AM   Line Securement Device Secured with sutures 5/2/2020  7:15 AM   Dressing Type Biopatch in place;Central line dressing with pants 5/2/2020  7:00 PM   Dressing Status Clean;Dry;Intact 5/2/2020  7:00 PM   Dressing Intervention Sterile dressing change 5/2/2020  7:00 PM   Date on Dressing 05/02/20 5/2/2020  3:00 AM   Dressing Due to be Changed 05/09/20 5/2/2020  3:00 AM   Distal Patency/Care infusing 5/2/2020  7:00 PM   Medial 1 Patency/Care infusing 5/2/2020  7:00 PM   Proximal 1 Patency/Care infusing 5/2/2020  7:00 PM   Waveform Normal 5/2/2020  7:00 PM   Line Necessity Review Hemodynamic instability;Incompatible infusions;Medication caustic to vasculature;Poor venous access 5/2/2020  7:15 AM   Number of days: 8            Peripheral IV - Single Lumen 04/30/20 0550 20 G Left Hand (Active)   Site Assessment Clean;Dry;Intact;No redness;No swelling 5/2/2020  7:00 PM   Line Status Blood return noted;Flushed;Saline locked 5/2/2020  7:00 PM   Dressing Status Clean;Dry;Intact 5/2/2020  7:00 PM   Dressing Intervention Integrity maintained 5/2/2020  7:00 PM   Dressing Change Due 05/04/20 5/2/2020  7:00 PM   Site Change Due 05/04/20 5/2/2020  3:00 AM   Reason Not Rotated Not due 5/2/2020  7:00 PM   Number of days: 2            Peripheral IV - Single Lumen 04/30/20 0550 18 G Right Wrist (Active)   Site Assessment Clean;Dry;Intact;No redness;No swelling 5/2/2020  7:00 PM   Line Status Flushed;Saline locked 5/2/2020  7:00 PM   Dressing Status Clean;Dry;Intact 5/2/2020  7:00 PM   Dressing Intervention Integrity maintained 5/2/2020  7:00 PM   Dressing Change Due 05/04/20 5/2/2020  7:00 PM   Site Change Due 05/04/20 5/2/2020  3:00 AM   Reason Not Rotated Not due 5/2/2020  7:00 PM   Number of days: 2            Arterial Line  04/27/20 1100 Right Brachial (Active)   Site Assessment Clean;Dry;Intact;No redness;No swelling 5/2/2020  7:00 PM   Line Status Pulsatile blood flow 5/2/2020  7:00 PM   Art Line Waveform Appropriate;Square wave test performed 5/2/2020  7:00 PM   Arterial Line Interventions Zeroed and calibrated;Leveled;Connections checked and tightened;Flushed per protocol 5/2/2020  7:00 PM   Color/Movement/Sensation Capillary refill less than 3 sec 5/2/2020  7:00 PM   Dressing Type Central line dressing with pants 5/2/2020  7:00 PM   Dressing Status Clean;Dry;Intact 5/2/2020  7:00 PM   Dressing Intervention Integrity maintained 5/2/2020  7:00 PM   Dressing Change Due 05/05/20 5/2/2020  7:00 PM   Number of days: 5            NG/OG Tube 04/12/20 1000 18 Fr. Right nostril (Active)   Placement Check placement verified by aspirate characteristics 5/2/2020  7:00 PM   Advancement advanced manually 5/1/2020  3:00 PM   Tolerance no signs/symptoms of discomfort 5/2/2020  7:00 PM   Securement secured to nostril center w/ adhesive device 5/2/2020  7:00 PM   Clamp Status/Tolerance unclamped;no restlessness;no residual;no nausea;no abdominal distention 5/2/2020  7:00 PM   Suction Setting/Drainage Method suction initiated at;low;intermittent setting 5/2/2020  3:15 PM   Insertion Site Appearance drainage;no redness, warmth, tenderness, skin breakdown, drainage 5/2/2020  7:00 PM   Drainage Bloody 5/2/2020  3:15 PM   Flush/Irrigation flushed w/;water;no resistance met 5/2/2020  7:00 PM   Feeding Type continuous 5/2/2020  7:00 PM   Feeding Action feeding continued 5/2/2020  7:00 PM   Current Rate (mL/hr) 30 mL/hr 5/2/2020  7:15 AM   Goal Rate (mL/hr) 30 mL/hr 5/2/2020  7:15 AM   Intake (mL) 30 mL 4/29/2020  7:00 AM   Water Bolus (mL) 250 mL 5/2/2020  9:00 PM   Tube Output(mL)(Include Discarded Residual) 200 mL 5/2/2020  5:00 PM   Rate Formula Tube Feeding (mL/hr) 30 mL/hr 5/2/2020  7:15 AM   Formula Name [facility] 5/2/2020  7:15 AM   Intake (mL) - Formula  "Tube Feeding 30 5/2/2020  3:00 PM   Residual Amount (ml) 20 ml 5/2/2020  7:15 AM   Number of days: 20            Urethral Catheter 04/18/20 9033 (Active)   Site Assessment Clean;Intact 5/2/2020  7:00 PM   Collection Container Urimeter 5/2/2020  7:00 PM   Securement Method secured to top of thigh w/ adhesive device 5/2/2020  7:00 PM   Catheter Care Performed yes 5/2/2020  7:00 PM   Reason for Continuing Urinary Catheterization Critically ill in ICU requiring intensive monitoring 5/2/2020  7:00 PM   CAUTI Prevention Bundle StatLock in place w 1" slack;Intact seal between catheter & drainage tubing;Drainage bag/urimeter off the floor;Green sheeting clip in use;No dependent loops or kinks;Drainage bag/urimeter not overfilled (<2/3 full);Drainage bag/urimeter below bladder 5/2/2020  3:15 PM   Output (mL) 100 mL 5/2/2020  9:00 PM   Number of days: 14       Prev airway: S/p emergent crich 2/2 difficult airway encountered in the ED. On ECMO support after having failed maximal medical and ventilatory management.     He was able to intubated from above by Dr. Houser on 4/11/20:    Method of Intubation:  Video laryngoscopy, bougie and fiberoptic  Blade:  Glidescope 3  Laryngeal View Grade: Grade I - full view of chords    Airway Device:  Oral endotracheal tube  Airway Device Size:  7.5    "Pt with cricothyrotomy due to unable to intubate with Glidescope in ED last PM.  With Dr. العراقي at bedside, glottis visualized with Glidescope per Dr. Houser.  Airway suctioned, attempted to view below cords with fiberoptic.  Unable to make bend.  Bougie placed in glottis below cords, ETT advanced over bougie.  Cricothyrotomy removed by Dr. العراقي.  ETT placed at ~25 cm"    Drips:       Patient Active Problem List   Diagnosis    COVID-19 virus detected    Acute hypoxemic respiratory failure    Shock    Sedated    Acute respiratory disease due to COVID-19 virus    Difficult intubation    Type 2 diabetes mellitus without complication, " without long-term current use of insulin    Goals of care, counseling/discussion    Arm wound, right, sequela    Shock, unspecified    Acute respiratory distress syndrome (ARDS) due to COVID-19 virus    Hypokalemia    Hypophosphatemia    Acute blood loss anemia       Review of patient's allergies indicates:  No Known Allergies    Current Inpatient Medications:      Current Facility-Administered Medications on File Prior to Visit   Medication Dose Route Frequency Provider Last Rate Last Dose    0.9%  NaCl infusion (for blood administration)   Intravenous Q24H PRN Francis Ayon MD        acetaminophen tablet 650 mg  650 mg Per NG tube Q6H PRN Dottie Andrade MD   650 mg at 04/25/20 0803    albuterol-ipratropium 2.5 mg-0.5 mg/3 mL nebulizer solution 3 mL  3 mL Nebulization Q6H Louisa Irvin PA-C   3 mL at 05/04/20 0102    calcium gluconate 1g in dextrose 5% 100mL (ready to mix system)  1 g Intravenous PRN Jack Joseph MD   1 g at 04/26/20 0610    calcium gluconate 2 g in dextrose 5 % 100 mL IVPB  2 g Intravenous PRN Jack Joseph MD   2 g at 04/26/20 0819    calcium gluconate 3 g in dextrose 5 % 100 mL IVPB  3 g Intravenous PRN Jack Joseph MD        ceFEPIme injection 2 g  2 g Intravenous Q8H Francis Ayon MD   2 g at 05/04/20 0756    chlorhexidine 0.12 % solution 15 mL  15 mL Mouth/Throat BID Dottie Andrade MD   15 mL at 05/04/20 0908    dextrose 10% (D10W) Bolus  25 g Intravenous PRN Naseem Alexandra MD        dextrose 10% (D10W) Bolus  12.5 g Intravenous PRN Naseem Alexandra MD        fentaNYL injection 25 mcg  25 mcg Intravenous Q4H PRN Rufina Shane MD   25 mcg at 05/04/20 0755    fluconazole (DIFLUCAN) IVPB 200 mg 100 mL  200 mg Intravenous Q24H Anne Miller MD   200 mg at 05/03/20 1303    furosemide (LASIX) 2 mg/mL in sodium chloride 0.9% 100 mL continuous infusion (conc: 2 mg/mL)  1 mg/hr Intravenous Continuous Francismeghan Ayon MD 0.5 mL/hr at  05/04/20 1000 1 mg/hr at 05/04/20 1000    glucose chewable tablet 16 g  16 g Oral PRN Luz Ortega NP        glucose chewable tablet 24 g  24 g Oral PRN Luz Ortega NP        heparin 25,000 units in dextrose 5% 250 mL (100 units/mL) infusion (heparin infusion - NO NOMOGRAM)  600 Units/hr Intravenous Continuous Francis Ayon MD 6 mL/hr at 05/04/20 1000 600 Units/hr at 05/04/20 1000    insulin regular 100 Units in sodium chloride 0.9% 100 mL infusion  5 Units/hr Intravenous Continuous Naseem Alexandra MD 1.4 mL/hr at 05/04/20 1000 1.4 Units/hr at 05/04/20 1000    magnesium sulfate 2g in water 50mL IVPB (premix)  2 g Intravenous PRN Jack Joseph MD   2 g at 05/04/20 0107    magnesium sulfate 2g in water 50mL IVPB (premix)  4 g Intravenous PRN Jack Joseph MD        niCARdipine 40 mg/200 mL infusion  1 mg/hr Intravenous Continuous Francis Ayon MD   Stopped at 04/30/20 1800    nitric oxide gas Gas 2 ppm  2 ppm Inhalation Continuous Francis Ayon MD        norepinephrine 16 mg in dextrose 5 % 250 mL infusion  0.02 mcg/kg/min (Dosing Weight) Intravenous Continuous Jakc Joseph MD   Stopped at 04/25/20 0300    pantoprazole (PROTONIX) 40 mg in dextrose 5 % 100 mL infusion  8 mg/hr Intravenous Continuous Francis Ayon MD 20 mL/hr at 05/04/20 1000 8 mg/hr at 05/04/20 1000    polyethylene glycol packet 17 g  17 g Per NG tube Daily Jorge Pulliam MD   Stopped at 05/03/20 0900    potassium chloride 40 mEq in 100 mL IVPB (FOR CENTRAL LINE ADMINISTRATION ONLY)  40 mEq Intravenous PRN Jack Joseph MD 25 mL/hr at 05/04/20 0607 40 mEq at 05/04/20 0607    And    potassium chloride 20 mEq in 100 mL IVPB (FOR CENTRAL LINE ADMINISTRATION ONLY)  60 mEq Intravenous PRN Jack Joseph MD 50 mL/hr at 04/25/20 1741 60 mEq at 04/25/20 1741    propofol (DIPRIVAN) 10 mg/mL infusion  5 mcg/kg/min Intravenous Continuous Dottieromelia Andrade MD 11.4 mL/hr at 05/04/20 1000 30 mcg/kg/min  at 05/04/20 1000    sodium chloride 0.9% flush 10 mL  10 mL Intravenous PRN Anne Miller MD        sodium phosphate 15 mmol in dextrose 5 % 250 mL IVPB  15 mmol Intravenous PRN Jack Joseph MD 83.3 mL/hr at 05/01/20 0513 15 mmol at 05/01/20 0513    sodium phosphate 20.01 mmol in dextrose 5 % 250 mL IVPB  20.01 mmol Intravenous PRN Jack Joseph MD 62.5 mL/hr at 04/30/20 0314 20.01 mmol at 04/30/20 0314    sodium phosphate 30 mmol in dextrose 5 % 250 mL IVPB  30 mmol Intravenous PRN Jack Joseph MD        sucralfate 100 mg/mL suspension 1 g  1 g Per NG tube Q6H Rafael Nj MD   1 g at 05/04/20 0551    vancomycin in dextrose 5 % 1 gram/250 mL IVPB 1,000 mg  1,000 mg Intravenous Q12H Francis Ayon MD   1,000 mg at 05/03/20 2202    vasopressin (PITRESSIN) 0.2 Units/mL in dextrose 5 % 100 mL infusion  0.04 Units/min Intravenous Continuous Anne Miller MD 6 mL/hr at 05/04/20 1000 0.02 Units/min at 05/04/20 1000    white petrolatum-mineral oil (LUBIFRESH P.M.) ophthalmic ointment   Both Eyes BID Francis Ayon MD         Current Outpatient Medications on File Prior to Visit   Medication Sig Dispense Refill    aspirin 81 MG Chew Take 81 mg by mouth once daily.      fenofibrate 160 MG Tab Take 160 mg by mouth once daily.      metoprolol tartrate (LOPRESSOR) 50 MG tablet Take 50 mg by mouth 2 (two) times daily.      rosuvastatin (CRESTOR) 10 MG tablet Take 5 mg by mouth once daily.      telmisartan (MICARDIS) 40 MG Tab Take 40 mg by mouth once daily.         No past surgical history on file.    Social History     Socioeconomic History    Marital status:      Spouse name: Not on file    Number of children: Not on file    Years of education: Not on file    Highest education level: Not on file   Occupational History    Not on file   Social Needs    Financial resource strain: Not on file    Food insecurity:     Worry: Not on file     Inability: Not on file     Transportation needs:     Medical: Not on file     Non-medical: Not on file   Tobacco Use    Smoking status: Never Smoker   Substance and Sexual Activity    Alcohol use: Not on file    Drug use: Not on file    Sexual activity: Not on file   Lifestyle    Physical activity:     Days per week: Not on file     Minutes per session: Not on file    Stress: Not on file   Relationships    Social connections:     Talks on phone: Not on file     Gets together: Not on file     Attends [employer] service: Not on file     Active member of club or organization: Not on file     Attends meetings of clubs or organizations: Not on file     Relationship status: Not on file   Other Topics Concern    Not on file   Social History Narrative    Not on file       OBJECTIVE:     Vital Signs Range (Last 24H):  Temp:  [35.8 °C (96.4 °F)-36.8 °C (98.2 °F)]   Pulse:  [112-140]   Resp:  [22-27]   BP: (108-141)/(57-75)   SpO2:  [92 %-100 %]   Arterial Line BP: ()/(49-67)       Significant Labs:  Lab Results   Component Value Date    WBC 13.64 (H) 05/04/2020    HGB 10.5 (L) 05/04/2020    HCT 31.6 (L) 05/04/2020    PLT 76 (L) 05/04/2020    TRIG 390 (H) 05/02/2020    ALT 26 05/04/2020    AST 39 05/04/2020     05/04/2020    K 3.7 05/04/2020     05/04/2020    CREATININE 0.5 05/04/2020    BUN 25 (H) 05/04/2020    CO2 25 05/04/2020    TSH 0.151 (L) 04/17/2020    INR 1.0 05/04/2020    HGBA1C 6.6 (H) 04/12/2020       Diagnostic Studies:     EKG:   Results for orders placed or performed during the hospital encounter of 04/10/20   EKG 12-lead    Collection Time: 04/18/20  4:24 PM    Narrative    Test Reason : R00.0,    Vent. Rate : 130 BPM     Atrial Rate : 130 BPM     P-R Int : 128 ms          QRS Dur : 078 ms      QT Int : 290 ms       P-R-T Axes : 055 -12 036 degrees     QTc Int : 426 ms    Sinus tachycardia  Otherwise normal ECG  When compared with ECG of 17-APR-2020 08:52,  Vent. rate has increased BY  78 BPM  ST no longer  elevated in Lateral leads  T wave inversion no longer evident in Inferior leads  Confirmed by KIKO DAVILA MD ([id number]) on 4/20/2020 10:14:01 AM    Referred By: [email or web address]   SELF           Confirmed By:KIKO DAVILA MD       2D ECHO:  TTE:  Results for orders placed or performed during the hospital encounter of 04/10/20   Echo Color Flow Doppler? Yes   Result Value Ref Range    BSA 1.69 m2    TDI SEPTAL 0.10 m/s    LV LATERAL E/E' RATIO 6.42 m/s    LV SEPTAL E/E' RATIO 7.70 m/s    LA WIDTH 3.80 cm    TDI LATERAL 0.12 m/s    LVIDD 5.00 3.5 - 6.0 cm    IVS 0.87 0.6 - 1.1 cm    PW 0.79 0.6 - 1.1 cm    LVIDS 3.64 2.1 - 4.0 cm    FS 27 28 - 44 %    LA volume 55.43 cm3    Sinus 3.68 cm    STJ 2.81 cm    LV mass 142.38 g    LA size 3.50 cm    RVDD 4.57 cm    TAPSE 2.27 cm    Left Ventricle Relative Wall Thickness 0.32 cm    AV mean gradient 5 mmHg    AV valve area 2.55 cm2    AV Velocity Ratio 0.68     AV index (prosthetic) 0.68     E/A ratio 1.64     Mean e' 0.11 m/s    E wave decelartion time 156.43 msec    LVOT diameter 2.19 cm    LVOT area 3.8 cm2    LVOT peak abran 1.05 m/s    LVOT peak VTI 18.52 cm    Ao peak abran 1.54 m/s    Ao VTI 27.35 cm    LVOT stroke volume 69.73 cm3    AV peak gradient 9 mmHg    E/E' ratio 7.00 m/s    MV Peak E Abran 0.77 m/s    TR Max Abran 3.08 m/s    MV Peak A Abran 0.47 m/s    LV Systolic Volume 55.83 mL    LV Systolic Volume Index 33.3 mL/m2    LV Diastolic Volume 118.34 mL    LV Diastolic Volume Index 70.62 mL/m2    LA Volume Index 33.1 mL/m2    LV Mass Index 85 g/m2    RA Major Axis 4.65 cm    Left Atrium Minor Axis 5.00 cm    Left Atrium Major Axis 4.81 cm    Triscuspid Valve Regurgitation Peak Gradient 38 mmHg    RA Width 4.55 cm    Right Atrial Pressure (from IVC) 8 mmHg    TV rest pulmonary artery pressure 46 mmHg    Narrative    · Normal left ventricular systolic function. The estimated ejection   fraction is 60%.  · Mild right ventricular enlargement.  · Low normal right ventricular  systolic function.  · Normal LV diastolic function.  · The estimated PA systolic pressure is 46 mmHg.  · Intermediate central venous pressure (8 mmHg).          KAVYA:  No results found for this or any previous visit.    ASSESSMENT/PLAN:         Pre-op Assessment    I have reviewed the Patient Summary Reports.     I have reviewed the Nursing Notes.   I have reviewed the Medications.     Review of Systems  Anesthesia Hx:  History of prior surgery of interest to airway management or planning: Denies Family Hx of Anesthesia complications.   Denies Personal Hx of Anesthesia complications.   Hematology/Oncology:         -- Anemia:   Cardiovascular:   Hypertension    Pulmonary:   Pneumonia COVID+  Respiratory Failure  ECMO   Renal/:  Renal/ Normal     Hepatic/GI:   GIB   Musculoskeletal:  Musculoskeletal Normal    Neurological:  Neurology Normal    Endocrine:   Diabetes    Dermatological:  Skin Normal    Psych:  Psychiatric Normal           Physical Exam  General:  Malnutrition    Airway/Jaw/Neck:  Airway Findings: (s/p emergent crich in ED) Pre-Existing Airway Tube(s): Oral Endotracheal tube Size: 7.5  General Airway Assessment: Adult        Eyes/Ears/Nose:  EYES/EARS/NOSE FINDINGS: Normal   Dental:  DENTAL FINDINGS: Normal   Chest/Lungs:  Chest/Lungs Findings: (On ECMO)    Heart/Vascular:  Heart Findings: Rate: Normal  Rhythm: Regular Rhythm  Sounds: Normal  Heart murmur: negative    Abdomen:  Abdomen Findings: Normal    Musculoskeletal:  Musculoskeletal Findings: Normal   Skin:  Skin Findings: Normal    Mental Status:  Mental Status Findings:  Unconscious         Anesthesia Plan  Type of Anesthesia, risks & benefits discussed:  Anesthesia Type:  general, MAC  Patient's Preference: General/MAC  Intra-op Monitoring Plan: standard ASA monitors, arterial line and central line  Intra-op Monitoring Plan Comments:   Post Op Pain Control Plan: multimodal analgesia and per primary service following discharge from PACU  Post  Op Pain Control Plan Comments: IV meds as needed  Induction:   IV  Beta Blocker:  Patient is not currently on a Beta-Blocker (No further documentation required).       Informed Consent: Patient representative understands risks and agrees with Anesthesia plan.  Questions answered. Anesthesia consent signed with patient representative.  ASA Score: 5     Day of Surgery Review of History & Physical:    H&P update referred to the surgeon.     Anesthesia Plan Notes: Discussed anesthetic options, pt understands and agrees with plan        Ready For Surgery From Anesthesia Perspective.

## 2020-05-04 NOTE — PROGRESS NOTES
Pharmacokinetic Assessment Follow Up: IV Vancomycin    Vancomycin serum concentration assessment(s):    Vancomycin random level ordered by provider resulted at 17.1 mcg/mL approximately 6 hours after the previous dose. Vancomycin trough level resulted at 9.7 mcg/mL drawn appropriately. Goal level is 10 to 20 mcg/mL.     Drug levels (last 3 results):  Recent Labs   Lab Result Units 05/04/20  0400 05/04/20  1000   Vancomycin, Random ug/mL 17.1  --    Vancomycin-Trough ug/mL  --  9.7*     Vancomycin Regimen Plan:    Increased to vancomycin 1250 mg IV every 12 hours with next serum trough concentration measured on 5/6 1030 prior to 4th dose of new regimen.     Pharmacy will continue to follow and monitor vancomycin.    Please contact pharmacy at extension [phone] for questions regarding this assessment.    Thank you for the consult,   Sonya Banks, PharmD, [facility]             Patient brief summary:  Ranjana Sanchez is a 54 y.o. male initiated on antimicrobial therapy with IV vancomycin for treatment of sepsis    Drug Allergies:   Review of patient's allergies indicates:  No Known Allergies    Actual Body Weight:   68.4 kg     Renal Function:   Estimated Creatinine Clearance: 138.1 mL/min (based on SCr of 0.5 mg/dL).    Dialysis Method (if applicable):  N/A    CBC (last 72 hours):  Recent Labs   Lab Result Units 05/01/20  2200 05/02/20  0400 05/02/20  0800 05/02/20  1255 05/02/20  1405 05/02/20  1940 05/02/20  2200 05/03/20  0200 05/03/20  0400 05/03/20  0743 05/03/20  1055 05/03/20  1309 05/03/20  1950 05/03/20  2200 05/04/20  0400 05/04/20  0808 05/04/20  1000   WBC K/uL 10.49 11.37 11.56 13.06* 13.91* 15.69* 17.66* 17.97* 19.77* 15.40* 14.89* 21.19* 16.89* 15.76* 16.69*  --  13.64*   Hemoglobin g/dL 8.5* 8.6* 9.0* 8.0* 7.4* 9.7* 9.5* 9.9* 10.0* 8.3* 8.6* 8.3* 9.2* 10.1* 11.0*  --  10.5*   Hemoglobin, Free, Plasma mg/dL  --   --   --   --   --   --   --   --   --   --   --   --   --   --   --  70*  --     Hematocrit % 26.5* 27.0* 28.1* 24.7* 23.2* 29.6* 28.9* 30.1* 30.3* 25.0* 26.3* 25.1* 28.1* 30.2* 33.5*  --  31.6*   Platelets K/uL 74* 115* 105* 92* 92* 83* 81* 73* 65* 111* 112* 108* 103* 93* 69*  --  76*   Gran% % 67.0 58.0 53.0 65.0 64.0 78.0* 69.0 63.0 82.0* 70.0 87.0* 90.0* 83.0* 78.0* 71.0  --  75.0*   Lymph% % 5.0* 23.0 27.0 11.0* 12.0* 9.0* 16.0* 18.0 3.0* 18.0 5.0* 4.0* 5.0* 5.0* 10.0*  --  10.0*   Mono% % 3.0* 6.0 6.0 10.0 8.0 3.0* 8.0 3.0* 2.0* 4.0 2.0* 0.0* 7.0 2.5* 7.0  --  6.0   Eosinophil% % 1.0 7.0 2.0 0.0 5.0 1.0 2.0 3.0 1.0 1.0 2.0 1.0 2.0 3.5 4.0  --  5.0   Basophil% % 1.0 0.0 0.0 0.0 1.0 0.0 0.0 0.0 1.0 0.0 0.0 0.0 0.0 1.0 0.0  --  0.0   Differential Method  Manual Manual Manual Manual Manual Manual Manual Manual Manual Manual Manual Manual Manual Manual Manual  --  Manual       Metabolic Panel (last 72 hours):  Recent Labs   Lab Result Units 05/01/20 2200 05/02/20  0400 05/02/20  0800 05/02/20  1405 05/02/20  2200 05/03/20  0400 05/03/20  0743 05/03/20  1309 05/03/20  2200 05/04/20  0400 05/04/20  1000   Sodium mmol/L 143 143 145 142 139 142 140 141 140 137 133*   Potassium mmol/L 4.8 4.5 4.5 4.2 3.7 5.1 4.8 4.7 4.1 3.7 4.1   Chloride mmol/L 108 108 109 107 104 109 108 108 106 104 101   CO2 mmol/L 29 27 29 28 28 26 26 28 27 25 25   Glucose mg/dL 378* 371* 355* 280* 134* 249* 342* 362* 179* 130* 217*   BUN, Bld mg/dL 57* 54* 55* 55* 48* 46* 51* 44* 31* 25* 23*   Creatinine mg/dL 0.6 0.6 0.6 0.5 0.5 0.6 0.6 0.6 0.5 0.5 0.5   Albumin g/dL 1.9* 2.0* 1.9* 1.7* 1.7* 1.8* 1.8* 1.8* 1.9* 2.0* 1.9*   Total Bilirubin mg/dL 0.4 0.3 0.3 0.3 0.3 0.3 0.3 0.3 0.3 0.4 0.4   Alkaline Phosphatase U/L 127 130 120 110 100 102 95 90 90 112 123   AST U/L 33 30 25 21 25 26 25 23 30 39 43*   ALT U/L 30 29 26 24 23 23 24 22 22 26 32   Magnesium mg/dL 2.0 1.9 1.9 1.9 2.1 2.1 2.1 2.0 1.9 2.0 2.1   Phosphorus mg/dL 3.5 3.8 4.0 3.9 3.9 4.0 3.2 3.9 3.3 3.2 2.9       Vancomycin Administrations:  vancomycin given in the  last 96 hours                   vancomycin in dextrose 5 % 1 gram/250 mL IVPB 1,000 mg (mg) 1,000 mg New Bag 05/04/20 1130     1,000 mg New Bag 05/03/20 2202     1,000 mg New Bag  1050     1,000 mg New Bag 05/02/20 2212     1,000 mg New Bag  1003     1,000 mg New Bag 05/01/20 2223     1,000 mg New Bag  1000     1,000 mg New Bag 04/30/20 2201                Microbiologic Results:  Microbiology Results (last 7 days)     Procedure Component Value Units Date/Time    Blood culture [[id number]] Collected:  04/25/20 0930    Order Status:  Completed Specimen:  Blood from Peripheral, Hand, Right Updated:  04/29/20 1212     Blood Culture, Routine No Growth after 4 days.     Narrative:       Blood cultures x 2 different sites. 4 bottles total. Please  draw cultures before administering antibiotics.    Blood culture [[id number]] Collected:  04/25/20 0945    Order Status:  Completed Specimen:  Blood from Peripheral, Hand, Left Updated:  04/29/20 1212     Blood Culture, Routine No Growth after 4 days.     Narrative:       Blood cultures from 2 different sites. 4 bottles total.  Please draw before starting antibiotics.

## 2020-05-04 NOTE — ASSESSMENT & PLAN NOTE
Acute respiratory distress syndrome (ARDS) due to COVID-19 virus  - Crich switched to ETT on 4/11  - Monika weaned to 2  - RPM 3700; goal flow > 4.0  - thrombocytopenia: transfuse for under 30K  - Sweep at 8.5  - Oxygenator Fi 100%  - Vent Fi 50%, PEEP 10  - Considering tracheostomy     Tongue laceration      -ENT consulted and evaluated patient, laceration superficial, not bleeding      -recs: bite block vs paralysis, will monitor    Sedation  - On propofol  - Off dilaudid gtt and valium     UGI Bleed  - Scoped by GI 5/3 with epinephrine injection and clip placement for an actively bleeding D2/D3 Diuelafoy lesion  - Second look scope with GI today  - on/off low-dose vaso overnight    FEN/GI  -TF at 30 (held for scope)     Anticoagulation      - Heparin gtt at 600 U/hr      - Goal aXa 0.2 Q6hrs    Prophylaxis      - protonix gtt      - abx for ECMO

## 2020-05-05 NOTE — NURSING
Updated Dr. Ayon on pt current status. Discussed VS, MAP, CVP, UO, gtts, labs, and most recent ABG. No new orders received.

## 2020-05-05 NOTE — CONSULTS
Double lumen PICC placed to -rt basilic vein. 34 cm in length, 0 cm exposed,31- cm circumference.  Lot #[id number]

## 2020-05-05 NOTE — BRIEF OP NOTE
[facility]  Brief Operative Note    SUMMARY     Surgery Date: 5/5/2020     Surgeon(s) and Role:     * Naseem Alexandra MD - Primary     * VANCE Grayson MD - Resident - Assisting        Pre-op Diagnosis:  Acute respiratory disease due to COVID-19 virus [U07.1, J06.9]    Post-op Diagnosis:  Post-Op Diagnosis Codes:     * Acute respiratory disease due to COVID-19 virus [U07.1, J06.9]    Procedure(s) (LRB):  CREATION, TRACHEOSTOMY  (N/A)    Anesthesia: General    Description of Procedure: open tracheostomy, bronchoscopy    Description of the findings of the procedure: normal neck anatomy; lungs are very erythematous bilaterally with no purulence or fluid accumulation    Estimated Blood Loss: 10ml         Specimens:   Specimen (12h ago, onward)    None        VANCE Grayson MD   General Surgery, PGY-2  Pager: [phone]

## 2020-05-05 NOTE — PROGRESS NOTES
[facility] shift report    Date: 05/05/2020  ECMO Specialist:  Maikel Deluna    Pump parameters:  RPM: 3500   Flow:  4.63  Sweep:  8.5  FiO2:  100%    Oxygenator status:  Clots: pre oxy  Fibrin: pre oxy    Pressure trends:  P1: 140-145  P2: 116-120  Delta P: 23-25    Volume status:  Chugging noted No  CVP: 2-4  MAP:  70-90  MD notified (name):  Anny    Anticoagulation:  ACT/aPTT/Xa parameters: 0.25-0.3  ACT/aPTT/Xa trends this shift: 0.27    Cannula size / status / placement:  Arterial:   Venous 1: RIJV 23 Fr@4 cm  Venous 2:RFV 27 Fr@12 cm  Dual lumen:      Additional Comments:    No blood products given, trach scheduled for tomorrow morning

## 2020-05-05 NOTE — PROGRESS NOTES
"[facility]  Adult Nutrition  Progress Note    SUMMARY       Recommendations  1. As medically able, recommend resuming TF and advancing to Peptamen Intense VHP at a goal rate of 40 mL/hr - to provide 960 kcal/day (1361 kcal w/propofol) and 88g protein/day.   2. RD to monitor.    Goals: Meet % EEN, EPN by RD f/u date  Nutrition Goal Status: progressing towards goal  Communication of RD Recs: (POC)    Reason for Assessment  Reason For Assessment: RD follow-up  Diagnosis: (COVID-19)  Relevant Medical History: HTN  Interdisciplinary Rounds: did not attend  General Information Comments: RD working remotely. Patient remains intubated, sedated. Placed on VV-ECMO 4/25, continues. Scoped by GI 5/3 and clip placed for bleeding lesion. TF held for this scope and still not restarted (was at goal of 30 mL/hr). Plan for bedside trach and PEG today. Still unable to complete NFPE due to recent hospital wide restrictions to limit the spread of COVID-19.   Nutrition Discharge Planning: Unable to determine at this time.    Nutrition Risk Screen  Nutrition Risk Screen: tube feeding or parenteral nutrition    Nutrition/Diet History  Food Allergies: NKFA  Factors Affecting Nutritional Intake: NPO, on mechanical ventilation    Anthropometrics  Temp: 98.2 °F (36.8 °C)  Height Method: Stated  Height: 5' 4" (162.6 cm)  Height (inches): 64 in  Weight Method: Bed Scale  Weight: 64.6 kg (142 lb 6.7 oz)  Weight (lb): 142.42 lb  Ideal Body Weight (IBW), Male: 130 lb  % Ideal Body Weight, Male (lb): 106.92 %  BMI (Calculated): 24.4  BMI Grade: 18.5-24.9 - normal  Usual Body Weight (UBW), kg: (JAMMIE)    Lab/Procedures/Meds  Pertinent Labs Reviewed: reviewed  Pertinent Labs Comments: Glu 149, Alb 2.1, CRP 84, Trig 212  Pertinent Medications Reviewed: reviewed  Pertinent Medications Comments: lasix, insulin drip, cardene, levophed, pantoprazole, propofol, vasopressin    Estimated/Assessed Needs  Weight Used For Calorie " Calculations: 63.5 kg (139 lb 15.9 oz)(dosing weight)  Energy Calorie Requirements (kcal): 1316 kcal/day  Energy Need Method: [facility]  Protein Requirements: 76-95 g/day(1.2-1.5 g/kg)  Weight Used For Protein Calculations: 63.5 kg (139 lb 15.9 oz)(dosing weight)  Fluid Requirements (mL): 1 mL/kcal or per MD  Estimated Fluid Requirement Method: RDA Method  RDA Method (mL): 1316    Nutrition Prescription Ordered  Current Diet Order: NPO  Current Nutrition Support Formula Ordered: Peptamen Intense VHP  Current Nutrition Support Rate Ordered: 30 (ml)  Current Nutrition Support Frequency Ordered: mL/hr    Evaluation of Received Nutrient/Fluid Intake  Enteral Calories (kcal): 720  Enteral Protein (gm): 66  Enteral (Free Water) Fluid (mL): 605  Other Calories (kcal): 401(propofol)  Total Calories (kcal): 1121  % Kcal Needs: 85%  % Protein Needs: 87%  I/O: -1.8L x 24hrs, -7.6L since admit  Energy Calories Required: meeting needs  Protein Required: meeting needs  Fluid Required: (per MD)  Comments: LBM 5/3  Tolerance: (on hold, was tolerating)  % Intake of Estimated Energy Needs: 0 - 25 % (TF on hold)  % Meal Intake: NPO    Nutrition Risk  Level of Risk/Frequency of Follow-up: high(2x/week)     Assessment and Plan  Nutrition Problem  Inadequate energy intake     Related to (etiology):   Inability to consume sufficient energy      Signs and Symptoms (as evidenced by):   NPO     Interventions (treatment strategy):  Collaboration of nutrition care w/ other providers   Enteral Nutrition      Nutrition Diagnosis Status:   Improving     Monitor and Evaluation  Food and Nutrient Intake: energy intake, food and beverage intake, enteral nutrition intake  Food and Nutrient Adminstration: diet order, enteral and parenteral nutrition administration  Physical Activity and Function: nutrition-related ADLs and IADLs  Anthropometric Measurements: weight change, weight  Biochemical Data, Medical Tests and Procedures: inflammatory profile,  lipid profile, glucose/endocrine profile, gastrointestinal profile, electrolyte and renal panel  Nutrition-Focused Physical Findings: overall appearance     Nutrition Follow-Up  RD Follow-up?: Yes

## 2020-05-05 NOTE — ASSESSMENT & PLAN NOTE
Acute respiratory distress syndrome (ARDS) due to COVID-19 virus  - Crich switched to ETT on 4/11  - Monika weaned to 0.5 yesterday, weaning to 0.2 today, goal off tomorrow  - RPM 3500; goal flow > 4.0  - thrombocytopenia: stable today; transfuse for under 30K  - Sweep at 8.5  - Oxygenator Fi 100%  - Vent Fi 50%, PEEP 10  - Bedside tracheostomy and bronch today, heparin held at 0800  - Lasix gtt at 3, goal -600mL qshift; please document I/Os q4hrs to ensure this     Tongue laceration      -ENT consulted and evaluated patient, laceration superficial, not bleeding      -recs: bite block vs paralysis, will monitor    Sedation  - On propofol  - Off dilaudid gtt and valium     UGI Bleed  - Scoped by GI 5/3 with epinephrine injection and clip placement for an actively bleeding D2/D3 Diuelafoy lesion  - No pressors, Hb stable today    FEN/GI  -TF at 30 (held for trach)  -FWB discontinued yesterday for hyponatremia, improving today to 138, will restart boluses when Na in 140s     Anticoagulation      - Heparin gtt at 800 U/hr (held for trach)      - Goal aXa 0.2 Q6hrs    Prophylaxis      - protonix gtt to finish this AM, will restart q12 IV       - abx for ECMO      - PICC consult

## 2020-05-05 NOTE — NURSING
Lasix infusing at 3mg/hr. Notified Dr. Miller of increased UO from 150-175/hr to 400-625cc/hr. CVP now 0, HR now 130s. Pt otherwise stable, sats improved, ECMO circuit and flows unchanged. Will turn Lasix off and maintain UO of 200cc/hr per Dr. Miller.

## 2020-05-05 NOTE — TREATMENT PLAN
GI Treatment Plan    Interval History  Chart reviewed. Dark brown BM overnight. No blood transfusions yesterday. Hgb stable 10s.    Plan  - s/p EGD 5/3 (see procedure note) with Gianni's actively oozing s/p clip x4 with hemostasis  - further plan as previously note: recommend IR for angiogram / embolization of area marked by clips, heparin gtt per primary team    Thank you for involving us in the care of Ranjana Sanchez. Please call with any additional questions, concerns or changes in the patient's clinical status.      Eddie Denise MD  Gastroenterology Fellow, PGY4  [facility]

## 2020-05-05 NOTE — PROCEDURES
"Ranjana Sanchez is a 54 y.o. male patient.    Temp: 98.2 °F (36.8 °C) (05/05/20 1400)  Pulse: (!) 120 (05/05/20 1700)  Resp: (!) 21 (05/05/20 1400)  BP: 126/75 (05/04/20 0400)  SpO2: 95 % (05/05/20 1700)  Weight: 64.6 kg (142 lb 6.7 oz) (05/05/20 0300)  Height: 5' 4" (162.6 cm) (04/24/20 0102)    PICC  Date/Time: 5/5/2020 5:06 PM  Performed by: Daina Pollock RN  Consent Done: Yes  Time out: Immediately prior to procedure a time out was called to verify the correct patient, procedure, equipment, support staff and site/side marked as required  Indications: med administration and vascular access  Local anesthetic: lidocaine 1% without epinephrine  Anesthetic Total (mL): 4  Preparation: skin prepped with ChloraPrep  Skin prep agent dried: skin prep agent completely dried prior to procedure  Sterile barriers: all five maximum sterile barriers used - cap, mask, sterile gown, sterile gloves, and large sterile sheet  Hand hygiene: hand hygiene performed prior to central venous catheter insertion  Location details: right basilic  Catheter type: double lumen  Catheter size: 5 Fr  Catheter Length: 34cm    Ultrasound guidance: yes  Vessel Caliber: medium and patent, compressibility normal  Vascular Doppler: not done  Needle advanced into vessel with real time Ultrasound guidance.  Guidewire confirmed in vessel.  Image recorded and saved.  Sterile sheath used.  no esophageal manometryNumber of attempts: 1  Post-procedure: blood return through all ports, chlorhexidine patch and sterile dressing applied    Assessment: placement verified by x-ray          Tony Sutton  5/5/2020  "

## 2020-05-05 NOTE — OP NOTE
DATE: 05/05/2020.    PREOPERATIVE DIAGNOSES:   1. ARDS.  2. Respiratory failure.     POSTOPERATIVE DIAGNOSES:   1. ARDS.  2. Respiratory failure.     PROCEDURES PERFORMED:   1. Open tracheostomy.   2. Bronchoscopy through tracheostomy.    ATTENDING SURGEON: Naseem Alexandra MD .    RESIDENT: Fab Grayson MD.    ANESTHESIA: General.     INDICATIONS: Ranjana Sanchez is a 54 y.o.male admitted to [facility] with ARDS from COVID-19 requiring ECMO. The patient has failed attempts to wean from the ventilator. We have recommended to the family that the patient would benefit from placement of a tracheostomy tube for the anticipated prolonged need for mechanical ventilation. We did obtain informed consent from the patient's family who expressed understanding of the risks and benefits and gave consent to proceed.     PROCEDURE: The patient was taken to the operating room and placed supine. After induction of general endotracheal tube anesthesia, the neck was extended, padded, and was prepped and draped in the standard fashion. Timeout was performed. Tracheal landmarks were identified and a transverse cervical incision was made below the cricoid cartilage. Subcutaneous tissues platysma were divided with cautery. The strap muscles were identified and split in the midline with blunt a cautery dissection. Pretracheal fascia was divided to expose the anterior surface of the trachea. The second tracheal space was sharply incised and the second tracheal ring was divided inferiorly in the midline to create a T incision tracheotomy. The endotracheal tube was withdrawn under direct vision to above the tracheotomy and a cuffed #8 tracheostomy tube was placed into the airway. The cuff of the tracheostomy tube was inflated and Bronchoscopy through the tracheostomy confirmed appropriate position with visualization of the almita. The endotracheal tube was removed and the tracheostomy tube was secured in place using Velcro  tracheostomy tape and 2-0 Prolene sutures.  All needle ans sponge counts were correct at the conclusion of the case. I was present for the procedure in its entirety.    ESTIMATED BLOOD LOSS: 10 mL     FINDINGS: A #8 Shiley tracheostomy without apparent complication.     SPECIMEN: None.     DRAINS: None.     COMPLICATIONS: None.

## 2020-05-05 NOTE — PROGRESS NOTES
[facility]ists shift report    Date: 05/05/2020  ECMO Specialist:  Tatyana Pintorobertvalentina    Pump parameters:  RPM: 3500  Flow: 4.6-4.7  Sweep:  8.0  FiO2:  100    Oxygenator status:  Clots: Pre Oxy  Fibrin: Pre Oxy and at connectors    Pressure trends:  P1: 140's  P2: 120's  Delta P: 20's    Volume status:  Chugging noted?  None  CVP: NA  MAP:  80-90's  MD notified (name):  Nany    Anticoagulation:  ACT/aPTT/Xa parameters: 0.25-0.3  ACT/aPTT/Xa trends this shift: 0.1-0.29     Cannula size / status / placement:  Arterial:   Venous 1:  23 FR / RIJ / 4 cm   Venous 2:  27 FR / RFV / 12 cm   Dual lumen:      Additional Comments:  Patient remains on VV ECMO.  Rested well today before Tracheostomy this afternoon.  Patient tolerated procedure well.  Heparin was stopped this morning anticipating surgery, will restart tonight.  A bronchoscopy was done post procedure,  Dr. Alexandra said his lungs were clean.  All ABG results were reported to Dr. Ayon.

## 2020-05-05 NOTE — PLAN OF CARE
Patient post tracheostomy at bedside per Dr. Alexandra. Patient tolerated procedure well. Patient's family updated, and consented on plan of care for the day, of tracheostomy at bedside, and of PICC line placement. Dr. Ayon updated throughout the day on patient's assessments, vital signs, urine output, ABG results, and lab results. Will continue to monitor patient.         Problem: Communication Impairment (Mechanical Ventilation, Invasive)  Goal: Effective Communication  Outcome: Ongoing, Progressing     Problem: Communication Impairment (Mechanical Ventilation, Invasive)  Goal: Effective Communication  Outcome: Ongoing, Progressing     Problem: Communication Impairment (Mechanical Ventilation, Invasive)  Goal: Effective Communication  Outcome: Ongoing, Progressing     Problem: Communication Impairment (Mechanical Ventilation, Invasive)  Goal: Effective Communication  Outcome: Ongoing, Progressing     Problem: Communication Impairment (Mechanical Ventilation, Invasive)  Goal: Effective Communication  Outcome: Ongoing, Progressing     Problem: Communication Impairment (Mechanical Ventilation, Invasive)  Goal: Effective Communication  Outcome: Ongoing, Progressing     Problem: Communication Impairment (Mechanical Ventilation, Invasive)  Goal: Effective Communication  Outcome: Ongoing, Progressing     Problem: Communication Impairment (Mechanical Ventilation, Invasive)  Goal: Effective Communication  Outcome: Ongoing, Progressing     Problem: Communication Impairment (Mechanical Ventilation, Invasive)  Goal: Effective Communication  Outcome: Ongoing, Progressing     Problem: Communication Impairment (Mechanical Ventilation, Invasive)  Goal: Effective Communication  Outcome: Ongoing, Progressing     Problem: Communication Impairment (Mechanical Ventilation, Invasive)  Goal: Effective Communication  Outcome: Ongoing, Progressing     Problem: Communication Impairment (Mechanical Ventilation, Invasive)  Goal: Effective  Communication  Outcome: Ongoing, Progressing     Problem: Communication Impairment (Mechanical Ventilation, Invasive)  Goal: Effective Communication  Outcome: Ongoing, Progressing     Problem: Communication Impairment (Mechanical Ventilation, Invasive)  Goal: Effective Communication  Outcome: Ongoing, Progressing     Problem: Communication Impairment (Mechanical Ventilation, Invasive)  Goal: Effective Communication  Outcome: Ongoing, Progressing     Problem: Communication Impairment (Mechanical Ventilation, Invasive)  Goal: Effective Communication  Outcome: Ongoing, Progressing     Problem: Communication Impairment (Mechanical Ventilation, Invasive)  Goal: Effective Communication  Outcome: Ongoing, Progressing     Problem: Communication Impairment (Mechanical Ventilation, Invasive)  Goal: Effective Communication  Outcome: Ongoing, Progressing     Problem: Communication Impairment (Mechanical Ventilation, Invasive)  Goal: Effective Communication  Outcome: Ongoing, Progressing     Problem: Communication Impairment (Mechanical Ventilation, Invasive)  Goal: Effective Communication  Outcome: Ongoing, Progressing     Problem: Communication Impairment (Mechanical Ventilation, Invasive)  Goal: Effective Communication  Outcome: Ongoing, Progressing     Problem: Communication Impairment (Mechanical Ventilation, Invasive)  Goal: Effective Communication  Outcome: Ongoing, Progressing     Problem: Communication Impairment (Mechanical Ventilation, Invasive)  Goal: Effective Communication  Outcome: Ongoing, Progressing     Problem: Communication Impairment (Mechanical Ventilation, Invasive)  Goal: Effective Communication  Outcome: Ongoing, Progressing     Problem: Communication Impairment (Mechanical Ventilation, Invasive)  Goal: Effective Communication  Outcome: Ongoing, Progressing     Problem: Communication Impairment (Mechanical Ventilation, Invasive)  Goal: Effective Communication  Outcome: Ongoing, Progressing     Problem:  Communication Impairment (Mechanical Ventilation, Invasive)  Goal: Effective Communication  Outcome: Ongoing, Progressing     Problem: Communication Impairment (Mechanical Ventilation, Invasive)  Goal: Effective Communication  Outcome: Ongoing, Progressing     Problem: Communication Impairment (Mechanical Ventilation, Invasive)  Goal: Effective Communication  Outcome: Ongoing, Progressing     Problem: Communication Impairment (Mechanical Ventilation, Invasive)  Goal: Effective Communication  Outcome: Ongoing, Progressing     Problem: Communication Impairment (Mechanical Ventilation, Invasive)  Goal: Effective Communication  Outcome: Ongoing, Progressing     Problem: Communication Impairment (Mechanical Ventilation, Invasive)  Goal: Effective Communication  Outcome: Ongoing, Progressing     Problem: Communication Impairment (Mechanical Ventilation, Invasive)  Goal: Effective Communication  Outcome: Ongoing, Progressing     Problem: Communication Impairment (Mechanical Ventilation, Invasive)  Goal: Effective Communication  Outcome: Ongoing, Progressing     Problem: Communication Impairment (Mechanical Ventilation, Invasive)  Goal: Effective Communication  Outcome: Ongoing, Progressing     Problem: Communication Impairment (Mechanical Ventilation, Invasive)  Goal: Effective Communication  Outcome: Ongoing, Progressing     Problem: Communication Impairment (Mechanical Ventilation, Invasive)  Goal: Effective Communication  Outcome: Ongoing, Progressing     Problem: Communication Impairment (Mechanical Ventilation, Invasive)  Goal: Effective Communication  Outcome: Ongoing, Progressing     Problem: Communication Impairment (Mechanical Ventilation, Invasive)  Goal: Effective Communication  Outcome: Ongoing, Progressing     Problem: Communication Impairment (Mechanical Ventilation, Invasive)  Goal: Effective Communication  Outcome: Ongoing, Progressing     Problem: Communication Impairment (Mechanical Ventilation,  Invasive)  Goal: Effective Communication  Outcome: Ongoing, Progressing     Problem: Communication Impairment (Mechanical Ventilation, Invasive)  Goal: Effective Communication  Outcome: Ongoing, Progressing     Problem: Communication Impairment (Mechanical Ventilation, Invasive)  Goal: Effective Communication  Outcome: Ongoing, Progressing     Problem: Communication Impairment (Mechanical Ventilation, Invasive)  Goal: Effective Communication  Outcome: Ongoing, Progressing     Problem: Communication Impairment (Mechanical Ventilation, Invasive)  Goal: Effective Communication  Outcome: Ongoing, Progressing     Problem: Communication Impairment (Mechanical Ventilation, Invasive)  Goal: Effective Communication  Outcome: Ongoing, Progressing     Problem: Communication Impairment (Mechanical Ventilation, Invasive)  Goal: Effective Communication  Outcome: Ongoing, Progressing     Problem: Communication Impairment (Mechanical Ventilation, Invasive)  Goal: Effective Communication  Outcome: Ongoing, Progressing     Problem: Communication Impairment (Mechanical Ventilation, Invasive)  Goal: Effective Communication  Outcome: Ongoing, Progressing     Problem: Communication Impairment (Mechanical Ventilation, Invasive)  Goal: Effective Communication  Outcome: Ongoing, Progressing     Problem: Communication Impairment (Mechanical Ventilation, Invasive)  Goal: Effective Communication  Outcome: Ongoing, Progressing     Problem: Communication Impairment (Mechanical Ventilation, Invasive)  Goal: Effective Communication  Outcome: Ongoing, Progressing

## 2020-05-05 NOTE — NURSING
Skin note: Patient with 3cm  Long, and 1 cm wide wound noted to left cheek bone. Will consult wound care.

## 2020-05-05 NOTE — EICU
Intervention Initiated From:  Bedside    Vicki Communicated with Bedside Nurse regarding:  Time-Out bedside tracheostomy  Doctor Notified:  YesDr. Naseem Alexandra    Comments: Called for time out privileges verified on Dr. WILLARD Alexandra.  1448 Neck cleansed with chlora prep. Sterile large drape applied. Surgical tech and resident present to assist   1452 Incision made bovie used to cauterize. Bovie pads on patient.   1502 Inserted shiley # 8 Sutured down both sides  1504 4X4 applied under new tracheostomy.  Connected vent to tracheostomy  1511 ETT removed.   1511 Bronchoscope down through tracheostomy. Procedure near completion. Pt is sedated and  Paralyzed. VSS.

## 2020-05-05 NOTE — PLAN OF CARE
Pt stable throughout shift. Remains intubated AC 50/10. Nitric weaned to 0.5ppm, tolerated well. Opens eyes to pain and withdraws all extremeties. ST on monitor, -130s. MAP maintained in 80s. Vaso weaned off. CVP 0-3, -625cc/hr. Lasix infusing, stopped x 1 when UO reached 625cc/hr per Dr. Miller. ECMO sites stable. 1 medium dark brown liquid stool overnight, H/H stable. Electrolytes replaced per PRN order. AntiXa therapeutic on 800units/hr. Plan for bedside trach/peg today. See flowsheets for details.

## 2020-05-05 NOTE — SUBJECTIVE & OBJECTIVE
Interval History:   No acute events overnight, no further transfusions, Hb and platelets stable. No pressor or cardene use overnight. Hep gtt at 800/hr, holding at 0800 for bedside trach/bronch this AM. Free water boluses stopped yesterday for Na of 132, improved to 138 this AM. CXR stable from yesterday, overall improved. Monika weaned to 0.5 yesterday. PPi gtt to end today.    Medications:  Continuous Infusions:   furosemide (LASIX) 2 mg/mL continuous infusion (non-titrating) 3 mg/hr (05/05/20 0900)    heparin (porcine) in 5 % dex Stopped (05/05/20 0800)    insulin (HUMAN R) infusion (adults) Stopped (05/05/20 0100)    nicardipine Stopped (04/30/20 1800)    nitric oxide gas      norepinephrine bitartrate-D5W Stopped (04/25/20 0300)    propofoL 40 mcg/kg/min (05/05/20 0900)    vasopressin (PITRESSIN) infusion Stopped (05/04/20 2000)     Scheduled Meds:   albuterol-ipratropium  3 mL Nebulization Q6H    ceFEPime (MAXIPIME) IVPB  2 g Intravenous Q8H    chlorhexidine  15 mL Mouth/Throat BID    fluconazole (DIFLUCAN) IVPB  200 mg Intravenous Q24H    pantoprazole  40 mg Intravenous Q12H    polyethylene glycol  17 g Per NG tube Daily    sucralfate  1 g Per NG tube Q6H    vancomycin (VANCOCIN) IVPB  1,250 mg Intravenous Q12H    white petrolatum-mineral oiL   Both Eyes BID        Objective:     Vital Signs (Most Recent):  Temp: 98.2 °F (36.8 °C) (05/05/20 0800)  Pulse: (!) 129 (05/05/20 0900)  Resp: (!) 22 (05/05/20 0755)  BP: 126/75 (05/04/20 0400)  SpO2: 98 % (05/05/20 0900) Vital Signs (24h Range):  Temp:  [97.7 °F (36.5 °C)-98.4 °F (36.9 °C)] 98.2 °F (36.8 °C)  Pulse:  [114-139] 129  Resp:  [20-26] 22  SpO2:  [91 %-99 %] 98 %  Arterial Line BP: ()/(55-68) 98/56     Weight: 64.6 kg (142 lb 6.7 oz)  Body mass index is 24.45 kg/m².    SpO2: 98 %  O2 Device (Oxygen Therapy): ventilator    Intake/Output - Last 3 Shifts       05/03 0700 - 05/04 0659 05/04 0700 - 05/05 0659 05/05 0700 - 05/06 0659    I.V.  (mL/kg) 2161 (31.6) 2253 (34.9) 124 (1.9)    Blood 1719      NG/GT 1270 1040     Total Intake(mL/kg) 5150 (75.3) 3293 (51) 124 (1.9)    Urine (mL/kg/hr) 3100 (1.9) 4960 (3.2) 500 (3.3)    Drains 50      Stool 0 0     Blood 2 6 3    Total Output 3152 4966 503    Net +1998 -[phone] -379           Stool Occurrence 6 x 1 x           Lines/Drains/Airways     Central Venous Catheter Line            Percutaneous Central Line Insertion/Assessment - Triple Lumen  04/24/20 0900 left internal jugular 11 days         ECMO Cannula 04/25/20 1120 right femoral vein 9 days         ECMO Cannula 04/25/20 1120 right internal jugular 9 days          Drain                 Urethral Catheter 04/18/20 1854 16 days         NG/OG Tube 05/03/20 1215 [place] sump;nasogastric 18 Fr. Left nostril 1 day          Airway                 Airway - Non-Surgical 04/11/20 1525 Endotracheal Tube 23 days          Arterial Line                 Arterial Line 04/27/20 1100 Right Brachial 7 days          Peripheral Intravenous Line                 Peripheral IV - Single Lumen 05/03/20 1700 20 G Right;Medial Forearm 1 day         Peripheral IV - Single Lumen 05/04/20 0300 20 G Anterior;Right Wrist 1 day                Physical Exam    Constitutional: He is sedated, and intubated.   Head: Normocephalic   Cardiovascular: Tachycardia 130s   Pulmonary/Chest: intubated vent FiO2 50% PEEP 10, ECMO RPM 3500, sweep 8.5, FiO2 100%, flows mid 4  Skin: L neck and L groin cannulas intact, min to no flashing, few clots pre-oxygenator, all sutures intact and secure  Nursing note and vitals reviewed.    Significant Labs:  BMP:   Recent Labs   Lab 05/05/20  0219   *      K 4.2      CO2 30*   BUN 15   CREATININE 0.5   CALCIUM 7.9*   MG 1.8     CBC:   Recent Labs   Lab 05/05/20  0741   WBC 11.81   RBC 3.58*   HGB 10.9*   HCT 33.3*   PLT 68*   MCV 93   MCH 30.4   MCHC 32.7     LFTs:   Recent Labs   Lab 05/05/20  0219   ALT 37   AST 44*   ALKPHOS 151*   BILITOT 0.5    PROT 5.5*   ALBUMIN 2.1*     Significant Diagnostics:  Cxr: bilateral opacities; stable from yesterday, overall improved from last week.

## 2020-05-05 NOTE — PROGRESS NOTES
[facility]  Cardiothoracic Surgery  Daily ECMO Progress Note    Patient Name: Ranjana Sanchez  MRN: [id number]  Admission Date: 4/10/2020  Hospital Length of Stay: 25 days  Type of ECMO  Support: V-V  Days of ECMO SUpport:10   Code Status: Full Code   Attending Physician: Francis Ayon MD   Referring Provider: Francis Ayon MD  Principal Problem:Acute respiratory distress syndrome (ARDS) due to COVID-19 virus    Subjective:   Interval History:   No acute events overnight, no further transfusions, Hb and platelets stable. No pressor or cardene use overnight. Hep gtt at 800/hr, holding at 0800 for bedside trach/bronch this AM. Free water boluses stopped yesterday for Na of 132, improved to 138 this AM. CXR stable from yesterday, overall improved. Monika weaned to 0.5 yesterday. PPi gtt to end today.    Medications:  Continuous Infusions:   furosemide (LASIX) 2 mg/mL continuous infusion (non-titrating) 3 mg/hr (05/05/20 0900)    heparin (porcine) in 5 % dex Stopped (05/05/20 0800)    insulin (HUMAN R) infusion (adults) Stopped (05/05/20 0100)    nicardipine Stopped (04/30/20 1800)    nitric oxide gas      norepinephrine bitartrate-D5W Stopped (04/25/20 0300)    propofoL 40 mcg/kg/min (05/05/20 0900)    vasopressin (PITRESSIN) infusion Stopped (05/04/20 2000)     Scheduled Meds:   albuterol-ipratropium  3 mL Nebulization Q6H    ceFEPime (MAXIPIME) IVPB  2 g Intravenous Q8H    chlorhexidine  15 mL Mouth/Throat BID    fluconazole (DIFLUCAN) IVPB  200 mg Intravenous Q24H    pantoprazole  40 mg Intravenous Q12H    polyethylene glycol  17 g Per NG tube Daily    sucralfate  1 g Per NG tube Q6H    vancomycin (VANCOCIN) IVPB  1,250 mg Intravenous Q12H    white petrolatum-mineral oiL   Both Eyes BID        Objective:     Vital Signs (Most Recent):  Temp: 98.2 °F (36.8 °C) (05/05/20 0800)  Pulse: (!) 129 (05/05/20 0900)  Resp: (!) 22 (05/05/20 0755)  BP: 126/75 (05/04/20 0400)  SpO2: 98 %  (05/05/20 0900) Vital Signs (24h Range):  Temp:  [97.7 °F (36.5 °C)-98.4 °F (36.9 °C)] 98.2 °F (36.8 °C)  Pulse:  [114-139] 129  Resp:  [20-26] 22  SpO2:  [91 %-99 %] 98 %  Arterial Line BP: ()/(55-68) 98/56     Weight: 64.6 kg (142 lb 6.7 oz)  Body mass index is 24.45 kg/m².    SpO2: 98 %  O2 Device (Oxygen Therapy): ventilator    Intake/Output - Last 3 Shifts       05/03 0700 - 05/04 0659 05/04 0700 - 05/05 0659 05/05 0700 - 05/06 0659    I.V. (mL/kg) 2161 (31.6) 2253 (34.9) 124 (1.9)    Blood 1719      NG/GT 1270 1040     Total Intake(mL/kg) 5150 (75.3) 3293 (51) 124 (1.9)    Urine (mL/kg/hr) 3100 (1.9) 4960 (3.2) 500 (3.3)    Drains 50      Stool 0 0     Blood 2 6 3    Total Output 3152 4966 503    Net +1998 -1673 -379           Stool Occurrence 6 x 1 x           Lines/Drains/Airways     Central Venous Catheter Line            Percutaneous Central Line Insertion/Assessment - Triple Lumen  04/24/20 0900 left internal jugular 11 days         ECMO Cannula 04/25/20 1120 right femoral vein 9 days         ECMO Cannula 04/25/20 1120 right internal jugular 9 days          Drain                 Urethral Catheter 04/18/20 1854 16 days         NG/OG Tube 05/03/20 1215 [place] sump;nasogastric 18 Fr. Left nostril 1 day          Airway                 Airway - Non-Surgical 04/11/20 1525 Endotracheal Tube 23 days          Arterial Line                 Arterial Line 04/27/20 1100 Right Brachial 7 days          Peripheral Intravenous Line                 Peripheral IV - Single Lumen 05/03/20 1700 20 G Right;Medial Forearm 1 day         Peripheral IV - Single Lumen 05/04/20 0300 20 G Anterior;Right Wrist 1 day                Physical Exam    Constitutional: He is sedated, and intubated.   Head: Normocephalic   Cardiovascular: Tachycardia 130s   Pulmonary/Chest: intubated vent FiO2 50% PEEP 10, ECMO RPM 3500, sweep 8.5, FiO2 100%, flows mid 4  Skin: L neck and L groin cannulas intact, min to no flashing, few clots  pre-oxygenator, all sutures intact and secure  Nursing note and vitals reviewed.    Significant Labs:  BMP:   Recent Labs   Lab 05/05/20  0219   *      K 4.2      CO2 30*   BUN 15   CREATININE 0.5   CALCIUM 7.9*   MG 1.8     CBC:   Recent Labs   Lab 05/05/20  0741   WBC 11.81   RBC 3.58*   HGB 10.9*   HCT 33.3*   PLT 68*   MCV 93   MCH 30.4   MCHC 32.7     LFTs:   Recent Labs   Lab 05/05/20  0219   ALT 37   AST 44*   ALKPHOS 151*   BILITOT 0.5   PROT 5.5*   ALBUMIN 2.1*     Significant Diagnostics:  Cxr: bilateral opacities; stable from yesterday, overall improved from last week.     Assessment/Plan:     Acute respiratory distress syndrome (ARDS) due to COVID-19 virus  - Crich switched to ETT on 4/11  - Monika weaned to 0.5 yesterday, weaning to 0.2 today, goal off tomorrow  - RPM 3500; goal flow > 4.0  - thrombocytopenia: stable today; transfuse for under 30K  - Sweep at 8.5  - Oxygenator Fi 100%  - Vent Fi 50%, PEEP 10  - Bedside tracheostomy and bronch today, heparin held at 0800  - Lasix gtt at 3, goal -600mL qshift; please document I/Os q4hrs to ensure this     Tongue laceration      -ENT consulted and evaluated patient, laceration superficial, not bleeding      -recs: bite block vs paralysis, will monitor    Sedation  - On propofol  - Off dilaudid gtt and valium     UGI Bleed  - Scoped by GI 5/3 with epinephrine injection and clip placement for an actively bleeding D2/D3 Diuelafoy lesion  - No pressors, Hb stable today    FEN/GI  -TF at 30 (held for trach)  -FWB discontinued yesterday for hyponatremia, improving today to 138, will restart boluses when Na in 140s     Anticoagulation      - Heparin gtt at 800 U/hr (held for trach)      - Goal aXa 0.2 Q6hrs    Prophylaxis      - protonix gtt to finish this AM, will restart q12 IV       - abx for ECMO      - PICC consult    Anne Miller MD  Cardiothoracic Surgery  [facility]    VV ECMO circuit checked and all parameters  reviewed. Anticoagulation was managed and all cannulation exit sites along with review of labs and radiology studies performed. Speed and functioning of the pump along with oxygenator quality reviewed.  Patient doing very well and is supposed to undergo a tracheostomy by Dr. Alexandra.  Patient continues to diurese very valve and current truly nitric oxide is being weaned.  Patient was on minimal nitric oxide sub port and plan to wean it off in the next 24 hours.  Coordination of care between various team members which included the critical care team the surgical team nursing staff and perfusion.  Was carried out.  All team members understand the plan and in agreement.

## 2020-05-06 NOTE — PLAN OF CARE
Pt remains intubated/sedated.  TOSHIA titrated off overnight w/o difficulty.  ECMO speed 3500 rpm, 4.3-4.6 lpm, sweep 7.5.  Excellent UOP noted with lasix gtt and Diamox administered during shift.  Pt remains off pressor support and Nicardipine added to keep MAP<85.  Vent settings- AC/VC 50%, 10 PEEP.  No bowel movement during shift.  Remains on q1h accuchecks with insulin gtt titrated per nomogram.

## 2020-05-06 NOTE — PROGRESS NOTES
ECMO Specialists shift report    Date: 05/06/2020  ECMO Specialist:  Cyrus Stefany    Pump parameters:  RPM:                3500  Flow:                      4.57  Sweep:                   7.5  FiO2:                  100    Oxygenator status:  Clots:                PRE & POST OXYGENATOR  Fibrin:               PRESENT    Pressure trends:  P1:                    146  P2:                    113  Delta P:              13    Volume status:  Chugging noted?     SMALL AMOUNT OF CHUGGING  CVP:                  - 8  MAP:                   87  MD notified (name):    DR. PURCELL    Anticoagulation:  ACT/aPTT/Xa parameters:    0.25  -  0.3  ACT/aPTT/Xa trends this shift:      <0.1    Cannula size / status / placement:  Arterial:   Venous 1:       23 [place] @ RIJV  @   4 cm  Venous 2:       27 [place] @ RFV  @ 12 cm  Dual lumen:      Additional Comments:      PATIENT WAS WEANED ON SWEEP TO 7.5 CM,

## 2020-05-06 NOTE — NURSING
Dr. Ayon updated on pt status and most recent labwork.  New order for Diamox received and carried out.

## 2020-05-06 NOTE — PROGRESS NOTES
ECMO Specialists shift report    Date: 05/06/2020  ECMO Specialist:  Lia Alvarez    Pump parameters:  RPM: 3000  Flow:  3.8  Sweep:  7  FiO2:  100    Oxygenator status:  Clots: Yes - Pre    Pressure trends:  P1: 120's  P2: 100's  Delta P: 22-24    Volume status:  Chugging noted - slight  MAP:  80's  MD notified (name):  Dr. Ayon    Anticoagulation:  ACT/aPTT/Xa parameters: Xa 0.25-0.3  ACT/aPTT/Xa trends this shift: 0.34    Cannula size / status / placement:  Arterial:   Venous 1:23FR / RIJV / 4 cm   Venous 2:27FR / RFV / 12 cm  Dual lumen:      Additional Comments:          Progress made with weaning of ECMO settings:  Decreased rpm's to 3000 & effective flow of 3.77-3.85 lpm within Dr. Ayon's parameters. Only the very slightest chugging occasionally in circuit but no flow issues or other problems; no products given.  Heparin increased earlier in day. Suctioning produced only slight tan secretions from trach. Cannulas remain all tightly stitched into place.  Dr. Ayon in contact all day concerning labs & ABG's & settings.  Pt had restful day.

## 2020-05-06 NOTE — ASSESSMENT & PLAN NOTE
Acute respiratory distress syndrome (ARDS) due to COVID-19 virus  - Crich switched to ETT on 4/11  - Monika weaned to off last night 5/5  - RPM 3500; goal flow > 4.0  - thrombocytopenia: stable today; transfuse for under 30K  - Sweep at 7.5 with pCO2s in 40s, keep there  - Oxygenator Fi 100%  - Vent Fi 50%, PEEP 10  - Tracheostomy and bronch 5/5  - Lasix gtt, goal -600mL qshift; please document I/Os q4hrs to ensure this     Tongue laceration      -ENT consulted and evaluated patient, laceration superficial, not bleeding      -recs: bite block vs paralysis, will monitor    Sedation  - On propofol  - Off dilaudid gtt and valium     UGI Bleed  - Scoped by GI 5/3 with epinephrine injection and clip placement for an actively bleeding D2/D3 Diuelafoy lesion  - No pressors, Hb stable     FEN/GI  -TF at 30  -FWB discontinued for hyponatremia, improving today to 141, will restart boluses     Anticoagulation      - Heparin gtt at 400 U/hr      - Goal aXa 0.2 Q6hrs    Prophylaxis      - protonix q12 IV       - abx for ECMO      - PICC placed 5/5; will remove central line today

## 2020-05-06 NOTE — SUBJECTIVE & OBJECTIVE
Interval History:   No acute events overnight, Hb and platelets stable. Got tracheostomy yesterday. Responded well to Diamox overnight. No pressor or cardene. Hep gtt at 400/hr. Na improved to 141 this AM. Monika weaned to off last night. Got PICC. Moving more volume on vent rest (100ccs).    Medications:  Continuous Infusions:   furosemide (LASIX) 2 mg/mL continuous infusion (non-titrating) 2 mg/hr (05/06/20 0900)    heparin (porcine) in 5 % dex 400 Units/hr (05/06/20 0900)    insulin (HUMAN R) infusion (adults) Stopped (05/06/20 0500)    nicardipine 2 mg/hr (05/06/20 0900)    nitric oxide gas      norepinephrine bitartrate-D5W Stopped (04/25/20 0300)    propofoL 30 mcg/kg/min (05/06/20 0900)    vasopressin (PITRESSIN) infusion Stopped (05/04/20 2000)     Scheduled Meds:   albuterol-ipratropium  3 mL Nebulization Q6H    ceFEPime (MAXIPIME) IVPB  2 g Intravenous Q8H    chlorhexidine  15 mL Mouth/Throat BID    fluconazole (DIFLUCAN) IVPB  200 mg Intravenous Q24H    pantoprazole  40 mg Intravenous Q12H    polyethylene glycol  17 g Per NG tube Daily    sucralfate  1 g Per NG tube Q6H    vancomycin (VANCOCIN) IVPB  1,250 mg Intravenous Q12H    white petrolatum-mineral oiL   Both Eyes BID        Objective:     Vital Signs (Most Recent):  Temp: 98.6 °F (37 °C) (05/06/20 0701)  Pulse: (!) 135 (05/06/20 0900)  Resp: (!) 26 (05/06/20 0807)  BP: 126/75 (05/04/20 0400)  SpO2: 100 % (05/06/20 0900) Vital Signs (24h Range):  Temp:  [98.2 °F (36.8 °C)-98.6 °F (37 °C)] 98.6 °F (37 °C)  Pulse:  [] 135  Resp:  [21-26] 26  SpO2:  [91 %-100 %] 100 %  Arterial Line BP: ()/(45-68) 109/58     Weight: 63.2 kg (139 lb 5.3 oz)  Body mass index is 23.92 kg/m².    SpO2: 100 %  O2 Device (Oxygen Therapy): ventilator    Intake/Output - Last 3 Shifts       05/04 0700 - 05/05 0659 05/05 0700 - 05/06 0659 05/06 0700 - 05/07 0659    I.V. (mL/kg) 2253 (34.9) 1152.5 (18.2)     Blood       NG/GT 1040 85     IV Piggyback  750      Total Intake(mL/kg) 3293 (51) 1987.5 (31.4)     Urine (mL/kg/hr) 4960 (3.2) 4795 (3.2) 275 (2)    Drains       Stool 0      Blood 6 8 2    Total Output 4966 4803 277    Net -1673 -2815.5 -277           Stool Occurrence 1 x            Lines/Drains/Airways     Peripherally Inserted Central Catheter Line            PICC Double Lumen 05/05/20 1707 right basilic less than 1 day          Central Venous Catheter Line            Percutaneous Central Line Insertion/Assessment - Triple Lumen  04/24/20 0900 left internal jugular 12 days         ECMO Cannula 04/25/20 1120 right femoral vein 10 days         ECMO Cannula 04/25/20 1120 right internal jugular 10 days          Drain                 Urethral Catheter 04/18/20 1854 17 days         NG/OG Tube 05/03/20 1215 [place] sump;nasogastric 18 Fr. Left nostril 2 days          Airway                 Surgical Airway 05/05/20 1500 Shiley Cuffed less than 1 day    Bivona Custom Flextend Trach 05/05/20 less than 1 day          Arterial Line                 Arterial Line 04/27/20 1100 Right Brachial 8 days          Peripheral Intravenous Line                 Peripheral IV - Single Lumen 05/03/20 1700 20 G Right;Medial Forearm 2 days         Peripheral IV - Single Lumen 05/04/20 0300 20 G Anterior;Right Wrist 2 days                Physical Exam    Constitutional: He is sedated, and intubated.   Head: Normocephalic   Cardiovascular: Tachycardia 130s   Pulmonary/Chest: intubated vent FiO2 50% PEEP 10, ECMO RPM 3500, sweep 7.5, FiO2 100%, flows mid 4  Skin: L neck and L groin cannulas intact, min to no flashing, few clots pre-oxygenator, all sutures intact and secure  Nursing note and vitals reviewed.    Significant Labs:  BMP:   Recent Labs   Lab 05/06/20  0729   *      K 4.3      CO2 28   BUN 10   CREATININE 0.5   CALCIUM 8.2*   MG 2.3     CBC:   Recent Labs   Lab 05/06/20  0729 05/06/20  0809   WBC 15.68*  --    RBC 3.83*  --    HGB 11.3*  --    HCT 35.8* 31*   PLT  93*  --    MCV 94  --    MCH 29.5  --    MCHC 31.6*  --      LFTs:   Recent Labs   Lab 05/06/20  0729   ALT 40   AST 37   ALKPHOS 159*   BILITOT 0.6   PROT 6.1   ALBUMIN 2.3*     Significant Diagnostics:  Cxr: none today will order

## 2020-05-06 NOTE — PROGRESS NOTES
Chart reviewed.    Discussed status of trach with Zachary, nurse for today. Some serosanguinous drainage, but no significant issues.    We will sign off. Thank you for involving us in the care of Mr. Sanchez.    VANCE Grayson MD   General Surgery, PGY-2  Pager: [phone]

## 2020-05-06 NOTE — PROGRESS NOTES
[facility]  Cardiothoracic Surgery  Daily ECMO Progress Note    Patient Name: Ranjana Sanchez  MRN: [id number]  Admission Date: 4/10/2020  Hospital Length of Stay: 26 days  TYPE OF ECMO SUPPORT: V-V  DAY OF ECMO SUPPORT: 11  Code Status: Full Code   Attending Physician: Francis Ayon MD   Referring Provider: Francis Ayon MD  Principal Problem:Acute respiratory distress syndrome (ARDS) due to COVID-19 virus    Subjective:   Interval History:   No acute events overnight, Hb and platelets stable. Got tracheostomy yesterday. Responded well to Diamox overnight. No pressor or cardene. Hep gtt at 400/hr. Na improved to 141 this AM. Monika weaned to off last night. Got PICC. Moving more volume on vent rest (100ccs).    Medications:  Continuous Infusions:   furosemide (LASIX) 2 mg/mL continuous infusion (non-titrating) 2 mg/hr (05/06/20 0900)    heparin (porcine) in 5 % dex 400 Units/hr (05/06/20 0900)    insulin (HUMAN R) infusion (adults) Stopped (05/06/20 0500)    nicardipine 2 mg/hr (05/06/20 0900)    nitric oxide gas      norepinephrine bitartrate-D5W Stopped (04/25/20 0300)    propofoL 30 mcg/kg/min (05/06/20 0900)    vasopressin (PITRESSIN) infusion Stopped (05/04/20 2000)     Scheduled Meds:   albuterol-ipratropium  3 mL Nebulization Q6H    ceFEPime (MAXIPIME) IVPB  2 g Intravenous Q8H    chlorhexidine  15 mL Mouth/Throat BID    fluconazole (DIFLUCAN) IVPB  200 mg Intravenous Q24H    pantoprazole  40 mg Intravenous Q12H    polyethylene glycol  17 g Per NG tube Daily    sucralfate  1 g Per NG tube Q6H    vancomycin (VANCOCIN) IVPB  1,250 mg Intravenous Q12H    white petrolatum-mineral oiL   Both Eyes BID        Objective:     Vital Signs (Most Recent):  Temp: 98.6 °F (37 °C) (05/06/20 0701)  Pulse: (!) 135 (05/06/20 0900)  Resp: (!) 26 (05/06/20 0807)  BP: 126/75 (05/04/20 0400)  SpO2: 100 % (05/06/20 0900) Vital Signs (24h Range):  Temp:  [98.2 °F (36.8 °C)-98.6 °F (37 °C)]  98.6 °F (37 °C)  Pulse:  [] 135  Resp:  [21-26] 26  SpO2:  [91 %-100 %] 100 %  Arterial Line BP: ()/(45-68) 109/58     Weight: 63.2 kg (139 lb 5.3 oz)  Body mass index is 23.92 kg/m².    SpO2: 100 %  O2 Device (Oxygen Therapy): ventilator    Intake/Output - Last 3 Shifts       05/04 0700 - 05/05 0659 05/05 0700 - 05/06 0659 05/06 0700 - 05/07 0659    I.V. (mL/kg) 2253 (34.9) 1152.5 (18.2)     Blood       NG/GT 1040 85     IV Piggyback  750     Total Intake(mL/kg) 3293 (51) 1987.5 (31.4)     Urine (mL/kg/hr) 4960 (3.2) 4795 (3.2) 275 (2)    Drains       Stool 0      Blood 6 8 2    Total Output 4966 4803 277    Net -1673 -2815.5 -277           Stool Occurrence 1 x            Lines/Drains/Airways     Peripherally Inserted Central Catheter Line            PICC Double Lumen 05/05/20 1707 right basilic less than 1 day          Central Venous Catheter Line            Percutaneous Central Line Insertion/Assessment - Triple Lumen  04/24/20 0900 left internal jugular 12 days         ECMO Cannula 04/25/20 1120 right femoral vein 10 days         ECMO Cannula 04/25/20 1120 right internal jugular 10 days          Drain                 Urethral Catheter 04/18/20 1854 17 days         NG/OG Tube 05/03/20 1215 [place] sump;nasogastric 18 Fr. Left nostril 2 days          Airway                 Surgical Airway 05/05/20 1500 Shiley Cuffed less than 1 day    Bivona Custom Flextend Trach 05/05/20 less than 1 day          Arterial Line                 Arterial Line 04/27/20 1100 Right Brachial 8 days          Peripheral Intravenous Line                 Peripheral IV - Single Lumen 05/03/20 1700 20 G Right;Medial Forearm 2 days         Peripheral IV - Single Lumen 05/04/20 0300 20 G Anterior;Right Wrist 2 days                Physical Exam    Constitutional: He is sedated, and intubated.   Head: Normocephalic   Cardiovascular: Tachycardia 130s   Pulmonary/Chest: intubated vent FiO2 50% PEEP 10, ECMO RPM 3500, sweep 7.5, FiO2 100%,  flows mid 4  Skin: L neck and L groin cannulas intact, min to no flashing, few clots pre-oxygenator, all sutures intact and secure  Nursing note and vitals reviewed.    Significant Labs:  BMP:   Recent Labs   Lab 05/06/20  0729   *      K 4.3      CO2 28   BUN 10   CREATININE 0.5   CALCIUM 8.2*   MG 2.3     CBC:   Recent Labs   Lab 05/06/20  0729 05/06/20  0809   WBC 15.68*  --    RBC 3.83*  --    HGB 11.3*  --    HCT 35.8* 31*   PLT 93*  --    MCV 94  --    MCH 29.5  --    MCHC 31.6*  --      LFTs:   Recent Labs   Lab 05/06/20  0729   ALT 40   AST 37   ALKPHOS 159*   BILITOT 0.6   PROT 6.1   ALBUMIN 2.3*     Significant Diagnostics:  Cxr: none today will order      Assessment/Plan:     Acute respiratory distress syndrome (ARDS) due to COVID-19 virus  - Crich switched to ETT on 4/11  - Monika weaned to off last night 5/5  - RPM 3500; goal flow > 4.0  - thrombocytopenia: stable today; transfuse for under 30K  - Sweep at 7.5 with pCO2s in 40s, keep there  - Oxygenator Fi 100%  - Vent Fi 50%, PEEP 10  - Tracheostomy and bronch 5/5  - Lasix gtt, goal -600mL qshift; please document I/Os q4hrs to ensure this     Tongue laceration      -ENT consulted and evaluated patient, laceration superficial, not bleeding      -recs: bite block vs paralysis, will monitor    Sedation  - On propofol  - Off dilaudid gtt and valium     UGI Bleed  - Scoped by GI 5/3 with epinephrine injection and clip placement for an actively bleeding D2/D3 Diuelafoy lesion  - No pressors, Hb stable     FEN/GI  -TF at 30  -FWB discontinued for hyponatremia, improving today to 141, will restart boluses     Anticoagulation      - Heparin gtt at 400 U/hr      - Goal aXa 0.2 Q6hrs    Prophylaxis      - protonix q12 IV       - abx for ECMO      - PICC placed 5/5; will remove central line today    Anne Miller MD  Cardiothoracic Surgery  [facility]    VV ECMO circuit checked and all parameters reviewed. Anticoagulation  was managed and all cannulation exit sites along with review of labs and radiology studies performed. Speed and functioning of the pump along with oxygenator quality reviewed.  Patient doing better after tracheostomy.  Range of motion was done for 3 extremities except the right lower extremity which had the ECMO cannula.  Patient appears to be responding to name much better by blinking and opening his eyes.  Tidal volumes have improved [id number] cc range.  The flow on the VV ECMO was reduced from 30/5 100 RPMs to 3100 RPMs.  At this setting the flow through the pump was between 3.9-4 liters/minute.  After making dose changes sat monitor was showing 100%.  ABG would be repeated in 3 minutes.  Extensive time was spent in collaborating care would be in various team members which included the surgical team, ICU team, perfusion and nursing staff.

## 2020-05-06 NOTE — PLAN OF CARE
05/06/20 1304   Discharge Reassessment   Assessment Type Discharge Planning Reassessment   Provided patient/caregiver education on the expected discharge date and the discharge plan No   Do you have any problems affording any of your prescribed medications? TBD   Discharge Plan A Long-term acute care facility (LTAC)

## 2020-05-06 NOTE — PROGRESS NOTES
PETRONA Skin Integrity Evaluation      Subjective    PETRONA skin integrity champion evaluation of patient as part of the comprehensive skin care team .     Ranjana Sanchez is being evaluated as per the Epic  pressure injury skin report.  He has been admitted to [facility]  for 26 days .   Skin injury was noted on 4/15/20      Limited Physical exam     Lesion 1: left cheek       Lesion 2: Nose            Assessment :       Lesion 1:  Abrasion,  intact dry brownish red scab measuring 1 cm x 1.5 cm without any drainage noted. Area appears to be a healing area of partial skin thickness that may be related to a medical adhesive skin injury.    POA : no    Consults: Wound Care, PT, OT      Lesion 2:  Abrasion, dry intact scab noted to septum without any visible signs of infection noted. Discoloration to right nare appears to have improved.    POA : no    Consults:Wound Care, PT , OT      Follow up:    Patient will be re evaluated weekly.

## 2020-05-06 NOTE — OP NOTE
Date of service:  04/30/2020    Preop diagnosis:  1.  Severe COVID 19 infection   2.  VV ECMO support  3.  Significant recirculation in the long venous femoral cannula    Postop diagnosis:  Same    Operation:  1.  Repositioning of peripheral VV ECMO cannula-right femoral venous cannula was withdrawn by 8 cm  2.  Fixation of the venous cannula to the skin and subcutaneous tissues at multiple points.    Staff surgeon:  Francis Ayon  Anesthesia: local   Estimated blood loss:  5 cc    Key findings of the operation:  1.  Significant reduction in the circulation and the long venous cannula with improvement in arterial saturations.    Indication of operation:  This is a 54-year-old gentleman with severe COVID-19 infection and currently supported with VV ECMO.  Over the last few days patient has done very well with significant improvement in hemodynamics and diuresis with weaning off all pressors.  However we noticed that there is more recirculation that is noted in the long venous cannula and a decision was made to reposition the cannula to help reduce admixture of blood.    Operation:  This operation was done at the bedside in the ICU.  Area was prepped and draped in the usual sterile fashion.  Previously placed sutures to hold the cannula in place were cut and removed.  Under direct vision the long venous cannula was retracted until significant reduction in admixture of blood was noted.  Once this was noted multiple sutures were then reapplied to prevent movement of cannula at multiple sites.  On measurement it was noted that the cannula was withdrawn by a cm in length.  Minimal bleeding was noted with good hemostasis.  Sterile dressing was applied.  Terminal count of needles sponges and instrument was found to be correct.    Medicare attestation:  I was present for all parts of the operation.

## 2020-05-06 NOTE — PROGRESS NOTES
Pharmacokinetic Assessment Follow Up: IV Vancomycin    Vancomycin serum concentration assessment(s):    Vancomycin trough level resulted at 13 mcg/mL drawn appropriately. Goal level is 10 to 20 mcg/mL.     Drug levels (last 3 results):  Recent Labs   Lab Result Units 05/04/20  0400 05/04/20  1000 05/06/20  1102   Vancomycin, Random ug/mL 17.1  --   --    Vancomycin-Trough ug/mL  --  9.7* 13.0     Vancomycin Regimen Plan:    Continue vancomycin 1250 mg IV every 12 hours with next serum trough concentration measured on 5/8 1030.     Pharmacy will continue to follow and monitor vancomycin.    Please contact pharmacy at extension [phone] for questions regarding this assessment.    Thank you for the consult,   Sonya Banks, PharmD, BCCCP             Patient brief summary:  Ranjana Sanchez is a 54 y.o. male initiated on antimicrobial therapy with IV vancomycin for treatment of sepsis    Drug Allergies:   Review of patient's allergies indicates:  No Known Allergies    Actual Body Weight:   68.4 kg     Renal Function:   Estimated Creatinine Clearance: 138.1 mL/min (based on SCr of 0.5 mg/dL).    Dialysis Method (if applicable):  N/A    CBC (last 72 hours):  Recent Labs   Lab Result Units 05/03/20  1309 05/03/20  1950 05/03/20  2200 05/04/20  0400 05/04/20  0808 05/04/20  1000 05/04/20  1530 05/04/20  2003 05/05/20  0219 05/05/20  0741 05/05/20  0818 05/05/20  1359 05/05/20  1932 05/06/20  0206 05/06/20  0729   WBC K/uL 21.19* 16.89* 15.76* 16.69*  --  13.64* 13.28* 14.07* 12.83* 11.81  --  9.38 10.20 12.79* 15.68*   Hemoglobin g/dL 8.3* 9.2* 10.1* 11.0*  --  10.5* 10.4* 10.4* 10.8* 10.9*  --  10.6* 10.5* 11.3* 11.3*   Hemoglobin, Free, Plasma mg/dL  --   --   --   --  70*  --   --   --   --   --  50*  --   --   --  80*   Hematocrit % 25.1* 28.1* 30.2* 33.5*  --  31.6* 31.6* 32.0* 33.2* 33.3*  --  32.0* 32.3* 35.3* 35.8*   Platelets K/uL 108* 103* 93* 69*  --  76* 75* 75* 77* 68*  --  72* 72* 85* 93*   Gran% % 90.0*  83.0* 78.0* 71.0  --  75.0* 77.0* 87.0* 86.0* 77.4*  --  75.8* 77.7* 83.7* 84.1*   Lymph% % 4.0* 5.0* 5.0* 10.0*  --  10.0* 8.0* 5.0* 5.0* 7.5*  --  8.0* 7.4* 5.7* 4.9*   Mono% % 0.0* 7.0 2.5* 7.0  --  6.0 6.0 7.0 1.0* 6.1  --  6.8 6.6 5.0 6.1   Eosinophil% % 1.0 2.0 3.5 4.0  --  5.0 7.0 1.0 6.0 3.7  --  4.3 3.9 3.2 2.7   Basophil% % 0.0 0.0 1.0 0.0  --  0.0 0.0 0.0 0.0 0.8  --  0.5 0.5 0.4 0.3   Differential Method  Manual Manual Manual Manual  --  Manual Manual Manual Manual Automated  --  Automated Automated Automated Automated       Metabolic Panel (last 72 hours):  Recent Labs   Lab Result Units 05/03/20  1309 05/03/20  2200 05/04/20  0400 05/04/20  1000 05/04/20  1530 05/04/20  2003 05/05/20  0219 05/05/20  0741 05/05/20  1359 05/05/20  1932 05/06/20  0206 05/06/20  0729   Sodium mmol/L 141 140 137 133* 135* 134* 138 140 139 140 141 139   Potassium mmol/L 4.7 4.1 3.7 4.1 4.1 3.4* 4.2 3.6 4.1 3.6 3.5 4.3   Chloride mmol/L 108 106 104 101 102 97 100 99 101 102 100 101   CO2 mmol/L 28 27 25 25 26 27 30* 30* 29 28 32* 28   Glucose mg/dL 362* 179* 130* 217* 191* 184* 149* 178* 197* 201* 213* 154*   BUN, Bld mg/dL 44* 31* 25* 23* 19 17 15 11 10 11 10 10   Creatinine mg/dL 0.6 0.5 0.5 0.5 0.5 0.5 0.5 0.5 0.5 0.5 0.6 0.5   Albumin g/dL 1.8* 1.9* 2.0* 1.9* 2.0* 1.9* 2.1* 2.1* 1.9* 2.0* 2.3* 2.3*   Total Bilirubin mg/dL 0.3 0.3 0.4 0.4 0.4 0.5 0.5 0.5 0.5 0.5 0.5 0.6   Alkaline Phosphatase U/L 90 90 112 123 130 138* 151* 149* 144* 147* 163* 159*   AST U/L 23 30 39 43* 44* 48* 44* 44* 42* 41* 37 37   ALT U/L 22 22 26 32 34 33 37 39 39 40 42 40   Magnesium mg/dL 2.0 1.9 2.0 2.1 1.9 1.8 1.8 2.2 2.1 1.9 1.9 2.3   Phosphorus mg/dL 3.9 3.3 3.2 2.9 2.7 2.6* 2.9 3.0 2.3* 2.6* 3.2 3.1       Vancomycin Administrations:  vancomycin given in the last 96 hours                   vancomycin in dextrose 5 % 1 gram/250 mL IVPB 1,000 mg (mg) 1,000 mg New Bag 05/04/20 1130     1,000 mg New Bag 05/03/20 2202     1,000 mg New Bag  1050      1,000 mg New Bag 05/02/20 2212     1,000 mg New Bag  1003     1,000 mg New Bag 05/01/20 2223     1,000 mg New Bag  1000     1,000 mg New Bag 04/30/20 2201                Microbiologic Results:  Microbiology Results (last 7 days)     ** No results found for the last 168 hours. **

## 2020-05-06 NOTE — CONSULTS
Consult received per RN for wound seen after pt extubated and trached when ETT alcaraz removed.    Pt is known to wound care team from previous consults from this admission with orders in place for sween to be be applied to right nare daily and betadine BID with foams to left arm.    Received pt lying in bed sedated with ECMO in use. Pt's nurse reports that pt has been able to open his eyes when spoken to and that he has been doing better.    Upon assessment of left cheek: intact dry brownish red scab measuring 1 cm x 1.5 cm without any drainage noted. Area appears to be a healing area of partial skin thickness that may be related to a medical adhesive skin injury. Sween moisturizer recommended to be applied daily per nursing.    To septum: dry intact scab noted to septum without any visible signs of infection noted. Discoloration to right nare appears to have improved. Nursing to continue to apply sween daily.    To right ear: area of induration, but unable to visualize open area. Border foam recommended.    Recommendations:  -Nursing to apply Sween 24 lotion (pink top) to left cheek and  discolored area to nasal septum daily with cotton tipped applicator. Please ensure pressure is relieved from NG tube with securement device.  -Nursing to apply border foam beneath right ear to help alleviate pressure from medical device. Change weekly and PRN if soiled or dislodged.  -Nursing to continue to cleanse wound/blister to L arm with sterile normal saline, apply Betadine swab BID over the discolored affected skin. Cover with a foam dressing.  -Nursing to maintain pressure injury prevention interventions as directed.    Nursing to continue care. Wound care to sign off. Please reconsult if further assistance is needed. [id number]

## 2020-05-06 NOTE — PLAN OF CARE
POC reviewed. Pt intubated on AC 50/10. Pt opens eyes spontaneously and to voice. Pts HR has been 120s-130s. MAP 75-85. O2 sats %. ECMO speed 3000 rpm, 3.7-4.6 lpm. RPMs decreased today per Dr. Ayon. Heparin gtt titrated per Anti Xa. Anti Xa Q6. Insulin gtt titrated per algorithm. BG Q1. Lasix gtt titrated up for decreased U/O. ABGs Q6. Torres intact with U/O of /hr. NG intact with TFs at 30cc/hr, which is goal. Labs trended Q6. Lytes replaced. Dr. Ayon notified of labs, assessment, and ABGs. Heel boots on. SCDS on. VSS. Will continue to monitor.

## 2020-05-06 NOTE — ANESTHESIA POSTPROCEDURE EVALUATION
Anesthesia Post Evaluation    Patient: Ranjana Sanchez    Procedure(s) Performed: Procedure(s) (LRB):  CREATION, TRACHEOSTOMY  (N/A)    Final Anesthesia Type: general    Patient location during evaluation: ICU  Patient participation: No - Unable to Participate, Coma/Other Inability to Communicate  Level of consciousness: lethargic  Post-procedure vital signs: reviewed and stable  Pain management: adequate  Airway patency: patent    PONV status at discharge: No PONV  Anesthetic complications: no      Cardiovascular status: blood pressure returned to baseline  Respiratory status: ventilator (tracheostomy)  Hydration status: euvolemic  Follow-up not needed.  Comments: On VV ECMO          Vitals Value Taken Time   /75 5/4/2020  4:02 AM   Temp 36.9 °C (98.5 °F) 5/5/2020  7:15 PM   Pulse 139 5/5/2020  9:50 PM   Resp 23 5/5/2020  7:13 PM   SpO2 100 % 5/5/2020  9:50 PM   Vitals shown include unvalidated device data.      No case tracking events are documented in the log.      Pain/Liz Score: Pain Rating Prior to Med Admin: 10 (for bedside tracheostomy) (5/5/2020  3:30 PM)  Pain Rating Post Med Admin: 0 (given for bedside trachesotomy) (5/5/2020  2:58 PM)

## 2020-05-07 NOTE — PROGRESS NOTES
"[facility]  Adult Nutrition  Progress Note    SUMMARY       Recommendations    1. Recommend advancing TF of Peptamen Intense VHP to goal rate of 45 mL/hr to provide 1080 kcal (1280 kcal with current propofol), 99 gm protein, and 907 mL fluid free fluid.     2. RD to monitor.    Goals: Meet % EEN, EPN by RD f/u date  Nutrition Goal Status: progressing towards goal  Communication of RD Recs: (POC)    Reason for Assessment    Reason For Assessment: RD follow-up  Diagnosis: (COVID-19)  Relevant Medical History: HTN  Interdisciplinary Rounds: did not attend  General Information Comments: Remote access. Pt trached, ventilated. Tolerating TF @ 30 mL/hr. Unable to assess for PO intake and UBW PTA. Due to recent hospital wide restrictions to limit the transfer of COVID-19, no NFPE performed at this time. NFPE to be performed at a later date. Unable to assess for malnutrition at this time.   Nutrition Discharge Planning: Unable to determine at this time.    Nutrition Risk Screen    Nutrition Risk Screen: tube feeding or parenteral nutrition    Nutrition/Diet History    Food Allergies: NKFA  Factors Affecting Nutritional Intake: NPO, on mechanical ventilation    Anthropometrics    Temp: 98.6 °F (37 °C)  Height Method: Stated  Height: 5' 4" (162.6 cm)  Height (inches): 64 in  Weight Method: Bed Scale  Weight: 64.4 kg (141 lb 15.6 oz)  Weight (lb): 141.98 lb  Ideal Body Weight (IBW), Male: 130 lb  % Ideal Body Weight, Male (lb): 106.92 %  BMI (Calculated): 24.4  BMI Grade: 18.5-24.9 - normal  Usual Body Weight (UBW), kg: (JAMMIE)     Lab/Procedures/Meds    Pertinent Labs Reviewed: reviewed  Pertinent Labs Comments: glucose 171, phos 2.3, alk phos 153, .3  Pertinent Medications Reviewed: reviewed  Pertinent Medications Comments: propofol    Estimated/Assessed Needs    Weight Used For Calorie Calculations: 63.5 kg (139 lb 15.9 oz)(dosing wt)  Energy Calorie Requirements (kcal): 1381 kcal/day  Energy " Need Method: [place]  Protein Requirements: 76-95 g/day(1.2-1.5 g/kg)  Weight Used For Protein Calculations: 63.5 kg (139 lb 15.9 oz)(dosing weight)  Fluid Requirements (mL): 1 mL/kcal or per MD  Estimated Fluid Requirement Method: RDA Method  RDA Method (mL): 1381    Nutrition Prescription Ordered    Current Diet Order: NPO  Current Nutrition Support Formula Ordered: Peptamen Intense VHP  Current Nutrition Support Rate Ordered: 30 (ml)  Current Nutrition Support Frequency Ordered: mL/hr    Evaluation of Received Nutrient/Fluid Intake    Enteral Calories (kcal): 720  Enteral Protein (gm): 66  Enteral (Free Water) Fluid (mL): 605  Other Calories (kcal): 200(propofol)  Total Calories (kcal): 921  % Kcal Needs: 67%  % Protein Needs: 87%  I/O: -10.758L x 24 hrs   Energy Calories Required: not meeting needs  Protein Required: meeting needs  Fluid Required: (per MD)  Comments: LBM 5/3  Tolerance: tolerating  % Intake of Estimated Energy Needs: Other: 67-87%  % Meal Intake: NPO    Nutrition Risk    Level of Risk/Frequency of Follow-up: (f/u 1 x wk)     Assessment and Plan    Nutrition Problem  Inadequate energy intake     Related to (etiology):   Inability to consume sufficient energy      Signs and Symptoms (as evidenced by):   NPO     Interventions (treatment strategy):  Collaboration of nutrition care w/ other providers   Enteral Nutrition      Nutrition Diagnosis Status:   Improving      Monitor and Evaluation    Food and Nutrient Intake: energy intake, food and beverage intake, enteral nutrition intake  Food and Nutrient Adminstration: diet order, enteral and parenteral nutrition administration  Physical Activity and Function: nutrition-related ADLs and IADLs  Anthropometric Measurements: weight change, weight  Biochemical Data, Medical Tests and Procedures: inflammatory profile, lipid profile, glucose/endocrine profile, gastrointestinal profile, electrolyte and renal panel  Nutrition-Focused Physical Findings:  overall appearance     Nutrition Follow-Up    RD Follow-up?: Yes

## 2020-05-07 NOTE — PLAN OF CARE
Recommendations    1. Recommend advancing TF of Peptamen Intense VHP to goal rate of 45 mL/hr to provide 1080 kcal (1280 kcal with current propofol), 99 gm protein, and 907 mL fluid free fluid.     2. RD to monitor.    Goals: Meet % EEN, EPN by RD f/u date  Nutrition Goal Status: progressing towards goal

## 2020-05-07 NOTE — PLAN OF CARE
Pt remains trached and ventilated, AC/VC 50/10.  Tidal volumes noted to be 120's-200's.  Tube feeding remain infusing at goal, no residuals noted.  UOP remains 100-150 ml/hr.  ECMO speed remains 3000, flows 3.7.3.8.  Heparin infusing at 800 units/hr.  Lasix continues at 3mg/hr.  Plan of care and pt update via telephone with pt's son.  Questions encouraged and addressed.  Understanding verbalized.

## 2020-05-07 NOTE — PROGRESS NOTES
[facility]  Cardiothoracic Surgery  Daily ECMO Progress Note    Patient Name: Ranjana Sanchez  MRN: [id number]  Admission Date: 4/10/2020  Hospital Length of Stay: 27 days  TYPE OF ECMO: V-V  DAY OD ECMO SUPPORT: 12  Code Status: Full Code   Attending Physician: Francis Ayon MD   Referring Provider: Francis Ayon MD  Principal Problem:Acute respiratory distress syndrome (ARDS) due to COVID-19 virus    Subjective:   Interval History:   No acute events overnight, Hb and platelets stable. Pulling better tidal volumes this morning 150-200 up from low 100s yesterday. Positive 230mL for the day with 2.4L UOP, will give Diamox today before increasing Lasix gtt. Cardene is at 1. Hep gtt at 800/hr. Central line removed yesterday. Sweep increased from 7 to 8 for PCO2 55 on AM ABG. Rechecking another one now.    Medications:  Continuous Infusions:   furosemide (LASIX) 2 mg/mL continuous infusion (non-titrating) 3 mg/hr (05/07/20 0800)    heparin (porcine) in 5 % dex 800 Units/hr (05/07/20 0800)    insulin (HUMAN R) infusion (adults) 1.9 Units/hr (05/07/20 0800)    nicardipine Stopped (05/07/20 0800)    norepinephrine bitartrate-D5W Stopped (04/25/20 0300)    propofoL 20 mcg/kg/min (05/07/20 0800)    vasopressin (PITRESSIN) infusion Stopped (05/04/20 2000)     Scheduled Meds:   acetaZOLAMIDE (DIAMOX) IVPB  250 mg Intravenous Once    albuterol-ipratropium  3 mL Nebulization Q6H    ceFEPime (MAXIPIME) IVPB  2 g Intravenous Q8H    chlorhexidine  15 mL Mouth/Throat BID    fluconazole (DIFLUCAN) IVPB  200 mg Intravenous Q24H    pantoprazole  40 mg Intravenous Q12H    polyethylene glycol  17 g Per NG tube Daily    sucralfate  1 g Per NG tube Q6H    vancomycin (VANCOCIN) IVPB  1,250 mg Intravenous Q12H    white petrolatum-mineral oiL   Both Eyes BID        Objective:     Vital Signs (Most Recent):  Temp: 98.6 °F (37 °C) (05/07/20 0701)  Pulse: (!) 131 (05/07/20 0815)  Resp: (!) 26 (05/07/20  7345)  BP: 126/75 (05/04/20 0400)  SpO2: 100 % (05/07/20 0815) Vital Signs (24h Range):  Temp:  [98.3 °F (36.8 °C)-98.8 °F (37.1 °C)] 98.6 °F (37 °C)  Pulse:  [123-138] 131  Resp:  [24-26] 26  SpO2:  [94 %-100 %] 100 %  Arterial Line BP: ()/(54-66) 106/56     Weight: 64.4 kg (141 lb 15.6 oz)  Body mass index is 24.37 kg/m².    SpO2: 100 %  O2 Device (Oxygen Therapy): ventilator    Intake/Output - Last 3 Shifts       05/05 0700 - 05/06 0659 05/06 0700 - 05/07 0659 05/07 0700 - 05/08 0659    I.V. (mL/kg) 1152.5 (18.2) 1071.8 (16.6)     NG/GT 85 975 60    IV Piggyback 750 600     Total Intake(mL/kg) 1987.5 (31.4) 2646.8 (41.1) 60 (0.9)    Urine (mL/kg/hr) 4795 (3.2) 2490 (1.6) 175 (1.8)    Stool       Blood 8 6     Total Output 4803 2496 175    Net -2815.5 +150.8 -115                 Lines/Drains/Airways     Peripherally Inserted Central Catheter Line            PICC Double Lumen 05/05/20 1707 right basilic 1 day          Central Venous Catheter Line                 ECMO Cannula 04/25/20 1120 right femoral vein 11 days         ECMO Cannula 04/25/20 1120 right internal jugular 11 days          Drain                 Urethral Catheter 04/18/20 1854 18 days         NG/OG Tube 05/03/20 1215 [place] sump;nasogastric 18 Fr. Left nostril 3 days          Airway                 Surgical Airway 05/05/20 1500 Shiley Cuffed 1 day    Bivona Custom Flextend Trach 05/05/20 1 day          Arterial Line                 Arterial Line 04/27/20 1100 Right Brachial 9 days          Peripheral Intravenous Line                 Peripheral IV - Single Lumen 05/03/20 1700 20 G Right;Medial Forearm 3 days         Peripheral IV - Single Lumen 05/04/20 0300 20 G Anterior;Right Wrist 3 days                Physical Exam    Constitutional: He is sedated, and intubated.   Head: Normocephalic   Cardiovascular: Tachycardia 120s   Pulmonary/Chest: intubated vent FiO2 50% PEEP 10, ECMO RPM 3000, sweep 8, FiO2 100%, flows upper 3s  Skin: L neck and L  groin cannulas intact, min to no flashing, few clots pre-oxygenator, all sutures intact and secure  Nursing note and vitals reviewed.    Significant Labs:  BMP:   Recent Labs   Lab 05/07/20 0226   *      K 3.6   CL 98   CO2 31*   BUN 13   CREATININE 0.5   CALCIUM 8.0*   MG 1.8     CBC:   Recent Labs   Lab 05/07/20  0750   WBC 13.58*   RBC 3.31*   HGB 10.1*   HCT 31.2*   *   MCV 94   MCH 30.5   MCHC 32.4     LFTs:   Recent Labs   Lab 05/07/20 0226   ALT 39   AST 34   ALKPHOS 153*   BILITOT 0.5   PROT 6.0   ALBUMIN 2.1*     Significant Diagnostics:  Cxr: none today will order, yesterday's XR stable    Assessment/Plan:     Acute respiratory distress syndrome (ARDS) due to COVID-19 virus  - Crich switched to ETT on 4/11  - Monika weaned to off 5/5  - RPM reduced to 3000; goal flow mid 3s and above (currently 3.7 to 3.8)  - thrombocytopenia: stable today; transfuse for under 30K  - Sweep at 8 from 7 for pCO2s 55 on AM labs, goal 40s but low 50s acceptable if not acidotic on ABG  - Oxygenator Fi 100%  - Vent Fi 50%, PEEP 10; volumes improving as noted above  - Tracheostomy and bronch (failry clean) 5/5  - Lasix gtt, goal -600mL qshift; please document I/Os q4hrs to ensure this  - Giving 250 Diamox this AM for increased diuresis and bicarb 34 on ABG     Tongue laceration      -ENT consulted and evaluated patient, laceration superficial, not bleeding      -recs: bite block vs paralysis, will monitor    Sedation  - On propofol  - Off dilaudid gtt and valium     UGI Bleed  - Scoped by GI 5/3 with epinephrine injection and clip placement for an actively bleeding D2/D3 Diuelafoy lesion  - No pressors, Hb stable     FEN/GI  -TF at 30  -FWB discontinued     Anticoagulation      - Heparin gtt at 800 U/hr; increasing to 1000      - Goal aXa 0.2 Q6hrs; will likely increase goal to 0.3 given persistently higher LDHs results each day    Prophylaxis      - protonix q12 IV       - abx for ECMO      - PICC placed 5/5;  central line removed 5/6    Anne Miller MD  Cardiothoracic Surgery  [facility]    VV ECMO circuit checked and all parameters reviewed. Anticoagulation was managed and all cannulation exit sites along with review of labs and radiology studies performed. Speed and functioning of the pump along with oxygenator quality reviewed.    Patient doing well, responding to verbal stimuli in terms of opening eyes.  Range of motion of extremities being done.  Sedation continues to be weaned.  Precedex has been now started and propofol is only at 10 mg an hour.  Flows remained stable at 2900 RPMs providing for 0.5 L of flow.  Nitric oxide is completely off for last 72 hours.  25 minutes was spent in coordinating care with different things which included the surgical team, critical care team nursing and perfusion teams.  Only concerning thing is that the CRP and ferritin levels slowly continue to increase.

## 2020-05-07 NOTE — ASSESSMENT & PLAN NOTE
Acute respiratory distress syndrome (ARDS) due to COVID-19 virus  - Crich switched to ETT on 4/11  - Monika weaned to off 5/5  - RPM reduced to 3000; goal flow mid 3s and above (currently 3.7 to 3.8)  - thrombocytopenia: stable today; transfuse for under 30K  - Sweep at 8 from 7 for pCO2s 55 on AM labs, goal 40s but low 50s acceptable if not acidotic on ABG  - Oxygenator Fi 100%  - Vent Fi 50%, PEEP 10; volumes improving as noted above  - Tracheostomy and bronch (failry clean) 5/5  - Lasix gtt, goal -600mL qshift; please document I/Os q4hrs to ensure this  - Giving 250 Diamox this AM for increased diuresis and bicarb 34 on ABG     Tongue laceration      -ENT consulted and evaluated patient, laceration superficial, not bleeding      -recs: bite block vs paralysis, will monitor    Sedation  - On propofol  - Off dilaudid gtt and valium     UGI Bleed  - Scoped by GI 5/3 with epinephrine injection and clip placement for an actively bleeding D2/D3 Diuelafoy lesion  - No pressors, Hb stable     FEN/GI  -TF at 30  -FWB discontinued     Anticoagulation      - Heparin gtt at 800 U/hr; increasing to 1000      - Goal aXa 0.2 Q6hrs; will likely increase goal to 0.3 given persistently higher LDHs results each day    Prophylaxis      - protonix q12 IV       - abx for ECMO      - PICC placed 5/5; central line removed 5/6

## 2020-05-07 NOTE — PROGRESS NOTES
ECMO Specialists shift report    Date: 05/07/2020  ECMO Specialist:  Lia Alvarez    Pump parameters:  RPM: 2800  Flow:  3.5 lpm  Sweep:  8  FiO2:  100    Oxygenator status:  Clots: few - pre  Fibrin: scant    Pressure trends:  P1:100-115   P2: 80's  Delta P: 18-21    Volume status:  Chugging noted - yes; intermittent  MAP:  70-80's  MD notified (name):  Dr. Ayon    Anticoagulation:  ACT/aPTT/Xa parameters: Xa 0.25-0.3  ACT/aPTT/Xa trends this shift: Xa 0.34    Cannula size / status / placement:  Arterial:   Venous 1:   23FR / RIJV / 4 cm  Venous 2:  27FR / RFV / 12 cm  Dual lumen: no      Additional Comments:            Pt stable today with further progress made in weaning of ECMO settings under direction of Dr. Ayon all day.  No blood products required; trach care done by resp. Heparin increased again earlier in day.  Cannulas remain secured in place. Pt maintained flows >3.5 lpm as per Nany's instructions.  PCO2 slightly higher acceptable as long as no acidotic pH.

## 2020-05-07 NOTE — PLAN OF CARE
POC reviewed with family. Pt intubated on AC 50/10. Pt opens eyes spontaneously and to voice. Pts HR has been 110s-120s. MAP 75-85. O2 sats %. ECMO speed 2800rpm, 3.5-3.8 lpm. RPMs decreased today per Dr. Ayon. Heparin gtt titrated per Anti Xa. Anti Xa Q6. Insulin gtt titrated per algorithm. BG Q1. Diamox given today. ABGs Q6. Torres intact with U/O of 125-300cc/hr. NG intact with TFs at 30cc/hr, which is goal. Pt had one Large black BM today. MD notified. Labs trended Q6. Lytes replaced. Dr. Ayon notified of labs, assessment, and ABGs. Heel boots on. SCDS on. VSS. Will continue to monitor.

## 2020-05-07 NOTE — SUBJECTIVE & OBJECTIVE
Interval History:   No acute events overnight, Hb and platelets stable. Pulling better tidal volumes this morning 150-200 up from low 100s yesterday. Positive 230mL for the day with 2.4L UOP, will give Diamox today before increasing Lasix gtt. Cardene is at 1. Hep gtt at 800/hr. Central line removed yesterday. Sweep increased from 7 to 8 for PCO2 55 on AM ABG. Rechecking another one now.    Medications:  Continuous Infusions:   furosemide (LASIX) 2 mg/mL continuous infusion (non-titrating) 3 mg/hr (05/07/20 0800)    heparin (porcine) in 5 % dex 800 Units/hr (05/07/20 0800)    insulin (HUMAN R) infusion (adults) 1.9 Units/hr (05/07/20 0800)    nicardipine Stopped (05/07/20 0800)    norepinephrine bitartrate-D5W Stopped (04/25/20 0300)    propofoL 20 mcg/kg/min (05/07/20 0800)    vasopressin (PITRESSIN) infusion Stopped (05/04/20 2000)     Scheduled Meds:   acetaZOLAMIDE (DIAMOX) IVPB  250 mg Intravenous Once    albuterol-ipratropium  3 mL Nebulization Q6H    ceFEPime (MAXIPIME) IVPB  2 g Intravenous Q8H    chlorhexidine  15 mL Mouth/Throat BID    fluconazole (DIFLUCAN) IVPB  200 mg Intravenous Q24H    pantoprazole  40 mg Intravenous Q12H    polyethylene glycol  17 g Per NG tube Daily    sucralfate  1 g Per NG tube Q6H    vancomycin (VANCOCIN) IVPB  1,250 mg Intravenous Q12H    white petrolatum-mineral oiL   Both Eyes BID        Objective:     Vital Signs (Most Recent):  Temp: 98.6 °F (37 °C) (05/07/20 0701)  Pulse: (!) 131 (05/07/20 0815)  Resp: (!) 26 (05/07/20 0745)  BP: 126/75 (05/04/20 0400)  SpO2: 100 % (05/07/20 0815) Vital Signs (24h Range):  Temp:  [98.3 °F (36.8 °C)-98.8 °F (37.1 °C)] 98.6 °F (37 °C)  Pulse:  [123-138] 131  Resp:  [24-26] 26  SpO2:  [94 %-100 %] 100 %  Arterial Line BP: ()/(54-66) 106/56     Weight: 64.4 kg (141 lb 15.6 oz)  Body mass index is 24.37 kg/m².    SpO2: 100 %  O2 Device (Oxygen Therapy): ventilator    Intake/Output - Last 3 Shifts       05/05 0700 - 05/06  0659 05/06 0700 - 05/07 0659 05/07 0700 - 05/08 0659    I.V. (mL/kg) 1152.5 (18.2) 1071.8 (16.6)     NG/GT 85 975 60    IV Piggyback 750 600     Total Intake(mL/kg) 1987.5 (31.4) 2646.8 (41.1) 60 (0.9)    Urine (mL/kg/hr) 4795 (3.2) 2490 (1.6) 175 (1.8)    Stool       Blood 8 6     Total Output 4803 2496 175    Net -2815.5 +150.8 -115                 Lines/Drains/Airways     Peripherally Inserted Central Catheter Line            PICC Double Lumen 05/05/20 1707 right basilic 1 day          Central Venous Catheter Line                 ECMO Cannula 04/25/20 1120 right femoral vein 11 days         ECMO Cannula 04/25/20 1120 right internal jugular 11 days          Drain                 Urethral Catheter 04/18/20 1854 18 days         NG/OG Tube 05/03/20 1215 [place] sump;nasogastric 18 Fr. Left nostril 3 days          Airway                 Surgical Airway 05/05/20 1500 Shiley Cuffed 1 day    Bivona Custom Flextend Trach 05/05/20 1 day          Arterial Line                 Arterial Line 04/27/20 1100 Right Brachial 9 days          Peripheral Intravenous Line                 Peripheral IV - Single Lumen 05/03/20 1700 20 G Right;Medial Forearm 3 days         Peripheral IV - Single Lumen 05/04/20 0300 20 G Anterior;Right Wrist 3 days                Physical Exam    Constitutional: He is sedated, and intubated.   Head: Normocephalic   Cardiovascular: Tachycardia 120s   Pulmonary/Chest: intubated vent FiO2 50% PEEP 10, ECMO RPM 3000, sweep 8, FiO2 100%, flows upper 3s  Skin: L neck and L groin cannulas intact, min to no flashing, few clots pre-oxygenator, all sutures intact and secure  Nursing note and vitals reviewed.    Significant Labs:  BMP:   Recent Labs   Lab 05/07/20  0226   *      K 3.6   CL 98   CO2 31*   BUN 13   CREATININE 0.5   CALCIUM 8.0*   MG 1.8     CBC:   Recent Labs   Lab 05/07/20  0750   WBC 13.58*   RBC 3.31*   HGB 10.1*   HCT 31.2*   *   MCV 94   MCH 30.5   MCHC 32.4     LFTs:   Recent  Labs   Lab 05/07/20  0226   ALT 39   AST 34   ALKPHOS 153*   BILITOT 0.5   PROT 6.0   ALBUMIN 2.1*     Significant Diagnostics:  Cxr: none today will order, yesterday's XR stable

## 2020-05-08 NOTE — PLAN OF CARE
05/08/20 1003   Discharge Reassessment   Assessment Type Discharge Planning Reassessment   Provided patient/caregiver education on the expected discharge date and the discharge plan No   Do you have any problems affording any of your prescribed medications? TBD   Discharge Plan A Long-term acute care facility (LTAC)   Discharge Plan B Skilled Nursing Facility   DME Needed Upon Discharge    (TBD)   Patient choice form signed by patient/caregiver N/A   Anticipated Discharge Disposition LTAC

## 2020-05-08 NOTE — PLAN OF CARE
Pt remains on VV ECMO, speed 2800 RPM, flow 3.5, FiO2 100%, Sweep 8, SvO2 66-73%, pt having 10-15 coughing episodes an hour with associated drop in SvO2 and MAPs. Trached, vent setting A/C V/C+ 50% and PEEP 10, sats 100%. HR 98-130s, MAP 65-90, pt on Vaso @ 0.04 Units/min. Precedex gtt added overnight for sedation, noted improvement. TF @ 30 mL/hr, goal, no residuals, no BM this shift. Heparin gtt @ 900 Units/hr, anti-Xa 0.29 this am, MD aware, no new orders. Lasix gtt @ 5 mg/hr, -250 mL/hr, net negative ~500 mL this shift. Wound care to left arm performed per  order, no skin breakdown noted to sacrum, back of head and heels, overlay mattress in place, heel protecto boots on, elbows elevated on pillows. Pt flows dropped this am while turning to left side to change sheets, speed decreased by ECMO specialist, recovered once laid back to supine and flat. Per MD order HOB elevated to 20 degrees this am and tolerating well. All AM results reviewed with Dr. Ayon, 1 PRBC being transfused. Plan to wean off Propofol gtt today if tolerated.

## 2020-05-08 NOTE — PROGRESS NOTES
Pt remains on VV ECMO, speed increased to 2900, flows 3.5, FiO2 100%, Sweep 8. 2units PRBCs given. Trach, vent settings changed mult times per MD throughout shift because Vt dropped to <20 without success to increase Vt. Pt bronch'd per SICU team. Precedex gtt increased to sedate pt more and Vt now 80-120s consistently. Vaso gtt @0.04. TF @ 30mL/hr-goal. Lasix gtt @ 5mg/hr, -200mL/hr, CVP 6. HOB elevated 20degrees per MD order. Opens eyes spontaneously, does not follow commands, withdraws to noxious stimuli in all 4 extremities. Dr. Ayon @ bedside for rounds @ 1400 and updated over the phone multiple times throughout shift. Pt son updated on plan of care per RN. Dr. Ayon attempted to Facetime son while @ bedside but he did not answer.

## 2020-05-08 NOTE — PROGRESS NOTES
Patient seen for skin rounding with Dr. Madera. Shall full thickness skin loss noted to left cheek, appears to be device related from previous ET tube alcaraz device, slightly moist slough/eschar. Recommend daily cleaning with sterile normal saline, apply Triad barrier cream/ointment.  Nursing to continue care. Wound care to follow prn.

## 2020-05-08 NOTE — ASSESSMENT & PLAN NOTE
Acute respiratory distress syndrome (ARDS) due to COVID-19 virus  - Crich switched to ETT on 4/11  - Monika weaned to off 5/5  - RPM reduced to 2800; flows mid 3s  - thrombocytopenia: stable today; transfuse for under 30K  - starting Aspirin 81 tmrw morning daily  - Sweep at 8, goal 40-50s acceptable if not acidotic on ABG  - Oxygenator Fi 100%  - Vent Fi 50%, PEEP 10; volumes worsening, somewhat improved after RT suction overnight, will perform bronchoscopy today  - Tracheostomy and bronch 5/5  - Lasix gtt, goal -600mL qshift; please document I/Os q4hrs to ensure this  - Giving 250 Diamox again today for increased diuresis and bicarb 34 on ABG     Tongue laceration      -ENT consulted and evaluated patient, laceration superficial, not bleeding      -recs: bite block vs paralysis, will monitor    Sedation  - On propofol, weaning by 10 each day while precedex is on, goal off Saturday or Sunday  - Off dilaudid gtt and valium but may consider re-adding valium     UGI Bleed  - Scoped by GI 5/3 with epinephrine injection and clip placement for an actively bleeding D2/D3 Diuelafoy lesion  - No pressors, Hb stable     FEN/GI  - TF at 30  - FWB discontinued     Anticoagulation      - Heparin gtt at 900 U/hr      - Goal aXa [id number] given persistently higher LDHs (lower for first time today)    Prophylaxis      - protonix q12 IV       - abx for ECMO      - PICC placed 5/5; central line removed 5/6

## 2020-05-08 NOTE — PROGRESS NOTES
Vt noted to be decreasing 40-80s, down from 160-180 @ 1900, pt also noted ti be more tacypneic RR 28-32, HR 130s, SvO2 68%, PRN Fentanyl administered, pt also suctioned and HME changed by RT, following Vt noted to be , SvO2 70-71%, sats 100%, , RR, 22-24, WCTM.

## 2020-05-08 NOTE — PLAN OF CARE
[facility] shift report    Date: 05/08/2020  ECMO Specialist:  Jorge Yeboah    Pump parameters:  RPM: 2800  Flow:  3.3LPM  Sweep:  8  FiO2:  100    Oxygenator status:  Clots: pre-oxy  Fibrin: pre-post oxy    Pressure trends:  P1: 104  P2: 84  Delta P: 20    Volume status:  Chugging noted none  CVP: 0-1  MAP: 89  MD notified (name):  Dr. Ayon    Anticoagulation:  ACT/aPTT/Xa parameters: 0.25-.03  ACT/aPTT/Xa trends this shift: 0.26    Cannula size / status / placement:  Arterial:   Venous 1: 23F/RFIV/4cm   Venous 2:27F/RFV/12  Dual lumen:      Additional Comments:  Patient is resting on VV ecmo with the documented settings.  The patient has had 2 units of PRBC today.  Urine output is good.  Patient had a bedside bronchoscopy today.  Small scant secretions were noted.  Overall the lungs looked good.  Xray shows improvement over the past few days.  Patient is still asynchronous with the ventilator.  Will continue to monitor

## 2020-05-08 NOTE — PROGRESS NOTES
[facility] shift report    Date: 05/08/2020  ECMO Specialist:  Ashley Francisco    Pump parameters:  RPM: 2800  Flow: 3.5  Sweep:  8  FiO2:  1.0    Oxygenator status:  Clots: pre and sm. post  Fibrin: pre and post    Pressure trends:  P1: 105  P2: 86  Delta P: 19    Volume status:  Chugging noted?N0  CVP: NA  MAP:  70  MD notified (name): Nany    Anticoagulation:  ACT/aPTT/Xa parameters:   ACT/aPTT/Xa trends this shift: .36 to .29    Cannula size / status / placement:  Arterial:   Venous 1:23F RIJV @ 4cm   Venous 2:27F RFV @ 12cm  Dual lumen:      Additional Comments: Coughing a lot.  Flows dropped during bedding change.  Brought RPMs down to compensate.  No volume needed tonight.  SVO2 dropped during coughing fits.

## 2020-05-08 NOTE — PROGRESS NOTES
Pharmacokinetic Assessment Follow Up: IV Vancomycin    Vancomycin serum concentration assessment(s):    Vancomycin trough level resulted at 17.9 mcg/mL drawn appropriately. Goal level is 10 to 20 mcg/mL.     Drug levels (last 3 results):  Recent Labs   Lab Result Units 05/06/20  1102 05/08/20  1025   Vancomycin-Trough ug/mL 13.0 17.9     Vancomycin Regimen Plan:    Continue vancomycin 1000 mg IV every 12 hours with next serum trough concentration measured on 5/10 1030.     Pharmacy will continue to follow and monitor vancomycin.    Please contact pharmacy at extension [phone] for questions regarding this assessment.    Thank you for the consult,   Arpita Kapadia PharmD             Patient brief summary:  Ranjana Sanchez is a 54 y.o. male initiated on antimicrobial therapy with IV vancomycin for treatment of sepsis    Drug Allergies:   Review of patient's allergies indicates:  No Known Allergies    Actual Body Weight:   68.4 kg     Renal Function:   Estimated Creatinine Clearance: 115.1 mL/min (based on SCr of 0.6 mg/dL).    Dialysis Method (if applicable):  N/A    CBC (last 72 hours):  Recent Labs   Lab Result Units 05/05/20  1359 05/05/20  1932 05/06/20  0206 05/06/20  0729 05/06/20  1400 05/06/20 2000 05/07/20  0226 05/07/20  0750 05/07/20  1407 05/07/20 2000 05/08/20  0200 05/08/20  0801   WBC K/uL 9.38 10.20 12.79* 15.68* 14.92* 14.38* 14.25* 13.58* 13.91* 13.66* 11.20 11.64   Hemoglobin g/dL 10.6* 10.5* 11.3* 11.3* 10.7* 10.7* 10.3* 10.1* 9.7* 9.9* 9.3* 9.8*   Hemoglobin, Free, Plasma mg/dL  --   --   --  80*  --   --   --  30  --   --   --  50*   Hematocrit % 32.0* 32.3* 35.3* 35.8* 33.3* 33.3* 32.3* 31.2* 30.3* 30.6* 29.0* 31.1*   Platelets K/uL 72* 72* 85* 93* 90* 99* 106* 109* 110* 115* 125* 117*   Gran% % 75.8* 77.7* 83.7* 84.1* 84.3* 84.8* 82.9* 81.4* 81.2* 82.4* 79.2* 77.5*   Lymph% % 8.0* 7.4* 5.7* 4.9* 5.2* 5.1* 5.7* 6.8* 6.8* 5.7* 6.7* 7.3*   Mono% % 6.8 6.6 5.0 6.1 5.8 5.5 5.7 5.8 6.0 5.9 6.3 6.6    Eosinophil% % 4.3 3.9 3.2 2.7 2.9 3.1 4.1 4.4 4.2 4.3 5.5 5.9   Basophil% % 0.5 0.5 0.4 0.3 0.3 0.2 0.3 0.2 0.4 0.4 0.4 0.5   Differential Method  Automated Automated Automated Automated Automated Automated Automated Automated Automated Automated Automated Automated       Metabolic Panel (last 72 hours):  Recent Labs   Lab Result Units 05/05/20  1359 05/05/20  1932 05/06/20  0206 05/06/20  0729 05/06/20  1400 05/06/20 2000 05/07/20  0226 05/07/20  0750 05/07/20  1407 05/07/20 2000 05/08/20  0200 05/08/20  0801   Sodium mmol/L 139 140 141 139 136 138 137 138 136 135* 134* 134*   Potassium mmol/L 4.1 3.6 3.5 4.3 3.7 4.1 3.6 4.1 3.5 4.0 3.7 4.0   Chloride mmol/L 101 102 100 101 99 100 98 98 97 97 95 93*   CO2 mmol/L 29 28 32* 28 27 31* 31* 32* 33* 29 28 30*   Glucose mg/dL 197* 201* 213* 154* 279* 216* 171* 193* 167* 167* 158* 168*   BUN, Bld mg/dL 10 11 10 10 11 13 13 13 13 14 16 17   Creatinine mg/dL 0.5 0.5 0.6 0.5 0.6 0.5 0.5 0.5 0.5 0.5 0.5 0.6   Albumin g/dL 1.9* 2.0* 2.3* 2.3* 2.2* 2.2* 2.1* 2.0* 2.0* 2.1* 2.1* 2.1*   Total Bilirubin mg/dL 0.5 0.5 0.5 0.6 0.5 0.6 0.5 0.5 0.5 0.4 0.4 0.5   Alkaline Phosphatase U/L 144* 147* 163* 159* 150* 151* 153* 146* 144* 147* 140* 143*   AST U/L 42* 41* 37 37 32 34 34 31 33 33 37 34   ALT U/L 39 40 42 40 38 38 39 36 38 39 39 39   Magnesium mg/dL 2.1 1.9 1.9 2.3 2.3 2.0 1.8 1.7 2.3 2.2 1.9 1.9   Phosphorus mg/dL 2.3* 2.6* 3.2 3.1 2.5* 3.3 2.3* 3.8 2.8 2.3* 3.5 3.6       Vancomycin Administrations:  vancomycin given in the last 96 hours                   vancomycin in dextrose 5 % 1 gram/250 mL IVPB 1,000 mg (mg) 1,000 mg New Bag 05/04/20 1130     1,000 mg New Bag 05/03/20 2202     1,000 mg New Bag  1050     1,000 mg New Bag 05/02/20 2212     1,000 mg New Bag  1003     1,000 mg New Bag 05/01/20 2223     1,000 mg New Bag  1000     1,000 mg New Bag 04/30/20 2201                Microbiologic Results:  Microbiology Results (last 7 days)     ** No results found for the last 168  hours. **

## 2020-05-08 NOTE — PROGRESS NOTES
[facility]  Cardiothoracic Surgery  Daily ECMO Progress Note    Patient Name: Ranjana Sanchez  MRN: [id number]  Admission Date: 4/10/2020  Hospital Length of Stay: 28 days  Code Status: Full Code   Attending Physician: Francis Ayon MD   Referring Provider: Francis Ayon MD  Principal Problem:Acute respiratory distress syndrome (ARDS) due to COVID-19 virus    Subjective:   Interval History:  Decreasing tidal volumes overnight with mild improvement after suctioning by RT. Got 1U pRBC earlier this AM. Propofol slowly weaning. Positive 100mL in 24hrs. Tidal volumes 160s this AM. Vaso at 0.04. Hep gtt at 900/hr.    Medications:  Continuous Infusions:   dexmedetomidine (PRECEDEX) infusion 1.4 mcg/kg/hr (05/08/20 1700)    furosemide (LASIX) 2 mg/mL continuous infusion (non-titrating) 5 mg/hr (05/08/20 1700)    heparin (porcine) in 5 % dex 900 Units/hr (05/08/20 1700)    insulin (HUMAN R) infusion (adults) 3.1 Units/hr (05/08/20 1500)    nicardipine Stopped (05/07/20 0800)    norepinephrine bitartrate-D5W Stopped (04/25/20 0300)    propofoL Stopped (05/08/20 1000)    vasopressin (PITRESSIN) infusion 0.04 Units/min (05/08/20 1700)     Scheduled Meds:   acetaZOLAMIDE (DIAMOX) IVPB  250 mg Intravenous Once    albuterol-ipratropium  3 mL Nebulization Q6H    [START ON 5/9/2020] aspirin  81 mg Per NG tube Daily    ceFEPime (MAXIPIME) IVPB  2 g Intravenous Q8H    chlorhexidine  15 mL Mouth/Throat BID    fluconazole (DIFLUCAN) IVPB  200 mg Intravenous Q24H    pantoprazole  40 mg Intravenous Q12H    polyethylene glycol  17 g Per NG tube Daily    sucralfate  1 g Per NG tube Q6H    vancomycin (VANCOCIN) IVPB  1,250 mg Intravenous Q12H    white petrolatum-mineral oiL   Both Eyes BID        Objective:     Vital Signs (Most Recent):  Temp: 98.6 °F (37 °C) (05/08/20 1500)  Pulse: 94 (05/08/20 1730)  Resp: 12 (05/08/20 1339)  BP: 124/76 (05/08/20 1700)  SpO2: 99 % (05/08/20 1730) Vital Signs  (24h Range):  Temp:  [98 °F (36.7 °C)-98.6 °F (37 °C)] 98.6 °F (37 °C)  Pulse:  [] 94  Resp:  [12-24] 12  SpO2:  [91 %-100 %] 99 %  BP: ()/(51-92) 124/76  Arterial Line BP: ()/(48-67) 101/61     Weight: 63 kg (138 lb 14.2 oz)  Body mass index is 23.84 kg/m².    SpO2: 99 %  O2 Device (Oxygen Therapy): ventilator    Intake/Output - Last 3 Shifts       05/06 0700 - 05/07 0659 05/07 0700 - 05/08 0659 05/08 0700 - 05/09 0659    I.V. (mL/kg) 1071.8 (16.6) 2750.7 (43.7) 595 (9.4)    Blood  350 350    NG/ 1055 390    IV Piggyback 600      Total Intake(mL/kg) 2646.8 (41.1) 4155.7 (66) 1335 (21.2)    Urine (mL/kg/hr) 2490 (1.6) 3740 (2.5) 1725 (2.6)    Stool  0     Blood 6 3     Total Output 2496 3743 1725    Net +150.8 +412.7 -390           Stool Occurrence  1 x           Lines/Drains/Airways     Peripherally Inserted Central Catheter Line            PICC Double Lumen 05/05/20 1707 right basilic 3 days          Central Venous Catheter Line                 ECMO Cannula 04/25/20 1120 right femoral vein 13 days         ECMO Cannula 04/25/20 1120 right internal jugular 13 days          Drain                 Urethral Catheter 04/18/20 1854 19 days         NG/OG Tube 05/03/20 1215 [place] sump;nasogastric 18 Fr. Left nostril 5 days          Airway                 Surgical Airway 05/05/20 1500 Shiley Cuffed 3 days          Arterial Line                 Arterial Line 04/27/20 1100 Right Brachial 11 days          Peripheral Intravenous Line                 Peripheral IV - Single Lumen 05/08/20 0300 20 G Anterior;Left Hand less than 1 day         Peripheral IV - Single Lumen 05/08/20 0300 20 G Left Wrist less than 1 day         Peripheral IV - Single Lumen 05/08/20 0600 22 G Right Hand less than 1 day                Physical Exam    Constitutional: He is sedated, and intubated.   Head: Normocephalic   Cardiovascular: Tachycardia 130s   Pulmonary/Chest: intubated vent FiO2 50% PEEP 10, ECMO RPM 2800, sweep 8, FiO2  100%, flows mid3s  Skin: L neck and L groin cannulas intact, min to no flashing, few clots pre-oxygenator, all sutures intact and secure  Nursing note and vitals reviewed.    Significant Labs:  BMP:   Recent Labs   Lab 05/08/20  1410   *   *   K 3.3*   CL 92*   CO2 32*   BUN 18   CREATININE 0.5   CALCIUM 9.0   MG 1.8     CBC:   Recent Labs   Lab 05/08/20  1410 05/08/20  1415   WBC 11.02  --    RBC 3.74*  --    HGB 11.3*  --    HCT 34.1* 36   *  --    MCV 91  --    MCH 30.2  --    MCHC 33.1  --      LFTs:   Recent Labs   Lab 05/08/20  1410   ALT 36   AST 30   ALKPHOS 142*   BILITOT 0.7   PROT 6.2   ALBUMIN 2.2*     Significant Diagnostics:  Cxr: mildly improved in left upper airspace, otherwise stable      Assessment/Plan:     Acute respiratory distress syndrome (ARDS) due to COVID-19 virus  - Crich switched to ETT on 4/11  - Monika weaned to off 5/5  - RPM reduced to 2800; flows mid 3s  - thrombocytopenia: stable today; transfuse for under 30K  - starting Aspirin 81 tmrw morning daily  - Sweep at 8, goal 40-50s acceptable if not acidotic on ABG  - Oxygenator Fi 100%  - Vent Fi 50%, PEEP 10; volumes worsening, somewhat improved after RT suction overnight, will perform bronchoscopy today  - Tracheostomy and bronch 5/5  - Lasix gtt, goal -600mL qshift; please document I/Os q4hrs to ensure this  - Giving 250 Diamox again today for increased diuresis and bicarb 34 on ABG     Tongue laceration      -ENT consulted and evaluated patient, laceration superficial, not bleeding      -recs: bite block vs paralysis, will monitor    Sedation  - On propofol, weaning by 10 each day while precedex is on, goal off Saturday or Sunday  - Off dilaudid gtt and valium but may consider re-adding valium     UGI Bleed  - Scoped by GI 5/3 with epinephrine injection and clip placement for an actively bleeding D2/D3 Diuelafoy lesion  - No pressors, Hb stable     FEN/GI  - TF at 30  - FWB discontinued     Anticoagulation      -  Heparin gtt at 900 U/hr      - Goal aXa [id number] given persistently higher LDHs (lower for first time today)    Prophylaxis      - protonix q12 IV       - abx for ECMO      - PICC placed 5/5; central line removed 5/6    Anne Miller MD  Cardiothoracic Surgery   [facility]    VV ECMO circuit checked and all parameters reviewed. Anticoagulation was managed and all cannulation exit sites along with review of labs and radiology studies performed. Speed and functioning of the pump along with oxygenator quality reviewed.   Patient doing well in terms of ECMO flow.  Anticoagulation seems to be working within the limits without any new clot formation of fibrin deposit in the oxygenator.  Bronchoscopy was performed which was clean and samples were submitted for microbiology.  Patient tolerating tube feeds at goal.  Range of motion is being carried out for physical therapy.  Patient is on Valium and Precedex to help in agitation.  The plan was communicated between different team members which included the [facility] Cardiothoracic surgery, surgical critical care team, nursing staff and perfusion services.  Everybody is concerns and questions were addressed.  Communication with family is being done on a daily basis keeping them up to date with patient's condition.

## 2020-05-08 NOTE — SUBJECTIVE & OBJECTIVE
Interval History:  Decreasing tidal volumes overnight with mild improvement after suctioning by RT. Got 1U pRBC earlier this AM. Propofol slowly weaning. Positive 100mL in 24hrs. Tidal volumes 160s this AM. Vaso at 0.04. Hep gtt at 900/hr.    Medications:  Continuous Infusions:   dexmedetomidine (PRECEDEX) infusion 1.4 mcg/kg/hr (05/08/20 1700)    furosemide (LASIX) 2 mg/mL continuous infusion (non-titrating) 5 mg/hr (05/08/20 1700)    heparin (porcine) in 5 % dex 900 Units/hr (05/08/20 1700)    insulin (HUMAN R) infusion (adults) 3.1 Units/hr (05/08/20 1500)    nicardipine Stopped (05/07/20 0800)    norepinephrine bitartrate-D5W Stopped (04/25/20 0300)    propofoL Stopped (05/08/20 1000)    vasopressin (PITRESSIN) infusion 0.04 Units/min (05/08/20 1700)     Scheduled Meds:   acetaZOLAMIDE (DIAMOX) IVPB  250 mg Intravenous Once    albuterol-ipratropium  3 mL Nebulization Q6H    [START ON 5/9/2020] aspirin  81 mg Per NG tube Daily    ceFEPime (MAXIPIME) IVPB  2 g Intravenous Q8H    chlorhexidine  15 mL Mouth/Throat BID    fluconazole (DIFLUCAN) IVPB  200 mg Intravenous Q24H    pantoprazole  40 mg Intravenous Q12H    polyethylene glycol  17 g Per NG tube Daily    sucralfate  1 g Per NG tube Q6H    vancomycin (VANCOCIN) IVPB  1,250 mg Intravenous Q12H    white petrolatum-mineral oiL   Both Eyes BID        Objective:     Vital Signs (Most Recent):  Temp: 98.6 °F (37 °C) (05/08/20 1500)  Pulse: 94 (05/08/20 1730)  Resp: 12 (05/08/20 1339)  BP: 124/76 (05/08/20 1700)  SpO2: 99 % (05/08/20 1730) Vital Signs (24h Range):  Temp:  [98 °F (36.7 °C)-98.6 °F (37 °C)] 98.6 °F (37 °C)  Pulse:  [] 94  Resp:  [12-24] 12  SpO2:  [91 %-100 %] 99 %  BP: ()/(51-92) 124/76  Arterial Line BP: ()/(48-67) 101/61     Weight: 63 kg (138 lb 14.2 oz)  Body mass index is 23.84 kg/m².    SpO2: 99 %  O2 Device (Oxygen Therapy): ventilator    Intake/Output - Last 3 Shifts       05/06 0700 - 05/07 0659 05/07 0700  - 05/08 0659 05/08 0700 - 05/09 0659    I.V. (mL/kg) 1071.8 (16.6) 2750.7 (43.7) 595 (9.4)    Blood  350 350    NG/ 1055 390    IV Piggyback 600      Total Intake(mL/kg) 2646.8 (41.1) 4155.7 (66) 1335 (21.2)    Urine (mL/kg/hr) 2490 (1.6) 3740 (2.5) 1725 (2.6)    Stool  0     Blood 6 3     Total Output 2496 3743 1725    Net +150.8 +412.7 -390           Stool Occurrence  1 x           Lines/Drains/Airways     Peripherally Inserted Central Catheter Line            PICC Double Lumen 05/05/20 1707 right basilic 3 days          Central Venous Catheter Line                 ECMO Cannula 04/25/20 1120 right femoral vein 13 days         ECMO Cannula 04/25/20 1120 right internal jugular 13 days          Drain                 Urethral Catheter 04/18/20 1854 19 days         NG/OG Tube 05/03/20 1215 [place] sump;nasogastric 18 Fr. Left nostril 5 days          Airway                 Surgical Airway 05/05/20 1500 Shiley Cuffed 3 days          Arterial Line                 Arterial Line 04/27/20 1100 Right Brachial 11 days          Peripheral Intravenous Line                 Peripheral IV - Single Lumen 05/08/20 0300 20 G Anterior;Left Hand less than 1 day         Peripheral IV - Single Lumen 05/08/20 0300 20 G Left Wrist less than 1 day         Peripheral IV - Single Lumen 05/08/20 0600 22 G Right Hand less than 1 day                Physical Exam    Constitutional: He is sedated, and intubated.   Head: Normocephalic   Cardiovascular: Tachycardia 130s   Pulmonary/Chest: intubated vent FiO2 50% PEEP 10, ECMO RPM 2800, sweep 8, FiO2 100%, flows mid3s  Skin: L neck and L groin cannulas intact, min to no flashing, few clots pre-oxygenator, all sutures intact and secure  Nursing note and vitals reviewed.    Significant Labs:  BMP:   Recent Labs   Lab 05/08/20  1410   *   *   K 3.3*   CL 92*   CO2 32*   BUN 18   CREATININE 0.5   CALCIUM 9.0   MG 1.8     CBC:   Recent Labs   Lab 05/08/20  1410 05/08/20  1415   WBC 11.02   --    RBC 3.74*  --    HGB 11.3*  --    HCT 34.1* 36   *  --    MCV 91  --    MCH 30.2  --    MCHC 33.1  --      LFTs:   Recent Labs   Lab 05/08/20  1410   ALT 36   AST 30   ALKPHOS 142*   BILITOT 0.7   PROT 6.2   ALBUMIN 2.2*     Significant Diagnostics:  Cxr: mildly improved in left upper airspace, otherwise stable

## 2020-05-08 NOTE — PROGRESS NOTES
Dr. Ayon updated on VS, ABG results and ECMO settings, per MD increase Lasix gtt to 5 mg/hr, decreased heparin gtt to 900 Units/hr, change electrolyte replacement order to oral (specifically NeutraPhos), increase Propofol back up to 20 mcg/kg/min. In the am decrease Prop gtt back to 15 mcg/kg/min and increase HOB from 15-20 degrees, will follow orders and CTM.     Dr. Ayon updated, per MD Propofol increased to 30 mcg/kg/min and Precedex added @ 0.4 mcg/kg/hr.

## 2020-05-08 NOTE — PROCEDURES
BRONCHOSCOPY    Pt with concern for mucous plugging after sudden decrease in ventilatory volumes.  Emergency consent obtained.  Time out performed.  Flexible bronchoscope was inserted into trachea and advanced to almita.  There was minimal mucous noted at this point.  Minimal amount of mucous was suctioned from right side.  The scope was then advanced down the left mainstem and into the right middle lobe where a BAL was performed for more moderate secretions.  The rest of the right side was fairly clear and bronchoscope was removed.  Pt tolerated the procedure well.  Dr. Chavez was present and directed critical portions of procedure.    Zelalem Ariza MD  General Surgery, PGY-2  [phone]

## 2020-05-09 NOTE — ASSESSMENT & PLAN NOTE
Acute respiratory distress syndrome (ARDS) due to COVID-19 virus  - Crich switched to ETT on 4/11  - Monika weaned off 5/5  - RPM at 2900; flows mid 3s  - thrombocytopenia: stable; transfuse for under 30K  - started Aspirin 81 daily today  - Sweep at 7.5, goal 40-50s acceptable if not acidotic on ABG  - Oxygenator Fi 100%; will try weaning to 90% today  - Vent Fi 50%, PEEP 10; volumes mildly reduced but stable. CXR mildly worse  - Tracheostomy and bronch 5/5  - Lasix gtt at 5, new goal -650mL per day; please document I/Os q4hrs to ensure this  - Weaning vaso today to 0.02     Tongue laceration      -ENT consulted and evaluated patient, laceration superficial, not bleeding      -recs: bite block vs paralysis, will monitor    Sedation  - Propofol off 5/8  - Weaning precedex 0.05 to 0.01 per day as tolerated  - Off dilaudid gtt and valium but may consider re-adding valium     UGI Bleed  - Scoped by GI 5/3 with epinephrine injection and clip placement for an actively bleeding D2/D3 Diuelafoy lesion  - No pressors, Hb stable     FEN/GI  - TF at 30, will increase to 40 today (new goal 45 since propofol discontinued)  - FWB discontinued     Anticoagulation      - Heparin gtt at 900 U/hr, increased to 1000      - Goal aXa 0.25 - 0.3 Q6hrs given persistently higher D-Dimer    Prophylaxis      - protonix q12 IV       - abx for ECMO      - PICC placed 5/5; central line removed 5/6

## 2020-05-09 NOTE — PROGRESS NOTES
[facility]  Cardiothoracic Surgery  Daily ECMO Progress Note    Patient Name: Ranjana Sanchez  MRN: [id number]  Admission Date: 4/10/2020  Hospital Length of Stay: 29 days  TYPE of ECMO: V-V  Day OF ECMO SUPPORT: 14  Code Status: Full Code   Attending Physician: Francis Ayon MD   Referring Provider: Francis Ayon MD  Principal Problem:Acute respiratory distress syndrome (ARDS) due to COVID-19 virus    Subjective:   Interval History:  Propofol off yesterday. Will try slowly weaning precedex today. Got 2U pRBC yesterday, and 500mL albumin overnight for line chugging. Getting another 100mL 25% albumin this AM. Net negative 1.0L in last 24hrs, good UOP, on lasix gtt at 5. Weaning vaso slightly this AM. Hep gtt at 900/hr.    Medications:  Continuous Infusions:   dexmedetomidine (PRECEDEX) infusion 1 mcg/kg/hr (05/09/20 1100)    furosemide (LASIX) 2 mg/mL continuous infusion (non-titrating) 5 mg/hr (05/09/20 1100)    heparin (porcine) in 5 % dex 1,000 Units/hr (05/09/20 1100)    insulin (HUMAN R) infusion (adults) 2.6 Units/hr (05/09/20 1100)    nicardipine Stopped (05/07/20 0800)    norepinephrine bitartrate-D5W Stopped (04/25/20 0300)    propofoL Stopped (05/08/20 1000)    vasopressin (PITRESSIN) infusion 0.04 Units/min (05/09/20 1100)     Scheduled Meds:   albuterol-ipratropium  3 mL Nebulization Q6H    aspirin  81 mg Per NG tube Daily    ceFEPime (MAXIPIME) IVPB  2 g Intravenous Q8H    chlorhexidine  15 mL Mouth/Throat BID    fluconazole (DIFLUCAN) IVPB  200 mg Intravenous Q24H    pantoprazole  40 mg Intravenous Q12H    polyethylene glycol  17 g Per NG tube Daily    sucralfate  1 g Per NG tube Q6H    vancomycin (VANCOCIN) IVPB  1,250 mg Intravenous Q12H    white petrolatum-mineral oiL   Both Eyes BID        Objective:     Vital Signs (Most Recent):  Temp: 98.1 °F (36.7 °C) (05/09/20 1100)  Pulse: 91 (05/09/20 1100)  Resp: 16 (05/09/20 0846)  BP: 113/70 (05/09/20  1100)  SpO2: 97 % (05/09/20 1100) Vital Signs (24h Range):  Temp:  [98.1 °F (36.7 °C)-98.6 °F (37 °C)] 98.1 °F (36.7 °C)  Pulse:  [] 91  Resp:  [12-23] 16  SpO2:  [91 %-100 %] 97 %  BP: ()/(57-92) 113/70  Arterial Line BP: ()/(50-67) 107/57     Weight: 63 kg (138 lb 14.2 oz)  Body mass index is 23.84 kg/m².    SpO2: 97 %  O2 Device (Oxygen Therapy): ventilator    Intake/Output - Last 3 Shifts       05/07 0700 - 05/08 0659 05/08 0700 - 05/09 0659 05/09 0700 - 05/10 0659    I.V. (mL/kg) 2750.7 (43.7) 1652 (26.2) 445 (7.1)    Blood 350 350     NG/GT 1055 800 210    IV Piggyback       Total Intake(mL/kg) 4155.7 (66) 2802 (44.5) 655 (10.4)    Urine (mL/kg/hr) 3740 (2.5) 3775 (2.5) 675 (2.6)    Stool 0 0     Blood 3 2 3    Total Output 3743 3777 678    Net +412.7 -975 -23           Stool Occurrence 1 x 1 x           Lines/Drains/Airways     Peripherally Inserted Central Catheter Line            PICC Double Lumen 05/05/20 1707 right basilic 3 days          Central Venous Catheter Line                 ECMO Cannula 04/25/20 1120 right femoral vein 13 days         ECMO Cannula 04/25/20 1120 right internal jugular 13 days          Drain                 Urethral Catheter 04/18/20 1854 20 days         NG/OG Tube 05/03/20 1215 [place] sump;nasogastric 18 Fr. Left nostril 5 days          Airway                 Surgical Airway 05/05/20 1500 Shiley Cuffed 3 days          Arterial Line                 Arterial Line 04/27/20 1100 Right Brachial 12 days          Peripheral Intravenous Line                 Peripheral IV - Single Lumen 05/08/20 0300 20 G Anterior;Left Hand 1 day         Peripheral IV - Single Lumen 05/08/20 0300 20 G Left Wrist 1 day         Peripheral IV - Single Lumen 05/08/20 0600 22 G Right Hand 1 day                Physical Exam    Constitutional: He is sedated, and intubated.   Head: Normocephalic   Cardiovascular: Tachycardia 130s   Pulmonary/Chest: intubated vent FiO2 50% PEEP 10, ECMO RPM 2900,  sweep 7.5, FiO2 100%, flows mid 3s  Skin: L neck and L groin cannulas intact, min to no flashing, few clots pre-oxygenator, all sutures intact and secure  Nursing note and vitals reviewed.    Significant Labs:  BMP:   Recent Labs   Lab 05/09/20  0800   *   *   K 4.1   CL 96   CO2 26   BUN 19   CREATININE 0.6   CALCIUM 8.7   MG 1.9     CBC:   Recent Labs   Lab 05/09/20  0800   WBC 8.07   RBC 3.68*   HGB 10.9*   HCT 33.4*   *   MCV 91   MCH 29.6   MCHC 32.6     LFTs:   Recent Labs   Lab 05/09/20  0800   ALT 34   AST 25   ALKPHOS 132   BILITOT 0.4   PROT 6.3   ALBUMIN 2.2*     Significant Diagnostics:  Cxr: mildly improved in left upper airspace, otherwise stable    ROS        Assessment/Plan:     Acute respiratory distress syndrome (ARDS) due to COVID-19 virus  - Crich switched to ETT on 4/11  - Monika weaned off 5/5  - RPM at 2900; flows mid 3s  - thrombocytopenia: stable; transfuse for under 30K  - started Aspirin 81 daily today  - Sweep at 7.5, goal 40-50s acceptable if not acidotic on ABG  - Oxygenator Fi 100%; will try weaning to 90% today  - Vent Fi 50%, PEEP 10; volumes mildly reduced but stable. CXR mildly worse  - Tracheostomy and bronch 5/5  - Lasix gtt at 5, new goal -650mL per day; please document I/Os q4hrs to ensure this  - Weaning vaso today to 0.02     Tongue laceration      -ENT consulted and evaluated patient, laceration superficial, not bleeding      -recs: bite block vs paralysis, will monitor    Sedation  - Propofol off 5/8  - Weaning precedex 0.05 to 0.01 per day as tolerated  - Off dilaudid gtt and valium but may consider re-adding valium     UGI Bleed  - Scoped by GI 5/3 with epinephrine injection and clip placement for an actively bleeding D2/D3 Diuelafoy lesion  - No pressors, Hb stable     FEN/GI  - TF at 30, will increase to 40 today (new goal 45 since propofol discontinued)  - FWB discontinued     Anticoagulation      - Heparin gtt at 900 U/hr, increased to 1000      -  Goal aXa 0.25 - 0.3 Q6hrs given persistently higher D-Dimer    Prophylaxis      - protonix q12 IV       - abx for ECMO      - PICC placed 5/5; central line removed 5/6    Anne Miller MD  Cardiothoracic Surgery  [facility]    VV ECMO circuit checked and all parameters reviewed. Anticoagulation was managed and all cannulation exit sites along with review of labs and radiology studies performed. Speed and functioning of the pump along with oxygenator quality reviewed.  Patient appears to be stable and will wean patient Precedex.  Patient tolerating tube feeds and will achieve goal today.  Questions and concerns of all team members were addressed and coordination of care between different teams was achieved.

## 2020-05-09 NOTE — PROGRESS NOTES
ECMO Specialists shift report    Date: 05/09/2020  ECMO Specialist:  Ashley Francisco    Pump parameters:  RPM: 2900  Flow:  3.5  Sweep:  8  FiO2: 100    Oxygenator status:  Clots: pre and post  Fibrin: pre and post    Pressure trends:  P1: 107  P2: 86  Delta P: 21    Volume status:  Chugging noted? No  CVP: NA  MAP:  70s  MD notified (name): Nany    Anticoagulation:  ACT/aPTT/Xa parameters: .25-.3  ACT/aPTT/Xa trends this shift: .26-.25    Cannula size / status / placement:  Arterial:   Venous 1: RIJV 23F@ 4cm  Venous 2:RFV 27F@12 cm  Dual lumen:      Additional Comments:   Pt. Was well sedated.  Maintained the flows.  RN gave albumin.  See RNs notes.

## 2020-05-09 NOTE — CARE UPDATE
JACKIE Ayon MD on phone.   MD notified of pts current VS, neuro status, gtts, rates, CVP, UOP, TF, labs, ventilator settings and ECMO settings.      ECMO CO2/Sweep decreased to 7.5 per ABG results.  MD notified.   /FiO2 decreased to 95% per MD order.   Flows: 3.3-3.4.    100cc 25% Albumin administered per MD order.     Precedex to be weaned 0.05mcg/kg/min/24hrs.     Plan of care reviewed with pt.   Questions answered per ICU RN.   See flowsheet for details.

## 2020-05-09 NOTE — CARE UPDATE
Pts son, JACKIE Sanchez called.   Son updated on father's plan of care.   Questions answered per ICU RN.   MD notified ICU RN spoke/updated son.

## 2020-05-09 NOTE — SUBJECTIVE & OBJECTIVE
Interval History:  Propofol off yesterday. Will try slowly weaning precedex today. Got 2U pRBC yesterday, and 500mL albumin overnight for line chugging. Getting another 100mL 25% albumin this AM. Net negative 1.0L in last 24hrs, good UOP, on lasix gtt at 5. Weaning vaso slightly this AM. Hep gtt at 900/hr.    Medications:  Continuous Infusions:   dexmedetomidine (PRECEDEX) infusion 1 mcg/kg/hr (05/09/20 1100)    furosemide (LASIX) 2 mg/mL continuous infusion (non-titrating) 5 mg/hr (05/09/20 1100)    heparin (porcine) in 5 % dex 1,000 Units/hr (05/09/20 1100)    insulin (HUMAN R) infusion (adults) 2.6 Units/hr (05/09/20 1100)    nicardipine Stopped (05/07/20 0800)    norepinephrine bitartrate-D5W Stopped (04/25/20 0300)    propofoL Stopped (05/08/20 1000)    vasopressin (PITRESSIN) infusion 0.04 Units/min (05/09/20 1100)     Scheduled Meds:   albuterol-ipratropium  3 mL Nebulization Q6H    aspirin  81 mg Per NG tube Daily    ceFEPime (MAXIPIME) IVPB  2 g Intravenous Q8H    chlorhexidine  15 mL Mouth/Throat BID    fluconazole (DIFLUCAN) IVPB  200 mg Intravenous Q24H    pantoprazole  40 mg Intravenous Q12H    polyethylene glycol  17 g Per NG tube Daily    sucralfate  1 g Per NG tube Q6H    vancomycin (VANCOCIN) IVPB  1,250 mg Intravenous Q12H    white petrolatum-mineral oiL   Both Eyes BID        Objective:     Vital Signs (Most Recent):  Temp: 98.1 °F (36.7 °C) (05/09/20 1100)  Pulse: 91 (05/09/20 1100)  Resp: 16 (05/09/20 0846)  BP: 113/70 (05/09/20 1100)  SpO2: 97 % (05/09/20 1100) Vital Signs (24h Range):  Temp:  [98.1 °F (36.7 °C)-98.6 °F (37 °C)] 98.1 °F (36.7 °C)  Pulse:  [] 91  Resp:  [12-23] 16  SpO2:  [91 %-100 %] 97 %  BP: ()/(57-92) 113/70  Arterial Line BP: ()/(50-67) 107/57     Weight: 63 kg (138 lb 14.2 oz)  Body mass index is 23.84 kg/m².    SpO2: 97 %  O2 Device (Oxygen Therapy): ventilator    Intake/Output - Last 3 Shifts       05/07 0700 - 05/08 0659 05/08 0700 -  05/09 0659 05/09 0700 - 05/10 0659    I.V. (mL/kg) 2750.7 (43.7) 1652 (26.2) 445 (7.1)    Blood 350 350     NG/GT 1055 800 210    IV Piggyback       Total Intake(mL/kg) 4155.7 (66) 2802 (44.5) 655 (10.4)    Urine (mL/kg/hr) 3740 (2.5) 3775 (2.5) 675 (2.6)    Stool 0 0     Blood 3 2 3    Total Output 3743 3777 678    Net +412.7 -975 -23           Stool Occurrence 1 x 1 x           Lines/Drains/Airways     Peripherally Inserted Central Catheter Line            PICC Double Lumen 05/05/20 1707 right basilic 3 days          Central Venous Catheter Line                 ECMO Cannula 04/25/20 1120 right femoral vein 13 days         ECMO Cannula 04/25/20 1120 right internal jugular 13 days          Drain                 Urethral Catheter 04/18/20 1854 20 days         NG/OG Tube 05/03/20 1215 [place] sump;nasogastric 18 Fr. Left nostril 5 days          Airway                 Surgical Airway 05/05/20 1500 Shiley Cuffed 3 days          Arterial Line                 Arterial Line 04/27/20 1100 Right Brachial 12 days          Peripheral Intravenous Line                 Peripheral IV - Single Lumen 05/08/20 0300 20 G Anterior;Left Hand 1 day         Peripheral IV - Single Lumen 05/08/20 0300 20 G Left Wrist 1 day         Peripheral IV - Single Lumen 05/08/20 0600 22 G Right Hand 1 day                Physical Exam    Constitutional: He is sedated, and intubated.   Head: Normocephalic   Cardiovascular: Tachycardia 130s   Pulmonary/Chest: intubated vent FiO2 50% PEEP 10, ECMO RPM 2900, sweep 7.5, FiO2 100%, flows mid 3s  Skin: L neck and L groin cannulas intact, min to no flashing, few clots pre-oxygenator, all sutures intact and secure  Nursing note and vitals reviewed.    Significant Labs:  BMP:   Recent Labs   Lab 05/09/20  0800   *   *   K 4.1   CL 96   CO2 26   BUN 19   CREATININE 0.6   CALCIUM 8.7   MG 1.9     CBC:   Recent Labs   Lab 05/09/20  0800   WBC 8.07   RBC 3.68*   HGB 10.9*   HCT 33.4*   *   MCV 91    MCH 29.6   MCHC 32.6     LFTs:   Recent Labs   Lab 05/09/20  0800   ALT 34   AST 25   ALKPHOS 132   BILITOT 0.4   PROT 6.3   ALBUMIN 2.2*     Significant Diagnostics:  Cxr: mildly improved in left upper airspace, otherwise stable    ROS

## 2020-05-09 NOTE — PROGRESS NOTES
"ECMO Specialists shift report    Date: 05/09/2020  ECMO Specialist:  Tatyana Lawrence    Pump parameters:  RPM: 2800 - 2900  Flow:  3.3-3.5  Sweep:  8  FiO2:  95     Oxygenator status:  Clots: Pre Oxygenator  Fibrin: Pre Oxygenator and throughout circuit at connection sites.     Pressure trends:  P1: 's   P2: 20's   Delta P: 70-80's     Volume status:  Chugging noted?continuous but minimal throughout the day  CVP: NA  MAP:  's   MD notified (name):  Nany     Anticoagulation:  ACT/aPTT/Xa parameters:  0.25-0.30  ACT/aPTT/Xa trends this shift:  0.25-0.29    Cannula size / status / placement:  Arterial:   Venous 1: 23 FR / RIJV / 4 cm  Venous 2:27 FR / RFV / 12 cm   Dual lumen:      Additional Comments:   Patient's circuit was "chugging" throughout the shift.  Albumin was given but didn't resolve the problem.  Flows varied from 3.3-3.5 LPM.  Patient's SVO2 was acceptable throughout the day ranging 76-80%.  The FiO2 was weaned today to 95% per Dr. Ayon.  I attempted to wean the Sweep to 7.5 but the patient's PCO2 estela to 52.  All ABG results were reported to Dr. Ayon.              "

## 2020-05-09 NOTE — CARE UPDATE
Pt intubated.   Ventilator Settings: AC/PC: Rate: 16, FiO2: 50%, PEEP: 10.   SpO2 ~96%.   Q6H ABGs obtained per Ecmo LADONNA Blair, RRT.      Pt opens eyes spontaneously.   Pt not following commands.   Pt withdraws from pain.   MD aware.   RASS -3.      Q4H Neurovascular exams completed.   See flowsheet for details.      HOB ~20 degrees per MD order.      Precedex gtt titrated per comfort.      Goal to maintain 78-85 per MD order.   Cardene gtt titrated to maintain goal.      CVP 3, HOB Flat.   UOP ~150cc/hr.    Lasix gtt @ 5mg/hr.  Goal to maintain pt net negative 650cc/24hrs.      Q6H APTT/Anti Xa monitoring.   Goal to maintain Anti Xa- 0.25-0.3.   MD updated with results.   Heparin gtt @ 900units/hr.      ECMO  VV ECMO  RIJ/R Femoral Vein   RPM: 2900        Flow: 4.3-4.5  Sweep:  7.5LPM  FiO2:  100     Dressings changed per ICU RN.   Q2H site assessments.      TF @30cc/hr per NGT.   NGT flushed PRN.   40cc residual output noted.      Plan of care reviewed with pt and son.   Questions answered per ICU RN.   See flowsheet for details.

## 2020-05-09 NOTE — PLAN OF CARE
POC reviewed with pt and family. Pt remains of VV ECMO. RPMs @ 2900, flows 3.4-3.5, FiO2 100 % and sweep of 8. Albumin given during shift for chattering cannulas and decreased MAPs. MAPs maintained between 75-85 throughout shift. CVP between 1-2. Pt on precedex for sedation, lasix @ 5 mg/hr, Vaso @ .03 units/hr, heparin at 900 units/hr (antiXa goal 0.25-0.3), and insulin (titrated per protocol) for glucose control. Accu check Q1 hr. Pt opens eyes and withdraws to pain in all 4 extremities. Does not follow commands or show any purposeful motor response. UO  cc/hr throughout shift. 1 dark red/black BM throughout shift. Labs drawn q6. ABG done Q6. HOB maintained @ 20 degrees per MD order. VSS. See flow sheets for additional details.

## 2020-05-09 NOTE — CARE UPDATE
Chattering/swaying noted in ECMO cannulas/circuit.     Flows: 3.3, sustaining  RPM: 2800  Sweep: 7.5LPM  FiO2: 95    A. MD Nany notified.     500cc Albumin administered per MD order.     See flowsheet for details.

## 2020-05-10 NOTE — PROGRESS NOTES
[facility]  Cardiothoracic Surgery  Daily ECMO Progress Note    Patient Name: Ranjana Sanchez  MRN: [id number]  Admission Date: 4/10/2020  Hospital Length of Stay: 30 days  TYPE of ECMO: V-V  Day OF ECMO SUPPORT: 15  Code Status: Full Code   Attending Physician: Francis Ayon MD   Referring Provider: Francis Ayon MD  Principal Problem:Acute respiratory distress syndrome (ARDS) due to COVID-19 virus    Subjective:   Interval History:  Propofol added this AM for need for sedation, precedex wean not tolerated. Net negative 1.0L in last 24hrs, good UOP, on lasix gtt at 5. Vaso has been off, cardene is at 6. Hep gtt at 900/hr.    Medications:  Continuous Infusions:   dexmedetomidine (PRECEDEX) infusion 1 mcg/kg/hr (05/10/20 0900)    furosemide (LASIX) 2 mg/mL continuous infusion (non-titrating) 3 mg/hr (05/10/20 0900)    heparin (porcine) in 5 % dex 900 Units/hr (05/10/20 0900)    insulin (HUMAN R) infusion (adults) 2.4 Units/hr (05/10/20 0900)    nicardipine 6 mg/hr (05/10/20 0900)    norepinephrine bitartrate-D5W Stopped (04/25/20 0300)    propofoL 10 mcg/kg/min (05/10/20 0957)    vasopressin (PITRESSIN) infusion Stopped (05/09/20 1800)     Scheduled Meds:   albumin human 5%  25 g Intravenous Once    albuterol-ipratropium  3 mL Nebulization Q6H    aspirin  81 mg Per NG tube Daily    ceFEPime (MAXIPIME) IVPB  2 g Intravenous Q8H    chlorhexidine  15 mL Mouth/Throat BID    diazePAM  2 mg Oral Q12H    fluconazole (DIFLUCAN) IVPB  200 mg Intravenous Q24H    pantoprazole  40 mg Intravenous Q12H    polyethylene glycol  17 g Per NG tube Daily    sucralfate  1 g Per NG tube Q6H    vancomycin (VANCOCIN) IVPB  1,250 mg Intravenous Q12H    white petrolatum-mineral oiL   Both Eyes BID        Objective:     Vital Signs (Most Recent):  Temp: 98.4 °F (36.9 °C) (05/10/20 0730)  Pulse: (!) 130 (05/10/20 0945)  Resp: 20 (05/10/20 0708)  BP: 133/68 (05/10/20 0900)  SpO2: (!) 94 % (05/10/20  0945) Vital Signs (24h Range):  Temp:  [98.4 °F (36.9 °C)-98.6 °F (37 °C)] 98.4 °F (36.9 °C)  Pulse:  [] 130  Resp:  [20-23] 20  SpO2:  [93 %-99 %] 94 %  BP: (104-145)/(56-79) 133/68  Arterial Line BP: ()/(46-79) 124/61     Weight: 63 kg (138 lb 14.2 oz)  Body mass index is 23.84 kg/m².    SpO2: (!) 94 %  O2 Device (Oxygen Therapy): ventilator    Intake/Output - Last 3 Shifts       05/08 0700 - 05/09 0659 05/09 0700 - 05/10 0659 05/10 0700 - 05/11 0659    I.V. (mL/kg) 1652 (26.2) 3322.5 (52.7)     Blood 350      NG/ 1150 120    Total Intake(mL/kg) 2802 (44.5) 4472.5 (71) 120 (1.9)    Urine (mL/kg/hr) 3775 (2.5) 5400 (3.6) 475 (1.8)    Stool 0      Blood 2 8 3    Total Output 3777 5408 478    Net -975 -935.5 -358           Stool Occurrence 1 x            Lines/Drains/Airways     Peripherally Inserted Central Catheter Line            PICC Double Lumen 05/05/20 1707 right basilic 4 days          Central Venous Catheter Line                 ECMO Cannula 04/25/20 1120 right femoral vein 14 days         ECMO Cannula 04/25/20 1120 right internal jugular 14 days          Drain                 Urethral Catheter 04/18/20 1854 21 days         NG/OG Tube 05/03/20 1215 [place] sump;nasogastric 18 Fr. Left nostril 6 days          Airway                 Surgical Airway 05/05/20 1500 Shiley Cuffed 4 days          Arterial Line                 Arterial Line 04/27/20 1100 Right Brachial 13 days          Peripheral Intravenous Line                 Peripheral IV - Single Lumen 05/08/20 0300 20 G Anterior;Left Hand 2 days         Peripheral IV - Single Lumen 05/08/20 0300 20 G Left Wrist 2 days         Peripheral IV - Single Lumen 05/08/20 0600 22 G Right Hand 2 days                Physical Exam    Constitutional: He is sedated, and intubated.   Head: Normocephalic   Cardiovascular: Tachycardia 130s   Pulmonary/Chest: intubated vent FiO2 50% PEEP 10, ECMO RPM 2900, sweep 8, oxygenator FiO2 95%, flows mid 3s  Skin: L neck  and L groin cannulas intact, min to no flashing, clots pre and post-oxygenator, all sutures intact and secure  Nursing note and vitals reviewed.    Significant Labs:  BMP:   Recent Labs   Lab 05/10/20  0800   *      K 3.3*   CL 99   CO2 30*   BUN 17   CREATININE 0.5   CALCIUM 9.1   MG 2.0     CBC:   Recent Labs   Lab 05/10/20  0800 05/10/20  0806   WBC 13.91*  --    RBC 3.51*  --    HGB 10.5*  --    HCT 32.3* 31*     --    MCV 92  --    MCH 29.9  --    MCHC 32.5  --      LFTs:   Recent Labs   Lab 05/10/20  0800   ALT 24   AST 26   ALKPHOS 112   BILITOT 0.8   PROT 7.2   ALBUMIN 4.3     Significant Diagnostics:  Cxr: mildly improved in left upper airspace, otherwise stable    ROS        Assessment/Plan:     Acute respiratory distress syndrome (ARDS) due to COVID-19 virus  - Crich switched to ETT on 4/11  - Monika weaned off 5/5  - RPM at 2900; flows mid 3s  - thrombocytopenia: stable; transfuse for under 30K  - Aspirin 81 daily  - Sweep at 8, goal 40-50s acceptable if not acidotic on ABG  - Oxygenator Fi 95%; will try weaning again in the next day  - Vent Fi 50%, PEEP 10; volumes improved to low 200s again. 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 Anticoagulation was managed and all cannulation exit sites along with review of labs and radiology studies performed. Speed and functioning of the pump along with oxygenator quality reviewed.  Patient doing well and stable on VV ECMO support.  Today is day 15 of ECMO support.  Some adjustment in speed of the pump was carried out however overall the quality of the oxygenator and the functioning of the pump appears to be within normal limits.  At this stage we will continue to provide ongoing ECMO support as it appears patient has made slow but increased mental progress.  Coordination of care between the surgical trauma ICU, nursing and perfusion study was carried out and all questions and concerns were addressed.

## 2020-05-10 NOTE — SUBJECTIVE & OBJECTIVE
Interval History:  Propofol added this AM for need for sedation, precedex wean not tolerated. Net negative 1.0L in last 24hrs, good UOP, on lasix gtt at 5. Vaso has been off, cardene is at 6. Hep gtt at 900/hr.    Medications:  Continuous Infusions:   dexmedetomidine (PRECEDEX) infusion 1 mcg/kg/hr (05/10/20 0900)    furosemide (LASIX) 2 mg/mL continuous infusion (non-titrating) 3 mg/hr (05/10/20 0900)    heparin (porcine) in 5 % dex 900 Units/hr (05/10/20 0900)    insulin (HUMAN R) infusion (adults) 2.4 Units/hr (05/10/20 0900)    nicardipine 6 mg/hr (05/10/20 0900)    norepinephrine bitartrate-D5W Stopped (04/25/20 0300)    propofoL 10 mcg/kg/min (05/10/20 0957)    vasopressin (PITRESSIN) infusion Stopped (05/09/20 1800)     Scheduled Meds:   albumin human 5%  25 g Intravenous Once    albuterol-ipratropium  3 mL Nebulization Q6H    aspirin  81 mg Per NG tube Daily    ceFEPime (MAXIPIME) IVPB  2 g Intravenous Q8H    chlorhexidine  15 mL Mouth/Throat BID    diazePAM  2 mg Oral Q12H    fluconazole (DIFLUCAN) IVPB  200 mg Intravenous Q24H    pantoprazole  40 mg Intravenous Q12H    polyethylene glycol  17 g Per NG tube Daily    sucralfate  1 g Per NG tube Q6H    vancomycin (VANCOCIN) IVPB  1,250 mg Intravenous Q12H    white petrolatum-mineral oiL   Both Eyes BID        Objective:     Vital Signs (Most Recent):  Temp: 98.4 °F (36.9 °C) (05/10/20 0730)  Pulse: (!) 130 (05/10/20 0945)  Resp: 20 (05/10/20 0708)  BP: 133/68 (05/10/20 0900)  SpO2: (!) 94 % (05/10/20 0945) Vital Signs (24h Range):  Temp:  [98.4 °F (36.9 °C)-98.6 °F (37 °C)] 98.4 °F (36.9 °C)  Pulse:  [] 130  Resp:  [20-23] 20  SpO2:  [93 %-99 %] 94 %  BP: (104-145)/(56-79) 133/68  Arterial Line BP: ()/(46-79) 124/61     Weight: 63 kg (138 lb 14.2 oz)  Body mass index is 23.84 kg/m².    SpO2: (!) 94 %  O2 Device (Oxygen Therapy): ventilator    Intake/Output - Last 3 Shifts       05/08 0700 - 05/09 0659 05/09 0700 - 05/10 0659  05/10 0700 - 05/11 0659    I.V. (mL/kg) 1652 (26.2) 3322.5 (52.7)     Blood 350      NG/ 1150 120    Total Intake(mL/kg) 2802 (44.5) 4472.5 (71) 120 (1.9)    Urine (mL/kg/hr) 3775 (2.5) 5400 (3.6) 475 (1.8)    Stool 0      Blood 2 8 3    Total Output 3777 5408 478    Net -975 -935.5 -358           Stool Occurrence 1 x            Lines/Drains/Airways     Peripherally Inserted Central Catheter Line            PICC Double Lumen 05/05/20 1707 right basilic 4 days          Central Venous Catheter Line                 ECMO Cannula 04/25/20 1120 right femoral vein 14 days         ECMO Cannula 04/25/20 1120 right internal jugular 14 days          Drain                 Urethral Catheter 04/18/20 1854 21 days         NG/OG Tube 05/03/20 1215 [place] sump;nasogastric 18 Fr. Left nostril 6 days          Airway                 Surgical Airway 05/05/20 1500 Shiley Cuffed 4 days          Arterial Line                 Arterial Line 04/27/20 1100 Right Brachial 13 days          Peripheral Intravenous Line                 Peripheral IV - Single Lumen 05/08/20 0300 20 G Anterior;Left Hand 2 days         Peripheral IV - Single Lumen 05/08/20 0300 20 G Left Wrist 2 days         Peripheral IV - Single Lumen 05/08/20 0600 22 G Right Hand 2 days                Physical Exam    Constitutional: He is sedated, and intubated.   Head: Normocephalic   Cardiovascular: Tachycardia 130s   Pulmonary/Chest: intubated vent FiO2 50% PEEP 10, ECMO RPM 2900, sweep 8, oxygenator FiO2 95%, flows mid 3s  Skin: L neck and L groin cannulas intact, min to no flashing, clots pre and post-oxygenator, all sutures intact and secure  Nursing note and vitals reviewed.    Significant Labs:  BMP:   Recent Labs   Lab 05/10/20  0800   *      K 3.3*   CL 99   CO2 30*   BUN 17   CREATININE 0.5   CALCIUM 9.1   MG 2.0     CBC:   Recent Labs   Lab 05/10/20  0800 05/10/20  0806   WBC 13.91*  --    RBC 3.51*  --    HGB 10.5*  --    HCT 32.3* 31*     --     MCV 92  --    MCH 29.9  --    MCHC 32.5  --      LFTs:   Recent Labs   Lab 05/10/20  0800   ALT 24   AST 26   ALKPHOS 112   BILITOT 0.8   PROT 7.2   ALBUMIN 4.3     Significant Diagnostics:  Cxr: mildly improved in left upper airspace, otherwise stable    ROS

## 2020-05-10 NOTE — PROGRESS NOTES
[facility] Specialists shift report    Date: 05/10/2020  ECMO Specialist:  Ashley Francisco    Pump parameters:  RPM: 2900  Flow: 3.3-3.4  Sweep:  8  FiO2:  95    Oxygenator status:  Clots: pre and post  Fibrin: pre and post    Pressure trends:  P1: 106  P2: 87  Delta P: 19    Volume status:  Chugging noted? Yes  CVP: NA  MAP:  90s  MD notified (name):  Nany    Anticoagulation:  ACT/aPTT/Xa parameters: .25-.3  ACT/aPTT/Xa trends this shift: .32-.26    Cannula size / status / placement:  Arterial:   Venous 1:RIJV 23F@4cm   Venous 2:RFV 27F@12cm  Dual lumen:      Additional Comments: Pt. Very agitated and breathing hard.  Many episodes of chugging/dry circuit.  Pushed a total of 500 of Albumin.  And 150 of Isolyte. RN also gave Albumin.  Maintained flows 3.3-3.4.

## 2020-05-10 NOTE — PROGRESS NOTES
[facility] shift report    Date: 05/10/2020  ECMO Specialist:  Lia Alvarez    Pump parameters:  RPM: 2800  Flow:  3.25  Sweep:  9  FiO2:  95    Oxygenator status:  Clots: Yes - Pre & Post  Fibrin: scant throughout    Pressure trends:  P1: 101  P2: 79  Delta P: 22    Volume status:  Chugging noted - Yes; most of day  MAP:  90's  MD notified (name):  Dr. Ayon    Anticoagulation:  ACT/aPTT/Xa parameters: Xa 0.25-0.3  ACT/aPTT/Xa trends this shift: 0.21-0.28    Cannula size / status / placement:  Arterial:   Venous 1: 23FR / RIJV / 4 cm  Venous 2: 27FR / RFV / 12 cm  Dual lumen: no     Additional Comments:        Chugging noted throughout day.  Dr. Ayon chose to decrease rpm's & increase Sweep today. BS on right more pronounced than left; end expiratory wheezing present; sticky tan secretions with suctioning. Ecmo cannulas remain sutured & secured in place. Heparin increased to 10. No blood products given as of yet. Pt had restful day.

## 2020-05-10 NOTE — PLAN OF CARE
"      SICU PLAN OF CARE NOTE    Dx: Acute respiratory distress syndrome (ARDS) due to COVID-19 virus    Shift Events: Potassium replacement, sedation adjustment, Increase sweep, increase rpm on ecmo    Goals of Care: Comfort, wean ecmo    Neuro: Withdraws    Vital Signs: /61   Pulse (!) 132   Temp 98.1 °F (36.7 °C) (Oral)   Resp (!) 23   Ht 5' 4" (1.626 m)   Wt 63 kg (138 lb 14.2 oz)   SpO2 97%   BMI 23.84 kg/m²     Respiratory:A/C pC RR 16, Fio2 50%, Peep 10                       Ecmo: Fio2 95%, 9lpm    Diet: TF 45cc/h    Gtts: Insulin, Lasix, Cardene, Propofol, Precedex, Heparin    Urine Output:  100-250/hour    Drains: n/a    Labs/Accuchecks: q6h    Skin: multiple wounds       "

## 2020-05-10 NOTE — ASSESSMENT & PLAN NOTE
Acute respiratory distress syndrome (ARDS) due to COVID-19 virus  - Crich switched to ETT on 4/11  - Monika weaned off 5/5  - RPM at 2900; flows mid 3s  - thrombocytopenia: stable; transfuse for under 30K  - Aspirin 81 daily  - Sweep at 8, goal 40-50s acceptable if not acidotic on ABG  - Oxygenator Fi 95%; will try weaning again in the next day  - Vent Fi 50%, PEEP 10; volumes improved to low 200s again. CXR stable to improved on RLL  - Tracheostomy and bronch 5/5  - Lasix gtt at 5, new goal -650mL per day; please document I/Os q4hrs to ensure this  - Giving Diamox 250     Tongue laceration      -ENT consulted and evaluated patient, laceration superficial, not bleeding      -recs: bite block vs paralysis, will monitor    Sedation  - Propofol restarted  - Weaning precedex as tolerated  - Off dilaudid gtt  - Restarted low dose valium 2mg q12 to come off sedative drips     UGI Bleed  - Scoped by GI 5/3 with epinephrine injection and clip placement for an actively bleeding D2/D3 Diuelafoy lesion  - No pressors, Hb stable     FEN/GI  - TF at 40, will increase to goal 45 today  - FWB discontinued     Anticoagulation      - Heparin gtt at 900 U/hr, increasing to 1000      - Goal aXa 0.25 - 0.3 Q6hrs given persistently higher D-Dimer    Prophylaxis      - protonix q12 IV       - abx for ECMO      - PICC placed 5/5; central line removed 5/6

## 2020-05-10 NOTE — NURSING
Dr. Ayon updated on vital signs, assessment, ecmo parameters. Orders received to decrease Precedex, start Propofol at 10mcg continuous. Will continue to monitor.

## 2020-05-10 NOTE — PROGRESS NOTES
Pharmacokinetic Assessment Follow Up: IV Vancomycin    Vancomycin serum concentration assessment(s):  · Vancomycin trough level resulted at 13.5 mcg/mL drawn appropriately. Goal level is 10 to 20 mcg/mL.     Drug levels (last 3 results):  Recent Labs   Lab Result Units 05/08/20  1025 05/10/20  1014   Vancomycin-Trough ug/mL 17.9 13.5     Vancomycin Regimen Plan:  · Continue vancomycin 1,250 mg IV every 12 hours with next serum trough concentration measured on 5/13 at 1030.     Pharmacy will continue to follow and monitor vancomycin. Please contact pharmacy for questions regarding this assessment.    Thank you for the consult,   Delma Price, PharmD, [facility]  Critical Care Clinical Pharmacist  Spectralink: [id number]  ________________________________________________________________________________________________________________________________       Patient brief summary:  Ranjana Sanchez is a 54 y.o. male initiated on antimicrobial therapy with IV vancomycin for treatment of sepsis    Drug Allergies:   Review of patient's allergies indicates:  No Known Allergies    Actual Body Weight:   68.4 kg     Renal Function:   Estimated Creatinine Clearance: 138.1 mL/min (based on SCr of 0.5 mg/dL).    Dialysis Method (if applicable):  N/A    CBC (last 72 hours):  Recent Labs   Lab Result Units 05/07/20  1407 05/07/20 2000 05/08/20  0200 05/08/20  0801 05/08/20  1410 05/08/20  1949 05/09/20  0143 05/09/20  0800 05/09/20  1400 05/09/20  2000 05/10/20  0200 05/10/20  0800 05/10/20  0830   WBC K/uL 13.91* 13.66* 11.20 11.64 11.02 10.34 8.86 8.07 9.19 14.10* 13.33* 13.91*  --    Hemoglobin g/dL 9.7* 9.9* 9.3* 9.8* 11.3* 11.5* 11.3* 10.9* 10.2* 11.0* 10.9* 10.5*  --    Hemoglobin, Free, Plasma mg/dL  --   --   --  50*  --   --  20  --   --   --   --   --  20   Hematocrit % 30.3* 30.6* 29.0* 31.1* 34.1* 34.7* 33.5* 33.4* 30.6* 34.1* 33.9* 32.3*  --    Platelets K/uL 110* 115* 125* 117* 114* 130* 133* 133* 133* 140* 144* 151  --     Gran% % 81.2* 82.4* 79.2* 77.5* 75.5* 69.5 72.5 71.7 74.0* 80.6* 80.6* 79.9*  --    Lymph% % 6.8* 5.7* 6.7* 7.3* 8.5* 11.0* 9.6* 8.2* 6.7* 5.1* 4.1* 5.0*  --    Mono% % 6.0 5.9 6.3 6.6 7.1 8.6 7.4 9.2 8.8 8.2 8.9 10.3  --    Eosinophil% % 4.2 4.3 5.5 5.9 6.0 6.2 6.4 6.6 6.0 3.8 4.1 3.1  --    Basophil% % 0.4 0.4 0.4 0.5 0.6 0.6 0.7 0.6 0.9 0.4 0.4 0.3  --    Differential Method  Automated Automated Automated Automated Automated Automated Automated Automated Automated Automated Automated Automated  --        Metabolic Panel (last 72 hours):  Recent Labs   Lab Result Units 05/07/20  1407 05/07/20 2000 05/08/20  0200 05/08/20  0801 05/08/20  1410 05/08/20  1949 05/09/20  0143 05/09/20  0800 05/09/20  1400 05/09/20  2000 05/10/20  0200 05/10/20  0800   Sodium mmol/L 136 135* 134* 134* 134* 134* 131* 132* 132* 133* 140 141   Potassium mmol/L 3.5 4.0 3.7 4.0 3.3* 3.4* 3.7 4.1 3.2* 3.8 4.0 3.3*   Chloride mmol/L 97 97 95 93* 92* 92* 92* 96 95 95 100 99   CO2 mmol/L 33* 29 28 30* 32* 32* 29 26 29 27 29 30*   Glucose mg/dL 167* 167* 158* 168* 209* 108 299* 216* 152* 220* 195* 155*   BUN, Bld mg/dL 13 14 16 17 18 17 18 19 18 17 17 17   Creatinine mg/dL 0.5 0.5 0.5 0.6 0.5 0.5 0.6 0.6 0.5 0.6 0.6 0.5   Albumin g/dL 2.0* 2.1* 2.1* 2.1* 2.2* 2.2* 2.3* 2.2* 3.1* 3.2* 3.9 4.3   Total Bilirubin mg/dL 0.5 0.4 0.4 0.5 0.7 0.5 0.5 0.4 0.5 0.6 0.6 0.8   Alkaline Phosphatase U/L 144* 147* 140* 143* 142* 142* 138* 132 111 125 117 112   AST U/L 33 33 37 34 30 29 27 25 23 27 23 26   ALT U/L 38 39 39 39 36 37 35 34 28 29 28 24   Magnesium mg/dL 2.3 2.2 1.9 1.9 1.8 1.8 2.1 1.9 2.2 2.2 2.0 2.0   Phosphorus mg/dL 2.8 2.3* 3.5 3.6 3.2 3.3 3.0 3.3 3.3 2.6* 2.4* 2.7       Vancomycin Administrations:  vancomycin given in the last 96 hours                   vancomycin in dextrose 5 % 1 gram/250 mL IVPB 1,000 mg (mg) 1,000 mg New Bag 05/04/20 1130     1,000 mg New Bag 05/03/20 2202     1,000 mg New Bag  1050     1,000 mg New Bag 05/02/20 2212      1,000 mg New Bag  1003     1,000 mg New Bag 05/01/20 2223     1,000 mg New Bag  1000     1,000 mg New Bag 04/30/20 2201                Microbiologic Results:  Microbiology Results (last 7 days)     Procedure Component Value Units Date/Time    Culture, Respiratory with Gram Stain [[id number]] Collected:  05/08/20 1514    Order Status:  Completed Specimen:  Respiratory from Bronchial Wash Updated:  05/10/20 0803     Respiratory Culture No growth     Gram Stain (Respiratory) <10 epithelial cells per low power field.     Gram Stain (Respiratory) No WBC's     Gram Stain (Respiratory) No organisms seen

## 2020-05-11 NOTE — NURSING
Dr. Alston with SICU team at bedside to assess patient. States will discuss plan of care with Dr. Ayon. Will continue to monitor.

## 2020-05-11 NOTE — PLAN OF CARE
05/11/20 0942   Discharge Reassessment   Assessment Type Discharge Planning Reassessment   Provided patient/caregiver education on the expected discharge date and the discharge plan No   Do you have any problems affording any of your prescribed medications? TBD   Discharge Plan A Long-term acute care facility (LTAC)   Discharge Plan B Skilled Nursing Facility   Patient choice form signed by patient/caregiver N/A     No SW needs identified at this time as pt is not medically stable.  SW will continue to follow.    Gabriella Stinson LMSW  [facility]  [id number]

## 2020-05-11 NOTE — ASSESSMENT & PLAN NOTE
Acute respiratory distress syndrome (ARDS) due to COVID-19 virus  - Crich switched to ETT on 4/11  - Monika weaned off 5/5  - RPM at 3000; flows mid 3s  - thrombocytopenia: stable; transfuse for under 30K  - Aspirin 81 daily  - Sweep at 9 --> will try weaning to 8 this AM, goal 40-50s acceptable if not acidotic on ABG  - Oxygenator Fi 95%; will try weaning again in the next day or so  - Vent Fi 50%, PEEP 10; volumes improved to low 200s again. CXR stable to improved on RLL  - Tracheostomy and bronch 5/5  - Lasix gtt at 3, goal -650mL per day; please document I/Os q4hrs to ensure this     Tongue laceration      -ENT consulted and evaluated patient, laceration superficial, not bleeding      -recs: bite block vs paralysis, will monitor    Sedation  - Propofol restarted, at 10  - Weaning precedex as tolerated, at 0.8  - Off dilaudid gtt  - Restarted low dose valium 2mg q12 to come off sedative drips; will consider increasing to 5q12 if can not further wean precedex/propofol     UGI Bleed  - Scoped by GI 5/3 with epinephrine injection and clip placement for an actively bleeding D2/D3 Diuelafoy lesion  - No pressors, Hb stable     FEN/GI  - TF at 45, goal  - FWB discontinued     Anticoagulation      - Heparin gtt at 1000 U/hr      - Goal aXa 0.25 - 0.3 Q6hrs given persistently higher D-Dimer    Prophylaxis      - protonix q12 IV       - abx for ECMO      - PICC placed 5/5; central line removed 5/6

## 2020-05-11 NOTE — PROGRESS NOTES
[facility]  Cardiothoracic Surgery  Daily ECMO Progress Note    Patient Name: Ranjana Sanchez  MRN: [id number]  Admission Date: 4/10/2020  Hospital Length of Stay: 31 days  Code Status: Full Code   Type of ECMO: V-V  Day of ECMO Support:16  Attending Physician: Francis Ayon MD   Referring Provider: Francis Ayon MD  Principal Problem:Acute respiratory distress syndrome (ARDS) due to COVID-19 virus    Subjective:   Interval History:  Increasing agitation and dysrhythmic breathing requiring fentanyl and increase of RPMs on ECMO machine. Net negative 70mL in last 24hrs, good UOP, on lasix gtt at 3. Vaso has been off, cardene is at 6. Hep gtt at 1000/hr.    Medications:  Continuous Infusions:   dexmedetomidine (PRECEDEX) infusion 0.8 mcg/kg/hr (05/11/20 0700)    furosemide (LASIX) 2 mg/mL continuous infusion (non-titrating) 3 mg/hr (05/11/20 0700)    heparin (porcine) in 5 % dex 1,000 Units/hr (05/11/20 0700)    insulin (HUMAN R) infusion (adults) 1.3 Units/hr (05/11/20 0700)    nicardipine 2.5 mg/hr (05/11/20 0700)    norepinephrine bitartrate-D5W Stopped (04/25/20 0300)    propofoL 10 mcg/kg/min (05/11/20 0700)    vasopressin (PITRESSIN) infusion Stopped (05/09/20 1800)     Scheduled Meds:   albumin human 5%  25 g Intravenous Once    albuterol-ipratropium  3 mL Nebulization Q6H    aspirin  81 mg Per NG tube Daily    chlorhexidine  15 mL Mouth/Throat BID    diazePAM  2 mg Oral Q12H    fluconazole (DIFLUCAN) IVPB  200 mg Intravenous Q24H    pantoprazole  40 mg Intravenous Q12H    polyethylene glycol  17 g Per NG tube Daily    potassium chloride 10%  40 mEq Oral Once    potassium chloride 10%  40 mEq Oral Once    potassium chloride  20 mEq Oral BID    sucralfate  1 g Per NG tube Q6H    vancomycin (VANCOCIN) IVPB  1,250 mg Intravenous Q12H    white petrolatum-mineral oiL   Both Eyes BID        Objective:     Vital Signs (Most Recent):  Temp: 98 °F (36.7 °C) (05/11/20  0715)  Pulse: (!) 135 (05/11/20 0745)  Resp: (!) 24 (05/11/20 0709)  BP: 125/69 (05/11/20 0700)  SpO2: 97 % (05/11/20 0745) Vital Signs (24h Range):  Temp:  [98 °F (36.7 °C)-98.4 °F (36.9 °C)] 98 °F (36.7 °C)  Pulse:  [119-143] 135  Resp:  [23-27] 24  SpO2:  [93 %-100 %] 97 %  BP: (110-138)/(57-71) 125/69  Arterial Line BP: (109-133)/(55-64) 115/61     Weight: 63.2 kg (139 lb 5.3 oz)  Body mass index is 23.92 kg/m².    SpO2: 97 %  O2 Device (Oxygen Therapy): ventilator    Intake/Output - Last 3 Shifts       05/09 0700 - 05/10 0659 05/10 0700 - 05/11 0659 05/11 0700 - 05/12 0659    I.V. (mL/kg) 3322.5 (52.7) 2138 (33.8)     Blood       NG/GT 1150 1255 45    Total Intake(mL/kg) 4472.5 (71) 3393 (53.7) 45 (0.7)    Urine (mL/kg/hr) 5400 (3.6) 3595 (2.4) 150 (2.9)    Stool       Blood 8 5     Total Output 5408 3600 150    Net -935.5 -207 -105                 Lines/Drains/Airways     Peripherally Inserted Central Catheter Line            PICC Double Lumen 05/05/20 1707 right basilic 5 days          Central Venous Catheter Line                 ECMO Cannula 04/25/20 1120 right femoral vein 15 days         ECMO Cannula 04/25/20 1120 right internal jugular 15 days          Drain                 Urethral Catheter 04/18/20 1854 22 days         NG/OG Tube 05/03/20 1215 [place] sump;nasogastric 18 Fr. Left nostril 7 days          Airway                 Surgical Airway 05/05/20 1500 Shiley Cuffed 5 days          Arterial Line                 Arterial Line 04/27/20 1100 Right Brachial 13 days          Peripheral Intravenous Line                 Peripheral IV - Single Lumen 05/08/20 0300 20 G Anterior;Left Hand 3 days         Peripheral IV - Single Lumen 05/08/20 0300 20 G Left Wrist 3 days         Peripheral IV - Single Lumen 05/08/20 0600 22 G Right Hand 3 days                Physical Exam    Constitutional: He is sedated, and intubated.   Head: Normocephalic   Cardiovascular: Tachycardia 130s   Pulmonary/Chest: intubated vent FiO2  50% PEEP 10, ECMO RPM 3000, sweep 9, oxygenator FiO2 95%, flows mid 3s  Skin: L neck and L groin cannulas intact, min to no flashing, clots pre and post-oxygenator, all sutures intact and secure  Nursing note and vitals reviewed.    Significant Labs:  BMP:   Recent Labs   Lab 05/11/20 0200   *      K 3.8      CO2 29   BUN 26*   CREATININE 0.6   CALCIUM 9.6   MG 2.1     CBC:   Recent Labs   Lab 05/11/20 0200 05/11/20 0202   WBC 13.88*  --    RBC 3.61*  --    HGB 10.8*  --    HCT 33.9* 32*     --    MCV 94  --    MCH 29.9  --    MCHC 31.9*  --      LFTs:   Recent Labs   Lab 05/11/20 0200   ALT 36   AST 34   ALKPHOS 159*   BILITOT 0.6   PROT 6.9   ALBUMIN 3.6     Significant Diagnostics:  Cxr: mildly improved in left upper airspace, otherwise stable    Assessment/Plan:     Acute respiratory distress syndrome (ARDS) due to COVID-19 virus  - Crich switched to ETT on 4/11  - Monika weaned off 5/5  - RPM at 3000; flows mid 3s  - thrombocytopenia: stable; transfuse for under 30K  - Aspirin 81 daily  - Sweep at 9 --> will try weaning to 8 this AM, goal 40-50s acceptable if not acidotic on ABG  - Oxygenator Fi 95%; will try weaning again in the next day or so  - Vent Fi 50%, PEEP 10; volumes improved to low 200s again. CXR stable to improved on RLL  - Tracheostomy and bronch 5/5  - Lasix gtt at 3, goal -650mL per day; please document I/Os q4hrs to ensure this     Tongue laceration      -ENT consulted and evaluated patient, laceration superficial, not bleeding      -recs: bite block vs paralysis, will monitor    Sedation  - Propofol restarted, at 10  - Weaning precedex as tolerated, at 0.8  - Off dilaudid gtt  - Restarted low dose valium 2mg q12 to come off sedative drips; will consider increasing to 5q12 if can not further wean precedex/propofol     UGI Bleed  - Scoped by GI 5/3 with epinephrine injection and clip placement for an actively bleeding D2/D3 Diuelafoy lesion  - No pressors, Hb stable      FEN/GI  - TF at 45, goal  - FWB discontinued     Anticoagulation      - Heparin gtt at 1000 U/hr      - Goal aXa 0.25 - 0.3 Q6hrs given persistently higher D-Dimer    Prophylaxis      - protonix q12 IV       - abx for ECMO      - PICC placed 5/5; central line removed 5/6    Anne Miller MD  Cardiothoracic Surgery  [facility]      VV ECMO circuit checked and all parameters reviewed. Anticoagulation was managed and all cannulation exit sites along with review of labs and radiology studies performed. Speed and functioning of the pump along with oxygenator quality reviewed.  Patient seen and remains comp and supported well on VV ECMO.  Range of motion was personally performed.  Propofol was further decreased to 5 mg.  Patient appears to be in a state of tachypnea and tachycardia over the last 48 hours.  This is something that has recently developed and would seek critical care input.  Dr. Bahena was notified.  Overall the circuit remains stable without any new clot formation.  Patient tolerating FiO2 now down to 90% on the  and pump speed was decreased to 2800 RPMs.  Communication with different team members were carried out which included the cardiothoracic surgery team, perfusion team and the nursing staff.  The ICU team would regroup after seeing the patient.

## 2020-05-11 NOTE — SUBJECTIVE & OBJECTIVE
Interval History:  Increasing agitation and dysrhythmic breathing requiring fentanyl and increase of RPMs on ECMO machine. Net negative 70mL in last 24hrs, good UOP, on lasix gtt at 3. Vaso has been off, cardene is at 6. Hep gtt at 1000/hr.    Medications:  Continuous Infusions:   dexmedetomidine (PRECEDEX) infusion 0.8 mcg/kg/hr (05/11/20 0700)    furosemide (LASIX) 2 mg/mL continuous infusion (non-titrating) 3 mg/hr (05/11/20 0700)    heparin (porcine) in 5 % dex 1,000 Units/hr (05/11/20 0700)    insulin (HUMAN R) infusion (adults) 1.3 Units/hr (05/11/20 0700)    nicardipine 2.5 mg/hr (05/11/20 0700)    norepinephrine bitartrate-D5W Stopped (04/25/20 0300)    propofoL 10 mcg/kg/min (05/11/20 0700)    vasopressin (PITRESSIN) infusion Stopped (05/09/20 1800)     Scheduled Meds:   albumin human 5%  25 g Intravenous Once    albuterol-ipratropium  3 mL Nebulization Q6H    aspirin  81 mg Per NG tube Daily    chlorhexidine  15 mL Mouth/Throat BID    diazePAM  2 mg Oral Q12H    fluconazole (DIFLUCAN) IVPB  200 mg Intravenous Q24H    pantoprazole  40 mg Intravenous Q12H    polyethylene glycol  17 g Per NG tube Daily    potassium chloride 10%  40 mEq Oral Once    potassium chloride 10%  40 mEq Oral Once    potassium chloride  20 mEq Oral BID    sucralfate  1 g Per NG tube Q6H    vancomycin (VANCOCIN) IVPB  1,250 mg Intravenous Q12H    white petrolatum-mineral oiL   Both Eyes BID        Objective:     Vital Signs (Most Recent):  Temp: 98 °F (36.7 °C) (05/11/20 0715)  Pulse: (!) 135 (05/11/20 0745)  Resp: (!) 24 (05/11/20 0709)  BP: 125/69 (05/11/20 0700)  SpO2: 97 % (05/11/20 0745) Vital Signs (24h Range):  Temp:  [98 °F (36.7 °C)-98.4 °F (36.9 °C)] 98 °F (36.7 °C)  Pulse:  [119-143] 135  Resp:  [23-27] 24  SpO2:  [93 %-100 %] 97 %  BP: (110-138)/(57-71) 125/69  Arterial Line BP: (109-133)/(55-64) 115/61     Weight: 63.2 kg (139 lb 5.3 oz)  Body mass index is 23.92 kg/m².    SpO2: 97 %  O2 Device  (Oxygen Therapy): ventilator    Intake/Output - Last 3 Shifts       05/09 0700 - 05/10 0659 05/10 0700 - 05/11 0659 05/11 0700 - 05/12 0659    I.V. (mL/kg) 3322.5 (52.7) 2138 (33.8)     Blood       NG/GT 1150 1255 45    Total Intake(mL/kg) 4472.5 (71) 3393 (53.7) 45 (0.7)    Urine (mL/kg/hr) 5400 (3.6) 3595 (2.4) 150 (2.9)    Stool       Blood 8 5     Total Output 5408 3600 150    Net -935.5 -207 -105                 Lines/Drains/Airways     Peripherally Inserted Central Catheter Line            PICC Double Lumen 05/05/20 1707 right basilic 5 days          Central Venous Catheter Line                 ECMO Cannula 04/25/20 1120 right femoral vein 15 days         ECMO Cannula 04/25/20 1120 right internal jugular 15 days          Drain                 Urethral Catheter 04/18/20 1854 22 days         NG/OG Tube 05/03/20 1215 [place] sump;nasogastric 18 Fr. Left nostril 7 days          Airway                 Surgical Airway 05/05/20 1500 [place] Cuffed 5 days          Arterial Line                 Arterial Line 04/27/20 1100 Right Brachial 13 days          Peripheral Intravenous Line                 Peripheral IV - Single Lumen 05/08/20 0300 20 G Anterior;Left Hand 3 days         Peripheral IV - Single Lumen 05/08/20 0300 20 G Left Wrist 3 days         Peripheral IV - Single Lumen 05/08/20 0600 22 G Right Hand 3 days                Physical Exam    Constitutional: He is sedated, and intubated.   Head: Normocephalic   Cardiovascular: Tachycardia 130s   Pulmonary/Chest: intubated vent FiO2 50% PEEP 10, ECMO RPM 3000, sweep 9, oxygenator FiO2 95%, flows mid 3s  Skin: L neck and L groin cannulas intact, min to no flashing, clots pre and post-oxygenator, all sutures intact and secure  Nursing note and vitals reviewed.    Significant Labs:  BMP:   Recent Labs   Lab 05/11/20 0200   *      K 3.8      CO2 29   BUN 26*   CREATININE 0.6   CALCIUM 9.6   MG 2.1     CBC:   Recent Labs   Lab 05/11/20 0200  05/11/20  0202   WBC 13.88*  --    RBC 3.61*  --    HGB 10.8*  --    HCT 33.9* 32*     --    MCV 94  --    MCH 29.9  --    MCHC 31.9*  --      LFTs:   Recent Labs   Lab 05/11/20  0200   ALT 36   AST 34   ALKPHOS 159*   BILITOT 0.6   PROT 6.9   ALBUMIN 3.6     Significant Diagnostics:  Cxr: mildly improved in left upper airspace, otherwise stable

## 2020-05-11 NOTE — PROGRESS NOTES
ECMO Specialists shift report    Date: 05/11/2020  ECMO Specialist:  Tatyana Pintorobertvalentina    Pump parameters:  RPM: 2800  Flow:  3.2  Sweep:  8  FiO2:  90    Oxygenator status:  Clots: Pre oxygenator  Fibrin: Pre oxygenator and at connection sites    Pressure trends:  P1: 100-110's  P2: 80-90  Delta P: 20's    Volume status:  Chugging noted?  Minimal all day  CVP: NA  MAP:  80's   MD notified (name):  Nany     Anticoagulation:  ACT/aPTT/Xa parameters: 0.25 - 0.30  ACT/aPTT/Xa trends this shift: 0.23-0.32    Cannula size / status / placement:  Arterial:   Venous 1: 23 FR / RIJ / 4 cm  Venous 2: 27 FR / RFV / 12 cm  Dual lumen:      Additional Comments:  SWEEP was weaned to 8 LPM, 90% and the RPM's were weaned to 2800 per Dr. Ayon.  All ABG results were reported to Dr. Ayon.  Patient continues to work at breathing using all accessory muscles.  Mentioned my concern of patients neurological status to Dr. Ayon.

## 2020-05-11 NOTE — NURSING
Dr. Ayon at bedside. Assessed pt, reviewed vital signs, gtts, ecmo parameters, neuro status. Aware of anti xa level, will hold Heparin gtt at current rate. Ecmo speed decreased to 2800rpm, Fio2 decreased to 90%. Diamox ordered. Will consult OT to have pt assessed for foot drop boots. Will continue to monitor.

## 2020-05-11 NOTE — PLAN OF CARE
No acute events overnight, ECMO speed 3000, flow 3.4-3.6, FiO2 95%, Sweep 9. Vent settings: A/C PC 50% and PEEP 10, sats 97%. Pt wide awake throughout night, appearing restless, HR 130s, RR 25-28, intermittently coughing. MAPs 80s, cardene gtt weaned down as tolerated. Heparin gtt @ 1000 Units/hr, Anti-Xa 0.31 this am. TF @ goal 45 mL/hr, no BM this shift. -150 mL/hr, Lasix gtt @ 3 mg/hr, net negative 97 mL. Wound care to L forearm done this am by RN per  orders following chlorhexidine bath, wound to L cheek also cleansed and coloplast applied, no new skin breakdown noted, heel protector boots in place, elbows elevated on pillows, overlay mattress inflated. AM lab results discussed with Dr. Ayon, no new orders.

## 2020-05-11 NOTE — PROGRESS NOTES
ECMO Specialists shift report    Date: 05/11/2020  ECMO Specialist:  Maikel Deluna    Pump parameters:  RPM: 3000  Flow:  3.57  Sweep:  9  FiO2:  95%    Oxygenator status:  Clots: pre/ post oxy  Fibrin: pre/post oxy    Pressure trends:  P1: 117-120  P2: 90-95  Delta P: 25-29    Volume status:  Chugging noted None  CVP: 2-4  MAP:  80-85  MD notified (name):  Nany    Anticoagulation:  ACT/aPTT/Xa parameters: 0.25-0.3  ACT/aPTT/Xa trends this shift: 0.27-0.31    Cannula size / status / placement:  Arterial:   Venous 1: RIJV 23 Fr. @ 4cm  Venous 2:RFV 27 Fr. @ 12 cm  Dual lumen:      Additional Comments:  No blood products given, patient stable with all parameters within set limits.

## 2020-05-12 NOTE — PT/OT/SLP PROGRESS
Occupational Therapy      Patient Name:  Ranjana Sanchez   MRN:  [id number]    OT orders received for PRAFO boots. OT discussed current protocol with nurse for obtaining PRAFO boots. [facility] for one PRAFO to be ordered and delivered to alternate between LE's every 2 hours. OT to d/c orders at this time as pt unable to participate with further OT services.     Erma Roberts, LOTR  5/12/2020

## 2020-05-12 NOTE — PROGRESS NOTES
05/12/20 0845 05/12/20 0846 05/12/20 0847   Vital Signs   Pulse (!) 139 (!) 138 (!) 136   Resp Rate Total 22 br/min 19 br/min 18 br/min   SpO2 100 % 100 % 100 %   Oxygen Concentration (%) 50 50 50   Art Line   Arterial Line /60 101/59 96/56   Arterial Line MAP (mmHg) 78 mmHg 77 mmHg 73 mmHg      05/12/20 0848 05/12/20 0849 05/12/20 0850   Vital Signs   Pulse (!) 137 (!) 135 (!) 136   Resp Rate Total 16 br/min 16 br/min 27 br/min   SpO2 100 % 100 % 100 %   Oxygen Concentration (%) 50 50 50   Art Line   Arterial Line BP 86/52 81/50 82/53   Arterial Line MAP (mmHg) 67 mmHg 63 mmHg 66 mmHg      05/12/20 0851   Vital Signs   Pulse (!) 139   Resp Rate Total 22 br/min   SpO2 99 %   Oxygen Concentration (%) 50   Art Line   Arterial Line /66   Arterial Line MAP (mmHg) 88 mmHg       Notified Dr. Ariza of cyclic events of hypotension with associated changes in breathing pattern and vent volumes. MD to come to bedside to assess.

## 2020-05-12 NOTE — PROGRESS NOTES
[facility]  General Surgery  Progress Note    Subjective:     History of Present Illness:  No notes on file    Post-Op Info:  Procedure(s) (LRB):  CREATION, TRACHEOSTOMY  (N/A)   6 Days Post-Op     Interval History:   NAEON. Weaned Sweep to 8 from 9, RPM to 2800  Continues to have waxing and waning neuro status.     Medications:  Continuous Infusions:   dexmedetomidine (PRECEDEX) infusion 0.8 mcg/kg/hr (05/11/20 2000)    furosemide (LASIX) 2 mg/mL continuous infusion (non-titrating) 3 mg/hr (05/11/20 2000)    heparin (porcine) in 5 % dex 1,000 Units/hr (05/11/20 2000)    insulin (HUMAN R) infusion (adults) 0.9 Units/hr (05/11/20 2000)    nicardipine 2.5 mg/hr (05/11/20 2000)    norepinephrine bitartrate-D5W Stopped (04/25/20 0300)    propofoL 5 mcg/kg/min (05/11/20 2000)    vasopressin (PITRESSIN) infusion Stopped (05/09/20 1800)     Scheduled Meds:   albumin human 5%  25 g Intravenous Once    albuterol-ipratropium  3 mL Nebulization Q6H    aspirin  81 mg Per NG tube Daily    chlorhexidine  15 mL Mouth/Throat BID    diazePAM  2 mg Oral Q12H    fluconazole (DIFLUCAN) IVPB  200 mg Intravenous Q24H    pantoprazole  40 mg Intravenous Q12H    polyethylene glycol  17 g Per NG tube Daily    potassium chloride 10%  20 mEq Per NG tube BID    potassium chloride 10%  40 mEq Oral Once    sucralfate  1 g Per NG tube Q6H    vancomycin (VANCOCIN) IVPB  1,250 mg Intravenous Q12H    white petrolatum-mineral oiL   Both Eyes BID     PRN Meds:acetaminophen, calcium gluconate IVPB, calcium gluconate IVPB, calcium gluconate IVPB, Dextrose 10% Bolus, Dextrose 10% Bolus, fentaNYL, glucose, glucose, magnesium oxide, magnesium oxide, magnesium sulfate IVPB, potassium chloride 10%, potassium chloride 10%, potassium chloride 10%, potassium, sodium phosphates, sodium chloride 0.9%     Review of patient's allergies indicates:  No Known Allergies  Objective:     Vital Signs (Most Recent):  Temp: 98.3 °F  (36.8 °C) (05/11/20 1915)  Pulse: (!) 132 (05/11/20 2015)  Resp: (!) 21 (05/11/20 2003)  BP: 115/70 (05/11/20 2000)  SpO2: 99 % (05/11/20 2015) Vital Signs (24h Range):  Temp:  [98 °F (36.7 °C)-98.4 °F (36.9 °C)] 98.3 °F (36.8 °C)  Pulse:  [127-146] 132  Resp:  [21-27] 21  SpO2:  [96 %-100 %] 99 %  BP: (104-132)/(57-74) 115/70  Arterial Line BP: ()/(48-67) 117/61     Weight: 63.2 kg (139 lb 5.3 oz)  Body mass index is 23.92 kg/m².    Intake/Output - Last 3 Shifts       05/09 0700 - 05/10 0659 05/10 0700 - 05/11 0659 05/11 0700 - 05/12 0659    I.V. (mL/kg) 3322.5 (52.7) 2138 (33.8) 519.5 (8.2)    Blood       NG/GT 1150 1255 595    Total Intake(mL/kg) 4472.5 (71) 3393 (53.7) 1114.5 (17.6)    Urine (mL/kg/hr) 5400 (3.6) 3595 (2.4) 2425 (2.8)    Stool       Blood 8 5 6    Total Output 5408 3600 2431    Net -935.5 -207 -1316.5                 Physical Exam  Constitutional: He is sedated, and intubated.   Head: Normocephalic   Cardiovascular: Tachycardia 130s   Pulmonary/Chest: intubated vent FiO2 50% PEEP 10, ECMO RPM 3000, sweep 9, oxygenator FiO2 95%, flows mid 3s  Skin: L neck and L groin cannulas intact, min to no flashing, clots pre and post-oxygenator, all sutures intact and secure  Nursing note and vitals reviewed.    Significant Labs:  CBC:   Recent Labs   Lab 05/11/20 2000   WBC 12.85*   RBC 3.66*   HGB 11.0*   HCT 34.9*      MCV 95   MCH 30.1   MCHC 31.5*     CMP:   Recent Labs   Lab 05/11/20  1400   *   CALCIUM 10.0   ALBUMIN 3.5   PROT 7.1      K 4.3   CO2 30*      BUN 36*   CREATININE 0.7   ALKPHOS 175*   ALT 41   AST 34   BILITOT 0.6     LFTs:   Recent Labs   Lab 05/11/20  1400   ALT 41   AST 34   ALKPHOS 175*   BILITOT 0.6   PROT 7.1   ALBUMIN 3.5     Coagulation:   Recent Labs   Lab 05/11/20  1400   LABPROT 10.8   INR 1.0   APTT 30.2     ABGs:   Recent Labs   Lab 05/11/20 1958   PH 7.391   PCO2 54.1*   PO2 72*   HCO3 32.8*   POCSATURATED 94*   BE 8       Significant  Diagnostics:  I have reviewed all pertinent imaging results/findings within the past 24 hours.    Assessment/Plan:     Acute respiratory disease due to COVID-19 virus  Acute respiratory distress syndrome (ARDS) due to COVID-19 virus    Neuro/Pain:   - Precedex and Propofol gtts   - Weaning precedex as tolerated, at 0.8  - Off dilaudid gtt  - Restarted low dose valium 2mg q12 to come off sedative drips; will consider increasing to 5q12 if can not further wean precedex/propofol     Pulmonary:   Day 16 of VV ECMO  Crich switched to ETT on 4/11, Tracheostomy and bronch 5/5  - Monika weaned off 5/5  - RPM at 2800; flows mid 3s   Sweep at 9 --> weaned to 8 per CTS, goal 40-50s acceptable if not acidotic on ABG  - Oxygenator Fi 90%; will try weaning again in the next day or so  - Vent Fi 50%, PEEP 10; volumes improved to low 200s again.   - CXR stable to improved on RLL    Cardiac:  - Cardene gtt     Renal:   - Lasix gtt, goal net -~600cc/day     Infectious Disease:   - Completed Cefepime, cont Vanco  - Trend WBC     Hematology/Oncology:  - thrombocytopenia: stable; transfuse for under 30K  - Heparin gtt at 1000 U/hr  - Goal aXa 0.25 - 0.3 Q6hrs given persistently higher D-Dimer  - Aspirin 81 daily  - Trend CBC     Endocrine:  - Goal glucose 100-180     Fluids/Electrolytes/Nutrition/GI:   Scoped by GI 5/3 with epinephrine injection and clip placement for an actively bleeding D2/D3 Diuelafoy lesion  ENT consulted and evaluated patient, laceration superficial, not bleeding      - TF at 45cc/hr  - Trend CMP  - Replace Lytes PRN     Prophylaxis      - protonix q12 IV       - abx for ECMO      - PICC placed 5/5; central line removed 5/6    Dispo:  Critically ill on VV Ecmo with some modest improvement          Dakota Jacinto MD  General Surgery  [facility]

## 2020-05-12 NOTE — PROGRESS NOTES
Pain under ribs and lower back pain.  Patient can't get comfortable    Pt labile today, -150s, tachypneic. Sedation orders adjusted. Vent adjusted. No issues with ECMO, cannulas remain CDI, speed 2800, flows 3.4, sweep 8.5, 90%O2.   Lasix gtt decreased. Propofol d/c. Minimal precedex. Fentanyl pushes PRN.   UO 65-150mL/hr  500cc albumin administered.   Pt opening eyes to voice, no purposeful movements, withdraws x4 extremities.   Wound care per order, no new skin breakdown. Waffle, foams, heel boots in place. Obtained heel drop splint, PRAFO boot in place, rotated q2.    Family updated via telephone on place of care.

## 2020-05-12 NOTE — CONSULTS
Patient seen for wound consult for skin breakdown beneath trach and follow up for left cheek skin breakdown from prior ET tube alcaraz.  Left cheek-dry stable eschar/crust, appears to slowly resolving. Recommend continuing daily triad cream application.    Difficult to assess skin beneath trach flange, sutures in place to flange. Possible erosion noted near stoma, recommend continuing routine trach care per nursing and respiratory therapy.    Nursing to continue care. Wound care to follow prn.

## 2020-05-12 NOTE — PLAN OF CARE
No acute events overnight.   Patient remains intubated, 50% O2 10 Peep. A/C PC  ECMO cannulas remain in place; speed 2800, flows 3.2, sweep 8, o2 90%.   Gtts: Lasix, Insulin, Propofol, Precedex, heparin, cardene on/off  -225 mL/hr.   250cc albumin administered.   Patient intermittently withdraws to painful stimuli, opens eyes spontaneously but no purposeful movements.  Wound care per orders, no new skin breakdown this shift.   Patient updated with plan of care

## 2020-05-12 NOTE — ASSESSMENT & PLAN NOTE
Acute respiratory distress syndrome (ARDS) due to COVID-19 virus    Neuro/Pain:   - Precedex and Propofol gtts   - Weaning precedex as tolerated, at 0.8  - Off dilaudid gtt  - Restarted low dose valium 2mg q12 to come off sedative drips; will consider increasing to 5q12 if can not further wean precedex/propofol     Pulmonary:   Day 16 of VV ECMO  Crich switched to ETT on 4/11, Tracheostomy and bronch 5/5  - Monika weaned off 5/5  - RPM at 2800; flows mid 3s   Sweep at 9 --> weaned to 8 per CTS, goal 40-50s acceptable if not acidotic on ABG  - Oxygenator Fi 90%; will try weaning again in the next day or so  - Vent Fi 50%, PEEP 10; volumes improved to low 200s again.   - CXR stable to improved on RLL    Cardiac:  - Cardene gtt     Renal:   - Lasix gtt, goal net -~600cc/day     Infectious Disease:   - Completed Cefepime, cont Vanco  - Trend WBC     Hematology/Oncology:  - thrombocytopenia: stable; transfuse for under 30K  - Heparin gtt at 1000 U/hr  - Goal aXa 0.25 - 0.3 Q6hrs given persistently higher D-Dimer  - Aspirin 81 daily  - Trend CBC     Endocrine:  - Goal glucose 100-180     Fluids/Electrolytes/Nutrition/GI:   Scoped by GI 5/3 with epinephrine injection and clip placement for an actively bleeding D2/D3 Diuelafoy lesion  ENT consulted and evaluated patient, laceration superficial, not bleeding      - TF at 45cc/hr  - Trend CMP  - Replace Lytes PRN     Prophylaxis      - protonix q12 IV       - abx for ECMO      - PICC placed 5/5; central line removed 5/6    Dispo:  Critically ill on VV Ecmo with some modest improvement

## 2020-05-12 NOTE — PROGRESS NOTES
Dr. Ayon updated with current assessment, vital signs, ECMO settings, UOP and 8pm lab results. Orders received to change heparin to 900u/hr and administer 250cc albumin.

## 2020-05-12 NOTE — PROGRESS NOTES
Dr. Alston, Dr. Ayon updated on current HR of 150 sustaining, with RR in 40s with increased WOB. New orders received and implemented .

## 2020-05-12 NOTE — PROGRESS NOTES
ECMO Specialists shift report    Date: 05/12/2020  ECMO Specialist:  Tatyana Pintorobertvalentina    Pump parameters:  RPM: 2800  Flow:  3.2   Sweep:  8.5  FiO2:  90    Oxygenator status:  Clots: Pre Oxygenator  Fibrin: Pre Oxygenator and at connections     Pressure trends:  P1: 110's   P2: 80's   Delta P: 20's     Volume status:  Chugging noted? None  CVP: NA  MAP:  80's   MD notified (name):  Nany     Anticoagulation:  ACT/aPTT/Xa parameters: 0.25-0.30  ACT/aPTT/Xa trends this shift: 0.30-0.34    Cannula size / status / placement:  Arterial:   Venous 1: 23 FR / RIJ / 4 cm  Venous 2: 27 FR / RFV / 12 cm   Dual lumen:      Additional Comments:  Dr. Ayon and Dr. Alston at bedside for an extensive amount of time today trying to adjust ventilator settings to make his breathing more synchronous and comfortable.  His sedation was also adjusted.  I increased his Sweep today to 8.5.  500 cc of Albumin given for tachycardia, suspected need for volume.

## 2020-05-12 NOTE — PROGRESS NOTES
Dr. Ayon at bedside with Dr. Alston, updated on VS, assessment, labs UO. Lasix decreased. Sedation plan adjusted. Anticoagulation studies reviewed. Heel drop boots ordered, waiting for DME to fit pt at bedside. ROM performed with RN, PT ordered. Will notify MD with any changes and continue to monitor closely.   Family updated by Dr. Ayon at 1345 via telephone.

## 2020-05-12 NOTE — SUBJECTIVE & OBJECTIVE
Interval History:   NAEON. Weaned Sweep to 8 from 9, RPM to 2800  Continues to have waxing and waning neuro status.     Medications:  Continuous Infusions:   dexmedetomidine (PRECEDEX) infusion 0.8 mcg/kg/hr (05/11/20 2000)    furosemide (LASIX) 2 mg/mL continuous infusion (non-titrating) 3 mg/hr (05/11/20 2000)    heparin (porcine) in 5 % dex 1,000 Units/hr (05/11/20 2000)    insulin (HUMAN R) infusion (adults) 0.9 Units/hr (05/11/20 2000)    nicardipine 2.5 mg/hr (05/11/20 2000)    norepinephrine bitartrate-D5W Stopped (04/25/20 0300)    propofoL 5 mcg/kg/min (05/11/20 2000)    vasopressin (PITRESSIN) infusion Stopped (05/09/20 1800)     Scheduled Meds:   albumin human 5%  25 g Intravenous Once    albuterol-ipratropium  3 mL Nebulization Q6H    aspirin  81 mg Per NG tube Daily    chlorhexidine  15 mL Mouth/Throat BID    diazePAM  2 mg Oral Q12H    fluconazole (DIFLUCAN) IVPB  200 mg Intravenous Q24H    pantoprazole  40 mg Intravenous Q12H    polyethylene glycol  17 g Per NG tube Daily    potassium chloride 10%  20 mEq Per NG tube BID    potassium chloride 10%  40 mEq Oral Once    sucralfate  1 g Per NG tube Q6H    vancomycin (VANCOCIN) IVPB  1,250 mg Intravenous Q12H    white petrolatum-mineral oiL   Both Eyes BID     PRN Meds:acetaminophen, calcium gluconate IVPB, calcium gluconate IVPB, calcium gluconate IVPB, Dextrose 10% Bolus, Dextrose 10% Bolus, fentaNYL, glucose, glucose, magnesium oxide, magnesium oxide, magnesium sulfate IVPB, potassium chloride 10%, potassium chloride 10%, potassium chloride 10%, potassium, sodium phosphates, sodium chloride 0.9%     Review of patient's allergies indicates:  No Known Allergies  Objective:     Vital Signs (Most Recent):  Temp: 98.3 °F (36.8 °C) (05/11/20 1915)  Pulse: (!) 132 (05/11/20 2015)  Resp: (!) 21 (05/11/20 2003)  BP: 115/70 (05/11/20 2000)  SpO2: 99 % (05/11/20 2015) Vital Signs (24h Range):  Temp:  [98 °F (36.7 °C)-98.4 °F (36.9 °C)] 98.3 °F  (36.8 °C)  Pulse:  [127-146] 132  Resp:  [21-27] 21  SpO2:  [96 %-100 %] 99 %  BP: (104-132)/(57-74) 115/70  Arterial Line BP: ()/(48-67) 117/61     Weight: 63.2 kg (139 lb 5.3 oz)  Body mass index is 23.92 kg/m².    Intake/Output - Last 3 Shifts       05/09 0700 - 05/10 0659 05/10 0700 - 05/11 0659 05/11 0700 - 05/12 0659    I.V. (mL/kg) 3322.5 (52.7) 2138 (33.8) 519.5 (8.2)    Blood       NG/GT 1150 1255 595    Total Intake(mL/kg) 4472.5 (71) 3393 (53.7) 1114.5 (17.6)    Urine (mL/kg/hr) 5400 (3.6) 3595 (2.4) 2425 (2.8)    Stool       Blood 8 5 6    Total Output 5408 3600 2431    Net -935.5 -207 -1316.5                 Physical Exam  Constitutional: He is sedated, and intubated.   Head: Normocephalic   Cardiovascular: Tachycardia 130s   Pulmonary/Chest: intubated vent FiO2 50% PEEP 10, ECMO RPM 3000, sweep 9, oxygenator FiO2 95%, flows mid 3s  Skin: L neck and L groin cannulas intact, min to no flashing, clots pre and post-oxygenator, all sutures intact and secure  Nursing note and vitals reviewed.    Significant Labs:  CBC:   Recent Labs   Lab 05/11/20 2000   WBC 12.85*   RBC 3.66*   HGB 11.0*   HCT 34.9*      MCV 95   MCH 30.1   MCHC 31.5*     CMP:   Recent Labs   Lab 05/11/20  1400   *   CALCIUM 10.0   ALBUMIN 3.5   PROT 7.1      K 4.3   CO2 30*      BUN 36*   CREATININE 0.7   ALKPHOS 175*   ALT 41   AST 34   BILITOT 0.6     LFTs:   Recent Labs   Lab 05/11/20  1400   ALT 41   AST 34   ALKPHOS 175*   BILITOT 0.6   PROT 7.1   ALBUMIN 3.5     Coagulation:   Recent Labs   Lab 05/11/20  1400   LABPROT 10.8   INR 1.0   APTT 30.2     ABGs:   Recent Labs   Lab 05/11/20 1958   PH 7.391   PCO2 54.1*   PO2 72*   HCO3 32.8*   POCSATURATED 94*   BE 8       Significant Diagnostics:  I have reviewed all pertinent imaging results/findings within the past 24 hours.

## 2020-05-12 NOTE — PROGRESS NOTES
ECMO Specialists shift report    Date: 05/12/2020  ECMO Specialist:  Maikel Deluna    Pump parameters:  RPM: 2800  Flow:  3.22  Sweep:  8  FiO2:  90%    Oxygenator status:  Clots: pre/post oxy and connectors  Fibrin: pre oxy    Pressure trends:  P1: 102-108  P2: 79-86  Delta P: 20-24    Volume status:  Chugging noted no  CVP: 2-4  MAP:  78-85  MD notified (name):  Nany    Anticoagulation:  ACT/aPTT/Xa parameters: 0.25-0.3  ACT/aPTT/Xa trends this shift: 0.22-0.33    Cannula size / status / placement:  Arterial:   Venous 1: RIL 23 Fr. @ 4 cm  Venous 2:RFV 27 Fr. @ 12 cm  Dual lumen:      Additional Comments:    Heparin rate dropped to 9 after 8 pm labs, 2 am value lower on Anti Xa, waiting for next result. No blood products given besides Albumin

## 2020-05-12 NOTE — PROGRESS NOTES
Dr. Alston at bedside, updated on VS, assessment, labs, UO. Extensive time spent adjusting vent for synchrony. Pt opening eyes to name/sound, not tracking, following commands, withdraws from pain in all extremities. Sedation plan discussed, holding sedation for neuro assessment. Will notify MD with changes and continue to monitor.

## 2020-05-13 NOTE — ASSESSMENT & PLAN NOTE
Acute respiratory distress syndrome (ARDS) due to COVID-19 virus  - Crich switched to ETT on 4/11  - Monika weaned off 5/5  - RPM at 2800; flows mid 3s  - thrombocytopenia: stable; transfuse for under 30K  - Aspirin 81 daily  - Sweep at 8; goal 40-50s acceptable if not acidotic on ABG  - Oxygenator Fi 95%; will try weaning again in the next day or so  - Vent Fi 50%, PEEP 10; volumes improved; CXR stable to improved  - Tracheostomy and bronch 5/5  - Lasix gtt at 3, goal -650mL per day; please document I/Os q4hrs to ensure this     Tongue laceration      -ENT consulted and evaluated patient, laceration superficial, not bleeding      -recs: bite block vs paralysis, will monitor    Sedation  - Propofol restarted, at 10  - Weaning precedex as tolerated, at 0.8  - Off dilaudid gtt  - Restarted low dose valium 2mg q12 to come off sedative drips; will increase today, stop precedex and propofol, and start seroquel as well     UGI Bleed  - Scoped by GI 5/3 with epinephrine injection and clip placement for an actively bleeding D2/D3 Diuelafoy lesion  - No pressors, Hb stable     FEN/GI  - TF at 45, goal  - FWB discontinued     Anticoagulation      - Heparin gtt at 900 U/hr      - Goal aXa 0.25 - 0.3 Q6hrs    Prophylaxis      - protonix q12 IV       - abx for ECMO      - PICC placed 5/5; central line removed 5/6

## 2020-05-13 NOTE — PROGRESS NOTES
Dr. Perera notified H&h 9.3/30 from 11.2/35 @ 1400. , cvp -2. MD ordered another cbc @ [place]. Will continue to monitor.

## 2020-05-13 NOTE — PROGRESS NOTES
Dr. Ayon updated on patient's progress, H&H, cvp, anti x-a, HR, UO. ABG reviewed, sodium 146. MD ordered to decrease heparin to 800 units/hr. Turn lasix off.Pharmacy to be notified to change antibiotics to be mixed in D5W when possible. Will continue to monitor.

## 2020-05-13 NOTE — PROGRESS NOTES
Pharmacokinetic Assessment Follow Up: IV Vancomycin    Vancomycin serum concentration assessment(s):  · Vancomycin trough level resulted at 30.9 mcg/mL drawn appropriately. Goal level is 10 to 20 mcg/mL    Drug levels (last 3 results):  Recent Labs   Lab Result Units 05/13/20  1030   Vancomycin-Trough ug/mL 30.9*     Vancomycin Regimen Plan:  · Hold vancomycin IV and redose when level is less than 20 mcg/mL. Next serum concentration measured with morning labs on 5/14    Pharmacy will continue to follow and monitor vancomycin. Please contact pharmacy for questions regarding this assessment.    Thank you for the consult,   Sonya Banks, PharmD, BCCCP  Spectralink: [id number]  _________________________________________________________________________________       Patient brief summary:  Ranjana Sanchez is a 54 y.o. male initiated on antimicrobial therapy with IV vancomycin for treatment of sepsis    Drug Allergies:   Review of patient's allergies indicates:  No Known Allergies    Actual Body Weight:   68.4 kg     Renal Function:   Estimated Creatinine Clearance: 86.3 mL/min (based on SCr of 0.8 mg/dL).    Dialysis Method (if applicable):  N/A    CBC (last 72 hours):  Recent Labs   Lab Result Units 05/10/20  1400 05/10/20  1948 05/11/20  0200 05/11/20  0800 05/11/20  0900 05/11/20  1400 05/11/20  2000 05/12/20  0215 05/12/20  0753 05/12/20  1357 05/12/20  2006 05/13/20  0200 05/13/20  0800   WBC K/uL 12.80* 14.31* 13.88* 12.96*  --  11.69 12.85* 12.05 12.61 10.93 10.25 9.81  9.81 10.33   Hemoglobin g/dL 10.9* 10.9* 10.8* 10.7*  --  11.5* 11.0* 10.7* 10.7* 11.2* 9.3* 9.4*  9.4* 9.4*   Hemoglobin, Free, Plasma mg/dL  --   --   --   --  30  --   --   --  40  --   --   --  10   Hematocrit % 33.9* 34.1* 33.9* 33.6*  --  36.5* 34.9* 34.4* 34.0* 35.8* 30.0* 30.8*  30.8* 31.0*   Platelets K/uL 167 161 178 200  --  202 211 216 231 227 216 216  216 219   Gran% % 79.7* 78.6* 77.6* 73.5*  --  71.5 69.9 65.6 64.8 61.7 57.8  57.0  57.0 58.1   Lymph% % 4.7* 4.7* 5.0* 7.1*  --  6.5* 7.3* 9.5* 8.6* 10.2* 13.0* 11.8*  11.8* 10.2*   Mono% % 9.4 11.2 11.4 12.7  --  12.7 13.9 13.9 15.5* 15.1* 15.5* 15.4*  15.4* 15.4*   Eosinophil% % 4.6 3.8 4.4 4.7  --  6.8 6.3 7.9 8.0 8.7* 9.2* 11.5*  11.5* 10.7*   Basophil% % 0.4 0.4 0.4 0.5  --  0.8 0.5 0.7 0.6 0.9 0.7 0.8  0.8 0.8   Differential Method  Automated Automated Automated Automated  --  Automated Automated Automated Automated Automated Automated Automated  Automated Automated       Metabolic Panel (last 72 hours):  Recent Labs   Lab Result Units 05/10/20  1400 05/10/20  1948 05/11/20  0200 05/11/20  0800 05/11/20  1400 05/11/20  2000 05/12/20  0215 05/12/20  0753 05/12/20  1357 05/12/20 2006 05/13/20  0200 05/13/20  0800   Sodium mmol/L 142 144 144 144 145 144 146* 146* 146* 146* 145 145   Potassium mmol/L 3.3* 4.3 3.8 3.7 4.3 3.9 3.9 4.3 4.1 3.9 4.0 4.1   Chloride mmol/L 102 105 104 104 103 102 103 103 103 105 106 105   CO2 mmol/L 31* 28 29 29 30* 32* 31* 32* 32* 29 30* 31*   Glucose mg/dL 210* 201* 161* 232* 194* 243* 199* 188* 253* 190* 198* 218*   BUN, Bld mg/dL 17 21* 26* 32* 36* 45* 47* 52* 54* 55* 53* 54*   Creatinine mg/dL 0.6 0.6 0.6 0.6 0.7 0.7 0.7 0.7 0.7 0.7 0.7 0.8   Albumin g/dL 4.0 3.8 3.6 3.4* 3.5 3.5 3.8 3.7 3.6 3.8 3.7 3.6   Total Bilirubin mg/dL 0.7 0.8 0.6 0.7 0.6 0.7 0.6 0.6 0.6 0.6 0.5 0.6   Alkaline Phosphatase U/L 123 135 159* 155* 175* 184* 170* 186* 181* 155* 154* 169*   AST U/L 28 31 34 29 34 36 30 35 32 29 27 31   ALT U/L 29 31 36 34 41 44 40 47* 44 38 36 39   Magnesium mg/dL 1.9 2.1 2.1 2.0 2.2 2.2 2.4  --  2.3 2.3 2.4 2.4   Phosphorus mg/dL 2.7 1.9* 2.8 3.1 3.5 4.1 3.8  --  4.2 4.1 3.2 3.7       Vancomycin Administrations:  vancomycin given in the last 96 hours                   vancomycin in dextrose 5 % 1 gram/250 mL IVPB 1,000 mg (mg) 1,000 mg New Bag 05/04/20 1130     1,000 mg New Bag 05/03/20 2202     1,000 mg New Bag  1050     1,000 mg New Bag 05/02/20  2212     1,000 mg New Bag  1003     1,000 mg New Bag 05/01/20 2223     1,000 mg New Bag  1000     1,000 mg New Bag 04/30/20 2201                Microbiologic Results:  Microbiology Results (last 7 days)     Procedure Component Value Units Date/Time    Culture, Respiratory with Gram Stain [[id number]] Collected:  05/08/20 1514    Order Status:  Completed Specimen:  Respiratory from Bronchial Wash Updated:  05/10/20 0803     Respiratory Culture No growth     Gram Stain (Respiratory) <10 epithelial cells per low power field.     Gram Stain (Respiratory) No WBC's     Gram Stain (Respiratory) No organisms seen

## 2020-05-13 NOTE — PROGRESS NOTES
[facility]  Critical Care - Surgery  Progress Note    Patient Name: Ranjana Sanchez  MRN: [id number]  Admission Date: 4/10/2020  Hospital Length of Stay: 33 days  Code Status: Full Code  Attending Provider: Francis Ayon MD  Primary Care Provider: Primary Doctor No   Principal Problem: Acute respiratory distress syndrome (ARDS) due to COVID-19 virus    Subjective:     Hospital/ICU Course:  No notes on file    Interval History/Significant Events:   Doing better with sedation changes, but more tachycardic today, may be withdrawing from gtts.  Not much change on vent.  Sweep 8.5 today.    Follow-up For: Procedure(s) (LRB):  CREATION, TRACHEOSTOMY  (N/A)    Post-Operative Day: 8 Days Post-Op    Objective:     Vital Signs (Most Recent):  Temp: 98 °F (36.7 °C) (05/12/20 2300)  Pulse: (!) 147 (05/13/20 1039)  Resp: (!) 33 (05/13/20 0717)  BP: 116/69 (05/13/20 1000)  SpO2: 99 % (05/13/20 1039) Vital Signs (24h Range):  Temp:  [98 °F (36.7 °C)-98.4 °F (36.9 °C)] 98 °F (36.7 °C)  Pulse:  [130-151] 147  Resp:  [33-44] 33  SpO2:  [97 %-100 %] 99 %  BP: ()/(58-78) 116/69  Arterial Line BP: ()/(52-73) 104/59     Weight: 62.9 kg (138 lb 10.7 oz)  Body mass index is 23.8 kg/m².      Intake/Output Summary (Last 24 hours) at 5/13/2020 1042  Last data filed at 5/13/2020 1000  Gross per 24 hour   Intake 3065.46 ml   Output 2433 ml   Net 632.46 ml       Physical Exam   Constitutional: He appears well-developed.   HENT:   Head: Normocephalic and atraumatic.   Eyes: Pupils are equal, round, and reactive to light.   Neck: Normal range of motion.   Cardiovascular:   tachycardic   Pulmonary/Chest:   Trached, mechanically ventilated  On ECMO, cannulated in RIJ and fem vein   Abdominal: Soft. He exhibits no distension.   Musculoskeletal: Normal range of motion.   Neurological:   sedated   Skin: Skin is warm.   Vitals reviewed.      Vents:  Vent Mode: A/C (05/13/20 0717)  Ventilator Initiated: Yes (04/10/20  2247)  Set Rate: 26 BPM (05/13/20 0717)  Vt Set: 200 mL (05/13/20 0717)  PEEP/CPAP: 10 cmH20 (05/13/20 0717)  Oxygen Concentration (%): 50 (05/13/20 0930)  Peak Airway Pressure: 45 cmH2O (05/13/20 0717)  Plateau Pressure: 40 cmH20 (05/13/20 0717)  Total Ve: 5.99 mL (05/13/20 0717)  F/VT Ratio<105 (RSBI): 177.42 (05/13/20 0717)    Lines/Drains/Airways     Peripherally Inserted Central Catheter Line            PICC Double Lumen 05/05/20 1707 right basilic 7 days          Central Venous Catheter Line                 ECMO Cannula 04/25/20 1120 right femoral vein 17 days         ECMO Cannula 04/25/20 1120 right internal jugular 17 days          Drain                 Urethral Catheter 04/18/20 1854 24 days         NG/OG Tube 05/03/20 1215 [place] sump;nasogastric 18 Fr. Left nostril 9 days          Airway                 Surgical Airway 05/05/20 1500 Shiley Cuffed 7 days          Arterial Line                 Arterial Line 04/27/20 1100 Right Brachial 15 days          Peripheral Intravenous Line                 Peripheral IV - Single Lumen 05/08/20 0300 20 G Anterior;Left Hand 5 days         Peripheral IV - Single Lumen 05/08/20 0300 20 G Left Wrist 5 days         Peripheral IV - Single Lumen 05/12/20 0230 22 G Left Antecubital 1 day         Peripheral IV - Single Lumen 05/13/20 0357 22 G Right Forearm less than 1 day                Significant Labs:    CBC/Anemia Profile:  Recent Labs   Lab 05/12/20  0215  05/12/20 2006 05/13/20  0200 05/13/20  0206 05/13/20  0800 05/13/20  0813   WBC 12.05   < > 10.25  --  9.81  9.81  --  10.33  --    HGB 10.7*   < > 9.3*  --  9.4*  9.4*  --  9.4*  --    HCT 34.4*   < > 30.0*   < > 30.8*  30.8* 29* 31.0* 28*      < > 216  --  216  216  --  219  --    MCV 96   < > 97  --  97  97  --  98  --    RDW 14.0   < > 14.2  --  14.0  14.0  --  14.2  --    FERRITIN 1,951*  --   --   --  1,968*  --   --   --     < > = values in this interval not displayed.        Chemistries:  Recent  Labs   Lab 05/12/20 2006 05/13/20  0200 05/13/20  0800   * 145 145   K 3.9 4.0 4.1    106 105   CO2 29 30* 31*   BUN 55* 53* 54*   CREATININE 0.7 0.7 0.8   CALCIUM 10.1 10.1 10.2   ALBUMIN 3.8 3.7 3.6   PROT 6.8 6.8 6.8   BILITOT 0.6 0.5 0.6   ALKPHOS 155* 154* 169*   ALT 38 36 39   AST 29 27 31   MG 2.3 2.4 2.4   PHOS 4.1 3.2 3.7     Pneumo on CXR today    Assessment/Plan:     COVID-19 virus detected  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  This was switched to ETT on 4/11.  Cannulated on 4/25.    Neuro:  5 of valium  100 BID of seroquel  5 Dariela q 4   Turned off gtts 5/12    Pulm  AC/VC+ on the vent.  Small pneumo on cxr, subclinical.  Sweep of 8 today, 3.4 L/min flows and 2800 RPM  Will need some more time on ECMO, not pulling enough volume on the vent    Cardiac   Cardene, wean as tolerated    GI  TFs at goal    Renal  Making urine, diuresing agressively    Heme/ID  Fluc/Vanc  White count normalized  800 of hep right now  CRP coming down for a couple days now    Dispo:  Remain in ICU, will trying to expand lungs with vent, will see if increase in valium and addition of dariela has helped.             Critical care was time spent personally by me on the following activities: development of treatment plan with patient or surrogate and bedside caregivers, discussions with consultants, evaluation of patient's response to treatment, examination of patient, ordering and performing treatments and interventions, ordering and review of laboratory studies, ordering and review of radiographic studies, pulse oximetry, re-evaluation of patient's condition.  This critical care time did not overlap with that of any other provider or involve time for any procedures.     Zelalem Ariza MD  Critical Care - Surgery  [facility]

## 2020-05-13 NOTE — PROGRESS NOTES
[facility]  Cardiothoracic Surgery  Progress Note    Patient Name: Ranjana Sanchez  MRN: [id number]  Admission Date: 4/10/2020  Hospital Length of Stay: 32 days  Type of ECMO support:  VV  Days of ECMO support:  17 days   Code Status: Full Code   Attending Physician: Francis Ayon MD   Referring Provider: Francis Ayon MD  Principal Problem:Acute respiratory distress syndrome (ARDS) due to COVID-19 virus    Subjective:   Interval History:  Net negative 1.3L in last 24hrs, good UOP, on lasix gtt at 3. Cardene is at 3. Hep gtt at 900/hr. Lung volumes in the mid to upper 200s.    Medications:  Continuous Infusions:   dexmedetomidine (PRECEDEX) infusion 0.4 mcg/kg/hr (05/12/20 2100)    furosemide (LASIX) 2 mg/mL continuous infusion (non-titrating) Stopped (05/12/20 2123)    heparin (porcine) in 5 % dex 950 Units/hr (05/12/20 2100)    insulin (HUMAN R) infusion (adults) 1.4 Units/hr (05/12/20 2100)    nicardipine Stopped (05/12/20 0900)    norepinephrine bitartrate-D5W Stopped (04/25/20 0300)    propofoL Stopped (05/12/20 1200)    vasopressin (PITRESSIN) infusion Stopped (05/09/20 1800)     Scheduled Meds:   albumin human 5%  25 g Intravenous Once    albuterol-ipratropium  3 mL Nebulization Q6H    aspirin  81 mg Per NG tube Daily    chlorhexidine  15 mL Mouth/Throat BID    diazePAM  2 mg Oral Q8H    fluconazole (DIFLUCAN) IVPB  200 mg Intravenous Q24H    pantoprazole  40 mg Intravenous Q12H    polyethylene glycol  17 g Per NG tube Daily    potassium chloride 10%  20 mEq Per NG tube BID    potassium chloride 10%  40 mEq Oral Once    QUEtiapine  50 mg Per NG tube Q12H    sucralfate  1 g Per NG tube Q6H    traZODone  25 mg Per NG tube QHS    vancomycin (VANCOCIN) IVPB  1,250 mg Intravenous Q12H    white petrolatum-mineral oiL   Both Eyes BID        Objective:     Vital Signs (Most Recent):  Temp: 98.4 °F (36.9 °C) (05/12/20 1900)  Pulse: (!) 135 (05/12/20 2100)  Resp: (!) 40  (05/12/20 1926)  BP: 110/65 (05/12/20 2100)  SpO2: 100 % (05/12/20 2100) Vital Signs (24h Range):  Temp:  [98.1 °F (36.7 °C)-98.4 °F (36.9 °C)] 98.4 °F (36.9 °C)  Pulse:  [127-151] 135  Resp:  [22-44] 40  SpO2:  [97 %-100 %] 100 %  BP: ()/(56-78) 110/65  Arterial Line BP: ()/(50-73) 103/58     Weight: 63.3 kg (139 lb 8.8 oz)  Body mass index is 23.95 kg/m².    SpO2: 100 %  O2 Device (Oxygen Therapy): ventilator    Intake/Output - Last 3 Shifts       05/10 0700 - 05/11 0659 05/11 0700 - 05/12 0659 05/12 0700 - 05/13 0659    I.V. (mL/kg) 2138 (33.8) 1231 (19.4) 840.5 (13.3)    Blood  250     NG/GT 1255 1095 1145    Total Intake(mL/kg) 3393 (53.7) 2576 (40.7) 1985.5 (31.4)    Urine (mL/kg/hr) 3595 (2.4) 3850 (2.5) 1810 (2)    Stool  0     Blood 5 6 6    Total Output 3600 3856 1816    Net -207 -1280 +169.5           Stool Occurrence  1 x           Lines/Drains/Airways     Peripherally Inserted Central Catheter Line            PICC Double Lumen 05/05/20 1707 right basilic 7 days          Central Venous Catheter Line                 ECMO Cannula 04/25/20 1120 right femoral vein 17 days         ECMO Cannula 04/25/20 1120 right internal jugular 17 days          Drain                 Urethral Catheter 04/18/20 1854 24 days         NG/OG Tube 05/03/20 1215 [place] sump;nasogastric 18 Fr. Left nostril 9 days          Airway                 Surgical Airway 05/05/20 1500 Shiley Cuffed 7 days          Arterial Line                 Arterial Line 04/27/20 1100 Right Brachial 15 days          Peripheral Intravenous Line                 Peripheral IV - Single Lumen 05/08/20 0300 20 G Anterior;Left Hand 4 days         Peripheral IV - Single Lumen 05/08/20 0300 20 G Left Wrist 4 days         Peripheral IV - Single Lumen 05/08/20 0600 22 G Right Hand 4 days         Peripheral IV - Single Lumen 05/12/20 0230 22 G Left Antecubital less than 1 day                Physical Exam    Constitutional: He is sedated, and intubated.    Head: Normocephalic   Cardiovascular: Tachycardia 130s   Pulmonary/Chest: intubated vent FiO2 50% PEEP 10, ECMO RPM 2800, sweep 8, oxygenator FiO2 95%, flows mid 3s  Skin: L neck and L groin cannulas intact, min to no flashing, clots pre and post-oxygenator, all sutures intact and secure  Nursing note and vitals reviewed.    Significant Labs:  BMP:   Recent Labs   Lab 05/12/20 2006   *   *   K 3.9      CO2 29   BUN 55*   CREATININE 0.7   CALCIUM 10.1   MG 2.3     CBC:   Recent Labs   Lab 05/12/20 2006 05/12/20 2007   WBC 10.25  --    RBC 3.09*  --    HGB 9.3*  --    HCT 30.0* 27*     --    MCV 97  --    MCH 30.1  --    MCHC 31.0*  --      LFTs:   Recent Labs   Lab 05/12/20 2006   ALT 38   AST 29   ALKPHOS 155*   BILITOT 0.6   PROT 6.8   ALBUMIN 3.8     Significant Diagnostics:  Cxr: mildly improved bilaterally      Assessment/Plan:     Acute respiratory distress syndrome (ARDS) due to COVID-19 virus  - Crich switched to ETT on 4/11  - Monika weaned off 5/5  - RPM at 2800; flows mid 3s  - thrombocytopenia: stable; transfuse for under 30K  - Aspirin 81 daily  - Sweep at 8; goal 40-50s acceptable if not acidotic on ABG  - Oxygenator Fi 95%; will try weaning again in the next day or so  - Vent Fi 50%, PEEP 10; volumes improved; CXR stable to improved  - Tracheostomy and bronch 5/5  - Lasix gtt at 3, goal -650mL per day; please document I/Os q4hrs to ensure this     Tongue laceration      -ENT consulted and evaluated patient, laceration superficial, not bleeding      -recs: bite block vs paralysis, will monitor    Sedation  - Propofol restarted, at 10  - Weaning precedex as tolerated, at 0.8  - Off dilaudid gtt  - Restarted low dose valium 2mg q12 to come off sedative drips; will increase today, stop precedex and propofol, and start seroquel as well     UGI Bleed  - Scoped by GI 5/3 with epinephrine injection and clip placement for an actively bleeding D2/D3 Diuelafoy lesion  - No pressors,  Hb stable     FEN/GI  - TF at 45, goal  - FWB discontinued     Anticoagulation      - Heparin gtt at 900 U/hr      - Goal aXa 0.25 - 0.3 Q6hrs    Prophylaxis      - protonix q12 IV       - abx for ECMO      - PICC placed 5/5; central line removed 5/6    Anne Miller MD  Cardiothoracic Surgery  [facility]    VV ECMO circuit checked and all parameters reviewed. Anticoagulation was managed and all cannulation exit sites along with review of labs and radiology studies performed. Speed and functioning of the pump along with oxygenator quality reviewed. Coordination of care was carried out between different team members which included the surgical team, surgical ICU, perfusion services and the nursing staff.

## 2020-05-13 NOTE — SUBJECTIVE & OBJECTIVE
Interval History/Significant Events:   Doing better with sedation changes, but more tachycardic today, may be withdrawing from gtts.  Not much change on vent.  Sweep 8.5 today.    Follow-up For: Procedure(s) (LRB):  CREATION, TRACHEOSTOMY  (N/A)    Post-Operative Day: 8 Days Post-Op    Objective:     Vital Signs (Most Recent):  Temp: 98 °F (36.7 °C) (05/12/20 2300)  Pulse: (!) 147 (05/13/20 1039)  Resp: (!) 33 (05/13/20 0717)  BP: 116/69 (05/13/20 1000)  SpO2: 99 % (05/13/20 1039) Vital Signs (24h Range):  Temp:  [98 °F (36.7 °C)-98.4 °F (36.9 °C)] 98 °F (36.7 °C)  Pulse:  [130-151] 147  Resp:  [33-44] 33  SpO2:  [97 %-100 %] 99 %  BP: ()/(58-78) 116/69  Arterial Line BP: ()/(52-73) 104/59     Weight: 62.9 kg (138 lb 10.7 oz)  Body mass index is 23.8 kg/m².      Intake/Output Summary (Last 24 hours) at 5/13/2020 1042  Last data filed at 5/13/2020 1000  Gross per 24 hour   Intake 3065.46 ml   Output 2433 ml   Net 632.46 ml       Physical Exam   Constitutional: He appears well-developed.   HENT:   Head: Normocephalic and atraumatic.   Eyes: Pupils are equal, round, and reactive to light.   Neck: Normal range of motion.   Cardiovascular:   tachycardic   Pulmonary/Chest:   Trached, mechanically ventilated  On ECMO, cannulated in RIJ and fem vein   Abdominal: Soft. He exhibits no distension.   Musculoskeletal: Normal range of motion.   Neurological:   sedated   Skin: Skin is warm.   Vitals reviewed.      Vents:  Vent Mode: A/C (05/13/20 0717)  Ventilator Initiated: Yes (04/10/20 2247)  Set Rate: 26 BPM (05/13/20 0717)  Vt Set: 200 mL (05/13/20 0717)  PEEP/CPAP: 10 cmH20 (05/13/20 0717)  Oxygen Concentration (%): 50 (05/13/20 0930)  Peak Airway Pressure: 45 cmH2O (05/13/20 0717)  Plateau Pressure: 40 cmH20 (05/13/20 0717)  Total Ve: 5.99 mL (05/13/20 0717)  F/VT Ratio<105 (RSBI): 177.42 (05/13/20 0717)    Lines/Drains/Airways     Peripherally Inserted Central Catheter Line            PICC Double Lumen 05/05/20  1707 right basilic 7 days          Central Venous Catheter Line                 ECMO Cannula 04/25/20 1120 right femoral vein 17 days         ECMO Cannula 04/25/20 1120 right internal jugular 17 days          Drain                 Urethral Catheter 04/18/20 1854 24 days         NG/OG Tube 05/03/20 1215 [place] sump;nasogastric 18 Fr. Left nostril 9 days          Airway                 Surgical Airway 05/05/20 1500 Shiley Cuffed 7 days          Arterial Line                 Arterial Line 04/27/20 1100 Right Brachial 15 days          Peripheral Intravenous Line                 Peripheral IV - Single Lumen 05/08/20 0300 20 G Anterior;Left Hand 5 days         Peripheral IV - Single Lumen 05/08/20 0300 20 G Left Wrist 5 days         Peripheral IV - Single Lumen 05/12/20 0230 22 G Left Antecubital 1 day         Peripheral IV - Single Lumen 05/13/20 0357 22 G Right Forearm less than 1 day                Significant Labs:    CBC/Anemia Profile:  Recent Labs   Lab 05/12/20 0215 05/12/20 2006 05/13/20 0200 05/13/20  0206 05/13/20  0800 05/13/20  0813   WBC 12.05   < > 10.25  --  9.81  9.81  --  10.33  --    HGB 10.7*   < > 9.3*  --  9.4*  9.4*  --  9.4*  --    HCT 34.4*   < > 30.0*   < > 30.8*  30.8* 29* 31.0* 28*      < > 216  --  216  216  --  219  --    MCV 96   < > 97  --  97  97  --  98  --    RDW 14.0   < > 14.2  --  14.0  14.0  --  14.2  --    FERRITIN 1,951*  --   --   --  1,968*  --   --   --     < > = values in this interval not displayed.        Chemistries:  Recent Labs   Lab 05/12/20 2006 05/13/20 0200 05/13/20  0800   * 145 145   K 3.9 4.0 4.1    106 105   CO2 29 30* 31*   BUN 55* 53* 54*   CREATININE 0.7 0.7 0.8   CALCIUM 10.1 10.1 10.2   ALBUMIN 3.8 3.7 3.6   PROT 6.8 6.8 6.8   BILITOT 0.6 0.5 0.6   ALKPHOS 155* 154* 169*   ALT 38 36 39   AST 29 27 31   MG 2.3 2.4 2.4   PHOS 4.1 3.2 3.7     Pneumo on CXR today

## 2020-05-13 NOTE — PLAN OF CARE
Pt remains awake, opens eyes spontaneously. Withdraws to pain only. Does not track c eyes. Remains on vent a/c vc+ 50% PEEP 10. Rate set @ 26; patient breaths 30-40/min. Afebrile. -150 bpm. Map 75-85 mmHg maintained. Coarse breath sounds, diminished on L. VV ecmo speed 2800, sweep 8.5, approx 3.4 flow, FiO2 90%. SVO2 73%. Abd soft, nontender, normoactive bowel sounds. Tolerating tube feeds to NGT well at 45 ml/hr. Scant residuals noted. UO  ml/hr. Pulses 1+ x all extremities. Remains on lasix, precedex, insulin, and heparin gtts. Wound care performed. Heels floated and alternated c preventative foot drop boot. Repositioned q 2 as tolerated.  Waffle mattress inflated. No acute events this shift. See chart and doc flowsheet for details.

## 2020-05-13 NOTE — PROGRESS NOTES
During the circuit inspection it was noted that the CDI Shunt line appeared to be clotted.  It was identified that the main recirculation line had a clot.  Perfusion was notified and Adrian Zepeda was able to cut in a adapter.  Sterile technique was used.  No issues.  CDI line is flowing and recalibrated.

## 2020-05-13 NOTE — PROGRESS NOTES
ECMO Specialists shift report    Date: 05/13/2020  ECMO Specialist:  Maikel Deluna    Pump parameters:  RPM: 2800  Flow:  3.3- 3.4  Sweep:  8.5  FiO2:  90%    Oxygenator status:  Clots: pre/post oxy  Fibrin: pre oxy connectors    Pressure trends:  P1: 110-115  P2: 85-91  Delta P: 23-26    Volume status:  Chugging noted None  CVP: 0-2  MAP:  78-92  MD notified (name):  Nany    Anticoagulation:  ACT/aPTT/Xa parameters: 0.25-0.3  ACT/aPTT/Xa trends this shift: 0.28-0.31    Cannula size / status / placement:  Arterial:   Venous 1: RIJV 23 Fr. @ 4 cm  Venous 2:RFV 27 Fr. @ 12 cm  Dual lumen:      Additional Comments:    No blood products given. Decreased heparin to 8 and all lab values within set limits

## 2020-05-13 NOTE — PROGRESS NOTES
"05/13/2020  Maikel Zepeda    Current provider:  Francis Ayon MD      As floor rounding has been requested to be limited during COVID-19 patient quarantine by Infectious Disease and leadership, only "electronic" rounding has been performed on this mechanical assist device patient.  Electronic rounding includes verifying in Epic that current settings and measurements for the device have been documented appropriately and that they are within limits.  Additionally, this rounding verifies that the EQUIPMENT section of the VAD flowsheet is documented in Epic, specifically checking the presence of emergency equipment and controller self test.  Any discrepancies will be related to the care team.    In emergency, the nursing units have been notified to contact the perfusion department either by:  Calling [phone] from 630am to 4pm, or by contacting the hospital  from 4pm to 630am and asking to speak with the perfusionist on call.    Maikel Zepeda  7:30 AM  "

## 2020-05-13 NOTE — NURSING
"Small amount of "chattering" assessed in ECMO lines.  Pt becomes hypotensive when coughing.  Dr. Alston notified.  Order received for 500 ml Albumin 5% IV.  After administration of Albumin IV, "chattering" resolves and hypotensive episodes with cough also resolve.  "

## 2020-05-13 NOTE — PROGRESS NOTES
[facility]  Cardiothoracic Surgery  Daily ECMO Progress Note    Patient Name: Ranjana Sanchez  MRN: [id number]  Admission Date: 4/10/2020  Hospital Length of Stay: 33 days  Type of ECMO:  V-V  Day of ECMO support:  19 days  Code Status: Full Code   Attending Physician: Francis Ayon MD   Referring Provider: Francis Ayon MD  Principal Problem:Acute respiratory distress syndrome (ARDS) due to COVID-19 virus    Subjective:   Interval History:  Net positive 450mL in last 24hrs, good UOP, lasix gtt turned off yesterday for rising BUN. BUN at 53 today, Cr non-elevated. Cardene turned off this AM. Hep gtt at 800/hr, aXa at goal levels, LDH decreasing. Lung volumes in the mid to upper 200s. Deep sulcus sign visualized on R diaphram on CXR this AM; decreasing PEEP. Oxygenator FiO2 weaned to 90 based on ABG this AM.    Medications:  Continuous Infusions:   dexmedetomidine (PRECEDEX) infusion 0.4 mcg/kg/hr (05/13/20 0900)    heparin (porcine) in 5 % dex 800 Units/hr (05/13/20 0900)    insulin (HUMAN R) infusion (adults) 1.3 Units/hr (05/13/20 0900)    nicardipine 2.5 mg/hr (05/13/20 0900)    norepinephrine bitartrate-D5W Stopped (04/25/20 0300)    propofoL Stopped (05/12/20 1200)    vasopressin (PITRESSIN) infusion Stopped (05/09/20 1800)     Scheduled Meds:   albumin human 5%  25 g Intravenous Once    albuterol-ipratropium  3 mL Nebulization Q6H    aspirin  81 mg Per NG tube Daily    chlorhexidine  15 mL Mouth/Throat BID    diazePAM  2 mg Oral Q8H    fluconazole (DIFLUCAN) IVPB  200 mg Intravenous Q24H    pantoprazole  40 mg Intravenous Q12H    polyethylene glycol  17 g Per NG tube Daily    potassium chloride 10%  20 mEq Per NG tube BID    potassium chloride 10%  40 mEq Oral Once    QUEtiapine  50 mg Per NG tube Q12H    sucralfate  1 g Per NG tube Q6H    traZODone  25 mg Per NG tube QHS    vancomycin (VANCOCIN) IVPB  1,250 mg Intravenous Q12H    white petrolatum-mineral oiL    Both Eyes BID        Objective:     Vital Signs (Most Recent):  Temp: 98 °F (36.7 °C) (05/12/20 2300)  Pulse: (!) 139 (05/13/20 1000)  Resp: (!) 33 (05/13/20 0717)  BP: 116/69 (05/13/20 1000)  SpO2: 100 % (05/13/20 1000) Vital Signs (24h Range):  Temp:  [98 °F (36.7 °C)-98.4 °F (36.9 °C)] 98 °F (36.7 °C)  Pulse:  [130-151] 139  Resp:  [33-44] 33  SpO2:  [97 %-100 %] 100 %  BP: ()/(58-78) 116/69  Arterial Line BP: ()/(52-73) 113/64     Weight: 62.9 kg (138 lb 10.7 oz)  Body mass index is 23.8 kg/m².    SpO2: 100 %  O2 Device (Oxygen Therapy): ventilator    Intake/Output - Last 3 Shifts       05/11 0700 - 05/12 0659 05/12 0700 - 05/13 0659 05/13 0700 - 05/14 0659    I.V. (mL/kg) 1231 (19.4) 1285.5 (20.4)     Blood 250      NG/GT 1095 1730 135    Total Intake(mL/kg) 2576 (40.7) 3015.5 (47.9) 135 (2.1)    Urine (mL/kg/hr) 3850 (2.5) 2630 (1.7) 275 (1.4)    Stool 0      Blood 6 6     Total Output 3856 2636 275    Net -1280 +379.5 -140           Stool Occurrence 1 x            Lines/Drains/Airways     Peripherally Inserted Central Catheter Line            PICC Double Lumen 05/05/20 1707 right basilic 7 days          Central Venous Catheter Line                 ECMO Cannula 04/25/20 1120 right femoral vein 17 days         ECMO Cannula 04/25/20 1120 right internal jugular 17 days          Drain                 Urethral Catheter 04/18/20 1854 24 days         NG/OG Tube 05/03/20 1215 [place] sump;nasogastric 18 Fr. Left nostril 9 days          Airway                 Surgical Airway 05/05/20 1500 Shiley Cuffed 7 days          Arterial Line                 Arterial Line 04/27/20 1100 Right Brachial 15 days          Peripheral Intravenous Line                 Peripheral IV - Single Lumen 05/08/20 0300 20 G Anterior;Left Hand 5 days         Peripheral IV - Single Lumen 05/08/20 0300 20 G Left Wrist 5 days         Peripheral IV - Single Lumen 05/12/20 0230 22 G Left Antecubital 1 day         Peripheral IV - Single Lumen  05/13/20 0357 22 G Right Forearm less than 1 day                Physical Exam    Constitutional: He is sedated, and intubated.   Head: Normocephalic   Cardiovascular: Tachycardia 130s   Pulmonary/Chest: intubated vent FiO2 50% PEEP 10, ECMO RPM 2800, sweep 8.5, oxygenator FiO2 90%, flows mid 3s  Skin: L neck and L groin cannulas intact, min to no flashing, clots pre and post-oxygenator, all sutures intact and secure  Nursing note and vitals reviewed.    Significant Labs:  BMP:   Recent Labs   Lab 05/13/20  0800   *      K 4.1      CO2 31*   BUN 54*   CREATININE 0.8   CALCIUM 10.2   MG 2.4     CBC:   Recent Labs   Lab 05/13/20  0800 05/13/20  0813   WBC 10.33  --    RBC 3.18*  --    HGB 9.4*  --    HCT 31.0* 28*     --    MCV 98  --    MCH 29.6  --    MCHC 30.3*  --      LFTs:   Recent Labs   Lab 05/13/20  0800   ALT 39   AST 31   ALKPHOS 169*   BILITOT 0.6   PROT 6.8   ALBUMIN 3.6     Significant Diagnostics:  Cxr: stable with deep sulcus sign on R      Assessment/Plan:     Acute respiratory distress syndrome (ARDS) due to COVID-19 virus  - Crich switched to ETT on 4/11  - Monika weaned off 5/5  - RPM at 2800; flows mid 3s  - thrombocytopenia: resolved; transfuse for under 30K  - Aspirin 81 daily  - Sweep at 8.5; goal 40-50s acceptable if not acidotic on ABG  - Oxygenator Fi 90%  - Vent Fi 50%, PEEP 10; volumes improved; CXR with new deep sulcus sign, reducing PEEP to 8  - Tracheostomy and bronch 5/5  - Lasix gtt stopped 5/12     Tongue laceration      -ENT consulted and evaluated patient, laceration superficial, not bleeding      -recs: bite block vs paralysis, will monitor    Sedation  - Propofol and precedex off 5/12  - Increased valium to q8, and started seroquel yesterday      UGI Bleed  - Scoped by GI 5/3 with epinephrine injection and clip placement for an actively bleeding D2/D3 Diuelafoy lesion  - No pressors, Hb stable     FEN/GI  - TF at 45, goal  - FWB restarted for increasing BUN  and HR at 250mL q12     Anticoagulation      - Heparin gtt at 800 U/hr      - Goal aXa 0.25 - 0.3 Q6hrs      -  downtrending    Prophylaxis      - protonix q12 IV       - abx for ECMO      - PICC placed 5/5; central line removed 5/6    Anne Miller MD  Cardiothoracic Surgery  [facility]    VV ECMO circuit checked and all parameters reviewed. Anticoagulation was managed and all cannulation exit sites along with review of labs and radiology studies performed. Speed and functioning of the pump along with oxygenator quality reviewed.  Patient doing well.  On review of medications with the surgical ICU team we will discontinue Diflucan and trazodone to help improved neurological status.  We would also consult neurology for assessment of brain function and awaiting their recommendation.Coordination of care was carried out between different team members which included the surgical team, surgical ICU, perfusion services and the nursing staff.

## 2020-05-13 NOTE — ASSESSMENT & PLAN NOTE
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  This was switched to ETT on 4/11.  Cannulated on 4/25.    Neuro:  5 of valium  100 BID of seroquel  5 Rebekah q 4   Turned off gtts yesterday    Pulm  AC/VC+ on the vent.  Small pneumo on cxr, subclinical.  Sweep of 8 today, 3.4 L/min flows and 2800 RPM  Will need some more time on ECMO, not pulling enough volume on the vent    Cardiac   Cardene, wean as tolerated    GI  TFs at goal    Renal  Making urine, diuresing agressively    Heme/ID  Fluc/Vanc  White count normalized    Dispo:  Remain in ICU, will trying to expand lungs with vent, will work on sedation plan.

## 2020-05-13 NOTE — NURSING
Notified by Radiology service of small right hemothorax noted in chest x-ray examination.  Dr. Miller notified of situation and all patient data.  Order received to decrease PEEP on ventilator to 8.

## 2020-05-13 NOTE — SUBJECTIVE & OBJECTIVE
Interval History:  Net positive 450mL in last 24hrs, good UOP, lasix gtt turned off yesterday for rising BUN. BUN at 53 today, Cr non-elevated. Cardene turned off this AM. Hep gtt at 800/hr, aXa at goal levels, LDH decreasing. Lung volumes in the mid to upper 200s. Deep sulcus sign visualized on R diaphram on CXR this AM; decreasing PEEP. Oxygenator FiO2 weaned to 90 based on ABG this AM.    Medications:  Continuous Infusions:   dexmedetomidine (PRECEDEX) infusion 0.4 mcg/kg/hr (05/13/20 0900)    heparin (porcine) in 5 % dex 800 Units/hr (05/13/20 0900)    insulin (HUMAN R) infusion (adults) 1.3 Units/hr (05/13/20 0900)    nicardipine 2.5 mg/hr (05/13/20 0900)    norepinephrine bitartrate-D5W Stopped (04/25/20 0300)    propofoL Stopped (05/12/20 1200)    vasopressin (PITRESSIN) infusion Stopped (05/09/20 1800)     Scheduled Meds:   albumin human 5%  25 g Intravenous Once    albuterol-ipratropium  3 mL Nebulization Q6H    aspirin  81 mg Per NG tube Daily    chlorhexidine  15 mL Mouth/Throat BID    diazePAM  2 mg Oral Q8H    fluconazole (DIFLUCAN) IVPB  200 mg Intravenous Q24H    pantoprazole  40 mg Intravenous Q12H    polyethylene glycol  17 g Per NG tube Daily    potassium chloride 10%  20 mEq Per NG tube BID    potassium chloride 10%  40 mEq Oral Once    QUEtiapine  50 mg Per NG tube Q12H    sucralfate  1 g Per NG tube Q6H    traZODone  25 mg Per NG tube QHS    vancomycin (VANCOCIN) IVPB  1,250 mg Intravenous Q12H    white petrolatum-mineral oiL   Both Eyes BID        Objective:     Vital Signs (Most Recent):  Temp: 98 °F (36.7 °C) (05/12/20 2300)  Pulse: (!) 139 (05/13/20 1000)  Resp: (!) 33 (05/13/20 0717)  BP: 116/69 (05/13/20 1000)  SpO2: 100 % (05/13/20 1000) Vital Signs (24h Range):  Temp:  [98 °F (36.7 °C)-98.4 °F (36.9 °C)] 98 °F (36.7 °C)  Pulse:  [130-151] 139  Resp:  [33-44] 33  SpO2:  [97 %-100 %] 100 %  BP: ()/(58-78) 116/69  Arterial Line BP: ()/(52-73) 113/64      Weight: 62.9 kg (138 lb 10.7 oz)  Body mass index is 23.8 kg/m².    SpO2: 100 %  O2 Device (Oxygen Therapy): ventilator    Intake/Output - Last 3 Shifts       05/11 0700 - 05/12 0659 05/12 0700 - 05/13 0659 05/13 0700 - 05/14 0659    I.V. (mL/kg) 1231 (19.4) 1285.5 (20.4)     Blood 250      NG/GT 1095 1730 135    Total Intake(mL/kg) 2576 (40.7) 3015.5 (47.9) 135 (2.1)    Urine (mL/kg/hr) 3850 (2.5) 2630 (1.7) 275 (1.4)    Stool 0      Blood 6 6     Total Output 3856 2636 275    Net -1280 +379.5 -140           Stool Occurrence 1 x            Lines/Drains/Airways     Peripherally Inserted Central Catheter Line            PICC Double Lumen 05/05/20 1707 right basilic 7 days          Central Venous Catheter Line                 ECMO Cannula 04/25/20 1120 right femoral vein 17 days         ECMO Cannula 04/25/20 1120 right internal jugular 17 days          Drain                 Urethral Catheter 04/18/20 1854 24 days         NG/OG Tube 05/03/20 1215 [place] sump;nasogastric 18 Fr. Left nostril 9 days          Airway                 Surgical Airway 05/05/20 1500 Shiley Cuffed 7 days          Arterial Line                 Arterial Line 04/27/20 1100 Right Brachial 15 days          Peripheral Intravenous Line                 Peripheral IV - Single Lumen 05/08/20 0300 20 G Anterior;Left Hand 5 days         Peripheral IV - Single Lumen 05/08/20 0300 20 G Left Wrist 5 days         Peripheral IV - Single Lumen 05/12/20 0230 22 G Left Antecubital 1 day         Peripheral IV - Single Lumen 05/13/20 0357 22 G Right Forearm less than 1 day                Physical Exam    Constitutional: He is sedated, and intubated.   Head: Normocephalic   Cardiovascular: Tachycardia 130s   Pulmonary/Chest: intubated vent FiO2 50% PEEP 10, ECMO RPM 2800, sweep 8.5, oxygenator FiO2 90%, flows mid 3s  Skin: L neck and L groin cannulas intact, min to no flashing, clots pre and post-oxygenator, all sutures intact and secure  Nursing note and  vitals reviewed.    Significant Labs:  BMP:   Recent Labs   Lab 05/13/20  0800   *      K 4.1      CO2 31*   BUN 54*   CREATININE 0.8   CALCIUM 10.2   MG 2.4     CBC:   Recent Labs   Lab 05/13/20  0800 05/13/20  0813   WBC 10.33  --    RBC 3.18*  --    HGB 9.4*  --    HCT 31.0* 28*     --    MCV 98  --    MCH 29.6  --    MCHC 30.3*  --      LFTs:   Recent Labs   Lab 05/13/20  0800   ALT 39   AST 31   ALKPHOS 169*   BILITOT 0.6   PROT 6.8   ALBUMIN 3.6     Significant Diagnostics:  Cxr: stable with deep sulcus sign on R

## 2020-05-13 NOTE — SUBJECTIVE & OBJECTIVE
Interval History/Significant Events:     Slowly has been doing more on the vent over the last few days.  Now having tidal volumes in the 200s.  8 of sweep.  No pressors.    Follow-up For: Procedure(s) (LRB):  CREATION, TRACHEOSTOMY  (N/A)    Post-Operative Day: 8 Days Post-Op    Objective:     Vital Signs (Most Recent):  Temp: 98 °F (36.7 °C) (05/12/20 2300)  Pulse: (!) 139 (05/13/20 1000)  Resp: (!) 33 (05/13/20 0717)  BP: 116/69 (05/13/20 1000)  SpO2: 100 % (05/13/20 1000) Vital Signs (24h Range):  Temp:  [98 °F (36.7 °C)-98.4 °F (36.9 °C)] 98 °F (36.7 °C)  Pulse:  [130-151] 139  Resp:  [33-44] 33  SpO2:  [97 %-100 %] 100 %  BP: ()/(58-78) 116/69  Arterial Line BP: ()/(52-73) 113/64     Weight: 62.9 kg (138 lb 10.7 oz)  Body mass index is 23.8 kg/m².      Intake/Output Summary (Last 24 hours) at 5/13/2020 1028  Last data filed at 5/13/2020 1000  Gross per 24 hour   Intake 3065.46 ml   Output 2433 ml   Net 632.46 ml       Physical Exam   Constitutional: He appears well-developed.   HENT:   Head: Normocephalic and atraumatic.   Eyes: Pupils are equal, round, and reactive to light.   Neck: Normal range of motion.   Cardiovascular:   tachycardic   Pulmonary/Chest:   Trached, mechanically ventilated  On ECMO, cannulated in RIJ and fem vein   Abdominal: Soft. He exhibits no distension.   Musculoskeletal: Normal range of motion.   Neurological:   sedated   Skin: Skin is warm.   Vitals reviewed.      Vents:  Vent Mode: A/C (05/13/20 0717)  Ventilator Initiated: Yes (04/10/20 2247)  Set Rate: 26 BPM (05/13/20 0717)  Vt Set: 200 mL (05/13/20 0717)  PEEP/CPAP: 10 cmH20 (05/13/20 0717)  Oxygen Concentration (%): 50 (05/13/20 0930)  Peak Airway Pressure: 45 cmH2O (05/13/20 0717)  Plateau Pressure: 40 cmH20 (05/13/20 0717)  Total Ve: 5.99 mL (05/13/20 0717)  F/VT Ratio<105 (RSBI): 177.42 (05/13/20 0717)    Lines/Drains/Airways     Peripherally Inserted Central Catheter Line            PICC Double Lumen 05/05/20 2551  right basilic 7 days          Central Venous Catheter Line                 ECMO Cannula 04/25/20 1120 right femoral vein 17 days         ECMO Cannula 04/25/20 1120 right internal jugular 17 days          Drain                 Urethral Catheter 04/18/20 1854 24 days         NG/OG Tube 05/03/20 1215 [place] sump;nasogastric 18 Fr. Left nostril 9 days          Airway                 Surgical Airway 05/05/20 1500 Shiley Cuffed 7 days          Arterial Line                 Arterial Line 04/27/20 1100 Right Brachial 15 days          Peripheral Intravenous Line                 Peripheral IV - Single Lumen 05/08/20 0300 20 G Anterior;Left Hand 5 days         Peripheral IV - Single Lumen 05/08/20 0300 20 G Left Wrist 5 days         Peripheral IV - Single Lumen 05/12/20 0230 22 G Left Antecubital 1 day         Peripheral IV - Single Lumen 05/13/20 0357 22 G Right Forearm less than 1 day                Significant Labs:    CBC/Anemia Profile:  Recent Labs   Lab 05/12/20  0215  05/12/20 2006 05/13/20 0200 05/13/20  0206 05/13/20  0800 05/13/20  0813   WBC 12.05   < > 10.25  --  9.81  9.81  --  10.33  --    HGB 10.7*   < > 9.3*  --  9.4*  9.4*  --  9.4*  --    HCT 34.4*   < > 30.0*   < > 30.8*  30.8* 29* 31.0* 28*      < > 216  --  216  216  --  219  --    MCV 96   < > 97  --  97  97  --  98  --    RDW 14.0   < > 14.2  --  14.0  14.0  --  14.2  --    FERRITIN 1,951*  --   --   --  1,968*  --   --   --     < > = values in this interval not displayed.        Chemistries:  Recent Labs   Lab 05/12/20 2006 05/13/20 0200 05/13/20  0800   * 145 145   K 3.9 4.0 4.1    106 105   CO2 29 30* 31*   BUN 55* 53* 54*   CREATININE 0.7 0.7 0.8   CALCIUM 10.1 10.1 10.2   ALBUMIN 3.8 3.7 3.6   PROT 6.8 6.8 6.8   BILITOT 0.6 0.5 0.6   ALKPHOS 155* 154* 169*   ALT 38 36 39   AST 29 27 31   MG 2.3 2.4 2.4   PHOS 4.1 3.2 3.7       Significant Imaging:  I have reviewed and interpreted all pertinent imaging results/findings  within the past 24 hours.

## 2020-05-13 NOTE — PROGRESS NOTES
ECMO Specialists shift report    Date: 05/13/2020  ECMO Specialist:  Jorge Yeboah    Pump parameters:  RPM: 2800  Flow:  3.4   Sweep:  9LPM  FiO2:  100    Oxygenator status:  Clots: pre-oxy  Fibrin: pre-post oxy    Pressure trends:  P1: 112  P2: 88  Delta P: 26    Volume status:  Chugging noted none  CVP:   MAP:  75-88  MD notified (name):  Dr. Alston    Anticoagulation:  ACT/aPTT/Xa parameters: 0.25-0.3  ACT/aPTT/Xa trends this shift: 0.28, 0.21    Cannula size / status / placement:  Arterial:   Venous 1: RIJV/ 23f  Venous 2: RFV/ 27f  Dual lumen:      Additional Comments:    Patient remains on VV ECMO with the documented settings.  Increased sweep to 9LPM @ 90% for elevated CO2.  CDI line was clotted at the recirculation adapter.  Adrian was able to cut in a new adapter without incident.  Patient urine output is good.  Chest xray showed a small pneumo.  Possible chest tube later today.

## 2020-05-13 NOTE — PLAN OF CARE
Plan of care notation:    ECMO functioning without alarm today, with speed at 2800 RPM.   Pt remains w/ trach on ventilator, current settings: A/C rate 26, FiO2 50%, PEEP decreased to 8 today per orders.  Pt remains tachypneic and tachycardic.  Heparin IV per orders, with anti-Xa monitoring per orders.  Cardene IV titrated to maintain ordered parameters.  Precedex IV for comfort.  Tube feedings per orders, with pt tolerating well.  Accu-checks performed hourly, with Insulin IV titrated per standing orders.  Labs sent per orders.  Dr. Alston presents to bedside today to assess this morning and this afternoon, and aware of all patient data. Plan of care reviewed with pt, but no evidence of understanding obtained.  Emotional support offered.

## 2020-05-13 NOTE — ASSESSMENT & PLAN NOTE
Acute respiratory distress syndrome (ARDS) due to COVID-19 virus  - Crich switched to ETT on 4/11  - Monika weaned off 5/5  - RPM at 2800; flows mid 3s  - thrombocytopenia: resolved; transfuse for under 30K  - Aspirin 81 daily  - Sweep at 8.5; goal 40-50s acceptable if not acidotic on ABG  - Oxygenator Fi 90%  - Vent Fi 50%, PEEP 10; volumes improved; CXR with new deep sulcus sign, reducing PEEP to 8  - Tracheostomy and bronch 5/5  - Lasix gtt stopped 5/12     Tongue laceration      -ENT consulted and evaluated patient, laceration superficial, not bleeding      -recs: bite block vs paralysis, will monitor    Sedation  - Propofol and precedex off 5/12  - Increased valium to q8, and started seroquel yesterday      UGI Bleed  - Scoped by GI 5/3 with epinephrine injection and clip placement for an actively bleeding D2/D3 Diuelafoy lesion  - No pressors, Hb stable     FEN/GI  - TF at 45, goal  - FWB restarted for increasing BUN and HR at 250mL q12     Anticoagulation      - Heparin gtt at 800 U/hr      - Goal aXa 0.25 - 0.3 Q6hrs      -  downtrending    Prophylaxis      - protonix q12 IV       - abx for ECMO      - PICC placed 5/5; central line removed 5/6

## 2020-05-13 NOTE — SUBJECTIVE & OBJECTIVE
Interval History:  Net negative 1.3L in last 24hrs, good UOP, on lasix gtt at 3. Cardene is at 3. Hep gtt at 900/hr. Lung volumes in the mid to upper 200s.    Medications:  Continuous Infusions:   dexmedetomidine (PRECEDEX) infusion 0.4 mcg/kg/hr (05/12/20 2100)    furosemide (LASIX) 2 mg/mL continuous infusion (non-titrating) Stopped (05/12/20 2123)    heparin (porcine) in 5 % dex 950 Units/hr (05/12/20 2100)    insulin (HUMAN R) infusion (adults) 1.4 Units/hr (05/12/20 2100)    nicardipine Stopped (05/12/20 0900)    norepinephrine bitartrate-D5W Stopped (04/25/20 0300)    propofoL Stopped (05/12/20 1200)    vasopressin (PITRESSIN) infusion Stopped (05/09/20 1800)     Scheduled Meds:   albumin human 5%  25 g Intravenous Once    albuterol-ipratropium  3 mL Nebulization Q6H    aspirin  81 mg Per NG tube Daily    chlorhexidine  15 mL Mouth/Throat BID    diazePAM  2 mg Oral Q8H    fluconazole (DIFLUCAN) IVPB  200 mg Intravenous Q24H    pantoprazole  40 mg Intravenous Q12H    polyethylene glycol  17 g Per NG tube Daily    potassium chloride 10%  20 mEq Per NG tube BID    potassium chloride 10%  40 mEq Oral Once    QUEtiapine  50 mg Per NG tube Q12H    sucralfate  1 g Per NG tube Q6H    traZODone  25 mg Per NG tube QHS    vancomycin (VANCOCIN) IVPB  1,250 mg Intravenous Q12H    white petrolatum-mineral oiL   Both Eyes BID        Objective:     Vital Signs (Most Recent):  Temp: 98.4 °F (36.9 °C) (05/12/20 1900)  Pulse: (!) 135 (05/12/20 2100)  Resp: (!) 40 (05/12/20 1926)  BP: 110/65 (05/12/20 2100)  SpO2: 100 % (05/12/20 2100) Vital Signs (24h Range):  Temp:  [98.1 °F (36.7 °C)-98.4 °F (36.9 °C)] 98.4 °F (36.9 °C)  Pulse:  [127-151] 135  Resp:  [22-44] 40  SpO2:  [97 %-100 %] 100 %  BP: ()/(56-78) 110/65  Arterial Line BP: ()/(50-73) 103/58     Weight: 63.3 kg (139 lb 8.8 oz)  Body mass index is 23.95 kg/m².    SpO2: 100 %  O2 Device (Oxygen Therapy): ventilator    Intake/Output - Last  3 Shifts       05/10 0700 - 05/11 0659 05/11 0700 - 05/12 0659 05/12 0700 - 05/13 0659    I.V. (mL/kg) 2138 (33.8) 1231 (19.4) 840.5 (13.3)    Blood  250     NG/GT 1255 1095 1145    Total Intake(mL/kg) 3393 (53.7) 2576 (40.7) 1985.5 (31.4)    Urine (mL/kg/hr) 3595 (2.4) 3850 (2.5) 1810 (2)    Stool  0     Blood 5 6 6    Total Output 3600 3856 1816    Net -207 -1280 +169.5           Stool Occurrence  1 x           Lines/Drains/Airways     Peripherally Inserted Central Catheter Line            PICC Double Lumen 05/05/20 1707 right basilic 7 days          Central Venous Catheter Line                 ECMO Cannula 04/25/20 1120 right femoral vein 17 days         ECMO Cannula 04/25/20 1120 right internal jugular 17 days          Drain                 Urethral Catheter 04/18/20 1854 24 days         NG/OG Tube 05/03/20 1215 [place] sump;nasogastric 18 Fr. Left nostril 9 days          Airway                 Surgical Airway 05/05/20 1500 Shiley Cuffed 7 days          Arterial Line                 Arterial Line 04/27/20 1100 Right Brachial 15 days          Peripheral Intravenous Line                 Peripheral IV - Single Lumen 05/08/20 0300 20 G Anterior;Left Hand 4 days         Peripheral IV - Single Lumen 05/08/20 0300 20 G Left Wrist 4 days         Peripheral IV - Single Lumen 05/08/20 0600 22 G Right Hand 4 days         Peripheral IV - Single Lumen 05/12/20 0230 22 G Left Antecubital less than 1 day                Physical Exam    Constitutional: He is sedated, and intubated.   Head: Normocephalic   Cardiovascular: Tachycardia 130s   Pulmonary/Chest: intubated vent FiO2 50% PEEP 10, ECMO RPM 2800, sweep 8, oxygenator FiO2 95%, flows mid 3s  Skin: L neck and L groin cannulas intact, min to no flashing, clots pre and post-oxygenator, all sutures intact and secure  Nursing note and vitals reviewed.    Significant Labs:  BMP:   Recent Labs   Lab 05/12/20 2006   *   *   K 3.9      CO2 29   BUN 55*    CREATININE 0.7   CALCIUM 10.1   MG 2.3     CBC:   Recent Labs   Lab 05/12/20 2006 05/12/20 2007   WBC 10.25  --    RBC 3.09*  --    HGB 9.3*  --    HCT 30.0* 27*     --    MCV 97  --    MCH 30.1  --    MCHC 31.0*  --      LFTs:   Recent Labs   Lab 05/12/20 2006   ALT 38   AST 29   ALKPHOS 155*   BILITOT 0.6   PROT 6.8   ALBUMIN 3.8     Significant Diagnostics:  Cxr: mildly improved bilaterally

## 2020-05-13 NOTE — ASSESSMENT & PLAN NOTE
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  This was switched to ETT on 4/11.  Cannulated on 4/25.    Neuro:  5 of valium  100 BID of seroquel  5 Dariela q 4   Turned off gtts 5/12    Pulm  AC/VC+ on the vent.  Small pneumo on cxr, subclinical.  Sweep of 8 today, 3.4 L/min flows and 2800 RPM  Will need some more time on ECMO, not pulling enough volume on the vent    Cardiac   Cardene, wean as tolerated    GI  TFs at goal    Renal  Making urine, diuresing agressively    Heme/ID  Fluc/Vanc  White count normalized  800 of hep right now  CRP coming down for a couple days now    Dispo:  Remain in ICU, will trying to expand lungs with vent, will see if increase in valium and addition of dariela has helped.

## 2020-05-13 NOTE — PROGRESS NOTES
[facility]  Critical Care - Surgery  Progress Note    Patient Name: Ranjana Sanchez  MRN: [id number]  Admission Date: 4/10/2020  Hospital Length of Stay: 33 days  Code Status: Full Code  Attending Provider: Francis Ayon MD  Primary Care Provider: Primary Doctor No   Principal Problem: Acute respiratory distress syndrome (ARDS) due to COVID-19 virus    Subjective:     Hospital/ICU Course:  No notes on file    Interval History/Significant Events:     Slowly has been doing more on the vent over the last few days.  Now having tidal volumes in the 200s.  8 of sweep.  No pressors.    Follow-up For: Procedure(s) (LRB):  CREATION, TRACHEOSTOMY  (N/A)    Post-Operative Day: 8 Days Post-Op    Objective:     Vital Signs (Most Recent):  Temp: 98 °F (36.7 °C) (05/12/20 2300)  Pulse: (!) 139 (05/13/20 1000)  Resp: (!) 33 (05/13/20 0717)  BP: 116/69 (05/13/20 1000)  SpO2: 100 % (05/13/20 1000) Vital Signs (24h Range):  Temp:  [98 °F (36.7 °C)-98.4 °F (36.9 °C)] 98 °F (36.7 °C)  Pulse:  [130-151] 139  Resp:  [33-44] 33  SpO2:  [97 %-100 %] 100 %  BP: ()/(58-78) 116/69  Arterial Line BP: ()/(52-73) 113/64     Weight: 62.9 kg (138 lb 10.7 oz)  Body mass index is 23.8 kg/m².      Intake/Output Summary (Last 24 hours) at 5/13/2020 1028  Last data filed at 5/13/2020 1000  Gross per 24 hour   Intake 3065.46 ml   Output 2433 ml   Net 632.46 ml       Physical Exam   Constitutional: He appears well-developed.   HENT:   Head: Normocephalic and atraumatic.   Eyes: Pupils are equal, round, and reactive to light.   Neck: Normal range of motion.   Cardiovascular:   tachycardic   Pulmonary/Chest:   Trached, mechanically ventilated  On ECMO, cannulated in RIJ and fem vein   Abdominal: Soft. He exhibits no distension.   Musculoskeletal: Normal range of motion.   Neurological:   sedated   Skin: Skin is warm.   Vitals reviewed.      Vents:  Vent Mode: A/C (05/13/20 0717)  Ventilator Initiated: Yes (04/10/20  2247)  Set Rate: 26 BPM (05/13/20 0717)  Vt Set: 200 mL (05/13/20 0717)  PEEP/CPAP: 10 cmH20 (05/13/20 0717)  Oxygen Concentration (%): 50 (05/13/20 0930)  Peak Airway Pressure: 45 cmH2O (05/13/20 0717)  Plateau Pressure: 40 cmH20 (05/13/20 0717)  Total Ve: 5.99 mL (05/13/20 0717)  F/VT Ratio<105 (RSBI): 177.42 (05/13/20 0717)    Lines/Drains/Airways     Peripherally Inserted Central Catheter Line            PICC Double Lumen 05/05/20 1707 right basilic 7 days          Central Venous Catheter Line                 ECMO Cannula 04/25/20 1120 right femoral vein 17 days         ECMO Cannula 04/25/20 1120 right internal jugular 17 days          Drain                 Urethral Catheter 04/18/20 1854 24 days         NG/OG Tube 05/03/20 1215 [place] sump;nasogastric 18 Fr. Left nostril 9 days          Airway                 Surgical Airway 05/05/20 1500 Shiley Cuffed 7 days          Arterial Line                 Arterial Line 04/27/20 1100 Right Brachial 15 days          Peripheral Intravenous Line                 Peripheral IV - Single Lumen 05/08/20 0300 20 G Anterior;Left Hand 5 days         Peripheral IV - Single Lumen 05/08/20 0300 20 G Left Wrist 5 days         Peripheral IV - Single Lumen 05/12/20 0230 22 G Left Antecubital 1 day         Peripheral IV - Single Lumen 05/13/20 0357 22 G Right Forearm less than 1 day                Significant Labs:    CBC/Anemia Profile:  Recent Labs   Lab 05/12/20  0215  05/12/20 2006 05/13/20  0200 05/13/20  0206 05/13/20  0800 05/13/20  0813   WBC 12.05   < > 10.25  --  9.81  9.81  --  10.33  --    HGB 10.7*   < > 9.3*  --  9.4*  9.4*  --  9.4*  --    HCT 34.4*   < > 30.0*   < > 30.8*  30.8* 29* 31.0* 28*      < > 216  --  216  216  --  219  --    MCV 96   < > 97  --  97  97  --  98  --    RDW 14.0   < > 14.2  --  14.0  14.0  --  14.2  --    FERRITIN 1,951*  --   --   --  1,968*  --   --   --     < > = values in this interval not displayed.        Chemistries:  Recent  Labs   Lab 05/12/20 2006 05/13/20  0200 05/13/20  0800   * 145 145   K 3.9 4.0 4.1    106 105   CO2 29 30* 31*   BUN 55* 53* 54*   CREATININE 0.7 0.7 0.8   CALCIUM 10.1 10.1 10.2   ALBUMIN 3.8 3.7 3.6   PROT 6.8 6.8 6.8   BILITOT 0.6 0.5 0.6   ALKPHOS 155* 154* 169*   ALT 38 36 39   AST 29 27 31   MG 2.3 2.4 2.4   PHOS 4.1 3.2 3.7       Significant Imaging:  I have reviewed and interpreted all pertinent imaging results/findings within the past 24 hours.    Assessment/Plan:     COVID-19 virus detected  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  This was switched to ETT on 4/11.  Cannulated on 4/25.    Neuro:  5 of valium  100 BID of seroquel  5 Rebekah q 4   Turned off gtts yesterday    Pulm  AC/VC+ on the vent.  Small pneumo on cxr, subclinical.  Sweep of 8 today, 3.4 L/min flows and 2800 RPM  Will need some more time on ECMO, not pulling enough volume on the vent    Cardiac   Cardene, wean as tolerated    GI  TFs at goal    Renal  Making urine, diuresing agressively    Heme/ID  Fluc/Vanc  White count normalized    Dispo:  Remain in ICU, will trying to expand lungs with vent, will work on sedation plan.             Critical care was time spent personally by me on the following activities: development of treatment plan with patient or surrogate and bedside caregivers, discussions with consultants, evaluation of patient's response to treatment, examination of patient, ordering and performing treatments and interventions, ordering and review of laboratory studies, ordering and review of radiographic studies, pulse oximetry, re-evaluation of patient's condition.  This critical care time did not overlap with that of any other provider or involve time for any procedures.     Zelalem Ariza MD  Critical Care - Surgery  [facility]

## 2020-05-14 NOTE — PLAN OF CARE
05/14/20 1619   Discharge Reassessment   Assessment Type Discharge Planning Reassessment   Discharge Plan A Long-term acute care facility (LTAC)   Discharge Plan B Long-term acute care facility (LTAC)   DME Needed Upon Discharge  other (see comments)  (TBD)   Anticipated Discharge Disposition LTAC   Can the patient/caregiver answer the patient profile reliably? No, cognitively impaired   Describe the patient's ability to walk at the present time. Does not walk or unable to take any steps at all     Anticipate discharge to LTAC when medically ready.     Julie Haase RN  Case Management [phone]

## 2020-05-14 NOTE — PROGRESS NOTES
"[facility]  Critical Care - Surgery  Progress Note    Patient Name: Ranjana Sanchez  MRN: [id number]  Admission Date: 4/10/2020  Hospital Length of Stay: 34 days  Code Status: Full Code  Attending Provider: Francis Ayon MD  Primary Care Provider: Primary Doctor No   Principal Problem: Acute respiratory distress syndrome (ARDS) due to COVID-19 virus    Subjective:     Hospital/ICU Course:  No significant changes, unchanged neuro status overnight.    Vitals:    05/14/20 1500 05/14/20 1600 05/14/20 1700 05/14/20 1800   BP: 123/68 139/76 (!) 141/78 130/74   BP Location: Left arm Left arm Left arm Left arm   Patient Position: Lying Lying Lying Lying   Pulse: (!) 141 (!) 139 (!) 140 (!) 138   Resp:       Temp:  98.3 °F (36.8 °C)     TempSrc:  Oral     SpO2: 98% 98% 98% 98%   Weight: 67.6 kg (149 lb)      Height: 5' 4" (1.626 m)        Body mass index is 25.58 kg/m².      Intake/Output Summary (Last 24 hours) at 5/14/2020 1808  Last data filed at 5/14/2020 1800  Gross per 24 hour   Intake 3218.03 ml   Output 2216 ml   Net 1002.03 ml     Physical Exam   Constitutional: He appears well-developed.   HENT:   Head: Normocephalic and atraumatic.   Eyes: Pupils are equal, round, and reactive to light.   Neck: Normal range of motion.   Cardiovascular:   tachycardic   Pulmonary/Chest:   Trached, mechanically ventilated  On ECMO, cannulated in RIJ and R fem vein   Abdominal: Soft. He exhibits no distension.   Musculoskeletal: Normal range of motion.   Neurological:   Opens eyes and moves head spontaneously, does not follow commands, no observed extremity movement  Skin: Skin is warm.   Vitals reviewed.    Vent Mode: A/C  Oxygen Concentration (%):  [50] 50  Resp Rate Total:  [27 br/min-53 br/min] 43 br/min  Vt Set:  [200 mL] 200 mL  PEEP/CPAP:  [5 cmH20-8 cmH20] 5 cmH20  Mean Airway Pressure:  [15 srE28-51 cmH20] 16 cmH20    Recent Labs   Lab 05/14/20  1400 05/14/20  1402   WBC 9.00  --    RBC 3.14*  --  "   HGB 9.3*  --    HCT 30.5* 27*     --    MCV 97  --    MCH 29.6  --    MCHC 30.5*  --        Recent Labs   Lab 05/14/20  1400   CALCIUM 10.2   PROT 6.6   *   K 4.2   CO2 28   *   BUN 46*   CREATININE 0.7   ALKPHOS 133   ALT 27   AST 25   BILITOT 0.5         Assessment/Plan:        COVID-19 virus detected  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  This was switched to ETT on 4/11.  Cannulated on 4/25.     Neuro:  Metabolic encephalopathy. Altered mental status with poor neuro exam: multifactorial. Likely component of delirium from prolonged ICU stay and severe illness, possible component of neurologic manifestation of COVID-19, and least likely possible stroke  - for now minimizing sedation   - optimizing sleep/wake cycle   - neurology consult and EEG to identify reasons for AMS     Pulm:  Hypoxic Respiratory failure and ARDS secondary to COVID-19 infection  -AC/VC+, Vt 200, 5 PEEP  Small pneumo on cxr  - subclinical and improved today, continuing to monitor today.  V-V ECMO   -Sweep of 9, 3.4 L/min flows and 2800 RPM  -Lung compliance remains low, no ECMO wean yet     Cardiac:  HTN - Cardene, wean as tolerated  Tachycardia - TTE today to look for RWMA, does not respond to      GI  -TFs at goal  -PPI bid for previous GI bleed     Renal/Electrolytes  -Making adequate urine  -holding further diuresis at this time  -slightly hypernatremic but this may be protective for cerebral edema     ID  Fluc/Vanc  White count normalized  CRP decreasing    Heme  -heparin infusion for ECMO and for hypercoagulable state associated with COVID-19    Endocrine  Tight glucose regulation     Dispo:  Remain in ICU, will trying to expand lungs with vent, awaiting neuro improvement.       I have spent 56 min critical time involved in the management of this patient. This has included a physical exam, review of laboratory  values and images and discussions with the multidisciplinary team, patient, and family and is independent of time spent performing procedures and teaching.     Davie Alston MD  [facility] [id number]

## 2020-05-14 NOTE — PROGRESS NOTES
[facility]  Cardiothoracic Surgery  Daily ECMO Progress Note    Patient Name: Ranjana Sanchez  MRN: [id number]  Admission Date: 4/10/2020  Hospital Length of Stay: 34 days  TYPE of ECMO Support: V-V  DAY OF ECMO SUPPORT: 20  Code Status: Full Code   Attending Physician: Francis Ayon MD   Referring Provider: Francis Ayon MD  Principal Problem:Acute respiratory distress syndrome (ARDS) due to COVID-19 virus    Subjective:   Interval History:  Net positive 1L in last 24hrs, 2.1L UOP, BUN downtrending at 48 today, Cr non-elevated, Na improved at 148 with initiation of free water boluses yesterday. Cardene at 1.5. Hep gtt at 800/hr, aXa at goal levels, LDH level good. CXR improved on R, deep sulcus sign less prominent, no subQ air on decreased PEEP of 8. Good PaO2 at oxygenator FiO2 90 so will wean. Not changing PCO2 in low 50s due to respiratory compensation for alkalosis.    Medications:  Continuous Infusions:   dexmedetomidine (PRECEDEX) infusion 0.4 mcg/kg/hr (05/14/20 0500)    heparin (porcine) in 5 % dex 800 Units/hr (05/14/20 0500)    insulin (HUMAN R) infusion (adults) 0.4 Units/hr (05/14/20 0500)    nicardipine 1.5 mg/hr (05/14/20 0500)    norepinephrine bitartrate-D5W Stopped (04/25/20 0300)    propofoL Stopped (05/12/20 1200)    vasopressin (PITRESSIN) infusion Stopped (05/09/20 1800)     Scheduled Meds:   albumin human 5%  12.5 g Intravenous Once    albuterol-ipratropium  3 mL Nebulization Q6H    aspirin  81 mg Per NG tube Daily    chlorhexidine  15 mL Mouth/Throat BID    diazePAM  5 mg Oral Q8H    fluconazole (DIFLUCAN) IVPB  200 mg Intravenous Q24H    pantoprazole  40 mg Intravenous Q12H    phenylephrine HCl in 0.9% NaCl        polyethylene glycol  17 g Per NG tube Daily    potassium chloride 10%  20 mEq Per NG tube BID    QUEtiapine  100 mg Per NG tube Q12H    sucralfate  1 g Per NG tube Q6H    traZODone  25 mg Per NG tube QHS    vancomycin (VANCOCIN) IVPB   1,250 mg Intravenous Q24H    vasopressin        white petrolatum-mineral oiL   Both Eyes BID        Objective:     Vital Signs (Most Recent):  Temp: 98.5 °F (36.9 °C) (05/14/20 0300)  Pulse: (!) 143 (05/14/20 0900)  Resp: (!) 43 (05/14/20 0724)  BP: (!) 92/59 (05/14/20 0900)  SpO2: 100 % (05/14/20 0900) Vital Signs (24h Range):  Temp:  [98.4 °F (36.9 °C)-98.7 °F (37.1 °C)] 98.5 °F (36.9 °C)  Pulse:  [130-149] 143  Resp:  [35-43] 43  SpO2:  [97 %-100 %] 100 %  BP: ()/(59-78) 92/59  Arterial Line BP: ()/(48-70) 83/48     Weight: 67.8 kg (149 lb 7.6 oz)  Body mass index is 25.66 kg/m².    SpO2: 100 %  O2 Device (Oxygen Therapy): ventilator    Intake/Output - Last 3 Shifts       05/12 0700 - 05/13 0659 05/13 0700 - 05/14 0659 05/14 0700 - 05/15 0659    I.V. (mL/kg) 1285.5 (20.4) 1269 (18.7)     Blood  322     Other  61     NG/GT 1730 1535     Total Intake(mL/kg) 3015.5 (47.9) 3187 (47)     Urine (mL/kg/hr) 2630 (1.7) 2195 (1.3)     Stool       Blood 6 2 3    Total Output 2636 2197 3    Net +379.5 +990 -3                 Lines/Drains/Airways     Peripherally Inserted Central Catheter Line            PICC Double Lumen 05/05/20 1707 right basilic 8 days          Central Venous Catheter Line                 ECMO Cannula 04/25/20 1120 right femoral vein 18 days         ECMO Cannula 04/25/20 1120 right internal jugular 18 days          Drain                 Urethral Catheter 04/18/20 1854 25 days         NG/OG Tube 05/03/20 1215 [place] sump;nasogastric 18 Fr. Left nostril 10 days          Airway                 Surgical Airway 05/05/20 1500 Shiley Cuffed 8 days          Arterial Line                 Arterial Line 04/27/20 1100 Right Brachial 16 days          Peripheral Intravenous Line                 Peripheral IV - Single Lumen 05/08/20 0300 20 G Anterior;Left Hand 6 days         Peripheral IV - Single Lumen 05/08/20 0300 20 G Left Wrist 6 days         Peripheral IV - Single Lumen 05/12/20 0230 22 G Left  Antecubital 2 days         Peripheral IV - Single Lumen 05/13/20 0357 22 G Right Forearm 1 day                Physical Exam    Constitutional: He is sedated, and intubated.   Head: Normocephalic   Cardiovascular: Tachycardia 130s   Pulmonary/Chest: intubated vent FiO2 50% PEEP 8, ECMO RPM 2800, sweep 8, oxygenator FiO2 90%, flows mid 3s  Skin: L neck and L groin cannulas intact, min to no flashing, clots pre and post-oxygenator, all sutures intact and secure  Nursing note and vitals reviewed.    Significant Labs:  BMP:   Recent Labs   Lab 05/14/20  0150   *   *   K 4.3   *   CO2 27   BUN 48*   CREATININE 0.7   CALCIUM 10.0   MG 2.4     CBC:   Recent Labs   Lab 05/14/20  0800   WBC 10.02   RBC 3.24*   HGB 9.5*   HCT 31.7*      MCV 98   MCH 29.3   MCHC 30.0*     LFTs:   Recent Labs   Lab 05/14/20  0150   ALT 31   AST 27   ALKPHOS 146*   BILITOT 0.5   PROT 6.5   ALBUMIN 3.6     Significant Diagnostics:  Cxr: stable with resolving deep sulcus sign on R; no subQ emphysema    Assessment/Plan:     Acute respiratory distress syndrome (ARDS) due to COVID-19 virus  - Crich switched to ETT on 4/11  - Monika weaned off 5/5  - Tracheostomy and bronch 5/5  - Thrombocytopenia: resolved; transfuse for under 30K  - Aspirin 81 daily  - RPM at 2800; flows mid 3s  - Sweep at 8; not increasing; goal 40-50s acceptable if not acidotic on ABG (respiratory compensation for alkalosis)  - Oxygenator Fi 90%; wean to 85% today  - Vent Fi 50%, PEEP 8; CXR with resolving R deep sulcus sign; doesn't appear to need a chest tube at this time     Tongue laceration      -ENT consulted and evaluated patient, laceration superficial, not bleeding      -recs: bite block vs paralysis, will monitor    Sedation  - Propofol and precedex off 5/12  - Increased valium to q8, and seroquel; may consider increasing seroquel today as patient's tachycardia and tachypnea resolves after administration of his PRN Fentanyl     UGI Bleed  - Scoped  by GI 5/3 with epinephrine injection and clip placement for an actively bleeding D2/D3 Diuelafoy lesion  - No pressors, Hb stable     FEN/GI  - TF at 45, goal      - Lasix gtt stopped 5/12  - FWB restarted for increasing BUN and HR at 250mL q12     Anticoagulation      - Heparin gtt at 800 U/hr      - Goal aXa 0.25 - 0.3 Q6hrs      -  from 326    Prophylaxis      - protonix q12 IV       - abx for ECMO      - PICC placed 5/5; central line removed 5/6    Anne Miller MD  Cardiothoracic Surgery  [facility]      VV ECMO circuit checked and all parameters reviewed. Anticoagulation was managed and all cannulation exit sites along with review of labs and radiology studies performed. Speed and functioning of the pump along with oxygenator quality reviewed.  Patient doing well much improved respiratory status.  Chest x-ray seems to be improving on a daily basis.  Patient tolerating tube feeds.  Pressure necrosis on the nose remained stable.  FiO2 is improving and as a result we are going to decrease the FiO2 on the oxygenator to 75%.  Coordination of care was carried out between different team members which included the surgical team, surgical ICU, perfusion services and the nursing staff.

## 2020-05-14 NOTE — PROGRESS NOTES
"[facility]  Adult Nutrition  Progress Note    SUMMARY       Recommendations    1. Continue TF of Peptamen Intense VHP at goal rate of 45 mL/hr to provide 1080 kcal, 99 gm protein, and 907 mL fluid free fluid.      2. RD to monitor.    Goals: Meet % EEN, EPN by RD f/u date  Nutrition Goal Status: goal met  Communication of RD Recs: (POC)    Reason for Assessment    Reason For Assessment: RD follow-up  Diagnosis: (COVID-19)  Relevant Medical History: HTN  Interdisciplinary Rounds: did not attend  General Information Comments: Remote access, spoke with RN via phone. Pt intubated, tolerating TF @ 45 mL/hr. Noted wt gain since last RD assessment. Unable to assess for PO intake and UBW PTA. Due to recent hospital wide restrictions to limit the transfer of COVID-19, no NFPE performed at this time. NFPE to be performed at a later date. Unable to assess for malnutrition criteria at this time.   Nutrition Discharge Planning: Unable to determine at this time.    Nutrition Risk Screen    Nutrition Risk Screen: tube feeding or parenteral nutrition    Nutrition/Diet History    Food Allergies: NKFA  Factors Affecting Nutritional Intake: NPO, on mechanical ventilation    Anthropometrics    Temp: 98.5 °F (36.9 °C)  Height Method: Stated  Height: 5' 4" (162.6 cm)  Height (inches): 64 in  Weight Method: Bed Scale  Weight: 67.8 kg (149 lb 7.6 oz)  Weight (lb): 149.47 lb  Ideal Body Weight (IBW), Male: 130 lb  % Ideal Body Weight, Male (lb): 106.92 %  BMI (Calculated): 25.6  BMI Grade: 18.5-24.9 - normal  Usual Body Weight (UBW), kg: (JAMMIE)     Lab/Procedures/Meds    Pertinent Labs Reviewed: reviewed  Pertinent Labs Comments: Na 148, BUN 48, glucose 196, alk ohos 146, .9, triglycerides 180  Pertinent Medications Reviewed: reviewed  Pertinent Medications Comments: aspirin    Estimated/Assessed Needs    Weight Used For Calorie Calculations: 63.5 kg (139 lb 15.9 oz)(dosing wt)  Energy Calorie Requirements " (kcal): 1548 kcal/day  Energy Need Method: [facility]  Protein Requirements: 76-95 g/day(1.2-1.5 g/kg)  Weight Used For Protein Calculations: 63.5 kg (139 lb 15.9 oz)(dosing weight)  Fluid Requirements (mL): 1 mL/kcal or per MD  Estimated Fluid Requirement Method: RDA Method  RDA Method (mL): 1548  CHO Requirement: -    Nutrition Prescription Ordered    Current Diet Order: NPO  Current Nutrition Support Formula Ordered: Peptamen Intense VHP  Current Nutrition Support Rate Ordered: 45 (ml)  Current Nutrition Support Frequency Ordered: mL/hr    Evaluation of Received Nutrient/Fluid Intake    Enteral Calories (kcal): 1080  Enteral Protein (gm): 99  Enteral (Free Water) Fluid (mL): 907  Other Calories (kcal): 0  Total Calories (kcal): 0  % Kcal Needs: 83  % Protein Needs: 104  I/O: +1.02L x 24, -4.806L since admit  Energy Calories Required: meeting needs  Protein Required: meeting needs  Fluid Required: (per MD)  Comments: LBM 5/12  Tolerance: tolerating  % Intake of Estimated Energy Needs: 75 - 100 %  % Meal Intake: NPO    Nutrition Risk    Level of Risk/Frequency of Follow-up: (f/u 1 x wk)     Assessment and Plan    Nutrition Problem  Inadequate energy intake     Related to (etiology):   Inability to consume sufficient energy      Signs and Symptoms (as evidenced by):   NPO     Interventions (treatment strategy):  Collaboration of nutrition care w/ other providers   Enteral Nutrition      Nutrition Diagnosis Status:   Continues       Monitor and Evaluation    Food and Nutrient Intake: energy intake, food and beverage intake, enteral nutrition intake  Food and Nutrient Adminstration: diet order, enteral and parenteral nutrition administration  Physical Activity and Function: nutrition-related ADLs and IADLs  Anthropometric Measurements: weight change, weight  Biochemical Data, Medical Tests and Procedures: inflammatory profile, lipid profile, glucose/endocrine profile, gastrointestinal profile, electrolyte and renal  panel  Nutrition-Focused Physical Findings: overall appearance     Nutrition Follow-Up    RD Follow-up?: Yes

## 2020-05-14 NOTE — PROGRESS NOTES
Pharmacokinetic Assessment Follow Up: IV Vancomycin    Vancomycin serum concentration assessment(s):  · Vancomycin random level resulted at 18.1 mcg/mL approximately 15 hours after the previous level. Goal level is 10 to 20 mcg/mL    Drug levels (last 3 results):  Recent Labs   Lab Result Units 05/13/20  1030 05/14/20  0150   Vancomycin, Random ug/mL  --  18.1   Vancomycin-Trough ug/mL 30.9*  --      Vancomycin Regimen Plan:  · Schedule vancomycin IV 1250 mg every 24 hours. Next serum concentration measured on 5/16 0830 prior to 3rd dose of new regimen    Pharmacy will continue to follow and monitor vancomycin. Please contact pharmacy for questions regarding this assessment.    Thank you for the consult,   Sonya Banks, PharmD, BCCCP  Spectralink: [id number]  _________________________________________________________________________________       Patient brief summary:  Ranjana Sanchez is a 54 y.o. male initiated on antimicrobial therapy with IV vancomycin for treatment of sepsis    Drug Allergies:   Review of patient's allergies indicates:  No Known Allergies    Actual Body Weight:   68.4 kg     Renal Function:   Estimated Creatinine Clearance: 98.7 mL/min (based on SCr of 0.7 mg/dL).    Dialysis Method (if applicable):  N/A    CBC (last 72 hours):  Recent Labs   Lab Result Units 05/11/20  1400 05/11/20 2000 05/12/20  0215 05/12/20  0753 05/12/20  1357 05/12/20 2006 05/13/20  0200 05/13/20  0800 05/13/20  1400 05/13/20  1946 05/14/20  0150 05/14/20  0800   WBC K/uL 11.69 12.85* 12.05 12.61 10.93 10.25 9.81  9.81 10.33 9.30 10.11 8.55 10.02   Hemoglobin g/dL 11.5* 11.0* 10.7* 10.7* 11.2* 9.3* 9.4*  9.4* 9.4* 8.7* 8.9* 9.4* 9.5*   Hemoglobin, Free, Plasma mg/dL  --   --   --  40  --   --   --  10  --   --   --  20   Hematocrit % 36.5* 34.9* 34.4* 34.0* 35.8* 30.0* 30.8*  30.8* 31.0* 28.6* 29.4* 30.7* 31.7*   Platelets K/uL 202 211 216 231 227 216 216  216 219 204 209 196 210   Gran% % 71.5 69.9 65.6 64.8  61.7 57.8 57.0  57.0 58.1 56.8 70.0 68.0 60.0   Lymph% % 6.5* 7.3* 9.5* 8.6* 10.2* 13.0* 11.8*  11.8* 10.2* 11.4* 7.0* 6.0* 11.0*   Mono% % 12.7 13.9 13.9 15.5* 15.1* 15.5* 15.4*  15.4* 15.4* 14.9 9.0 12.0 12.0   Eosinophil% % 6.8 6.3 7.9 8.0 8.7* 9.2* 11.5*  11.5* 10.7* 11.2* 14.0* 11.0* 13.0*   Basophil% % 0.8 0.5 0.7 0.6 0.9 0.7 0.8  0.8 0.8 0.8 0.0 0.0 0.0   Differential Method  Automated Automated Automated Automated Automated Automated Automated  Automated Automated Automated Manual Manual Manual       Metabolic Panel (last 72 hours):  Recent Labs   Lab Result Units 05/11/20  1400 05/11/20  2000 05/12/20  0215 05/12/20  0753 05/12/20  1357 05/12/20 2006 05/13/20  0200 05/13/20  0800 05/13/20  1400 05/13/20  1946 05/14/20  0150 05/14/20  0800   Sodium mmol/L 145 144 146* 146* 146* 146* 145 145 146* 147* 148* 149*   Potassium mmol/L 4.3 3.9 3.9 4.3 4.1 3.9 4.0 4.1 4.1 4.3 4.3 4.2   Chloride mmol/L 103 102 103 103 103 105 106 105 108 109 111* 112*   CO2 mmol/L 30* 32* 31* 32* 32* 29 30* 31* 29 28 27 26   Glucose mg/dL 194* 243* 199* 188* 253* 190* 198* 218* 316* 184* 196* 191*   BUN, Bld mg/dL 36* 45* 47* 52* 54* 55* 53* 54* 51* 53* 48* 46*   Creatinine mg/dL 0.7 0.7 0.7 0.7 0.7 0.7 0.7 0.8 0.8 0.7 0.7 0.7   Albumin g/dL 3.5 3.5 3.8 3.7 3.6 3.8 3.7 3.6 3.9 3.8 3.6 3.6   Total Bilirubin mg/dL 0.6 0.7 0.6 0.6 0.6 0.6 0.5 0.6 0.6 0.6 0.5 0.5   Alkaline Phosphatase U/L 175* 184* 170* 186* 181* 155* 154* 169* 147* 149* 146* 142*   AST U/L 34 36 30 35 32 29 27 31 25 27 27 28   ALT U/L 41 44 40 47* 44 38 36 39 34 33 31 30   Magnesium mg/dL 2.2 2.2 2.4  --  2.3 2.3 2.4 2.4 2.2 2.3 2.4 2.2   Phosphorus mg/dL 3.5 4.1 3.8  --  4.2 4.1 3.2 3.7 2.9 2.8 2.9 3.4       Vancomycin Administrations:  vancomycin given in the last 96 hours                   vancomycin in dextrose 5 % 1 gram/250 mL IVPB 1,000 mg (mg) 1,000 mg New Bag 05/04/20 1130     1,000 mg New Bag 05/03/20 2202     1,000 mg New Bag  1050     1,000 mg New  Bag 05/02/20 2212     1,000 mg New Bag  1003     1,000 mg New Bag 05/01/20 2223     1,000 mg New Bag  1000     1,000 mg New Bag 04/30/20 2201                Microbiologic Results:  Microbiology Results (last 7 days)     Procedure Component Value Units Date/Time    Culture, Respiratory with Gram Stain [[id number]] Collected:  05/08/20 1514    Order Status:  Completed Specimen:  Respiratory from Bronchial Wash Updated:  05/10/20 0803     Respiratory Culture No growth     Gram Stain (Respiratory) <10 epithelial cells per low power field.     Gram Stain (Respiratory) No WBC's     Gram Stain (Respiratory) No organisms seen

## 2020-05-14 NOTE — SUBJECTIVE & OBJECTIVE
Interval History:  Net positive 1L in last 24hrs, 2.1L UOP, BUN downtrending at 48 today, Cr non-elevated, Na improved at 148 with initiation of free water boluses yesterday. Cardene at 1.5. Hep gtt at 800/hr, aXa at goal levels, LDH level good. CXR improved on R, deep sulcus sign less prominent, no subQ air on decreased PEEP of 8. Good PaO2 at oxygenator FiO2 90 so will wean. Not changing PCO2 in low 50s due to respiratory compensation for alkalosis.    Medications:  Continuous Infusions:   dexmedetomidine (PRECEDEX) infusion 0.4 mcg/kg/hr (05/14/20 0500)    heparin (porcine) in 5 % dex 800 Units/hr (05/14/20 0500)    insulin (HUMAN R) infusion (adults) 0.4 Units/hr (05/14/20 0500)    nicardipine 1.5 mg/hr (05/14/20 0500)    norepinephrine bitartrate-D5W Stopped (04/25/20 0300)    propofoL Stopped (05/12/20 1200)    vasopressin (PITRESSIN) infusion Stopped (05/09/20 1800)     Scheduled Meds:   albumin human 5%  12.5 g Intravenous Once    albuterol-ipratropium  3 mL Nebulization Q6H    aspirin  81 mg Per NG tube Daily    chlorhexidine  15 mL Mouth/Throat BID    diazePAM  5 mg Oral Q8H    fluconazole (DIFLUCAN) IVPB  200 mg Intravenous Q24H    pantoprazole  40 mg Intravenous Q12H    phenylephrine HCl in 0.9% NaCl        polyethylene glycol  17 g Per NG tube Daily    potassium chloride 10%  20 mEq Per NG tube BID    QUEtiapine  100 mg Per NG tube Q12H    sucralfate  1 g Per NG tube Q6H    traZODone  25 mg Per NG tube QHS    vancomycin (VANCOCIN) IVPB  1,250 mg Intravenous Q24H    vasopressin        white petrolatum-mineral oiL   Both Eyes BID        Objective:     Vital Signs (Most Recent):  Temp: 98.5 °F (36.9 °C) (05/14/20 0300)  Pulse: (!) 143 (05/14/20 0900)  Resp: (!) 43 (05/14/20 0724)  BP: (!) 92/59 (05/14/20 0900)  SpO2: 100 % (05/14/20 0900) Vital Signs (24h Range):  Temp:  [98.4 °F (36.9 °C)-98.7 °F (37.1 °C)] 98.5 °F (36.9 °C)  Pulse:  [130-149] 143  Resp:  [35-43] 43  SpO2:  [97  %-100 %] 100 %  BP: ()/(59-78) 92/59  Arterial Line BP: ()/(48-70) 83/48     Weight: 67.8 kg (149 lb 7.6 oz)  Body mass index is 25.66 kg/m².    SpO2: 100 %  O2 Device (Oxygen Therapy): ventilator    Intake/Output - Last 3 Shifts       05/12 0700 - 05/13 0659 05/13 0700 - 05/14 0659 05/14 0700 - 05/15 0659    I.V. (mL/kg) 1285.5 (20.4) 1269 (18.7)     Blood  322     Other  61     NG/GT 1730 1535     Total Intake(mL/kg) 3015.5 (47.9) 3187 (47)     Urine (mL/kg/hr) 2630 (1.7) 2195 (1.3)     Stool       Blood 6 2 3    Total Output 2636 2197 3    Net +379.5 +990 -3                 Lines/Drains/Airways     Peripherally Inserted Central Catheter Line            PICC Double Lumen 05/05/20 1707 right basilic 8 days          Central Venous Catheter Line                 ECMO Cannula 04/25/20 1120 right femoral vein 18 days         ECMO Cannula 04/25/20 1120 right internal jugular 18 days          Drain                 Urethral Catheter 04/18/20 1854 25 days         NG/OG Tube 05/03/20 1215 [place] sump;nasogastric 18 Fr. Left nostril 10 days          Airway                 Surgical Airway 05/05/20 1500 [place]iley Cuffed 8 days          Arterial Line                 Arterial Line 04/27/20 1100 Right Brachial 16 days          Peripheral Intravenous Line                 Peripheral IV - Single Lumen 05/08/20 0300 20 G Anterior;Left Hand 6 days         Peripheral IV - Single Lumen 05/08/20 0300 20 G Left Wrist 6 days         Peripheral IV - Single Lumen 05/12/20 0230 22 G Left Antecubital 2 days         Peripheral IV - Single Lumen 05/13/20 0357 22 G Right Forearm 1 day                Physical Exam    Constitutional: He is sedated, and intubated.   Head: Normocephalic   Cardiovascular: Tachycardia 130s   Pulmonary/Chest: intubated vent FiO2 50% PEEP 8, ECMO RPM 2800, sweep 8, oxygenator FiO2 90%, flows mid 3s  Skin: L neck and L groin cannulas intact, min to no flashing, clots pre and post-oxygenator, all sutures intact and  secure  Nursing note and vitals reviewed.    Significant Labs:  BMP:   Recent Labs   Lab 05/14/20  0150   *   *   K 4.3   *   CO2 27   BUN 48*   CREATININE 0.7   CALCIUM 10.0   MG 2.4     CBC:   Recent Labs   Lab 05/14/20  0800   WBC 10.02   RBC 3.24*   HGB 9.5*   HCT 31.7*      MCV 98   MCH 29.3   MCHC 30.0*     LFTs:   Recent Labs   Lab 05/14/20  0150   ALT 31   AST 27   ALKPHOS 146*   BILITOT 0.5   PROT 6.5   ALBUMIN 3.6     Significant Diagnostics:  Cxr: stable with resolving deep sulcus sign on R; no subQ emphysema

## 2020-05-14 NOTE — PLAN OF CARE
Patient's blood pressure and respiratory status continues to be be labile.   Tachypnea has been persistent despite sedation.  Tachycardia is remains unresolved. Bedside echo performed. Continuous EEG placed via technician.   UOP remains >100mL an hour.   Plan remains to wean sedation to comprehend in depth neurological status.   250 mL of albumin administered for intermittent hypotension (80/50).   Vasopressors remain d/c'd.  Will continue to monitor patient's status.  Family (son) updated via telephone.

## 2020-05-14 NOTE — CONSULTS
55 y/o M with a medical history of HTN presented to the ED for increasing SOB with known COVID-19 exposure on 4/10/20. Patient is originally from [facility] and is a  on a cruise ship. On arrival he was emergently intubated due to ARDS 2/2 COVID. He is on ECMO (RIJ-RFV) since 4/25. He is in a hypercoagulable state associated with COID-19 and is on a heparin therapy associated with that. He has a GI bleed due to diulafoy lesion. Neurology was consulted to evaluate neurologic status as he is not following commands despite being only on precedex.     Patient's propofol has been discontinued since 5/12/20. He is currently on 0.4 mcg/kg/hr of precedex (non-titrating). Conversation with the nurse revealed that patient's neurologic status has been the same for the past couple of days. Patient's pupils are brisk and reactive to light. He is not able to track body however, does have corneal reflex. Patient has both a gag and a cough reflex. He minimally withdraws to pain in both the upper and lower extremities.     Given that the patient has been on ECMO since 4/25 he is at an increased risk of neurological issues including embolic stroke, intracerebral hemorrhage, seizures, and anoxic injuries> Patient has brain stem reflexes which means that he does not qualify for being brain dead. We can obtain an EEG to rule out status, however this will not tell us anything about prognostication.     Plan   1) EEG to rule out seizure   2) Ultimately will need an MRI Brain w/o contrast however, given that the patient is on ECMO this will be difficult.     Due to COVID-19, the Neurology team is limiting interaction with consult patients unless absolutely necessary in order to limit patient's exposure to the virus. As of now, the Neurology team will be chart checking this patient and discussing with staff. If the patient has an acute neurological change and/or you believe the patient needs to be examined by a provider, please page  Neurology on call to discuss the patient with a team member.     Fely Reeves MD  Please call [phone] for any further questions. We will continue to follow.

## 2020-05-14 NOTE — PROGRESS NOTES
"05/14/2020  Maikel Zepeda    Current provider:  Francis Ayon MD      As floor rounding has been requested to be limited during COVID-19 patient quarantine by Infectious Disease and leadership, only "electronic" rounding has been performed on this mechanical assist device patient.  Electronic rounding includes verifying in Epic that current settings and measurements for the device have been documented appropriately and that they are within limits.  Additionally, this rounding verifies that the EQUIPMENT section of the VAD flowsheet is documented in Epic, specifically checking the presence of emergency equipment and controller self test.  Any discrepancies will be related to the care team.    In emergency, the nursing units have been notified to contact the perfusion department either by:  Calling [phone] from 630am to 4pm, or by contacting the hospital  from 4pm to 630am and asking to speak with the perfusionist on call.    Maikel Zepeda  9:15 AM  "

## 2020-05-14 NOTE — ASSESSMENT & PLAN NOTE
Acute respiratory distress syndrome (ARDS) due to COVID-19 virus  - Crich switched to ETT on 4/11  - Monika weaned off 5/5  - Tracheostomy and bronch 5/5  - Thrombocytopenia: resolved; transfuse for under 30K  - Aspirin 81 daily  - RPM at 2800; flows mid 3s  - Sweep at 8; not increasing; goal 40-50s acceptable if not acidotic on ABG (respiratory compensation for alkalosis)  - Oxygenator Fi 90%; wean to 85% today  - Vent Fi 50%, PEEP 8; CXR with resolving R deep sulcus sign; doesn't appear to need a chest tube at this time     Tongue laceration      -ENT consulted and evaluated patient, laceration superficial, not bleeding      -recs: bite block vs paralysis, will monitor    Sedation  - Propofol and precedex off 5/12  - Increased valium to q8, and seroquel; may consider increasing seroquel today as patient's tachycardia and tachypnea resolves after administration of his PRN Fentanyl     UGI Bleed  - Scoped by GI 5/3 with epinephrine injection and clip placement for an actively bleeding D2/D3 Diuelafoy lesion  - No pressors, Hb stable     FEN/GI  - TF at 45, goal      - Lasix gtt stopped 5/12  - FWB restarted for increasing BUN and HR at 250mL q12     Anticoagulation      - Heparin gtt at 800 U/hr      - Goal aXa 0.25 - 0.3 Q6hrs      -  from 326    Prophylaxis      - protonix q12 IV       - abx for ECMO      - PICC placed 5/5; central line removed 5/6

## 2020-05-14 NOTE — PROGRESS NOTES
EC[facility] Specialists shift report    Date: 05/14/2020  ECMO Specialist:  Tatyana Lawrence    Pump parameters:  RPM: 2800  Flow:  3.4  Sweep:  9  FiO2:  85%    Oxygenator status:  Clots: Pre Oxygenator,  One small clot developing Post Oxy  Fibrin: Pre Oxy and at various connection sites    Pressure trends:  P1: 110's   P2: 80's   Delta P: 20's     Volume status:  Chugging noted? None   CVP: NA  MAP:  60-90's   MD notified (name):  Nany     Anticoagulation:  ACT/aPTT/Xa parameters: 0.25-0.30  ACT/aPTT/Xa trends this shift: 0.22, 0.28    Cannula size / status / placement:  Arterial:   Venous 1: 23 FR / RIJ / 4 cm  Venous 2: 27 FR / RFV / 12 cm   Dual lumen:      Additional Comments:  ECMO pump is running with no problems.  Patient continues to be tachypneic, tachycardic, and labile with blood pressure.   Patient received 250 of Albumin for hypotension early this AM.   Decreased the FiO2 to 85% and the Heparin was adjusted to reach Anti Xa goals.

## 2020-05-15 NOTE — PROGRESS NOTES
Notified Dr. Ayon of increasing Cardene requirements to maintain MAP < 85. PRN Fentanyl administered for agitation, MAP remained > 85. Precedex at 0.5 mcg/kg/hr.     Orders to start 12.5 mg lopressor PO now and q12h, 5 mg norvasc PO now and daily, and OK'd to increase Cardene to 10 mg/hr. Will carry out.

## 2020-05-15 NOTE — PROGRESS NOTES
Notified Dr. Ayon of MAP < 75, CVP 1 flat, UOP 80-90 mL/hr. Orders to give 250 mL albumin now. Will carry out.

## 2020-05-15 NOTE — PROGRESS NOTES
[facility]  Critical Care - Surgery  Progress Note    Patient Name: Ranjana Sanchez  MRN: [id number]  Admission Date: 4/10/2020  Hospital Length of Stay: 35 days  Code Status: Full Code  Attending Provider: Francis Ayon MD  Primary Care Provider: Primary Doctor No   Principal Problem: Acute respiratory distress syndrome (ARDS) due to COVID-19 virus    Subjective:     Hospital/ICU Course:  No significant changes, unchanged neuro status overnight.    Vitals:    05/15/20 1300 05/15/20 1315 05/15/20 1330 05/15/20 1331   BP: 113/71      BP Location: Left arm      Patient Position: Lying      Pulse: (!) 138 (!) 139 (!) 139 (!) 136   Resp:    (!) 54   Temp:       TempSrc:       SpO2: 97% 98% 97% 97%   Weight:       Height:         Body mass index is 25.73 kg/m².      Intake/Output Summary (Last 24 hours) at 5/15/2020 1338  Last data filed at 5/15/2020 1300  Gross per 24 hour   Intake 2727.9 ml   Output 2490 ml   Net 237.9 ml     Physical Exam   Constitutional: He appears well-developed.   HENT:   Head: Normocephalic and atraumatic.   Eyes: Pupils are equal, round, and reactive to light.   Neck: Normal range of motion.   Cardiovascular:   tachycardic   Pulmonary/Chest:   Trached, mechanically ventilated  On ECMO, cannulated in RIJ and R fem vein   Abdominal: Soft. He exhibits no distension.   Musculoskeletal: Normal range of motion.   Neurological:   Opens eyes and moves head spontaneously, does not follow commands, minimal Skin: lower extremity extension to pain   Skin is warm.   Vitals reviewed.    Vent Mode: A/C  Oxygen Concentration (%):  [50] 50  Resp Rate Total:  [31 br/min-54 br/min] 53 br/min  Vt Set:  [200 mL] 200 mL  PEEP/CPAP:  [5 cmH20] 5 cmH20  Mean Airway Pressure:  [15 vlU28-97 cmH20] 15 cmH20    Recent Labs   Lab 05/15/20  0803 05/15/20  1019   WBC 11.69  --    RBC 3.34*  --    HGB 9.8*  --    HCT 32.6* 26*     --    MCV 98  --    MCH 29.3  --    MCHC 30.1*  --        Recent  Labs   Lab 05/15/20  0803   CALCIUM 9.7   PROT 6.7   *   K 4.4   CO2 29   *   BUN 40*   CREATININE 0.6   ALKPHOS 135   ALT 26   AST 27   BILITOT 0.5         Assessment/Plan:        COVID-19 virus detected  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  This was switched to ETT on 4/11.  Cannulated on 4/25.     Neuro:  Metabolic encephalopathy. Altered mental status with poor neuro exam: multifactorial. Likely component of delirium from prolonged ICU stay and severe illness, possible component of neurologic manifestation of COVID-19, and least likely possible stroke  - minimizing sedation   - optimizing sleep/wake cycle   - neurology consult and EEG to identify reasons for AMS.   - CT head possible, but won't . Would only pursue this if absolutely required and patient is suitable for travel     Pulm:  Hypoxic Respiratory failure and ARDS secondary to COVID-19 infection  -AC/VC+, Vt 200, 5 PEEP  Small pneumo on cxr  - subclinical and stable today, continuing to monitor   V-V ECMO   -Sweep down to 8, 3.4 L/min flows and 2800 RPM  -Lung compliance remains low, no ECMO wean yet     Cardiac:  HTN - Cardene, wean as tolerated  Tachycardia - TTE 5/14 showed no RWMA and normal EF     GI  -TFs at goal  -PPI bid for previous GI bleed     Renal/Electrolytes  -Making adequate urine  -holding further diuresis at this time  -slightly hypernatremic but this may be protective for cerebral edema     ID  Fluc/Vanc  White count normalized  CRP again decreasing    Heme  -heparin infusion for ECMO and for hypercoagulable state associated with COVID-19    Endocrine  Tight glucose regulation     Dispo:  Remain in ICU, will trying to expand lungs with vent, awaiting neuro improvement.       I have spent 59 min critical time involved in the management of this patient. This has included a physical exam, review of  laboratory values and images and discussions with the multidisciplinary team, patient, and family and is independent of time spent performing procedures and teaching.     Davie Alston MD  [facility] [id number]

## 2020-05-15 NOTE — PROGRESS NOTES
Notified Dr. Ayon of most recent labs, vitals, assessment, and gtts. Orders to transfuse 1 unit PRBC. Will carry out.

## 2020-05-15 NOTE — PROGRESS NOTES
"05/15/2020  Maikel Zepeda    Current provider:  Francis Ayon MD      As floor rounding has been requested to be limited during COVID-19 patient quarantine by Infectious Disease and leadership, only "electronic" rounding has been performed on this mechanical assist device patient.  Electronic rounding includes verifying in Epic that current settings and measurements for the device have been documented appropriately and that they are within limits.  Additionally, this rounding verifies that the EQUIPMENT section of the VAD flowsheet is documented in Epic, specifically checking the presence of emergency equipment and controller self test.  Any discrepancies will be related to the care team.    In emergency, the nursing units have been notified to contact the perfusion department either by:  Calling [phone] from 630am to 4pm, or by contacting the hospital  from 4pm to 630am and asking to speak with the perfusionist on call.    Maikel Zepeda  9:27 AM  "

## 2020-05-15 NOTE — PLAN OF CARE
Waiting on 24 hour EEG. Please see consult note form 5/14/20 for further information.     Fely Reeves MD

## 2020-05-15 NOTE — PLAN OF CARE
Shift events: 250 mL albumin given for MAP < 75. 1 unit PRBC given for H/H 8.9/29.6 Net +988mL/shift    Neuro: Pt does not follow commands, does not track with eyes. Corneal, gag, and cough reflex present. Will withdraw in lower extremities and intermittently withdraw in upper extremities. PERRLA    Cardiac: HR continues to be ST, 130-140s. Vasopressors remain off. Cardene titrated to maintain MAP < 85, currently @ 5 mg/hr    Respiratory: AC/VC+, rate set at 26, patient tachypneic in 40s-50s despite PRN medications, Dr. Ayon and Dr. Alston aware. TVs 150-180, Peak pressures 35-38, FiO2 50%, 5 PEEP. ABGs q6h/PRN with perfusionist.    ECMO: VV, no alarms today, speed 2800, flows 3.4 lpm, sweep decreased to 8 and FiO2 decreased to 70%. RIJ and R femoral cannulas remain CDI.    Gtts: Precedex @ 0.4 mcg/kg/hr, Heparin @ 800 units/hr (AntiXa q6h), Insulin @ 1.5 units/hr (accucheck q1h).    GI: TF @ 45 mL/hr (goal), max residual 10 mL, no BM this shift    : UOP  mL/hr per Melissa    Skin: L arm wound cleansed with providone and foam dsg changed. L cheek wound remains JUANITA, triad applied. Heel offloading boot and foot drop boot in place and alternated frequently. POM today per nursing.Turned q2h as tolerated.

## 2020-05-15 NOTE — PROGRESS NOTES
EC[facility] Specialists shift report    Date: 05/15/2020  ECMO Specialist:  Lia Alvarez    Pump parameters:  RPM: 2800  Flow:  3.5 lpm  Sweep:  8  FiO2:  70    Oxygenator status:  Clots: pre & post  Fibrin: scant amounts    Pressure trends:  P1: 115  P2: 88  Delta P: 27    Volume status:  Chugging noted - yes, some intermittent  CVP: 1  MAP:  80's-90's  MD notified (name):  Nany    Anticoagulation:  ACT/aPTT/Xa parameters: 0.25-0.3  ACT/aPTT/Xa trends this shift: 0.29    Cannula size / status / placement:  Arterial:   Venous 1: 23FR / RIJV / 4 cm.  Venous 2:  27FR / RFV / 12 cm.  Dual lumen: no     Additional Comments:          No problems with flows or with circuit.  Few ECMO changes made via MD's; 250 ml Albumin given; 1 unit rbc's.  X-ray slightly worsening; small pneumothorax on rt; end insp. & end exp. wheezing on rt & left; rhonchi mostly on left. RR in 50's.  -150's.  Cannulas remain sutured in place; no bleeding.  Urine output good.  Cardene titrated today; glucose monitored closely as well. Pt remains on TPN.  EEG in progress all day at bedside.  All lab values reported to MD's.

## 2020-05-15 NOTE — PROGRESS NOTES
Updated Dr. Ayon of most recent ABG, ECMO settings, gtts, vitals, assessment. MAPs continue to be labile between 65-90, Cardene gtt titrated appropriately to maintain MAP 75-85. HR slightly decreased to 129-135. Orders to decrease FiO2 75%. Will carry out.

## 2020-05-15 NOTE — PLAN OF CARE
Shift events: Continuous EEG in place.  No acute events overnight. Heparin gtt decreased at 0300 to 800 units/hr.  Current gtts include Heparin, Cardene, Insulin, and Precedex.  Pt remains sinus tachycardic with rate 130s-150s, MAPs maintained 75-90, CVP 2-4, resp rate 35-50.  VV-ECMO FiO2 85%, SWEEP 9, Speed 2800, Flows 3.4.  Vent settings AC VC+ FiO2 50%, 5 PEEP, Vt 200. Urine output 75-125cc/hr.  Net +383cc/shift.  TF remain at 45cc/hr (goal) with minimal residuals.  No BM overnight.      Neuro: Pt opens eyes spontaneously, but does not track.  Withdraws extremities to pain.  PERRLA.  Cough, gag, and corneal reflexes intact.     Vital signs:  Temp:  [98.6 °F (37 °C)-98.7 °F (37.1 °C)]   Pulse:  [126-146]   Resp:  [41]   BP: (100-130)/(62-70)   SpO2:  [97 %-100 %]   Arterial Line BP: ()/(57-67)      Labs:  Accuchecks hourly with insulin gtt titrated per order.  CBC, CMP, Mag, Phos, PT/INR, aPTT, Fibrinogen, and AntiXa Q6H    Skin:  Trach site with moderate secretions at site and red streaked. T-slit gauze changed intermittently to keep trach site dry.    Left arm wound dressings changed.  Betadine applied to site and foam dressing in place.    Left cheek wound remains JUANITA.  Sween applied.    Heel offloading boots and foot drop boot in place.

## 2020-05-15 NOTE — PROGRESS NOTES
[facility] Specialists shift report    Date: 05/15/2020  ECMO Specialist:  Ashley Francisco    Pump parameters:  RPM: 2800  Flow:  3.4  Sweep:  9  FiO2:  85  Oxygenator status:  Clots: pre and post  Fibrin: pre and post    Pressure trends:  P1: 114  P2: 88  Delta P: 26    Volume status:   Chugging noted?No  CVP: 1  MAP:  80-90s  MD notified (name):  Nany    Anticoagulation:  ACT/aPTT/Xa parameters: .25-.3   ACT/aPTT/Xa trends this shift: .29-.38    Cannula size / status / placement:  Arterial:   Venous 1: RIJV 23F@4cm    Venous 2: RFV 27F@12cm  Dual lumen:      Additional Comments:   Patient at times breathing 60 a min.  Agitated.  Flows maintained.  No issues with pump.

## 2020-05-16 NOTE — PROGRESS NOTES
Dr. Ayon at bedside. Updated on assessment, vitals, gtts, and labs. Speed decreased to 2700 rpm per Dr. Ayon. All orders received and implemented.

## 2020-05-16 NOTE — PROGRESS NOTES
[facility]  Cardiothoracic Surgery  Progress Note    Patient Name: Ranjana Sanchez  MRN: [id number]  Admission Date: 4/10/2020  Hospital Length of Stay: 36 days  TYPE of ECMO: V-V  Day OF ECMO SUPPORT: 21  Code Status: Full Code   Attending Physician: Francis Ayon MD   Referring Provider: Francis Ayon MD  Principal Problem:Acute respiratory distress syndrome (ARDS) due to COVID-19 virus        Subjective:     Post-Op Info:  Procedure(s) (LRB):  CREATION, TRACHEOSTOMY  (N/A)   11 Days Post-Op     Interval History: ECMO at 70% oxygen and sweep of 8 with vent at 50% and 6 with Vt of ~150. EEG showing no encephalopathy related to metabolic/toxic derangement; metop started yesterday with improvement of tachycardia; sweep intermittently increased to 12 with improvement of hypercarbia but no demonstrable decrease in tachypnea    ROS  Medications:  Continuous Infusions:   dexmedetomidine (PRECEDEX) infusion 0.5 mcg/kg/hr (05/16/20 1700)    heparin (porcine) in 5 % dex 900 Units/hr (05/16/20 1600)    insulin (HUMAN R) infusion (adults) 0.6 Units/hr (05/16/20 1700)    nicardipine 7.5 mg/hr (05/16/20 1700)    vasopressin (PITRESSIN) infusion Stopped (05/15/20 2130)     Scheduled Meds:   amLODIPine  5 mg Oral Daily    aspirin  81 mg Per NG tube Daily    chlorhexidine  15 mL Mouth/Throat BID    diazePAM  2 mg Oral Q8H    melatonin  6 mg Per G Tube Nightly    metoprolol  12.5 mg Oral Q12H    multivitamin liquid no.118  10 mL Per G Tube Daily    pantoprazole  40 mg Intravenous Q12H    polyethylene glycol  17 g Per NG tube Daily    potassium chloride 10%  20 mEq Per NG tube BID    QUEtiapine  100 mg Per NG tube Q12H    sucralfate  1 g Per NG tube Q6H    traZODone  50 mg Per G Tube QHS    vancomycin (VANCOCIN) IVPB  1,250 mg Intravenous Q24H    vitamin D  1,000 Units Per G Tube Daily    white petrolatum-mineral oiL   Both Eyes BID     PRN Meds:sodium chloride, sodium chloride,  acetaminophen, artificial tears, calcium gluconate IVPB, calcium gluconate IVPB, calcium gluconate IVPB, Dextrose 10% Bolus, Dextrose 10% Bolus, fentaNYL, glucose, glucose, magnesium oxide, magnesium oxide, magnesium sulfate IVPB, potassium chloride 10%, potassium chloride 10%, potassium chloride 10%, potassium, sodium phosphates, sodium chloride 0.9%     Objective:     Vital Signs (Most Recent):  Temp: 98.3 °F (36.8 °C) (05/16/20 1515)  Pulse: (!) 135 (05/16/20 1615)  Resp: (!) 43 (05/16/20 0720)  BP: 128/70 (05/16/20 1600)  SpO2: (!) 94 % (05/16/20 1615) Vital Signs (24h Range):  Temp:  [98.1 °F (36.7 °C)-98.5 °F (36.9 °C)] 98.3 °F (36.8 °C)  Pulse:  [115-146] 135  Resp:  [41-43] 43  SpO2:  [91 %-100 %] 94 %  BP: ()/(59-82) 128/70  Arterial Line BP: ()/(50-68) 120/62     Weight: 71.4 kg (157 lb 6.5 oz)  Body mass index is 27.02 kg/m².    SpO2: (!) 94 %  O2 Device (Oxygen Therapy): ventilator    Intake/Output - Last 3 Shifts       05/14 0700 - 05/15 0659 05/15 0700 - 05/16 0659 05/16 0700 - 05/17 0659    I.V. (mL/kg) 1054.4 (15.5) 1456.6 (20.4) 464 (6.5)    Blood  350     Other       NG/GT 2030 1735 995    IV Piggyback 250      Total Intake(mL/kg) 3334.4 (49) 3541.6 (49.6) 1459 (20.4)    Urine (mL/kg/hr) 2515 (1.5) 2300 (1.3) 1280 (1.8)    Stool   0    Blood 7 4 3    Total Output 2522 2304 1283    Net +812.4 +1237.6 +176           Stool Occurrence   1 x          Lines/Drains/Airways     Peripherally Inserted Central Catheter Line            PICC Double Lumen 05/05/20 1707 right basilic 11 days          Central Venous Catheter Line                 ECMO Cannula 04/25/20 1120 right femoral vein 21 days         ECMO Cannula 04/25/20 1120 right internal jugular 21 days          Drain                 Urethral Catheter 04/18/20 1854 27 days         NG/OG Tube 05/03/20 1215 [place] sump;nasogastric 18 Fr. Left nostril 13 days          Airway                 Surgical Airway 05/05/20 1500 Shiley Cuffed 11 days           Arterial Line                 Arterial Line 04/27/20 1100 Right Brachial 19 days          Peripheral Intravenous Line                 Peripheral IV - Single Lumen 05/08/20 0300 20 G Anterior;Left Hand 8 days         Peripheral IV - Single Lumen 05/12/20 0230 22 G Left Antecubital 4 days         Peripheral IV - Single Lumen 05/13/20 0357 22 G Right Forearm 3 days         Peripheral IV - Single Lumen 05/16/20 1500 22 G Left;Anterior Wrist less than 1 day                Physical Exam  No movement of upper extremities, withdraws to pain at lower; opens eyes to stimulation but does not track    Significant Labs:  BMP:   Recent Labs   Lab 05/16/20  1410   *   *   K 4.2   *   CO2 29   BUN 33*   CREATININE 0.6   CALCIUM 10.1   MG 2.0     CBC:   Recent Labs   Lab 05/16/20  1410 05/16/20  1411   WBC 13.62*  --    RBC 3.26*  --    HGB 9.8*  --    HCT 31.4* 28*     --    MCV 96  --    MCH 30.1  --    MCHC 31.2*  --        Significant Diagnostics:  CXR: stable opacities    Assessment/Plan:     Acute respiratory disease due to COVID-19 virus  Acute respiratory distress syndrome (ARDS) due to COVID-19 virus  - Crich switched to ETT on 4/11  - Monika weaned off 5/5  - Tracheostomy and bronch 5/5  - Thrombocytopenia resolved; transfuse for under 30K  - Aspirin 81 daily  - RPM at 2800; flows mid 3s  - Sweep at 8; CO2 goal 40-50s if not acidotic on ABG (respiratory compensation for alkalosis)  - Oxygenator Fi 85%; wean to 70% today  - Vent Fi 50%, PEEP 6     Tongue laceration      -ENT consulted and evaluated patient, laceration superficial, not bleeding      -recs: bite block vs paralysis, will monitor    Sedation  - Propofol and precedex off 5/12  - Valium, seroquel, PRN Fentanyl     UGI Bleed  - Scoped by GI 5/3 with epinephrine injection and clip placement for an actively bleeding D2/D3 Diuelafoy lesion  - No pressors, Hb stable     FEN/GI  - TF at 45, goal      - Lasix gtt stopped 5/12  - FWB for  increasing BUN and HR at 250mL q12     Anticoagulation      - Heparin gtt at 800 U/hr      - Goal aXa 0.25 - 0.3 Q6hrs      - monitor LDH    Prophylaxis      - protonix q12 IV       - abx for ECMO      - PICC placed 5/5; central line removed 5/6    COVID-19 virus detected  54y.o male with a medical history of HTN who is COVID +, intubated, paralyzed, sedated on vasopressors.  CTS consulted for possible ECMO.  --Patient is currently a DNR.  Dicussed with critical care team who will discuss treatment options with patients family   --Dr. Ayon to review and give final recommendations        Rafael Nj MD  Cardiothoracic Surgery  [facility]    VV ECMO circuit checked and all parameters reviewed. Anticoagulation was managed and all cannulation exit sites along with review of labs and radiology studies performed. Speed and functioning of the pump along with oxygenator quality reviewed.  Patient stable doing well.  Minimal amount of activity noted.  EEG does not show any major neurological events.  It appears the neurological status could be as a result of metabolic and COVID manifestations.  Will continue to provide care and support on ECMO for now.  All questions and concerns of different team members was carried out and coordination of care between the surgical, ICU and nursing and perfusion Staff was carried out.

## 2020-05-16 NOTE — ASSESSMENT & PLAN NOTE
Acute respiratory distress syndrome (ARDS) due to COVID-19 virus  - Crich switched to ETT on 4/11  - Monika weaned off 5/5  - Tracheostomy and bronch 5/5  - Thrombocytopenia resolved; transfuse for under 30K  - Aspirin 81 daily  - RPM at 2800; flows mid 3s  - Sweep at 8; CO2 goal 40-50s if not acidotic on ABG (respiratory compensation for alkalosis)  - Oxygenator Fi 85%; wean to 70% today  - Vent Fi 50%, PEEP 6     Tongue laceration      -ENT consulted and evaluated patient, laceration superficial, not bleeding      -recs: bite block vs paralysis, will monitor    Sedation  - Propofol and precedex off 5/12  - Valium, seroquel, PRN Fentanyl     UGI Bleed  - Scoped by GI 5/3 with epinephrine injection and clip placement for an actively bleeding D2/D3 Diuelafoy lesion  - No pressors, Hb stable     FEN/GI  - TF at 45, goal      - Lasix gtt stopped 5/12  - FWB for increasing BUN and HR at 250mL q12     Anticoagulation      - Heparin gtt at 800 U/hr      - Goal aXa 0.25 - 0.3 Q6hrs      - monitor LDH    Prophylaxis      - protonix q12 IV       - abx for ECMO      - PICC placed 5/5; central line removed 5/6

## 2020-05-16 NOTE — PROGRESS NOTES
ECMO Specialists shift report    Date: 05/16/2020  ECMO Specialist:  Lia Alvarez    Pump parameters:  RPM: 2700  Flow:  3.2 lpm  Sweep:  8  FiO2:  70    Oxygenator status:  Clots: few pre & post  Fibrin: not remarkable    Pressure trends:  P1: 105  P2: 79  Delta P: 26    Volume status:  Chugging noted - very slight  MAP:  80's  MD notified (name):  Nany    Anticoagulation:  ACT/aPTT/Xa parameters: 0.25-0.3  ACT/aPTT/Xa trends this shift: 0.2    Cannula size / status / placement:  Arterial:   Venous 1: 23 FR / RIJV / 4 cm  Venous 2: 27FR / RFV / 12 cm  Dual lumen: no     Additional Comments:          Pt had restful day on VV ECMO with no loss of flows in circuit or the need for blood products at this time.  Bilateral wheezing still present; x-ray white; pt remains tachypnic & tachycardic with MAP in 80's today.  Cannulas remain in place with no movement of placement or bleeding. Heparin increased slightly.  Urine output very good.  EEG finished today. Several MD's made rds today; ECMO decrease in flow via Dr. Ayon with no problems.

## 2020-05-16 NOTE — SUBJECTIVE & OBJECTIVE
Interval History: ECMO at 70% oxygen and sweep of 8 with vent at 50% and 6 with Vt of ~150. EEG showing no encephalopathy related to metabolic/toxic derangement; metop started yesterday with improvement of tachycardia; sweep intermittently increased to 12 with improvement of hypercarbia but no demonstrable decrease in tachypnea    ROS  Medications:  Continuous Infusions:   dexmedetomidine (PRECEDEX) infusion 0.5 mcg/kg/hr (05/16/20 1700)    heparin (porcine) in 5 % dex 900 Units/hr (05/16/20 1600)    insulin (HUMAN R) infusion (adults) 0.6 Units/hr (05/16/20 1700)    nicardipine 7.5 mg/hr (05/16/20 1700)    vasopressin (PITRESSIN) infusion Stopped (05/15/20 2130)     Scheduled Meds:   amLODIPine  5 mg Oral Daily    aspirin  81 mg Per NG tube Daily    chlorhexidine  15 mL Mouth/Throat BID    diazePAM  2 mg Oral Q8H    melatonin  6 mg Per G Tube Nightly    metoprolol  12.5 mg Oral Q12H    multivitamin liquid no.118  10 mL Per G Tube Daily    pantoprazole  40 mg Intravenous Q12H    polyethylene glycol  17 g Per NG tube Daily    potassium chloride 10%  20 mEq Per NG tube BID    QUEtiapine  100 mg Per NG tube Q12H    sucralfate  1 g Per NG tube Q6H    traZODone  50 mg Per G Tube QHS    vancomycin (VANCOCIN) IVPB  1,250 mg Intravenous Q24H    vitamin D  1,000 Units Per G Tube Daily    white petrolatum-mineral oiL   Both Eyes BID     PRN Meds:sodium chloride, sodium chloride, acetaminophen, artificial tears, calcium gluconate IVPB, calcium gluconate IVPB, calcium gluconate IVPB, Dextrose 10% Bolus, Dextrose 10% Bolus, fentaNYL, glucose, glucose, magnesium oxide, magnesium oxide, magnesium sulfate IVPB, potassium chloride 10%, potassium chloride 10%, potassium chloride 10%, potassium, sodium phosphates, sodium chloride 0.9%     Objective:     Vital Signs (Most Recent):  Temp: 98.3 °F (36.8 °C) (05/16/20 1515)  Pulse: (!) 135 (05/16/20 1615)  Resp: (!) 43 (05/16/20 0720)  BP: 128/70 (05/16/20  1600)  SpO2: (!) 94 % (05/16/20 1615) Vital Signs (24h Range):  Temp:  [98.1 °F (36.7 °C)-98.5 °F (36.9 °C)] 98.3 °F (36.8 °C)  Pulse:  [115-146] 135  Resp:  [41-43] 43  SpO2:  [91 %-100 %] 94 %  BP: ()/(59-82) 128/70  Arterial Line BP: ()/(50-68) 120/62     Weight: 71.4 kg (157 lb 6.5 oz)  Body mass index is 27.02 kg/m².    SpO2: (!) 94 %  O2 Device (Oxygen Therapy): ventilator    Intake/Output - Last 3 Shifts       05/14 0700 - 05/15 0659 05/15 0700 - 05/16 0659 05/16 0700 - 05/17 0659    I.V. (mL/kg) 1054.4 (15.5) 1456.6 (20.4) 464 (6.5)    Blood  350     Other       NG/GT 2030 1735 995    IV Piggyback 250      Total Intake(mL/kg) 3334.4 (49) 3541.6 (49.6) 1459 (20.4)    Urine (mL/kg/hr) 2515 (1.5) 2300 (1.3) 1280 (1.8)    Stool   0    Blood 7 4 3    Total Output 2522 2304 1283    Net +812.4 +1237.6 +176           Stool Occurrence   1 x          Lines/Drains/Airways     Peripherally Inserted Central Catheter Line            PICC Double Lumen 05/05/20 1707 right basilic 11 days          Central Venous Catheter Line                 ECMO Cannula 04/25/20 1120 right femoral vein 21 days         ECMO Cannula 04/25/20 1120 right internal jugular 21 days          Drain                 Urethral Catheter 04/18/20 1854 27 days         NG/OG Tube 05/03/20 1215 [place] sump;nasogastric 18 Fr. Left nostril 13 days          Airway                 Surgical Airway 05/05/20 1500 Shiley Cuffed 11 days          Arterial Line                 Arterial Line 04/27/20 1100 Right Brachial 19 days          Peripheral Intravenous Line                 Peripheral IV - Single Lumen 05/08/20 0300 20 G Anterior;Left Hand 8 days         Peripheral IV - Single Lumen 05/12/20 0230 22 G Left Antecubital 4 days         Peripheral IV - Single Lumen 05/13/20 0357 22 G Right Forearm 3 days         Peripheral IV - Single Lumen 05/16/20 1500 22 G Left;Anterior Wrist less than 1 day                Physical Exam  No movement of upper extremities,  withdraws to pain at lower; opens eyes to stimulation but does not track    Significant Labs:  BMP:   Recent Labs   Lab 05/16/20  1410   *   *   K 4.2   *   CO2 29   BUN 33*   CREATININE 0.6   CALCIUM 10.1   MG 2.0     CBC:   Recent Labs   Lab 05/16/20  1410 05/16/20  1411   WBC 13.62*  --    RBC 3.26*  --    HGB 9.8*  --    HCT 31.4* 28*     --    MCV 96  --    MCH 30.1  --    MCHC 31.2*  --        Significant Diagnostics:  CXR: stable opacities

## 2020-05-16 NOTE — PROGRESS NOTES
Dr. Alston at bedside. Updated on most recent vitals, assessment, gtts, labs, and ABG. Notified of pt not withdrawing to noxious stimuli in BUE. Corneal, gag, cough reflexes present. Pt will open eyes to painful stimuli but does not track. All orders received and implemented.

## 2020-05-16 NOTE — PLAN OF CARE
Shift events: Decreased RPM to 2700. Re-swabbed for COVID-19, pending.     Neuro: Much unchanged today. Still does not follow commands, does not track with eyes. Only withdraws to painful stimuli in BLE. Corneal, gag, and cough reflexes present. PERRLA.    Cardiac: HR -140s. Vasopressors remain off. Cardene gtt in place to maintain MAP < 85, currently @ 7.5 mcg/hr.    Respiratory: AC/VC+, rate 26, , FiO2 50%, 5 PEEP. Peak pressures 31-35, RR 40s-50s despite sedation/PRNs.    ECMO: VV, no alarms today, speed 2700, flows 3.2 L/min, sweep 8 and FiO2 70%. RIJ and R femoral cannulas remain CDI.     Gtts: Precedex @ 0.5 mcg/kg/hr, Heparin @ 900 units/hr (AntiXa q6h), Insulin @ 0.6 units/hr (accucheck q1h).     GI: TF @ 45 mL/hr (goal), max residual 10 mL, 1 BM this shift     : UOP  mL/hr per Torres     Skin: L arm wound cleansed with providone and foam dsg changed. L cheek wound remains JUANITA, triad applied. Heel offloading boot and foot drop boot in place and alternated frequently. POM today per nursing. Turned q2h as tolerated.

## 2020-05-16 NOTE — PROGRESS NOTES
Due to COVID-19, the Neurology team is limiting interaction with consult patients unless absolutely necessary in order to limit patient's exposure to the virus. As of now, the Neurology team will be chart checking this patient and discussing with staff. If the patient has an acute neurological change and/or you believe the patient needs to be examined by a provider, please page Neurology on call to discuss the patient with a team member.     HPI; 55 y/o M with a medical history of HTN presented to the ED for increasing SOB with known COVID-19 exposure on 4/10/20. Patient is originally from [facility] and is a  on a cruise ship. On arrival he was emergently intubated due to ARDS 2/2 COVID. He is on ECMO (RIJ-RFV) since 4/25. He is in a hypercoagulable state associated with COID-19 and is on a heparin therapy associated with that. He has a GI bleed due to diulafoy lesion. Neurology was consulted to evaluate neurologic status as he is not following commands despite being only on precedex.      Patient's propofol has been discontinued since 5/12/20. He is currently on 0.4 mcg/kg/hr of precedex (non-titrating). Conversation with the nurse revealed that patient's neurologic status has been the same for the past couple of days. Patient's pupils are brisk and reactive to light. He is not able to track body however, does have corneal reflex. Patient has both a gag and a cough reflex. He minimally withdraws to pain in only  lower extremities since 5/15 night.       Given that the patient has been on ECMO since 4/25 he is at an increased risk of neurological issues including embolic stroke, intracerebral hemorrhage, seizures, and anoxic injuries> Patient has brain stem reflexes which means that he does not qualify for being brain dead. Obtained EEG to rule out status, which elizabeth should generalized encephalopathy, without any epileptiform activity.      Plan   -Ultimately will need an MRI Brain w/o contrast however, given that  the patient is on ECMO this will be difficult.   -Neurology will sign off. Please contact with any questions or concerns.    Ritika Pete MD  Neurology resident, PGY-2  Department of Neurology

## 2020-05-16 NOTE — ASSESSMENT & PLAN NOTE
Acute respiratory distress syndrome (ARDS) due to COVID-19 virus  - Crich switched to ETT on 4/11  - Monika weaned off 5/5  - Tracheostomy and bronch 5/5  - Thrombocytopenia resolved; transfuse for under 30K  - Aspirin 81 daily  - RPM at 2800; flows mid 3s  - Sweep at 9; CO2 goal 40-50s if not acidotic on ABG (respiratory compensation for alkalosis)  - Oxygenator Fi 85%; wean to 80% today  - Vent Fi 50%, PEEP 5     Tongue laceration      -ENT consulted and evaluated patient, laceration superficial, not bleeding      -recs: bite block vs paralysis, will monitor    Sedation  - Propofol and precedex off 5/12  - Valium, seroquel, PRN Fentanyl     UGI Bleed  - Scoped by GI 5/3 with epinephrine injection and clip placement for an actively bleeding D2/D3 Diuelafoy lesion  - No pressors, Hb stable     FEN/GI  - TF at 45, goal      - Lasix gtt stopped 5/12  - FWB for increasing BUN and HR at 250mL q12     Anticoagulation      - Heparin gtt at 800 U/hr      - Goal aXa 0.25 - 0.3 Q6hrs      -  from 326    Prophylaxis      - protonix q12 IV       - abx for ECMO      - PICC placed 5/5; central line removed 5/6

## 2020-05-16 NOTE — SUBJECTIVE & OBJECTIVE
Interval History:  Net positive 900mL in last 24hrs, 2.4L UOP, BUN downtrending at 40 today, Cr non-elevated, Na stable at 149. Cardene at 6. Hep gtt at 800/hr, aXa at goal levels, LDH level 488. CXR pending, PEEP reduced to 5 and has maintained there since yesterday morning. PaO2 acceptable with at oxygenator FiO2 85 so will wean. EEG monitoring overnight.    Medications:  Continuous Infusions:   dexmedetomidine (PRECEDEX) infusion 0.4 mcg/kg/hr (05/15/20 2200)    heparin (porcine) in 5 % dex 800 Units/hr (05/15/20 2200)    insulin (HUMAN R) infusion (adults) 1.5 Units/hr (05/15/20 2200)    nicardipine Stopped (05/15/20 2100)    vasopressin (PITRESSIN) infusion Stopped (05/15/20 2130)     Scheduled Meds:   albuterol-ipratropium  3 mL Nebulization Q6H    amLODIPine  5 mg Oral Daily    aspirin  81 mg Per NG tube Daily    chlorhexidine  15 mL Mouth/Throat BID    diazePAM  2 mg Oral Q8H    melatonin  6 mg Per G Tube Nightly    metoprolol  12.5 mg Oral Q12H    [START ON 5/16/2020] multivitamin liquid no.118  10 mL Per G Tube Daily    pantoprazole  40 mg Intravenous Q12H    polyethylene glycol  17 g Per NG tube Daily    potassium chloride 10%  20 mEq Per NG tube BID    QUEtiapine  100 mg Per NG tube Q12H    sucralfate  1 g Per NG tube Q6H    thiamine (VITAMIN B1) IVPB  100 mg Intravenous Once    traZODone  50 mg Per G Tube QHS    vancomycin (VANCOCIN) IVPB  1,250 mg Intravenous Q24H    white petrolatum-mineral oiL   Both Eyes BID        Objective:     Vital Signs (Most Recent):  Temp: 98.5 °F (36.9 °C) (05/15/20 1900)  Pulse: (!) 122 (05/15/20 2230)  Resp: (!) 41 (05/15/20 1955)  BP: 113/65 (05/15/20 2200)  SpO2: 95 % (05/15/20 2230) Vital Signs (24h Range):  Temp:  [98.5 °F (36.9 °C)-98.7 °F (37.1 °C)] 98.5 °F (36.9 °C)  Pulse:  [117-142] 122  Resp:  [41-56] 41  SpO2:  [93 %-100 %] 95 %  BP: (100-132)/(59-77) 113/65  Arterial Line BP: ()/(50-67) 99/55     Weight: 68 kg (149 lb 14.6 oz)  Body  mass index is 25.73 kg/m².    SpO2: 95 %  O2 Device (Oxygen Therapy): ventilator    Intake/Output - Last 3 Shifts       05/14 0700 - 05/15 0659 05/15 0700 - 05/16 0659    I.V. (mL/kg) 1054.4 (15.5) 814.6 (12)    Blood  350    Other      NG/GT 2030 1420    IV Piggyback 250     Total Intake(mL/kg) 3334.4 (49) 2584.6 (38)    Urine (mL/kg/hr) 2515 (1.5) 1585 (1.4)    Blood 7 3    Total Output 2522 1588    Net +812.4 +996.6                Lines/Drains/Airways     Peripherally Inserted Central Catheter Line            PICC Double Lumen 05/05/20 1707 right basilic 10 days          Central Venous Catheter Line                 ECMO Cannula 04/25/20 1120 right femoral vein 20 days         ECMO Cannula 04/25/20 1120 right internal jugular 20 days          Drain                 Urethral Catheter 04/18/20 1854 27 days         NG/OG Tube 05/03/20 1215 [place] sump;nasogastric 18 Fr. Left nostril 12 days          Airway                 Surgical Airway 05/05/20 1500 Shiley Cuffed 10 days          Arterial Line                 Arterial Line 04/27/20 1100 Right Brachial 18 days          Peripheral Intravenous Line                 Peripheral IV - Single Lumen 05/08/20 0300 20 G Anterior;Left Hand 7 days         Peripheral IV - Single Lumen 05/08/20 0300 20 G Left Wrist 7 days         Peripheral IV - Single Lumen 05/12/20 0230 22 G Left Antecubital 3 days         Peripheral IV - Single Lumen 05/13/20 0357 22 G Right Forearm 2 days                Physical Exam    Constitutional: He is sedated, and intubated.   Head: Normocephalic   Cardiovascular: Tachycardia 130s   Pulmonary/Chest: intubated vent FiO2 50% PEEP 5, ECMO RPM 2800, sweep 9, oxygenator FiO2 85%, flows mid 3s  Skin: L neck and L groin cannulas intact, min to no flashing, clots pre and post-oxygenator, all sutures intact and secure  Nursing note and vitals reviewed.    Significant Labs:  BMP:   Recent Labs   Lab 05/15/20  1948   *   *   K 4.2   *   CO2 30*    BUN 38*   CREATININE 0.6   CALCIUM 10.0   MG 2.1     CBC:   Recent Labs   Lab 05/15/20  1948 05/15/20  2002   WBC 12.95*  --    RBC 3.38*  --    HGB 10.0*  --    HCT 32.6* 32*     --    MCV 96  --    MCH 29.6  --    MCHC 30.7*  --      LFTs:   Recent Labs   Lab 05/15/20  1948   ALT 24   AST 28   ALKPHOS 135   BILITOT 0.6   PROT 6.6   ALBUMIN 3.6     Significant Diagnostics:  Cxr: pending this AM    ROS

## 2020-05-16 NOTE — PROGRESS NOTES
[facility] shift report    Date: 05/16/2020  ECMO Specialist:  Ashley Francisco    Pump parameters:  RPM: 2850  Flow: 3.5  Sweep:  8  FiO2:  70    Oxygenator status:  Clots: pre and post  Fibrin: pre and post    Pressure trends:  P1: 115  P2: 89  Delta P: 26    Volume status:  Chugging noted? No  CVP: 1  MAP:  70-90  MD notified (name):  Nany    Anticoagulation:  ACT/aPTT/Xa parameters: .25-.3  ACT/aPTT/Xa trends this shift: .3-.27    Cannula size / status / placement:  Arterial:   Venous 1: RIJV 23F@4cm  Venous 2:RFV 27F@ 12cm  Dual lumen:      Additional Comments: Maintained the flow.  No issues with pump.  No changes.

## 2020-05-16 NOTE — PROGRESS NOTES
[facility]  Critical Care - Surgery  Progress Note    Patient Name: Ranjana Sanchez  MRN: [id number]  Admission Date: 4/10/2020  Hospital Length of Stay: 36 days  Code Status: Full Code  Attending Provider: Francis Ayon MD  Primary Care Provider: Primary Doctor No   Principal Problem: Acute respiratory distress syndrome (ARDS) due to COVID-19 virus    Subjective:     Hospital/ICU Course:  No significant changes, unchanged neuro status overnight.    Vitals:    05/16/20 1115 05/16/20 1130 05/16/20 1145 05/16/20 1200   BP:    127/73   BP Location:    Left arm   Patient Position:    Lying   Pulse: (!) 131 (!) 130 (!) 130 (!) 130   Resp:       Temp: 98.2 °F (36.8 °C)      TempSrc: Oral      SpO2: 96% 97% 97% 97%   Weight:       Height:         Body mass index is 27.02 kg/m².      Intake/Output Summary (Last 24 hours) at 5/16/2020 1214  Last data filed at 5/16/2020 1200  Gross per 24 hour   Intake 4125.6 ml   Output 2401 ml   Net 1724.6 ml     Physical Exam   Constitutional: He appears well-developed.   HENT:   Head: Normocephalic and atraumatic.   Eyes: Pupils are equal, round, and reactive to light.   Neck: Normal range of motion.   Cardiovascular:   tachycardic   Pulmonary/Chest:   Trached, mechanically ventilated  On ECMO, cannulated in RIJ and R fem vein   Abdominal: Soft. He exhibits no distension.   Musculoskeletal: Normal range of motion.   Neurological:   Opens eyes and moves head spontaneously, does not follow commands, minimal lower extremity extension to pain   Skin is warm.   Vitals reviewed.    Vent Mode: A/C  Oxygen Concentration (%):  [50] 50  Resp Rate Total:  [28 br/min-56 br/min] 43 br/min  Vt Set:  [200 mL] 200 mL  PEEP/CPAP:  [5 cmH20] 5 cmH20  Mean Airway Pressure:  [14 cmH20] 14 cmH20    Recent Labs   Lab 05/16/20  0800 05/16/20  0802   WBC 13.65*  --    RBC 3.33*  --    HGB 9.8*  --    HCT 32.2* 29*     --    MCV 97  --    MCH 29.4  --    MCHC 30.4*  --        Recent  Labs   Lab 05/16/20  0800   CALCIUM 9.7   PROT 6.5   *   K 4.1   CO2 29   *   BUN 35*   CREATININE 0.7   ALKPHOS 137*   ALT 26   AST 31   BILITOT 0.5         Assessment/Plan:        COVID-19 virus detected  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  This was switched to ETT on 4/11.  Cannulated on 4/25.     Neuro:  Metabolic encephalopathy. Altered mental status with poor neuro exam: multifactorial. Likely component of delirium from prolonged ICU stay and severe illness, possible component of neurologic manifestation of COVID-19, and least likely possible stroke  - minimizing sedation   - optimizing sleep/wake cycle   - neurology consulted, MRI when possible  - EEG with encephalopathy/diffuse cortical dysfunction and no epileptiform activity.   - CT head possible, but won't . Would only pursue this if absolutely required and patient is suitable for travel  - vitamin replacement (as below)     Pulm:  Hypoxic Respiratory failure and ARDS secondary to COVID-19 infection  -AC/VC+, Vt 180, 6 PEEP  Small pneumo on cxr  - subclinical and stable today, continuing to monitor   V-V ECMO   -Sweep 8, 3.4 L/min flows and 2800 RPM  -Lung compliance remains low, unable to tolerate ECMO wean     Remains unclear as to why patient is driving ventilator at 35-45 breaths per minute  -he is in a hypermetabolic state, likely covid-related, which may be the reason for hypercarbia despite a sweep of 8  -another source is encephalopathy, which has multiple etiologies (as above)     Cardiac:  HTN - Cardene, wean as tolerated  Tachycardia - TTE 5/14 showed no RWMA and normal EF  - metoprolol started     GI  -TFs at goal, peptamen intense to decrease carbohydrate intake and respiratory quotient  -PPI bid for previous GI bleed     Renal/Electrolytes  -Making adequate urine  -holding further diuresis at this  time  -slightly hypernatremic but this may be protective for cerebral edema, will correct gently with free water flushes  -added vitamin supplements to optimize chances for neurologic recovery     ID  Fluc/Vanc  White count normalized  CRP again decreasing    Heme  -heparin infusion for ECMO and for hypercoagulable state associated with COVID-19    Endocrine  Tight glucose regulation     Dispo:  Remain in ICU, will trying to expand lungs with vent, awaiting neuro improvement.       I have spent 59 min critical time involved in the management of this patient. This has included a physical exam, review of laboratory values and images and discussions with the multidisciplinary team, patient, and family and is independent of time spent performing procedures and teaching.     Davie Alston MD  [facility] [id number]

## 2020-05-16 NOTE — PLAN OF CARE
Pt remains labile throughout shift. Sinus tach 110s-130s. Sats > 91% on ACVC+ 50%, 5 PEEP. VV ECMO speed 2800, sweep 8, FIO2 70%, Flow 3.4-3.5. Pt persistently tachypnic throughout shift RR 30s-50s despite PRN medications. BP labile MAP 65-90s. Titration of Cardene to maintain MAP <85. CVP trending down throughout shift, 4-3-2, MD aware. UOP  cc/hr.  Pt does not follow commands, does not track with eyes, only withdraws to pain in LE, no movement in UE. Precedex @ 0.5mcg/kg/hr for comfort. Heparin @ 800 units/hr, AntiXa 0.27 (within goal of 0.25-0.3) this am. Q1h accuchecks, insulin titrated according to algorithm. Tube feeds @ goal of 45 with minimal residual. Sterile dressing change completed to RIJ and R fem cannula sites. Pt had BM x 2. Wound care performed as ordered. Will continue to monitor.

## 2020-05-16 NOTE — PROGRESS NOTES
Dr. Ayon updated on pt labs, gtts, vs, and status. MAPs currently 68-72, 12.5 PO metorpolol given about 1hr ago. HR currently 110s-120s, pt remains tachypnic RR 30-40s. Made aware that pt does not follow commands, only withdraws from pain in LE. Orders received to start vaso @ .01 units/hr for MAP < 70. Will continue to monitor.

## 2020-05-16 NOTE — PROGRESS NOTES
Notified Dr. Alston of AntiXa 0.31, goal 0.25-0.3. Heparin gtt @ 900 units/hr. No new orders at this time.

## 2020-05-16 NOTE — PROCEDURES
[facility] EEG/VIDEO MONITORING REPORT  Ranjana Sanchez  [id number]  1965    DATE OF SERVICE:  05/15/2020 - 05/16/2020  DATE OF ADMISSION: 4/10/2020  5:26 PM    ADMITTING/REQUESTING PROVIDER: Jaylon Tim MD    REASON FOR CONSULT:  54-year-old man with prolonged hospitalization secondary to respiratory failure from COVID-19 with persistent decreased responsiveness.  Evaluate for evidence of epileptiform activity.    METHODOLOGY   Electroencephalographic (EEG) recording is with electrodes placed according to the International 10-20 placement system.  Thirty two (32) channels of digital signal (sampling rate of 512/sec) including T1 and T2 was simultaneously recorded from the scalp and may include  EKG, EMG, and/or eye monitors.  Recording band pass was 0.1 to 512 hz.  Digital video recording of the patient is simultaneously recorded with the EEG.  The patient is instructed report clinical symptoms which may occur during the recording session.  EEG and video recording is stored and archived in digital format.  Activation procedures which include photic stimulation, hyperventilation and instructing patients to perform simple task are done in selected patients.   The EEG is displayed on a monitor screen and can be reviewed using different montages.  Computer assisted analysis is employed to detect spike and electrographic seizure activity.   The entire record is submitted for computer analysis.  The entire recording is visually reviewed and the times identified by computer analysis as being spikes or seizures are reviewed again.  Compresses spectral analysis (CSA) is also performed on the activity recorded from each individual channel.  This is displayed as a power display of frequencies from 0 to 30 Hz over time.   The CSA is reviewed looking for asymmetries in power between homologous areas of the scalp and then compared with the original EEG recording.     [employer] software is also utilized in the review of this  study.  This software suite analyzes the EEG recording in multiple domains.  Coherence and rhythmicity is computed to identify EEG sections which may contain organized seizures.  Each channel undergoes analysis to detect presence of spike and sharp waves which have special and morphological characteristic of epileptic activity.  The routine EEG recording is converted from spacial into frequency domain.  This is then displayed comparing homologous areas to identify areas of significant asymmetry.  Algorithm to identify non-cortically generated artifact is used to separate eye movement, EMG and other artifact from the EEG.      RECORDING TIMES  Start on 05/15/2020 at 07:00 a.m.  Stop on 05/16/2020 at 11:36 a.m. -> End of the Recording Session  A total of 28 hr and 26 min of EEG recording is obtained.    EEG FINDINGS  Background activity:   The background is continuous and relatively symmetric predominantly 4-6 hz theta activity with some admixed delta frequencies.  There is minimal variability from page to page.  There is a fair amount of lead and muscle artifact during the recording.    There are no pushbutton activations.    Sleep:  There is no normal sleep architecture    Activation procedures:   Hyperventilation is not performed  Photic stimulation is not performed  Reactivity is not tested    Cardiac Monitor:   Tachycardia    Impression:   This is an abnormal continuous EEG monitoring study because generalized background slowing consistent with diffuse cortical dysfunction and a moderate-severe encephalopathy.  This finding is nonspecific with regards to etiology but can be seen in the setting of toxic/metabolic derangements, infection, anoxia, and as a medication effect.  There are no prominent focal findings however encephalopathy obscures focal findings.  There are no pushbutton activations, no epileptiform discharges, and no electrographic seizures.    LTM Summary 05/14/2020 - 05/16/2020:  Patient  events/Seizures:  None  Interictal findings:  None  Other notable abnormalities:  Moderate-severe encephalopathy    Zari Sneed MD PhD  Neurology-Epilepsy  [facility].  [facility]

## 2020-05-16 NOTE — PROGRESS NOTES
[facility]  Cardiothoracic Surgery  Daily ECMO Progress Note    Patient Name: Ranjana Sanchez  MRN: [id number]  Admission Date: 4/10/2020  Hospital Length of Stay: 35 days  TYPE of ECMO: V-V  Day OF ECMO SUPPORT: 20  Code Status: Full Code   Attending Physician: Francis Ayon MD   Referring Provider: Francis Ayon MD  Principal Problem:Acute respiratory distress syndrome (ARDS) due to COVID-19 virus    Subjective:   Interval History:  Net positive 900mL in last 24hrs, 2.4L UOP, BUN downtrending at 40 today, Cr non-elevated, Na stable at 149. Cardene at 6. Hep gtt at 800/hr, aXa at goal levels, LDH level 488. CXR pending, PEEP reduced to 5 and has maintained there since yesterday morning. PaO2 acceptable with at oxygenator FiO2 85 so will wean. EEG monitoring overnight.    Medications:  Continuous Infusions:   dexmedetomidine (PRECEDEX) infusion 0.4 mcg/kg/hr (05/15/20 2200)    heparin (porcine) in 5 % dex 800 Units/hr (05/15/20 2200)    insulin (HUMAN R) infusion (adults) 1.5 Units/hr (05/15/20 2200)    nicardipine Stopped (05/15/20 2100)    vasopressin (PITRESSIN) infusion Stopped (05/15/20 2130)     Scheduled Meds:   albuterol-ipratropium  3 mL Nebulization Q6H    amLODIPine  5 mg Oral Daily    aspirin  81 mg Per NG tube Daily    chlorhexidine  15 mL Mouth/Throat BID    diazePAM  2 mg Oral Q8H    melatonin  6 mg Per G Tube Nightly    metoprolol  12.5 mg Oral Q12H    [START ON 5/16/2020] multivitamin liquid no.118  10 mL Per G Tube Daily    pantoprazole  40 mg Intravenous Q12H    polyethylene glycol  17 g Per NG tube Daily    potassium chloride 10%  20 mEq Per NG tube BID    QUEtiapine  100 mg Per NG tube Q12H    sucralfate  1 g Per NG tube Q6H    thiamine (VITAMIN B1) IVPB  100 mg Intravenous Once    traZODone  50 mg Per G Tube QHS    vancomycin (VANCOCIN) IVPB  1,250 mg Intravenous Q24H    white petrolatum-mineral oiL   Both Eyes BID        Objective:     Vital  Signs (Most Recent):  Temp: 98.5 °F (36.9 °C) (05/15/20 1900)  Pulse: (!) 122 (05/15/20 2230)  Resp: (!) 41 (05/15/20 1955)  BP: 113/65 (05/15/20 2200)  SpO2: 95 % (05/15/20 2230) Vital Signs (24h Range):  Temp:  [98.5 °F (36.9 °C)-98.7 °F (37.1 °C)] 98.5 °F (36.9 °C)  Pulse:  [117-142] 122  Resp:  [41-56] 41  SpO2:  [93 %-100 %] 95 %  BP: (100-132)/(59-77) 113/65  Arterial Line BP: ()/(50-67) 99/55     Weight: 68 kg (149 lb 14.6 oz)  Body mass index is 25.73 kg/m².    SpO2: 95 %  O2 Device (Oxygen Therapy): ventilator    Intake/Output - Last 3 Shifts       05/14 0700 - 05/15 0659 05/15 0700 - 05/16 0659    I.V. (mL/kg) 1054.4 (15.5) 814.6 (12)    Blood  350    Other      NG/GT 2030 1420    IV Piggyback 250     Total Intake(mL/kg) 3334.4 (49) 2584.6 (38)    Urine (mL/kg/hr) 2515 (1.5) 1585 (1.4)    Blood 7 3    Total Output 2522 1588    Net +812.4 +996.6                Lines/Drains/Airways     Peripherally Inserted Central Catheter Line            PICC Double Lumen 05/05/20 1707 right basilic 10 days          Central Venous Catheter Line                 ECMO Cannula 04/25/20 1120 right femoral vein 20 days         ECMO Cannula 04/25/20 1120 right internal jugular 20 days          Drain                 Urethral Catheter 04/18/20 1854 27 days         NG/OG Tube 05/03/20 1215 [place] sump;nasogastric 18 Fr. Left nostril 12 days          Airway                 Surgical Airway 05/05/20 1500 Shiley Cuffed 10 days          Arterial Line                 Arterial Line 04/27/20 1100 Right Brachial 18 days          Peripheral Intravenous Line                 Peripheral IV - Single Lumen 05/08/20 0300 20 G Anterior;Left Hand 7 days         Peripheral IV - Single Lumen 05/08/20 0300 20 G Left Wrist 7 days         Peripheral IV - Single Lumen 05/12/20 0230 22 G Left Antecubital 3 days         Peripheral IV - Single Lumen 05/13/20 0357 22 G Right Forearm 2 days                Physical Exam    Constitutional: He is sedated, and  intubated.   Head: Normocephalic   Cardiovascular: Tachycardia 130s   Pulmonary/Chest: intubated vent FiO2 50% PEEP 5, ECMO RPM 2800, sweep 9, oxygenator FiO2 85%, flows mid 3s  Skin: L neck and L groin cannulas intact, min to no flashing, clots pre and post-oxygenator, all sutures intact and secure  Nursing note and vitals reviewed.    Significant Labs:  BMP:   Recent Labs   Lab 05/15/20  1948   *   *   K 4.2   *   CO2 30*   BUN 38*   CREATININE 0.6   CALCIUM 10.0   MG 2.1     CBC:   Recent Labs   Lab 05/15/20  1948 05/15/20  2002   WBC 12.95*  --    RBC 3.38*  --    HGB 10.0*  --    HCT 32.6* 32*     --    MCV 96  --    MCH 29.6  --    MCHC 30.7*  --      LFTs:   Recent Labs   Lab 05/15/20  1948   ALT 24   AST 28   ALKPHOS 135   BILITOT 0.6   PROT 6.6   ALBUMIN 3.6     Significant Diagnostics:  Cxr: pending this AM    ROS      Assessment/Plan:     Acute respiratory distress syndrome (ARDS) due to COVID-19 virus  - Crich switched to ETT on 4/11  - Monika weaned off 5/5  - Tracheostomy and bronch 5/5  - Thrombocytopenia resolved; transfuse for under 30K  - Aspirin 81 daily  - RPM at 2800; flows mid 3s  - Sweep at 9; CO2 goal 40-50s if not acidotic on ABG (respiratory compensation for alkalosis)  - Oxygenator Fi 85%; wean to 80% today  - Vent Fi 50%, PEEP 5     Tongue laceration      -ENT consulted and evaluated patient, laceration superficial, not bleeding      -recs: bite block vs paralysis, will monitor    Sedation  - Propofol and precedex off 5/12  - Valium, seroquel, PRN Fentanyl     UGI Bleed  - Scoped by GI 5/3 with epinephrine injection and clip placement for an actively bleeding D2/D3 Diuelafoy lesion  - No pressors, Hb stable     FEN/GI  - TF at 45, goal      - Lasix gtt stopped 5/12  - FWB for increasing BUN and HR at 250mL q12     Anticoagulation      - Heparin gtt at 800 U/hr      - Goal aXa 0.25 - 0.3 Q6hrs      -  from 326    Prophylaxis      - protonix q12 IV       - abx  for ECMO      - PICC placed 5/5; central line removed 5/6    Anne Miller MD  Cardiothoracic Surgery  [facility]    VV ECMO circuit checked and all parameters reviewed. Anticoagulation was managed and all cannulation exit sites along with review of labs and radiology studies performed. Speed and functioning of the pump along with oxygenator quality reviewed.  Patient is doing well very stable spontaneously opening his own eyes.  However it appears patient is getting fluid overloaded.  Diuresis is limited because of patient's hypernatremia.  Would add some free water.  Coordination of care was provided between the ICU , surgical and perfusion team members.  Concerns of all team members were addressed.  Family is being notified and updated about the patient's current situation.

## 2020-05-16 NOTE — PROGRESS NOTES
Pharmacokinetic Assessment Follow Up: IV Vancomycin    Vancomycin serum concentration assessment(s):  · Vancomycin trough level resulted within goal as 12.9 mcg/mL.  Goal level is 10 to 20 mcg/mL.     Drug levels (last 3 results):  Recent Labs   Lab Result Units 05/13/20  1030 05/14/20  0150 05/16/20  0800   Vancomycin, Random ug/mL  --  18.1  --    Vancomycin-Trough ug/mL 30.9*  --  12.9     Vancomycin Regimen Plan:  · Continue current regimen of vancomycin 1250 mg every 24 hours. Next surveillance trough ordered on 5/19 at 08:30.     Pharmacy will continue to follow and monitor vancomycin. Please contact pharmacy for questions regarding this assessment.    Thank you for the consult,   Daiana Vega, PharmD, [facility]  Spectralink: [id number]  _________________________________________________________________________________       Patient brief summary:  Ranjana Sanchez is a 54 y.o. male initiated on antimicrobial therapy with IV vancomycin for treatment of sepsis    Drug Allergies:   Review of patient's allergies indicates:  No Known Allergies    Actual Body Weight:   71.4 kg     Renal Function:   Estimated Creatinine Clearance: 106.8 mL/min (based on SCr of 0.7 mg/dL).    Dialysis Method (if applicable):  N/A    CBC (last 72 hours):  Recent Labs   Lab Result Units 05/13/20  1400 05/13/20  1946 05/14/20  0150 05/14/20  0800 05/14/20  1400 05/14/20  2000 05/15/20  0200 05/15/20  0803 05/15/20  1400 05/15/20  1948 05/16/20  0203 05/16/20  0800   WBC K/uL 9.30 10.11 8.55 10.02 9.00 10.76 10.41 11.69 11.57 12.95* 12.48 13.65*   Hemoglobin g/dL 8.7* 8.9* 9.4* 9.5* 9.3* 9.4* 9.4* 9.8* 8.9* 10.0* 9.7* 9.8*   Hemoglobin, Free, Plasma mg/dL  --   --   --  20  --   --   --  30  --   --   --   --    Hematocrit % 28.6* 29.4* 30.7* 31.7* 30.5* 31.5* 31.0* 32.6* 29.6* 32.6* 32.4* 32.2*   Platelets K/uL 204 209 196 210 200 214 196 210 186 191 186 186   Gran% % 56.8 70.0 68.0 60.0 66.0 69.0 61.0 64.0 72.0 70.0 77.0*  --     Lymph% % 11.4* 7.0* 6.0* 11.0* 7.0* 6.0* 7.0* 8.0* 10.0* 10.0* 6.0*  --    Mono% % 14.9 9.0 12.0 12.0 11.0 8.0 9.0 11.0 6.0 8.0 7.0  --    Eosinophil% % 11.2* 14.0* 11.0* 13.0* 12.0* 12.0* 15.0* 13.0* 10.0* 5.0 6.0  --    Basophil% % 0.8 0.0 0.0 0.0 0.0 1.0 0.0 1.0 1.0 0.0 0.0  --    Differential Method  Automated Manual Manual Manual Manual Manual Manual Manual Manual Manual Manual  --        Metabolic Panel (last 72 hours):  Recent Labs   Lab Result Units 05/13/20  1400 05/13/20  1946 05/14/20  0150 05/14/20  0800 05/14/20  1400 05/14/20  2000 05/15/20  0200 05/15/20  0803 05/15/20  1400 05/15/20  1948 05/16/20  0203 05/16/20  0800   Sodium mmol/L 146* 147* 148* 149* 149* 149* 149* 151* 148* 150* 150* 150*   Potassium mmol/L 4.1 4.3 4.3 4.2 4.2 4.2 4.2 4.4 4.1 4.2 4.3 4.1   Chloride mmol/L 108 109 111* 112* 113* 113* 111* 113* 111* 113* 113* 113*   CO2 mmol/L 29 28 27 26 28 27 27 29 28 30* 28 29   Glucose mg/dL 316* 184* 196* 191* 206* 189* 189* 131* 199* 155* 181* 166*   BUN, Bld mg/dL 51* 53* 48* 46* 46* 42* 40* 40* 39* 38* 37* 35*   Creatinine mg/dL 0.8 0.7 0.7 0.7 0.7 0.7 0.7 0.6 0.7 0.6 0.7 0.7   Albumin g/dL 3.9 3.8 3.6 3.6 3.7 3.6 3.5 3.4* 3.6 3.6 3.4* 3.3*   Total Bilirubin mg/dL 0.6 0.6 0.5 0.5 0.5 0.5 0.4 0.5 0.5 0.6 0.5 0.5   Alkaline Phosphatase U/L 147* 149* 146* 142* 133 130 130 135 124 135 132 137*   AST U/L 25 27 27 28 25 26 23 27 25 28 26 31   ALT U/L 34 33 31 30 27 26 24 26 22 24 23 26   Magnesium mg/dL 2.2 2.3 2.4 2.2 2.3 2.1 2.1 2.1 2.1 2.1 2.0 2.0   Phosphorus mg/dL 2.9 2.8 2.9 3.4 2.9 3.3 2.9 3.2 2.9 3.0 2.9 3.0       Vancomycin Administrations:  vancomycin given in the last 96 hours                   vancomycin in dextrose 5 % 1 gram/250 mL IVPB 1,000 mg (mg) 1,000 mg New Bag 05/04/20 1130     1,000 mg New Bag 05/03/20 2202     1,000 mg New Bag  1050     1,000 mg New Bag 05/02/20 2212     1,000 mg New Bag  1003     1,000 mg New Bag 05/01/20 2223     1,000 mg New Bag  1000     1,000 mg New  Bag 04/30/20 2200                Microbiologic Results:  Microbiology Results (last 7 days)     Procedure Component Value Units Date/Time    Culture, Respiratory with Gram Stain [[id number]] Collected:  05/08/20 1514    Order Status:  Completed Specimen:  Respiratory from Bronchial Wash Updated:  05/10/20 0803     Respiratory Culture No growth     Gram Stain (Respiratory) <10 epithelial cells per low power field.     Gram Stain (Respiratory) No WBC's     Gram Stain (Respiratory) No organisms seen

## 2020-05-17 NOTE — PROGRESS NOTES
EC[facility] Specialists shift report    Date: 05/17/2020  ECMO Specialist:  Ashley Francisco    Pump parameters:  RPM: 2800  Flow:  3.4  Sweep:  8  FiO2:  70    Oxygenator status:  Clots: pre and post  Fibrin: pre and post    Pressure trends:  P1: 108  P2: 83  Delta P: 25    Volume status:  Chugging noted?No  CVP: 1  MAP:  70s-100  MD notified (name):  marvel    Anticoagulation:  ACT/aPTT/Xa parameters: .25-.3  ACT/aPTT/Xa trends this shift: .26-.24    Cannula size / status / placement:  Arterial:   Venous 1: RIJV 23F@4cm  Venous 2: RFV 27@12cm  Dual lumen:      Additional Comments: Marvel increased RPMs to 2800 which made the flows go to 3.4 lpm(2130 hrs).  Heparin increased to 9 ml/hr per parameters. Flows maintained.  No issues with pump.  Pt. Is still breathing hard 40-60 BPM.

## 2020-05-17 NOTE — PLAN OF CARE
"      SICU PLAN OF CARE NOTE    Dx: Acute respiratory distress syndrome (ARDS) due to COVID-19 virus    Shift Events: Pt remains on ventilator support--assist control-rate 26, FiO2 50%, and 5 PEEP.  VV-ECMO at 2800 RPM, 70% FiO2, sweep 8, flows 3.4-3.5. Cardene titrated to maintain MAP 75-85. Precedex gtt titrated to maintain pt comfort. Labs trended Q6H.    Neuro: Opens eyes spontaneously, but does not track. No spontaneous movements to upper extremities and withdraws to noxious stimulus to lower extremities.     Vital Signs: /71 (BP Location: Left arm, Patient Position: Lying)   Pulse (!) 135   Temp 97.9 °F (36.6 °C) (Oral)   Resp (!) 43   Ht 5' 4" (1.626 m)   Wt 68.5 kg (151 lb 0.2 oz)   SpO2 96%   BMI 25.92 kg/m²     Respiratory: Ventilator    Diet: Tube Feeds at 45 cc/hr with minimal to no residuals noted. BM x2.    Gtts: Precedex, Insulin, Heparin and Nicardipine    Urine Output: Urinary Catheter  cc/shift     Labs/Accuchecks: CBC, CMP, APTT, Anti-Xa, PT/INR, Fibrinogen Q6H. Accuchecks Q1H.     Skin: No skin breakdown noted to sacrum, elbows, nor heels. Left arm wound cleansed with betadine and covered with mepilex. Wound to left cheek cleansed with saline and triad cream applied. Waffle mattress inflated and bed plugged in with surface set for pt weight.        "

## 2020-05-17 NOTE — PROGRESS NOTES
[facility]ists shift report    Date: 05/17/2020  ECMO Specialist:  Lia Alvarez    Pump parameters:  RPM: 2800  Flow:  3.48 lpm  Sweep:  8  FiO2:  70%    Oxygenator status:  Clots: Few Pre & Post  Fibrin: not remarkable    Pressure trends:  P1: 112  P2: 87  Delta P: 25    Volume status:  Chugging noted - slight  MAP:  80's-90's  MD notified (name): Jean Ayon/Rafael Nj    Anticoagulation:  ACT/aPTT/Xa parameters: 0.25-0.3  ACT/aPTT/Xa trends this shift: 0.29    Cannula size / status / placement:  Arterial:   Venous 1: 23FR / RIJV / 4 cm  Venous 2: 27FR / RFV / 12 cm  Dual lumen: no     Additional Comments:          ECMO status remains unchanged; cannulas still sutured into place with no movement. Only slight chugging noted; no blood products given at this time.  MAP in 90's today; pt remains tachypnic & tachycardic.  Urine output remains good; no flow problems with ECMO circuit. Suctioning produces thick tan secretions; bilateral wheezing & rhonchi present.  Labs returned within limits.

## 2020-05-17 NOTE — PLAN OF CARE
No acute events overnight. ECMO RPM increased to 2800 from 2700 at 2130 per Dr. Ayon order. Heparin gtt increased to 900 units/hr. Current gtts include Heparin, Cardene, Insulin, and Precedex.  Pt remains sinus tachycardic with rate 120s-140s, MAPs maintained 75-85, CVP 0-1, resp rate 35-50.  VV-ECMO FiO2 70%, SWEEP , Speed 2800, Flows 3.0-3.2.  Vent settings AC VC+ FiO2 50%, 5 PEEP, Rate 26. Urine output 75-150cc/hr.  Net -172cc/shift. TF remain at 45cc/hr (goal) with minimal residuals.  No BM overnight. Pt opens eyes spontaneously, but does not track.  Withdraws extremities to pain. PERRLA.  Cough, gag, and corneal reflexes intact. Accuchecks hourly with insulin gtt titrated per order. CBC, CMP, Mag, Phos, PT/INR, aPTT, Fibrinogen, and AntiXa Q6H. Heel offloading boots and foot drop boot in place. Will continue to monitor.

## 2020-05-18 NOTE — PLAN OF CARE
05/18/20 1031   Discharge Reassessment   Assessment Type Discharge Planning Reassessment   Provided patient/caregiver education on the expected discharge date and the discharge plan No   Do you have any problems affording any of your prescribed medications? TBD   Discharge Plan A Long-term acute care facility (LTAC)   Discharge Plan B Long-term acute care facility (LTAC)   DME Needed Upon Discharge  none   Patient choice form signed by patient/caregiver N/A   Anticipated Discharge Disposition LTAC     No SW needs identified at this time as pt is not medically stable.  SW will continue to follow.    Gabriella Stinson LMSW  [facility]  [id number]

## 2020-05-18 NOTE — PROGRESS NOTES
[facility]  Cardiothoracic Surgery  Daily ECMO Progress Note    Patient Name: Ranjana Sanchez  MRN: [id number]  Admission Date: 4/10/2020  Hospital Length of Stay: 38 days  TYPE of ECMO: V-V  Day OF ECMO SUPPORT: 23  Code Status: Full Code   Attending Physician: Francis Ayon MD   Referring Provider: Francis Ayon MD  Principal Problem:Acute respiratory distress syndrome (ARDS) due to COVID-19 virus    Subjective:   Interval History:  Net positive 400mL in last 24hrs, 2.8L UOP, BUN downtrending at 30, Cr non-elevated, Na stable at 145. Cardene at 6. Hep gtt at 950/hr, aXa at goal levels, LDH level 385. CXR stable. PaO2 in 60s, acceptable, with at oxygenator FiO2 70; with stats in low 90s, so will leave at 70 today.    Medications:  Continuous Infusions:   dexmedetomidine (PRECEDEX) infusion 0.6 mcg/kg/hr (05/18/20 0701)    heparin (porcine) in 5 % dex 950 Units/hr (05/18/20 0701)    insulin (HUMAN R) infusion (adults) 0.4 Units/hr (05/18/20 0701)    nicardipine 6 mg/hr (05/18/20 0701)    vasopressin (PITRESSIN) infusion Stopped (05/15/20 2130)     Scheduled Meds:   amLODIPine  5 mg Oral Daily    aspirin  81 mg Per NG tube Daily    chlorhexidine  15 mL Mouth/Throat BID    diazePAM  2 mg Per NG tube BID    melatonin  6 mg Per G Tube Nightly    metoprolol  25 mg Oral Q12H    multivitamin liquid no.118  10 mL Per G Tube Daily    pantoprazole  40 mg Intravenous Q12H    piperacillin-tazobactam (ZOSYN) IVPB  4.5 g Intravenous Q6H    polyethylene glycol  17 g Per NG tube Daily    potassium chloride 10%  20 mEq Per NG tube BID    QUEtiapine  100 mg Per NG tube QHS    sucralfate  1 g Per NG tube Q6H    traZODone  50 mg Per G Tube QHS    vancomycin (VANCOCIN) IVPB  1,250 mg Intravenous Q24H    vitamin D  1,000 Units Per G Tube Daily    white petrolatum-mineral oiL   Both Eyes BID        Objective:     Vital Signs (Most Recent):  Temp: 98.3 °F (36.8 °C) (05/18/20 0700)  Pulse: (!)  129 (05/18/20 0730)  Resp: (!) 43 (05/16/20 0720)  BP: 123/67 (05/18/20 0700)  SpO2: 96 % (05/18/20 0730) Vital Signs (24h Range):  Temp:  [97.9 °F (36.6 °C)-98.5 °F (36.9 °C)] 98.3 °F (36.8 °C)  Pulse:  [113-136] 129  SpO2:  [92 %-99 %] 96 %  BP: (102-136)/(65-91) 123/67  Arterial Line BP: ()/(51-71) 117/61     Weight: 66.5 kg (146 lb 9.7 oz)  Body mass index is 25.16 kg/m².    SpO2: 96 %  O2 Device (Oxygen Therapy): ventilator    Intake/Output - Last 3 Shifts       05/16 0700 - 05/17 0659 05/17 0700 - 05/18 0659 05/18 0700 - 05/19 0659    I.V. (mL/kg) 1044 (15.2) 967.5 (14.5)     Blood       NG/GT 1930 2270 45    Total Intake(mL/kg) 2974 (43.4) 3237.5 (48.7) 45 (0.7)    Urine (mL/kg/hr) 2845 (1.7) 2980 (1.9) 250 (3.5)    Stool 0 0     Blood 5 7     Total Output 2850 2987 250    Net +124 +250.5 -205           Stool Occurrence 2 x 2 x           Lines/Drains/Airways     Peripherally Inserted Central Catheter Line            PICC Double Lumen 05/05/20 1707 right basilic 12 days          Central Venous Catheter Line                 ECMO Cannula 04/25/20 1120 right femoral vein 22 days         ECMO Cannula 04/25/20 1120 right internal jugular 22 days          Drain                 Urethral Catheter 04/18/20 1854 29 days         NG/OG Tube 05/03/20 1215 Esmeralda preetp;nasogastric 18 Fr. Left nostril 14 days          Airway                 Surgical Airway 05/05/20 1500 Shiley Cuffed 12 days          Arterial Line                 Arterial Line 04/27/20 1100 Right Brachial 20 days          Peripheral Intravenous Line                 Peripheral IV - Single Lumen 05/08/20 0300 20 G Anterior;Left Hand 10 days         Peripheral IV - Single Lumen 05/12/20 0230 22 G Left Antecubital 6 days         Peripheral IV - Single Lumen 05/13/20 0357 22 G Right Forearm 5 days         Peripheral IV - Single Lumen 05/16/20 1500 22 G Left;Anterior Wrist 1 day                Physical Exam    Constitutional: He is sedated, and intubated.    Head: Normocephalic   Cardiovascular: Tachycardia 130s   Pulmonary/Chest: intubated vent FiO2 50% PEEP 5, ECMO RPM 2800, sweep 8, oxygenator FiO2 70%, flows low 3s  Skin: L neck and L groin cannulas intact, min to no flashing, clots pre and post-oxygenator, all sutures intact and secure  Nursing note and vitals reviewed.    Significant Labs:  BMP:   Recent Labs   Lab 05/18/20 0203   *      K 4.5      CO2 29   BUN 30*   CREATININE 0.5   CALCIUM 9.6   MG 1.9     CBC:   Recent Labs   Lab 05/18/20 0203   WBC 13.86*   RBC 3.09*   HGB 9.1*   HCT 30.0*      MCV 97   MCH 29.4   MCHC 30.3*     LFTs:   Recent Labs   Lab 05/18/20 0203   ALT 46*   AST 44*   ALKPHOS 176*   BILITOT 0.4   PROT 5.9*   ALBUMIN 2.7*     Significant Diagnostics:  Cxr: pending this AM    ROS      Assessment/Plan:     Acute respiratory distress syndrome (ARDS) due to COVID-19 virus  - Crich switched to ETT on 4/11  - Monika weaned off 5/5  - Tracheostomy and bronch 5/5  - Thrombocytopenia resolved; transfuse for under 30K  - Aspirin 81 daily  - Increasing metoprolol to 25 BID today  - RPM at 2800; flows low 3s  - Sweep at 8; CO2 goal 40-50s if not acidotic on ABG (respiratory compensation for alkalosis)  - Oxygenator Fi at 70%; makeda in low 90s so will leave at 70%  - Vent Fi 50%, PEEP 5     Tongue laceration      -ENT consulted and evaluated patient, laceration superficial, not bleeding      -recs: bite block vs paralysis, will monitor    Sedation  - Valium, seroquel, PRN Fentanyl     UGI Bleed  - Scoped by GI 5/3 with epinephrine injection and clip placement for an actively bleeding D2/D3 Diuelafoy lesion  - No pressors, Hb stable     FEN/GI  - TF at 45, goal      - Lasix gtt stopped 5/12  - FWB for hypernatremia 250mL q6     Anticoagulation      - Goal aXa 0.25 - 0.3 Q6hrs      - monitor LDH    Prophylaxis      - protonix q12 IV       - abx for ECMO; WBC continues to rise, will broaden coverage with Zosyn      - PICC placed  5/5; central line removed 5/6    Anne Miller MD  Cardiothoracic Surgery  [facility]    VV ECMO circuit checked and all parameters reviewed. Anticoagulation was managed and all cannulation exit sites along with review of labs and radiology studies performed. Speed and functioning of the pump along with oxygenator quality reviewed.  Patient appears to be stable.  Labs shows slight increase in white count.  Would add Zosyn as an additional antibiotic to address increasing white count.  Hypernatremia is being addressed with free water.  Coordination of care between different team members which included the surgical team, ICU team, nursing staff and perfusion team was carried out and all questions and concerns were addressed.  Team has been notifying and updating family back in [place] about the condition and progress of the patient.

## 2020-05-18 NOTE — ASSESSMENT & PLAN NOTE
Acute respiratory distress syndrome (ARDS) due to COVID-19 virus  - Crich switched to ETT on 4/11  - Monika weaned off 5/5  - Tracheostomy and bronch 5/5  - Thrombocytopenia resolved; transfuse for under 30K  - Aspirin 81 daily  - Increasing metoprolol to 25 BID today  - RPM at 2800; flows low 3s  - Sweep at 8; CO2 goal 40-50s if not acidotic on ABG (respiratory compensation for alkalosis)  - Oxygenator Fi at 70%; makeda in low 90s so will leave at 70%  - Vent Fi 50%, PEEP 5     Tongue laceration      -ENT consulted and evaluated patient, laceration superficial, not bleeding      -recs: bite block vs paralysis, will monitor    Sedation  - Valium, seroquel, PRN Fentanyl     UGI Bleed  - Scoped by GI 5/3 with epinephrine injection and clip placement for an actively bleeding D2/D3 Diuelafoy lesion  - No pressors, Hb stable     FEN/GI  - TF at 45, goal      - Lasix gtt stopped 5/12  - FWB for hypernatremia 250mL q6     Anticoagulation      - Goal aXa 0.25 - 0.3 Q6hrs      - monitor LDH    Prophylaxis      - protonix q12 IV       - abx for ECMO; WBC continues to rise, will broaden coverage with Zosyn      - PICC placed 5/5; central line removed 5/6

## 2020-05-18 NOTE — NURSING
"      SICU PLAN OF CARE NOTE    Dx: Acute respiratory distress syndrome (ARDS) due to COVID-19 virus    Goals of Care: MAP 75-85  Anti-Xa 0.25-0.3  (goal)    Neuro: opens eyes spontaneously but nothing purposeful    Vital Signs: /68   Pulse (!) 122   Temp 98.3 °F (36.8 °C) (Oral)   Resp (!) 43   Ht 5' 4" (1.626 m)   Wt 66.5 kg (146 lb 9.7 oz)   SpO2 96%   BMI 25.16 kg/m²     Respiratory: Ventilator AC/VC, 50%, 5 PEEP    VV ECMO:   Speed 2800  Flows 3-4  70% FiO2  Sweep 8    Diet: Tube Feeds (Peptamen Intense @ 45 = goal)    Gtts: Precedex, Heparin, Insulin, and Cardene     Urine Output: Urinary Catheter 1,600 cc/shift    Labs/Accuchecks: Labs q6h, Accu checks q1h    Skin: see flowsheet    BM x 2    Dr. Ariza updated on patient throughout day.        "

## 2020-05-18 NOTE — ASSESSMENT & PLAN NOTE
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  This was switched to ETT on 4/11.  Cannulated on 4/25.    Neuro:  5 of valium  100 BID of seroquel  Fentanyl patch    Pulm  AC/VC+ on the vent.  Small pneumo on cxr, subclinical.  Sweep of 8 today, 3.43 L/min flows and 2800 RPM  Will need some more time on ECMO, not pulling enough volume on the vent    Cardiac   Cardene, wean as tolerated    GI  TFs at goal    Renal  Making urine, diuresing agressively    Heme/ID  Fluc/Vanc  White count normalized  950 of hep right now      Dispo:  Remain in ICU, will trying to expand lungs with vent

## 2020-05-18 NOTE — SUBJECTIVE & OBJECTIVE
Interval History/Significant Events:   Not much change since yesterday  Increases breathing and worsening vital signs when off sedation  Now on 0.7 of precedex      Follow-up For: Procedure(s) (LRB):  CREATION, TRACHEOSTOMY  (N/A)    Post-Operative Day: 13 Days Post-Op    Objective:     Vital Signs (Most Recent):  Temp: 98.6 °F (37 °C) (05/18/20 1100)  Pulse: (!) 117 (05/18/20 1300)  Resp: (!) 43 (05/16/20 0720)  BP: 116/62 (05/18/20 1300)  SpO2: 98 % (05/18/20 1300) Vital Signs (24h Range):  Temp:  [97.9 °F (36.6 °C)-98.6 °F (37 °C)] 98.6 °F (37 °C)  Pulse:  [107-136] 117  SpO2:  [92 %-99 %] 98 %  BP: (102-136)/(62-91) 116/62  Arterial Line BP: ()/(51-68) 108/60     Weight: 66.5 kg (146 lb 9.7 oz)  Body mass index is 25.16 kg/m².      Intake/Output Summary (Last 24 hours) at 5/18/2020 1351  Last data filed at 5/18/2020 1300  Gross per 24 hour   Intake 3743.52 ml   Output 3069 ml   Net 674.52 ml       Physical Exam   Constitutional: He appears well-developed.   HENT:   Head: Normocephalic and atraumatic.   Eyes: Pupils are equal, round, and reactive to light.   Neck: Normal range of motion.   Cardiovascular:   tachycardic   Pulmonary/Chest:   Trached, mechanically ventilated  On ECMO, cannulated in RIJ and fem vein   Abdominal: Soft. He exhibits no distension.   Musculoskeletal: Normal range of motion.   Neurological:   sedated   Skin: Skin is warm.   Vitals reviewed.      Vents:  Vent Mode: A/C (05/18/20 1244)  Ventilator Initiated: Yes (04/10/20 2247)  Set Rate: 26 BPM (05/18/20 1244)  Vt Set: 200 mL (05/18/20 1244)  PEEP/CPAP: 5 cmH20 (05/18/20 1244)  Oxygen Concentration (%): 50 (05/18/20 1300)  Peak Airway Pressure: 36 cmH2O (05/18/20 1244)  Plateau Pressure: 34 cmH20 (05/18/20 1244)  Total Ve: 5.74 mL (05/18/20 1244)  F/VT Ratio<105 (RSBI): 270.44 (05/16/20 0720)    Lines/Drains/Airways     Peripherally Inserted Central Catheter Line            PICC Double Lumen 05/05/20 1707 right basilic 12 days           Central Venous Catheter Line                 ECMO Cannula 04/25/20 1120 right femoral vein 23 days         ECMO Cannula 04/25/20 1120 right internal jugular 23 days          Drain                 Urethral Catheter 04/18/20 1854 29 days         NG/OG Tube 05/03/20 1215 Stevens sump;nasogastric 18 Fr. Left nostril 15 days          Airway                 Surgical Airway 05/05/20 1500 Shiley Cuffed 12 days          Arterial Line                 Arterial Line 04/27/20 1100 Right Brachial 21 days          Peripheral Intravenous Line                 Peripheral IV - Single Lumen 05/08/20 0300 20 G Anterior;Left Hand 10 days         Peripheral IV - Single Lumen 05/12/20 0230 22 G Left Antecubital 6 days         Peripheral IV - Single Lumen 05/13/20 0357 22 G Right Forearm 5 days         Peripheral IV - Single Lumen 05/16/20 1500 22 G Left;Anterior Wrist 1 day                Significant Labs:    CBC/Anemia Profile:  Recent Labs   Lab 05/17/20  0200  05/17/20 2015 05/18/20  0203 05/18/20  0800 05/18/20  0807   WBC 13.93*   < > 14.81*  --  13.86* 14.63*  --    HGB 9.7*   < > 9.7*  --  9.1* 9.6*  --    HCT 31.6*   < > 31.5*   < > 30.0* 31.4* 28*      < > 171  --  166 172  --    MCV 97   < > 96  --  97 97  --    RDW 14.4   < > 14.0  --  14.1 13.9  --    FERRITIN 1,873*  --   --   --  2,154*  --   --     < > = values in this interval not displayed.        Chemistries:  Recent Labs   Lab 05/17/20 2015 05/18/20 0203 05/18/20  0800    145 145   K 4.4 4.5 4.2    108 107   CO2 29 29 30*   BUN 29* 30* 28*   CREATININE 0.6 0.5 0.6   CALCIUM 10.1 9.6 9.4   ALBUMIN 2.9* 2.7* 2.7*   PROT 6.2 5.9* 5.9*   BILITOT 0.5 0.4 0.4   ALKPHOS 182* 176* 177*   ALT 44 46* 47*   AST 42* 44* 38   MG 1.9 1.9 1.8   PHOS 3.3 3.4 3.7         Significant Imaging:  I have reviewed and interpreted all pertinent imaging results/findings within the past 24 hours.

## 2020-05-18 NOTE — SUBJECTIVE & OBJECTIVE
Interval History: stably tachycardic and stable neuro function    ROS  Medications:  Continuous Infusions:   dexmedetomidine (PRECEDEX) infusion 0.5 mcg/kg/hr (05/17/20 2000)    heparin (porcine) in 5 % dex 900 Units/hr (05/17/20 2000)    insulin (HUMAN R) infusion (adults) 1.3 Units/hr (05/17/20 2000)    nicardipine 5 mg/hr (05/17/20 2000)    vasopressin (PITRESSIN) infusion Stopped (05/15/20 2130)     Scheduled Meds:   amLODIPine  5 mg Oral Daily    aspirin  81 mg Per NG tube Daily    chlorhexidine  15 mL Mouth/Throat BID    diazePAM  2 mg Per NG tube BID    melatonin  6 mg Per G Tube Nightly    metoprolol  12.5 mg Oral Q12H    multivitamin liquid no.118  10 mL Per G Tube Daily    pantoprazole  40 mg Intravenous Q12H    polyethylene glycol  17 g Per NG tube Daily    potassium chloride 10%  20 mEq Per NG tube BID    QUEtiapine  100 mg Per NG tube QHS    sucralfate  1 g Per NG tube Q6H    traZODone  50 mg Per G Tube QHS    vancomycin (VANCOCIN) IVPB  1,250 mg Intravenous Q24H    vitamin D  1,000 Units Per G Tube Daily    white petrolatum-mineral oiL   Both Eyes BID     PRN Meds:sodium chloride, sodium chloride, acetaminophen, artificial tears, calcium gluconate IVPB, calcium gluconate IVPB, calcium gluconate IVPB, Dextrose 10% Bolus, Dextrose 10% Bolus, fentaNYL, glucose, glucose, magnesium oxide, magnesium oxide, magnesium sulfate IVPB, potassium chloride 10%, potassium chloride 10%, potassium chloride 10%, potassium, sodium phosphates, sodium chloride 0.9%     Objective:     Vital Signs (Most Recent):  Temp: 97.9 °F (36.6 °C) (05/17/20 1515)  Pulse: (!) 133 (05/17/20 2015)  Resp: (!) 43 (05/16/20 0720)  BP: 131/75 (05/17/20 2000)  SpO2: 96 % (05/17/20 2015) Vital Signs (24h Range):  Temp:  [97.9 °F (36.6 °C)-98.5 °F (36.9 °C)] 97.9 °F (36.6 °C)  Pulse:  [115-137] 133  SpO2:  [92 %-98 %] 96 %  BP: (102-137)/(63-82) 131/75  Arterial Line BP: ()/(50-80) 121/65     Weight: 68.5 kg (151 lb  0.2 oz)  Body mass index is 25.92 kg/m².    SpO2: 96 %  O2 Device (Oxygen Therapy): ventilator    Intake/Output - Last 3 Shifts       05/15 0700 - 05/16 0659 05/16 0700 - 05/17 0659 05/17 0700 - 05/18 0659    I.V. (mL/kg) 1456.6 (20.4) 1044 (15.2) 605.5 (8.8)    Blood 350      NG/GT 1735 1930 1260    IV Piggyback       Total Intake(mL/kg) 3541.6 (49.6) 2974 (43.4) 1865.5 (27.2)    Urine (mL/kg/hr) 2300 (1.3) 2845 (1.7) 1755 (1.9)    Stool  0 0    Blood 4 5 6    Total Output 2304 2850 1761    Net +1237.6 +124 +104.5           Stool Occurrence  2 x 1 x          Lines/Drains/Airways     Peripherally Inserted Central Catheter Line            PICC Double Lumen 05/05/20 1707 right basilic 12 days          Central Venous Catheter Line                 ECMO Cannula 04/25/20 1120 right femoral vein 22 days         ECMO Cannula 04/25/20 1120 right internal jugular 22 days          Drain                 Urethral Catheter 04/18/20 1854 29 days         NG/OG Tube 05/03/20 1215 [place] lachelle;nasogastric 18 Fr. Left nostril 14 days          Airway                 Surgical Airway 05/05/20 1500 Shiley Cuffed 12 days          Arterial Line                 Arterial Line 04/27/20 1100 Right Brachial 20 days          Peripheral Intravenous Line                 Peripheral IV - Single Lumen 05/08/20 0300 20 G Anterior;Left Hand 9 days         Peripheral IV - Single Lumen 05/12/20 0230 22 G Left Antecubital 5 days         Peripheral IV - Single Lumen 05/13/20 0357 22 G Right Forearm 4 days         Peripheral IV - Single Lumen 05/16/20 1500 22 G Left;Anterior Wrist 1 day                Physical Exam  No pedal edema; continuing tachycardia; movement of BLE to pain, no movement at BUE    Significant Labs:  Cardiac markers: No results for input(s): CKMB, CPKMB, TROPONINT, TROPONINI, MYOGLOBIN in the last 48 hours.  CBC:   Recent Labs   Lab 05/17/20  1348  05/17/20 2013   WBC 14.35*  --   --    RBC 3.22*  --   --    HGB 9.6*  --   --    HCT 31.0*    < > 27*     --   --    MCV 96  --   --    MCH 29.8  --   --    MCHC 31.0*  --   --     < > = values in this interval not displayed.     CMP:   Recent Labs   Lab 05/17/20  1348   *   CALCIUM 9.8   ALBUMIN 2.8*   PROT 6.1      K 4.3   CO2 29      BUN 29*   CREATININE 0.6   ALKPHOS 165*   ALT 42   AST 40   BILITOT 0.4     Coagulation:   Recent Labs   Lab 05/17/20  1348   INR 1.0   APTT 31.7

## 2020-05-18 NOTE — PLAN OF CARE
No acute events overnight. Heparin gtt increased to 950 units/hr. Current gtts include Heparin, Cardene, Insulin, and Precedex. Pt remains sinus tachycardic with rate 120s-130s, MAPs maintained 75-85, CVP 0-1, resp rate 30-40.  VV-ECMO FiO2 70%, SWEEP 8, Speed 2800, Flows 3.0-3.3.  Vent settings AC VC+ FiO2 50%, 5 PEEP, Rate 26. Urine output 75-150cc/hr. TF remain at 45cc/hr (goal) with minimal residuals. BM x1 overnight. Pt opens eyes spontaneously, but does not track. Withdraws lower extremities to pain. PERRLA. Cough, gag, and corneal reflexes intact. Accuchecks hourly with insulin gtt titrated per order. CBC, CMP, Mag, Phos, PT/INR, aPTT, Fibrinogen, and AntiXa Q6H. Heel offloading boots and foot drop boot in place. Will continue to monitor. Dr. Ayon and Dr. Estrella updated on patient throughout shift.

## 2020-05-18 NOTE — PROGRESS NOTES
"05/18/2020  Maikel Zepeda    Current provider:  Francis Ayon MD      As floor rounding has been requested to be limited during COVID-19 patient quarantine by Infectious Disease and leadership, only "electronic" rounding has been performed on this mechanical assist device patient.  Electronic rounding includes verifying in Epic that current settings and measurements for the device have been documented appropriately and that they are within limits.  Additionally, this rounding verifies that the EQUIPMENT section of the VAD flowsheet is documented in Epic, specifically checking the presence of emergency equipment and controller self test.  Any discrepancies will be related to the care team.    In emergency, the nursing units have been notified to contact the perfusion department either by:  Calling [phone] from 630am to 4pm, or by contacting the hospital  from 4pm to 630am and asking to speak with the perfusionist on call.    Maikel Zepeda  7:06 AM  "

## 2020-05-18 NOTE — SUBJECTIVE & OBJECTIVE
Interval History:  Net positive 400mL in last 24hrs, 2.8L UOP, BUN downtrending at 30, Cr non-elevated, Na stable at 145. Cardene at 6. Hep gtt at 950/hr, aXa at goal levels, LDH level 385. CXR stable. PaO2 in 60s, acceptable, with at oxygenator FiO2 70; with stats in low 90s, so will leave at 70 today.    Medications:  Continuous Infusions:   dexmedetomidine (PRECEDEX) infusion 0.6 mcg/kg/hr (05/18/20 0701)    heparin (porcine) in 5 % dex 950 Units/hr (05/18/20 0701)    insulin (HUMAN R) infusion (adults) 0.4 Units/hr (05/18/20 0701)    nicardipine 6 mg/hr (05/18/20 0701)    vasopressin (PITRESSIN) infusion Stopped (05/15/20 2130)     Scheduled Meds:   amLODIPine  5 mg Oral Daily    aspirin  81 mg Per NG tube Daily    chlorhexidine  15 mL Mouth/Throat BID    diazePAM  2 mg Per NG tube BID    melatonin  6 mg Per G Tube Nightly    metoprolol  25 mg Oral Q12H    multivitamin liquid no.118  10 mL Per G Tube Daily    pantoprazole  40 mg Intravenous Q12H    piperacillin-tazobactam (ZOSYN) IVPB  4.5 g Intravenous Q6H    polyethylene glycol  17 g Per NG tube Daily    potassium chloride 10%  20 mEq Per NG tube BID    QUEtiapine  100 mg Per NG tube QHS    sucralfate  1 g Per NG tube Q6H    traZODone  50 mg Per G Tube QHS    vancomycin (VANCOCIN) IVPB  1,250 mg Intravenous Q24H    vitamin D  1,000 Units Per G Tube Daily    white petrolatum-mineral oiL   Both Eyes BID        Objective:     Vital Signs (Most Recent):  Temp: 98.3 °F (36.8 °C) (05/18/20 0700)  Pulse: (!) 129 (05/18/20 0730)  Resp: (!) 43 (05/16/20 0720)  BP: 123/67 (05/18/20 0700)  SpO2: 96 % (05/18/20 0730) Vital Signs (24h Range):  Temp:  [97.9 °F (36.6 °C)-98.5 °F (36.9 °C)] 98.3 °F (36.8 °C)  Pulse:  [113-136] 129  SpO2:  [92 %-99 %] 96 %  BP: (102-136)/(65-91) 123/67  Arterial Line BP: ()/(51-71) 117/61     Weight: 66.5 kg (146 lb 9.7 oz)  Body mass index is 25.16 kg/m².    SpO2: 96 %  O2 Device (Oxygen Therapy):  ventilator    Intake/Output - Last 3 Shifts       05/16 0700 - 05/17 0659 05/17 0700 - 05/18 0659 05/18 0700 - 05/19 0659    I.V. (mL/kg) 1044 (15.2) 967.5 (14.5)     Blood       NG/GT 1930 2270 45    Total Intake(mL/kg) 2974 (43.4) 3237.5 (48.7) 45 (0.7)    Urine (mL/kg/hr) 2845 (1.7) 2980 (1.9) 250 (3.5)    Stool 0 0     Blood 5 7     Total Output 2850 2987 250    Net +124 +250.5 -205           Stool Occurrence 2 x 2 x           Lines/Drains/Airways     Peripherally Inserted Central Catheter Line            PICC Double Lumen 05/05/20 1707 right basilic 12 days          Central Venous Catheter Line                 ECMO Cannula 04/25/20 1120 right femoral vein 22 days         ECMO Cannula 04/25/20 1120 right internal jugular 22 days          Drain                 Urethral Catheter 04/18/20 1854 29 days         NG/OG Tube 05/03/20 1215 [place] sump;nasogastric 18 Fr. Left nostril 14 days          Airway                 Surgical Airway 05/05/20 1500 Shiley Cuffed 12 days          Arterial Line                 Arterial Line 04/27/20 1100 Right Brachial 20 days          Peripheral Intravenous Line                 Peripheral IV - Single Lumen 05/08/20 0300 20 G Anterior;Left Hand 10 days         Peripheral IV - Single Lumen 05/12/20 0230 22 G Left Antecubital 6 days         Peripheral IV - Single Lumen 05/13/20 0357 22 G Right Forearm 5 days         Peripheral IV - Single Lumen 05/16/20 1500 22 G Left;Anterior Wrist 1 day                Physical Exam    Constitutional: He is sedated, and intubated.   Head: Normocephalic   Cardiovascular: Tachycardia 130s   Pulmonary/Chest: intubated vent FiO2 50% PEEP 5, ECMO RPM 2800, sweep 8, oxygenator FiO2 70%, flows low 3s  Skin: L neck and L groin cannulas intact, min to no flashing, clots pre and post-oxygenator, all sutures intact and secure  Nursing note and vitals reviewed.    Significant Labs:  BMP:   Recent Labs   Lab 05/18/20  0203   *      K 4.5      CO2 29    BUN 30*   CREATININE 0.5   CALCIUM 9.6   MG 1.9     CBC:   Recent Labs   Lab 05/18/20  0203   WBC 13.86*   RBC 3.09*   HGB 9.1*   HCT 30.0*      MCV 97   MCH 29.4   MCHC 30.3*     LFTs:   Recent Labs   Lab 05/18/20  0203   ALT 46*   AST 44*   ALKPHOS 176*   BILITOT 0.4   PROT 5.9*   ALBUMIN 2.7*     Significant Diagnostics:  Cxr: pending this AM    ROS

## 2020-05-18 NOTE — PROGRESS NOTES
[facility]  Cardiothoracic Surgery  DAILY VV ECMO Progress Note    Patient Name: Ranjana Sanchez  MRN: [id number]  Admission Date: 4/10/2020  Hospital Length of Stay: 37 days  TYPE of ECMO: V-V  Day OF ECMO SUPPORT: 22  Code Status: Full Code   Attending Physician: Francis Ayon MD   Referring Provider: Francis Ayon MD  Principal Problem:Acute respiratory distress syndrome (ARDS) due to COVID-19 virus      Subjective:     Post-Op Info:  Procedure(s) (LRB):  CREATION, TRACHEOSTOMY  (N/A)   12 Days Post-Op     Interval History: stably tachycardic and stable neuro function    ROS  Medications:  Continuous Infusions:   dexmedetomidine (PRECEDEX) infusion 0.5 mcg/kg/hr (05/17/20 2000)    heparin (porcine) in 5 % dex 900 Units/hr (05/17/20 2000)    insulin (HUMAN R) infusion (adults) 1.3 Units/hr (05/17/20 2000)    nicardipine 5 mg/hr (05/17/20 2000)    vasopressin (PITRESSIN) infusion Stopped (05/15/20 2130)     Scheduled Meds:   amLODIPine  5 mg Oral Daily    aspirin  81 mg Per NG tube Daily    chlorhexidine  15 mL Mouth/Throat BID    diazePAM  2 mg Per NG tube BID    melatonin  6 mg Per G Tube Nightly    metoprolol  12.5 mg Oral Q12H    multivitamin liquid no.118  10 mL Per G Tube Daily    pantoprazole  40 mg Intravenous Q12H    polyethylene glycol  17 g Per NG tube Daily    potassium chloride 10%  20 mEq Per NG tube BID    QUEtiapine  100 mg Per NG tube QHS    sucralfate  1 g Per NG tube Q6H    traZODone  50 mg Per G Tube QHS    vancomycin (VANCOCIN) IVPB  1,250 mg Intravenous Q24H    vitamin D  1,000 Units Per G Tube Daily    white petrolatum-mineral oiL   Both Eyes BID     PRN Meds:sodium chloride, sodium chloride, acetaminophen, artificial tears, calcium gluconate IVPB, calcium gluconate IVPB, calcium gluconate IVPB, Dextrose 10% Bolus, Dextrose 10% Bolus, fentaNYL, glucose, glucose, magnesium oxide, magnesium oxide, magnesium sulfate IVPB, potassium chloride 10%,  potassium chloride 10%, potassium chloride 10%, potassium, sodium phosphates, sodium chloride 0.9%     Objective:     Vital Signs (Most Recent):  Temp: 97.9 °F (36.6 °C) (05/17/20 1515)  Pulse: (!) 133 (05/17/20 2015)  Resp: (!) 43 (05/16/20 0720)  BP: 131/75 (05/17/20 2000)  SpO2: 96 % (05/17/20 2015) Vital Signs (24h Range):  Temp:  [97.9 °F (36.6 °C)-98.5 °F (36.9 °C)] 97.9 °F (36.6 °C)  Pulse:  [115-137] 133  SpO2:  [92 %-98 %] 96 %  BP: (102-137)/(63-82) 131/75  Arterial Line BP: ()/(50-80) 121/65     Weight: 68.5 kg (151 lb 0.2 oz)  Body mass index is 25.92 kg/m².    SpO2: 96 %  O2 Device (Oxygen Therapy): ventilator    Intake/Output - Last 3 Shifts       05/15 0700 - 05/16 0659 05/16 0700 - 05/17 0659 05/17 0700 - 05/18 0659    I.V. (mL/kg) 1456.6 (20.4) 1044 (15.2) 605.5 (8.8)    Blood 350      NG/GT 1735 1930 1260    IV Piggyback       Total Intake(mL/kg) 3541.6 (49.6) 2974 (43.4) 1865.5 (27.2)    Urine (mL/kg/hr) 2300 (1.3) 2845 (1.7) 1755 (1.9)    Stool  0 0    Blood 4 5 6    Total Output 2304 2850 1761    Net +1237.6 +124 +104.5           Stool Occurrence  2 x 1 x          Lines/Drains/Airways     Peripherally Inserted Central Catheter Line            PICC Double Lumen 05/05/20 1707 right basilic 12 days          Central Venous Catheter Line                 ECMO Cannula 04/25/20 1120 right femoral vein 22 days         ECMO Cannula 04/25/20 1120 right internal jugular 22 days          Drain                 Urethral Catheter 04/18/20 1854 29 days         NG/OG Tube 05/03/20 1215 [place];nasogastric 18 Fr. Left nostril 14 days          Airway                 Surgical Airway 05/05/20 1500 Shiley Cuffed 12 days          Arterial Line                 Arterial Line 04/27/20 1100 Right Brachial 20 days          Peripheral Intravenous Line                 Peripheral IV - Single Lumen 05/08/20 0300 20 G Anterior;Left Hand 9 days         Peripheral IV - Single Lumen 05/12/20 0230 22 G Left Antecubital 5  days         Peripheral IV - Single Lumen 05/13/20 0357 22 G Right Forearm 4 days         Peripheral IV - Single Lumen 05/16/20 1500 22 G Left;Anterior Wrist 1 day                Physical Exam  No pedal edema; continuing tachycardia; movement of BLE to pain, no movement at BUE    Significant Labs:  Cardiac markers: No results for input(s): CKMB, CPKMB, TROPONINT, TROPONINI, MYOGLOBIN in the last 48 hours.  CBC:   Recent Labs   Lab 05/17/20  1348  05/17/20 2013   WBC 14.35*  --   --    RBC 3.22*  --   --    HGB 9.6*  --   --    HCT 31.0*   < > 27*     --   --    MCV 96  --   --    MCH 29.8  --   --    MCHC 31.0*  --   --     < > = values in this interval not displayed.     CMP:   Recent Labs   Lab 05/17/20  1348   *   CALCIUM 9.8   ALBUMIN 2.8*   PROT 6.1      K 4.3   CO2 29      BUN 29*   CREATININE 0.6   ALKPHOS 165*   ALT 42   AST 40   BILITOT 0.4     Coagulation:   Recent Labs   Lab 05/17/20  1348   INR 1.0   APTT 31.7         Assessment/Plan:     Acute respiratory disease due to COVID-19 virus  Acute respiratory distress syndrome (ARDS) due to COVID-19 virus  - Crich switched to ETT on 4/11  - Monika weaned off 5/5  - Tracheostomy and bronch 5/5  - Thrombocytopenia resolved; transfuse for under 30K  - Aspirin 81 daily  - RPM at 2800; flows mid 3s  - Sweep at 8; CO2 goal 40-50s if not acidotic on ABG (respiratory compensation for alkalosis)  - Oxygenator Fi at 70%  - Vent Fi 50%, PEEP 5     Tongue laceration      -ENT consulted and evaluated patient, laceration superficial, not bleeding      -recs: bite block vs paralysis, will monitor    Sedation  - Valium, seroquel, PRN Fentanyl     UGI Bleed  - Scoped by GI 5/3 with epinephrine injection and clip placement for an actively bleeding D2/D3 Diuelafoy lesion  - No pressors, Hb stable     FEN/GI  - TF at 45, goal      - Lasix gtt stopped 5/12  - FWB for hypernatremia increased to 250mL q6     Anticoagulation      - Goal aXa 0.25 - 0.3 Q6hrs       - monitor LDH    Prophylaxis      - protonix q12 IV       - abx for ECMO      - PICC placed 5/5; central line removed 5/6    Rafael Nj MD  Cardiothoracic Surgery  [facility]      VV ECMO circuit checked and all parameters reviewed. Anticoagulation was managed and all cannulation exit sites along with review of labs and radiology studies performed. Speed and functioning of the pump along with oxygenator quality reviewed.  Patient stable and appears to be less agitated.  Beta-blocker was increased to address tachycardia.  Patient had been tolerating low-dose beta-blocker valve.  This was further increased today to help address tachycardia as well as his hypertension.  Coordination of care between different teams was carried out and all questions and concerns were addressed between the teams which included surgical team, ICU team, nursing staff and perfusion team.

## 2020-05-18 NOTE — PROGRESS NOTES
[facility]  Critical Care - Surgery  Progress Note    Patient Name: Ranjana Sanchez  MRN: [id number]  Admission Date: 4/10/2020  Hospital Length of Stay: 38 days  Code Status: Full Code  Attending Provider: Francis Ayon MD  Primary Care Provider: Primary Doctor No   Principal Problem: Acute respiratory distress syndrome (ARDS) due to COVID-19 virus    Subjective:     Hospital/ICU Course:  No significant changes, unchanged neuro status overnight. Increasing beta blockade wih some effect. Attempted modafinil with minimal change in neuro status.    Vitals:    05/18/20 1045 05/18/20 1100 05/18/20 1115 05/18/20 1130   BP:  119/70     BP Location:  Left arm     Patient Position:  Lying     Pulse: (!) 114 (!) 113 109 (!) 111   Resp:       Temp:  98.6 °F (37 °C)     TempSrc:  Oral     SpO2: 97% 97% 95% 98%   Weight:       Height:         Body mass index is 25.16 kg/m².      Intake/Output Summary (Last 24 hours) at 5/18/2020 1132  Last data filed at 5/18/2020 1100  Gross per 24 hour   Intake 3367.52 ml   Output 3089 ml   Net 278.52 ml     Physical Exam   Constitutional: He appears well-developed.   HENT:   Head: Normocephalic and atraumatic.   Eyes: Pupils are equal, round, and reactive to light.   Neck: Normal range of motion.   Cardiovascular:   tachycardic   Pulmonary/Chest:   Trached, mechanically ventilated  On ECMO, cannulated in RIJ and R fem vein   Abdominal: Soft. He exhibits no distension.   Musculoskeletal: Normal range of motion.   Neurological:   Opens eyes and moves head spontaneously, does not follow commands, minimal lower extremity extension to pain   Skin is warm.   Vitals reviewed.    Vent Mode: A/C  Oxygen Concentration (%):  [50] 50  Resp Rate Total:  [28 br/min-52 br/min] 42 br/min  Vt Set:  [180 mL-200 mL] 200 mL  PEEP/CPAP:  [5 cmH20] 5 cmH20  Mean Airway Pressure:  [15 ufZ15-75 cmH20] 16 cmH20    Recent Labs   Lab 05/18/20  0800 05/18/20  0807   WBC 14.63*  --    RBC 3.24*   --    HGB 9.6*  --    HCT 31.4* 28*     --    MCV 97  --    MCH 29.6  --    MCHC 30.6*  --        Recent Labs   Lab 05/18/20  0800   CALCIUM 9.4   PROT 5.9*      K 4.2   CO2 30*      BUN 28*   CREATININE 0.6   ALKPHOS 177*   ALT 47*   AST 38   BILITOT 0.4         Assessment/Plan:        COVID-19 virus detected  Ranjana Sacnhez is a 54 y.o. 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  This was switched to ETT on 4/11.  Cannulated on 4/25.     Neuro:  Metabolic encephalopathy. Altered mental status with poor neuro exam: multifactorial. 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  - attempted neurostim with modafinil, no neuro change.   - optimizing sleep/wake cycle to improve delirium  - seroquel qhs for delirium  - neurology consulted, would like MRI when possible  - EEG with encephalopathy/diffuse cortical dysfunction and no epileptiform activity.   - CT head possible, but won't . Would only pursue this if absolutely required and patient is suitable for travel  - vitamin replacement (as below)  - considering possible sympathetic storm, beta blockade is appropriate but can also consider bromocriptine if isolated frontal lobe pathology is suspected     Pulm:  Hypoxic Respiratory failure and ARDS secondary to COVID-19 infection  -AC/VC+, Vt 200, 5 PEEP, 50%  Small pneumo on cxr  - subclinical and stable today, continuing to monitor   V-V ECMO   -Sweep 8, 3.4 L/min flows and 2800 RPM, 70% FiO2  -Lung compliance remains low, unable to tolerate ECMO wean     Remains unclear as to why patient is driving ventilator at 35-45  breaths per minute  -he is in a hypermetabolic state, likely covid-related, which may be the reason for hypercarbia despite a sweep of 8  -another source is encephalopathy, which has multiple etiologies (as above)     Cardiac:  HTN - Cardene, wean as tolerated  Tachycardia - TTE 5/14 showed no RWMA and normal EF  - metoprolol started     GI  -TFs at goal, peptamen intense to decrease carbohydrate intake and respiratory quotient  -PPI bid for previous GI bleed     Renal/Electrolytes  -Making adequate urine  -holding further diuresis at this time  -slightly hypernatremic but this may be protective for cerebral edema, will correct gently with free water flushes  -added vitamin supplements to optimize chances for neurologic recovery     ID  Fluc/Vanc  White count normalized  CRP again decreasing    Heme  -heparin infusion for ECMO and for hypercoagulable state associated with COVID-19    Endocrine  Tight glucose regulation     Dispo:  Remain in ICU, will trying to expand lungs with vent, awaiting neuro improvement.       I have spent 59 min critical time involved in the management of this patient. This has included a physical exam, review of laboratory values and images and discussions with the multidisciplinary team, patient, and family and is independent of time spent performing procedures and teaching.     Davie Alston MD  [facility] [id number]

## 2020-05-18 NOTE — PROGRESS NOTES
[facility]  Critical Care - Surgery  Progress Note    Patient Name: Ranjana Sanchez  MRN: [id number]  Admission Date: 4/10/2020  Hospital Length of Stay: 38 days  Code Status: Full Code  Attending Provider: Francis Ayon MD  Primary Care Provider: Primary Doctor No   Principal Problem: Acute respiratory distress syndrome (ARDS) due to COVID-19 virus    Subjective:     Hospital/ICU Course:  No notes on file    Interval History/Significant Events:   Not much change since yesterday  Increases breathing and worsening vital signs when off sedation  Now on 0.7 of precedex      Follow-up For: Procedure(s) (LRB):  CREATION, TRACHEOSTOMY  (N/A)    Post-Operative Day: 13 Days Post-Op    Objective:     Vital Signs (Most Recent):  Temp: 98.6 °F (37 °C) (05/18/20 1100)  Pulse: (!) 117 (05/18/20 1300)  Resp: (!) 43 (05/16/20 0720)  BP: 116/62 (05/18/20 1300)  SpO2: 98 % (05/18/20 1300) Vital Signs (24h Range):  Temp:  [97.9 °F (36.6 °C)-98.6 °F (37 °C)] 98.6 °F (37 °C)  Pulse:  [107-136] 117  SpO2:  [92 %-99 %] 98 %  BP: (102-136)/(62-91) 116/62  Arterial Line BP: ()/(51-68) 108/60     Weight: 66.5 kg (146 lb 9.7 oz)  Body mass index is 25.16 kg/m².      Intake/Output Summary (Last 24 hours) at 5/18/2020 1351  Last data filed at 5/18/2020 1300  Gross per 24 hour   Intake 3743.52 ml   Output 3069 ml   Net 674.52 ml       Physical Exam   Constitutional: He appears well-developed.   HENT:   Head: Normocephalic and atraumatic.   Eyes: Pupils are equal, round, and reactive to light.   Neck: Normal range of motion.   Cardiovascular:   tachycardic   Pulmonary/Chest:   Trached, mechanically ventilated  On ECMO, cannulated in RIJ and fem vein   Abdominal: Soft. He exhibits no distension.   Musculoskeletal: Normal range of motion.   Neurological:   sedated   Skin: Skin is warm.   Vitals reviewed.      Vents:  Vent Mode: A/C (05/18/20 1244)  Ventilator Initiated: Yes (04/10/20 4431)  Set Rate: 26 BPM (05/18/20  1244)  Vt Set: 200 mL (05/18/20 1244)  PEEP/CPAP: 5 cmH20 (05/18/20 1244)  Oxygen Concentration (%): 50 (05/18/20 1300)  Peak Airway Pressure: 36 cmH2O (05/18/20 1244)  Plateau Pressure: 34 cmH20 (05/18/20 1244)  Total Ve: 5.74 mL (05/18/20 1244)  F/VT Ratio<105 (RSBI): 270.44 (05/16/20 0720)    Lines/Drains/Airways     Peripherally Inserted Central Catheter Line            PICC Double Lumen 05/05/20 1707 right basilic 12 days          Central Venous Catheter Line                 ECMO Cannula 04/25/20 1120 right femoral vein 23 days         ECMO Cannula 04/25/20 1120 right internal jugular 23 days          Drain                 Urethral Catheter 04/18/20 1854 29 days         NG/OG Tube 05/03/20 1215 [place] sump;nasogastric 18 Fr. Left nostril 15 days          Airway                 Surgical Airway 05/05/20 1500 Shiley Cuffed 12 days          Arterial Line                 Arterial Line 04/27/20 1100 Right Brachial 21 days          Peripheral Intravenous Line                 Peripheral IV - Single Lumen 05/08/20 0300 20 G Anterior;Left Hand 10 days         Peripheral IV - Single Lumen 05/12/20 0230 22 G Left Antecubital 6 days         Peripheral IV - Single Lumen 05/13/20 0357 22 G Right Forearm 5 days         Peripheral IV - Single Lumen 05/16/20 1500 22 G Left;Anterior Wrist 1 day                Significant Labs:    CBC/Anemia Profile:  Recent Labs   Lab 05/17/20  0200  05/17/20 2015 05/18/20  0203 05/18/20  0800 05/18/20  0807   WBC 13.93*   < > 14.81*  --  13.86* 14.63*  --    HGB 9.7*   < > 9.7*  --  9.1* 9.6*  --    HCT 31.6*   < > 31.5*   < > 30.0* 31.4* 28*      < > 171  --  166 172  --    MCV 97   < > 96  --  97 97  --    RDW 14.4   < > 14.0  --  14.1 13.9  --    FERRITIN 1,873*  --   --   --  2,154*  --   --     < > = values in this interval not displayed.        Chemistries:  Recent Labs   Lab 05/17/20 2015 05/18/20  0203 05/18/20  0800    145 145   K 4.4 4.5 4.2    108 107   CO2 29 29  30*   BUN 29* 30* 28*   CREATININE 0.6 0.5 0.6   CALCIUM 10.1 9.6 9.4   ALBUMIN 2.9* 2.7* 2.7*   PROT 6.2 5.9* 5.9*   BILITOT 0.5 0.4 0.4   ALKPHOS 182* 176* 177*   ALT 44 46* 47*   AST 42* 44* 38   MG 1.9 1.9 1.8   PHOS 3.3 3.4 3.7         Significant Imaging:  I have reviewed and interpreted all pertinent imaging results/findings within the past 24 hours.    Assessment/Plan:     COVID-19 virus detected  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  This was switched to ETT on 4/11.  Cannulated on 4/25.    Neuro:  5 of valium  100 BID of seroquel  Fentanyl patch    Pulm  AC/VC+ on the vent.  Small pneumo on cxr, subclinical.  Sweep of 8 today, 3.43 L/min flows and 2800 RPM  Will need some more time on ECMO, not pulling enough volume on the vent    Cardiac   Cardene, wean as tolerated    GI  TFs at goal    Renal  Making urine, diuresing agressively    Heme/ID  Fluc/Vanc  White count normalized  950 of hep right now      Dispo:  Remain in ICU, will trying to expand lungs with vent             Critical care was time spent personally by me on the following activities: development of treatment plan with patient or surrogate and bedside caregivers, discussions with consultants, evaluation of patient's response to treatment, examination of patient, ordering and performing treatments and interventions, ordering and review of laboratory studies, ordering and review of radiographic studies, pulse oximetry, re-evaluation of patient's condition.  This critical care time did not overlap with that of any other provider or involve time for any procedures.     Zelalem Ariza MD  Critical Care - Surgery  [facility]

## 2020-05-18 NOTE — ASSESSMENT & PLAN NOTE
Acute respiratory distress syndrome (ARDS) due to COVID-19 virus  - Crich switched to ETT on 4/11  - Monika weaned off 5/5  - Tracheostomy and bronch 5/5  - Thrombocytopenia resolved; transfuse for under 30K  - Aspirin 81 daily  - RPM at 2800; flows mid 3s  - Sweep at 8; CO2 goal 40-50s if not acidotic on ABG (respiratory compensation for alkalosis)  - Oxygenator Fi at 70%  - Vent Fi 50%, PEEP 5     Tongue laceration      -ENT consulted and evaluated patient, laceration superficial, not bleeding      -recs: bite block vs paralysis, will monitor    Sedation  - Valium, seroquel, PRN Fentanyl     UGI Bleed  - Scoped by GI 5/3 with epinephrine injection and clip placement for an actively bleeding D2/D3 Diuelafoy lesion  - No pressors, Hb stable     FEN/GI  - TF at 45, goal      - Lasix gtt stopped 5/12  - FWB for hypernatremia increased to 250mL q6     Anticoagulation      - Goal aXa 0.25 - 0.3 Q6hrs      - monitor LDH    Prophylaxis      - protonix q12 IV       - abx for ECMO      - PICC placed 5/5; central line removed 5/6

## 2020-05-18 NOTE — PROGRESS NOTES
ECMO Specialists shift report    Date: 05/18/2020  ECMO Specialist:  Cyrus Stefany    Pump parameters:  RPM:                2800  Flow:                      3.43  Sweep:                  8  FiO2:                 100    Oxygenator status:  Clots:                    Both Pre and Post Oxygenator  Fibrin:                   Not at this time    Pressure trends:  P1:                        111  P2:                          76  Delta P:                  35    Volume status:  Chugging noted?       Minimally observed  CVP:                        2  MAP:                      83  MD notified (name):  Dr Ayon    Anticoagulation:  ACT/aPTT/Xa parameters:         0.25 - 0.3  ACT/aPTT/Xa trends this shift:   0.24    Cannula size / status / placement:  Arterial:   Venous 1:              23 [place] @ RIJV  @ 4  cm from the insertion site, neck to coil  Venous 2:              27 [place] @  RFV @ 12cm from insertion site to coil  Dual lumen:      Additional Comments:    Noted: infrequent hypertensive episoded which self resolved.

## 2020-05-18 NOTE — PROGRESS NOTES
ECMO Specialists shift report    Date: 05/18/2020  ECMO Specialist:  Iesha Bedoya    Pump parameters:  RPM: 2800  Flow: 3.4  Sweep: 8  FiO2:  100    Oxygenator status:  Clots: yes  Fibrin: yes    Pressure trends:  P1: 114  P2: 84  Delta P: 30    Volume status:  Chugging noted some  CVP:  MAP:  80's  MD notified (name):  marvel    Anticoagulation:  ACT/aPTT/Xa parameters: 0.25-0.3  ACT/aPTT/Xa trends this shift: 0.30, 0.25    Cannula size / status / placement:  Arterial:   Venous 1: 23 [place] RIJV  Venous 2: 27 [place] RFV   Dual lumen:      Additional Comments:  Pt maintained on VV ECMO status unchanged. Hypertensive, tachycardiac episodes this shift. Abg's and Xa within range. Pt transported to 7082 without issue. Devon Priest helped.

## 2020-05-18 NOTE — PROGRESS NOTES
PETRONA Skin Integrity Evaluation      Subjective    PETRONA skin integrity champion evaluation of patient as part of the comprehensive skin care team .       Ranjana Sanchez is being evaluated as per the Epic  pressure injury skin report.  He has been admitted to [facility] for 32 days .   Skin injury was noted on 4/15/20          Limited Physical exam         Lesion 1: left cheek      Assessment :       Lesion 1:  Abrasion, healing well    POA : no    Consults: Wound Care, Support surfaces as per WOCN recommendations , Physical Therapy and Respiratory Therapy       Follow up:    Patient will be re evaluated weekly.

## 2020-05-19 NOTE — NURSING
Dr Alexandra recommended new ventilator settings, Dr. Ayon agrees with setting changes. Settings changed to ACPC rate12/pip12/PEEP12.0/50%.

## 2020-05-19 NOTE — PROGRESS NOTES
ECMO Specialists shift report    Date: 05/19/2020  ECMO Specialist:  Iesha Elke    Pump parameters:  RPM: 2800  Flow:  3.4  Sweep:  8.5  FiO2:  70    Oxygenator status:  Clots: yes  Fibrin: yes    Pressure trends:  P1: 114  P2: 82  Delta P: 30    Volume status:  Chugging noted? Minimal   CVP:   MAP:    MD notified (name):      Anticoagulation:  ACT/aPTT/Xa parameters: 0.25- 0.30  ACT/aPTT/Xa trends this shift: 0.25, 0.26    Cannula size / status / placement:  Arterial:   Venous 1: RIJV 23f measured 4 cm from insertion site to coil  Venous 2: RFV 27f measured 12 cm from insertion site to coil   Dual lumen:      Additional Comments:      Pt maintained on VV ECMO patients BP labile this shift, mixed venous saturation below 70 with stimulation. Increased FIO2 to 80% when needed. ABG CO2 53 increased sweep.  See flowsheet.

## 2020-05-19 NOTE — NURSING
Dr. Miller notified that cardene infusion was restarted for increasing MAP to  despite good sedation and apparent pain control. Rates of 1-5mg/hr for goal MAP 75-85. She also ordered x1 dose 10mg IVP labetalol with goal to wean Cardene back off.

## 2020-05-19 NOTE — SUBJECTIVE & OBJECTIVE
Interval History:  Net positive 400mL in last 24hrs, 2.8L UOP, Na stable at 145. Cardene off since 2100. Hep gtt at 950/hr, aXa at goal levels, LDH level 396. CXR pending. PaO2 in 80s, acceptable, but oxygenator increased to FiO2 80 due to SvO2. Getting 1U pRBC this AM. Tachycardia intermittently improved with metop increase yesterday.    Medications:  Continuous Infusions:   dexmedetomidine (PRECEDEX) infusion 0.8 mcg/kg/hr (05/19/20 1000)    heparin (porcine) in 5 % dex 950 Units/hr (05/19/20 1000)    insulin (HUMAN R) infusion (adults) 1.5 Units/hr (05/19/20 1000)    nicardipine Stopped (05/18/20 2130)    vasopressin (PITRESSIN) infusion Stopped (05/15/20 2130)     Scheduled Meds:   amLODIPine  5 mg Oral Daily    aspirin  81 mg Per NG tube Daily    chlorhexidine  15 mL Mouth/Throat BID    diazePAM  2 mg Per NG tube BID    fentaNYL  1 patch Transdermal Q72H    melatonin  6 mg Per G Tube Nightly    metoprolol  25 mg Oral Q12H    multivitamin liquid no.118  10 mL Per G Tube Daily    pantoprazole  40 mg Intravenous Q12H    piperacillin-tazobactam (ZOSYN) IVPB  4.5 g Intravenous Q8H    polyethylene glycol  17 g Per NG tube Daily    potassium chloride 10%  20 mEq Per NG tube BID    QUEtiapine  100 mg Per NG tube QHS    sucralfate  1 g Per NG tube Q6H    traZODone  50 mg Per G Tube QHS    vancomycin (VANCOCIN) IVPB  1,250 mg Intravenous Q24H    vitamin D  1,000 Units Per G Tube Daily    white petrolatum-mineral oiL   Both Eyes BID        Objective:     Vital Signs (Most Recent):  Temp: 98.5 °F (36.9 °C) (05/19/20 0800)  Pulse: (!) 123 (05/19/20 1015)  Resp: (!) 32 (05/19/20 0544)  BP: 112/68 (05/18/20 1500)  SpO2: 96 % (05/19/20 1015) Vital Signs (24h Range):  Temp:  [97.8 °F (36.6 °C)-99 °F (37.2 °C)] 98.5 °F (36.9 °C)  Pulse:  [] 123  Resp:  [27-32] 32  SpO2:  [90 %-100 %] 96 %  BP: (110-116)/(62-68) 112/68  Arterial Line BP: ()/(50-77) 123/71     Weight: 61 kg (134 lb 7.7 oz)  Body  mass index is 23.08 kg/m².    SpO2: 96 %  O2 Device (Oxygen Therapy): ventilator    Intake/Output - Last 3 Shifts       05/17 0700 - 05/18 0659 05/18 0700 - 05/19 0659 05/19 0700 - 05/20 0659    I.V. (mL/kg) 967.5 (14.5) 1151 (18.9)     Blood   350.9    NG/GT 2270 1655 455    IV Piggyback  350     Total Intake(mL/kg) 3237.5 (48.7) 3156 (51.7) 805.9 (13.2)    Urine (mL/kg/hr) 2980 (1.9) 2710 (1.9) 375 (1.5)    Stool 0 0     Blood 7 14     Total Output 2987 2724 375    Net +250.5 +432 +430.9           Stool Occurrence 2 x 3 x           Lines/Drains/Airways     Peripherally Inserted Central Catheter Line            PICC Double Lumen 05/05/20 1707 right basilic 13 days          Central Venous Catheter Line                 ECMO Cannula 04/25/20 1120 right femoral vein 23 days         ECMO Cannula 04/25/20 1120 right internal jugular 23 days          Drain                 Urethral Catheter 04/18/20 1854 30 days         NG/OG Tube 05/03/20 1215 [place] sump;nasogastric 18 Fr. Left nostril 15 days          Airway                 Surgical Airway 05/05/20 1500 Shiley Cuffed 13 days          Arterial Line                 Arterial Line 04/27/20 1100 Right Brachial 22 days          Peripheral Intravenous Line                 Peripheral IV - Single Lumen 05/16/20 1500 22 G Left;Anterior Wrist 2 days         Peripheral IV - Single Lumen 05/19/20 0145 22 G Left Upper Arm less than 1 day                Physical Exam    Constitutional: He is sedated, and intubated.   Head: Normocephalic   Cardiovascular: Tachycardia 130s   Pulmonary/Chest: intubated vent FiO2 50% PEEP 5, ECMO RPM 2800, sweep 8, oxygenator FiO2 80%, flows low 3s  Skin: L neck and L groin cannulas intact, min to no flashing, clots pre and post-oxygenator, all sutures intact and secure  Nursing note and vitals reviewed.    Significant Labs:  BMP:   Recent Labs   Lab 05/19/20  0809   *      K 4.3      CO2 30*   BUN 30*   CREATININE 0.7   CALCIUM 9.0   MG  1.9     CBC:   Recent Labs   Lab 05/19/20  0809 05/19/20  0817   WBC 15.52*  --    RBC 3.42*  --    HGB 9.9*  --    HCT 32.5* 29*     --    MCV 95  --    MCH 28.9  --    MCHC 30.5*  --      LFTs:   Recent Labs   Lab 05/19/20  0809   ALT 54*   AST 41*   ALKPHOS 187*   BILITOT 0.6   PROT 5.7*   ALBUMIN 2.4*     Significant Diagnostics:  Cxr: pending this AM    ROS

## 2020-05-19 NOTE — PROGRESS NOTES
[facility]  Cardiothoracic Surgery  Daily ECMO Progress Note    Patient Name: Ranjana Sanchez  MRN: [id number]  Admission Date: 4/10/2020  Hospital Length of Stay: 39 days  TYPE of ECMO: V-V  Day OF ECMO SUPPORT: 24  Code Status: Full Code   Attending Physician: Francis Ayon MD   Referring Provider: Francis Ayon MD  Principal Problem:Acute respiratory distress syndrome (ARDS) due to COVID-19 virus    Subjective:   Interval History:  Net positive 400mL in last 24hrs, 2.8L UOP, Na stable at 145. Cardene off since 2100. Hep gtt at 950/hr, aXa at goal levels, LDH level 396. CXR pending. PaO2 in 80s, acceptable, but oxygenator increased to FiO2 80 due to SvO2. Getting 1U pRBC this AM. Tachycardia intermittently improved with metop increase yesterday.    Medications:  Continuous Infusions:   dexmedetomidine (PRECEDEX) infusion 0.8 mcg/kg/hr (05/19/20 1000)    heparin (porcine) in 5 % dex 950 Units/hr (05/19/20 1000)    insulin (HUMAN R) infusion (adults) 1.5 Units/hr (05/19/20 1000)    nicardipine Stopped (05/18/20 2130)    vasopressin (PITRESSIN) infusion Stopped (05/15/20 2130)     Scheduled Meds:   amLODIPine  5 mg Oral Daily    aspirin  81 mg Per NG tube Daily    chlorhexidine  15 mL Mouth/Throat BID    diazePAM  2 mg Per NG tube BID    fentaNYL  1 patch Transdermal Q72H    melatonin  6 mg Per G Tube Nightly    metoprolol  25 mg Oral Q12H    multivitamin liquid no.118  10 mL Per G Tube Daily    pantoprazole  40 mg Intravenous Q12H    piperacillin-tazobactam (ZOSYN) IVPB  4.5 g Intravenous Q8H    polyethylene glycol  17 g Per NG tube Daily    potassium chloride 10%  20 mEq Per NG tube BID    QUEtiapine  100 mg Per NG tube QHS    sucralfate  1 g Per NG tube Q6H    traZODone  50 mg Per G Tube QHS    vancomycin (VANCOCIN) IVPB  1,250 mg Intravenous Q24H    vitamin D  1,000 Units Per G Tube Daily    white petrolatum-mineral oiL   Both Eyes BID        Objective:      Vital Signs (Most Recent):  Temp: 98.5 °F (36.9 °C) (05/19/20 0800)  Pulse: (!) 123 (05/19/20 1015)  Resp: (!) 32 (05/19/20 0544)  BP: 112/68 (05/18/20 1500)  SpO2: 96 % (05/19/20 1015) Vital Signs (24h Range):  Temp:  [97.8 °F (36.6 °C)-99 °F (37.2 °C)] 98.5 °F (36.9 °C)  Pulse:  [] 123  Resp:  [27-32] 32  SpO2:  [90 %-100 %] 96 %  BP: (110-116)/(62-68) 112/68  Arterial Line BP: ()/(50-77) 123/71     Weight: 61 kg (134 lb 7.7 oz)  Body mass index is 23.08 kg/m².    SpO2: 96 %  O2 Device (Oxygen Therapy): ventilator    Intake/Output - Last 3 Shifts       05/17 0700 - 05/18 0659 05/18 0700 - 05/19 0659 05/19 0700 - 05/20 0659    I.V. (mL/kg) 967.5 (14.5) 1151 (18.9)     Blood   350.9    NG/GT 2270 1655 455    IV Piggyback  350     Total Intake(mL/kg) 3237.5 (48.7) 3156 (51.7) 805.9 (13.2)    Urine (mL/kg/hr) 2980 (1.9) 2710 (1.9) 375 (1.5)    Stool 0 0     Blood 7 14     Total Output 2987 2724 375    Net +250.5 +432 +430.9           Stool Occurrence 2 x 3 x           Lines/Drains/Airways     Peripherally Inserted Central Catheter Line            PICC Double Lumen 05/05/20 1707 right basilic 13 days          Central Venous Catheter Line                 ECMO Cannula 04/25/20 1120 right femoral vein 23 days         ECMO Cannula 04/25/20 1120 right internal jugular 23 days          Drain                 Urethral Catheter 04/18/20 1854 30 days         NG/OG Tube 05/03/20 1215 [place] sump;nasogastric 18 Fr. Left nostril 15 days          Airway                 Surgical Airway 05/05/20 1500 Shiley Cuffed 13 days          Arterial Line                 Arterial Line 04/27/20 1100 Right Brachial 22 days          Peripheral Intravenous Line                 Peripheral IV - Single Lumen 05/16/20 1500 22 G Left;Anterior Wrist 2 days         Peripheral IV - Single Lumen 05/19/20 0145 22 G Left Upper Arm less than 1 day                Physical Exam    Constitutional: He is sedated, and intubated.    Head: Normocephalic   Cardiovascular: Tachycardia 130s   Pulmonary/Chest: intubated vent FiO2 50% PEEP 5, ECMO RPM 2800, sweep 8, oxygenator FiO2 80%, flows low 3s  Skin: L neck and L groin cannulas intact, min to no flashing, clots pre and post-oxygenator, all sutures intact and secure  Nursing note and vitals reviewed.    Significant Labs:  BMP:   Recent Labs   Lab 05/19/20  0809   *      K 4.3      CO2 30*   BUN 30*   CREATININE 0.7   CALCIUM 9.0   MG 1.9     CBC:   Recent Labs   Lab 05/19/20  0809 05/19/20  0817   WBC 15.52*  --    RBC 3.42*  --    HGB 9.9*  --    HCT 32.5* 29*     --    MCV 95  --    MCH 28.9  --    MCHC 30.5*  --      LFTs:   Recent Labs   Lab 05/19/20  0809   ALT 54*   AST 41*   ALKPHOS 187*   BILITOT 0.6   PROT 5.7*   ALBUMIN 2.4*     Significant Diagnostics:  Cxr: pending this AM    ROS      Assessment/Plan:     Acute respiratory distress syndrome (ARDS) due to COVID-19 virus  - Crich switched to ETT on 4/11  - Monika weaned off 5/5  - Tracheostomy and bronch 5/5  - Thrombocytopenia resolved; transfuse for under 30K  - Aspirin 81 daily  - Metoprolol 25 BID  - RPM at 2800; flows low 3s  - Sweep at 8; CO2 goal 40-50s if not acidotic on ABG (respiratory compensation for alkalosis)  - Oxygenator Fi at 80%; PaO2 and saturations adequate, please wean back to FiO2 70% per these parameters  - Vent Fi 50%, PEEP 5     Tongue laceration      -ENT consulted and evaluated patient, laceration superficial, not bleeding      -recs: bite block vs paralysis, will monitor    Sedation  - Valium, seroquel, PRN Fentanyl     UGI Bleed  - Scoped by GI 5/3 with epinephrine injection and clip placement for an actively bleeding D2/D3 Diuelafoy lesion  - Getting 1U pRBC this AM    FEN/GI  - TF at 45, goal      - Lasix gtt stopped 5/12  - FWB for hypernatremia 250mL q6     Anticoagulation      - Goal aXa 0.25 - 0.3 Q6hrs      - monitor LDH    Prophylaxis      - protonix q12 IV       - abx for  ECMO; WBC stable on broadened coverage with Vanc/Zosyn      - PICC placed 5/5; central line removed 5/6    Anne Miller MD  Cardiothoracic Surgery  [facility]    VV ECMO circuit checked and all parameters reviewed. Anticoagulation was managed and all cannulation exit sites along with review of labs and radiology studies performed. Speed and functioning of the pump along with oxygenator quality reviewed.  Patient very stable and we are continuing to decrease the amount of FiO2 on the oxygenator.  Patient has remained stable on the sweep at this stage.  Anticoagulation appears within the target range.  Patient is moving lower extremities spontaneously.  Upper extremity still having minimal response.  Patient appears drowsy and will start removing and reducing the dosage of sedative medications.  Coordination of care between different teams which included the surgical team, ICU team, nursing staff and perfusion team was carried out and all questions and concerns were addressed.

## 2020-05-19 NOTE — PROGRESS NOTES
ECMO Specialists shift report    Date: 05/19/2020  ECMO Specialist:  Cyrus Lou    Pump parameters:  RPM:                2800  Flow:                      3.43  Sweep:                  8  FiO2:                   80  (increased due to SvO2 was < 70%    Oxygenator status:  Clots:                     Pre (mainly)  And Post (two spots)  Fibrin:                    None realized    Pressure trends:  P1:                    113  P2:                      82  Delta P:              31    Volume status:  Chugging noted?    Minor (of no consequence)  CVP:                      -2  MAP:                     84  MD notified (name):  Dr Ayon    Anticoagulation:  ACT/aPTT/Xa parameters:   0.25 - 0.3  ACT/aPTT/Xa trends this shift:     Cannula size / status / placement:  Arterial:   Venous 1:       23 [place] @ RIJV @ 4 cm  Venous 2:       27 [place] @ RFV @ 12  Dual lumen:      Additional Comments:    Patient's BP was very labile and had resulted in a mixed venous saturation below 70% with agitation, so FIO2 was increased to 80%.

## 2020-05-19 NOTE — PROGRESS NOTES
Uneventful shift, other than labile blood pressures. Remains tachypneic. Precedex titrated for comfort and to keep MAP 75-85. Receiving 1u PRBC.

## 2020-05-19 NOTE — PROGRESS NOTES
Pharmacokinetic Assessment Follow Up: IV Vancomycin    Vancomycin serum concentration assessment(s):  · Vancomycin trough level resulted within goal as 13 mcg/mL.  Goal level is 10 to 20 mcg/mL.     Drug levels (last 3 results):  Recent Labs   Lab Result Units 05/19/20  0808   Vancomycin-Trough ug/mL 13.0     Vancomycin Regimen Plan:  · Continue current regimen of vancomycin 1250 mg every 24 hours. Next surveillance trough ordered on 5/22 at 08:30.     Pharmacy will continue to follow and monitor vancomycin. Please contact pharmacy for questions regarding this assessment.    Thank you for the consult,     Arpita Kapadia, TommyD  PGY-2 Pharmacy Resident- Internal Medicine  [id number]    _________________________________________________________________________________       Patient brief summary:  Ranjana Sanchez is a 54 y.o. male initiated on antimicrobial therapy with IV vancomycin for treatment of sepsis    Drug Allergies:   Review of patient's allergies indicates:  No Known Allergies    Actual Body Weight:   61 kg     Renal Function:   Estimated Creatinine Clearance: 98.7 mL/min (based on SCr of 0.7 mg/dL).    Dialysis Method (if applicable):  N/A    CBC (last 72 hours):  Recent Labs   Lab Result Units 05/16/20  1410 05/16/20 2000 05/17/20  0200 05/17/20  0738 05/17/20  1348 05/17/20 2015 05/18/20  0203 05/18/20  0800 05/18/20  1400 05/18/20 2006 05/19/20  0209 05/19/20  0809 05/19/20  0844   WBC K/uL 13.62* 14.85* 13.93* 14.40* 14.35* 14.81* 13.86* 14.63* 13.10* 15.33* 14.07* 15.52*  --    Hemoglobin g/dL 9.8* 10.0* 9.7* 9.8* 9.6* 9.7* 9.1* 9.6* 10.9* 9.3* 8.8* 9.9*  --    Hemoglobin, Free, Plasma mg/dL  --   --   --  20  --   --   --  20  --   --   --   --  20   Hematocrit % 31.4* 32.2* 31.6* 31.8* 31.0* 31.5* 30.0* 31.4* 35.9* 30.3* 28.9* 32.5*  --    Platelets K/uL 169 178 171 168 160 171 166 172 146* 172 176 164  --    Gran% % 75.0* 65.0 68.0 74.0* 71.0 74.0* 77.0* 77.0* 77.0* 68.1 66.8 69.1  --    Lymph%  % 7.0* 5.0* 9.0* 7.0* 8.0* 12.0* 6.0* 5.0* 3.0* 7.8* 8.0* 7.7*  --    Mono% % 5.0 5.0 8.0 7.0 3.0* 5.0 5.0 2.0* 11.0 8.2 8.0 7.5  --    Eosinophil% % 6.0 16.0* 13.0* 12.0* 12.0* 0.0 10.0* 13.0* 7.0 10.7* 11.7* 10.8*  --    Basophil% % 1.0 0.0 1.0 0.0 1.0 0.0 0.0 0.0 0.0 0.8 0.6 0.7  --    Differential Method  Manual Manual Manual Manual Manual Manual Manual Manual Manual Automated Automated Automated  --        Metabolic Panel (last 72 hours):  Recent Labs   Lab Result Units 05/16/20  1410 05/16/20 2000 05/17/20  0200 05/17/20  0738 05/17/20  1348 05/17/20 2015 05/18/20  0203 05/18/20  0800 05/18/20  1400 05/18/20 2006 05/19/20  0209 05/19/20  0809   Sodium mmol/L 150* 148* 149* 148* 145 145 145 145 144 144 145 144   Potassium mmol/L 4.2 3.7 4.9 4.3 4.3 4.4 4.5 4.2 4.4 4.4 4.6 4.3   Chloride mmol/L 111* 111* 114* 112* 109 108 108 107 107 106 107 106   CO2 mmol/L 29 28 26 28 29 29 29 30* 31* 31* 30* 30*   Glucose mg/dL 131* 225* 115* 220* 150* 193* 138* 180* 134* 225* 149* 214*   BUN, Bld mg/dL 33* 32* 30* 31* 29* 29* 30* 28* 30* 29* 31* 30*   Creatinine mg/dL 0.6 0.7 0.6 0.7 0.6 0.6 0.5 0.6 0.6 0.7 0.7 0.7   Albumin g/dL 3.2* 3.1* 3.1* 3.0* 2.8* 2.9* 2.7* 2.7* 2.6* 2.6* 2.6* 2.4*   Total Bilirubin mg/dL 0.5 0.5 0.5 0.5 0.4 0.5 0.4 0.4 0.5 0.5 0.3 0.6   Alkaline Phosphatase U/L 144* 149* 151* 169* 165* 182* 176* 177* 195* 212* 196* 187*   AST U/L 33 35 32 35 40 42* 44* 38 45* 45* 38 41*   ALT U/L 26 31 30 36 42 44 46* 47* 55* 59* 57* 54*   Magnesium mg/dL 2.0 1.9 2.0 1.9 2.0 1.9 1.9 1.8 1.9 2.0 2.0 1.9   Phosphorus mg/dL 3.0 2.7 2.8 3.1 3.0 3.3 3.4 3.7 3.6 3.9 4.1 4.2       Vancomycin Administrations:  vancomycin given in the last 96 hours                   vancomycin in dextrose 5 % 1 gram/250 mL IVPB 1,000 mg (mg) 1,000 mg New Bag 05/04/20 1130     1,000 mg New Bag 05/03/20 2202     1,000 mg New Bag  1050     1,000 mg New Bag 05/02/20 2212     1,000 mg New Bag  1003     1,000 mg New Bag 05/01/20 2223     1,000  mg New Bag  1000     1,000 mg New Bag 04/30/20 2201                Microbiologic Results:  Microbiology Results (last 7 days)     ** No results found for the last 168 hours. **

## 2020-05-19 NOTE — NURSING
SHIFT EVENTS: No acute events this shift.    NEUROLOGICAL: Patient awakens to vigorous stimulation, does not follow commands, spontaneous eye movement though does not appear purposeful, I did note spontaneous movements in both feet at one point while outside of the room. Precedex continued at 0.5-1.4mcg. Perrla.     CARDIOVASCULAR: ST without ectopy noted, 90-120bpm throughout shift. MAP 75-85 maintained with minimal Cardene on and off as needed. 10mg IVP labetalol given once with minimal effect. PICC line CVP 2-3-6-6. Pulses palpable in all extremities.     PULMONARY: Adequate saturations throughout shift. Oxygenator at 70-80%, sweep 8.5 following ABGs. 8.0 Cooper. Ventilator settings updated [facility] 12/12/12.0/50%. Patient is persistently tachypnic 34-59 breaths, shallow with accessory muscle use. Lung sounds extremely diminished-absent. Moderate yellow sputum.     GENITOURINARY: Adequate clear yellow urine via parks. No Lasix given today.     GASTROINTESTINAL: +BS, BM x3 today. Would hold miralax tomorrow if still not appropriate.     NUTRITION/ENDOCRINE: TF continued at 45cc/hr, patient tolerating well with minimal residuals. Hourly accuchecks with insulin infusion titration per protocol.     PLAN: Continue weaning ECMO support as appropriate. Frequent neuro assessments. Hold miralax if possible.

## 2020-05-19 NOTE — CONSULTS
Wound care consulted for drainage under phlange of trach  Patient is Covid-19 positive.   The trach phlange is sutured tightly to the skin and cannot be moved to fully assess the trach area.    Recommendations:  Continue trach care as scheduled.   Place t-slit dressing between faceplate/phlange and skin.  HydroferaBlue Ready dressing between faceplate/phlange and skin when sutures removed.   Continue pressure prevention measures  Nursing to continue care, wound care to sign off.  Please reconsult if needed.   DARRYL Benitez RN, CN  [phone]

## 2020-05-19 NOTE — ASSESSMENT & PLAN NOTE
Acute respiratory distress syndrome (ARDS) due to COVID-19 virus  - Crich switched to ETT on 4/11  - Monika weaned off 5/5  - Tracheostomy and bronch 5/5  - Thrombocytopenia resolved; transfuse for under 30K  - Aspirin 81 daily  - Metoprolol 25 BID  - RPM at 2800; flows low 3s  - Sweep at 8; CO2 goal 40-50s if not acidotic on ABG (respiratory compensation for alkalosis)  - Oxygenator Fi at 80%; PaO2 and saturations adequate, please wean back to FiO2 70% per these parameters  - Vent Fi 50%, PEEP 5     Tongue laceration      -ENT consulted and evaluated patient, laceration superficial, not bleeding      -recs: bite block vs paralysis, will monitor    Sedation  - Valium, seroquel, PRN Fentanyl     UGI Bleed  - Scoped by GI 5/3 with epinephrine injection and clip placement for an actively bleeding D2/D3 Diuelafoy lesion  - Getting 1U pRBC this AM    FEN/GI  - TF at 45, goal      - Lasix gtt stopped 5/12  - FWB for hypernatremia 250mL q6     Anticoagulation      - Goal aXa 0.25 - 0.3 Q6hrs      - monitor LDH    Prophylaxis      - protonix q12 IV       - abx for ECMO; WBC stable on broadened coverage with Vanc/Zosyn      - PICC placed 5/5; central line removed 5/6

## 2020-05-20 NOTE — PROGRESS NOTES
"      SICU PLAN OF CARE NOTE    Dx: Acute respiratory distress syndrome (ARDS) due to COVID-19 virus    Shift Events: Pt received push of lasix with small response, starting lasix gtt. Cardene was turned on, then off again to maintain MAP <85. Plan to increase PO lopressor this evening. Timing of evening medications adjusted.     Goals of Care: MAP 75-85, anti-xa 0.25-0.3.     Neuro: withdraws lower extremities to pain, opens eyes spontaneously, does not follow commands    Vital Signs: /68   Pulse 104   Temp 98.3 °F (36.8 °C) (Oral)   Resp (!) 32   Ht 5' 4" (1.626 m)   Wt 60.4 kg (133 lb 2.5 oz)   SpO2 95%   BMI 22.86 kg/m²     Respiratory: Ventilator, AC/PC 50%, 12 PEEP    VV ECMO:  RPM 2800  Flow 3.4 rpm  Sweep 8.5  FiO2 65    Diet: Tube Feeds @ goal 45 ml/hr    Gtts: Precedex, Insulin, Lasix and Heparin    Urine Output: Urinary Catheter 100-300 cc/hour     Labs/Accuchecks: labs q6, accuchecks q1.    Skin: Free from new breakdown, wound care provided per orders. Pt with one large, liquid BM, no breakdown noted. Pt on waffle mattress. Heel foam in place, foot boot rotated.      Dr Ariza and Dr Ayon updated on patient throughout shift.   "

## 2020-05-20 NOTE — ASSESSMENT & PLAN NOTE
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  This was switched to ETT on 4/11.  Cannulated on 4/25.    Neuro:  5 of valium  100 BID of seroquel  Fentanyl patch    Pulm  AC/VC+ on the vent.  Small pneumo on cxr, subclinical.  Sweep of 8 today, 3.43 L/min flows and 2800 RPM  Will need some more time on ECMO, not pulling enough volume on the vent    Cardiac   Cardene, wean as tolerated  Will add some labetalol to help get off cardene    GI  TFs at goal    Renal  Making urine, diuresing agressively    Heme/ID  Fluc/Vanc  White count normalized  950 of hep right now      Dispo:  Remain in ICU, lungs not ready to work much yet

## 2020-05-20 NOTE — PROGRESS NOTES
[facility] - Respiratory ICU  Critical Care - Surgery  Progress Note    Patient Name: Ranjana Sanchez  MRN: [id number]  Admission Date: 4/10/2020  Hospital Length of Stay: 40 days  Code Status: Full Code  Attending Provider: Francis Ayon MD  Primary Care Provider: Primary Doctor No   Principal Problem: Acute respiratory distress syndrome (ARDS) due to COVID-19 virus    Subjective:     Hospital/ICU Course:  No notes on file    Interval History/Significant Events:   Back on Cardene  No significant changes otherwise    Follow-up For: Procedure(s) (LRB):  CREATION, TRACHEOSTOMY  (N/A)    Post-Operative Day: 15 Days Post-Op    Objective:     Vital Signs (Most Recent):  Temp: 98.3 °F (36.8 °C) (05/20/20 1115)  Pulse: 104 (05/20/20 1700)  Resp: (!) 32 (05/19/20 0544)  BP: 112/68 (05/18/20 1500)  SpO2: 95 % (05/20/20 1700) Vital Signs (24h Range):  Temp:  [97.9 °F (36.6 °C)-98.6 °F (37 °C)] 98.3 °F (36.8 °C)  Pulse:  [] 104  SpO2:  [94 %-99 %] 95 %  Arterial Line BP: ()/(46-74) 114/62     Weight: 60.4 kg (133 lb 2.5 oz)  Body mass index is 22.86 kg/m².      Intake/Output Summary (Last 24 hours) at 5/20/2020 1713  Last data filed at 5/20/2020 1600  Gross per 24 hour   Intake 3171.43 ml   Output 2933 ml   Net 238.43 ml       Physical Exam   Constitutional: He appears well-developed.   HENT:   Head: Normocephalic and atraumatic.   Eyes: Pupils are equal, round, and reactive to light.   Neck: Normal range of motion.   Cardiovascular:   tachycardic   Pulmonary/Chest:   Trached, mechanically ventilated  On ECMO, cannulated in RIJ and fem vein   Abdominal: Soft. He exhibits no distension.   Musculoskeletal: Normal range of motion.   Neurological:   sedated   Skin: Skin is warm.   Vitals reviewed.      Vents:  Vent Mode: A/C (05/20/20 1239)  Ventilator Initiated: Yes (04/10/20 2540)  Set Rate: 12 BPM (05/20/20 1239)  Vt Set: 200 mL (05/18/20 1244)  PEEP/CPAP: 12 cmH20 (05/20/20 1239)  Oxygen  Concentration (%): 50 (05/20/20 1700)  Peak Airway Pressure: 27 cmH2O (05/20/20 1239)  Plateau Pressure: 27 cmH20 (05/20/20 1239)  Total Ve: 3.26 mL (05/20/20 1239)  F/VT Ratio<105 (RSBI): 195.65 (05/18/20 2038)    Lines/Drains/Airways     Peripherally Inserted Central Catheter Line            PICC Double Lumen 05/05/20 1707 right basilic 15 days          Central Venous Catheter Line                 ECMO Cannula 04/25/20 1120 right femoral vein 25 days         ECMO Cannula 04/25/20 1120 right internal jugular 25 days          Drain                 Urethral Catheter 04/18/20 1854 31 days         NG/OG Tube 05/03/20 1215 [place] sump;nasogastric 18 Fr. Left nostril 17 days          Airway                 Surgical Airway 05/05/20 1500 Shiley Cuffed 15 days          Arterial Line                 Arterial Line 04/27/20 1100 Right Brachial 23 days          Peripheral Intravenous Line                 Peripheral IV - Single Lumen 05/19/20 0145 22 G Left Upper Arm 1 day         Peripheral IV - Single Lumen 05/19/20 1000 20 G Right Hand 1 day                Significant Labs:    CBC/Anemia Profile:  Recent Labs   Lab 05/19/20  0209  05/20/20  0219  05/20/20  0800 05/20/20  1351 05/20/20  1352   WBC 14.07*   < > 16.32*  --  17.33*  --  17.05*   HGB 8.8*   < > 9.8*  --  9.6*  --  9.5*   HCT 28.9*   < > 31.9*   < > 31.6* 28* 31.4*      < > 181  --  168  --  162   MCV 96   < > 95  --  96  --  96   RDW 13.7   < > 14.3  --  14.1  --  13.9   FERRITIN 1,918*  --  1,727*  --   --   --   --     < > = values in this interval not displayed.        Chemistries:  Recent Labs   Lab 05/20/20  0219 05/20/20  0800 05/20/20  1352    142 141   K 4.4 4.0 4.2    104 103   CO2 35* 29 30*   BUN 28* 28* 26*   CREATININE 0.7 0.7 0.6   CALCIUM 9.2 9.0 9.4   ALBUMIN 2.6* 2.5* 2.4*   PROT 5.9* 5.8* 5.9*   BILITOT 0.4 0.4 0.4   ALKPHOS 219* 201* 214*   ALT 66* 60* 61*   AST 43* 37 42*   MG 1.8 1.9 2.0   PHOS 3.9 3.8 3.7         Significant  Imaging:  I have reviewed and interpreted all pertinent imaging results/findings within the past 24 hours.    Assessment/Plan:     COVID-19 virus detected  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  This was switched to ETT on 4/11.  Cannulated on 4/25.    Neuro:  5 of valium  100 BID of seroquel  Fentanyl patch    Pulm  AC/VC+ on the vent.  Small pneumo on cxr, subclinical.  Sweep of 8 today, 3.43 L/min flows and 2800 RPM  Will need some more time on ECMO, not pulling enough volume on the vent    Cardiac   Cardene, wean as tolerated  Will add some labetalol to help get off cardene    GI  TFs at goal    Renal  Making urine, diuresing agressively    Heme/ID  Fluc/Vanc  White count normalized  950 of hep right now      Dispo:  Remain in ICU, lungs not ready to work much yet               Critical care was time spent personally by me on the following activities: development of treatment plan with patient or surrogate and bedside caregivers, discussions with consultants, evaluation of patient's response to treatment, examination of patient, ordering and performing treatments and interventions, ordering and review of laboratory studies, ordering and review of radiographic studies, pulse oximetry, re-evaluation of patient's condition.  This critical care time did not overlap with that of any other provider or involve time for any procedures.     Zelalem Ariza MD  Critical Care - Surgery  [facility] - Respiratory ICU

## 2020-05-20 NOTE — SUBJECTIVE & OBJECTIVE
Interval History/Significant Events:   Back on Cardene  No significant changes otherwise    Follow-up For: Procedure(s) (LRB):  CREATION, TRACHEOSTOMY  (N/A)    Post-Operative Day: 15 Days Post-Op    Objective:     Vital Signs (Most Recent):  Temp: 98.3 °F (36.8 °C) (05/20/20 1115)  Pulse: 104 (05/20/20 1700)  Resp: (!) 32 (05/19/20 0544)  BP: 112/68 (05/18/20 1500)  SpO2: 95 % (05/20/20 1700) Vital Signs (24h Range):  Temp:  [97.9 °F (36.6 °C)-98.6 °F (37 °C)] 98.3 °F (36.8 °C)  Pulse:  [] 104  SpO2:  [94 %-99 %] 95 %  Arterial Line BP: ()/(46-74) 114/62     Weight: 60.4 kg (133 lb 2.5 oz)  Body mass index is 22.86 kg/m².      Intake/Output Summary (Last 24 hours) at 5/20/2020 1713  Last data filed at 5/20/2020 1600  Gross per 24 hour   Intake 3171.43 ml   Output 2933 ml   Net 238.43 ml       Physical Exam   Constitutional: He appears well-developed.   HENT:   Head: Normocephalic and atraumatic.   Eyes: Pupils are equal, round, and reactive to light.   Neck: Normal range of motion.   Cardiovascular:   tachycardic   Pulmonary/Chest:   Trached, mechanically ventilated  On ECMO, cannulated in RIJ and fem vein   Abdominal: Soft. He exhibits no distension.   Musculoskeletal: Normal range of motion.   Neurological:   sedated   Skin: Skin is warm.   Vitals reviewed.      Vents:  Vent Mode: A/C (05/20/20 1239)  Ventilator Initiated: Yes (04/10/20 2247)  Set Rate: 12 BPM (05/20/20 1239)  Vt Set: 200 mL (05/18/20 1244)  PEEP/CPAP: 12 cmH20 (05/20/20 1239)  Oxygen Concentration (%): 50 (05/20/20 1700)  Peak Airway Pressure: 27 cmH2O (05/20/20 1239)  Plateau Pressure: 27 cmH20 (05/20/20 1239)  Total Ve: 3.26 mL (05/20/20 1239)  F/VT Ratio<105 (RSBI): 195.65 (05/18/20 2038)    Lines/Drains/Airways     Peripherally Inserted Central Catheter Line            PICC Double Lumen 05/05/20 1707 right basilic 15 days          Central Venous Catheter Line                 ECMO Cannula 04/25/20 1120 right femoral vein 25 days          ECMO Cannula 04/25/20 1120 right internal jugular 25 days          Drain                 Urethral Catheter 04/18/20 1854 31 days         NG/OG Tube 05/03/20 1215 [place] sump;nasogastric 18 Fr. Left nostril 17 days          Airway                 Surgical Airway 05/05/20 1500 Shiley Cuffed 15 days          Arterial Line                 Arterial Line 04/27/20 1100 Right Brachial 23 days          Peripheral Intravenous Line                 Peripheral IV - Single Lumen 05/19/20 0145 22 G Left Upper Arm 1 day         Peripheral IV - Single Lumen 05/19/20 1000 20 G Right Hand 1 day                Significant Labs:    CBC/Anemia Profile:  Recent Labs   Lab 05/19/20  0209  05/20/20  0219  05/20/20  0800 05/20/20  1351 05/20/20  1352   WBC 14.07*   < > 16.32*  --  17.33*  --  17.05*   HGB 8.8*   < > 9.8*  --  9.6*  --  9.5*   HCT 28.9*   < > 31.9*   < > 31.6* 28* 31.4*      < > 181  --  168  --  162   MCV 96   < > 95  --  96  --  96   RDW 13.7   < > 14.3  --  14.1  --  13.9   FERRITIN 1,918*  --  1,727*  --   --   --   --     < > = values in this interval not displayed.        Chemistries:  Recent Labs   Lab 05/20/20  0219 05/20/20  0800 05/20/20  1352    142 141   K 4.4 4.0 4.2    104 103   CO2 35* 29 30*   BUN 28* 28* 26*   CREATININE 0.7 0.7 0.6   CALCIUM 9.2 9.0 9.4   ALBUMIN 2.6* 2.5* 2.4*   PROT 5.9* 5.8* 5.9*   BILITOT 0.4 0.4 0.4   ALKPHOS 219* 201* 214*   ALT 66* 60* 61*   AST 43* 37 42*   MG 1.8 1.9 2.0   PHOS 3.9 3.8 3.7         Significant Imaging:  I have reviewed and interpreted all pertinent imaging results/findings within the past 24 hours.

## 2020-05-20 NOTE — PROGRESS NOTES
[facility] shift report    Date: 05/20/2020  ECMO Specialist:  Lia Alvarez    Pump parameters:  RPM: 2800  Flow:  3.4 ;pm  Sweep:  8.5  FiO2:  65    Oxygenator status:  Clots: yes - pre & post  Fibrin: scant at connections    Pressure trends:  P1: 110  P2: 80  Delta P: 30    Volume status:  Chugging noted - very minimal  MAP:  80-90's  MD notified (name):  Nany    Anticoagulation:  ACT/aPTT/Xa parameters: 0.25-0.3  ACT/aPTT/Xa trends this shift: .26-.28    Cannula size / status / placement:  Arterial:   Venous 1: 23FR / RIJV / 4 cm from incision; sutures on upper scalp removed; cannula anchored w clamps  Venous 2: 27FR / RFV / 12 cm from incision site  Dual lumen: no      Additional Comments:        Here on rds, Dr. Ayon requested to keep ABG's with lytes Q6 with CO2 45-55 as long as pH not acidotic & SvO2 >65%; lactate remains Q12 hrs.  X-ray still white with infiltrates; BS decreased bilaterally with diffuse rhonchi.  No problems with ECMO circuit; decreased FiO2 to 65. Possible CT of head tomorrow. Urine output remains good but lasix drip restarted. Heparin unchanged today at 9.5 ml/hr. Labs within normal limits reported to Nany.

## 2020-05-20 NOTE — PROGRESS NOTES
[facility] shift report    Date: 05/20/2020  ECMO Specialist:  Mckay Farooq    Pump parameters:  RPM: 2800  Flow:  3.35  Sweep:  8.5  FiO2:  70     Oxygenator status:  Clots: yes  Fibrin: yes     Pressure trends:  P1: 112  P2: 80  Delta P: 30     Volume status:  Chugging noted? Minimal   CVP:   MAP:  60-90  MD notified (name): Kenan       Anticoagulation:  ACT/aPTT/Xa parameters: 0.25- 0.30  ACT/aPTT/Xa trends this shift: 0.28, 0.28    Cannula size / status / placement:  Arterial:   Venous 1: RIJV 23f measured 4 cm from insertion site to coil   Venous 2: RFV 27f measured 12 cm from insertion site to coil  Dual lumen:      Additional Comments:  Pt maintained on VV ECMO, no remarkable events or changes made.  PCO2 has been greater then 50, spoke with Dr. Grayson, said to leave the sweep where it was due to ABG being compensated with normal pH.

## 2020-05-21 NOTE — PROGRESS NOTES
"05/21/2020  Maikel Zepeda    Current provider:  Francis Ayon MD      As floor rounding has been requested to be limited during COVID-19 patient quarantine by Infectious Disease and leadership, only "electronic" rounding has been performed on this mechanical assist device patient.  Electronic rounding includes verifying in Epic that current settings and measurements for the device have been documented appropriately and that they are within limits.  Additionally, this rounding verifies that the EQUIPMENT section of the VAD flowsheet is documented in Epic, specifically checking the presence of emergency equipment and controller self test.  Any discrepancies will be related to the care team.    In emergency, the nursing units have been notified to contact the perfusion department either by:  Calling [phone] from 630am to 4pm, or by contacting the hospital  from 4pm to 630am and asking to speak with the perfusionist on call.    Maikel Zepeda  4:04 PM  "

## 2020-05-21 NOTE — SUBJECTIVE & OBJECTIVE
Interval History:  Net negative 530mL in last 24hrs, 3.2L UOP, Na 141. Hep gtt reduced to 900/hr, LDH level increasing 427. CXR stable. PaO2 157, oxygenator FiO2 reduced to 65. Tachycardia slightly down on metoprolol.    Medications:  Continuous Infusions:   dexmedetomidine (PRECEDEX) infusion 0.5 mcg/kg/hr (05/21/20 0800)    furosemide (LASIX) 2 mg/mL continuous infusion (non-titrating) 2 mg/hr (05/21/20 0615)    heparin (porcine) in 5 % dex 900 Units/hr (05/21/20 0800)    insulin (HUMAN R) infusion (adults) Stopped (05/20/20 2300)    nicardipine 3 mg/hr (05/21/20 0800)    vasopressin (PITRESSIN) infusion Stopped (05/15/20 2130)     Scheduled Meds:   amLODIPine  5 mg Oral Daily    aspirin  81 mg Per NG tube Daily    chlorhexidine  15 mL Mouth/Throat BID    diazePAM  2 mg Per NG tube BID    fentaNYL  1 patch Transdermal Q72H    melatonin  6 mg Per G Tube Nightly    metoprolol  37.5 mg Per NG tube Q12H    multivitamin liquid no.118  10 mL Per G Tube Daily    pantoprazole  40 mg Intravenous Q12H    piperacillin-tazobactam (ZOSYN) IVPB  4.5 g Intravenous Q8H    polyethylene glycol  17 g Per NG tube Daily    potassium chloride 10%  20 mEq Per NG tube BID    psyllium husk (aspartame)  1 packet Per NG tube TID    QUEtiapine  50 mg Per NG tube QHS    sucralfate  1 g Per NG tube Q6H    traZODone  50 mg Per G Tube QHS    vancomycin (VANCOCIN) IVPB  1,250 mg Intravenous Q24H    vitamin D  1,000 Units Per G Tube Daily    white petrolatum-mineral oiL   Both Eyes BID        Objective:     Vital Signs (Most Recent):  Temp: (P) 98.7 °F (37.1 °C) (05/21/20 0300)  Pulse: (!) 123 (05/21/20 0738)  Resp: (!) 32 (05/19/20 0544)  BP: 112/68 (05/18/20 1500)  SpO2: 95 % (05/21/20 0738) Vital Signs (24h Range):  Temp:  [98.1 °F (36.7 °C)-98.7 °F (37.1 °C)] (P) 98.7 °F (37.1 °C)  Pulse:  [] 123  SpO2:  [94 %-100 %] 95 %  Arterial Line BP: ()/(44-71) 112/59     Weight: 64.3 kg (141 lb 12.1 oz)  Body mass  index is 24.33 kg/m².    SpO2: 95 %  O2 Device (Oxygen Therapy): ventilator    Intake/Output - Last 3 Shifts       05/19 0700 - 05/20 0659 05/20 0700 - 05/21 0659 05/21 0700 - 05/22 0659    I.V. (mL/kg) 780.1 (12.9) 989 (15.4)     Blood 350.9      NG/GT 2280 2205 45    IV Piggyback 508.3      Total Intake(mL/kg) 3919.4 (64.9) 3194 (49.7) 45 (0.7)    Urine (mL/kg/hr) 2490 (1.7) 3145 (2) 325 (4.5)    Stool 0 500     Blood  3     Total Output 2490 3648 325    Net +1429.4 -454 -280           Stool Occurrence 5 x 3 x           Lines/Drains/Airways     Peripherally Inserted Central Catheter Line            PICC Double Lumen 05/05/20 1707 right basilic 15 days          Central Venous Catheter Line                 ECMO Cannula 04/25/20 1120 right femoral vein 25 days         ECMO Cannula 04/25/20 1120 right internal jugular 25 days          Drain                 Urethral Catheter 04/18/20 1854 32 days         NG/OG Tube 05/03/20 1215 [place] sump;nasogastric 18 Fr. Left nostril 17 days          Airway                 Surgical Airway 05/05/20 1500 Shiley Cuffed 15 days          Arterial Line                 Arterial Line 04/27/20 1100 Right Brachial 23 days          Peripheral Intravenous Line                 Peripheral IV - Single Lumen 05/19/20 0145 22 G Left Upper Arm 2 days         Peripheral IV - Single Lumen 05/19/20 1000 20 G Right Hand 1 day                Physical Exam    Constitutional: He is sedated, and intubated.   Head: Normocephalic   Cardiovascular: Tachycardia 120s   Pulmonary/Chest: intubated vent FiO2 50% PEEP 12, ECMO RPM 2800, sweep 8.5, oxygenator FiO2 65%, flows low 3s  Skin: L neck and L groin cannulas intact, min to no flashing, clots pre and post-oxygenator, all sutures intact and secure  Nursing note and vitals reviewed.    Significant Labs:  BMP:   Recent Labs   Lab 05/21/20  0215   *      K 4.4      CO2 30*   BUN 25*   CREATININE 0.7   CALCIUM 9.4   MG 1.8     CBC:   Recent Labs    Lab 05/21/20 0215   WBC 18.29*   RBC 3.17*   HGB 9.3*   HCT 30.3*      MCV 96   MCH 29.3   MCHC 30.7*     LFTs:   Recent Labs   Lab 05/21/20 0215   ALT 55*   AST 34   ALKPHOS 200*   BILITOT 0.4   PROT 5.9*   ALBUMIN 2.5*     Significant Diagnostics:  Cxr: stable

## 2020-05-21 NOTE — PROGRESS NOTES
ECMO Specialists shift report    Date: 05/21/2020  ECMO Specialist:  Jorge Yeboah    Pump parameters:  RPM: 2700  Flow:  3.15   Sweep:  8.5  FiO2:  65    Oxygenator status:  Clots: pre/post oxy  Fibrin: pre/post oxy    Pressure trends:  P1: 107   P2: 80  Delta P: 39    Volume status:  Chugging noted none  CVP:   MAP:  75-90  MD notified (name): Dr. Ayon    Anticoagulation:  ACT/aPTT/Xa parameters: 0.25-0.30  ACT/aPTT/Xa trends this shift: 0.31    Cannula size / status / placement:  Arterial:   Venous 1: 23F/ RIJV  Venous 2:27F/ RFV  Dual lumen:      Additional Comments:    Patient on VV ECMO with the documented settings.  RPM's were weaned from 2800 to 2700.  No adverse events noted.  Urine output remains good.  Will continue to monitor.

## 2020-05-21 NOTE — ASSESSMENT & PLAN NOTE
Acute respiratory distress syndrome (ARDS) due to COVID-19 virus  - Crich switched to ETT on 4/11  - Monika weaned off 5/5  - Tracheostomy and bronch 5/5  - Thrombocytopenia resolved; transfuse for under 30K  - Aspirin 81 daily  - Metoprolol 25 BID  - RPM at 2800; flows low 3s  - Sweep at 8; CO2 goal 40-50s if not acidotic on ABG (respiratory compensation for alkalosis)  - Oxygenator Fi at 65%; try to further wean to 60 today given great response to PEEP  - Vent Fi 50%, PEEP 12     Tongue laceration      -ENT evaluated, laceration superficial      -recs: bite block vs paralysis, will monitor    Sedation  - Valium, seroquel, PRN Fentanyl     UGI Bleed  - Scoped by GI 5/3 with epinephrine injection and clip placement for an actively bleeding D2/D3 Diuelafoy lesion    FEN/GI  - TF at 45, goal      - Lasix gtt stopped 5/12  - FWB for hypernatremia 250mL q6     Anticoagulation      - Goal aXa 0.25 - 0.3 Q6hrs      - monitor LDH    Prophylaxis      - protonix q12 IV       - abx for ECMO; WBC higher 18 today on Vanc/Zosyn      - PICC placed 5/5; central line removed 5/6

## 2020-05-21 NOTE — PROGRESS NOTES
"[facility] - Respiratory ICU  Adult Nutrition  Progress Note    SUMMARY       Recommendations    Recommendation:   1. Continue TF of Peptamen Intense VHP @ goal rate of 45 mL/hr at this time.    - Providing pt with 1080 kcal, 99 g protein and 907 mL free water.     Additional water per MD.  2. RD to monitor and follow up     Goals: Meet % EEN, EPN by RD f/u date  Nutrition Goal Status: goal met  Communication of RD Recs: (POC)    Reason for Assessment    Reason For Assessment: RD follow-up  Diagnosis: (COVID-19)  Relevant Medical History: HTN  Interdisciplinary Rounds: did not attend  General Information Comments: Remote assessment completed. Continues intubated and on vent. TF running at goal rate of 45 mL/hr with no reported residuals at this time. No reported N/V/C/D at this time. Wt stable, continues fluctuating ~10 lbs since admit. Pt with no indications of malnutrition at this time. NFPE not performed, patient has been screened for possible COVID-19 and has been placed on airborne and contact precautions. Patient is noted as being positive for COVID-19.  Nutrition Discharge Planning: Unable to determine at this time.    Nutrition Risk Screen    Nutrition Risk Screen: tube feeding or parenteral nutrition    Nutrition/Diet History    Food Allergies: NKFA  Factors Affecting Nutritional Intake: NPO, on mechanical ventilation    Anthropometrics    Temp: 98.3 °F (36.8 °C)  Height Method: Stated  Height: 5' 4" (162.6 cm)  Height (inches): 64 in  Weight Method: Bed Scale  Weight: 64.3 kg (141 lb 12.1 oz)  Weight (lb): 141.76 lb  Ideal Body Weight (IBW), Male: 130 lb  % Ideal Body Weight, Male (lb): 114.62 %  BMI (Calculated): 24.3  BMI Grade: 18.5-24.9 - normal  Usual Body Weight (UBW), kg: (JAMMIE)       Lab/Procedures/Meds    Pertinent Labs Reviewed: reviewed  Pertinent Labs Comments: BUN 25; Glucose 188  Pertinent Medications Reviewed: reviewed  Pertinent Medications Comments: MVI; aspirin; psyllium " husk; vitamin D; vancomycin     Estimated/Assessed Needs    Weight Used For Calorie Calculations: 63.5 kg (139 lb 15.9 oz)(dosing wt)  Energy Calorie Requirements (kcal): 1548 kcal/day  Energy Need Method: [facility]  Protein Requirements: 76-95 g/day(1.2-1.5 g/kg)  Weight Used For Protein Calculations: 63.5 kg (139 lb 15.9 oz)(dosing weight)  Fluid Requirements (mL): 1 mL/kcal or per MD  Estimated Fluid Requirement Method: RDA Method  RDA Method (mL): 1548    Nutrition Prescription Ordered    Current Diet Order: NPO  Current Nutrition Support Formula Ordered: Peptamen Intense VHP  Current Nutrition Support Rate Ordered: 45 (ml)  Current Nutrition Support Frequency Ordered: mL/hr    Evaluation of Received Nutrient/Fluid Intake    Enteral Calories (kcal): 1080  Enteral Protein (gm): 99  Enteral (Free Water) Fluid (mL): 907  % Kcal Needs: 83  % Protein Needs: 104  I/O: +2.1 L since admit   Energy Calories Required: meeting needs  Protein Required: meeting needs  Fluid Required: (per MD)  Comments: LBM 5/21  Tolerance: tolerating  % Intake of Estimated Energy Needs: 75 - 100 %  % Meal Intake: NPO    Nutrition Risk    Level of Risk/Frequency of Follow-up: (f/u 1 x wk)     Assessment and Plan  Nutrition Problem  Inadequate energy intake     Related to (etiology):   Inability to consume sufficient energy      Signs and Symptoms (as evidenced by):   NPO     Interventions (treatment strategy):  Collaboration of nutrition care w/ other providers   Enteral Nutrition      Nutrition Diagnosis Status:   Resolving    Monitor and Evaluation    Food and Nutrient Intake: energy intake, food and beverage intake, enteral nutrition intake  Food and Nutrient Adminstration: diet order, enteral and parenteral nutrition administration  Physical Activity and Function: nutrition-related ADLs and IADLs  Anthropometric Measurements: weight change, weight  Biochemical Data, Medical Tests and Procedures: inflammatory profile, lipid profile,  glucose/endocrine profile, gastrointestinal profile, electrolyte and renal panel  Nutrition-Focused Physical Findings: overall appearance     Nutrition Follow-Up    RD Follow-up?: Yes

## 2020-05-21 NOTE — PLAN OF CARE
05/21/20 1333   Discharge Reassessment   Assessment Type Discharge Planning Reassessment   Provided patient/caregiver education on the expected discharge date and the discharge plan No   Do you have any problems affording any of your prescribed medications? No   Discharge Plan A Long-term acute care facility (LTAC)   Discharge Plan B Long-term acute care facility (LTAC)   Patient choice form signed by patient/caregiver N/A   Anticipated Discharge Disposition LTAC

## 2020-05-21 NOTE — PROGRESS NOTES
[facility] - Respiratory ICU  Cardiothoracic Surgery  Daily ECMO Progress Note    Patient Name: Ranjana Sanchez  MRN: [id number]  Admission Date: 4/10/2020  Hospital Length of Stay: 40 days  TYPE of ECMO: V-V  Day OF ECMO SUPPORT: 25  Code Status: Full Code   Attending Physician: Francis Ayon MD   Referring Provider: Francis Ayon MD  Principal Problem:Acute respiratory distress syndrome (ARDS) due to COVID-19 virus    Subjective:   Interval History:  Net negative 530mL in last 24hrs, 3.2L UOP, Na 141. Hep gtt reduced to 900/hr, LDH level increasing 427. CXR stable. PaO2 157, oxygenator FiO2 reduced to 65. Tachycardia slightly down on metoprolol.    Medications:  Continuous Infusions:   dexmedetomidine (PRECEDEX) infusion 0.5 mcg/kg/hr (05/21/20 0800)    furosemide (LASIX) 2 mg/mL continuous infusion (non-titrating) 2 mg/hr (05/21/20 0615)    heparin (porcine) in 5 % dex 900 Units/hr (05/21/20 0800)    insulin (HUMAN R) infusion (adults) Stopped (05/20/20 2300)    nicardipine 3 mg/hr (05/21/20 0800)    vasopressin (PITRESSIN) infusion Stopped (05/15/20 2130)     Scheduled Meds:   amLODIPine  5 mg Oral Daily    aspirin  81 mg Per NG tube Daily    chlorhexidine  15 mL Mouth/Throat BID    diazePAM  2 mg Per NG tube BID    fentaNYL  1 patch Transdermal Q72H    melatonin  6 mg Per G Tube Nightly    metoprolol  37.5 mg Per NG tube Q12H    multivitamin liquid no.118  10 mL Per G Tube Daily    pantoprazole  40 mg Intravenous Q12H    piperacillin-tazobactam (ZOSYN) IVPB  4.5 g Intravenous Q8H    polyethylene glycol  17 g Per NG tube Daily    potassium chloride 10%  20 mEq Per NG tube BID    psyllium husk (aspartame)  1 packet Per NG tube TID    QUEtiapine  50 mg Per NG tube QHS    sucralfate  1 g Per NG tube Q6H    traZODone  50 mg Per G Tube QHS    vancomycin (VANCOCIN) IVPB  1,250 mg Intravenous Q24H    vitamin D  1,000 Units Per G Tube Daily    white petrolatum-mineral oiL    Both Eyes BID        Objective:     Vital Signs (Most Recent):  Temp: (P) 98.7 °F (37.1 °C) (05/21/20 0300)  Pulse: (!) 123 (05/21/20 0738)  Resp: (!) 32 (05/19/20 0544)  BP: 112/68 (05/18/20 1500)  SpO2: 95 % (05/21/20 0738) Vital Signs (24h Range):  Temp:  [98.1 °F (36.7 °C)-98.7 °F (37.1 °C)] (P) 98.7 °F (37.1 °C)  Pulse:  [] 123  SpO2:  [94 %-100 %] 95 %  Arterial Line BP: ()/(44-71) 112/59     Weight: 64.3 kg (141 lb 12.1 oz)  Body mass index is 24.33 kg/m².    SpO2: 95 %  O2 Device (Oxygen Therapy): ventilator    Intake/Output - Last 3 Shifts       05/19 0700 - 05/20 0659 05/20 0700 - 05/21 0659 05/21 0700 - 05/22 0659    I.V. (mL/kg) 780.1 (12.9) 989 (15.4)     Blood 350.9      NG/GT 2280 2205 45    IV Piggyback 508.3      Total Intake(mL/kg) 3919.4 (64.9) 3194 (49.7) 45 (0.7)    Urine (mL/kg/hr) 2490 (1.7) 3145 (2) 325 (4.5)    Stool 0 500     Blood  3     Total Output 2490 3648 325    Net +1429.4 -454 -280           Stool Occurrence 5 x 3 x           Lines/Drains/Airways     Peripherally Inserted Central Catheter Line            PICC Double Lumen 05/05/20 1707 right basilic 15 days          Central Venous Catheter Line                 ECMO Cannula 04/25/20 1120 right femoral vein 25 days         ECMO Cannula 04/25/20 1120 right internal jugular 25 days          Drain                 Urethral Catheter 04/18/20 1854 32 days         NG/OG Tube 05/03/20 1215 [place] sump;nasogastric 18 Fr. Left nostril 17 days          Airway                 Surgical Airway 05/05/20 1500 Shiley Cuffed 15 days          Arterial Line                 Arterial Line 04/27/20 1100 Right Brachial 23 days          Peripheral Intravenous Line                 Peripheral IV - Single Lumen 05/19/20 0145 22 G Left Upper Arm 2 days         Peripheral IV - Single Lumen 05/19/20 1000 20 G Right Hand 1 day                Physical Exam    Constitutional: He is sedated, and intubated.    Head: Normocephalic   Cardiovascular: Tachycardia 120s   Pulmonary/Chest: intubated vent FiO2 50% PEEP 12, ECMO RPM 2800, sweep 8.5, oxygenator FiO2 65%, flows low 3s  Skin: L neck and L groin cannulas intact, min to no flashing, clots pre and post-oxygenator, all sutures intact and secure  Nursing note and vitals reviewed.    Significant Labs:  BMP:   Recent Labs   Lab 05/21/20 0215   *      K 4.4      CO2 30*   BUN 25*   CREATININE 0.7   CALCIUM 9.4   MG 1.8     CBC:   Recent Labs   Lab 05/21/20 0215   WBC 18.29*   RBC 3.17*   HGB 9.3*   HCT 30.3*      MCV 96   MCH 29.3   MCHC 30.7*     LFTs:   Recent Labs   Lab 05/21/20 0215   ALT 55*   AST 34   ALKPHOS 200*   BILITOT 0.4   PROT 5.9*   ALBUMIN 2.5*     Significant Diagnostics:  Cxr: stable      Assessment/Plan:     Acute respiratory distress syndrome (ARDS) due to COVID-19 virus  - Crich switched to ETT on 4/11  - Monika weaned off 5/5  - Tracheostomy and bronch 5/5  - Thrombocytopenia resolved; transfuse for under 30K  - Aspirin 81 daily  - Metoprolol 25 BID  - RPM at 2800; flows low 3s  - Sweep at 8; CO2 goal 40-50s if not acidotic on ABG (respiratory compensation for alkalosis)  - Oxygenator Fi at 65%; try to further wean to 60 today given great response to PEEP  - Vent Fi 50%, PEEP 12     Tongue laceration      -ENT evaluated, laceration superficial      -recs: bite block vs paralysis, will monitor    Sedation  - Valium, seroquel, PRN Fentanyl     UGI Bleed  - Scoped by GI 5/3 with epinephrine injection and clip placement for an actively bleeding D2/D3 Diuelafoy lesion    FEN/GI  - TF at 45, goal      - Lasix gtt stopped 5/12  - FWB for hypernatremia 250mL q6     Anticoagulation      - Goal aXa 0.25 - 0.3 Q6hrs      - monitor LDH    Prophylaxis      - protonix q12 IV       - abx for ECMO; WBC higher 18 today on Vanc/Zosyn      - PICC placed 5/5; central line removed 5/6    Anne Miller MD  Cardiothoracic Surgery  [facility] Res[facility] ICU    VV ECMO circuit checked and all parameters reviewed. Anticoagulation was managed and all cannulation exit sites along with review of labs and radiology studies performed. Speed and functioning of the pump along with oxygenator quality reviewed.  Patient appears to be doing well.  Patient appears to be significantly sedated.  Significant amount of sedatives have been reduced.  We are going to for the back off on the free water as patient's sodium has significantly improved.  We can initiate some diuresis as patient has become significantly positive over the last 1 week.  Chest x-ray revealed significant fluid overload.  Family was notified and updated about the condition of the patient.  Coordination between different teams which included the surgical team, ICU team, nursing staff and perfusion team was carried out and all questions and concerns were addressed.  Patient tolerating tube feeds well.  Patient is experiencing some diarrhea and as a result fiber was added to the diet.

## 2020-05-21 NOTE — PLAN OF CARE
"      SICU PLAN OF CARE NOTE    Dx: Acute respiratory distress syndrome (ARDS) due to COVID-19 virus    Shift Events: Started on PO labetalol to decrease Cardene. Lasix increased to 3 mg/hr. Heparin gtt decreased to 800 units/hr. PEEP decreased to 10, ECMO speed decreased to 2700 RPM.    Goals of Care: MAP 75-85, anti-Xa 0.25-0.3.    Neuro: withdraws uppers and lowers to pain. Opens eyes spontatnously. Does not track. Does not follow commands    Vital Signs: /68   Pulse 100   Temp 98.3 °F (36.8 °C) (Oral)   Resp (!) 32   Ht 5' 4" (1.626 m)   Wt 64.3 kg (141 lb 12.1 oz)   SpO2 98%   BMI 24.33 kg/m²     Respiratory: Ventilator, AC/PC, 50%, 10 PEEP    ECMO:  RPM: 2700  Flow:  3.15        Sweep:  8.5  FiO2:  65    Diet: Tube Feeds    Gtts: Precedex, Insulin, Lasix and Heparin    Urine Output: Urinary Catheter 150-250 cc/hour     Labs/Accuchecks: Labs q6, accuchecks q1.    Skin: Skin free from new breakdown. Pt with 2 BM, back side clean, dry, intact. Foot drop boot rotated. Foam pads on heels, pt on waffle mattress.       "

## 2020-05-21 NOTE — PROGRESS NOTES
ECMO Specialists shift report    Date: 05/21/2020  ECMO Specialist:  Mckay Farooq    Pump parameters:  RPM: 2800  Flow:  3.4 Lpm  Sweep:  8.5  FiO2:  65     Oxygenator status:  Clots: yes - pre & post  Fibrin: scant at connections     Pressure trends:  P1: 110  P2: 80  Delta P: 30     Volume status:  Chugging noted - very minimal  MAP:  80-90's  MD notified (name):  Nany     Anticoagulation:  ACT/aPTT/Xa parameters: 0.29-0.33  ACT/aPTT/Xa trends this shift: .26-.28     Cannula size / status / placement:  Arterial:   Venous 1: 23FR / RIJV / 4 cm from incision; sutures on upper scalp removed; cannula anchored w clamps  Venous 2: 27FR / RFV / 12 cm from incision site  Dual lumen: no       Additional Comments:  No changes made per shift. Pt remains on VV ECMO

## 2020-05-21 NOTE — PLAN OF CARE
No acute events overnight. Heparin gtt decreased to 900 units/hr. Current gtts include Heparin, Cardene, Insulin, Lasix, and Precedex. Lasix gtt increased to 2mg/hr this shift per Dr. Ayon order. Pt net negative ~ 400mL. Pt remains sinus tachycardic with rate 120s-130s, MAPs maintained 75-85, resp rate 30-40.  VV-ECMO FiO2 65%, SWEEP 8.5, Speed 2800, Flows 3.4. Vent settings AC VC+ FiO2 50%, 12 PEEP, Rate 12. Urine output 115-150cc/hr. TF remain at 45cc/hr (goal) with minimal residuals. BM x2 overnight. Pt opens eyes spontaneously, but does not track. Withdraws lower extremities to pain. Accuchecks hourly with insulin gtt titrated per order; currently off. Heel offloading boots and foot drop boot in place. Will continue to monitor. Dr. Ayon and Dr. Jacinto updated on patient throughout shift.

## 2020-05-22 NOTE — ASSESSMENT & PLAN NOTE
Acute respiratory distress syndrome (ARDS) due to COVID-19 virus  - Crich switched to ETT on 4/11  - Monika weaned off 5/5  - Tracheostomy and bronch 5/5  - Thrombocytopenia resolved; transfuse for under 30K  - Aspirin 81 daily  - Metoprolol 25 BID  - RPM at 2800; flows low 3s  - Sweep at 8; CO2 goal 40-50s if not acidotic on ABG (respiratory compensation for alkalosis)  - Oxygenator Fi at 70%  - Vent Fi 50%, PEEP 12     Tongue laceration      -ENT evaluated, laceration superficial      -recs: bite block vs paralysis, will monitor    Sedation  - Valium, seroquel, PRN Fentanyl     UGI Bleed  - Scoped by GI 5/3 with epinephrine injection and clip placement for an actively bleeding D2/D3 Diuelafoy lesion    FEN/GI  - TF at 45, goal      - Lasix gtt stopped 5/12  - FWB for hypernatremia 250mL q6     Anticoagulation      - Goal aXa 0.25 - 0.3 Q6hrs      - monitor LDH    Prophylaxis      - protonix q12 IV       - abx for ECMO; WBC higher on Vanc/Zosyn      - PICC placed 5/5; central line removed 5/6

## 2020-05-22 NOTE — PROGRESS NOTES
"05/22/2020  Maikel Zepeda    Current provider:  Francis Ayon MD      As floor rounding has been requested to be limited during COVID-19 patient quarantine by Infectious Disease and leadership, only "electronic" rounding has been performed on this mechanical assist device patient.  Electronic rounding includes verifying in Epic that current settings and measurements for the device have been documented appropriately and that they are within limits.  Additionally, this rounding verifies that the EQUIPMENT section of the VAD flowsheet is documented in Epic, specifically checking the presence of emergency equipment and controller self test.  Any discrepancies will be related to the care team.    In emergency, the nursing units have been notified to contact the perfusion department either by:  Calling [phone] from 630am to 4pm, or by contacting the hospital  from 4pm to 630am and asking to speak with the perfusionist on call.    Maikel Zepeda  10:52 AM  "

## 2020-05-22 NOTE — PROGRESS NOTES
ECMO Specialists shift report    Date: 05/22/2020  ECMO Specialist:  Jorge Yeboah    Pump parameters:  RPM: 2700  Flow:  2.90  Sweep:  9  FiO2:  60    Oxygenator status:  Clots: pre/post  Fibrin: pre/post    Pressure trends:  P1: 108  P2: 76  Delta P: 29    Volume status:  Chugging noted none  CVP:   MAP:  75-95  MD notified (name):  Nany    Anticoagulation:  ACT/aPTT/Xa parameters: 0.25-0.30  ACT/aPTT/Xa trends this shift: 0.29,0.30    Cannula size / status / placement:  Arterial:   Venous 1: RIJV/23F  Venous 2:RFV/27F  Dual lumen:      Additional Comments:    Patient remains on VV ecmo with documented settings.  Increased sweep flow to 9LPM and reduced FiO2 to 60%.  X-ray remains unchanged from past few days.  Urine output is good.  See flowsheet for details

## 2020-05-22 NOTE — NURSING
"      SICU PLAN OF CARE NOTE    Dx: Acute respiratory distress syndrome (ARDS) due to COVID-19 virus    Shift Events: ECMO sweep increased; FiO2 decreased at 1400 ABG    Goals of Care: MAP 75-85; AntiXa 0.25-0.3; Keep net negative; use minimal sedatives.    Neuro: Opens eyes to verbal stimuli, does not look at person; withdraws extremities to deep noxious stimuli; PERRL    Vital Signs: /68   Pulse (!) 125   Temp 98.4 °F (36.9 °C) (Oral)   Resp (!) 28   Ht 5' 4" (1.626 m)   Wt 60.5 kg (133 lb 6.1 oz)   SpO2 95%   BMI 22.89 kg/m²     Respiratory: Ventilator 50% +8    Diet: Tube Feeds Peptamen Intense VHP at goal 45 cc/h.     Gtts: Precedex, Insulin, Lasix and Heparin Cardene    Urine Output: Urinary Catheter; UO >150 cc/h    VVECMO: Sweep 9; FiO2 60%; 2700 RPM; 2.8-2.9 LPM     Labs/Accuchecks: q6h labs and q1h accuchecks    Skin: skin tear and dti. Repositioning slightly for weigh redistribution. Heels and elbows offloaded. HOB 15 degrees. Unable to fully turn due to ECMO device. Waffle cushion in place.       "

## 2020-05-22 NOTE — PROGRESS NOTES
[facility] - Respiratory ICU  Cardiothoracic Surgery  Daily ECMO Progress Note    Patient Name: Ranjana Sanchez  MRN: [id number]  Admission Date: 4/10/2020  Hospital Length of Stay: 41 days  TYPE of ECMO: V-V  Day OF ECMO SUPPORT: 26  Code Status: Full Code   Attending Physician: Francis Ayon MD   Referring Provider: Francis Ayon MD  Principal Problem:Acute respiratory distress syndrome (ARDS) due to COVID-19 virus    Subjective:   Interval History:  Hep gtt 950/hr. CXR stable. Tachycardia slightly down on metoprolol.    Medications:  Continuous Infusions:   dexmedetomidine (PRECEDEX) infusion 0.4 mcg/kg/hr (05/21/20 2100)    furosemide (LASIX) 2 mg/mL continuous infusion (non-titrating) 3 mg/hr (05/21/20 2100)    heparin (porcine) in 5 % dex 850 Units/hr (05/21/20 2100)    insulin (HUMAN R) infusion (adults) 0.5 Units/hr (05/21/20 2100)    nicardipine Stopped (05/21/20 1500)    vasopressin (PITRESSIN) infusion Stopped (05/15/20 2130)     Scheduled Meds:   amLODIPine  5 mg Oral Daily    aspirin  81 mg Per NG tube Daily    chlorhexidine  15 mL Mouth/Throat BID    diazePAM  1 mg Oral BID    fentaNYL  1 patch Transdermal Q72H    labetalol  100 mg Per G Tube BID    melatonin  6 mg Per G Tube Nightly    multivitamin liquid no.118  10 mL Per G Tube Daily    pantoprazole  40 mg Intravenous Q12H    piperacillin-tazobactam (ZOSYN) IVPB  4.5 g Intravenous Q8H    polyethylene glycol  17 g Per NG tube Daily    potassium chloride 10%  20 mEq Per NG tube BID    psyllium husk (aspartame)  1 packet Per NG tube TID    QUEtiapine  50 mg Per NG tube BID    sucralfate  1 g Per NG tube Q6H    vancomycin (VANCOCIN) IVPB  1,250 mg Intravenous Q24H    vitamin D  1,000 Units Per G Tube Daily    white petrolatum-mineral oiL   Both Eyes BID        Objective:     Vital Signs (Most Recent):  Temp: 98.4 °F (36.9 °C) (05/21/20 1900)  Pulse: 110 (05/21/20 2115)  Resp: (!) 32 (05/19/20 0544)  BP: 112/68  (05/18/20 1500)  SpO2: 97 % (05/21/20 2115) Vital Signs (24h Range):  Temp:  [97.9 °F (36.6 °C)-98.7 °F (37.1 °C)] 98.4 °F (36.9 °C)  Pulse:  [] 110  SpO2:  [94 %-100 %] 97 %  Arterial Line BP: ()/(44-67) 114/65     Weight: 64.3 kg (141 lb 12.1 oz)  Body mass index is 24.33 kg/m².    SpO2: 97 %  O2 Device (Oxygen Therapy): ventilator    Intake/Output - Last 3 Shifts       05/19 0700 - 05/20 0659 05/20 0700 - 05/21 0659 05/21 0700 - 05/22 0659    I.V. (mL/kg) 780.1 (12.9) 989 (15.4) 774.6 (12)    Blood 350.9      NG/GT 2280 2205 1285    IV Piggyback 508.3      Total Intake(mL/kg) 3919.4 (64.9) 3194 (49.7) 2059.6 (32)    Urine (mL/kg/hr) 2490 (1.7) 3145 (2) 2750 (3)    Stool 0 500 0    Blood  3 2    Total Output 2490 3648 2752    Net +1429.4 -454 -692.4           Stool Occurrence 5 x 3 x 2 x          Lines/Drains/Airways     Peripherally Inserted Central Catheter Line            PICC Double Lumen 05/05/20 1707 right basilic 16 days          Central Venous Catheter Line                 ECMO Cannula 04/25/20 1120 right femoral vein 26 days         ECMO Cannula 04/25/20 1120 right internal jugular 26 days          Drain                 Urethral Catheter 04/18/20 1854 33 days         NG/OG Tube 05/03/20 1215 [place] sump;nasogastric 18 Fr. Left nostril 18 days          Airway                 Surgical Airway 05/05/20 1500 Shiley Cuffed 16 days          Arterial Line                 Arterial Line 04/27/20 1100 Right Brachial 24 days          Peripheral Intravenous Line                 Peripheral IV - Single Lumen 05/19/20 0145 22 G Left Upper Arm 2 days         Peripheral IV - Single Lumen 05/19/20 1000 20 G Right Hand 2 days                Physical Exam    Constitutional: He is sedated, and intubated.   Head: Normocephalic   Cardiovascular: Tachycardia 120s   Pulmonary/Chest: intubated vent FiO2 50% PEEP 12, ECMO RPM 2800, sweep 8.5, oxygenator FiO2 70%, flows low 3s  Skin: L neck and L groin cannulas intact, min  to no flashing, clots pre and post-oxygenator, all sutures intact and secure  Nursing note and vitals reviewed.    Significant Labs:  BMP:   Recent Labs   Lab 05/21/20  1400   *      K 3.9   CL 97   CO2 34*   BUN 24*   CREATININE 0.7   CALCIUM 9.3   MG 2.0     CBC:   Recent Labs   Lab 05/21/20 2020   WBC 15.03*   RBC 3.14*   HGB 9.1*   HCT 29.7*      MCV 95   MCH 29.0   MCHC 30.6*     LFTs:   Recent Labs   Lab 05/21/20  1400   ALT 53*   AST 31   ALKPHOS 199*   BILITOT 0.5   PROT 6.4   ALBUMIN 2.6*     Significant Diagnostics:  Cxr: stable    ROS      Assessment/Plan:     Acute respiratory distress syndrome (ARDS) due to COVID-19 virus  - Crich switched to ETT on 4/11  - Monika weaned off 5/5  - Tracheostomy and bronch 5/5  - Thrombocytopenia resolved; transfuse for under 30K  - Aspirin 81 daily  - Metoprolol 25 BID  - RPM at 2800; flows low 3s  - Sweep at 8; CO2 goal 40-50s if not acidotic on ABG (respiratory compensation for alkalosis)  - Oxygenator Fi at 70%  - Vent Fi 50%, PEEP 12     Tongue laceration      -ENT evaluated, laceration superficial      -recs: bite block vs paralysis, will monitor    Sedation  - Valium, seroquel, PRN Fentanyl     UGI Bleed  - Scoped by GI 5/3 with epinephrine injection and clip placement for an actively bleeding D2/D3 Diuelafoy lesion    FEN/GI  - TF at 45, goal      - Lasix gtt stopped 5/12  - FWB for hypernatremia 250mL q6     Anticoagulation      - Goal aXa 0.25 - 0.3 Q6hrs      - monitor LDH    Prophylaxis      - protonix q12 IV       - abx for ECMO; WBC higher on Vanc/Zosyn      - PICC placed 5/5; central line removed 5/6    Anne Miller MD  Cardiothoracic Surgery  [facility] - Respiratory ICU      VV ECMO circuit checked and all parameters reviewed. Anticoagulation was managed and all cannulation exit sites along with review of labs and radiology studies performed. Speed and functioning of the pump along with oxygenator quality reviewed.   Patient has been doing well.  We for the reduce the sedation and benzodiazepines on the patient.  Mr. Sanchez appears to be more awake and has started to move his upper extremity.  Patient is responding well to the diuresis and has started to show some improvement.  Over the last 1 week patient had turned out to be about 12 L positive.  However, over the last 24 hours we have been net 600 cc negative.  Coordination of care between different teams which included surgical team, ICU team, nursing staff and perfusion team was carried out.  All questions and concerns were addressed.

## 2020-05-22 NOTE — PROGRESS NOTES
[facility] - Respiratory ICU  Cardiothoracic Surgery  Daily ECMO Progress Note    Patient Name: Ranjana Sanchez  MRN: [id number]  Admission Date: 4/10/2020  Hospital Length of Stay: 42 days  Day of ECMO Support: 27  Type of ECMO: VV  Code Status: Full Code   Attending Physician: Francis Ayon MD   Referring Provider: Francis Ayon MD  Principal Problem:Acute respiratory distress syndrome (ARDS) due to COVID-19 virus    Subjective:   Interval History:  HR continues to improve. Net negative 1.5L with lasix gtt. CXR stable. WBC lower, no fevers.    Medications:  Continuous Infusions:   dexmedetomidine (PRECEDEX) infusion 0.3 mcg/kg/hr (05/22/20 1600)    furosemide (LASIX) 2 mg/mL continuous infusion (non-titrating) 5 mg/hr (05/22/20 1600)    heparin (porcine) in 5 % dex 900 Units/hr (05/22/20 1600)    insulin (HUMAN R) infusion (adults) 1.5 Units/hr (05/22/20 1600)    nicardipine Stopped (05/22/20 0909)    vasopressin (PITRESSIN) infusion Stopped (05/15/20 2130)     Scheduled Meds:   amLODIPine  5 mg Oral Daily    aspirin  81 mg Per NG tube Daily    chlorhexidine  15 mL Mouth/Throat BID    diazePAM  0.5 mg Oral BID    fentaNYL  1 patch Transdermal Q72H    labetalol  200 mg Per G Tube BID    melatonin  6 mg Per G Tube Nightly    multivitamin liquid no.118  10 mL Per G Tube Daily    pantoprazole  40 mg Intravenous Q12H    piperacillin-tazobactam (ZOSYN) IVPB  4.5 g Intravenous Q8H    polyethylene glycol  17 g Per NG tube Daily    potassium chloride 10%  20 mEq Per NG tube BID    psyllium husk (aspartame)  1 packet Per NG tube TID    QUEtiapine  50 mg Per NG tube BID    sucralfate  1 g Per NG tube Q6H    vancomycin (VANCOCIN) IVPB  1,250 mg Intravenous Q24H    vitamin D  1,000 Units Per G Tube Daily    white petrolatum-mineral oiL   Both Eyes BID        Objective:     Vital Signs (Most Recent):  Temp: 98.4 °F (36.9 °C) (05/22/20 1500)  Pulse: (!) 124 (05/22/20 1615)  Resp: (!) 28  (05/22/20 1500)  BP: 115/68 (05/22/20 1500)  SpO2: 96 % (05/22/20 1615) Vital Signs (24h Range):  Temp:  [98.4 °F (36.9 °C)-98.6 °F (37 °C)] 98.4 °F (36.9 °C)  Pulse:  [] 124  Resp:  [28-32] 28  SpO2:  [94 %-100 %] 96 %  BP: (110-115)/(67-68) 115/68  Arterial Line BP: ()/(49-71) 115/66     Weight: 60.5 kg (133 lb 6.1 oz)  Body mass index is 22.89 kg/m².    SpO2: 96 %  O2 Device (Oxygen Therapy): ventilator    Intake/Output - Last 3 Shifts       05/20 0700 - 05/21 0659 05/21 0700 - 05/22 0659 05/22 0700 - 05/23 0659    I.V. (mL/kg) 989 (15.4) 1038.1 (17.2) 139 (2.3)    Blood       NG/GT 2205 1940 750    IV Piggyback   350    Total Intake(mL/kg) 3194 (49.7) 2978.1 (49.2) 1239 (20.5)    Urine (mL/kg/hr) 3145 (2) 4630 (3.2) 2150 (3.6)    Stool 500 0 0    Blood 3 3     Total Output 3648 4633 2150    Net -454 -1655 -911           Stool Occurrence 3 x 2 x 1 x          Lines/Drains/Airways     Peripherally Inserted Central Catheter Line            PICC Double Lumen 05/05/20 1707 right basilic 16 days          Central Venous Catheter Line                 ECMO Cannula 04/25/20 1120 right femoral vein 27 days         ECMO Cannula 04/25/20 1120 right internal jugular 27 days          Drain                 Urethral Catheter 04/18/20 1854 33 days         NG/OG Tube 05/03/20 1215 [place] sump;nasogastric 18 Fr. Left nostril 19 days          Airway                 Surgical Airway 05/05/20 1500 Shiley Cuffed 17 days          Arterial Line                 Arterial Line 04/27/20 1100 Right Brachial 25 days          Peripheral Intravenous Line                 Peripheral IV - Single Lumen 05/19/20 0145 22 G Left Upper Arm 3 days         Peripheral IV - Single Lumen 05/19/20 1000 20 G Right Hand 3 days                Physical Exam    Constitutional: He is sedated, and intubated.   Head: Normocephalic   Cardiovascular: Tachycardia 120s   Pulmonary/Chest: intubated vent FiO2 50% PEEP 12, ECMO RPM 2800, sweep 8.5, oxygenator FiO2  65%, flows low 3s  Skin: L neck and L groin cannulas intact, no flashing, clots pre and post-oxygenator, all sutures intact and secure  Nursing note and vitals reviewed.    Significant Labs:  BMP:   Recent Labs   Lab 05/22/20  1416   *      K 4.3   CL 93*   CO2 38*   BUN 33*   CREATININE 0.8   CALCIUM 10.1   MG 2.1     CBC:   Recent Labs   Lab 05/22/20  1416 05/22/20  1422   WBC 17.53*  --    RBC 3.31*  --    HGB 9.4*  --    HCT 31.0* 28*     --    MCV 94  --    MCH 28.4  --    MCHC 30.3*  --      LFTs:   Recent Labs   Lab 05/22/20  1416   ALT 50*   AST 29   ALKPHOS 188*   BILITOT 0.5   PROT 7.0   ALBUMIN 2.8*     Significant Diagnostics:  Cxr: stable      Assessment/Plan:     Acute respiratory distress syndrome (ARDS) due to COVID-19 virus  - Crich switched to ETT on 4/11  - Monika weaned off 5/5  - Tracheostomy and bronch 5/5  - Thrombocytopenia resolved; transfuse for under 30K  - Aspirin 81 daily  - Metoprolol 25 BID  - RPM at 2800; flows low 3s  - Sweep at 8.5; CO2 goal 40-50s if not acidotic on ABG (respiratory compensation for alkalosis)  - Oxygenator Fi at 65%; wean to 60 today  - Vent Fi 50%, PEEP 12     Tongue laceration      -ENT evaluated, laceration superficial      -recs: bite block vs paralysis, will monitor    Sedation  - Valium, seroquel, PRN Fentanyl     UGI Bleed  - Scoped by GI 5/3 with epinephrine injection and clip placement for an actively bleeding D2/D3 Diuelafoy lesion    FEN/GI  - TF at 45, goal      - Lasix gtt stopped 5/12  - FWB for hypernatremia 250mL q6     Anticoagulation      - Goal aXa 0.25 - 0.3 Q6hrs      - monitor LDH    Prophylaxis      - protonix q12 IV       - abx for ECMO; WBC stable on Vanc/Zosyn      - PICC placed 5/5; central line removed 5/6    Anne Miller MD  Cardiothoracic Surgery  [facility] - Respiratory ICU      VV ECMO circuit checked and all parameters reviewed. Anticoagulation was managed and all cannulation exit sites along with  review of labs and radiology studies performed. Speed and functioning of the pump along with oxygenator quality reviewed.  Patient doing well and stable on ECMO support.  Coordination of care was provided with different team members and services.

## 2020-05-22 NOTE — PLAN OF CARE
No acute events overnight. Heparin gtt increased to 900 units/hr. Current gtts include Heparin, Insulin, Lasix, and Precedex. Lasix gtt increased to 5mg/hr with a 2mg IVP this shift per Dr. Ayon order. Pt net negative ~ 1000mL. Pt remains sinus tachycardic with rate 100-120s, MAPs maintained 75-85, resp rate 30-40.  VV-ECMO FiO2 65%, SWEEP 8.5, Speed 2700, Flows 3.1-3.2. Vent settings AC VC+ FiO2 50%, Rate 12, PEEP decreased to 8 this shift per Dr. Ayon order. Urine output 200-250cc/hr. TF remain at 45cc/hr (goal) with minimal residuals. No BM overnight. Pt opens eyes spontaneously, but still does not track. NOW withdraws UPPER and lower extremities to pain. Accuchecks hourly with insulin gtt titrated per order. Heel offloading boots and foot drop boot in place and rotated. Will continue to monitor. Dr. Ayon updated on patient throughout shift.

## 2020-05-22 NOTE — PROGRESS NOTES
Pharmacokinetic Assessment Follow Up: IV Vancomycin    Vancomycin serum concentration assessment(s):  · Vancomycin trough level resulted within goal as 12.4 mcg/mL.  Goal level is 10 to 20 mcg/mL.     Drug levels (last 3 results):  Recent Labs   Lab Result Units 05/22/20  0805   Vancomycin-Trough ug/mL 12.4     Vancomycin Regimen Plan:  · Continue current regimen of vancomycin 1250 mg every 24 hours. Next surveillance trough ordered on 5/27 at 08:30.     Pharmacy will continue to follow and monitor vancomycin. Please contact pharmacy for questions regarding this assessment.    Thank you for the consult,     Arpita Kapadia, TommyD  PGY-2 Pharmacy Resident- Internal Medicine  [id number]    _________________________________________________________________________________       Patient brief summary:  Ranjana Sanchez is a 54 y.o. male initiated on antimicrobial therapy with IV vancomycin for treatment of sepsis    Drug Allergies:   Review of patient's allergies indicates:  No Known Allergies    Actual Body Weight:   61 kg     Renal Function:   Estimated Creatinine Clearance: 86.3 mL/min (based on SCr of 0.8 mg/dL).    Dialysis Method (if applicable):  N/A    CBC (last 72 hours):  Recent Labs   Lab Result Units 05/19/20 2008 05/20/20  0219 05/20/20  0800 05/20/20  1352 05/20/20  2000 05/21/20  0215 05/21/20  0800 05/21/20  1400 05/21/20 2020 05/22/20  0215 05/22/20  0800 05/22/20  1416   WBC K/uL 16.08* 16.32* 17.33* 17.05* 18.16* 18.29* 18.31* 16.23* 15.03* 15.92* 16.79* 17.53*   Hemoglobin g/dL 10.0* 9.8* 9.6* 9.5* 9.6* 9.3* 9.4* 9.5* 9.1* 9.1* 9.0* 9.4*   Hemoglobin, Free, Plasma mg/dL  --   --  20  --   --   --  20  --   --   --  30  --    Hematocrit % 32.0* 31.9* 31.6* 31.4* 31.3* 30.3* 30.4* 31.4* 29.7* 29.6* 29.4* 31.0*   Platelets K/uL 167 181 168 162 172 172 174 179 177 171 167 185   Gran% % 66.1 67.5 69.6 88.0* 69.1 69.7 68.9 67.2 63.5 65.0 67.6 66.4   Lymph% % 8.0* 8.5* 7.8* 4.0* 7.2* 7.8* 7.6* 8.0* 9.5*  8.3* 7.4* 7.9*   Mono% % 7.5 7.4 6.7 3.0* 7.3 7.4 7.5 6.7 7.1 7.3 7.1 7.1   Eosinophil% % 12.6* 11.3* 10.6* 4.0 11.6* 10.3* 11.5* 12.9* 14.6* 14.9* 13.7* 14.1*   Basophil% % 0.9 0.9 0.6 0.0 0.6 0.6 0.5 0.7 0.7 0.6 0.4 0.6   Differential Method  Automated Automated Automated Manual Automated Automated Automated Automated Automated Automated Automated Automated       Metabolic Panel (last 72 hours):  Recent Labs   Lab Result Units 05/19/20 2008 05/20/20  0219 05/20/20  0800 05/20/20  1352 05/20/20  2000 05/21/20  0215 05/21/20  0800 05/21/20  1400 05/21/20 2020 05/22/20  0215 05/22/20  0800 05/22/20  1416   Sodium mmol/L 143 145 142 141 141 141 140 140 140 141 140 142   Potassium mmol/L 4.3 4.4 4.0 4.2 4.1 4.4 4.0 3.9 4.5 4.2 3.8 4.3   Chloride mmol/L 105 106 104 103 102 102 100 97 97 97 93* 93*   CO2 mmol/L 31* 35* 29 30* 31* 30* 32* 34* 35* 33* 36* 38*   Glucose mg/dL 183* 148* 206* 187* 180* 185* 188* 174* 176* 192* 201* 159*   BUN, Bld mg/dL 29* 28* 28* 26* 26* 25* 25* 24* 26* 27* 30* 33*   Creatinine mg/dL 0.7 0.7 0.7 0.6 0.7 0.7 0.7 0.7 0.7 0.7 0.8 0.8   Albumin g/dL 2.5* 2.6* 2.5* 2.4* 2.5* 2.5* 2.6* 2.6* 2.6* 2.6* 2.7* 2.8*   Total Bilirubin mg/dL 0.5 0.4 0.4 0.4 0.4 0.4 0.5 0.5 0.4 0.5 0.4 0.5   Alkaline Phosphatase U/L 230* 219* 201* 214* 220* 200* 202* 199* 188* 181* 187* 188*   AST U/L 47* 43* 37 42* 37 34 35 31 30 31 30 29   ALT U/L 68* 66* 60* 61* 61* 55* 54* 53* 50* 48* 47* 50*   Magnesium mg/dL 2.0 1.8 1.9 2.0 1.9 1.8 1.9 2.0 1.9 1.8 1.9 2.1   Phosphorus mg/dL 4.3 3.9 3.8 3.7 3.5 3.5 3.5 3.2 3.1 3.1 3.5 3.5       Vancomycin Administrations:  vancomycin given in the last 96 hours                   vancomycin in dextrose 5 % 1 gram/250 mL IVPB 1,000 mg (mg) 1,000 mg New Bag 05/04/20 1130     1,000 mg New Bag 05/03/20 2202     1,000 mg New Bag  1050     1,000 mg New Bag 05/02/20 2212     1,000 mg New Bag  1003     1,000 mg New Bag 05/01/20 2223     1,000 mg New Bag  1000     1,000 mg New Bag 04/30/20  2201                Microbiologic Results:  Microbiology Results (last 7 days)     ** No results found for the last 168 hours. **

## 2020-05-22 NOTE — SUBJECTIVE & OBJECTIVE
Interval History:  HR continues to improve. Net negative 1.5L with lasix gtt. CXR stable. WBC lower, no fevers.    Medications:  Continuous Infusions:   dexmedetomidine (PRECEDEX) infusion 0.3 mcg/kg/hr (05/22/20 1600)    furosemide (LASIX) 2 mg/mL continuous infusion (non-titrating) 5 mg/hr (05/22/20 1600)    heparin (porcine) in 5 % dex 900 Units/hr (05/22/20 1600)    insulin (HUMAN R) infusion (adults) 1.5 Units/hr (05/22/20 1600)    nicardipine Stopped (05/22/20 0909)    vasopressin (PITRESSIN) infusion Stopped (05/15/20 2130)     Scheduled Meds:   amLODIPine  5 mg Oral Daily    aspirin  81 mg Per NG tube Daily    chlorhexidine  15 mL Mouth/Throat BID    diazePAM  0.5 mg Oral BID    fentaNYL  1 patch Transdermal Q72H    labetalol  200 mg Per G Tube BID    melatonin  6 mg Per G Tube Nightly    multivitamin liquid no.118  10 mL Per G Tube Daily    pantoprazole  40 mg Intravenous Q12H    piperacillin-tazobactam (ZOSYN) IVPB  4.5 g Intravenous Q8H    polyethylene glycol  17 g Per NG tube Daily    potassium chloride 10%  20 mEq Per NG tube BID    psyllium husk (aspartame)  1 packet Per NG tube TID    QUEtiapine  50 mg Per NG tube BID    sucralfate  1 g Per NG tube Q6H    vancomycin (VANCOCIN) IVPB  1,250 mg Intravenous Q24H    vitamin D  1,000 Units Per G Tube Daily    white petrolatum-mineral oiL   Both Eyes BID        Objective:     Vital Signs (Most Recent):  Temp: 98.4 °F (36.9 °C) (05/22/20 1500)  Pulse: (!) 124 (05/22/20 1615)  Resp: (!) 28 (05/22/20 1500)  BP: 115/68 (05/22/20 1500)  SpO2: 96 % (05/22/20 1615) Vital Signs (24h Range):  Temp:  [98.4 °F (36.9 °C)-98.6 °F (37 °C)] 98.4 °F (36.9 °C)  Pulse:  [] 124  Resp:  [28-32] 28  SpO2:  [94 %-100 %] 96 %  BP: (110-115)/(67-68) 115/68  Arterial Line BP: ()/(49-71) 115/66     Weight: 60.5 kg (133 lb 6.1 oz)  Body mass index is 22.89 kg/m².    SpO2: 96 %  O2 Device (Oxygen Therapy): ventilator    Intake/Output - Last 3 Shifts        05/20 0700 - 05/21 0659 05/21 0700 - 05/22 0659 05/22 0700 - 05/23 0659    I.V. (mL/kg) 989 (15.4) 1038.1 (17.2) 139 (2.3)    Blood       NG/GT 2205 1940 750    IV Piggyback   350    Total Intake(mL/kg) 3194 (49.7) 2978.1 (49.2) 1239 (20.5)    Urine (mL/kg/hr) 3145 (2) 4630 (3.2) 2150 (3.6)    Stool 500 0 0    Blood 3 3     Total Output 3648 4633 2150    Net -454 -1655 -911           Stool Occurrence 3 x 2 x 1 x          Lines/Drains/Airways     Peripherally Inserted Central Catheter Line            PICC Double Lumen 05/05/20 1707 right basilic 16 days          Central Venous Catheter Line                 ECMO Cannula 04/25/20 1120 right femoral vein 27 days         ECMO Cannula 04/25/20 1120 right internal jugular 27 days          Drain                 Urethral Catheter 04/18/20 1854 33 days         NG/OG Tube 05/03/20 1215 [place] sump;nasogastric 18 Fr. Left nostril 19 days          Airway                 Surgical Airway 05/05/20 1500 [place]iley Cuffed 17 days          Arterial Line                 Arterial Line 04/27/20 1100 Right Brachial 25 days          Peripheral Intravenous Line                 Peripheral IV - Single Lumen 05/19/20 0145 22 G Left Upper Arm 3 days         Peripheral IV - Single Lumen 05/19/20 1000 20 G Right Hand 3 days                Physical Exam    Constitutional: He is sedated, and intubated.   Head: Normocephalic   Cardiovascular: Tachycardia 120s   Pulmonary/Chest: intubated vent FiO2 50% PEEP 12, ECMO RPM 2800, sweep 8.5, oxygenator FiO2 65%, flows low 3s  Skin: L neck and L groin cannulas intact, no flashing, clots pre and post-oxygenator, all sutures intact and secure  Nursing note and vitals reviewed.    Significant Labs:  BMP:   Recent Labs   Lab 05/22/20  1416   *      K 4.3   CL 93*   CO2 38*   BUN 33*   CREATININE 0.8   CALCIUM 10.1   MG 2.1     CBC:   Recent Labs   Lab 05/22/20  1416 05/22/20  1422   WBC 17.53*  --    RBC 3.31*  --    HGB 9.4*  --    HCT 31.0* 28*      --    MCV 94  --    MCH 28.4  --    MCHC 30.3*  --      LFTs:   Recent Labs   Lab 05/22/20  1416   ALT 50*   AST 29   ALKPHOS 188*   BILITOT 0.5   PROT 7.0   ALBUMIN 2.8*     Significant Diagnostics:  Cxr: stable

## 2020-05-22 NOTE — SUBJECTIVE & OBJECTIVE
Interval History:  Hep gtt 950/hr. CXR stable. Tachycardia slightly down on metoprolol.    Medications:  Continuous Infusions:   dexmedetomidine (PRECEDEX) infusion 0.4 mcg/kg/hr (05/21/20 2100)    furosemide (LASIX) 2 mg/mL continuous infusion (non-titrating) 3 mg/hr (05/21/20 2100)    heparin (porcine) in 5 % dex 850 Units/hr (05/21/20 2100)    insulin (HUMAN R) infusion (adults) 0.5 Units/hr (05/21/20 2100)    nicardipine Stopped (05/21/20 1500)    vasopressin (PITRESSIN) infusion Stopped (05/15/20 2130)     Scheduled Meds:   amLODIPine  5 mg Oral Daily    aspirin  81 mg Per NG tube Daily    chlorhexidine  15 mL Mouth/Throat BID    diazePAM  1 mg Oral BID    fentaNYL  1 patch Transdermal Q72H    labetalol  100 mg Per G Tube BID    melatonin  6 mg Per G Tube Nightly    multivitamin liquid no.118  10 mL Per G Tube Daily    pantoprazole  40 mg Intravenous Q12H    piperacillin-tazobactam (ZOSYN) IVPB  4.5 g Intravenous Q8H    polyethylene glycol  17 g Per NG tube Daily    potassium chloride 10%  20 mEq Per NG tube BID    psyllium husk (aspartame)  1 packet Per NG tube TID    QUEtiapine  50 mg Per NG tube BID    sucralfate  1 g Per NG tube Q6H    vancomycin (VANCOCIN) IVPB  1,250 mg Intravenous Q24H    vitamin D  1,000 Units Per G Tube Daily    white petrolatum-mineral oiL   Both Eyes BID        Objective:     Vital Signs (Most Recent):  Temp: 98.4 °F (36.9 °C) (05/21/20 1900)  Pulse: 110 (05/21/20 2115)  Resp: (!) 32 (05/19/20 0544)  BP: 112/68 (05/18/20 1500)  SpO2: 97 % (05/21/20 2115) Vital Signs (24h Range):  Temp:  [97.9 °F (36.6 °C)-98.7 °F (37.1 °C)] 98.4 °F (36.9 °C)  Pulse:  [] 110  SpO2:  [94 %-100 %] 97 %  Arterial Line BP: ()/(44-67) 114/65     Weight: 64.3 kg (141 lb 12.1 oz)  Body mass index is 24.33 kg/m².    SpO2: 97 %  O2 Device (Oxygen Therapy): ventilator    Intake/Output - Last 3 Shifts       05/19 0700 - 05/20 0659 05/20 0700 - 05/21 0659 05/21 0700 - 05/22  0659    I.V. (mL/kg) 780.1 (12.9) 989 (15.4) 774.6 (12)    Blood 350.9      NG/GT 2280 2205 1285    IV Piggyback 508.3      Total Intake(mL/kg) 3919.4 (64.9) 3194 (49.7) 2059.6 (32)    Urine (mL/kg/hr) 2490 (1.7) 3145 (2) 2750 (3)    Stool 0 500 0    Blood  3 2    Total Output 2490 3648 2752    Net +1429.4 -454 -692.4           Stool Occurrence 5 x 3 x 2 x          Lines/Drains/Airways     Peripherally Inserted Central Catheter Line            PICC Double Lumen 05/05/20 1707 right basilic 16 days          Central Venous Catheter Line                 ECMO Cannula 04/25/20 1120 right femoral vein 26 days         ECMO Cannula 04/25/20 1120 right internal jugular 26 days          Drain                 Urethral Catheter 04/18/20 1854 33 days         NG/OG Tube 05/03/20 1215 [place] sump;nasogastric 18 Fr. Left nostril 18 days          Airway                 Surgical Airway 05/05/20 1500 Shiley Cuffed 16 days          Arterial Line                 Arterial Line 04/27/20 1100 Right Brachial 24 days          Peripheral Intravenous Line                 Peripheral IV - Single Lumen 05/19/20 0145 22 G Left Upper Arm 2 days         Peripheral IV - Single Lumen 05/19/20 1000 20 G Right Hand 2 days                Physical Exam    Constitutional: He is sedated, and intubated.   Head: Normocephalic   Cardiovascular: Tachycardia 120s   Pulmonary/Chest: intubated vent FiO2 50% PEEP 12, ECMO RPM 2800, sweep 8.5, oxygenator FiO2 70%, flows low 3s  Skin: L neck and L groin cannulas intact, min to no flashing, clots pre and post-oxygenator, all sutures intact and secure  Nursing note and vitals reviewed.    Significant Labs:  BMP:   Recent Labs   Lab 05/21/20  1400   *      K 3.9   CL 97   CO2 34*   BUN 24*   CREATININE 0.7   CALCIUM 9.3   MG 2.0     CBC:   Recent Labs   Lab 05/21/20 2020   WBC 15.03*   RBC 3.14*   HGB 9.1*   HCT 29.7*      MCV 95   MCH 29.0   MCHC 30.6*     LFTs:   Recent Labs   Lab 05/21/20  1400    ALT 53*   AST 31   ALKPHOS 199*   BILITOT 0.5   PROT 6.4   ALBUMIN 2.6*     Significant Diagnostics:  Cxr: stable    ROS

## 2020-05-22 NOTE — SUBJECTIVE & OBJECTIVE
Interval History/Significant Events:   Doing about the same today  8.5 of sweep  Slightly improved CXR    Follow-up For: Procedure(s) (LRB):  CREATION, TRACHEOSTOMY  (N/A)    Post-Operative Day: 17 Days Post-Op    Objective:     Vital Signs (Most Recent):  Temp: 98.5 °F (36.9 °C) (05/22/20 1100)  Pulse: (!) 122 (05/22/20 1245)  Resp: (!) 32 (05/22/20 1100)  BP: 110/67 (05/22/20 1100)  SpO2: 97 % (05/22/20 1245) Vital Signs (24h Range):  Temp:  [98.3 °F (36.8 °C)-98.6 °F (37 °C)] 98.5 °F (36.9 °C)  Pulse:  [] 122  Resp:  [32] 32  SpO2:  [94 %-100 %] 97 %  BP: (110)/(67) 110/67  Arterial Line BP: ()/(49-71) 106/63     Weight: 60.5 kg (133 lb 6.1 oz)  Body mass index is 22.89 kg/m².      Intake/Output Summary (Last 24 hours) at 5/22/2020 1304  Last data filed at 5/22/2020 1200  Gross per 24 hour   Intake 2327.05 ml   Output 4728 ml   Net -2400.95 ml       Physical Exam   Constitutional: He appears well-developed.   HENT:   Head: Normocephalic and atraumatic.   Eyes: Pupils are equal, round, and reactive to light.   Neck: Normal range of motion.   Cardiovascular:   tachycardic   Pulmonary/Chest:   Trached, mechanically ventilated  On ECMO, cannulated in RIJ and fem vein   Abdominal: Soft. He exhibits no distension.   Musculoskeletal: Normal range of motion.   Neurological:   sedated   Skin: Skin is warm.   Vitals reviewed.      Vents:  Vent Mode: A/C (05/22/20 0820)  Ventilator Initiated: Yes (04/10/20 2247)  Set Rate: 12 BPM (05/22/20 0820)  Vt Set: 200 mL (05/18/20 1244)  PEEP/CPAP: 8 cmH20 (05/22/20 0820)  Oxygen Concentration (%): 50 (05/22/20 1245)  Peak Airway Pressure: 23 cmH2O (05/22/20 0820)  Plateau Pressure: 22 cmH20 (05/22/20 0820)  Total Ve: 4.9 mL (05/22/20 0820)  F/VT Ratio<105 (RSBI): 195.65 (05/18/20 2038)    Lines/Drains/Airways     Peripherally Inserted Central Catheter Line            PICC Double Lumen 05/05/20 1707 right basilic 16 days          Central Venous Catheter Line                  ECMO Cannula 04/25/20 1120 right femoral vein 27 days         ECMO Cannula 04/25/20 1120 right internal jugular 27 days          Drain                 Urethral Catheter 04/18/20 1854 33 days         NG/OG Tube 05/03/20 1215 [place] sump;nasogastric 18 Fr. Left nostril 19 days          Airway                 Surgical Airway 05/05/20 1500 Shiley Cuffed 16 days          Arterial Line                 Arterial Line 04/27/20 1100 Right Brachial 25 days          Peripheral Intravenous Line                 Peripheral IV - Single Lumen 05/19/20 0145 22 G Left Upper Arm 3 days         Peripheral IV - Single Lumen 05/19/20 1000 20 G Right Hand 3 days                Significant Labs:    CBC/Anemia Profile:  Recent Labs   Lab 05/21/20 0215 05/21/20 2020 05/22/20 0215 05/22/20  0742 05/22/20  0800   WBC 18.29*   < > 15.03*  --  15.92*  --  16.79*   HGB 9.3*   < > 9.1*  --  9.1*  --  9.0*   HCT 30.3*   < > 29.7*   < > 29.6* 27* 29.4*      < > 177  --  171  --  167   MCV 96   < > 95  --  95  --  95   RDW 13.8   < > 13.6  --  13.5  --  13.6   FERRITIN 1,284*  --   --   --  1,215*  --   --     < > = values in this interval not displayed.        Chemistries:  Recent Labs   Lab 05/21/20 2020 05/22/20 0215 05/22/20  0800    141 140   K 4.5 4.2 3.8   CL 97 97 93*   CO2 35* 33* 36*   BUN 26* 27* 30*   CREATININE 0.7 0.7 0.8   CALCIUM 9.2 9.2 9.3   ALBUMIN 2.6* 2.6* 2.7*   PROT 6.3 6.3 6.5   BILITOT 0.4 0.5 0.4   ALKPHOS 188* 181* 187*   ALT 50* 48* 47*   AST 30 31 30   MG 1.9 1.8 1.9   PHOS 3.1 3.1 3.5         Significant Imaging:  I have reviewed and interpreted all pertinent imaging results/findings within the past 24 hours.

## 2020-05-22 NOTE — NURSING
Dr. Ariza rounded. Notified him of loose sutures to R IJ ECMO cannula. 1 Ethibond suture obtained and at bedside.

## 2020-05-22 NOTE — PROGRESS NOTES
[facility] - Respiratory ICU  Critical Care - Surgery  Progress Note    Patient Name: Ranjana Sanchez  MRN: [id number]  Admission Date: 4/10/2020  Hospital Length of Stay: 42 days  Code Status: Full Code  Attending Provider: Francis Ayon MD  Primary Care Provider: Primary Doctor No   Principal Problem: Acute respiratory distress syndrome (ARDS) due to COVID-19 virus    Subjective:     Hospital/ICU Course:  No notes on file    Interval History/Significant Events:   Doing about the same today  8.5 of sweep  Slightly improved CXR    Follow-up For: Procedure(s) (LRB):  CREATION, TRACHEOSTOMY  (N/A)    Post-Operative Day: 17 Days Post-Op    Objective:     Vital Signs (Most Recent):  Temp: 98.5 °F (36.9 °C) (05/22/20 1100)  Pulse: (!) 122 (05/22/20 1245)  Resp: (!) 32 (05/22/20 1100)  BP: 110/67 (05/22/20 1100)  SpO2: 97 % (05/22/20 1245) Vital Signs (24h Range):  Temp:  [98.3 °F (36.8 °C)-98.6 °F (37 °C)] 98.5 °F (36.9 °C)  Pulse:  [] 122  Resp:  [32] 32  SpO2:  [94 %-100 %] 97 %  BP: (110)/(67) 110/67  Arterial Line BP: ()/(49-71) 106/63     Weight: 60.5 kg (133 lb 6.1 oz)  Body mass index is 22.89 kg/m².      Intake/Output Summary (Last 24 hours) at 5/22/2020 1304  Last data filed at 5/22/2020 1200  Gross per 24 hour   Intake 2327.05 ml   Output 4728 ml   Net -2400.95 ml       Physical Exam   Constitutional: He appears well-developed.   HENT:   Head: Normocephalic and atraumatic.   Eyes: Pupils are equal, round, and reactive to light.   Neck: Normal range of motion.   Cardiovascular:   tachycardic   Pulmonary/Chest:   Trached, mechanically ventilated  On ECMO, cannulated in RIJ and fem vein   Abdominal: Soft. He exhibits no distension.   Musculoskeletal: Normal range of motion.   Neurological:   sedated   Skin: Skin is warm.   Vitals reviewed.      Vents:  Vent Mode: A/C (05/22/20 0820)  Ventilator Initiated: Yes (04/10/20 5511)  Set Rate: 12 BPM (05/22/20 0820)  Vt Set: 200 mL (05/18/20  1244)  PEEP/CPAP: 8 cmH20 (05/22/20 0820)  Oxygen Concentration (%): 50 (05/22/20 1245)  Peak Airway Pressure: 23 cmH2O (05/22/20 0820)  Plateau Pressure: 22 cmH20 (05/22/20 0820)  Total Ve: 4.9 mL (05/22/20 0820)  F/VT Ratio<105 (RSBI): 195.65 (05/18/20 2038)    Lines/Drains/Airways     Peripherally Inserted Central Catheter Line            PICC Double Lumen 05/05/20 1707 right basilic 16 days          Central Venous Catheter Line                 ECMO Cannula 04/25/20 1120 right femoral vein 27 days         ECMO Cannula 04/25/20 1120 right internal jugular 27 days          Drain                 Urethral Catheter 04/18/20 1854 33 days         NG/OG Tube 05/03/20 1215 [place] sump;nasogastric 18 Fr. Left nostril 19 days          Airway                 Surgical Airway 05/05/20 1500 Shiley Cuffed 16 days          Arterial Line                 Arterial Line 04/27/20 1100 Right Brachial 25 days          Peripheral Intravenous Line                 Peripheral IV - Single Lumen 05/19/20 0145 22 G Left Upper Arm 3 days         Peripheral IV - Single Lumen 05/19/20 1000 20 G Right Hand 3 days                Significant Labs:    CBC/Anemia Profile:  Recent Labs   Lab 05/21/20 0215 05/21/20 2020 05/22/20 0215 05/22/20  0742 05/22/20  0800   WBC 18.29*   < > 15.03*  --  15.92*  --  16.79*   HGB 9.3*   < > 9.1*  --  9.1*  --  9.0*   HCT 30.3*   < > 29.7*   < > 29.6* 27* 29.4*      < > 177  --  171  --  167   MCV 96   < > 95  --  95  --  95   RDW 13.8   < > 13.6  --  13.5  --  13.6   FERRITIN 1,284*  --   --   --  1,215*  --   --     < > = values in this interval not displayed.        Chemistries:  Recent Labs   Lab 05/21/20 2020 05/22/20 0215 05/22/20  0800    141 140   K 4.5 4.2 3.8   CL 97 97 93*   CO2 35* 33* 36*   BUN 26* 27* 30*   CREATININE 0.7 0.7 0.8   CALCIUM 9.2 9.2 9.3   ALBUMIN 2.6* 2.6* 2.7*   PROT 6.3 6.3 6.5   BILITOT 0.4 0.5 0.4   ALKPHOS 188* 181* 187*   ALT 50* 48* 47*   AST 30 31 30   MG 1.9  1.8 1.9   PHOS 3.1 3.1 3.5         Significant Imaging:  I have reviewed and interpreted all pertinent imaging results/findings within the past 24 hours.    Assessment/Plan:     COVID-19 virus detected  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  This was switched to ETT on 4/11.  Cannulated on 4/25.    Neuro:  5 of valium  100 BID of seroquel  Fentanyl patch    Pulm  AC/VC+ on the vent.  Small pneumo on cxr, subclinical.  Sweep of 8 today, 3.43 L/min flows and 2800 RPM  Will need some more time on ECMO, not pulling enough volume on the vent    Cardiac   Cardene, wean as tolerated  Will add some labetalol to help get off cardene    GI  TFs at goal    Renal  Making urine, diuresing agressively    Heme/ID  Fluc/Vanc  White count normalized  950 of hep right now      Dispo:  Remain in ICU, lungs not ready to work much yet             Critical care was time spent personally by me on the following activities: development of treatment plan with patient or surrogate and bedside caregivers, discussions with consultants, evaluation of patient's response to treatment, examination of patient, ordering and performing treatments and interventions, ordering and review of laboratory studies, ordering and review of radiographic studies, pulse oximetry, re-evaluation of patient's condition.  This critical care time did not overlap with that of any other provider or involve time for any procedures.     Zelalem Ariza MD  Critical Care - Surgery  [facility]

## 2020-05-22 NOTE — PLAN OF CARE
[facility] shift report    Date: 05/22/2020  ECMO Specialist:  Ashley Francisco    Pump parameters:  RPM: 2700  Flow:  3.1  Sweep:  8.5  FiO2:  .65    Oxygenator status:  Clots: pre and post  Fibrin: pre and post    Pressure trends:  P1: 108  P2: 77  Delta P: 31    Volume status:  Chugging noted?No  CVP: NA  MAP:  80  MD notified (name): Nany    Anticoagulation:  ACT/aPTT/Xa parameters: .25-.3  ACT/aPTT/Xa trends this shift: .27-.24    Cannula size / status / placement:  Arterial:   Venous 1: RIJV 23F@4cm  Venous 2: RFV 27F@12cm  Dual lumen:      Additional Comments: Maintained the flow.  No issues.  Went up on heparin to 9 ml/hr.(3111)

## 2020-05-23 NOTE — PROGRESS NOTES
Updated Dr. Miller regarding patient's fluid status via telephone. MD notified that patient continues to urinate 200-250 mL/hr despite decrease in lasix infusion rate to 2.5 mg/hr from 5 mg/hr. MAPs are 75-85. H/H is 10/32.8. Flows on ECMO have been slowing trending down today from 3.1 at beginning of the shift to 2.8 currently. There appears to be some chatter in the ECMO lines. MD stated to decrease lasix infusion to 1 mg/hr and given 500 mL of 5% albumin over 3 hours.

## 2020-05-23 NOTE — PROGRESS NOTES
[facility] shift report    Date: 05/23/2020  ECMO Specialist:  Monik Bryant    Pump parameters:  RPM: 2700  Flow:  2.90  Sweep:  8.5  FiO2:  55    Oxygenator status:  Clots: pre/post Oxy  Fibrin: pre/post Oxy    Pressure trends:  P1: 110  P2: 76  Delta P: 34    Volume status:  Chugging noted yes  CVP:  MAP: 85   MD notified (name):  Nany    Anticoagulation:  /Xa parameters: .25- .30  /Xa trends this shift: 0.29    Cannula size / status / placement:  Arterial:   Venous 1: RIJV 23Fr @4cm  Venous 2:RFV 27Fr@12cm  Dual lumen:      Additional Comments:sweep decreased /Dr Ayon to 8.5 for a paco2 <60  Also Fio2 decreased to 55/Nany

## 2020-05-23 NOTE — PROGRESS NOTES
[facility] - Respiratory ICU  Critical Care - Surgery  Progress Note    Patient Name: Ranjana Sanchez  MRN: [id number]  Admission Date: 4/10/2020  Hospital Length of Stay: 43 days  Code Status: Full Code  Attending Provider: Francis Ayon MD  Primary Care Provider: Primary Doctor No   Principal Problem: Acute respiratory distress syndrome (ARDS) due to COVID-19 virus    Subjective:     Hospital/ICU Course:  No notes on file    Interval History/Significant Events:   Clinically doing about the same  Eyes opening spontaneously    Follow-up For: Procedure(s) (LRB):  CREATION, TRACHEOSTOMY  (N/A)    Post-Operative Day: 18 Days Post-Op    Objective:     Vital Signs (Most Recent):  Temp: 98.3 °F (36.8 °C) (05/23/20 0820)  Pulse: (!) 125 (05/23/20 0830)  Resp: (!) 38 (05/23/20 0820)  BP: 115/68 (05/22/20 1500)  SpO2: (!) 94 % (05/23/20 0830) Vital Signs (24h Range):  Temp:  [98.3 °F (36.8 °C)-98.5 °F (36.9 °C)] 98.3 °F (36.8 °C)  Pulse:  [104-128] 125  Resp:  [25-43] 38  SpO2:  [83 %-99 %] 94 %  BP: (110-115)/(67-68) 115/68  Arterial Line BP: ()/(47-71) 108/61     Weight: 62.6 kg (138 lb 0.1 oz)  Body mass index is 23.69 kg/m².      Intake/Output Summary (Last 24 hours) at 5/23/2020 0845  Last data filed at 5/23/2020 0805  Gross per 24 hour   Intake 3511 ml   Output 4476 ml   Net -965 ml       Physical Exam   Constitutional: He appears well-developed.   HENT:   Head: Normocephalic and atraumatic.   Eyes: Pupils are equal, round, and reactive to light.   Neck: Normal range of motion.   Cardiovascular:   tachycardic   Pulmonary/Chest:   Trached, mechanically ventilated  On ECMO, cannulated in RIJ and fem vein   Abdominal: Soft. He exhibits no distension.   Musculoskeletal: Normal range of motion.   Neurological:   sedated   Skin: Skin is warm.   Vitals reviewed.      Vents:  Vent Mode: A/C (05/23/20 0730)  Ventilator Initiated: Yes (04/10/20 6917)  Set Rate: 12 BPM (05/23/20 0730)  Vt Set: 200 mL  (05/18/20 1244)  PEEP/CPAP: 8 cmH20 (05/23/20 0730)  Oxygen Concentration (%): 50 (05/23/20 0830)  Peak Airway Pressure: 23 cmH2O (05/23/20 0730)  Plateau Pressure: 22 cmH20 (05/23/20 0730)  Total Ve: 6.73 mL (05/23/20 0730)  F/VT Ratio<105 (RSBI): 130 (05/23/20 0039)    Lines/Drains/Airways     Peripherally Inserted Central Catheter Line            PICC Double Lumen 05/05/20 1707 right basilic 17 days          Central Venous Catheter Line                 ECMO Cannula 04/25/20 1120 right femoral vein 27 days         ECMO Cannula 04/25/20 1120 right internal jugular 27 days          Drain                 Urethral Catheter 04/18/20 1854 34 days         NG/OG Tube 05/03/20 1215 [place] sump;nasogastric 18 Fr. Left nostril 19 days          Airway                 Surgical Airway 05/05/20 1500 Shiley Cuffed 17 days          Arterial Line                 Arterial Line 04/27/20 1100 Right Brachial 25 days          Peripheral Intravenous Line                 Peripheral IV Triple Lumen 05/23/20 0500 20 G Anterior;Left Forearm less than 1 day         Peripheral IV Triple Lumen 05/23/20 0500 20 G Left Hand less than 1 day                Significant Labs:    CBC/Anemia Profile:  Recent Labs   Lab 05/22/20  0215  05/22/20 2000 05/23/20  0151 05/23/20  0804 05/23/20  0805   WBC 15.92*   < > 18.58*  --  17.40*  --  14.76*   HGB 9.1*   < > 9.6*  --  9.4*  --  9.0*   HCT 29.6*   < > 30.9*   < > 29.8* 27* 28.5*      < > 185  --  183  --  178   MCV 95   < > 93  --  93  --  94   RDW 13.5   < > 13.4  --  13.3  --  13.4   FERRITIN 1,215*  --   --   --  1,531*  --   --     < > = values in this interval not displayed.        Chemistries:  Recent Labs   Lab 05/22/20  1416 05/22/20 2000 05/23/20  0151    141 142   K 4.3 4.1 4.1   CL 93* 93* 93*   CO2 38* 35* 37*   BUN 33* 36* 39*   CREATININE 0.8 0.8 0.8   CALCIUM 10.1 9.8 9.8   ALBUMIN 2.8* 2.9* 2.9*   PROT 7.0 7.0 6.8   BILITOT 0.5 0.5 0.5   ALKPHOS 188* 198* 195*   ALT 50*  48* 47*   AST 29 34 32   MG 2.1 2.1 2.1   PHOS 3.5 3.8 4.2       Significant Imaging:  I have reviewed all pertinent imaging results/findings within the past 24 hours.    Assessment/Plan:     COVID-19 virus detected  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  This was switched to ETT on 4/11.  Cannulated on 4/25.    Neuro:  5 of valium  100 BID of seroquel  Fentanyl patch    Pulm  AC/VC+ on the vent.  Small pneumo on cxr, subclinical.  Sweep of 8 today, 3.43 L/min flows and 2800 RPM  Will need some more time on ECMO, not pulling enough volume on the vent    Cardiac   Labetalol as needed for pressures    GI  TFs at goal    Renal  Making urine, diuresing agressively    Heme/ID  Fluc/Vanc  White count normalized  950 of hep right now      Dispo:  Remain in ICU, lungs not ready to work much yet               Critical care was time spent personally by me on the following activities: development of treatment plan with patient or surrogate and bedside caregivers, discussions with consultants, evaluation of patient's response to treatment, examination of patient, ordering and performing treatments and interventions, ordering and review of laboratory studies, ordering and review of radiographic studies, pulse oximetry, re-evaluation of patient's condition.  This critical care time did not overlap with that of any other provider or involve time for any procedures.     Zelalem Ariza MD  Critical Care - Surgery  [facility] - Respiratory ICU

## 2020-05-23 NOTE — SUBJECTIVE & OBJECTIVE
Interval History:  Line chugging overnight requiring 1U pRBC and 250 albumin, 150 isolyte. -120. Net negative 1.2L with lasix gtt. CXR stable/no improvement. WBC lower, no fevers.    Medications:  Continuous Infusions:   dexmedetomidine (PRECEDEX) infusion Stopped (05/23/20 1106)    furosemide (LASIX) 2 mg/mL continuous infusion (non-titrating) 2.5 mg/hr (05/23/20 1200)    heparin (porcine) in 5 % dex 900 Units/hr (05/23/20 1200)    insulin (HUMAN R) infusion (adults) 4 Units/hr (05/23/20 1200)    nicardipine Stopped (05/22/20 1900)    vasopressin (PITRESSIN) infusion Stopped (05/15/20 2130)     Scheduled Meds:   amLODIPine  5 mg Oral Daily    aspirin  81 mg Per NG tube Daily    chlorhexidine  15 mL Mouth/Throat BID    diazePAM  0.5 mg Oral BID    fentaNYL  1 patch Transdermal Q72H    labetalol  200 mg Per G Tube BID    multivitamin liquid no.118  10 mL Per G Tube Daily    pantoprazole  40 mg Intravenous Q12H    piperacillin-tazobactam (ZOSYN) IVPB  4.5 g Intravenous Q8H    polyethylene glycol  17 g Per NG tube Daily    potassium chloride 10%  20 mEq Per NG tube BID    psyllium husk (aspartame)  1 packet Per NG tube TID    QUEtiapine  50 mg Per NG tube BID    sucralfate  1 g Per NG tube Q6H    vancomycin (VANCOCIN) IVPB  1,250 mg Intravenous Q24H    vitamin D  1,000 Units Per G Tube Daily    white petrolatum-mineral oiL   Both Eyes BID        Objective:     Vital Signs (Most Recent):  Temp: 98.2 °F (36.8 °C) (05/23/20 1100)  Pulse: (!) 114 (05/23/20 1145)  Resp: (!) 38 (05/23/20 0820)  BP: 115/68 (05/22/20 1500)  SpO2: 97 % (05/23/20 1145) Vital Signs (24h Range):  Temp:  [98.2 °F (36.8 °C)-98.6 °F (37 °C)] 98.2 °F (36.8 °C)  Pulse:  [104-128] 114  Resp:  [25-43] 38  SpO2:  [83 %-99 %] 97 %  BP: (115)/(68) 115/68  Arterial Line BP: ()/(47-71) 105/62     Weight: 62.6 kg (138 lb 0.1 oz)  Body mass index is 23.69 kg/m².    SpO2: 97 %  O2 Device (Oxygen Therapy):  ventilator    Intake/Output - Last 3 Shifts       05/21 0700 - 05/22 0659 05/22 0700 - 05/23 0659 05/23 0700 - 05/24 0659    I.V. (mL/kg) 1038.1 (17.2) 1191 (19)     Blood   350    NG/GT 1940 1740 630    IV Piggyback  350     Total Intake(mL/kg) 2978.1 (49.2) 3281 (52.4) 980 (15.7)    Urine (mL/kg/hr) 4630 (3.2) 4420 (2.9) 1235 (3.7)    Stool 0 0     Blood 3 3 3    Total Output 4633 4423 1238    Net -1655 -1142 -258           Stool Occurrence 2 x 3 x           Lines/Drains/Airways     Peripherally Inserted Central Catheter Line            PICC Double Lumen 05/05/20 1707 right basilic 17 days          Central Venous Catheter Line                 ECMO Cannula 04/25/20 1120 right femoral vein 28 days         ECMO Cannula 04/25/20 1120 right internal jugular 28 days          Drain                 Urethral Catheter 04/18/20 1854 34 days         NG/OG Tube 05/03/20 1215 [place] sump;nasogastric 18 Fr. Left nostril 20 days          Airway                 Surgical Airway 05/05/20 1500 Shiley Cuffed 17 days          Arterial Line                 Arterial Line 04/27/20 1100 Right Brachial 26 days          Peripheral Intravenous Line                 Peripheral IV Triple Lumen 05/23/20 0500 20 G Anterior;Left Forearm less than 1 day         Peripheral IV Triple Lumen 05/23/20 0500 20 G Left Hand less than 1 day                Physical Exam    Constitutional: He is sedated, and intubated.   Head: Normocephalic   Cardiovascular: Tachycardia 120s   Pulmonary/Chest: intubated vent FiO2 50% PEEP 8, ECMO RPM 2700, sweep 9, oxygenator FiO2 60%, flows low 3s  Skin: L neck and L groin cannulas intact, no flashing, clots pre and post-oxygenator, all sutures intact and secure  Nursing note and vitals reviewed.    Significant Labs:  BMP:   Recent Labs   Lab 05/23/20  0805   *      K 3.4*   CL 93*   CO2 38*   BUN 39*   CREATININE 0.8   CALCIUM 9.9   MG 2.1     CBC:   Recent Labs   Lab 05/23/20  0805   WBC 14.76*   RBC 3.02*   HGB  9.0*   HCT 28.5*      MCV 94   MCH 29.8   MCHC 31.6*     LFTs:   Recent Labs   Lab 05/23/20  0805   ALT 44   AST 32   ALKPHOS 180*   BILITOT 0.5   PROT 7.0   ALBUMIN 3.3*     Significant Diagnostics:  Cxr: stable    ROS

## 2020-05-23 NOTE — PROGRESS NOTES
Updated Dr. Miller via telephone regarding patient's status, vital signs, infusions and urine output. MD notified that patient's MAP had been in the high 70's-80's. MAP is currently 65-75. Patient was given one unit of pRBC's this morning.    Orders received to decreased lasix infusion from 5mg/hr to 2.5 mg/hr. MD stated to give diamox 250 mg IVPB once now. MD stated to ask ECMO tech to decrease sweep from 9 to 8.5 with a goal PCO2 of <60 and to decrease the FiO2 on the ECMO circuit to 55%. MD murphy stated to increase PEEP on the ventilator from 8 to 10.

## 2020-05-23 NOTE — PROGRESS NOTES
Dr. Shane notified of continued chatter in ECMO lines despite 250 mL albumin, H&H this am 9.4 & 29.8, during phone conversation ECMO started alarming, ECMO specialist notified me that pt flows have dropped from 3s to 1.5 on 2700 RPMs, ECMO specialist decreased speed to 2550 and administered a total of 150 mL IsoLyte, per MD order another 250 mL Albumin to be infused. Speed then increased back to 2700, flows now 3.1, MAPs 80s.       0350  Dr. Miller notified of tonight's events and continued chatter in the lines despite a total of 500 mL albumin administered, per MD transfuse 1 unit PRBC.

## 2020-05-23 NOTE — PROGRESS NOTES
Dr. Alexandra at bedside, updated on patient status and new orders received from Dr. Miller. Patient on precedex 0.2 mcg/kg/hr. Patient opens eyes spontaneously and slightly withdraws to pain to lower extremities. No movement to upper extremities and patient does not follow commands, make eye contact or track people in the room with his eyes. Dr. Alexandra stated to stop the precedex infusion and continue to evaluate patient's neuro status.

## 2020-05-23 NOTE — SUBJECTIVE & OBJECTIVE
No current facility-administered medications on file prior to encounter.      Current Outpatient Medications on File Prior to Encounter   Medication Sig    aspirin 81 MG Chew Take 81 mg by mouth once daily.    fenofibrate 160 MG Tab Take 160 mg by mouth once daily.    metoprolol tartrate (LOPRESSOR) 50 MG tablet Take 50 mg by mouth 2 (two) times daily.    rosuvastatin (CRESTOR) 10 MG tablet Take 5 mg by mouth once daily.    telmisartan (MICARDIS) 40 MG Tab Take 40 mg by mouth once daily.       Review of patient's allergies indicates:  No Known Allergies    Past Medical History:   Diagnosis Date    Diabetes mellitus     Hypertension      Past Surgical History:   Procedure Laterality Date    ESOPHAGOGASTRODUODENOSCOPY N/A 5/3/2020    Procedure: EGD (ESOPHAGOGASTRODUODENOSCOPY);  Surgeon: Wilman Smalls MD;  Location: [facility] ([address] [place] [address]);  Service: Endoscopy;  Laterality: N/A;    TRACHEOSTOMY N/A 5/5/2020    Procedure: CREATION, TRACHEOSTOMY ;  Surgeon: Naseem Alexandra MD;  Location: [facility] OR [address] [place] [address];  Service: General;  Laterality: N/A;     Family History     None        Tobacco Use    Smoking status: Never Smoker   Substance and Sexual Activity    Alcohol use: Not on file    Drug use: Not on file    Sexual activity: Not on file     Review of Systems  Objective:     Vital Signs (Most Recent):  Temp: 98.3 °F (36.8 °C) (05/23/20 0820)  Pulse: (!) 125 (05/23/20 0915)  Resp: (!) 38 (05/23/20 0820)  BP: 115/68 (05/22/20 1500)  SpO2: 95 % (05/23/20 0915) Vital Signs (24h Range):  Temp:  [98.3 °F (36.8 °C)-98.5 °F (36.9 °C)] 98.3 °F (36.8 °C)  Pulse:  [104-128] 125  Resp:  [25-43] 38  SpO2:  [83 %-99 %] 95 %  BP: (110-115)/(67-68) 115/68  Arterial Line BP: ()/(47-71) 109/60     Weight: 62.6 kg (138 lb 0.1 oz)  Body mass index is 23.69 kg/m².    Physical Exam    Significant Labs:  {Results:[id number]}    Significant Diagnostics:  {Imaging Review:[id number]}

## 2020-05-23 NOTE — PROGRESS NOTES
ECMO Specialists shift report    Date: 05/23/2020  ECMO Specialist:  Ashley Francisco    Pump parameters:  RPM: 2700  Flow: 2.9-3.0  Sweep:  9  FiO2:  60    Oxygenator status:  Clots: pre and post  Fibrin: pre and post    Pressure trends:  P1: 108  P2: 80  Delta P: 28    Volume status:  Chugging noted? Yes  CVP: NA  MAP:  70s-80s  MD notified (name):  Nany    Anticoagulation:  ACT/aPTT/Xa parameters: .25-.3  ACT/aPTT/Xa trends this shift: .28-.32    Cannula size / status / placement:  Arterial:   Venous 1: RIJV 23F@4 cm  Venous 2: RFV 27F@ 12 cm  Dual lumen:      Additional Comments:  Pt. Haveing multiple issues with dry/chattering ECMO circuit.  0300 flows dropped out to 1.5 lpm.  Dropped RPMs to 2550 and gave 150 Isolyte. Slowly came back to 2700 RPMs and flows 3.0.  Pt remains on .60 FiO2 and 9 sweep.  500 of albumin(IV) given and PRBCs have been ordered at this time.  Still doing 40-60 BPMs. See RNs notes for more info.

## 2020-05-23 NOTE — SUBJECTIVE & OBJECTIVE
Interval History/Significant Events:   Clinically doing about the same  Eyes opening spontaneously    Follow-up For: Procedure(s) (LRB):  CREATION, TRACHEOSTOMY  (N/A)    Post-Operative Day: 18 Days Post-Op    Objective:     Vital Signs (Most Recent):  Temp: 98.3 °F (36.8 °C) (05/23/20 0820)  Pulse: (!) 125 (05/23/20 0830)  Resp: (!) 38 (05/23/20 0820)  BP: 115/68 (05/22/20 1500)  SpO2: (!) 94 % (05/23/20 0830) Vital Signs (24h Range):  Temp:  [98.3 °F (36.8 °C)-98.5 °F (36.9 °C)] 98.3 °F (36.8 °C)  Pulse:  [104-128] 125  Resp:  [25-43] 38  SpO2:  [83 %-99 %] 94 %  BP: (110-115)/(67-68) 115/68  Arterial Line BP: ()/(47-71) 108/61     Weight: 62.6 kg (138 lb 0.1 oz)  Body mass index is 23.69 kg/m².      Intake/Output Summary (Last 24 hours) at 5/23/2020 0845  Last data filed at 5/23/2020 0805  Gross per 24 hour   Intake 3511 ml   Output 4476 ml   Net -965 ml       Physical Exam   Constitutional: He appears well-developed.   HENT:   Head: Normocephalic and atraumatic.   Eyes: Pupils are equal, round, and reactive to light.   Neck: Normal range of motion.   Cardiovascular:   tachycardic   Pulmonary/Chest:   Trached, mechanically ventilated  On ECMO, cannulated in RIJ and fem vein   Abdominal: Soft. He exhibits no distension.   Musculoskeletal: Normal range of motion.   Neurological:   sedated   Skin: Skin is warm.   Vitals reviewed.      Vents:  Vent Mode: A/C (05/23/20 0730)  Ventilator Initiated: Yes (04/10/20 2247)  Set Rate: 12 BPM (05/23/20 0730)  Vt Set: 200 mL (05/18/20 1244)  PEEP/CPAP: 8 cmH20 (05/23/20 0730)  Oxygen Concentration (%): 50 (05/23/20 0830)  Peak Airway Pressure: 23 cmH2O (05/23/20 0730)  Plateau Pressure: 22 cmH20 (05/23/20 0730)  Total Ve: 6.73 mL (05/23/20 0730)  F/VT Ratio<105 (RSBI): 130 (05/23/20 0039)    Lines/Drains/Airways     Peripherally Inserted Central Catheter Line            PICC Double Lumen 05/05/20 1707 right basilic 17 days          Central Venous Catheter Line                  ECMO Cannula 04/25/20 1120 right femoral vein 27 days         ECMO Cannula 04/25/20 1120 right internal jugular 27 days          Drain                 Urethral Catheter 04/18/20 1854 34 days         NG/OG Tube 05/03/20 1215 [place] preetp;nasogastric 18 Fr. Left nostril 19 days          Airway                 Surgical Airway 05/05/20 1500 Shiley Cuffed 17 days          Arterial Line                 Arterial Line 04/27/20 1100 Right Brachial 25 days          Peripheral Intravenous Line                 Peripheral IV Triple Lumen 05/23/20 0500 20 G Anterior;Left Forearm less than 1 day         Peripheral IV Triple Lumen 05/23/20 0500 20 G Left Hand less than 1 day                Significant Labs:    CBC/Anemia Profile:  Recent Labs   Lab 05/22/20  0215  05/22/20 2000 05/23/20  0151 05/23/20  0804 05/23/20  0805   WBC 15.92*   < > 18.58*  --  17.40*  --  14.76*   HGB 9.1*   < > 9.6*  --  9.4*  --  9.0*   HCT 29.6*   < > 30.9*   < > 29.8* 27* 28.5*      < > 185  --  183  --  178   MCV 95   < > 93  --  93  --  94   RDW 13.5   < > 13.4  --  13.3  --  13.4   FERRITIN 1,215*  --   --   --  1,531*  --   --     < > = values in this interval not displayed.        Chemistries:  Recent Labs   Lab 05/22/20  1416 05/22/20 2000 05/23/20  0151    141 142   K 4.3 4.1 4.1   CL 93* 93* 93*   CO2 38* 35* 37*   BUN 33* 36* 39*   CREATININE 0.8 0.8 0.8   CALCIUM 10.1 9.8 9.8   ALBUMIN 2.8* 2.9* 2.9*   PROT 7.0 7.0 6.8   BILITOT 0.5 0.5 0.5   ALKPHOS 188* 198* 195*   ALT 50* 48* 47*   AST 29 34 32   MG 2.1 2.1 2.1   PHOS 3.5 3.8 4.2       Significant Imaging:  I have reviewed all pertinent imaging results/findings within the past 24 hours.

## 2020-05-23 NOTE — PLAN OF CARE
Pt remains on ECMO speed 2700, flows 3-3.1, Fio2 60%, sweep 9, sats >92%, ABG q6h. Vent settings AC PC 50% PEEP 8. Withdraws to deep nail bed stimuli, pupils equal and reactive, does not track. Gtts: Heparin, Lasix, Insulin and Precedex. -200 mL/hr, net negative ~1L, insulin gtt titrated per q1h accu checks. Per Dr. Ayon this AM do not titrate Precedex past 0.2 mcg/kg/min, Melatonin D/C'ed. VSS, occasional MAPS 58-60 when asleep, responds well to decrease in Precedex gtt. Wounds noted to R nare, L forearm, L ear DTI, no skin breakdown noted to sacrum, heels or back of head, ECMO cannula sites with scant dried blood. AM lab results reviewed with DR. Ayon, no new orders.

## 2020-05-23 NOTE — PROGRESS NOTES
[facility] - Respiratory ICU  Cardiothoracic Surgery  Daily ECMO Progress Note    Patient Name: Ranjana Sanchez  MRN: [id number]  Admission Date: 4/10/2020  Hospital Length of Stay: 43 days  Code Status: Full Code   Attending Physician: Francis Ayon MD   Referring Provider: Francis Ayon MD  Principal Problem:Acute respiratory distress syndrome (ARDS) due to COVID-19 virus    Subjective:   Interval History:  Line chugging overnight requiring 1U pRBC and 250 albumin, 150 isolyte. -120. Net negative 1.2L with lasix gtt. CXR stable/no improvement. WBC lower, no fevers.    Medications:  Continuous Infusions:   dexmedetomidine (PRECEDEX) infusion Stopped (05/23/20 1106)    furosemide (LASIX) 2 mg/mL continuous infusion (non-titrating) 2.5 mg/hr (05/23/20 1200)    heparin (porcine) in 5 % dex 900 Units/hr (05/23/20 1200)    insulin (HUMAN R) infusion (adults) 4 Units/hr (05/23/20 1200)    nicardipine Stopped (05/22/20 1900)    vasopressin (PITRESSIN) infusion Stopped (05/15/20 2130)     Scheduled Meds:   amLODIPine  5 mg Oral Daily    aspirin  81 mg Per NG tube Daily    chlorhexidine  15 mL Mouth/Throat BID    diazePAM  0.5 mg Oral BID    fentaNYL  1 patch Transdermal Q72H    labetalol  200 mg Per G Tube BID    multivitamin liquid no.118  10 mL Per G Tube Daily    pantoprazole  40 mg Intravenous Q12H    piperacillin-tazobactam (ZOSYN) IVPB  4.5 g Intravenous Q8H    polyethylene glycol  17 g Per NG tube Daily    potassium chloride 10%  20 mEq Per NG tube BID    psyllium husk (aspartame)  1 packet Per NG tube TID    QUEtiapine  50 mg Per NG tube BID    sucralfate  1 g Per NG tube Q6H    vancomycin (VANCOCIN) IVPB  1,250 mg Intravenous Q24H    vitamin D  1,000 Units Per G Tube Daily    white petrolatum-mineral oiL   Both Eyes BID        Objective:     Vital Signs (Most Recent):  Temp: 98.2 °F (36.8 °C) (05/23/20 1100)  Pulse: (!) 114 (05/23/20 1145)  Resp: (!) 38 (05/23/20  0820)  BP: 115/68 (05/22/20 1500)  SpO2: 97 % (05/23/20 1145) Vital Signs (24h Range):  Temp:  [98.2 °F (36.8 °C)-98.6 °F (37 °C)] 98.2 °F (36.8 °C)  Pulse:  [104-128] 114  Resp:  [25-43] 38  SpO2:  [83 %-99 %] 97 %  BP: (115)/(68) 115/68  Arterial Line BP: ()/(47-71) 105/62     Weight: 62.6 kg (138 lb 0.1 oz)  Body mass index is 23.69 kg/m².    SpO2: 97 %  O2 Device (Oxygen Therapy): ventilator    Intake/Output - Last 3 Shifts       05/21 0700 - 05/22 0659 05/22 0700 - 05/23 0659 05/23 0700 - 05/24 0659    I.V. (mL/kg) 1038.1 (17.2) 1191 (19)     Blood   350    NG/GT 1940 1740 630    IV Piggyback  350     Total Intake(mL/kg) 2978.1 (49.2) 3281 (52.4) 980 (15.7)    Urine (mL/kg/hr) 4630 (3.2) 4420 (2.9) 1235 (3.7)    Stool 0 0     Blood 3 3 3    Total Output 4633 4423 1238    Net -1655 -1142 -258           Stool Occurrence 2 x 3 x           Lines/Drains/Airways     Peripherally Inserted Central Catheter Line            PICC Double Lumen 05/05/20 1707 right basilic 17 days          Central Venous Catheter Line                 ECMO Cannula 04/25/20 1120 right femoral vein 28 days         ECMO Cannula 04/25/20 1120 right internal jugular 28 days          Drain                 Urethral Catheter 04/18/20 1854 34 days         NG/OG Tube 05/03/20 1215 [place] sump;nasogastric 18 Fr. Left nostril 20 days          Airway                 Surgical Airway 05/05/20 1500 Shiley Cuffed 17 days          Arterial Line                 Arterial Line 04/27/20 1100 Right Brachial 26 days          Peripheral Intravenous Line                 Peripheral IV Triple Lumen 05/23/20 0500 20 G Anterior;Left Forearm less than 1 day         Peripheral IV Triple Lumen 05/23/20 0500 20 G Left Hand less than 1 day                Physical Exam    Constitutional: He is sedated, and intubated.   Head: Normocephalic   Cardiovascular: Tachycardia 120s   Pulmonary/Chest: intubated vent FiO2 50% PEEP 8, ECMO RPM 2700, sweep 9, oxygenator FiO2 60%, flows  low 3s  Skin: L neck and L groin cannulas intact, no flashing, clots pre and post-oxygenator, all sutures intact and secure  Nursing note and vitals reviewed.    Significant Labs:  BMP:   Recent Labs   Lab 05/23/20 0805   *      K 3.4*   CL 93*   CO2 38*   BUN 39*   CREATININE 0.8   CALCIUM 9.9   MG 2.1     CBC:   Recent Labs   Lab 05/23/20 0805   WBC 14.76*   RBC 3.02*   HGB 9.0*   HCT 28.5*      MCV 94   MCH 29.8   MCHC 31.6*     LFTs:   Recent Labs   Lab 05/23/20 0805   ALT 44   AST 32   ALKPHOS 180*   BILITOT 0.5   PROT 7.0   ALBUMIN 3.3*     Significant Diagnostics:  Cxr: stable    ROS        Assessment/Plan:     Acute respiratory distress syndrome (ARDS) due to COVID-19 virus  - Crich switched to ETT on 4/11  - Monika weaned off 5/5  - Tracheostomy and bronch 5/5  - Thrombocytopenia resolved; transfuse for under 30K  - Aspirin 81 daily  - Metoprolol 25 BID  - RPM at 2700; flows low 3s  - Sweep at 9; wean to 8.5; CO2 goal is 59 or below; do not increase sweep for pCO2s 59 or below as long as patient is not acidotic, as these high pCO2s are respiratory compensation for alkalosis  - Oxygenator Fi at 60%; wean to 55 today  - Vent Fi 50%, PEEP increased to 10     Tongue laceration      -ENT evaluated, laceration superficial      -recs: bite block vs paralysis, will monitor    Sedation  - Valium, seroquel, PRN Fentanyl     UGI Bleed  - Scoped by GI 5/3 with epinephrine injection and clip placement for an actively bleeding D2/D3 Diuelafoy lesion    FEN/GI  - TF at 45, goal      - Lasix gtt reduced to 2.5      - Diamox 250 x1, watch for response  - FWB for hypernatremia 250mL q6     Anticoagulation      - Goal aXa 0.25 - 0.3 Q6hrs      - monitor LDH    Prophylaxis      - protonix q12 IV       - abx for ECMO; WBC stable on Vanc/Zosyn      - PICC placed 5/5; central line removed 5/6    Anne Miller MD  Cardiothoracic Surgery  [facility] - Respiratory ICU

## 2020-05-23 NOTE — PLAN OF CARE
Patient remain on Ecmo with speed at 2700 rpm, flows 2.8-3.1. FiO2 decreased from 60% to 55 % and sweep decreased from 9 to 8.5 per MD order. Ventilator settings currently on AC/PC FiO2 50%/PEEP 10. Patient given 500 mL of 5% albumin and 1 unit pRBCs. Patient currently on heparin at 900 units/hr, lasix at 1 mg/hr, insulin at 2 units/hr and precedex at 0.2 mcg/kg/hr. Urine output 150-300 mL/hr. Patient tolerating tube feeds at goal rate of 45 mL/hr with minimal residuals. Patient opens eyes and withdraws lower extremities; however, patient does not move upper extremities or follow commands. Patient's son updated via telephone by primary nurse and questions answered.

## 2020-05-23 NOTE — PROGRESS NOTES
Dr. Busby and Dr. Miller at bedside. MDs notified that new orders received earlier today have been performed. Patient's MAP is currently 60-65. No new orders received at this time.

## 2020-05-23 NOTE — ASSESSMENT & PLAN NOTE
Acute respiratory distress syndrome (ARDS) due to COVID-19 virus  - Crich switched to ETT on 4/11  - Monika weaned off 5/5  - Tracheostomy and bronch 5/5  - Thrombocytopenia resolved; transfuse for under 30K  - Aspirin 81 daily  - Metoprolol 25 BID  - RPM at 2700; flows low 3s  - Sweep at 9; wean to 8.5; CO2 goal is 59 or below; do not increase sweep for pCO2s 59 or below as long as patient is not acidotic, as these high pCO2s are respiratory compensation for alkalosis  - Oxygenator Fi at 60%; wean to 55 today  - Vent Fi 50%, PEEP increased to 10     Tongue laceration      -ENT evaluated, laceration superficial      -recs: bite block vs paralysis, will monitor    Sedation  - Valium, seroquel, PRN Fentanyl     UGI Bleed  - Scoped by GI 5/3 with epinephrine injection and clip placement for an actively bleeding D2/D3 Diuelafoy lesion    FEN/GI  - TF at 45, goal      - Lasix gtt reduced to 2.5      - Diamox 250 x1, watch for response  - FWB for hypernatremia 250mL q6     Anticoagulation      - Goal aXa 0.25 - 0.3 Q6hrs      - monitor LDH    Prophylaxis      - protonix q12 IV       - abx for ECMO; WBC stable on Vanc/Zosyn      - PICC placed 5/5; central line removed 5/6

## 2020-05-23 NOTE — ASSESSMENT & PLAN NOTE
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  This was switched to ETT on 4/11.  Cannulated on 4/25.    Neuro:  5 of valium  100 BID of seroquel  Fentanyl patch    Pulm  AC/VC+ on the vent.  Small pneumo on cxr, subclinical.  Sweep of 8 today, 3.43 L/min flows and 2800 RPM  Will need some more time on ECMO, not pulling enough volume on the vent    Cardiac   Labetalol as needed for pressures    GI  TFs at goal    Renal  Making urine, diuresing agressively    Heme/ID  Fluc/Vanc  White count normalized  950 of hep right now      Dispo:  Remain in ICU, lungs not ready to work much yet

## 2020-05-23 NOTE — PROGRESS NOTES
Dr. Ariza at bedside to place suture to right IJ cannula site. MD able to secure cannula with one suture, patient tolerated well.

## 2020-05-23 NOTE — PROGRESS NOTES
Dr. Shane notified of chatter in the ECMO lines, pt net negative 1.1L, 0200 labs sent and ABG obtained, hematocrit decreased from 36 to 32. Per MD administer 250 mL albumin and await lab results.

## 2020-05-24 NOTE — PROGRESS NOTES
ECMO Specialists shift report    Date: 05/24/2020  ECMO Specialist:  Tatyana Lawrence    Pump parameters:  RPM: 2700  Flow:  3  Sweep:  9.5  FiO2:  55    Oxygenator status:  Clots: Pre and Post Oxygenator  Fibrin: at connectors     Pressure trends:  P1: 100's  P2: 70's  Delta P: 30's     Volume status:  Chugging noted? None  CVP: NA  MAP:  70-80's   MD notified (name):  Qi    Anticoagulation:  ACT/aPTT/Xa parameters: 0.25 - 0.30   ACT/aPTT/Xa trends this shift: 0.34 - 0.25    Cannula size / status / placement:  Arterial:   Venous 1: RIJ / 23 FR / 4 cm  Venous 2: RFV / 27 FR / 12 cm   Dual lumen:      Additional Comments:   Sweep was increased to 9.5 LPM due to PCO2 of 61 and pH of 7.32.  Stitches in RIJ were reinforced again today due to bleeding.

## 2020-05-24 NOTE — PROGRESS NOTES
Dr. Ariza at bedside to assess right IJ cannula site bleeding. MD removed dressing and placed two sutures to better secure right IJ cannula and to stop the bleeding. Surgicel nu-knit sutured to cannula at site of bleeding. New dressing applied.

## 2020-05-24 NOTE — PROGRESS NOTES
[facility] ICU  Cardiothoracic Surgery  Daily ECMO Progress Note    Patient Name: Ranjana Sanchez  MRN: [id number]  Admission Date: 4/10/2020  Hospital Length of Stay: 44 days  Code Status: Full Code   Attending Physician: Francis Ayon MD   Referring Provider: Francis Ayon MD  Principal Problem:Acute respiratory distress syndrome (ARDS) due to COVID-19 virus    Subjective:   Interval History:  NAEO, afebrile. HR 110s, WBC stable.    Medications:  Continuous Infusions:   dexmedetomidine (PRECEDEX) infusion Stopped (05/23/20 2100)    furosemide (LASIX) 2 mg/mL continuous infusion (non-titrating) 1 mg/hr (05/24/20 1500)    heparin (porcine) in 5 % dex 900 Units/hr (05/24/20 1500)    insulin (HUMAN R) infusion (adults) 1 Units/hr (05/24/20 1500)    nicardipine Stopped (05/24/20 0930)    vasopressin (PITRESSIN) infusion Stopped (05/15/20 2130)     Scheduled Meds:   amLODIPine  5 mg Oral Daily    aspirin  81 mg Per NG tube Daily    chlorhexidine  15 mL Mouth/Throat BID    diazePAM  0.5 mg Oral BID    fentaNYL  1 patch Transdermal Q72H    labetalol  200 mg Per G Tube BID    multivitamin liquid no.118  10 mL Per G Tube Daily    pantoprazole  40 mg Intravenous Q12H    piperacillin-tazobactam (ZOSYN) IVPB  4.5 g Intravenous Q8H    polyethylene glycol  17 g Per NG tube Daily    potassium chloride 10%  20 mEq Per NG tube BID    psyllium husk (aspartame)  1 packet Per NG tube TID    QUEtiapine  50 mg Per NG tube BID    sucralfate  1 g Per NG tube Q6H    vancomycin (VANCOCIN) IVPB  1,250 mg Intravenous Q24H    vitamin D  1,000 Units Per G Tube Daily    white petrolatum-mineral oiL   Both Eyes BID        Objective:     Vital Signs (Most Recent):  Temp: 98.5 °F (36.9 °C) (05/24/20 1500)  Pulse: (!) 116 (05/24/20 1515)  Resp: (!) 38 (05/23/20 0820)  BP: 115/68 (05/22/20 1500)  SpO2: 100 % (05/24/20 1515) Vital Signs (24h Range):  Temp:  [98.3 °F (36.8 °C)-98.7 °F (37.1 °C)]  98.5 °F (36.9 °C)  Pulse:  [100-133] 116  SpO2:  [94 %-100 %] 100 %  Arterial Line BP: ()/(43-72) 112/60     Weight: 63.4 kg (139 lb 12.4 oz)  Body mass index is 23.99 kg/m².    SpO2: 100 %  O2 Device (Oxygen Therapy): ventilator    Intake/Output - Last 3 Shifts       05/22 0700 - 05/23 0659 05/23 0700 - 05/24 0659 05/24 0700 - 05/25 0659    I.V. (mL/kg) 1191 (19) 1769 (27.9)     Blood  350     NG/GT 1740 2100 765    IV Piggyback 350      Total Intake(mL/kg) 3281 (52.4) 4219 (66.5) 765 (12.1)    Urine (mL/kg/hr) 4420 (2.9) 4070 (2.7) 860 (1.6)    Stool 0 0     Blood 3 4 4    Total Output 4423 4074 864    Net -1142 +145 -99           Stool Occurrence 3 x 3 x           Lines/Drains/Airways     Peripherally Inserted Central Catheter Line            PICC Double Lumen 05/05/20 1707 right basilic 18 days          Central Venous Catheter Line                 ECMO Cannula 04/25/20 1120 right femoral vein 29 days         ECMO Cannula 04/25/20 1120 right internal jugular 29 days          Drain                 Urethral Catheter 04/18/20 1854 35 days         NG/OG Tube 05/03/20 1215 [place] sump;nasogastric 18 Fr. Left nostril 21 days          Airway                 Surgical Airway 05/05/20 1500 Shiley Cuffed 19 days          Arterial Line                 Arterial Line 04/27/20 1100 Right Brachial 27 days          Peripheral Intravenous Line                 Peripheral IV Triple Lumen 05/23/20 0500 20 G Anterior;Left Forearm 1 day         Peripheral IV Triple Lumen 05/23/20 0500 20 G Left Hand 1 day                Physical Exam  Constitutional: He is sedated, and intubated.   Head: Normocephalic   Cardiovascular: Tachycardia 120s   Pulmonary/Chest: intubated vent FiO2 50% PEEP 10, ECMO RPM 2700, sweep 9.5, oxygenator FiO2 55%, flows 3  Skin: L neck and L groin cannulas intact, no flashing, clots pre and post-oxygenator, all sutures intact and secure  Nursing note and vitals reviewed.    Significant Labs:  BMP:   Recent Labs    Lab 05/24/20  1419   *      K 4.0      CO2 31*   BUN 40*   CREATININE 0.8   CALCIUM 10.2   MG 2.4     CBC:   Recent Labs   Lab 05/24/20  1419   WBC 13.99*   RBC 3.26*   HGB 9.4*   HCT 31.1*      MCV 95   MCH 28.8   MCHC 30.2*     LFTs:   Recent Labs   Lab 05/24/20  1419   ALT 68*   AST 55*   ALKPHOS 249*   BILITOT 0.8   PROT 7.1   ALBUMIN 3.5     Significant Diagnostics:  Cxr: stable      Assessment/Plan:     Acute respiratory distress syndrome (ARDS) due to COVID-19 virus  - Crich switched to ETT on 4/11  - Monika weaned off 5/5  - Tracheostomy and bronch 5/5  - Thrombocytopenia resolved; transfuse for under 30K  - Aspirin 81 daily  - Metoprolol 25 BID  - RPM at 2700; flows low 3s  - Sweep at 9.5; CO2 goal is 59 or below; do not increase sweep for pCO2s 59 or below as long as patient is not acidotic, as these high pCO2s are respiratory compensation for alkalosis  - Oxygenator Fi at 55%; wean to 50 today  - Vent Fi 50%, PEEP 10     Tongue laceration      -ENT evaluated, laceration superficial      -recs: bite block vs paralysis, will monitor    Sedation  - Valium, seroquel, PRN Fentanyl     UGI Bleed  - Scoped by GI 5/3 with epinephrine injection and clip placement for an actively bleeding D2/D3 Diuelafoy lesion    FEN/GI  - TF at 45, goal      - Lasix gtt reduced to 2.5  - FWB for hypernatremia 250mL q6     Anticoagulation      - Goal aXa 0.25 - 0.3 Q6hrs      - monitor LDH    Prophylaxis      - protonix q12 IV       - abx for ECMO; WBC stable on Vanc/Zosyn      - PICC placed 5/5; central line removed 5/6    Anne Miller MD  Cardiothoracic Surgery  [facility]

## 2020-05-24 NOTE — SUBJECTIVE & OBJECTIVE
Interval History:  NAEO, afebrile. HR 110s, WBC stable.    Medications:  Continuous Infusions:   dexmedetomidine (PRECEDEX) infusion Stopped (05/23/20 2100)    furosemide (LASIX) 2 mg/mL continuous infusion (non-titrating) 1 mg/hr (05/24/20 1500)    heparin (porcine) in 5 % dex 900 Units/hr (05/24/20 1500)    insulin (HUMAN R) infusion (adults) 1 Units/hr (05/24/20 1500)    nicardipine Stopped (05/24/20 0930)    vasopressin (PITRESSIN) infusion Stopped (05/15/20 2130)     Scheduled Meds:   amLODIPine  5 mg Oral Daily    aspirin  81 mg Per NG tube Daily    chlorhexidine  15 mL Mouth/Throat BID    diazePAM  0.5 mg Oral BID    fentaNYL  1 patch Transdermal Q72H    labetalol  200 mg Per G Tube BID    multivitamin liquid no.118  10 mL Per G Tube Daily    pantoprazole  40 mg Intravenous Q12H    piperacillin-tazobactam (ZOSYN) IVPB  4.5 g Intravenous Q8H    polyethylene glycol  17 g Per NG tube Daily    potassium chloride 10%  20 mEq Per NG tube BID    psyllium husk (aspartame)  1 packet Per NG tube TID    QUEtiapine  50 mg Per NG tube BID    sucralfate  1 g Per NG tube Q6H    vancomycin (VANCOCIN) IVPB  1,250 mg Intravenous Q24H    vitamin D  1,000 Units Per G Tube Daily    white petrolatum-mineral oiL   Both Eyes BID        Objective:     Vital Signs (Most Recent):  Temp: 98.5 °F (36.9 °C) (05/24/20 1500)  Pulse: (!) 116 (05/24/20 1515)  Resp: (!) 38 (05/23/20 0820)  BP: 115/68 (05/22/20 1500)  SpO2: 100 % (05/24/20 1515) Vital Signs (24h Range):  Temp:  [98.3 °F (36.8 °C)-98.7 °F (37.1 °C)] 98.5 °F (36.9 °C)  Pulse:  [100-133] 116  SpO2:  [94 %-100 %] 100 %  Arterial Line BP: ()/(43-72) 112/60     Weight: 63.4 kg (139 lb 12.4 oz)  Body mass index is 23.99 kg/m².    SpO2: 100 %  O2 Device (Oxygen Therapy): ventilator    Intake/Output - Last 3 Shifts       05/22 0700 - 05/23 0659 05/23 0700 - 05/24 0659 05/24 0700 - 05/25 0659    I.V. (mL/kg) 1191 (19) 1769 (27.9)     Blood  350     NG/GT 1740  2100 765    IV Piggyback 350      Total Intake(mL/kg) 3281 (52.4) 4219 (66.5) 765 (12.1)    Urine (mL/kg/hr) 4420 (2.9) 4070 (2.7) 860 (1.6)    Stool 0 0     Blood 3 4 4    Total Output 4423 4074 864    Net -1142 +145 -99           Stool Occurrence 3 x 3 x           Lines/Drains/Airways     Peripherally Inserted Central Catheter Line            PICC Double Lumen 05/05/20 1707 right basilic 18 days          Central Venous Catheter Line                 ECMO Cannula 04/25/20 1120 right femoral vein 29 days         ECMO Cannula 04/25/20 1120 right internal jugular 29 days          Drain                 Urethral Catheter 04/18/20 1854 35 days         NG/OG Tube 05/03/20 1215 [place] sump;nasogastric 18 Fr. Left nostril 21 days          Airway                 Surgical Airway 05/05/20 1500 Shiley Cuffed 19 days          Arterial Line                 Arterial Line 04/27/20 1100 Right Brachial 27 days          Peripheral Intravenous Line                 Peripheral IV Triple Lumen 05/23/20 0500 20 G Anterior;Left Forearm 1 day         Peripheral IV Triple Lumen 05/23/20 0500 20 G Left Hand 1 day                Physical Exam  Constitutional: He is sedated, and intubated.   Head: Normocephalic   Cardiovascular: Tachycardia 120s   Pulmonary/Chest: intubated vent FiO2 50% PEEP 10, ECMO RPM 2700, sweep 9.5, oxygenator FiO2 55%, flows 3  Skin: L neck and L groin cannulas intact, no flashing, clots pre and post-oxygenator, all sutures intact and secure  Nursing note and vitals reviewed.    Significant Labs:  BMP:   Recent Labs   Lab 05/24/20  1419   *      K 4.0      CO2 31*   BUN 40*   CREATININE 0.8   CALCIUM 10.2   MG 2.4     CBC:   Recent Labs   Lab 05/24/20  1419   WBC 13.99*   RBC 3.26*   HGB 9.4*   HCT 31.1*      MCV 95   MCH 28.8   MCHC 30.2*     LFTs:   Recent Labs   Lab 05/24/20  1419   ALT 68*   AST 55*   ALKPHOS 249*   BILITOT 0.8   PROT 7.1   ALBUMIN 3.5     Significant Diagnostics:  Cxr:  stable

## 2020-05-24 NOTE — PROGRESS NOTES
[facility] - [facility] ICU  Critical Care - Surgery  Progress Note    Patient Name: Ranjana Sanchez  MRN: [id number]  Admission Date: 4/10/2020  Hospital Length of Stay: 44 days  Code Status: Full Code  Attending Provider: Francis Ayon MD  Primary Care Provider: Primary Doctor No   Principal Problem: Acute respiratory distress syndrome (ARDS) due to COVID-19 virus    Subjective:     Hospital/ICU Course:  No notes on file    Interval History/Significant Events:   Sweep back up to 9  Resutured RIJ line in place yesterday    Follow-up For: Procedure(s) (LRB):  CREATION, TRACHEOSTOMY  (N/A)    Post-Operative Day: 19 Days Post-Op    Objective:     Vital Signs (Most Recent):  Temp: 98.7 °F (37.1 °C) (05/24/20 0700)  Pulse: 108 (05/24/20 0930)  Resp: (!) 38 (05/23/20 0820)  BP: 115/68 (05/22/20 1500)  SpO2: 97 % (05/24/20 0930) Vital Signs (24h Range):  Temp:  [98.2 °F (36.8 °C)-98.7 °F (37.1 °C)] 98.7 °F (37.1 °C)  Pulse:  [105-133] 108  SpO2:  [94 %-100 %] 97 %  Arterial Line BP: ()/(43-72) 96/56     Weight: 63.4 kg (139 lb 12.4 oz)  Body mass index is 23.99 kg/m².      Intake/Output Summary (Last 24 hours) at 5/24/2020 0949  Last data filed at 5/24/2020 0900  Gross per 24 hour   Intake 4029 ml   Output 3744 ml   Net 285 ml       Physical Exam   Constitutional: He appears well-developed.   HENT:   Head: Normocephalic and atraumatic.   Eyes: Pupils are equal, round, and reactive to light.   Neck: Normal range of motion.   Cardiovascular:   tachycardic   Pulmonary/Chest:   Trached, mechanically ventilated  On ECMO, cannulated in RIJ and fem vein   Abdominal: Soft. He exhibits no distension.   Musculoskeletal: Normal range of motion.   Neurological:   sedated   Skin: Skin is warm.   Vitals reviewed.      Vents:  Vent Mode: A/C (05/24/20 0746)  Ventilator Initiated: Yes (04/10/20 2247)  Set Rate: 12 BPM (05/24/20 0746)  Vt Set: 200 mL (05/18/20 1244)  PEEP/CPAP: 10 cmH20 (05/24/20 0746)  Oxygen  Concentration (%): 50 (05/24/20 0930)  Peak Airway Pressure: 25 cmH2O (05/24/20 0746)  Plateau Pressure: 22 cmH20 (05/24/20 0746)  Total Ve: 6.06 mL (05/24/20 0746)  F/VT Ratio<105 (RSBI): 130 (05/23/20 0039)    Lines/Drains/Airways     Peripherally Inserted Central Catheter Line            PICC Double Lumen 05/05/20 1707 right basilic 18 days          Central Venous Catheter Line                 ECMO Cannula 04/25/20 1120 right femoral vein 28 days         ECMO Cannula 04/25/20 1120 right internal jugular 28 days          Drain                 Urethral Catheter 04/18/20 1854 35 days         NG/OG Tube 05/03/20 1215 [place] sump;nasogastric 18 Fr. Left nostril 20 days          Airway                 Surgical Airway 05/05/20 1500 Shiley Cuffed 18 days          Arterial Line                 Arterial Line 04/27/20 1100 Right Brachial 26 days          Peripheral Intravenous Line                 Peripheral IV Triple Lumen 05/23/20 0500 20 G Anterior;Left Forearm 1 day         Peripheral IV Triple Lumen 05/23/20 0500 20 G Left Hand 1 day                Significant Labs:    CBC/Anemia Profile:  Recent Labs   Lab 05/23/20  0151 05/23/20 2014 05/24/20 0200 05/24/20  0204 05/24/20  0737 05/24/20  0745   WBC 17.40*   < > 16.72*  --  15.13*  --  15.68*  --    HGB 9.4*   < > 9.9*  --  9.9*  --  9.7*  --    HCT 29.8*   < > 31.5*   < > 32.1* 32* 31.9* 28*      < > 172  --  176  --  192  --    MCV 93   < > 94  --  94  --  95  --    RDW 13.3   < > 14.2  --  14.2  --  14.3  --    FERRITIN 1,531*  --   --   --  1,262*  --   --   --     < > = values in this interval not displayed.        Chemistries:  Recent Labs   Lab 05/23/20 2014 05/24/20 0200 05/24/20  0737    144 145   K 4.0 4.0 3.9   CL 98 101 103   CO2 33* 31* 32*   BUN 39* 39* 40*   CREATININE 0.8 0.8 0.8   CALCIUM 10.0 10.1 10.1   ALBUMIN 3.8 3.7 3.9   PROT 7.2 7.3 7.4   BILITOT 0.7 0.6 0.8   ALKPHOS 194* 217* 241*   ALT 45* 49* 56*   AST 36 42* 51*   MG 2.2  2.3 2.2   PHOS 4.6* 3.9 3.9       Significant Imaging:  I have reviewed all pertinent imaging results/findings within the past 24 hours.    Assessment/Plan:     COVID-19 virus detected  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  This was switched to ETT on 4/11.  Cannulated on 4/25.    Neuro:  5 of valium  100 BID of seroquel  Fentanyl patch    Pulm  AC/VC+ on the vent.  Small pneumo on cxr, subclinical.  Sweep of 9 today, 2800 RPM  Will need some more time on ECMO, not pulling enough volume on the vent    Cardiac   Labetalol as needed for pressures    GI  TFs at goal    Renal  Making urine, diuresing agressively    Heme/ID  Fluc/Vanc  White count normalized  Hep gtt      Dispo:  Remain in ICU, lungs not ready to work much yet               Critical care was time spent personally by me on the following activities: development of treatment plan with patient or surrogate and bedside caregivers, discussions with consultants, evaluation of patient's response to treatment, examination of patient, ordering and performing treatments and interventions, ordering and review of laboratory studies, ordering and review of radiographic studies, pulse oximetry, re-evaluation of patient's condition.  This critical care time did not overlap with that of any other provider or involve time for any procedures.     Zelalem Ariza MD  Critical Care - Surgery  [facility]

## 2020-05-24 NOTE — SUBJECTIVE & OBJECTIVE
Interval History/Significant Events:   No change  Sweep at 8.5    Follow-up For: Procedure(s) (LRB):  CREATION, TRACHEOSTOMY  (N/A)    Post-Operative Day: 19 Days Post-Op    Objective:     Vital Signs (Most Recent):  Temp: 98.6 °F (37 °C) (05/24/20 1200)  Pulse: 110 (05/24/20 1310)  Resp: (!) 38 (05/23/20 0820)  BP: 115/68 (05/22/20 1500)  SpO2: 97 % (05/24/20 1310) Vital Signs (24h Range):  Temp:  [98.3 °F (36.8 °C)-98.7 °F (37.1 °C)] 98.6 °F (37 °C)  Pulse:  [100-133] 110  SpO2:  [94 %-100 %] 97 %  Arterial Line BP: ()/(43-72) 104/57     Weight: 63.4 kg (139 lb 12.4 oz)  Body mass index is 23.99 kg/m².      Intake/Output Summary (Last 24 hours) at 5/24/2020 1405  Last data filed at 5/24/2020 1300  Gross per 24 hour   Intake 3113 ml   Output 2934 ml   Net 179 ml       Physical Exam   Constitutional: He appears well-developed.   HENT:   Head: Normocephalic and atraumatic.   Eyes: Pupils are equal, round, and reactive to light.   Neck: Normal range of motion.   Cardiovascular:   tachycardic   Pulmonary/Chest:   Trached, mechanically ventilated  On ECMO, cannulated in RIJ and fem vein   Abdominal: Soft. He exhibits no distension.   Musculoskeletal: Normal range of motion.   Neurological:   sedated   Skin: Skin is warm.   Vitals reviewed.      Vents:  Vent Mode: A/C (05/24/20 1310)  Ventilator Initiated: Yes (04/10/20 2247)  Set Rate: 12 BPM (05/24/20 1310)  Vt Set: 200 mL (05/18/20 1244)  PEEP/CPAP: 10 cmH20 (05/24/20 1310)  Oxygen Concentration (%): 50 (05/24/20 1310)  Peak Airway Pressure: 30 cmH2O (05/24/20 1310)  Plateau Pressure: 22 cmH20 (05/24/20 1310)  Total Ve: 4.84 mL (05/24/20 1310)  F/VT Ratio<105 (RSBI): 130 (05/23/20 0039)    Lines/Drains/Airways     Peripherally Inserted Central Catheter Line            PICC Double Lumen 05/05/20 1707 right basilic 18 days          Central Venous Catheter Line                 ECMO Cannula 04/25/20 1120 right femoral vein 29 days         ECMO Cannula 04/25/20 1120  right internal jugular 29 days          Drain                 Urethral Catheter 04/18/20 1854 35 days         NG/OG Tube 05/03/20 1215 [place] sump;nasogastric 18 Fr. Left nostril 21 days          Airway                 Surgical Airway 05/05/20 1500 Shiley Cuffed 18 days          Arterial Line                 Arterial Line 04/27/20 1100 Right Brachial 27 days          Peripheral Intravenous Line                 Peripheral IV Triple Lumen 05/23/20 0500 20 G Anterior;Left Forearm 1 day         Peripheral IV Triple Lumen 05/23/20 0500 20 G Left Hand 1 day                Significant Labs:    No major changes    Significant Imaging:  I have reviewed all pertinent imaging results/findings within the past 24 hours.

## 2020-05-24 NOTE — PROGRESS NOTES
[facility]  Critical Care - Surgery  Progress Note    Patient Name: Ranjana Sanchez  MRN: [id number]  Admission Date: 4/10/2020  Hospital Length of Stay: 44 days  Code Status: Full Code  Attending Provider: Francis Ayon MD  Primary Care Provider: Primary Doctor No   Principal Problem: Acute respiratory distress syndrome (ARDS) due to COVID-19 virus    Subjective:     Hospital/ICU Course:  No notes on file    Interval History/Significant Events:   No change  Sweep at 8.5    Follow-up For: Procedure(s) (LRB):  CREATION, TRACHEOSTOMY  (N/A)    Post-Operative Day: 19 Days Post-Op    Objective:     Vital Signs (Most Recent):  Temp: 98.6 °F (37 °C) (05/24/20 1200)  Pulse: 110 (05/24/20 1310)  Resp: (!) 38 (05/23/20 0820)  BP: 115/68 (05/22/20 1500)  SpO2: 97 % (05/24/20 1310) Vital Signs (24h Range):  Temp:  [98.3 °F (36.8 °C)-98.7 °F (37.1 °C)] 98.6 °F (37 °C)  Pulse:  [100-133] 110  SpO2:  [94 %-100 %] 97 %  Arterial Line BP: ()/(43-72) 104/57     Weight: 63.4 kg (139 lb 12.4 oz)  Body mass index is 23.99 kg/m².      Intake/Output Summary (Last 24 hours) at 5/24/2020 1405  Last data filed at 5/24/2020 1300  Gross per 24 hour   Intake 3113 ml   Output 2934 ml   Net 179 ml       Physical Exam   Constitutional: He appears well-developed.   HENT:   Head: Normocephalic and atraumatic.   Eyes: Pupils are equal, round, and reactive to light.   Neck: Normal range of motion.   Cardiovascular:   tachycardic   Pulmonary/Chest:   Trached, mechanically ventilated  On ECMO, cannulated in RIJ and fem vein   Abdominal: Soft. He exhibits no distension.   Musculoskeletal: Normal range of motion.   Neurological:   sedated   Skin: Skin is warm.   Vitals reviewed.      Vents:  Vent Mode: A/C (05/24/20 1310)  Ventilator Initiated: Yes (04/10/20 3630)  Set Rate: 12 BPM (05/24/20 1310)  Vt Set: 200 mL (05/18/20 1244)  PEEP/CPAP: 10 cmH20 (05/24/20 1310)  Oxygen Concentration (%): 50 (05/24/20  1310)  Peak Airway Pressure: 30 cmH2O (05/24/20 1310)  Plateau Pressure: 22 cmH20 (05/24/20 1310)  Total Ve: 4.84 mL (05/24/20 1310)  F/VT Ratio<105 (RSBI): 130 (05/23/20 0039)    Lines/Drains/Airways     Peripherally Inserted Central Catheter Line            PICC Double Lumen 05/05/20 1707 right basilic 18 days          Central Venous Catheter Line                 ECMO Cannula 04/25/20 1120 right femoral vein 29 days         ECMO Cannula 04/25/20 1120 right internal jugular 29 days          Drain                 Urethral Catheter 04/18/20 1854 35 days         NG/OG Tube 05/03/20 1215 [place] sump;nasogastric 18 Fr. Left nostril 21 days          Airway                 Surgical Airway 05/05/20 1500 Shiley Cuffed 18 days          Arterial Line                 Arterial Line 04/27/20 1100 Right Brachial 27 days          Peripheral Intravenous Line                 Peripheral IV Triple Lumen 05/23/20 0500 20 G Anterior;Left Forearm 1 day         Peripheral IV Triple Lumen 05/23/20 0500 20 G Left Hand 1 day                Significant Labs:    No major changes    Significant Imaging:  I have reviewed all pertinent imaging results/findings within the past 24 hours.    Assessment/Plan:     COVID-19 virus detected  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  This was switched to ETT on 4/11.  Cannulated on 4/25.    Neuro:  5 of valium  100 BID of seroquel  Fentanyl patch    Pulm  AC/VC+ on the vent.  Small pneumo on cxr, subclinical.  Sweep of 8.5 today, 2800 RPM  Will need some more time on ECMO, not pulling enough volume on the vent    Cardiac   Labetalol as needed for pressures    GI  TFs at goal    Renal  Making urine, diuresing agressively    Heme/ID  Fluc/Vanc  White count normalized  Hep gtt      Dispo:  Remain in ICU, lungs not ready to work much yet               Critical care was time spent personally by me on the  following activities: development of treatment plan with patient or surrogate and bedside caregivers, discussions with consultants, evaluation of patient's response to treatment, examination of patient, ordering and performing treatments and interventions, ordering and review of laboratory studies, ordering and review of radiographic studies, pulse oximetry, re-evaluation of patient's condition.  This critical care time did not overlap with that of any other provider or involve time for any procedures.     Zelalem Ariza MD  Critical Care - Surgery  [facility]

## 2020-05-24 NOTE — PLAN OF CARE
Pt remains on ECMO speed 2700, flows 2.91-3, Fio2 55%, sweep increased back up to 9 from 8.5 due to CO2 68 per Dr. Ayon orders, sats >98%, ABG q6h. Vent settings AC PC 50% PEEP 10. Withdraws to deep nail bed stimuli to BLE, opens eyes wide to BUE but no purposeful movement, pupils equal and reactive, does not track. Gtts: Heparin @ 900 Units/hr, Lasix @ 1 mg/hr, Insulin @ 0.7 Units/hr. -150 mL/hr, net positive 300 mL after giving 250 Albumin secondary to fluctuation in flows and some line chugging. VSS. Wounds noted to R nare, L forearm, L ear DTI, no skin breakdown noted to sacrum, heels or back of head, ECMO cannula sites with scant dried blood, dressing changed and dated. New oozing noted from R brachial art line, dressing changed and surgicell. AM lab results reviewed with Dr. Grayson, no new orders.

## 2020-05-24 NOTE — SUBJECTIVE & OBJECTIVE
Interval History/Significant Events:   No significant change  Sweep 8.5    Follow-up For: Procedure(s) (LRB):  CREATION, TRACHEOSTOMY  (N/A)    Post-Operative Day: 19 Days Post-Op    Objective:     Vital Signs (Most Recent):  Temp: 98.6 °F (37 °C) (05/24/20 1200)  Pulse: 110 (05/24/20 1310)  Resp: (!) 38 (05/23/20 0820)  BP: 115/68 (05/22/20 1500)  SpO2: 97 % (05/24/20 1310) Vital Signs (24h Range):  Temp:  [98.3 °F (36.8 °C)-98.7 °F (37.1 °C)] 98.6 °F (37 °C)  Pulse:  [100-133] 110  SpO2:  [94 %-100 %] 97 %  Arterial Line BP: ()/(43-72) 104/57     Weight: 63.4 kg (139 lb 12.4 oz)  Body mass index is 23.99 kg/m².      Intake/Output Summary (Last 24 hours) at 5/24/2020 1407  Last data filed at 5/24/2020 1300  Gross per 24 hour   Intake 3113 ml   Output 2934 ml   Net 179 ml       Physical Exam   Constitutional: He appears well-developed.   HENT:   Head: Normocephalic and atraumatic.   Eyes: Pupils are equal, round, and reactive to light.   Neck: Normal range of motion.   Cardiovascular:   tachycardic   Pulmonary/Chest:   Trached, mechanically ventilated  On ECMO, cannulated in RIJ and fem vein   Abdominal: Soft. He exhibits no distension.   Musculoskeletal: Normal range of motion.   Neurological:   sedated   Skin: Skin is warm.   Vitals reviewed.      Vents:  Vent Mode: A/C (05/24/20 1310)  Ventilator Initiated: Yes (04/10/20 2247)  Set Rate: 12 BPM (05/24/20 1310)  Vt Set: 200 mL (05/18/20 1244)  PEEP/CPAP: 10 cmH20 (05/24/20 1310)  Oxygen Concentration (%): 50 (05/24/20 1310)  Peak Airway Pressure: 30 cmH2O (05/24/20 1310)  Plateau Pressure: 22 cmH20 (05/24/20 1310)  Total Ve: 4.84 mL (05/24/20 1310)  F/VT Ratio<105 (RSBI): 130 (05/23/20 0039)    Lines/Drains/Airways     Peripherally Inserted Central Catheter Line            PICC Double Lumen 05/05/20 1707 right basilic 18 days          Central Venous Catheter Line                 ECMO Cannula 04/25/20 1120 right femoral vein 29 days         ECMO Cannula  04/25/20 1120 right internal jugular 29 days          Drain                 Urethral Catheter 04/18/20 1854 35 days         NG/OG Tube 05/03/20 1215 [place] sump;nasogastric 18 Fr. Left nostril 21 days          Airway                 Surgical Airway 05/05/20 1500 Shiley Cuffed 18 days          Arterial Line                 Arterial Line 04/27/20 1100 Right Brachial 27 days          Peripheral Intravenous Line                 Peripheral IV Triple Lumen 05/23/20 0500 20 G Anterior;Left Forearm 1 day         Peripheral IV Triple Lumen 05/23/20 0500 20 G Left Hand 1 day                Significant Labs:    CBC/Anemia Profile:  Recent Labs   Lab 05/23/20  0151  05/23/20 2014 05/24/20  0200 05/24/20  0204 05/24/20  0737 05/24/20  0745   WBC 17.40*   < > 16.72*  --  15.13*  --  15.68*  --    HGB 9.4*   < > 9.9*  --  9.9*  --  9.7*  --    HCT 29.8*   < > 31.5*   < > 32.1* 32* 31.9* 28*      < > 172  --  176  --  192  --    MCV 93   < > 94  --  94  --  95  --    RDW 13.3   < > 14.2  --  14.2  --  14.3  --    FERRITIN 1,531*  --   --   --  1,262*  --   --   --     < > = values in this interval not displayed.        Chemistries:  Recent Labs   Lab 05/23/20 2014 05/24/20 0200 05/24/20  0737    144 145   K 4.0 4.0 3.9   CL 98 101 103   CO2 33* 31* 32*   BUN 39* 39* 40*   CREATININE 0.8 0.8 0.8   CALCIUM 10.0 10.1 10.1   ALBUMIN 3.8 3.7 3.9   PROT 7.2 7.3 7.4   BILITOT 0.7 0.6 0.8   ALKPHOS 194* 217* 241*   ALT 45* 49* 56*   AST 36 42* 51*   MG 2.2 2.3 2.2   PHOS 4.6* 3.9 3.9       Significant Imaging:  I have reviewed all pertinent imaging results/findings within the past 24 hours.

## 2020-05-24 NOTE — ASSESSMENT & PLAN NOTE
Acute respiratory distress syndrome (ARDS) due to COVID-19 virus  - Crich switched to ETT on 4/11  - Monika weaned off 5/5  - Tracheostomy and bronch 5/5  - Thrombocytopenia resolved; transfuse for under 30K  - Aspirin 81 daily  - Metoprolol 25 BID  - RPM at 2700; flows low 3s  - Sweep at 9.5; CO2 goal is 59 or below; do not increase sweep for pCO2s 59 or below as long as patient is not acidotic, as these high pCO2s are respiratory compensation for alkalosis  - Oxygenator Fi at 55%; wean to 50 today  - Vent Fi 50%, PEEP 10     Tongue laceration      -ENT evaluated, laceration superficial      -recs: bite block vs paralysis, will monitor    Sedation  - Valium, seroquel, PRN Fentanyl     UGI Bleed  - Scoped by GI 5/3 with epinephrine injection and clip placement for an actively bleeding D2/D3 Diuelafoy lesion    FEN/GI  - TF at 45, goal      - Lasix gtt reduced to 2.5  - FWB for hypernatremia 250mL q6     Anticoagulation      - Goal aXa 0.25 - 0.3 Q6hrs      - monitor LDH    Prophylaxis      - protonix q12 IV       - abx for ECMO; WBC stable on Vanc/Zosyn      - PICC placed 5/5; central line removed 5/6

## 2020-05-24 NOTE — PROGRESS NOTES
[facility] - Respiratory ICU  Critical Care - Surgery  Progress Note    Patient Name: Ranjana Sanchez  MRN: [id number]  Admission Date: 4/10/2020  Hospital Length of Stay: 44 days  Code Status: Full Code  Attending Provider: Francis Ayon MD  Primary Care Provider: Primary Doctor No   Principal Problem: Acute respiratory distress syndrome (ARDS) due to COVID-19 virus    Subjective:     Hospital/ICU Course:  No notes on file    Interval History/Significant Events:   No significant change  Sweep 8.5    Follow-up For: Procedure(s) (LRB):  CREATION, TRACHEOSTOMY  (N/A)    Post-Operative Day: 19 Days Post-Op    Objective:     Vital Signs (Most Recent):  Temp: 98.6 °F (37 °C) (05/24/20 1200)  Pulse: 110 (05/24/20 1310)  Resp: (!) 38 (05/23/20 0820)  BP: 115/68 (05/22/20 1500)  SpO2: 97 % (05/24/20 1310) Vital Signs (24h Range):  Temp:  [98.3 °F (36.8 °C)-98.7 °F (37.1 °C)] 98.6 °F (37 °C)  Pulse:  [100-133] 110  SpO2:  [94 %-100 %] 97 %  Arterial Line BP: ()/(43-72) 104/57     Weight: 63.4 kg (139 lb 12.4 oz)  Body mass index is 23.99 kg/m².      Intake/Output Summary (Last 24 hours) at 5/24/2020 1407  Last data filed at 5/24/2020 1300  Gross per 24 hour   Intake 3113 ml   Output 2934 ml   Net 179 ml       Physical Exam   Constitutional: He appears well-developed.   HENT:   Head: Normocephalic and atraumatic.   Eyes: Pupils are equal, round, and reactive to light.   Neck: Normal range of motion.   Cardiovascular:   tachycardic   Pulmonary/Chest:   Trached, mechanically ventilated  On ECMO, cannulated in RIJ and fem vein   Abdominal: Soft. He exhibits no distension.   Musculoskeletal: Normal range of motion.   Neurological:   sedated   Skin: Skin is warm.   Vitals reviewed.      Vents:  Vent Mode: A/C (05/24/20 1310)  Ventilator Initiated: Yes (04/10/20 2247)  Set Rate: 12 BPM (05/24/20 1310)  Vt Set: 200 mL (05/18/20 1244)  PEEP/CPAP: 10 cmH20 (05/24/20 1310)  Oxygen Concentration (%): 50 (05/24/20  1310)  Peak Airway Pressure: 30 cmH2O (05/24/20 1310)  Plateau Pressure: 22 cmH20 (05/24/20 1310)  Total Ve: 4.84 mL (05/24/20 1310)  F/VT Ratio<105 (RSBI): 130 (05/23/20 0039)    Lines/Drains/Airways     Peripherally Inserted Central Catheter Line            PICC Double Lumen 05/05/20 1707 right basilic 18 days          Central Venous Catheter Line                 ECMO Cannula 04/25/20 1120 right femoral vein 29 days         ECMO Cannula 04/25/20 1120 right internal jugular 29 days          Drain                 Urethral Catheter 04/18/20 1854 35 days         NG/OG Tube 05/03/20 1215 [place] sump;nasogastric 18 Fr. Left nostril 21 days          Airway                 Surgical Airway 05/05/20 1500 Shiley Cuffed 18 days          Arterial Line                 Arterial Line 04/27/20 1100 Right Brachial 27 days          Peripheral Intravenous Line                 Peripheral IV Triple Lumen 05/23/20 0500 20 G Anterior;Left Forearm 1 day         Peripheral IV Triple Lumen 05/23/20 0500 20 G Left Hand 1 day                Significant Labs:    CBC/Anemia Profile:  No significant change    Significant Imaging:  I have reviewed all pertinent imaging results/findings within the past 24 hours.    Assessment/Plan:     COVID-19 virus detected  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  This was switched to ETT on 4/11.  Cannulated on 4/25.    Neuro:  5 of valium  100 BID of seroquel  Fentanyl patch    Pulm  AC/VC+ on the vent.  Small pneumo on cxr, subclinical.  Sweep of 8.5 today, 2800 RPM  Will need some more time on ECMO, not pulling enough volume on the vent    Cardiac   Labetalol as needed for pressures    GI  TFs at goal    Renal  Making urine, diuresing agressively    Heme/ID  Fluc/Vanc  White count normalized  Hep gtt      Dispo:  Remain in ICU, lungs not ready to work much yet               Critical care was time spent  personally by me on the following activities: development of treatment plan with patient or surrogate and bedside caregivers, discussions with consultants, evaluation of patient's response to treatment, examination of patient, ordering and performing treatments and interventions, ordering and review of laboratory studies, ordering and review of radiographic studies, pulse oximetry, re-evaluation of patient's condition.  This critical care time did not overlap with that of any other provider or involve time for any procedures.     Zelalem Ariza MD  Critical Care - Surgery  [facility] - Respiratory ICU

## 2020-05-24 NOTE — ASSESSMENT & PLAN NOTE
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  This was switched to ETT on 4/11.  Cannulated on 4/25.    Neuro:  5 of valium  100 BID of seroquel  Fentanyl patch    Pulm  AC/VC+ on the vent.  Small pneumo on cxr, subclinical.  Sweep of 9 today, 2800 RPM  Will need some more time on ECMO, not pulling enough volume on the vent    Cardiac   Labetalol as needed for pressures    GI  TFs at goal    Renal  Making urine, diuresing agressively    Heme/ID  Fluc/Vanc  White count normalized  Hep gtt      Dispo:  Remain in ICU, lungs not ready to work much yet

## 2020-05-24 NOTE — SUBJECTIVE & OBJECTIVE
Interval History/Significant Events:   Sweep back up to 9  Resutured RIJ line in place yesterday    Follow-up For: Procedure(s) (LRB):  CREATION, TRACHEOSTOMY  (N/A)    Post-Operative Day: 19 Days Post-Op    Objective:     Vital Signs (Most Recent):  Temp: 98.7 °F (37.1 °C) (05/24/20 0700)  Pulse: 108 (05/24/20 0930)  Resp: (!) 38 (05/23/20 0820)  BP: 115/68 (05/22/20 1500)  SpO2: 97 % (05/24/20 0930) Vital Signs (24h Range):  Temp:  [98.2 °F (36.8 °C)-98.7 °F (37.1 °C)] 98.7 °F (37.1 °C)  Pulse:  [105-133] 108  SpO2:  [94 %-100 %] 97 %  Arterial Line BP: ()/(43-72) 96/56     Weight: 63.4 kg (139 lb 12.4 oz)  Body mass index is 23.99 kg/m².      Intake/Output Summary (Last 24 hours) at 5/24/2020 0949  Last data filed at 5/24/2020 0900  Gross per 24 hour   Intake 4029 ml   Output 3744 ml   Net 285 ml       Physical Exam   Constitutional: He appears well-developed.   HENT:   Head: Normocephalic and atraumatic.   Eyes: Pupils are equal, round, and reactive to light.   Neck: Normal range of motion.   Cardiovascular:   tachycardic   Pulmonary/Chest:   Trached, mechanically ventilated  On ECMO, cannulated in RIJ and fem vein   Abdominal: Soft. He exhibits no distension.   Musculoskeletal: Normal range of motion.   Neurological:   sedated   Skin: Skin is warm.   Vitals reviewed.      Vents:  Vent Mode: A/C (05/24/20 0746)  Ventilator Initiated: Yes (04/10/20 2247)  Set Rate: 12 BPM (05/24/20 0746)  Vt Set: 200 mL (05/18/20 1244)  PEEP/CPAP: 10 cmH20 (05/24/20 0746)  Oxygen Concentration (%): 50 (05/24/20 0930)  Peak Airway Pressure: 25 cmH2O (05/24/20 0746)  Plateau Pressure: 22 cmH20 (05/24/20 0746)  Total Ve: 6.06 mL (05/24/20 0746)  F/VT Ratio<105 (RSBI): 130 (05/23/20 0039)    Lines/Drains/Airways     Peripherally Inserted Central Catheter Line            PICC Double Lumen 05/05/20 1707 right basilic 18 days          Central Venous Catheter Line                 ECMO Cannula 04/25/20 1120 right femoral vein 28  days         ECMO Cannula 04/25/20 1120 right internal jugular 28 days          Drain                 Urethral Catheter 04/18/20 1854 35 days         NG/OG Tube 05/03/20 1215 [place] sump;nasogastric 18 Fr. Left nostril 20 days          Airway                 Surgical Airway 05/05/20 1500 Shiley Cuffed 18 days          Arterial Line                 Arterial Line 04/27/20 1100 Right Brachial 26 days          Peripheral Intravenous Line                 Peripheral IV Triple Lumen 05/23/20 0500 20 G Anterior;Left Forearm 1 day         Peripheral IV Triple Lumen 05/23/20 0500 20 G Left Hand 1 day                Significant Labs:    CBC/Anemia Profile:  Recent Labs   Lab 05/23/20  0151  05/23/20 2014 05/24/20  0200 05/24/20  0204 05/24/20  0737 05/24/20  0745   WBC 17.40*   < > 16.72*  --  15.13*  --  15.68*  --    HGB 9.4*   < > 9.9*  --  9.9*  --  9.7*  --    HCT 29.8*   < > 31.5*   < > 32.1* 32* 31.9* 28*      < > 172  --  176  --  192  --    MCV 93   < > 94  --  94  --  95  --    RDW 13.3   < > 14.2  --  14.2  --  14.3  --    FERRITIN 1,531*  --   --   --  1,262*  --   --   --     < > = values in this interval not displayed.        Chemistries:  Recent Labs   Lab 05/23/20 2014 05/24/20 0200 05/24/20  0737    144 145   K 4.0 4.0 3.9   CL 98 101 103   CO2 33* 31* 32*   BUN 39* 39* 40*   CREATININE 0.8 0.8 0.8   CALCIUM 10.0 10.1 10.1   ALBUMIN 3.8 3.7 3.9   PROT 7.2 7.3 7.4   BILITOT 0.7 0.6 0.8   ALKPHOS 194* 217* 241*   ALT 45* 49* 56*   AST 36 42* 51*   MG 2.2 2.3 2.2   PHOS 4.6* 3.9 3.9       Significant Imaging:  I have reviewed all pertinent imaging results/findings within the past 24 hours.

## 2020-05-24 NOTE — PLAN OF CARE
Patient remain on Ecmo with speed at 2700 rpm, flows roughly 3. FiO2 55 % and sweep increased from 9 to 9.5 for pCO2 >60. Ventilator settings currently on AC/PC FiO2 50%/PEEP 10. Patient currently on heparin at 900 units/hr, lasix at 1 mg/hr, and insulin at 1.4 units/hr and precedex at 0.2 mcg/kg/hr. Urine output  mL/hr. Primary team aware of lower urine output, no new orders received. Patient tolerating tube feeds at goal rate of 45 mL/hr with minimal residuals. Patient opens eyes and withdraws lower extremities; however, patient does not move upper extremities or follow commands. Patient's son updated via telephone by primary nurse and questions answered.

## 2020-05-24 NOTE — ASSESSMENT & PLAN NOTE
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  This was switched to ETT on 4/11.  Cannulated on 4/25.    Neuro:  5 of valium  100 BID of seroquel  Fentanyl patch    Pulm  AC/VC+ on the vent.  Small pneumo on cxr, subclinical.  Sweep of 8.5 today, 2800 RPM  Will need some more time on ECMO, not pulling enough volume on the vent    Cardiac   Labetalol as needed for pressures    GI  TFs at goal    Renal  Making urine, diuresing agressively    Heme/ID  Fluc/Vanc  White count normalized  Hep gtt      Dispo:  Remain in ICU, lungs not ready to work much yet

## 2020-05-24 NOTE — PROGRESS NOTES
Dr. Busby, Dr. Miller and  at bedside. MDs updated on patient status, vitals, ECMO status and infusions. Labs and ABGs reviewed with MDs. Patient's MAP is currently in 60-65. Cardene turned off this morning, lasix infusing at 1 mg/hr and heparin infusing at 900 units/hr. Urine output was 100-125mL/hr; however, patient urinated 50 mL over the last hour. MD stated to changes to lasix gtt and no fluid to be administered at this time. Notified team that patient's right IJ cannula site is starting to ooze a moderate amount of blood. MDs stated Dr. Ariza will return later to assess site. No further orders at this time.

## 2020-05-24 NOTE — PROGRESS NOTES
[facility] shift report    Date: 05/24/2020  ECMO Specialist:  Ashley Francisco    Pump parameters:  RPM: 2700  Flow:  2.9  Sweep:  9  FiO2: .55    Oxygenator status:  Clots: pre and post  Fibrin: pre and post    Pressure trends:  P1: 107  P2: 77  Delta P: 30    Volume status:  Chugging noted? No  CVP: Na  MAP:  80  MD notified (name):  Nany    Anticoagulation:  ACT/aPTT/Xa parameters: .25-.3  ACT/aPTT/Xa trends this shift: .27-.29    Cannula size / status / placement:  Arterial:   Venous 1: RIJV 23F@4 cm   Venous 2: RFV 27F@ 12 cm  Dual lumen:      Additional Comments: Went back up on sweep to 9.  Maintained the flows.  No issues.

## 2020-05-25 NOTE — PLAN OF CARE
"   05/25/20 1315   Discharge Reassessment   Assessment Type Discharge Planning Reassessment   Discharge Plan A Long-term acute care facility (LTAC)   Discharge Plan B Long-term acute care facility (LTAC)   DME Needed Upon Discharge  other (see comments)  (tbd)   Anticipated Discharge Disposition Long Term   Can the patient/caregiver answer the patient profile reliably? No, cognitively impaired   Describe the patient's ability to walk at the present time. Does not walk or unable to take any steps at all   Number of comorbid conditions (as recorded on the chart) One   Post-Acute Status   Post-Acute Authorization Placement   Post-Acute Placement Status Awaiting Internal Medical Clearance   Other Status   (Pt is from [facility])     Patient not medically stable to discharge. Per MD note, "Will need some more time on ECMO, not pulling enough volume on the vent. Lungs not ready to work much yet". Will continue to follow.   "

## 2020-05-25 NOTE — PROGRESS NOTES
[facility] shift report    Date: 05/25/2020  ECMO Specialist:  Monik Bryant    Pump parameters:  RPM: 2700  Flow: 2.98   Sweep: 10  FiO2: 55    Oxygenator status:  Clots: pre and post  Fibrin: pre and post    Pressure trends:  P1:107  P2: 79  Delta P: 28    Volume status:  Chugging noted yes  CVP  MAP:  80  MD notified (name): Dr Hernandez     Anticoagulation:  /Xa parameters 0.25-0.3  ACT/aPTT/Xa trends this shift:     Cannula size / status / placement:  Arterial:   Venous 1: [id number] 4Cm  Venous 2:RFV 27FR 12Cm  Dual lumen:      Additional Comments:bleeding from stiches on neck ,was shown to Dr Hernandez, so he is aware.Caughing spells   With some chatering

## 2020-05-25 NOTE — SUBJECTIVE & OBJECTIVE
Interval History/Significant Events:   Sweep at 9  Some oozing from RIJ site      Follow-up For: Procedure(s) (LRB):  CREATION, TRACHEOSTOMY  (N/A)    Post-Operative Day: 20 Days Post-Op    Objective:     Vital Signs (Most Recent):  Temp: 98.5 °F (36.9 °C) (05/25/20 0600)  Pulse: (!) 119 (05/25/20 0615)  Resp: (!) 41 (05/25/20 0600)  BP: 115/68 (05/22/20 1500)  SpO2: 98 % (05/25/20 0615) Vital Signs (24h Range):  Temp:  [98.2 °F (36.8 °C)-98.7 °F (37.1 °C)] 98.5 °F (36.9 °C)  Pulse:  [] 119  Resp:  [38-55] 41  SpO2:  [93 %-100 %] 98 %  Arterial Line BP: ()/() 119/62     Weight: 58 kg (127 lb 13.9 oz)  Body mass index is 21.95 kg/m².      Intake/Output Summary (Last 24 hours) at 5/25/2020 0700  Last data filed at 5/25/2020 0600  Gross per 24 hour   Intake 3482 ml   Output 2211 ml   Net 1271 ml       Physical Exam   Constitutional: He appears well-developed.   HENT:   Head: Normocephalic and atraumatic.   Eyes: Pupils are equal, round, and reactive to light.   Neck: Normal range of motion.   Cardiovascular:   tachycardic   Pulmonary/Chest:   Trached, mechanically ventilated  On ECMO, cannulated in RIJ and fem vein   Abdominal: Soft. He exhibits no distension.   Musculoskeletal: Normal range of motion.   Neurological:   sedated   Skin: Skin is warm.   Vitals reviewed.      Vents:  Vent Mode: A/C (05/25/20 0140)  Ventilator Initiated: Yes (04/10/20 2247)  Set Rate: 12 BPM (05/25/20 0140)  Vt Set: 200 mL (05/18/20 1244)  PEEP/CPAP: 10 cmH20 (05/25/20 0140)  Oxygen Concentration (%): 50 (05/25/20 0615)  Peak Airway Pressure: 24 cmH2O (05/25/20 0140)  Plateau Pressure: 22 cmH20 (05/25/20 0140)  Total Ve: 3.62 mL (05/25/20 0140)  F/VT Ratio<105 (RSBI): 130 (05/23/20 0039)    Lines/Drains/Airways     Peripherally Inserted Central Catheter Line            PICC Double Lumen 05/05/20 1707 right basilic 19 days          Central Venous Catheter Line                 ECMO Cannula 04/25/20 1120 right femoral vein  29 days         ECMO Cannula 04/25/20 1120 right internal jugular 29 days          Drain                 Urethral Catheter 04/18/20 1854 36 days         NG/OG Tube 05/03/20 1215 [place] sump;nasogastric 18 Fr. Left nostril 21 days          Airway                 Surgical Airway 05/05/20 1500 Shiley Cuffed 19 days          Arterial Line                 Arterial Line 04/27/20 1100 Right Brachial 27 days          Peripheral Intravenous Line                 Peripheral IV Triple Lumen 05/23/20 0500 20 G Anterior;Left Forearm 2 days         Peripheral IV Triple Lumen 05/23/20 0500 20 G Left Hand 2 days                Significant Labs:    CBC/Anemia Profile:  Recent Labs   Lab 05/24/20  0200 05/24/20  1419 05/24/20 2002 05/24/20 2003 05/25/20 0208 05/25/20  0210   WBC 15.13*   < > 13.99* 14.25*  --   --  12.81*   HGB 9.9*   < > 9.4* 9.4*  --   --  9.1*   HCT 32.1*   < > 31.1* 30.4* 29* 29* 29.6*      < > 184 176  --   --  168   MCV 94   < > 95 95  --   --  95   RDW 14.2   < > 14.2 14.1  --   --  14.2   FERRITIN 1,262*  --   --   --   --   --  1,051*    < > = values in this interval not displayed.        Chemistries:  Recent Labs   Lab 05/24/20  1419 05/24/20 2002 05/25/20 0210    146* 147*   K 4.0 3.9 3.9    105 108   CO2 31* 31* 27   BUN 40* 40* 42*   CREATININE 0.8 0.8 0.8   CALCIUM 10.2 10.2 9.9   ALBUMIN 3.5 3.5 3.5   PROT 7.1 7.1 6.9   BILITOT 0.8 0.7 0.7   ALKPHOS 249* 257* 265*   ALT 68* 77* 84*   AST 55* 58* 64*   MG 2.4 2.3 2.3   PHOS 3.7 3.1 2.7       Significant Imaging:  I have reviewed and interpreted all pertinent imaging results/findings within the past 24 hours.

## 2020-05-25 NOTE — NURSING
Patient has remained hemodynamically lulú.  UOP remains 100-125 mL/hr.    Temperature afebrile.   MD x3 notified of copious bleeding from right IJ site. Redressing will be postponed.   1 unit of PRBC will be administered for decreasing H&H.   Patients son updated via phone.  Will continue to monitor patient's status.   Please see flowsheet data for full assessment details.

## 2020-05-25 NOTE — PROGRESS NOTES
[facility] shift report    Date: 05/25/2020  ECMO Specialist:  Ashley Francisco    Pump parameters:  RPM: 2700  Flow:  3.0  Sweep:  10  FiO2: 55    Oxygenator status:  Clots: pre and post  Fibrin: pre and post    Pressure trends:  P1: 111  P2: 82  Delta P: 29    Volume status:  Chugging noted?  CVP: NA  MAP: 70s  MD notified (name):  Qi    Anticoagulation:  ACT/aPTT/Xa parameters: .25-.3  ACT/aPTT/Xa trends this shift: .31-.33    Cannula size / status / placement:  Arterial:   Venous 1: RIJV 23F@ 4 cm  Venous 2: RFV 27F@ 12 cm  Dual lumen:      Additional Comments:  Bleeding from stiches placed for RIJV behind ear.  Agitated.  And coughing for 6 hrs.  Went up on sweep for 61 CO2.  Heparin dropped to 8ml/hr.

## 2020-05-25 NOTE — PROGRESS NOTES
[facility] Res[facility] ICU  Critical Care - Surgery  Progress Note    Patient Name: Ranjana Sanchez  MRN: [id number]  Admission Date: 4/10/2020  Hospital Length of Stay: 45 days  Code Status: Full Code  Attending Provider: Francis yAon MD  Primary Care Provider: Primary Doctor No   Principal Problem: Acute respiratory distress syndrome (ARDS) due to COVID-19 virus    Subjective:     Hospital/ICU Course:  No notes on file    Interval History/Significant Events:   Sweep at 9  Some oozing from RIJ site      Follow-up For: Procedure(s) (LRB):  CREATION, TRACHEOSTOMY  (N/A)    Post-Operative Day: 20 Days Post-Op    Objective:     Vital Signs (Most Recent):  Temp: 98.5 °F (36.9 °C) (05/25/20 0600)  Pulse: (!) 119 (05/25/20 0615)  Resp: (!) 41 (05/25/20 0600)  BP: 115/68 (05/22/20 1500)  SpO2: 98 % (05/25/20 0615) Vital Signs (24h Range):  Temp:  [98.2 °F (36.8 °C)-98.7 °F (37.1 °C)] 98.5 °F (36.9 °C)  Pulse:  [] 119  Resp:  [38-55] 41  SpO2:  [93 %-100 %] 98 %  Arterial Line BP: ()/() 119/62     Weight: 58 kg (127 lb 13.9 oz)  Body mass index is 21.95 kg/m².      Intake/Output Summary (Last 24 hours) at 5/25/2020 0700  Last data filed at 5/25/2020 0600  Gross per 24 hour   Intake 3482 ml   Output 2211 ml   Net 1271 ml       Physical Exam   Constitutional: He appears well-developed.   HENT:   Head: Normocephalic and atraumatic.   Eyes: Pupils are equal, round, and reactive to light.   Neck: Normal range of motion.   Cardiovascular:   tachycardic   Pulmonary/Chest:   Trached, mechanically ventilated  On ECMO, cannulated in RIJ and fem vein   Abdominal: Soft. He exhibits no distension.   Musculoskeletal: Normal range of motion.   Neurological:   sedated   Skin: Skin is warm.   Vitals reviewed.      Vents:  Vent Mode: A/C (05/25/20 0140)  Ventilator Initiated: Yes (04/10/20 8916)  Set Rate: 12 BPM (05/25/20 0140)  Vt Set: 200 mL (05/18/20 1244)  PEEP/CPAP: 10 cmH20 (05/25/20 0140)  Oxygen  Concentration (%): 50 (05/25/20 0615)  Peak Airway Pressure: 24 cmH2O (05/25/20 0140)  Plateau Pressure: 22 cmH20 (05/25/20 0140)  Total Ve: 3.62 mL (05/25/20 0140)  F/VT Ratio<105 (RSBI): 130 (05/23/20 0039)    Lines/Drains/Airways     Peripherally Inserted Central Catheter Line            PICC Double Lumen 05/05/20 1707 right basilic 19 days          Central Venous Catheter Line                 ECMO Cannula 04/25/20 1120 right femoral vein 29 days         ECMO Cannula 04/25/20 1120 right internal jugular 29 days          Drain                 Urethral Catheter 04/18/20 1854 36 days         NG/OG Tube 05/03/20 1215 [place] sump;nasogastric 18 Fr. Left nostril 21 days          Airway                 Surgical Airway 05/05/20 1500 Shiley Cuffed 19 days          Arterial Line                 Arterial Line 04/27/20 1100 Right Brachial 27 days          Peripheral Intravenous Line                 Peripheral IV Triple Lumen 05/23/20 0500 20 G Anterior;Left Forearm 2 days         Peripheral IV Triple Lumen 05/23/20 0500 20 G Left Hand 2 days                Significant Labs:    CBC/Anemia Profile:  Recent Labs   Lab 05/24/20 0200 05/24/20 1419 05/24/20 2002 05/24/20 2003 05/25/20  0208 05/25/20  0210   WBC 15.13*   < > 13.99* 14.25*  --   --  12.81*   HGB 9.9*   < > 9.4* 9.4*  --   --  9.1*   HCT 32.1*   < > 31.1* 30.4* 29* 29* 29.6*      < > 184 176  --   --  168   MCV 94   < > 95 95  --   --  95   RDW 14.2   < > 14.2 14.1  --   --  14.2   FERRITIN 1,262*  --   --   --   --   --  1,051*    < > = values in this interval not displayed.        Chemistries:  Recent Labs   Lab 05/24/20  1419 05/24/20 2002 05/25/20  0210    146* 147*   K 4.0 3.9 3.9    105 108   CO2 31* 31* 27   BUN 40* 40* 42*   CREATININE 0.8 0.8 0.8   CALCIUM 10.2 10.2 9.9   ALBUMIN 3.5 3.5 3.5   PROT 7.1 7.1 6.9   BILITOT 0.8 0.7 0.7   ALKPHOS 249* 257* 265*   ALT 68* 77* 84*   AST 55* 58* 64*   MG 2.4 2.3 2.3   PHOS 3.7 3.1 2.7        Significant Imaging:  I have reviewed and interpreted all pertinent imaging results/findings within the past 24 hours.    Assessment/Plan:     COVID-19 virus detected  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  This was switched to ETT on 4/11.  Cannulated on 4/25.    Neuro:  5 of valium  100 BID of seroquel  Fentanyl patch    Pulm  AC/VC+ on the vent.  Small pneumo on cxr, subclinical.  Sweep of 8.5 today, 2800 RPM  Will need some more time on ECMO, not pulling enough volume on the vent    Cardiac   Labetalol as needed for pressures    GI  TFs at goal    Renal  Making urine, diuresing agressively    Heme/ID  Fluc/Vanc  White count normalized  Hep gtt      Dispo:  Remain in ICU, lungs not ready to work much yet               Critical care was time spent personally by me on the following activities: development of treatment plan with patient or surrogate and bedside caregivers, discussions with consultants, evaluation of patient's response to treatment, examination of patient, ordering and performing treatments and interventions, ordering and review of laboratory studies, ordering and review of radiographic studies, pulse oximetry, re-evaluation of patient's condition.  This critical care time did not overlap with that of any other provider or involve time for any procedures.     Zelalem Ariza MD  Critical Care - Surgery  [facility]

## 2020-05-26 NOTE — PLAN OF CARE
SW assigned to case today 5/26/2020. SW will assist team with DC needs. YOSEF in communication with CM.    Arlet Kessler, LCSW [facility]  [id number]

## 2020-05-26 NOTE — PLAN OF CARE
Remains on VV-ECMO speed 2700 RPM, flows 2.9-3. FiO2 70% and SWEEP 11. Ventilator settings AC/PC FiO2 50%/PEEP 10. Heparin at 700 units/hr, lasix at 1 mg/hr, insulin at 0.2 units/hr and precedex at 0.2 mcg/kg/hr. Urine output  mL/hr.  TF at 45cc/hr and tolerating well witgh minimal residuals.  1 BM overnight.  Patient opens eyes spontaneously but does not track.   Pt intermittently withdraws all extremities. but does not follow commands.

## 2020-05-26 NOTE — SUBJECTIVE & OBJECTIVE
Interval History:  NAEO, afebrile, HR 110s. Got 250 albumin overnight. /hr on lasix gtt of 1. Positive 900mL. BM x1 overnight. Sweep further increased 11. FiO2 increased to 70.    Medications:  Continuous Infusions:   dexmedetomidine (PRECEDEX) infusion 0.2 mcg/kg/hr (05/26/20 0900)    furosemide (LASIX) 2 mg/mL continuous infusion (non-titrating) 1 mg/hr (05/26/20 0900)    heparin (porcine) in 5 % dex 700 Units/hr (05/26/20 0900)    insulin (HUMAN R) infusion (adults) 0.2 Units/hr (05/26/20 0700)    nicardipine 1 mg/hr (05/26/20 0600)     Scheduled Meds:   albumin human 25%  12.5 g Intravenous Once    amLODIPine  5 mg Oral Daily    aspirin  81 mg Per NG tube Daily    chlorhexidine  15 mL Mouth/Throat BID    diazePAM  0.5 mg Oral BID    fentaNYL  1 patch Transdermal Q72H    labetalol  200 mg Per G Tube BID    multivitamin liquid no.118  10 mL Per G Tube Daily    pantoprazole  40 mg Intravenous Q12H    piperacillin-tazobactam (ZOSYN) IVPB  4.5 g Intravenous Q8H    polyethylene glycol  17 g Per NG tube Daily    potassium chloride 10%  20 mEq Per NG tube BID    psyllium husk (aspartame)  1 packet Per NG tube TID    QUEtiapine  50 mg Per NG tube BID    sucralfate  1 g Per NG tube Q6H    vancomycin (VANCOCIN) IVPB  1,250 mg Intravenous Q24H    vitamin D  1,000 Units Per G Tube Daily        Objective:     Vital Signs (Most Recent):  Temp: 98.4 °F (36.9 °C) (05/26/20 0700)  Pulse: 106 (05/26/20 0930)  Resp: (!) 41 (05/25/20 0600)  BP: 115/68 (05/22/20 1500)  SpO2: 99 % (05/26/20 0930) Vital Signs (24h Range):  Temp:  [98.3 °F (36.8 °C)-98.8 °F (37.1 °C)] 98.4 °F (36.9 °C)  Pulse:  [] 106  SpO2:  [97 %-100 %] 99 %  Arterial Line BP: ()/(51-73) 105/64     Weight: 58 kg (127 lb 13.9 oz)  Body mass index is 21.95 kg/m².    SpO2: 99 %  O2 Device (Oxygen Therapy): ventilator    Intake/Output - Last 3 Shifts       05/24 0700 - 05/25 0659 05/25 0700 - 05/26 0659 05/26 0700 - 05/27 0659     I.V. (mL/kg) 1002 (17.3) 588.1 (10.1)     Blood 400 350     NG/GT 2125 1750 375    IV Piggyback  450     Total Intake(mL/kg) 3527 (60.8) 3138.1 (54.1) 375 (6.5)    Urine (mL/kg/hr) 2345 (1.7) 2285 (1.6) 295 (1.7)    Stool 0 0     Blood 6 7     Total Output 2351 2292 295    Net +1176 +846.1 +80           Stool Occurrence 4 x 1 x           Lines/Drains/Airways     Peripherally Inserted Central Catheter Line            PICC Double Lumen 05/05/20 1707 right basilic 20 days          Central Venous Catheter Line                 ECMO Cannula 04/25/20 1120 right femoral vein 30 days         ECMO Cannula 04/25/20 1120 right internal jugular 30 days          Drain                 Urethral Catheter 04/18/20 1854 37 days         NG/OG Tube 05/03/20 1215 [place] sump;nasogastric 18 Fr. Left nostril 22 days          Airway                 Surgical Airway 05/05/20 1500 Shiley Cuffed 20 days          Arterial Line                 Arterial Line 04/27/20 1100 Right Brachial 28 days          Peripheral Intravenous Line                 Peripheral IV Triple Lumen 05/23/20 0500 20 G Anterior;Left Forearm 3 days         Peripheral IV - Single Lumen 05/26/20 0845 18 G Left Upper Arm less than 1 day                Physical Exam    Constitutional: He is sedated, and intubated.   Head: Normocephalic   Cardiovascular: Tachycardia 120s   Pulmonary/Chest: intubated vent FiO2 50% PEEP 10, ECMO RPM 2700, sweep 11, oxygenator FiO2 70%, flow 2.9  Skin: L neck and L groin cannulas intact, no flashing, clots pre and post-oxygenator, all sutures intact and secure  Nursing note and vitals reviewed.    Significant Labs:  BMP:   Recent Labs   Lab 05/26/20  0200   *   *   K 4.4   *   CO2 26   BUN 41*   CREATININE 0.8   CALCIUM 9.5   MG 2.2     CBC:   Recent Labs   Lab 05/26/20  0757  05/26/20  0915   WBC 15.24*  --   --    RBC 3.52*  --   --    HGB 10.3*  --   --    HCT 33.1*   < > 29*   *  --   --    MCV 94  --   --    MCH 29.3  --    --    MCHC 31.1*  --   --     < > = values in this interval not displayed.     LFTs:   Recent Labs   Lab 05/26/20  0200   *   AST 67*   ALKPHOS 233*   BILITOT 0.7   PROT 6.6   ALBUMIN 3.3*     Significant Diagnostics:  Cxr: stable    ROS

## 2020-05-26 NOTE — ASSESSMENT & PLAN NOTE
Acute respiratory distress syndrome (ARDS) due to COVID-19 virus  - Crich switched to ETT on 4/11  - Monika weaned off 5/5  - Tracheostomy and bronch 5/5  - Thrombocytopenia resolved; transfuse for under 30K  - Aspirin 81 daily  - Metoprolol 25 BID  - RPM at 2700; flows low 3s  - Sweep at 10; CO2 goal is 59 or below; do not increase sweep for pCO2s 59 or below as long as patient is not acidotic, as these high pCO2s are respiratory compensation for alkalosis  - Oxygenator Fi at 55%; wean to 50 today as tolerated  - Vent Fi 50%, PEEP 10     Tongue laceration      -ENT evaluated, laceration superficial      -recs: bite block vs paralysis, will monitor    Sedation  - Valium, seroquel, PRN Fentanyl     UGI Bleed  - Scoped by GI 5/3 with epinephrine injection and clip placement for an actively bleeding D2/D3 Diuelafoy lesion    FEN/GI  - TF at 45, goal      - Lasix gtt reduced to 2.5  - FWB for hypernatremia 250mL q6     Anticoagulation      - Goal aXa 0.25 - 0.3 Q6hrs      - monitor LDH    Prophylaxis      - protonix q12 IV       - abx for ECMO; WBC stable on Vanc/Zosyn      - PICC placed 5/5; central line removed 5/6

## 2020-05-26 NOTE — ASSESSMENT & PLAN NOTE
Acute respiratory distress syndrome (ARDS) due to COVID-19 virus  - Crich switched to ETT on 4/11  - Monika weaned off 5/5  - Tracheostomy and bronch 5/5  - Thrombocytopenia resolved; transfuse for under 30K  - Aspirin 81 daily  - Metoprolol 25 BID  - RPM at 2700; flows low 3s  - Sweep at 11; CO2 goal is 59 or below; do not increase sweep for pCO2s 59 or below as long as patient is not acidotic, as these high pCO2s are respiratory compensation for alkalosis  - Oxygenator Fi at 70%; wean as tolerated  - Vent Fi 50%, PEEP 10  - Will consider reaching out to Pulmonology service today     Tongue laceration      -ENT evaluated, laceration superficial      -recs: bite block vs paralysis, will monitor    Sedation  - Valium, seroquel, PRN Fentanyl     UGI Bleed  - Scoped by GI 5/3 with epinephrine injection and clip placement for an actively bleeding D2/D3 Diuelafoy lesion    FEN/GI  - TF at 45, goal      - Lasix gtt reduced to 1  - FWB for hypernatremia 250mL q6     Anticoagulation      - Goal aXa 0.25 - 0.3 Q6hrs      - monitor LDH    Prophylaxis      - protonix q12 IV       - abx for ECMO; WBC stable      - PICC placed 5/5; central line removed 5/6

## 2020-05-26 NOTE — NURSING
Patient and patient's family updated on plan of care. No acute events during shift. All VSS. HR 90's-120's. MAP 60-95 with restarting Cardene gtt to maintain MAP goal of 70-85. Patient opens eyes to voice and withdraws to pain to all four extremities. Patient remains on ventilator AC/PC 50% FiO2 and 10 PEEP. SpO2 saturation %. ECMO cannulation sites CDI. TF remain at 45ml/hr and being tolerated well. FWB's q6h hours. 1 bowel movement during shift. 1.3 L of urine output for shift. All questions and concerns acknowledged and answered. See flow sheets for full assessment details. Will continue to monitor patient closely.    VV ECMO  Sweep 10.5  Flow 2.9  FiO2 60%  RPM 2700    IV gtts:  Heparin 700 units/hr  Lasix 2.5mg/hr  Precedex 0.01 mcg/kg/hr  Cardene 7.5 mg/hr

## 2020-05-26 NOTE — NURSING
Called Dr. Ariza about patient's Anti- Xa of .27 which is in goal range of .25-.3. No new orders for Heparin gtt adjustment. Dr. Ariza also notified of patient's MAP's dipping to low to mid 60's. MAP has not decreased less than 60. No new orders for blood pressure give. All VSS. Will continue to monitor patient closely.

## 2020-05-26 NOTE — PROGRESS NOTES
[facility] shift report    Date: 05/26/2020  ECMO Specialist:  Monik Bryant    Pump parameters:  RPM: 2700  Flow:  3.01  Sweep:  10.5  FiO2:  60%    Oxygenator status:  Clots: pre post Oxy  Fibrin:     Pressure trends:  P1: 109  P2:78   Delta P: 31    Volume status:  Chugging noted no  CVP:    MAP: 75-85   MD notified (name): Mary     Anticoagulation:  /Xa parameters: .25-.30  /Xa trends this shift: 0.27    Cannula size / status / placement:  Arterial:   Venous 1:  23Fr  RIJV@4CM  Venous 2   27FR  RFV@12cm  Dual lumen:      Additional Comments: stable on VV ecmo, sweep down to 10.5 and FIO2 to 60%.

## 2020-05-26 NOTE — SUBJECTIVE & OBJECTIVE
Interval History:  NAEO, afebrile. HR 120s, Sweep increased  To 10. WBC down to 12.8 this AM.    Medications:  Continuous Infusions:   dexmedetomidine (PRECEDEX) infusion 0.2 mcg/kg/hr (05/25/20 1900)    furosemide (LASIX) 2 mg/mL continuous infusion (non-titrating) 1 mg/hr (05/25/20 1900)    heparin (porcine) in 5 % dex 800 Units/hr (05/25/20 2000)    insulin (HUMAN R) infusion (adults) 1 Units/hr (05/25/20 1900)    nicardipine 5 mg/hr (05/25/20 1900)     Scheduled Meds:   amLODIPine  5 mg Oral Daily    aspirin  81 mg Per NG tube Daily    chlorhexidine  15 mL Mouth/Throat BID    diazePAM  0.5 mg Oral BID    fentaNYL  1 patch Transdermal Q72H    labetalol  200 mg Per G Tube BID    multivitamin liquid no.118  10 mL Per G Tube Daily    pantoprazole  40 mg Intravenous Q12H    piperacillin-tazobactam (ZOSYN) IVPB  4.5 g Intravenous Q8H    polyethylene glycol  17 g Per NG tube Daily    potassium chloride 10%  20 mEq Per NG tube BID    psyllium husk (aspartame)  1 packet Per NG tube TID    QUEtiapine  50 mg Per NG tube BID    sucralfate  1 g Per NG tube Q6H    vancomycin (VANCOCIN) IVPB  1,250 mg Intravenous Q24H    vitamin D  1,000 Units Per G Tube Daily        Objective:     Vital Signs (Most Recent):  Temp: 98.4 °F (36.9 °C) (05/25/20 1730)  Pulse: (!) 138 (05/25/20 2018)  Resp: (!) 41 (05/25/20 0600)  BP: 115/68 (05/22/20 1500)  SpO2: 100 % (05/25/20 2018) Vital Signs (24h Range):  Temp:  [98.3 °F (36.8 °C)-98.7 °F (37.1 °C)] 98.4 °F (36.9 °C)  Pulse:  [] 138  Resp:  [38-55] 41  SpO2:  [93 %-100 %] 100 %  Arterial Line BP: ()/(51-69) 107/64     Weight: 58 kg (127 lb 13.9 oz)  Body mass index is 21.95 kg/m².    SpO2: 100 %  O2 Device (Oxygen Therapy): ventilator    Intake/Output - Last 3 Shifts       05/23 0700 - 05/24 0659 05/24 0700 - 05/25 0659 05/25 0700 - 05/26 0659    I.V. (mL/kg) 1769 (27.9) 1002 (17.3) 155.7 (2.7)    Blood 350 400 350    NG/GT 2100 8867 939    IV Piggyback   450     Total Intake(mL/kg) 4219 (66.5) 3527 (60.8) 1890.7 (32.6)    Urine (mL/kg/hr) 4070 (2.7) 2345 (1.7) 1375 (1.7)    Stool 0 0     Blood 4 6 6    Total Output 4074 2351 1381    Net +145 +1176 +509.7           Stool Occurrence 3 x 4 x           Lines/Drains/Airways     Peripherally Inserted Central Catheter Line            PICC Double Lumen 05/05/20 1707 right basilic 20 days          Central Venous Catheter Line                 ECMO Cannula 04/25/20 1120 right femoral vein 30 days         ECMO Cannula 04/25/20 1120 right internal jugular 30 days          Drain                 Urethral Catheter 04/18/20 1854 37 days         NG/OG Tube 05/03/20 1215 [place] sump;nasogastric 18 Fr. Left nostril 22 days          Airway                 Surgical Airway 05/05/20 1500 [place]iley Cuffed 20 days          Arterial Line                 Arterial Line 04/27/20 1100 Right Brachial 28 days          Peripheral Intravenous Line                 Peripheral IV Triple Lumen 05/23/20 0500 20 G Anterior;Left Forearm 2 days         Peripheral IV Triple Lumen 05/23/20 0500 20 G Left Hand 2 days                Physical Exam    Constitutional: He is sedated, and intubated.   Head: Normocephalic   Cardiovascular: Tachycardia 120s   Pulmonary/Chest: intubated vent FiO2 50% PEEP 10, ECMO RPM 2700, sweep 10, oxygenator FiO2 55%, flows 3  Skin: L neck and L groin cannulas intact, no flashing, clots pre and post-oxygenator, all sutures intact and secure  Nursing note and vitals reviewed.    Significant Labs:  BMP:   Recent Labs   Lab 05/25/20 2000   *   *   K 3.7      CO2 27   BUN 40*   CREATININE 0.7   CALCIUM 9.5   MG 2.0     CBC:   Recent Labs   Lab 05/25/20 2000 05/25/20 2016   WBC 23.76*  --    RBC 3.87*  --    HGB 11.5*  --    HCT 36.5* 34*   *  --    MCV 94  --    MCH 29.7  --    MCHC 31.5*  --      LFTs:   Recent Labs   Lab 05/25/20 2000   ALT 98*   AST 68*   ALKPHOS 249*   BILITOT 0.9   PROT 6.5   ALBUMIN 3.2*      Significant Diagnostics:  Cxr: stable    ROS

## 2020-05-26 NOTE — PROGRESS NOTES
[facility] shift report    Date: 05/26/2020  ECMO Specialist:  Cyrus Stefany    Pump parameters:  RPM:                2700  Flow:                      2.9  Sweep:                11  FiO2:                   70    Oxygenator status:  Clots:                           Pre and Post Oxygenator  Fibrin:                          None found    Pressure trends:  P1:                      110  P2:                        81  Delta P:                29    Volume status:  Chugging noted?      none  CVP:                         Not measure  MAP:                         90  MD notified (name):  Dr. Hernandez    Anticoagulation:  ACT/aPTT/Xa parameters:        0.25   -  0.3  ACT/aPTT/Xa trends this shift:  0.35    Cannula size / status / placement:  Arterial:   Venous 1:    23 [place] @ RIJV  @  4 cm  from insertion  Venous 2:    27 [place] @ RFV   @ 12 cm from insertion  Dual lumen:      Additional Comments:    Patient was stable on VV ECMO with only an increase of the sweep to 11 lpm and FIO2 to 70%.

## 2020-05-26 NOTE — PROGRESS NOTES
[facility] - Respiratory ICU  Cardiothoracic Surgery  Daily ECMO Progress Note    Patient Name: Ranjana Sanchez  MRN: [id number]  Admission Date: 4/10/2020  Hospital Length of Stay: 46 days  Code Status: Full Code   Attending Physician: Francis Ayon MD   Referring Provider: Francis Ayon MD  Principal Problem:Acute respiratory distress syndrome (ARDS) due to COVID-19 virus    Subjective:   Interval History:  NAEO, afebrile, HR 110s. Got 250 albumin overnight. /hr on lasix gtt of 1. Positive 900mL. BM x1 overnight. Sweep further increased 11. FiO2 increased to 70.    Medications:  Continuous Infusions:   dexmedetomidine (PRECEDEX) infusion 0.2 mcg/kg/hr (05/26/20 0900)    furosemide (LASIX) 2 mg/mL continuous infusion (non-titrating) 1 mg/hr (05/26/20 0900)    heparin (porcine) in 5 % dex 700 Units/hr (05/26/20 0900)    insulin (HUMAN R) infusion (adults) 0.2 Units/hr (05/26/20 0700)    nicardipine 1 mg/hr (05/26/20 0600)     Scheduled Meds:   albumin human 25%  12.5 g Intravenous Once    amLODIPine  5 mg Oral Daily    aspirin  81 mg Per NG tube Daily    chlorhexidine  15 mL Mouth/Throat BID    diazePAM  0.5 mg Oral BID    fentaNYL  1 patch Transdermal Q72H    labetalol  200 mg Per G Tube BID    multivitamin liquid no.118  10 mL Per G Tube Daily    pantoprazole  40 mg Intravenous Q12H    piperacillin-tazobactam (ZOSYN) IVPB  4.5 g Intravenous Q8H    polyethylene glycol  17 g Per NG tube Daily    potassium chloride 10%  20 mEq Per NG tube BID    psyllium husk (aspartame)  1 packet Per NG tube TID    QUEtiapine  50 mg Per NG tube BID    sucralfate  1 g Per NG tube Q6H    vancomycin (VANCOCIN) IVPB  1,250 mg Intravenous Q24H    vitamin D  1,000 Units Per G Tube Daily        Objective:     Vital Signs (Most Recent):  Temp: 98.4 °F (36.9 °C) (05/26/20 0700)  Pulse: 106 (05/26/20 0930)  Resp: (!) 41 (05/25/20 0600)  BP: 115/68 (05/22/20 1500)  SpO2: 99 % (05/26/20 0930) Vital  Signs (24h Range):  Temp:  [98.3 °F (36.8 °C)-98.8 °F (37.1 °C)] 98.4 °F (36.9 °C)  Pulse:  [] 106  SpO2:  [97 %-100 %] 99 %  Arterial Line BP: ()/(51-73) 105/64     Weight: 58 kg (127 lb 13.9 oz)  Body mass index is 21.95 kg/m².    SpO2: 99 %  O2 Device (Oxygen Therapy): ventilator    Intake/Output - Last 3 Shifts       05/24 0700 - 05/25 0659 05/25 0700 - 05/26 0659 05/26 0700 - 05/27 0659    I.V. (mL/kg) 1002 (17.3) 588.1 (10.1)     Blood 400 350     NG/GT 2125 1750 375    IV Piggyback  450     Total Intake(mL/kg) 3527 (60.8) 3138.1 (54.1) 375 (6.5)    Urine (mL/kg/hr) 2345 (1.7) 2285 (1.6) 295 (1.7)    Stool 0 0     Blood 6 7     Total Output 2351 2292 295    Net +1176 +846.1 +80           Stool Occurrence 4 x 1 x           Lines/Drains/Airways     Peripherally Inserted Central Catheter Line            PICC Double Lumen 05/05/20 1707 right basilic 20 days          Central Venous Catheter Line                 ECMO Cannula 04/25/20 1120 right femoral vein 30 days         ECMO Cannula 04/25/20 1120 right internal jugular 30 days          Drain                 Urethral Catheter 04/18/20 1854 37 days         NG/OG Tube 05/03/20 1215 [place] sump;nasogastric 18 Fr. Left nostril 22 days          Airway                 Surgical Airway 05/05/20 1500 Shiley Cuffed 20 days          Arterial Line                 Arterial Line 04/27/20 1100 Right Brachial 28 days          Peripheral Intravenous Line                 Peripheral IV Triple Lumen 05/23/20 0500 20 G Anterior;Left Forearm 3 days         Peripheral IV - Single Lumen 05/26/20 0845 18 G Left Upper Arm less than 1 day                Physical Exam    Constitutional: He is sedated, and intubated.   Head: Normocephalic   Cardiovascular: Tachycardia 120s   Pulmonary/Chest: intubated vent FiO2 50% PEEP 10, ECMO RPM 2700, sweep 11, oxygenator FiO2 70%, flow 2.9  Skin: L neck and L groin cannulas intact, no flashing, clots pre and post-oxygenator, all sutures intact  and secure  Nursing note and vitals reviewed.    Significant Labs:  BMP:   Recent Labs   Lab 05/26/20  0200   *   *   K 4.4   *   CO2 26   BUN 41*   CREATININE 0.8   CALCIUM 9.5   MG 2.2     CBC:   Recent Labs   Lab 05/26/20  0757  05/26/20  0915   WBC 15.24*  --   --    RBC 3.52*  --   --    HGB 10.3*  --   --    HCT 33.1*   < > 29*   *  --   --    MCV 94  --   --    MCH 29.3  --   --    MCHC 31.1*  --   --     < > = values in this interval not displayed.     LFTs:   Recent Labs   Lab 05/26/20  0200   *   AST 67*   ALKPHOS 233*   BILITOT 0.7   PROT 6.6   ALBUMIN 3.3*     Significant Diagnostics:  Cxr: stable    Assessment/Plan:     Acute respiratory distress syndrome (ARDS) due to COVID-19 virus  - Crich switched to ETT on 4/11  - Monika weaned off 5/5  - Tracheostomy and bronch 5/5  - Thrombocytopenia resolved; transfuse for under 30K  - Aspirin 81 daily  - Metoprolol 25 BID  - RPM at 2700; flows low 3s  - Sweep at 11; CO2 goal is 59 or below; do not increase sweep for pCO2s 59 or below as long as patient is not acidotic, as these high pCO2s are respiratory compensation for alkalosis  - Oxygenator Fi at 70%; wean as tolerated  - Vent Fi 50%, PEEP 10  - Will consider reaching out to Pulmonology service today     Tongue laceration      -ENT evaluated, laceration superficial      -recs: bite block vs paralysis, will monitor    Sedation  - Valium, seroquel, PRN Fentanyl     UGI Bleed  - Scoped by GI 5/3 with epinephrine injection and clip placement for an actively bleeding D2/D3 Diuelafoy lesion    FEN/GI  - TF at 45, goal      - Lasix gtt reduced to 1  - FWB for hypernatremia 250mL q6     Anticoagulation      - Goal aXa 0.25 - 0.3 Q6hrs      - monitor LDH    Prophylaxis      - protonix q12 IV       - abx for ECMO; WBC stable      - PICC placed 5/5; central line removed 5/6    Anne Miller MD  Cardiothoracic Surgery  [facility] - Respiratory ICU    VV ECMO circuit checked  and all parameters reviewed. Anticoagulation was managed and all cannulation exit sites along with review of labs and radiology studies performed. Speed and functioning of the pump along with oxygenator quality reviewed.  Significant amount of time was spent in coordinating care with different teams which consists of the nursing staff perfusion team critical care and CT surgery team.  Patient seems to be doing well with no major concerns.  Seen to be optimistic in hopefully weaning patient of the the ECMO.  Small slow incremental improvement is noted.

## 2020-05-26 NOTE — PROGRESS NOTES
[facility] - Respiratory ICU  Cardiothoracic Surgery  Daily ECMO Progress Note    Patient Name: Ranjana Sanchez  MRN: [id number]  Admission Date: 4/10/2020  Hospital Length of Stay: 45 days  Code Status: Full Code   Attending Physician: Francis Ayon MD   Referring Provider: Francis Ayon MD  Principal Problem:Acute respiratory distress syndrome (ARDS) due to COVID-19 virus    Subjective:   Interval History:  NAEO, afebrile. HR 120s, Sweep increased  To 10. WBC down to 12.8 this AM.    Medications:  Continuous Infusions:   dexmedetomidine (PRECEDEX) infusion 0.2 mcg/kg/hr (05/25/20 1900)    furosemide (LASIX) 2 mg/mL continuous infusion (non-titrating) 1 mg/hr (05/25/20 1900)    heparin (porcine) in 5 % dex 800 Units/hr (05/25/20 2000)    insulin (HUMAN R) infusion (adults) 1 Units/hr (05/25/20 1900)    nicardipine 5 mg/hr (05/25/20 1900)     Scheduled Meds:   amLODIPine  5 mg Oral Daily    aspirin  81 mg Per NG tube Daily    chlorhexidine  15 mL Mouth/Throat BID    diazePAM  0.5 mg Oral BID    fentaNYL  1 patch Transdermal Q72H    labetalol  200 mg Per G Tube BID    multivitamin liquid no.118  10 mL Per G Tube Daily    pantoprazole  40 mg Intravenous Q12H    piperacillin-tazobactam (ZOSYN) IVPB  4.5 g Intravenous Q8H    polyethylene glycol  17 g Per NG tube Daily    potassium chloride 10%  20 mEq Per NG tube BID    psyllium husk (aspartame)  1 packet Per NG tube TID    QUEtiapine  50 mg Per NG tube BID    sucralfate  1 g Per NG tube Q6H    vancomycin (VANCOCIN) IVPB  1,250 mg Intravenous Q24H    vitamin D  1,000 Units Per G Tube Daily        Objective:     Vital Signs (Most Recent):  Temp: 98.4 °F (36.9 °C) (05/25/20 1730)  Pulse: (!) 138 (05/25/20 2018)  Resp: (!) 41 (05/25/20 0600)  BP: 115/68 (05/22/20 1500)  SpO2: 100 % (05/25/20 2018) Vital Signs (24h Range):  Temp:  [98.3 °F (36.8 °C)-98.7 °F (37.1 °C)] 98.4 °F (36.9 °C)  Pulse:  [] 138  Resp:  [38-55] 41  SpO2:   [93 %-100 %] 100 %  Arterial Line BP: ()/(51-69) 107/64     Weight: 58 kg (127 lb 13.9 oz)  Body mass index is 21.95 kg/m².    SpO2: 100 %  O2 Device (Oxygen Therapy): ventilator    Intake/Output - Last 3 Shifts       05/23 0700 - 05/24 0659 05/24 0700 - 05/25 0659 05/25 0700 - 05/26 0659    I.V. (mL/kg) 1769 (27.9) 1002 (17.3) 155.7 (2.7)    Blood 350 400 350    NG/GT 2100 2125 935    IV Piggyback   450    Total Intake(mL/kg) 4219 (66.5) 3527 (60.8) 1890.7 (32.6)    Urine (mL/kg/hr) 4070 (2.7) 2345 (1.7) 1375 (1.7)    Stool 0 0     Blood 4 6 6    Total Output 4074 2351 1381    Net +145 +1176 +509.7           Stool Occurrence 3 x 4 x           Lines/Drains/Airways     Peripherally Inserted Central Catheter Line            PICC Double Lumen 05/05/20 1707 right basilic 20 days          Central Venous Catheter Line                 ECMO Cannula 04/25/20 1120 right femoral vein 30 days         ECMO Cannula 04/25/20 1120 right internal jugular 30 days          Drain                 Urethral Catheter 04/18/20 1854 37 days         NG/OG Tube 05/03/20 1215 [place] sump;nasogastric 18 Fr. Left nostril 22 days          Airway                 Surgical Airway 05/05/20 1500 Shiley Cuffed 20 days          Arterial Line                 Arterial Line 04/27/20 1100 Right Brachial 28 days          Peripheral Intravenous Line                 Peripheral IV Triple Lumen 05/23/20 0500 20 G Anterior;Left Forearm 2 days         Peripheral IV Triple Lumen 05/23/20 0500 20 G Left Hand 2 days                Physical Exam    Constitutional: He is sedated, and intubated.   Head: Normocephalic   Cardiovascular: Tachycardia 120s   Pulmonary/Chest: intubated vent FiO2 50% PEEP 10, ECMO RPM 2700, sweep 10, oxygenator FiO2 55%, flows 3  Skin: L neck and L groin cannulas intact, no flashing, clots pre and post-oxygenator, all sutures intact and secure  Nursing note and vitals reviewed.    Significant Labs:  BMP:   Recent Labs   Lab 05/25/20 2000    *   *   K 3.7      CO2 27   BUN 40*   CREATININE 0.7   CALCIUM 9.5   MG 2.0     CBC:   Recent Labs   Lab 05/25/20 2000 05/25/20 2016   WBC 23.76*  --    RBC 3.87*  --    HGB 11.5*  --    HCT 36.5* 34*   *  --    MCV 94  --    MCH 29.7  --    MCHC 31.5*  --      LFTs:   Recent Labs   Lab 05/25/20 2000   ALT 98*   AST 68*   ALKPHOS 249*   BILITOT 0.9   PROT 6.5   ALBUMIN 3.2*     Significant Diagnostics:  Cxr: stable    ROS        Assessment/Plan:     Acute respiratory distress syndrome (ARDS) due to COVID-19 virus  - Crich switched to ETT on 4/11  - Monika weaned off 5/5  - Tracheostomy and bronch 5/5  - Thrombocytopenia resolved; transfuse for under 30K  - Aspirin 81 daily  - Metoprolol 25 BID  - RPM at 2700; flows low 3s  - Sweep at 10; CO2 goal is 59 or below; do not increase sweep for pCO2s 59 or below as long as patient is not acidotic, as these high pCO2s are respiratory compensation for alkalosis  - Oxygenator Fi at 55%; wean to 50 today as tolerated  - Vent Fi 50%, PEEP 10     Tongue laceration      -ENT evaluated, laceration superficial      -recs: bite block vs paralysis, will monitor    Sedation  - Valium, seroquel, PRN Fentanyl     UGI Bleed  - Scoped by GI 5/3 with epinephrine injection and clip placement for an actively bleeding D2/D3 Diuelafoy lesion    FEN/GI  - TF at 45, goal      - Lasix gtt reduced to 2.5  - FWB for hypernatremia 250mL q6     Anticoagulation      - Goal aXa 0.25 - 0.3 Q6hrs      - monitor LDH    Prophylaxis      - protonix q12 IV       - abx for ECMO; WBC stable on Vanc/Zosyn      - PICC placed 5/5; central line removed 5/6    Anne Miller MD  Cardiothoracic Surgery  [facility]    VV ECMO circuit checked and all parameters reviewed. Anticoagulation was managed and all cannulation exit sites along with review of labs and radiology studies performed. Speed and functioning of the pump along with oxygenator quality  reviewed.  Patient doing well and is nutritional goals continued to be maintained with a recent albumin of 3.2 indicating good nutritional intake.  Range of motion was carried out by myself.  Patient has developed contractures in his upper and lower extremities.

## 2020-05-27 NOTE — PROGRESS NOTES
[facility] - Respiratory ICU  Critical Care - Surgery  Progress Note    Patient Name: Ranjana Sanchez  MRN: [id number]  Admission Date: 4/10/2020  Hospital Length of Stay: 47 days  Code Status: Full Code  Attending Provider: Francis Ayon MD  Primary Care Provider: Primary Doctor No   Principal Problem: Acute respiratory distress syndrome (ARDS) due to COVID-19 virus    Subjective:     Hospital/ICU Course:  No notes on file    Interval History/Significant Events:   Sweep 7.5, not much change otherwise.    Follow-up For: Procedure(s) (LRB):  CREATION, TRACHEOSTOMY  (N/A)    Post-Operative Day: 22 Days Post-Op    Objective:     Vital Signs (Most Recent):  Temp: 98.3 °F (36.8 °C) (05/27/20 1100)  Pulse: (!) 118 (05/27/20 1445)  Resp: (!) 41 (05/25/20 0600)  BP: 115/68 (05/22/20 1500)  SpO2: 100 % (05/27/20 1445) Vital Signs (24h Range):  Temp:  [98.3 °F (36.8 °C)-98.5 °F (36.9 °C)] 98.3 °F (36.8 °C)  Pulse:  [102-133] 118  SpO2:  [96 %-100 %] 100 %  Arterial Line BP: ()/(48-68) 104/61     Weight: 58 kg (127 lb 13.9 oz)  Body mass index is 21.95 kg/m².      Intake/Output Summary (Last 24 hours) at 5/27/2020 1554  Last data filed at 5/27/2020 1400  Gross per 24 hour   Intake 2994 ml   Output 3892 ml   Net -898 ml       Physical Exam   Constitutional: He appears well-developed.   HENT:   Head: Normocephalic and atraumatic.   Eyes: Pupils are equal, round, and reactive to light.   Neck: Normal range of motion.   Cardiovascular:   tachycardic   Pulmonary/Chest:   Trached, mechanically ventilated  On ECMO, cannulated in RIJ and fem vein   Abdominal: Soft. He exhibits no distension.   Musculoskeletal: Normal range of motion.   Neurological:   sedated   Skin: Skin is warm.   Vitals reviewed.      Vents:  Vent Mode: A/C (05/27/20 1253)  Ventilator Initiated: Yes (04/10/20 7864)  Set Rate: 12 BPM (05/27/20 1253)  Vt Set: 200 mL (05/18/20 1244)  PEEP/CPAP: 10 cmH20 (05/27/20 1253)  Oxygen Concentration (%):  50 (05/27/20 1445)  Peak Airway Pressure: 26 cmH2O (05/27/20 1253)  Plateau Pressure: 22 cmH20 (05/27/20 1253)  Total Ve: 8.66 mL (05/27/20 1253)  F/VT Ratio<105 (RSBI): 130 (05/23/20 0039)    Lines/Drains/Airways     Peripherally Inserted Central Catheter Line            PICC Double Lumen 05/05/20 1707 right basilic 21 days          Central Venous Catheter Line                 ECMO Cannula 04/25/20 1120 right femoral vein 32 days         ECMO Cannula 04/25/20 1120 right internal jugular 32 days          Drain                 Urethral Catheter 04/18/20 1854 38 days         NG/OG Tube 05/03/20 1215 [place] sump;nasogastric 18 Fr. Left nostril 24 days          Airway                 Surgical Airway 05/05/20 1500 Shiley Cuffed 22 days          Arterial Line                 Arterial Line 04/27/20 1100 Right Brachial 30 days          Peripheral Intravenous Line                 Peripheral IV Triple Lumen 05/23/20 0500 20 G Anterior;Left Forearm 4 days         Peripheral IV - Single Lumen 05/26/20 0845 18 G Left Upper Arm 1 day                Significant Labs:    CBC/Anemia Profile:  Recent Labs   Lab 05/26/20  0200  05/27/20  0200  05/27/20  0750 05/27/20  1159 05/27/20  1400   WBC 19.42*   < > 12.21  --  12.78*  --  13.49*   HGB 10.4*   < > 10.0*  --  10.3*  --  10.4*   HCT 33.3*   < > 32.1*   < > 33.2* 28* 33.2*   *   < > 127*  --  134*  --  133*   MCV 94   < > 95  --  95  --  94   RDW 14.4   < > 14.6*  --  14.5  --  14.6*   FERRITIN 1,458*  --  1,206*  --   --   --   --     < > = values in this interval not displayed.        Chemistries:  Recent Labs   Lab 05/27/20  0200 05/27/20  0750 05/27/20  1400   * 148* 150*   K 3.9 3.9 3.6    108 107   CO2 29 30* 32*   BUN 36* 39* 39*   CREATININE 0.8 0.8 0.8   CALCIUM 9.8 10.2 10.0   ALBUMIN 3.9 3.8 3.8   PROT 7.0 7.2 7.1   BILITOT 0.6 0.7 0.8   ALKPHOS 209* 201* 204*   * 105* 100*   AST 54* 52* 46*   MG 2.1 2.1 2.2   PHOS 3.5 4.0 3.7       Significant  Imaging:  I have reviewed and interpreted all pertinent imaging results/findings within the past 24 hours.    Assessment/Plan:     COVID-19 virus detected  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  This was switched to ETT on 4/11.  Cannulated on 4/25.    Neuro:  5 of valium  100 BID of seroquel  Fentanyl patch    Pulm  AC/VC+ on the vent.  Small pneumo on cxr, subclinical.  Sweep of 8.5 today, 2800 RPM  Will need some more time on ECMO, not pulling enough volume on the vent    Cardiac   Labetalol as needed for pressures    GI  TFs at goal    Renal  Making urine, diuresing agressively    Heme/ID  Fluc/Vanc  White count normalized  Hep gtt      Dispo:  Remain in ICU, lungs not ready to work much yet               Critical care was time spent personally by me on the following activities: development of treatment plan with patient or surrogate and bedside caregivers, discussions with consultants, evaluation of patient's response to treatment, examination of patient, ordering and performing treatments and interventions, ordering and review of laboratory studies, ordering and review of radiographic studies, pulse oximetry, re-evaluation of patient's condition.  This critical care time did not overlap with that of any other provider or involve time for any procedures.     Zelalem Ariza MD  Critical Care - Surgery  [facility]

## 2020-05-27 NOTE — PROGRESS NOTES
ECMO Specialists shift report    Date: 05/27/2020  ECMO Specialist:  Cyrus Stefany    Pump parameters:  RPM:                         2700  Flow:                               2.97  Sweep:                         10.5  FiO2:                            60    Oxygenator status:  Clots:                           Pre and Post Oxygenator  Fibrin:                          None observed    Pressure trends:  P1:                               106  P2:                                 76  Delta P:                         30    Volume status:  Chugging noted?         Minor chugging observe but no intervention required at this time.  CVP:                            Not monitored at this time  MAP:                            76  MD notified (name):     Dr Ayon    Anticoagulation:  ACT/aPTT/Xa parameters:       0.25  -  0.30  ACT/aPTT/Xa trends this shift: 0.27    Cannula size / status / placement:  Arterial:   Venous 1:   23 [place] @ RIJV @    4cm from insertion  Venous 2:   27 [place] @ RFV  @ 12 cm from insertion   Dual lumen:      Additional Comments:    Patient maintained on VV ECMO without any complications.  There was minor chugging which was not requiring any action.

## 2020-05-27 NOTE — PROGRESS NOTES
Pharmacokinetic Assessment Follow Up: IV Vancomycin    Vancomycin serum concentration assessment(s):  · Vancomycin trough level resulted as 18.3 mcg/mL, drawn appropriately. Goal level is 10 to 20 mcg/mL  · Renal function has remained stable but furosemide drip has increased    Drug levels (last 3 results):  Recent Labs   Lab Result Units 05/27/20  0750   Vancomycin, Random ug/mL 18.3     Vancomycin Regimen Plan:  · Decrease to vancomycin IV 1000 mg every 24 hours. Next trough ordered on 5/30 0830, prior to 3rd dose of new regimen    Pharmacy will continue to follow and monitor vancomycin. Please contact pharmacy for questions regarding this assessment.    Thank you for the consult,     Sonya Banks, PharmD, [facility]  EXT [id number]    _________________________________________________________________________________       Patient brief summary:  Ranjana Sanchez is a 54 y.o. male initiated on antimicrobial therapy with IV vancomycin for treatment of sepsis    Drug Allergies:   Review of patient's allergies indicates:  No Known Allergies    Actual Body Weight:   61 kg     Renal Function:   Estimated Creatinine Clearance: 84.6 mL/min (based on SCr of 0.8 mg/dL).    Dialysis Method (if applicable):  N/A    CBC (last 72 hours):  Recent Labs   Lab Result Units 05/24/20  1419 05/24/20 2002 05/25/20  0210 05/25/20  0800 05/25/20  1400 05/25/20 2000 05/26/20  0200 05/26/20  0757 05/26/20  1352 05/26/20 2000 05/27/20  0200 05/27/20  0750 05/27/20  0800   WBC K/uL 13.99* 14.25* 12.81* 14.63* 12.46 23.76* 19.42* 15.24* 12.56 12.78* 12.21 12.78*  --    Hemoglobin g/dL 9.4* 9.4* 9.1* 10.5* 9.9* 11.5* 10.4* 10.3* 10.2* 10.6* 10.0* 10.3*  --    Hemoglobin, Free, Plasma mg/dL  --   --   --  30  --   --   --  30  --   --   --   --  50*   Hematocrit % 31.1* 30.4* 29.6* 34.1* 32.0* 36.5* 33.3* 33.1* 33.0* 33.8* 32.1* 33.2*  --    Platelets K/uL 184 176 168 160 151 140* 140* 135* 128* 124* 127* 134*  --    Gran% % 63.2 61.7 61.6  64.3 63.6 84.7* 81.0* 71.4 66.2 64.2 63.0 63.0  --    Lymph% % 7.5* 7.8* 8.2* 8.1* 7.8* 2.1* 3.9* 6.6* 8.4* 7.1* 7.9* 9.2*  --    Mono% % 7.9 8.8 8.0 8.4 8.7 2.4* 3.6* 6.5 7.5 8.9 9.6 8.5  --    Eosinophil% % 17.2* 17.8* 18.3* 15.1* 15.7* 7.5 8.5* 12.3* 15.2* 16.3* 17.0* 16.6*  --    Basophil% % 0.7 0.6 0.5 0.5 0.6 0.5 0.5 0.6 0.6 0.8 0.5 0.9  --    Differential Method  Automated Automated Automated Automated Automated Automated Automated Automated Automated Automated Automated Automated  --        Metabolic Panel (last 72 hours):  Recent Labs   Lab Result Units 05/24/20  1419 05/24/20 2002 05/25/20  0210 05/25/20  0800 05/25/20  1400 05/25/20 2000 05/26/20  0200 05/26/20  0757 05/26/20  1352 05/26/20 2000 05/27/20  0200 05/27/20  0750   Sodium mmol/L 145 146* 147* 145 145 146* 147* 149* 146* 149* 150* 148*   Potassium mmol/L 4.0 3.9 3.9 3.7 4.0 3.7 4.4 4.0 3.8 4.0 3.9 3.9   Chloride mmol/L 104 105 108 107 108 110 111* 113* 111* 111* 109 108   CO2 mmol/L 31* 31* 27 28 29 27 26 26 27 29 29 30*   Glucose mg/dL 124* 176* 183* 196* 169* 164* 156* 185* 183* 203* 166* 192*   BUN, Bld mg/dL 40* 40* 42* 40* 39* 40* 41* 39* 35* 36* 36* 39*   Creatinine mg/dL 0.8 0.8 0.8 0.7 0.7 0.7 0.8 0.7 0.7 0.8 0.8 0.8   Albumin g/dL 3.5 3.5 3.5 3.5 3.3* 3.2* 3.3* 3.5 3.3* 3.3* 3.9 3.8   Total Bilirubin mg/dL 0.8 0.7 0.7 0.7 0.7 0.9 0.7 0.7 0.6 0.7 0.6 0.7   Alkaline Phosphatase U/L 249* 257* 265* 271* 250* 249* 233* 225* 222* 230* 209* 201*   AST U/L 55* 58* 64* 72* 63* 68* 67* 69* 63* 69* 54* 52*   ALT U/L 68* 77* 84* 94* 94* 98* 105* 108* 110* 128* 106* 105*   Magnesium mg/dL 2.4 2.3 2.3 2.2 2.3 2.0 2.2 2.2 2.2 2.1 2.1 2.1   Phosphorus mg/dL 3.7 3.1 2.7 2.9 3.0 3.4 2.4* 2.8 2.6* 2.7 3.5 4.0       Vancomycin Administrations:  vancomycin given in the last 96 hours                   vancomycin in dextrose 5 % 1 gram/250 mL IVPB 1,000 mg (mg) 1,000 mg New Bag 05/04/20 1130     1,000 mg New Bag 05/03/20 2202     1,000 mg New Bag  1050      1,000 mg New Bag 05/02/20 2212     1,000 mg New Bag  1003     1,000 mg New Bag 05/01/20 2223     1,000 mg New Bag  1000     1,000 mg New Bag 04/30/20 2201                Microbiologic Results:  Microbiology Results (last 7 days)     ** No results found for the last 168 hours. **

## 2020-05-27 NOTE — PROGRESS NOTES
ECMO Specialists shift report    Date: 05/27/2020  ECMO Specialist:  Lia Alvarez    Pump parameters:  RPM: 2700  Flow:  3.0  Sweep:  8.5  FiO2:  60    Oxygenator status:  Clots: yes; few pre & post  Fibrin: present at connectors    Pressure trends:  P1: 108  P2: 79  Delta P: 29    Volume status:  Chugging noted - minor  MAP: 80's   MD notified (name):  Mary    Anticoagulation:  ACT/aPTT/Xa parameters: 0.25-0.3  ACT/aPTT/Xa trends this shift: 0.28    Cannula size / status / placement:  Arterial:   Venous 1: 23FR / RIJV / 4 cm  Venous 2: 27FR / RFV / 12 cm  Dual lumen: no     Additional Comments:        Pt remains on VV ECMO tolerating some weaning today of Sweep & FiO2 to oxygenator.  Only very slight chugging noted but no blood products given today as of yet.   Sx produces mod. amts of tan secretions; BS are rhonchi throughout & diminished in RML.  Pt does have spontaneous coughing. Cannulas remain in place; no bleeding from sites today. HR still tachycardic.  Sedation minimal; Hep stable all day at 7.

## 2020-05-27 NOTE — SUBJECTIVE & OBJECTIVE
Interval History/Significant Events:   Sweep 7.5, not much change otherwise.    Follow-up For: Procedure(s) (LRB):  CREATION, TRACHEOSTOMY  (N/A)    Post-Operative Day: 22 Days Post-Op    Objective:     Vital Signs (Most Recent):  Temp: 98.3 °F (36.8 °C) (05/27/20 1100)  Pulse: (!) 118 (05/27/20 1445)  Resp: (!) 41 (05/25/20 0600)  BP: 115/68 (05/22/20 1500)  SpO2: 100 % (05/27/20 1445) Vital Signs (24h Range):  Temp:  [98.3 °F (36.8 °C)-98.5 °F (36.9 °C)] 98.3 °F (36.8 °C)  Pulse:  [102-133] 118  SpO2:  [96 %-100 %] 100 %  Arterial Line BP: ()/(48-68) 104/61     Weight: 58 kg (127 lb 13.9 oz)  Body mass index is 21.95 kg/m².      Intake/Output Summary (Last 24 hours) at 5/27/2020 1554  Last data filed at 5/27/2020 1400  Gross per 24 hour   Intake 2994 ml   Output 3892 ml   Net -898 ml       Physical Exam   Constitutional: He appears well-developed.   HENT:   Head: Normocephalic and atraumatic.   Eyes: Pupils are equal, round, and reactive to light.   Neck: Normal range of motion.   Cardiovascular:   tachycardic   Pulmonary/Chest:   Trached, mechanically ventilated  On ECMO, cannulated in RIJ and fem vein   Abdominal: Soft. He exhibits no distension.   Musculoskeletal: Normal range of motion.   Neurological:   sedated   Skin: Skin is warm.   Vitals reviewed.      Vents:  Vent Mode: A/C (05/27/20 1253)  Ventilator Initiated: Yes (04/10/20 2247)  Set Rate: 12 BPM (05/27/20 1253)  Vt Set: 200 mL (05/18/20 1244)  PEEP/CPAP: 10 cmH20 (05/27/20 1253)  Oxygen Concentration (%): 50 (05/27/20 1445)  Peak Airway Pressure: 26 cmH2O (05/27/20 1253)  Plateau Pressure: 22 cmH20 (05/27/20 1253)  Total Ve: 8.66 mL (05/27/20 1253)  F/VT Ratio<105 (RSBI): 130 (05/23/20 0039)    Lines/Drains/Airways     Peripherally Inserted Central Catheter Line            PICC Double Lumen 05/05/20 1707 right basilic 21 days          Central Venous Catheter Line                 ECMO Cannula 04/25/20 1120 right femoral vein 32 days         ECMO  Cannula 04/25/20 1120 right internal jugular 32 days          Drain                 Urethral Catheter 04/18/20 1854 38 days         NG/OG Tube 05/03/20 1215 [place] sump;nasogastric 18 Fr. Left nostril 24 days          Airway                 Surgical Airway 05/05/20 1500 Shiley Cuffed 22 days          Arterial Line                 Arterial Line 04/27/20 1100 Right Brachial 30 days          Peripheral Intravenous Line                 Peripheral IV Triple Lumen 05/23/20 0500 20 G Anterior;Left Forearm 4 days         Peripheral IV - Single Lumen 05/26/20 0845 18 G Left Upper Arm 1 day                Significant Labs:    CBC/Anemia Profile:  Recent Labs   Lab 05/26/20  0200  05/27/20  0200  05/27/20  0750 05/27/20  1159 05/27/20  1400   WBC 19.42*   < > 12.21  --  12.78*  --  13.49*   HGB 10.4*   < > 10.0*  --  10.3*  --  10.4*   HCT 33.3*   < > 32.1*   < > 33.2* 28* 33.2*   *   < > 127*  --  134*  --  133*   MCV 94   < > 95  --  95  --  94   RDW 14.4   < > 14.6*  --  14.5  --  14.6*   FERRITIN 1,458*  --  1,206*  --   --   --   --     < > = values in this interval not displayed.        Chemistries:  Recent Labs   Lab 05/27/20  0200 05/27/20  0750 05/27/20  1400   * 148* 150*   K 3.9 3.9 3.6    108 107   CO2 29 30* 32*   BUN 36* 39* 39*   CREATININE 0.8 0.8 0.8   CALCIUM 9.8 10.2 10.0   ALBUMIN 3.9 3.8 3.8   PROT 7.0 7.2 7.1   BILITOT 0.6 0.7 0.8   ALKPHOS 209* 201* 204*   * 105* 100*   AST 54* 52* 46*   MG 2.1 2.1 2.2   PHOS 3.5 4.0 3.7       Significant Imaging:  I have reviewed and interpreted all pertinent imaging results/findings within the past 24 hours.

## 2020-05-27 NOTE — NURSING
"      SICU PLAN OF CARE NOTE    Dx: Acute respiratory distress syndrome (ARDS) due to COVID-19 virus    Shift Events: MAP 70-85 (goal). BM x 2    Neuro: opens eyes spontaneously, withdraws. Does not follow commands     Vital Signs: /68   Pulse (!) 129   Temp 98.3 °F (36.8 °C) (Oral)   Resp (!) 41   Ht 5' 4" (1.626 m)   Wt 58 kg (127 lb 13.9 oz)   SpO2 99%   BMI 21.95 kg/m²     Respiratory: AC/VC 50%, 10 PEEP  VV ECMO, 60% FiO2, Sweep 8.5, RPM's 2700, Flows 2.9-3  Tachypneic 40-60's resp/min throughout shift    Diet: Tube Feeds @ 45 (goal), Peptamen Intense  250 cc water bolus given q6h.    Gtts: Precedex, Lasix, Heparin and Nicardipine    Urine Output: Urinary Catheter  2100cc/shift (125-200cc/hr)      Labs/Accuchecks: Labs q6, K replaced. Accu checks q4     Skin: see flowsheet    Cannulated RIJ @ 4 cm, R Fem @ 12 cm, sites CDI       "

## 2020-05-27 NOTE — PLAN OF CARE
"    Dx: Acute respiratory distress syndrome (ARDS) due to COVID-19 virus    Shift Events: NAEON. Pt remains on VV ECMO. Sweep @ 10.5 with Fi02 60%. RPM 2700, flows ~2.9-3.     Neuro: withdraws in all extremities, eyes open to voice. Pt does not follow commands, Mds aware.     Vital Signs: /68   Pulse (!) 111   Temp 98.5 °F (36.9 °C) (Oral)   Resp (!) 41   Ht 5' 4" (1.626 m)   Wt 58 kg (127 lb 13.9 oz)   SpO2 98%   BMI 21.95 kg/m²     HR 100s-130s, MAPs maintained 70-85, cardene weaned off early in shift.     Respiratory: AC PC 50% 10 PEEP sats >97%, pt very tachypneic as high as 60 resp/min, MD aware    Diet: NPO. Tolerating TF at goal of 45 ml/hr with minimal residuals, water boluses given per order    Gtts: heparin, lasix, precedex    Urine Output: 100-325 ml/hr with a total of 1800 ml for shift    Drains: Cannulated RIJ @ 4 cm, R Fem @ 12 cm, sites CDI    Labs/Accuchecks: q4 accuchecks, labs q6, electrolytes replaced per orders. Discussed fibrinogen as well as other labs/vitals/assessment with Dr. Perera overnight.        "

## 2020-05-28 NOTE — PROGRESS NOTES
Dr. Ayon here @bedside with Pulmonary Dr. Tim for assessment.  Rpm's decreased to 2600 with 2.9 lpm flow & no complications; vent changes made accordingly.  Follow-up ABG's & lytes all acceptable.    ECMO settings now:  2600 rpm's                                      2.91 lpm flow                                      8.5 lpm Sweep                                       50% FiO2

## 2020-05-28 NOTE — PROGRESS NOTES
Dr. Ayon at bedside.  Md notified of gtts, rate, uop, all vs, and neuro assessment.  Pt withdraws BLE to painful stimuli, opens eyes spontaneously, moves BUE inward toward center of body spontaneously.

## 2020-05-28 NOTE — PROGRESS NOTES
"05/28/2020  Maikel Zepeda    Current provider:  Francis Ayon MD      As floor rounding has been requested to be limited during COVID-19 patient quarantine by Infectious Disease and leadership, only "electronic" rounding has been performed on this mechanical assist device patient.  Electronic rounding includes verifying in Epic that current settings and measurements for the device have been documented appropriately and that they are within limits.  Additionally, this rounding verifies that the EQUIPMENT section of the VAD flowsheet is documented in Epic, specifically checking the presence of emergency equipment and controller self test.  Any discrepancies will be related to the care team.    In emergency, the nursing units have been notified to contact the perfusion department either by:  Calling [phone] from 630am to 4pm, or by contacting the hospital  from 4pm to 630am and asking to speak with the perfusionist on call.    Maikel Zepeda  12:36 PM  "

## 2020-05-28 NOTE — PROGRESS NOTES
[facility] Specialists shift report    Date: 05/28/2020  ECMO Specialist:  Ashley Francisco    Pump parameters:  RPM: 2700  Flow: 3.1  Sweep:  8.5  FiO2:  60    Oxygenator status:  Clots: pre and post  Fibrin: pre and post    Pressure trends:  P1: 104  P2: 78  Delta P: 26    Volume status:  Chugging noted?No  CVP: NA  MAP:  80  MD notified (name):  Nany    Anticoagulation:  ACT/aPTT/Xa parameters: .25-.3  ACT/aPTT/Xa trends this shift: .3    Cannula size / status / placement:  Arterial:   Venous 1: RIJV 23F@4 cm  Venous 2: RFV 27F@ 12 cm  Dual lumen:      Additional Comments: Agitated through out night.  40-60 BPM.  Some chirpping from circuit(dry). 250 Albumin given.  Flows maintained.

## 2020-05-28 NOTE — PROGRESS NOTES
[facility] Specialists shift report    Date: 05/28/2020  ECMO Specialist:  Lia Alvarez    Pump parameters:  RPM: 2700  Flow:  3.1  Sweep:  8.5  FiO2:  50    Oxygenator status:  Clots: yes a few; pre & post  Fibrin: some present @connector site    Pressure trends:  P1: 104  P2: 74  Delta P: 30    Volume status:  Chugging noted - very slight periodically  MAP:  80-90's  MD notified (name):  Angela Garcia & Nany    Anticoagulation:  ACT/aPTT/Xa parameters:  Xa 0.25-0.3  ACT/aPTT/Xa trends this shift: Xa 0.28    Cannula size / status / placement:  Arterial:   Venous 1: 23FR/RIJV/@4 cm  Venous 2: 27FR/RFV/@12 cm  Dual lumen: no     Additional Comments:      Pt remains VV ECMO with some weaning of settings today including Sweep & FiO2; flows maintained well.  ECMO cannulas remain in place at neck & groin.  Trach remains in place w little change in x-ray or BS.

## 2020-05-28 NOTE — SUBJECTIVE & OBJECTIVE
Interval History:  NAEO, afebrile, HR 110s-120s. UOP 3.5L on lasix gtt of 2.5. Euvolemic past 24hrs. Weaned sweep and oxygenator FIO2. Dark blood BM x1 overnight. Hb 9.5 from 10.5. Na and BUN increased, free water boluses intermittently held.    Medications:  Continuous Infusions:   dexmedetomidine (PRECEDEX) infusion 0.1 mcg/kg/hr (05/28/20 0900)    dextrose 10 % in water (D10W)      furosemide (LASIX) 2 mg/mL continuous infusion (non-titrating) 2.5 mg/hr (05/28/20 0900)    heparin (porcine) in 5 % dex 650 Units/hr (05/28/20 1000)    nicardipine 1 mg/hr (05/28/20 0900)     Scheduled Meds:   albumin human 25%  12.5 g Intravenous Once    amLODIPine  5 mg Oral Daily    aspirin  81 mg Per NG tube Daily    chlorhexidine  15 mL Mouth/Throat BID    diazePAM  0.5 mg Oral QHS    fentaNYL  1 patch Transdermal Q72H    labetalol  200 mg Per G Tube BID    multivitamin liquid no.118  10 mL Per G Tube Daily    pantoprazole  40 mg Intravenous Q12H    piperacillin-tazobactam (ZOSYN) IVPB  4.5 g Intravenous Q8H    polyethylene glycol  17 g Per NG tube Daily    potassium chloride 10%  20 mEq Per NG tube BID    psyllium husk (aspartame)  1 packet Per NG tube TID    QUEtiapine  50 mg Per NG tube QHS    sucralfate  1 g Per NG tube Q6H    vancomycin (VANCOCIN) IVPB  1,000 mg Intravenous Q24H    vitamin D  1,000 Units Per G Tube Daily        Objective:     Vital Signs (Most Recent):  Temp: 98.4 °F (36.9 °C) (05/28/20 0715)  Pulse: (!) 113 (05/28/20 0945)  Resp: (!) 45 (05/28/20 0152)  BP: (!) 101/55 (05/28/20 0814)  SpO2: 99 % (05/28/20 0945) Vital Signs (24h Range):  Temp:  [98.2 °F (36.8 °C)-98.4 °F (36.9 °C)] 98.4 °F (36.9 °C)  Pulse:  [103-140] 113  Resp:  [45-48] 45  SpO2:  [96 %-100 %] 99 %  BP: (101)/(55) 101/55  Arterial Line BP: ()/(48-76) 107/62     Weight: 59 kg (130 lb 1.1 oz)  Body mass index is 22.33 kg/m².    SpO2: 99 %  O2 Device (Oxygen Therapy): ventilator    Intake/Output - Last 3 Shifts        05/26 0700 - 05/27 0659 05/27 0700 - 05/28 0659 05/28 0700 - 05/29 0659    I.V. (mL/kg) 998 (17.2) 1125 (19.1)     Blood       NG/GT 2160 2080 285    IV Piggyback  350     Total Intake(mL/kg) 3158 (54.4) 3555 (60.3) 285 (4.8)    Urine (mL/kg/hr) 3520 (2.5) 3515 (2.5) 375 (2.1)    Stool 0      Blood 7 6 3    Total Output 3527 3521 378    Net -369 +34 -93           Stool Occurrence 1 x            Lines/Drains/Airways     Peripherally Inserted Central Catheter Line            PICC Double Lumen 05/05/20 1707 right basilic 22 days          Central Venous Catheter Line                 ECMO Cannula 04/25/20 1120 right femoral vein 32 days         ECMO Cannula 04/25/20 1120 right internal jugular 32 days          Drain                 Urethral Catheter 04/18/20 1854 39 days         NG/OG Tube 05/03/20 1215 [place] sump;nasogastric 18 Fr. Left nostril 24 days          Airway                 Surgical Airway 05/05/20 1500 Shiley Cuffed 22 days          Arterial Line                 Arterial Line 04/27/20 1100 Right Brachial 30 days          Peripheral Intravenous Line                 Peripheral IV - Single Lumen 05/26/20 0845 18 G Left Upper Arm 2 days         Peripheral IV - Single Lumen 05/27/20 2058 22 G Left;Posterior Hand less than 1 day                Physical Exam    Constitutional: He is sedated, and intubated.   Head: Normocephalic   Cardiovascular: Tachycardia 120s   Pulmonary/Chest: intubated vent FiO2 50% PEEP 10, ECMO RPM 2700, Flow 2.9, sweep 8.5, oxygenator FiO2 60%  Skin: L neck and L groin cannulas intact, no flashing, clots pre and post-oxygenator, all sutures intact and secure  Nursing note and vitals reviewed.    Significant Labs:  BMP:   Recent Labs   Lab 05/28/20  0754   *   *   K 3.5      CO2 32*   BUN 46*   CREATININE 0.8   CALCIUM 10.4   MG 2.1     CBC:   Recent Labs   Lab 05/28/20  0754   WBC 12.42   RBC 3.34*   HGB 9.7*   HCT 32.0*   *   MCV 96   MCH 29.0   MCHC 30.3*      LFTs:   Recent Labs   Lab 05/28/20  0754   ALT 78*   AST 33   ALKPHOS 172*   BILITOT 0.7   PROT 7.3   ALBUMIN 3.9     Significant Diagnostics:  Cxr: no changes

## 2020-05-28 NOTE — PROGRESS NOTES
"[facility] - Respiratory ICU  Adult Nutrition  Progress Note    SUMMARY       Recommendations    Recommendation:  1. Continue TF of Peptamen Intense VHP @ goal rate of 45 mL/hr at this time.    - Providing pt with 1080 kcal, 99 g protein and 907 mL free water.    Additional water per MD.   2. RD to monitor and follow up     Goals: Meet % EEN, EPN by RD f/u date  Nutrition Goal Status: goal met  Communication of RD Recs: (POC)    Reason for Assessment    Reason For Assessment: RD follow-up  Diagnosis: (COVID-19)  Relevant Medical History: HTN  Interdisciplinary Rounds: did not attend  General Information Comments: Remote assessment completed. Pt continues on vent and ECMO. TF continues running at goal rate of 45 mL/hr. No residuals noted or N/V/C/D at this time. Wt continues fluctuating within 10 lb range, likely fluid related. Wounds noted. Pt with no indications of malnutrition at this time. NFPE not performed, patient has been screened for possible COVID-19 and has been placed on airborne and contact precautions. Patient is noted as being positive for COVID-19.  Nutrition Discharge Planning: adequate intake via TFs    Nutrition Risk Screen    Nutrition Risk Screen: tube feeding or parenteral nutrition    Nutrition/Diet History    Food Allergies: NKFA  Factors Affecting Nutritional Intake: NPO, on mechanical ventilation    Anthropometrics    Temp: 98.4 °F (36.9 °C)  Height Method: Stated  Height: 5' 4" (162.6 cm)  Height (inches): 64 in  Weight Method: Bed Scale  Weight: 59 kg (130 lb 1.1 oz)  Weight (lb): 130.07 lb  Ideal Body Weight (IBW), Male: 130 lb  % Ideal Body Weight, Male (lb): 114.62 %  BMI (Calculated): 22.3  BMI Grade: 18.5-24.9 - normal  Usual Body Weight (UBW), kg: (JAMMIE)       Lab/Procedures/Meds    Pertinent Labs Reviewed: reviewed  Pertinent Labs Comments: Na 153; BUN 46; Glucose 205  Pertinent Medications Reviewed: reviewed  Pertinent Medications Comments: psyllium husk; aspirin; " fentanyl; MVI; pantoprazole; polyethylene glycol     Estimated/Assessed Needs    Weight Used For Calorie Calculations: 59 kg (130 lb 1.1 oz)  Energy Calorie Requirements (kcal): 1399 kcal/d  Energy Need Method: [facility]  Protein Requirements: 76-95 g/day(1.2-1.5 g/kg)  Weight Used For Protein Calculations: 63.5 kg (139 lb 15.9 oz)(dosing weight)  Fluid Requirements (mL): 1 mL/kcal or per MD  Estimated Fluid Requirement Method: RDA Method  RDA Method (mL): 1399      Nutrition Prescription Ordered    Current Diet Order: NPO  Current Nutrition Support Formula Ordered: Peptamen Intense VHP  Current Nutrition Support Rate Ordered: 45 (ml)  Current Nutrition Support Frequency Ordered: mL/hr    Evaluation of Received Nutrient/Fluid Intake    Enteral Calories (kcal): 1080  Enteral Protein (gm): 99  Enteral (Free Water) Fluid (mL): 907  % Kcal Needs: 77  % Protein Needs: 100  I/O: +2.8 L since admit   Energy Calories Required: meeting needs  Protein Required: meeting needs  Fluid Required: (per MD)  Comments: LBM 5/28  Tolerance: tolerating  % Intake of Estimated Energy Needs: 75 - 100 %  % Meal Intake: NPO    Nutrition Risk    Level of Risk/Frequency of Follow-up: (f/u 1 x wk)     Assessment and Plan    Nutrition Problem  Inadequate energy intake     Related to (etiology):   Inability to consume sufficient energy      Signs and Symptoms (as evidenced by):   NPO     Interventions (treatment strategy):  Collaboration of nutrition care w/ other providers   Enteral Nutrition      Nutrition Diagnosis Status:   Resolving    Monitor and Evaluation    Food and Nutrient Intake: energy intake, food and beverage intake, enteral nutrition intake  Food and Nutrient Adminstration: diet order, enteral and parenteral nutrition administration  Physical Activity and Function: nutrition-related ADLs and IADLs  Anthropometric Measurements: weight change, weight  Biochemical Data, Medical Tests and Procedures: inflammatory profile, lipid  profile, glucose/endocrine profile, gastrointestinal profile, electrolyte and renal panel  Nutrition-Focused Physical Findings: overall appearance     Nutrition Follow-Up    RD Follow-up?: Yes

## 2020-05-28 NOTE — PLAN OF CARE
On and off cardene gtt for map < 90  V-v ecmo rpm 2600, FIO2 50%, sweep 8.5  Not following commands.

## 2020-05-28 NOTE — PROGRESS NOTES
Patient on 50% and 10 of PEEP. -140 BPM through shift, MDs aware, MAP maintained below 85 via cardene gtt through shift. Afebrile, SATs 97-99% through shift. Cardene @ 1 mg/h, prec at .1 mcg/kg/min, heparin at 700 u/hr, lasix at 2.5 mg/hr, titrated down from 5 via MD orders. 500 mL albumin given through shift. Patient does not follow commands, no withdrawal to pain, opens eyes spontaneously but does not track. Patient -200 mL/hr. Patient had 1 BM through shift.     Skin: Wound care done via wound care orders, unable to turn due to patient acuity and ECMO, put on new pads in AM and cleaned with chlorohex. Will continue to monitor.

## 2020-05-28 NOTE — ASSESSMENT & PLAN NOTE
Acute respiratory distress syndrome (ARDS) due to COVID-19 virus  - Crich switched to ETT on 4/11  - Monika weaned off 5/5  - Tracheostomy and bronch 5/5  - Thrombocytopenia resolved; transfuse for under 30K  - Aspirin 81 daily  - Metoprolol 25 BID  - RPM at 2700; flows low 3s  - Sweep at 10.5; CO2 goal is 59 or below; do not increase sweep for pCO2s 59 or below as long as patient is not acidotic, as these high pCO2s are respiratory compensation for alkalosis  - Oxygenator Fi at 60%; wean as tolerated  - Vent Fi 50%, PEEP 10  - Will consider reaching out to Pulmonology service      Tongue laceration      -ENT evaluated, laceration superficial      -recs: bite block vs paralysis, will monitor    Sedation  - Valium, seroquel, PRN Fentanyl     UGI Bleed  - Scoped by GI 5/3 with epinephrine injection and clip placement for an actively bleeding D2/D3 Diuelafoy lesion    FEN/GI  - TF at 45, goal      - Lasix gtt reduced to 1  - FWB for hypernatremia 250mL q6     Anticoagulation      - Goal aXa 0.25 - 0.3 Q6hrs      - monitor LDH    Prophylaxis      - protonix q12 IV       - WBC stable      - PICC placed 5/5; central line removed 5/6

## 2020-05-28 NOTE — PROGRESS NOTES
[facility] ICU  Critical Care - Surgery  Progress Note    Patient Name: Ranjana Sanchez  MRN: [id number]  Admission Date: 4/10/2020  Hospital Length of Stay: 48 days  Code Status: Full Code  Attending Provider: Francis Ayon MD  Primary Care Provider: Primary Doctor No   Principal Problem: Acute respiratory distress syndrome (ARDS) due to COVID-19 virus    Subjective:     Hospital/ICU Course:  No notes on file    Interval History/Significant Events:   Sweep 8.5 today.  No change in neuro status.    Follow-up For: Procedure(s) (LRB):  CREATION, TRACHEOSTOMY  (N/A)    Post-Operative Day: 23 Days Post-Op    Objective:     Vital Signs (Most Recent):  Temp: 98.3 °F (36.8 °C) (05/28/20 1100)  Pulse: (!) 120 (05/28/20 1345)  Resp: (!) 45 (05/28/20 0152)  BP: (!) 101/55 (05/28/20 0814)  SpO2: 98 % (05/28/20 1345) Vital Signs (24h Range):  Temp:  [98.2 °F (36.8 °C)-98.4 °F (36.9 °C)] 98.3 °F (36.8 °C)  Pulse:  [103-140] 120  Resp:  [45-48] 45  SpO2:  [96 %-100 %] 98 %  BP: (101)/(55) 101/55  Arterial Line BP: ()/(48-76) 110/65     Weight: 59 kg (130 lb 1.1 oz)  Body mass index is 22.33 kg/m².      Intake/Output Summary (Last 24 hours) at 5/28/2020 1346  Last data filed at 5/28/2020 1200  Gross per 24 hour   Intake 3555 ml   Output 3171 ml   Net 384 ml       Physical Exam   Constitutional: He appears well-developed.   HENT:   Head: Normocephalic and atraumatic.   Eyes: Pupils are equal, round, and reactive to light.   Neck: Normal range of motion.   Cardiovascular:   tachycardic   Pulmonary/Chest:   Trached, mechanically ventilated  On ECMO, cannulated in RIJ and fem vein   Abdominal: Soft. He exhibits no distension.   Musculoskeletal: Normal range of motion.   Neurological:   sedated   Skin: Skin is warm.   Vitals reviewed.      Vents:  Vent Mode: A/C (05/28/20 0836)  Ventilator Initiated: Yes (04/10/20 3907)  Set Rate: 12 BPM (05/28/20 0836)  Vt Set: 200 mL (05/18/20 1244)  PEEP/CPAP: 10  cmH20 (05/28/20 0836)  Oxygen Concentration (%): 50 (05/28/20 1345)  Peak Airway Pressure: 25 cmH2O (05/28/20 0836)  Plateau Pressure: 22 cmH20 (05/28/20 0836)  Total Ve: 5.68 mL (05/28/20 0836)  F/VT Ratio<105 (RSBI): 250 (05/28/20 0152)    Lines/Drains/Airways     Peripherally Inserted Central Catheter Line            PICC Double Lumen 05/05/20 1707 right basilic 22 days          Central Venous Catheter Line                 ECMO Cannula 04/25/20 1120 right femoral vein 33 days         ECMO Cannula 04/25/20 1120 right internal jugular 33 days          Drain                 Urethral Catheter 04/18/20 1854 39 days         NG/OG Tube 05/03/20 1215 [place] sump;nasogastric 18 Fr. Left nostril 25 days          Airway                 Surgical Airway 05/05/20 1500 Shiley Cuffed 22 days          Arterial Line                 Arterial Line 04/27/20 1100 Right Brachial 31 days          Peripheral Intravenous Line                 Peripheral IV - Single Lumen 05/26/20 0845 18 G Left Upper Arm 2 days         Peripheral IV - Single Lumen 05/27/20 2058 22 G Left;Posterior Hand less than 1 day                Significant Labs:    CBC/Anemia Profile:  Recent Labs   Lab 05/27/20  0200  05/28/20  0000  05/28/20  0410 05/28/20  0751 05/28/20  0754   WBC 12.21   < > 13.89*  --  11.95  --  12.42   HGB 10.0*   < > 10.5*  --  9.5*  --  9.7*   HCT 32.1*   < > 34.0*   < > 31.1* 31* 32.0*   *   < > 130*  --  124*  --  129*   MCV 95   < > 95  --  96  --  96   RDW 14.6*   < > 14.4  --  14.4  --  14.6*   FERRITIN 1,206*  --   --   --  1,440*  --   --     < > = values in this interval not displayed.        Chemistries:  Recent Labs   Lab 05/28/20  0000 05/28/20  0410 05/28/20  0754   * 154* 153*   K 3.8 4.1 3.5    111* 110   CO2 30* 27 32*   BUN 46* 45* 46*   CREATININE 0.8 0.8 0.8   CALCIUM 10.1 9.7 10.4   ALBUMIN 3.6 3.9 3.9   PROT 7.3 7.1 7.3   BILITOT 0.7 0.7 0.7   ALKPHOS 202* 170* 172*   ALT 95* 78* 78*   AST 39 32 33   MG  2.1 2.3 2.1   PHOS 4.1 3.1 2.9         Significant Imaging:  I have reviewed and interpreted all pertinent imaging results/findings within the past 24 hours.    Assessment/Plan:     COVID-19 virus detected  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  This was switched to ETT on 4/11.  Cannulated on 4/25.    Neuro:  5 of valium  100 BID of seroquel  Fentanyl patch    Pulm  AC/VC+ on the vent.  Small pneumo on cxr, subclinical.  Sweep of 8.5 today, 2800 RPM  Will need some more time on ECMO, not pulling enough volume on the vent    Cardiac   Labetalol as needed for pressures    GI  TFs at goal    Renal  Making urine, diuresing agressively    Heme/ID  Fluc/Vanc  White count normalized  Hep gtt      Dispo:  Remain in ICU, lungs not ready to work much yet               Critical care was time spent personally by me on the following activities: development of treatment plan with patient or surrogate and bedside caregivers, discussions with consultants, evaluation of patient's response to treatment, examination of patient, ordering and performing treatments and interventions, ordering and review of laboratory studies, ordering and review of radiographic studies, pulse oximetry, re-evaluation of patient's condition.  This critical care time did not overlap with that of any other provider or involve time for any procedures.     Zelalem Ariza MD  Critical Care - Surgery  [facility]

## 2020-05-28 NOTE — PROGRESS NOTES
[facility]  Cardiothoracic Surgery  Daily ECMO Progress Note    Patient Name: Ranjana Sanchez  MRN: [id number]  Admission Date: 4/10/2020  Hospital Length of Stay: 47 days  Code Status: Full Code   Attending Physician: Francis Ayon MD   Referring Provider: Francis Ayon MD  Principal Problem:Acute respiratory distress syndrome (ARDS) due to COVID-19 virus    Subjective:   Interval History:  NAEO, afebrile, HR 110s-120s. Got 250 albumin overnight. UOP 3.4L on lasix gtt of 2.5. Negative 400mL. Weaned sweep and oxygenator FIO2.    Medications:  Continuous Infusions:   dexmedetomidine (PRECEDEX) infusion 0.1 mcg/kg/hr (05/27/20 1800)    dextrose 10 % in water (D10W)      furosemide (LASIX) 2 mg/mL continuous infusion (non-titrating) 5 mg/hr (05/27/20 1800)    heparin (porcine) in 5 % dex 700 Units/hr (05/27/20 1800)    nicardipine 6 mg/hr (05/27/20 1800)     Scheduled Meds:   albumin human 25%  12.5 g Intravenous Once    amLODIPine  5 mg Oral Daily    aspirin  81 mg Per NG tube Daily    chlorhexidine  15 mL Mouth/Throat BID    diazePAM  0.5 mg Oral QHS    fentaNYL  1 patch Transdermal Q72H    labetalol  200 mg Per G Tube BID    multivitamin liquid no.118  10 mL Per G Tube Daily    pantoprazole  40 mg Intravenous Q12H    piperacillin-tazobactam (ZOSYN) IVPB  4.5 g Intravenous Q8H    polyethylene glycol  17 g Per NG tube Daily    potassium chloride 10%  20 mEq Per NG tube BID    psyllium husk (aspartame)  1 packet Per NG tube TID    QUEtiapine  50 mg Per NG tube BID    sucralfate  1 g Per NG tube Q6H    [START ON 5/28/2020] vancomycin (VANCOCIN) IVPB  1,000 mg Intravenous Q24H    vitamin D  1,000 Units Per G Tube Daily        Objective:     Vital Signs (Most Recent):  Temp: 98.3 °F (36.8 °C) (05/27/20 1500)  Pulse: (!) 137 (05/27/20 1830)  Resp: (!) 41 (05/25/20 0600)  BP: 115/68 (05/22/20 1500)  SpO2: 96 % (05/27/20 1830) Vital Signs (24h Range):  Temp:  [98.3 °F  (36.8 °C)-98.5 °F (36.9 °C)] 98.3 °F (36.8 °C)  Pulse:  [102-137] 137  SpO2:  [96 %-100 %] 96 %  Arterial Line BP: ()/(48-76) 139/76     Weight: 58 kg (127 lb 13.9 oz)  Body mass index is 21.95 kg/m².    SpO2: 96 %  O2 Device (Oxygen Therapy): ventilator    Intake/Output - Last 3 Shifts       05/25 0700 - 05/26 0659 05/26 0700 - 05/27 0659 05/27 0700 - 05/28 0659    I.V. (mL/kg) 588.1 (10.1) 998 (17.2) 184 (3.2)    Blood 350      NG/GT 1750 2160 1040    IV Piggyback 450  350    Total Intake(mL/kg) 3138.1 (54.1) 3158 (54.4) 1574 (27.1)    Urine (mL/kg/hr) 2285 (1.6) 3520 (2.5) 2100 (3.1)    Stool 0 0     Blood 7 7 4    Total Output 2292 3527 2104    Net +846.1 -369 -530           Stool Occurrence 1 x 1 x           Lines/Drains/Airways     Peripherally Inserted Central Catheter Line            PICC Double Lumen 05/05/20 1707 right basilic 22 days          Central Venous Catheter Line                 ECMO Cannula 04/25/20 1120 right femoral vein 32 days         ECMO Cannula 04/25/20 1120 right internal jugular 32 days          Drain                 Urethral Catheter 04/18/20 1854 38 days         NG/OG Tube 05/03/20 1215 [place] sump;nasogastric 18 Fr. Left nostril 24 days          Airway                 Surgical Airway 05/05/20 1500 Shiley Cuffed 22 days          Arterial Line                 Arterial Line 04/27/20 1100 Right Brachial 30 days          Peripheral Intravenous Line                 Peripheral IV - Single Lumen 05/26/20 0845 18 G Left Upper Arm 1 day                Physical Exam    Constitutional: He is sedated, and intubated.   Head: Normocephalic   Cardiovascular: Tachycardia 120s   Pulmonary/Chest: intubated vent FiO2 50% PEEP 10, ECMO RPM 2700, Flow 2.9, sweep 10.5, oxygenator FiO2 60%  Skin: L neck and L groin cannulas intact, no flashing, clots pre and post-oxygenator, all sutures intact and secure  Nursing note and vitals reviewed.    Significant Labs:  BMP:   Recent Labs   Lab 05/27/20  1400    *   *   K 3.6      CO2 32*   BUN 39*   CREATININE 0.8   CALCIUM 10.0   MG 2.2     CBC:   Recent Labs   Lab 05/27/20  1400 05/27/20  1600   WBC 13.49*  --    RBC 3.52*  --    HGB 10.4*  --    HCT 33.2* 32*   *  --    MCV 94  --    MCH 29.5  --    MCHC 31.3*  --      LFTs:   Recent Labs   Lab 05/27/20  1400   *   AST 46*   ALKPHOS 204*   BILITOT 0.8   PROT 7.1   ALBUMIN 3.8     Significant Diagnostics:  Cxr: no changes      Assessment/Plan:     Acute respiratory distress syndrome (ARDS) due to COVID-19 virus  - Crich switched to ETT on 4/11  - Monika weaned off 5/5  - Tracheostomy and bronch 5/5  - Thrombocytopenia resolved; transfuse for under 30K  - Aspirin 81 daily  - Metoprolol 25 BID  - RPM at 2700; flows low 3s  - Sweep at 10.5; CO2 goal is 59 or below; do not increase sweep for pCO2s 59 or below as long as patient is not acidotic, as these high pCO2s are respiratory compensation for alkalosis  - Oxygenator Fi at 60%; wean as tolerated  - Vent Fi 50%, PEEP 10  - Will consider reaching out to Pulmonology service      Tongue laceration      -ENT evaluated, laceration superficial      -recs: bite block vs paralysis, will monitor    Sedation  - Valium, seroquel, PRN Fentanyl     UGI Bleed  - Scoped by GI 5/3 with epinephrine injection and clip placement for an actively bleeding D2/D3 Diuelafoy lesion    FEN/GI  - TF at 45, goal      - Lasix gtt reduced to 1  - FWB for hypernatremia 250mL q6     Anticoagulation      - Goal aXa 0.25 - 0.3 Q6hrs      - monitor LDH    Prophylaxis      - protonix q12 IV       - WBC stable      - PICC placed 5/5; central line removed 5/6    Anne Miller MD  Cardiothoracic Surgery  [facility]    VV ECMO circuit checked and all parameters reviewed. Anticoagulation was managed and all cannulation exit sites along with review of labs and radiology studies performed. Speed and functioning of the pump along with oxygenator  quality reviewed.  Patient doing well no real concerns and slow incremental changes noted in ventilatory status.  Coordination of care was provided between different teams which consisted of ICU, cardiac surgery, nursing staff and perfusion team.  All questions and concerns of all team members were addressed and an update to the family was provided.

## 2020-05-28 NOTE — ASSESSMENT & PLAN NOTE
Acute respiratory distress syndrome (ARDS) due to COVID-19 virus  - Crich switched to ETT on 4/11  - Monika weaned off 5/5  - Tracheostomy and bronch 5/5  - Thrombocytopenia resolved; transfuse for under 30K  - Aspirin 81 daily  - Metoprolol 25 BID  - RPM at 2700; flows low 3s  - Sweep at 8.5; CO2 goal is 59 or below; do not increase sweep for pCO2s 59 or below as long as patient is not acidotic, as these high pCO2s are respiratory compensation for alkalosis  - Oxygenator Fi at 60%; wean to 55% and further as tolerated  - Vent Fi 50%, PEEP 10  - Will consider reaching out to Pulmonology service      Tongue laceration      -ENT evaluated, laceration superficial      -recs: bite block vs paralysis, will monitor    Sedation  - Valium, seroquel, PRN Fentanyl     UGI Bleed  - Scoped by GI 5/3 with epinephrine injection and clip placement for an actively bleeding D2/D3 Diuelafoy lesion    FEN/GI  - TF at 45, goal      - Lasix gtt 2.5      - Increasing hypernatremia and BUN  - Resuming FWB for hypernatremia 250mL q6     Anticoagulation      - Goal aXa 0.25 - 0.3 Q6hrs      - reducing hep gtt to 650 U/hr and will consider lowering aXa goal to 0.2-0.25 in light of new bloody BM concerning for poss UGI bleed      - monitor LDH    Prophylaxis      - protonix q12 IV       - WBC stable      - PICC placed 5/5; central line removed 5/6

## 2020-05-28 NOTE — PROGRESS NOTES
[facility] ICU  Cardiothoracic Surgery  Daily ECMO Progress Note    Patient Name: Ranjana Sanchez  MRN: [id number]  Admission Date: 4/10/2020  Hospital Length of Stay: 48 days  Code Status: Full Code   Attending Physician: Francis Ayon MD   Referring Provider: Francis Ayon MD  Principal Problem:Acute respiratory distress syndrome (ARDS) due to COVID-19 virus    Subjective:   Interval History:  NAEO, afebrile, HR 110s-120s. UOP 3.5L on lasix gtt of 2.5. Euvolemic past 24hrs. Weaned sweep and oxygenator FIO2. Dark blood BM x1 overnight. Hb 9.5 from 10.5. Na and BUN increased, free water boluses intermittently held.    Medications:  Continuous Infusions:   dexmedetomidine (PRECEDEX) infusion 0.1 mcg/kg/hr (05/28/20 0900)    dextrose 10 % in water (D10W)      furosemide (LASIX) 2 mg/mL continuous infusion (non-titrating) 2.5 mg/hr (05/28/20 0900)    heparin (porcine) in 5 % dex 650 Units/hr (05/28/20 1000)    nicardipine 1 mg/hr (05/28/20 0900)     Scheduled Meds:   albumin human 25%  12.5 g Intravenous Once    amLODIPine  5 mg Oral Daily    aspirin  81 mg Per NG tube Daily    chlorhexidine  15 mL Mouth/Throat BID    diazePAM  0.5 mg Oral QHS    fentaNYL  1 patch Transdermal Q72H    labetalol  200 mg Per G Tube BID    multivitamin liquid no.118  10 mL Per G Tube Daily    pantoprazole  40 mg Intravenous Q12H    piperacillin-tazobactam (ZOSYN) IVPB  4.5 g Intravenous Q8H    polyethylene glycol  17 g Per NG tube Daily    potassium chloride 10%  20 mEq Per NG tube BID    psyllium husk (aspartame)  1 packet Per NG tube TID    QUEtiapine  50 mg Per NG tube QHS    sucralfate  1 g Per NG tube Q6H    vancomycin (VANCOCIN) IVPB  1,000 mg Intravenous Q24H    vitamin D  1,000 Units Per G Tube Daily        Objective:     Vital Signs (Most Recent):  Temp: 98.4 °F (36.9 °C) (05/28/20 0715)  Pulse: (!) 113 (05/28/20 0945)  Resp: (!) 45 (05/28/20 0152)  BP: (!) 101/55 (05/28/20  0814)  SpO2: 99 % (05/28/20 0945) Vital Signs (24h Range):  Temp:  [98.2 °F (36.8 °C)-98.4 °F (36.9 °C)] 98.4 °F (36.9 °C)  Pulse:  [103-140] 113  Resp:  [45-48] 45  SpO2:  [96 %-100 %] 99 %  BP: (101)/(55) 101/55  Arterial Line BP: ()/(48-76) 107/62     Weight: 59 kg (130 lb 1.1 oz)  Body mass index is 22.33 kg/m².    SpO2: 99 %  O2 Device (Oxygen Therapy): ventilator    Intake/Output - Last 3 Shifts       05/26 0700 - 05/27 0659 05/27 0700 - 05/28 0659 05/28 0700 - 05/29 0659    I.V. (mL/kg) 998 (17.2) 1125 (19.1)     Blood       NG/GT 2160 2080 285    IV Piggyback  350     Total Intake(mL/kg) 3158 (54.4) 3555 (60.3) 285 (4.8)    Urine (mL/kg/hr) 3520 (2.5) 3515 (2.5) 375 (2.1)    Stool 0      Blood 7 6 3    Total Output 3527 3521 378    Net -369 +34 -93           Stool Occurrence 1 x            Lines/Drains/Airways     Peripherally Inserted Central Catheter Line            PICC Double Lumen 05/05/20 1707 right basilic 22 days          Central Venous Catheter Line                 ECMO Cannula 04/25/20 1120 right femoral vein 32 days         ECMO Cannula 04/25/20 1120 right internal jugular 32 days          Drain                 Urethral Catheter 04/18/20 1854 39 days         NG/OG Tube 05/03/20 1215 [place] sump;nasogastric 18 Fr. Left nostril 24 days          Airway                 Surgical Airway 05/05/20 1500 Shiley Cuffed 22 days          Arterial Line                 Arterial Line 04/27/20 1100 Right Brachial 30 days          Peripheral Intravenous Line                 Peripheral IV - Single Lumen 05/26/20 0845 18 G Left Upper Arm 2 days         Peripheral IV - Single Lumen 05/27/20 2058 22 G Left;Posterior Hand less than 1 day                Physical Exam    Constitutional: He is sedated, and intubated.   Head: Normocephalic   Cardiovascular: Tachycardia 120s   Pulmonary/Chest: intubated vent FiO2 50% PEEP 10, ECMO RPM 2700, Flow 2.9, sweep 8.5, oxygenator FiO2 60%  Skin: L neck and L groin cannulas  intact, no flashing, clots pre and post-oxygenator, all sutures intact and secure  Nursing note and vitals reviewed.    Significant Labs:  BMP:   Recent Labs   Lab 05/28/20  0754   *   *   K 3.5      CO2 32*   BUN 46*   CREATININE 0.8   CALCIUM 10.4   MG 2.1     CBC:   Recent Labs   Lab 05/28/20  0754   WBC 12.42   RBC 3.34*   HGB 9.7*   HCT 32.0*   *   MCV 96   MCH 29.0   MCHC 30.3*     LFTs:   Recent Labs   Lab 05/28/20  0754   ALT 78*   AST 33   ALKPHOS 172*   BILITOT 0.7   PROT 7.3   ALBUMIN 3.9     Significant Diagnostics:  Cxr: no changes      Assessment/Plan:     Acute respiratory distress syndrome (ARDS) due to COVID-19 virus  - Crich switched to ETT on 4/11  - Monika weaned off 5/5  - Tracheostomy and bronch 5/5  - Thrombocytopenia resolved; transfuse for under 30K  - Aspirin 81 daily  - Metoprolol 25 BID  - RPM at 2700; flows low 3s  - Sweep at 8.5; CO2 goal is 59 or below; do not increase sweep for pCO2s 59 or below as long as patient is not acidotic, as these high pCO2s are respiratory compensation for alkalosis  - Oxygenator Fi at 60%; wean to 55% and further as tolerated  - Vent Fi 50%, PEEP 10  - Will consider reaching out to Pulmonology service      Tongue laceration      -ENT evaluated, laceration superficial      -recs: bite block vs paralysis, will monitor    Sedation  - Valium, seroquel, PRN Fentanyl     UGI Bleed  - Scoped by GI 5/3 with epinephrine injection and clip placement for an actively bleeding D2/D3 Diuelafoy lesion    FEN/GI  - TF at 45, goal      - Lasix gtt 2.5      - Increasing hypernatremia and BUN  - Resuming FWB for hypernatremia 250mL q6     Anticoagulation      - Goal aXa 0.25 - 0.3 Q6hrs      - reducing hep gtt to 650 U/hr and will consider lowering aXa goal to 0.2-0.25 in light of new bloody BM concerning for poss UGI bleed      - monitor LDH    Prophylaxis      - protonix q12 IV       - WBC stable      - PICC placed 5/5; central line removed  5/6    Anne Miller MD  Cardiothoracic Surgery  [facility]      VV ECMO circuit checked and all parameters reviewed. Anticoagulation was managed and all cannulation exit sites along with review of labs and radiology studies performed. Speed and functioning of the pump along with oxygenator quality reviewed.  Coordination of care was provided written family members and family was updated about the condition of the patient.  Small incremental changes are noted in the respiratory status.  Continue with the current care.

## 2020-05-28 NOTE — SUBJECTIVE & OBJECTIVE
Interval History/Significant Events:   Sweep 8.5 today.  No change in neuro status.    Follow-up For: Procedure(s) (LRB):  CREATION, TRACHEOSTOMY  (N/A)    Post-Operative Day: 23 Days Post-Op    Objective:     Vital Signs (Most Recent):  Temp: 98.3 °F (36.8 °C) (05/28/20 1100)  Pulse: (!) 120 (05/28/20 1345)  Resp: (!) 45 (05/28/20 0152)  BP: (!) 101/55 (05/28/20 0814)  SpO2: 98 % (05/28/20 1345) Vital Signs (24h Range):  Temp:  [98.2 °F (36.8 °C)-98.4 °F (36.9 °C)] 98.3 °F (36.8 °C)  Pulse:  [103-140] 120  Resp:  [45-48] 45  SpO2:  [96 %-100 %] 98 %  BP: (101)/(55) 101/55  Arterial Line BP: ()/(48-76) 110/65     Weight: 59 kg (130 lb 1.1 oz)  Body mass index is 22.33 kg/m².      Intake/Output Summary (Last 24 hours) at 5/28/2020 1346  Last data filed at 5/28/2020 1200  Gross per 24 hour   Intake 3555 ml   Output 3171 ml   Net 384 ml       Physical Exam   Constitutional: He appears well-developed.   HENT:   Head: Normocephalic and atraumatic.   Eyes: Pupils are equal, round, and reactive to light.   Neck: Normal range of motion.   Cardiovascular:   tachycardic   Pulmonary/Chest:   Trached, mechanically ventilated  On ECMO, cannulated in RIJ and fem vein   Abdominal: Soft. He exhibits no distension.   Musculoskeletal: Normal range of motion.   Neurological:   sedated   Skin: Skin is warm.   Vitals reviewed.      Vents:  Vent Mode: A/C (05/28/20 0836)  Ventilator Initiated: Yes (04/10/20 2247)  Set Rate: 12 BPM (05/28/20 0836)  Vt Set: 200 mL (05/18/20 1244)  PEEP/CPAP: 10 cmH20 (05/28/20 0836)  Oxygen Concentration (%): 50 (05/28/20 1345)  Peak Airway Pressure: 25 cmH2O (05/28/20 0836)  Plateau Pressure: 22 cmH20 (05/28/20 0836)  Total Ve: 5.68 mL (05/28/20 0836)  F/VT Ratio<105 (RSBI): 250 (05/28/20 0152)    Lines/Drains/Airways     Peripherally Inserted Central Catheter Line            PICC Double Lumen 05/05/20 1707 right basilic 22 days          Central Venous Catheter Line                 ECMO Cannula  04/25/20 1120 right femoral vein 33 days         ECMO Cannula 04/25/20 1120 right internal jugular 33 days          Drain                 Urethral Catheter 04/18/20 1854 39 days         NG/OG Tube 05/03/20 1215 [place] sump;nasogastric 18 Fr. Left nostril 25 days          Airway                 Surgical Airway 05/05/20 1500 [place] Cuffed 22 days          Arterial Line                 Arterial Line 04/27/20 1100 Right Brachial 31 days          Peripheral Intravenous Line                 Peripheral IV - Single Lumen 05/26/20 0845 18 G Left Upper Arm 2 days         Peripheral IV - Single Lumen 05/27/20 2058 22 G Left;Posterior Hand less than 1 day                Significant Labs:    CBC/Anemia Profile:  Recent Labs   Lab 05/27/20  0200  05/28/20  0000  05/28/20  0410 05/28/20  0751 05/28/20  0754   WBC 12.21   < > 13.89*  --  11.95  --  12.42   HGB 10.0*   < > 10.5*  --  9.5*  --  9.7*   HCT 32.1*   < > 34.0*   < > 31.1* 31* 32.0*   *   < > 130*  --  124*  --  129*   MCV 95   < > 95  --  96  --  96   RDW 14.6*   < > 14.4  --  14.4  --  14.6*   FERRITIN 1,206*  --   --   --  1,440*  --   --     < > = values in this interval not displayed.        Chemistries:  Recent Labs   Lab 05/28/20  0000 05/28/20  0410 05/28/20  0754   * 154* 153*   K 3.8 4.1 3.5    111* 110   CO2 30* 27 32*   BUN 46* 45* 46*   CREATININE 0.8 0.8 0.8   CALCIUM 10.1 9.7 10.4   ALBUMIN 3.6 3.9 3.9   PROT 7.3 7.1 7.3   BILITOT 0.7 0.7 0.7   ALKPHOS 202* 170* 172*   ALT 95* 78* 78*   AST 39 32 33   MG 2.1 2.3 2.1   PHOS 4.1 3.1 2.9         Significant Imaging:  I have reviewed and interpreted all pertinent imaging results/findings within the past 24 hours.

## 2020-05-29 NOTE — SUBJECTIVE & OBJECTIVE
Interval History:  NAEO, afebrile, HR 110s-120s. UOP 3.2L on lasix gtt of 2.5. Positive 500mL past 24hrs. Weaned sweep and oxygenator FIO2. Streaks of bright red blood in BM x1 overnight. Hb 10.5. Hep gtt reduced to 650 yesterday and will be further reduced with lower aXa goal.    Medications:  Continuous Infusions:   dexmedetomidine (PRECEDEX) infusion 0.1 mcg/kg/hr (05/29/20 0900)    dextrose 10 % in water (D10W)      furosemide (LASIX) 2 mg/mL continuous infusion (non-titrating) 5 mg/hr (05/29/20 0900)    heparin (porcine) in 5 % dex 650 Units/hr (05/29/20 0900)    nicardipine 0.5 mg/hr (05/29/20 0900)     Scheduled Meds:   albumin human 25%  12.5 g Intravenous Once    amLODIPine  5 mg Oral Daily    aspirin  81 mg Per NG tube Daily    chlorhexidine  15 mL Mouth/Throat BID    fentaNYL  1 patch Transdermal Q72H    labetalol  300 mg Per G Tube BID    multivitamin liquid no.118  10 mL Per G Tube Daily    pantoprazole  40 mg Intravenous Q12H    piperacillin-tazobactam (ZOSYN) IVPB  4.5 g Intravenous Q8H    polyethylene glycol  17 g Per NG tube Daily    potassium chloride 10%  20 mEq Per NG tube BID    psyllium husk (aspartame)  1 packet Per NG tube TID    QUEtiapine  50 mg Per NG tube QHS    sucralfate  1 g Per NG tube Q6H    vitamin D  1,000 Units Per G Tube Daily        Objective:     Vital Signs (Most Recent):  Temp: 98.4 °F (36.9 °C) (05/29/20 0700)  Pulse: (!) 131(Simultaneous filing. User may not have seen previous data.) (05/29/20 0900)  Resp: (!) 45 (05/28/20 0152)  BP: (!) 101/55 (05/28/20 0814)  SpO2: 100 %(Simultaneous filing. User may not have seen previous data.) (05/29/20 0900) Vital Signs (24h Range):  Temp:  [97.9 °F (36.6 °C)-98.4 °F (36.9 °C)] 98.4 °F (36.9 °C)  Pulse:  [102-139] 131  SpO2:  [96 %-100 %] 100 %  Arterial Line BP: ()/(50-76) 108/69     Weight: 59.6 kg (131 lb 6.3 oz)  Body mass index is 22.55 kg/m².    SpO2: 100 %(Simultaneous filing. User may not have seen  previous data.)  O2 Device (Oxygen Therapy): ventilator    Intake/Output - Last 3 Shifts       05/27 0700 - 05/28 0659 05/28 0700 - 05/29 0659 05/29 0700 - 05/30 0659    I.V. (mL/kg) 1125 (19.1) 384.3 (6.4) 49.3 (0.8)    NG/GT 2080 2560 135    IV Piggyback 350 700 100    Total Intake(mL/kg) 3555 (60.3) 3644.3 (61.1) 284.3 (4.8)    Urine (mL/kg/hr) 3515 (2.5) 3095 (2.2) 505 (3)    Other  0     Stool  0     Blood 6 6     Total Output 3521 3101 505    Net +34 +543.3 -220.7           Stool Occurrence  1 x           Lines/Drains/Airways     Peripherally Inserted Central Catheter Line            PICC Double Lumen 05/05/20 1707 right basilic 23 days          Central Venous Catheter Line                 ECMO Cannula 04/25/20 1120 right femoral vein 33 days         ECMO Cannula 04/25/20 1120 right internal jugular 33 days          Drain                 NG/OG Tube 05/03/20 1215 [place] sump;nasogastric 18 Fr. Left nostril 25 days         Urethral Catheter 05/28/20 1630 less than 1 day          Airway                 Surgical Airway 05/05/20 1500 Shiley Cuffed 23 days          Arterial Line                 Arterial Line 04/27/20 1100 Right Brachial 31 days          Peripheral Intravenous Line                 Peripheral IV - Single Lumen 05/26/20 0845 18 G Left Upper Arm 3 days         Peripheral IV - Single Lumen 05/27/20 2058 22 G Left;Posterior Hand 1 day                Physical Exam    Constitutional: He is sedated, and intubated.   Head: Normocephalic   Cardiovascular: Tachycardia 120s   Pulmonary/Chest: intubated vent FiO2 50% PEEP 10, ECMO RPM 2700, Flow 2.9, sweep 8.5, oxygenator FiO2 50%  Skin: L neck and L groin cannulas intact, no flashing, clots pre and post-oxygenator, all sutures intact and secure  Nursing note and vitals reviewed.    Significant Labs:  BMP:   Recent Labs   Lab 05/29/20  0109   *   *   K 5.5*      CO2 28   BUN 55*   CREATININE 1.1   CALCIUM 10.3   MG 2.3     CBC:   Recent Labs    Lab 05/29/20  0735   WBC 17.29*   RBC 3.44*   HGB 10.3*   HCT 32.6*   *   MCV 95   MCH 29.9   MCHC 31.6*     LFTs:   Recent Labs   Lab 05/29/20  0109   ALT 74*   AST 37   ALKPHOS 199*   BILITOT 0.7   PROT 6.9   ALBUMIN 3.7     Significant Diagnostics:  Cxr: no changes

## 2020-05-29 NOTE — ASSESSMENT & PLAN NOTE
Acute respiratory distress syndrome (ARDS) due to COVID-19 virus  - Crich switched to ETT on 4/11  - Monika weaned off 5/5  - Tracheostomy and bronch 5/5  - Thrombocytopenia resolved; transfuse for under 30K  - Aspirin 81 daily  - Metoprolol 25 BID  - RPM at 2700; flows low 3s  - Sweep at 8.5; CO2 goal is 59 or below; do not increase sweep for pCO2s 59 or below as long as patient is not acidotic, as these high pCO2s are respiratory compensation for alkalosis  - Oxygenator Fi at 50%; wean further as tolerated  - Vent Fi 50%, PEEP 10     Tongue laceration      -ENT evaluated, laceration superficial      -recs: bite block vs paralysis, will monitor    Sedation  - Valium, seroquel, PRN Fentanyl     UGI Bleed  - Scoped by GI 5/3 with epinephrine injection and clip placement for an actively bleeding D2/D3 Diuelafoy lesion    FEN/GI  - TF at 45, goal      - Lasix gtt 2.5  - Resuming FWB for hypernatremia 250mL q6     Anticoagulation      - New goal aXa 0.2-0.25 Q6hrs      - reducing hep gtt to 600 U/hr in light of new bloody BM concerning for poss UGI bleed      - monitor LDH    Prophylaxis      - protonix q12 IV       - WBC stable; Vancomycin stopped yesterday, will look to stop Zosyn in next 48hrs      - PICC placed 5/5; central line removed 5/6

## 2020-05-29 NOTE — PROGRESS NOTES
Pt hypotensive 70-80's systolic MAPS decreasing as low as 58. Cardene stopped. Notified Dr Hernandez on rounds. Pt did receive scheduled Labatelol and Amlodipine per MAR this morning.Per MD call in 30-45 minutes if still hypotensive and may likely start Vasopressin. Monitoring closely.

## 2020-05-29 NOTE — PLAN OF CARE
Pt remains with tracheostomy on ventilator AC/PC 50%/5. VV ECMO also continues at speed 2600 with flows 2.85-2.89 lpm.  ECMO FiO2 50%, Sweep 8.5 lpm.  RIJ cannula remains without sutures, MDs aware.  Minimal movement and turning due to pt acuity and instability.  Cardene titrated to maintain MAP<90.  Continues to tolerate TF at goal.  BMx1 with streaks of bright red on wipes noted, no occult blood noted.  Good UOP with lasix gtt at 5 mg/hr.  PRBC x1 unit transfused.  Pt remains unresponsive to verbal stimuli, but withdraws to noxious stimulation.  No family contact in which to discuss plan of care or to address questions.

## 2020-05-29 NOTE — PROGRESS NOTES
Pt remains hypotensive with MAPS 55-62. Dr Ariza called and notified and Vasopressin 0.04 units/min ordered at this time.

## 2020-05-29 NOTE — PROGRESS NOTES
[facility] - Respiratory ICU  Critical Care - Surgery  Progress Note    Patient Name: Ranjana Sanchez  MRN: [id number]  Admission Date: 4/10/2020  Hospital Length of Stay: 49 days  Code Status: Full Code  Attending Provider: Francis Ayon MD  Primary Care Provider: Primary Doctor No   Principal Problem: Acute respiratory distress syndrome (ARDS) due to COVID-19 virus    Subjective:     Hospital/ICU Course:  No notes on file    Interval History/Significant Events:   Not much change overnight  Required vaso today for pressure support    Follow-up For: Procedure(s) (LRB):  CREATION, TRACHEOSTOMY  (N/A)    Post-Operative Day: 24 Days Post-Op    Objective:     Vital Signs (Most Recent):  Temp: 99 °F (37.2 °C) (05/29/20 1100)  Pulse: 109 (05/29/20 1200)  Resp: (!) 45 (05/28/20 0152)  BP: (!) 101/55 (05/28/20 0814)  SpO2: 100 % (05/29/20 1200) Vital Signs (24h Range):  Temp:  [97.9 °F (36.6 °C)-99 °F (37.2 °C)] 99 °F (37.2 °C)  Pulse:  [100-139] 109  SpO2:  [96 %-100 %] 100 %  Arterial Line BP: ()/(50-76) 93/59     Weight: 59.6 kg (131 lb 6.3 oz)  Body mass index is 22.55 kg/m².      Intake/Output Summary (Last 24 hours) at 5/29/2020 1242  Last data filed at 5/29/2020 1211  Gross per 24 hour   Intake 3348.4 ml   Output 3020 ml   Net 328.4 ml       Physical Exam   Constitutional: He appears well-developed.   HENT:   Head: Normocephalic and atraumatic.   Eyes: Pupils are equal, round, and reactive to light.   Neck: Normal range of motion.   Cardiovascular:   tachycardic   Pulmonary/Chest:   Trached, mechanically ventilated  On ECMO, cannulated in RIJ and fem vein   Abdominal: Soft. He exhibits no distension.   Musculoskeletal: Normal range of motion.   Neurological:   sedated   Skin: Skin is warm.   Vitals reviewed.      Vents:  Vent Mode: A/C (05/29/20 0745)  Ventilator Initiated: Yes (04/10/20 2247)  Set Rate: 18 BPM (05/29/20 0745)  Vt Set: 200 mL (05/18/20 1244)  PEEP/CPAP: 5 cmH20 (05/29/20  0745)  Oxygen Concentration (%): 50 (05/29/20 1200)  Peak Airway Pressure: 37 cmH2O (05/29/20 0745)  Plateau Pressure: 24 cmH20 (05/29/20 0745)  Total Ve: 7.95 mL (05/29/20 0745)  F/VT Ratio<105 (RSBI): 250 (05/28/20 0152)    Lines/Drains/Airways     Peripherally Inserted Central Catheter Line            PICC Double Lumen 05/05/20 1707 right basilic 23 days          Central Venous Catheter Line                 ECMO Cannula 04/25/20 1120 right femoral vein 34 days         ECMO Cannula 04/25/20 1120 right internal jugular 34 days          Drain                 NG/OG Tube 05/03/20 1215 [place] sump;nasogastric 18 Fr. Left nostril 26 days         Urethral Catheter 05/28/20 1630 less than 1 day          Airway                 Surgical Airway 05/05/20 1500 Shiley Cuffed 23 days          Arterial Line                 Arterial Line 04/27/20 1100 Right Brachial 32 days          Peripheral Intravenous Line                 Peripheral IV - Single Lumen 05/26/20 0845 18 G Left Upper Arm 3 days         Peripheral IV - Single Lumen 05/27/20 2058 22 G Left;Posterior Hand 1 day                Significant Labs:    CBC/Anemia Profile:  Recent Labs   Lab 05/28/20  0410  05/28/20 1934 05/29/20 0109 05/29/20  0114 05/29/20  0722 05/29/20  0735   WBC 11.95   < > 13.24*  --  13.89*  --   --  17.29*   HGB 9.5*   < > 9.4*  --  8.8*  --   --  10.3*   HCT 31.1*   < > 30.8*   < > 29.2* 29* 25* 32.6*   *   < > 136*  --  132*  --   --  133*   MCV 96   < > 95  --  96  --   --  95   RDW 14.4   < > 14.5  --  14.6*  --   --  14.5   FERRITIN 1,440*  --   --   --  1,473*  --   --   --     < > = values in this interval not displayed.        Chemistries:  Recent Labs   Lab 05/28/20 1934 05/29/20 0109 05/29/20  0735   * 150* 149*   K 3.4* 5.5* 4.3    110 106   CO2 32* 28 30*   BUN 45* 55* 60*   CREATININE 0.9 1.1 1.0   CALCIUM 10.3 10.3 10.5   ALBUMIN 3.8 3.7 3.7   PROT 7.2 6.9 7.1   BILITOT 0.8 0.7 0.8   ALKPHOS 185* 199* 206*   ALT  76* 74* 77*   AST 39 37 45*   MG 2.4 2.3 2.3   PHOS 3.3 3.1 4.4         Significant Imaging:  I have reviewed and interpreted all pertinent imaging results/findings within the past 24 hours.    Assessment/Plan:     COVID-19 virus detected  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  This was switched to ETT on 4/11.  Cannulated on 4/25.    Neuro:  5 of valium  100 BID of seroquel  Fentanyl patch    Pulm  AC/VC+ on the vent.  Small pneumo on cxr, subclinical.  Sweep of 8.5 today, 2800 RPM  Will need some more time on ECMO, not pulling enough volume on the vent    Cardiac   Labetalol as needed for pressures    GI  TFs at goal    Renal  Making urine, diuresing agressively    Heme/ID  Fluc/Vanc  White count normalized  Hep gtt      Dispo:  Remain in ICU, lungs not ready to work much yet               Critical care was time spent personally by me on the following activities: development of treatment plan with patient or surrogate and bedside caregivers, discussions with consultants, evaluation of patient's response to treatment, examination of patient, ordering and performing treatments and interventions, ordering and review of laboratory studies, ordering and review of radiographic studies, pulse oximetry, re-evaluation of patient's condition.  This critical care time did not overlap with that of any other provider or involve time for any procedures.     Zelalem Ariza MD  Critical Care - Surgery  [facility] - Respiratory ICU

## 2020-05-29 NOTE — PROGRESS NOTES
Pt BP 99/61with MAPS in high 70's right as Vaso arrived at bedside. Will hold off on medication for now as MAPS are now within goal of 70-90.  Monitoring closely.

## 2020-05-29 NOTE — PROGRESS NOTES
[facility] shift report    Date: 05/29/2020  ECMO Specialist:  Ashley Francisco    Pump parameters:  RPM: 2600  Flow:  2.9  Sweep:  8.5  FiO2:  50    Oxygenator status:  Clots: pre and post  Fibrin: pre and post    Pressure trends:  P1: 101  P2: 74  Delta P: 27    Volume status:  Chugging noted?  CVP: NA  MAP:  70s-90s  MD notified (name):  Nany    Anticoagulation:  ACT/aPTT/Xa parameters: .25-.3  ACT/aPTT/Xa trends this shift: .28    Cannula size / status / placement:  Arterial:   Venous 1:RIJV 23F@4cm   Venous 2:RFV 27F@12cm  Dual lumen:      Additional Comments:  Lots of agitation all night.  Maintained the flows.  Still breathing 40-60 BPM.

## 2020-05-29 NOTE — PLAN OF CARE
CMICU DAILY GOALS       A: Awake    RASS: Goal - RASS Goal: 0-->alert and calm  Actual - RASS ([place] Agitation-Sedation Scale): -1-->drowsy   Restraint necessity: none  B: Breath   SBT: Not attempted   C: Coordinate A & B, analgesics/sedatives   Pain: managed    SAT: Not attempted  D: Delirium   CAM-ICU: Overall CAM-ICU: Positive  E: Early Mobility   MOVE Screen: Fail   Activity: Activity Management: bedrest maintained per order  FAS: Feeding/Nutrition   Diet order: Diet/Nutrition Received: tube feeding,   Fluid restriction:    T: Thrombus   DVT prophylaxis: VTE Required Core Measure: Pharmacological prophylaxis initiated/maintained  H: HOB Elevation   Head of Bed (HOB): HOB at 20 degrees  U: Ulcer Prophylaxis   GI: yes  G: Glucose control   managed Glycemic Management: blood glucose monitoring  S: Skin   Bundle compliance: yes   Bathing/Skin Care: bath, partial, incontinence care, bath, chlorhexidine Date: 5/29/20  B: Bowel Function   2X large mucoid, dark brown BM's this shift.     I: Indwelling Catheters   [place]res necessity: Yes   CVC necessity: No-PICC line & PIV's in place.    IPAD offered: No  D: De-escalation Antibx   No. Pt remains on Zosyn  Plan for the day   Pt remains on ECMO: Sweep 8, Fio2 50%. NO issues with ECMO this shift. RIJ measuring @ 4cm and R fem measuring @ 12 cm. No oozing-dressing clean, dry, intact. Pt hypotensive this morning with MAPS 55-63. MD aware and Vaso ordered and started. Able to wean off Vaso this evening and MAPS remain in 80's currently. Lasix, Heparin, Precedex continued. 1 unit PRBC given this shift for H&H 9 & 29. Heparin infusion titrated this shift per MD order. Currently @ 550 units/hr. Sharan updated this shift on patient status. All questions answered and concerns addressed.      Code Status: Full Code   VS and assessment per flow sheet, patient progressing towards goals as tolerated, plan of care reviewed with Ranjana Sanchez and sharan, all concerns addressed, will  continue to monitor.

## 2020-05-29 NOTE — PROGRESS NOTES
Per order placed by Dr Miller new Anti XA goal 0.2-0.25. Heparin infusion decreased per order from 650 units/hr to 600 units/hr. Monitoring closely.

## 2020-05-29 NOTE — PROGRESS NOTES
[facility] - Respiratory ICU  Cardiothoracic Surgery  Progress Note    Patient Name: Ranjana Sanchez  MRN: [id number]  Admission Date: 4/10/2020  Hospital Length of Stay: 49 days  Code Status: Full Code   Attending Physician: Francis Ayon MD   Referring Provider: Francis Ayon MD  Principal Problem:Acute respiratory distress syndrome (ARDS) due to COVID-19 virus    Subjective:   Interval History:  NAEO, afebrile, HR 110s-120s. UOP 3.2L on lasix gtt of 2.5. Positive 500mL past 24hrs. Weaned sweep and oxygenator FIO2. Streaks of bright red blood in BM x1 overnight. Hb 10.5. Hep gtt reduced to 650 yesterday and will be further reduced with lower aXa goal.    Medications:  Continuous Infusions:   dexmedetomidine (PRECEDEX) infusion 0.1 mcg/kg/hr (05/29/20 0900)    dextrose 10 % in water (D10W)      furosemide (LASIX) 2 mg/mL continuous infusion (non-titrating) 5 mg/hr (05/29/20 0900)    heparin (porcine) in 5 % dex 650 Units/hr (05/29/20 0900)    nicardipine 0.5 mg/hr (05/29/20 0900)     Scheduled Meds:   albumin human 25%  12.5 g Intravenous Once    amLODIPine  5 mg Oral Daily    aspirin  81 mg Per NG tube Daily    chlorhexidine  15 mL Mouth/Throat BID    fentaNYL  1 patch Transdermal Q72H    labetalol  300 mg Per G Tube BID    multivitamin liquid no.118  10 mL Per G Tube Daily    pantoprazole  40 mg Intravenous Q12H    piperacillin-tazobactam (ZOSYN) IVPB  4.5 g Intravenous Q8H    polyethylene glycol  17 g Per NG tube Daily    potassium chloride 10%  20 mEq Per NG tube BID    psyllium husk (aspartame)  1 packet Per NG tube TID    QUEtiapine  50 mg Per NG tube QHS    sucralfate  1 g Per NG tube Q6H    vitamin D  1,000 Units Per G Tube Daily        Objective:     Vital Signs (Most Recent):  Temp: 98.4 °F (36.9 °C) (05/29/20 0700)  Pulse: (!) 131(Simultaneous filing. User may not have seen previous data.) (05/29/20 0900)  Resp: (!) 45 (05/28/20 0152)  BP: (!) 101/55 (05/28/20  0814)  SpO2: 100 %(Simultaneous filing. User may not have seen previous data.) (05/29/20 0900) Vital Signs (24h Range):  Temp:  [97.9 °F (36.6 °C)-98.4 °F (36.9 °C)] 98.4 °F (36.9 °C)  Pulse:  [102-139] 131  SpO2:  [96 %-100 %] 100 %  Arterial Line BP: ()/(50-76) 108/69     Weight: 59.6 kg (131 lb 6.3 oz)  Body mass index is 22.55 kg/m².    SpO2: 100 %(Simultaneous filing. User may not have seen previous data.)  O2 Device (Oxygen Therapy): ventilator    Intake/Output - Last 3 Shifts       05/27 0700 - 05/28 0659 05/28 0700 - 05/29 0659 05/29 0700 - 05/30 0659    I.V. (mL/kg) 1125 (19.1) 384.3 (6.4) 49.3 (0.8)    NG/GT 2080 2560 135    IV Piggyback 350 700 100    Total Intake(mL/kg) 3555 (60.3) 3644.3 (61.1) 284.3 (4.8)    Urine (mL/kg/hr) 3515 (2.5) 3095 (2.2) 505 (3)    Other  0     Stool  0     Blood 6 6     Total Output 3521 3101 505    Net +34 +543.3 -220.7           Stool Occurrence  1 x           Lines/Drains/Airways     Peripherally Inserted Central Catheter Line            PICC Double Lumen 05/05/20 1707 right basilic 23 days          Central Venous Catheter Line                 ECMO Cannula 04/25/20 1120 right femoral vein 33 days         ECMO Cannula 04/25/20 1120 right internal jugular 33 days          Drain                 NG/OG Tube 05/03/20 1215 [place] sump;nasogastric 18 Fr. Left nostril 25 days         Urethral Catheter 05/28/20 1630 less than 1 day          Airway                 Surgical Airway 05/05/20 1500 [place] Cuffed 23 days          Arterial Line                 Arterial Line 04/27/20 1100 Right Brachial 31 days          Peripheral Intravenous Line                 Peripheral IV - Single Lumen 05/26/20 0845 18 G Left Upper Arm 3 days         Peripheral IV - Single Lumen 05/27/20 2058 22 G Left;Posterior Hand 1 day                Physical Exam    Constitutional: He is sedated, and intubated.   Head: Normocephalic   Cardiovascular: Tachycardia 120s   Pulmonary/Chest: intubated vent FiO2 50%  PEEP 10, ECMO RPM 2700, Flow 2.9, sweep 8.5, oxygenator FiO2 50%  Skin: L neck and L groin cannulas intact, no flashing, clots pre and post-oxygenator, all sutures intact and secure  Nursing note and vitals reviewed.    Significant Labs:  BMP:   Recent Labs   Lab 05/29/20  0109   *   *   K 5.5*      CO2 28   BUN 55*   CREATININE 1.1   CALCIUM 10.3   MG 2.3     CBC:   Recent Labs   Lab 05/29/20  0735   WBC 17.29*   RBC 3.44*   HGB 10.3*   HCT 32.6*   *   MCV 95   MCH 29.9   MCHC 31.6*     LFTs:   Recent Labs   Lab 05/29/20  0109   ALT 74*   AST 37   ALKPHOS 199*   BILITOT 0.7   PROT 6.9   ALBUMIN 3.7     Significant Diagnostics:  Cxr: no changes      Assessment/Plan:     Acute respiratory distress syndrome (ARDS) due to COVID-19 virus  - Crich switched to ETT on 4/11  - Monika weaned off 5/5  - Tracheostomy and bronch 5/5  - Thrombocytopenia resolved; transfuse for under 30K  - Aspirin 81 daily  - Metoprolol 25 BID  - RPM at 2700; flows low 3s  - Sweep at 8.5; CO2 goal is 59 or below; do not increase sweep for pCO2s 59 or below as long as patient is not acidotic, as these high pCO2s are respiratory compensation for alkalosis  - Oxygenator Fi at 50%; wean further as tolerated  - Vent Fi 50%, PEEP 10     Tongue laceration      -ENT evaluated, laceration superficial      -recs: bite block vs paralysis, will monitor    Sedation  - Valium, seroquel, PRN Fentanyl     UGI Bleed  - Scoped by GI 5/3 with epinephrine injection and clip placement for an actively bleeding D2/D3 Diuelafoy lesion    FEN/GI  - TF at 45, goal      - Lasix gtt 2.5  - Resuming FWB for hypernatremia 250mL q6     Anticoagulation      - New goal aXa 0.2-0.25 Q6hrs      - reducing hep gtt to 600 U/hr in light of new bloody BM concerning for poss UGI bleed      - monitor LDH    Prophylaxis      - protonix q12 IV       - WBC stable; Vancomycin stopped yesterday, will look to stop Zosyn in next 48hrs      - PICC placed 5/5; central  line removed 5/6    Anne Miller MD  Cardiothoracic Surgery  [facility] - Res[facility] ICU    VV ECMO circuit checked and all parameters reviewed. Anticoagulation was managed and all cannulation exit sites along with review of labs and radiology studies performed. Speed and functioning of the pump along with oxygenator quality reviewed.  Patient has been doing well with no significant changes except 1 bloody bowel movement that happened last night which resulted in reduction of heparin drip to 600 units an hour.  The goal of anti Xa has been reduced from 0.2 0.25 range.  All questions and concerns of all different team members were addressed and significant time was spent in coordinating care if and team members which included CT surgery, nursing staff, ICU staff and perfusion team.

## 2020-05-29 NOTE — SUBJECTIVE & OBJECTIVE
Interval History/Significant Events:   Not much change overnight  Required vaso today for pressure support    Follow-up For: Procedure(s) (LRB):  CREATION, TRACHEOSTOMY  (N/A)    Post-Operative Day: 24 Days Post-Op    Objective:     Vital Signs (Most Recent):  Temp: 99 °F (37.2 °C) (05/29/20 1100)  Pulse: 109 (05/29/20 1200)  Resp: (!) 45 (05/28/20 0152)  BP: (!) 101/55 (05/28/20 0814)  SpO2: 100 % (05/29/20 1200) Vital Signs (24h Range):  Temp:  [97.9 °F (36.6 °C)-99 °F (37.2 °C)] 99 °F (37.2 °C)  Pulse:  [100-139] 109  SpO2:  [96 %-100 %] 100 %  Arterial Line BP: ()/(50-76) 93/59     Weight: 59.6 kg (131 lb 6.3 oz)  Body mass index is 22.55 kg/m².      Intake/Output Summary (Last 24 hours) at 5/29/2020 1242  Last data filed at 5/29/2020 1211  Gross per 24 hour   Intake 3348.4 ml   Output 3020 ml   Net 328.4 ml       Physical Exam   Constitutional: He appears well-developed.   HENT:   Head: Normocephalic and atraumatic.   Eyes: Pupils are equal, round, and reactive to light.   Neck: Normal range of motion.   Cardiovascular:   tachycardic   Pulmonary/Chest:   Trached, mechanically ventilated  On ECMO, cannulated in RIJ and fem vein   Abdominal: Soft. He exhibits no distension.   Musculoskeletal: Normal range of motion.   Neurological:   sedated   Skin: Skin is warm.   Vitals reviewed.      Vents:  Vent Mode: A/C (05/29/20 0745)  Ventilator Initiated: Yes (04/10/20 2247)  Set Rate: 18 BPM (05/29/20 0745)  Vt Set: 200 mL (05/18/20 1244)  PEEP/CPAP: 5 cmH20 (05/29/20 0745)  Oxygen Concentration (%): 50 (05/29/20 1200)  Peak Airway Pressure: 37 cmH2O (05/29/20 0745)  Plateau Pressure: 24 cmH20 (05/29/20 0745)  Total Ve: 7.95 mL (05/29/20 0745)  F/VT Ratio<105 (RSBI): 250 (05/28/20 0152)    Lines/Drains/Airways     Peripherally Inserted Central Catheter Line            PICC Double Lumen 05/05/20 1707 right basilic 23 days          Central Venous Catheter Line                 ECMO Cannula 04/25/20 1120 right femoral  vein 34 days         ECMO Cannula 04/25/20 1120 right internal jugular 34 days          Drain                 NG/OG Tube 05/03/20 1215 Meagher sump;nasogastric 18 Fr. Left nostril 26 days         Urethral Catheter 05/28/20 1630 less than 1 day          Airway                 Surgical Airway 05/05/20 1500 Shiley Cuffed 23 days          Arterial Line                 Arterial Line 04/27/20 1100 Right Brachial 32 days          Peripheral Intravenous Line                 Peripheral IV - Single Lumen 05/26/20 0845 18 G Left Upper Arm 3 days         Peripheral IV - Single Lumen 05/27/20 2058 22 G Left;Posterior Hand 1 day                Significant Labs:    CBC/Anemia Profile:  Recent Labs   Lab 05/28/20  0410  05/28/20 1934 05/29/20 0109 05/29/20  0114 05/29/20  0722 05/29/20  0735   WBC 11.95   < > 13.24*  --  13.89*  --   --  17.29*   HGB 9.5*   < > 9.4*  --  8.8*  --   --  10.3*   HCT 31.1*   < > 30.8*   < > 29.2* 29* 25* 32.6*   *   < > 136*  --  132*  --   --  133*   MCV 96   < > 95  --  96  --   --  95   RDW 14.4   < > 14.5  --  14.6*  --   --  14.5   FERRITIN 1,440*  --   --   --  1,473*  --   --   --     < > = values in this interval not displayed.        Chemistries:  Recent Labs   Lab 05/28/20 1934 05/29/20 0109 05/29/20  0735   * 150* 149*   K 3.4* 5.5* 4.3    110 106   CO2 32* 28 30*   BUN 45* 55* 60*   CREATININE 0.9 1.1 1.0   CALCIUM 10.3 10.3 10.5   ALBUMIN 3.8 3.7 3.7   PROT 7.2 6.9 7.1   BILITOT 0.8 0.7 0.8   ALKPHOS 185* 199* 206*   ALT 76* 74* 77*   AST 39 37 45*   MG 2.4 2.3 2.3   PHOS 3.3 3.1 4.4         Significant Imaging:  I have reviewed and interpreted all pertinent imaging results/findings within the past 24 hours.

## 2020-05-29 NOTE — PROGRESS NOTES
[facility]ists shift report    Date: 05/29/2020  ECMO Specialist:  Iesha Elke    Pump parameters:  RPM: 2600  Flow:  2.8-2.9  Sweep:  8  FiO2:  50    Oxygenator status:  Clots: pre and post   Fibrin: pre and post    Pressure trends:  P1: 103  P2: 74  Delta P: 29    Volume status:  Chugging noted no  CVP:   MAP:  60's- 80's  MD notified (name):  To Hernandez     Anticoagulation:  ACT/aPTT/Xa parameters: Xa 0.25-0.3  ACT/aPTT/Xa trends this shift: Xa 0.28, 0.26    Cannula size / status / placement:  Arterial:   Venous 1: RIJV 23 Fr @ 4  Venous 2:RFV 27 Fr @ 12  Dual lumen:      Additional Comments:      Pt maintained on VV ECMO. No flow issues this shift. Pt had times of hypotension received pRBCs for HCT and low BP. Sweep decreased to 8 lpm for pt CO2 36.6

## 2020-05-29 NOTE — PROGRESS NOTES
Anti XA back at 0.26. Dr Estrella notified. Verbal order to decrease Heparin gtt to 550 units/hr.

## 2020-05-30 NOTE — PLAN OF CARE
Pt remains unresponsive to all stimuli except painful or noxious.  Does not follow commands, does not track and does not have purposeful movement of extremities.  Excellent UOP with lasix gtt at 5mg/hr.  Precedex remains at 0.1 mcg/kg/hr and heparin gtt decreased to 500 units/hr to attempt to maintain an anti Xa 0.2-0.25.  Ventilator settings remain AC/PC 50%/5, oxygenator remains at 50% FiO2, sweep of 8, speed 2600 rpm with flows 2.85-2.9.  No contact with family to discuss plan of care.

## 2020-05-30 NOTE — PROGRESS NOTES
[facility] - Respiratory ICU  Cardiothoracic Surgery  Progress Note    Patient Name: Ranjana Sanchez  MRN: [id number]  Admission Date: 4/10/2020  Hospital Length of Stay: 50 days  Code Status: Full Code   Attending Physician: Francis Ayon MD   Referring Provider: Francis Ayon MD  Principal Problem:Acute respiratory distress syndrome (ARDS) due to COVID-19 virus      Subjective:     Post-Op Info:  Procedure(s) (LRB):  CREATION, TRACHEOSTOMY  (N/A)   25 Days Post-Op     Interval History: Fi/sweep decreased to 50/7. satting 100% with vent at 50/5 with tidal volumes of 200-250cc. Minimal change in neuro exam; ECMO 2.8/2600    ROS  Medications:  Continuous Infusions:   dexmedetomidine (PRECEDEX) infusion 0.1 mcg/kg/hr (05/30/20 1200)    furosemide (LASIX) 2 mg/mL continuous infusion (non-titrating) 5 mg/hr (05/30/20 1200)    heparin (porcine) in 5 % dex 700 Units/hr (05/30/20 1200)    nicardipine Stopped (05/29/20 1027)    vasopressin (PITRESSIN) infusion Stopped (05/29/20 1740)     Scheduled Meds:   albumin human 25%  12.5 g Intravenous Once    aspirin  81 mg Per NG tube Daily    chlorhexidine  15 mL Mouth/Throat BID    fentaNYL  1 patch Transdermal Q72H    labetalol  200 mg Per G Tube BID    multivitamin liquid no.118  10 mL Per G Tube Daily    pantoprazole  40 mg Intravenous Q12H    piperacillin-tazobactam (ZOSYN) IVPB  4.5 g Intravenous Q8H    polyethylene glycol  17 g Per NG tube Daily    potassium chloride 10%  20 mEq Per NG tube BID    psyllium husk (aspartame)  1 packet Per NG tube TID    QUEtiapine  50 mg Per NG tube QHS    sucralfate  1 g Per NG tube Q6H    vitamin D  1,000 Units Per G Tube Daily     PRN Meds:sodium chloride, acetaminophen, artificial tears, calcium gluconate IVPB, calcium gluconate IVPB, calcium gluconate IVPB, Dextrose 10% Bolus, fentaNYL, glucose, glucose, insulin aspart U-100, magnesium oxide, magnesium oxide, magnesium sulfate IVPB, potassium chloride  10%, potassium chloride 10%, potassium chloride 10%, potassium, sodium phosphates, sodium chloride 0.9%     Objective:     Vital Signs (Most Recent):  Temp: 98.5 °F (36.9 °C) (05/30/20 1100)  Pulse: 102 (05/30/20 1200)  Resp: (!) 21 (05/30/20 0727)  BP: 107/64 (05/29/20 1713)  SpO2: 100 % (05/30/20 1200) Vital Signs (24h Range):  Temp:  [97.9 °F (36.6 °C)-99 °F (37.2 °C)] 98.5 °F (36.9 °C)  Pulse:  [] 102  Resp:  [21] 21  SpO2:  [97 %-100 %] 100 %  BP: ()/(57-64) 107/64  Arterial Line BP: ()/(47-74) 87/52     Weight: 57.3 kg (126 lb 5.2 oz)  Body mass index is 21.68 kg/m².    SpO2: 100 %  O2 Device (Oxygen Therapy): ventilator    Intake/Output - Last 3 Shifts       05/28 0700 - 05/29 0659 05/29 0700 - 05/30 0659 05/30 0700 - 05/31 0659    I.V. (mL/kg) 384.3 (6.4) 295.2 (5.2) 240 (4.2)    Blood  350     NG/GT 2560 1945 580    IV Piggyback 700 300     Total Intake(mL/kg) 3644.3 (61.1) 2890.2 (50.4) 820 (14.3)    Urine (mL/kg/hr) 3095 (2.2) 2945 (2.1) 975 (2.9)    Other 0      Stool 0      Blood 6 4 4    Total Output 3101 2949 979    Net +543.3 -58.8 -159           Stool Occurrence 1 x            Lines/Drains/Airways     Peripherally Inserted Central Catheter Line            PICC Double Lumen 05/05/20 1707 right basilic 24 days          Central Venous Catheter Line                 ECMO Cannula 04/25/20 1120 right femoral vein 35 days         ECMO Cannula 04/25/20 1120 right internal jugular 35 days          Drain                 NG/OG Tube 05/03/20 1215 Belva sump;nasogastric 18 Fr. Left nostril 27 days         Urethral Catheter 05/28/20 1630 1 day          Airway                 Surgical Airway 05/05/20 1500 Shiley Cuffed 24 days          Arterial Line                 Arterial Line 04/27/20 1100 Right Brachial 33 days          Peripheral Intravenous Line                 Peripheral IV - Single Lumen 05/26/20 0845 18 G Left Upper Arm 4 days         Peripheral IV - Single Lumen 05/27/20 2058 22 G  Left;Posterior Hand 2 days                Physical Exam  ECMO, vent; mild oozing at neck cannulation site; stable neuro exam- minimal responsiveness    Significant Labs:  BMP:   Recent Labs   Lab 05/30/20  0815   *   *   K 3.8      CO2 28   BUN 70*   CREATININE 1.2   CALCIUM 10.0   MG 2.6     CBC:   Recent Labs   Lab 05/30/20  0815  05/30/20  0956   WBC 12.82*  --   --    RBC 3.35*  --   --    HGB 10.1*  --   --    HCT 31.7*   < > 28*   *  --   --    MCV 95  --   --    MCH 30.1  --   --    MCHC 31.9*  --   --     < > = values in this interval not displayed.     CMP:   Recent Labs   Lab 05/30/20  0815   *   CALCIUM 10.0   ALBUMIN 3.3*   PROT 6.7   *   K 3.8   CO2 28      BUN 70*   CREATININE 1.2   ALKPHOS 187*   ALT 66*   AST 34   BILITOT 0.9     Coagulation:   Recent Labs   Lab 05/30/20  0815   INR 1.1   APTT 26.9     Lactic Acid: No results for input(s): LACTATE in the last 48 hours.  LFTs:   Recent Labs   Lab 05/30/20 0815   ALT 66*   AST 34   ALKPHOS 187*   BILITOT 0.9   PROT 6.7   ALBUMIN 3.3*       Significant Diagnostics:      Assessment/Plan:     Acute respiratory disease due to COVID-19 virus  Acute respiratory distress syndrome (ARDS) due to COVID-19 virus  - Crich switched to ETT on 4/11  - Monika weaned off 5/5  - Tracheostomy and bronch 5/5  - severe thrombocytopenia resolved; transfuse for under 30K  - Aspirin 81 daily  - decrease to labetalol 200 bid  - RPM at 2600; flows at 2.8  - Sweep at 7; CO2 goal is 59 or below; do not increase sweep for pCO2s 59 or below as long as patient is not acidotic, as these high pCO2s are respiratory compensation for alkalosis  - Oxygenator Fi at 50%; wean further as tolerated  - Vent Fi 50%, PEEP 5  - d/c norvasc for softer pressures  - diamox x1  - increase free water to 250 q4h for hypernatremia     Tongue laceration      -ENT evaluated, laceration superficial      -recs: bite block vs paralysis, will monitor    Sedation  -  Valium, seroquel, PRN Fentanyl     UGI Bleed  - Scoped by GI 5/3 with epinephrine injection and clip placement for an actively bleeding D2/D3 Diuelafoy lesion    FEN/GI  - TF at 45, goal      - Lasix gtt 2.5  - Resuming FWB for hypernatremia 250mL q6     Anticoagulation      - New goal aXa 0.2-0.25 Q6hrs      - hep gtt      - monitor LDH    Prophylaxis      - protonix q12 IV       - leukocytosis stable      - PICC placed 5/5; central line removed 5/6    Rafael Nj MD  Cardiothoracic Surgery  [facility] - Respiratory ICU

## 2020-05-30 NOTE — PROGRESS NOTES
[facility] shift report    Date: 05/30/2020  ECMO Specialist:  Ashley Francisco    Pump parameters:  RPM: 2600  Flow:  2.8  Sweep:  8  FiO2:  50    Oxygenator status:  Clots: pre and post  Fibrin: pre and post    Pressure trends:  P1: 101  P2: 75  Delta P: 26    Volume status:  Chugging noted? no  CVP: NA  MAP:  80s  MD notified (name):  Nany    Anticoagulation:  ACT/aPTT/Xa parameters: .2-.25  ACT/aPTT/Xa trends this shift: .26    Cannula size / status / placement:  Arterial:   Venous 1: RIJV 23F@4cm  Venous 2: RFV 27F@ 12cm  Dual lumen:      Additional Comments:  Maintained.  Heparin decreased per Xa to 5 ml/hr.

## 2020-05-30 NOTE — CARE UPDATE
Pt intubated.   Ventilator Settings: AC/PC: Rate: 16, FiO2: 50%, PEEP: 5   SpO2 ~100%.   Q6H ABGs obtained per [facility] Tech.      Pt opens eyes spontaneously.   Pt not following commands.   Pt withdraws from pain.   MD aware.   RASS -1.      Q4H Neurovascular exams completed.   See flowsheet for details.      HOB ~20 degrees per MD order.      Precedex gtt @ 0.1mcg/kg/min per MD order.      Goal to maintain MAP <90 per MD order.     UOP ~150cc/hr.    Lasix gtt @ 5mg/hr.     Q6H APTT/Anti Xa monitoring.   Goal to maintain Anti Xa- 0.25-0.3.   MD updated with results.   Heparin gtt @ 600units/hr.      ECMO  VV ECMO  RIJ/R Femoral Vein   RPM: 2600        Flow: 2.4-2.8  Sweep:  8LPM  FiO2:  50     Dressings changed per ICU RN.   Q4H site assessments.      TF @45cc/hr per NGT.   Q4H 250cc FWB.   40cc residual output noted.      Plan of care reviewed with pt and son.   Questions answered per ICU RN.   See flowsheet for details.

## 2020-05-30 NOTE — CARE UPDATE
JACKIE Ayon MD notified of pts current VS, gtts, rates, UOP, labs, ECMO and ventilator settings.     MD notified of chugging in ECMO cannulas.   100cc 25% Albumin to be administered per MD order.    Anti Xa 0.31.  Heparin gtt decreased to 650units/hr.     ECMO   RPM: 2600   Flow: 2.8-2.9  Sweep: 4LPM  FiO2: 40%    Plan of care reviewed with MD.

## 2020-05-30 NOTE — SUBJECTIVE & OBJECTIVE
Interval History: Fi/sweep decreased to 50/7. satting 100% with vent at 50/5 with tidal volumes of 200-250cc. Minimal change in neuro exam; ECMO 2.8/2600    ROS  Medications:  Continuous Infusions:   dexmedetomidine (PRECEDEX) infusion 0.1 mcg/kg/hr (05/30/20 1200)    furosemide (LASIX) 2 mg/mL continuous infusion (non-titrating) 5 mg/hr (05/30/20 1200)    heparin (porcine) in 5 % dex 700 Units/hr (05/30/20 1200)    nicardipine Stopped (05/29/20 1027)    vasopressin (PITRESSIN) infusion Stopped (05/29/20 1740)     Scheduled Meds:   albumin human 25%  12.5 g Intravenous Once    aspirin  81 mg Per NG tube Daily    chlorhexidine  15 mL Mouth/Throat BID    fentaNYL  1 patch Transdermal Q72H    labetalol  200 mg Per G Tube BID    multivitamin liquid no.118  10 mL Per G Tube Daily    pantoprazole  40 mg Intravenous Q12H    piperacillin-tazobactam (ZOSYN) IVPB  4.5 g Intravenous Q8H    polyethylene glycol  17 g Per NG tube Daily    potassium chloride 10%  20 mEq Per NG tube BID    psyllium husk (aspartame)  1 packet Per NG tube TID    QUEtiapine  50 mg Per NG tube QHS    sucralfate  1 g Per NG tube Q6H    vitamin D  1,000 Units Per G Tube Daily     PRN Meds:sodium chloride, acetaminophen, artificial tears, calcium gluconate IVPB, calcium gluconate IVPB, calcium gluconate IVPB, Dextrose 10% Bolus, fentaNYL, glucose, glucose, insulin aspart U-100, magnesium oxide, magnesium oxide, magnesium sulfate IVPB, potassium chloride 10%, potassium chloride 10%, potassium chloride 10%, potassium, sodium phosphates, sodium chloride 0.9%     Objective:     Vital Signs (Most Recent):  Temp: 98.5 °F (36.9 °C) (05/30/20 1100)  Pulse: 102 (05/30/20 1200)  Resp: (!) 21 (05/30/20 0727)  BP: 107/64 (05/29/20 1713)  SpO2: 100 % (05/30/20 1200) Vital Signs (24h Range):  Temp:  [97.9 °F (36.6 °C)-99 °F (37.2 °C)] 98.5 °F (36.9 °C)  Pulse:  [] 102  Resp:  [21] 21  SpO2:  [97 %-100 %] 100 %  BP: ()/(57-64)  107/64  Arterial Line BP: ()/(47-74) 87/52     Weight: 57.3 kg (126 lb 5.2 oz)  Body mass index is 21.68 kg/m².    SpO2: 100 %  O2 Device (Oxygen Therapy): ventilator    Intake/Output - Last 3 Shifts       05/28 0700 - 05/29 0659 05/29 0700 - 05/30 0659 05/30 0700 - 05/31 0659    I.V. (mL/kg) 384.3 (6.4) 295.2 (5.2) 240 (4.2)    Blood  350     NG/GT 2560 1945 580    IV Piggyback 700 300     Total Intake(mL/kg) 3644.3 (61.1) 2890.2 (50.4) 820 (14.3)    Urine (mL/kg/hr) 3095 (2.2) 2945 (2.1) 975 (2.9)    Other 0      Stool 0      Blood 6 4 4    Total Output 3101 2949 979    Net +543.3 -58.8 -159           Stool Occurrence 1 x            Lines/Drains/Airways     Peripherally Inserted Central Catheter Line            PICC Double Lumen 05/05/20 1707 right basilic 24 days          Central Venous Catheter Line                 ECMO Cannula 04/25/20 1120 right femoral vein 35 days         ECMO Cannula 04/25/20 1120 right internal jugular 35 days          Drain                 NG/OG Tube 05/03/20 1215 [place] sump;nasogastric 18 Fr. Left nostril 27 days         Urethral Catheter 05/28/20 1630 1 day          Airway                 Surgical Airway 05/05/20 1500 Shiley Cuffed 24 days          Arterial Line                 Arterial Line 04/27/20 1100 Right Brachial 33 days          Peripheral Intravenous Line                 Peripheral IV - Single Lumen 05/26/20 0845 18 G Left Upper Arm 4 days         Peripheral IV - Single Lumen 05/27/20 2058 22 G Left;Posterior Hand 2 days                Physical Exam  ECMO, vent; mild oozing at neck cannulation site; stable neuro exam- minimal responsiveness    Significant Labs:  BMP:   Recent Labs   Lab 05/30/20  0815   *   *   K 3.8      CO2 28   BUN 70*   CREATININE 1.2   CALCIUM 10.0   MG 2.6     CBC:   Recent Labs   Lab 05/30/20  0815  05/30/20  0956   WBC 12.82*  --   --    RBC 3.35*  --   --    HGB 10.1*  --   --    HCT 31.7*   < > 28*   *  --   --    MCV  95  --   --    MCH 30.1  --   --    MCHC 31.9*  --   --     < > = values in this interval not displayed.     CMP:   Recent Labs   Lab 05/30/20  0815   *   CALCIUM 10.0   ALBUMIN 3.3*   PROT 6.7   *   K 3.8   CO2 28      BUN 70*   CREATININE 1.2   ALKPHOS 187*   ALT 66*   AST 34   BILITOT 0.9     Coagulation:   Recent Labs   Lab 05/30/20  0815   INR 1.1   APTT 26.9     Lactic Acid: No results for input(s): LACTATE in the last 48 hours.  LFTs:   Recent Labs   Lab 05/30/20  0815   ALT 66*   AST 34   ALKPHOS 187*   BILITOT 0.9   PROT 6.7   ALBUMIN 3.3*       Significant Diagnostics:

## 2020-05-30 NOTE — PROGRESS NOTES
[facility]ists shift report    Date: 05/30/2020  ECMO Specialist:  Monik Bryant    Pump parameters:  RPM: 2600  Flow: 2.86   Sweep: 6   FiO2: 40%    Oxygenator status:  Clots: yes pre/post  Fibrin: yes    Pressure trends:  P1: 101  P2: 71  Delta P: 30    Volume status:  Chugging noted yes  CVP:   MAP:  60's- 70's  MD notified (name):  Dr Ayon    Anticoagulation:  /Xa parameters:.25-0.3   Xa trends this shift: 0.16- 0.31    Cannula size / status / placement:  Arterial:   Venous 1: RIJV 23FR-4cm  Venous 2: RFV 27FR-12cm  Dual lumen:      Additional Comments:Dr Ayon aware of all gases and pump changes

## 2020-05-30 NOTE — ASSESSMENT & PLAN NOTE
Acute respiratory distress syndrome (ARDS) due to COVID-19 virus  - Crich switched to ETT on 4/11  - Monika weaned off 5/5  - Tracheostomy and bronch 5/5  - severe thrombocytopenia resolved; transfuse for under 30K  - Aspirin 81 daily  - decrease to labetalol 200 bid  - RPM at 2600; flows at 2.8  - Sweep at 7; CO2 goal is 59 or below; do not increase sweep for pCO2s 59 or below as long as patient is not acidotic, as these high pCO2s are respiratory compensation for alkalosis  - Oxygenator Fi at 50%; wean further as tolerated  - Vent Fi 50%, PEEP 5  - d/c norvasc for softer pressures  - diamox x1  - increase free water to 250 q4h for hypernatremia     Tongue laceration      -ENT evaluated, laceration superficial      -recs: bite block vs paralysis, will monitor    Sedation  - Valium, seroquel, PRN Fentanyl     UGI Bleed  - Scoped by GI 5/3 with epinephrine injection and clip placement for an actively bleeding D2/D3 Diuelafoy lesion    FEN/GI  - TF at 45, goal      - Lasix gtt 2.5  - Resuming FWB for hypernatremia 250mL q6     Anticoagulation      - New goal aXa 0.2-0.25 Q6hrs      - hep gtt      - monitor LDH    Prophylaxis      - protonix q12 IV       - leukocytosis stable      - PICC placed 5/5; central line removed 5/6

## 2020-05-31 NOTE — ASSESSMENT & PLAN NOTE
Acute respiratory distress syndrome (ARDS) due to COVID-19 virus  - Crich switched to ETT on 4/11  - Monika weaned off 5/5  - Tracheostomy and bronch 5/5  - severe thrombocytopenia resolved; transfuse for under 30K  - Aspirin 81 daily  - decrease to labetalol 200 bid  - RPM at 2600; flows at 2.8  - Sweep at 5; CO2 goal is 59 or below; do not increase sweep for pCO2s 59 or below as long as patient is not acidotic, as these high pCO2s are respiratory compensation for alkalosis  - Oxygenator Fi at 50%; wean further as tolerated  - increase free water to 300 q4h for hypernatremia     Tongue laceration      -ENT evaluated, laceration superficial      -recs: bite block vs paralysis, will monitor    Sedation  - Valium, seroquel, PRN Fentanyl     UGI Bleed  - Scoped by GI 5/3 with epinephrine injection and clip placement for an actively bleeding D2/D3 Diuelafoy lesion    FEN/GI  - TF at 45, goal      - Lasix gtt 5  - Resuming FWB for hypernatremia 300mL q6     Anticoagulation      - New goal aXa 0.25-0.3 Q6hrs      - hep gtt      - monitor LDH    Prophylaxis      - protonix q12 IV       - leukocytosis stable      - PICC placed 5/5; central line removed 5/6

## 2020-05-31 NOTE — PROGRESS NOTES
[facility] Specialists shift report    Date: 05/31/2020  ECMO Specialist:  Monik Bryant    Pump parameters:  RPM: 2600  Flow:  2.8  Sweep:  5  FiO2:  40    Oxygenator status:  Clots: yes pre/post   Fibrin: yes pre/post    Pressure trends:  P1: 103  P2: 76  Delta P: 27    Volume status:  Chugging noted  yes  CVP:   MAP:  80's  MD notified (name):  Dr Rafael Nj/ Nany    Anticoagulation:  /Xa parameters: 0.25-0.30  Xa trends this shift: 0.21-0.31    Cannula size / status / placement:  Arterial:   Venous 1: RIJV 23 Fr @ 4cm  Venous 2: RFV 27 FR @12cm  Dual lumen:      Additional Comments:Co2 parameter changed to 45-55/ Dr Ayon

## 2020-05-31 NOTE — PROGRESS NOTES
Updated Dr. Ayon on most recent assessment, labs, gtts, ABG, ECMO settings.     Orders to increase scheduled labetalol to 300 mg bid and give additional dose now to help maintain MAP < 90. Also orders to maintain pCO2 goal of 45-55. Will carry out and continue to monitor pt closely.

## 2020-05-31 NOTE — PROGRESS NOTES
Received AntiXa results from 0748. AntiXa 0.21. Dr. Nj notified. Orders to increase Heparin gtt to 700 units/hr for AntiXa goal of 0.25-0.3. Will carry out.

## 2020-05-31 NOTE — SUBJECTIVE & OBJECTIVE
Interval History: sweep weaned to 5; same neuro exam; higher TV at 250-300 at Pi of 24 and peep of 5    ROS  Medications:  Continuous Infusions:   dexmedetomidine (PRECEDEX) infusion 0.1 mcg/kg/hr (05/31/20 1500)    furosemide (LASIX) 2 mg/mL continuous infusion (non-titrating) 5 mg/hr (05/31/20 1500)    heparin (porcine) in 5 % dex 700 Units/hr (05/31/20 1500)    nicardipine Stopped (05/29/20 1027)    vasopressin (PITRESSIN) infusion Stopped (05/29/20 1740)     Scheduled Meds:   aspirin  81 mg Per NG tube Daily    chlorhexidine  15 mL Mouth/Throat BID    fentaNYL  1 patch Transdermal Q72H    labetalol  200 mg Per G Tube BID    multivitamin liquid no.118  10 mL Per G Tube Daily    pantoprazole  40 mg Intravenous Q12H    piperacillin-tazobactam (ZOSYN) IVPB  4.5 g Intravenous Q8H    polyethylene glycol  17 g Per NG tube Daily    potassium chloride 10%  20 mEq Per NG tube BID    psyllium husk (aspartame)  1 packet Per NG tube TID    QUEtiapine  50 mg Per NG tube QHS    sucralfate  1 g Per NG tube Q6H    vitamin D  1,000 Units Per G Tube Daily     PRN Meds:sodium chloride, sodium chloride, acetaminophen, artificial tears, calcium gluconate IVPB, calcium gluconate IVPB, calcium gluconate IVPB, Dextrose 10% Bolus, fentaNYL, glucose, glucose, insulin aspart U-100, magnesium oxide, magnesium oxide, magnesium sulfate IVPB, potassium chloride 10%, potassium chloride 10%, potassium chloride 10%, potassium, sodium phosphates, sodium chloride 0.9%     Objective:     Vital Signs (Most Recent):  Temp: 98.3 °F (36.8 °C) (05/31/20 1115)  Pulse: 100 (05/31/20 1500)  Resp: (!) 30 (05/31/20 1306)  BP: 107/64 (05/29/20 1713)  SpO2: 100 % (05/31/20 1500) Vital Signs (24h Range):  Temp:  [98.1 °F (36.7 °C)-98.5 °F (36.9 °C)] 98.3 °F (36.8 °C)  Pulse:  [] 100  Resp:  [30-35] 30  SpO2:  [97 %-100 %] 100 %  Arterial Line BP: ()/(51-74) 108/66     Weight: 58.9 kg (129 lb 13.6 oz)  Body mass index is 22.29  kg/m².    SpO2: 100 %  O2 Device (Oxygen Therapy): ventilator    Intake/Output - Last 3 Shifts       05/29 0700 - 05/30 0659 05/30 0700 - 05/31 0659 05/31 0700 - 06/01 0659    I.V. (mL/kg) 295.2 (5.2) 1059.4 (18) 473.9 (8)    Blood 350 350     NG/GT 1945 2140 765    IV Piggyback 300 100     Total Intake(mL/kg) 2890.2 (50.4) 3649.4 (62) 1238.9 (21)    Urine (mL/kg/hr) 2945 (2.1) 3925 (2.8) 1865 (3.7)    Other       Stool       Blood 4 7 3    Total Output 2949 3932 1868    Net -58.8 -282.6 -629.1                 Lines/Drains/Airways     Peripherally Inserted Central Catheter Line            PICC Double Lumen 05/05/20 1707 right basilic 25 days          Central Venous Catheter Line                 ECMO Cannula 04/25/20 1120 right femoral vein 36 days         ECMO Cannula 04/25/20 1120 right internal jugular 36 days          Drain                 NG/OG Tube 05/03/20 1215 [place] sump;nasogastric 18 Fr. Left nostril 28 days         Urethral Catheter 05/28/20 1630 2 days          Airway                 Surgical Airway 05/05/20 1500 Shiley Cuffed 26 days          Arterial Line                 Arterial Line 04/27/20 1100 Right Brachial 34 days          Peripheral Intravenous Line                 Peripheral IV - Single Lumen 05/26/20 0845 18 G Left Upper Arm 5 days         Peripheral IV - Single Lumen 05/30/20 1205 20 G Left Other 1 day         Peripheral IV - Single Lumen 05/31/20 0220 20 G Right Wrist less than 1 day                Physical Exam  Same neuro exam; tachypnea;     Significant Labs:  Cardiac markers: No results for input(s): CKMB, CPKMB, TROPONINT, TROPONINI, MYOGLOBIN in the last 48 hours.  CBC:   Recent Labs   Lab 05/31/20  1359   WBC 12.75*   RBC 3.53*   HGB 10.7*   HCT 33.6*   *   MCV 95   MCH 30.3   MCHC 31.8*     CMP:   Recent Labs   Lab 05/31/20  1359   *   CALCIUM 10.3   ALBUMIN 3.9   PROT 7.2   *   K 3.4*   CO2 29      BUN 69*   CREATININE 1.2   ALKPHOS 181*   ALT 53*   AST 32    BILITOT 1.0     Coagulation:   Recent Labs   Lab 05/31/20  1359   INR 1.1   APTT 31.4     Lactic Acid: No results for input(s): LACTATE in the last 48 hours.    Significant Diagnostics:  CXR: same, similar opacities

## 2020-05-31 NOTE — NURSING
ECMO specialist noting 'chattering' in inflow and outflow lines, Dr. Jacinto notifed, New orders for 500ml 5% albumin received and administered.  Updated on other details of pt status as well.

## 2020-05-31 NOTE — PROGRESS NOTES
[facility] ICU  General Surgery  Progress Note    Subjective:     Interval History: Patient continues to exhibit critical illnes with continued requirement of VV ECMO    Post-Op Info:  Procedure(s) (LRB):  CREATION, TRACHEOSTOMY  (N/A)   26 Days Post-Op      Medications:  Continuous Infusions:   dexmedetomidine (PRECEDEX) infusion 0.1 mcg/kg/hr (05/31/20 1000)    furosemide (LASIX) 2 mg/mL continuous infusion (non-titrating) 5 mg/hr (05/31/20 1000)    heparin (porcine) in 5 % dex 600 Units/hr (05/31/20 1000)    nicardipine Stopped (05/29/20 1027)    vasopressin (PITRESSIN) infusion Stopped (05/29/20 1740)     Scheduled Meds:   aspirin  81 mg Per NG tube Daily    chlorhexidine  15 mL Mouth/Throat BID    fentaNYL  1 patch Transdermal Q72H    labetalol  200 mg Per G Tube BID    multivitamin liquid no.118  10 mL Per G Tube Daily    pantoprazole  40 mg Intravenous Q12H    piperacillin-tazobactam (ZOSYN) IVPB  4.5 g Intravenous Q8H    polyethylene glycol  17 g Per NG tube Daily    potassium chloride 10%  20 mEq Per NG tube BID    psyllium husk (aspartame)  1 packet Per NG tube TID    QUEtiapine  50 mg Per NG tube QHS    sucralfate  1 g Per NG tube Q6H    vitamin D  1,000 Units Per G Tube Daily     PRN Meds:sodium chloride, sodium chloride, acetaminophen, artificial tears, calcium gluconate IVPB, calcium gluconate IVPB, calcium gluconate IVPB, Dextrose 10% Bolus, fentaNYL, glucose, glucose, insulin aspart U-100, magnesium oxide, magnesium oxide, magnesium sulfate IVPB, potassium chloride 10%, potassium chloride 10%, potassium chloride 10%, potassium, sodium phosphates, sodium chloride 0.9%     Objective:     Vital Signs (Most Recent):  Temp: 98.4 °F (36.9 °C) (05/31/20 0830)  Pulse: 105 (05/31/20 1000)  Resp: (!) 35 (05/31/20 0801)  BP: 107/64 (05/29/20 1713)  SpO2: 99 % (05/31/20 1000) Vital Signs (24h Range):  Temp:  [98.1 °F (36.7 °C)-98.5 °F (36.9 °C)] 98.4 °F (36.9  °C)  Pulse:  [] 105  Resp:  [24-35] 35  SpO2:  [97 %-100 %] 99 %  Arterial Line BP: ()/(50-74) 115/65       Intake/Output Summary (Last 24 hours) at 5/31/2020 1018  Last data filed at 5/31/2020 1000  Gross per 24 hour   Intake 3339.4 ml   Output 4141 ml   Net -801.6 ml       Physical Exam    Significant Labs:  All pertinent labs from the last 24 hours have been reviewed.    Significant Diagnostics:  I have reviewed all pertinent imaging results/findings within the past 24 hours.    Assessment/Plan:     Active Diagnoses:    Diagnosis Date Noted POA    PRINCIPAL PROBLEM:  Acute respiratory distress syndrome (ARDS) due to COVID-19 virus [U07.1, J80] 04/25/2020 Yes    Encounter for weaning from ventilator [Z99.11]  Not Applicable    Air hunger [R09.89]  Yes    Encephalopathy, metabolic [G93.41]  Yes    Acute blood loss anemia [D62] 05/02/2020 No    Shock, unspecified [R57.9] 04/25/2020 No    Hypokalemia [E87.6]  Unknown    Hypophosphatemia [E83.39]  Unknown    Arm wound, right, sequela [S41.101S] 04/23/2020 Not Applicable    Goals of care, counseling/discussion [Z71.89] 04/22/2020 Not Applicable    Type 2 diabetes mellitus without complication, without long-term current use of insulin [E11.9] 04/15/2020 Yes    Acute respiratory disease due to COVID-19 virus [U07.1, J06.9]  Yes    Difficult intubation [T88.4XXA]  Yes    Acute hypoxemic respiratory failure [J96.01] 04/11/2020 Yes    Shock [R57.9] 04/11/2020 Yes    Sedated [R41.89] 04/11/2020 Yes    COVID-19 virus detected [U07.1] 04/10/2020 Yes      Problems Resolved During this Admission:    Diagnosis Date Noted Date Resolved POA    Hypernatremia [E87.0] 04/17/2020 04/20/2020 Unknown    Tachycardia-bradycardia [I49.5] 04/17/2020 04/25/2020 Unknown    Nonspecific abnormal electrocardiogram (ECG) (EKG) [R94.31] 04/17/2020 04/25/2020 No    Respiratory distress [R06.03] 04/10/2020 04/25/2020 Yes    Hypertension [I10] 04/10/2020 04/25/2020  Yes     Patient with only minimal neurologic function.  Sl improved hemodynamics over past week.  Will continue current support as needed although prognosis remains poor.    Jace Hernandez MD  General Surgery  [facility] - Res[facility] ICU

## 2020-05-31 NOTE — PROGRESS NOTES
Dr. Nj at bedside. Updated on most recent assessment, vitals, labs, and gtts. All orders received and implemented. Will continue to monitor.

## 2020-05-31 NOTE — PLAN OF CARE
Pt remains on ventilator with trach, AC/PC 50%/5.  ECMO 40%, Sweep 6, 2600 rpm, 2.9 lpm.  Albumin 500ml 5% administered due to inflow/outflow line chattering.  PRBCsx1 administered for decreased H/H.  Heparin gtt decreased from 650 to 600 units/hr. No other changes in gtt rates.  No family contact to discuss plan of care or address questions.

## 2020-05-31 NOTE — PLAN OF CARE
Shift events: 1 unit PRBC. 250 mL albumin.    Vitals: Afebrile / -110 / MAP 70-90 /  SpO2 %     Neuro: Does not follow commands, does not track with eyes. Withdraws in BLE, moves upper extremities but nothing purposeful. PERRLA. Corneal, gag, and cough reflexes present.     Cardiac: VV ECMO, speed 2600, Flows 2.8-3 L/min, FiO2 40%, sweep decreased to 5 today. Inflow and outflow cannulas remain CDI. ABGs per ECMO perfusionist.     Respiratory: AC/PC, rate 18, Peak pressures 30-35, pulling TVs 200-290, FiO2 50% 5 PEEP.     Neurovascular: Radial and pedal pulses 2+ and palpable.    GI: 3 BMs today. TF @ 45 mL/hr (goal) with minimal residuals per L nare. 300 mL free H2O boluses q4h.    : UOP per Torres 150-250 mL/hr    Gtts: Lasix @ 5 mg/hr, Heparin @ 700 units/hr (AntiXa goal 0.25-0.3, q6h), Precedex @ 0.1 mcg/kg/hr     Skin:    - Left cheek wound cleansed with soap and water, barrier cream applied  - Left arm wound cleansed with sterile normal saline, betadine applied, foam dressing changed  - Trach care per RT  - Heel offloading boot and foot drop boot alternated frequently  - Pt turned q2h as tolerated and all pressure points padded with pillows

## 2020-05-31 NOTE — PROGRESS NOTES
Notified Dr. Nj of most recent labs:  Anti-Xa 0.31, platelets 114, and fibrinogen 208. Orders to re-check Anti-Xa @ 1800.    Also notified of pt's RR increased to 35-44 bpm, Peak pressures remain 31-35, and MAPs maintaining > 90. PRN Fentanyl administered with minimal response. More chattering noted in ECMO circuit as well. Flows decreased to 2.7 from 2.9-3.    Orders to give 250 mL albumin now and increase sweep to 5. Kavita, perfusionist at bedside and aware of changes.

## 2020-06-01 NOTE — ASSESSMENT & PLAN NOTE
Acute respiratory distress syndrome (ARDS) due to COVID-19 virus  - Crich switched to ETT on 4/11  - Monika weaned off 5/5  - Tracheostomy and bronch 5/5  - severe thrombocytopenia resolved; transfuse for under 30K  - Aspirin 81 daily  - Decreased labetalol to 200BID  - RPM at 2600; flows at 2.8  - Sweep at 4.5; CO2 goal is 59 or below; do not increase sweep for pCO2s 59 or below as long as patient is not acidotic, as these high pCO2s are respiratory compensation for alkalosis  - Oxygenator Fi at 40%; wean further as tolerated  - Revert all vent settings to those on 5/31 morning, decreasing ins time and increasing PEEP, given that lungs now appear more edematous on CXR     Tongue laceration      -ENT evaluated, laceration superficial      -recs: bite block vs paralysis, will monitor    Sedation  - Valium, seroquel, PRN Fentanyl     UGI Bleed  - Scoped by GI 5/3 with epinephrine injection and clip placement for an actively bleeding D2/D3 Diuelafoy lesion    FEN/GI  - TF at 45, goal      - Lasix gtt 5  - Increased FWB for hypernatremia 300mL q4     Anticoagulation      - Goal aXa has been altered over weekend to 0.25-0.3 Q6hrs      - Hep gtt      - monitor LDH    Prophylaxis      - protonix q12 IV       - leukocytosis stable      - PICC placed 5/5; central line removed 5/6

## 2020-06-01 NOTE — PROGRESS NOTES
Received a call from Dr Miller with a verbal order to change pt's current vent settings to AC +, Rate 12, Ins 0.6, Peep 10 per Dr Ayon. RT called and to bedside and vent settings changed per order.

## 2020-06-01 NOTE — PROGRESS NOTES
[facility] - Respiratory ICU  Cardiothoracic Surgery  Daily ECMO Progress Note    Patient Name: Ranjana Sanchez  MRN: [id number]  Admission Date: 4/10/2020  Hospital Length of Stay: 52 days  Code Status: Full Code   Attending Physician: Francis Ayon MD   Referring Provider: Francis Ayon MD  Principal Problem:Acute respiratory disease due to COVID-19 virus    Subjective:   Interval History:  Lasix gtt weaned given high UOP, 200-300/hr, 4.4L, net negative 1L in 24hrs. Weaned sweep to 4.5 and oxygenator FIO2 to 40%. Hep gtt ongoing at 600.    Medications:  Continuous Infusions:   dexmedetomidine (PRECEDEX) infusion 0.1 mcg/kg/hr (06/01/20 0800)    furosemide (LASIX) 2 mg/mL continuous infusion (non-titrating) 2.5 mg/hr (06/01/20 0800)    heparin (porcine) in 5 % dex 600 Units/hr (06/01/20 0800)    nicardipine Stopped (05/29/20 1027)    vasopressin (PITRESSIN) infusion Stopped (05/29/20 1740)     Scheduled Meds:   aspirin  81 mg Per NG tube Daily    chlorhexidine  15 mL Mouth/Throat BID    fentaNYL  1 patch Transdermal Q72H    labetalol  300 mg Per G Tube BID    multivitamin liquid no.118  10 mL Per G Tube Daily    pantoprazole  40 mg Intravenous Q12H    piperacillin-tazobactam (ZOSYN) IVPB  4.5 g Intravenous Q8H    polyethylene glycol  17 g Per NG tube Daily    potassium chloride 10%  20 mEq Per NG tube BID    psyllium husk (aspartame)  1 packet Per NG tube TID    QUEtiapine  50 mg Per NG tube QHS    sucralfate  1 g Per NG tube Q6H    vitamin D  1,000 Units Per G Tube Daily        Objective:     Vital Signs (Most Recent):  Temp: 97 °F (36.1 °C) (06/01/20 0700)  Pulse: (!) 113 (06/01/20 0800)  Resp: (!) 30 (05/31/20 1306)  BP: 107/64 (05/29/20 1713)  SpO2: 99 % (06/01/20 0800) Vital Signs (24h Range):  Temp:  [97 °F (36.1 °C)-98.5 °F (36.9 °C)] 97 °F (36.1 °C)  Pulse:  [] 113  Resp:  [30] 30  SpO2:  [94 %-100 %] 99 %  Arterial Line BP: ()/(42-72) 124/70     Weight: 58.2  kg (128 lb 4.9 oz)  Body mass index is 22.02 kg/m².    SpO2: 99 %  O2 Device (Oxygen Therapy): ventilator    Intake/Output - Last 3 Shifts       05/30 0700 - 05/31 0659 05/31 0700 - 06/01 0659 06/01 0700 - 06/02 0659    I.V. (mL/kg) 1059.4 (18) 681.7 (11.7) 29 (0.5)    Blood 350      NG/GT 2140 2655 90    IV Piggyback 100 100     Total Intake(mL/kg) 3649.4 (62) 3436.7 (59) 119 (2)    Urine (mL/kg/hr) 3925 (2.8) 4425 (3.2) 285 (3.3)    Stool  0     Blood 7 7     Total Output 3932 4432 285    Net -282.6 -995.4 -166.1           Stool Occurrence  2 x           Lines/Drains/Airways     Peripherally Inserted Central Catheter Line            PICC Double Lumen 05/05/20 1707 right basilic 26 days          Central Venous Catheter Line                 ECMO Cannula 04/25/20 1120 right femoral vein 36 days         ECMO Cannula 04/25/20 1120 right internal jugular 36 days          Drain                 NG/OG Tube 05/03/20 1215 [place] sump;nasogastric 18 Fr. Left nostril 28 days         Urethral Catheter 05/28/20 1630 3 days          Airway                 Surgical Airway 05/05/20 1500 Shiley Cuffed 26 days          Arterial Line                 Arterial Line 04/27/20 1100 Right Brachial 34 days          Peripheral Intravenous Line                 Peripheral IV - Single Lumen 05/26/20 0845 18 G Left Upper Arm 5 days         Peripheral IV - Single Lumen 05/30/20 1205 20 G Left Other 1 day         Peripheral IV - Single Lumen 05/31/20 0220 20 G Right Wrist 1 day                Physical Exam    Constitutional: He is sedated, and intubated.   Head: Normocephalic   Cardiovascular: Tachycardia 120s   Pulmonary/Chest: intubated vent FiO2 50% PEEP 5, ECMO RPM 2600, Flow 2.9, sweep 4.5, oxygenator FiO2 40%  Skin: L neck and L groin cannulas intact, no flashing, clots pre and post-oxygenator, all sutures intact and secure  Nursing note and vitals reviewed.    Significant Labs:  BMP:   Recent Labs   Lab 06/01/20  0234   *   *   K  3.6      CO2 30*   BUN 69*   CREATININE 1.1   CALCIUM 10.3   MG 2.6     CBC:   Recent Labs   Lab 06/01/20  0234  06/01/20  0803   WBC 14.48*  --   --    RBC 3.43*  --   --    HGB 10.3*  --   --    HCT 32.4*   < > 32*   *  --   --    MCV 95  --   --    MCH 30.0  --   --    MCHC 31.8*  --   --     < > = values in this interval not displayed.     LFTs:   Recent Labs   Lab 06/01/20  0234   ALT 49*   AST 34   ALKPHOS 180*   BILITOT 1.1*   PROT 7.3   ALBUMIN 4.0     Significant Diagnostics:  Cxr: overall worsened since two days ago with bilateral lung spaces more tiffany out      Assessment/Plan:     Acute respiratory distress syndrome (ARDS) due to COVID-19 virus  - Crich switched to ETT on 4/11  - Monika weaned off 5/5  - Tracheostomy and bronch 5/5  - severe thrombocytopenia resolved; transfuse for under 30K  - Aspirin 81 daily  - Decreased labetalol to 200BID  - RPM at 2600; flows at 2.8  - Sweep at 4.5; CO2 goal is 59 or below; do not increase sweep for pCO2s 59 or below as long as patient is not acidotic, as these high pCO2s are respiratory compensation for alkalosis  - Oxygenator Fi at 40%; wean further as tolerated  - Revert all vent settings to those on 5/31 morning, decreasing ins time and increasing PEEP, given that lungs now appear more edematous on CXR     Tongue laceration      -ENT evaluated, laceration superficial      -recs: bite block vs paralysis, will monitor    Sedation  - Valium, seroquel, PRN Fentanyl     UGI Bleed  - Scoped by GI 5/3 with epinephrine injection and clip placement for an actively bleeding D2/D3 Diuelafoy lesion    FEN/GI  - TF at 45, goal      - Lasix gtt 5  - Increased FWB for hypernatremia 300mL q4     Anticoagulation      - Goal aXa has been altered over weekend to 0.25-0.3 Q6hrs      - Hep gtt      - monitor LDH    Prophylaxis      - protonix q12 IV       - leukocytosis stable      - PICC placed 5/5; central line removed 5/6    Anne Miller MD  Cardiothoracic  Surgery  [facility]      VV ECMO circuit checked and all parameters reviewed. Anticoagulation was managed and all cannulation exit sites along with review of labs and radiology studies performed. Speed and functioning of the pump along with oxygenator quality reviewed.  Patient continues to be stable on  tube feeds at 45 cc an hour.  No concerns from the nursing staff or perfusion team with any flow related issues.  Significant amount of time was spent in coordinating care  between different teams which consisted of the surgical ICU to, perfusion Staff and the nursing staff.  All questions and concerns all team members were addressed.

## 2020-06-01 NOTE — PROGRESS NOTES
Pt more tachypneic this afternoon with increased WOB and use of accessory muscles. Hypertensive with MAPS 90's. Dr Alston to bedside for rounds and was notified. MD changed Vent settings at this time to   AC/PC  Peep 5  Rate remains 12  Dr Alston verbal order to re-start Cardene at this time to maintain MAPS <90.   MD will notify Dr Ayon.

## 2020-06-01 NOTE — SUBJECTIVE & OBJECTIVE
Interval History:  Lasix gtt weaned given high UOP, 200-300/hr, 4.4L, net negative 1L in 24hrs. Weaned sweep to 4.5 and oxygenator FIO2 to 40%. Hep gtt ongoing at 600.    Medications:  Continuous Infusions:   dexmedetomidine (PRECEDEX) infusion 0.1 mcg/kg/hr (06/01/20 0800)    furosemide (LASIX) 2 mg/mL continuous infusion (non-titrating) 2.5 mg/hr (06/01/20 0800)    heparin (porcine) in 5 % dex 600 Units/hr (06/01/20 0800)    nicardipine Stopped (05/29/20 1027)    vasopressin (PITRESSIN) infusion Stopped (05/29/20 1740)     Scheduled Meds:   aspirin  81 mg Per NG tube Daily    chlorhexidine  15 mL Mouth/Throat BID    fentaNYL  1 patch Transdermal Q72H    labetalol  300 mg Per G Tube BID    multivitamin liquid no.118  10 mL Per G Tube Daily    pantoprazole  40 mg Intravenous Q12H    piperacillin-tazobactam (ZOSYN) IVPB  4.5 g Intravenous Q8H    polyethylene glycol  17 g Per NG tube Daily    potassium chloride 10%  20 mEq Per NG tube BID    psyllium husk (aspartame)  1 packet Per NG tube TID    QUEtiapine  50 mg Per NG tube QHS    sucralfate  1 g Per NG tube Q6H    vitamin D  1,000 Units Per G Tube Daily        Objective:     Vital Signs (Most Recent):  Temp: 97 °F (36.1 °C) (06/01/20 0700)  Pulse: (!) 113 (06/01/20 0800)  Resp: (!) 30 (05/31/20 1306)  BP: 107/64 (05/29/20 1713)  SpO2: 99 % (06/01/20 0800) Vital Signs (24h Range):  Temp:  [97 °F (36.1 °C)-98.5 °F (36.9 °C)] 97 °F (36.1 °C)  Pulse:  [] 113  Resp:  [30] 30  SpO2:  [94 %-100 %] 99 %  Arterial Line BP: ()/(42-72) 124/70     Weight: 58.2 kg (128 lb 4.9 oz)  Body mass index is 22.02 kg/m².    SpO2: 99 %  O2 Device (Oxygen Therapy): ventilator    Intake/Output - Last 3 Shifts       05/30 0700 - 05/31 0659 05/31 0700 - 06/01 0659 06/01 0700 - 06/02 0659    I.V. (mL/kg) 1059.4 (18) 681.7 (11.7) 29 (0.5)    Blood 350      NG/GT 2140 2655 90    IV Piggyback 100 100     Total Intake(mL/kg) 3649.4 (62) 3436.7 (59) 119 (2)    Urine  (mL/kg/hr) 3925 (2.8) 4425 (3.2) 285 (3.3)    Stool  0     Blood 7 7     Total Output 3932 4432 285    Net -282.6 -995.4 -166.1           Stool Occurrence  2 x           Lines/Drains/Airways     Peripherally Inserted Central Catheter Line            PICC Double Lumen 05/05/20 1707 right basilic 26 days          Central Venous Catheter Line                 ECMO Cannula 04/25/20 1120 right femoral vein 36 days         ECMO Cannula 04/25/20 1120 right internal jugular 36 days          Drain                 NG/OG Tube 05/03/20 1215 [place] sump;nasogastric 18 Fr. Left nostril 28 days         Urethral Catheter 05/28/20 1630 3 days          Airway                 Surgical Airway 05/05/20 1500 Shiley Cuffed 26 days          Arterial Line                 Arterial Line 04/27/20 1100 Right Brachial 34 days          Peripheral Intravenous Line                 Peripheral IV - Single Lumen 05/26/20 0845 18 G Left Upper Arm 5 days         Peripheral IV - Single Lumen 05/30/20 1205 20 G Left Other 1 day         Peripheral IV - Single Lumen 05/31/20 0220 20 G Right Wrist 1 day                Physical Exam    Constitutional: He is sedated, and intubated.   Head: Normocephalic   Cardiovascular: Tachycardia 120s   Pulmonary/Chest: intubated vent FiO2 50% PEEP 5, ECMO RPM 2600, Flow 2.9, sweep 4.5, oxygenator FiO2 40%  Skin: L neck and L groin cannulas intact, no flashing, clots pre and post-oxygenator, all sutures intact and secure  Nursing note and vitals reviewed.    Significant Labs:  BMP:   Recent Labs   Lab 06/01/20  0234   *   *   K 3.6      CO2 30*   BUN 69*   CREATININE 1.1   CALCIUM 10.3   MG 2.6     CBC:   Recent Labs   Lab 06/01/20  0234  06/01/20  0803   WBC 14.48*  --   --    RBC 3.43*  --   --    HGB 10.3*  --   --    HCT 32.4*   < > 32*   *  --   --    MCV 95  --   --    MCH 30.0  --   --    MCHC 31.8*  --   --     < > = values in this interval not displayed.     LFTs:   Recent Labs   Lab  06/01/20  0234   ALT 49*   AST 34   ALKPHOS 180*   BILITOT 1.1*   PROT 7.3   ALBUMIN 4.0     Significant Diagnostics:  Cxr: overall worsened since two days ago with bilateral lung spaces more tiffany out

## 2020-06-01 NOTE — PROGRESS NOTES
[facility] shift report    Date: 06/01/2020  ECMO Specialist:  Jorge Yeboah    Pump parameters:  RPM: 2600  Flow:  2.80  Sweep:  4   FiO2:  40     Oxygenator status:  Clots: pre/post oxy  Fibrin: slight    Pressure trends:  P1: 107  P2: 73  Delta P: 29    Volume status:  Chugging noted none  CVP:   MAP: 70-90  MD notified (name):  Nany    Anticoagulation:  ACT/aPTT/Xa parameters: .25-.30  ACT/aPTT/Xa trends this shift: .28, .29    Cannula size / status / placement:  Arterial:   Venous 1: RIJV 23 Fr @ 4cm  Venous 2: RFV 27 FR @12cm  Dual lumen:      Additional Comments:    Patient remains on VV ECMO with the documented settings.  Was able to decrease Sweep to 4 from 4.5.  Patient tolerating change well.  Patient is noted to be more agitated and asymptomatic on vent.  Some hypertensive episodes with tachypnea and tachycardia.  Urine output remains good.  Will continue to monitor

## 2020-06-01 NOTE — CARE UPDATE
1320 Attempted to call Dr. Ray with [facility], but no answer. Will attempt to call later today.     1345 Dr. Ray returned call. Connected to second [facility] physician Dr. Lopez. Discussed Mr. Sanchez's current status. They are also having conversations with the family and would like to be on the same page. Mostly concerned about neurological status and if there is a timeline for coming off ECMO. Discussed that it is difficult to discern neuro status as COVID patients also can have severe encephalopathy so this may be confounding neuro exams. Additionally, unable to obtain. Pulmonary function seems to be improving, but unable to give timeline for recovery at the present. All questions addressed.    Urszula Arguello MD  General Surgery, PGY-1  [phone]

## 2020-06-01 NOTE — PROGRESS NOTES
[facility] - Respiratory ICU  Critical Care - Surgery  Progress Note    Patient Name: Ranjana Sanchez  MRN: [id number]  Admission Date: 4/10/2020  Hospital Length of Stay: 52 days  Code Status: Full Code  Attending Provider: Francis Ayon MD  Primary Care Provider: Primary Doctor No   Principal Problem: Acute respiratory disease due to COVID-19 virus    Subjective:     Hospital/ICU Course:  No notes on file    Interval History/Significant Events:   No big changes.   Currently off pressors  Hep gtt at 600, antiXa 0.29  Sweep 4.5  Na downtrending appropriately 153 -> 148    Vent changed, currently TVs low 300s with RR in 30s  PEEP 5 -> 10 -> 5  Insp 0.9 -> 0.6 -> 0.9  FiO2 50%  RR 18 -> 12      Follow-up For: Procedure(s) (LRB):  CREATION, TRACHEOSTOMY  (N/A)    Post-Operative Day: 27 Days Post-Op    Objective:     Vital Signs (Most Recent):  Temp: 98.1 °F (36.7 °C) (06/01/20 0300)  Pulse: (!) 114 (06/01/20 0600)  Resp: (!) 30 (05/31/20 1306)  BP: 107/64 (05/29/20 1713)  SpO2: 98 % (06/01/20 0600) Vital Signs (24h Range):  Temp:  [98.1 °F (36.7 °C)-98.5 °F (36.9 °C)] 98.1 °F (36.7 °C)  Pulse:  [] 114  Resp:  [30-35] 30  SpO2:  [94 %-100 %] 98 %  Arterial Line BP: ()/(42-71) 118/66     Weight: 58.2 kg (128 lb 4.9 oz)  Body mass index is 22.02 kg/m².      Intake/Output Summary (Last 24 hours) at 6/1/2020 0656  Last data filed at 6/1/2020 0600  Gross per 24 hour   Intake 3436.65 ml   Output 4432 ml   Net -995.35 ml       Physical Exam   Constitutional: He appears well-developed.   Head: Normocephalic and atraumatic.   Eyes: Pupils are equal, round, and reactive to light.   Neck: Tracheostomy in place  Cardiovascular:   tachycardic   Pulmonary/Chest:   Trached, mechanically ventilated  On ECMO, cannulated in RIJ and fem vein   Abdominal: Soft. He exhibits no distension.   Musculoskeletal: Normal range of motion.   Neurological:   Sedated. Withdraws lower extremities to pain, does not withdraw  upper extremities. Pupillary, cough, and gag reflex intact.   Skin: Skin is warm.   Vitals reviewed    Vents:  Vent Mode: A/C (06/01/20 0158)  Ventilator Initiated: Yes (04/10/20 2247)  Set Rate: 18 BPM (06/01/20 0158)  Vt Set: 200 mL (05/18/20 1244)  PEEP/CPAP: 5 cmH20 (06/01/20 0158)  Oxygen Concentration (%): 50 (06/01/20 0600)  Peak Airway Pressure: 32 cmH2O (06/01/20 0158)  Plateau Pressure: 31 cmH20 (06/01/20 0158)  Total Ve: 8.85 mL (06/01/20 0158)  F/VT Ratio<105 (RSBI): 115.38 (05/31/20 1306)    Lines/Drains/Airways     Peripherally Inserted Central Catheter Line            PICC Double Lumen 05/05/20 1707 right basilic 26 days          Central Venous Catheter Line                 ECMO Cannula 04/25/20 1120 right femoral vein 36 days         ECMO Cannula 04/25/20 1120 right internal jugular 36 days          Drain                 NG/OG Tube 05/03/20 1215 [place] sump;nasogastric 18 Fr. Left nostril 28 days         Urethral Catheter 05/28/20 1630 3 days          Airway                 Surgical Airway 05/05/20 1500 Shiley Cuffed 26 days          Arterial Line                 Arterial Line 04/27/20 1100 Right Brachial 34 days          Peripheral Intravenous Line                 Peripheral IV - Single Lumen 05/26/20 0845 18 G Left Upper Arm 5 days         Peripheral IV - Single Lumen 05/30/20 1205 20 G Left Other 1 day         Peripheral IV - Single Lumen 05/31/20 0220 20 G Right Wrist 1 day                Significant Labs:    CBC/Anemia Profile:  Recent Labs   Lab 05/31/20  0249  05/31/20  1359  05/31/20  1956 05/31/20  1959 06/01/20  0234 06/01/20  0236   WBC 12.25   < > 12.75*  --  13.73*  --  14.48*  --    HGB 8.8*   < > 10.7*  --  10.5*  --  10.3*  --    HCT 28.0*   < > 33.6*   < > 33.5* 30* 32.4* 32*   *   < > 114*  --  115*  --  114*  --    MCV 97   < > 95  --  95  --  95  --    RDW 14.4   < > 14.6*  --  14.6*  --  14.5  --    FERRITIN 1,540*  --   --   --   --   --  1,509*  --     < > = values in  this interval not displayed.        Chemistries:  Recent Labs   Lab 05/31/20 1359 05/31/20 1956 06/01/20  0234   * 153* 148*   K 3.4* 4.2 3.6    111* 106   CO2 29 29 30*   BUN 69* 67* 69*   CREATININE 1.2 1.1 1.1   CALCIUM 10.3 10.4 10.3   ALBUMIN 3.9 4.1 4.0   PROT 7.2 7.3 7.3   BILITOT 1.0 1.1* 1.1*   ALKPHOS 181* 186* 180*   ALT 53* 52* 49*   AST 32 35 34   MG 2.7* 2.6 2.6   PHOS 4.1 3.6 3.5       ABGs:   Recent Labs   Lab 06/01/20 0236   PH 7.405   PCO2 47.5*   HCO3 29.8*   POCSATURATED 98   BE 5     CMP:   Recent Labs   Lab 05/31/20 1359 05/31/20 1956 06/01/20 0234   * 153* 148*   K 3.4* 4.2 3.6    111* 106   CO2 29 29 30*   * 194* 182*   BUN 69* 67* 69*   CREATININE 1.2 1.1 1.1   CALCIUM 10.3 10.4 10.3   PROT 7.2 7.3 7.3   ALBUMIN 3.9 4.1 4.0   BILITOT 1.0 1.1* 1.1*   ALKPHOS 181* 186* 180*   AST 32 35 34   ALT 53* 52* 49*   ANIONGAP 13 13 12   EGFRNONAA >60.0 >60.0 >60.0     Cardiac Markers: No results for input(s): CKMB, TROPONINT, MYOGLOBIN in the last 48 hours.  Coagulation:   Recent Labs   Lab 06/01/20 0234   INR 1.1   APTT 27.5     Lactic Acid: No results for input(s): LACTATE in the last 48 hours.  Prealbumin: No results for input(s): PREALBUMIN in the last 48 hours.  All pertinent labs within the past 24 hours have been reviewed.    Significant Imaging:  I have reviewed and interpreted all pertinent imaging results/findings within the past 24 hours.     CXR 6/1/2020  EXAMINATION:  XR CHEST AP PORTABLE    CLINICAL HISTORY:  COVID ECMO, evaluate lung volumes;    TECHNIQUE:  Single frontal view of the chest was performed.    COMPARISON:  05/31/2020    FINDINGS:  Cardiac size remains stable.  Endotracheal tube and central vascular catheters remain in place.  Diffuse airspace consolidation is seen throughout both lung fields with significant worsening since yesterday.  There may be some pleural fluid present.      Impression       Significant  worsening      Electronically signed by: Jace Espana MD  Date: 06/01/2020  Time: 07:33         Assessment/Plan:     COVID-19 virus detected  Ranjana Sanchez is a 55 y.o. 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  This was switched to ETT on 4/11.  Cannulated on 4/25.    Neuro:  5 of valium  100 BID of seroquel  Fentanyl patch  Precedex for sedation    Pulm  AC/VC+ on the vent.  Sweep of 4.5 today, 2600 RPM. Weaning sweep as able.   Vent settings adjusted this am. New ABG at 2 pm.     Cardiac   Labetalol as needed for pressures    FEN  KCl 20 mEq PO    GI  TFs at goal  Protonix, sucralfate  Bowel reg    Renal  Making urine, diuresing agressively  Lasix gtt at 5 -> 2.5    ID  Zosyn    Endo  Continue T4    Heme  Hep gtt at 600  AntiXa goal 0.25-0.3 to prevent another GI bleed      Dispo: Continued ICU care for oxygenation/ventilation requirements         Critical Care Daily Checklist:    A: Awake: RASS Goal/Actual Goal: RASS Goal: 0-->alert and calm  Actual: [place] Agitation Sedation Scale (RASS): Drowsy   B: Spontaneous Breathing Trial Performed? Spon. Breathing Trial Initiated?: Not initiated (05/24/20 0746)   C: SAT & SBT Coordinated?  No                      D: Delirium: CAM-ICU Overall CAM-ICU: Positive   E: Early Mobility Performed? No   F: Feeding Goal: Goals: Meet % EEN, EPN by RD f/u date  Status: Nutrition Goal Status: goal met   Current Diet Order   Procedures    Diet NPO      AS: Analgesia/Sedation Precedex   T: Thromboembolic Prophylaxis On therapeutic heparin   H: HOB > 300 Yes   U: Stress Ulcer Prophylaxis (if needed) Yes protonix   G: Glucose Control As needed   B: Bowel Function Stool Occurrence: 2   I: Indwelling Catheter (Lines & Torres) Necessity ECMO cannulation fem vein, right IJ. Right brachial a arterial line. Right bacilic PICC. PIV x3. Torres. NGT. Tracheostomy tube   D: De-escalation of  Antimicrobials/Pharmacotherapies Not currently indicated    Plan for the day/ETD As above    Code Status:  Family/Goals of Care: Full Code  Ongoing discussiosn       Critical secondary to Patient has a condition that poses threat to life and bodily function: COVID-19 with respiratory failure     Critical care was time spent personally by me on the following activities: development of treatment plan with patient or surrogate and bedside caregivers, discussions with consultants, evaluation of patient's response to treatment, examination of patient, ordering and performing treatments and interventions, ordering and review of laboratory studies, ordering and review of radiographic studies, pulse oximetry, re-evaluation of patient's condition.  This critical care time did not overlap with that of any other provider or involve time for any procedures.     Urszula Arguello MD  Critical Care - Surgery  [facility] - Respiratory ICU

## 2020-06-01 NOTE — PROGRESS NOTES
Pt remains on Ventilator with following settings: AC/PC  Peep 5  Rate 12  Fio2 50%  Adjustments made by Dr Alston at bedside.     Ecmo: 2600 rpm              Flow 2.8              Sweep 4              Fio2 40%              RIJ: 4 cm              R femoral: 12 cm              Dressings dry, clean, intact.     Drips:   Cardene @ 5 mg/hr               Lasix @ 2.5 mg/hr               Heparin 600 units/hr               Precedex @ 0.1 mcg/kg/min    Neuro: Not following commands, does not track, opens eyes spontaneously, withdraws slightly on UE's and fully on LE's. Corneal, gag, and cough reflexes intact. LE pulses dopplered, UE pulses +1.     RR: Remains Tachypneic with respirations in 40-50's. Using accessory muscles and diaphragmatically breathing. When having 1-2 minute episodes escpecially. See progress note for details.     : parks in place 150-300 cc/hr.     GI: 2 X large liquid BM's this shift. TF continued @ goal of 45 ml/hr with no residuals noted.     Zosyn continued.    Labs: K+ 3.4: replaced with 40 mEq    Son able to skype with me today and see patient. All questions answered and concerns addressed.  Will continue to monitor.

## 2020-06-01 NOTE — PLAN OF CARE
Pt remains on ventilator AC/PC 50%/5, ECMO 40%/4.5 sweep, 2600 RPM flows 2.9-2.98.  Excellent UOP on lasix gtt, heparin decreased to 600 units/hr per anti-Xa.  No changes in other gtts infusing.  BM x 1, liquid brown/green.  Continues to tolerate TF at goal.  VSS, no family available to discuss plan of care or to address questions.

## 2020-06-01 NOTE — PROGRESS NOTES
[facility] shift report    Date: 06/01/2020  ECMO Specialist:  Cyrus Stefany    Pump parameters:  RPM:                          2600  Flow:                                2.8  Sweep:                            4.5  FiO2:                              40    Oxygenator status:  Clots:                           Pre and Post Oxygenator have clots.  Fibrin:                           Some Fibrin clots seen in oxygenator.    Pressure trends:  P1:                                 98  P2:                                 70  Delta P:                          28    Volume status:  Chugging noted?    Minor chugging observed.  CVP:                       Not being monitored at this time  MAP:                       89  MD notified (name):Dr. Genao    Anticoagulation:  ACT/aPTT/Xa parameters:        0.25   -  0.30  ACT/aPTT/Xa trends this shift:  0.33    Cannula size / status / placement:  Arterial:   Venous 1:    23 [place] in RIJV @    4cm from insertion point to end of coils  Venous 2:    27 [place] in RFV  @ 12cm from insertion point to end of coils  Dual lumen:      Additional Comments:    Patient has been maintained on VV ECMO without any problems.  The sweep was weaned to 4.5 lpm.

## 2020-06-01 NOTE — PROGRESS NOTES
Pt having episodes where he becomes more tachypnic with respirations in 50's, is hypertensive with systolic's 160's, O2 Sat decreases to 88-90%. Pt becomes diaphoretic and diaphragmatic breathing noted. -130's. Pt has had 3 of these episodes so far and they only last 1-2 minutes. Peak Pressure 30.  Called CTS to notify. Spoke with Dr Alston to notify. No new orders @ this time.

## 2020-06-01 NOTE — SUBJECTIVE & OBJECTIVE
Interval History/Significant Events:   No big changes.   Currently off pressors  Hep gtt at 600, antiXa 0.29  Sweep 4.5  Na downtrending appropriately 153 -> 148    Follow-up For: Procedure(s) (LRB):  CREATION, TRACHEOSTOMY  (N/A)    Post-Operative Day: 27 Days Post-Op    Objective:     Vital Signs (Most Recent):  Temp: 98.1 °F (36.7 °C) (06/01/20 0300)  Pulse: (!) 114 (06/01/20 0600)  Resp: (!) 30 (05/31/20 1306)  BP: 107/64 (05/29/20 1713)  SpO2: 98 % (06/01/20 0600) Vital Signs (24h Range):  Temp:  [98.1 °F (36.7 °C)-98.5 °F (36.9 °C)] 98.1 °F (36.7 °C)  Pulse:  [] 114  Resp:  [30-35] 30  SpO2:  [94 %-100 %] 98 %  Arterial Line BP: ()/(42-71) 118/66     Weight: 58.2 kg (128 lb 4.9 oz)  Body mass index is 22.02 kg/m².      Intake/Output Summary (Last 24 hours) at 6/1/2020 0656  Last data filed at 6/1/2020 0600  Gross per 24 hour   Intake 3436.65 ml   Output 4432 ml   Net -995.35 ml       Physical Exam   Constitutional: He appears well-developed.   HENT:   Head: Normocephalic and atraumatic.   Eyes: Pupils are equal, round, and reactive to light.   Neck: Normal range of motion.   Cardiovascular:   tachycardic   Pulmonary/Chest:   Trached, mechanically ventilated  On ECMO, cannulated in RIJ and fem vein   Abdominal: Soft. He exhibits no distension.   Musculoskeletal: Normal range of motion.   Neurological:   sedated   Skin: Skin is warm.   Vitals reviewed    Vents:  Vent Mode: A/C (06/01/20 0158)  Ventilator Initiated: Yes (04/10/20 8528)  Set Rate: 18 BPM (06/01/20 0158)  Vt Set: 200 mL (05/18/20 1244)  PEEP/CPAP: 5 cmH20 (06/01/20 0158)  Oxygen Concentration (%): 50 (06/01/20 0600)  Peak Airway Pressure: 32 cmH2O (06/01/20 0158)  Plateau Pressure: 31 cmH20 (06/01/20 0158)  Total Ve: 8.85 mL (06/01/20 0158)  F/VT Ratio<105 (RSBI): 115.38 (05/31/20 1306)    Lines/Drains/Airways     Peripherally Inserted Central Catheter Line            PICC Double Lumen 05/05/20 1707 right basilic 26 days           Central Venous Catheter Line                 ECMO Cannula 04/25/20 1120 right femoral vein 36 days         ECMO Cannula 04/25/20 1120 right internal jugular 36 days          Drain                 NG/OG Tube 05/03/20 1215 [place] sump;nasogastric 18 Fr. Left nostril 28 days         Urethral Catheter 05/28/20 1630 3 days          Airway                 Surgical Airway 05/05/20 1500 [place] Cuffed 26 days          Arterial Line                 Arterial Line 04/27/20 1100 Right Brachial 34 days          Peripheral Intravenous Line                 Peripheral IV - Single Lumen 05/26/20 0845 18 G Left Upper Arm 5 days         Peripheral IV - Single Lumen 05/30/20 1205 20 G Left Other 1 day         Peripheral IV - Single Lumen 05/31/20 0220 20 G Right Wrist 1 day                Significant Labs:    CBC/Anemia Profile:  Recent Labs   Lab 05/31/20 0249 05/31/20 1359 05/31/20 1956 05/31/20 1959 06/01/20  0234 06/01/20  0236   WBC 12.25   < > 12.75*  --  13.73*  --  14.48*  --    HGB 8.8*   < > 10.7*  --  10.5*  --  10.3*  --    HCT 28.0*   < > 33.6*   < > 33.5* 30* 32.4* 32*   *   < > 114*  --  115*  --  114*  --    MCV 97   < > 95  --  95  --  95  --    RDW 14.4   < > 14.6*  --  14.6*  --  14.5  --    FERRITIN 1,540*  --   --   --   --   --  1,509*  --     < > = values in this interval not displayed.        Chemistries:  Recent Labs   Lab 05/31/20 1359 05/31/20 1956 06/01/20 0234   * 153* 148*   K 3.4* 4.2 3.6    111* 106   CO2 29 29 30*   BUN 69* 67* 69*   CREATININE 1.2 1.1 1.1   CALCIUM 10.3 10.4 10.3   ALBUMIN 3.9 4.1 4.0   PROT 7.2 7.3 7.3   BILITOT 1.0 1.1* 1.1*   ALKPHOS 181* 186* 180*   ALT 53* 52* 49*   AST 32 35 34   MG 2.7* 2.6 2.6   PHOS 4.1 3.6 3.5       ABGs:   Recent Labs   Lab 06/01/20 0236   PH 7.405   PCO2 47.5*   HCO3 29.8*   POCSATURATED 98   BE 5     CMP:   Recent Labs   Lab 05/31/20  1359 05/31/20 1956 06/01/20 0234   * 153* 148*   K 3.4* 4.2 3.6    111* 106   CO2  29 29 30*   * 194* 182*   BUN 69* 67* 69*   CREATININE 1.2 1.1 1.1   CALCIUM 10.3 10.4 10.3   PROT 7.2 7.3 7.3   ALBUMIN 3.9 4.1 4.0   BILITOT 1.0 1.1* 1.1*   ALKPHOS 181* 186* 180*   AST 32 35 34   ALT 53* 52* 49*   ANIONGAP 13 13 12   EGFRNONAA >60.0 >60.0 >60.0     Cardiac Markers: No results for input(s): CKMB, TROPONINT, MYOGLOBIN in the last 48 hours.  Coagulation:   Recent Labs   Lab 06/01/20  0234   INR 1.1   APTT 27.5     Lactic Acid: No results for input(s): LACTATE in the last 48 hours.  Prealbumin: No results for input(s): PREALBUMIN in the last 48 hours.  All pertinent labs within the past 24 hours have been reviewed.    Significant Imaging:  I have reviewed and interpreted all pertinent imaging results/findings within the past 24 hours.     CXR this am: final read pending. Appears to have more patchy infiltrates on the right.

## 2020-06-01 NOTE — ASSESSMENT & PLAN NOTE
Ranjana Sanchez is a 55 y.o. 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  This was switched to ETT on 4/11.  Cannulated on 4/25.    Neuro:  5 of valium  100 BID of seroquel  Fentanyl patch    Pulm  AC/VC+ on the vent.  Small pneumo on cxr, subclinical.  Sweep of 4.5 today, 2800 RPM   Will need some more time on ECMO, not pulling enough volume on the vent    Cardiac   Labetalol as needed for pressures    GI  TFs at goal    Renal  Making urine, diuresing agressively  Lasix gtt at 5    Heme/ID  Fluc/Vanc  White count normalized  Hep gtt at 600, goal 0.25-0.3 to prevent another GI bleed      Dispo: Continued ICU care for oxygenation/ventilation requirements

## 2020-06-02 NOTE — PROGRESS NOTES
[facility] - Respiratory ICU  Critical Care - Surgery  Progress Note    Patient Name: Ranjana Sanchez  MRN: [id number]  Admission Date: 4/10/2020  Hospital Length of Stay: 53 days  Code Status: Full Code  Attending Provider: Francis Ayon MD  Primary Care Provider: Primary Doctor No   Principal Problem: Acute respiratory disease due to COVID-19 virus    Subjective:     Hospital/ICU Course:  No notes on file    Interval History/Significant Events:   No acute events. Neuro status unchanged.  WBC downtrending 17.6 -> 13.7  Pump parameters: RPM 2600, Sweep 4, FiO2 40%  Vent settings: A/C PEEP 5, FiO2 50%, insp 0.9 s, rate 12. Overbreathing vent. Taking TVs around 300.    Stat head CT this am with bilateral basal ganglia hypoattenuation concerning for significant anoxic event.    Follow-up For: Procedure(s) (LRB):  CREATION, TRACHEOSTOMY  (N/A)    Post-Operative Day: 28 Days Post-Op    Objective:     Vital Signs (Most Recent):  Temp: 98 °F (36.7 °C) (06/01/20 2300)  Pulse: 94 (06/02/20 0630)  Resp: (!) 33 (06/01/20 1254)  BP: 107/64 (05/29/20 1713)  SpO2: 97 % (06/02/20 0630) Vital Signs (24h Range):  Temp:  [97.5 °F (36.4 °C)-98.3 °F (36.8 °C)] 98 °F (36.7 °C)  Pulse:  [] 94  Resp:  [33] 33  SpO2:  [91 %-100 %] 97 %  Arterial Line BP: ()/(47-70) 123/60     Weight: 58.2 kg (128 lb 4.9 oz)  Body mass index is 22.02 kg/m².      Intake/Output Summary (Last 24 hours) at 6/2/2020 0717  Last data filed at 6/2/2020 0600  Gross per 24 hour   Intake 3915.35 ml   Output 4081 ml   Net -165.65 ml       Physical Exam   Constitutional: He appears well-developed and well-nourished.   HENT:   Head: Normocephalic and atraumatic.   NGT with tube feeds   Eyes: No scleral icterus.   Opens eyes and blinks spontaneously   Neck:   trachostomy in place  See settings below   Cardiovascular:   Intermittently tachycardic   Genitourinary:   Genitourinary Comments: Torres in place   Neurological:   Sedated. Withdraws lower  extremities to pain, does not withdraw upper extremities. Corneal, cough, and gag reflex intact.    Skin: Skin is warm and dry. He is not diaphoretic.   Nursing note and vitals reviewed.      Vents:  Vent Mode: A/C (06/02/20 0139)  Ventilator Initiated: Yes (04/10/20 2247)  Set Rate: 12 BPM (06/02/20 0139)  Vt Set: 200 mL (05/18/20 1244)  PEEP/CPAP: 5 cmH20 (06/02/20 0139)  Oxygen Concentration (%): 50 (06/02/20 0630)  Peak Airway Pressure: 32 cmH2O (06/02/20 0139)  Plateau Pressure: 36 cmH20 (06/02/20 0139)  Total Ve: 7.88 mL (06/02/20 0139)  F/VT Ratio<105 (RSBI): 114.19 (06/01/20 1254)    Lines/Drains/Airways     Peripherally Inserted Central Catheter Line            PICC Double Lumen 05/05/20 1707 right basilic 27 days          Central Venous Catheter Line                 ECMO Cannula 04/25/20 1120 right femoral vein 37 days         ECMO Cannula 04/25/20 1120 right internal jugular 37 days          Drain                 NG/OG Tube 05/03/20 1215 [place] sump;nasogastric 18 Fr. Left nostril 29 days         Urethral Catheter 05/28/20 1630 4 days          Airway                 Surgical Airway 05/05/20 1500 Shiley Cuffed 27 days          Arterial Line                 Arterial Line 04/27/20 1100 Right Brachial 35 days          Peripheral Intravenous Line                 Peripheral IV - Single Lumen 05/26/20 0845 18 G Left Upper Arm 6 days         Peripheral IV - Single Lumen 05/30/20 1205 20 G Left Other 2 days         Peripheral IV - Single Lumen 05/31/20 0220 20 G Right Wrist 2 days                Significant Labs:    CBC/Anemia Profile:  Recent Labs   Lab 06/01/20  0234  06/01/20  1841  06/01/20  2002 06/02/20  0152 06/02/20  0158   WBC 14.48*   < > 18.03*  --  17.61*  --  13.72*   HGB 10.3*   < > 10.8*  --  10.3*  --  9.0*   HCT 32.4*   < > 34.6*   < > 32.3* 30* 28.6*   *   < > 119*  --  109*  --  104*   MCV 95   < > 96  --  96  --  96   RDW 14.5   < > 14.3  --  14.2  --  14.3   FERRITIN 1,509*  --   --   --    --   --  1,408*    < > = values in this interval not displayed.        Chemistries:  Recent Labs   Lab 06/01/20  1356 06/01/20 2002 06/02/20  0158   * 145 145   K 3.6 4.5 3.7    104 105   CO2 29 29 27   BUN 68* 71* 73*   CREATININE 1.2 1.1 1.1   CALCIUM 10.1 9.8 9.8   ALBUMIN 3.7 3.6 3.9   PROT 7.2 7.1 6.9   BILITOT 1.2* 1.1* 1.2*   ALKPHOS 173* 169* 142*   ALT 46* 45* 41   AST 32 37 30   MG 2.5 2.4 2.5   PHOS 3.1 3.3 3.4       CMP:   Recent Labs   Lab 06/01/20  1356 06/01/20 2002 06/02/20  0158   * 145 145   K 3.6 4.5 3.7    104 105   CO2 29 29 27   * 220* 236*   BUN 68* 71* 73*   CREATININE 1.2 1.1 1.1   CALCIUM 10.1 9.8 9.8   PROT 7.2 7.1 6.9   ALBUMIN 3.7 3.6 3.9   BILITOT 1.2* 1.1* 1.2*   ALKPHOS 173* 169* 142*   AST 32 37 30   ALT 46* 45* 41   ANIONGAP 13 12 13   EGFRNONAA >60.0 >60.0 >60.0     Coagulation:   Recent Labs   Lab 06/02/20 0158   INR 1.2   APTT 29.4     Lactic Acid: No results for input(s): LACTATE in the last 48 hours.  Respiratory Culture: No results for input(s): GSRESP, RESPIRATORYC in the last 48 hours.  Troponin: No results for input(s): TROPONINI in the last 48 hours.  All pertinent labs within the past 24 hours have been reviewed.    Significant Imaging:  I have reviewed and interpreted all pertinent imaging results/findings within the past 24 hours.       CT head non-con 6/2/2020  Narrative     EXAMINATION:  CT HEAD WITHOUT CONTRAST    CLINICAL HISTORY:  COVID ECMO change in neural status, rule out stroke;    TECHNIQUE:  Multiple sequential 5 mm axial images of the head without contrast.  Coronal and sagittal reformatted imaging from the axial acquisition.    COMPARISON:  None    FINDINGS:  There is symmetrical decreased attenuation in the bilateral basal ganglia concerning for edema and evolving infarction.  There is generalized poor gray-white matter differentiation throughout the cerebral hemispheres which may be artifactual from motion however  concerning for possible edema and global anoxic hypoxic injury.  Clinical correlation advised.  Further evaluation with MRI if patient compatible.  There is no evidence for acute intracranial hemorrhage.  Ventricles normal without hydrocephalus.  No midline shift..  There is small lobular opacities in the maxillary antra suggestive for mucous retention cysts.  There is partial opacification the mastoid air cells bilaterally.  Case discussed with Dr. Alston on 06/02/2020 at 11:29 This report was flagged in Epic as abnormal.      Impression       Symmetrical hypoattenuation bilobed basal ganglia concerning for edema as well as poor gray-white matter differentiation cerebral hemispheres diffusely concerning for profound hypoxic anoxic encephalopathy.  There is no evidence for acute intracranial hemorrhage.  Clinical correlation further evaluation with MRI as warranted if patient compatible.      Electronically signed by: Juan Jose Beard DO  Date: 06/02/2020  Time: 11:31           CT Chest w/o contrast 6/2/2020  Narrative     EXAMINATION:  CT CHEST WITHOUT CONTRAST    CLINICAL HISTORY:  COVID, prolonged ECMO, assess lung volumes and aeration;    TECHNIQUE:  Using low dose technique the chest was surveyed from above the pulmonary apices through the posterior costophrenic angles.  Data was reconstructed for multiplanar images in axial, sagittal and coronal planes and for maximal intensity projection images in the axial plane.    All CT scans at this facility use dose modulation, iterative reconstruction and/or weight based dosing when appropriate to reduce radiation dose to as low as reasonably achievable.    Xray dose: DLP = 280.00 mGy-cm.    COMPARISON:  Chest radiograph: 06/02/2020, 08:44 hours.    FINDINGS:  Devices:    -Tracheostomy cannula has its tip in the intrathoracic trachea at the level of the roof of the aortic arch, in good location.    -PICC line placed via the right upper extremity has its tip in the right  atrium.    -NG type tube crosses the GE junction; as seen on chest radiograph earlier today, proximal port is distal to the pylorus with distal tip not included on the study.    -ECMO cannula inserted via the right neck has its tip in the cephalad aspect of the right atrium.    -ECMO cannula ascending in the IVC has its tip below the level of the intrahepatic portion of the IVC.    Base of Neck: No significant abnormality.    Aorta: Left-sided aortic arch with 3 arterial branches. The aorta maintains normal caliber, contour and course. There is modest calcification of the thoracic aorta and modest coronary artery calcification.    Heart/pericardium: Normal size on noncontrast study.  No pericardial effusion or calcification.    Pulmonary vasculature: Pulmonary arteries distribute normally.  There are four pulmonary veins.    Briana/Mediastinum: No pathologic sandi enlargement.  No pneumomediastinum.    Esophagus: Normal.    Upper Abdomen: No pneumoperitoneum.  Sludge is present in the dependent portion of the gallbladder.  No bowel distension identified.    Thoracic soft tissues: Normal. Both breasts are present.    Bones: No acute fracture. No suspicious lytic or sclerotic lesion.    Pleura:    -There is a small left pneumothorax with minimal loss of volume in the left lung.    -There is no right pneumothorax and no pneumomediastinum or pneumoperitoneum.    -No pleural fluid, pleural thickening or pleural calcification.    Airways and lungs:    -There are 3 focal soft tissue opacities that suggests subsegmental atelectasis and less likely inflammation:    A. there is focal subsegmental soft tissue opacity, 2.3 x 0.6 cm, in the dependent portion of the posterior segment of the right upper lobe.  It has a platelike appearance, lying along the anterior margin of the oblique fissure (sagittal series 6, image 53; axial series 3, images 77 through 85); appearance and perifissural location suggests subsegmental  atelectasis.    B. there is also a focal soft tissue subsegmental opacity at the lateral aspect of the left upper lobe, 2.0 by 0.9 cm (axial series 3, image 79), compatible with subsegmental atelectasis or focal inflammation.    C. Right lower lobe, superior or posterior basal segment (axial series 3, image 126): There is a 2nd soft tissue nodule, 1.2 by 0.7 cm, also with homogeneous nature noting that the margins are less well-defined than observed for the left upper lobe nodule; lack of definition could be related to respiratory motion. Differential diagnosis includes focal pulmonary inflammation; I consider hematoma or neoplasm less likely but not excluded.    -Left upper lobe, medial aspect of the apicoposterior segment: There is an ovoid pulmonary nodule, 1.7 by 1.4 cm (axial series 3, image 64).  It has homogeneous character with attenuation of 72 Hounsfield units (coronal series 5, image 22).  Features suggest focal hematoma perhaps related to prior instrumentation.  I consider neoplasm, infection or noninfectious inflammation less likely but not excluded.    -Of note, there is no cavitated pulmonary nodule or other evidence of septic emboli.    -There is diffuse ground-glass attenuation throughout both lungs.  There is some reticulation with peripheral predominance, of uncertain significance.    -Tubular and varicoid bronchiectasis is present at multiple locations throughout both lungs.      Impression       1.  There is a small left pneumothorax with no mediastinal shift and minimal loss of left lung volume.  There is no right pneumothorax, pneumomediastinum or pneumoperitoneum.  There is no left or right pleural fluid.    2.  Devices are enumerated above with information regarding their locations.    3.  There is widespread largely homogeneous ground-glass disease throughout all lobes as well as tubular and varicoid bronchiectasis in this patient with history of COVID-19 pneumonia maintained on ECMO.  Fine  reticular and reticulonodular pattern is evident in the periphery of the lungs consistent with interstitial lung disease.  Findings are not typical for pulmonary edema due to elevated pulmonary venous pressures and/or abnormal capillary permeability.    4.  Three sites of focal subsegmental soft tissue opacity are noted in the lungs and enumerated above.  Radiographic appearance is most compatible with subsegmental atelectasis; infection, noninfectious inflammation and neoplasm are less likely but not excluded.    5.  I detect no evidence of septic emboli.    6.  1.7 by 1.4 cm homogeneous ovoid soft tissue opacity in the medial aspect of the apicoposterior segment of the left upper lobe (axial series 3, image 64) has features suggesting hematoma, possibly related to prior instrumentation.    7.  Additional findings above.    This critical information regarding small left pneumothorax was relayed by myself by telephone to Faye Tsang RN on 06/02/2020 at 12:03 hours.    CRITICAL FINDINGS:  Small left pneumothorax without mediastinal shift.    RECOMMENDATIONS:  Continued surveillance.      Electronically signed by: Geneva Bond MD  Date: 06/02/2020  Time: 12:04           CXR 6/2/2020  Narrative     EXAMINATION:  XR CHEST AP PORTABLE    CLINICAL HISTORY:  COVID ECMO;    TECHNIQUE:  Single frontal view of the chest was performed.           Assessment/Plan:     COVID-19 virus detected  Ranjana Sanchez is a 55 y.o. 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  This was switched to ETT on 4/11.  Cannulated on 4/25.    Neuro:  Precedex  5 of valium  100 BID of seroquel  Fentanyl patch  Will discuss case with neuro given evidence of bilateral stroke.     Pulm  AC/VC+ on the vent.  Small pneumo on cxr, subclinical.  Sweep of 4 today, 2600 RPM (4.5 and 2800 yesterday respectively)  Will need some more time on ECMO, not pulling enough volume on the  vent    Cardiac  Cardene gtt as needed, currently off    GI  TFs at goal    Renal  Making urine, diuresing agressively  Lasix gtt at 2.5    Heme  Hep gtt at 600, goal 0.25-0.3 to prevent another GI bleed  Has been at goal at this rate    ID  WBC 17.6 -> 13.7 on Zosyn      Dispo: Continued ICU care for oxygenation/ventilation requirements      Critical secondary to Patient has a condition that poses threat to life and bodily function: Respiratory failure secondary to COVID     Critical care was time spent personally by me on the following activities: development of treatment plan with patient or surrogate and bedside caregivers, discussions with consultants, evaluation of patient's response to treatment, examination of patient, ordering and performing treatments and interventions, ordering and review of laboratory studies, ordering and review of radiographic studies, pulse oximetry, re-evaluation of patient's condition.  This critical care time did not overlap with that of any other provider or involve time for any procedures.     Urszula Arguello MD  Critical Care - Surgery  [facility] - Respiratory ICU

## 2020-06-02 NOTE — PROGRESS NOTES
Traveled to CT (for non con chest & head) with CTS MD, respiratory therapist x2, perfusionist, SICU charge RN, and additional RN.  Patient tolerated well, .02-.08 levo required during move, but weaned upon return.  Back to RICU, connected to bedside monitor, all VSS.

## 2020-06-02 NOTE — SUBJECTIVE & OBJECTIVE
Interval History/Significant Events:   No acute events. Neuro status unchanged.  WBC downtrending 17.6 -> 13.7  Pump parameters: RPM 2600, Sweep 4, FiO2 40%  Vent settings: A/C PEEP 5, FiO2 50%, insp 0.9 s, rate 12. Overbreathing vent. Taking TVs around 300.      Follow-up For: Procedure(s) (LRB):  CREATION, TRACHEOSTOMY  (N/A)    Post-Operative Day: 28 Days Post-Op    Objective:     Vital Signs (Most Recent):  Temp: 98 °F (36.7 °C) (06/01/20 2300)  Pulse: 94 (06/02/20 0630)  Resp: (!) 33 (06/01/20 1254)  BP: 107/64 (05/29/20 1713)  SpO2: 97 % (06/02/20 0630) Vital Signs (24h Range):  Temp:  [97.5 °F (36.4 °C)-98.3 °F (36.8 °C)] 98 °F (36.7 °C)  Pulse:  [] 94  Resp:  [33] 33  SpO2:  [91 %-100 %] 97 %  Arterial Line BP: ()/(47-70) 123/60     Weight: 58.2 kg (128 lb 4.9 oz)  Body mass index is 22.02 kg/m².      Intake/Output Summary (Last 24 hours) at 6/2/2020 0717  Last data filed at 6/2/2020 0600  Gross per 24 hour   Intake 3915.35 ml   Output 4081 ml   Net -165.65 ml       Physical Exam   Constitutional: He appears well-developed and well-nourished.   HENT:   Head: Normocephalic and atraumatic.   NGT with tube feeds   Eyes: No scleral icterus.   Opens eyes and blinks spontaneously   Neck:   trachostomy in place  See settings below   Cardiovascular:   Intermittently tachycardic   Genitourinary:   Genitourinary Comments: Torres in place   Neurological:   Sedated. Withdraws lower extremities to pain, does not withdraw upper extremities. Corneal, cough, and gag reflex intact.    Skin: Skin is warm and dry. He is not diaphoretic.   Nursing note and vitals reviewed.      Vents:  Vent Mode: A/C (06/02/20 0139)  Ventilator Initiated: Yes (04/10/20 8267)  Set Rate: 12 BPM (06/02/20 0139)  Vt Set: 200 mL (05/18/20 1244)  PEEP/CPAP: 5 cmH20 (06/02/20 0139)  Oxygen Concentration (%): 50 (06/02/20 0630)  Peak Airway Pressure: 32 cmH2O (06/02/20 0139)  Plateau Pressure: 36 cmH20 (06/02/20 0139)  Total Ve: 7.88 mL  (06/02/20 0139)  F/VT Ratio<105 (RSBI): 114.19 (06/01/20 1254)    Lines/Drains/Airways     Peripherally Inserted Central Catheter Line            PICC Double Lumen 05/05/20 1707 right basilic 27 days          Central Venous Catheter Line                 ECMO Cannula 04/25/20 1120 right femoral vein 37 days         ECMO Cannula 04/25/20 1120 right internal jugular 37 days          Drain                 NG/OG Tube 05/03/20 1215 [place] sump;nasogastric 18 Fr. Left nostril 29 days         Urethral Catheter 05/28/20 1630 4 days          Airway                 Surgical Airway 05/05/20 1500 Shiley Cuffed 27 days          Arterial Line                 Arterial Line 04/27/20 1100 Right Brachial 35 days          Peripheral Intravenous Line                 Peripheral IV - Single Lumen 05/26/20 0845 18 G Left Upper Arm 6 days         Peripheral IV - Single Lumen 05/30/20 1205 20 G Left Other 2 days         Peripheral IV - Single Lumen 05/31/20 0220 20 G Right Wrist 2 days                Significant Labs:    CBC/Anemia Profile:  Recent Labs   Lab 06/01/20  0234  06/01/20  1841  06/01/20 2002 06/02/20  0152 06/02/20  0158   WBC 14.48*   < > 18.03*  --  17.61*  --  13.72*   HGB 10.3*   < > 10.8*  --  10.3*  --  9.0*   HCT 32.4*   < > 34.6*   < > 32.3* 30* 28.6*   *   < > 119*  --  109*  --  104*   MCV 95   < > 96  --  96  --  96   RDW 14.5   < > 14.3  --  14.2  --  14.3   FERRITIN 1,509*  --   --   --   --   --  1,408*    < > = values in this interval not displayed.        Chemistries:  Recent Labs   Lab 06/01/20  1356 06/01/20 2002 06/02/20  0158   * 145 145   K 3.6 4.5 3.7    104 105   CO2 29 29 27   BUN 68* 71* 73*   CREATININE 1.2 1.1 1.1   CALCIUM 10.1 9.8 9.8   ALBUMIN 3.7 3.6 3.9   PROT 7.2 7.1 6.9   BILITOT 1.2* 1.1* 1.2*   ALKPHOS 173* 169* 142*   ALT 46* 45* 41   AST 32 37 30   MG 2.5 2.4 2.5   PHOS 3.1 3.3 3.4       CMP:   Recent Labs   Lab 06/01/20  1356 06/01/20 2002 06/02/20  0158   * 145  145   K 3.6 4.5 3.7    104 105   CO2 29 29 27   * 220* 236*   BUN 68* 71* 73*   CREATININE 1.2 1.1 1.1   CALCIUM 10.1 9.8 9.8   PROT 7.2 7.1 6.9   ALBUMIN 3.7 3.6 3.9   BILITOT 1.2* 1.1* 1.2*   ALKPHOS 173* 169* 142*   AST 32 37 30   ALT 46* 45* 41   ANIONGAP 13 12 13   EGFRNONAA >60.0 >60.0 >60.0     Coagulation:   Recent Labs   Lab 06/02/20  0158   INR 1.2   APTT 29.4     Lactic Acid: No results for input(s): LACTATE in the last 48 hours.  Respiratory Culture: No results for input(s): GSRESP, RESPIRATORYC in the last 48 hours.  Troponin: No results for input(s): TROPONINI in the last 48 hours.  All pertinent labs within the past 24 hours have been reviewed.    Significant Imaging:  I have reviewed and interpreted all pertinent imaging results/findings within the past 24 hours.     CXR 6/2/2020  Imaging and read pending. Yesterday did have some increased bilateral infiltrates bilaterally. Will see if any improvement today

## 2020-06-02 NOTE — ASSESSMENT & PLAN NOTE
Acute respiratory distress syndrome (ARDS) due to COVID-19 virus  - Crich switched to ETT on 4/11  - Monika weaned off 5/5  - Tracheostomy and bronch 5/5  - severe thrombocytopenia resolved; transfuse for under 30K  - Aspirin 81 daily  - Labetalol 200BID  - RPM at 2600; flows at 2.8  - Sweep at 4.5; CO2 goal is 59 or below; do not increase sweep for pCO2s 59 or below as long as patient is not acidotic, as these high pCO2s are respiratory compensation for alkalosis  - Oxygenator Fi at 40%; wean further as tolerated  - CT head and chest imaging reviewed, neurology consulted for findings concerning for anoxic brain injury, appreciate assistance     Tongue laceration      -ENT evaluated, laceration superficial      -recs: bite block vs paralysis, will monitor    Sedation  - Valium, seroquel, PRN Fentanyl     UGI Bleed  - Scoped by GI 5/3 with epinephrine injection and clip placement for an actively bleeding D2/D3 Diuelafoy lesion    FEN/GI  - TF at 45, goal      - Lasix gtt 5  - FWB for hypernatremia 300mL q4     Anticoagulation      - Goal aXa 0.25-0.3 Q6hrs      - Hep gtt being increased to 700 for aXa 0.2      - monitor LDH    Prophylaxis      - protonix q12 IV       - leukocytosis stable      - PICC placed 5/5; central line removed 5/6

## 2020-06-02 NOTE — PROGRESS NOTES
[facility] shift report    Date: 06/02/2020  ECMO Specialist:  Ashley Francisco    Pump parameters:  RPM: 2600  Flow: 2.9  Sweep:  4  FiO2:  .40    Oxygenator status:  Clots: Pre and Post  Fibrin: Pre and post    Pressure trends:  P1: 97  P2: 73  Delta P: 24    Volume status:  Chugging noted? yes  CVP: NA  MAP:  70s-80s  MD notified (name): Nany    Anticoagulation:  ACT/aPTT/Xa parameters: .25-.3  ACT/aPTT/Xa trends this shift: .25    Cannula size / status / placement:  Arterial:   Venous 1: RIJV 23F@ 4cm  Venous 2: RFV 27F@ 12cm  Dual lumen:      Additional Comments: 1830 circuit chattering.  Dry.  Pushed 200 Iso.  1950 RN gave 500 albumin.  Pt. Agitated and BPM 40-60.    Maintained the flow.

## 2020-06-02 NOTE — PROGRESS NOTES
[facility] - Respiratory ICU  Cardiothoracic Surgery  Daily ECMO Progress Note    Patient Name: Ranjana Sanchez  MRN: [id number]  Admission Date: 4/10/2020  Hospital Length of Stay: 53 days  Code Status: Full Code   Attending Physician: Francis Ayon MD   Referring Provider: Francis Ayon MD  Principal Problem:Acute respiratory disease due to COVID-19 virus    Subjective:   Interval History:  Some episodes of agitation and tachypnea overnight resolved with fentanyl. UOP 4.2L, net negative 300mL in 24hrs with improved BUN and HR when non-agitated. Sweep to 4.5 and oxygenator FIO2 to 40%. Hep gtt ongoing at 600, aXa 0.2. CT H/Chest this AM shows findings concerning for hypoxic injury, and ground glass bilateral lungs.    Medications:  Continuous Infusions:   dexmedetomidine (PRECEDEX) infusion 0.1 mcg/kg/hr (06/02/20 1300)    furosemide (LASIX) 2 mg/mL continuous infusion (non-titrating) 2.5 mg/hr (06/02/20 1300)    heparin (porcine) in 5 % dex 700 Units/hr (06/02/20 1300)    nicardipine Stopped (06/01/20 2200)    norepinephrine bitartrate-D5W       Scheduled Meds:   aspirin  81 mg Per NG tube Daily    chlorhexidine  15 mL Mouth/Throat BID    fentaNYL  1 patch Transdermal Q72H    labetalol  300 mg Per G Tube BID    multivitamin liquid no.118  10 mL Per G Tube Daily    pantoprazole  40 mg Per NG tube BID    piperacillin-tazobactam (ZOSYN) IVPB  4.5 g Intravenous Q8H    polyethylene glycol  17 g Per NG tube Daily    potassium chloride 10%  40 mEq Per NG tube BID    psyllium husk (aspartame)  1 packet Per NG tube TID    QUEtiapine  50 mg Per NG tube QHS    sucralfate  1 g Per NG tube Q6H    vitamin D  1,000 Units Per G Tube Daily        Objective:     Vital Signs (Most Recent):  Temp: 98.4 °F (36.9 °C) (06/02/20 1200)  Pulse: 101 (06/02/20 1330)  Resp: (!) 29 (06/02/20 1330)  BP: (!) 99/52 (06/02/20 1030)  SpO2: 98 % (06/02/20 1330) Vital Signs (24h Range):  Temp:  [97.5 °F (36.4  °C)-98.9 °F (37.2 °C)] 98.4 °F (36.9 °C)  Pulse:  [] 101  Resp:  [28-36] 29  SpO2:  [91 %-100 %] 98 %  BP: (99)/(52) 99/52  Arterial Line BP: ()/(46-76) 108/59     Weight: 60.8 kg (134 lb 0.6 oz)  Body mass index is 23.01 kg/m².    SpO2: 98 %  O2 Device (Oxygen Therapy): ventilator    Intake/Output - Last 3 Shifts       05/31 0700 - 06/01 0659 06/01 0700 - 06/02 0659 06/02 0700 - 06/03 0659    I.V. (mL/kg) 681.7 (11.7) 895.5 (15.4)     Blood       NG/GT 2655 2880 615    IV Piggyback 100 200     Total Intake(mL/kg) 3436.7 (59) 3975.5 (68.3) 615 (10.1)    Urine (mL/kg/hr) 4425 (3.2) 4364 (3.1) 750 (1.8)    Stool 0      Blood 7 2     Total Output 4432 4366 750    Net -995.4 -390.6 -135           Stool Occurrence 2 x            Lines/Drains/Airways     Peripherally Inserted Central Catheter Line            PICC Double Lumen 05/05/20 1707 right basilic 27 days          Central Venous Catheter Line                 ECMO Cannula 04/25/20 1120 right femoral vein 38 days         ECMO Cannula 04/25/20 1120 right internal jugular 38 days          Drain                 NG/OG Tube 05/03/20 1215 [place] sump;nasogastric 18 Fr. Left nostril 30 days         Urethral Catheter 05/28/20 1630 4 days          Airway                 Surgical Airway 05/05/20 1500 Shiley Cuffed 27 days          Arterial Line                 Arterial Line 04/27/20 1100 Right Brachial 36 days          Peripheral Intravenous Line                 Peripheral IV - Single Lumen 05/26/20 0845 18 G Left Upper Arm 7 days         Peripheral IV - Single Lumen 05/30/20 1205 20 G Left Other 3 days         Peripheral IV - Single Lumen 05/31/20 0220 20 G Right Wrist 2 days                Physical Exam    Constitutional: He is sedated, and intubated.   Head: Normocephalic   Cardiovascular: Tachycardia 120s   Pulmonary/Chest: intubated vent FiO2 50% PEEP 5, ECMO RPM 2600, Flow 2.9, sweep 4.5, oxygenator FiO2 40%  Skin: L neck and L groin cannulas intact, no  flashing, clots pre and post-oxygenator, sutures intact and secure with 2 new sutures to upper (return) cannula.  Nursing note and vitals reviewed.    Significant Labs:  BMP:   Recent Labs   Lab 06/02/20  0740   *   *   K 3.4*      CO2 29   BUN 69*   CREATININE 1.1   CALCIUM 10.0   MG 2.5     CBC:   Recent Labs   Lab 06/02/20  1200   WBC 12.67   RBC 2.97*   HGB 9.1*   HCT 28.9*   *   MCV 97   MCH 30.6   MCHC 31.5*     LFTs:   Recent Labs   Lab 06/02/20  0740   ALT 43   AST 29   ALKPHOS 143*   BILITOT 1.2*   PROT 7.1   ALBUMIN 4.0     Significant Diagnostics:  Cxr: somewhat improved since yesterday    Ct Head Without Contrast 6/2/2020  -symmetrical hypoattenuation bilobed basal ganglia concerning for edema as well as poor gray-white matter differentiation cerebral hemispheres diffusely concerning for profound hypoxic anoxic encephalopathy  -no evidence for acute intracranial hemorrhage    Ct Chest Without Contrast 6/2/2020  -small left pneumothorax with no mediastinal shift and minimal loss of left lung volume  -no right pneumothorax, pneumomediastinum or pneumoperitoneum  -widespread largely homogeneous ground-glass disease throughout all lobes as well as tubular and varicoid bronchiectasis in this patient with history of COVID-19 pneumonia maintained on ECMO  -fine reticular and reticulonodular pattern is evident in the periphery of the lungs consistent with interstitial lung disease  -findings are not typical for pulmonary edema due to elevated pulmonary venous pressures and/or abnormal capillary permeability    Assessment/Plan:     Acute respiratory distress syndrome (ARDS) due to COVID-19 virus  - Crich switched to ETT on 4/11  - Monika weaned off 5/5  - Tracheostomy and bronch 5/5  - severe thrombocytopenia resolved; transfuse for under 30K  - Aspirin 81 daily  - Labetalol 200BID  - RPM at 2600; flows at 2.8  - Sweep at 4.5; CO2 goal is 59 or below; do not increase sweep for pCO2s 59 or  below as long as patient is not acidotic, as these high pCO2s are respiratory compensation for alkalosis  - Oxygenator Fi at 40%; wean further as tolerated  - CT head and chest imaging reviewed, neurology consulted for findings concerning for anoxic brain injury, appreciate assistance     Tongue laceration      -ENT evaluated, laceration superficial      -recs: bite block vs paralysis, will monitor    Sedation  - Valium, seroquel, PRN Fentanyl     UGI Bleed  - Scoped by GI 5/3 with epinephrine injection and clip placement for an actively bleeding D2/D3 Diuelafoy lesion    FEN/GI  - TF at 45, goal      - Lasix gtt 5  - FWB for hypernatremia 300mL q4     Anticoagulation      - Goal aXa 0.25-0.3 Q6hrs      - Hep gtt being increased to 700 for aXa 0.2      - monitor LDH    Prophylaxis      - protonix q12 IV       - leukocytosis stable      - PICC placed 5/5; central line removed 5/6    Anne Miller MD  Cardiothoracic Surgery  [facility] - Respiratory ICU        VV ECMO circuit checked and all parameters reviewed. Anticoagulation was managed and all cannulation exit sites along with review of labs and radiology studies performed. Speed and functioning of the pump along with oxygenator quality reviewed.  Significant amount of time was spent in coordinating care  between different teams which consisted of the surgical ICU to, perfusion Staff and the nursing staff.  All questions and concerns all team members were addressed.  Overall patient has been doing well and encouraged by his incremental, though slow improvement in respiratory status.

## 2020-06-02 NOTE — SUBJECTIVE & OBJECTIVE
Interval History:  Some episodes of agitation and tachypnea overnight resolved with fentanyl. UOP 4.2L, net negative 300mL in 24hrs with improved BUN and HR when non-agitated. Sweep to 4.5 and oxygenator FIO2 to 40%. Hep gtt ongoing at 600, aXa 0.2. CT H/Chest this AM shows findings concerning for hypoxic injury, and ground glass bilateral lungs.    Medications:  Continuous Infusions:   dexmedetomidine (PRECEDEX) infusion 0.1 mcg/kg/hr (06/02/20 1300)    furosemide (LASIX) 2 mg/mL continuous infusion (non-titrating) 2.5 mg/hr (06/02/20 1300)    heparin (porcine) in 5 % dex 700 Units/hr (06/02/20 1300)    nicardipine Stopped (06/01/20 2200)    norepinephrine bitartrate-D5W       Scheduled Meds:   aspirin  81 mg Per NG tube Daily    chlorhexidine  15 mL Mouth/Throat BID    fentaNYL  1 patch Transdermal Q72H    labetalol  300 mg Per G Tube BID    multivitamin liquid no.118  10 mL Per G Tube Daily    pantoprazole  40 mg Per NG tube BID    piperacillin-tazobactam (ZOSYN) IVPB  4.5 g Intravenous Q8H    polyethylene glycol  17 g Per NG tube Daily    potassium chloride 10%  40 mEq Per NG tube BID    psyllium husk (aspartame)  1 packet Per NG tube TID    QUEtiapine  50 mg Per NG tube QHS    sucralfate  1 g Per NG tube Q6H    vitamin D  1,000 Units Per G Tube Daily        Objective:     Vital Signs (Most Recent):  Temp: 98.4 °F (36.9 °C) (06/02/20 1200)  Pulse: 101 (06/02/20 1330)  Resp: (!) 29 (06/02/20 1330)  BP: (!) 99/52 (06/02/20 1030)  SpO2: 98 % (06/02/20 1330) Vital Signs (24h Range):  Temp:  [97.5 °F (36.4 °C)-98.9 °F (37.2 °C)] 98.4 °F (36.9 °C)  Pulse:  [] 101  Resp:  [28-36] 29  SpO2:  [91 %-100 %] 98 %  BP: (99)/(52) 99/52  Arterial Line BP: ()/(46-76) 108/59     Weight: 60.8 kg (134 lb 0.6 oz)  Body mass index is 23.01 kg/m².    SpO2: 98 %  O2 Device (Oxygen Therapy): ventilator    Intake/Output - Last 3 Shifts       05/31 0700 - 06/01 0659 06/01 0700 - 06/02 0659 06/02 0700 - 06/03  0659    I.V. (mL/kg) 681.7 (11.7) 895.5 (15.4)     Blood       NG/GT 2655 2880 615    IV Piggyback 100 200     Total Intake(mL/kg) 3436.7 (59) 3975.5 (68.3) 615 (10.1)    Urine (mL/kg/hr) 4425 (3.2) 4364 (3.1) 750 (1.8)    Stool 0      Blood 7 2     Total Output 4432 4366 750    Net -995.4 -390.6 -135           Stool Occurrence 2 x            Lines/Drains/Airways     Peripherally Inserted Central Catheter Line            PICC Double Lumen 05/05/20 1707 right basilic 27 days          Central Venous Catheter Line                 ECMO Cannula 04/25/20 1120 right femoral vein 38 days         ECMO Cannula 04/25/20 1120 right internal jugular 38 days          Drain                 NG/OG Tube 05/03/20 1215 [place] sump;nasogastric 18 Fr. Left nostril 30 days         Urethral Catheter 05/28/20 1630 4 days          Airway                 Surgical Airway 05/05/20 1500 Shiley Cuffed 27 days          Arterial Line                 Arterial Line 04/27/20 1100 Right Brachial 36 days          Peripheral Intravenous Line                 Peripheral IV - Single Lumen 05/26/20 0845 18 G Left Upper Arm 7 days         Peripheral IV - Single Lumen 05/30/20 1205 20 G Left Other 3 days         Peripheral IV - Single Lumen 05/31/20 0220 20 G Right Wrist 2 days                Physical Exam    Constitutional: He is sedated, and intubated.   Head: Normocephalic   Cardiovascular: Tachycardia 120s   Pulmonary/Chest: intubated vent FiO2 50% PEEP 5, ECMO RPM 2600, Flow 2.9, sweep 4.5, oxygenator FiO2 40%  Skin: L neck and L groin cannulas intact, no flashing, clots pre and post-oxygenator, sutures intact and secure with 2 new sutures to upper (return) cannula.  Nursing note and vitals reviewed.    Significant Labs:  BMP:   Recent Labs   Lab 06/02/20  0740   *   *   K 3.4*      CO2 29   BUN 69*   CREATININE 1.1   CALCIUM 10.0   MG 2.5     CBC:   Recent Labs   Lab 06/02/20  1200   WBC 12.67   RBC 2.97*   HGB 9.1*   HCT 28.9*   PLT  102*   MCV 97   MCH 30.6   MCHC 31.5*     LFTs:   Recent Labs   Lab 06/02/20  0740   ALT 43   AST 29   ALKPHOS 143*   BILITOT 1.2*   PROT 7.1   ALBUMIN 4.0     Significant Diagnostics:  Cxr: somewhat improved since yesterday    Ct Head Without Contrast 6/2/2020  -symmetrical hypoattenuation bilobed basal ganglia concerning for edema as well as poor gray-white matter differentiation cerebral hemispheres diffusely concerning for profound hypoxic anoxic encephalopathy  -no evidence for acute intracranial hemorrhage    Ct Chest Without Contrast 6/2/2020  -small left pneumothorax with no mediastinal shift and minimal loss of left lung volume  -no right pneumothorax, pneumomediastinum or pneumoperitoneum  -widespread largely homogeneous ground-glass disease throughout all lobes as well as tubular and varicoid bronchiectasis in this patient with history of COVID-19 pneumonia maintained on ECMO  -fine reticular and reticulonodular pattern is evident in the periphery of the lungs consistent with interstitial lung disease  -findings are not typical for pulmonary edema due to elevated pulmonary venous pressures and/or abnormal capillary permeability

## 2020-06-02 NOTE — PROGRESS NOTES
ECMO Specialists shift report    Date: 06/02/2020  ECMO Specialist:  Monik Bryant    Pump parameters:  RPM: 2600  Flow:  2.86  Sweep:  4  FiO2:  40%    Oxygenator status:  Clots: pre and post   Fibrin: pre and post    Pressure trends:  P1: 96  P2: 66  Delta P: 30    Volume status:  Chugging noted yes  CVP:   MAP:  Mid 60's-80's  MD notified (name):  Dr Anne Madden    Anticoagulation:  Xa parameters: 0.25=0.3  ACT/aPTT/Xa trends this shift: 0.2-0.19-    Cannula size / status / placement:  Arterial:   Venous 1: 23Fr RIJV @4cm  Venous 2: 27Fr  Rfv @12cm  Dual lumen:      Additional Comments:1030travel to CT for chest and head, with 2 respiratory therapist, Devon sosa, myself,SICU charge nurse and additional RN,  Travel well tolerated small left pnewmothorax with minimal loss of lung volume found Dr Anne Madden aware of all results : CT,  blood gases , and pump settigs .heparin increase 7, and labs for today. Rechecking AntiXa after heparin increased.

## 2020-06-02 NOTE — PLAN OF CARE
"      SICU PLAN OF CARE NOTE    Dx: Acute respiratory disease due to COVID-19 virus    Goals of Care: MAP 70-90    Vital Signs: BP (!) 99/52   Pulse (!) 122   Temp 98.8 °F (37.1 °C) (Axillary)   Resp (!) 39   Ht 5' 4" (1.626 m)   Wt 60.8 kg (134 lb 0.6 oz)   SpO2 99%   BMI 23.01 kg/m²     Exam: appears acutely ill, intubated, cachectic, mild distress    Cardiac: sinus tach; VSS throughout shift besides suture placement and travel to CT (hypotensive)    Resp:  AC/PC 50%/5 PEEP, ECMO 40%/4 sweep, 2600 RPM & flows 2.8-2.9    Neuro: Unresponsive; Pt will open eyes spontaneously but does not follow commands or track. Does not withdraw to pain on my shift, but does furrow brow to pain.    Gtts: Heparin @ 700 units/hr per anti-Xa (goal range of 0.25-0.3), Lasix @ 2.5 mg/hr, and Precedex @ 0.1 mcg/kg/min    Output: Urine Urinary Catheter ~2000 cc/shift ; BM x1 (dark liquid)    Diet: NPO and Tube Feeds (@ goal of 45 w/ no residuals)    Labs/Accuchecks: Q6labs, [id number] w/ coverage each check    Skin: moisture associated injury on the sacrum - barrier cream applied.  Triad to cheeck wound (improving).  Heel boots rotated.    Shift Events: CT head and chest - see abnormal results. 1 PRBC.  Several acute respiratory episodes, recovered quickly with minimal intervention.  See flowsheet for further assessment/details.  POC reviewed with pt's son, updated on current condition, questions answered, and emotional support provided.   MD updated on current condition, vitals, labs, and gtts.  No new orders received, will continue to monitor.     "

## 2020-06-02 NOTE — PLAN OF CARE
06/02/20 1209   Discharge Reassessment   Assessment Type Discharge Planning Reassessment   Do you have any problems affording any of your prescribed medications?   (tbd)   Discharge Plan A Long-term acute care facility (LTAC)   Discharge Plan B Long-term acute care facility (LTAC)   DME Needed Upon Discharge    (tbd)   Anticipated Discharge Disposition Long Term   Can the patient/caregiver answer the patient profile reliably? No, cognitively impaired   How does the patient rate their overall health at the present time?   (dorcas)   Describe the patient's ability to walk at the present time. Does not walk or unable to take any steps at all   Number of comorbid conditions (as recorded on the chart) One   Post-Acute Status   Post-Acute Authorization Placement   Post-Acute Placement Status Awaiting Internal Medical Clearance   Other Status   (Pt is from [facility])     Patient not medically stable to discharge. Patient was admitted with acute respiratory disease due to Covid-19. Patient remains (+) covid 5/16/2020. Patient remains on ECMO & 50% ventilated. Continuous heparin, lasix, & precedex drips in progress. Pt continues to tolerate TF. Plan to discharge patient to LTAC when medically stable. Will continue to follow.

## 2020-06-02 NOTE — ASSESSMENT & PLAN NOTE
Ranjana Sanchez is a 55 y.o. 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  This was switched to ETT on 4/11.  Cannulated on 4/25.    Neuro:  Precedex  5 of valium  100 BID of seroquel  Fentanyl patch    Pulm  AC/VC+ on the vent.  Small pneumo on cxr, subclinical.  Sweep of 4 today, 2600 RPM (4.5 and 2800 yesterday respectively)  Will need some more time on ECMO, not pulling enough volume on the vent    Cardiac  Cardene gtt as needed, currently off    GI  TFs at goal    Renal  Making urine, diuresing agressively  Lasix gtt at 2.5    Heme  Hep gtt at 600, goal 0.25-0.3 to prevent another GI bleed  Has been at goal at this rate    ID  WBC 17.6 -> 13.7 on Zosyn      Dispo: Continued ICU care for oxygenation/ventilation requirements

## 2020-06-02 NOTE — PLAN OF CARE
POC reviewed with pt. Pt will open eyes spontaneously but does not follow commands or track. Withdrawals to ain  on all extremeties to pain. Pt remains on ventilator AC/PC 50%/5, ECMO 40%/4.5 sweep, 2600 RPM flows 2.9-2.98. Heparin @ 600 units/hr per anti-Xa (anti-xa within range of 0.25-0.3), Lasix @ 2.5 mg/hr, and precedex @ 0.1mcg/kg/min.  UO between 150-250 ml/hr throughout shift,   BM x 2, liquid brown/green.  Continues to tolerate TF @ goal rate of 45 ml/hr. No residuals noted. VSS. No questions or concerns at this time.

## 2020-06-02 NOTE — CARE UPDATE
55 y/o M with a medical history of HTN presented to the ED for increasing SOB with known COVID-19 exposure on 4/10/20. Patient is originally from [facility] and is a  on a cruise ship. On arrival he was emergently intubated due to ARDS 2/2 COVID. He is on ECMO (RIJ-RFV) since 4/25. He is in a hypercoagulable state associated with COID-19 and is on a heparin therapy associated with that. He has a GI bleed due to diulafoy lesion. Neurology was consulted to evaluate neurologic status as he is not following commands despite being only on precedex.      Patient's propofol has been discontinued since 5/12/20. He is currently on 0.1 mcg/kg/hr of precedex (non-titrating).     Given that the patient has been on ECMO since 4/25 he is at an increased risk of neurological issues including embolic stroke, intracerebral hemorrhage, seizures, and anoxic injuries. Patient still continues to have brain stem reflexes which means that he does not qualify for being brain dead. Although he is currently sedated, he withdraws to pain in lower extremities with an intact cough, gag and corneal reflex. Repeat CTH concerning hypoxic anoxic encephalopathy however, cannot fully determine prognostication based on that.     Plan   1) Ultimately will need an MRI Brain w/o contrast however, given that the patient is on ECMO this will be difficult.   2) Continue to monitor neurologic exam      Due to COVID-19, the Neurology team is limiting interaction with consult patients unless absolutely necessary in order to limit patient's exposure to the virus. As of now, the Neurology team will be chart checking this patient and discussing with staff. If the patient has an acute neurological change and/or you believe the patient needs to be examined by a provider, please page Neurology on call to discuss the patient with a team member.      Fely Reeves MD  Please call [phone] for any further questions.

## 2020-06-03 NOTE — PLAN OF CARE
ECMO Specialists shift report    Date: 06/03/2020  ECMO Specialist:  Jorge Yeboah    Pump parameters:  RPM: 2600  Flow:  2.70  Sweep:  4  FiO2:  40    Oxygenator status:  Clots: pre/post  Fibrin: pre/post    Pressure trends:  P1: 104  P2: 68  Delta P: 28    Volume status:  Chugging noted: slight  CVP:  MAP:  60-90  MD notified (name):  Kenan    Anticoagulation:  ACT/aPTT/Xa parameters: 0.25-0.30  ACT/aPTT/Xa trends this shift: 0.23,0.24    Cannula size / status / placement:  Arterial:   Venous 1: 23Fr RIJV @4cm  Venous 2:  27Fr  Rfv @12cm  Dual lumen:      Additional Comments:  Patient on VV ECMO with the documented settings.  Urine output remains good.  Will continue to monitor

## 2020-06-03 NOTE — PROGRESS NOTES
[facility] - Respiratory ICU  Critical Care - Surgery  Progress Note    Patient Name: Ranjana Sanchez  MRN: [id number]  Admission Date: 4/10/2020  Hospital Length of Stay: 54 days  Code Status: Full Code  Attending Provider: Francis Ayon MD  Primary Care Provider: Primary Doctor No   Principal Problem: Acute respiratory disease due to COVID-19 virus    Subjective:     Interval History/Significant Events:   No acute events. Decreasing sedation, neuro status unchanged.  WBC stable 13.7 -> 13.5  Pump parameters: RPM 2600, Sweep 4, FiO2 40%  Vent settings: A/C PEEP 5, FiO2 50%, insp 0.9 s, rate 12.        Follow-up For: Procedure(s) (LRB):  CREATION, TRACHEOSTOMY  (N/A)    Post-Operative Day: 28 Days Post-Op    Objective:     Vital Signs (Most Recent):  Temp: 98.1 °F (36.7 °C) (06/03/20 0300)  Pulse: (!) 113 (06/03/20 0630)  Resp: (!) 35 (06/03/20 0630)  BP: 120/65 (06/02/20 1900)  SpO2: 97 % (06/03/20 0630) Vital Signs (24h Range):  Temp:  [98.1 °F (36.7 °C)-98.9 °F (37.2 °C)] 98.1 °F (36.7 °C)  Pulse:  [] 113  Resp:  [] 35  SpO2:  [92 %-100 %] 97 %  BP: ()/(52-65) 120/65  Arterial Line BP: ()/() 118/65     Weight: 59.8 kg (131 lb 13.4 oz)  Body mass index is 22.63 kg/m².      Intake/Output Summary (Last 24 hours) at 6/3/2020 0638  Last data filed at 6/3/2020 0600  Gross per 24 hour   Intake 4014 ml   Output 4175 ml   Net -161 ml       Physical Exam   Constitutional: He appears well-developed and well-nourished.   HENT:   Head: Normocephalic and atraumatic.   NGT with tube feeds   Eyes: No scleral icterus.   Opens eyes and blinks spontaneously   Neck:   trachostomy in place  See settings below   Cardiovascular:   Intermittently tachycardic   Genitourinary:   Genitourinary Comments: Torres in place   Neurological:   Sedated. Withdraws lower extremities to pain, does not withdraw upper extremities. Corneal, cough, and gag reflex intact.    Skin: Skin is warm and dry. He is  not diaphoretic.   Nursing note and vitals reviewed.      Vents:  Vent Mode: A/C (06/03/20 0141)  Ventilator Initiated: Yes (04/10/20 2247)  Set Rate: 12 BPM (06/03/20 0141)  Vt Set: 200 mL (05/18/20 1244)  PEEP/CPAP: 5 cmH20 (06/03/20 0141)  Oxygen Concentration (%): 50 (06/03/20 0630)  Peak Airway Pressure: 31 cmH2O (06/03/20 0141)  Plateau Pressure: 0 cmH20 (06/03/20 0141)  Total Ve: 10.2 mL (06/03/20 0141)  F/VT Ratio<105 (RSBI): (!) 96.59 (06/03/20 0141)    Lines/Drains/Airways     Peripherally Inserted Central Catheter Line            PICC Double Lumen 05/05/20 1707 right basilic 28 days          Central Venous Catheter Line                 ECMO Cannula 04/25/20 1120 right femoral vein 38 days         ECMO Cannula 04/25/20 1120 right internal jugular 38 days          Drain                 NG/OG Tube 05/03/20 1215 [place] sump;nasogastric 18 Fr. Left nostril 30 days         Urethral Catheter 05/28/20 1630 5 days          Airway                 Surgical Airway 05/05/20 1500 Shiley Cuffed 28 days          Arterial Line                 Arterial Line 04/27/20 1100 Right Brachial 36 days          Peripheral Intravenous Line                 Peripheral IV - Single Lumen 05/31/20 0220 20 G Right Wrist 3 days         Peripheral IV - Single Lumen 05/31/20 1205 20 G Left Other 2 days                Significant Labs:    CBC/Anemia Profile:  Recent Labs   Lab 06/02/20  0158  06/02/20  1200  06/02/20  2000 06/03/20  0200 06/03/20  0202   WBC 13.72*   < > 12.67  --  12.25 13.50*  --    HGB 9.0*   < > 9.1*  --  8.3* 10.6*  --    HCT 28.6*   < > 28.9*   < > 26.7* 32.7* 31*   *   < > 102*  --  87* 102*  --    MCV 96   < > 97  --  97 95  --    RDW 14.3   < > 14.2  --  14.1 14.0  --    FERRITIN 1,408*  --   --   --   --  1,553*  --     < > = values in this interval not displayed.        Chemistries:  Recent Labs   Lab 06/02/20 1200 06/02/20 2000 06/03/20  0200   * 147* 147*   K 4.2 2.6* 4.7    113* 108   CO2 29  23 26   BUN 68* 53* 61*   CREATININE 1.1 0.8 1.0   CALCIUM 9.6 7.6* 9.6   ALBUMIN 3.9 3.1* 3.7   PROT 6.9 5.6* 7.1   BILITOT 1.3* 1.0 1.4*   ALKPHOS 156* 133 164*   ALT 41 36 42   AST 35 28 34   MG 2.5 1.9 3.2*   PHOS 4.0 3.0 2.7       CMP:   Recent Labs   Lab 06/02/20  1200 06/02/20 2000 06/03/20  0200   * 147* 147*   K 4.2 2.6* 4.7    113* 108   CO2 29 23 26   * 196* 219*   BUN 68* 53* 61*   CREATININE 1.1 0.8 1.0   CALCIUM 9.6 7.6* 9.6   PROT 6.9 5.6* 7.1   ALBUMIN 3.9 3.1* 3.7   BILITOT 1.3* 1.0 1.4*   ALKPHOS 156* 133 164*   AST 35 28 34   ALT 41 36 42   ANIONGAP 12 11 13   EGFRNONAA >60.0 >60.0 >60.0     Coagulation:   Recent Labs   Lab 06/03/20  0200   INR 1.1   APTT 30.5     Lactic Acid: No results for input(s): LACTATE in the last 48 hours.  Respiratory Culture: No results for input(s): GSRESP, RESPIRATORYC in the last 48 hours.  Troponin: No results for input(s): TROPONINI in the last 48 hours.  All pertinent labs within the past 24 hours have been reviewed.    Significant Imaging:  I have reviewed and interpreted all pertinent imaging results/findings within the past 24 hours.     CXR 6/2/2020  Imaging and read pending. Yesterday did have some increased bilateral infiltrates bilaterally. Will see if any improvement today      Assessment/Plan:     Type 2 diabetes mellitus without complication, without long-term current use of insulin  SQ insulin regimen achieving goal of 140-180    COVID-19 virus detected  Ranjana Sanchez is a 55 y.o. 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  This was switched to ETT on 4/11.  Cannulated on 4/25.    Neuro:  Precedex  5 of valium  100 BID of seroquel  Fentanyl patch  Encephalopathy with poor neuro exam  - CT on June 2 showing bilateral basal ganglia infarcts, which explains the neuro exam findings. His infarcts are sizeable, and will cause permanent deficits, the extent of which is  unknown. The likelihood of meaningful recovery is low. Neuro consulted to prognosticate, recommending MRI.         Pulm  AC/VC+ on the vent.  Small pneumo on cxr, subclinical.  Sweep of 4 today, 2600 RPM    Will need some more time on ECMO, not pulling enough volume on the vent    Cardiac  Cardene gtt as needed, currently off    GI  TFs at goal    Renal  Making urine, diuresing agressively  Lasix gtt at 2.5    Heme  Hep gtt at 600, goal 0.25-0.3 to prevent another GI bleed  Has been at goal at this rate    ID  WBC stable 13.5 on Zosyn      Dispo: Continued ICU care for oxygenation/ventilation requirements             Michael Barton MD  Critical Care - Surgery  [facility] - Respiratory ICU

## 2020-06-03 NOTE — ASSESSMENT & PLAN NOTE
Acute respiratory distress syndrome (ARDS) due to COVID-19 virus  - Crich switched to ETT on 4/11  - Monika weaned off 5/5  - Tracheostomy and bronch 5/5  - severe thrombocytopenia resolved; transfuse for under 30K  - Aspirin 81 daily  - Labetalol 200BID  - RPM at 2600; flows at 2.7  - Sweep at 4; CO2 goal is 59 or below; do not increase sweep for pCO2s 59 or below as long as patient is not acidotic, as these high pCO2s are respiratory compensation for alkalosis  - Oxygenator Fi at 40%  - CT head and chest imaging reviewed with profound basilar changes concerning for hypoxemic injury, neurology following, unable to perform MRI at this time     Tongue laceration      -ENT evaluated, laceration superficial      -recs: bite block vs paralysis, will monitor    Sedation  - Valium, seroquel, PRN Fentanyl     UGI Bleed  - Scoped by GI 5/3 with epinephrine injection and clip placement for an actively bleeding D2/D3 Diuelafoy lesion    FEN/GI  - TF at 45, goal      - Lasix gtt 5  - FWB for hypernatremia 300mL q4     Anticoagulation      - Goal aXa 0.2-0.25 Q6hrs      - Hep gtt 700      - monitor LDH    Prophylaxis      - protonix q12 IV       - leukocytosis stable      - PICC placed 5/5; central line removed 5/6

## 2020-06-03 NOTE — NURSING
No acute events throughout the day.   ECMO support increased slightly for increased CO2.   UOP remains >100mL/hr.   Infusions remain at constant rate.  Patient's son updated via telephone.   Will continue to monitor patients status.   Please see flowsheet data for full assessment details.

## 2020-06-03 NOTE — ASSESSMENT & PLAN NOTE
Ranajna Sanchez is a 55 y.o. 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  This was switched to ETT on 4/11.  Cannulated on 4/25.    Neuro:  Precedex  5 of valium  100 BID of seroquel  Fentanyl patch  Encephalopathy with poor neuro exam  - CT on June 2 showing bilateral basal ganglia infarcts, which explains the neuro exam findings. His infarcts are sizeable, and will cause permanent deficits, the extent of which is unknown. The likelihood of meaningful recovery is low. Neuro consulted to prognosticate, recommending MRI.         Pulm  AC/VC+ on the vent.  Small pneumo on cxr, subclinical.  Sweep of 4 today, 2600 RPM    Will need some more time on ECMO, not pulling enough volume on the vent    Cardiac  Cardene gtt as needed, currently off    GI  TFs at goal    Renal  Making urine, diuresing agressively  Lasix gtt at 2.5    Heme  Hep gtt at 600, goal 0.25-0.3 to prevent another GI bleed  Has been at goal at this rate    ID  WBC stable 13.5 on Zosyn      Dispo: Continued ICU care for oxygenation/ventilation requirements

## 2020-06-03 NOTE — PLAN OF CARE
"                                                                     SICU PLAN OF CARE NOTE     Dx: Acute respiratory disease due to COVID-19 virus    Shift Events: Electrolyte Replacement, multiple tachypnic and hypertensive episodes improved w/ pain med administration      Goals of Care: MAP 70-90     Vital Signs: BP (!) 119/68   Pulse (!) 122   Temp 98.4 (Oral)  Resp (!) 39   Ht 5' 4" (1.626 m)   Wt 59.8kg  SpO2 98%   BMI 23.01 kg/m²      Cardiac: Sinus Tach, mostly hypertensive throughout shift w/ fentanyl pain admin helping     Resp:  AC/PC 50% / PEEP 5, ECMO VV FiO2 40% / 4 Sweep, 2600 RPM & Flows 2.7-2.9     Neuro: Unresponsive; Pt will open eyes spontaneously but does not follow commands or track. Does not withdraw to pain on my shift, but does change facial expression to pain.     Gtts: Heparin @ 700 units/hr per anti-Xa (goal range of 0.2-0.25), Lasix @ 2.5 mg/hr, and Precedex @ 0.1 mcg/kg/min     Output: Urine Urinary Catheter  2L/shift ; BM x1 (dark liquid)     Diet: NPO and Tube Feeds (@ goal of 45 w/ 30cc residuals)     Labs/Accuchecks: Q6h labs, Q4h accuchecks w/ coverage each check     Skin: Moisture associated injury on the sacrum - barrier cream applied.  Triad to cheek wound (improving).  Heel boots rotated.  "

## 2020-06-03 NOTE — PROGRESS NOTES
[facility] - Respiratory ICU  Cardiothoracic Surgery  Daily ECMO Progress Note    Patient Name: Ranjana Sanchez  MRN: [id number]  Admission Date: 4/10/2020  Hospital Length of Stay: 54 days  Code Status: Full Code   Attending Physician: Francis Ayon MD   Referring Provider: Francis Ayon MD  Principal Problem:Acute respiratory disease due to COVID-19 virus    Subjective:   Interval History:  UOP 4.2L, net negative 180mL in 24hrs. Sweep to 4 and oxygenator FIO2 to 40%. Hep gtt ongoing at 700, aXa 0.24.    Medications:  Continuous Infusions:   dexmedetomidine (PRECEDEX) infusion 0.1 mcg/kg/hr (06/03/20 1500)    furosemide (LASIX) 2 mg/mL continuous infusion (non-titrating) 2.5 mg/hr (06/03/20 1500)    heparin (porcine) in 5 % dex 700 Units/hr (06/03/20 1500)    nicardipine Stopped (06/01/20 2200)    norepinephrine bitartrate-D5W Stopped (06/02/20 1500)     Scheduled Meds:   aspirin  81 mg Per NG tube Daily    chlorhexidine  15 mL Mouth/Throat BID    fentaNYL  1 patch Transdermal Q72H    labetalol  300 mg Per G Tube BID    multivitamin liquid no.118  10 mL Per G Tube Daily    pantoprazole  40 mg Per NG tube BID    piperacillin-tazobactam (ZOSYN) IVPB  4.5 g Intravenous Q8H    polyethylene glycol  17 g Per NG tube Daily    potassium chloride 10%  40 mEq Per NG tube BID    psyllium husk (aspartame)  1 packet Per NG tube TID    QUEtiapine  50 mg Per NG tube QHS    sucralfate  1 g Per NG tube Q6H    vitamin D  1,000 Units Per G Tube Daily        Objective:     Vital Signs (Most Recent):  Temp: 99 °F (37.2 °C) (06/03/20 0900)  Pulse: (!) 113 (06/03/20 1600)  Resp: (!) 22 (06/03/20 1500)  BP: 120/65 (06/02/20 1900)  SpO2: 99 % (06/03/20 1600) Vital Signs (24h Range):  Temp:  [98.1 °F (36.7 °C)-99 °F (37.2 °C)] 99 °F (37.2 °C)  Pulse:  [] 113  Resp:  [] 22  SpO2:  [93 %-100 %] 99 %  BP: (120)/(65) 120/65  Arterial Line BP: ()/() 136/75     Weight: 59.8 kg (131 lb  13.4 oz)  Body mass index is 22.63 kg/m².    SpO2: 99 %  O2 Device (Oxygen Therapy): ventilator    Intake/Output - Last 3 Shifts       06/01 0700 - 06/02 0659 06/02 0700 - 06/03 0659 06/03 0700 - 06/04 0659    I.V. (mL/kg) 895.5 (15.4) 534 (8.9) 88.7 (1.5)    Blood  350     NG/GT 2880 2880 1005    IV Piggyback 200 250 100    Total Intake(mL/kg) 3975.5 (68.3) 4014 (67.1) 1193.7 (20)    Urine (mL/kg/hr) 4364 (3.1) 4175 (2.9) 1225 (2)    Stool  0     Blood 2  2    Total Output 4366 4175 1227    Net -390.6 -161 -33.4           Stool Occurrence  2 x           Lines/Drains/Airways     Peripherally Inserted Central Catheter Line            PICC Double Lumen 05/05/20 1707 right basilic 28 days          Central Venous Catheter Line                 ECMO Cannula 04/25/20 1120 right femoral vein 39 days         ECMO Cannula 04/25/20 1120 right internal jugular 39 days          Drain                 NG/OG Tube 05/03/20 1215 [place] sump;nasogastric 18 Fr. Left nostril 31 days         Urethral Catheter 05/28/20 1630 6 days          Airway                 Surgical Airway 05/05/20 1500 Shiley Cuffed 29 days          Arterial Line                 Arterial Line 04/27/20 1100 Right Brachial 37 days          Peripheral Intravenous Line                 Peripheral IV - Single Lumen 05/31/20 0220 20 G Right Wrist 3 days         Peripheral IV - Single Lumen 05/31/20 1205 20 G Left Other 3 days                Physical Exam    Constitutional: He is sedated, and intubated.   Head: Normocephalic   Cardiovascular: Tachycardia 100s   Pulmonary/Chest: intubated vent FiO2 50% PEEP 5, ECMO RPM 2600, Flow 2.7, sweep 4, oxygenator FiO2 40%  Skin: L neck and L groin cannulas intact, no flashing, clots pre and post-oxygenator, sutures intact and secure with 2 new sutures to upper (return) cannula.  Nursing note and vitals reviewed.    Significant Labs:  BMP:   Recent Labs   Lab 06/03/20  1400   *   *   K 4.2      CO2 30*   BUN 64*    CREATININE 1.0   CALCIUM 9.8   MG 2.9*     CBC:   Recent Labs   Lab 06/03/20  1400 06/03/20  1441   WBC 13.42*  --    RBC 3.51*  --    HGB 10.6*  --    HCT 33.4* 31*   *  --    MCV 95  --    MCH 30.2  --    MCHC 31.7*  --      LFTs:   Recent Labs   Lab 06/03/20  1400   ALT 45*   AST 33   ALKPHOS 174*   BILITOT 1.3*   PROT 7.2   ALBUMIN 3.6     Significant Diagnostics:  Cxr: stable    Ct Head Without Contrast 6/2/2020  -symmetrical hypoattenuation bilobed basal ganglia concerning for edema as well as poor gray-white matter differentiation cerebral hemispheres diffusely concerning for profound hypoxic anoxic encephalopathy  -no evidence for acute intracranial hemorrhage    Ct Chest Without Contrast 6/2/2020  -small left pneumothorax with no mediastinal shift and minimal loss of left lung volume  -no right pneumothorax, pneumomediastinum or pneumoperitoneum  -widespread largely homogeneous ground-glass disease throughout all lobes as well as tubular and varicoid bronchiectasis in this patient with history of COVID-19 pneumonia maintained on ECMO  -fine reticular and reticulonodular pattern is evident in the periphery of the lungs consistent with interstitial lung disease  -findings are not typical for pulmonary edema due to elevated pulmonary venous pressures and/or abnormal capillary permeability      Assessment/Plan:     Acute respiratory distress syndrome (ARDS) due to COVID-19 virus  - Crich switched to ETT on 4/11  - Monika weaned off 5/5  - Tracheostomy and bronch 5/5  - severe thrombocytopenia resolved; transfuse for under 30K  - Aspirin 81 daily  - Labetalol 200BID  - RPM at 2600; flows at 2.7  - Sweep at 4; CO2 goal is 59 or below; do not increase sweep for pCO2s 59 or below as long as patient is not acidotic, as these high pCO2s are respiratory compensation for alkalosis  - Oxygenator Fi at 40%  - CT head and chest imaging reviewed with profound basilar changes concerning for hypoxemic injury, neurology  following, unable to perform MRI at this time     Tongue laceration      -ENT evaluated, laceration superficial      -recs: bite block vs paralysis, will monitor    Sedation  - Valium, seroquel, PRN Fentanyl     UGI Bleed  - Scoped by GI 5/3 with epinephrine injection and clip placement for an actively bleeding D2/D3 Diuelafoy lesion    FEN/GI  - TF at 45, goal      - Lasix gtt 5  - FWB for hypernatremia 300mL q4     Anticoagulation      - Goal aXa 0.2-0.25 Q6hrs      - Hep gtt 700      - monitor LDH    Prophylaxis      - protonix q12 IV       - leukocytosis stable      - PICC placed 5/5; central line removed 5/6    Anne Miller MD  Cardiothoracic Surgery  [facility]      VV ECMO circuit checked and all parameters reviewed. Anticoagulation was managed and all cannulation exit sites along with review of labs and radiology studies performed. Speed and functioning of the pump along with oxygenator quality reviewed.  Patient continues to be stable on  tube feeds at 45 cc an hour.   Significant amount of time was spent in coordinating care  between different teams which consisted of the surgical ICU to, perfusion Staff and the nursing staff.  All questions and concerns all team members were addressed.

## 2020-06-03 NOTE — PROGRESS NOTES
[facility] Specialists shift report    Date: 06/03/2020  ECMO Specialist:  Iesha Bedoya    Pump parameters:  RPM: 2600  Flow:  2.6  Sweep:  4.5  FiO2:  100    Oxygenator status:  Clots: pre and post  Fibrin: pre and post     Pressure trends:  P1: 96  P2: 66  Delta P: 30    Volume status:  Chugging noted some  CVP:   MAP:  80-90  MD notified (name):      Anticoagulation:  ACT/aPTT/Xa parameters: Xa 0.20- 0.25  ACT/aPTT/Xa trends this shift: 0.24, 0.26    Cannula size / status / placement:  Arterial:   Venous 1: RIJV 23 Fr @ 4 cm  Venous 2: RFV 27 Fr @ 12 cm   Dual lumen:      Additional Comments:     Pt maintained on VV ECMO. Sweep increased this shift for ABG.

## 2020-06-03 NOTE — SUBJECTIVE & OBJECTIVE
Interval History/Significant Events:   No acute events. Decreasing sedation, neuro status unchanged.  WBC stable 13.7 -> 13.5  Pump parameters: RPM 2600, Sweep 4, FiO2 40%  Vent settings: A/C PEEP 5, FiO2 50%, insp 0.9 s, rate 12.        Follow-up For: Procedure(s) (LRB):  CREATION, TRACHEOSTOMY  (N/A)    Post-Operative Day: 28 Days Post-Op    Objective:     Vital Signs (Most Recent):  Temp: 98.1 °F (36.7 °C) (06/03/20 0300)  Pulse: (!) 113 (06/03/20 0630)  Resp: (!) 35 (06/03/20 0630)  BP: 120/65 (06/02/20 1900)  SpO2: 97 % (06/03/20 0630) Vital Signs (24h Range):  Temp:  [98.1 °F (36.7 °C)-98.9 °F (37.2 °C)] 98.1 °F (36.7 °C)  Pulse:  [] 113  Resp:  [] 35  SpO2:  [92 %-100 %] 97 %  BP: ()/(52-65) 120/65  Arterial Line BP: ()/() 118/65     Weight: 59.8 kg (131 lb 13.4 oz)  Body mass index is 22.63 kg/m².      Intake/Output Summary (Last 24 hours) at 6/3/2020 0638  Last data filed at 6/3/2020 0600  Gross per 24 hour   Intake 4014 ml   Output 4175 ml   Net -161 ml       Physical Exam   Constitutional: He appears well-developed and well-nourished.   HENT:   Head: Normocephalic and atraumatic.   NGT with tube feeds   Eyes: No scleral icterus.   Opens eyes and blinks spontaneously   Neck:   trachostomy in place  See settings below   Cardiovascular:   Intermittently tachycardic   Genitourinary:   Genitourinary Comments: Torres in place   Neurological:   Sedated. Withdraws lower extremities to pain, does not withdraw upper extremities. Corneal, cough, and gag reflex intact.    Skin: Skin is warm and dry. He is not diaphoretic.   Nursing note and vitals reviewed.      Vents:  Vent Mode: A/C (06/03/20 0141)  Ventilator Initiated: Yes (04/10/20 2133)  Set Rate: 12 BPM (06/03/20 0141)  Vt Set: 200 mL (05/18/20 1244)  PEEP/CPAP: 5 cmH20 (06/03/20 0141)  Oxygen Concentration (%): 50 (06/03/20 0630)  Peak Airway Pressure: 31 cmH2O (06/03/20 0141)  Plateau Pressure: 0 cmH20 (06/03/20 0141)  Total Ve:  10.2 mL (06/03/20 0141)  F/VT Ratio<105 (RSBI): (!) 96.59 (06/03/20 0141)    Lines/Drains/Airways     Peripherally Inserted Central Catheter Line            PICC Double Lumen 05/05/20 1707 right basilic 28 days          Central Venous Catheter Line                 ECMO Cannula 04/25/20 1120 right femoral vein 38 days         ECMO Cannula 04/25/20 1120 right internal jugular 38 days          Drain                 NG/OG Tube 05/03/20 1215 [place] sump;nasogastric 18 Fr. Left nostril 30 days         Urethral Catheter 05/28/20 1630 5 days          Airway                 Surgical Airway 05/05/20 1500 Shiley Cuffed 28 days          Arterial Line                 Arterial Line 04/27/20 1100 Right Brachial 36 days          Peripheral Intravenous Line                 Peripheral IV - Single Lumen 05/31/20 0220 20 G Right Wrist 3 days         Peripheral IV - Single Lumen 05/31/20 1205 20 G Left Other 2 days                Significant Labs:    CBC/Anemia Profile:  Recent Labs   Lab 06/02/20  0158  06/02/20 1200 06/02/20 2000 06/03/20 0200 06/03/20  0202   WBC 13.72*   < > 12.67  --  12.25 13.50*  --    HGB 9.0*   < > 9.1*  --  8.3* 10.6*  --    HCT 28.6*   < > 28.9*   < > 26.7* 32.7* 31*   *   < > 102*  --  87* 102*  --    MCV 96   < > 97  --  97 95  --    RDW 14.3   < > 14.2  --  14.1 14.0  --    FERRITIN 1,408*  --   --   --   --  1,553*  --     < > = values in this interval not displayed.        Chemistries:  Recent Labs   Lab 06/02/20 1200 06/02/20 2000 06/03/20 0200   * 147* 147*   K 4.2 2.6* 4.7    113* 108   CO2 29 23 26   BUN 68* 53* 61*   CREATININE 1.1 0.8 1.0   CALCIUM 9.6 7.6* 9.6   ALBUMIN 3.9 3.1* 3.7   PROT 6.9 5.6* 7.1   BILITOT 1.3* 1.0 1.4*   ALKPHOS 156* 133 164*   ALT 41 36 42   AST 35 28 34   MG 2.5 1.9 3.2*   PHOS 4.0 3.0 2.7       CMP:   Recent Labs   Lab 06/02/20  1200 06/02/20 2000 06/03/20  0200   * 147* 147*   K 4.2 2.6* 4.7    113* 108   CO2 29 23 26   *  196* 219*   BUN 68* 53* 61*   CREATININE 1.1 0.8 1.0   CALCIUM 9.6 7.6* 9.6   PROT 6.9 5.6* 7.1   ALBUMIN 3.9 3.1* 3.7   BILITOT 1.3* 1.0 1.4*   ALKPHOS 156* 133 164*   AST 35 28 34   ALT 41 36 42   ANIONGAP 12 11 13   EGFRNONAA >60.0 >60.0 >60.0     Coagulation:   Recent Labs   Lab 06/03/20  0200   INR 1.1   APTT 30.5     Lactic Acid: No results for input(s): LACTATE in the last 48 hours.  Respiratory Culture: No results for input(s): GSRESP, RESPIRATORYC in the last 48 hours.  Troponin: No results for input(s): TROPONINI in the last 48 hours.  All pertinent labs within the past 24 hours have been reviewed.    Significant Imaging:  I have reviewed and interpreted all pertinent imaging results/findings within the past 24 hours.     CXR 6/2/2020  Imaging and read pending. Yesterday did have some increased bilateral infiltrates bilaterally. Will see if any improvement today

## 2020-06-04 NOTE — PROGRESS NOTES
[facility]ists shift report    Date: 06/04/2020  ECMO Specialist:  Lia Alvarez    Pump parameters:  RPM: 2600  Flow:  2.9 lpm  Sweep:  5  FiO2:  60    Oxygenator status:  Clots: Yes; pre & post oxy;  & at connectors  Fibrin: some throughout    Pressure trends:  P1: 90's  P2: 60's  Delta P: 30    Volume status:  Chugging noted - yes off  & on  MAP: 80's-100's  MD notified (name):  Dr. Ayon    Anticoagulation:  ACT/aPTT/Xa parameters: Xa 0.25-0.3  ACT/aPTT/Xa trends this shift: Xa 0.2    Cannula size / status / placement:  Arterial:   Venous 1: 23FR / RIJV / @4cm  Venous 2: 27FR / RFV / @12 cm  Dual lumen: no     Additional Comments:        Maintained good flows on VV ECMO at 2600 rpm's.  SvO2 dropped several times today when pt especially agitated.  Sx produces only scant amts; BS revealed insp & exp rhonchi; pt coughs quite often which contributes to lowering SvO2's.  Pt received one unit PRBC's today.

## 2020-06-04 NOTE — PLAN OF CARE
Discussed patient's case with primary team.  We have previous notes from the Neurology service as well.  Patient is on ECMO currently, is unrealistic to expect him to be able to get off of ECMO within the next few weeks.  I suspect MRI Brain w/o contrast would also show some poor prognostic signs but we do have other data to be able to give some idea of prognosis.    2020 CT head w/o contrast:  Reviewed by myself with attending.  Notable for hypodense bilateral basal ganglia.  It does also appear that he has poor gray-white differentiation throughout which is consistent with a diffuse edema that is often seen with hypoxic and anoxic injury and is associated with poor prognosis.    2020 EE hr and 26 min--predominantly 4-6 Hz theta with delta frequencies.  No normal sleep architecture.    Slowed frequency throughout a prolonged study with absence of normal sleep architecture is also a poor prognostic sign.    Primary team reporting exam w/ withdrawal to noxious stimuli in BLE, some posturing in BUE that appears consistent with decorticate rigidity.  --------  Under the best medical circumstances, it is not clear that this patient will have a positive outcome from a neurologic standpoint.  Would advise having goals of care discussions with the family regarding poor neurologic prognosis.  Depending on what they know about his wishes, I believe he would be appropriate for Palliative Care consultation.  Brainstem reflexes are intact so he is not brain dead, but it is highly unlikely that he will be independent in any ADLs or regain significant motor or cognitive function given current information.  All information we have currently points toward a very poor prognosis.    Nissa Valladares MD  PGY4, Neurology  Pager:  [phone]  Spectralink:  [phone]

## 2020-06-04 NOTE — ASSESSMENT & PLAN NOTE
Ranjana Sanchez is a 55 y.o. 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  This was switched to ETT on 4/11.  Cannulated on 4/25.    Neuro:  Precedex .1  5 of valium  100 BID of seroquel  Fentanyl patch  Encephalopathy with poor neuro exam  - CT on June 2 showing bilateral basal ganglia infarcts, which explains the neuro exam findings. His infarcts are sizeable, and will cause permanent deficits, the extent of which is unknown. The likelihood of meaningful recovery is low. Neuro consulted to prognosticate, recommending MRI.       Pulm  AC/VC+ on the vent.  Small pneumo on cxr, subclinical.  Sweep of 4 today, 2600 RPM    Will need some more time on ECMO, not pulling enough volume on the vent    Cardiac  Cardene gtt as needed, currently off    GI  TFs at goal    Renal  Making urine, diuresing agressively  Lasix gtt at 2.5    Heme  Hep gtt at 600, goal 0.25-0.3 to prevent another GI bleed  Has been at goal at this rate    ID  WBC stable 12.7 on Zosyn      Dispo: Continued ICU care for oxygenation/ventilation requirements

## 2020-06-04 NOTE — PLAN OF CARE
Plan is to discharge to LTAC when medically ready.       06/04/20 1614   Discharge Reassessment   Assessment Type Discharge Planning Reassessment   Discharge Plan A Long-term acute care facility (LTAC)   Discharge Plan B Long-term acute care facility (LTAC)   Anticipated Discharge Disposition LTAC

## 2020-06-04 NOTE — PROGRESS NOTES
EC[facility]ists shift report    Date: 06/04/2020  ECMO Specialist:  Cyrus Stefany    Pump parameters:  RPM:                          2600  Flow:                                2.8  Sweep:                            4.5  FiO2:                             40    Oxygenator status:  Clots:                             Pre + Post Oxygenator  Fibrin:                            None    Pressure trends:  P1:                                94  P2:                                67  Delta P:                        27    Volume status:  Chugging noted?         Some chugging observed with patient agitation  CVP:                            Not monitored  MAP:                            77  MD notified (name):     Dr. Ayon    Anticoagulation:  ACT/aPTT/Xa parameters:       0.2  -  0.25  ACT/aPTT/Xa trends this shift: 0.29    Cannula size / status / placement:  Arterial:   Venous 1:    23 Italian @ RIJV @ 4cm from insertion point to coils  Venous 2:    27 Italian @ RFV  @ 12cm from insertion point to coils  Dual lumen:      Additional Comments:    Patient has been agitated and causing the puimp's SvO2 to desaturate below 60%.  This event causes the pump to alarm.  The patient has been  Stable except for his agitated states.

## 2020-06-04 NOTE — PROGRESS NOTES
"[facility] - Respiratory ICU  Adult Nutrition  Progress Note    SUMMARY       Recommendations    Recommendation:   1. Continue TF of Peptamen Intense VHP @ goal rate of 45 mL/hr at this time.    - Providing pt with 1080 kcal, 99 g protein and 907 mL free water. Additional water per MD.  2. RD to monitor and follow up     Goals: Meet % EEN, EPN by RD f/u date  Nutrition Goal Status: progressing towards goal, goal met  Communication of RD Recs: (POC)    Reason for Assessment    Reason For Assessment: RD follow-up  Diagnosis: (COVID-19)  Relevant Medical History: HTN  Interdisciplinary Rounds: did not attend  General Information Comments: Pt continues intubated on vent. TF remains at goal rate of 45 mL/hr, pt tolerating well. No reported N/V/C/D or residuals noted. Wt stable over past week, pt with ~10 lbs wt loss since admit, possibly fluid related. Wounds noted. Pt with no indicaitons of malnutrition at this time. NFPE not performed, patient has been screened for possible COVID-19 and has been placed on airborne and contact precautions. Patient is noted as being positive for COVID-19.  Nutrition Discharge Planning: adequate intake via TFs    Nutrition Risk Screen    Nutrition Risk Screen: tube feeding or parenteral nutrition    Nutrition/Diet History    Food Allergies: NKFA  Factors Affecting Nutritional Intake: NPO, on mechanical ventilation    Anthropometrics    Temp: 99 °F (37.2 °C)  Height Method: Stated  Height: 5' 4" (162.6 cm)  Height (inches): 64 in  Weight Method: Bed Scale  Weight: 59 kg (130 lb 1.1 oz)  Weight (lb): 130.07 lb  Ideal Body Weight (IBW), Male: 130 lb  % Ideal Body Weight, Male (lb): 114.62 %  BMI (Calculated): 22.3  BMI Grade: 18.5-24.9 - normal  Usual Body Weight (UBW), kg: (JAMMIE)       Lab/Procedures/Meds    Pertinent Labs Reviewed: reviewed  Pertinent Labs Comments: Na 149; BUN 67; Glucose 220  Pertinent Medications Reviewed: reviewed  Pertinent Medications Comments: " Vitamin D; MVI; aspirin; pantoprazole; psyllium husk     Estimated/Assessed Needs    Weight Used For Calorie Calculations: 59 kg (130 lb 1.1 oz)  Energy Calorie Requirements (kcal): 1632 kcal/d  Energy Need Method: [facility]  Protein Requirements: 76-95 g/day(1.2-1.5 g/kg)  Weight Used For Protein Calculations: 63.5 kg (139 lb 15.9 oz)(dosing weight)  Fluid Requirements (mL): 1 mL/kcal or per MD  Estimated Fluid Requirement Method: RDA Method  RDA Method (mL): 1632    Nutrition Prescription Ordered    Current Diet Order: NPO  Current Nutrition Support Formula Ordered: Peptamen Intense VHP  Current Nutrition Support Rate Ordered: 45 (ml)  Current Nutrition Support Frequency Ordered: mL/hr    Evaluation of Received Nutrient/Fluid Intake    Enteral Calories (kcal): 1080  Enteral Protein (gm): 99  Enteral (Free Water) Fluid (mL): 907  % Kcal Needs: 66  % Protein Needs: 100  I/O: -2.3 L since admit   Energy Calories Required: not meeting needs  Protein Required: meeting needs  Fluid Required: (per MD)  Comments: LBM 6/3  Tolerance: tolerating  % Intake of Estimated Energy Needs: 75 - 100 %  % Meal Intake: NPO    Nutrition Risk    Level of Risk/Frequency of Follow-up: (f/u 1 x wk)     Assessment and Plan     Nutrition Problem  Inadequate energy intake     Related to (etiology):   Inability to consume sufficient energy      Signs and Symptoms (as evidenced by):   NPO     Interventions (treatment strategy):  Collaboration of nutrition care w/ other providers   Enteral Nutrition      Nutrition Diagnosis Status:   Resolving    Monitor and Evaluation    Food and Nutrient Intake: energy intake, food and beverage intake, enteral nutrition intake  Food and Nutrient Adminstration: diet order, enteral and parenteral nutrition administration  Physical Activity and Function: nutrition-related ADLs and IADLs  Anthropometric Measurements: weight change, weight  Biochemical Data, Medical Tests and Procedures: inflammatory profile,  lipid profile, glucose/endocrine profile, gastrointestinal profile, electrolyte and renal panel  Nutrition-Focused Physical Findings: overall appearance     Nutrition Follow-Up    RD Follow-up?: Yes

## 2020-06-04 NOTE — PT/OT/SLP PROGRESS
Occupational Therapy      Patient Name:  Ranjana Sanchez   MRN:  [id number]    Patient not seen today secondary to Other (Comment)(Pt on echmo prior to OT entry. Had thorough conversation with RN who has been working with pt. Pt not appropriate for therapy at this time as he unable to follow commands, agitated, and nonverbal. PRAFO boots ordered and on pt..). RN explaining they have been repositioning pt and providing gentle ROM to UEs/LEs as appropriate. OT to d/c at this time. Please re-consult if appropriate.     Cecile Maloney, OT  6/4/2020

## 2020-06-04 NOTE — PLAN OF CARE
Pt tachypneic- RR in the 50s/ hypertensive/ tachycardic over night requiring PRN fentanyl. SVo2 57-64 %.   Decreased heparin gtt for 0.29 Xa.    Afebrile. HR 90s-120s. MAP 70-90.  Unresponsive. Pt opens eyes spontaneously. Does not follow commands or track. Does not withdraw to pain. Arms postured in towards body.     Vent settings: 50%/6 peep/ rate 12    VV ECMO: 2600 RPM  Flows 2.7-2.8  Fio2 50%  Sweep 5    Gtts:  Precedex at 0.1 mcg/kg/hr  Lasix at 2.5 mg/hr  Heparin at 600 units/hr (Xa 0.23 this AM)    Torres in place with 100-125 cc UOP/HR    1 BM    Potassium replaced.  MD aware of Hgb 9.7 this AM.    Foams to heels, pressure points protected.

## 2020-06-04 NOTE — SUBJECTIVE & OBJECTIVE
Interval History/Significant Events:   NAEON. UOP 2.7L, net negative 160mL in 24hrs.Precedex .1, lasix 2.5mg/hr, heparin 600. Anti xa .23        Follow-up For: Procedure(s) (LRB):  CREATION, TRACHEOSTOMY  (N/A)    Post-Operative Day: 28 Days Post-Op    Objective:     Vital Signs (Most Recent):  Temp: 98.4 °F (36.9 °C) (06/04/20 0300)  Pulse: (!) 121 (06/04/20 0600)  Resp: (!) 28 (06/04/20 0600)  BP: (!) 165/76 (06/04/20 0600)  SpO2: 100 % (06/04/20 0600) Vital Signs (24h Range):  Temp:  [98.4 °F (36.9 °C)-99.4 °F (37.4 °C)] 98.4 °F (36.9 °C)  Pulse:  [] 121  Resp:  [17-71] 28  SpO2:  [96 %-100 %] 100 %  BP: (107-165)/(59-89) 165/76  Arterial Line BP: ()/() 104/60     Weight: 59 kg (130 lb 1.1 oz)  Body mass index is 22.33 kg/m².      Intake/Output Summary (Last 24 hours) at 6/4/2020 0615  Last data filed at 6/4/2020 0600  Gross per 24 hour   Intake 2617.2 ml   Output 2773 ml   Net -155.8 ml       Physical Exam   Constitutional: He appears well-developed and well-nourished.   HENT:   Head: Normocephalic and atraumatic.   NGT with tube feeds   Eyes: No scleral icterus.   Opens eyes and blinks spontaneously   Neck:   trachostomy in place  See settings below   Cardiovascular:   Intermittently tachycardic   Genitourinary:   Genitourinary Comments: Torres in place   Neurological:   Sedated. Withdraws lower extremities to pain, does not withdraw upper extremities. Corneal, cough, and gag reflex intact.    Skin: Skin is warm and dry. He is not diaphoretic.   Nursing note and vitals reviewed.      Vents:  Vent Mode: A/C (06/04/20 0123)  Ventilator Initiated: Yes (04/10/20 2247)  Set Rate: 12 BPM (06/04/20 0123)  Vt Set: 200 mL (05/18/20 1244)  PEEP/CPAP: 6 cmH20 (06/04/20 0123)  Oxygen Concentration (%): 50 (06/04/20 0600)  Peak Airway Pressure: 35 cmH2O (06/04/20 0123)  Plateau Pressure: 19 cmH20 (06/04/20 0123)  Total Ve: 9.98 mL (06/04/20 0123)  F/VT Ratio<105 (RSBI): 109.93 (06/04/20  0123)    Lines/Drains/Airways     Peripherally Inserted Central Catheter Line            PICC Double Lumen 05/05/20 1707 right basilic 29 days          Central Venous Catheter Line                 ECMO Cannula 04/25/20 1120 right femoral vein 39 days         ECMO Cannula 04/25/20 1120 right internal jugular 39 days          Drain                 NG/OG Tube 05/03/20 1215 [place] preetp;nasogastric 18 Fr. Left nostril 31 days         Urethral Catheter 05/28/20 1630 6 days          Airway                 Surgical Airway 05/05/20 1500 Shiley Cuffed 29 days          Arterial Line                 Arterial Line 04/27/20 1100 Right Brachial 37 days          Peripheral Intravenous Line                 Peripheral IV - Single Lumen 05/31/20 0220 20 G Right Wrist 4 days         Peripheral IV - Single Lumen 05/31/20 1205 20 G Left Other 3 days                Significant Labs:    CBC/Anemia Profile:  Recent Labs   Lab 06/03/20 0200 06/03/20 1400 06/03/20 2000 06/03/20 2014 06/04/20  0214 06/04/20  0400   WBC 13.50*   < > 13.42*  --  13.74*  --   --  12.17   HGB 10.6*   < > 10.6*  --  10.4*  --   --  9.7*   HCT 32.7*   < > 33.4*   < > 32.5* 30* 30* 31.0*   *   < > 103*  --  102*  --   --  101*   MCV 95   < > 95  --  95  --   --  96   RDW 14.0   < > 14.0  --  14.1  --   --  14.1   FERRITIN 1,553*  --   --   --   --   --   --  1,540*    < > = values in this interval not displayed.        Chemistries:  Recent Labs   Lab 06/03/20 1400 06/03/20 2000 06/04/20  0400   * 149* 148*   K 4.2 4.0 3.5    108 108   CO2 30* 28 27   BUN 64* 67* 68*   CREATININE 1.0 0.9 0.9   CALCIUM 9.8 9.9 9.5   ALBUMIN 3.6 3.7 3.4*   PROT 7.2 7.2 6.9   BILITOT 1.3* 1.4* 1.2*   ALKPHOS 174* 170* 149*   ALT 45* 43 41   AST 33 33 30   MG 2.9* 2.6 2.6   PHOS 3.4 3.9 3.7       CMP:   Recent Labs   Lab 06/03/20  1400 06/03/20 2000 06/04/20  0400   * 149* 148*   K 4.2 4.0 3.5    108 108   CO2 30* 28 27   * 199* 214*   BUN 64*  67* 68*   CREATININE 1.0 0.9 0.9   CALCIUM 9.8 9.9 9.5   PROT 7.2 7.2 6.9   ALBUMIN 3.6 3.7 3.4*   BILITOT 1.3* 1.4* 1.2*   ALKPHOS 174* 170* 149*   AST 33 33 30   ALT 45* 43 41   ANIONGAP 11 13 13   EGFRNONAA >60.0 >60.0 >60.0     Coagulation:   Recent Labs   Lab 06/04/20  0400   INR 1.1   APTT 29.8     Lactic Acid: No results for input(s): LACTATE in the last 48 hours.  Respiratory Culture: No results for input(s): GSRESP, RESPIRATORYC in the last 48 hours.  Troponin: No results for input(s): TROPONINI in the last 48 hours.  All pertinent labs within the past 24 hours have been reviewed.    Significant Imaging:  I have reviewed and interpreted all pertinent imaging results/findings within the past 24 hours.

## 2020-06-04 NOTE — CARE UPDATE
Received call from [facility] Dr. Ray and Dr. Leon. They have seen and reviewed his current medical records and were concerned about his prognosis given the evidence of stroke on CT.   Explained that neurology has already seen him but that giving a possible prognosis would require MRI which we are unable to do.   They understand and would like their medical director Dr. Briceño to speak with Dr. Ayon and Dr. Alston regarding his care. He may be reached at [phone] or [phone] and is available from 1-4 pm tomorrow afternoon.    Urszula Arguello MD  General Surgery, PGY-1  [phone]

## 2020-06-04 NOTE — PROGRESS NOTES
[facility] - Respiratory ICU  Cardiothoracic Surgery  Daily ECMO Progress Note    Patient Name: Ranjana Sanchez  MRN: [id number]  Admission Date: 4/10/2020  Hospital Length of Stay: 55 days  Code Status: Full Code   Attending Physician: Francis Ayon MD   Referring Provider: Francis Ayon MD  Principal Problem:Acute respiratory disease due to COVID-19 virus    Subjective:   Interval History:  No events overnight. UOP 2.9L, net negative 250mL in 24hrs. Hep gtt ongoing at 600, aXa 0.20, Hb 9.6 from 10.6 yest.    Medications:  Continuous Infusions:   dexmedetomidine (PRECEDEX) infusion 0.1 mcg/kg/hr (06/04/20 0903)    furosemide (LASIX) 2 mg/mL continuous infusion (non-titrating) 2.5 mg/hr (06/04/20 0903)    heparin (porcine) in 5 % dex 600 Units/hr (06/04/20 0903)    nicardipine Stopped (06/01/20 2200)    norepinephrine bitartrate-D5W Stopped (06/02/20 1500)     Scheduled Meds:   aspirin  81 mg Per NG tube Daily    chlorhexidine  15 mL Mouth/Throat BID    fentaNYL  1 patch Transdermal Q72H    labetalol  300 mg Per G Tube BID    multivitamin liquid no.118  10 mL Per G Tube Daily    pantoprazole  40 mg Per NG tube BID    piperacillin-tazobactam (ZOSYN) IVPB  4.5 g Intravenous Q8H    polyethylene glycol  17 g Per NG tube Daily    potassium chloride 10%  40 mEq Per NG tube BID    psyllium husk (aspartame)  1 packet Per NG tube TID    QUEtiapine  50 mg Per NG tube QHS    sucralfate  1 g Per NG tube Q6H    vitamin D  1,000 Units Per G Tube Daily        Objective:     Vital Signs (Most Recent):  Temp: 99 °F (37.2 °C) (06/04/20 0700)  Pulse: (!) 116 (06/04/20 0900)  Resp: (!) 30 (06/04/20 0900)  BP: (!) 165/76 (06/04/20 0600)  SpO2: 100 % (06/04/20 0900) Vital Signs (24h Range):  Temp:  [98.4 °F (36.9 °C)-99.4 °F (37.4 °C)] 99 °F (37.2 °C)  Pulse:  [] 116  Resp:  [17-71] 30  SpO2:  [96 %-100 %] 100 %  BP: (107-165)/(59-89) 165/76  Arterial Line BP: ()/() 118/67     Weight:  59 kg (130 lb 1.1 oz)  Body mass index is 22.33 kg/m².    SpO2: 100 %  O2 Device (Oxygen Therapy): ventilator    Intake/Output - Last 3 Shifts       06/02 0700 - 06/03 0659 06/03 0700 - 06/04 0659 06/04 0700 - 06/05 0659    I.V. (mL/kg) 534 (8.9) 437.2 (7.4) 26.6 (0.5)    Blood 350      NG/GT 2880 1980 435    IV Piggyback 250 200 100    Total Intake(mL/kg) 4014 (67.1) 2617.2 (44.4) 561.6 (9.5)    Urine (mL/kg/hr) 4175 (2.9) 2890 (2) 425 (2.6)    Stool 0 0     Blood  8 3    Total Output 4175 2898 428    Net -161 -280.8 +133.6           Stool Occurrence 2 x 1 x           Lines/Drains/Airways     Peripherally Inserted Central Catheter Line            PICC Double Lumen 05/05/20 1707 right basilic 29 days          Central Venous Catheter Line                 ECMO Cannula 04/25/20 1120 right femoral vein 39 days         ECMO Cannula 04/25/20 1120 right internal jugular 39 days          Drain                 NG/OG Tube 05/03/20 1215 [place] sump;nasogastric 18 Fr. Left nostril 31 days         Urethral Catheter 05/28/20 1630 6 days          Airway                 Surgical Airway 05/05/20 1500 Shiley Cuffed 29 days          Arterial Line                 Arterial Line 04/27/20 1100 Right Brachial 37 days          Peripheral Intravenous Line                 Peripheral IV - Single Lumen 05/31/20 0220 20 G Right Wrist 4 days         Peripheral IV - Single Lumen 05/31/20 1205 20 G Left Other 3 days                Physical Exam    Constitutional: He is sedated, and intubated.   Head: Normocephalic   Cardiovascular: Tachycardia 100s   Pulmonary/Chest: intubated vent FiO2 50% PEEP 6, ECMO RPM 2600, Flow 2.8, sweep 4.5, oxygenator FiO2 40%  Skin: L neck and L groin cannulas intact, no flashing, clots pre and post-oxygenator, sutures intact and secure with 2 new sutures to upper (return) cannula.  Nursing note and vitals reviewed.    Significant Labs:  BMP:   Recent Labs   Lab 06/04/20  0800   *   *   K 4.4      CO2  28   BUN 67*   CREATININE 0.9   CALCIUM 9.5   MG 2.6     CBC:   Recent Labs   Lab 06/04/20  0800 06/04/20  0815   WBC 12.59  --    RBC 3.18*  --    HGB 9.7*  --    HCT 30.8* 28*   *  --    MCV 97  --    MCH 30.5  --    MCHC 31.5*  --      LFTs:   Recent Labs   Lab 06/04/20  0800   ALT 39   AST 28   ALKPHOS 154*   BILITOT 1.2*   PROT 6.9   ALBUMIN 3.4*     Significant Diagnostics:  Cxr: stable    Ct Head Without Contrast 6/2/2020  -symmetrical hypoattenuation bilobed basal ganglia concerning for edema as well as poor gray-white matter differentiation cerebral hemispheres diffusely concerning for profound hypoxic anoxic encephalopathy  -no evidence for acute intracranial hemorrhage    Ct Chest Without Contrast 6/2/2020  -small left pneumothorax with no mediastinal shift and minimal loss of left lung volume  -no right pneumothorax, pneumomediastinum or pneumoperitoneum  -widespread largely homogeneous ground-glass disease throughout all lobes as well as tubular and varicoid bronchiectasis in this patient with history of COVID-19 pneumonia maintained on ECMO  -fine reticular and reticulonodular pattern is evident in the periphery of the lungs consistent with interstitial lung disease  -findings are not typical for pulmonary edema due to elevated pulmonary venous pressures and/or abnormal capillary permeability      Assessment/Plan:     Acute respiratory distress syndrome (ARDS) due to COVID-19 virus  - Crich switched to ETT on 4/11  - Monika weaned off 5/5  - Tracheostomy and bronch 5/5  - severe thrombocytopenia resolved; transfuse for under 30K  - Aspirin 81 daily  - Labetalol 300BID  - RPM at 2600; flows at 2.7  - Sweep at 4.5; CO2 goal is 59 or below; do not increase sweep for pCO2s 59 or below as long as patient is not acidotic, as these high pCO2s are respiratory compensation for alkalosis  - Oxygenator Fi at 40%  - CT head and chest imaging reviewed with profound basilar changes concerning for hypoxemic  injury, neurology following, unable to perform MRI at this time     Tongue laceration      -ENT evaluated, laceration superficial      -recs: bite block vs paralysis, will monitor    Sedation  - Valium, seroquel, PRN Fentanyl     UGI Bleed  - Scoped by GI 5/3 with epinephrine injection and clip placement for an actively bleeding D2/D3 Diuelafoy lesion    FEN/GI  - TF at 45, goal      - Lasix gtt 2.5  - FWB for hypernatremia 300mL q4     Anticoagulation      - Goal aXa 0.2-0.25 Q6hrs      - Hep gtt 600      - monitor LDH    Prophylaxis      - protonix q12 IV       - leukocytosis stable      - stopping Zosyn today      - PICC placed 5/5; central line removed 5/6    Anne Miller MD   Cardiothoracic Surgery    VV ECMO circuit checked and all parameters reviewed. Anticoagulation was managed and all cannulation exit sites along with review of labs and radiology studies performed. Speed and functioning of the pump along with oxygenator quality reviewed.  It appears that the patient is not decorticating.  It appears that the patient is having contractures.  Extensive physical therapy and occupational therapy was carried out by bedside by myself.  This lasted for about 45 minutes.  Patient is responsive to his knee with spontaneous opening of eyes.  It appears that the patient is tracking.  Patient was able to move his upper and lower extremities with significant difficulty.  Bedside nurses and perfusion team were Witness to all these changes.  At this time we would continue to provide wound care.  We would be ordering a repeat COVID test on this patient.  And would be stopping Zosyn today.  Extensive discussion was carried out between different team members to achieve care in this patient.  I personally talked to Dr. Bahena about this patient.

## 2020-06-04 NOTE — NURSING
Patient remains stable.  One unit PRBCs administered.  UOP remains 100-150 mL/hr.   Infusions remains unchanged.   Family updated via telephone.   Will continue to monitor patient progress.   Please see flowsheet data for full assessment details.

## 2020-06-04 NOTE — SUBJECTIVE & OBJECTIVE
Interval History:  No events overnight. UOP 2.9L, net negative 250mL in 24hrs. Hep gtt ongoing at 600, aXa 0.20, Hb 9.6 from 10.6 yest.    Medications:  Continuous Infusions:   dexmedetomidine (PRECEDEX) infusion 0.1 mcg/kg/hr (06/04/20 0903)    furosemide (LASIX) 2 mg/mL continuous infusion (non-titrating) 2.5 mg/hr (06/04/20 0903)    heparin (porcine) in 5 % dex 600 Units/hr (06/04/20 0903)    nicardipine Stopped (06/01/20 2200)    norepinephrine bitartrate-D5W Stopped (06/02/20 1500)     Scheduled Meds:   aspirin  81 mg Per NG tube Daily    chlorhexidine  15 mL Mouth/Throat BID    fentaNYL  1 patch Transdermal Q72H    labetalol  300 mg Per G Tube BID    multivitamin liquid no.118  10 mL Per G Tube Daily    pantoprazole  40 mg Per NG tube BID    piperacillin-tazobactam (ZOSYN) IVPB  4.5 g Intravenous Q8H    polyethylene glycol  17 g Per NG tube Daily    potassium chloride 10%  40 mEq Per NG tube BID    psyllium husk (aspartame)  1 packet Per NG tube TID    QUEtiapine  50 mg Per NG tube QHS    sucralfate  1 g Per NG tube Q6H    vitamin D  1,000 Units Per G Tube Daily        Objective:     Vital Signs (Most Recent):  Temp: 99 °F (37.2 °C) (06/04/20 0700)  Pulse: (!) 116 (06/04/20 0900)  Resp: (!) 30 (06/04/20 0900)  BP: (!) 165/76 (06/04/20 0600)  SpO2: 100 % (06/04/20 0900) Vital Signs (24h Range):  Temp:  [98.4 °F (36.9 °C)-99.4 °F (37.4 °C)] 99 °F (37.2 °C)  Pulse:  [] 116  Resp:  [17-71] 30  SpO2:  [96 %-100 %] 100 %  BP: (107-165)/(59-89) 165/76  Arterial Line BP: ()/() 118/67     Weight: 59 kg (130 lb 1.1 oz)  Body mass index is 22.33 kg/m².    SpO2: 100 %  O2 Device (Oxygen Therapy): ventilator    Intake/Output - Last 3 Shifts       06/02 0700 - 06/03 0659 06/03 0700 - 06/04 0659 06/04 0700 - 06/05 0659    I.V. (mL/kg) 534 (8.9) 437.2 (7.4) 26.6 (0.5)    Blood 350      NG/GT 2880 1980 435    IV Piggyback 250 200 100    Total Intake(mL/kg) 4014 (67.1) 2617.2 (44.4) 561.6  (9.5)    Urine (mL/kg/hr) 4175 (2.9) 2890 (2) 425 (2.6)    Stool 0 0     Blood  8 3    Total Output 4175 2898 428    Net -161 -280.8 +133.6           Stool Occurrence 2 x 1 x           Lines/Drains/Airways     Peripherally Inserted Central Catheter Line            PICC Double Lumen 05/05/20 1707 right basilic 29 days          Central Venous Catheter Line                 ECMO Cannula 04/25/20 1120 right femoral vein 39 days         ECMO Cannula 04/25/20 1120 right internal jugular 39 days          Drain                 NG/OG Tube 05/03/20 1215 [place] sump;nasogastric 18 Fr. Left nostril 31 days         Urethral Catheter 05/28/20 1630 6 days          Airway                 Surgical Airway 05/05/20 1500 Shiley Cuffed 29 days          Arterial Line                 Arterial Line 04/27/20 1100 Right Brachial 37 days          Peripheral Intravenous Line                 Peripheral IV - Single Lumen 05/31/20 0220 20 G Right Wrist 4 days         Peripheral IV - Single Lumen 05/31/20 1205 20 G Left Other 3 days                Physical Exam    Constitutional: He is sedated, and intubated.   Head: Normocephalic   Cardiovascular: Tachycardia 100s   Pulmonary/Chest: intubated vent FiO2 50% PEEP 6, ECMO RPM 2600, Flow 2.8, sweep 4.5, oxygenator FiO2 40%  Skin: L neck and L groin cannulas intact, no flashing, clots pre and post-oxygenator, sutures intact and secure with 2 new sutures to upper (return) cannula.  Nursing note and vitals reviewed.    Significant Labs:  BMP:   Recent Labs   Lab 06/04/20  0800   *   *   K 4.4      CO2 28   BUN 67*   CREATININE 0.9   CALCIUM 9.5   MG 2.6     CBC:   Recent Labs   Lab 06/04/20  0800 06/04/20  0815   WBC 12.59  --    RBC 3.18*  --    HGB 9.7*  --    HCT 30.8* 28*   *  --    MCV 97  --    MCH 30.5  --    MCHC 31.5*  --      LFTs:   Recent Labs   Lab 06/04/20  0800   ALT 39   AST 28   ALKPHOS 154*   BILITOT 1.2*   PROT 6.9   ALBUMIN 3.4*     Significant  Diagnostics:  Cxr: stable    Ct Head Without Contrast 6/2/2020  -symmetrical hypoattenuation bilobed basal ganglia concerning for edema as well as poor gray-white matter differentiation cerebral hemispheres diffusely concerning for profound hypoxic anoxic encephalopathy  -no evidence for acute intracranial hemorrhage    Ct Chest Without Contrast 6/2/2020  -small left pneumothorax with no mediastinal shift and minimal loss of left lung volume  -no right pneumothorax, pneumomediastinum or pneumoperitoneum  -widespread largely homogeneous ground-glass disease throughout all lobes as well as tubular and varicoid bronchiectasis in this patient with history of COVID-19 pneumonia maintained on ECMO  -fine reticular and reticulonodular pattern is evident in the periphery of the lungs consistent with interstitial lung disease  -findings are not typical for pulmonary edema due to elevated pulmonary venous pressures and/or abnormal capillary permeability

## 2020-06-04 NOTE — ASSESSMENT & PLAN NOTE
Acute respiratory distress syndrome (ARDS) due to COVID-19 virus  - Crich switched to ETT on 4/11  - Monika weaned off 5/5  - Tracheostomy and bronch 5/5  - severe thrombocytopenia resolved; transfuse for under 30K  - Aspirin 81 daily  - Labetalol 300BID  - RPM at 2600; flows at 2.7  - Sweep at 4.5; CO2 goal is 59 or below; do not increase sweep for pCO2s 59 or below as long as patient is not acidotic, as these high pCO2s are respiratory compensation for alkalosis  - Oxygenator Fi at 40%  - CT head and chest imaging reviewed with profound basilar changes concerning for hypoxemic injury, neurology following, unable to perform MRI at this time     Tongue laceration      -ENT evaluated, laceration superficial      -recs: bite block vs paralysis, will monitor    Sedation  - Valium, seroquel, PRN Fentanyl     UGI Bleed  - Scoped by GI 5/3 with epinephrine injection and clip placement for an actively bleeding D2/D3 Diuelafoy lesion    FEN/GI  - TF at 45, goal      - Lasix gtt 2.5  - FWB for hypernatremia 300mL q4     Anticoagulation      - Goal aXa 0.2-0.25 Q6hrs      - Hep gtt 600      - monitor LDH    Prophylaxis      - protonix q12 IV       - leukocytosis stable      - stopping Zosyn today      - PICC placed 5/5; central line removed 5/6

## 2020-06-05 NOTE — PLAN OF CARE
Dx: Acute respiratory disease due to COVID-19 virus       Goals of Care: MAP 70-90       Vital Signs: Temp:  [97.8 °F (36.6 °C)-98.7 °F (37.1 °C)]   Pulse:  [100-128]   Resp:  [21-40]   SpO2:  [100 %]   Arterial Line BP: ()/(48-78)        VV-ECMO: 60%; 5 Sweep; Speed 2600 RPM; Flows 2.8-3    Cardiac: sinus tach; VSS throughout shift       Resp:  AC/PC 50%; 6 PEEP; rate 12      Neuro: Unresponsive; Pt will open eyes spontaneously but does not follow commands or track. Withdraws intermittently to pain all extremities.        Gtts: Heparin @ 600 units/hr per anti-Xa (goal range of 0.2-0.25), Lasix @ 2.5 mg/hr, and Precedex @ 0.1 mcg/kg/min       Output: Urine Urinary Catheter 100-200 cc/hr ; BM x2 (dark brown)       Diet: NPO and Tube Feeds (@ goal of 45 w/ no residuals); Q4h free water boluses as tolerated      Labs/Accuchecks: Q6h Labs, Q4h accuchecks w/ prn insulin per order      Skin: Triad applied to cheek wound.  Provodine iodine applied to left arm wounds and foam dressing changed.  ECMO cannula dressings intact.  Small amount of old drainage noted to left IJ cannula.  Heel boots rotated.       Shift Events: 500cc albumin administered for line chattering.  1 PRBC.  PRN fentanyl administered for restlessness evidence by VS changes and grimacing.

## 2020-06-05 NOTE — PROGRESS NOTES
[facility] - Respiratory ICU  Critical Care - Surgery  Progress Note    Patient Name: Ranjana Sanchez  MRN: [id number]  Admission Date: 4/10/2020  Hospital Length of Stay: 56 days  Code Status: Full Code  Attending Provider: Francis Ayon MD  Primary Care Provider: Primary Doctor No   Principal Problem: Acute respiratory disease due to COVID-19 virus    Subjective:     Hospital/ICU Course:  No notes on file    Interval History/Significant Events: overnight received 2u pRBCs for Hgb 8.2. Also got 500 cc albumin for chattering lines. Otherwise no major events. Case reviewed by neuro again. Overall, has many poor prognostic indicators and advised GOC discussion.    Follow-up For: Procedure(s) (LRB):  CREATION, TRACHEOSTOMY  (N/A)    Post-Operative Day: 31 Days Post-Op    Objective:     Vital Signs (Most Recent):  Temp: 98.4 °F (36.9 °C) (06/05/20 1015)  Pulse: 106 (06/05/20 1015)  Resp: (!) 23 (06/05/20 1015)  BP: (!) 103/57 (06/04/20 1200)  SpO2: 100 % (06/05/20 1015) Vital Signs (24h Range):  Temp:  [97.8 °F (36.6 °C)-98.8 °F (37.1 °C)] 98.4 °F (36.9 °C)  Pulse:  [100-136] 106  Resp:  [21-54] 23  SpO2:  [99 %-100 %] 100 %  BP: (103)/(57) 103/57  Arterial Line BP: ()/(47-78) 102/62     Weight: 59 kg (130 lb 1.1 oz)  Body mass index is 22.33 kg/m².      Intake/Output Summary (Last 24 hours) at 6/5/2020 1030  Last data filed at 6/5/2020 0900  Gross per 24 hour   Intake 4234.35 ml   Output 3540 ml   Net 694.35 ml       Physical Exam    Vents:  Vent Mode: A/C (06/05/20 0815)  Ventilator Initiated: Yes (04/10/20 2247)  Set Rate: 12 BPM (06/05/20 0815)  Vt Set: 200 mL (05/18/20 1244)  PEEP/CPAP: 6 cmH20 (06/05/20 0815)  Oxygen Concentration (%): 50 (06/05/20 0900)  Peak Airway Pressure: 36 cmH2O (06/05/20 0815)  Plateau Pressure: 34 cmH20 (06/05/20 0815)  Total Ve: 10.4 mL (06/05/20 0815)  F/VT Ratio<105 (RSBI): 126.98 (06/05/20 0815)    Lines/Drains/Airways     Peripherally Inserted Central Catheter  Line            PICC Double Lumen 05/05/20 1707 right basilic 30 days          Central Venous Catheter Line                 ECMO Cannula 04/25/20 1120 right femoral vein 40 days         ECMO Cannula 04/25/20 1120 right internal jugular 40 days          Drain                 NG/OG Tube 05/03/20 1215 [place] preetp;nasogastric 18 Fr. Left nostril 32 days         Urethral Catheter 05/28/20 1630 7 days          Airway                 Surgical Airway 05/05/20 1500 Shiley Cuffed 30 days          Arterial Line                 Arterial Line 04/27/20 1100 Right Brachial 38 days          Peripheral Intravenous Line                 Peripheral IV - Single Lumen 05/31/20 0220 20 G Right Wrist 5 days         Peripheral IV - Single Lumen 05/31/20 1205 20 G Left Other 4 days                Significant Labs:    CBC/Anemia Profile:  Recent Labs   Lab 06/04/20  0400  06/05/20  0100  06/05/20  0200 06/05/20  0800 06/05/20  0819   WBC 12.17   < > 12.50  --  12.60 14.10*  --    HGB 9.7*   < > 8.2*  --  8.3* 9.0*  --    HCT 31.0*   < > 25.5*   < > 24.9* 28.0* 26*   *   < > 96*  --  99* 103*  --    MCV 96   < > 95  --  93 94  --    RDW 14.1   < > 14.9*  --  14.8* 14.6*  --    FERRITIN 1,540*  --  1,172*  --   --   --   --     < > = values in this interval not displayed.        Chemistries:  Recent Labs   Lab 06/04/20  1930 06/05/20  0100 06/05/20  0800   * 151* 153*   K 3.7 4.8 3.9   * 114* 113*   CO2 29 25 29   BUN 68* 65* 67*   CREATININE 0.8 0.9 0.8   CALCIUM 9.6 9.6 9.8   ALBUMIN 3.2* 3.6 3.6   PROT 6.6 6.4 6.8   BILITOT 1.1* 1.1* 1.4*   ALKPHOS 142* 116 123   ALT 37 31 34   AST 28 26 28   MG 2.4 2.3 2.4   PHOS 3.2 2.9 3.1       CMP:   Recent Labs   Lab 06/04/20  1930 06/05/20  0100 06/05/20  0800   * 151* 153*   K 3.7 4.8 3.9   * 114* 113*   CO2 29 25 29   * 263* 208*   BUN 68* 65* 67*   CREATININE 0.8 0.9 0.8   CALCIUM 9.6 9.6 9.8   PROT 6.6 6.4 6.8   ALBUMIN 3.2* 3.6 3.6   BILITOT 1.1* 1.1* 1.4*    ALKPHOS 142* 116 123   AST 28 26 28   ALT 37 31 34   ANIONGAP 11 12 11   EGFRNONAA >60.0 >60.0 >60.0     Coagulation:   Recent Labs   Lab 06/05/20  0800   INR 1.1   APTT 28.0     Lactic Acid: No results for input(s): LACTATE in the last 48 hours.  All pertinent labs within the past 24 hours have been reviewed.    Significant Imaging:  I have reviewed and interpreted all pertinent imaging results/findings within the past 24 hours.     CXR 6/5/2020  EXAMINATION:  XR CHEST AP PORTABLE    CLINICAL HISTORY:  COVID ECMO, eval lungs;    COMPARISON:  Comparison is made to 06/04/2020.    FINDINGS:  Tracheostomy cannula, vascular catheters, and enteric tube are again observed, the distal aspect of the enteric tube lying distal to the duodenal bulb, the tube tip projected inferior to the imaged volume.  Heart size and the appearance of the cardiomediastinal silhouette demonstrate no detrimental change since the examination referenced above.  Areas of pulmonary parenchymal opacity representing airspace consolidation are again noted bilaterally, with the lung zones appearing stable and with no significant new areas of airspace consolidation or volume loss having developed since the examination referenced above.  No pleural fluid of any substantial volume is seen on either side.  No pneumothorax.      Impression       Allowing for differences in patient positioning, no significant interval change in the appearance of the chest since 06/04/2020.      Electronically signed by: Devon Carrion MD  Date: 06/05/2020  Time: 09:31         Assessment/Plan:     COVID-19 virus detected  Ranjana Sanchez is a 55 y.o. 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  This was switched to ETT on 4/11.  Cannulated on 4/25.    Neuro:  Precedex .1  5 of valium  100 BID of seroquel  Fentanyl patch  Encephalopathy with poor neuro exam  - CT on June 2 showing bilateral basal ganglia  infarcts, which explains the neuro exam findings. His infarcts are sizeable, and will cause permanent deficits, the extent of which is unknown. The likelihood of meaningful recovery is low. Neuro consulted to prognosticate, and agree with conclusion although recommending MRI.       Pulm  AC/VC+ on the vent.  Sweep of 5 today, 2600 RPM    TVs improving around low 300s now, however will need more time on ECMO    Cardiac  Levo and vaso as needed  cardene off  MAP goal < 90    FEN/GI  TFs at goal  Sodium rising to 153 today. Will increase free water bolus from 300 -> 400 cc q4hrs    Renal  Making urine, diuresing agressively  Lasix gtt at 2.5    Heme  Hep gtt at 600, goal 0.25-0.3 to prevent another GI bleed  Has been at goal at this rate    ID  Cecilia dc'd  Remains afebrile with WBC 12.6 today    Dispo: Continued RICU care for oxygenation/ventilation requirements          Critical secondary to Patient has a condition that poses threat to life and bodily function: COVID with respiratory failure     Critical care was time spent personally by me on the following activities: development of treatment plan with patient or surrogate and bedside caregivers, discussions with consultants, evaluation of patient's response to treatment, examination of patient, ordering and performing treatments and interventions, ordering and review of laboratory studies, ordering and review of radiographic studies, pulse oximetry, re-evaluation of patient's condition.  This critical care time did not overlap with that of any other provider or involve time for any procedures.     Urszula Arguello MD  Critical Care - Surgery  [facility] - Respiratory ICU

## 2020-06-05 NOTE — SUBJECTIVE & OBJECTIVE
Interval History:  No events overnight. UOP 3.6L, net postive 780mL in 24hrs. Hep gtt ongoing at 600, got 2U pRBC overnight and 500ml Albumin for chattering. Neural exam remains poor.    Medications:  Continuous Infusions:   dexmedetomidine (PRECEDEX) infusion 0.1 mcg/kg/hr (06/05/20 1500)    furosemide (LASIX) 2 mg/mL continuous infusion (non-titrating) 2.5 mg/hr (06/05/20 1500)    heparin (porcine) in 5 % dex 600 Units/hr (06/05/20 1500)    norepinephrine bitartrate-D5W Stopped (06/02/20 1500)    vasopressin (PITRESSIN) infusion Stopped (06/04/20 1200)     Scheduled Meds:   aspirin  81 mg Per NG tube Daily    chlorhexidine  15 mL Mouth/Throat BID    fentaNYL  1 patch Transdermal Q72H    labetalol  300 mg Per G Tube BID    multivitamin liquid no.118  10 mL Per G Tube Daily    pantoprazole  40 mg Per NG tube BID    polyethylene glycol  17 g Per NG tube Daily    potassium chloride 10%  40 mEq Per NG tube BID    psyllium husk (aspartame)  1 packet Per NG tube TID    QUEtiapine  50 mg Per NG tube QHS    sucralfate  1 g Per NG tube Q6H    vitamin D  1,000 Units Per G Tube Daily        Objective:     Vital Signs (Most Recent):  Temp: 98.7 °F (37.1 °C) (06/05/20 1150)  Pulse: (!) 122 (06/05/20 1500)  Resp: (!) 44 (06/05/20 1500)  BP: (!) 103/57 (06/04/20 1200)  SpO2: 100 % (06/05/20 1500) Vital Signs (24h Range):  Temp:  [97.8 °F (36.6 °C)-98.8 °F (37.1 °C)] 98.7 °F (37.1 °C)  Pulse:  [100-136] 122  Resp:  [21-44] 44  SpO2:  [100 %] 100 %  Arterial Line BP: ()/(48-78) 133/76     Weight: 59 kg (130 lb 1.1 oz)  Body mass index is 22.33 kg/m².    SpO2: 100 %  O2 Device (Oxygen Therapy): ventilator    Intake/Output - Last 3 Shifts       06/03 0700 - 06/04 0659 06/04 0700 - 06/05 0659 06/05 0700 - 06/06 0659    I.V. (mL/kg) 437.2 (7.4) 1008.2 (17.1) 79.7 (1.4)    Blood  750 350    NG/GT 1980 0070 705    IV Piggyback 200 100     Total Intake(mL/kg) 2617.2 (44.4) 4388.2 (74.4) 1134.7 (19.2)    Urine  (mL/kg/hr) 2890 (2) 3600 (2.5) 1645 (2.9)    Stool 0 0     Blood 8 5 3    Total Output 2898 3605 1648    Net -280.8 +783.2 -513.4           Stool Occurrence 1 x 2 x           Lines/Drains/Airways     Peripherally Inserted Central Catheter Line            PICC Double Lumen 05/05/20 1707 right basilic 30 days          Central Venous Catheter Line                 ECMO Cannula 04/25/20 1120 right femoral vein 41 days         ECMO Cannula 04/25/20 1120 right internal jugular 41 days          Drain                 NG/OG Tube 05/03/20 1215 [place] sump;nasogastric 18 Fr. Left nostril 33 days         Urethral Catheter 05/28/20 1630 8 days          Airway                 Surgical Airway 05/05/20 1500 Shiley Cuffed 31 days          Arterial Line                 Arterial Line 04/27/20 1100 Right Brachial 39 days          Peripheral Intravenous Line                 Peripheral IV - Single Lumen 05/31/20 0220 20 G Right Wrist 5 days         Peripheral IV - Single Lumen 05/31/20 1205 20 G Left Other 5 days                Physical Exam    Constitutional: He is sedated, and intubated.   Head: Normocephalic   Cardiovascular: Tachycardia 100s   Pulmonary/Chest: intubated vent FiO2 50% PEEP 6, ECMO RPM 2600, Flow 2.9, sweep 5, oxygenator FiO2 60%  Skin: L neck and L groin cannulas intact, no flashing, clots pre and post-oxygenator, sutures intact and secure with 2 new sutures to upper (return) cannula.  Nursing note and vitals reviewed.    Significant Labs:  BMP:   Recent Labs   Lab 06/05/20  1400   *   *   K 3.8   *   CO2 29   BUN 61*   CREATININE 0.8   CALCIUM 9.8   MG 2.3     CBC:   Recent Labs   Lab 06/05/20  1400 06/05/20  1441   WBC 14.14*  --    RBC 3.49*  --    HGB 10.0*  --    HCT 31.5* 29*   *  --    MCV 90  --    MCH 28.7  --    MCHC 31.7*  --      LFTs:   Recent Labs   Lab 06/05/20  1400   ALT 32   AST 29   ALKPHOS 122   BILITOT 1.4*   PROT 6.7   ALBUMIN 3.5     Significant Diagnostics:  Cxr:  stable    Ct Head Without Contrast 6/2/2020  -symmetrical hypoattenuation bilobed basal ganglia concerning for edema as well as poor gray-white matter differentiation cerebral hemispheres diffusely concerning for profound hypoxic anoxic encephalopathy  -no evidence for acute intracranial hemorrhage    Ct Chest Without Contrast 6/2/2020  -small left pneumothorax with no mediastinal shift and minimal loss of left lung volume  -no right pneumothorax, pneumomediastinum or pneumoperitoneum  -widespread largely homogeneous ground-glass disease throughout all lobes as well as tubular and varicoid bronchiectasis in this patient with history of COVID-19 pneumonia maintained on ECMO  -fine reticular and reticulonodular pattern is evident in the periphery of the lungs consistent with interstitial lung disease  -findings are not typical for pulmonary edema due to elevated pulmonary venous pressures and/or abnormal capillary permeability

## 2020-06-05 NOTE — PROGRESS NOTES
[facility] - Respiratory ICU  Cardiothoracic Surgery  Daily ECMO Progress Note    Patient Name: Ranjana Sanchez  MRN: [id number]  Admission Date: 4/10/2020  Hospital Length of Stay: 56 days  Code Status: Full Code   Attending Physician: Francis Ayon MD   Referring Provider: Francis Ayon MD  Principal Problem:Acute respiratory disease due to COVID-19 virus    Subjective:   Interval History:  No events overnight. UOP 3.6L, net postive 780mL in 24hrs. Hep gtt ongoing at 600, got 2U pRBC overnight and 500ml Albumin for chattering. Neural exam remains poor.    Medications:  Continuous Infusions:   dexmedetomidine (PRECEDEX) infusion 0.1 mcg/kg/hr (06/05/20 1500)    furosemide (LASIX) 2 mg/mL continuous infusion (non-titrating) 2.5 mg/hr (06/05/20 1500)    heparin (porcine) in 5 % dex 600 Units/hr (06/05/20 1500)    norepinephrine bitartrate-D5W Stopped (06/02/20 1500)    vasopressin (PITRESSIN) infusion Stopped (06/04/20 1200)     Scheduled Meds:   aspirin  81 mg Per NG tube Daily    chlorhexidine  15 mL Mouth/Throat BID    fentaNYL  1 patch Transdermal Q72H    labetalol  300 mg Per G Tube BID    multivitamin liquid no.118  10 mL Per G Tube Daily    pantoprazole  40 mg Per NG tube BID    polyethylene glycol  17 g Per NG tube Daily    potassium chloride 10%  40 mEq Per NG tube BID    psyllium husk (aspartame)  1 packet Per NG tube TID    QUEtiapine  50 mg Per NG tube QHS    sucralfate  1 g Per NG tube Q6H    vitamin D  1,000 Units Per G Tube Daily        Objective:     Vital Signs (Most Recent):  Temp: 98.7 °F (37.1 °C) (06/05/20 1150)  Pulse: (!) 122 (06/05/20 1500)  Resp: (!) 44 (06/05/20 1500)  BP: (!) 103/57 (06/04/20 1200)  SpO2: 100 % (06/05/20 1500) Vital Signs (24h Range):  Temp:  [97.8 °F (36.6 °C)-98.8 °F (37.1 °C)] 98.7 °F (37.1 °C)  Pulse:  [100-136] 122  Resp:  [21-44] 44  SpO2:  [100 %] 100 %  Arterial Line BP: ()/(48-78) 133/76     Weight: 59 kg (130 lb 1.1  oz)  Body mass index is 22.33 kg/m².    SpO2: 100 %  O2 Device (Oxygen Therapy): ventilator    Intake/Output - Last 3 Shifts       06/03 0700 - 06/04 0659 06/04 0700 - 06/05 0659 06/05 0700 - 06/06 0659    I.V. (mL/kg) 437.2 (7.4) 1008.2 (17.1) 79.7 (1.4)    Blood  750 350    NG/GT 1980 2530 705    IV Piggyback 200 100     Total Intake(mL/kg) 2617.2 (44.4) 4388.2 (74.4) 1134.7 (19.2)    Urine (mL/kg/hr) 2890 (2) 3600 (2.5) 1645 (2.9)    Stool 0 0     Blood 8 5 3    Total Output 2898 3605 1648    Net -280.8 +783.2 -513.4           Stool Occurrence 1 x 2 x           Lines/Drains/Airways     Peripherally Inserted Central Catheter Line            PICC Double Lumen 05/05/20 1707 right basilic 30 days          Central Venous Catheter Line                 ECMO Cannula 04/25/20 1120 right femoral vein 41 days         ECMO Cannula 04/25/20 1120 right internal jugular 41 days          Drain                 NG/OG Tube 05/03/20 1215 [place] sump;nasogastric 18 Fr. Left nostril 33 days         Urethral Catheter 05/28/20 1630 8 days          Airway                 Surgical Airway 05/05/20 1500 Shiley Cuffed 31 days          Arterial Line                 Arterial Line 04/27/20 1100 Right Brachial 39 days          Peripheral Intravenous Line                 Peripheral IV - Single Lumen 05/31/20 0220 20 G Right Wrist 5 days         Peripheral IV - Single Lumen 05/31/20 1205 20 G Left Other 5 days                Physical Exam    Constitutional: He is sedated, and intubated.   Head: Normocephalic   Cardiovascular: Tachycardia 100s   Pulmonary/Chest: intubated vent FiO2 50% PEEP 6, ECMO RPM 2600, Flow 2.9, sweep 5, oxygenator FiO2 60%  Skin: L neck and L groin cannulas intact, no flashing, clots pre and post-oxygenator, sutures intact and secure with 2 new sutures to upper (return) cannula.  Nursing note and vitals reviewed.    Significant Labs:  BMP:   Recent Labs   Lab 06/05/20  1400   *   *   K 3.8   *   CO2 29    BUN 61*   CREATININE 0.8   CALCIUM 9.8   MG 2.3     CBC:   Recent Labs   Lab 06/05/20  1400 06/05/20  1441   WBC 14.14*  --    RBC 3.49*  --    HGB 10.0*  --    HCT 31.5* 29*   *  --    MCV 90  --    MCH 28.7  --    MCHC 31.7*  --      LFTs:   Recent Labs   Lab 06/05/20  1400   ALT 32   AST 29   ALKPHOS 122   BILITOT 1.4*   PROT 6.7   ALBUMIN 3.5     Significant Diagnostics:  Cxr: stable    Ct Head Without Contrast 6/2/2020  -symmetrical hypoattenuation bilobed basal ganglia concerning for edema as well as poor gray-white matter differentiation cerebral hemispheres diffusely concerning for profound hypoxic anoxic encephalopathy  -no evidence for acute intracranial hemorrhage    Ct Chest Without Contrast 6/2/2020  -small left pneumothorax with no mediastinal shift and minimal loss of left lung volume  -no right pneumothorax, pneumomediastinum or pneumoperitoneum  -widespread largely homogeneous ground-glass disease throughout all lobes as well as tubular and varicoid bronchiectasis in this patient with history of COVID-19 pneumonia maintained on ECMO  -fine reticular and reticulonodular pattern is evident in the periphery of the lungs consistent with interstitial lung disease  -findings are not typical for pulmonary edema due to elevated pulmonary venous pressures and/or abnormal capillary permeability    Assessment/Plan:     Acute respiratory distress syndrome (ARDS) due to COVID-19 virus  - Crich switched to ETT on 4/11  - Monika weaned off 5/5  - Tracheostomy and bronch 5/5  - severe thrombocytopenia resolved; transfuse for under 30K  - Aspirin 81 daily  - Labetalol 300BID  - RPM at 2600; flows at 2.8  - Sweep at 5; CO2 goal is 59 or below; do not increase sweep for pCO2s 59 or below as long as patient is not acidotic, as these high pCO2s are respiratory compensation for alkalosis  - Oxygenator Fi at 60%  - CT head and chest imaging reviewed with profound basilar changes concerning for hypoxemic injury,  neurology following, poor prognosis, will likely move toward goals of care discussions     Tongue laceration      -ENT evaluated, laceration superficial      -recs: bite block vs paralysis, will monitor    Sedation  - Valium, seroquel, PRN Fentanyl     UGI Bleed  - Scoped by GI 5/3 with epinephrine injection and clip placement for an actively bleeding D2/D3 Diuelafoy lesion    FEN/GI  - TF at 45, goal      - Lasix gtt 2.5  - FWB for hypernatremia 400mL q4     Anticoagulation      - Goal aXa 0.2-0.25 Q6hrs      - Hep gtt 600      - monitor LDH    Prophylaxis      - protonix q12 IV       - leukocytosis stable      - stopped Zosyn yesterday      - PICC placed 5/5; central line removed 5/6    Anne Miller MD  Cardiothoracic Surgery  [facility] - Respiratory ICU      VV ECMO circuit checked and all parameters reviewed. Anticoagulation was managed and all cannulation exit sites along with review of labs and radiology studies performed. Speed and functioning of the pump along with oxygenator quality reviewed.   Significant amount of time was spent in coordinating care  between different teams which consisted of the surgical ICU to, perfusion Staff and the nursing staff.  All questions and concerns all team members were addressed.

## 2020-06-05 NOTE — PROGRESS NOTES
[facility]ists shift report    Date: 06/05/2020  ECMO Specialist:  Ashley Francisco    Pump parameters:  RPM: 2600  Flow: 2.9  Sweep:  5  FiO2: 60    Oxygenator status:  Clots: pre and post  Fibrin: pre and post    Pressure trends:  P1: 101  P2: 72  Delta P: 29    Volume status:  Chugging noted? Chattering yes all night.  CVP: Na  MAP:  70-80  MD notified (name): Nany    Anticoagulation:  ACT/aPTT/Xa parameters:.2-.25  ACT/aPTT/Xa trends this shift: .21-.22    Cannula size / status / placement:  Arterial:   Venous 1: RIJV 23F@4cm   Venous 2:RFV 27F@12cm  Dual lumen:      Additional Comments:  Circuit chattering/dry most of night.  I pushed 200 of Iso.  Pt. Given 500 albumin.  And I unit of PRBCs.  Pt. Was very very agitated all night.  Still doing 40-60 BPM.  Maintained the flows.

## 2020-06-05 NOTE — NURSING
One unit of PRBCs administered.   Temperature remains afebrile.  -150 mL/hr.   Patient remains unresponsive and withdraws in LLE to pain.   Family updated via phone.   Physician has been requested to update family concerning CT scan.   Will continue to monitor patient.

## 2020-06-05 NOTE — ASSESSMENT & PLAN NOTE
Ranjana Sanchez is a 55 y.o. 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  This was switched to ETT on 4/11.  Cannulated on 4/25.    Neuro:  Precedex .1  5 of valium  100 BID of seroquel  Fentanyl patch  Encephalopathy with poor neuro exam  - CT on June 2 showing bilateral basal ganglia infarcts, which explains the neuro exam findings. His infarcts are sizeable, and will cause permanent deficits, the extent of which is unknown. The likelihood of meaningful recovery is low. Neuro consulted to prognosticate, and agree with conclusion although recommending MRI.       Pulm  AC/VC+ on the vent.  Sweep of 5 today, 2600 RPM    TVs improving around low 300s now, however will need more time on ECMO    Cardiac  Levo and vaso as needed  cardene off  MAP goal < 90    FEN/GI  TFs at goal  Sodium rising to 153 today. Will increase free water bolus from 300 -> 400 cc q4hrs    Renal  Making urine, diuresing agressively  Lasix gtt at 2.5    Heme  Hep gtt at 600, goal 0.25-0.3 to prevent another GI bleed  Has been at goal at this rate    ID  Cecilia dc'd  Remains afebrile with WBC 12.6 today    Dispo: Continued RICU care for oxygenation/ventilation requirements

## 2020-06-05 NOTE — SUBJECTIVE & OBJECTIVE
Interval History/Significant Events: overnight received 2u pRBCs for Hgb 8.2. Also got 500 cc albumin for chattering lines. Otherwise no major events. Case reviewed by neuro again. Overall, has many poor prognostic indicators and advised GOC discussion.    Follow-up For: Procedure(s) (LRB):  CREATION, TRACHEOSTOMY  (N/A)    Post-Operative Day: 31 Days Post-Op    Objective:     Vital Signs (Most Recent):  Temp: 98.4 °F (36.9 °C) (06/05/20 1015)  Pulse: 106 (06/05/20 1015)  Resp: (!) 23 (06/05/20 1015)  BP: (!) 103/57 (06/04/20 1200)  SpO2: 100 % (06/05/20 1015) Vital Signs (24h Range):  Temp:  [97.8 °F (36.6 °C)-98.8 °F (37.1 °C)] 98.4 °F (36.9 °C)  Pulse:  [100-136] 106  Resp:  [21-54] 23  SpO2:  [99 %-100 %] 100 %  BP: (103)/(57) 103/57  Arterial Line BP: ()/(47-78) 102/62     Weight: 59 kg (130 lb 1.1 oz)  Body mass index is 22.33 kg/m².      Intake/Output Summary (Last 24 hours) at 6/5/2020 1030  Last data filed at 6/5/2020 0900  Gross per 24 hour   Intake 4234.35 ml   Output 3540 ml   Net 694.35 ml       Physical Exam    Vents:  Vent Mode: A/C (06/05/20 0815)  Ventilator Initiated: Yes (04/10/20 2247)  Set Rate: 12 BPM (06/05/20 0815)  Vt Set: 200 mL (05/18/20 1244)  PEEP/CPAP: 6 cmH20 (06/05/20 0815)  Oxygen Concentration (%): 50 (06/05/20 0900)  Peak Airway Pressure: 36 cmH2O (06/05/20 0815)  Plateau Pressure: 34 cmH20 (06/05/20 0815)  Total Ve: 10.4 mL (06/05/20 0815)  F/VT Ratio<105 (RSBI): 126.98 (06/05/20 0815)    Lines/Drains/Airways     Peripherally Inserted Central Catheter Line            PICC Double Lumen 05/05/20 1707 right basilic 30 days          Central Venous Catheter Line                 ECMO Cannula 04/25/20 1120 right femoral vein 40 days         ECMO Cannula 04/25/20 1120 right internal jugular 40 days          Drain                 NG/OG Tube 05/03/20 1215 [place] sump;nasogastric 18 Fr. Left nostril 32 days         Urethral Catheter 05/28/20 1630 7 days          Airway                  Surgical Airway 05/05/20 1500 [place] Cuffed 30 days          Arterial Line                 Arterial Line 04/27/20 1100 Right Brachial 38 days          Peripheral Intravenous Line                 Peripheral IV - Single Lumen 05/31/20 0220 20 G Right Wrist 5 days         Peripheral IV - Single Lumen 05/31/20 1205 20 G Left Other 4 days                Significant Labs:    CBC/Anemia Profile:  Recent Labs   Lab 06/04/20  0400  06/05/20  0100 06/05/20  0200 06/05/20  0800 06/05/20  0819   WBC 12.17   < > 12.50  --  12.60 14.10*  --    HGB 9.7*   < > 8.2*  --  8.3* 9.0*  --    HCT 31.0*   < > 25.5*   < > 24.9* 28.0* 26*   *   < > 96*  --  99* 103*  --    MCV 96   < > 95  --  93 94  --    RDW 14.1   < > 14.9*  --  14.8* 14.6*  --    FERRITIN 1,540*  --  1,172*  --   --   --   --     < > = values in this interval not displayed.        Chemistries:  Recent Labs   Lab 06/04/20 1930 06/05/20 0100 06/05/20  0800   * 151* 153*   K 3.7 4.8 3.9   * 114* 113*   CO2 29 25 29   BUN 68* 65* 67*   CREATININE 0.8 0.9 0.8   CALCIUM 9.6 9.6 9.8   ALBUMIN 3.2* 3.6 3.6   PROT 6.6 6.4 6.8   BILITOT 1.1* 1.1* 1.4*   ALKPHOS 142* 116 123   ALT 37 31 34   AST 28 26 28   MG 2.4 2.3 2.4   PHOS 3.2 2.9 3.1       CMP:   Recent Labs   Lab 06/04/20 1930 06/05/20  0100 06/05/20  0800   * 151* 153*   K 3.7 4.8 3.9   * 114* 113*   CO2 29 25 29   * 263* 208*   BUN 68* 65* 67*   CREATININE 0.8 0.9 0.8   CALCIUM 9.6 9.6 9.8   PROT 6.6 6.4 6.8   ALBUMIN 3.2* 3.6 3.6   BILITOT 1.1* 1.1* 1.4*   ALKPHOS 142* 116 123   AST 28 26 28   ALT 37 31 34   ANIONGAP 11 12 11   EGFRNONAA >60.0 >60.0 >60.0     Coagulation:   Recent Labs   Lab 06/05/20  0800   INR 1.1   APTT 28.0     Lactic Acid: No results for input(s): LACTATE in the last 48 hours.  All pertinent labs within the past 24 hours have been reviewed.    Significant Imaging:  I have reviewed and interpreted all pertinent imaging results/findings within the past 24  hours.     CXR 6/5/2020  EXAMINATION:  XR CHEST AP PORTABLE    CLINICAL HISTORY:  COVID ECMO, eval lungs;    COMPARISON:  Comparison is made to 06/04/2020.    FINDINGS:  Tracheostomy cannula, vascular catheters, and enteric tube are again observed, the distal aspect of the enteric tube lying distal to the duodenal bulb, the tube tip projected inferior to the imaged volume.  Heart size and the appearance of the cardiomediastinal silhouette demonstrate no detrimental change since the examination referenced above.  Areas of pulmonary parenchymal opacity representing airspace consolidation are again noted bilaterally, with the lung zones appearing stable and with no significant new areas of airspace consolidation or volume loss having developed since the examination referenced above.  No pleural fluid of any substantial volume is seen on either side.  No pneumothorax.      Impression       Allowing for differences in patient positioning, no significant interval change in the appearance of the chest since 06/04/2020.      Electronically signed by: Devon Carrion MD  Date: 06/05/2020  Time: 09:31

## 2020-06-05 NOTE — ASSESSMENT & PLAN NOTE
Acute respiratory distress syndrome (ARDS) due to COVID-19 virus  - Crich switched to ETT on 4/11  - Monika weaned off 5/5  - Tracheostomy and bronch 5/5  - severe thrombocytopenia resolved; transfuse for under 30K  - Aspirin 81 daily  - Labetalol 300BID  - RPM at 2600; flows at 2.8  - Sweep at 5; CO2 goal is 59 or below; do not increase sweep for pCO2s 59 or below as long as patient is not acidotic, as these high pCO2s are respiratory compensation for alkalosis  - Oxygenator Fi at 60%  - CT head and chest imaging reviewed with profound basilar changes concerning for hypoxemic injury, neurology following, poor prognosis, will likely move toward goals of care discussions     Tongue laceration      -ENT evaluated, laceration superficial      -recs: bite block vs paralysis, will monitor    Sedation  - Valium, seroquel, PRN Fentanyl     UGI Bleed  - Scoped by GI 5/3 with epinephrine injection and clip placement for an actively bleeding D2/D3 Diuelafoy lesion    FEN/GI  - TF at 45, goal      - Lasix gtt 2.5  - FWB for hypernatremia 400mL q4     Anticoagulation      - Goal aXa 0.2-0.25 Q6hrs      - Hep gtt 600      - monitor LDH    Prophylaxis      - protonix q12 IV       - leukocytosis stable      - stopped Zosyn yesterday      - PICC placed 5/5; central line removed 5/6

## 2020-06-05 NOTE — PROGRESS NOTES
[facility] shift report    Date: 06/05/2020  ECMO Specialist:  Lia Alvarez    Pump parameters:  RPM: 2600  Flow:  2.9 lpm  Sweep:  5  FiO2:  60    Oxygenator status:  Clots: Pre & Post Oxy;  Few at connectors  Fibrin: scant throughout    Pressure trends:  P1: 101  P2: 72  Delta P: 29-30    Volume status:  Chugging noted - yes  MAP:  80's-90's  MD notified (name):  Dr. Alston & Dr. Arguello    Anticoagulation:  ACT/aPTT/Xa parameters: Xa 0.2-0.25  ACT/aPTT/Xa trends this shift: Xa 0.22    Cannula size / status / placement:  Arterial:   Venous 1: 23FR / RIJV / 4 cm  Venous 2: 27FR / RFV / 12 cm  Dual lumen: no     Additional Comments:        Maintained flows today without complication; pt received 1 unit PRBC's. X-ray remained unchanged; BS greatly diminished on left; minimal sx required today as resp did trach care. Urine output remains good.  No changes made today; MD's aware.  Pt re-tested for Covid yesterday; results positive.

## 2020-06-06 NOTE — SUBJECTIVE & OBJECTIVE
Interval History:  Small amount joe blood aspirated from NGT, got 1U pRBC overnight, and Hb continues to drift down 8.9 this AM from 9.7 yesterday AM. No bloody or tarry BMs. UOP 3.6L, net postive 100mL in 24hrs. Hep gtt ongoing at 600, got  Neural exam remains poor with noted decorticate posturing.    Medications:  Continuous Infusions:   dexmedetomidine (PRECEDEX) infusion 0.1 mcg/kg/hr (06/06/20 0900)    furosemide (LASIX) 2 mg/mL continuous infusion (non-titrating) 2.5 mg/hr (06/06/20 0900)    heparin (porcine) in 5 % dex 600 Units/hr (06/06/20 0900)    norepinephrine bitartrate-D5W Stopped (06/02/20 1500)    vasopressin (PITRESSIN) infusion Stopped (06/04/20 1200)     Scheduled Meds:   aspirin  81 mg Per NG tube Daily    chlorhexidine  15 mL Mouth/Throat BID    fentaNYL  1 patch Transdermal Q72H    labetalol  300 mg Per G Tube BID    multivitamin liquid no.118  10 mL Per G Tube Daily    pantoprazole  40 mg Per NG tube BID    polyethylene glycol  17 g Per NG tube Daily    potassium chloride 10%  40 mEq Per NG tube BID    psyllium husk (aspartame)  1 packet Per NG tube TID    QUEtiapine  50 mg Per NG tube QHS    sucralfate  1 g Per NG tube Q6H    vitamin D  1,000 Units Per G Tube Daily        Objective:     Vital Signs (Most Recent):  Temp: 98.4 °F (36.9 °C) (06/06/20 0800)  Pulse: 105 (06/06/20 0915)  Resp: (!) 34 (06/06/20 0915)  BP: (!) 103/57 (06/04/20 1200)  SpO2: 100 % (06/06/20 0915) Vital Signs (24h Range):  Temp:  [98.1 °F (36.7 °C)-99 °F (37.2 °C)] 98.4 °F (36.9 °C)  Pulse:  [] 105  Resp:  [14-51] 34  SpO2:  [100 %] 100 %  Arterial Line BP: ()/(49-77) 87/54     Weight: 60 kg (132 lb 4.4 oz)  Body mass index is 22.71 kg/m².    SpO2: 100 %  O2 Device (Oxygen Therapy): ventilator    Intake/Output - Last 3 Shifts       06/04 0700 - 06/05 0659 06/05 0700 - 06/06 0659 06/06 0700 - 06/07 0659    I.V. (mL/kg) 1008.2 (17.1) 550.2 (9.2)     Blood 750 750     NG/GT 3540 6328     IV  Piggyback 100      Total Intake(mL/kg) 4388.2 (74.4) 3775.2 (62.9)     Urine (mL/kg/hr) 3600 (2.5) 3770 (2.6) 425 (2.8)    Stool 0 0     Blood 5 6 2    Total Output 3605 3776 427    Net +783.2 -0.8 -427           Stool Occurrence 2 x 1 x           Lines/Drains/Airways     Peripherally Inserted Central Catheter Line            PICC Double Lumen 05/05/20 1707 right basilic 31 days          Central Venous Catheter Line                 ECMO Cannula 04/25/20 1120 right femoral vein 41 days         ECMO Cannula 04/25/20 1120 right internal jugular 41 days          Drain                 NG/OG Tube 05/03/20 1215 [place] sump;nasogastric 18 Fr. Left nostril 33 days         Urethral Catheter 05/28/20 1630 8 days          Airway                 Surgical Airway 05/05/20 1500 Shiley Cuffed 31 days          Arterial Line                 Arterial Line 04/27/20 1100 Right Brachial 39 days          Peripheral Intravenous Line                 Peripheral IV - Single Lumen 06/06/20 0136 18 G Right Hand less than 1 day         Peripheral IV - Single Lumen 06/06/20 0137 18 G Right Hand less than 1 day                Physical Exam    Constitutional: He is sedated, and intubated.   Head: Normocephalic   Cardiovascular: Tachycardia 100s   Pulmonary/Chest: intubated vent FiO2 50% PEEP 6, ECMO RPM 2600, Flow 2.8, sweep 5, oxygenator FiO2 65%  Skin: L neck and L groin cannulas intact, no flashing, clots pre and post-oxygenator, sutures intact and secure with 2 new sutures to upper (return) cannula.  Nursing note and vitals reviewed.    Significant Labs:  BMP:   Recent Labs   Lab 06/06/20  0100   *   *   K 4.8   *   CO2 25   BUN 66*   CREATININE 0.8   CALCIUM 10.0   MG 2.3     CBC:   Recent Labs   Lab 06/06/20  0845   WBC 13.22*   RBC 3.29*   HGB 9.7*   HCT 30.5*   PLT 89*   MCV 93   MCH 29.5   MCHC 31.8*     LFTs:   Recent Labs   Lab 06/06/20  0100   ALT 32   AST 32   ALKPHOS 133   BILITOT 1.3*   PROT 6.3   ALBUMIN 3.3*      Significant Diagnostics:  Cxr: stable    Ct Head Without Contrast 6/2/2020  -symmetrical hypoattenuation bilobed basal ganglia concerning for edema as well as poor gray-white matter differentiation cerebral hemispheres diffusely concerning for profound hypoxic anoxic encephalopathy  -no evidence for acute intracranial hemorrhage    Ct Chest Without Contrast 6/2/2020  -small left pneumothorax with no mediastinal shift and minimal loss of left lung volume  -no right pneumothorax, pneumomediastinum or pneumoperitoneum  -widespread largely homogeneous ground-glass disease throughout all lobes as well as tubular and varicoid bronchiectasis in this patient with history of COVID-19 pneumonia maintained on ECMO  -fine reticular and reticulonodular pattern is evident in the periphery of the lungs consistent with interstitial lung disease  -findings are not typical for pulmonary edema due to elevated pulmonary venous pressures and/or abnormal capillary permeability

## 2020-06-06 NOTE — PROCEDURES
DATE: 5/14/20    EEG NUMBER:    -1,  -2    REFERRING PHYSICIAN:Dr. Reeves      This EEG was performed to assess for evidence of underlying epilepsy.     ELECTROENCEPHALOGRAM REPORT     METHODOLOGY:  Electroencephalographic (EEG) is recorded with electrodes placed according to the International 10-20 placement system.  Thirty two (32) channels of digital signal (sampling rate of 512/sec), including T1 and T2, were simultaneously recorded from the scalp and may include EKG, EMG, and/or eye monitors.  Recording band pass was 0.1 to 512 Hz.  Digital video recording of the patient is simultaneously recorded with the EEG. The patient is instructed to report clinical symptoms which may occur during the recording session.  EEG and video recording are stored and archived in digital format.  Activation procedures, which include photic stimulation, hyperventilation and instructing patients to perform simple tasks, are done in selected patients.     The EEG is displayed on a monitor screen and can be reviewed using different montages.  Computer-assisted analysis is employed to detect spike and electrographic seizure activity.  The entire record is submitted for computer analysis.  The entire recording is visually reviewed, and the times identified by computer analysis as being spikes or seizures are reviewed again.     Compressed spectral analysis (CSA) is also performed on the activity recorded from each individual channel.  This is displayed as a power display of frequencies from 0 to 30 Hz over time.  The CSA is reviewed looking for asymmetries in power between homologous areas of the scalp, then compared with the original EEG recording.     [employer] software was also utilized in the review of this study.  This software suite analyzes the EEG recording in multiple domains.  Coherence and rhythmicity are computed to identify EEG sections which may contain organized seizures.  Each channel undergoes analysis to  detect the presence of spike and sharp waves which have special and morphological characteristics of epileptic activity.  The routine EEG recording is converted from special into frequency domain.  This is then displayed comparing homologous areas to identify areas of significant asymmetry.  Algorithm to identify non-cortically generated artifact is used to separate artifact from the EEG.     Recording times  Start 5/14/20 15:13:31   End 5/15/20 0:16:36   Restart 5/15/20 0:20:24   End 5/15/20 7:00:06   Total time of VEEG was 15 hrs 14 min     EEG FINDINGS:  The recording was obtained with a number of standard bipolar and referential montages during obtunded state.  In this  state, the posterior background rhythm was a symmetric, well-modulated 5-6 Hz activity. Activation procedures were not performed.  Mixed theta and occasional delta range activity was noted throughout the record which was diffuse. Variability and reactivity was noted.    During deeper stages of sleep, symmetric V waves and sleep spindles were noted.  There were no focal abnormalities.  There were no interictal epileptiform abnormalities and no clinical or electrographic seizures were recorded.     The EKG channel revealed a sinus rhythm.     IMPRESSION:  This is an abnormal EEG during obtunded state.  Diffuse slowing of the background was noted.      CLINICAL CORRELATION:  The patient is a 55 -year-old male who is being evaluated with altered sensorium.The patient is currently not maintained on any antiseizure medications.  This is an abnormal EEG during obtunded state. The overall degree of slowing  is suggestive of a moderate to severe degree of encephalopathy likely a toxic metabolic encephalopathy.  There is no evidence of an epileptic process on this recording.  No seizures were recorded during this study.

## 2020-06-06 NOTE — PROGRESS NOTES
[facility] - Respiratory ICU  Cardiothoracic Surgery  Daily ECMO Progress Note    Patient Name: Ranjana Sanchez  MRN: [id number]  Admission Date: 4/10/2020  Hospital Length of Stay: 57 days  Code Status: Full Code   Attending Physician: Francis Ayon MD   Referring Provider: Francis Ayon MD  Principal Problem:Acute respiratory disease due to COVID-19 virus    Subjective:   Interval History:  Small amount joe blood aspirated from NGT, got 1U pRBC overnight, and Hb continues to drift down 8.9 this AM from 9.7 yesterday AM. No bloody or tarry BMs. UOP 3.6L, net postive 100mL in 24hrs. Hep gtt ongoing at 600, got  Neural exam remains poor with noted decorticate posturing.    Medications:  Continuous Infusions:   dexmedetomidine (PRECEDEX) infusion 0.1 mcg/kg/hr (06/06/20 0900)    furosemide (LASIX) 2 mg/mL continuous infusion (non-titrating) 2.5 mg/hr (06/06/20 0900)    heparin (porcine) in 5 % dex 600 Units/hr (06/06/20 0900)    norepinephrine bitartrate-D5W Stopped (06/02/20 1500)    vasopressin (PITRESSIN) infusion Stopped (06/04/20 1200)     Scheduled Meds:   aspirin  81 mg Per NG tube Daily    chlorhexidine  15 mL Mouth/Throat BID    fentaNYL  1 patch Transdermal Q72H    labetalol  300 mg Per G Tube BID    multivitamin liquid no.118  10 mL Per G Tube Daily    pantoprazole  40 mg Per NG tube BID    polyethylene glycol  17 g Per NG tube Daily    potassium chloride 10%  40 mEq Per NG tube BID    psyllium husk (aspartame)  1 packet Per NG tube TID    QUEtiapine  50 mg Per NG tube QHS    sucralfate  1 g Per NG tube Q6H    vitamin D  1,000 Units Per G Tube Daily        Objective:     Vital Signs (Most Recent):  Temp: 98.4 °F (36.9 °C) (06/06/20 0800)  Pulse: 105 (06/06/20 0915)  Resp: (!) 34 (06/06/20 0915)  BP: (!) 103/57 (06/04/20 1200)  SpO2: 100 % (06/06/20 0915) Vital Signs (24h Range):  Temp:  [98.1 °F (36.7 °C)-99 °F (37.2 °C)] 98.4 °F (36.9 °C)  Pulse:  [] 105  Resp:   [14-51] 34  SpO2:  [100 %] 100 %  Arterial Line BP: ()/(49-77) 87/54     Weight: 60 kg (132 lb 4.4 oz)  Body mass index is 22.71 kg/m².    SpO2: 100 %  O2 Device (Oxygen Therapy): ventilator    Intake/Output - Last 3 Shifts       06/04 0700 - 06/05 0659 06/05 0700 - 06/06 0659 06/06 0700 - 06/07 0659    I.V. (mL/kg) 1008.2 (17.1) 550.2 (9.2)     Blood 750 750     NG/GT 2530 2475     IV Piggyback 100      Total Intake(mL/kg) 4388.2 (74.4) 3775.2 (62.9)     Urine (mL/kg/hr) 3600 (2.5) 3770 (2.6) 425 (2.8)    Stool 0 0     Blood 5 6 2    Total Output 3605 3776 427    Net +783.2 -0.8 -427           Stool Occurrence 2 x 1 x           Lines/Drains/Airways     Peripherally Inserted Central Catheter Line            PICC Double Lumen 05/05/20 1707 right basilic 31 days          Central Venous Catheter Line                 ECMO Cannula 04/25/20 1120 right femoral vein 41 days         ECMO Cannula 04/25/20 1120 right internal jugular 41 days          Drain                 NG/OG Tube 05/03/20 1215 [place] sump;nasogastric 18 Fr. Left nostril 33 days         Urethral Catheter 05/28/20 1630 8 days          Airway                 Surgical Airway 05/05/20 1500 Shiley Cuffed 31 days          Arterial Line                 Arterial Line 04/27/20 1100 Right Brachial 39 days          Peripheral Intravenous Line                 Peripheral IV - Single Lumen 06/06/20 0136 18 G Right Hand less than 1 day         Peripheral IV - Single Lumen 06/06/20 0137 18 G Right Hand less than 1 day                Physical Exam    Constitutional: He is sedated, and intubated.   Head: Normocephalic   Cardiovascular: Tachycardia 100s   Pulmonary/Chest: intubated vent FiO2 50% PEEP 6, ECMO RPM 2600, Flow 2.8, sweep 5, oxygenator FiO2 65%  Skin: L neck and L groin cannulas intact, no flashing, clots pre and post-oxygenator, sutures intact and secure with 2 new sutures to upper (return) cannula.  Nursing note and vitals reviewed.    Significant  Labs:  BMP:   Recent Labs   Lab 06/06/20  0100   *   *   K 4.8   *   CO2 25   BUN 66*   CREATININE 0.8   CALCIUM 10.0   MG 2.3     CBC:   Recent Labs   Lab 06/06/20  0845   WBC 13.22*   RBC 3.29*   HGB 9.7*   HCT 30.5*   PLT 89*   MCV 93   MCH 29.5   MCHC 31.8*     LFTs:   Recent Labs   Lab 06/06/20  0100   ALT 32   AST 32   ALKPHOS 133   BILITOT 1.3*   PROT 6.3   ALBUMIN 3.3*     Significant Diagnostics:  Cxr: stable    Ct Head Without Contrast 6/2/2020  -symmetrical hypoattenuation bilobed basal ganglia concerning for edema as well as poor gray-white matter differentiation cerebral hemispheres diffusely concerning for profound hypoxic anoxic encephalopathy  -no evidence for acute intracranial hemorrhage    Ct Chest Without Contrast 6/2/2020  -small left pneumothorax with no mediastinal shift and minimal loss of left lung volume  -no right pneumothorax, pneumomediastinum or pneumoperitoneum  -widespread largely homogeneous ground-glass disease throughout all lobes as well as tubular and varicoid bronchiectasis in this patient with history of COVID-19 pneumonia maintained on ECMO  -fine reticular and reticulonodular pattern is evident in the periphery of the lungs consistent with interstitial lung disease  -findings are not typical for pulmonary edema due to elevated pulmonary venous pressures and/or abnormal capillary permeability      Assessment/Plan:     Acute respiratory distress syndrome (ARDS) due to COVID-19 virus  - Crich switched to ETT on 4/11  - Monika weaned off 5/5  - Tracheostomy and bronch 5/5  - severe thrombocytopenia resolved; transfuse for under 30K  - Aspirin 81 daily  - Labetalol 300BID  - RPM at 2600; flows at 2.8  - Sweep at 5; CO2 goal is 59 or below; do not increase sweep for pCO2s 59 or below as long as patient is not acidotic, as these high pCO2s are respiratory compensation for alkalosis  - Oxygenator Fi at 65%  - CT head and chest imaging reviewed with profound basilar  changes concerning for hypoxemic injury, neurology following, poor prognosis, need to have goals of care discussion with family and review CT findings     Tongue laceration      -ENT evaluated, laceration superficial      -recs: bite block vs paralysis, will monitor    Sedation  - Valium, seroquel, PRN Fentanyl     UGI Bleed  - Scoped by GI 5/3 with epinephrine injection and clip placement for an actively bleeding D2/D3 Diuelafoy lesion    FEN/GI  - TF at 45, goal      - Lasix gtt 2.5  - FWB for hypernatremia 400mL q4     Anticoagulation      - Goal aXa 0.2-0.25 Q6hrs      - Hep gtt 600      - monitor LDH    Prophylaxis      - protonix q12 IV       - leukocytosis stable      - stopped Zosyn yesterday      - PICC placed 5/5; central line removed 5/6    Anne Miller MD  Cardiothoracic Surgery  [facility] - Res[facility] ICU        VV ECMO circuit checked and all parameters reviewed. Anticoagulation was managed and all cannulation exit sites along with review of labs and radiology studies performed. Speed and functioning of the pump along with oxygenator quality reviewed.  Patient continues to be stable on  tube feeds at 45 cc an hour.  No concerns from the nursing staff or perfusion team with any flow related issues.  Significant amount of time was spent in coordinating care  between different teams which consisted of the surgical ICU to, perfusion Staff and the nursing staff.  All questions and concerns all team members were addressed.  Periodically update was provided to family members of this patient in [place] and all questions and concerns were addressed.

## 2020-06-06 NOTE — PLAN OF CARE
Patient with stable vital signs. Patient maintaining MAP 70-90. Patient with ECMO at bedside. ECMO perfusion at bedside, with canula sites measured with RN, every 4 hours. Patient with o2sats 100%. Patient's family updated per Dr. Alston on the phone.. Dr. Alston, Dr. Cho, and Dr. Grayson updated on patient's assessments, vital signs, and lab results. Will continue to monitor patient.      Problem: Adult Inpatient Plan of Care  Goal: Absence of Hospital-Acquired Illness or Injury  Outcome: Ongoing, Progressing     Problem: Communication Impairment (Mechanical Ventilation, Invasive)  Goal: Effective Communication  Outcome: Ongoing, Progressing     Problem: Device-Related Complication Risk (Mechanical Ventilation, Invasive)  Goal: Optimal Device Function  Outcome: Ongoing, Progressing     Problem: Inability to Wean (Mechanical Ventilation, Invasive)  Goal: Mechanical Ventilation Liberation  Outcome: Ongoing, Progressing     Problem: Nutrition Impairment (Mechanical Ventilation, Invasive)  Goal: Optimal Nutrition Delivery  Outcome: Ongoing, Progressing     Problem: Skin and Tissue Injury (Mechanical Ventilation, Invasive)  Goal: Absence of Device-Related Skin and Tissue Injury  Outcome: Ongoing, Progressing     Problem: Ventilator-Induced Lung Injury (Mechanical Ventilation, Invasive)  Goal: Absence of Ventilator-Induced Lung Injury  Outcome: Ongoing, Progressing     Problem: Communication Impairment (Artificial Airway)  Goal: Effective Communication  Outcome: Ongoing, Progressing     Problem: Skin and Tissue Injury (Artificial Airway)  Goal: Absence of Device-Related Skin or Tissue Injury  Outcome: Ongoing, Progressing     Problem: Infection  Goal: Infection Symptom Resolution  Outcome: Ongoing, Progressing     Problem: Fall Injury Risk  Goal: Absence of Fall and Fall-Related Injury  Outcome: Ongoing, Progressing     Problem: Diabetes Comorbidity  Goal: Blood Glucose Level Within Desired Range  Outcome: Ongoing,  Progressing  Intervention: Maintain Glycemic Control  Flowsheets (Taken 6/6/2020 1714)  Glycemic Management: blood glucose monitoring     Problem: ARDS (Acute Respiratory Distress Syndrome)  Goal: Effective Oxygenation  Outcome: Ongoing, Progressing     Problem: Adjustment to Therapy (Extracorporeal Life Support/ECMO)  Goal: Optimal Adjustment to Therapy  Outcome: Ongoing, Progressing     Problem: Bleeding (Extracorporeal Life Support/ECMO)  Goal: Absence of Bleeding  Outcome: Ongoing, Progressing  Intervention: Minimize and Manage Risk of Bleeding  Flowsheets (Taken 6/6/2020 1714)  Bleeding Precautions: blood pressure closely monitored; coagulation study results reviewed     Problem: Infection (Extracorporeal Life Support/ECMO)  Goal: Absence of Infection Signs/Symptoms  Outcome: Ongoing, Progressing  Intervention: Prevent or Manage Infection  Flowsheets (Taken 6/6/2020 1714)  Infection Management: aseptic technique maintained

## 2020-06-06 NOTE — PROGRESS NOTES
[facility] - Respiratory ICU  Critical Care - Surgery  Progress Note    Patient Name: Ranjana Sanchez  MRN: [id number]  Admission Date: 4/10/2020  Hospital Length of Stay: 57 days  Code Status: Full Code  Attending Provider: Francis Ayon MD  Primary Care Provider: Primary Doctor No   Principal Problem: Acute respiratory disease due to COVID-19 virus    Subjective:     Hospital/ICU Course:  No notes on file    Interval History/Significant Events:   Got a unit of blood overnight.  No other major changes.    Follow-up For: Procedure(s) (LRB):  CREATION, TRACHEOSTOMY  (N/A)    Post-Operative Day: 32 Days Post-Op    Objective:     Vital Signs (Most Recent):  Temp: 98.1 °F (36.7 °C) (06/06/20 0300)  Pulse: (!) 125 (06/06/20 0700)  Resp: (!) 45 (06/06/20 0600)  BP: (!) 103/57 (06/04/20 1200)  SpO2: 100 % (06/06/20 0700) Vital Signs (24h Range):  Temp:  [98.1 °F (36.7 °C)-99 °F (37.2 °C)] 98.1 °F (36.7 °C)  Pulse:  [103-136] 125  Resp:  [14-51] 45  SpO2:  [100 %] 100 %  Arterial Line BP: ()/(49-77) 119/67     Weight: 60 kg (132 lb 4.4 oz)  Body mass index is 22.71 kg/m².      Intake/Output Summary (Last 24 hours) at 6/6/2020 0746  Last data filed at 6/6/2020 0600  Gross per 24 hour   Intake 3721.35 ml   Output 3616 ml   Net 105.35 ml       Physical Exam   Constitutional: He appears well-developed.   HENT:   Head: Normocephalic and atraumatic.   Eyes: Pupils are equal, round, and reactive to light.   Neck: Normal range of motion.   Cardiovascular:   tachycardic   Pulmonary/Chest:   Trached, mechanically ventilated  On ECMO, cannulated in RIJ and fem vein   Abdominal: Soft. He exhibits no distension.   Musculoskeletal: Normal range of motion.   Neurological:   sedated   Skin: Skin is warm.   Vitals reviewed.      Vents:  Vent Mode: A/C (06/06/20 0110)  Ventilator Initiated: Yes (04/10/20 2247)  Set Rate: 12 BPM (06/06/20 0110)  Vt Set: 200 mL (05/18/20 1244)  PEEP/CPAP: 6 cmH20 (06/06/20 0110)  Oxygen  Concentration (%): 50 (06/06/20 0600)  Peak Airway Pressure: 36 cmH2O (06/06/20 0110)  Plateau Pressure: 34 cmH20 (06/06/20 0110)  Total Ve: 9.67 mL (06/06/20 0110)  F/VT Ratio<105 (RSBI): 139 (06/06/20 0110)    Lines/Drains/Airways     Peripherally Inserted Central Catheter Line            PICC Double Lumen 05/05/20 1707 right basilic 31 days          Central Venous Catheter Line                 ECMO Cannula 04/25/20 1120 right femoral vein 41 days         ECMO Cannula 04/25/20 1120 right internal jugular 41 days          Drain                 NG/OG Tube 05/03/20 1215 [place] sump;nasogastric 18 Fr. Left nostril 33 days         Urethral Catheter 05/28/20 1630 8 days          Airway                 Surgical Airway 05/05/20 1500 Shiley Cuffed 31 days          Arterial Line                 Arterial Line 04/27/20 1100 Right Brachial 39 days          Peripheral Intravenous Line                 Peripheral IV - Single Lumen 06/06/20 0136 18 G Right Hand less than 1 day         Peripheral IV - Single Lumen 06/06/20 0137 18 G Right Hand less than 1 day                Significant Labs:    CBC/Anemia Profile:  Recent Labs   Lab 06/05/20 0100 06/05/20  1400  06/05/20 1947 06/05/20 2010 06/06/20 0100 06/06/20  0148   WBC 12.50   < > 14.14*  --  15.54*  --  14.62*  --    HGB 8.2*   < > 10.0*  --  9.7*  --  8.9*  --    HCT 25.5*   < > 31.5*   < > 29.7* 30* 28.3* 28*   PLT 96*   < > 106*  --  100*  --  89*  --    MCV 95   < > 90  --  90  --  91  --    RDW 14.9*   < > 17.4*  --  17.2*  --  17.3*  --    FERRITIN 1,172*  --   --   --   --   --  1,447*  --     < > = values in this interval not displayed.        Chemistries:  Recent Labs   Lab 06/05/20  1400 06/05/20 1947 06/06/20 0100   * 150* 150*   K 3.8 4.3 4.8   * 113* 113*   CO2 29 29 25   BUN 61* 60* 66*   CREATININE 0.8 0.7 0.8   CALCIUM 9.8 10.0 10.0   ALBUMIN 3.5 3.4* 3.3*   PROT 6.7 6.7 6.3   BILITOT 1.4* 1.3* 1.3*   ALKPHOS 122 126 133   ALT 32 32 32   AST  29 32 32   MG 2.3 2.3 2.3   PHOS 2.9 2.7 2.9       Significant Imaging:  I have reviewed and interpreted all pertinent imaging results/findings within the past 24 hours.    Assessment/Plan:     COVID-19 virus detected  Ranjana Sanchez is a 55 y.o. 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  This was switched to ETT on 4/11.  Cannulated on 4/25.    Neuro:  Precedex .1  5 of valium  100 BID of seroquel  Fentanyl patch  Encephalopathy with poor neuro exam  - CT on June 2 showing bilateral basal ganglia infarcts, which explains the neuro exam findings. His infarcts are sizeable, and will cause permanent deficits, the extent of which is unknown. The likelihood of meaningful recovery is low. Neuro consulted to prognosticate, and agree with conclusion although recommending MRI.       Pulm  AC/VC+ on the vent.  Sweep of 5 today, 2600 RPM    TVs improving around low 300s now, however will need more time on ECMO    Cardiac  Levo and vaso as needed  cardene off  MAP goal < 90    FEN/GI  TFs at goal  Sodium rising to 153 today. Will increase free water bolus from 300 -> 400 cc q4hrs    Renal  Making urine, diuresing agressively  Lasix gtt at 2.5    Heme  Hep gtt at 600, goal 0.25-0.3 to prevent another GI bleed  Has been at goal at this rate    ID  WBC up to 14 today    Dispo: RICU               Critical care was time spent personally by me on the following activities: development of treatment plan with patient or surrogate and bedside caregivers, discussions with consultants, evaluation of patient's response to treatment, examination of patient, ordering and performing treatments and interventions, ordering and review of laboratory studies, ordering and review of radiographic studies, pulse oximetry, re-evaluation of patient's condition.  This critical care time did not overlap with that of any other provider or involve time for any procedures.     Zlealem ZARCO  MD To  Critical Care - Surgery  [facility] - Res[facility] ICU

## 2020-06-06 NOTE — PROGRESS NOTES
ECMO Specialists shift report    Date: 06/06/2020  ECMO Specialist:  Ashley Francisco    Pump parameters:  RPM: 2600  Flow:  2.8  Sweep:  5  FiO2: .65    Oxygenator status:  Clots: pre and post  Fibrin: pre and post    Pressure trends:  P1: 98  P2: 72  Delta P: 26    Volume status:  Chugging noted?Yes dry/chattering  CVP: NA  MAP:  70s-90s  MD notified (name): Svitlana    Anticoagulation:  ACT/aPTT/Xa parameters:.2-.25  ACT/aPTT/Xa trends this shift: .21    Cannula size / status / placement:  Arterial:   Venous 1: RIJV 23F@4cm  Venous 2: RFV 27F@12cm  Dual lumen:      Additional Comments:  Pt. Very agitated.  Blood from NGT.  Circuit dry/chattering. Gave PRBCs and Albumin. Went up on FiO2(65%) for SVO2 under 60.  Maintained the flow.

## 2020-06-06 NOTE — ASSESSMENT & PLAN NOTE
Ranjana Sanchez is a 55 y.o. 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  This was switched to ETT on 4/11.  Cannulated on 4/25.    Neuro:  Precedex .1  5 of valium  100 BID of seroquel  Fentanyl patch  Encephalopathy with poor neuro exam  - CT on June 2 showing bilateral basal ganglia infarcts, which explains the neuro exam findings. His infarcts are sizeable, and will cause permanent deficits, the extent of which is unknown. The likelihood of meaningful recovery is low. Neuro consulted to prognosticate, and agree with conclusion although recommending MRI.       Pulm  AC/VC+ on the vent.  Sweep of 5 today, 2600 RPM    TVs improving around low 300s now, however will need more time on ECMO    Cardiac  Levo and vaso as needed  cardene off  MAP goal < 90    FEN/GI  TFs at goal  Sodium rising to 153 today. Will increase free water bolus from 300 -> 400 cc q4hrs    Renal  Making urine, diuresing agressively  Lasix gtt at 2.5    Heme  Hep gtt at 600, goal 0.25-0.3 to prevent another GI bleed  Has been at goal at this rate    ID  WBC up to 14 today    Dispo: CAT

## 2020-06-06 NOTE — PLAN OF CARE
Dx: Acute respiratory disease due to COVID-19 virus       Goals of Care: MAP 70-90       Vital Signs: Temp:  [98.1 °F (36.7 °C)-98.4 °F (36.9 °C)]   Pulse:  [105-132]   Resp:  [26-51]   SpO2:  [100 %]   Arterial Line BP: ()/(49-77)        VV-ECMO: FiO2 65% (increased from 60% at 0000); 5 Sweep; Speed 2600 RPM; Flows 2.8     Cardiac: Sinus tach; VSS throughout shift       Resp: Vent settings: SIMV PC FiO2 50%/PEEP 6, Vt 250, rate 30, pressure support 20      Neuro: Pt will open eyes spontaneously but does not follow commands or track. Withdraws intermittently to pain.  Decorticate posturing noted.    GI: Tube Feeds @ goal of 45 w/ 10cc residuals; Q4h free water boluses as tolerated; 25cc joe blood noted when checking residuals at 0300 assessment     Gtts: Heparin @ 600 units/hr per anti-Xa (goal range of 0.2-0.25), Lasix @ 2.5 mg/hr, and Precedex @ 0.1 mcg/kg/min       Output: Urine Urinary Catheter 100-200 cc/hr ; BM x1 (dark brown)       Labs/Accuchecks: Q6h Labs, Q4h accuchecks w/ prn insulin administered per order      Skin: Triad applied to cheek wound.  Provodine iodine applied to left arm wounds and foam dressing changed.  ECMO cannula dressings intact.  Scant drainage noted at right IJ cannula site.  Heel boots rotated.       Shift Events: 250cc 5% albumin administered for line chattering.  1 PRBC.  PRN fentanyl administered for restlessness evidence by VS changes and body stiffness.

## 2020-06-06 NOTE — SUBJECTIVE & OBJECTIVE
Interval History/Significant Events:   Got a unit of blood overnight.  No other major changes.    Follow-up For: Procedure(s) (LRB):  CREATION, TRACHEOSTOMY  (N/A)    Post-Operative Day: 32 Days Post-Op    Objective:     Vital Signs (Most Recent):  Temp: 98.1 °F (36.7 °C) (06/06/20 0300)  Pulse: (!) 125 (06/06/20 0700)  Resp: (!) 45 (06/06/20 0600)  BP: (!) 103/57 (06/04/20 1200)  SpO2: 100 % (06/06/20 0700) Vital Signs (24h Range):  Temp:  [98.1 °F (36.7 °C)-99 °F (37.2 °C)] 98.1 °F (36.7 °C)  Pulse:  [103-136] 125  Resp:  [14-51] 45  SpO2:  [100 %] 100 %  Arterial Line BP: ()/(49-77) 119/67     Weight: 60 kg (132 lb 4.4 oz)  Body mass index is 22.71 kg/m².      Intake/Output Summary (Last 24 hours) at 6/6/2020 0746  Last data filed at 6/6/2020 0600  Gross per 24 hour   Intake 3721.35 ml   Output 3616 ml   Net 105.35 ml       Physical Exam   Constitutional: He appears well-developed.   HENT:   Head: Normocephalic and atraumatic.   Eyes: Pupils are equal, round, and reactive to light.   Neck: Normal range of motion.   Cardiovascular:   tachycardic   Pulmonary/Chest:   Trached, mechanically ventilated  On ECMO, cannulated in RIJ and fem vein   Abdominal: Soft. He exhibits no distension.   Musculoskeletal: Normal range of motion.   Neurological:   sedated   Skin: Skin is warm.   Vitals reviewed.      Vents:  Vent Mode: A/C (06/06/20 0110)  Ventilator Initiated: Yes (04/10/20 2247)  Set Rate: 12 BPM (06/06/20 0110)  Vt Set: 200 mL (05/18/20 1244)  PEEP/CPAP: 6 cmH20 (06/06/20 0110)  Oxygen Concentration (%): 50 (06/06/20 0600)  Peak Airway Pressure: 36 cmH2O (06/06/20 0110)  Plateau Pressure: 34 cmH20 (06/06/20 0110)  Total Ve: 9.67 mL (06/06/20 0110)  F/VT Ratio<105 (RSBI): 139 (06/06/20 0110)    Lines/Drains/Airways     Peripherally Inserted Central Catheter Line            PICC Double Lumen 05/05/20 1707 right basilic 31 days          Central Venous Catheter Line                 ECMO Cannula 04/25/20 1120  right femoral vein 41 days         ECMO Cannula 04/25/20 1120 right internal jugular 41 days          Drain                 NG/OG Tube 05/03/20 1215 Dillon sump;nasogastric 18 Fr. Left nostril 33 days         Urethral Catheter 05/28/20 1630 8 days          Airway                 Surgical Airway 05/05/20 1500 Shiley Cuffed 31 days          Arterial Line                 Arterial Line 04/27/20 1100 Right Brachial 39 days          Peripheral Intravenous Line                 Peripheral IV - Single Lumen 06/06/20 0136 18 G Right Hand less than 1 day         Peripheral IV - Single Lumen 06/06/20 0137 18 G Right Hand less than 1 day                Significant Labs:    CBC/Anemia Profile:  Recent Labs   Lab 06/05/20 0100 06/05/20 1400 06/05/20 1947 06/05/20 2010 06/06/20 0100 06/06/20  0148   WBC 12.50   < > 14.14*  --  15.54*  --  14.62*  --    HGB 8.2*   < > 10.0*  --  9.7*  --  8.9*  --    HCT 25.5*   < > 31.5*   < > 29.7* 30* 28.3* 28*   PLT 96*   < > 106*  --  100*  --  89*  --    MCV 95   < > 90  --  90  --  91  --    RDW 14.9*   < > 17.4*  --  17.2*  --  17.3*  --    FERRITIN 1,172*  --   --   --   --   --  1,447*  --     < > = values in this interval not displayed.        Chemistries:  Recent Labs   Lab 06/05/20 1400 06/05/20 1947 06/06/20 0100   * 150* 150*   K 3.8 4.3 4.8   * 113* 113*   CO2 29 29 25   BUN 61* 60* 66*   CREATININE 0.8 0.7 0.8   CALCIUM 9.8 10.0 10.0   ALBUMIN 3.5 3.4* 3.3*   PROT 6.7 6.7 6.3   BILITOT 1.4* 1.3* 1.3*   ALKPHOS 122 126 133   ALT 32 32 32   AST 29 32 32   MG 2.3 2.3 2.3   PHOS 2.9 2.7 2.9       Significant Imaging:  I have reviewed and interpreted all pertinent imaging results/findings within the past 24 hours.

## 2020-06-06 NOTE — ASSESSMENT & PLAN NOTE
Acute respiratory distress syndrome (ARDS) due to COVID-19 virus  - Crich switched to ETT on 4/11  - Monika weaned off 5/5  - Tracheostomy and bronch 5/5  - severe thrombocytopenia resolved; transfuse for under 30K  - Aspirin 81 daily  - Labetalol 300BID  - RPM at 2600; flows at 2.8  - Sweep at 5; CO2 goal is 59 or below; do not increase sweep for pCO2s 59 or below as long as patient is not acidotic, as these high pCO2s are respiratory compensation for alkalosis  - Oxygenator Fi at 65%  - CT head and chest imaging reviewed with profound basilar changes concerning for hypoxemic injury, neurology following, poor prognosis, need to have goals of care discussion with family and review CT findings     Tongue laceration      -ENT evaluated, laceration superficial      -recs: bite block vs paralysis, will monitor    Sedation  - Valium, seroquel, PRN Fentanyl     UGI Bleed  - Scoped by GI 5/3 with epinephrine injection and clip placement for an actively bleeding D2/D3 Diuelafoy lesion    FEN/GI  - TF at 45, goal      - Lasix gtt 2.5  - FWB for hypernatremia 400mL q4     Anticoagulation      - Goal aXa 0.2-0.25 Q6hrs      - Hep gtt 600      - monitor LDH    Prophylaxis      - protonix q12 IV       - leukocytosis stable      - stopped Zosyn yesterday      - PICC placed 5/5; central line removed 5/6

## 2020-06-07 NOTE — ASSESSMENT & PLAN NOTE
Ranjana Sanchez is a 55 y.o. 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  This was switched to ETT on 4/11.  Cannulated on 4/25.    Neuro:  Precedex .1  5 of valium  100 BID of seroquel  Fentanyl patch  Encephalopathy with poor neuro exam  - CT on June 2 showing bilateral basal ganglia infarcts, which explains the neuro exam findings. His infarcts are sizeable, and will cause permanent deficits, the extent of which is unknown. The likelihood of meaningful recovery is low. Neuro consulted to prognosticate, and agree with conclusion although recommending MRI.       Pulm  AC/VC+ on the vent.  Sweep of 5 today, 2600 RPM    TVs improving around low 300s now, however will need more time on ECMO    Cardiac  Levo and vaso as needed  cardene off  MAP goal < 90    FEN/GI  TFs at goal  Sodium rising to 153 today. Will increase free water bolus from 300 -> 400 cc q4hrs    Renal  Making urine, diuresing agressively  Lasix gtt at 2.5    Heme  Hep gtt at 600, goal 0.25-0.3 to prevent another GI bleed  Has been at goal at this rate  Transfused 2 units prbc    ID  WBC 12.43    Dispo: CAT

## 2020-06-07 NOTE — PROGRESS NOTES
EC[facility] Specialists shift report    Date: 06/07/2020  ECMO Specialist:  Ashley Francisco    Pump parameters:  RPM: 2600  Flow:  2.9  Sweep:  5  FiO2:  65    Oxygenator status:  Clots: Pre and post  Fibrin: Pre and post    Pressure trends:  P1: 95  P2: 67  Delta P: 28    Volume status:  Chugging noted? Dry and chirpping  CVP: NA  MAP:  60-80  MD notified (name):  Kenan    Anticoagulation:  ACT/aPTT/Xa parameters: .2-.25  ACT/aPTT/Xa trends this shift: .28-.33    Cannula size / status / placement:  Arterial:   Venous 1: RIJV 23F@4cm  Venous 2: RFV 27F@ 12cm  Dual lumen:      Additional Comments:  Pt. H+H low.  Needed volume.  Circuit dry.  2 units PRBCs. Albumin given. See RNs notes for more information.  Breathing 40-60 BPM.  Maintained the flow.

## 2020-06-07 NOTE — PROGRESS NOTES
[facility] - Respiratory ICU  Cardiothoracic Surgery  Daily ECMO Progress Note    Patient Name: Ranjana Sanchez  MRN: [id number]  Admission Date: 4/10/2020  Hospital Length of Stay: 58 days  Code Status: Full Code   Attending Physician: Francis Ayon MD   Referring Provider: Francis Ayon MD  Principal Problem:Acute respiratory disease due to COVID-19 virus    Subjective:   Interval History:  Got 2U pRBC overnight, and Hb continues to drift down. No bloody or tarry BMs yet though suspect UGI bleed. UOP 3.4L, net negative 575mL in 24hrs. Hep gtt increased overnight, even though aXa appears to have been to goal, now being lowered from 800 to 700. Needs to be further lowered, darin with suspected GI bleed.    Medications:  Continuous Infusions:   dexmedetomidine (PRECEDEX) infusion 0.4 mcg/kg/hr (06/07/20 1100)    furosemide (LASIX) 2 mg/mL continuous infusion (non-titrating) 2.5 mg/hr (06/07/20 1100)    heparin (porcine) in 5 % dex 700 Units/hr (06/07/20 1100)    norepinephrine bitartrate-D5W Stopped (06/07/20 0900)    vasopressin (PITRESSIN) infusion Stopped (06/04/20 1200)     Scheduled Meds:   albumin human 5%  12.5 g Intravenous Once    aspirin  81 mg Per NG tube Daily    chlorhexidine  15 mL Mouth/Throat BID    fentaNYL  1 patch Transdermal Q72H    labetalol  300 mg Per G Tube BID    multivitamin liquid no.118  10 mL Per G Tube Daily    pantoprazole  40 mg Per NG tube BID    polyethylene glycol  17 g Per NG tube Daily    potassium chloride 10%  40 mEq Per NG tube BID    propranoloL  20 mg Per NG tube TID    psyllium husk (aspartame)  1 packet Per NG tube TID    sucralfate  1 g Per NG tube Q6H    vitamin D  1,000 Units Per G Tube Daily        Objective:     Vital Signs (Most Recent):  Temp: 98.4 °F (36.9 °C) (06/07/20 0815)  Pulse: (!) 131 (06/07/20 1100)  Resp: (!) 53 (06/07/20 1100)  BP: 114/62 (06/07/20 0800)  SpO2: 100 % (06/07/20 1100) Vital Signs (24h Range):  Temp:  [98.1  °F (36.7 °C)-98.5 °F (36.9 °C)] 98.4 °F (36.9 °C)  Pulse:  [] 131  Resp:  [23-56] 53  SpO2:  [98 %-100 %] 100 %  BP: (114)/(62) 114/62  Arterial Line BP: ()/(43-80) 118/67     Weight: 60 kg (132 lb 4.4 oz)  Body mass index is 22.71 kg/m².    SpO2: 100 %  O2 Device (Oxygen Therapy): ventilator    Intake/Output - Last 3 Shifts       06/05 0700 - 06/06 0659 06/06 0700 - 06/07 0659 06/07 0700 - 06/08 0659    I.V. (mL/kg) 550.2 (9.2) 558.2 (9.3)     Blood 750 700     NG/GT 2475 1600 400    IV Piggyback       Total Intake(mL/kg) 3775.2 (62.9) 2858.2 (47.6) 400 (6.7)    Urine (mL/kg/hr) 3770 (2.6) 3280 (2.3) 715 (2.5)    Stool 0      Blood 6 5 1    Total Output 3776 3285 716    Net -0.8 -426.8 -316           Stool Occurrence 1 x            Lines/Drains/Airways     Peripherally Inserted Central Catheter Line            PICC Double Lumen 05/05/20 1707 right basilic 32 days          Central Venous Catheter Line                 ECMO Cannula 04/25/20 1120 right femoral vein 43 days         ECMO Cannula 04/25/20 1120 right internal jugular 43 days          Drain                 NG/OG Tube 05/03/20 1215 Persia sump;nasogastric 18 Fr. Left nostril 34 days         Urethral Catheter 05/28/20 1630 9 days          Airway                 Surgical Airway 05/05/20 1500 Shiley Cuffed 32 days          Arterial Line                 Arterial Line 04/27/20 1100 Right Brachial 41 days          Peripheral Intravenous Line                 Peripheral IV - Single Lumen 06/06/20 0136 18 G Right Hand 1 day         Peripheral IV - Single Lumen 06/06/20 0137 18 G Left Hand 1 day                Physical Exam    Constitutional: He is sedated, and intubated.   Head: Normocephalic   Cardiovascular: Tachycardia 100s   Pulmonary/Chest: intubated vent FiO2 50% PEEP 6, ECMO RPM 2600, Flow 2.9, sweep 5, oxygenator FiO2 65%  Skin: L neck and L groin cannulas intact, no flashing, clots pre and post-oxygenator, sutures intact and secure with 2 new  sutures to upper (return) cannula.  Nursing note and vitals reviewed.    Significant Labs:  BMP:   Recent Labs   Lab 06/07/20  0800   *   *   K 3.3*   *   CO2 27   BUN 65*   CREATININE 0.7   CALCIUM 9.4   MG 2.0     CBC:   Recent Labs   Lab 06/07/20  0800 06/07/20  0800   WBC 12.28  --    RBC 3.22*  --    HGB 9.5*  --    HCT 29.5* 27*   PLT 93*  --    MCV 92  --    MCH 29.5  --    MCHC 32.2  --      LFTs:   Recent Labs   Lab 06/07/20  0800   ALT 31   AST 30   ALKPHOS 112   BILITOT 1.6*   PROT 6.1   ALBUMIN 3.7     Significant Diagnostics:  Cxr: stable    Ct Head Without Contrast 6/2/2020  -symmetrical hypoattenuation bilobed basal ganglia concerning for edema as well as poor gray-white matter differentiation cerebral hemispheres diffusely concerning for profound hypoxic anoxic encephalopathy  -no evidence for acute intracranial hemorrhage    Ct Chest Without Contrast 6/2/2020  -small left pneumothorax with no mediastinal shift and minimal loss of left lung volume  -no right pneumothorax, pneumomediastinum or pneumoperitoneum  -widespread largely homogeneous ground-glass disease throughout all lobes as well as tubular and varicoid bronchiectasis in this patient with history of COVID-19 pneumonia maintained on ECMO  -fine reticular and reticulonodular pattern is evident in the periphery of the lungs consistent with interstitial lung disease  -findings are not typical for pulmonary edema due to elevated pulmonary venous pressures and/or abnormal capillary permeability      Assessment/Plan:     Acute respiratory distress syndrome (ARDS) due to COVID-19 virus  - Crich switched to ETT on 4/11  - Monika weaned off 5/5  - Tracheostomy and bronch 5/5  - severe thrombocytopenia resolved; transfuse for under 30K  - Aspirin 81 daily  - Labetalol 300BID  - RPM at 2600; flows at 2.9  - Sweep at 5; CO2 goal is 59 or below  - Do not increase sweep for pCO2s 59 or below as long as patient is not acidotic, as these  high pCO2s are respiratory compensation for alkalosis  - Oxygenator Fi at 65%, wean  - CT head and chest imaging reviewed with profound basilar changes concerning for hypoxemic injury, neurology following, poor prognosis, need to have goals of care discussion with family and review CT findings     Tongue laceration      -ENT evaluated, laceration superficial      -recs: bite block vs paralysis, will monitor    Sedation  - Valium, seroquel, PRN Fentanyl     UGI Bleed  - Scoped by GI 5/3 with epinephrine injection and clip placement for an actively bleeding D2/D3 Diuelafoy lesion  - Recommend GI re-consultation if Hb continues to drift and further blood is aspirated from NGT or patient has bloody BM, etc    FEN/GI  - TF at 45, goal      - Lasix gtt 2.5  - FWB for hypernatremia 400mL q4     Anticoagulation      - Goal aXa 0.2-0.25 Q6hrs      - Hep gtt must be lowered to 600      - monitor LDH    Prophylaxis      - protonix q12 IV       - leukocytosis stable      - PICC placed 5/5; central line removed 5/6    Anne Miller MD  Cardiothoracic Surgery  [facility] - Respiratory ICU        VV ECMO circuit checked and all parameters reviewed. Anticoagulation was managed and all cannulation exit sites along with review of labs and radiology studies performed. Speed and functioning of the pump along with oxygenator quality reviewed.  Patient continues to be stable on  tube feeds at 45 cc an hour.  No concerns from the nursing staff or perfusion team with any flow related issues.  Some drop in hemoglobin was noted which has been addressed by decreasing the amount of heparin drip as well as giving some blood transfusion.  If this does not resolve the issue an extensive workup will be carried out.  Significant amount of time was spent in coordinating care  between different teams which consisted of the surgical ICU to, perfusion Staff and the nursing staff.  All questions and concerns all team members were  addressed.

## 2020-06-07 NOTE — PLAN OF CARE
Problem: Adult Inpatient Plan of Care  Goal: Plan of Care Review  Outcome: Ongoing, Not Progressing  VS currently stable. Patient remains on VV ECMO: Flows 2.9-3.2 RPM 2500, Sweep of 5 and FiO2 of 65%. Remains trached on ventilator A/C PC 50%FiO2 and 6 of peer, rate of 30. 2uPRBC given this shift for low H&H. Gtts: Precedex at 0.1mcg/kg/hr, norepinephrine gtt at 0.02mcg/kg/min started for MAPS in 40's. Heparin at 700u/hr, Lasix at 2.5mg/hr. Pt turned q2h no new skin breakdown noted. Pt contracted. PROM performed q2h. Labs trended. Potassium replaced. Free water boluses given. See flowsheets for assessments and intake and output. Will continue to monitor closely.

## 2020-06-07 NOTE — ASSESSMENT & PLAN NOTE
Acute respiratory distress syndrome (ARDS) due to COVID-19 virus  - Crich switched to ETT on 4/11  - Monika weaned off 5/5  - Tracheostomy and bronch 5/5  - severe thrombocytopenia resolved; transfuse for under 30K  - Aspirin 81 daily  - Labetalol 300BID  - RPM at 2600; flows at 2.9  - Sweep at 5; CO2 goal is 59 or below  - Do not increase sweep for pCO2s 59 or below as long as patient is not acidotic, as these high pCO2s are respiratory compensation for alkalosis  - Oxygenator Fi at 65%, wean  - CT head and chest imaging reviewed with profound basilar changes concerning for hypoxemic injury, neurology following, poor prognosis, need to have goals of care discussion with family and review CT findings     Tongue laceration      -ENT evaluated, laceration superficial      -recs: bite block vs paralysis, will monitor    Sedation  - Valium, seroquel, PRN Fentanyl     UGI Bleed  - Scoped by GI 5/3 with epinephrine injection and clip placement for an actively bleeding D2/D3 Diuelafoy lesion  - Recommend GI re-consultation if Hb continues to drift and further blood is aspirated from NGT or patient has bloody BM, etc    FEN/GI  - TF at 45, goal      - Lasix gtt 2.5  - FWB for hypernatremia 400mL q4     Anticoagulation      - Goal aXa 0.2-0.25 Q6hrs      - Hep gtt must be lowered to 600      - monitor LDH    Prophylaxis      - protonix q12 IV       - leukocytosis stable      - PICC placed 5/5; central line removed 5/6

## 2020-06-07 NOTE — PROGRESS NOTES
"06/06/2020  Maikel Zepeda    Current provider:  Francis Ayon MD      As floor rounding has been requested to be limited during COVID-19 patient quarantine by Infectious Disease and leadership, only "electronic" rounding has been performed on this mechanical assist device patient.  Electronic rounding includes verifying in Epic that current settings and measurements for the device have been documented appropriately and that they are within limits.  Additionally, this rounding verifies that the EQUIPMENT section of the VAD flowsheet is documented in Epic, specifically checking the presence of emergency equipment and controller self test.  Any discrepancies will be related to the care team.    In emergency, the nursing units have been notified to contact the perfusion department either by:  Calling [phone] from 630am to 4pm, or by contacting the hospital  from 4pm to 630am and asking to speak with the perfusionist on call.    Maikel Zepeda  10:54 PM  "

## 2020-06-07 NOTE — SUBJECTIVE & OBJECTIVE
Interval History:  Got 2U pRBC overnight, and Hb continues to drift down. No bloody or tarry BMs yet though suspect UGI bleed. UOP 3.4L, net negative 575mL in 24hrs. Hep gtt increased overnight, even though aXa appears to have been to goal, now being lowered from 800 to 700. Needs to be further lowered, darin with suspected GI bleed.    Medications:  Continuous Infusions:   dexmedetomidine (PRECEDEX) infusion 0.4 mcg/kg/hr (06/07/20 1100)    furosemide (LASIX) 2 mg/mL continuous infusion (non-titrating) 2.5 mg/hr (06/07/20 1100)    heparin (porcine) in 5 % dex 700 Units/hr (06/07/20 1100)    norepinephrine bitartrate-D5W Stopped (06/07/20 0900)    vasopressin (PITRESSIN) infusion Stopped (06/04/20 1200)     Scheduled Meds:   albumin human 5%  12.5 g Intravenous Once    aspirin  81 mg Per NG tube Daily    chlorhexidine  15 mL Mouth/Throat BID    fentaNYL  1 patch Transdermal Q72H    labetalol  300 mg Per G Tube BID    multivitamin liquid no.118  10 mL Per G Tube Daily    pantoprazole  40 mg Per NG tube BID    polyethylene glycol  17 g Per NG tube Daily    potassium chloride 10%  40 mEq Per NG tube BID    propranoloL  20 mg Per NG tube TID    psyllium husk (aspartame)  1 packet Per NG tube TID    sucralfate  1 g Per NG tube Q6H    vitamin D  1,000 Units Per G Tube Daily        Objective:     Vital Signs (Most Recent):  Temp: 98.4 °F (36.9 °C) (06/07/20 0815)  Pulse: (!) 131 (06/07/20 1100)  Resp: (!) 53 (06/07/20 1100)  BP: 114/62 (06/07/20 0800)  SpO2: 100 % (06/07/20 1100) Vital Signs (24h Range):  Temp:  [98.1 °F (36.7 °C)-98.5 °F (36.9 °C)] 98.4 °F (36.9 °C)  Pulse:  [] 131  Resp:  [23-56] 53  SpO2:  [98 %-100 %] 100 %  BP: (114)/(62) 114/62  Arterial Line BP: ()/(43-80) 118/67     Weight: 60 kg (132 lb 4.4 oz)  Body mass index is 22.71 kg/m².    SpO2: 100 %  O2 Device (Oxygen Therapy): ventilator    Intake/Output - Last 3 Shifts       06/05 0700 - 06/06 0659 06/06 0700 - 06/07 0659  06/07 0700 - 06/08 0659    I.V. (mL/kg) 550.2 (9.2) 558.2 (9.3)     Blood 750 700     NG/GT 2475 1600 400    IV Piggyback       Total Intake(mL/kg) 3775.2 (62.9) 2858.2 (47.6) 400 (6.7)    Urine (mL/kg/hr) 3770 (2.6) 3280 (2.3) 715 (2.5)    Stool 0      Blood 6 5 1    Total Output 3776 3285 716    Net -0.8 -426.8 -316           Stool Occurrence 1 x            Lines/Drains/Airways     Peripherally Inserted Central Catheter Line            PICC Double Lumen 05/05/20 1707 right basilic 32 days          Central Venous Catheter Line                 ECMO Cannula 04/25/20 1120 right femoral vein 43 days         ECMO Cannula 04/25/20 1120 right internal jugular 43 days          Drain                 NG/OG Tube 05/03/20 1215 Curry sump;nasogastric 18 Fr. Left nostril 34 days         Urethral Catheter 05/28/20 1630 9 days          Airway                 Surgical Airway 05/05/20 1500 Shiley Cuffed 32 days          Arterial Line                 Arterial Line 04/27/20 1100 Right Brachial 41 days          Peripheral Intravenous Line                 Peripheral IV - Single Lumen 06/06/20 0136 18 G Right Hand 1 day         Peripheral IV - Single Lumen 06/06/20 0137 18 G Left Hand 1 day                Physical Exam    Constitutional: He is sedated, and intubated.   Head: Normocephalic   Cardiovascular: Tachycardia 100s   Pulmonary/Chest: intubated vent FiO2 50% PEEP 6, ECMO RPM 2600, Flow 2.9, sweep 5, oxygenator FiO2 65%  Skin: L neck and L groin cannulas intact, no flashing, clots pre and post-oxygenator, sutures intact and secure with 2 new sutures to upper (return) cannula.  Nursing note and vitals reviewed.    Significant Labs:  BMP:   Recent Labs   Lab 06/07/20  0800   *   *   K 3.3*   *   CO2 27   BUN 65*   CREATININE 0.7   CALCIUM 9.4   MG 2.0     CBC:   Recent Labs   Lab 06/07/20  0800 06/07/20  0800   WBC 12.28  --    RBC 3.22*  --    HGB 9.5*  --    HCT 29.5* 27*   PLT 93*  --    MCV 92  --    MCH 29.5   --    MCHC 32.2  --      LFTs:   Recent Labs   Lab 06/07/20  0800   ALT 31   AST 30   ALKPHOS 112   BILITOT 1.6*   PROT 6.1   ALBUMIN 3.7     Significant Diagnostics:  Cxr: stable    Ct Head Without Contrast 6/2/2020  -symmetrical hypoattenuation bilobed basal ganglia concerning for edema as well as poor gray-white matter differentiation cerebral hemispheres diffusely concerning for profound hypoxic anoxic encephalopathy  -no evidence for acute intracranial hemorrhage    Ct Chest Without Contrast 6/2/2020  -small left pneumothorax with no mediastinal shift and minimal loss of left lung volume  -no right pneumothorax, pneumomediastinum or pneumoperitoneum  -widespread largely homogeneous ground-glass disease throughout all lobes as well as tubular and varicoid bronchiectasis in this patient with history of COVID-19 pneumonia maintained on ECMO  -fine reticular and reticulonodular pattern is evident in the periphery of the lungs consistent with interstitial lung disease  -findings are not typical for pulmonary edema due to elevated pulmonary venous pressures and/or abnormal capillary permeability

## 2020-06-07 NOTE — PROGRESS NOTES
[facility] - Respiratory ICU  Critical Care - Surgery  Progress Note    Patient Name: Ranjana Sanchez  MRN: [id number]  Admission Date: 4/10/2020  Hospital Length of Stay: 58 days  Code Status: Full Code  Attending Provider: Francis Ayon MD  Primary Care Provider: Primary Doctor No   Principal Problem: Acute respiratory disease due to COVID-19 virus    Subjective:       Interval History/Significant Events:   2U prbc given over last shift, and Hb now 8.8/28.1. UOP 3.2L, net negative 426mL in 24hrs. Hep gtt ongoing at 600. Continues to have decorticate posturing.    Follow-up For: Procedure(s) (LRB):  CREATION, TRACHEOSTOMY  (N/A)    Post-Operative Day: 32 Days Post-Op    Objective:     Vital Signs (Most Recent):  Temp: 98.3 °F (36.8 °C) (06/07/20 0545)  Pulse: (!) 113 (06/07/20 0700)  Resp: (!) 40 (06/07/20 0615)  BP: (!) 103/57 (06/04/20 1200)  SpO2: 100 % (06/07/20 0700) Vital Signs (24h Range):  Temp:  [98.1 °F (36.7 °C)-98.8 °F (37.1 °C)] 98.3 °F (36.8 °C)  Pulse:  [] 113  Resp:  [23-56] 40  SpO2:  [98 %-100 %] 100 %  Arterial Line BP: ()/(43-80) 112/60     Weight: 60 kg (132 lb 4.4 oz)  Body mass index is 22.71 kg/m².      Intake/Output Summary (Last 24 hours) at 6/7/2020 0806  Last data filed at 6/7/2020 0600  Gross per 24 hour   Intake 2458.19 ml   Output 3085 ml   Net -626.81 ml       Physical Exam   Constitutional: He appears well-developed.   HENT:   Head: Normocephalic and atraumatic.   Eyes: Pupils are equal, round, and reactive to light.   Neck: Normal range of motion.   Cardiovascular:   tachycardic   Pulmonary/Chest:   Trached, mechanically ventilated  On ECMO, cannulated in RIJ and fem vein   Abdominal: Soft. He exhibits no distension.   Musculoskeletal: Normal range of motion.   Neurological:   sedated   Skin: Skin is warm.   Vitals reviewed.      Vents:  Vent Mode: A/C (06/07/20 0203)  Ventilator Initiated: Yes (04/10/20 2247)  Set Rate: 30 BPM (06/07/20 0203)  Vt Set:  250 mL (06/06/20 0858)  Pressure Support: 20 cmH20 (06/06/20 0858)  PEEP/CPAP: 6 cmH20 (06/07/20 0203)  Oxygen Concentration (%): 50 (06/07/20 0615)  Peak Airway Pressure: 31 cmH2O (06/07/20 0203)  Plateau Pressure: 28 cmH20 (06/07/20 0203)  Total Ve: 11.2 mL (06/07/20 0203)  F/VT Ratio<105 (RSBI): 138.41 (06/07/20 0203)    Lines/Drains/Airways     Peripherally Inserted Central Catheter Line            PICC Double Lumen 05/05/20 1707 right basilic 32 days          Central Venous Catheter Line                 ECMO Cannula 04/25/20 1120 right femoral vein 42 days         ECMO Cannula 04/25/20 1120 right internal jugular 42 days          Drain                 NG/OG Tube 05/03/20 1215 [place] sump;nasogastric 18 Fr. Left nostril 34 days         Urethral Catheter 05/28/20 1630 9 days          Airway                 Surgical Airway 05/05/20 1500 Shiley Cuffed 32 days          Arterial Line                 Arterial Line 04/27/20 1100 Right Brachial 40 days          Peripheral Intravenous Line                 Peripheral IV - Single Lumen 06/06/20 0136 18 G Right Hand 1 day         Peripheral IV - Single Lumen 06/06/20 0137 18 G Left Hand 1 day                Significant Labs:    CBC/Anemia Profile:  Recent Labs   Lab 06/06/20  0100  06/06/20  1421  06/06/20 2000 06/06/20 2010 06/07/20  0144 06/07/20  0200   WBC 14.62*   < > 13.32*  --  10.76  --   --  12.43   HGB 8.9*   < > 9.4*  --  7.6*  --   --  8.8*   HCT 28.3*   < > 29.6*   < > 24.6* 24* 26* 28.1*   PLT 89*   < > 89*  --  79*  --   --  94*   MCV 91   < > 93  --  94  --   --  93   RDW 17.3*   < > 16.5*  --  16.5*  --   --  15.7*   FERRITIN 1,447*  --   --   --   --   --   --  1,397*    < > = values in this interval not displayed.        Chemistries:  Recent Labs   Lab 06/06/20  1421 06/06/20 2000 06/07/20  0200   * 152* 153*   K 4.3 3.6 4.0   * 115* 116*   CO2 27 29 26   BUN 67* 72* 70*   CREATININE 0.8 0.7 0.7   CALCIUM 10.0 9.0 9.4   ALBUMIN 3.4* 3.2*  3.4*   PROT 6.2 5.6* 5.9*   BILITOT 1.3* 1.2* 1.4*   ALKPHOS 129 111 122   ALT 32 27 31   AST 29 28 30   MG 2.2 2.2 2.1   PHOS 3.3 3.6 3.0       Significant Imaging:  I have reviewed and interpreted all pertinent imaging results/findings within the past 24 hours.    Assessment/Plan:     COVID-19 virus detected  Ranjana Sanchez is a 55 y.o. 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  This was switched to ETT on 4/11.  Cannulated on 4/25.    Neuro:  Precedex .1  5 of valium  100 BID of seroquel  Fentanyl patch  Encephalopathy with poor neuro exam  - CT on June 2 showing bilateral basal ganglia infarcts, which explains the neuro exam findings. His infarcts are sizeable, and will cause permanent deficits, the extent of which is unknown. The likelihood of meaningful recovery is low. Neuro consulted to prognosticate, and agree with conclusion although recommending MRI.       Pulm  AC/VC+ on the vent.  Sweep of 5 today, 2600 RPM    TVs improving around low 300s now, however will need more time on ECMO    Cardiac  Levo and vaso as needed  cardene off  MAP goal < 90    FEN/GI  TFs at goal  Sodium rising to 153 today. Will increase free water bolus from 300 -> 400 cc q4hrs    Renal  Making urine, diuresing agressively  Lasix gtt at 2.5    Heme  Hep gtt at 600, goal 0.25-0.3 to prevent another GI bleed  Has been at goal at this rate  Transfused 2 units prbc    ID  WBC 12.43    Dispo: CAT Barton MD  Critical Care - Surgery  [facility] - Respiratory ICU

## 2020-06-07 NOTE — SUBJECTIVE & OBJECTIVE
Interval History/Significant Events:   2U prbc given over last shift, and Hb now 8.8/28.1. UOP 3.2L, net negative 426mL in 24hrs. Hep gtt ongoing at 600. Continues to have decorticate posturing.    Follow-up For: Procedure(s) (LRB):  CREATION, TRACHEOSTOMY  (N/A)    Post-Operative Day: 32 Days Post-Op    Objective:     Vital Signs (Most Recent):  Temp: 98.3 °F (36.8 °C) (06/07/20 0545)  Pulse: (!) 113 (06/07/20 0700)  Resp: (!) 40 (06/07/20 0615)  BP: (!) 103/57 (06/04/20 1200)  SpO2: 100 % (06/07/20 0700) Vital Signs (24h Range):  Temp:  [98.1 °F (36.7 °C)-98.8 °F (37.1 °C)] 98.3 °F (36.8 °C)  Pulse:  [] 113  Resp:  [23-56] 40  SpO2:  [98 %-100 %] 100 %  Arterial Line BP: ()/(43-80) 112/60     Weight: 60 kg (132 lb 4.4 oz)  Body mass index is 22.71 kg/m².      Intake/Output Summary (Last 24 hours) at 6/7/2020 0806  Last data filed at 6/7/2020 0600  Gross per 24 hour   Intake 2458.19 ml   Output 3085 ml   Net -626.81 ml       Physical Exam   Constitutional: He appears well-developed.   HENT:   Head: Normocephalic and atraumatic.   Eyes: Pupils are equal, round, and reactive to light.   Neck: Normal range of motion.   Cardiovascular:   tachycardic   Pulmonary/Chest:   Trached, mechanically ventilated  On ECMO, cannulated in RIJ and fem vein   Abdominal: Soft. He exhibits no distension.   Musculoskeletal: Normal range of motion.   Neurological:   sedated   Skin: Skin is warm.   Vitals reviewed.      Vents:  Vent Mode: A/C (06/07/20 0203)  Ventilator Initiated: Yes (04/10/20 8987)  Set Rate: 30 BPM (06/07/20 0203)  Vt Set: 250 mL (06/06/20 0858)  Pressure Support: 20 cmH20 (06/06/20 0858)  PEEP/CPAP: 6 cmH20 (06/07/20 0203)  Oxygen Concentration (%): 50 (06/07/20 0615)  Peak Airway Pressure: 31 cmH2O (06/07/20 0203)  Plateau Pressure: 28 cmH20 (06/07/20 0203)  Total Ve: 11.2 mL (06/07/20 0203)  F/VT Ratio<105 (RSBI): 138.41 (06/07/20 0203)    Lines/Drains/Airways     Peripherally Inserted Central Catheter  Line            PICC Double Lumen 05/05/20 1707 right basilic 32 days          Central Venous Catheter Line                 ECMO Cannula 04/25/20 1120 right femoral vein 42 days         ECMO Cannula 04/25/20 1120 right internal jugular 42 days          Drain                 NG/OG Tube 05/03/20 1215 [place] sump;nasogastric 18 Fr. Left nostril 34 days         Urethral Catheter 05/28/20 1630 9 days          Airway                 Surgical Airway 05/05/20 1500 Shiley Cuffed 32 days          Arterial Line                 Arterial Line 04/27/20 1100 Right Brachial 40 days          Peripheral Intravenous Line                 Peripheral IV - Single Lumen 06/06/20 0136 18 G Right Hand 1 day         Peripheral IV - Single Lumen 06/06/20 0137 18 G Left Hand 1 day                Significant Labs:    CBC/Anemia Profile:  Recent Labs   Lab 06/06/20  0100 06/06/20  1421 06/06/20 2000 06/06/20 2010 06/07/20  0144 06/07/20  0200   WBC 14.62*   < > 13.32*  --  10.76  --   --  12.43   HGB 8.9*   < > 9.4*  --  7.6*  --   --  8.8*   HCT 28.3*   < > 29.6*   < > 24.6* 24* 26* 28.1*   PLT 89*   < > 89*  --  79*  --   --  94*   MCV 91   < > 93  --  94  --   --  93   RDW 17.3*   < > 16.5*  --  16.5*  --   --  15.7*   FERRITIN 1,447*  --   --   --   --   --   --  1,397*    < > = values in this interval not displayed.        Chemistries:  Recent Labs   Lab 06/06/20  1421 06/06/20 2000 06/07/20  0200   * 152* 153*   K 4.3 3.6 4.0   * 115* 116*   CO2 27 29 26   BUN 67* 72* 70*   CREATININE 0.8 0.7 0.7   CALCIUM 10.0 9.0 9.4   ALBUMIN 3.4* 3.2* 3.4*   PROT 6.2 5.6* 5.9*   BILITOT 1.3* 1.2* 1.4*   ALKPHOS 129 111 122   ALT 32 27 31   AST 29 28 30   MG 2.2 2.2 2.1   PHOS 3.3 3.6 3.0       Significant Imaging:  I have reviewed and interpreted all pertinent imaging results/findings within the past 24 hours.

## 2020-06-07 NOTE — PLAN OF CARE
"      SICU PLAN OF CARE NOTE    Dx: Acute respiratory disease due to COVID-19 virus    Shift Events: Pt with several episodes of increased tachypenia, tachycardia, and hypertension. Often related to BM, resolves once pt cleaned. During these episodes, pt's eyes open, eyes looking around, not focusing on anyone. In the middle of the afternoon, pt appeared even more agitated. Tylenol administered, fentanyl administered. Pt eventually appeared to relax and vitals trending back down. Dr Ayon, Dr Ariza, and Dr Alston updated throughout shift on pts vitals.     Goals of Care: LLE US to be performed this evening. BP goal 70-90, AntiXa 0.2-0.25.    Neuro: Pt withdraws all extremities slightly to peripheral pain. Pt opens eyes spontaneously. Intermittently appears to look at stimuli, but does not track. Does not follow commands.     Vital Signs: /62 (BP Location: Left arm, Patient Position: Lying)   Pulse 105   Temp 97.8 °F (36.6 °C) (Oral)   Resp (!) 44   Ht 5' 4" (1.626 m)   Wt 60 kg (132 lb 4.4 oz)   SpO2 99%   BMI 22.71 kg/m²     Respiratory: Ventilator, AC/PC 50%, 6 PEEP    ECMO:  FiO2 decreased to 50%, Sweep decreased to 4. Speed remains 2600.  Flow 2.9. Femoral catheter dressing changed.     Diet:Tube feeds restarted for goal of 45 ml/hr. Pt with 8 small, soft dark green gelatinous BMs.     Gtts: Heparin decreased to 500 units /hr  (last anti Xa 0.29), lasix at 2.5 mg/hr, precedex at 0.01 mcg/kg/hr (non-titrating). Levo stopped early in shift.     Urine Output: Urinary Catheter 100-240 cc/hour     Labs/Accuchecks: Labs q6. Electrolytes replaced PRN. H/H stable. Accuchecks q4, coverage PRN    Skin: Skin free from new breakdown. Pt turned frequently 2/2 BMs. Heel boots rotated. Devices free from skin. Triad cream applied to wound on cheek.          "

## 2020-06-07 NOTE — PROGRESS NOTES
"06/07/2020  Maikel Zepeda    Current provider:  Francis Ayon MD      As floor rounding has been requested to be limited during COVID-19 patient quarantine by Infectious Disease and leadership, only "electronic" rounding has been performed on this mechanical assist device patient.  Electronic rounding includes verifying in Epic that current settings and measurements for the device have been documented appropriately and that they are within limits.  Additionally, this rounding verifies that the EQUIPMENT section of the VAD flowsheet is documented in Epic, specifically checking the presence of emergency equipment and controller self test.  Any discrepancies will be related to the care team.    In emergency, the nursing units have been notified to contact the perfusion department either by:  Calling [phone] from 630am to 4pm, or by contacting the hospital  from 4pm to 630am and asking to speak with the perfusionist on call.    Maikel Zepeda  9:14 AM  "

## 2020-06-07 NOTE — PROGRESS NOTES
[facility] shift report    Date: 06/07/2020  ECMO Specialist:  Iesha Bedoya    Pump parameters:  RPM: 2600  Flow:  2.9  Sweep:  4  FiO2:  50    Oxygenator status:  Clots: some. Pre and post  Fibrin: some. Pre and post     Pressure trends:  P1: 90's  P2: 60's  Delta P: 20's    Volume status:  Chugging noted slightly   CVP:   MAP:    MD notified (name):  Nany     Anticoagulation:  ACT/aPTT/Xa parameters: Xa 0.2-0.25  ACT/aPTT/Xa trends this shift: 0.29    Cannula size / status / placement:  Arterial:   Venous 1: RIJV 23 fr @4 cm  Venous 2: RFV 27 fr @12 cm   Dual lumen:      Additional Comments:      Pt maintained on VV ECMO. Pt had multiple episodes of hypertension, tachycardia, and increased RR when agitated. No issues with pump flow. Sweep and Fio2 weaned this shift for pt abg. Plan is to wean sweep for CO2 <50 as long as pt is not acidotic per Dr. Miller. Heparin rate decreased this shift for Xa results.

## 2020-06-08 NOTE — NURSING
Trach site oozing bright red blood x 1hour. Pt coughing frequently with minimal secretions. Dr. Landon to bedside to assess pt. Trach dressing change, pressure applied to bleeding area. Bleeding diminished. Hydromorphone ordered x1. Dr. Landon discussed plan of care with nurse and ecmo tech regarding vital signs, ventilator, ecmo, labs. Will continue to monitor.

## 2020-06-08 NOTE — PLAN OF CARE
06/08/20 1237   Discharge Reassessment   Assessment Type Discharge Planning Reassessment   Do you have any problems affording any of your prescribed medications?   (tbd)   Discharge Plan A Long-term acute care facility (LTAC)   Discharge Plan B Long-term acute care facility (LTAC)   DME Needed Upon Discharge    (tbd)   Anticipated Discharge Disposition Long Term   Can the patient/caregiver answer the patient profile reliably? No, cognitively impaired  (pt intubated & ventilated)   How does the patient rate their overall health at the present time?   (dorcas)   Describe the patient's ability to walk at the present time. Does not walk or unable to take any steps at all   How often would a person be available to care for the patient?   (tbd)   Number of comorbid conditions (as recorded on the chart) One   Post-Acute Status   Post-Acute Authorization Placement   Post-Acute Placement Status Awaiting Internal Medical Clearance     Patient not medically stable to dc today. Patient remains 50% ventilated & on ECMO. WBC 14.9, na 151, crp 54. Will continue to follow.

## 2020-06-08 NOTE — ASSESSMENT & PLAN NOTE
Ranjana Sanchez is a 55 y.o. 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  This was switched to ETT on 4/11.  Cannulated on 4/25.    Neuro:  Precedex off  100 BID of seroquel  Fentanyl patch  Encephalopathy with poor neuro exam and decorticate posturing  - CT on June 2 showing bilateral basal ganglia infarcts, which explains the neuro exam findings. His infarcts are sizeable, and will cause permanent deficits, the extent of which is unknown. The likelihood of meaningful recovery is low. Neuro consulted to prognosticate, and agree with conclusion although recommending MRI.       Pulm  AC/VC+ on the vent.  VV ECMO: Flows 2.9-3.2 RPM 2500, Sweep of 3   TVs improving around low 300s now, however will need more time on ECMO    Cardiac  Levo as needed  Nicardipine gtt 1  MAP goal < 90    FEN/GI  TFs at goal    Renal  Making urine, diuresing agressively  Lasix gtt at 2.5    Heme  Hep gtt at 700, goal 0.25-0.3 to prevent another GI bleed  Has been at goal at this rate  Transfused 2 units prbc    ID  WBC stable    Dispo: RICU

## 2020-06-08 NOTE — PROGRESS NOTES
[facility] shift report    Date: 06/08/2020  ECMO Specialist:  Monik Bryant    Pump parameters:  RPM: 2600  Flow:  2.82  Sweep:  3  FiO2:  40%    Oxygenator status:  Clots: yes, in cannulas and pre/post oxy  Fibrin: yes    Pressure trends:  P1: 92  P2: 64  Delta P: 28    Volume status:  Chugging noted yes  CVP:   MAP: in the 80's   MD notified (name):  Dr Ayon    Anticoagulation:  Xa parameters: 020-0.25  ACT/aPTT/Xa trends this shift: 0.23-0.23    Cannula size / status / placement:  Arterial:   Venous 1: RIJV 23FR @4cm  Venous 2:RFV 27FR @12cm  Dual lumen:      Additional Comments:Pt maintain on VV ECMO, chugging all day, Dr Ayon aware.

## 2020-06-08 NOTE — PROGRESS NOTES
Dr. Ariza made aware of patient becoming bradycardic in the 20's with subsequent drop in blood pressure to 40/20's. Precedex gtt off. Orders to d/c propranolol, draw labs now, and have atropine at bedside. Will continue to watch patient closely.

## 2020-06-08 NOTE — PROGRESS NOTES
[facility] - Respiratory ICU  Critical Care - Surgery  Progress Note    Patient Name: Ranjana Sanchez  MRN: [id number]  Admission Date: 4/10/2020  Hospital Length of Stay: 59 days  Code Status: Full Code  Attending Provider: Francis Ayon MD  Primary Care Provider: Primary Doctor No   Principal Problem: Acute respiratory disease due to COVID-19 virus    Subjective:     Interval History/Significant Events:    Pt became bradycardic to 20's overnight with BP 40/20. Precedex weaned off. ECMO RPM: 2600 Flow:  2.8 Sweep:  3. Levo as high as .12 overnight. Heparin 700, lasix 2.5. Decorticate posturing.      Follow-up For: Procedure(s) (LRB):  CREATION, TRACHEOSTOMY  (N/A)    Post-Operative Day: 32 Days Post-Op    Objective:     Vital Signs (Most Recent):  Temp: 98 °F (36.7 °C) (06/08/20 0300)  Pulse: (!) 119 (06/08/20 0800)  Resp: (!) 53 (06/08/20 0800)  BP: 114/62 (06/07/20 0800)  SpO2: (!) 93 % (06/08/20 0800) Vital Signs (24h Range):  Temp:  [97.7 °F (36.5 °C)-98.1 °F (36.7 °C)] 98 °F (36.7 °C)  Pulse:  [] 119  Resp:  [23-63] 53  SpO2:  [92 %-100 %] 93 %  Arterial Line BP: ()/(42-78) 100/57     Weight: 61.6 kg (135 lb 12.9 oz)  Body mass index is 23.31 kg/m².      Intake/Output Summary (Last 24 hours) at 6/8/2020 0818  Last data filed at 6/8/2020 0800  Gross per 24 hour   Intake 4235.08 ml   Output 4559 ml   Net -323.92 ml       Physical Exam   Constitutional: He appears well-developed.   HENT:   Head: Normocephalic and atraumatic.   Eyes: Pupils are equal, round, and reactive to light.   Neck: Normal range of motion.   Cardiovascular:   tachycardic   Pulmonary/Chest:   Trached, mechanically ventilated  On ECMO, cannulated in RIJ and fem vein   Abdominal: Soft. He exhibits no distension.   Musculoskeletal: Normal range of motion.   Neurological:   Decorticate posturing   Skin: Skin is warm.   Vitals reviewed.      Vents:  Vent Mode: A/C (06/08/20 0800)  Ventilator Initiated: Yes (04/10/20  2247)  Set Rate: 30 BPM (06/08/20 0800)  Vt Set: 250 mL (06/06/20 0858)  Pressure Support: 20 cmH20 (06/06/20 0858)  PEEP/CPAP: 6 cmH20 (06/08/20 0800)  Oxygen Concentration (%): 50 (06/08/20 0800)  Peak Airway Pressure: 31 cmH2O (06/08/20 0800)  Plateau Pressure: 31 cmH20 (06/08/20 0800)  Total Ve: 10.8 mL (06/08/20 0800)  F/VT Ratio<105 (RSBI): 271.79 (06/08/20 0800)    Lines/Drains/Airways     Peripherally Inserted Central Catheter Line            PICC Double Lumen 05/05/20 1707 right basilic 33 days          Central Venous Catheter Line                 ECMO Cannula 04/25/20 1120 right femoral vein 43 days         ECMO Cannula 04/25/20 1120 right internal jugular 43 days          Drain                 NG/OG Tube 05/03/20 1215 [place] sump;nasogastric 18 Fr. Left nostril 35 days         Urethral Catheter 05/28/20 1630 10 days          Airway                 Surgical Airway 05/05/20 1500 Shiley Cuffed 33 days          Arterial Line                 Arterial Line 04/27/20 1100 Right Brachial 41 days          Peripheral Intravenous Line                 Peripheral IV - Single Lumen 06/06/20 0136 18 G Right Hand 2 days         Peripheral IV - Single Lumen 06/06/20 0137 18 G Left Hand 2 days                Significant Labs:    CBC/Anemia Profile:  Recent Labs   Lab 06/07/20  0200  06/07/20  1419  06/07/20 2000 06/08/20  0200 06/08/20  0420   WBC 12.43   < > 13.65*  --  15.63* 10.45  --    HGB 8.8*   < > 9.3*  --  8.2* 9.4*  --    HCT 28.1*   < > 29.1*   < > 26.1* 29.0* 28*   PLT 94*   < > 102*  --  92* 75*  --    MCV 93   < > 93  --  93 94  --    RDW 15.7*   < > 15.7*  --  15.7* 14.8*  --    FERRITIN 1,397*  --   --   --   --  1,373*  --     < > = values in this interval not displayed.        Chemistries:  Recent Labs   Lab 06/07/20  1419 06/07/20 2000 06/08/20  0200   * 153* 152*   K 5.6* 4.2 3.6   * 117* 115*   CO2 28 26 26   BUN 64* 77* 75*   CREATININE 0.7 0.7 0.7   CALCIUM 9.7 9.7 9.4   ALBUMIN 3.4* 3.5  3.7   PROT 6.1 5.9* 5.7*   BILITOT 1.9* 1.7* 1.6*   ALKPHOS 172* 172* 138*   ALT 41 40 33   AST 46* 41* 34   MG 2.0 2.1 2.0   PHOS 2.7 3.1 3.5       Significant Imaging:  I have reviewed and interpreted all pertinent imaging results/findings within the past 24 hours.    Assessment/Plan:     COVID-19 virus detected  Ranjana Sanchez is a 55 y.o. 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  This was switched to ETT on 4/11.  Cannulated on 4/25.    Neuro:  Precedex off  100 BID of seroquel  Fentanyl patch  Encephalopathy with poor neuro exam and decorticate posturing  - CT on June 2 showing bilateral basal ganglia infarcts, which explains the neuro exam findings. His infarcts are sizeable, and will cause permanent deficits, the extent of which is unknown. The likelihood of meaningful recovery is low. Neuro consulted to prognosticate, and agree with conclusion although recommending MRI.       Pulm  AC/VC+ on the vent.  VV ECMO: Flows 2.9-3.2 RPM 2500, Sweep of 3   TVs improving around low 300s now, however will need more time on ECMO    Cardiac  Levo as needed  Nicardipine gtt 1  MAP goal < 90    FEN/GI  TFs at goal    Renal  Making urine, diuresing agressively  Lasix gtt at 2.5    Heme  Hep gtt at 700, goal 0.25-0.3 to prevent another GI bleed  Has been at goal at this rate  Transfused 2 units prbc    ID  WBC stable    Dispo: CAT Barton MD  Critical Care - Surgery  [facility] - Respiratory ICU

## 2020-06-08 NOTE — TELEPHONE ENCOUNTER
----- Message from Rowena Alarcno sent at 6/8/2020  3:02 PM CDT -----  Contact: Dr. Rivera mobley/ [facility] [employer] called want to get a peer to peer with Dr. Ayon. Call back [phone]

## 2020-06-08 NOTE — PLAN OF CARE
"      SICU PLAN OF CARE NOTE    Dx: Acute respiratory disease due to COVID-19 virus    Shift Events: Bleeding around trach site; ECMO 40FIo2, sweep 3    Goals of Care: comfort, wean ecmo    Neuro: withdraws to pain    Vital Signs: /62 (BP Location: Left arm, Patient Position: Lying)   Pulse (!) 118   Temp 98 °F (36.7 °C) (Oral)   Resp (!) 33   Ht 5' 4" (1.626 m)   Wt 61.6 kg (135 lb 12.9 oz)   SpO2 100%   BMI 23.31 kg/m²     Respiratory: AC/PC RR 30, 50% Fio2, Peep 6    Diet: Peptamen at 30cc/h  Gtts: Lasix 2.5mg/h, Heparin 700units/h, Precedex .4mcg      Urine Output: 175-300cc/h    Drains: n/a  Labs/Accuchecks: q6h    Skin: Cannula site CDI, Left cheek wound healing       "

## 2020-06-08 NOTE — PLAN OF CARE
Problem: Adult Inpatient Plan of Care  Goal: Plan of Care Review  Outcome: Ongoing, Progressing  VS currently stable. Patient remains on VV ECMO: Flows 2.9-3.2 RPM 2500, Sweep of 3 and FiO2 of 50%. Remains trached on ventilator A/C PC 50%, FiO2 and 6 of peep, rate of 30. 2uPRBC given this shift for low H&H. Gtts: Precedex weaned off, norepinephrine gtt as high as 0.12mcg/kg/min for MAPS in 30's. Heparin at 700u/hr, Lasix at 2.5mg/hr. Pt turned q2h no new skin breakdown noted. Pt contracted upper extremities. PROM performed q2h. Labs trended. Potassium replaced. Free water boluses given. See flowsheets for assessments and intake and output. Will continue to monitor closely.

## 2020-06-08 NOTE — PROGRESS NOTES
[facility] Specialists shift report    Date: 06/08/2020  ECMO Specialist:  Mckay Farooq    Pump parameters:  RPM: 2600  Flow:  2.8  Sweep:  3  FiO2:  40     Oxygenator status:  Clots: some. Pre and post  Fibrin: some. Pre and post      Pressure trends:  P1: 90's  P2: 60's  Delta P: 20's     Volume status:  Chugging noted slightly   CVP:   MAP:    MD notified (name):  Nany      Anticoagulation:  ACT/aPTT/Xa parameters: Xa 0.2-0.25  ACT/aPTT/Xa trends this shift: 0.13, 0.22     Cannula size / status / placement:  Arterial:   Venous 1: RIJV 23 fr @4 cm  Venous 2: RFV 27 fr @12 cm   Dual lumen:      Additional Comments:  Pt had a few episodes of hypovolemia, and bradycardia

## 2020-06-08 NOTE — CARE UPDATE
Came in to change patients inner canula.  Nurse pointed out that his trach ties were bloody. We noticed his stoma had blood coming from it but none in sputum when suctioned. We added a protective barrier and new guaze and trach ties.

## 2020-06-08 NOTE — SUBJECTIVE & OBJECTIVE
Interval History/Significant Events:    Pt became bradycardic to 20's overnight with BP 40/20. Precedex weaned off. ECMO RPM: 2600 Flow:  2.8 Sweep:  3. Levo as high as .12 overnight. Heparin 700, lasix 2.5. Decorticate posturing.      Follow-up For: Procedure(s) (LRB):  CREATION, TRACHEOSTOMY  (N/A)    Post-Operative Day: 32 Days Post-Op    Objective:     Vital Signs (Most Recent):  Temp: 98 °F (36.7 °C) (06/08/20 0300)  Pulse: (!) 119 (06/08/20 0800)  Resp: (!) 53 (06/08/20 0800)  BP: 114/62 (06/07/20 0800)  SpO2: (!) 93 % (06/08/20 0800) Vital Signs (24h Range):  Temp:  [97.7 °F (36.5 °C)-98.1 °F (36.7 °C)] 98 °F (36.7 °C)  Pulse:  [] 119  Resp:  [23-63] 53  SpO2:  [92 %-100 %] 93 %  Arterial Line BP: ()/(42-78) 100/57     Weight: 61.6 kg (135 lb 12.9 oz)  Body mass index is 23.31 kg/m².      Intake/Output Summary (Last 24 hours) at 6/8/2020 0818  Last data filed at 6/8/2020 0800  Gross per 24 hour   Intake 4235.08 ml   Output 4559 ml   Net -323.92 ml       Physical Exam   Constitutional: He appears well-developed.   HENT:   Head: Normocephalic and atraumatic.   Eyes: Pupils are equal, round, and reactive to light.   Neck: Normal range of motion.   Cardiovascular:   tachycardic   Pulmonary/Chest:   Trached, mechanically ventilated  On ECMO, cannulated in RIJ and fem vein   Abdominal: Soft. He exhibits no distension.   Musculoskeletal: Normal range of motion.   Neurological:   Decorticate posturing   Skin: Skin is warm.   Vitals reviewed.      Vents:  Vent Mode: A/C (06/08/20 0800)  Ventilator Initiated: Yes (04/10/20 0148)  Set Rate: 30 BPM (06/08/20 0800)  Vt Set: 250 mL (06/06/20 0858)  Pressure Support: 20 cmH20 (06/06/20 0858)  PEEP/CPAP: 6 cmH20 (06/08/20 0800)  Oxygen Concentration (%): 50 (06/08/20 0800)  Peak Airway Pressure: 31 cmH2O (06/08/20 0800)  Plateau Pressure: 31 cmH20 (06/08/20 0800)  Total Ve: 10.8 mL (06/08/20 0800)  F/VT Ratio<105 (RSBI): 271.79 (06/08/20  0800)    Lines/Drains/Airways     Peripherally Inserted Central Catheter Line            PICC Double Lumen 05/05/20 1707 right basilic 33 days          Central Venous Catheter Line                 ECMO Cannula 04/25/20 1120 right femoral vein 43 days         ECMO Cannula 04/25/20 1120 right internal jugular 43 days          Drain                 NG/OG Tube 05/03/20 1215 [place] sump;nasogastric 18 Fr. Left nostril 35 days         Urethral Catheter 05/28/20 1630 10 days          Airway                 Surgical Airway 05/05/20 1500 Shiley Cuffed 33 days          Arterial Line                 Arterial Line 04/27/20 1100 Right Brachial 41 days          Peripheral Intravenous Line                 Peripheral IV - Single Lumen 06/06/20 0136 18 G Right Hand 2 days         Peripheral IV - Single Lumen 06/06/20 0137 18 G Left Hand 2 days                Significant Labs:    CBC/Anemia Profile:  Recent Labs   Lab 06/07/20 0200 06/07/20  1419  06/07/20 2000 06/08/20 0200 06/08/20  0420   WBC 12.43   < > 13.65*  --  15.63* 10.45  --    HGB 8.8*   < > 9.3*  --  8.2* 9.4*  --    HCT 28.1*   < > 29.1*   < > 26.1* 29.0* 28*   PLT 94*   < > 102*  --  92* 75*  --    MCV 93   < > 93  --  93 94  --    RDW 15.7*   < > 15.7*  --  15.7* 14.8*  --    FERRITIN 1,397*  --   --   --   --  1,373*  --     < > = values in this interval not displayed.        Chemistries:  Recent Labs   Lab 06/07/20  1419 06/07/20 2000 06/08/20 0200   * 153* 152*   K 5.6* 4.2 3.6   * 117* 115*   CO2 28 26 26   BUN 64* 77* 75*   CREATININE 0.7 0.7 0.7   CALCIUM 9.7 9.7 9.4   ALBUMIN 3.4* 3.5 3.7   PROT 6.1 5.9* 5.7*   BILITOT 1.9* 1.7* 1.6*   ALKPHOS 172* 172* 138*   ALT 41 40 33   AST 46* 41* 34   MG 2.0 2.1 2.0   PHOS 2.7 3.1 3.5       Significant Imaging:  I have reviewed and interpreted all pertinent imaging results/findings within the past 24 hours.

## 2020-06-09 NOTE — ASSESSMENT & PLAN NOTE
55 yr masle with ARDS on ECMO   Reports of prolonged periods of hypoxia and poor oxygenation prior to and during mechanical ventilation.  Also reports of intermittent hypotension leading to possible cerebral hypoperfusion.  The combination of these and possibly other neurological effects of COVID-19 may have resulted in irreversible hypoxic ischemic damage to the brain  EEG - shows diffuse nonspecific slowing which is symmetric  Head CT shows hypoattenuation signals in the bilateral basal ganglia as well as portions of the cortical rim and possible watershed areas bilaterally.  This is suggestive of global hypoxic ischemic injury.  Chronic finding extent of injury is challenging.  Had brain imaging shows cortical and deeper structures being involved.  History of prolonged poor oxygenation further supports extensive injury.  His current neurological examination, while on fentanyl and Precedex, demonstrates intact cranial nerve reflexes but with poor mental status and posturing along with triple flexion.  His overall neurological status is poor with the prognosis that is guarded.  While MRI brain would be valuable in further quantifying this the fact that he is on ECMO does not allow for this. He has been on ECMO since April 26.   At this point, taking into consideration the extent of brain injury, he has very low likelihood of substantial further neurological improvement. His clinical picture is consistent with persistent vegetative state/unresponsive wakefulness syndrome    I would recommend reviewing code status and engaging in discussions with family about quality of life and current neurological status to assist in establishing suitable goals of care

## 2020-06-09 NOTE — PROGRESS NOTES
"[facility] - Respiratory ICU  Adult Nutrition  Progress Note    SUMMARY       Recommendations    Recommendation:   1. Continue increasing TF of Peptamen Intense VHP @ goal rate of 45 mL/hr at this time.    - Provides pt with 1080 kcal, 99 g protein and 907 mL free water.     -Additional water per MD.   2. RD to monitor and follow up     Goals: Meet % EEN, EPN by RD f/u date  Nutrition Goal Status: progressing towards goal  Communication of RD Recs: (POC)    Reason for Assessment    Reason For Assessment: RD follow-up  Diagnosis: (COVID-19)  Relevant Medical History: HTN  Interdisciplinary Rounds: did not attend  General Information Comments: Pt remains on ECMO; vent and trached. TF held and restarted a few days ago, increasing to goal rate of 45 mL/hr. Currently tolerating at 30 mL/hr per chart. RN states pt tolerating well. Wt continues fluctuating, ~4 lbs wt gain over past week. Wounds noted. Pt with no indications of malnutrition at this time. No c/o N/V/C/D or residuals noted. NFPE not performed, patient has been screened for possible COVID-19 and has been placed on airborne and contact precautions. Patient is noted as being positive for COVID-19.  Nutrition Discharge Planning: adequate intake via TFs    Nutrition Risk Screen    Nutrition Risk Screen: tube feeding or parenteral nutrition    Nutrition/Diet History    Food Allergies: NKFA  Factors Affecting Nutritional Intake: NPO, on mechanical ventilation    Anthropometrics    Temp: 98 °F (36.7 °C)  Height Method: Stated  Height: 5' 4" (162.6 cm)  Height (inches): 64 in  Weight Method: Bed Scale  Weight: 61 kg (134 lb 7.7 oz)  Weight (lb): 134.48 lb  Ideal Body Weight (IBW), Male: 130 lb  % Ideal Body Weight, Male (lb): 114.62 %  BMI (Calculated): 23.1  BMI Grade: 18.5-24.9 - normal  Usual Body Weight (UBW), kg: (JAMMIE)       Lab/Procedures/Meds    Pertinent Labs Reviewed: reviewed  Pertinent Labs Comments: Na 147; BUN 52; Glucose 219  Pertinent " Medications Reviewed: reviewed  Pertinent Medications Comments: vitamin D; psyllium husk; MVI; fentanyl    Estimated/Assessed Needs    Weight Used For Calorie Calculations: 61 kg (134 lb 7.7 oz)  Energy Calorie Requirements (kcal): 1527 kcal/d  Energy Need Method: [facility]  Protein Requirements: 76-95 g/day(1.2-1.5 g/kg)  Weight Used For Protein Calculations: 63.5 kg (139 lb 15.9 oz)(dosing weight)  Fluid Requirements (mL): 1 mL/kcal or per MD  Estimated Fluid Requirement Method: RDA Method  RDA Method (mL): 1527    Nutrition Prescription Ordered    Current Diet Order: NPO  Current Nutrition Support Formula Ordered: Peptamen Intense VHP  Current Nutrition Support Rate Ordered: 30 (ml)  Current Nutrition Support Frequency Ordered: mL/hr    Evaluation of Received Nutrient/Fluid Intake    Enteral Calories (kcal): 720  Enteral Protein (gm): 66  Enteral (Free Water) Fluid (mL): 605  % Kcal Needs: 47  % Protein Needs: 86  I/O: -3.4 L since admit   Energy Calories Required: not meeting needs  Protein Required: meeting needs  Fluid Required: (per MD)  Comments: LBM 6/8  Tolerance: tolerating  % Intake of Estimated Energy Needs: 50 - 75 %  % Meal Intake: NPO    Nutrition Risk    Level of Risk/Frequency of Follow-up: (f/u 1 x wk)     Assessment and Plan    Nutrition Problem  Inadequate energy intake     Related to (etiology):   Inability to consume sufficient energy      Signs and Symptoms (as evidenced by):   NPO     Interventions (treatment strategy):  Collaboration of nutrition care w/ other providers   Enteral Nutrition      Nutrition Diagnosis Status:   Resolving     Monitor and Evaluation    Food and Nutrient Intake: energy intake, food and beverage intake, enteral nutrition intake  Food and Nutrient Adminstration: diet order, enteral and parenteral nutrition administration  Physical Activity and Function: nutrition-related ADLs and IADLs  Anthropometric Measurements: weight change, weight  Biochemical Data, Medical  Tests and Procedures: inflammatory profile, lipid profile, glucose/endocrine profile, gastrointestinal profile, electrolyte and renal panel  Nutrition-Focused Physical Findings: overall appearance     Nutrition Follow-Up    RD Follow-up?: Yes

## 2020-06-09 NOTE — PROGRESS NOTES
[facility] - Respiratory ICU  Cardiothoracic Surgery  Daily ECMO Progress Note    Patient Name: Ranjana Sanchez  MRN: [id number]  Admission Date: 4/10/2020  Hospital Length of Stay: 60 days  Code Status: Full Code   Attending Physician: Francis Ayon MD   Referring Provider: Francis Ayon MD  Principal Problem:Acute respiratory disease due to COVID-19 virus    Subjective:   Interval History:  Hb continues to drift down. UOP 4.7L, net negative 75mL in 24hrs. Hep gtt 700. Large L thigh.    Medications:  Continuous Infusions:   dexmedetomidine (PRECEDEX) infusion 0.4 mcg/kg/hr (06/09/20 1100)    furosemide (LASIX) 2 mg/mL continuous infusion (non-titrating) 2.5 mg/hr (06/09/20 1100)    heparin (porcine) in 5 % dex 700 Units/hr (06/09/20 1100)    norepinephrine bitartrate-D5W Stopped (06/09/20 0400)     Scheduled Meds:   albumin human 5%  12.5 g Intravenous Once    aspirin  81 mg Per NG tube Daily    fentaNYL  1 patch Transdermal Q72H    HYDROmorphone  0.5 mg Intravenous Once    multivitamin liquid no.118  10 mL Per G Tube Daily    pantoprazole  40 mg Per NG tube BID    polyethylene glycol  17 g Per NG tube Daily    potassium chloride 10%  40 mEq Per NG tube BID    psyllium husk (aspartame)  1 packet Per NG tube TID    sucralfate  1 g Per NG tube Q6H    vitamin D  1,000 Units Per G Tube Daily        Objective:     Vital Signs (Most Recent):  Temp: 98.5 °F (36.9 °C) (06/09/20 1115)  Pulse: (!) 135 (06/09/20 1145)  Resp: (!) 61 (06/09/20 1145)  BP: 103/67 (06/09/20 1115)  SpO2: 100 % (06/09/20 1145) Vital Signs (24h Range):  Temp:  [98 °F (36.7 °C)-98.5 °F (36.9 °C)] 98.5 °F (36.9 °C)  Pulse:  [] 135  Resp:  [0-64] 61  SpO2:  [92 %-100 %] 100 %  BP: (103)/(67) 103/67  Arterial Line BP: ()/(44-90) 98/63     Weight: 61 kg (134 lb 7.7 oz)  Body mass index is 23.08 kg/m².    SpO2: 100 %  O2 Device (Oxygen Therapy): ventilator    Intake/Output - Last 3 Shifts       06/07 0700 -  06/08 0659 06/08 0700 - 06/09 0659 06/09 0700 - 06/10 0659    I.V. (mL/kg) 435.1 (7.1) 578 (9.5)     Blood 1200  652    NG/GT 3000 2690 500    Total Intake(mL/kg) 4635.1 (75.2) 3268 (53.6) 1152 (18.9)    Urine (mL/kg/hr) 4860 (3.3) 4605 (3.1) 405 (1.3)    Drains   15    Stool 0 0 0    Blood 2 3     Total Output 4862 4608 420    Net -226.9 -1340 +732           Stool Occurrence 11 x 2 x 1 x          Lines/Drains/Airways     Peripherally Inserted Central Catheter Line            PICC Double Lumen 05/05/20 1707 right basilic 34 days          Central Venous Catheter Line                 ECMO Cannula 04/25/20 1120 right femoral vein 45 days         ECMO Cannula 04/25/20 1120 right internal jugular 45 days          Drain                 NG/OG Tube 05/03/20 1215 [place] sump;nasogastric 18 Fr. Left nostril 36 days         Urethral Catheter 05/28/20 1630 11 days          Airway                 Surgical Airway 05/05/20 1500 Shiley Cuffed 34 days          Arterial Line                 Arterial Line 04/27/20 1100 Right Brachial 43 days          Peripheral Intravenous Line                 Peripheral IV - Single Lumen 06/06/20 0136 18 G Right Hand 3 days         Peripheral IV - Single Lumen 06/06/20 0137 18 G Left Hand 3 days                Physical Exam    Constitutional: He is sedated, and intubated.   Head: Normocephalic   Cardiovascular: Tachycardia 100s   Pulmonary/Chest: intubated vent FiO2 50% PEEP 6, ECMO RPM 2600, Flow 2.9, sweep 5, oxygenator FiO2 65%  Skin: L neck and L groin cannulas intact, no flashing, clots pre and post-oxygenator, sutures intact and secure with 2 new sutures to upper (return) cannula.  Nursing note and vitals reviewed.    Significant Labs:  BMP:   Recent Labs   Lab 06/09/20  0830   *   *   K 4.4      CO2 29   BUN 52*   CREATININE 0.7   CALCIUM 9.4   MG 1.9     CBC:   Recent Labs   Lab 06/09/20  0830   WBC 13.74*   RBC 2.51*   HGB 7.6*   HCT 23.8*   PLT 85*   MCV 95   MCH 30.3    MCHC 31.9*     LFTs:   Recent Labs   Lab 06/09/20  0830   ALT 34   AST 32   ALKPHOS 135   BILITOT 2.0*   PROT 6.1   ALBUMIN 3.5     Significant Diagnostics:  Cxr: stable    Ct Head Without Contrast 6/2/2020  -symmetrical hypoattenuation bilobed basal ganglia concerning for edema as well as poor gray-white matter differentiation cerebral hemispheres diffusely concerning for profound hypoxic anoxic encephalopathy  -no evidence for acute intracranial hemorrhage    Ct Chest Without Contrast 6/2/2020  -small left pneumothorax with no mediastinal shift and minimal loss of left lung volume  -no right pneumothorax, pneumomediastinum or pneumoperitoneum  -widespread largely homogeneous ground-glass disease throughout all lobes as well as tubular and varicoid bronchiectasis in this patient with history of COVID-19 pneumonia maintained on ECMO  -fine reticular and reticulonodular pattern is evident in the periphery of the lungs consistent with interstitial lung disease  -findings are not typical for pulmonary edema due to elevated pulmonary venous pressures and/or abnormal capillary permeability      Assessment/Plan:     Acute respiratory distress syndrome (ARDS) due to COVID-19 virus  - Crich switched to ETT on 4/11  - Monika weaned off 5/5  - Tracheostomy and bronch 5/5  - severe thrombocytopenia resolved; transfuse for under 30K  - Aspirin 81 daily  - Labetalol 300BID  - RPM at 2600; flows at 2.9  - Sweep at 5; CO2 goal is 59 or below  - Do not increase sweep for pCO2s 59 or below as long as patient is not acidotic, as these high pCO2s are respiratory compensation for alkalosis  - Oxygenator Fi at 40%  - CT head and chest imaging reviewed with profound basilar changes concerning for hypoxemic injury, neurology following, poor prognosis, need to have goals of care discussion with family and review CT findings     Tongue laceration      -ENT evaluated, laceration superficial      -recs: bite block vs paralysis, will  monitor    Sedation  - Valium, seroquel, PRN Fentanyl     UGI Bleed  - Scoped by GI 5/3 with epinephrine injection and clip placement for an actively bleeding D2/D3 Diuelafoy lesion  - Recommend GI re-consultation if Hb continues to drift and further blood is aspirated from NGT or patient has bloody BM, etc    FEN/GI  - TF at 45, goal      - Lasix gtt 2.5  - FWB for hypernatremia 400mL q4     Anticoagulation      - Goal aXa 0.2-0.25 Q6hrs      - Hep gtt must be lowered to 600      - monitor LDH    Prophylaxis      - protonix q12 IV       - leukocytosis stable      - PICC placed 5/5; central line removed 5/6    Anne Miller MD  Cardiothoracic Surgery  [facility] - [facility]      VV ECMO circuit checked and all parameters reviewed. Anticoagulation was managed and all cannulation exit sites along with review of labs and radiology studies performed. Speed and functioning of the pump along with oxygenator quality reviewed.  Patient continues to be stable on  tube feeds at 45 cc an hour.  No concerns from the nursing staff or perfusion team with any flow related issues.  Significant amount of time was spent in coordinating care  between different teams which consisted of the surgical ICU to, perfusion Staff and the nursing staff.  All questions and concerns all team members were addressed.

## 2020-06-09 NOTE — PROGRESS NOTES
Pt with brief episode of bradycardia and hypotension, HR 21, map of 22. Pt returned to baseline vs without intervention. Precedex turned off. ETT suctioned with small amount of bloody drainage noted.  MD made aware. Will continue to monitor closely.

## 2020-06-09 NOTE — PLAN OF CARE
Problem: Aspiration (Enteral Nutrition)  Goal: Absence of Aspiration Signs/Symptoms  Outcome: Ongoing, Progressing   Recommendations     Recommendation:   1. Continue increasing TF of Peptamen Intense VHP @ goal rate of 45 mL/hr at this time.               - Provides pt with 1080 kcal, 99 g protein and 907 mL free water.                -Additional water per MD.   2. RD to monitor and follow up      Goals: Meet % EEN, EPN by RD f/u date  Nutrition Goal Status: progressing towards goal  Communication of RD Recs: (POC)

## 2020-06-09 NOTE — PROGRESS NOTES
Perfusionists x 2, SICU float RN and bedside RN @ bedside to exchange ECMO oxygenator @ 1000. Pt became bradycardic and hypotensive requiring restarting levo to increase BP. Pt quickly converted back to a ST HR and BP stabilized with aid of levophed. Levophed is currently off and all VSS.

## 2020-06-09 NOTE — PROGRESS NOTES
Notified MD of pt's low BP map of 55. Levo gtt restarted and titrated prn. Order for albumin 250 placed and administered. Will continue to monitor closely.

## 2020-06-09 NOTE — HOSPITAL COURSE
My review of notes:   4/11/20   55 y/o M with HTN presents via EMS for fever, shortness of breat hand hypoxia. Pt is from [place], he works as a  on [employer] ship [employer]  Pt reports sob for the past two days.   While attempting to place R radial A-line, pt begam coughing and became dyssynchronous with vent, pt O2 sats 60s and required Ambu-bag oxygenation for respiratory suppor  Pt on 9L stating 91%. Denies fever   Was AOx 3   Difficult intubation- s/p crich in ER    Mid April   Multiples episodes of desaturation into mid-80s    4/26/20   Patient with one organ failure and unfortunately has developed severe ARDS with very poor compliant lungs.  Despite proning, 4cc/kg TV, Monika patient with Plt pressures of 39 and sats 90% on 95% FIO2, 10 PEEP. Previously  attempted high PEEP with no significant lung recruitment. ECMO initiated     Early May had episodes of MAP dropping into mid 50s

## 2020-06-09 NOTE — PROGRESS NOTES
6/9/2020   @ 10:00 A.M.    ECMO  CIRCUIT WAS CHANGED    ORIGINAL CANNULAS ARE STILL IN PLACE    ECMO CIRCUIT WAS PRIMED WITH 2 UNITS OF PRBC'S ( TYPE O POS.)    A BRIEF 20 SEC. DROP IN BLOOD PRESSURE WAS NOTED; HIS PRESSURE RETURNED TO A MEAN OF 74 AND HAS BEEN SUSTAINEED    ALL OTHER SETTINGS ARE STABLE

## 2020-06-09 NOTE — PROGRESS NOTES
Dr. Landon notified of Anti Xa 0.27, currently running @ 700 u/hr. No new order to change infusion rate at this time. Will recheck additional Anti Xa later this afternoon. MD also notified of H&H 7.6/23.8 before changing oxygenator and that pt continues to ooze blood from trach site. Per Dr. Landon, continue to reinforce site with gauze.

## 2020-06-09 NOTE — SUBJECTIVE & OBJECTIVE
Interval History:  Hb continues to drift down. UOP 4.7L, net negative 75mL in 24hrs. Hep gtt 700. Large L thigh.    Medications:  Continuous Infusions:   dexmedetomidine (PRECEDEX) infusion 0.4 mcg/kg/hr (06/09/20 1100)    furosemide (LASIX) 2 mg/mL continuous infusion (non-titrating) 2.5 mg/hr (06/09/20 1100)    heparin (porcine) in 5 % dex 700 Units/hr (06/09/20 1100)    norepinephrine bitartrate-D5W Stopped (06/09/20 0400)     Scheduled Meds:   albumin human 5%  12.5 g Intravenous Once    aspirin  81 mg Per NG tube Daily    fentaNYL  1 patch Transdermal Q72H    HYDROmorphone  0.5 mg Intravenous Once    multivitamin liquid no.118  10 mL Per G Tube Daily    pantoprazole  40 mg Per NG tube BID    polyethylene glycol  17 g Per NG tube Daily    potassium chloride 10%  40 mEq Per NG tube BID    psyllium husk (aspartame)  1 packet Per NG tube TID    sucralfate  1 g Per NG tube Q6H    vitamin D  1,000 Units Per G Tube Daily        Objective:     Vital Signs (Most Recent):  Temp: 98.5 °F (36.9 °C) (06/09/20 1115)  Pulse: (!) 135 (06/09/20 1145)  Resp: (!) 61 (06/09/20 1145)  BP: 103/67 (06/09/20 1115)  SpO2: 100 % (06/09/20 1145) Vital Signs (24h Range):  Temp:  [98 °F (36.7 °C)-98.5 °F (36.9 °C)] 98.5 °F (36.9 °C)  Pulse:  [] 135  Resp:  [0-64] 61  SpO2:  [92 %-100 %] 100 %  BP: (103)/(67) 103/67  Arterial Line BP: ()/(44-90) 98/63     Weight: 61 kg (134 lb 7.7 oz)  Body mass index is 23.08 kg/m².    SpO2: 100 %  O2 Device (Oxygen Therapy): ventilator    Intake/Output - Last 3 Shifts       06/07 0700 - 06/08 0659 06/08 0700 - 06/09 0659 06/09 0700 - 06/10 0659    I.V. (mL/kg) 435.1 (7.1) 578 (9.5)     Blood 1200  652    NG/GT 3000 2690 500    Total Intake(mL/kg) 4635.1 (75.2) 3268 (53.6) 1152 (18.9)    Urine (mL/kg/hr) 4860 (3.3) 4605 (3.1) 405 (1.3)    Drains   15    Stool 0 0 0    Blood 2 3     Total Output 4862 4608 420    Net -226.9 -1340 +732           Stool Occurrence 11 x 2 x 1 x           Lines/Drains/Airways     Peripherally Inserted Central Catheter Line            PICC Double Lumen 05/05/20 1707 right basilic 34 days          Central Venous Catheter Line                 ECMO Cannula 04/25/20 1120 right femoral vein 45 days         ECMO Cannula 04/25/20 1120 right internal jugular 45 days          Drain                 NG/OG Tube 05/03/20 1215 [place] sump;nasogastric 18 Fr. Left nostril 36 days         Urethral Catheter 05/28/20 1630 11 days          Airway                 Surgical Airway 05/05/20 1500 Shiley Cuffed 34 days          Arterial Line                 Arterial Line 04/27/20 1100 Right Brachial 43 days          Peripheral Intravenous Line                 Peripheral IV - Single Lumen 06/06/20 0136 18 G Right Hand 3 days         Peripheral IV - Single Lumen 06/06/20 0137 18 G Left Hand 3 days                Physical Exam    Constitutional: He is sedated, and intubated.   Head: Normocephalic   Cardiovascular: Tachycardia 100s   Pulmonary/Chest: intubated vent FiO2 50% PEEP 6, ECMO RPM 2600, Flow 2.9, sweep 5, oxygenator FiO2 65%  Skin: L neck and L groin cannulas intact, no flashing, clots pre and post-oxygenator, sutures intact and secure with 2 new sutures to upper (return) cannula.  Nursing note and vitals reviewed.    Significant Labs:  BMP:   Recent Labs   Lab 06/09/20  0830   *   *   K 4.4      CO2 29   BUN 52*   CREATININE 0.7   CALCIUM 9.4   MG 1.9     CBC:   Recent Labs   Lab 06/09/20  0830   WBC 13.74*   RBC 2.51*   HGB 7.6*   HCT 23.8*   PLT 85*   MCV 95   MCH 30.3   MCHC 31.9*     LFTs:   Recent Labs   Lab 06/09/20  0830   ALT 34   AST 32   ALKPHOS 135   BILITOT 2.0*   PROT 6.1   ALBUMIN 3.5     Significant Diagnostics:  Cxr: stable    Ct Head Without Contrast 6/2/2020  -symmetrical hypoattenuation bilobed basal ganglia concerning for edema as well as poor gray-white matter differentiation cerebral hemispheres diffusely concerning for profound hypoxic  anoxic encephalopathy  -no evidence for acute intracranial hemorrhage    Ct Chest Without Contrast 6/2/2020  -small left pneumothorax with no mediastinal shift and minimal loss of left lung volume  -no right pneumothorax, pneumomediastinum or pneumoperitoneum  -widespread largely homogeneous ground-glass disease throughout all lobes as well as tubular and varicoid bronchiectasis in this patient with history of COVID-19 pneumonia maintained on ECMO  -fine reticular and reticulonodular pattern is evident in the periphery of the lungs consistent with interstitial lung disease  -findings are not typical for pulmonary edema due to elevated pulmonary venous pressures and/or abnormal capillary permeability

## 2020-06-09 NOTE — PLAN OF CARE
"      SICU PLAN OF CARE NOTE    Dx: Acute respiratory disease due to COVID-19 virus    Shift Events: ECMO oxygenator changed, 2 U PBRCs given, 2UPRBCs used to prime pump.     Goals of Care: comfort, wean ECMO    Neuro: Opens eyes spontaneously, but does not track. Contracted upper extremities.Does not follow commands. Neurology examined him today, see note for further info.     Cardiac: HR 110s-130s, 140s when agitated, and episode of bradycardia in the 30s with concrrent hypotension MAPs in the 40s when exchanging oxygenator today. Levo currently off, but readily available if needed. Easily dopplerable pulses present.     Vital Signs: /68 (BP Location: Left arm, Patient Position: Lying)   Pulse (!) 113   Temp 98.4 °F (36.9 °C) (Oral)   Resp (!) 33   Ht 5' 4" (1.626 m)   Wt 61 kg (134 lb 7.7 oz)   SpO2 97%   BMI 23.08 kg/m²     Respiratory: Pt remains on VV ECMO, 2800RPM, 30% FiO2 flows 2.6-2.7. 2 Sweep. Trached, on vent AC/PC 50% 6 PEEP, Rate 30 and sating> 94%%.     Diet: Tube Feeds @ goal 45cc/hr, free water boluses administered.     Gtts: Heparin @ 700 u/hr, lasix @ 2.5mg/hr, and precedex @ 0.2mg/hr.    Urine Output: Urinary Catheter > 120 cc/hour     Labs/Accuchecks: Labs q 6, accuchecks q 4 requiring coverage.    Skin: Skin generally dry and ecchymotic. ECMO cannulation sites CDI. Trach site oozing moderately, team aware. Reinforced with gauze. L upper skin tear with foam in place L cheek wound healing, woundcare performed per orders. Heel boot sin place. Turned as tolerated to prevent breakdown. Bed plugged in and working. No other breakdown noted.      Family MD contacted RN to request update from team, team made aware. Vital signs currently stable. Will continue to monitor closely.     "

## 2020-06-09 NOTE — PROGRESS NOTES
[facility] - Respiratory ICU  Critical Care - Surgery  Progress Note    Patient Name: Ranjana Sanchez  MRN: [id number]  Admission Date: 4/10/2020  Hospital Length of Stay: 60 days  Code Status: Full Code  Attending Provider: Francis Ayon MD  Primary Care Provider: Primary Doctor No   Principal Problem: Acute respiratory disease due to COVID-19 virus    Subjective:     Hospital/ICU Course:  No notes on file    Interval History/Significant Events:   No major changes overnight  Neuro on board  TVs around 200s    Follow-up For: Procedure(s) (LRB):  CREATION, TRACHEOSTOMY  (N/A)    Post-Operative Day: 35 Days Post-Op    Objective:     Vital Signs (Most Recent):  Temp: 98 °F (36.7 °C) (06/09/20 0300)  Pulse: (!) 128 (06/09/20 0945)  Resp: (!) 53 (06/09/20 0945)  BP: 114/62 (06/07/20 0800)  SpO2: 98 % (06/09/20 0945) Vital Signs (24h Range):  Temp:  [97.8 °F (36.6 °C)-98.3 °F (36.8 °C)] 98 °F (36.7 °C)  Pulse:  [] 128  Resp:  [0-64] 53  SpO2:  [92 %-100 %] 98 %  Arterial Line BP: ()/(44-90) 105/60     Weight: 61 kg (134 lb 7.7 oz)  Body mass index is 23.08 kg/m².      Intake/Output Summary (Last 24 hours) at 6/9/2020 0954  Last data filed at 6/9/2020 0948  Gross per 24 hour   Intake 3930 ml   Output 4225 ml   Net -295 ml       Physical Exam   Constitutional: He appears well-developed.   HENT:   Head: Normocephalic and atraumatic.   Eyes: Pupils are equal, round, and reactive to light.   Neck: Normal range of motion.   Cardiovascular:   tachycardic   Pulmonary/Chest:   Trached, mechanically ventilated  On ECMO, cannulated in RIJ and fem vein   Abdominal: Soft. He exhibits no distension.   Musculoskeletal: Normal range of motion.   Neurological:   Decorticate posturing   Skin: Skin is warm.   Vitals reviewed.      Vents:  Vent Mode: A/C (06/09/20 0741)  Ventilator Initiated: Yes (04/10/20 2247)  Set Rate: 30 BPM (06/09/20 0741)  Vt Set: 250 mL (06/06/20 0858)  Pressure Support: 20 cmH20 (06/06/20  0858)  PEEP/CPAP: 6 cmH20 (06/09/20 0741)  Oxygen Concentration (%): 50 (06/09/20 0815)  Peak Airway Pressure: 31 cmH2O (06/09/20 0741)  Plateau Pressure: 28 cmH20 (06/09/20 0741)  Total Ve: 9.54 mL (06/09/20 0741)  F/VT Ratio<105 (RSBI): 2318.18 (06/09/20 0741)    Lines/Drains/Airways     Peripherally Inserted Central Catheter Line            PICC Double Lumen 05/05/20 1707 right basilic 34 days          Central Venous Catheter Line                 ECMO Cannula 04/25/20 1120 right femoral vein 44 days         ECMO Cannula 04/25/20 1120 right internal jugular 44 days          Drain                 NG/OG Tube 05/03/20 1215 [place] sump;nasogastric 18 Fr. Left nostril 36 days         Urethral Catheter 05/28/20 1630 11 days          Airway                 Surgical Airway 05/05/20 1500 Shiley Cuffed 34 days          Arterial Line                 Arterial Line 04/27/20 1100 Right Brachial 42 days          Peripheral Intravenous Line                 Peripheral IV - Single Lumen 06/06/20 0136 18 G Right Hand 3 days         Peripheral IV - Single Lumen 06/06/20 0137 18 G Left Hand 3 days                Significant Labs:    CBC/Anemia Profile:  Recent Labs   Lab 06/08/20  0200  06/08/20  2354  06/09/20  0331  06/09/20  0821 06/09/20  0827 06/09/20  0830   WBC 10.45   < > 13.06*  --  17.48*  --   --   --  13.74*   HGB 9.4*   < > 8.3*  --  8.2*  --   --   --  7.6*   HCT 29.0*   < > 26.3*   < > 26.1*   < > 22* 22* 23.8*   PLT 75*   < > 82*  --  87*  --   --   --  85*   MCV 94   < > 96  --  96  --   --   --  95   RDW 14.8*   < > 15.0*  --  15.0*  --   --   --  15.1*   FERRITIN 1,373*  --   --   --  1,689*  --   --   --   --     < > = values in this interval not displayed.        Chemistries:  Recent Labs   Lab 06/08/20  2317 06/09/20  0331 06/09/20  0830   * 151* 147*   K 3.8 5.5* 4.4   * 114* 108   CO2 29 28 29   BUN 48* 51* 52*   CREATININE 0.6 0.7 0.7   CALCIUM 9.5 10.1 9.4   ALBUMIN 3.2* 3.8 3.5   PROT 5.8* 6.4 6.1    BILITOT 1.8* 2.0* 2.0*   ALKPHOS 123 144* 135   ALT 34 34 34   AST 31 35 32   MG 1.9 2.0 1.9   PHOS 2.5* 2.9 3.5       Significant Imaging:  I have reviewed all pertinent imaging results/findings within the past 24 hours.    Assessment/Plan:     COVID-19 virus detected  Ranjana Sanchez is a 55 y.o. 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  This was switched to ETT on 4/11.  Cannulated on 4/25.    Neuro:  Precedex on/off  100 BID of seroquel  Fentanyl patch  Encephalopathy with poor neuro exam and decorticate posturing  - CT on June 2 showing bilateral basal ganglia infarcts, which likely explains the neuro exam findings. His infarcts are sizeable, but extent of permanent deficits are unknown. The likelihood of meaningful recovery is low. Neuro consulted to prognosticate, will be seeing again today      Pulm  AC/VC+ on the vent.  VV ECMO: Flows 2.9-3.2 RPM 2500, Sweep of 3   TVs improving around low 300s now, however will need more time on ECMO    Cardiac  Levo as needed  Nicardipine gtt 1  MAP goal < 90    FEN/GI  TFs at goal    Renal  Making urine, diuresing agressively  Lasix gtt at 2.5    Heme  Hep gtt at 700, goal 0.25-0.3 to prevent another GI bleed  Has been at goal at this rate  Transfused 2 units prbc    ID  WBC stable    Dispo: RICU                 Critical care was time spent personally by me on the following activities: development of treatment plan with patient or surrogate and bedside caregivers, discussions with consultants, evaluation of patient's response to treatment, examination of patient, ordering and performing treatments and interventions, ordering and review of laboratory studies, ordering and review of radiographic studies, pulse oximetry, re-evaluation of patient's condition.  This critical care time did not overlap with that of any other provider or involve time for any procedures.     Zelalem Ariza MD  Critical Care  - Surgery  [facility] - Respiratory ICU

## 2020-06-09 NOTE — ASSESSMENT & PLAN NOTE
Acute respiratory distress syndrome (ARDS) due to COVID-19 virus  - Crich switched to ETT on 4/11  - Monika weaned off 5/5  - Tracheostomy and bronch 5/5  - severe thrombocytopenia resolved; transfuse for under 30K  - Aspirin 81 daily  - Labetalol 300BID  - RPM at 2600; flows at 2.9  - Sweep at 5; CO2 goal is 59 or below  - Do not increase sweep for pCO2s 59 or below as long as patient is not acidotic, as these high pCO2s are respiratory compensation for alkalosis  - Oxygenator Fi at 40%; switching out today, will prime circuit with blood and transfuse 2U pRBC for the already low Hb  - CT head and chest imaging reviewed with profound basilar changes concerning for hypoxemic injury, neurology following, poor prognosis, need to have goals of care discussion with family and review CT findings     Tongue laceration      -ENT evaluated, laceration superficial      -recs: bite block vs paralysis, will monitor    Sedation  - Valium, seroquel, PRN Fentanyl     UGI Bleed  - Scoped by GI 5/3 with epinephrine injection and clip placement for an actively bleeding D2/D3 Diuelafoy lesion  - Recommend GI re-consultation if Hb continues to drift and further blood is aspirated from NGT or patient has bloody BM, etc    FEN/GI  - TF at 45, goal      - Lasix gtt 2.5  - FWB for hypernatremia 400mL q4     Anticoagulation      - Goal aXa 0.2-0.25 Q6hrs      - Hep gtt must be lowered to 600      - monitor LDH    Prophylaxis      - protonix q12 IV       - leukocytosis stable      - PICC placed 5/5; central line removed 5/6

## 2020-06-09 NOTE — PROGRESS NOTES
New ECMO circuit:  Maquet Quadrox iD Adult Oxy lot [id number] exp 1/16/2021  Tubing lot [id number] exp 4/30/2021  Centrimag blood pump lot [id number] exp 4/30/2022

## 2020-06-09 NOTE — SUBJECTIVE & OBJECTIVE
Interval History/Significant Events:   No major changes overnight  Neuro on board  TVs around 200s    Follow-up For: Procedure(s) (LRB):  CREATION, TRACHEOSTOMY  (N/A)    Post-Operative Day: 35 Days Post-Op    Objective:     Vital Signs (Most Recent):  Temp: 98 °F (36.7 °C) (06/09/20 0300)  Pulse: (!) 128 (06/09/20 0945)  Resp: (!) 53 (06/09/20 0945)  BP: 114/62 (06/07/20 0800)  SpO2: 98 % (06/09/20 0945) Vital Signs (24h Range):  Temp:  [97.8 °F (36.6 °C)-98.3 °F (36.8 °C)] 98 °F (36.7 °C)  Pulse:  [] 128  Resp:  [0-64] 53  SpO2:  [92 %-100 %] 98 %  Arterial Line BP: ()/(44-90) 105/60     Weight: 61 kg (134 lb 7.7 oz)  Body mass index is 23.08 kg/m².      Intake/Output Summary (Last 24 hours) at 6/9/2020 0954  Last data filed at 6/9/2020 0948  Gross per 24 hour   Intake 3930 ml   Output 4225 ml   Net -295 ml       Physical Exam   Constitutional: He appears well-developed.   HENT:   Head: Normocephalic and atraumatic.   Eyes: Pupils are equal, round, and reactive to light.   Neck: Normal range of motion.   Cardiovascular:   tachycardic   Pulmonary/Chest:   Trached, mechanically ventilated  On ECMO, cannulated in RIJ and fem vein   Abdominal: Soft. He exhibits no distension.   Musculoskeletal: Normal range of motion.   Neurological:   Decorticate posturing   Skin: Skin is warm.   Vitals reviewed.      Vents:  Vent Mode: A/C (06/09/20 0741)  Ventilator Initiated: Yes (04/10/20 2247)  Set Rate: 30 BPM (06/09/20 0741)  Vt Set: 250 mL (06/06/20 0858)  Pressure Support: 20 cmH20 (06/06/20 0858)  PEEP/CPAP: 6 cmH20 (06/09/20 0741)  Oxygen Concentration (%): 50 (06/09/20 0815)  Peak Airway Pressure: 31 cmH2O (06/09/20 0741)  Plateau Pressure: 28 cmH20 (06/09/20 0741)  Total Ve: 9.54 mL (06/09/20 0741)  F/VT Ratio<105 (RSBI): 2318.18 (06/09/20 0741)    Lines/Drains/Airways     Peripherally Inserted Central Catheter Line            PICC Double Lumen 05/05/20 1707 right basilic 34 days          Central Venous  Catheter Line                 ECMO Cannula 04/25/20 1120 right femoral vein 44 days         ECMO Cannula 04/25/20 1120 right internal jugular 44 days          Drain                 NG/OG Tube 05/03/20 1215 [place] sump;nasogastric 18 Fr. Left nostril 36 days         Urethral Catheter 05/28/20 1630 11 days          Airway                 Surgical Airway 05/05/20 1500 Shiley Cuffed 34 days          Arterial Line                 Arterial Line 04/27/20 1100 Right Brachial 42 days          Peripheral Intravenous Line                 Peripheral IV - Single Lumen 06/06/20 0136 18 G Right Hand 3 days         Peripheral IV - Single Lumen 06/06/20 0137 18 G Left Hand 3 days                Significant Labs:    CBC/Anemia Profile:  Recent Labs   Lab 06/08/20  0200  06/08/20  2354  06/09/20  0331  06/09/20  0821 06/09/20  0827 06/09/20  0830   WBC 10.45   < > 13.06*  --  17.48*  --   --   --  13.74*   HGB 9.4*   < > 8.3*  --  8.2*  --   --   --  7.6*   HCT 29.0*   < > 26.3*   < > 26.1*   < > 22* 22* 23.8*   PLT 75*   < > 82*  --  87*  --   --   --  85*   MCV 94   < > 96  --  96  --   --   --  95   RDW 14.8*   < > 15.0*  --  15.0*  --   --   --  15.1*   FERRITIN 1,373*  --   --   --  1,689*  --   --   --   --     < > = values in this interval not displayed.        Chemistries:  Recent Labs   Lab 06/08/20  2317 06/09/20  0331 06/09/20  0830   * 151* 147*   K 3.8 5.5* 4.4   * 114* 108   CO2 29 28 29   BUN 48* 51* 52*   CREATININE 0.6 0.7 0.7   CALCIUM 9.5 10.1 9.4   ALBUMIN 3.2* 3.8 3.5   PROT 5.8* 6.4 6.1   BILITOT 1.8* 2.0* 2.0*   ALKPHOS 123 144* 135   ALT 34 34 34   AST 31 35 32   MG 1.9 2.0 1.9   PHOS 2.5* 2.9 3.5       Significant Imaging:  I have reviewed all pertinent imaging results/findings within the past 24 hours.

## 2020-06-09 NOTE — PROGRESS NOTES
[facility] - Respiratory ICU  Cardiothoracic Surgery  Daily ECMO Progress Note    Patient Name: Ranjana Sanchez  MRN: [id number]  Admission Date: 4/10/2020  Hospital Length of Stay: 60 days  Code Status: Full Code   Attending Physician: Francis Ayon MD   Referring Provider: Francis Ayon MD  Principal Problem:Acute respiratory disease due to COVID-19 virus    Subjective:   Interval History:  Got 2U pRBC this AM, and Hb continues to drift. UOP 4.5L, net negative 1.3L in 24hrs.    Medications:  Continuous Infusions:   dexmedetomidine (PRECEDEX) infusion 0.4 mcg/kg/hr (06/09/20 1100)    furosemide (LASIX) 2 mg/mL continuous infusion (non-titrating) 2.5 mg/hr (06/09/20 1100)    heparin (porcine) in 5 % dex 700 Units/hr (06/09/20 1100)    norepinephrine bitartrate-D5W Stopped (06/09/20 0400)     Scheduled Meds:   albumin human 5%  12.5 g Intravenous Once    aspirin  81 mg Per NG tube Daily    fentaNYL  1 patch Transdermal Q72H    HYDROmorphone  0.5 mg Intravenous Once    multivitamin liquid no.118  10 mL Per G Tube Daily    pantoprazole  40 mg Per NG tube BID    polyethylene glycol  17 g Per NG tube Daily    potassium chloride 10%  40 mEq Per NG tube BID    psyllium husk (aspartame)  1 packet Per NG tube TID    sucralfate  1 g Per NG tube Q6H    vitamin D  1,000 Units Per G Tube Daily        Objective:     Vital Signs (Most Recent):  Temp: 98.5 °F (36.9 °C) (06/09/20 1115)  Pulse: (!) 134 (06/09/20 1115)  Resp: (!) 42 (06/09/20 1115)  BP: 103/67 (06/09/20 1115)  SpO2: 100 % (06/09/20 1115) Vital Signs (24h Range):  Temp:  [97.8 °F (36.6 °C)-98.5 °F (36.9 °C)] 98.5 °F (36.9 °C)  Pulse:  [] 134  Resp:  [0-64] 42  SpO2:  [92 %-100 %] 100 %  BP: (103)/(67) 103/67  Arterial Line BP: ()/(44-90) 94/63     Weight: 61 kg (134 lb 7.7 oz)  Body mass index is 23.08 kg/m².    SpO2: 100 %  O2 Device (Oxygen Therapy): ventilator    Intake/Output - Last 3 Shifts       06/07 0700 - 06/08 0659  06/08 0700 - 06/09 0659 06/09 0700 - 06/10 0659    I.V. (mL/kg) 435.1 (7.1) 578 (9.5)     Blood 1200  652    NG/GT 3000 2690 500    Total Intake(mL/kg) 4635.1 (75.2) 3268 (53.6) 1152 (18.9)    Urine (mL/kg/hr) 4860 (3.3) 4605 (3.1) 405 (1.4)    Drains   15    Stool 0 0 0    Blood 2 3     Total Output 4862 4608 420    Net -226.9 -1340 +732           Stool Occurrence 11 x 2 x 1 x          Lines/Drains/Airways     Peripherally Inserted Central Catheter Line            PICC Double Lumen 05/05/20 1707 right basilic 34 days          Central Venous Catheter Line                 ECMO Cannula 04/25/20 1120 right femoral vein 45 days         ECMO Cannula 04/25/20 1120 right internal jugular 45 days          Drain                 NG/OG Tube 05/03/20 1215 [place] sump;nasogastric 18 Fr. Left nostril 36 days         Urethral Catheter 05/28/20 1630 11 days          Airway                 Surgical Airway 05/05/20 1500 Shiley Cuffed 34 days          Arterial Line                 Arterial Line 04/27/20 1100 Right Brachial 43 days          Peripheral Intravenous Line                 Peripheral IV - Single Lumen 06/06/20 0136 18 G Right Hand 3 days         Peripheral IV - Single Lumen 06/06/20 0137 18 G Left Hand 3 days                Physical Exam    Constitutional: He is sedated, and intubated.   Head: Normocephalic   Cardiovascular: Tachycardia 100s   Pulmonary/Chest: intubated vent FiO2 50% PEEP 6, ECMO RPM 2600, Flow 2.9, sweep 5, oxygenator FiO2 65%  Skin: L neck and L groin cannulas intact, no flashing, clots pre and post-oxygenator, sutures intact and secure with 2 new sutures to upper (return) cannula. L thigh enlarged with suspected hematoma, though pulses remain intact distally.  Nursing note and vitals reviewed.    Significant Labs:  BMP:   Recent Labs   Lab 06/09/20  0830   *   *   K 4.4      CO2 29   BUN 52*   CREATININE 0.7   CALCIUM 9.4   MG 1.9     CBC:   Recent Labs   Lab 06/09/20  0830   WBC 13.74*    RBC 2.51*   HGB 7.6*   HCT 23.8*   PLT 85*   MCV 95   MCH 30.3   MCHC 31.9*     LFTs:   Recent Labs   Lab 06/09/20  0830   ALT 34   AST 32   ALKPHOS 135   BILITOT 2.0*   PROT 6.1   ALBUMIN 3.5     Significant Diagnostics:  Cxr: stable    Ct Head Without Contrast 6/2/2020  -symmetrical hypoattenuation bilobed basal ganglia concerning for edema as well as poor gray-white matter differentiation cerebral hemispheres diffusely concerning for profound hypoxic anoxic encephalopathy  -no evidence for acute intracranial hemorrhage    Ct Chest Without Contrast 6/2/2020  -small left pneumothorax with no mediastinal shift and minimal loss of left lung volume  -no right pneumothorax, pneumomediastinum or pneumoperitoneum  -widespread largely homogeneous ground-glass disease throughout all lobes as well as tubular and varicoid bronchiectasis in this patient with history of COVID-19 pneumonia maintained on ECMO  -fine reticular and reticulonodular pattern is evident in the periphery of the lungs consistent with interstitial lung disease  -findings are not typical for pulmonary edema due to elevated pulmonary venous pressures and/or abnormal capillary permeability      Assessment/Plan:     Acute respiratory distress syndrome (ARDS) due to COVID-19 virus  - Crich switched to ETT on 4/11  - Monika weaned off 5/5  - Tracheostomy and bronch 5/5  - severe thrombocytopenia resolved; transfuse for under 30K  - Aspirin 81 daily  - Labetalol 300BID  - RPM at 2600; flows at 2.9  - Sweep at 5; CO2 goal is 59 or below  - Do not increase sweep for pCO2s 59 or below as long as patient is not acidotic, as these high pCO2s are respiratory compensation for alkalosis  - Oxygenator Fi at 40%; switching out today, will prime circuit with blood and transfuse 2U pRBC for the already low Hb  - CT head and chest imaging reviewed with profound basilar changes concerning for hypoxemic injury, neurology following, poor prognosis, need to have goals of care  discussion with family and review CT findings     Tongue laceration      -ENT evaluated, laceration superficial      -recs: bite block vs paralysis, will monitor    Sedation  - Valium, seroquel, PRN Fentanyl     UGI Bleed  - Scoped by GI 5/3 with epinephrine injection and clip placement for an actively bleeding D2/D3 Diuelafoy lesion  - Recommend GI re-consultation if Hb continues to drift and further blood is aspirated from NGT or patient has bloody BM, etc    FEN/GI  - TF at 45, goal      - Lasix gtt 2.5  - FWB for hypernatremia 400mL q4     Anticoagulation      - Goal aXa 0.2-0.25 Q6hrs      - Hep gtt must be lowered to 600      - monitor LDH    Prophylaxis      - protonix q12 IV       - leukocytosis stable      - PICC placed 5/5; central line removed 5/6    Anne Miller MD  Cardiothoracic Surgery  [facility] - Respiratory ICU      VV ECMO circuit checked and all parameters reviewed. Anticoagulation was managed and all cannulation exit sites along with review of labs and radiology studies performed. Speed and functioning of the pump along with oxygenator quality reviewed.  Patient continues to be stable on  tube feeds at 45 cc an hour.  No concerns from the nursing staff or perfusion team with any flow related issues.  Significant amount of time was spent in coordinating care  between different teams which consisted of the surgical ICU to, perfusion Staff and the nursing staff.  All questions and concerns all team members were addressed.

## 2020-06-09 NOTE — SUBJECTIVE & OBJECTIVE
Interval History: No fever     Current Facility-Administered Medications   Medication Dose Route Frequency Provider Last Rate Last Dose    0.9%  NaCl infusion (for blood administration)   Intravenous Q24H PRN Zelalem Ariza MD        0.9%  NaCl infusion (for blood administration)   Intravenous Q24H PRN Anne Miller MD        acetaminophen tablet 650 mg  650 mg Per NG tube Q6H PRN Dottie Andrade MD   650 mg at 06/07/20 2211    albumin human 5% bottle 12.5 g  12.5 g Intravenous Once VANCE Grayson MD        artificial tears 0.5 % ophthalmic solution 1 drop  1 drop Both Eyes PRN Davie Alston MD   1 drop at 05/28/20 1122    aspirin chewable tablet 81 mg  81 mg Per NG tube Daily Anne Miller MD   81 mg at 06/09/20 0802    calcium gluconate 1g in dextrose 5% 100mL (ready to mix system)  1 g Intravenous PRN Jack Joseph MD   1 g at 05/08/20 0514    calcium gluconate 2 g in dextrose 5 % 100 mL IVPB  2 g Intravenous PRN Jack Joseph MD   2 g at 04/26/20 0819    calcium gluconate 3 g in dextrose 5 % 100 mL IVPB  3 g Intravenous PRN Jack Joseph MD        dexmedetomidine (PRECEDEX) 400mcg/100mL 0.9% NaCL infusion  0.1 mcg/kg/hr Intravenous Continuous Francis Ayon MD 9.7 mL/hr at 06/09/20 1200 0.6 mcg/kg/hr at 06/09/20 1200    dextrose 10% (D10W) Bolus  12.5 g Intravenous PRN Francis Ayon MD        fentaNYL 12 mcg/hr 1 patch  1 patch Transdermal Q72H Zelalem Ariza MD   1 patch at 06/08/20 1525    fentaNYL injection 25 mcg  25 mcg Intravenous Q1H PRN VANCE Grayson MD   25 mcg at 06/09/20 0934    furosemide (LASIX) 2 mg/mL in sodium chloride 0.9% 100 mL continuous infusion (conc: 2 mg/mL)  2.5 mg/hr Intravenous Continuous Francis Ayon MD 1.25 mL/hr at 06/09/20 1200 2.5 mg/hr at 06/09/20 1200    glucose chewable tablet 16 g  16 g Oral PRN Luz Ortega NP        glucose chewable tablet 24 g  24 g Oral PRN Luz Ortega NP        heparin 25,000  units in dextrose 5% 250 mL (100 units/mL) infusion (heparin infusion - NO NOMOGRAM)  600 Units/hr Intravenous Continuous Anne Miller MD 7 mL/hr at 06/09/20 1100 700 Units/hr at 06/09/20 1100    HYDROmorphone injection 0.5 mg  0.5 mg Intravenous Once Chito Munoz MD        insulin aspart U-100 pen 1-10 Units  1-10 Units Subcutaneous Q4H PRN VANCE Grayson MD   4 Units at 06/09/20 1057    magnesium oxide tablet 800 mg  800 mg Per NG tube PRN Francis Ayon MD   800 mg at 05/22/20 0822    magnesium oxide tablet 800 mg  800 mg Per NG tube PRN Francis Ayon MD        magnesium sulfate 2g in water 50mL IVPB (premix)  2 g Intravenous PRN Jack Joseph MD   2 g at 06/02/20 2159    multivitamin liquid no.118 per dose 10 mL  10 mL Per G Tube Daily Davie Alston MD   10 mL at 06/09/20 0937    norepinephrine 4 mg in dextrose 5% 250 mL infusion (premix) (titrating)  0.02 mcg/kg/min (Dosing Weight) Intravenous Continuous Anne Miller MD   Stopped at 06/09/20 0400    pantoprazole suspension 40 mg  40 mg Per NG tube BID Francis Ayon MD   40 mg at 06/09/20 0802    polyethylene glycol packet 17 g  17 g Per NG tube Daily Jorge Pulliam MD   17 g at 06/09/20 0802    potassium chloride 10% oral solution 40 mEq  40 mEq Per NG tube PRN Francis Ayon MD   40 mEq at 06/09/20 0023    potassium chloride 10% oral solution 40 mEq  40 mEq Per NG tube PRN Francis Ayon MD   40 mEq at 06/08/20 2216    potassium chloride 10% oral solution 40 mEq  40 mEq Per NG tube PRN Francis Ayon MD   40 mEq at 06/08/20 1101    potassium chloride 10% oral solution 40 mEq  40 mEq Per NG tube BID Anne Miller MD   Stopped at 06/09/20 0815    potassium, sodium phosphates 280-160-250 mg packet 2 packet  2 packet Per NG tube PRN Francis Ayon MD   2 packet at 06/09/20 0023    psyllium husk (aspartame) 3.4 gram packet 1 packet  1 packet Per NG tube TID Francis Ayon MD   1 packet at  06/09/20 0802    sodium chloride 0.9% flush 10 mL  10 mL Intravenous PRN Anne Miller MD        sucralfate 100 mg/mL suspension 1 g  1 g Per NG tube Q6H Francis Ayon MD   1 g at 06/09/20 1227    vitamin D 1000 units tablet 1,000 Units  1,000 Units Per G Tube Daily Davie Alston MD   1,000 Units at 06/09/20 0802     Continuous Infusions:   dexmedetomidine (PRECEDEX) infusion 0.6 mcg/kg/hr (06/09/20 1200)    furosemide (LASIX) 2 mg/mL continuous infusion (non-titrating) 2.5 mg/hr (06/09/20 1200)    heparin (porcine) in 5 % dex 700 Units/hr (06/09/20 1100)    norepinephrine bitartrate-D5W Stopped (06/09/20 0400)       Review of Systems  Objective:     Vital Signs (Most Recent):  Temp: 98.1 °F (36.7 °C) (06/09/20 1245)  Pulse: (!) 135 (06/09/20 1245)  Resp: (!) 49 (06/09/20 1245)  BP: 103/67 (06/09/20 1115)  SpO2: 100 % (06/09/20 1245) Vital Signs (24h Range):  Temp:  [98 °F (36.7 °C)-98.5 °F (36.9 °C)] 98.1 °F (36.7 °C)  Pulse:  [] 135  Resp:  [0-64] 49  SpO2:  [92 %-100 %] 100 %  BP: (103)/(67) 103/67  Arterial Line BP: ()/(44-90) 96/65     Weight: 61 kg (134 lb 7.7 oz)  Body mass index is 23.08 kg/m².    Physical Exam   Constitutional: He appears well-developed.   HENT:   Head: Normocephalic and atraumatic.   Abdominal: Soft.   Skin: Skin is warm and dry.         Mental status -   Eyes open   No blink to threat   No response to pain, verbal call   No tracking   No grimace to pain     Pupils reactive to light OU   VOR intact OU   Corneal intact OU   + spontaneous eye opening     Face symmetric   Tongue midline     No Semipurposeful movements of limbs   Decerebrate posturing lidia UE (per nurse)   Triple flexion lidia LE     No Clonus in ankles   Toes upgoing on plantar stimulation     Exam findings to suggest seizures:  Myoclonus - no   eye twitching - no   Nystagmus - no   gaze deviation - no   waxy rigidity - no       Significant Labs:   BMP:   Recent Labs   Lab 06/08/20  6039  06/09/20  0331 06/09/20  0830   * 226* 219*   * 151* 147*   K 3.8 5.5* 4.4   * 114* 108   CO2 29 28 29   BUN 48* 51* 52*   CREATININE 0.6 0.7 0.7   CALCIUM 9.5 10.1 9.4   MG 1.9 2.0 1.9     CBC:   Recent Labs   Lab 06/08/20  2354  06/09/20  0331  06/09/20  0830 06/09/20  1205 06/09/20  1210 06/09/20  1237   WBC 13.06*  --  17.48*  --  13.74*  --   --   --    HGB 8.3*  --  8.2*  --  7.6*  --   --   --    HCT 26.3*   < > 26.1*   < > 23.8* 27* 25* 26*   PLT 82*  --  87*  --  85*  --   --   --     < > = values in this interval not displayed.     All pertinent lab results from the past 24 hours have been reviewed.    Significant Studies: CT: I have reviewed all pertinent results/findings within the past 24 hours  EEG: I have reviewed all pertinent results/findings within the past 24 hours

## 2020-06-09 NOTE — PROGRESS NOTES
Notified MD of pt's increased . Orders placed for EKG and labs. Will continue to monitor closely.

## 2020-06-09 NOTE — SUBJECTIVE & OBJECTIVE
Interval History:  Got 2U pRBC this AM, and Hb continues to drift. UOP 4.5L, net negative 1.3L in 24hrs.    Medications:  Continuous Infusions:   dexmedetomidine (PRECEDEX) infusion 0.4 mcg/kg/hr (06/09/20 1100)    furosemide (LASIX) 2 mg/mL continuous infusion (non-titrating) 2.5 mg/hr (06/09/20 1100)    heparin (porcine) in 5 % dex 700 Units/hr (06/09/20 1100)    norepinephrine bitartrate-D5W Stopped (06/09/20 0400)     Scheduled Meds:   albumin human 5%  12.5 g Intravenous Once    aspirin  81 mg Per NG tube Daily    fentaNYL  1 patch Transdermal Q72H    HYDROmorphone  0.5 mg Intravenous Once    multivitamin liquid no.118  10 mL Per G Tube Daily    pantoprazole  40 mg Per NG tube BID    polyethylene glycol  17 g Per NG tube Daily    potassium chloride 10%  40 mEq Per NG tube BID    psyllium husk (aspartame)  1 packet Per NG tube TID    sucralfate  1 g Per NG tube Q6H    vitamin D  1,000 Units Per G Tube Daily        Objective:     Vital Signs (Most Recent):  Temp: 98.5 °F (36.9 °C) (06/09/20 1115)  Pulse: (!) 134 (06/09/20 1115)  Resp: (!) 42 (06/09/20 1115)  BP: 103/67 (06/09/20 1115)  SpO2: 100 % (06/09/20 1115) Vital Signs (24h Range):  Temp:  [97.8 °F (36.6 °C)-98.5 °F (36.9 °C)] 98.5 °F (36.9 °C)  Pulse:  [] 134  Resp:  [0-64] 42  SpO2:  [92 %-100 %] 100 %  BP: (103)/(67) 103/67  Arterial Line BP: ()/(44-90) 94/63     Weight: 61 kg (134 lb 7.7 oz)  Body mass index is 23.08 kg/m².    SpO2: 100 %  O2 Device (Oxygen Therapy): ventilator    Intake/Output - Last 3 Shifts       06/07 0700 - 06/08 0659 06/08 0700 - 06/09 0659 06/09 0700 - 06/10 0659    I.V. (mL/kg) 435.1 (7.1) 578 (9.5)     Blood 1200  652    NG/GT 3000 2690 500    Total Intake(mL/kg) 4635.1 (75.2) 3268 (53.6) 1152 (18.9)    Urine (mL/kg/hr) 4860 (3.3) 4605 (3.1) 405 (1.4)    Drains   15    Stool 0 0 0    Blood 2 3     Total Output 4862 4608 420    Net -226.9 -1340 +732           Stool Occurrence 11 x 2 x 1 x           Lines/Drains/Airways     Peripherally Inserted Central Catheter Line            PICC Double Lumen 05/05/20 1707 right basilic 34 days          Central Venous Catheter Line                 ECMO Cannula 04/25/20 1120 right femoral vein 45 days         ECMO Cannula 04/25/20 1120 right internal jugular 45 days          Drain                 NG/OG Tube 05/03/20 1215 [place] sump;nasogastric 18 Fr. Left nostril 36 days         Urethral Catheter 05/28/20 1630 11 days          Airway                 Surgical Airway 05/05/20 1500 [place] Cuffed 34 days          Arterial Line                 Arterial Line 04/27/20 1100 Right Brachial 43 days          Peripheral Intravenous Line                 Peripheral IV - Single Lumen 06/06/20 0136 18 G Right Hand 3 days         Peripheral IV - Single Lumen 06/06/20 0137 18 G Left Hand 3 days                Physical Exam    Constitutional: He is sedated, and intubated.   Head: Normocephalic   Cardiovascular: Tachycardia 100s   Pulmonary/Chest: intubated vent FiO2 50% PEEP 6, ECMO RPM 2600, Flow 2.9, sweep 5, oxygenator FiO2 65%  Skin: L neck and L groin cannulas intact, no flashing, clots pre and post-oxygenator, sutures intact and secure with 2 new sutures to upper (return) cannula. L thigh enlarged with suspected hematoma, though pulses remain intact distally.  Nursing note and vitals reviewed.    Significant Labs:  BMP:   Recent Labs   Lab 06/09/20  0830   *   *   K 4.4      CO2 29   BUN 52*   CREATININE 0.7   CALCIUM 9.4   MG 1.9     CBC:   Recent Labs   Lab 06/09/20  0830   WBC 13.74*   RBC 2.51*   HGB 7.6*   HCT 23.8*   PLT 85*   MCV 95   MCH 30.3   MCHC 31.9*     LFTs:   Recent Labs   Lab 06/09/20  0830   ALT 34   AST 32   ALKPHOS 135   BILITOT 2.0*   PROT 6.1   ALBUMIN 3.5     Significant Diagnostics:  Cxr: stable    Ct Head Without Contrast 6/2/2020  -symmetrical hypoattenuation bilobed basal ganglia concerning for edema as well as poor gray-white matter  differentiation cerebral hemispheres diffusely concerning for profound hypoxic anoxic encephalopathy  -no evidence for acute intracranial hemorrhage    Ct Chest Without Contrast 6/2/2020  -small left pneumothorax with no mediastinal shift and minimal loss of left lung volume  -no right pneumothorax, pneumomediastinum or pneumoperitoneum  -widespread largely homogeneous ground-glass disease throughout all lobes as well as tubular and varicoid bronchiectasis in this patient with history of COVID-19 pneumonia maintained on ECMO  -fine reticular and reticulonodular pattern is evident in the periphery of the lungs consistent with interstitial lung disease  -findings are not typical for pulmonary edema due to elevated pulmonary venous pressures and/or abnormal capillary permeability

## 2020-06-09 NOTE — PROGRESS NOTES
[facility] - Respiratory ICU  Neurology-Epilepsy  Progress Note    Patient Name: Ranjana Sanchez  MRN: [id number]  Admission Date: 4/10/2020  Hospital Length of Stay: 60 days  Code Status: Full Code   Attending Provider: Francis Ayon MD  Primary Care Physician: Primary Doctor No   Principal Problem:Acute respiratory disease due to COVID-19 virus    Subjective:     Hospital Course:   My review of notes:   4/11/20   55 y/o M with HTN presents via EMS for fever, shortness of breat hand hypoxia. Pt is from [place], he works as a  on [email or web address]  Pt reports sob for the past two days.   While attempting to place R radial A-line, pt begam coughing and became dyssynchronous with vent, pt O2 sats 60s and required Ambu-bag oxygenation for respiratory suppor  Pt on 9L stating 91%. Denies fever   Was AOx 3   Difficult intubation- s/p crich in ER    Mid April   Multiples episodes of desaturation into mid-80s    4/26/20   Patient with one organ failure and unfortunately has developed severe ARDS with very poor compliant lungs.  Despite proning, 4cc/kg TV, Monika patient with Plt pressures of 39 and sats 90% on 95% FIO2, 10 PEEP. Previously  attempted high PEEP with no significant lung recruitment. ECMO initiated     Early May had episodes of MAP dropping into mid 50s       Interval History: No fever     Current Facility-Administered Medications   Medication Dose Route Frequency Provider Last Rate Last Dose    0.9%  NaCl infusion (for blood administration)   Intravenous Q24H PRN Zelalem Ariza MD        0.9%  NaCl infusion (for blood administration)   Intravenous Q24H PRN Anne Miller MD        acetaminophen tablet 650 mg  650 mg Per NG tube Q6H PRN Dottie Andrade MD   650 mg at 06/07/20 2211    albumin human 5% bottle 12.5 g  12.5 g Intravenous Once W. Fab Grayson MD        artificial tears 0.5 % ophthalmic solution 1 drop  1 drop Both Eyes PRN Davie Alston MD   1 drop at 05/28/20  1122    aspirin chewable tablet 81 mg  81 mg Per NG tube Daily Anne Miller MD   81 mg at 06/09/20 0802    calcium gluconate 1g in dextrose 5% 100mL (ready to mix system)  1 g Intravenous PRN Jack Joseph MD   1 g at 05/08/20 0514    calcium gluconate 2 g in dextrose 5 % 100 mL IVPB  2 g Intravenous PRN Jack Joseph MD   2 g at 04/26/20 0819    calcium gluconate 3 g in dextrose 5 % 100 mL IVPB  3 g Intravenous PRN Jack Joseph MD        dexmedetomidine (PRECEDEX) 400mcg/100mL 0.9% NaCL infusion  0.1 mcg/kg/hr Intravenous Continuous Francis Ayon MD 9.7 mL/hr at 06/09/20 1200 0.6 mcg/kg/hr at 06/09/20 1200    dextrose 10% (D10W) Bolus  12.5 g Intravenous PRN Francis Ayon MD        fentaNYL 12 mcg/hr 1 patch  1 patch Transdermal Q72H Zelalem Ariza MD   1 patch at 06/08/20 1525    fentaNYL injection 25 mcg  25 mcg Intravenous Q1H PRN VANCE Grayson MD   25 mcg at 06/09/20 0934    furosemide (LASIX) 2 mg/mL in sodium chloride 0.9% 100 mL continuous infusion (conc: 2 mg/mL)  2.5 mg/hr Intravenous Continuous Francis Ayon MD 1.25 mL/hr at 06/09/20 1200 2.5 mg/hr at 06/09/20 1200    glucose chewable tablet 16 g  16 g Oral PRN Luz Ortega NP        glucose chewable tablet 24 g  24 g Oral PRN Luz Ortega NP        heparin 25,000 units in dextrose 5% 250 mL (100 units/mL) infusion (heparin infusion - NO NOMOGRAM)  600 Units/hr Intravenous Continuous Anne Miller MD 7 mL/hr at 06/09/20 1100 700 Units/hr at 06/09/20 1100    HYDROmorphone injection 0.5 mg  0.5 mg Intravenous Once Chito Munoz MD        insulin aspart U-100 pen 1-10 Units  1-10 Units Subcutaneous Q4H PRN VANCE Grayson MD   4 Units at 06/09/20 1057    magnesium oxide tablet 800 mg  800 mg Per NG tube PRN Francis Ayon MD   800 mg at 05/22/20 0822    magnesium oxide tablet 800 mg  800 mg Per NG tube PRN Francis Ayon MD        magnesium sulfate 2g in water 50mL IVPB  (premix)  2 g Intravenous PRN Jack Joseph MD   2 g at 06/02/20 2159    multivitamin liquid no.118 per dose 10 mL  10 mL Per G Tube Daily Davie Alston MD   10 mL at 06/09/20 0937    norepinephrine 4 mg in dextrose 5% 250 mL infusion (premix) (titrating)  0.02 mcg/kg/min (Dosing Weight) Intravenous Continuous Anne Miller MD   Stopped at 06/09/20 0400    pantoprazole suspension 40 mg  40 mg Per NG tube BID Francis Ayon MD   40 mg at 06/09/20 0802    polyethylene glycol packet 17 g  17 g Per NG tube Daily Jorge Pulliam MD   17 g at 06/09/20 0802    potassium chloride 10% oral solution 40 mEq  40 mEq Per NG tube PRN Francis Ayon MD   40 mEq at 06/09/20 0023    potassium chloride 10% oral solution 40 mEq  40 mEq Per NG tube PRN Francis Ayon MD   40 mEq at 06/08/20 2216    potassium chloride 10% oral solution 40 mEq  40 mEq Per NG tube PRN Francis Ayon MD   40 mEq at 06/08/20 1101    potassium chloride 10% oral solution 40 mEq  40 mEq Per NG tube BID Anne Miller MD   Stopped at 06/09/20 0815    potassium, sodium phosphates 280-160-250 mg packet 2 packet  2 packet Per NG tube PRN Francis Ayon MD   2 packet at 06/09/20 0023    psyllium husk (aspartame) 3.4 gram packet 1 packet  1 packet Per NG tube TID Francis Ayon MD   1 packet at 06/09/20 0802    sodium chloride 0.9% flush 10 mL  10 mL Intravenous PRN Anne Miller MD        sucralfate 100 mg/mL suspension 1 g  1 g Per NG tube Q6H Francis Ayon MD   1 g at 06/09/20 1227    vitamin D 1000 units tablet 1,000 Units  1,000 Units Per G Tube Daily Davie Alston MD   1,000 Units at 06/09/20 0802     Continuous Infusions:   dexmedetomidine (PRECEDEX) infusion 0.6 mcg/kg/hr (06/09/20 1200)    furosemide (LASIX) 2 mg/mL continuous infusion (non-titrating) 2.5 mg/hr (06/09/20 1200)    heparin (porcine) in 5 % dex 700 Units/hr (06/09/20 1100)    norepinephrine bitartrate-D5W Stopped (06/09/20 0400)        Review of Systems  Objective:     Vital Signs (Most Recent):  Temp: 98.1 °F (36.7 °C) (06/09/20 1245)  Pulse: (!) 135 (06/09/20 1245)  Resp: (!) 49 (06/09/20 1245)  BP: 103/67 (06/09/20 1115)  SpO2: 100 % (06/09/20 1245) Vital Signs (24h Range):  Temp:  [98 °F (36.7 °C)-98.5 °F (36.9 °C)] 98.1 °F (36.7 °C)  Pulse:  [] 135  Resp:  [0-64] 49  SpO2:  [92 %-100 %] 100 %  BP: (103)/(67) 103/67  Arterial Line BP: ()/(44-90) 96/65     Weight: 61 kg (134 lb 7.7 oz)  Body mass index is 23.08 kg/m².    Physical Exam   Constitutional: He appears well-developed.   HENT:   Head: Normocephalic and atraumatic.   Abdominal: Soft.   Skin: Skin is warm and dry.         Mental status -   Eyes open   No blink to threat   No response to pain, verbal call   No tracking   No grimace to pain     Pupils reactive to light OU   VOR intact OU   Corneal intact OU   + spontaneous eye opening     Face symmetric   Tongue midline     No Semipurposeful movements of limbs   Decerebrate posturing lidia UE (per nurse)   Triple flexion lidia LE     No Clonus in ankles   Toes upgoing on plantar stimulation     Exam findings to suggest seizures:  Myoclonus - no   eye twitching - no   Nystagmus - no   gaze deviation - no   waxy rigidity - no       Significant Labs:   BMP:   Recent Labs   Lab 06/08/20  2317 06/09/20  0331 06/09/20  0830   * 226* 219*   * 151* 147*   K 3.8 5.5* 4.4   * 114* 108   CO2 29 28 29   BUN 48* 51* 52*   CREATININE 0.6 0.7 0.7   CALCIUM 9.5 10.1 9.4   MG 1.9 2.0 1.9     CBC:   Recent Labs   Lab 06/08/20  2354  06/09/20  0331  06/09/20  0830 06/09/20  1205 06/09/20  1210 06/09/20  1237   WBC 13.06*  --  17.48*  --  13.74*  --   --   --    HGB 8.3*  --  8.2*  --  7.6*  --   --   --    HCT 26.3*   < > 26.1*   < > 23.8* 27* 25* 26*   PLT 82*  --  87*  --  85*  --   --   --     < > = values in this interval not displayed.     All pertinent lab results from the past 24 hours have been  reviewed.    Significant Studies: CT: I have reviewed all pertinent results/findings within the past 24 hours  EEG: I have reviewed all pertinent results/findings within the past 24 hours    Assessment and Plan:     Acute hypoxemic respiratory failure  55 yr masle with ARDS on ECMO   Reports of prolonged periods of hypoxia and poor oxygenation prior to and during mechanical ventilation.  Also reports of intermittent hypotension leading to possible cerebral hypoperfusion.  The combination of these and possibly other neurological effects of COVID-19 may have resulted in irreversible hypoxic ischemic damage to the brain  EEG - shows diffuse nonspecific slowing which is symmetric  Head CT shows hypoattenuation signals in the bilateral basal ganglia as well as portions of the cortical rim and possible watershed areas bilaterally.  This is suggestive of global hypoxic ischemic injury.  Chronic finding extent of injury is challenging.  Had brain imaging shows cortical and deeper structures being involved.  History of prolonged poor oxygenation further supports extensive injury.  His current neurological examination, while on fentanyl and Precedex, demonstrates intact cranial nerve reflexes but with poor mental status and posturing along with triple flexion.  His overall neurological status is poor with the prognosis that is guarded.  While MRI brain would be valuable in further quantifying this the fact that he is on ECMO does not allow for this. He has been on ECMO since April 26.   At this point, taking into consideration the extent of brain injury, he has very low likelihood of substantial further neurological improvement. His clinical picture is consistent with persistent vegetative state/unresponsive wakefulness syndrome    I would recommend reviewing code status and engaging in discussions with family about quality of life and current neurological status to assist in establishing suitable goals of care        VTE  Risk Mitigation (From admission, onward)         Ordered     heparin 25,000 units in dextrose 5% 250 mL (100 units/mL) infusion (heparin infusion - NO NOMOGRAM)  Continuous      06/02/20 1332     Apply sequential compression device to lower extremities (if no contraindications). Medical venous thromboembolus prophylaxis is preferred.  Until discontinued      04/16/20 0721                Edward Goss MD  Neurology-Epilepsy  [facility] - Res[facility] ICU

## 2020-06-09 NOTE — PROGRESS NOTES
[facility] shift report    Date: 06/09/2020  ECMO Specialist:  Mckay Farooq    Date: 06/08/2020  EC[facility] Specialist:  Monik Bryant     Pump parameters:  RPM: 2600  Flow:  2.9  Sweep:  3  FiO2:  30%     Oxygenator status:  Clots: yes, in cannulas and pre/post oxy  Fibrin: yes     Pressure trends:  P1: 100  P2:75  Delta P: 25     Volume status:  Chugging noted yes  CVP:   MAP: in the 80's   MD notified (name):  Dr Ayon     Anticoagulation:  Xa parameters: 020-0.25  ACT/aPTT/Xa trends this shift: 0.18-0.31     Cannula size / status / placement:  Arterial:   Venous 1: RIJV 23FR @4cm  Venous 2:RFV 27FR @12cm  Dual lumen:      Additional Comments:    Some chugging, plus increased bp and HR, followed by decreased bp and hr

## 2020-06-09 NOTE — ASSESSMENT & PLAN NOTE
Ranjana Sanchez is a 55 y.o. 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  This was switched to ETT on 4/11.  Cannulated on 4/25.    Neuro:  Precedex on/off  100 BID of seroquel  Fentanyl patch  Encephalopathy with poor neuro exam and decorticate posturing  - CT on June 2 showing bilateral basal ganglia infarcts, which likely explains the neuro exam findings. His infarcts are sizeable, but extent of permanent deficits are unknown. The likelihood of meaningful recovery is low. Neuro consulted to prognosticate, will be seeing again today      Pulm  AC/VC+ on the vent.  VV ECMO: Flows 2.9-3.2 RPM 2500, Sweep of 3   TVs improving around low 300s now, however will need more time on ECMO    Cardiac  Levo as needed  Nicardipine gtt 1  MAP goal < 90    FEN/GI  TFs at goal    Renal  Making urine, diuresing agressively  Lasix gtt at 2.5    Heme  Hep gtt at 700, goal 0.25-0.3 to prevent another GI bleed  Has been at goal at this rate  Transfused 2 units prbc    ID  WBC stable    Dispo: CAT

## 2020-06-09 NOTE — ASSESSMENT & PLAN NOTE
Acute respiratory distress syndrome (ARDS) due to COVID-19 virus  - Crich switched to ETT on 4/11  - Monika weaned off 5/5  - Tracheostomy and bronch 5/5  - severe thrombocytopenia resolved; transfuse for under 30K  - Aspirin 81 daily  - Labetalol 300BID  - RPM at 2600; flows at 2.9  - Sweep at 5; CO2 goal is 59 or below  - Do not increase sweep for pCO2s 59 or below as long as patient is not acidotic, as these high pCO2s are respiratory compensation for alkalosis  - Oxygenator Fi at 40%  - CT head and chest imaging reviewed with profound basilar changes concerning for hypoxemic injury, neurology following, poor prognosis, need to have goals of care discussion with family and review CT findings     Tongue laceration      -ENT evaluated, laceration superficial      -recs: bite block vs paralysis, will monitor    Sedation  - Valium, seroquel, PRN Fentanyl     UGI Bleed  - Scoped by GI 5/3 with epinephrine injection and clip placement for an actively bleeding D2/D3 Diuelafoy lesion  - Recommend GI re-consultation if Hb continues to drift and further blood is aspirated from NGT or patient has bloody BM, etc    FEN/GI  - TF at 45, goal      - Lasix gtt 2.5  - FWB for hypernatremia 400mL q4     Anticoagulation      - Goal aXa 0.2-0.25 Q6hrs      - Hep gtt must be lowered to 600      - monitor LDH    Prophylaxis      - protonix q12 IV       - leukocytosis stable      - PICC placed 5/5; central line removed 5/6

## 2020-06-09 NOTE — PROGRESS NOTES
Dr. Ariza notified of result of small L pneumo from this morning xray, which was relayed to RN from Dr. Pruett.     Also received OK from Dr. Ariza to restart precedex due to tachypnea despite episode of bradycardia last PM. HR currently fluctuating between 100s-130s. BP stable.

## 2020-06-10 NOTE — NURSING
Dr. Ariza notified of continued -140's throughout shift. Due to recent past hx of bradycardia no intervention at this time.

## 2020-06-10 NOTE — CONSULTS
Consult received per RN for worsening of wound to left forearm. Pt is known to wound care team during this admission.    Received pt lying in bed intubated with ECMO in process and remains on COVID-19 precautions. Pt's nurse reports pt has been on ECMO since April 26 and that the pt's prognosis seems poor with the pt's family family has been made aware about the pt's quality of life and current neurological status.    Upon assessment of left arm: dry stable area of eschar measuring 8 cm x 3 cm x 0.1 cm without any drainage noted. Border foams removed at this time with the recommendation given to pt's nurse for nursing to paint left arm with betadine daily and leave open to air.    To left cheek: wound appears to have resolved with intact pink scar tissue noted to left cheek without any drainage noted.     To trach site: Advised by pt's nurse to leave dressing intact due to concern for dislodging dried blood clots that were around trach site. Hydrofera blue ready noted at bedside.     To right ear: no area of skin break down noted at this time.    Also of note, per Dr. Barton's progress note from today the pt has encephalopathy with poor neuro exam and decorticate posturing with a CT on June 2 showing bilateral basal ganglia infarcts, which likely explains the neuro exam findings. Per MD, his infarcts are sizeable, but extent of permanent deficits are unknown. The likelihood of meaningful recovery is low. Neuro consulted to prognosticate, will be seeing again today.    Recommendations:  -Nursing to apply betadine daily to dry eschar to left arm. Area may be left open to air or a border foam may be applied PRN to protect.     Nursing to continue care. Plan of care discussed with pt's nurse. Wound care to sign off. Please reconsult if further assistance is needed. [phone]

## 2020-06-10 NOTE — PLAN OF CARE
"      SICU PLAN OF CARE NOTE    Dx: Acute respiratory disease due to COVID-19 virus    Shift Events: Sweep on Ecmo decreased to 1.5    Goals of Care: aXA 0.2-0.25, MAP 70-90    Neuro: Unresponsive    Vital Signs: /75 (BP Location: Left arm, Patient Position: Lying)   Pulse (!) 134   Temp 98.7 °F (37.1 °C) (Oral)   Resp (!) 43   Ht 5' 4" (1.626 m)   Wt 62.8 kg (138 lb 7.2 oz)   SpO2 95%   BMI 23.76 kg/m²     Respiratory: Ventilator    Diet: Tube Feeds    Gtts: Precedex, Norepinephrine, Lasix and Heparin    Urine Output: Urinary Catheter 150-250 cc/hour    Drains: NG/OG Tube 0 cc /  shift                 Labs/Accuchecks: Q4hrs.    Skin: DTI to left cheek and left arm.       "

## 2020-06-10 NOTE — NURSING
"      SICU PLAN OF CARE NOTE    Dx: Acute respiratory disease due to COVID-19 virus    Shift Events: Heparin decreased to 650 U/hr to keep AntiXa therapeutic.     Goals of Care: Map 70-90; AntiXa 0.2-0.25    Neuro: Unresponsive   Eyes opening spontaneously but not tracking; pt does not follow commands and has no spontaneous movement. Extremities contracted, decorticate posturing.     Vital Signs: /69 (BP Location: Left arm, Patient Position: Lying)   Pulse (!) 142   Temp 98.9 °F (37.2 °C) (Axillary)   Resp (!) 40   Ht 5' 4" (1.626 m)   Wt 62.8 kg (138 lb 7.2 oz)   SpO2 99%   BMI 23.76 kg/m²     Respiratory: Ventilator    Diet: Tube Feeds    Gtts: Precedex, Lasix and Heparin    Urine Output: Urinary Catheter 150-250 cc/hour    Drains: NG/OG Tube 45 cc /  hour     Labs/Accuchecks: ACCU Q4,   AntiXa, APTT, CBC, CMP, Fibrinogen, Mag, Phos, PT/INR Q6    Skin: DTI to L cheek and L forearm, JUANITA.        "

## 2020-06-10 NOTE — PROGRESS NOTES
[facility] shift report    Date: 06/10/2020  ECMO Specialist:  Mckay Farooq    Pump parameters:  RPM: 2800  Flow:  2.6  Sweep:  2  FiO2:  30     Oxygenator status:  Clots: none  Fibrin:none     Pressure trends:  P1: 90's  P2: 70's  Delta P: 20's     Volume status:  Chugging noted slightly   CVP:   MAP:    MD notified (name):  Nany      Anticoagulation:  ACT/aPTT/Xa parameters: Xa 0.2-0.25  ACT/aPTT/Xa trends this shift: .21     Cannula size / status / placement:  Arterial:   Venous 1: RIJV 23 fr @4 cm  Venous 2: RFV 27 fr @12 cm   Dual lumen:      Additional Comments:

## 2020-06-10 NOTE — NURSING
"      SICU PLAN OF CARE NOTE    Dx: Acute respiratory disease due to COVID-19 virus s/p Right femoral vein and right internal jugular vein cannulation for venovenous ECMO using a short 23-Austrian cannula on the right internal jugular vein for return, and a long 27-Austrian cannula on the right femoral vein for drainage    Shift Events: Heparin gtt decreased to achieve AntiXa goal. Sweep increased to 2.5. Tachycardia-no intervention     Goals of Care: Anti Xa 0.2-.25; MAP goal 70-90    Neuro: Opens eyes spontaneously. PERRL. Does not track. + cough +gag. Does not withdraw to BLE. BUE with what appears to be decorticate posturing vs contracture. No purposeful movements of extremities noted throughout shift.     Vital Signs: /69 (BP Location: Left arm, Patient Position: Lying)   Pulse (!) 140   Temp 98.9 °F (37.2 °C) (Axillary)   Resp (!) 46   Ht 5' 4" (1.626 m)   Wt 62.8 kg (138 lb 7.2 oz)   SpO2 98%   BMI 23.76 kg/m²     Respiratory: Ventilator 50% +6    Diet: NPO and Tube Feeds at goal 45 cc/h. Water bolus as I/O tolerates.    Gtts: Precedex, Lasix and Heparin    Urine Output: Urinary Catheter >2 L /shift    VAD Sweep 2.5; FiO2 30%; No chatter or alarms     Labs/Accuchecks: q6h labs and accuchecks q4    Skin: Left arm, neck and L cheek. See documentation. Turn and reposition q2h. Boots to BLE. Waffle inflated. Elbows offloaded. Medical devices padded as able.        "

## 2020-06-10 NOTE — PROGRESS NOTES
"[facility] shift report    Date: 06/10/2020  EC[facility] Specialist:  Iesha Elke    Pump parameters:  RPM: 2800  Flow:  2.6  Sweep:  2.5  FiO2:  30    Oxygenator status:  Clots: no  Fibrin: no    Pressure trends:  P1: 90"s  P2: 80's  Delta P: mid teens     Volume status:  Chugging noted no  CVP:   MAP:  70's- 80's  MD notified (name):  To    Anticoagulation:  ACT/aPTT/Xa parameters: Xa 0.2-0.25  ACT/aPTT/Xa trends this shift: 0.26, 0.22      Cannula size / status / placement:  Arterial:   Venous 1: RIJV 23 Fr @ 4cm  Venous 2: RFV 27 Fr @ 12  Dual lumen:      Additional Comments:      Pt remains on VV ECMO. Sweep increase due to pt CO2 of 62.2. Heparin rate  for Xa. Pt -140's most of the shift Dr. Ariza aware no invention at this time.            "

## 2020-06-10 NOTE — PROGRESS NOTES
[facility] - Respiratory ICU  Critical Care - Surgery  Progress Note    Patient Name: Ranjana Sanchez  MRN: [id number]  Admission Date: 4/10/2020  Hospital Length of Stay: 61 days  Code Status: Full Code  Attending Provider: Francis Ayon MD  Primary Care Provider: Primary Doctor No   Principal Problem: Acute respiratory disease due to COVID-19 virus    Subjective:     Interval History/Significant Events:   No acute event. Precedex .4. Off pressors. Lasix gtt 2.5.  Decorticate posturing. RPM: 2800, Flow:  2.6, Sweep:  2.      Follow-up For: Procedure(s) (LRB):  CREATION, TRACHEOSTOMY  (N/A)    Post-Operative Day: 32 Days Post-Op    Objective:     Vital Signs (Most Recent):  Temp: 98 °F (36.7 °C) (06/10/20 0700)  Pulse: (!) 132 (06/10/20 1017)  Resp: (!) 104 (06/10/20 0851)  BP: 113/72 (06/10/20 0802)  SpO2: 98 % (06/10/20 1017) Vital Signs (24h Range):  Temp:  [97.6 °F (36.4 °C)-98.7 °F (37.1 °C)] 98 °F (36.7 °C)  Pulse:  [] 132  Resp:  [] 104  SpO2:  [91 %-100 %] 98 %  BP: (103-130)/(67-84) 113/72  Arterial Line BP: ()/(49-85) 100/61     Weight: 62.8 kg (138 lb 7.2 oz)  Body mass index is 23.76 kg/m².      Intake/Output Summary (Last 24 hours) at 6/10/2020 1029  Last data filed at 6/10/2020 1000  Gross per 24 hour   Intake 3063.7 ml   Output 4209 ml   Net -1145.3 ml       Physical Exam   Constitutional: He appears well-developed.   HENT:   Head: Normocephalic and atraumatic.   Eyes: Pupils are equal, round, and reactive to light.   Neck: Normal range of motion.   Cardiovascular:   tachycardic   Pulmonary/Chest:   Trached, mechanically ventilated  On ECMO, cannulated in RIJ and fem vein   Abdominal: Soft. He exhibits no distension.   Musculoskeletal: Normal range of motion.   Neurological:   Decorticate posturing   Skin: Skin is warm.   Vitals reviewed.      Vents:  Vent Mode: A/C (06/10/20 0812)  Ventilator Initiated: Yes (04/10/20 2247)  Set Rate: 30 BPM (06/10/20 0812)  Vt Set:  250 mL (06/06/20 0858)  Pressure Support: 20 cmH20 (06/06/20 0858)  PEEP/CPAP: 6 cmH20 (06/10/20 0812)  Oxygen Concentration (%): 50 (06/10/20 1017)  Peak Airway Pressure: 28 cmH2O (06/10/20 0812)  Plateau Pressure: 22 cmH20 (06/10/20 0812)  Total Ve: 7.3 mL (06/10/20 0812)  F/VT Ratio<105 (RSBI): 135 (06/10/20 0812)    Lines/Drains/Airways     Peripherally Inserted Central Catheter Line            PICC Double Lumen 05/05/20 1707 right basilic 35 days          Central Venous Catheter Line                 ECMO Cannula 04/25/20 1120 right femoral vein 45 days         ECMO Cannula 04/25/20 1120 right internal jugular 45 days          Drain                 NG/OG Tube 05/03/20 1215 [place] sump;nasogastric 18 Fr. Left nostril 37 days         Urethral Catheter 05/28/20 1630 12 days          Airway                 Surgical Airway 05/05/20 1500 Shiley Cuffed 35 days          Arterial Line                 Arterial Line 04/27/20 1100 Right Brachial 43 days          Peripheral Intravenous Line                 Peripheral IV - Single Lumen 06/06/20 0136 18 G Right Hand 4 days         Peripheral IV - Single Lumen 06/06/20 0137 18 G Left Hand 4 days         Peripheral IV - Single Lumen 06/10/20 Posterior;Right Wrist less than 1 day                Significant Labs:    CBC/Anemia Profile:  Recent Labs   Lab 06/09/20  0331  06/09/20  2001  06/10/20  0213 06/10/20  0319 06/10/20  0402 06/10/20  0806 06/10/20  0809   WBC 17.48*   < > 12.11  --  13.97*  --   --   --  14.49*   HGB 8.2*   < > 11.1*  --  11.0*  --   --   --  11.7*   HCT 26.1*   < > 33.3*   < > 35.4*  --  32* 35* 36.2*   PLT 87*   < > 81*  --  87*  --   --   --  93*   MCV 96   < > 89  --  93  --   --   --  91   RDW 15.0*   < > 15.9*  --  16.0*  --   --   --  16.0*   FERRITIN 1,689*  --   --   --   --  1,642*  --   --   --     < > = values in this interval not displayed.        Chemistries:  Recent Labs   Lab 06/09/20  2001 06/10/20  0213 06/10/20  0809   * 147* 148*   K  3.2* 5.7* 4.1    108 105   CO2 31* 31* 33*   BUN 45* 45* 46*   CREATININE 0.7 0.7 0.7   CALCIUM 9.3 9.5 9.8   ALBUMIN 3.3* 3.3* 3.5   PROT 5.8* 6.0 6.4   BILITOT 1.8* 1.8* 2.0*   ALKPHOS 117 126 131   ALT 30 31 32   AST 25 27 27   MG 1.8 2.0 2.1   PHOS 3.9 3.6 3.6       Significant Imaging:  I have reviewed and interpreted all pertinent imaging results/findings within the past 24 hours.    Assessment/Plan:     Type 2 diabetes mellitus without complication, without long-term current use of insulin  SQ insulin regimen achieving goal of 140-180    COVID-19 virus detected  Ranjana Sanchez is a 55 y.o. 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  This was switched to ETT on 4/11.  Cannulated on 4/25.    Neuro:  Precedex .4  100 BID of seroquel  Fentanyl patch  Encephalopathy with poor neuro exam and decorticate posturing  - CT on June 2 showing bilateral basal ganglia infarcts, which likely explains the neuro exam findings. His infarcts are sizeable, but extent of permanent deficits are unknown. The likelihood of meaningful recovery is low. Neuro consulted to prognosticate, will be seeing again today      Pulm  AC/VC+ on the vent.  RPM: 2800, Flow:  2.6, Sweep:  2.   TVs improving around low 300s now, however will need more time on ECMO    Cardiac  Levo as needed  MAP goal < 90    FEN/GI  TFs at goal    Renal  Making urine, diuresing agressively  Lasix gtt at 2.5    Heme  Hep gtt at 650, goal 0.25-0.3 to prevent another GI bleed  Has been at goal at this rate      ID  WBC stable    Dispo: CAT Barton MD  Critical Care - Surgery  [facility] - Respiratory ICU

## 2020-06-10 NOTE — SUBJECTIVE & OBJECTIVE
Interval History/Significant Events:   No acute event. Precedex .4. Off pressors. Lasix gtt 2.5.  Decorticate posturing. RPM: 2800, Flow:  2.6, Sweep:  2.      Follow-up For: Procedure(s) (LRB):  CREATION, TRACHEOSTOMY  (N/A)    Post-Operative Day: 32 Days Post-Op    Objective:     Vital Signs (Most Recent):  Temp: 98 °F (36.7 °C) (06/10/20 0700)  Pulse: (!) 132 (06/10/20 1017)  Resp: (!) 104 (06/10/20 0851)  BP: 113/72 (06/10/20 0802)  SpO2: 98 % (06/10/20 1017) Vital Signs (24h Range):  Temp:  [97.6 °F (36.4 °C)-98.7 °F (37.1 °C)] 98 °F (36.7 °C)  Pulse:  [] 132  Resp:  [] 104  SpO2:  [91 %-100 %] 98 %  BP: (103-130)/(67-84) 113/72  Arterial Line BP: ()/(49-85) 100/61     Weight: 62.8 kg (138 lb 7.2 oz)  Body mass index is 23.76 kg/m².      Intake/Output Summary (Last 24 hours) at 6/10/2020 1029  Last data filed at 6/10/2020 1000  Gross per 24 hour   Intake 3063.7 ml   Output 4209 ml   Net -1145.3 ml       Physical Exam   Constitutional: He appears well-developed.   HENT:   Head: Normocephalic and atraumatic.   Eyes: Pupils are equal, round, and reactive to light.   Neck: Normal range of motion.   Cardiovascular:   tachycardic   Pulmonary/Chest:   Trached, mechanically ventilated  On ECMO, cannulated in RIJ and fem vein   Abdominal: Soft. He exhibits no distension.   Musculoskeletal: Normal range of motion.   Neurological:   Decorticate posturing   Skin: Skin is warm.   Vitals reviewed.      Vents:  Vent Mode: A/C (06/10/20 0812)  Ventilator Initiated: Yes (04/10/20 2247)  Set Rate: 30 BPM (06/10/20 0812)  Vt Set: 250 mL (06/06/20 0858)  Pressure Support: 20 cmH20 (06/06/20 0858)  PEEP/CPAP: 6 cmH20 (06/10/20 0812)  Oxygen Concentration (%): 50 (06/10/20 1017)  Peak Airway Pressure: 28 cmH2O (06/10/20 0812)  Plateau Pressure: 22 cmH20 (06/10/20 0812)  Total Ve: 7.3 mL (06/10/20 0812)  F/VT Ratio<105 (RSBI): 135 (06/10/20 0812)    Lines/Drains/Airways     Peripherally Inserted Central Catheter  Line            PICC Double Lumen 05/05/20 1707 right basilic 35 days          Central Venous Catheter Line                 ECMO Cannula 04/25/20 1120 right femoral vein 45 days         ECMO Cannula 04/25/20 1120 right internal jugular 45 days          Drain                 NG/OG Tube 05/03/20 1215 [place] preetp;nasogastric 18 Fr. Left nostril 37 days         Urethral Catheter 05/28/20 1630 12 days          Airway                 Surgical Airway 05/05/20 1500 Shiley Cuffed 35 days          Arterial Line                 Arterial Line 04/27/20 1100 Right Brachial 43 days          Peripheral Intravenous Line                 Peripheral IV - Single Lumen 06/06/20 0136 18 G Right Hand 4 days         Peripheral IV - Single Lumen 06/06/20 0137 18 G Left Hand 4 days         Peripheral IV - Single Lumen 06/10/20 Posterior;Right Wrist less than 1 day                Significant Labs:    CBC/Anemia Profile:  Recent Labs   Lab 06/09/20  0331  06/09/20  2001  06/10/20  0213 06/10/20  0319 06/10/20  0402 06/10/20  0806 06/10/20  0809   WBC 17.48*   < > 12.11  --  13.97*  --   --   --  14.49*   HGB 8.2*   < > 11.1*  --  11.0*  --   --   --  11.7*   HCT 26.1*   < > 33.3*   < > 35.4*  --  32* 35* 36.2*   PLT 87*   < > 81*  --  87*  --   --   --  93*   MCV 96   < > 89  --  93  --   --   --  91   RDW 15.0*   < > 15.9*  --  16.0*  --   --   --  16.0*   FERRITIN 1,689*  --   --   --   --  1,642*  --   --   --     < > = values in this interval not displayed.        Chemistries:  Recent Labs   Lab 06/09/20  2001 06/10/20  0213 06/10/20  0809   * 147* 148*   K 3.2* 5.7* 4.1    108 105   CO2 31* 31* 33*   BUN 45* 45* 46*   CREATININE 0.7 0.7 0.7   CALCIUM 9.3 9.5 9.8   ALBUMIN 3.3* 3.3* 3.5   PROT 5.8* 6.0 6.4   BILITOT 1.8* 1.8* 2.0*   ALKPHOS 117 126 131   ALT 30 31 32   AST 25 27 27   MG 1.8 2.0 2.1   PHOS 3.9 3.6 3.6       Significant Imaging:  I have reviewed and interpreted all pertinent imaging results/findings within the past  24 hours.

## 2020-06-10 NOTE — PLAN OF CARE
Problem: Adult Inpatient Plan of Care  Goal: Absence of Hospital-Acquired Illness or Injury  6/10/2020 1435 by Luis Daly RN  Outcome: Ongoing, Progressing  6/10/2020 1417 by Luis Daly RN  Outcome: Ongoing, Progressing  6/10/2020 1416 by Luis Daly RN  Outcome: Ongoing, Not Progressing     Problem: Adult Inpatient Plan of Care  Goal: Optimal Comfort and Wellbeing  6/10/2020 1435 by Luis Daly RN  Outcome: Ongoing, Progressing  6/10/2020 1417 by Luis Daly RN  Outcome: Ongoing, Progressing  6/10/2020 1416 by Luis Daly RN  Outcome: Ongoing, Not Progressing

## 2020-06-10 NOTE — ASSESSMENT & PLAN NOTE
Ranjana Sanchez is a 55 y.o. 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  This was switched to ETT on 4/11.  Cannulated on 4/25.    Neuro:  Precedex .4  100 BID of seroquel  Fentanyl patch  Encephalopathy with poor neuro exam and decorticate posturing  - CT on June 2 showing bilateral basal ganglia infarcts, which likely explains the neuro exam findings. His infarcts are sizeable, but extent of permanent deficits are unknown. The likelihood of meaningful recovery is low. Neuro consulted to prognosticate, will be seeing again today      Pulm  AC/VC+ on the vent.  RPM: 2800, Flow:  2.6, Sweep:  2.   TVs improving around low 300s now, however will need more time on ECMO    Cardiac  Levo as needed  MAP goal < 90    FEN/GI  TFs at goal    Renal  Making urine, diuresing agressively  Lasix gtt at 2.5    Heme  Hep gtt at 650, goal 0.25-0.3 to prevent another GI bleed  Has been at goal at this rate      ID  WBC stable    Dispo: CAT

## 2020-06-11 PROBLEM — Z78.9 ON ENTERAL NUTRITION: Status: ACTIVE | Noted: 2020-01-01

## 2020-06-11 NOTE — ASSESSMENT & PLAN NOTE
Acute respiratory distress syndrome (ARDS) due to COVID-19 virus  - Crich switched to ETT on 4/11  - Monika weaned off 5/5  - Tracheostomy and bronch 5/5  - severe thrombocytopenia resolved; transfuse for under 30K  - Aspirin 81 daily  - Labetalol 300BID  - RPM at 2600; flows at 2.6  - Sweep at 5; CO2 goal is 59 or below  - Do not increase sweep for pCO2s 59 or below as long as patient is not acidotic, as these high pCO2s are respiratory compensation for alkalosis  - Oxygenator Fi at 30%; switched out 6/9  - CT head and chest imaging reviewed with profound basilar changes concerning for hypoxemic injury  - Patient tolerating low sweep and low FiO2 on the oxygenator since oxygenator switch-out yesterday     Tongue laceration      -ENT evaluated, laceration superficial      -recs: bite block vs paralysis, will monitor    Sedation  - Valium, seroquel, PRN Fentanyl     UGI Bleed  - Scoped by GI 5/3 with epinephrine injection and clip placement for an actively bleeding D2/D3 Diuelafoy lesion  - Recommend GI re-consultation if Hb continues to drift and further blood is aspirated from NGT or patient has bloody BM, etc    FEN/GI  - TF at 45, goal      - Lasix gtt 2.5  - FWB for hypernatremia 400mL q4     Anticoagulation      - Goal aXa 0.2-0.25 Q6hrs      - Hep gtt 600      - monitor LDH    Prophylaxis      - protonix q12 IV       - leukocytosis stable      - PICC placed 5/5; central line removed 5/6

## 2020-06-11 NOTE — PLAN OF CARE
Patient remains trached on the ventilator, FiO2 50%, PEEP of 6, and rate of 30.  V-V ECMO, FiO2 40%, Sweep of 3.5.  Gtts/Infusions:  Precedex @ 0.6 mcg/kg/hr, Lasix gtt @ 2.5 mg/hr, and Heparin gtt @ 800 units/hr.  Patient has TF's infusing via NGT @ 45 mL/hr which is goal. Please see I&O flowsheet for UOP.  Free water boluses q4hr.  1 BM this shift.  Safety maintained overnight; pt remained free from falls, injury, and skin breakdown overnight.  Plan of care reviewed with the patient; no evidence of learning.

## 2020-06-11 NOTE — HPI
Reason for Consult: Management of T2DM, Hyperglycemia     Surgical Procedure and Date: tracheostomy 5/5/20    Diabetes diagnosis year: JAMMIE    Lab Results   Component Value Date    HGBA1C 6.6 (H) 04/12/2020       Home Diabetes Medications: none (per chart review)    How often checking glucose at home? JAMMIE  BG readings on regimen: JAMMIE  Hypoglycemia on the regimen? JAMMIE  Missed doses on regimen? JAMMIE    Diabetes Complications include:     JAMMIE    Complicating diabetes co morbidities:   none      HPI:   Patient is a 55 y.o. male with a diagnosis of DM2 and ARDS secondary to COVID-19.  Admitted to [facility] on 4/10/20 with fever and SOB.  Lengthy and complex hospital stay noted requiring intubation, ECMO, and trach placement.  Patient not with severe ARDS (with poor lung compliance) and suspected anoxic brain injury.  Patient developed hyperglycemia secondary to TF.  Endocrinology consulted for DM/BG management.

## 2020-06-11 NOTE — SUBJECTIVE & OBJECTIVE
Interval HPI:   Overnight events: Remains in RICU.  BG persistently elevated requiring correction scale insulin.  Eating:   NPO  Nausea: No  Hypoglycemia and intervention: No  Fever: No  TPN and/or TF: Yes  TF and rate: Peptamen Intense VHP at 45 cc/hr (goal)    PMH, PSH, FH, SH reviewed     ROS:  Unable to obtain due to: patient acuity/intubated     Current Medications and/or Treatments Impacting Glycemic Control  Immunotherapy:    Immunosuppressants     None        Steroids:   Hormones (From admission, onward)    None        Pressors:    Autonomic Drugs (From admission, onward)    Start     Stop Route Frequency Ordered    06/02/20 1445  norepinephrine 4 mg in dextrose 5% 250 mL infusion (premix) (titrating)     Question Answer Comment   Titrate by: (in mcg/kg/min) 0.01    Titrate interval: (in minutes) 15    Titrate to maintain: (MAP or SBP) MAP    Greater than: (in mmHg) 70    Maximum dose: (in mcg/kg/min) 3        -- IV Continuous 06/02/20 1339    04/16/20 0915  rocuronium 10 mg/mL injection     Note to Pharmacy:  Created by cabinet override    04/16 2129 04/16/20 0915        Hyperglycemia/Diabetes Medications:   Antihyperglycemics (From admission, onward)    Start     Stop Route Frequency Ordered    05/29/20 0413  insulin aspart U-100 pen 1-10 Units      -- SubQ Every 4 hours PRN 05/29/20 0314             PHYSICAL EXAMINATION:  Vitals:    06/11/20 1615   BP:    Pulse: (!) 129   Resp:    Temp:      Body mass index is 23.76 kg/m².    Physical Exam     Physical exam deferred due to COVID-19 restrictions.

## 2020-06-11 NOTE — CARE UPDATE
Update given to patient's son via telephone. Attempted Skype call with [facility] iPad x5 per son's request. Son did not answer skype call.

## 2020-06-11 NOTE — PLAN OF CARE
Patient remains trached on the ventilator, FiO2 50%, PEEP of 6, and rate of 30.  V-V ECMO, FiO2 30%, Sweep of 3.  Gtts/Infusions:  Precedex @ 0.8 mcg/kg/hr, Lasix gtt @ 2.5 mg/hr, and Heparin gtt @ 700 units/hr.  Patient has TF's infusing via NGT @ 45 mL/hr which is goal. Please see I&O flowsheet for UOP.  Free water boluses q4hr.  1 BM overnight.  Safety maintained overnight; pt remained free from falls, injury, and skin breakdown overnight.  Plan of care reviewed with the patient; no evidence of learning.

## 2020-06-11 NOTE — PHYSICIAN QUERY
PT Name: Ranjana Sanchez  MR #: [id number]     PHYSICIAN QUERY - INTEGUMENTARY CLARIFICATION     CDS/: Nancy Spivey, RN, CDS               Contact information: [email or web address]  This form is a permanent document in the medical record.     Query Date: June 11, 2020    By submitting this query, we are merely seeking further clarification of documentation.  Please utilize your independent clinical judgment when addressing the question(s) below.    The Medical Record contains the following:   Indicators   Supporting Clinical Findings Location in Medical Record    Redness      Decubitus, Pressure, Ulcer, etc.     x Deep Tissue Injury --Skin: DTI to left cheek and left arm.  Nursing POC Note 6/10     x Wound care consult --Consult received per RN for wound seen after pt extubated and trached when ETT alcaraz removed         -left cheek: intact dry brownish red scab measuring 1 cm x 1.5 cm without any drainage noted. Area appears to be a healing area of partial skin thickness that may be related to a medical adhesive skin injury. Sween moisturizer recommended to be applied daily per nursing.    --Shall full thickness skin loss noted to left cheek, appears to be device related from previous ET tube alcaraz device, slightly moist slough/eschar. Recommend daily cleaning with sterile normal saline, apply Triad barrier cream/ointment      --To left cheek: wound appears to have resolved with intact pink scar tissue noted to left cheek without any drainage noted Wound Care c/s 5/6                Wound Care c/s 5/8                                      Wound Care c/s 6/10    Medication:      Treatment:     x Other:  --Lesion 1:  Abrasion,  intact dry brownish red scab measuring 1 cm x 1.5 cm without any drainage noted. Area appears to be a healing area of partial skin thickness that may be related to a medical adhesive skin injury.         -POA : no PETRONA SKin Integrity Note 5/6       National Pressure Ulcer  Advisory Panel (2007) Pressure Ulcer Definitions & Stages:  Stage I:                     Intact skin with non-blanchable redness of a localized area usually over a bony prominence.   Stage II:                    Partial thickness loss of dermis presenting as a shallow open ulcer with a red pink wound bed, without slough.   Stage III:                   Full thickness tissue loss. Subcutaneous fat may be visible but bone, tendon or muscle is not exposed. Slough may be                                      present but does not obscure the depth of tissue loss. May include undermining and tunneling.  Stage IV:                  Full thickness tissue loss with exposed bone, tendon or muscle. Slough or eschar may be present on some parts of the                                      wound bed. Often include undermining and tunneling.  Unstageable:           Full thickness tissue loss but base of ulcer is covered by slough and/or eschar in the wound bed. Until enough                                        slough and/or eschar is removed to expose wound base, its true depth, and therefore stage, cannot be determined  Deep Tissue Injury: Purple or maroon localized area of discolored intact skin or blood-filled blister due to damage of underlying soft tissue   from pressure and/or shear.  Suspected deep tissue injuries may develop into a stageable ulcer or turn out to be another diagnosis (e.g. an ecchymosis)       Provider, please clarify/ provide the diagnosis related to the documentation of the left cheek:    [   ] Decubitus (Pressure) Ulcer    [   ] Deep Tissue Pressure Injury   [ x  ] Deep Tissue Pressure Injury that transformed into a Decubitus (Pressure) Ulcer   [   ] Other Integumentary Diagnosis (please specify):______________   [  ] Diagnosis Clinically Undetermined   [   ] Diagnosis Ruled Out       Please document in your progress notes daily for the duration of treatment until resolved and include in your discharge  summary.    Per wound care note 6/10/2020 area has healed.Patient was evaluated via chart review as the patient is COVID positive.

## 2020-06-11 NOTE — RESPIRATORY THERAPY
Site assessment concern documented in flowsheet. Wound care was previously consulted. Documented notes show that wound care was advised by pt nurse, not to remove dressing due to dislodging blood clots. Will attempt MD and pass on to oncoming therapist during handoff.

## 2020-06-11 NOTE — SUBJECTIVE & OBJECTIVE
Interval History/Significant Events:   No acute event. Precedex .8. Off levo. Lasix gtt 2.5.  Decorticate posturing. Sweep:  3.      Follow-up For: Procedure(s) (LRB):  CREATION, TRACHEOSTOMY  (N/A)    Post-Operative Day: 37 Days Post-Op    Objective:     Vital Signs (Most Recent):  Temp: 98.6 °F (37 °C) (06/11/20 0300)  Pulse: (!) 135 (06/11/20 0700)  Resp: (!) 32 (06/11/20 0630)  BP: 108/76 (06/11/20 0400)  SpO2: 99 % (06/11/20 0700) Vital Signs (24h Range):  Temp:  [98.4 °F (36.9 °C)-98.9 °F (37.2 °C)] 98.6 °F (37 °C)  Pulse:  [] 135  Resp:  [] 32  SpO2:  [92 %-100 %] 99 %  BP: (108-119)/(69-82) 108/76  Arterial Line BP: ()/(52-76) 127/74     Weight: 62.8 kg (138 lb 7.2 oz)  Body mass index is 23.76 kg/m².      Intake/Output Summary (Last 24 hours) at 6/11/2020 0737  Last data filed at 6/11/2020 0600  Gross per 24 hour   Intake 3158 ml   Output 4118 ml   Net -960 ml       Physical Exam   Constitutional: He appears well-developed.   HENT:   Head: Normocephalic and atraumatic.   Eyes: Pupils are equal, round, and reactive to light.   Neck: Normal range of motion.   Cardiovascular:   tachycardic   Pulmonary/Chest:   Trached, mechanically ventilated  On ECMO, cannulated in RIJ and fem vein   Abdominal: Soft. He exhibits no distension.   Musculoskeletal: Normal range of motion.   Neurological:   Decorticate posturing   Skin: Skin is warm.   Vitals reviewed.  Input from RN due to COVID-19    Vents:  Vent Mode: A/C (06/11/20 0216)  Ventilator Initiated: Yes (04/10/20 2247)  Set Rate: 30 BPM (06/11/20 0216)  Vt Set: 250 mL (06/06/20 0858)  Pressure Support: 20 cmH20 (06/06/20 0858)  PEEP/CPAP: 6 cmH20 (06/11/20 0216)  Oxygen Concentration (%): 50 (06/11/20 0630)  Peak Airway Pressure: 28 cmH2O (06/11/20 0216)  Plateau Pressure: 22 cmH20 (06/11/20 0216)  Total Ve: 6.08 mL (06/11/20 0216)  F/VT Ratio<105 (RSBI): 266.23 (06/11/20 0216)    Lines/Drains/Airways     Peripherally Inserted Central Catheter Line             PICC Double Lumen 05/05/20 1707 right basilic 36 days          Central Venous Catheter Line                 ECMO Cannula 04/25/20 1120 right femoral vein 46 days         ECMO Cannula 04/25/20 1120 right internal jugular 46 days          Drain                 NG/OG Tube 05/03/20 1215 [place] preetp;nasogastric 18 Fr. Left nostril 38 days         Urethral Catheter 05/28/20 1630 13 days          Airway                 Surgical Airway 05/05/20 1500 Shiley Cuffed 36 days          Arterial Line                 Arterial Line 04/27/20 1100 Right Brachial 44 days          Peripheral Intravenous Line                 Peripheral IV - Single Lumen 06/06/20 0136 18 G Right Hand 5 days         Peripheral IV - Single Lumen 06/06/20 0137 18 G Left Hand 5 days         Peripheral IV - Single Lumen 06/10/20 Posterior;Right Wrist 1 day                Significant Labs:    CBC/Anemia Profile:  Recent Labs   Lab 06/10/20  0319  06/10/20  1400  06/10/20  2014 06/11/20  0322 06/11/20  0405   WBC  --    < > 13.75*  --  14.36* 13.30*  --    HGB  --    < > 11.1*  --  11.0* 10.4*  --    HCT  --    < > 35.0*   < > 34.8* 32.7* 34*   PLT  --    < > 94*  --  97* 98*  --    MCV  --    < > 91  --  91 91  --    RDW  --    < > 15.9*  --  15.9* 15.6*  --    FERRITIN 1,642*  --   --   --   --  1,839*  --     < > = values in this interval not displayed.        Chemistries:  Recent Labs   Lab 06/10/20  1400 06/10/20  2014 06/11/20  0322   * 145 143   K 4.4 4.1 4.0    101 99   CO2 31* 34* 33*   BUN 48* 47* 46*   CREATININE 0.7 0.7 0.7   CALCIUM 10.1 10.0 9.5   ALBUMIN 3.5 3.3* 3.2*   PROT 6.6 6.4 6.2   BILITOT 2.4* 2.1* 2.1*   ALKPHOS 130 121 114   ALT 30 29 26   AST 26 25 23   MG 2.2 2.0 1.9   PHOS 2.9 2.8 2.5*       Significant Imaging:  I have reviewed and interpreted all pertinent imaging results/findings within the past 24 hours.

## 2020-06-11 NOTE — ASSESSMENT & PLAN NOTE
BG goal 140 - 180     Start scheduled Novolog 3 units every 4 hours, hold if TF held or discontinued  Low dose correction scale  BG monitoring every 4 hours    Discharge planning: URIEL

## 2020-06-11 NOTE — PROGRESS NOTES
[facility] - Respiratory ICU  Cardiothoracic Surgery  Progress Note    Patient Name: Ranjana Sanchez  MRN: [id number]  Admission Date: 4/10/2020  Hospital Length of Stay: 62 days  Code Status: Full Code   Attending Physician: Francis Ayon MD   Referring Provider: Francis Ayon MD  Principal Problem:Acute respiratory disease due to COVID-19 virus    Subjective:   Interval History:  No acute events overnight, low precedex requirements. UOP 4.5L, net negative 800mL in 24hrs. Hep gtt 600.    Medications:  Continuous Infusions:   dexmedetomidine (PRECEDEX) infusion 0.8 mcg/kg/hr (06/11/20 1200)    furosemide (LASIX) 2 mg/mL continuous infusion (non-titrating) 2.5 mg/hr (06/11/20 1200)    heparin (porcine) in 5 % dex 800 Units/hr (06/11/20 1200)    norepinephrine bitartrate-D5W Stopped (06/10/20 0500)     Scheduled Meds:   aspirin  81 mg Per NG tube Daily    fentaNYL  1 patch Transdermal Q72H    multivitamin liquid no.118  10 mL Per G Tube Daily    pantoprazole  40 mg Per NG tube BID    polyethylene glycol  17 g Per NG tube Daily    potassium chloride 10%  40 mEq Per NG tube BID    psyllium husk (aspartame)  1 packet Per NG tube TID    sucralfate  1 g Per NG tube Q6H    vitamin D  1,000 Units Per G Tube Daily        Objective:     Vital Signs (Most Recent):  Temp: 98.6 °F (37 °C) (06/11/20 1100)  Pulse: (!) 124 (06/11/20 1215)  Resp: (!) 39 (06/11/20 0701)  BP: 113/80 (06/11/20 1200)  SpO2: 95 % (06/11/20 1215) Vital Signs (24h Range):  Temp:  [98.4 °F (36.9 °C)-98.9 °F (37.2 °C)] 98.6 °F (37 °C)  Pulse:  [114-143] 124  Resp:  [26-60] 39  SpO2:  [92 %-100 %] 95 %  BP: (108-119)/(69-82) 113/80  Arterial Line BP: ()/(57-74) 140/73     Weight: 62.8 kg (138 lb 7.2 oz)  Body mass index is 23.76 kg/m².    SpO2: 95 %  O2 Device (Oxygen Therapy): ventilator    Intake/Output - Last 3 Shifts       06/09 0700 - 06/10 0659 06/10 0700 - 06/11 0659 06/11 0700 - 06/12 0659    I.V. (mL/kg) 298.7  (4.8) 423 (6.7) 110.2 (1.8)    Blood 1352      NG/GT 1985 3180 370    Total Intake(mL/kg) 3635.7 (57.9) 3603 (57.4) 480.2 (7.6)    Urine (mL/kg/hr) 4305 (2.9) 4270 (2.8) 900 (2.4)    Drains 15      Stool 0      Blood 2 3 2    Total Output 4322 4273 902    Net -686.3 -670 -421.8           Stool Occurrence 2 x            Lines/Drains/Airways     Peripherally Inserted Central Catheter Line            PICC Double Lumen 05/05/20 1707 right basilic 36 days          Central Venous Catheter Line                 ECMO Cannula 04/25/20 1120 right femoral vein 47 days         ECMO Cannula 04/25/20 1120 right internal jugular 47 days          Drain                 NG/OG Tube 05/03/20 1215 [place] sump;nasogastric 18 Fr. Left nostril 39 days         Urethral Catheter 05/28/20 1630 13 days          Airway                 Surgical Airway 05/05/20 1500 Shiley Cuffed 36 days          Arterial Line                 Arterial Line 04/27/20 1100 Right Brachial 45 days          Peripheral Intravenous Line                 Peripheral IV - Single Lumen 06/06/20 0136 18 G Right Hand 5 days         Peripheral IV - Single Lumen 06/06/20 0137 18 G Left Hand 5 days         Peripheral IV - Single Lumen 06/10/20 Posterior;Right Wrist 1 day                Physical Exam    Constitutional: He is sedated, and intubated.   Head: Normocephalic   Cardiovascular: Tachycardia 100s   Pulmonary/Chest: intubated vent FiO2 50% PEEP 6, ECMO RPM 2800, Flow 2.6, sweep 2, oxygenator FiO2 30%  Skin: L neck and L groin cannulas intact, no flashing, clots pre and post-oxygenator, sutures intact and secure with 2 new sutures to upper (return) cannula.  Nursing note and vitals reviewed.    Significant Labs:  BMP:   Recent Labs   Lab 06/11/20  0800   *      K 3.6   CL 99   CO2 34*   BUN 45*   CREATININE 0.7   CALCIUM 9.7   MG 1.9     CBC:   Recent Labs   Lab 06/11/20  0800 06/11/20  0806   WBC 13.60*  --    RBC 3.53*  --    HGB 10.3*  --    HCT 32.0* 31*   PLT  105*  --    MCV 91  --    MCH 29.2  --    MCHC 32.2  --      LFTs:   Recent Labs   Lab 06/11/20  0800   ALT 25   AST 24   ALKPHOS 110   BILITOT 2.0*   PROT 6.1   ALBUMIN 3.1*     Significant Diagnostics:  Cxr: stable    Ct Head Without Contrast 6/2/2020  -symmetrical hypoattenuation bilobed basal ganglia concerning for edema as well as poor gray-white matter differentiation cerebral hemispheres diffusely concerning for profound hypoxic anoxic encephalopathy  -no evidence for acute intracranial hemorrhage    Ct Chest Without Contrast 6/2/2020  -small left pneumothorax with no mediastinal shift and minimal loss of left lung volume  -no right pneumothorax, pneumomediastinum or pneumoperitoneum  -widespread largely homogeneous ground-glass disease throughout all lobes as well as tubular and varicoid bronchiectasis in this patient with history of COVID-19 pneumonia maintained on ECMO  -fine reticular and reticulonodular pattern is evident in the periphery of the lungs consistent with interstitial lung disease  -findings are not typical for pulmonary edema due to elevated pulmonary venous pressures and/or abnormal capillary permeability      Assessment/Plan:     Acute respiratory distress syndrome (ARDS) due to COVID-19 virus  - Crich switched to ETT on 4/11  - Monika weaned off 5/5  - Tracheostomy and bronch 5/5  - severe thrombocytopenia resolved; transfuse for under 30K  - Aspirin 81 daily  - Labetalol 300BID  - RPM at 2600; flows at 2.6  - Sweep at 5; CO2 goal is 59 or below  - Do not increase sweep for pCO2s 59 or below as long as patient is not acidotic, as these high pCO2s are respiratory compensation for alkalosis  - Oxygenator Fi at 30%; switched out 6/9  - CT head and chest imaging reviewed with profound basilar changes concerning for hypoxemic injury  - Patient tolerating low sweep and low FiO2 on the oxygenator since oxygenator switch-out yesterday     Tongue laceration      -ENT evaluated, laceration  superficial      -recs: bite block vs paralysis, will monitor    Sedation  - Valium, seroquel, PRN Fentanyl     UGI Bleed  - Scoped by GI 5/3 with epinephrine injection and clip placement for an actively bleeding D2/D3 Diuelafoy lesion  - Recommend GI re-consultation if Hb continues to drift and further blood is aspirated from NGT or patient has bloody BM, etc    FEN/GI  - TF at 45, goal      - Lasix gtt 2.5  - FWB for hypernatremia 400mL q4     Anticoagulation      - Goal aXa 0.2-0.25 Q6hrs      - Hep gtt 600      - monitor LDH    Prophylaxis      - protonix q12 IV       - leukocytosis stable      - PICC placed 5/5; central line removed 5/6    Anne Miller MD  Cardiothoracic Surgery  [facility] - Respiratory ICU      VV ECMO circuit checked and all parameters reviewed. Anticoagulation was managed and all cannulation exit sites along with review of labs and radiology studies performed. Speed and functioning of the pump along with oxygenator quality reviewed.  Patient continues to be stable on a Lasix drip at 2.5mg/hr and tube feeds at 45 cc an hour.  No concerns from the nursing staff or perfusion team with any flow or anticoagulation related issues.  No new clots noted in the circuit.  Coordination of care was provided between different teams which consisted of the surgical ICU to, perfusion Staff and the nursing staff.  All questions and concerns all team members were addressed.  Periodically update has been provided to the family and all questions and concerns have been addressed.

## 2020-06-11 NOTE — SUBJECTIVE & OBJECTIVE
Interval History:  No acute events overnight, low precedex requirements. UOP 4.5L, net negative 800mL in 24hrs. Hep gtt 600.    Medications:  Continuous Infusions:   dexmedetomidine (PRECEDEX) infusion 0.8 mcg/kg/hr (06/11/20 1200)    furosemide (LASIX) 2 mg/mL continuous infusion (non-titrating) 2.5 mg/hr (06/11/20 1200)    heparin (porcine) in 5 % dex 800 Units/hr (06/11/20 1200)    norepinephrine bitartrate-D5W Stopped (06/10/20 0500)     Scheduled Meds:   aspirin  81 mg Per NG tube Daily    fentaNYL  1 patch Transdermal Q72H    multivitamin liquid no.118  10 mL Per G Tube Daily    pantoprazole  40 mg Per NG tube BID    polyethylene glycol  17 g Per NG tube Daily    potassium chloride 10%  40 mEq Per NG tube BID    psyllium husk (aspartame)  1 packet Per NG tube TID    sucralfate  1 g Per NG tube Q6H    vitamin D  1,000 Units Per G Tube Daily        Objective:     Vital Signs (Most Recent):  Temp: 98.6 °F (37 °C) (06/11/20 1100)  Pulse: (!) 124 (06/11/20 1215)  Resp: (!) 39 (06/11/20 0701)  BP: 113/80 (06/11/20 1200)  SpO2: 95 % (06/11/20 1215) Vital Signs (24h Range):  Temp:  [98.4 °F (36.9 °C)-98.9 °F (37.2 °C)] 98.6 °F (37 °C)  Pulse:  [114-143] 124  Resp:  [26-60] 39  SpO2:  [92 %-100 %] 95 %  BP: (108-119)/(69-82) 113/80  Arterial Line BP: ()/(57-74) 140/73     Weight: 62.8 kg (138 lb 7.2 oz)  Body mass index is 23.76 kg/m².    SpO2: 95 %  O2 Device (Oxygen Therapy): ventilator    Intake/Output - Last 3 Shifts       06/09 0700 - 06/10 0659 06/10 0700 - 06/11 0659 06/11 0700 - 06/12 0659    I.V. (mL/kg) 298.7 (4.8) 423 (6.7) 110.2 (1.8)    Blood 1352      NG/GT 1985 3180 370    Total Intake(mL/kg) 3635.7 (57.9) 3603 (57.4) 480.2 (7.6)    Urine (mL/kg/hr) 4305 (2.9) 4270 (2.8) 900 (2.4)    Drains 15      Stool 0      Blood 2 3 2    Total Output 4322 4273 902    Net -686.3 -670 -421.8           Stool Occurrence 2 x            Lines/Drains/Airways     Peripherally Inserted Central Catheter  Line            PICC Double Lumen 05/05/20 1707 right basilic 36 days          Central Venous Catheter Line                 ECMO Cannula 04/25/20 1120 right femoral vein 47 days         ECMO Cannula 04/25/20 1120 right internal jugular 47 days          Drain                 NG/OG Tube 05/03/20 1215 [place] sump;nasogastric 18 Fr. Left nostril 39 days         Urethral Catheter 05/28/20 1630 13 days          Airway                 Surgical Airway 05/05/20 1500 Shiley Cuffed 36 days          Arterial Line                 Arterial Line 04/27/20 1100 Right Brachial 45 days          Peripheral Intravenous Line                 Peripheral IV - Single Lumen 06/06/20 0136 18 G Right Hand 5 days         Peripheral IV - Single Lumen 06/06/20 0137 18 G Left Hand 5 days         Peripheral IV - Single Lumen 06/10/20 Posterior;Right Wrist 1 day                Physical Exam    Constitutional: He is sedated, and intubated.   Head: Normocephalic   Cardiovascular: Tachycardia 100s   Pulmonary/Chest: intubated vent FiO2 50% PEEP 6, ECMO RPM 2800, Flow 2.6, sweep 2, oxygenator FiO2 30%  Skin: L neck and L groin cannulas intact, no flashing, clots pre and post-oxygenator, sutures intact and secure with 2 new sutures to upper (return) cannula.  Nursing note and vitals reviewed.    Significant Labs:  BMP:   Recent Labs   Lab 06/11/20  0800   *      K 3.6   CL 99   CO2 34*   BUN 45*   CREATININE 0.7   CALCIUM 9.7   MG 1.9     CBC:   Recent Labs   Lab 06/11/20  0800 06/11/20  0806   WBC 13.60*  --    RBC 3.53*  --    HGB 10.3*  --    HCT 32.0* 31*   *  --    MCV 91  --    MCH 29.2  --    MCHC 32.2  --      LFTs:   Recent Labs   Lab 06/11/20  0800   ALT 25   AST 24   ALKPHOS 110   BILITOT 2.0*   PROT 6.1   ALBUMIN 3.1*     Significant Diagnostics:  Cxr: stable    Ct Head Without Contrast 6/2/2020  -symmetrical hypoattenuation bilobed basal ganglia concerning for edema as well as poor gray-white matter differentiation cerebral  hemispheres diffusely concerning for profound hypoxic anoxic encephalopathy  -no evidence for acute intracranial hemorrhage    Ct Chest Without Contrast 6/2/2020  -small left pneumothorax with no mediastinal shift and minimal loss of left lung volume  -no right pneumothorax, pneumomediastinum or pneumoperitoneum  -widespread largely homogeneous ground-glass disease throughout all lobes as well as tubular and varicoid bronchiectasis in this patient with history of COVID-19 pneumonia maintained on ECMO  -fine reticular and reticulonodular pattern is evident in the periphery of the lungs consistent with interstitial lung disease  -findings are not typical for pulmonary edema due to elevated pulmonary venous pressures and/or abnormal capillary permeability

## 2020-06-11 NOTE — SUBJECTIVE & OBJECTIVE
Interval History:  No events overnight, precedex up to 0.8 this AM. UOP 4.1L, net negative 500mL in 24hrs. Hep gtt increased to 800 for low aXa.    Medications:  Continuous Infusions:   dexmedetomidine (PRECEDEX) infusion 0.8 mcg/kg/hr (06/11/20 1200)    furosemide (LASIX) 2 mg/mL continuous infusion (non-titrating) 2.5 mg/hr (06/11/20 1200)    heparin (porcine) in 5 % dex 800 Units/hr (06/11/20 1200)    norepinephrine bitartrate-D5W Stopped (06/10/20 0500)     Scheduled Meds:   aspirin  81 mg Per NG tube Daily    fentaNYL  1 patch Transdermal Q72H    multivitamin liquid no.118  10 mL Per G Tube Daily    pantoprazole  40 mg Per NG tube BID    polyethylene glycol  17 g Per NG tube Daily    potassium chloride 10%  40 mEq Per NG tube BID    psyllium husk (aspartame)  1 packet Per NG tube TID    sucralfate  1 g Per NG tube Q6H    vitamin D  1,000 Units Per G Tube Daily        Objective:     Vital Signs (Most Recent):  Temp: 98.6 °F (37 °C) (06/11/20 1100)  Pulse: (!) 124 (06/11/20 1215)  Resp: (!) 39 (06/11/20 0701)  BP: 113/80 (06/11/20 1200)  SpO2: 95 % (06/11/20 1215) Vital Signs (24h Range):  Temp:  [98.4 °F (36.9 °C)-98.9 °F (37.2 °C)] 98.6 °F (37 °C)  Pulse:  [114-143] 124  Resp:  [26-60] 39  SpO2:  [92 %-100 %] 95 %  BP: (108-119)/(69-82) 113/80  Arterial Line BP: ()/(57-74) 140/73     Weight: 62.8 kg (138 lb 7.2 oz)  Body mass index is 23.76 kg/m².    SpO2: 95 %  O2 Device (Oxygen Therapy): ventilator    Intake/Output - Last 3 Shifts       06/09 0700 - 06/10 0659 06/10 0700 - 06/11 0659 06/11 0700 - 06/12 0659    I.V. (mL/kg) 298.7 (4.8) 423 (6.7) 110.2 (1.8)    Blood 1352      NG/GT 1985 3180 370    Total Intake(mL/kg) 3635.7 (57.9) 3603 (57.4) 480.2 (7.6)    Urine (mL/kg/hr) 4305 (2.9) 4270 (2.8) 900 (2.4)    Drains 15      Stool 0      Blood 2 3 2    Total Output 4322 4273 902    Net -686.3 -670 -421.8           Stool Occurrence 2 x            Lines/Drains/Airways     Peripherally Inserted  Central Catheter Line            PICC Double Lumen 05/05/20 1707 right basilic 36 days          Central Venous Catheter Line                 ECMO Cannula 04/25/20 1120 right femoral vein 47 days         ECMO Cannula 04/25/20 1120 right internal jugular 47 days          Drain                 NG/OG Tube 05/03/20 1215 [place] sump;nasogastric 18 Fr. Left nostril 39 days         Urethral Catheter 05/28/20 1630 13 days          Airway                 Surgical Airway 05/05/20 1500 Shiley Cuffed 36 days          Arterial Line                 Arterial Line 04/27/20 1100 Right Brachial 45 days          Peripheral Intravenous Line                 Peripheral IV - Single Lumen 06/06/20 0136 18 G Right Hand 5 days         Peripheral IV - Single Lumen 06/06/20 0137 18 G Left Hand 5 days         Peripheral IV - Single Lumen 06/10/20 Posterior;Right Wrist 1 day                Physical Exam    Constitutional: He is sedated, and intubated.   Head: Normocephalic   Cardiovascular: Tachycardia 100s   Pulmonary/Chest: intubated vent FiO2 50% PEEP 6, ECMO RPM 2800, Flow 2.6, sweep 3, oxygenator FiO2 30%  Skin: L neck and L groin cannulas intact, no flashing, clots pre and post-oxygenator, sutures intact and secure with 2 new sutures to upper (return) cannula.  Nursing note and vitals reviewed.    Significant Labs:  BMP:   Recent Labs   Lab 06/11/20  0800   *      K 3.6   CL 99   CO2 34*   BUN 45*   CREATININE 0.7   CALCIUM 9.7   MG 1.9     CBC:   Recent Labs   Lab 06/11/20  0800 06/11/20  0806   WBC 13.60*  --    RBC 3.53*  --    HGB 10.3*  --    HCT 32.0* 31*   *  --    MCV 91  --    MCH 29.2  --    MCHC 32.2  --      LFTs:   Recent Labs   Lab 06/11/20  0800   ALT 25   AST 24   ALKPHOS 110   BILITOT 2.0*   PROT 6.1   ALBUMIN 3.1*     Significant Diagnostics:  Cxr: stable    Ct Head Without Contrast 6/2/2020  -symmetrical hypoattenuation bilobed basal ganglia concerning for edema as well as poor gray-white matter  differentiation cerebral hemispheres diffusely concerning for profound hypoxic anoxic encephalopathy  -no evidence for acute intracranial hemorrhage    Ct Chest Without Contrast 6/2/2020  -small left pneumothorax with no mediastinal shift and minimal loss of left lung volume  -no right pneumothorax, pneumomediastinum or pneumoperitoneum  -widespread largely homogeneous ground-glass disease throughout all lobes as well as tubular and varicoid bronchiectasis in this patient with history of COVID-19 pneumonia maintained on ECMO  -fine reticular and reticulonodular pattern is evident in the periphery of the lungs consistent with interstitial lung disease  -findings are not typical for pulmonary edema due to elevated pulmonary venous pressures and/or abnormal capillary permeability

## 2020-06-11 NOTE — PROGRESS NOTES
[facility] shift report    Date: 06/11/2020  ECMO Specialist:  Iesha Elke    Pump parameters:  RPM: 2800  Flow:  2.6  Sweep:  3.5  FiO2:  40    Oxygenator status:  Clots: post -on 1/4 x 1/16 connection to sample port.   Fibrin: some -in same area as clot    Pressure trends:  P1: 90's  P2: 70's   Delta P: teens    Volume status:  Chugging noted: some starting this evening  CVP:   MAP:  50's-100  MD notified (name):  To    Anticoagulation:  ACT/aPTT/Xa parameters: Xa 0.20- 0.25  ACT/aPTT/Xa trends this shift: 0.15, 0.21    Cannula size / status / placement:  Arterial:   Venous 1: RIJV 23 FR @ 4cm  Venous 2: RFV 27 FR @ 12cm  Dual lumen:      Additional Comments:      Pt maintained on VV ECMO. This afternoon pts HR, BP, and SATs are liable. ECMO FIO2 increased to overcome low saturations. Sweep increased for pt CO2 of 63.4. Flows dropped to zero with coughing some chugging noted 100cc isolyte given to pump to maintain flows.

## 2020-06-11 NOTE — CONSULTS
[facility] - Respiratory ICU  Endocrinology  Diabetes Consult Note    Consult Requested by: Francis Ayon MD   Reason for admit: Acute respiratory disease due to COVID-19 virus    HISTORY OF PRESENT ILLNESS:  Reason for Consult: Management of T2DM, Hyperglycemia     Surgical Procedure and Date: tracheostomy 5/5/20    Diabetes diagnosis year: JAMMIE    Lab Results   Component Value Date    HGBA1C 6.6 (H) 04/12/2020       Home Diabetes Medications: none (per chart review)    How often checking glucose at home? JAMMIE  BG readings on regimen: JAMMIE  Hypoglycemia on the regimen? JAMMIE  Missed doses on regimen? JAMMIE    Diabetes Complications include:     JAMMIE    Complicating diabetes co morbidities:   none      HPI:   Patient is a 55 y.o. male with a diagnosis of DM2 and ARDS secondary to COVID-19.  Admitted to [facility] on 4/10/20 with fever and SOB.  Lengthy and complex hospital stay noted requiring intubation, ECMO, and trach placement.  Patient now with severe ARDS (with poor lung compliance) and suspected anoxic brain injury.  Patient developed hyperglycemia secondary to TF.  Endocrinology consulted for DM/BG management.        Interval HPI:   Overnight events: Remains in RICU.  BG persistently elevated requiring correction scale insulin.  Eating:   NPO  Nausea: No  Hypoglycemia and intervention: No  Fever: No  TPN and/or TF: Yes  TF and rate: Peptamen Intense VHP at 45 cc/hr (goal)    PMH, PSH, FH, SH reviewed     ROS:  Unable to obtain due to: patient acuity/intubated     Current Medications and/or Treatments Impacting Glycemic Control  Immunotherapy:    Immunosuppressants     None        Steroids:   Hormones (From admission, onward)    None        Pressors:    Autonomic Drugs (From admission, onward)    Start     Stop Route Frequency Ordered    06/02/20 1445  norepinephrine 4 mg in dextrose 5% 250 mL infusion (premix) (titrating)     Question Answer Comment   Titrate by: (in mcg/kg/min) 0.01    Titrate interval: (in  minutes) 15    Titrate to maintain: (MAP or SBP) MAP    Greater than: (in mmHg) 70    Maximum dose: (in mcg/kg/min) 3        -- IV Continuous 06/02/20 1339    04/16/20 0915  rocuronium 10 mg/mL injection     Note to Pharmacy:  Created by cabinet override    04/16 2129 04/16/20 0915        Hyperglycemia/Diabetes Medications:   Antihyperglycemics (From admission, onward)    Start     Stop Route Frequency Ordered    05/29/20 0413  insulin aspart U-100 pen 1-10 Units      -- SubQ Every 4 hours PRN 05/29/20 0314             PHYSICAL EXAMINATION:  Vitals:    06/11/20 1615   BP:    Pulse: (!) 129   Resp:    Temp:      Body mass index is 23.76 kg/m².    Physical Exam     Physical exam deferred due to COVID-19 restrictions.         Labs Reviewed and Include   Recent Labs   Lab 06/11/20  1400   *   CALCIUM 9.9   ALBUMIN 3.3*   PROT 6.8      K 4.2   CO2 37*   CL 98   BUN 46*   CREATININE 0.7   ALKPHOS 128   ALT 28   AST 27   BILITOT 2.6*     Lab Results   Component Value Date    WBC 14.65 (H) 06/11/2020    HGB 11.0 (L) 06/11/2020    HCT 32 (L) 06/11/2020    MCV 93 06/11/2020     (L) 06/11/2020     No results for input(s): TSH, FREET4 in the last 168 hours.  Lab Results   Component Value Date    HGBA1C 6.6 (H) 04/12/2020       Nutritional status:   Body mass index is 23.76 kg/m².  Lab Results   Component Value Date    ALBUMIN 3.3 (L) 06/11/2020    ALBUMIN 3.1 (L) 06/11/2020    ALBUMIN 3.2 (L) 06/11/2020     Lab Results   Component Value Date    PREALBUMIN 20 05/28/2020       Estimated Creatinine Clearance: 97.5 mL/min (based on SCr of 0.7 mg/dL).    Accu-Checks  Recent Labs     06/10/20  0607 06/10/20  0827 06/10/20  0951 06/10/20  1424 06/10/20  1756 06/10/20  2237 06/11/20  0312 06/11/20  0613 06/11/20  1003 06/11/20  1407   POCTGLUCOSE 230* 293* 275* 175* 247* 194* 241* 228* 218* 211*        ASSESSMENT and PLAN    * Acute respiratory disease due to COVID-19 virus  Managed per primary team        Type 2  diabetes mellitus without complication, without long-term current use of insulin  BG goal 140 - 180     Start scheduled Novolog 3 units every 4 hours, hold if TF held or discontinued  Low dose correction scale  BG monitoring every 4 hours    Discharge planning: TBD        On enteral nutrition  May cause BG excursions.           Discussed POC with bedside RN via telephone due to covid restrictions.    Harlan Denny NP  Endocrinology  [facility] - Respiratory ICU

## 2020-06-11 NOTE — PROGRESS NOTES
[facility] - Respiratory ICU  Critical Care - Surgery  Progress Note    Patient Name: Ranjana Sanchez  MRN: [id number]  Admission Date: 4/10/2020  Hospital Length of Stay: 62 days  Code Status: Full Code  Attending Provider: Francis Ayon MD  Primary Care Provider: Primary Doctor No   Principal Problem: Acute respiratory disease due to COVID-19 virus    Subjective:     Hospital/ICU Course:  No notes on file    Interval History/Significant Events:   No acute event. Precedex .8. Off levo. Lasix gtt 2.5.  Decorticate posturing. Sweep:  3.      Follow-up For: Procedure(s) (LRB):  CREATION, TRACHEOSTOMY  (N/A)    Post-Operative Day: 37 Days Post-Op    Objective:     Vital Signs (Most Recent):  Temp: 98.6 °F (37 °C) (06/11/20 0300)  Pulse: (!) 135 (06/11/20 0700)  Resp: (!) 32 (06/11/20 0630)  BP: 108/76 (06/11/20 0400)  SpO2: 99 % (06/11/20 0700) Vital Signs (24h Range):  Temp:  [98.4 °F (36.9 °C)-98.9 °F (37.2 °C)] 98.6 °F (37 °C)  Pulse:  [] 135  Resp:  [] 32  SpO2:  [92 %-100 %] 99 %  BP: (108-119)/(69-82) 108/76  Arterial Line BP: ()/(52-76) 127/74     Weight: 62.8 kg (138 lb 7.2 oz)  Body mass index is 23.76 kg/m².      Intake/Output Summary (Last 24 hours) at 6/11/2020 0737  Last data filed at 6/11/2020 0600  Gross per 24 hour   Intake 3158 ml   Output 4118 ml   Net -960 ml       Physical Exam   Constitutional: He appears well-developed.   HENT:   Head: Normocephalic and atraumatic.   Eyes: Pupils are equal, round, and reactive to light.   Neck: Normal range of motion.   Cardiovascular:   tachycardic   Pulmonary/Chest:   Trached, mechanically ventilated  On ECMO, cannulated in RIJ and fem vein   Abdominal: Soft. He exhibits no distension.   Musculoskeletal: Normal range of motion.   Neurological:   Decorticate posturing   Skin: Skin is warm.   Vitals reviewed.  Input from RN due to COVID-19    Vents:  Vent Mode: A/C (06/11/20 0216)  Ventilator Initiated: Yes (04/10/20 2985)  Set  Rate: 30 BPM (06/11/20 0216)  Vt Set: 250 mL (06/06/20 0858)  Pressure Support: 20 cmH20 (06/06/20 0858)  PEEP/CPAP: 6 cmH20 (06/11/20 0216)  Oxygen Concentration (%): 50 (06/11/20 0630)  Peak Airway Pressure: 28 cmH2O (06/11/20 0216)  Plateau Pressure: 22 cmH20 (06/11/20 0216)  Total Ve: 6.08 mL (06/11/20 0216)  F/VT Ratio<105 (RSBI): 266.23 (06/11/20 0216)    Lines/Drains/Airways     Peripherally Inserted Central Catheter Line            PICC Double Lumen 05/05/20 1707 right basilic 36 days          Central Venous Catheter Line                 ECMO Cannula 04/25/20 1120 right femoral vein 46 days         ECMO Cannula 04/25/20 1120 right internal jugular 46 days          Drain                 NG/OG Tube 05/03/20 1215 [place] sump;nasogastric 18 Fr. Left nostril 38 days         Urethral Catheter 05/28/20 1630 13 days          Airway                 Surgical Airway 05/05/20 1500 [place]iley Cuffed 36 days          Arterial Line                 Arterial Line 04/27/20 1100 Right Brachial 44 days          Peripheral Intravenous Line                 Peripheral IV - Single Lumen 06/06/20 0136 18 G Right Hand 5 days         Peripheral IV - Single Lumen 06/06/20 0137 18 G Left Hand 5 days         Peripheral IV - Single Lumen 06/10/20 Posterior;Right Wrist 1 day                Significant Labs:    CBC/Anemia Profile:  Recent Labs   Lab 06/10/20  0319  06/10/20  1400  06/10/20  2014 06/11/20  0322 06/11/20  0405   WBC  --    < > 13.75*  --  14.36* 13.30*  --    HGB  --    < > 11.1*  --  11.0* 10.4*  --    HCT  --    < > 35.0*   < > 34.8* 32.7* 34*   PLT  --    < > 94*  --  97* 98*  --    MCV  --    < > 91  --  91 91  --    RDW  --    < > 15.9*  --  15.9* 15.6*  --    FERRITIN 1,642*  --   --   --   --  1,839*  --     < > = values in this interval not displayed.        Chemistries:  Recent Labs   Lab 06/10/20  1400 06/10/20  2014 06/11/20  0322   * 145 143   K 4.4 4.1 4.0    101 99   CO2 31* 34* 33*   BUN 48* 47* 46*    CREATININE 0.7 0.7 0.7   CALCIUM 10.1 10.0 9.5   ALBUMIN 3.5 3.3* 3.2*   PROT 6.6 6.4 6.2   BILITOT 2.4* 2.1* 2.1*   ALKPHOS 130 121 114   ALT 30 29 26   AST 26 25 23   MG 2.2 2.0 1.9   PHOS 2.9 2.8 2.5*       Significant Imaging:  I have reviewed and interpreted all pertinent imaging results/findings within the past 24 hours.    Assessment/Plan:     COVID-19 virus detected  Ranjana Sanchez is a 55 y.o. 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  This was switched to ETT on 4/11.  Cannulated on 4/25.    Neuro:  - Precedex for sedation  - 100 BID of seroquel  - Fentanyl patch  - Encephalopathy with poor neuro exam and decorticate posturing  - CT on June 2 showing bilateral basal ganglia infarcts, which likely explains the neuro exam findings. His infarcts are sizeable, but extent of permanent deficits are unknown. The likelihood of meaningful recovery is low. Neuro consulted to prognosticate and agreed with poor neurological prognosis given bilateral basal ganglia CVAs    Pulm  AC/VC+ on the vent.  ECMO  Vent Mode: A/C  Oxygen Concentration (%):  [50] 50  Resp Rate Total:  [31 br/min-60 br/min] 39 br/min  PEEP/CPAP:  [6 cmH20] 6 cmH20  Mean Airway Pressure:  [17 ayX55-49 cmH20] 17 cmH20      Cardiac  Levo as needed  MAP goal < 90    FEN/GI  TFs at goal    Renal  Making urine, diuresing agressively  Lasix gtt at 2.5    Heme  Hep gtt at 650, goal 0.25-0.3 to prevent another GI bleed  Has been at goal at this rate      ID  WBC stable    Dispo: RICU  -brother is reportedly coming in from [place] at some point over the next 2 weeks          Critical secondary to Patient has a condition that poses threat to life and bodily function: Severe Respiratory Distress     Critical care was time spent personally by me on the following activities: development of treatment plan with patient or surrogate and bedside caregivers, discussions with consultants,  evaluation of patient's response to treatment, examination of patient, ordering and performing treatments and interventions, ordering and review of laboratory studies, ordering and review of radiographic studies, pulse oximetry, re-evaluation of patient's condition.  This critical care time did not overlap with that of any other provider or involve time for any procedures.     Parker Benton MD  Critical Care - Surgery  [facility] - Res[facility]

## 2020-06-11 NOTE — ASSESSMENT & PLAN NOTE
Ranjana Sanchez is a 55 y.o. 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  This was switched to ETT on 4/11.  Cannulated on 4/25.    Neuro:  - Precedex for sedation  - 100 BID of seroquel  - Fentanyl patch  - Encephalopathy with poor neuro exam and decorticate posturing  - CT on June 2 showing bilateral basal ganglia infarcts, which likely explains the neuro exam findings. His infarcts are sizeable, but extent of permanent deficits are unknown. The likelihood of meaningful recovery is low. Neuro consulted to prognosticate and agreed with poor neurological prognosis given bilateral basal ganglia CVAs    Pulm  AC/VC+ on the vent.  ECMO  Vent Mode: A/C  Oxygen Concentration (%):  [50] 50  Resp Rate Total:  [31 br/min-60 br/min] 39 br/min  PEEP/CPAP:  [6 cmH20] 6 cmH20  Mean Airway Pressure:  [17 kdT32-27 cmH20] 17 cmH20      Cardiac  Levo as needed  MAP goal < 90    FEN/GI  TFs at goal    Renal  Making urine, diuresing agressively  Lasix gtt at 2.5    Heme  Hep gtt at 650, goal 0.25-0.3 to prevent another GI bleed  Has been at goal at this rate      ID  WBC stable    Dispo: [facility]  -brother is reportedly coming in from [place] at some point over the next 2 weeks

## 2020-06-11 NOTE — PLAN OF CARE
06/11/20 1552   Discharge Reassessment   Assessment Type Discharge Planning Reassessment   Discharge Plan A Skilled Nursing Facility   Discharge Plan B Long-term acute care facility (LTAC)   DME Needed Upon Discharge  other (see comments)  (TBD)   Anticipated Discharge Disposition SNF   Can the patient/caregiver answer the patient profile reliably?   (JAMMIE- Pt intubated and sedated.)   How does the patient rate their overall health at the present time? Poor   Describe the patient's ability to walk at the present time. Does not walk or unable to take any steps at all   How often would a person be available to care for the patient?   (TBD Has family in [facility])   Number of comorbid conditions (as recorded on the chart) Five or more   Post-Acute Status   Post-Acute Authorization Placement   Post-Acute Placement Status Awaiting Internal Medical Clearance   The following services currently following Patient:  Respiratory, Wound Care, Epilepsy, ECMO  Pt intubated and sedated- Current vent settings 50% O2 and Peep of 6. Current Pulse Ox @ 99%

## 2020-06-11 NOTE — PHYSICIAN QUERY
PT Name: Ranjana Sanchez  MR #: [id number]     PHYSICIAN QUERY - INTEGUMENTARY CLARIFICATION     CDS/: Nancy Spivey RN, CDS               Contact information: [email or web address] form is a permanent document in the medical record.     Query Date: June 11, 2020    By submitting this query, we are merely seeking further clarification of documentation.  Please utilize your independent clinical judgment when addressing the question(s) below.    The Medical Record contains the following:   Indicators   Supporting Clinical Findings Location in Medical Record    Redness      Decubitus, Pressure, Ulcer, etc.     x Deep Tissue Injury --Skin: DTI to left cheek and left arm.  Nursing POC Note 6/10   x Wound care consult --Wound care consulted for right nare, upper throat and left arm        -The left arm is a fluid filled raised area, intact, moist        -The left arm- foam dressing 2 x week    --noted L arm with gray discolored skin and what appears to be absorbed blistering possibly from infiltration.  Affected skin is taunt and not spongy.    --assessment of left arm: dry stable area of eschar measuring 8 cm x 3 cm x 0.1 cm without any drainage noted. Border foams removed at this time with the recommendation given to pt's nurse for nursing to paint left arm with betadine daily and leave open to air   Wound Care C/S 4/16      Wound Care C/S 4/22      Wound Care c/s 6/10    Medication:     x Treatment: --Wound care performed to LUE as ordered, cleansed with sterile saline, betadine applied, covered with foam. 2 wounds noted, bottom wound within demarcated lines, red, slough, upper FA wound irregular, red, brown, also with slough   Nursing Note 5/4   x Other:  --Notified wound care regarding patients left arm. Appears to be worse than yesterday. More grey and red around the site.      Nursing Note 4/22             National Pressure Ulcer Advisory Panel (2007) Pressure Ulcer Definitions &  Stages:  Stage I:                     Intact skin with non-blanchable redness of a localized area usually over a bony prominence.   Stage II:                    Partial thickness loss of dermis presenting as a shallow open ulcer with a red pink wound bed, without slough.   Stage III:                   Full thickness tissue loss. Subcutaneous fat may be visible but bone, tendon or muscle is not exposed. Slough may be                                      present but does not obscure the depth of tissue loss. May include undermining and tunneling.  Stage IV:                  Full thickness tissue loss with exposed bone, tendon or muscle. Slough or eschar may be present on some parts of the                                      wound bed. Often include undermining and tunneling.  Unstageable:           Full thickness tissue loss but base of ulcer is covered by slough and/or eschar in the wound bed. Until enough                                        slough and/or eschar is removed to expose wound base, its true depth, and therefore stage, cannot be determined  Deep Tissue Injury: Purple or maroon localized area of discolored intact skin or blood-filled blister due to damage of underlying soft tissue   from pressure and/or shear.  Suspected deep tissue injuries may develop into a stageable ulcer or turn out to be another diagnosis (e.g. an ecchymosis)       Provider, please clarify/ provide the diagnosis related to the documentation of the L arm/LUE:    [   ] Decubitus (Pressure) Ulcer    [   ] Deep Tissue Pressure Injury   [   ] Deep Tissue Pressure Injury that transformed into a Decubitus (Pressure) Ulcer   [   ] Other Integumentary Diagnosis (please specify):______________   [  ] Diagnosis Clinically Undetermined   [ x  ] Diagnosis Ruled Out     Spoke with wound care who has regularly been assessing the patient. Sppot to left arm was likely a result of blistering due to fluid overload and not pressure related. Once the  blisters subsided, patient was left with scab to area.     Please document in your progress notes daily for the duration of treatment until resolved and include in your discharge summary.

## 2020-06-11 NOTE — PROGRESS NOTES
ECMO Specialists shift report    Date: 06/11/2020  ECMO Specialist:  Ashley Francisco    Pump parameters:  RPM: 2800  Flow:  2.6  Sweep:  3  FiO2:  30    Oxygenator status:  Clots: no  Fibrin: no    Pressure trends:  P1: 97  P2: 81  Delta P: 16    Volume status:  Chugging noted? No  CVP:  MAP:  70s-90s  MD notified (name):  marvel    Anticoagulation:  ACT/aPTT/Xa parameters: .2-.25  ACT/aPTT/Xa trends this shift: .2    Cannula size / status / placement:  Arterial:   Venous 1: RIJV 23F@4cm  Venous 2:RFV 27F@ 12cm  Dual lumen:      Additional Comments: No issues.  Maintained.

## 2020-06-11 NOTE — ASSESSMENT & PLAN NOTE
Acute respiratory distress syndrome (ARDS) due to COVID-19 virus  - Crich switched to ETT on 4/11  - Monika weaned off 5/5  - Tracheostomy and bronch 5/5  - severe thrombocytopenia resolved; transfuse for under 30K  - Aspirin 81 daily  - Labetalol 300BID  - RPM at 2800; flows at 2.6  - Sweep at 5; CO2 goal is 59 or below  - Do not increase sweep for pCO2s 59 or below as long as patient is not acidotic, as these high pCO2s are respiratory compensation for alkalosis  - Oxygenator Fi at 30%; switched out 6/9  - CT head and chest imaging reviewed with profound basilar changes concerning for hypoxemic injury, neurology following, poor prognosis, need to have goals of care discussion with family and review CT findings  - Patient tolerating low sweep and low FiO2 on the oxygenator for ~48hrs now; will monitor and begin considering timing of weaning off ECMO     Tongue laceration      -ENT evaluated, laceration superficial      -recs: bite block vs paralysis, will monitor    Sedation  - Valium, seroquel, PRN Fentanyl     UGI Bleed  - Scoped by GI 5/3 with epinephrine injection and clip placement for an actively bleeding D2/D3 Diuelafoy lesion  - Recommend GI re-consultation if Hb continues to drift and further blood is aspirated from NGT or patient has bloody BM, etc    FEN/GI  - TF at 45, goal      - Lasix gtt 2.5  - FWB for hypernatremia 400mL q4     Anticoagulation      - Goal aXa 0.2-0.25 Q6hrs      - Hep gtt 800      - monitor LDH    Prophylaxis      - protonix q12 IV       - leukocytosis stable      - PICC placed 5/5; central line removed 5/6

## 2020-06-11 NOTE — PROGRESS NOTES
[facility] - Respiratory ICU  Cardiothoracic Surgery  Daily ECMO Progress Note    Patient Name: Ranjana Sanchez  MRN: [id number]  Admission Date: 4/10/2020  Hospital Length of Stay: 62 days  Code Status: Full Code   Attending Physician: Francis Ayon MD   Referring Provider: Francis Ayon MD  Principal Problem:Acute respiratory disease due to COVID-19 virus    Subjective:   Interval History:  No events overnight, precedex up to 0.8 this AM. UOP 4.1L, net negative 500mL in 24hrs. Hep gtt increased to 800 for low aXa.    Medications:  Continuous Infusions:   dexmedetomidine (PRECEDEX) infusion 0.8 mcg/kg/hr (06/11/20 1200)    furosemide (LASIX) 2 mg/mL continuous infusion (non-titrating) 2.5 mg/hr (06/11/20 1200)    heparin (porcine) in 5 % dex 800 Units/hr (06/11/20 1200)    norepinephrine bitartrate-D5W Stopped (06/10/20 0500)     Scheduled Meds:   aspirin  81 mg Per NG tube Daily    fentaNYL  1 patch Transdermal Q72H    multivitamin liquid no.118  10 mL Per G Tube Daily    pantoprazole  40 mg Per NG tube BID    polyethylene glycol  17 g Per NG tube Daily    potassium chloride 10%  40 mEq Per NG tube BID    psyllium husk (aspartame)  1 packet Per NG tube TID    sucralfate  1 g Per NG tube Q6H    vitamin D  1,000 Units Per G Tube Daily        Objective:     Vital Signs (Most Recent):  Temp: 98.6 °F (37 °C) (06/11/20 1100)  Pulse: (!) 124 (06/11/20 1215)  Resp: (!) 39 (06/11/20 0701)  BP: 113/80 (06/11/20 1200)  SpO2: 95 % (06/11/20 1215) Vital Signs (24h Range):  Temp:  [98.4 °F (36.9 °C)-98.9 °F (37.2 °C)] 98.6 °F (37 °C)  Pulse:  [114-143] 124  Resp:  [26-60] 39  SpO2:  [92 %-100 %] 95 %  BP: (108-119)/(69-82) 113/80  Arterial Line BP: ()/(57-74) 140/73     Weight: 62.8 kg (138 lb 7.2 oz)  Body mass index is 23.76 kg/m².    SpO2: 95 %  O2 Device (Oxygen Therapy): ventilator    Intake/Output - Last 3 Shifts       06/09 0700 - 06/10 0659 06/10 0700 - 06/11 0659 06/11 0700 - 06/12  0659    I.V. (mL/kg) 298.7 (4.8) 423 (6.7) 110.2 (1.8)    Blood 1352      NG/GT 1985 3180 370    Total Intake(mL/kg) 3635.7 (57.9) 3603 (57.4) 480.2 (7.6)    Urine (mL/kg/hr) 4305 (2.9) 4270 (2.8) 900 (2.4)    Drains 15      Stool 0      Blood 2 3 2    Total Output 4322 4273 902    Net -686.3 -670 -421.8           Stool Occurrence 2 x            Lines/Drains/Airways     Peripherally Inserted Central Catheter Line            PICC Double Lumen 05/05/20 1707 right basilic 36 days          Central Venous Catheter Line                 ECMO Cannula 04/25/20 1120 right femoral vein 47 days         ECMO Cannula 04/25/20 1120 right internal jugular 47 days          Drain                 NG/OG Tube 05/03/20 1215 [place] sump;nasogastric 18 Fr. Left nostril 39 days         Urethral Catheter 05/28/20 1630 13 days          Airway                 Surgical Airway 05/05/20 1500 Shiley Cuffed 36 days          Arterial Line                 Arterial Line 04/27/20 1100 Right Brachial 45 days          Peripheral Intravenous Line                 Peripheral IV - Single Lumen 06/06/20 0136 18 G Right Hand 5 days         Peripheral IV - Single Lumen 06/06/20 0137 18 G Left Hand 5 days         Peripheral IV - Single Lumen 06/10/20 Posterior;Right Wrist 1 day                Physical Exam    Constitutional: He is sedated, and intubated.   Head: Normocephalic   Cardiovascular: Tachycardia 100s   Pulmonary/Chest: intubated vent FiO2 50% PEEP 6, ECMO RPM 2800, Flow 2.6, sweep 3, oxygenator FiO2 30%  Skin: L neck and L groin cannulas intact, no flashing, clots pre and post-oxygenator, sutures intact and secure with 2 new sutures to upper (return) cannula.  Nursing note and vitals reviewed.    Significant Labs:  BMP:   Recent Labs   Lab 06/11/20  0800   *      K 3.6   CL 99   CO2 34*   BUN 45*   CREATININE 0.7   CALCIUM 9.7   MG 1.9     CBC:   Recent Labs   Lab 06/11/20  0800 06/11/20  0806   WBC 13.60*  --    RBC 3.53*  --    HGB 10.3*   --    HCT 32.0* 31*   *  --    MCV 91  --    MCH 29.2  --    MCHC 32.2  --      LFTs:   Recent Labs   Lab 06/11/20  0800   ALT 25   AST 24   ALKPHOS 110   BILITOT 2.0*   PROT 6.1   ALBUMIN 3.1*     Significant Diagnostics:  Cxr: stable    Ct Head Without Contrast 6/2/2020  -symmetrical hypoattenuation bilobed basal ganglia concerning for edema as well as poor gray-white matter differentiation cerebral hemispheres diffusely concerning for profound hypoxic anoxic encephalopathy  -no evidence for acute intracranial hemorrhage    Ct Chest Without Contrast 6/2/2020  -small left pneumothorax with no mediastinal shift and minimal loss of left lung volume  -no right pneumothorax, pneumomediastinum or pneumoperitoneum  -widespread largely homogeneous ground-glass disease throughout all lobes as well as tubular and varicoid bronchiectasis in this patient with history of COVID-19 pneumonia maintained on ECMO  -fine reticular and reticulonodular pattern is evident in the periphery of the lungs consistent with interstitial lung disease  -findings are not typical for pulmonary edema due to elevated pulmonary venous pressures and/or abnormal capillary permeability    Assessment/Plan:     Acute respiratory distress syndrome (ARDS) due to COVID-19 virus  - Crich switched to ETT on 4/11  - Monika weaned off 5/5  - Tracheostomy and bronch 5/5  - severe thrombocytopenia resolved; transfuse for under 30K  - Aspirin 81 daily  - Labetalol 300BID  - RPM at 2800; flows at 2.6  - Sweep at 5; CO2 goal is 59 or below  - Do not increase sweep for pCO2s 59 or below as long as patient is not acidotic, as these high pCO2s are respiratory compensation for alkalosis  - Oxygenator Fi at 30%; switched out 6/9  - CT head and chest imaging reviewed with profound basilar changes concerning for hypoxemic injury  - Patient tolerating low sweep and low FiO2 on the oxygenator for ~48hrs now; will monitor and begin considering timing of weaning off  ECMO     Tongue laceration      -ENT evaluated, laceration superficial      -recs: bite block vs paralysis, will monitor    Sedation  - Valium, seroquel, PRN Fentanyl     UGI Bleed  - Scoped by GI 5/3 with epinephrine injection and clip placement for an actively bleeding D2/D3 Diuelafoy lesion  - Recommend GI re-consultation if Hb continues to drift and further blood is aspirated from NGT or patient has bloody BM, etc    FEN/GI  - TF at 45, goal      - Lasix gtt 2.5  - FWB for hypernatremia 400mL q4     Anticoagulation      - Goal aXa 0.2-0.25 Q6hrs      - Hep gtt 800      - monitor LDH    Prophylaxis      - protonix q12 IV       - leukocytosis stable      - PICC placed 5/5; central line removed 5/6    Anne Miller MD  Cardiothoracic Surgery  [facility]      VV ECMO circuit checked and all parameters reviewed. Anticoagulation was managed and all cannulation exit sites along with review of labs and radiology studies performed. Speed and functioning of the pump along with oxygenator quality reviewed. Coordination of care was provided between different teams which consisted of the surgical ICU to, perfusion Staff and the nursing staff.  All questions and concerns all team members were addressed.

## 2020-06-12 NOTE — ASSESSMENT & PLAN NOTE
BG goal 140 - 180     Increase scheduled Novolog to 4 units every 4 hours, hold if TF held or discontinued  Low dose correction scale  BG monitoring every 4 hours    Discharge planning: URIEL

## 2020-06-12 NOTE — PLAN OF CARE
"      SICU PLAN OF CARE NOTE    Dx: Acute respiratory disease due to COVID-19 virus    Shift Events: Patient remains trached on AC/PC rate 30, fio2 50% peep 6. RR 30s-44. V-V ECMO, sweep 3.5 fio2 40%. O2 saturation 100%. HR 130s-140s. Tmax 99.5. Patient remains lightly sedated. Easily agitated with minimal stimulation. JEMIMA, positive cough, gag and corneal. Decorticate posturing x 4 extremities more noticeable in BUE than BLE. Prn fentanyl and tylenol given for pain/comfort. TF infusing @ goal 45 m/hr. Tolerating. Abdomen is soft and nondistended. No BM this shift. Dressings changed as needed. Diuresing well with lasix infusion. Edema noted to Left thigh and knee. Bruise noted on right hip, boarder outlined.    Goals of Care: MAP 70-90. Maintain dressings, drains, incisions and devices. Monitor labs and renal function and replace electrolytes as appropriate. Control pain and provide comfort. Protect skin, prevent injury and infection.     Neuro: Sedated, does not follow commands with sedation vacation. Decorticate posturing.     Vital Signs: /64   Pulse (!) 133   Temp 98.9 °F (37.2 °C) (Axillary)   Resp (!) 39   Ht 5' 4" (1.626 m)   Wt 62.8 kg (138 lb 7.2 oz)   SpO2 100%   BMI 23.76 kg/m²     Respiratory: Ventilator    Diet: Tube Feeds    Gtts: Precedex, Lasix and Heparin    Urine Output: Urinary Catheter 125-175 cc/hour    Drains: NG/OG Tube residuals  ml cc /  4 hours                 Labs/Accuchecks: q 6 hour CMP, Mg, Phos, ptt, INR, antXa, and CBC. AntiXa goal 0.2-0.25. Most recent value therapeutic. Infusion currently @ 750 u/hr. Accuchecks q 4 hours with scheduled insulin.     Skin: Skin is dry with generalized bruising. Wound care performed to left arm eschar. Dressings, drains, devices, and incisions monitored closely. Bed is plugged in, mattress is functioning, and waffle overlay is inflated. Foam dressings to heels and sacrum. Heel lift boot rotated with prafo boot between feet every 12 " hours. Turning q 2 hours and as needed. No signs of new skin breakdown noted.

## 2020-06-12 NOTE — PROGRESS NOTES
ECMO Specialists shift report    Date: 06/12/2020  ECMO Specialist:  Cyrus Stefany    Pump parameters:  RPM:                               2800  Flow:                                     2.7  Sweep:                                 3.5  FiO2:                                  40    Oxygenator status:  Clots:                                  None  Fibrin:                                 None    Pressure trends:  P1:                                      103  P2:                                        87  Delta P:                                16    Volume status:  Chugging noted?              Minor with no intervention required.  CVP:                                 No CVP monitoring  MAP:                                     87  MD notified (name):          Dr. Busby    Anticoagulation:  ACT/aPTT/Xa parameters:          0.2- 0.25  ACT/aPTT/Xa trends this shift:    0.26    Cannula size / status / placement:  Arterial:   Venous 1:     23 Dominican in the RIJV  @   4 cm from insertion to coils  Venous 2:     27 Dominican in the RFV  @ 12 cm from insertion to coils  Dual lumen:      Additional Comments:    Patient has been maintained on VV ECMO with no required additional action.  The patient seems to be more agitated but the nurse has given  additional medications.  The patient is still on 3.5 LPM of sweep and 2.7 lpm of flow at 40% oxygen.  The blood gases are within parameters.

## 2020-06-12 NOTE — PROGRESS NOTES
RT notified MD To of pt's trach site. RT called to confirm that the gauze behind the flange was not to be removed due to bleeding concerns from trach site. Also, notified MD that the site can't be properly cleaned due to area of the dry blood. MD is aware and will notify the day team.   RT Dede

## 2020-06-12 NOTE — PROGRESS NOTES
"[facility] - Respiratory ICU  Endocrinology  Progress Note    Admit Date: 4/10/2020     Reason for Consult: Management of T2DM, Hyperglycemia     Surgical Procedure and Date: tracheostomy 5/5/20    Diabetes diagnosis year: JAMMIE    Lab Results   Component Value Date    HGBA1C 6.6 (H) 04/12/2020       Home Diabetes Medications: none (per chart review)    How often checking glucose at home? JAMMIE  BG readings on regimen: JAMMIE  Hypoglycemia on the regimen? JAMMIE  Missed doses on regimen? JAMMIE    Diabetes Complications include:     JAMMIE    Complicating diabetes co morbidities:   none      HPI:   Patient is a 55 y.o. male with a diagnosis of DM2 and ARDS secondary to COVID-19.  Admitted to [facility] on 4/10/20 with fever and SOB.  Lengthy and complex hospital stay noted requiring intubation, ECMO, and trach placement.  Patient not with severe ARDS (with poor lung compliance) and suspected anoxic brain injury.  Patient developed hyperglycemia secondary to TF.  Endocrinology consulted for DM/BG management.        Interval HPI:   Overnight events: Remains in RICU, NAEON.  BG  mildly elevated overnight on current insulin regimen.  Eating:   NPO  Nausea: No  Hypoglycemia and intervention: No  Fever: No  TPN and/or TF: Yes  TF and rate: Peptamen Intense VHP at 45 cc/hr (goal)    BP (!) 90/59 (BP Location: Left arm, Patient Position: Lying)   Pulse (!) 132   Temp 99.5 °F (37.5 °C) (Axillary)   Resp (!) 39   Ht 5' 4" (1.626 m)   Wt 58.2 kg (128 lb 4.9 oz)   SpO2 100%   BMI 22.02 kg/m²      Labs Reviewed and Include    Recent Labs   Lab 06/12/20  0243   *   CALCIUM 10.0   ALBUMIN 3.1*   PROT 6.6      K 4.9   CO2 34*      BUN 46*   CREATININE 0.6   ALKPHOS 120   ALT 25   AST 26   BILITOT 2.0*     Lab Results   Component Value Date    WBC 13.43 (H) 06/12/2020    HGB 10.4 (L) 06/12/2020    HCT 34.2 (L) 06/12/2020    MCV 95 06/12/2020     (L) 06/12/2020     No results for input(s): TSH, FREET4 in the " last 168 hours.  Lab Results   Component Value Date    HGBA1C 6.6 (H) 04/12/2020       Nutritional status:   Body mass index is 22.02 kg/m².  Lab Results   Component Value Date    ALBUMIN 3.1 (L) 06/12/2020    ALBUMIN 3.0 (L) 06/11/2020    ALBUMIN 3.3 (L) 06/11/2020     Lab Results   Component Value Date    PREALBUMIN 20 05/28/2020       Estimated Creatinine Clearance: 111.8 mL/min (based on SCr of 0.6 mg/dL).    Accu-Checks  Recent Labs     06/10/20  1756 06/10/20  2237 06/11/20  0312 06/11/20  0613 06/11/20  1003 06/11/20  1407 06/11/20  1803 06/11/20  2006 06/12/20  0014 06/12/20  0528   POCTGLUCOSE 247* 194* 241* 228* 218* 211* 145* 210* 185* 193*       Current Medications and/or Treatments Impacting Glycemic Control  Immunotherapy:    Immunosuppressants     None        Steroids:   Hormones (From admission, onward)    None        Pressors:    Autonomic Drugs (From admission, onward)    Start     Stop Route Frequency Ordered    06/02/20 1445  norepinephrine 4 mg in dextrose 5% 250 mL infusion (premix) (titrating)     Question Answer Comment   Titrate by: (in mcg/kg/min) 0.01    Titrate interval: (in minutes) 15    Titrate to maintain: (MAP or SBP) MAP    Greater than: (in mmHg) 70    Maximum dose: (in mcg/kg/min) 3        -- IV Continuous 06/02/20 1339    04/16/20 0915  rocuronium 10 mg/mL injection     Note to Pharmacy:  Created by cabinet override    04/16 2129   04/16/20 0915        Hyperglycemia/Diabetes Medications:   Antihyperglycemics (From admission, onward)    Start     Stop Route Frequency Ordered    06/12/20 1600  insulin aspart U-100 pen 3 Units      -- SubQ Every 24 hours (non-standard times) 06/11/20 1630    06/12/20 1200  insulin aspart U-100 pen 3 Units      -- SubQ Every 24 hours (non-standard times) 06/11/20 1630    06/12/20 0800  insulin aspart U-100 pen 3 Units      -- SubQ Every 24 hours (non-standard times) 06/11/20 1630    06/12/20 0400  insulin aspart U-100 pen 3 Units      -- SubQ Every  24 hours (non-standard times) 06/11/20 1630    06/12/20 0000  insulin aspart U-100 pen 3 Units      -- SubQ Every 24 hours (non-standard times) 06/11/20 1630    06/11/20 2000  insulin aspart U-100 pen 3 Units      -- SubQ Every 24 hours (non-standard times) 06/11/20 1630    06/11/20 1729  insulin aspart U-100 pen 0-5 Units      -- SubQ Every 4 hours PRN 06/11/20 1630          ASSESSMENT and PLAN    * Acute respiratory disease due to COVID-19 virus  Managed per primary team        Type 2 diabetes mellitus without complication, without long-term current use of insulin  BG goal 140 - 180     Increase scheduled Novolog to 4 units every 4 hours, hold if TF held or discontinued  Low dose correction scale  BG monitoring every 4 hours    Discharge planning: TBD        On enteral nutrition  May cause BG excursions.           Harlan Denny, LYNDA  Endocrinology  [facility] Res[facility] ICU

## 2020-06-12 NOTE — PLAN OF CARE
"      SICU PLAN OF CARE NOTE    Dx: Acute respiratory disease due to COVID-19 virus    Shift Events: Remains on ventilator support--AC/PC rate 30, FiO2 50%, and 6 PEEP. VV ECMO--speed 2800, flows 2.6-2.8, sweep 2. Son updated via skype.     Neuro: Opens eyes spontaneously, but does not track. Does not follow commands. Decorticate posturing noted.    Vital Signs: /71   Pulse (!) 131   Temp 98.1 °F (36.7 °C) (Oral)   Resp (!) 39   Ht 5' 4" (1.626 m)   Wt 58.2 kg (128 lb 4.9 oz)   SpO2 100%   BMI 22.02 kg/m²     Respiratory: Ventilator    Diet: Tube Feeds @ 45 cc/hr via left nare NGT    Gtts: Precedex, Lasix and Heparin    Urine Output: Urinary Catheter 100-200 cc/hour     Labs/Accuchecks: CBC, CMP, PT/INR, APTT, AntiXa Q6H. Accuchecks Q4H.    Skin: Generalized bruising noted to upper and lower extremities. Betadine applied to left arm eschar wound. No new breakdown noted. Heel boots in place. Pt turned Q2H. Arms elevated with pillows. Waffle mattress overlay in place and inflated. Bed plugged in and mattress setting appropriate for pt weight.           "

## 2020-06-12 NOTE — ASSESSMENT & PLAN NOTE
Ranjana Sanchez is a 55 y.o. 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  This was switched to ETT on 4/11.  Cannulated on 4/25.    Neuro:  - Precedex for sedation  - 100 BID of seroquel  - Fentanyl patch  - Encephalopathy with poor neuro exam and decorticate posturing  - CT on June 2 showing bilateral basal ganglia infarcts, which likely explains the neuro exam findings. His infarcts are sizeable, but extent of permanent deficits are unknown. The likelihood of meaningful recovery is low. Neuro consulted to prognosticate and agreed with poor neurological prognosis given bilateral basal ganglia CVAs    Pulm  AC/VC+ on the vent.  ECMO  Vent Mode: A/C  Oxygen Concentration (%):  [50] 50  Resp Rate Total:  [31 br/min-54 br/min] 39 br/min  PEEP/CPAP:  [6 cmH20] 6 cmH20  Mean Airway Pressure:  [15 jxO04-82 cmH20] 17 cmH20      Cardiac  Levo as needed  MAP goal < 90    FEN/GI  TFs at goal    Renal  Making urine, diuresing agressively  Lasix gtt at 2.5    Heme  Hep gtt at 650, goal 0.25-0.3 to prevent another GI bleed  Has been at goal at this rate      ID  WBC stable    Dispo: RICU  -Prognosis is Grim  -brother is reportedly coming in from [place] at some point over the next 2 weeks

## 2020-06-12 NOTE — SUBJECTIVE & OBJECTIVE
"Interval HPI:   Overnight events: Remains in RICU, NAEON.  BG mildly elevated overnight on current insulin regimen.  Eating:   NPO  Nausea: No  Hypoglycemia and intervention: No  Fever: No  TPN and/or TF: Yes  TF and rate: Peptamen Intense VHP at 45 cc/hr (goal)    BP (!) 90/59 (BP Location: Left arm, Patient Position: Lying)   Pulse (!) 132   Temp 99.5 °F (37.5 °C) (Axillary)   Resp (!) 39   Ht 5' 4" (1.626 m)   Wt 58.2 kg (128 lb 4.9 oz)   SpO2 100%   BMI 22.02 kg/m²     Labs Reviewed and Include    Recent Labs   Lab 06/12/20  0243   *   CALCIUM 10.0   ALBUMIN 3.1*   PROT 6.6      K 4.9   CO2 34*      BUN 46*   CREATININE 0.6   ALKPHOS 120   ALT 25   AST 26   BILITOT 2.0*     Lab Results   Component Value Date    WBC 13.43 (H) 06/12/2020    HGB 10.4 (L) 06/12/2020    HCT 34.2 (L) 06/12/2020    MCV 95 06/12/2020     (L) 06/12/2020     No results for input(s): TSH, FREET4 in the last 168 hours.  Lab Results   Component Value Date    HGBA1C 6.6 (H) 04/12/2020       Nutritional status:   Body mass index is 22.02 kg/m².  Lab Results   Component Value Date    ALBUMIN 3.1 (L) 06/12/2020    ALBUMIN 3.0 (L) 06/11/2020    ALBUMIN 3.3 (L) 06/11/2020     Lab Results   Component Value Date    PREALBUMIN 20 05/28/2020       Estimated Creatinine Clearance: 111.8 mL/min (based on SCr of 0.6 mg/dL).    Accu-Checks  Recent Labs     06/10/20  1756 06/10/20  2237 06/11/20  0312 06/11/20  0613 06/11/20  1003 06/11/20  1407 06/11/20  1803 06/11/20 2006 06/12/20  0014 06/12/20  0528   POCTGLUCOSE 247* 194* 241* 228* 218* 211* 145* 210* 185* 193*       Current Medications and/or Treatments Impacting Glycemic Control  Immunotherapy:    Immunosuppressants     None        Steroids:   Hormones (From admission, onward)    None        Pressors:    Autonomic Drugs (From admission, onward)    Start     Stop Route Frequency Ordered    06/02/20 1448  norepinephrine 4 mg in dextrose 5% 250 mL infusion (premix) " (titrating)     Question Answer Comment   Titrate by: (in mcg/kg/min) 0.01    Titrate interval: (in minutes) 15    Titrate to maintain: (MAP or SBP) MAP    Greater than: (in mmHg) 70    Maximum dose: (in mcg/kg/min) 3        -- IV Continuous 06/02/20 1339    04/16/20 0915  rocuronium 10 mg/mL injection     Note to Pharmacy:  Created by cabinet override    04/16 2129 04/16/20 0915        Hyperglycemia/Diabetes Medications:   Antihyperglycemics (From admission, onward)    Start     Stop Route Frequency Ordered    06/12/20 1600  insulin aspart U-100 pen 3 Units      -- SubQ Every 24 hours (non-standard times) 06/11/20 1630    06/12/20 1200  insulin aspart U-100 pen 3 Units      -- SubQ Every 24 hours (non-standard times) 06/11/20 1630    06/12/20 0800  insulin aspart U-100 pen 3 Units      -- SubQ Every 24 hours (non-standard times) 06/11/20 1630    06/12/20 0400  insulin aspart U-100 pen 3 Units      -- SubQ Every 24 hours (non-standard times) 06/11/20 1630    06/12/20 0000  insulin aspart U-100 pen 3 Units      -- SubQ Every 24 hours (non-standard times) 06/11/20 1630    06/11/20 2000  insulin aspart U-100 pen 3 Units      -- SubQ Every 24 hours (non-standard times) 06/11/20 1630    06/11/20 1729  insulin aspart U-100 pen 0-5 Units      -- SubQ Every 4 hours PRN 06/11/20 1630

## 2020-06-12 NOTE — PROGRESS NOTES
[facility] - Respiratory ICU  Critical Care - Surgery  Progress Note    Patient Name: Ranjana Sanchez  MRN: [id number]  Admission Date: 4/10/2020  Hospital Length of Stay: 63 days  Code Status: Full Code  Attending Provider: Francis Ayon MD  Primary Care Provider: Primary Doctor No   Principal Problem: Acute respiratory disease due to COVID-19 virus    Subjective:     Hospital/ICU Course:  No notes on file    Interval History/Significant Events:     No events overnight  No further episodes of bleeding from Trach Site  No blood transfusions overnight    Follow-up For: Procedure(s) (LRB):  CREATION, TRACHEOSTOMY  (N/A)    Post-Operative Day: 38 Days Post-Op    Objective:     Vital Signs (Most Recent):  Temp: 98.1 °F (36.7 °C) (06/12/20 1105)  Pulse: (!) 141 (06/12/20 1300)  Resp: (!) 39 (06/11/20 0701)  BP: 119/82 (06/12/20 1200)  SpO2: 100 % (06/12/20 1300) Vital Signs (24h Range):  Temp:  [98.1 °F (36.7 °C)-99.5 °F (37.5 °C)] 98.1 °F (36.7 °C)  Pulse:  [] 141  SpO2:  [87 %-100 %] 100 %  BP: ()/(59-82) 119/82  Arterial Line BP: ()/() 118/73     Weight: 58.2 kg (128 lb 4.9 oz)  Body mass index is 22.02 kg/m².      Intake/Output Summary (Last 24 hours) at 6/12/2020 1314  Last data filed at 6/12/2020 1300  Gross per 24 hour   Intake 2075 ml   Output 3944 ml   Net -1869 ml       Physical Exam   Constitutional: He appears well-developed and well-nourished.   HENT:   Head: Normocephalic and atraumatic.   Right IJ ECMO Cannula in place   Pulmonary/Chest:   Trach in place  Surgi-Cell / NuKnit packed surrounding Trach Fixture  No further active bleeding noted from Trach Site   Abdominal: He exhibits no distension. There is no tenderness.   Musculoskeletal:   Right Femoral ECMO Cannula Site   Neurological:   Tracks  Decorticate posturing in place   Skin: Skin is warm and dry.       Vents:  Vent Mode: A/C (06/12/20 0124)  Ventilator Initiated: Yes (04/10/20 4630)  Set Rate: 30 BPM  (06/12/20 0124)  Vt Set: 250 mL (06/06/20 0858)  Pressure Support: 20 cmH20 (06/06/20 0858)  PEEP/CPAP: 6 cmH20 (06/12/20 0124)  Oxygen Concentration (%): 50 (06/12/20 1300)  Peak Airway Pressure: 29 cmH2O (06/12/20 0124)  Plateau Pressure: 22 cmH20 (06/12/20 0124)  Total Ve: 8.12 mL (06/12/20 0124)  F/VT Ratio<105 (RSBI): 266.23 (06/11/20 0216)    Lines/Drains/Airways     Peripherally Inserted Central Catheter Line            PICC Double Lumen 05/05/20 1707 right basilic 37 days          Central Venous Catheter Line                 ECMO Cannula 04/25/20 1120 right femoral vein 48 days         ECMO Cannula 04/25/20 1120 right internal jugular 48 days          Drain                 NG/OG Tube 05/03/20 1215 [place] sump;nasogastric 18 Fr. Left nostril 40 days         Urethral Catheter 05/28/20 1630 14 days          Airway                 Surgical Airway 05/05/20 1500 Shiley Cuffed 37 days          Arterial Line                 Arterial Line 04/27/20 1100 Right Brachial 46 days          Peripheral Intravenous Line                 Peripheral IV - Single Lumen 06/10/20 Posterior;Right Wrist 2 days         Peripheral IV - Single Lumen 06/12/20 0400 20 G Left Wrist less than 1 day                Significant Labs:    CBC/Anemia Profile:  Recent Labs   Lab 06/11/20  0322  06/11/20 2000 06/12/20  0243 06/12/20  0809 06/12/20  0816   WBC 13.30*   < > 13.28*  --  13.43* 12.23  --    HGB 10.4*   < > 10.5*  --  10.4* 10.3*  --    HCT 32.7*   < > 33.0*   < > 34.2* 32.1* 31*   PLT 98*   < > 117*  --  115* 121*  --    MCV 91   < > 91  --  95 92  --    RDW 15.6*   < > 15.5*  --  15.5* 15.8*  --    FERRITIN 1,839*  --   --   --  2,224*  --   --     < > = values in this interval not displayed.        Chemistries:  Recent Labs   Lab 06/11/20 2000 06/12/20  0243 06/12/20  0809    142 141   K 3.8 4.9 4.3   CL 99 100 98   CO2 35* 34* 33*   BUN 46* 46* 46*   CREATININE 0.6 0.6 0.6   CALCIUM 9.7 10.0 9.7   ALBUMIN 3.0* 3.1* 3.1*    PROT 6.2 6.6 6.4   BILITOT 2.2* 2.0* 2.0*   ALKPHOS 115 120 117   ALT 26 25 25   AST 22 26 25   MG 2.0 2.0 2.0   PHOS 3.6 3.1 3.7       All pertinent labs within the past 24 hours have been reviewed.    Significant Imaging:  I have reviewed and interpreted all pertinent imaging results/findings within the past 24 hours.    Assessment/Plan:     COVID-19 virus detected  Ranjana Sanchez is a 55 y.o. 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  This was switched to ETT on 4/11.  Cannulated on 4/25.    Neuro:  - Precedex for sedation  - 100 BID of seroquel  - Fentanyl patch  - Encephalopathy with poor neuro exam and decorticate posturing  - CT on June 2 showing bilateral basal ganglia infarcts, which likely explains the neuro exam findings. His infarcts are sizeable, but extent of permanent deficits are unknown. The likelihood of meaningful recovery is low. Neuro consulted to prognosticate and agreed with poor neurological prognosis given bilateral basal ganglia CVAs    Pulm  AC/VC+ on the vent.  ECMO  Vent Mode: A/C  Oxygen Concentration (%):  [50] 50  Resp Rate Total:  [31 br/min-54 br/min] 39 br/min  PEEP/CPAP:  [6 cmH20] 6 cmH20  Mean Airway Pressure:  [15 afT58-55 cmH20] 17 cmH20      Cardiac  Levo as needed  MAP goal < 90    FEN/GI  TFs at goal    Renal  Making urine, diuresing agressively  Lasix gtt at 2.5    Heme  Hep gtt at 650, goal 0.25-0.3 to prevent another GI bleed  Has been at goal at this rate      ID  WBC stable    Dispo: RICU  -Prognosis is Grim  -brother is reportedly coming in from [place] at some point over the next 2 weeks             Critical care was time spent personally by me on the following activities: development of treatment plan with patient or surrogate and bedside caregivers, discussions with consultants, evaluation of patient's response to treatment, examination of patient, ordering and performing treatments and  interventions, ordering and review of laboratory studies, ordering and review of radiographic studies, pulse oximetry, re-evaluation of patient's condition.  This critical care time did not overlap with that of any other provider or involve time for any procedures.     Chito Munoz MD  Critical Care - Surgery  [facility] - Res[facility] ICU

## 2020-06-12 NOTE — PROGRESS NOTES
Updated NOLAN Landon MD pt's antiXa 0.27 and heparin gtt currently at 750 units/hr. No new orders received at this time.

## 2020-06-12 NOTE — SUBJECTIVE & OBJECTIVE
Interval History/Significant Events:     No events overnight  No further episodes of bleeding from Trach Site  No blood transfusions overnight    Follow-up For: Procedure(s) (LRB):  CREATION, TRACHEOSTOMY  (N/A)    Post-Operative Day: 38 Days Post-Op    Objective:     Vital Signs (Most Recent):  Temp: 98.1 °F (36.7 °C) (06/12/20 1105)  Pulse: (!) 141 (06/12/20 1300)  Resp: (!) 39 (06/11/20 0701)  BP: 119/82 (06/12/20 1200)  SpO2: 100 % (06/12/20 1300) Vital Signs (24h Range):  Temp:  [98.1 °F (36.7 °C)-99.5 °F (37.5 °C)] 98.1 °F (36.7 °C)  Pulse:  [] 141  SpO2:  [87 %-100 %] 100 %  BP: ()/(59-82) 119/82  Arterial Line BP: ()/() 118/73     Weight: 58.2 kg (128 lb 4.9 oz)  Body mass index is 22.02 kg/m².      Intake/Output Summary (Last 24 hours) at 6/12/2020 1314  Last data filed at 6/12/2020 1300  Gross per 24 hour   Intake 2075 ml   Output 3944 ml   Net -1869 ml       Physical Exam   Constitutional: He appears well-developed and well-nourished.   HENT:   Head: Normocephalic and atraumatic.   Right IJ ECMO Cannula in place   Pulmonary/Chest:   Trach in place  Surgi-Cell / NuKnit packed surrounding Trach Fixture  No further active bleeding noted from Trach Site   Abdominal: He exhibits no distension. There is no tenderness.   Musculoskeletal:   Right Femoral ECMO Cannula Site   Neurological:   Tracks  Decorticate posturing in place   Skin: Skin is warm and dry.       Vents:  Vent Mode: A/C (06/12/20 0124)  Ventilator Initiated: Yes (04/10/20 2247)  Set Rate: 30 BPM (06/12/20 0124)  Vt Set: 250 mL (06/06/20 0858)  Pressure Support: 20 cmH20 (06/06/20 0858)  PEEP/CPAP: 6 cmH20 (06/12/20 0124)  Oxygen Concentration (%): 50 (06/12/20 1300)  Peak Airway Pressure: 29 cmH2O (06/12/20 0124)  Plateau Pressure: 22 cmH20 (06/12/20 0124)  Total Ve: 8.12 mL (06/12/20 0124)  F/VT Ratio<105 (RSBI): 266.23 (06/11/20 0216)    Lines/Drains/Airways     Peripherally Inserted Central Catheter Line            PICC  Double Lumen 05/05/20 1707 right basilic 37 days          Central Venous Catheter Line                 ECMO Cannula 04/25/20 1120 right femoral vein 48 days         ECMO Cannula 04/25/20 1120 right internal jugular 48 days          Drain                 NG/OG Tube 05/03/20 1215 [place] preetp;nasogastric 18 Fr. Left nostril 40 days         Urethral Catheter 05/28/20 1630 14 days          Airway                 Surgical Airway 05/05/20 1500 Shiley Cuffed 37 days          Arterial Line                 Arterial Line 04/27/20 1100 Right Brachial 46 days          Peripheral Intravenous Line                 Peripheral IV - Single Lumen 06/10/20 Posterior;Right Wrist 2 days         Peripheral IV - Single Lumen 06/12/20 0400 20 G Left Wrist less than 1 day                Significant Labs:    CBC/Anemia Profile:  Recent Labs   Lab 06/11/20 0322 06/11/20 2000 06/12/20 0243 06/12/20  0809 06/12/20  0816   WBC 13.30*   < > 13.28*  --  13.43* 12.23  --    HGB 10.4*   < > 10.5*  --  10.4* 10.3*  --    HCT 32.7*   < > 33.0*   < > 34.2* 32.1* 31*   PLT 98*   < > 117*  --  115* 121*  --    MCV 91   < > 91  --  95 92  --    RDW 15.6*   < > 15.5*  --  15.5* 15.8*  --    FERRITIN 1,839*  --   --   --  2,224*  --   --     < > = values in this interval not displayed.        Chemistries:  Recent Labs   Lab 06/11/20 2000 06/12/20  0243 06/12/20  0809    142 141   K 3.8 4.9 4.3   CL 99 100 98   CO2 35* 34* 33*   BUN 46* 46* 46*   CREATININE 0.6 0.6 0.6   CALCIUM 9.7 10.0 9.7   ALBUMIN 3.0* 3.1* 3.1*   PROT 6.2 6.6 6.4   BILITOT 2.2* 2.0* 2.0*   ALKPHOS 115 120 117   ALT 26 25 25   AST 22 26 25   MG 2.0 2.0 2.0   PHOS 3.6 3.1 3.7       All pertinent labs within the past 24 hours have been reviewed.    Significant Imaging:  I have reviewed and interpreted all pertinent imaging results/findings within the past 24 hours.

## 2020-06-12 NOTE — PROGRESS NOTES
[facility] shift report    Date: 06/12/2020  ECMO Specialist:  Tatyana Lawrence    Pump parameters:  RPM: 2800  Flow:  2.7  Sweep:  2   FiO2:  40     Oxygenator status:  Clots: None  Fibrin: None     Pressure trends:  P1: 100  P2: 80's   Delta P: 17-19    Volume status:  Chugging noted? None  CVP: .   MAP:  80-90's   MD notified (name):  Landon    Anticoagulation:  ACT/aPTT/Xa parameters: 0.2 - 0.25  ACT/aPTT/Xa trends this shift: 0.25 - 0.27 ( no changes were made)    Cannula size / status / placement:  Arterial:   Venous 1: 23 FR / RIJ / 4 cm  Venous 2: 27 FR / RFV / 12 cm  Dual lumen:      Additional Comments:  Sweep was weaned to 2 LPM throughout shift.

## 2020-06-13 NOTE — SUBJECTIVE & OBJECTIVE
Interval History: increase in CO2 prompting increase in sweep to 2.5; stable neuro exam    ROS  Medications:  Continuous Infusions:   dexmedetomidine (PRECEDEX) infusion 0.6 mcg/kg/hr (06/13/20 0900)    dextrose 10 % in water (D10W)      furosemide (LASIX) 2 mg/mL continuous infusion (non-titrating) 2.5 mg/hr (06/13/20 0900)    heparin (porcine) in 5 % dex 850 Units/hr (06/13/20 0900)    norepinephrine bitartrate-D5W Stopped (06/10/20 0500)     Scheduled Meds:   aspirin  81 mg Per NG tube Daily    fentaNYL  1 patch Transdermal Q72H    insulin aspart U-100  5 Units Subcutaneous Q24H    insulin aspart U-100  5 Units Subcutaneous Q24H    insulin aspart U-100  5 Units Subcutaneous Q24H    insulin aspart U-100  5 Units Subcutaneous Q24H    insulin aspart U-100  5 Units Subcutaneous Q24H    insulin aspart U-100  5 Units Subcutaneous Q24H    labetalol  10 mg Intravenous Once    multivitamin liquid no.118  10 mL Per G Tube Daily    pantoprazole  40 mg Per NG tube BID    polyethylene glycol  17 g Per NG tube Daily    potassium chloride 10%  40 mEq Per NG tube BID    psyllium husk (aspartame)  1 packet Per NG tube TID    sucralfate  1 g Per NG tube Q6H    vitamin D  1,000 Units Per G Tube Daily     PRN Meds:acetaminophen, artificial tears, calcium gluconate IVPB, calcium gluconate IVPB, calcium gluconate IVPB, dextrose 10 % in water (D10W), Dextrose 10% Bolus, Dextrose 10% Bolus, fentaNYL, glucagon (human recombinant), glucose, glucose, insulin aspart U-100, magnesium oxide, magnesium oxide, magnesium sulfate IVPB, potassium chloride 10%, potassium chloride 10%, potassium chloride 10%, potassium, sodium phosphates, sodium chloride 0.9%     Objective:     Vital Signs (Most Recent):  Temp: 98.1 °F (36.7 °C) (06/13/20 0701)  Pulse: (!) 137 (06/13/20 0900)  Resp: (!) 39 (06/11/20 0701)  BP: (!) 87/61 (06/13/20 0802)  SpO2: 100 % (06/13/20 0900) Vital Signs (24h Range):  Temp:  [98.1 °F (36.7 °C)-99.1 °F (37.3  °C)] 98.1 °F (36.7 °C)  Pulse:  [105-145] 137  SpO2:  [98 %-100 %] 100 %  BP: ()/(54-82) 87/61  Arterial Line BP: ()/(51-76) 113/72     Weight: 59.1 kg (130 lb 4.7 oz)  Body mass index is 22.36 kg/m².    SpO2: 100 %  O2 Device (Oxygen Therapy): ventilator    Intake/Output - Last 3 Shifts       06/11 0700 - 06/12 0659 06/12 0700 - 06/13 0659 06/13 0700 - 06/14 0659    I.V. (mL/kg) 494.2 (8.5) 430 (7.3)     NG/GT 1570 2585 535    Total Intake(mL/kg) 2064.2 (35.5) 3015 (51) 535 (9.1)    Urine (mL/kg/hr) 3700 (2.6) 3715 (2.6) 360 (2.1)    Stool 0      Blood 8 8 3    Total Output 3708 3723 363    Net -1643.8 -708 +172           Stool Occurrence 1 x            Lines/Drains/Airways     Peripherally Inserted Central Catheter Line            PICC Double Lumen 05/05/20 1707 right basilic 38 days          Central Venous Catheter Line                 ECMO Cannula 04/25/20 1120 right femoral vein 48 days         ECMO Cannula 04/25/20 1120 right internal jugular 48 days          Drain                 NG/OG Tube 05/03/20 1215 [place] sump;nasogastric 18 Fr. Left nostril 40 days         Urethral Catheter 05/28/20 1630 15 days          Airway                 Surgical Airway 05/05/20 1500 Shiley Cuffed 38 days          Arterial Line                 Arterial Line 04/27/20 1100 Right Brachial 46 days          Peripheral Intravenous Line                 Peripheral IV - Single Lumen 06/10/20 Posterior;Right Wrist 3 days         Peripheral IV - Single Lumen 06/12/20 0400 20 G Left Wrist 1 day                Physical Exam  Neuro: does not track; withdraws to pain  Tachypnea, with TV of 150-250    Significant Labs:  BMP:   Recent Labs   Lab 06/13/20  0230   *      K 4.2   CL 94*   CO2 34*   BUN 45*   CREATININE 0.6   CALCIUM 10.1   MG 2.0     CBC:   Recent Labs   Lab 06/13/20  0802 06/13/20  0811   WBC 10.74  --    RBC 3.37*  --    HGB 9.7*  --    HCT 31.3* 29*   *  --    MCV 93  --    MCH 28.8  --    MCHC  31.0*  --      CMP:   Recent Labs   Lab 06/13/20  0230   *   CALCIUM 10.1   ALBUMIN 2.8*   PROT 6.3      K 4.2   CO2 34*   CL 94*   BUN 45*   CREATININE 0.6   ALKPHOS 109   ALT 23   AST 26   BILITOT 1.7*     Coagulation:   Recent Labs   Lab 06/13/20  0802   INR 1.0   APTT 28.0       Significant Diagnostics:

## 2020-06-13 NOTE — PLAN OF CARE
Patient's vital signs stable at this time. Patient continues on heparin, precedex and lasix drips per order. Tube feeds at goal of 45cc/hr, minimal residuals noted. Patient turned Q2H. Plan of care reviewed with patient. Will continue to monitor.

## 2020-06-13 NOTE — PROGRESS NOTES
[facility] - Respiratory ICU  Critical Care - Surgery  Progress Note    Patient Name: Ranjana Sanchez  MRN: [id number]  Admission Date: 4/10/2020  Hospital Length of Stay: 64 days  Code Status: Full Code  Attending Provider: Francis Ayon MD  Primary Care Provider: Primary Doctor No   Principal Problem: Acute respiratory disease due to COVID-19 virus    Subjective:     Interval History/Significant Events:   NAEON. NGT readjusted overnight. Held free water bolus (Na 138). Precedex .1, lasix 2.5, heparin 850. No bleeding from trach site overnight.    ECMO Pump parameters:  RPM:                                2800  Flow:                                      2.7  Sweep:                                  2  FiO2:                                   40    Follow-up For: Procedure(s) (LRB):  CREATION, TRACHEOSTOMY  (N/A)    Post-Operative Day: 38 Days Post-Op    Objective:     Vital Signs (Most Recent):  Temp: 98.1 °F (36.7 °C) (06/13/20 0701)  Pulse: (!) 112 (06/13/20 0802)  Resp: (!) 39 (06/11/20 0701)  BP: (!) 87/61 (06/13/20 0802)  SpO2: 100 % (06/13/20 0802) Vital Signs (24h Range):  Temp:  [98.1 °F (36.7 °C)-99.1 °F (37.3 °C)] 98.1 °F (36.7 °C)  Pulse:  [105-145] 112  SpO2:  [98 %-100 %] 100 %  BP: ()/(54-82) 87/61  Arterial Line BP: ()/(50-76) 102/61     Weight: 59.1 kg (130 lb 4.7 oz)  Body mass index is 22.36 kg/m².      Intake/Output Summary (Last 24 hours) at 6/13/2020 0810  Last data filed at 6/13/2020 0800  Gross per 24 hour   Intake 2775.01 ml   Output 3598 ml   Net -822.99 ml       Physical Exam  Constitutional:       Appearance: He is well-developed and well-nourished.   HENT:      Head: Normocephalic and atraumatic.        Comments: Right IJ ECMO Cannula in placePulmonary:      Comments: Trach in place  Surgi-Cell / NuKnit packed surrounding Trach Fixture  No further active bleeding noted from Trach Site  Abdominal:      General: There is no distension.      Tenderness: There is no  abdominal tenderness.   Musculoskeletal:      Comments: Right Femoral ECMO Cannula Site   Skin:     General: Skin is warm and dry.   Neurological:      Comments: Tracks  Decorticate posturing in place         Vents:  Vent Mode: A/C (06/13/20 0802)  Ventilator Initiated: Yes (04/10/20 2247)  Set Rate: 30 BPM (06/13/20 0802)  Vt Set: 250 mL (06/06/20 0858)  Pressure Support: 20 cmH20 (06/06/20 0858)  PEEP/CPAP: 6 cmH20 (06/13/20 0802)  Oxygen Concentration (%): 50 (06/13/20 0802)  Peak Airway Pressure: 28 cmH2O (06/13/20 0802)  Plateau Pressure: 31 cmH20 (06/13/20 0802)  Total Ve: 6.62 mL (06/13/20 0802)  F/VT Ratio<105 (RSBI): 266.23 (06/11/20 0216)    Lines/Drains/Airways     Peripherally Inserted Central Catheter Line            PICC Double Lumen 05/05/20 1707 right basilic 38 days          Central Venous Catheter Line                 ECMO Cannula 04/25/20 1120 right femoral vein 48 days         ECMO Cannula 04/25/20 1120 right internal jugular 48 days          Drain                 NG/OG Tube 05/03/20 1215 [place] sump;nasogastric 18 Fr. Left nostril 40 days         Urethral Catheter 05/28/20 1630 15 days          Airway                 Surgical Airway 05/05/20 1500 Shiley Cuffed 38 days          Arterial Line                 Arterial Line 04/27/20 1100 Right Brachial 46 days          Peripheral Intravenous Line                 Peripheral IV - Single Lumen 06/10/20 Posterior;Right Wrist 3 days         Peripheral IV - Single Lumen 06/12/20 0400 20 G Left Wrist 1 day                Significant Labs:    CBC/Anemia Profile:  Recent Labs   Lab 06/12/20  0243  06/12/20  1404  06/12/20 2011 06/13/20  0230 06/13/20  0232   WBC 13.43*   < > 12.10  --  11.74 13.11*  --    HGB 10.4*   < > 10.3*  --  10.3* 9.9*  --    HCT 34.2*   < > 32.9*   < > 32.0* 30.6* 30*   *   < > 131*  --  136* 138*  --    MCV 95   < > 94  --  91 90  --    RDW 15.5*   < > 15.4*  --  15.7* 15.4*  --    FERRITIN 2,224*  --   --   --   --  2,578*   --     < > = values in this interval not displayed.        Chemistries:  Recent Labs   Lab 06/12/20  1404 06/12/20 2011 06/13/20  0230    140 138   K 4.4 4.1 4.2   CL 96 95 94*   CO2 36* 36* 34*   BUN 44* 43* 45*   CREATININE 0.6 0.6 0.6   CALCIUM 10.1 10.1 10.1   ALBUMIN 3.1* 2.9* 2.8*   PROT 6.6 6.5 6.3   BILITOT 2.0* 1.9* 1.7*   ALKPHOS 115 110 109   ALT 24 24 23   AST 26 23 26   MG 1.9 2.1 2.0   PHOS 3.5 3.1 3.0       All pertinent labs within the past 24 hours have been reviewed.    Significant Imaging:  I have reviewed and interpreted all pertinent imaging results/findings within the past 24 hours.    Assessment/Plan:     COVID-19 virus detected  Ranjana Sanchez is a 55 y.o. 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  This was switched to ETT on 4/11.  Cannulated on 4/25.    Neuro:  - Precedex for sedation  - 100 BID of seroquel  - Fentanyl patch  - Encephalopathy with poor neuro exam and decorticate posturing  - CT on June 2 showing bilateral basal ganglia infarcts, which likely explains the neuro exam findings. His infarcts are sizeable, but extent of permanent deficits are unknown. The likelihood of meaningful recovery is low. Neuro consulted to prognosticate and agreed with poor neurological prognosis given bilateral basal ganglia CVAs    Pulm  AC/VC+ on the vent.  ECMO   Pump parameters:  RPM:                                2800  Flow:                                      2.7  Sweep:                                  2  FiO2:                                   40  Vent Mode: A/C  Oxygen Concentration (%):  [50] 50  Resp Rate Total:  [31 br/min-54 br/min] 39 br/min  PEEP/CPAP:  [6 cmH20] 6 cmH20  Mean Airway Pressure:  [15 neG77-98 cmH20] 17 cmH20      Cardiac  Levo as needed  MAP goal < 90    FEN/GI  TFs at goal (45cc/hr)    Renal  Making urine, diuresing agressively  Lasix gtt at 2.5    Heme  Hep gtt at 850, goal 0.25-0.3 to  prevent another GI bleed  Has been at goal at this rate      ID  WBC stable    Dispo: RICU  -Prognosis is Grim  -brother is reportedly coming in from [place] at some point over the next 2 weeks        Michael Barton MD  Critical Care - Surgery  [facility]

## 2020-06-13 NOTE — PROGRESS NOTES
Dr. Queen notified that NG tube tran had come off and NG tube had come out a few cm. Tran reapplied and NG tube advanced back to marked location. X-ray completed, Dr. Queen reviewed x-ray. Per MD pull back NG tube 4cm then ok to use. Done per order.

## 2020-06-13 NOTE — PROGRESS NOTES
Dr. Queen notified that patient's sodium is 138, down from 142 yesterday. Patient is supposed to get 400cc free water enteral bolus. Per MD hold at this time.

## 2020-06-13 NOTE — PROGRESS NOTES
[facility] shift report    Date: 06/13/2020  ECMO Specialist:  Cyrus Stefany    Pump parameters:  RPM:                                2800  Flow:                                      2.7  Sweep:                                  2  FiO2:                                   40    Oxygenator status:  Clots:                                  none  Fibrin:                                 none    Pressure trends:  P1:                                     102  P2:                                       86  Delta P:                               16    Volume status:  Chugging noted?             Only when patient becomes agitated.  CVP:                                Not monitored at this time  MAP:                                    78  MD notified (name):        Dr. Busby    Anticoagulation:  ACT/aPTT/Xa parameters:         0.2 - 0.25  ACT/aPTT/Xa trends this shift:   0.24    Cannula size / status / placement:  Arterial:   Venous 1:    23 Khmer in RIJV @   4cm from insertion to end of coil  Venous 2:    27 Khmer in RFV @ 12cm from insertion to end of coil  Dual lumen:      Additional Comments:    Patient is stable on VV ECMO with no abnormal incidences.   The Sweep is at 2lpn and PFR is 2.7lpm.  No clots or Fiibrin located in circuit.

## 2020-06-13 NOTE — PLAN OF CARE
Patient remains on ventilator and VV ECMO. No acute events during shift. All VSS. Patient remains on heparin, lasix, and precedex gtts. Urine output 95-250ml/hr. Tube feeds remain at goal and being tolerated well. 3 bowel movements, slightly tarry like/creamy. No blood appearing in stool and CBC stable. Family updated. All VSS at this time. Will continue to monitor patient closely.

## 2020-06-13 NOTE — PROGRESS NOTES
[facility] shift report    Date: 06/13/2020  ECMO Specialist:  Tatyana Howard    Pump parameters:  RPM: 2800  Flow:  2.7  Sweep:  2.5  FiO2:  40    Oxygenator status:  Clots: None  Fibrin: none     Pressure trends:  P1: 100  P2: 80's   Delta P: 17-20     Volume status:  Chugging noted?  CVP:  na  MAP:  70-90's   MD notified (name):  Clem     Anticoagulation:  ACT/aPTT/Xa parameters: 0.2-0.25  ACT/aPTT/Xa trends this shift: 0.22-0.26    Cannula size / status / placement:  Arterial:   Venous 1: 23 FR / RIJV / 4 cm  Venous 2: 27 FR / RFV / 12 cm   Dual lumen:      Additional Comments:  Sweep was increased to 2.5 for a PCO2 of 61.  Otherwise stable throughout shift.

## 2020-06-13 NOTE — PROGRESS NOTES
[facility] - Respiratory ICU  Cardiothoracic Surgery  Progress Note    Patient Name: Ranjana Sanchez  MRN: [id number]  Admission Date: 4/10/2020  Hospital Length of Stay: 64 days  Code Status: Full Code   Attending Physician: Francis Ayon MD   Referring Provider: Francis Ayon MD  Principal Problem:Acute respiratory disease due to COVID-19 virus      Subjective:     Post-Op Info:  Procedure(s) (LRB):  CREATION, TRACHEOSTOMY  (N/A)   39 Days Post-Op     Interval History: increase in CO2 prompting increase in sweep to 2.5; stable neuro exam    ROS  Medications:  Continuous Infusions:   dexmedetomidine (PRECEDEX) infusion 0.6 mcg/kg/hr (06/13/20 0900)    dextrose 10 % in water (D10W)      furosemide (LASIX) 2 mg/mL continuous infusion (non-titrating) 2.5 mg/hr (06/13/20 0900)    heparin (porcine) in 5 % dex 850 Units/hr (06/13/20 0900)    norepinephrine bitartrate-D5W Stopped (06/10/20 0500)     Scheduled Meds:   aspirin  81 mg Per NG tube Daily    fentaNYL  1 patch Transdermal Q72H    insulin aspart U-100  5 Units Subcutaneous Q24H    insulin aspart U-100  5 Units Subcutaneous Q24H    insulin aspart U-100  5 Units Subcutaneous Q24H    insulin aspart U-100  5 Units Subcutaneous Q24H    insulin aspart U-100  5 Units Subcutaneous Q24H    insulin aspart U-100  5 Units Subcutaneous Q24H    labetalol  10 mg Intravenous Once    multivitamin liquid no.118  10 mL Per G Tube Daily    pantoprazole  40 mg Per NG tube BID    polyethylene glycol  17 g Per NG tube Daily    potassium chloride 10%  40 mEq Per NG tube BID    psyllium husk (aspartame)  1 packet Per NG tube TID    sucralfate  1 g Per NG tube Q6H    vitamin D  1,000 Units Per G Tube Daily     PRN Meds:acetaminophen, artificial tears, calcium gluconate IVPB, calcium gluconate IVPB, calcium gluconate IVPB, dextrose 10 % in water (D10W), Dextrose 10% Bolus, Dextrose 10% Bolus, fentaNYL, glucagon (human recombinant), glucose, glucose,  insulin aspart U-100, magnesium oxide, magnesium oxide, magnesium sulfate IVPB, potassium chloride 10%, potassium chloride 10%, potassium chloride 10%, potassium, sodium phosphates, sodium chloride 0.9%     Objective:     Vital Signs (Most Recent):  Temp: 98.1 °F (36.7 °C) (06/13/20 0701)  Pulse: (!) 137 (06/13/20 0900)  Resp: (!) 39 (06/11/20 0701)  BP: (!) 87/61 (06/13/20 0802)  SpO2: 100 % (06/13/20 0900) Vital Signs (24h Range):  Temp:  [98.1 °F (36.7 °C)-99.1 °F (37.3 °C)] 98.1 °F (36.7 °C)  Pulse:  [105-145] 137  SpO2:  [98 %-100 %] 100 %  BP: ()/(54-82) 87/61  Arterial Line BP: ()/(51-76) 113/72     Weight: 59.1 kg (130 lb 4.7 oz)  Body mass index is 22.36 kg/m².    SpO2: 100 %  O2 Device (Oxygen Therapy): ventilator    Intake/Output - Last 3 Shifts       06/11 0700 - 06/12 0659 06/12 0700 - 06/13 0659 06/13 0700 - 06/14 0659    I.V. (mL/kg) 494.2 (8.5) 430 (7.3)     NG/GT 1570 2585 535    Total Intake(mL/kg) 2064.2 (35.5) 3015 (51) 535 (9.1)    Urine (mL/kg/hr) 3700 (2.6) 3715 (2.6) 360 (2.1)    Stool 0      Blood 8 8 3    Total Output 3708 3723 363    Net -1643.8 -708 +172           Stool Occurrence 1 x            Lines/Drains/Airways     Peripherally Inserted Central Catheter Line            PICC Double Lumen 05/05/20 1707 right basilic 38 days          Central Venous Catheter Line                 ECMO Cannula 04/25/20 1120 right femoral vein 48 days         ECMO Cannula 04/25/20 1120 right internal jugular 48 days          Drain                 NG/OG Tube 05/03/20 1215 [place] sump;nasogastric 18 Fr. Left nostril 40 days         Urethral Catheter 05/28/20 1630 15 days          Airway                 Surgical Airway 05/05/20 1500 Shiley Cuffed 38 days          Arterial Line                 Arterial Line 04/27/20 1100 Right Brachial 46 days          Peripheral Intravenous Line                 Peripheral IV - Single Lumen 06/10/20 Posterior;Right Wrist 3 days         Peripheral IV - Single Lumen  06/12/20 0400 20 G Left Wrist 1 day                Physical Exam  Neuro: does not track; withdraws to pain  Tachypnea, with TV of 150-250    Significant Labs:  BMP:   Recent Labs   Lab 06/13/20  0230   *      K 4.2   CL 94*   CO2 34*   BUN 45*   CREATININE 0.6   CALCIUM 10.1   MG 2.0     CBC:   Recent Labs   Lab 06/13/20  0802 06/13/20  0811   WBC 10.74  --    RBC 3.37*  --    HGB 9.7*  --    HCT 31.3* 29*   *  --    MCV 93  --    MCH 28.8  --    MCHC 31.0*  --      CMP:   Recent Labs   Lab 06/13/20  0230   *   CALCIUM 10.1   ALBUMIN 2.8*   PROT 6.3      K 4.2   CO2 34*   CL 94*   BUN 45*   CREATININE 0.6   ALKPHOS 109   ALT 23   AST 26   BILITOT 1.7*     Coagulation:   Recent Labs   Lab 06/13/20 0802   INR 1.0   APTT 28.0       Significant Diagnostics:      Assessment/Plan:     * Acute respiratory disease due to COVID-19 virus  Acute respiratory distress syndrome (ARDS) due to COVID-19 virus  - Crich switched to ETT on 4/11  - Monika weaned off 5/5  - Tracheostomy and bronch 5/5  - severe thrombocytopenia resolved; transfuse for under 30K  - Aspirin 81 daily  - RPM at 2800; flows at 2.6  - Sweep at 2.5; CO2 goal is 59 or below  - Do not increase sweep for pCO2s 59 or below as long as patient is not acidotic, as these high pCO2s are respiratory compensation for alkalosis  - Oxygenator Fi at 30%; switched out 6/9  - CT head and chest imaging reviewed with profound basilar changes concerning for hypoxemic injury  - Patient tolerating low sweep and low FiO2 on the oxygenator since oxygenator switch-out yesterday     Tongue laceration      -ENT evaluated, laceration superficial      -recs: bite block vs paralysis, will monitor    Sedation  - Valium, seroquel, PRN Fentanyl     UGI Bleed  - Scoped by GI 5/3 with epinephrine injection and clip placement for an actively bleeding D2/D3 Diuelafoy lesion  - Recommend GI re-consultation if Hb continues to drift and further blood is aspirated from  NGT or patient has bloody BM, etc    FEN/GI  - TF at 45, goal      - Lasix gtt 2.5  - FWB for hypernatremia 400mL q4     Anticoagulation      - Goal aXa 0.2-0.25 Q6hrs      - Hep gtt 600      - monitor LDH    Prophylaxis      - protonix q12 IV       - leukocytosis stable      - PICC placed 5/5; central line removed 5/6      Rafael Nj MD  Cardiothoracic Surgery  [facility] - Respiratory ICU

## 2020-06-13 NOTE — SUBJECTIVE & OBJECTIVE
Interval History/Significant Events:   NAEON. NGT readjusted overnight. Held free water bolus (Na 138). Precedex .1, lasix 2.5, heparin 850. No bleeding from trach site overnight.    ECMO Pump parameters:  RPM:                                2800  Flow:                                      2.7  Sweep:                                  2  FiO2:                                   40    Follow-up For: Procedure(s) (LRB):  CREATION, TRACHEOSTOMY  (N/A)    Post-Operative Day: 38 Days Post-Op    Objective:     Vital Signs (Most Recent):  Temp: 98.1 °F (36.7 °C) (06/13/20 0701)  Pulse: (!) 112 (06/13/20 0802)  Resp: (!) 39 (06/11/20 0701)  BP: (!) 87/61 (06/13/20 0802)  SpO2: 100 % (06/13/20 0802) Vital Signs (24h Range):  Temp:  [98.1 °F (36.7 °C)-99.1 °F (37.3 °C)] 98.1 °F (36.7 °C)  Pulse:  [105-145] 112  SpO2:  [98 %-100 %] 100 %  BP: ()/(54-82) 87/61  Arterial Line BP: ()/(50-76) 102/61     Weight: 59.1 kg (130 lb 4.7 oz)  Body mass index is 22.36 kg/m².      Intake/Output Summary (Last 24 hours) at 6/13/2020 0810  Last data filed at 6/13/2020 0800  Gross per 24 hour   Intake 2775.01 ml   Output 3598 ml   Net -822.99 ml       Physical Exam  Constitutional:       Appearance: He is well-developed and well-nourished.   HENT:      Head: Normocephalic and atraumatic.        Comments: Right IJ ECMO Cannula in placePulmonary:      Comments: Trach in place  Surgi-Cell / NuKnit packed surrounding Trach Fixture  No further active bleeding noted from Trach Site  Abdominal:      General: There is no distension.      Tenderness: There is no abdominal tenderness.   Musculoskeletal:      Comments: Right Femoral ECMO Cannula Site   Skin:     General: Skin is warm and dry.   Neurological:      Comments: Tracks  Decorticate posturing in place         Vents:  Vent Mode: A/C (06/13/20 0802)  Ventilator Initiated: Yes (04/10/20 2247)  Set Rate: 30 BPM (06/13/20 0802)  Vt Set: 250 mL (06/06/20 0858)  Pressure Support: 20 cmH20  (06/06/20 0858)  PEEP/CPAP: 6 cmH20 (06/13/20 0802)  Oxygen Concentration (%): 50 (06/13/20 0802)  Peak Airway Pressure: 28 cmH2O (06/13/20 0802)  Plateau Pressure: 31 cmH20 (06/13/20 0802)  Total Ve: 6.62 mL (06/13/20 0802)  F/VT Ratio<105 (RSBI): 266.23 (06/11/20 0216)    Lines/Drains/Airways     Peripherally Inserted Central Catheter Line            PICC Double Lumen 05/05/20 1707 right basilic 38 days          Central Venous Catheter Line                 ECMO Cannula 04/25/20 1120 right femoral vein 48 days         ECMO Cannula 04/25/20 1120 right internal jugular 48 days          Drain                 NG/OG Tube 05/03/20 1215 [place] sump;nasogastric 18 Fr. Left nostril 40 days         Urethral Catheter 05/28/20 1630 15 days          Airway                 Surgical Airway 05/05/20 1500 Shiley Cuffed 38 days          Arterial Line                 Arterial Line 04/27/20 1100 Right Brachial 46 days          Peripheral Intravenous Line                 Peripheral IV - Single Lumen 06/10/20 Posterior;Right Wrist 3 days         Peripheral IV - Single Lumen 06/12/20 0400 20 G Left Wrist 1 day                Significant Labs:    CBC/Anemia Profile:  Recent Labs   Lab 06/12/20  0243  06/12/20  1404  06/12/20 2011 06/13/20  0230 06/13/20  0232   WBC 13.43*   < > 12.10  --  11.74 13.11*  --    HGB 10.4*   < > 10.3*  --  10.3* 9.9*  --    HCT 34.2*   < > 32.9*   < > 32.0* 30.6* 30*   *   < > 131*  --  136* 138*  --    MCV 95   < > 94  --  91 90  --    RDW 15.5*   < > 15.4*  --  15.7* 15.4*  --    FERRITIN 2,224*  --   --   --   --  2,578*  --     < > = values in this interval not displayed.        Chemistries:  Recent Labs   Lab 06/12/20  1404 06/12/20 2011 06/13/20  0230    140 138   K 4.4 4.1 4.2   CL 96 95 94*   CO2 36* 36* 34*   BUN 44* 43* 45*   CREATININE 0.6 0.6 0.6   CALCIUM 10.1 10.1 10.1   ALBUMIN 3.1* 2.9* 2.8*   PROT 6.6 6.5 6.3   BILITOT 2.0* 1.9* 1.7*   ALKPHOS 115 110 109   ALT 24 24 23   AST 26  23 26   MG 1.9 2.1 2.0   PHOS 3.5 3.1 3.0       All pertinent labs within the past 24 hours have been reviewed.    Significant Imaging:  I have reviewed and interpreted all pertinent imaging results/findings within the past 24 hours.

## 2020-06-13 NOTE — CARE UPDATE
BG goal 140-180    Remains in RICU, NAEON  BG fairly well controlled overnight on current insulin regimen  TF (Peptamen VHP) at 45 cc/hr (goal)  Plan:    Continue Novolog 5 units every 4 hours, hold if TF held or discontinued  Low dose correction scale  BG monitoring every 4 hours     Discharge planning: TBD    Endocrine to continue to follow    ** Please call Endocrine for any BG related issues **

## 2020-06-13 NOTE — ASSESSMENT & PLAN NOTE
Ranjana Sanchez is a 55 y.o. 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  This was switched to ETT on 4/11.  Cannulated on 4/25.    Neuro:  - Precedex for sedation  - 100 BID of seroquel  - Fentanyl patch  - Encephalopathy with poor neuro exam and decorticate posturing  - CT on June 2 showing bilateral basal ganglia infarcts, which likely explains the neuro exam findings. His infarcts are sizeable, but extent of permanent deficits are unknown. The likelihood of meaningful recovery is low. Neuro consulted to prognosticate and agreed with poor neurological prognosis given bilateral basal ganglia CVAs    Pulm  AC/VC+ on the vent.  ECMO   Pump parameters:  RPM:                                2800  Flow:                                      2.7  Sweep:                                  2  FiO2:                                   40  Vent Mode: A/C  Oxygen Concentration (%):  [50] 50  Resp Rate Total:  [31 br/min-54 br/min] 39 br/min  PEEP/CPAP:  [6 cmH20] 6 cmH20  Mean Airway Pressure:  [15 iiK57-93 cmH20] 17 cmH20      Cardiac  Levo as needed  MAP goal < 90    FEN/GI  TFs at goal (45cc/hr)    Renal  Making urine, diuresing agressively  Lasix gtt at 2.5    Heme  Hep gtt at 850, goal 0.25-0.3 to prevent another GI bleed  Has been at goal at this rate      ID  WBC stable    Dispo: RICU  -Prognosis is Grim  -brother is reportedly coming in from [place] at some point over the next 2 weeks

## 2020-06-14 NOTE — SUBJECTIVE & OBJECTIVE
Interval History/Significant Events:   NAEON. Precedex .1, lasix 2.5, heparin 850. No bleeding from trach site overnight. Sweep increased 2-> 2.5 due to increase in CO2.    ECMO Pump parameters:  RPM:                                2800  Flow:                                      2.7  Sweep:                                  2.5  FiO2:                                   40    Follow-up For: Procedure(s) (LRB):  CREATION, TRACHEOSTOMY  (N/A)    Post-Operative Day: 38 Days Post-Op    Objective:     Vital Signs (Most Recent):  Temp: 98.4 °F (36.9 °C) (06/14/20 0300)  Pulse: (!) 131 (06/14/20 0400)  Resp: (!) 38 (06/14/20 0119)  BP: 100/67 (06/14/20 0300)  SpO2: 100 % (06/14/20 0400) Vital Signs (24h Range):  Temp:  [97.9 °F (36.6 °C)-99.4 °F (37.4 °C)] 98.4 °F (36.9 °C)  Pulse:  [105-137] 131  Resp:  [38] 38  SpO2:  [95 %-100 %] 100 %  BP: ()/(61-77) 100/67  Arterial Line BP: ()/(52-75) 111/68     Weight: 59.1 kg (130 lb 4.7 oz)  Body mass index is 22.36 kg/m².      Intake/Output Summary (Last 24 hours) at 6/14/2020 0412  Last data filed at 6/14/2020 0300  Gross per 24 hour   Intake 2539 ml   Output 3198 ml   Net -659 ml       Physical Exam  Constitutional:       Appearance: He is well-developed and well-nourished.   HENT:      Head: Normocephalic and atraumatic.        Comments: Right IJ ECMO Cannula in placePulmonary:      Comments: Trach in place  Surgi-Cell / NuKnit packed surrounding Trach Fixture  No further active bleeding noted from Trach Site  Abdominal:      General: There is no distension.      Tenderness: There is no abdominal tenderness.   Musculoskeletal:      Comments: Right Femoral ECMO Cannula Site   Skin:     General: Skin is warm and dry.   Neurological:      Comments: Tracks  Withdraws to pain         Vents:  Vent Mode: A/C (06/14/20 0119)  Ventilator Initiated: Yes (04/10/20 2247)  Set Rate: 30 BPM (06/14/20 0119)  Vt Set: 250 mL (06/06/20 0858)  Pressure Support: 20 cmH20 (06/06/20  0858)  PEEP/CPAP: 6 cmH20 (06/14/20 0119)  Oxygen Concentration (%): 50 (06/14/20 0300)  Peak Airway Pressure: 29 cmH2O (06/14/20 0119)  Plateau Pressure: 20 cmH20 (06/14/20 0119)  Total Ve: 7.4 mL (06/14/20 0119)  F/VT Ratio<105 (RSBI): 196.89 (06/14/20 0119)    Lines/Drains/Airways     Peripherally Inserted Central Catheter Line            PICC Double Lumen 05/05/20 1707 right basilic 39 days          Central Venous Catheter Line                 ECMO Cannula 04/25/20 1120 right femoral vein 49 days         ECMO Cannula 04/25/20 1120 right internal jugular 49 days          Drain                 NG/OG Tube 05/03/20 1215 [place] sump;nasogastric 18 Fr. Left nostril 41 days         Urethral Catheter 05/28/20 1630 16 days          Airway                 Surgical Airway 05/05/20 1500 Shiley Cuffed 39 days          Arterial Line                 Arterial Line 04/27/20 1100 Right Brachial 47 days          Peripheral Intravenous Line                 Peripheral IV - Single Lumen 06/12/20 0400 20 G Left Wrist 2 days                Significant Labs:    CBC/Anemia Profile:  Recent Labs   Lab 06/13/20  0230  06/13/20  1410  06/13/20 2000 06/13/20 2017 06/14/20 0200 06/14/20  0209   WBC 13.11*   < > 11.91  --  12.69  --  11.91  --    HGB 9.9*   < > 10.2*  --  10.0*  --  9.9*  --    HCT 30.6*   < > 32.2*   < > 32.1* 30* 31.2* 30*   *   < > 145*  --  147*  --  149*  --    MCV 90   < > 92  --  93  --  91  --    RDW 15.4*   < > 15.0*  --  15.1*  --  14.9*  --    FERRITIN 2,578*  --   --   --   --   --  2,627*  --     < > = values in this interval not displayed.        Chemistries:  Recent Labs   Lab 06/13/20  1410 06/13/20 2000 06/14/20 0200    138 139   K 4.2 4.3 4.6   CL 94* 92* 93*   CO2 36* 34* 34*   BUN 44* 45* 46*   CREATININE 0.6 0.6 0.6   CALCIUM 10.2 10.3 10.2   ALBUMIN 2.9* 2.9* 3.0*   PROT 6.5 6.6 6.5   BILITOT 1.8* 1.8* 1.7*   ALKPHOS 111 110 116   ALT 25 24 23   AST 26 26 28   MG 2.1 2.1 2.0   PHOS 3.5  3.5 3.6       All pertinent labs within the past 24 hours have been reviewed.    Significant Imaging:  I have reviewed and interpreted all pertinent imaging results/findings within the past 24 hours.

## 2020-06-14 NOTE — SUBJECTIVE & OBJECTIVE
Interval History: no acute events; no pressors; stable at sweep of 2.5 and oxygen of 40%    ROS  Medications:  Continuous Infusions:   dexmedetomidine (PRECEDEX) infusion 0.5 mcg/kg/hr (06/14/20 1100)    dextrose 10 % in water (D10W)      furosemide (LASIX) 2 mg/mL continuous infusion (non-titrating) 2.5 mg/hr (06/14/20 1100)    heparin (porcine) in 5 % dex 800 Units/hr (06/14/20 1100)    norepinephrine bitartrate-D5W Stopped (06/10/20 0500)     Scheduled Meds:   aspirin  81 mg Per NG tube Daily    fentaNYL  1 patch Transdermal Q72H    insulin aspart U-100  5 Units Subcutaneous Q24H    insulin aspart U-100  5 Units Subcutaneous Q24H    insulin aspart U-100  5 Units Subcutaneous Q24H    insulin aspart U-100  5 Units Subcutaneous Q24H    insulin aspart U-100  5 Units Subcutaneous Q24H    insulin aspart U-100  5 Units Subcutaneous Q24H    labetalol  10 mg Intravenous Once    multivitamin liquid no.118  10 mL Per G Tube Daily    pantoprazole  40 mg Per NG tube BID    polyethylene glycol  17 g Per NG tube Daily    potassium chloride 10%  40 mEq Per NG tube BID    psyllium husk (aspartame)  1 packet Per NG tube TID    sucralfate  1 g Per NG tube Q6H    vitamin D  1,000 Units Per G Tube Daily     PRN Meds:acetaminophen, artificial tears, calcium gluconate IVPB, calcium gluconate IVPB, calcium gluconate IVPB, dextrose 10 % in water (D10W), Dextrose 10% Bolus, Dextrose 10% Bolus, fentaNYL, glucagon (human recombinant), glucose, glucose, insulin aspart U-100, magnesium oxide, magnesium oxide, magnesium sulfate IVPB, potassium chloride 10%, potassium chloride 10%, potassium chloride 10%, potassium, sodium phosphates, sodium chloride 0.9%     Objective:     Vital Signs (Most Recent):  Temp: 99.2 °F (37.3 °C) (06/14/20 1100)  Pulse: (!) 141 (06/14/20 1100)  Resp: (!) 38 (06/14/20 0119)  BP: 135/78 (06/14/20 1100)  SpO2: 99 % (06/14/20 1100) Vital Signs (24h Range):  Temp:  [98.1 °F (36.7 °C)-99.4 °F (37.4  °C)] 99.2 °F (37.3 °C)  Pulse:  [122-142] 141  Resp:  [38] 38  SpO2:  [95 %-100 %] 99 %  BP: ()/(66-82) 135/78  Arterial Line BP: ()/(59-75) 123/68     Weight: 59.1 kg (130 lb 4.7 oz)  Body mass index is 22.36 kg/m².    SpO2: 99 %  O2 Device (Oxygen Therapy): ventilator    Intake/Output - Last 3 Shifts       06/12 0700 - 06/13 0659 06/13 0700 - 06/14 0659 06/14 0700 - 06/15 0659    I.V. (mL/kg) 430 (7.3) 634 (10.7) 80.5 (1.4)    NG/GT 2585 2490 625    Total Intake(mL/kg) 3015 (51) 3124 (52.9) 705.5 (11.9)    Urine (mL/kg/hr) 3715 (2.6) 3365 (2.4) 725 (2.5)    Stool  0     Blood 8 8 3    Total Output 3723 3373 728    Net -708 -249 -22.5           Stool Occurrence  4 x           Lines/Drains/Airways     Peripherally Inserted Central Catheter Line            PICC Double Lumen 05/05/20 1707 right basilic 39 days          Central Venous Catheter Line                 ECMO Cannula 04/25/20 1120 right femoral vein 50 days         ECMO Cannula 04/25/20 1120 right internal jugular 50 days          Drain                 NG/OG Tube 05/03/20 1215 [place] sump;nasogastric 18 Fr. Left nostril 41 days         Urethral Catheter 05/28/20 1630 16 days          Airway                 Surgical Airway 05/05/20 1500 Shiley Cuffed 39 days          Arterial Line                 Arterial Line 04/27/20 1100 Right Brachial 48 days          Peripheral Intravenous Line                 Peripheral IV - Single Lumen 06/12/20 0400 20 G Left Wrist 2 days         Peripheral IV - Single Lumen 06/14/20 0200 20 G Left Hand less than 1 day                Physical Exam  Opens eyes to sound but no tracking; withdrawal to pain per nursing staff  Extremities appropriately padded  Vent at fi 50 and peep of 6; noted tachypnea and TV of ~200cc with significant pressure support    Significant Labs:  BMP:   Recent Labs   Lab 06/14/20  0800   *      K 3.9   CL 92*   CO2 36*   BUN 46*   CREATININE 0.6   CALCIUM 10.3   MG 2.1     CBC:   Recent  Labs   Lab 06/14/20  0800 06/14/20  0819   WBC 11.89  --    RBC 3.38*  --    HGB 10.0*  --    HCT 30.5* 29*     --    MCV 90  --    MCH 29.6  --    MCHC 32.8  --        Significant Diagnostics:  None new

## 2020-06-14 NOTE — PLAN OF CARE
No acute events during shift.      VV-ECMO; ECMO FiO2: 40%; SWEEP 2.5; Speed 2800 rpm; Flows 2.7-2.8    Vital signs:  Temp:  [98.1 °F (36.7 °C)-98.5 °F (36.9 °C)]   Pulse:  [122-136]   Resp:  [38]   BP: ()/(66-78)   SpO2:  [100 %]   Arterial Line BP: ()/(62-75)    MAP goal 70-90.      Vent settings: A/C PC; FiO2 50%; PEEP 6; Rate 30    Gtts: Heparin @ 800 units/hr; Precedex @ 0.1 mcg/kg/hr; Lasix @ 2.5 mg/hr    Tube Feeds @ 45cc/hr.  Pt tolerating well.  1 BM during shift.    Urine output 100-150cc/hr    Accuchecks q4h; 5 units insulin scheduled administered.  PRN insulin not required.      Labs and ABG q6h.  AntiXa remains therapeutic (0.2-0.25).

## 2020-06-14 NOTE — PROGRESS NOTES
[facility] Specialists shift report    Date: 06/14/2020  ECMO Specialist:  Cyrus Stefany    Pump parameters:  RPM:                                   2800  Flow:                                         2.7  Sweep:                                     2.5  FiO2:                                      40    Oxygenator status:  Clots:                                 No clots  Fibrin:                                No Fibrin    Pressure trends:  P1:                                        104  P2:                                          87  Delta P:                                  17    Volume status:  Chugging noted?              Minor chugging but no intervention since it resolved itself  CVP:                                 Not monitored  MAP:                                      76  MD notified (name):          Dr. Busby    Anticoagulation:  ACT/aPTT/Xa parameters:                0.2-0.25  ACT/aPTT/Xa trends this shift:         0.25    Cannula size / status / placement:  Arterial:   Venous 1:    23 [place]ese in RIJV with   4 cm from insertion to coils  Venous 2:    27 [place]ese in  RF  with 12 cm from insertion to coils.  Dual lumen:      Additional Comments:      Patient maintained on VV Ecmo with no interventions required.  Patient had no blood products or volume given.

## 2020-06-14 NOTE — NURSING
Patient has been stable throughout the shift.   UOP remains 100-200 mL/hr.   Temperature has been afebrile.  Patient has been withdrawing in LLE and LUE.   Please see flowsheet data for full assessment details.

## 2020-06-14 NOTE — PROGRESS NOTES
[facility] - Respiratory ICU  Critical Care - Surgery  Progress Note    Patient Name: Ranjana Sanchez  MRN: [id number]  Admission Date: 4/10/2020  Hospital Length of Stay: 65 days  Code Status: Full Code  Attending Provider: Francis Ayon MD  Primary Care Provider: Primary Doctor No   Principal Problem: Acute respiratory disease due to COVID-19 virus    Subjective:     Interval History/Significant Events:   NAEON. Precedex .1, lasix 2.5, heparin 850. No bleeding from trach site overnight. Sweep increased 2-> 2.5 due to increase in CO2.    ECMO Pump parameters:  RPM:                                2800  Flow:                                      2.7  Sweep:                                  2.5  FiO2:                                   40    Follow-up For: Procedure(s) (LRB):  CREATION, TRACHEOSTOMY  (N/A)    Post-Operative Day: 38 Days Post-Op    Objective:     Vital Signs (Most Recent):  Temp: 98.4 °F (36.9 °C) (06/14/20 0300)  Pulse: (!) 131 (06/14/20 0400)  Resp: (!) 38 (06/14/20 0119)  BP: 100/67 (06/14/20 0300)  SpO2: 100 % (06/14/20 0400) Vital Signs (24h Range):  Temp:  [97.9 °F (36.6 °C)-99.4 °F (37.4 °C)] 98.4 °F (36.9 °C)  Pulse:  [105-137] 131  Resp:  [38] 38  SpO2:  [95 %-100 %] 100 %  BP: ()/(61-77) 100/67  Arterial Line BP: ()/(52-75) 111/68     Weight: 59.1 kg (130 lb 4.7 oz)  Body mass index is 22.36 kg/m².      Intake/Output Summary (Last 24 hours) at 6/14/2020 0412  Last data filed at 6/14/2020 0300  Gross per 24 hour   Intake 2539 ml   Output 3198 ml   Net -659 ml       Physical Exam  Constitutional:       Appearance: He is well-developed and well-nourished.   HENT:      Head: Normocephalic and atraumatic.        Comments: Right IJ ECMO Cannula in placePulmonary:      Comments: Trach in place  Surgi-Cell / NuKnit packed surrounding Trach Fixture  No further active bleeding noted from Trach Site  Abdominal:      General: There is no distension.      Tenderness: There is no  abdominal tenderness.   Musculoskeletal:      Comments: Right Femoral ECMO Cannula Site   Skin:     General: Skin is warm and dry.   Neurological:      Comments: Tracks  Withdraws to pain         Vents:  Vent Mode: A/C (06/14/20 0119)  Ventilator Initiated: Yes (04/10/20 2247)  Set Rate: 30 BPM (06/14/20 0119)  Vt Set: 250 mL (06/06/20 0858)  Pressure Support: 20 cmH20 (06/06/20 0858)  PEEP/CPAP: 6 cmH20 (06/14/20 0119)  Oxygen Concentration (%): 50 (06/14/20 0300)  Peak Airway Pressure: 29 cmH2O (06/14/20 0119)  Plateau Pressure: 20 cmH20 (06/14/20 0119)  Total Ve: 7.4 mL (06/14/20 0119)  F/VT Ratio<105 (RSBI): 196.89 (06/14/20 0119)    Lines/Drains/Airways     Peripherally Inserted Central Catheter Line            PICC Double Lumen 05/05/20 1707 right basilic 39 days          Central Venous Catheter Line                 ECMO Cannula 04/25/20 1120 right femoral vein 49 days         ECMO Cannula 04/25/20 1120 right internal jugular 49 days          Drain                 NG/OG Tube 05/03/20 1215 [place] sump;nasogastric 18 Fr. Left nostril 41 days         Urethral Catheter 05/28/20 1630 16 days          Airway                 Surgical Airway 05/05/20 1500 Shiley Cuffed 39 days          Arterial Line                 Arterial Line 04/27/20 1100 Right Brachial 47 days          Peripheral Intravenous Line                 Peripheral IV - Single Lumen 06/12/20 0400 20 G Left Wrist 2 days                Significant Labs:    CBC/Anemia Profile:  Recent Labs   Lab 06/13/20  0230  06/13/20  1410  06/13/20 2000 06/13/20 2017 06/14/20  0200 06/14/20  0209   WBC 13.11*   < > 11.91  --  12.69  --  11.91  --    HGB 9.9*   < > 10.2*  --  10.0*  --  9.9*  --    HCT 30.6*   < > 32.2*   < > 32.1* 30* 31.2* 30*   *   < > 145*  --  147*  --  149*  --    MCV 90   < > 92  --  93  --  91  --    RDW 15.4*   < > 15.0*  --  15.1*  --  14.9*  --    FERRITIN 2,578*  --   --   --   --   --  2,627*  --     < > = values in this interval not  displayed.        Chemistries:  Recent Labs   Lab 06/13/20  1410 06/13/20 2000 06/14/20  0200    138 139   K 4.2 4.3 4.6   CL 94* 92* 93*   CO2 36* 34* 34*   BUN 44* 45* 46*   CREATININE 0.6 0.6 0.6   CALCIUM 10.2 10.3 10.2   ALBUMIN 2.9* 2.9* 3.0*   PROT 6.5 6.6 6.5   BILITOT 1.8* 1.8* 1.7*   ALKPHOS 111 110 116   ALT 25 24 23   AST 26 26 28   MG 2.1 2.1 2.0   PHOS 3.5 3.5 3.6       All pertinent labs within the past 24 hours have been reviewed.    Significant Imaging:  I have reviewed and interpreted all pertinent imaging results/findings within the past 24 hours.    Assessment/Plan:     COVID-19 virus detected  Ranjana Sanchez is a 55 y.o. 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  This was switched to ETT on 4/11.  Cannulated on 4/25.    Neuro:  - Precedex for sedation  - 100 BID of seroquel  - Fentanyl patch  - Encephalopathy with poor neuro exam; still withdraws to pain  - CT on June 2 showing bilateral basal ganglia infarcts, which likely explains the neuro exam findings. His infarcts are sizeable, but extent of permanent deficits are unknown. The likelihood of meaningful recovery is low. Neuro consulted to prognosticate and agreed with poor neurological prognosis given bilateral basal ganglia CVAs    Pulm  AC/VC+ on the vent.  ECMO   Pump parameters:  RPM:                                2800  Flow:                                      2.7  Sweep:                                  2.5  FiO2:                                   40  Vent Mode: A/C  Oxygen Concentration (%):  [50] 50  Resp Rate Total:  [31 br/min-50 br/min] 34 br/min  PEEP/CPAP:  [6 cmH20] 6 cmH20  Mean Airway Pressure:  [15 sqC44-24 cmH20] 16 cmH20      Cardiac  Levo as needed  MAP goal < 90    FEN/GI  TFs at goal (45cc/hr)    Renal  UOP 95-200cc/hr  Lasix gtt at 2.5    Heme  Hep gtt at 850, goal 0.2-0.25 to prevent another GI bleed  Has been at goal at this  rate      ID  WBC stable    Dispo: RICU  -Prognosis is Grim  -family is reportedly trying to come in from [place] at some point over the next 2 weeks             Michael Barton MD  Critical Care - Surgery  [facility] - Respiratory ICU

## 2020-06-14 NOTE — ASSESSMENT & PLAN NOTE
Acute respiratory distress syndrome (ARDS) due to COVID-19 virus  - Crich switched to ETT on 4/11  - Monika weaned off 5/5  - Tracheostomy and bronch 5/5  - severe thrombocytopenia resolved; transfuse for under 30K  - Aspirin 81 daily  - Labetalol 300BID  - RPM at 2600; flows at 2.6  - Sweep at 5; CO2 goal is 59 or below  - Do not increase sweep for pCO2s 59 or below as long as patient is not acidotic, as these high pCO2s are respiratory compensation for alkalosis  - Oxygenator Fi at 30%; switched out 6/9  - CT head and chest imaging reviewed with profound basilar changes concerning for hypoxemic injury  - Patient tolerating low sweep and low FiO2 on the oxygenator since oxygenator switch-out yesterday     Tongue laceration      -ENT evaluated, laceration superficial      -recs: bite block vs paralysis, will monitor    Sedation  - Valium, seroquel, PRN Fentanyl     UGI Bleed  - Scoped by GI 5/3 with epinephrine injection and clip placement for an actively bleeding D2/D3 Diuelafoy lesion  - Recommend GI re-consultation if Hb continues to drift and further blood is aspirated from NGT or patient has bloody BM, etc    FEN/GI  - TF at 45, goal      - Lasix gtt 2.5  - FWB for hypernatremia 400mL q4     Anticoagulation      - Goal aXa 0.2-0.25 Q6hrs      - Hep gtt 800      - monitor LDH    Prophylaxis      - protonix q12 IV       - leukocytosis stable      - PICC placed 5/5; central line removed 5/6

## 2020-06-14 NOTE — ASSESSMENT & PLAN NOTE
Ranjana Sanchez is a 55 y.o. 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  This was switched to ETT on 4/11.  Cannulated on 4/25.    Neuro:  - Precedex for sedation  - 100 BID of seroquel  - Fentanyl patch  - Encephalopathy with poor neuro exam; still withdraws to pain  - CT on June 2 showing bilateral basal ganglia infarcts, which likely explains the neuro exam findings. His infarcts are sizeable, but extent of permanent deficits are unknown. The likelihood of meaningful recovery is low. Neuro consulted to prognosticate and agreed with poor neurological prognosis given bilateral basal ganglia CVAs    Pulm  AC/VC+ on the vent.  ECMO   Pump parameters:  RPM:                                2800  Flow:                                      2.7  Sweep:                                  2.5  FiO2:                                   40  Vent Mode: A/C  Oxygen Concentration (%):  [50] 50  Resp Rate Total:  [31 br/min-50 br/min] 34 br/min  PEEP/CPAP:  [6 cmH20] 6 cmH20  Mean Airway Pressure:  [15 opA53-49 cmH20] 16 cmH20      Cardiac  Levo as needed  MAP goal < 90    FEN/GI  TFs at goal (45cc/hr)    Renal  UOP 95-200cc/hr  Lasix gtt at 2.5    Heme  Hep gtt at 850, goal 0.2-0.25 to prevent another GI bleed  Has been at goal at this rate      ID  WBC stable    Dispo: RICU  -Prognosis is Grim  -family is reportedly trying to come in from [place] at some point over the next 2 weeks

## 2020-06-14 NOTE — PROGRESS NOTES
[facility] Specialists shift report    Date: 06/14/2020  ECMO Specialist:  Tatyana Lawrence    Pump parameters:  RPM: 2800  Flow:  2.72  Sweep:  2.5  FiO2:  40    Oxygenator status:  Clots: None  Fibrin: None    Pressure trends:  P1: 100  P2: 80's   Delta P: 15-20    Volume status:  Chugging noted? None  CVP: .   MAP:  60-90's   MD notified (name):  Clem    Anticoagulation:  ACT/aPTT/Xa parameters: 0.2 - 0.25   ACT/aPTT/Xa trends this shift: 0.22 - 0.25    Cannula size / status / placement:  Arterial:   Venous 1:  23 FR / RIJ / 4 cm  Venous 2:  27 FR / RFV / 12 cm   Dual lumen:      Additional Comments:   Patient monitored on above settings.  No changes made.  Patient seemed more agitated today.

## 2020-06-14 NOTE — PROGRESS NOTES
[facility] - Respiratory ICU  Cardiothoracic Surgery  Progress Note    Patient Name: Ranjana Sanchez  MRN: [id number]  Admission Date: 4/10/2020  Hospital Length of Stay: 65 days  Code Status: Full Code   Attending Physician: Francis Ayon MD   Referring Provider: Francis Ayon MD  Principal Problem:Acute respiratory disease due to COVID-19 virus      Subjective:     Post-Op Info:  Procedure(s) (LRB):  CREATION, TRACHEOSTOMY  (N/A)   40 Days Post-Op     Interval History: no acute events; no pressors; stable at sweep of 2.5 and oxygen of 40%    ROS  Medications:  Continuous Infusions:   dexmedetomidine (PRECEDEX) infusion 0.5 mcg/kg/hr (06/14/20 1100)    dextrose 10 % in water (D10W)      furosemide (LASIX) 2 mg/mL continuous infusion (non-titrating) 2.5 mg/hr (06/14/20 1100)    heparin (porcine) in 5 % dex 800 Units/hr (06/14/20 1100)    norepinephrine bitartrate-D5W Stopped (06/10/20 0500)     Scheduled Meds:   aspirin  81 mg Per NG tube Daily    fentaNYL  1 patch Transdermal Q72H    insulin aspart U-100  5 Units Subcutaneous Q24H    insulin aspart U-100  5 Units Subcutaneous Q24H    insulin aspart U-100  5 Units Subcutaneous Q24H    insulin aspart U-100  5 Units Subcutaneous Q24H    insulin aspart U-100  5 Units Subcutaneous Q24H    insulin aspart U-100  5 Units Subcutaneous Q24H    labetalol  10 mg Intravenous Once    multivitamin liquid no.118  10 mL Per G Tube Daily    pantoprazole  40 mg Per NG tube BID    polyethylene glycol  17 g Per NG tube Daily    potassium chloride 10%  40 mEq Per NG tube BID    psyllium husk (aspartame)  1 packet Per NG tube TID    sucralfate  1 g Per NG tube Q6H    vitamin D  1,000 Units Per G Tube Daily     PRN Meds:acetaminophen, artificial tears, calcium gluconate IVPB, calcium gluconate IVPB, calcium gluconate IVPB, dextrose 10 % in water (D10W), Dextrose 10% Bolus, Dextrose 10% Bolus, fentaNYL, glucagon (human recombinant), glucose, glucose,  insulin aspart U-100, magnesium oxide, magnesium oxide, magnesium sulfate IVPB, potassium chloride 10%, potassium chloride 10%, potassium chloride 10%, potassium, sodium phosphates, sodium chloride 0.9%     Objective:     Vital Signs (Most Recent):  Temp: 99.2 °F (37.3 °C) (06/14/20 1100)  Pulse: (!) 141 (06/14/20 1100)  Resp: (!) 38 (06/14/20 0119)  BP: 135/78 (06/14/20 1100)  SpO2: 99 % (06/14/20 1100) Vital Signs (24h Range):  Temp:  [98.1 °F (36.7 °C)-99.4 °F (37.4 °C)] 99.2 °F (37.3 °C)  Pulse:  [122-142] 141  Resp:  [38] 38  SpO2:  [95 %-100 %] 99 %  BP: ()/(66-82) 135/78  Arterial Line BP: ()/(59-75) 123/68     Weight: 59.1 kg (130 lb 4.7 oz)  Body mass index is 22.36 kg/m².    SpO2: 99 %  O2 Device (Oxygen Therapy): ventilator    Intake/Output - Last 3 Shifts       06/12 0700 - 06/13 0659 06/13 0700 - 06/14 0659 06/14 0700 - 06/15 0659    I.V. (mL/kg) 430 (7.3) 634 (10.7) 80.5 (1.4)    NG/GT 2585 2490 625    Total Intake(mL/kg) 3015 (51) 3124 (52.9) 705.5 (11.9)    Urine (mL/kg/hr) 3715 (2.6) 3365 (2.4) 725 (2.5)    Stool  0     Blood 8 8 3    Total Output 3723 3373 728    Net -708 -249 -22.5           Stool Occurrence  4 x           Lines/Drains/Airways     Peripherally Inserted Central Catheter Line            PICC Double Lumen 05/05/20 1707 right basilic 39 days          Central Venous Catheter Line                 ECMO Cannula 04/25/20 1120 right femoral vein 50 days         ECMO Cannula 04/25/20 1120 right internal jugular 50 days          Drain                 NG/OG Tube 05/03/20 1215 [place] sump;nasogastric 18 Fr. Left nostril 41 days         Urethral Catheter 05/28/20 1630 16 days          Airway                 Surgical Airway 05/05/20 1500 Shiley Cuffed 39 days          Arterial Line                 Arterial Line 04/27/20 1100 Right Brachial 48 days          Peripheral Intravenous Line                 Peripheral IV - Single Lumen 06/12/20 0400 20 G Left Wrist 2 days         Peripheral IV  - Single Lumen 06/14/20 0200 20 G Left Hand less than 1 day                Physical Exam  Opens eyes to sound but no tracking; withdrawal to pain per nursing staff  Extremities appropriately padded  Vent at fi 50 and peep of 6; noted tachypnea and TV of ~200cc with significant pressure support    Significant Labs:  BMP:   Recent Labs   Lab 06/14/20  0800   *      K 3.9   CL 92*   CO2 36*   BUN 46*   CREATININE 0.6   CALCIUM 10.3   MG 2.1     CBC:   Recent Labs   Lab 06/14/20  0800 06/14/20  0819   WBC 11.89  --    RBC 3.38*  --    HGB 10.0*  --    HCT 30.5* 29*     --    MCV 90  --    MCH 29.6  --    MCHC 32.8  --        Significant Diagnostics:  None new    Assessment/Plan:     * Acute respiratory disease due to COVID-19 virus  Acute respiratory distress syndrome (ARDS) due to COVID-19 virus  - Crich switched to ETT on 4/11  - Monika weaned off 5/5  - Tracheostomy and bronch 5/5  - severe thrombocytopenia resolved; transfuse for under 30K  - Aspirin 81 daily  - Labetalol 300BID  - RPM at 2600; flows at 2.6  - Sweep at 5; CO2 goal is 59 or below  - Do not increase sweep for pCO2s 59 or below as long as patient is not acidotic, as these high pCO2s are respiratory compensation for alkalosis  - Oxygenator Fi at 30%; switched out 6/9  - CT head and chest imaging reviewed with profound basilar changes concerning for hypoxemic injury  - Patient tolerating low sweep and low FiO2 on the oxygenator since oxygenator switch-out yesterday     Tongue laceration      -ENT evaluated, laceration superficial      -recs: bite block vs paralysis, will monitor    Sedation  - Valium, seroquel, PRN Fentanyl     UGI Bleed  - Scoped by GI 5/3 with epinephrine injection and clip placement for an actively bleeding D2/D3 Diuelafoy lesion  - Recommend GI re-consultation if Hb continues to drift and further blood is aspirated from NGT or patient has bloody BM, etc    FEN/GI  - TF at 45, goal      - Lasix gtt 2.5  - FWB for  hypernatremia 400mL q4     Anticoagulation      - Goal aXa 0.2-0.25 Q6hrs      - Hep gtt 800      - monitor LDH    Prophylaxis      - protonix q12 IV       - leukocytosis stable      - PICC placed 5/5; central line removed 5/6    Rafael Nj MD  Cardiothoracic Surgery  [facility] - Respiratory ICU

## 2020-06-15 NOTE — SUBJECTIVE & OBJECTIVE
Interval History:  No acute events overnight, albumin for chatter this AM. Return cannula noted to be further out of neck than previous. UOP 3.5L, net negative 270mL in 24hrs. Hep gtt 800, aXa in target range.    Medications:  Continuous Infusions:   dexmedetomidine (PRECEDEX) infusion 0.5 mcg/kg/hr (06/15/20 1300)    dextrose 10 % in water (D10W)      furosemide (LASIX) 2 mg/mL continuous infusion (non-titrating) 2.5 mg/hr (06/15/20 1300)    heparin (porcine) in 5 % dex 800 Units/hr (06/15/20 1300)    norepinephrine bitartrate-D5W Stopped (06/10/20 0500)     Scheduled Meds:   aspirin  81 mg Per NG tube Daily    fentaNYL  1 patch Transdermal Q72H    insulin aspart U-100  5 Units Subcutaneous Q24H    insulin aspart U-100  5 Units Subcutaneous Q24H    insulin aspart U-100  5 Units Subcutaneous Q24H    insulin aspart U-100  5 Units Subcutaneous Q24H    insulin aspart U-100  5 Units Subcutaneous Q24H    insulin aspart U-100  5 Units Subcutaneous Q24H    multivitamin liquid no.118  10 mL Per G Tube Daily    pantoprazole  40 mg Per NG tube BID    polyethylene glycol  17 g Per NG tube Daily    potassium chloride 10%  40 mEq Per NG tube BID    psyllium husk (aspartame)  1 packet Per NG tube TID    sucralfate  1 g Per NG tube Q6H    vitamin D  1,000 Units Per G Tube Daily        Objective:     Vital Signs (Most Recent):  Temp: 98.3 °F (36.8 °C) (06/15/20 1100)  Pulse: (!) 139 (06/15/20 1345)  Resp: (!) 38 (06/14/20 0119)  BP: 124/66 (06/15/20 1300)  SpO2: 97 % (06/15/20 1345) Vital Signs (24h Range):  Temp:  [98.2 °F (36.8 °C)-98.8 °F (37.1 °C)] 98.3 °F (36.8 °C)  Pulse:  [108-143] 139  SpO2:  [96 %-100 %] 97 %  BP: (105-128)/(64-84) 124/66  Arterial Line BP: ()/(49-83) 108/68     Weight: 60.1 kg (132 lb 7.9 oz)  Body mass index is 22.74 kg/m².    SpO2: 97 %  O2 Device (Oxygen Therapy): ventilator    Intake/Output - Last 3 Shifts       06/13 0700 - 06/14 0659 06/14 0700 - 06/15 0659 06/15 0700 -  06/16 0659    I.V. (mL/kg) 634 (10.7) 565.9 (9.4) 145 (2.4)    NG/GT 2490 2780 315    Total Intake(mL/kg) 3124 (52.9) 3345.9 (55.7) 460 (7.7)    Urine (mL/kg/hr) 3365 (2.4) 3695 (2.6) 875 (2.1)    Stool 0 0     Blood 8 5 2    Total Output 3373 3700 877    Net -249 -354.2 -417           Stool Occurrence 4 x 1 x           Lines/Drains/Airways     Peripherally Inserted Central Catheter Line            PICC Double Lumen 05/05/20 1707 right basilic 40 days          Central Venous Catheter Line                 ECMO Cannula 04/25/20 1120 right femoral vein 51 days         ECMO Cannula 04/25/20 1120 right internal jugular 51 days          Drain                 NG/OG Tube 05/03/20 1215 [place] sump;nasogastric 18 Fr. Left nostril 43 days         Urethral Catheter 05/28/20 1630 17 days          Airway                 Surgical Airway 05/05/20 1500 Shiley Cuffed 40 days          Arterial Line                 Arterial Line 04/27/20 1100 Right Brachial 49 days          Peripheral Intravenous Line                 Peripheral IV - Single Lumen 06/12/20 0400 20 G Left Wrist 3 days         Peripheral IV - Single Lumen 06/14/20 0200 20 G Left Hand 1 day                Physical Exam    Constitutional: He is sedated, and intubated.   Head: Normocephalic   Cardiovascular: Tachycardia 130s   Pulmonary/Chest: intubated vent FiO2 50% PEEP 6, ECMO RPM 2800, Flow 2.7, sweep 2.5, oxygenator FiO2 40%  Skin: L neck cannula intact but backed out, appears at clavicle on CXR. No change in flows at this time. No flashing. Clots pre and post-oxygenator.  Nursing note and vitals reviewed.    Significant Labs:  BMP:   Recent Labs   Lab 06/15/20  1015   *      K 3.7   CL 90*   CO2 36*   BUN 47*   CREATININE 0.6   CALCIUM 9.9   MG 2.1     CBC:   Recent Labs   Lab 06/15/20  1015   WBC 13.00*   RBC 3.28*   HGB 9.5*   HCT 29.6*   *   MCV 90   MCH 29.0   MCHC 32.1     LFTs:   Recent Labs   Lab 06/15/20  1015   ALT 25   AST 28   ALKPHOS 106    BILITOT 1.8*   PROT 6.6   ALBUMIN 3.0*     Significant Diagnostics:  Cxr: stable

## 2020-06-15 NOTE — PROGRESS NOTES
[facility] shift report    Date: 06/15/2020  ECMO Specialist:  Jorge Yeboah      Pump parameters:  RPM: 2800  Flow:  2.70  Sweep:  2.5  FiO2:  40     Oxygenator status:  Clots: None  Fibrin: None     Pressure trends:  P1: 100  P2: 80's   Delta P: 15-20     Volume status:  Chugging noted? yes  CVP: .   MAP:  60-90's   MD notified (name):       Anticoagulation:  ACT/aPTT/Xa parameters: 0.2 - 0.25   ACT/aPTT/Xa trends this shift: 0.22 - 0.25     Cannula size / status / placement:  Arterial:   Venous 1:  23 FR / RIJ / 4 cm  Venous 2:  27 FR / RFV / 12 cm   Dual lumen:      Additional Comments:  Patient remains on VV ECMO.  No changes today.  Will continue to monitor.  0500 Patient had flow alarm on ECMO while coughing.  100cc Albumin given.

## 2020-06-15 NOTE — PROGRESS NOTES
[facility] shift report    Date: 06/15/2020  ECMO Specialist:  Lia Alvarez    Pump parameters:  RPM: 2900  Flow:  2.7  Sweep:  2  FiO2:  30    Oxygenator status:  Clots: no  Fibrin: no    Pressure trends:  P1: 101  P2: 82  Delta P: 19    Volume status:  Chugging noted - yes slightly  MAP:  90's  MD notified (name):  Nany/Angela?Humberto    Anticoagulation:  ACT/aPTT/Xa parameters: 0.2-0.25  ACT/aPTT/Xa trends this shift: 0.21    Cannula size / status / placement:  Arterial:   Venous 1: 23FR / RIJV / 6.5 cm from incision site to end of metal rings  Venous 2: 27FR / RFV / 12 cm from incision site to end of metal rings  Dual lumen: no     Additional Comments:          ECMO VV circuit maintained flows within parameters today.  Dr. Ayon @bedside today repositioned & restitched RIJV cannula into place at the 6.5 cm michele.  No problems; pt tolerated procedure. Dr. Ayon requested weaning to goal of possibly decannulation tomorrow.  ABG's acceptable as long as compensated for on pH & pt's other vitals stable.

## 2020-06-15 NOTE — NURSING
"      SICU PLAN OF CARE NOTE    Dx: Acute respiratory disease due to COVID-19 virus    Shift Events: VV ECMO, 2 SWEEP, 30% FiO2. Vent: A/C, 50%, 6 PEEP  Speed: 2800  Flows: 2.7    Goals of Care: wean ECMO. Decannulate elsie.    Neuro: opens eyes spontaneously. Does not follow commands. No purposeful movements.     Vital Signs: /81   Pulse (!) 144   Temp 98.1 °F (36.7 °C) (Oral)   Resp (!) 38   Ht 5' 4" (1.626 m)   Wt 60.1 kg (132 lb 7.9 oz)   SpO2 (!) 94%   BMI 22.74 kg/m²       Diet: Tube Feeds @ 45 (goal)    Gtts: Precedex @ 0.5, Lasix @ 2.5, and Heparin @ 800    Urine Output: Urinary Catheter 1,700 cc/shift (~100-175cc/hr)    Labs/Accuchecks: Labs q6h, Accu checks q4.    Skin: Left lower arm improving. See flowsheet. UEs contracted. Heels booted rotated and in place.     BM x 2    R IJ  @ 6.5 cm, R fem @ 12 cm, sites CDI    Son updated on plan of care       "

## 2020-06-15 NOTE — SUBJECTIVE & OBJECTIVE
Interval History/Significant Events:   NAEON.     Follow-up For: Procedure(s) (LRB):  CREATION, TRACHEOSTOMY  (N/A)    Post-Operative Day: 41 Days Post-Op    Objective:     Vital Signs (Most Recent):  Temp: 98.2 °F (36.8 °C) (06/15/20 0300)  Pulse: (!) 111 (06/15/20 0630)  Resp: (!) 38 (06/14/20 0119)  BP: 116/72 (06/15/20 0600)  SpO2: 100 % (06/15/20 0630) Vital Signs (24h Range):  Temp:  [98.2 °F (36.8 °C)-99.2 °F (37.3 °C)] 98.2 °F (36.8 °C)  Pulse:  [109-144] 111  SpO2:  [98 %-100 %] 100 %  BP: (101-135)/(64-84) 116/72  Arterial Line BP: ()/(49-83) 88/59     Weight: 60.1 kg (132 lb 7.9 oz)  Body mass index is 22.74 kg/m².      Intake/Output Summary (Last 24 hours) at 6/15/2020 0633  Last data filed at 6/15/2020 0600  Gross per 24 hour   Intake 3345.85 ml   Output 3700 ml   Net -354.15 ml       Physical Exam  Constitutional:       Appearance: He is well-developed and well-nourished.   HENT:      Head: Normocephalic and atraumatic.        Comments: Right IJ ECMO Cannula in placePulmonary:      Comments: Trach in place  Surgi-Cell / NuKnit packed surrounding Trach Fixture  No further active bleeding noted from Trach Site  Abdominal:      General: There is no distension.      Tenderness: There is no abdominal tenderness.   Musculoskeletal:      Comments: Right Femoral ECMO Cannula Site   Skin:     General: Skin is warm and dry.   Neurological:      Comments: Tracks  Withdraws to pain     Input from RN due to COVID-19    Vents:  Vent Mode: A/C (06/15/20 0111)  Ventilator Initiated: Yes (04/10/20 2247)  Set Rate: 30 BPM (06/15/20 0111)  Vt Set: 250 mL (06/06/20 0858)  Pressure Support: 20 cmH20 (06/06/20 0858)  PEEP/CPAP: 6 cmH20 (06/15/20 0111)  Oxygen Concentration (%): 50 (06/15/20 0630)  Peak Airway Pressure: 30 cmH2O (06/15/20 0111)  Plateau Pressure: 31 cmH20 (06/15/20 0111)  Total Ve: 6.6 mL (06/15/20 0111)  F/VT Ratio<105 (RSBI): 196.89 (06/14/20 0119)    Lines/Drains/Airways     Peripherally Inserted  Central Catheter Line            PICC Double Lumen 05/05/20 1707 right basilic 40 days          Central Venous Catheter Line                 ECMO Cannula 04/25/20 1120 right femoral vein 50 days         ECMO Cannula 04/25/20 1120 right internal jugular 50 days          Drain                 NG/OG Tube 05/03/20 1215 [place] preetp;nasogastric 18 Fr. Left nostril 42 days         Urethral Catheter 05/28/20 1630 17 days          Airway                 Surgical Airway 05/05/20 1500 Shiley Cuffed 40 days          Arterial Line                 Arterial Line 04/27/20 1100 Right Brachial 48 days          Peripheral Intravenous Line                 Peripheral IV - Single Lumen 06/12/20 0400 20 G Left Wrist 3 days         Peripheral IV - Single Lumen 06/14/20 0200 20 G Left Hand 1 day                Significant Labs:    CBC/Anemia Profile:  Recent Labs   Lab 06/14/20 0200 06/14/20  1355  06/14/20  2000 06/14/20  2004 06/15/20  0200 06/15/20  0205   WBC 11.91   < > 13.50*  --  12.76*  --  13.53*  --    HGB 9.9*   < > 10.2*  --  9.8*  --  9.7*  --    HCT 31.2*   < > 32.2*   < > 30.5* 30* 30.2* 30*   *   < > 162  --  159  --  150  --    MCV 91   < > 92  --  89  --  90  --    RDW 14.9*   < > 14.8*  --  14.7*  --  14.6*  --    FERRITIN 2,627*  --   --   --   --   --  2,377*  --     < > = values in this interval not displayed.        Chemistries:  Recent Labs   Lab 06/14/20  1355 06/14/20  2000 06/15/20  0200    135* 136   K 4.3 4.3 4.8   CL 92* 90* 92*   CO2 36* 36* 31*   BUN 46* 45* 45*   CREATININE 0.6 0.6 0.6   CALCIUM 10.4 10.3 10.3   ALBUMIN 3.0* 2.9* 3.0*   PROT 6.7 6.6 6.8   BILITOT 1.6* 1.5* 1.5*   ALKPHOS 116 114 112   ALT 25 24 26   AST 28 28 33   MG 2.0 2.0 2.0   PHOS 3.9 3.5 3.5       All pertinent labs within the past 24 hours have been reviewed.    Significant Imaging:  I have reviewed and interpreted all pertinent imaging results/findings within the past 24 hours.

## 2020-06-15 NOTE — ASSESSMENT & PLAN NOTE
Acute respiratory distress syndrome (ARDS) due to COVID-19 virus  - Crich switched to ETT on 4/11  - Monika weaned off 5/5  - Tracheostomy and bronch 5/5  - Severe thrombocytopenia resolved; transfuse for under 30K  - Aspirin 81 daily  - Labetalol 300BID  - RPM at 2800; flows at 2.7  - Sweep at 2.5; CO2 goal is 59 or below  - Do not increase sweep for pCO2s 59 or below as long as patient is not acidotic, as these high pCO2s are respiratory compensation for alkalosis  - Oxygenator Fi at 40%; switched out 6/9  - CT head and chest imaging reviewed with profound basilar changes concerning for hypoxemic injury; patient's son will be visiting from [place]  - Consider timing for oxygenator wean  - Will need to advance and re-secure return cannula in IJ     Tongue laceration      -ENT evaluated, laceration superficial      -resolved    Sedation  - Valium, seroquel, PRN Fentanyl     UGI Bleed  - Scoped by GI 5/3 with epinephrine injection and clip placement for an actively bleeding D2/D3 Diuelafoy lesion  - Recommend GI re-consultation if Hb continues to drift and further blood is aspirated from NGT or patient has bloody BM, etc  - Currently resolved    FEN/GI  - TF at 45, goal      - Lasix gtt 2.5  - FWB for hypernatremia 400mL q4     Anticoagulation      - Goal aXa 0.2-0.25 Q6hrs      - Hep gtt 800      - monitor LDH    Prophylaxis      - protonix q12 IV       - leukocytosis stable      - PICC placed 5/5; central line removed 5/6

## 2020-06-15 NOTE — PROGRESS NOTES
[facility] - Respiratory ICU  Critical Care - Surgery  Progress Note    Patient Name: Ranjana Sanchez  MRN: [id number]  Admission Date: 4/10/2020  Hospital Length of Stay: 66 days  Code Status: Full Code  Attending Provider: Francis Ayon MD  Primary Care Provider: Primary Doctor No   Principal Problem: Acute respiratory disease due to COVID-19 virus    Subjective:     Hospital/ICU Course:  No notes on file    Interval History/Significant Events:   NAEON.     Follow-up For: Procedure(s) (LRB):  CREATION, TRACHEOSTOMY  (N/A)    Post-Operative Day: 41 Days Post-Op    Objective:     Vital Signs (Most Recent):  Temp: 98.2 °F (36.8 °C) (06/15/20 0300)  Pulse: (!) 111 (06/15/20 0630)  Resp: (!) 38 (06/14/20 0119)  BP: 116/72 (06/15/20 0600)  SpO2: 100 % (06/15/20 0630) Vital Signs (24h Range):  Temp:  [98.2 °F (36.8 °C)-99.2 °F (37.3 °C)] 98.2 °F (36.8 °C)  Pulse:  [109-144] 111  SpO2:  [98 %-100 %] 100 %  BP: (101-135)/(64-84) 116/72  Arterial Line BP: ()/(49-83) 88/59     Weight: 60.1 kg (132 lb 7.9 oz)  Body mass index is 22.74 kg/m².      Intake/Output Summary (Last 24 hours) at 6/15/2020 0633  Last data filed at 6/15/2020 0600  Gross per 24 hour   Intake 3345.85 ml   Output 3700 ml   Net -354.15 ml       Physical Exam  Constitutional:       Appearance: He is well-developed and well-nourished.   HENT:      Head: Normocephalic and atraumatic.        Comments: Right IJ ECMO Cannula in placePulmonary:      Comments: Trach in place  Surgi-Cell / NuKnit packed surrounding Trach Fixture  No further active bleeding noted from Trach Site  Abdominal:      General: There is no distension.      Tenderness: There is no abdominal tenderness.   Musculoskeletal:      Comments: Right Femoral ECMO Cannula Site   Skin:     General: Skin is warm and dry.   Neurological:      Comments: Tracks  Withdraws to pain     Input from RN due to COVID-19    Vents:  Vent Mode: A/C (06/15/20 0111)  Ventilator Initiated: Yes  (04/10/20 2247)  Set Rate: 30 BPM (06/15/20 0111)  Vt Set: 250 mL (06/06/20 0858)  Pressure Support: 20 cmH20 (06/06/20 0858)  PEEP/CPAP: 6 cmH20 (06/15/20 0111)  Oxygen Concentration (%): 50 (06/15/20 0630)  Peak Airway Pressure: 30 cmH2O (06/15/20 0111)  Plateau Pressure: 31 cmH20 (06/15/20 0111)  Total Ve: 6.6 mL (06/15/20 0111)  F/VT Ratio<105 (RSBI): 196.89 (06/14/20 0119)    Lines/Drains/Airways     Peripherally Inserted Central Catheter Line            PICC Double Lumen 05/05/20 1707 right basilic 40 days          Central Venous Catheter Line                 ECMO Cannula 04/25/20 1120 right femoral vein 50 days         ECMO Cannula 04/25/20 1120 right internal jugular 50 days          Drain                 NG/OG Tube 05/03/20 1215 [place] sump;nasogastric 18 Fr. Left nostril 42 days         Urethral Catheter 05/28/20 1630 17 days          Airway                 Surgical Airway 05/05/20 1500 Shiley Cuffed 40 days          Arterial Line                 Arterial Line 04/27/20 1100 Right Brachial 48 days          Peripheral Intravenous Line                 Peripheral IV - Single Lumen 06/12/20 0400 20 G Left Wrist 3 days         Peripheral IV - Single Lumen 06/14/20 0200 20 G Left Hand 1 day                Significant Labs:    CBC/Anemia Profile:  Recent Labs   Lab 06/14/20  0200  06/14/20  1355  06/14/20  2000 06/14/20  2004 06/15/20  0200 06/15/20  0205   WBC 11.91   < > 13.50*  --  12.76*  --  13.53*  --    HGB 9.9*   < > 10.2*  --  9.8*  --  9.7*  --    HCT 31.2*   < > 32.2*   < > 30.5* 30* 30.2* 30*   *   < > 162  --  159  --  150  --    MCV 91   < > 92  --  89  --  90  --    RDW 14.9*   < > 14.8*  --  14.7*  --  14.6*  --    FERRITIN 2,627*  --   --   --   --   --  2,377*  --     < > = values in this interval not displayed.        Chemistries:  Recent Labs   Lab 06/14/20  1355 06/14/20  2000 06/15/20  0200    135* 136   K 4.3 4.3 4.8   CL 92* 90* 92*   CO2 36* 36* 31*   BUN 46* 45* 45*    CREATININE 0.6 0.6 0.6   CALCIUM 10.4 10.3 10.3   ALBUMIN 3.0* 2.9* 3.0*   PROT 6.7 6.6 6.8   BILITOT 1.6* 1.5* 1.5*   ALKPHOS 116 114 112   ALT 25 24 26   AST 28 28 33   MG 2.0 2.0 2.0   PHOS 3.9 3.5 3.5       All pertinent labs within the past 24 hours have been reviewed.    Significant Imaging:  I have reviewed and interpreted all pertinent imaging results/findings within the past 24 hours.    Assessment/Plan:     COVID-19 virus detected  Ranjana Sanchez is a 55 y.o. 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  This was switched to ETT on 4/11.  Cannulated on 4/25.    Neuro:  - Precedex for sedation  - 100 BID of seroquel  - Fentanyl patch  - Encephalopathy with poor neuro exam; still withdraws to pain  - CT on June 2 showing bilateral basal ganglia infarcts, which likely explains the neuro exam findings. His infarcts are sizeable, but extent of permanent deficits are unknown. The likelihood of meaningful recovery is low. Neuro consulted to prognosticate and agreed with poor neurological prognosis given bilateral basal ganglia CVAs    Pulm  AC/VC+ on the vent.  ECMO   Pump parameters:  See perfusionist documention    Vent Mode: A/C  Oxygen Concentration (%):  [50] 50  Resp Rate Total:  [31 br/min-47 br/min] 37 br/min  PEEP/CPAP:  [6 cmH20] 6 cmH20  Mean Airway Pressure:  [14 voZ64-81 cmH20] 16 cmH20      Cardiac  Levo as needed  MAP goal < 90  ASA 81  ECMO    FEN/GI  TFs at goal (45cc/hr)    Renal  UOP 95-200cc/hr  Lasix gtt at 2.5    Heme  Hep gtt at 850, goal 0.2-0.25 to prevent another GI bleed  Has been at goal at this rate  - upper endoscopy 5/3 with clip placement     ID  WBC stable    Dispo: RICU  -Prognosis is Grim  -family is reportedly trying to come in from [place] at some point over the next 2 weeks          Critical secondary to Patient has a condition that poses threat to life and bodily function: Severe Respiratory Distress      Critical care was time spent personally by me on the following activities: development of treatment plan with patient or surrogate and bedside caregivers, discussions with consultants, evaluation of patient's response to treatment, examination of patient, ordering and performing treatments and interventions, ordering and review of laboratory studies, ordering and review of radiographic studies, pulse oximetry, re-evaluation of patient's condition.  This critical care time did not overlap with that of any other provider or involve time for any procedures.     Parker Benton MD  Critical Care - Surgery  [facility] - Respiratory ICU

## 2020-06-15 NOTE — PROGRESS NOTES
MD's notified as to RIJV cannula placement.  X-ray requested by Dr. Chad Balderrama. CardioThoracic services made aware. No change in pt status; all stable; no loss in ECMO status or stability.  Dr. Ayon to address issue later today.

## 2020-06-15 NOTE — PLAN OF CARE
06/15/20 1237   Discharge Reassessment   Assessment Type Discharge Planning Reassessment   Discharge Plan A Long-term acute care facility (LTAC)   Discharge Plan B Long-term acute care facility (LTAC)   DME Needed Upon Discharge  other (see comments)  (tbd)   Anticipated Discharge Disposition Long Term   Can the patient/caregiver answer the patient profile reliably? No, cognitively impaired  (pt intubated & sedated)   Describe the patient's ability to walk at the present time. Does not walk or unable to take any steps at all   Number of comorbid conditions (as recorded on the chart) One   Post-Acute Status   Post-Acute Authorization Placement   Post-Acute Placement Status Awaiting Internal Medical Clearance     Patient not medically stable to dc today. Patient intubated, sedated, remains on ECMO. Pox 100% on 50% vent. H&H 6.7/21.3 this AM. Patient with trach/PEG and continues to be followed by endo & CTS. Plan to discharge patient to LTAC when medically stable. Will continue to follow.

## 2020-06-15 NOTE — ASSESSMENT & PLAN NOTE
Ranjana Sanchez is a 55 y.o. 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  This was switched to ETT on 4/11.  Cannulated on 4/25.    Neuro:  - Precedex for sedation  - 100 BID of seroquel  - Fentanyl patch  - Encephalopathy with poor neuro exam; still withdraws to pain  - CT on June 2 showing bilateral basal ganglia infarcts, which likely explains the neuro exam findings. His infarcts are sizeable, but extent of permanent deficits are unknown. The likelihood of meaningful recovery is low. Neuro consulted to prognosticate and agreed with poor neurological prognosis given bilateral basal ganglia CVAs    Pulm  AC/VC+ on the vent.  ECMO   Pump parameters:  See perfusionist documention    Vent Mode: A/C  Oxygen Concentration (%):  [50] 50  Resp Rate Total:  [31 br/min-47 br/min] 37 br/min  PEEP/CPAP:  [6 cmH20] 6 cmH20  Mean Airway Pressure:  [14 syN80-34 cmH20] 16 cmH20      Cardiac  Levo as needed  MAP goal < 90  ASA 81  ECMO    FEN/GI  TFs at goal (45cc/hr)    Renal  UOP 95-200cc/hr  Lasix gtt at 2.5    Heme  Hep gtt at 850, goal 0.2-0.25 to prevent another GI bleed  Has been at goal at this rate  - upper endoscopy 5/3 with clip placement     ID  WBC stable    Dispo: RICU  -Prognosis is Grim  -family is reportedly trying to come in from [place] at some point over the next 2 weeks

## 2020-06-15 NOTE — PLAN OF CARE
Pt remains on VV ECMO, speed 2800, flows 2.7, FiO2 40%, SWEEP 2.5, flows decreased suddenly to 1, chatter noted in venous cannula, recovered without intervention the first time, second time flows decreased again during coughing, 100 mL Albumin administered by ECMO specialist, flows returned to 2.7, no more chatter noted.   Pt trached, vent settings AC PC 50% and PEEP 6, stats 100%. Opens eyes spontaneously, withdraws to pian to all extremities, contracted, does not follow commands or track, moaning noted with turning. Precedex titrated for comfort. Heparin gtt infusing @ 800 Units/hr to maintain Anti-Xa goal of 0.2-0.25. Lasix gtt @ 2.5 mg/hr, -175 mL/hr, pt negative 200 mL this am. All am lab results reviewed. TF infusing @ 45 mL/hr, goal, 1 BM overnight. No skin breakdown noted to sacrum, back of heels and elbows, mattress overlay inflated, arms elevated on pillows, foam dressing in use, heel protectors in use, pt repositioned during incontinence care and as tolerated. Awaiting family to vist pt and discuss goal of care, continuing weaning ECMO as tolerated.

## 2020-06-15 NOTE — PROGRESS NOTES
Dr. Ayon here @bedside; advanced RIJV cannula slightly into place & restitched it securely into place @6.5 cm from the incision site. Wound redressed. ECMO weaning requested by Nany:  Decreased FiO2 to 30% & decreased Sweep to 2lpm.  No adverse effects at this time.

## 2020-06-15 NOTE — PROGRESS NOTES
[facility] - Respiratory ICU  Cardiothoracic Surgery  Daily ECMO Progress Note    Patient Name: Ranjana Sanchez  MRN: [id number]  Admission Date: 4/10/2020  Hospital Length of Stay: 66 days  Code Status: Full Code   Attending Physician: Francis Ayon MD   Referring Provider: Francis Ayon MD  Principal Problem:Acute respiratory disease due to COVID-19 virus    Subjective:   Interval History:  No acute events overnight, albumin for chatter this AM. Return cannula noted to be further out of neck than previous. UOP 3.5L, net negative 270mL in 24hrs. Hep gtt 800, aXa in target range.    Medications:  Continuous Infusions:   dexmedetomidine (PRECEDEX) infusion 0.5 mcg/kg/hr (06/15/20 1300)    dextrose 10 % in water (D10W)      furosemide (LASIX) 2 mg/mL continuous infusion (non-titrating) 2.5 mg/hr (06/15/20 1300)    heparin (porcine) in 5 % dex 800 Units/hr (06/15/20 1300)    norepinephrine bitartrate-D5W Stopped (06/10/20 0500)     Scheduled Meds:   aspirin  81 mg Per NG tube Daily    fentaNYL  1 patch Transdermal Q72H    insulin aspart U-100  5 Units Subcutaneous Q24H    insulin aspart U-100  5 Units Subcutaneous Q24H    insulin aspart U-100  5 Units Subcutaneous Q24H    insulin aspart U-100  5 Units Subcutaneous Q24H    insulin aspart U-100  5 Units Subcutaneous Q24H    insulin aspart U-100  5 Units Subcutaneous Q24H    multivitamin liquid no.118  10 mL Per G Tube Daily    pantoprazole  40 mg Per NG tube BID    polyethylene glycol  17 g Per NG tube Daily    potassium chloride 10%  40 mEq Per NG tube BID    psyllium husk (aspartame)  1 packet Per NG tube TID    sucralfate  1 g Per NG tube Q6H    vitamin D  1,000 Units Per G Tube Daily        Objective:     Vital Signs (Most Recent):  Temp: 98.3 °F (36.8 °C) (06/15/20 1100)  Pulse: (!) 139 (06/15/20 1345)  Resp: (!) 38 (06/14/20 0119)  BP: 124/66 (06/15/20 1300)  SpO2: 97 % (06/15/20 1345) Vital Signs (24h Range):  Temp:  [98.2 °F  (36.8 °C)-98.8 °F (37.1 °C)] 98.3 °F (36.8 °C)  Pulse:  [108-143] 139  SpO2:  [96 %-100 %] 97 %  BP: (105-128)/(64-84) 124/66  Arterial Line BP: ()/(49-83) 108/68     Weight: 60.1 kg (132 lb 7.9 oz)  Body mass index is 22.74 kg/m².    SpO2: 97 %  O2 Device (Oxygen Therapy): ventilator    Intake/Output - Last 3 Shifts       06/13 0700 - 06/14 0659 06/14 0700 - 06/15 0659 06/15 0700 - 06/16 0659    I.V. (mL/kg) 634 (10.7) 565.9 (9.4) 145 (2.4)    NG/GT 2490 2780 315    Total Intake(mL/kg) 3124 (52.9) 3345.9 (55.7) 460 (7.7)    Urine (mL/kg/hr) 3365 (2.4) 3695 (2.6) 875 (2.1)    Stool 0 0     Blood 8 5 2    Total Output 3373 3700 877    Net -249 -354.2 -417           Stool Occurrence 4 x 1 x           Lines/Drains/Airways     Peripherally Inserted Central Catheter Line            PICC Double Lumen 05/05/20 1707 right basilic 40 days          Central Venous Catheter Line                 ECMO Cannula 04/25/20 1120 right femoral vein 51 days         ECMO Cannula 04/25/20 1120 right internal jugular 51 days          Drain                 NG/OG Tube 05/03/20 1215 [place] sump;nasogastric 18 Fr. Left nostril 43 days         Urethral Catheter 05/28/20 1630 17 days          Airway                 Surgical Airway 05/05/20 1500 Shiley Cuffed 40 days          Arterial Line                 Arterial Line 04/27/20 1100 Right Brachial 49 days          Peripheral Intravenous Line                 Peripheral IV - Single Lumen 06/12/20 0400 20 G Left Wrist 3 days         Peripheral IV - Single Lumen 06/14/20 0200 20 G Left Hand 1 day                Physical Exam    Constitutional: He is sedated, and intubated.   Head: Normocephalic   Cardiovascular: Tachycardia 130s   Pulmonary/Chest: intubated vent FiO2 50% PEEP 6, ECMO RPM 2800, Flow 2.7, sweep 2.5, oxygenator FiO2 40%  Skin: L neck cannula intact but backed out, appears at clavicle on CXR. No change in flows at this time. No flashing. Clots pre and post-oxygenator.  Nursing  note and vitals reviewed.    Significant Labs:  BMP:   Recent Labs   Lab 06/15/20  1015   *      K 3.7   CL 90*   CO2 36*   BUN 47*   CREATININE 0.6   CALCIUM 9.9   MG 2.1     CBC:   Recent Labs   Lab 06/15/20  1015   WBC 13.00*   RBC 3.28*   HGB 9.5*   HCT 29.6*   *   MCV 90   MCH 29.0   MCHC 32.1     LFTs:   Recent Labs   Lab 06/15/20  1015   ALT 25   AST 28   ALKPHOS 106   BILITOT 1.8*   PROT 6.6   ALBUMIN 3.0*     Significant Diagnostics:  Cxr: stable    Assessment/Plan:     Acute respiratory distress syndrome (ARDS) due to COVID-19 virus  - Crich switched to ETT on 4/11  - Monika weaned off 5/5  - Tracheostomy and bronch 5/5  - Severe thrombocytopenia resolved; transfuse for under 30K  - Aspirin 81 daily  - Labetalol 300BID  - RPM at 2800; flows at 2.7  - Sweep at 2.5; CO2 goal is 59 or below  - Do not increase sweep for pCO2s 59 or below as long as patient is not acidotic, as these high pCO2s are respiratory compensation for alkalosis  - Oxygenator Fi at 40%; switched out 6/9  - CT head and chest imaging reviewed with profound basilar changes concerning for hypoxemic injury; patient's son will be visiting from [place]  - Consider timing for oxygenator wean  - Will need to advance and re-secure return cannula in IJ     Tongue laceration      -ENT evaluated, laceration superficial      -resolved    Sedation  - Valium, seroquel, PRN Fentanyl     UGI Bleed  - Scoped by GI 5/3 with epinephrine injection and clip placement for an actively bleeding D2/D3 Diuelafoy lesion  - Recommend GI re-consultation if Hb continues to drift and further blood is aspirated from NGT or patient has bloody BM, etc  - Currently resolved    FEN/GI  - TF at 45, goal      - Lasix gtt 2.5  - FWB for hypernatremia 400mL q4     Anticoagulation      - Goal aXa 0.2-0.25 Q6hrs      - Hep gtt 800      - monitor LDH    Prophylaxis      - protonix q12 IV       - leukocytosis stable      - PICC placed 5/5; central line removed  5/6    Anne Miller MD  Cardiothoracic Surgery  [facility] - [facility]      VV ECMO circuit checked and all parameters reviewed. Anticoagulation was managed and all cannulation exit sites along with review of labs and radiology studies performed. Speed and functioning of the pump along with oxygenator quality reviewed.  Patient stable continues to be on a Lasix drip at 2.5mg/hr and tube feeds at 45 cc an hour.  No concerns from the nursing staff for perfusion team with any flow or anticoagulation related issues.  No new clots noted in the circuit.  Coordination of care was provided between different teams which consisted of the surgical ICU to, perfusion Staff and the nursing staff.  All questions and concerns all team members were addressed.

## 2020-06-15 NOTE — CARE UPDATE
BG goal 140-180     Remains in RICU, NAEON  BG well controlled overnight on current insulin regimen, below goal this am  TF (Peptamen VHP) at 45 cc/hr (goal)  Plan:     Continue Novolog 5 units every 4 hours, hold if TF held or discontinued.  Consider reducing if BG remains below goal  Low dose correction scale  BG monitoring every 4 hours     Discharge planning: TBD     Endocrine to continue to follow     ** Please call Endocrine for any BG related issues **

## 2020-06-15 NOTE — NURSING
R IJ cannula migrated further out of neck. Xray ordered to confirm. Dr. Ayon @ bedside. Applied sutures to cannula. Cannula now @ 6.5cm. Sweep @ 2. FiO2 30%.

## 2020-06-16 PROBLEM — R57.9 SHOCK, UNSPECIFIED: Status: RESOLVED | Noted: 2020-01-01 | Resolved: 2020-01-01

## 2020-06-16 PROBLEM — R57.9 SHOCK: Status: RESOLVED | Noted: 2020-01-01 | Resolved: 2020-01-01

## 2020-06-16 NOTE — PROGRESS NOTES
Per Dr. Ayon, keep patient on 0 sweep (off ECMO) for now.  Change ABG's to Q4.  If pCO2 gets to 65 or higher, please increase sweep back to 1 lpm.

## 2020-06-16 NOTE — CARE UPDATE
BG goal 140-180    Remains in RICU. NAEON.   BG above goal with scheduled insulin and prn correction scale. TF increased to 50 cc/hr.     Plan:  Continue Novolog 5 units every 4 hours, hold if TF held or discontinued.   Low dose correction scale  BG monitoring every 4 hours     Discharge planning: tbd     Endocrine to continue to follow    ** Please call Endocrine for any BG related issues **

## 2020-06-16 NOTE — PHYSICIAN QUERY
PT Name: Ranjana Sanchez  MR #: [id number]     PHYSICIAN QUERY -  INTEGUMENTARY CLARIFICATION     CDS/: Nancy Spivey RN, CDS               Contact information: [email or web address]  This form is a permanent document in the medical record.     Query Date: June 16, 2020    By submitting this query, we are merely seeking further clarification of documentation.  Please utilize your independent clinical judgment when addressing the question(s) below.  The Medical Record contains the following:   Indicators   Supporting Clinical Findings Location in Medical Record    Redness     x Decubitus, Pressure, Ulcer, etc. --[ x  ] Deep Tissue Pressure Injury that transformed into a Decubitus (Pressure) Ulcer            -Per wound care note 6/10/2020 area has healed.Patient was evaluated via chart review as the patient is COVID positive.     Query response to Left cheek 6/15   x Deep Tissue Injury --Skin: DTI to left cheek and left arm.      Nursing POC Note 6/10   x Wound care consult --Shall full thickness skin loss noted to left cheek, appears to be device related from previous ET tube alcaraz device, slightly moist slough/eschar.       --To left cheek: wound appears to have resolved with intact pink scar tissue noted to left cheek without any drainage noted Wound Care c/s 5/8                                                  Wound Care c/s 6/10    Medication:      Treatment:     x Other:  --Lesion 1:  Abrasion,  intact dry brownish red scab measuring 1 cm x 1.5 cm without any drainage noted. Area appears to be a healing area of partial skin thickness that may be related to a medical adhesive skin injury.         -POA : no PETRONA SKin Integrity Note 5/6       National Pressure Ulcer Advisory Panel (2007) Pressure Ulcer Definitions & Stages:  Stage I:                     Intact skin with non-blanchable redness of a localized area usually over a bony prominence.   Stage II:                    Partial thickness  loss of dermis presenting as a shallow open ulcer with a red pink wound bed, without slough.   Stage III:                   Full thickness tissue loss. Subcutaneous fat may be visible but bone, tendon or muscle is not exposed. Slough may be                                      present but does not obscure the depth of tissue loss. May include undermining and tunneling.  Stage IV:                  Full thickness tissue loss with exposed bone, tendon or muscle. Slough or eschar may be present on some parts of the                                      wound bed. Often include undermining and tunneling.  Unstageable:           Full thickness tissue loss but base of ulcer is covered by slough and/or eschar in the wound bed. Until enough                                        slough and/or eschar is removed to expose wound base, its true depth, and therefore stage, cannot be determined  Deep Tissue Injury: Purple or maroon localized area of discolored intact skin or blood-filled blister due to damage of underlying soft tissue   from pressure and/or shear.  Suspected deep tissue injuries may develop into a stageable ulcer or turn out to be another diagnosis (e.g. an ecchymosis)     Provider, please specify the location, stage and POA status of the diagnosis:_no___        Please specify the Stage of the Pressure Ulcer for which the documented left cheek progressed to:     SITE LATERALITY STAGE PRESENT ON ADMISSION?   [ x ] Cheek    [ x ]  left [x  ] 2  [  ] 3         [  ]Unstageable   [x  ] No      [  ] Clinically Undetermined (W)                [  ] Documentation Insufficient (U)     - Per last wound care note, area has healed and is now pink scar tissue.     [  ] Clinically undetermined         Please document in your progress notes daily for the duration of treatment until resolved and include in your discharge summary.

## 2020-06-16 NOTE — PLAN OF CARE
CM was informed by Nasrin Goodwin that the patient's medical parole is about to  & that a letter from the hospital is needed in order to request Mr Sanchez's medical parole extension from [facility]. Message sent to Dr. Ayon informing of above. Awaiting response.

## 2020-06-16 NOTE — PROGRESS NOTES
ECMO Specialists shift report    Date: 06/16/2020  ECMO Specialist:  Iesha Bedoya    Pump parameters:  RPM: 2800  Flow:  2.5  Sweep:  0  FiO2:      Oxygenator status:  Clots: some   Fibrin: yes post oxy connector to sample port     Pressure trends:  P1: 90's  P2: 70's  Delta P: teens    Volume status:  Chugging noted no  CVP:   MAP:    MD notified (name):  Nany    Anticoagulation:  ACT/aPTT/Xa parameters: Xa 0.2-0.25  ACT/aPTT/Xa trends this shift: 0.30, 0.15    Cannula size / status / placement:  Arterial:   Venous 1: RIJV 23 FR measured @ 6.5-7   Venous 2: RFV 27 FR measured @ 12  Dual lumen:      Additional Comments:    PIP and RR on vent changed. Sweep 0. Patient ABG now Q4. If CO2  65 or above increase sweep back to 1 lpm per Nany. Hep rate changed for Xa. No issues with pump flow this shift.

## 2020-06-16 NOTE — PROGRESS NOTES
Patient Anti-Xa resulted at 0.18. Goal 0.20-0.25. Notified MD Svitlana, ordered to increase Heparin gtt to 1000U/ hr. No further orders were given. Will continue to monitor.

## 2020-06-16 NOTE — PROGRESS NOTES
[facility] - Respiratory ICU  Cardiothoracic Surgery  Daily ECMO Progress Note    Patient Name: Ranjana Sanchez  MRN: [id number]  Admission Date: 4/10/2020  Hospital Length of Stay: 67 days  Code Status: Full Code   Attending Physician: Francis Ayon MD   Referring Provider: Francis Ayon MD  Principal Problem:Acute respiratory disease due to COVID-19 virus    Subjective:   Interval History:  No acute events overnight. Return cannula repositioned yesterday. UOP 3.3L, net negative 85mL in 24hrs. Hep gtt increased to 900, aXa now supratherapeutic.    Medications:  Continuous Infusions:   dexmedetomidine (PRECEDEX) infusion 0.5 mcg/kg/hr (06/16/20 1000)    dextrose 10 % in water (D10W)      furosemide (LASIX) 2 mg/mL continuous infusion (non-titrating) 2.5 mg/hr (06/16/20 1000)    heparin (porcine) in 5 % dex 700 Units/hr (06/16/20 1000)    norepinephrine bitartrate-D5W Stopped (06/10/20 0500)     Scheduled Meds:   aspirin  81 mg Per NG tube Daily    fentaNYL  1 patch Transdermal Q72H    insulin aspart U-100  5 Units Subcutaneous Q24H    insulin aspart U-100  5 Units Subcutaneous Q24H    insulin aspart U-100  5 Units Subcutaneous Q24H    insulin aspart U-100  5 Units Subcutaneous Q24H    insulin aspart U-100  5 Units Subcutaneous Q24H    insulin aspart U-100  5 Units Subcutaneous Q24H    multivitamin liquid no.118  10 mL Per G Tube Daily    pantoprazole  40 mg Per NG tube BID    polyethylene glycol  17 g Per NG tube Daily    potassium chloride 10%  40 mEq Per NG tube BID    psyllium husk (aspartame)  1 packet Per NG tube TID    sucralfate  1 g Per NG tube Q6H    vancomycin (VANCOCIN) IVPB  1,000 mg Intravenous Q12H    vitamin D  1,000 Units Per G Tube Daily        Objective:     Vital Signs (Most Recent):  Temp: 98.4 °F (36.9 °C) (06/16/20 0800)  Pulse: (!) 139 (06/16/20 1000)  Resp: (!) 43 (06/16/20 0302)  BP: 120/78 (06/16/20 1000)  SpO2: 96 % (06/16/20 1000) Vital Signs (24h  Range):  Temp:  [98.1 °F (36.7 °C)-99 °F (37.2 °C)] 98.4 °F (36.9 °C)  Pulse:  [125-153] 139  Resp:  [37-43] 43  SpO2:  [94 %-100 %] 96 %  BP: ()/(66-89) 120/78  Arterial Line BP: ()/(63-96) 118/70     Weight: 60.1 kg (132 lb 7.9 oz)  Body mass index is 22.74 kg/m².    SpO2: 96 %  O2 Device (Oxygen Therapy): ventilator    Intake/Output - Last 3 Shifts       06/14 0700 - 06/15 0659 06/15 0700 - 06/16 0659 06/16 0700 - 06/17 0659    I.V. (mL/kg) 565.9 (9.4) 827 (13.8)     NG/GT 2780 2340 580    Total Intake(mL/kg) 3345.9 (55.7) 3167 (52.7) 580 (9.7)    Urine (mL/kg/hr) 3695 (2.6) 3195 (2.2) 290 (1.4)    Stool 0  0    Blood 5 2 2    Total Output 3700 3197 292    Net -354.2 -30 +288           Stool Occurrence 1 x  1 x          Lines/Drains/Airways     Peripherally Inserted Central Catheter Line            PICC Double Lumen 05/05/20 1707 right basilic 41 days          Central Venous Catheter Line                 ECMO Cannula 04/25/20 1120 right femoral vein 51 days         ECMO Cannula 04/25/20 1120 right internal jugular 51 days          Drain                 NG/OG Tube 05/03/20 1215 [place] sump;nasogastric 18 Fr. Left nostril 43 days         Urethral Catheter 05/28/20 1630 18 days          Airway                 Surgical Airway 05/05/20 1500 [place]iley Cuffed 41 days          Arterial Line                 Arterial Line 04/27/20 1100 Right Brachial 49 days          Peripheral Intravenous Line                 Peripheral IV - Single Lumen 06/12/20 0400 20 G Left Wrist 4 days         Peripheral IV - Single Lumen 06/14/20 0200 20 G Left Hand 2 days                Physical Exam    Constitutional: He is sedated, and intubated.   Head: Normocephalic   Cardiovascular: Tachycardia 130s   Pulmonary/Chest: intubated vent FiO2 50% PEEP 6, ECMO RPM 2800, Flow 2.5, sweep 1, oxygenator FiO2 21%  Skin: L neck cannula repositioned and secured, intact, will good placement on CXR. No change in flows at this time. No flashing. Clots  pre and post-oxygenator.  Nursing note and vitals reviewed.    Significant Labs:  BMP:   Recent Labs   Lab 06/16/20  0806   *      K 4.0   CL 95   CO2 34*   BUN 46*   CREATININE 0.7   CALCIUM 10.2   MG 2.0     CBC:   Recent Labs   Lab 06/16/20  0806   WBC 14.31*   RBC 3.21*   HGB 9.2*   HCT 29.2*   *   MCV 91   MCH 28.7   MCHC 31.5*     LFTs:   Recent Labs   Lab 06/16/20  0806   ALT 24   AST 31   ALKPHOS 113   BILITOT 1.5*   PROT 6.7   ALBUMIN 3.2*     Significant Diagnostics:  Cxr: stable      Assessment/Plan:     Acute respiratory distress syndrome (ARDS) due to COVID-19 virus  - Crich switched to ETT on 4/11  - Monika weaned off 5/5  - Tracheostomy and bronch 5/5  - Severe thrombocytopenia resolved; transfuse for under 30K  - Aspirin 81 daily  - Labetalol 300BID  - RPM at 2800; flows at 2.5  - Sweep at 1; CO2 goal is 59 or below  - Do not increase sweep for pCO2s 59 or below as long as patient is not acidotic, as these high pCO2s are respiratory compensation for alkalosis  - Oxygenator Fi at 21%; switched out 6/9  - CT head and chest imaging reviewed with profound basilar changes concerning for hypoxemic injury; patient's son will be visiting from [place]  - May consider decannulation today if patient's CO2 remains stable on 1 of sweep     Tongue laceration      -ENT evaluated, laceration superficial      -resolved    Sedation  - Valium, seroquel, PRN Fentanyl     UGI Bleed  - Scoped by GI 5/3 with epinephrine injection and clip placement for an actively bleeding D2/D3 Diuelafoy lesion  - Recommend GI re-consultation if Hb continues to drift and further blood is aspirated from NGT or patient has bloody BM, etc  - Currently resolved    FEN/GI  - TF at 45, goal      - Lasix gtt 2.5  - FWB for hypernatremia 400mL q4     Anticoagulation      - Goal aXa 0.2-0.25 Q6hrs      - Hep gtt turn down to 800      - monitor LDH    Prophylaxis      - protonix q12 IV       - leukocytosis stable      - PICC placed  5/5; central line removed 5/6    Anne Miller MD  Cardiothoracic Surgery  [facility] - Respiratory ICU      VV ECMO circuit checked and all parameters reviewed. Anticoagulation was managed and all cannulation exit sites along with review of labs and radiology studies performed. Speed and functioning of the pump along with oxygenator quality reviewed.  Patient stable continues to be on a Lasix drip at 2.5 and acute fever at 45 cc an hour.  No concerns from the nursing staff for perfusion team with any flow under anticoagulation related issues.  No new clots noted in the circuit.  Coordination of care was provided between different teams which consisted of the surgical ICU to, perfusion Staff and the nursing staff.  All questions and concerns all team members were addressed.

## 2020-06-16 NOTE — ASSESSMENT & PLAN NOTE
Acute respiratory distress syndrome (ARDS) due to COVID-19 virus  - Crich switched to ETT on 4/11  - Monika weaned off 5/5  - Tracheostomy and bronch 5/5  - Severe thrombocytopenia resolved; transfuse for under 30K  - Aspirin 81 daily  - Labetalol 300BID  - RPM at 2800; flows at 2.5  - Sweep at 1; CO2 goal is 59 or below  - Do not increase sweep for pCO2s 59 or below as long as patient is not acidotic, as these high pCO2s are respiratory compensation for alkalosis  - Oxygenator Fi at 21%; switched out 6/9  - CT head and chest imaging reviewed with profound basilar changes concerning for hypoxemic injury; patient's son will be visiting from [place]  - May consider decannulation today if patient's CO2 remains stable on 1 of sweep     Tongue laceration      -ENT evaluated, laceration superficial      -resolved    Sedation  - Valium, seroquel, PRN Fentanyl     UGI Bleed  - Scoped by GI 5/3 with epinephrine injection and clip placement for an actively bleeding 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

## 2020-06-16 NOTE — PROGRESS NOTES
"[facility] - Respiratory ICU  Adult Nutrition  Progress Note    SUMMARY       Recommendations  1. To better meet needs, recommend modifying TF to Peptamen AF at a goal rate of 50 mL/hr - to provide 1440 kcal/day and 91g protein/day.   2. RD to monitor.    Goals: Meet % EEN, EPN by RD f/u date  Nutrition Goal Status: progressing towards goal  Communication of RD Recs: (POC)    Reason for Assessment  Reason For Assessment: RD follow-up  Diagnosis: (COVID-19)  Relevant Medical History: HTN  Interdisciplinary Rounds: did not attend  General Information Comments: Patient remains intubated, sedated. On VV-ECMO, possible decannulation today. Tolerating TF at goal rate.No indicators of malnutrition at this time. Still unable to complete NFPE due being positive for COVID-19.  Nutrition Discharge Planning: Unable to determine at this time.    Nutrition Risk Screen  Nutrition Risk Screen: tube feeding or parenteral nutrition    Nutrition/Diet History  Food Allergies: NKFA  Factors Affecting Nutritional Intake: NPO, on mechanical ventilation    Anthropometrics  Temp: 98.4 °F (36.9 °C)  Height Method: Stated  Height: 5' 4" (162.6 cm)  Height (inches): 64 in  Weight Method: Bed Scale  Weight: 60.1 kg (132 lb 7.9 oz)  Weight (lb): 132.5 lb  Ideal Body Weight (IBW), Male: 130 lb  % Ideal Body Weight, Male (lb): 114.62 %  BMI (Calculated): 22.7  BMI Grade: 18.5-24.9 - normal  Usual Body Weight (UBW), kg: (JAMMIE)    Lab/Procedures/Meds  Pertinent Labs Reviewed: reviewed  Pertinent Labs Comments: BUN 46, Glu 199, Alb 3.3, CRP 80, Trig 216  Pertinent Medications Reviewed: reviewed  Pertinent Medications Comments: insulin, pantoprazole, psyllium, precedex, lasix, levophed    Estimated/Assessed Needs  Weight Used For Calorie Calculations: 60.1 kg (132 lb 7.9 oz)  Energy Calorie Requirements (kcal): 1554 kcal/day  Energy Need Method: [place]  Protein Requirements: 78-90 g/day(1.3-1.5 g/kg)  Weight Used For Protein " Calculations: 60.1 kg (132 lb 7.9 oz)  Fluid Requirements (mL): 1 mL/kcal or per MD  Estimated Fluid Requirement Method: RDA Method  RDA Method (mL): 1554    Nutrition Prescription Ordered  Current Diet Order: NPO  Current Nutrition Support Formula Ordered: Peptamen Intense VHP  Current Nutrition Support Rate Ordered: 45 (ml)  Current Nutrition Support Frequency Ordered: mL/hr    Evaluation of Received Nutrient/Fluid Intake  Enteral Calories (kcal): 1080  Enteral Protein (gm): 99  Enteral (Free Water) Fluid (mL): 907  % Kcal Needs: 69%  % Protein Needs: 111%  I/O: -16L since admit  Energy Calories Required: not meeting needs  Protein Required: meeting needs  Fluid Required: (per MD)  Comments: LBM 6/15  Tolerance: tolerating  % Intake of Estimated Energy Needs: 50 - 75 %  % Meal Intake: NPO    Nutrition Risk  Level of Risk/Frequency of Follow-up: (f/u 1 x wk)     Assessment and Plan  Nutrition Problem  Inadequate energy intake     Related to (etiology):   Inability to consume sufficient energy      Signs and Symptoms (as evidenced by):   NPO     Interventions (treatment strategy):  Collaboration of nutrition care w/ other providers   Enteral Nutrition      Nutrition Diagnosis Status:   Resolving     Monitor and Evaluation  Food and Nutrient Intake: energy intake, enteral nutrition intake  Food and Nutrient Adminstration: enteral and parenteral nutrition administration  Anthropometric Measurements: weight, weight change  Biochemical Data, Medical Tests and Procedures: electrolyte and renal panel, gastrointestinal profile, inflammatory profile  Nutrition-Focused Physical Findings: overall appearance     Nutrition Follow-Up  RD Follow-up?: Yes

## 2020-06-16 NOTE — PLAN OF CARE
Pt's vitals stable today. His son called and was updated on the current plan of care. He remains vented on AC/PC 50% and 6 of PEEP. He is on Lasix at 2.5 mg/hr, Precedex at 0.5 mcg/kg/hr, and Heparin at 800 units/hr. His ECMO sweep was turned off at 1330. See flow sheet for assessment details. RN at  to monitor.

## 2020-06-16 NOTE — PLAN OF CARE
Problem: Feeding Intolerance (Enteral Nutrition)  Goal: Feeding Tolerance  Outcome: Ongoing, Progressing  Intervention: Prevent and Manage Feeding Intolerance  Flowsheets (Taken 6/16/2020 0933)  Nutrition Support Management: other (see comments)     Recommendations  1. To better meet needs, recommend modifying TF to Peptamen AF at a goal rate of 50 mL/hr - to provide 1440 kcal/day and 91g protein/day.   2. RD to monitor.    Goals: Meet % EEN, EPN by RD f/u date  Nutrition Goal Status: progressing towards goal    Full assessment completed, see RD Note 6/16/2020.

## 2020-06-16 NOTE — PLAN OF CARE
0900  CM paged Dr. Ayon to discuss letter needed to extend the patient's medical parole but was told the MD is in surgery.    1115  CM informed resident Zelalem ([id number]) of letter needed by [facility]. Zelalem requested that this CM give the email & example letter to the patient's nurse, Di ([id number]), who in turn would inform Dr. Ayon of above. CM informed Di of request. Email & example letter hand delivered to nurse Di. CM informed YOSEF Quiroz & LONG Samaniego of above & requested that the completed letter be emailed to [email or web address] & [email or web address].

## 2020-06-16 NOTE — PROGRESS NOTES
[facility] - Respiratory ICU  Critical Care - Surgery  Progress Note    Patient Name: Ranjana Sanchez  MRN: [id number]  Admission Date: 4/10/2020  Hospital Length of Stay: 67 days  Code Status: Full Code  Attending Provider: Francis Ayon MD  Primary Care Provider: Primary Doctor No   Principal Problem: Acute respiratory disease due to COVID-19 virus    Subjective:     Hospital/ICU Course:  No notes on file    Interval History/Significant Events:   RIJ cannula migrated out 4 inches and adjusted back by Dr. Ayon. Vanc started.    Follow-up For: Procedure(s) (LRB):  CREATION, TRACHEOSTOMY  (N/A)    Post-Operative Day: 43 Days Post-Op    Objective:     Vital Signs (Most Recent):  Temp: 99 °F (37.2 °C) (06/16/20 0302)  Pulse: (!) 145 (06/16/20 0600)  Resp: (!) 43 (06/16/20 0302)  BP: 131/78 (06/16/20 0600)  SpO2: 97 % (06/16/20 0600) Vital Signs (24h Range):  Temp:  [98.1 °F (36.7 °C)-99 °F (37.2 °C)] 99 °F (37.2 °C)  Pulse:  [122-153] 145  Resp:  [37-43] 43  SpO2:  [94 %-100 %] 97 %  BP: ()/(66-86) 131/78  Arterial Line BP: ()/(64-96) 129/71     Weight: 60.1 kg (132 lb 7.9 oz)  Body mass index is 22.74 kg/m².      Intake/Output Summary (Last 24 hours) at 6/16/2020 0646  Last data filed at 6/16/2020 0600  Gross per 24 hour   Intake 3167 ml   Output 3197 ml   Net -30 ml       Physical Exam  Constitutional:       Appearance: He is well-developed and well-nourished.   HENT:      Head: Normocephalic and atraumatic.        Comments: Right IJ ECMO Cannula in placePulmonary:      Comments: Trach in place  Surgi-Cell / NuKnit packed surrounding Trach Fixture  No further active bleeding noted from Trach Site  Abdominal:      General: There is no distension.      Tenderness: There is no abdominal tenderness.   Musculoskeletal:      Comments: Right Femoral ECMO Cannula Site   Skin:     General: Skin is warm and dry.   Neurological:      Comments: Tracks  Withdraws to pain     Input from RN due to  COVID-19    Vents:  Vent Mode: A/C (06/16/20 0104)  Ventilator Initiated: Yes (04/10/20 2247)  Set Rate: 30 BPM (06/16/20 0104)  Vt Set: 250 mL (06/06/20 0858)  Pressure Support: 20 cmH20 (06/06/20 0858)  PEEP/CPAP: 6 cmH20 (06/16/20 0104)  Oxygen Concentration (%): 50 (06/16/20 0600)  Peak Airway Pressure: 31 cmH2O (06/16/20 0104)  Plateau Pressure: 18 cmH20 (06/16/20 0104)  Total Ve: 11.4 mL (06/16/20 0104)  F/VT Ratio<105 (RSBI): 147.37 (06/16/20 0104)    Lines/Drains/Airways     Peripherally Inserted Central Catheter Line            PICC Double Lumen 05/05/20 1707 right basilic 41 days          Central Venous Catheter Line                 ECMO Cannula 04/25/20 1120 right femoral vein 51 days         ECMO Cannula 04/25/20 1120 right internal jugular 51 days          Drain                 NG/OG Tube 05/03/20 1215 [place] sump;nasogastric 18 Fr. Left nostril 43 days         Urethral Catheter 05/28/20 1630 18 days          Airway                 Surgical Airway 05/05/20 1500 Shiley Cuffed 41 days          Arterial Line                 Arterial Line 04/27/20 1100 Right Brachial 49 days          Peripheral Intravenous Line                 Peripheral IV - Single Lumen 06/12/20 0400 20 G Left Wrist 4 days         Peripheral IV - Single Lumen 06/14/20 0200 20 G Left Hand 2 days                Significant Labs:    CBC/Anemia Profile:  Recent Labs   Lab 06/15/20  0200  06/15/20  1450 06/15/20  2000 06/15/20  2004 06/16/20  0200 06/16/20  0204   WBC 13.53*   < > 12.03 13.12*  --  15.54*  --    HGB 9.7*   < > 9.9* 9.2*  --  9.5*  --    HCT 30.2*   < > 31.6* 28.8* 27* 30.4* 29*      < > 155 137*  --  148*  --    MCV 90   < > 92 93  --  92  --    RDW 14.6*   < > 14.6* 14.8*  --  14.8*  --    FERRITIN 2,377*  --   --   --   --  2,174*  --     < > = values in this interval not displayed.        Chemistries:  Recent Labs   Lab 06/15/20  1450 06/15/20  2000 06/16/20  0200    139 142   K 4.4 3.9 4.3   CL 93* 93* 95   CO2  32* 33* 33*   BUN 47* 46* 46*   CREATININE 0.6 0.6 0.7   CALCIUM 10.2 9.9 10.2   ALBUMIN 3.2* 3.2* 3.3*   PROT 7.0 6.7 6.9   BILITOT 1.6* 1.7* 1.7*   ALKPHOS 113 105 112   ALT 23 24 25   AST 28 28 28   MG 2.1 2.0 1.9   PHOS 3.4 3.1 3.1       All pertinent labs within the past 24 hours have been reviewed.    Significant Imaging:  I have reviewed and interpreted all pertinent imaging results/findings within the past 24 hours.    Assessment/Plan:     COVID-19 virus detected  Ranjana Sanchez is a 55 y.o. 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  This was switched to ETT on 4/11.  Cannulated on 4/25.    Neuro:  - Precedex for sedation  - 100 BID of seroquel  - Fentanyl patch  - Encephalopathy with poor neuro exam; still withdraws to pain  - CT on June 2 showing bilateral basal ganglia infarcts, which likely explains the neuro exam findings. His infarcts are sizeable, but extent of permanent deficits are unknown. The likelihood of meaningful recovery is low. Neuro consulted to prognosticate and agreed with poor neurological prognosis given bilateral basal ganglia CVAs    Pulm  AC/VC+ on the vent.  ECMO   Pump parameters:  See perfusionist documentaion  Decannulation per CT surgery  Overbreathing vent at rate of 45-50s with TVs 150-250    Vent Mode: A/C  Oxygen Concentration (%):  [50] 50  Resp Rate Total:  [31 br/min-54 br/min] 51 br/min  PEEP/CPAP:  [6 cmH20] 6 cmH20  Mean Airway Pressure:  [14 txU32-51 cmH20] 18 cmH20      Cardiac  Levo as needed  Tachy to 140s  MAP goal < 90  ASA 81  ECMO    FEN/GI  TFs at goal (45cc/hr)    Renal  UOP 95-200cc/hr  Lasix gtt at 2.5    Heme  Hep gtt at 850, goal 0.2-0.25 to prevent another GI bleed  Has been at goal at this rate  - upper endoscopy 5/3 with clip placement     ID  WBC uptrending  vanc started  Dose per pharm    Endo:  Endocrine following and managing bg    Dispo: RICU  -Prognosis is Grim  -family is  reportedly trying to come in from [place] at some point           Critical secondary to Patient has a condition that poses threat to life and bodily function: Severe Respiratory Distress     Critical care was time spent personally by me on the following activities: development of treatment plan with patient or surrogate and bedside caregivers, discussions with consultants, evaluation of patient's response to treatment, examination of patient, ordering and performing treatments and interventions, ordering and review of laboratory studies, ordering and review of radiographic studies, pulse oximetry, re-evaluation of patient's condition.  This critical care time did not overlap with that of any other provider or involve time for any procedures.     Parker Benton MD  Critical Care - Surgery  [facility] - Respiratory ICU

## 2020-06-16 NOTE — ASSESSMENT & PLAN NOTE
Ranjana Sanchez is a 55 y.o. 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  This was switched to ETT on 4/11.  Cannulated on 4/25.    Neuro:  - Precedex for sedation  - 100 BID of seroquel  - Fentanyl patch  - Encephalopathy with poor neuro exam; still withdraws to pain  - CT on June 2 showing bilateral basal ganglia infarcts, which likely explains the neuro exam findings. His infarcts are sizeable, but extent of permanent deficits are unknown. The likelihood of meaningful recovery is low. Neuro consulted to prognosticate and agreed with poor neurological prognosis given bilateral basal ganglia CVAs    Pulm  AC/VC+ on the vent.  ECMO   Pump parameters:  See perfusionist documentaion  Decannulation per CT surgery  Overbreathing vent at rate of 45-50s with TVs 150-250    Vent Mode: A/C  Oxygen Concentration (%):  [50] 50  Resp Rate Total:  [31 br/min-54 br/min] 51 br/min  PEEP/CPAP:  [6 cmH20] 6 cmH20  Mean Airway Pressure:  [14 qhQ42-25 cmH20] 18 cmH20      Cardiac  Levo as needed  Tachy to 140s  MAP goal < 90  ASA 81  ECMO    FEN/GI  TFs at goal (45cc/hr)    Renal  UOP 95-200cc/hr  Lasix gtt at 2.5    Heme  Hep gtt at 850, goal 0.2-0.25 to prevent another GI bleed  Has been at goal at this rate  - upper endoscopy 5/3 with clip placement     ID  WBC uptrending  vanc started  Dose per pharm    Endo:  Endocrine following and managing bg    Dispo: RICU  -Prognosis is Grim  -family is reportedly trying to come in from [place] at some point

## 2020-06-16 NOTE — SUBJECTIVE & OBJECTIVE
Interval History:  No acute events overnight. Return cannula repositioned yesterday. UOP 3.3L, net negative 85mL in 24hrs. Hep gtt increased to 900, aXa now supratherapeutic.    Medications:  Continuous Infusions:   dexmedetomidine (PRECEDEX) infusion 0.5 mcg/kg/hr (06/16/20 1000)    dextrose 10 % in water (D10W)      furosemide (LASIX) 2 mg/mL continuous infusion (non-titrating) 2.5 mg/hr (06/16/20 1000)    heparin (porcine) in 5 % dex 700 Units/hr (06/16/20 1000)    norepinephrine bitartrate-D5W Stopped (06/10/20 0500)     Scheduled Meds:   aspirin  81 mg Per NG tube Daily    fentaNYL  1 patch Transdermal Q72H    insulin aspart U-100  5 Units Subcutaneous Q24H    insulin aspart U-100  5 Units Subcutaneous Q24H    insulin aspart U-100  5 Units Subcutaneous Q24H    insulin aspart U-100  5 Units Subcutaneous Q24H    insulin aspart U-100  5 Units Subcutaneous Q24H    insulin aspart U-100  5 Units Subcutaneous Q24H    multivitamin liquid no.118  10 mL Per G Tube Daily    pantoprazole  40 mg Per NG tube BID    polyethylene glycol  17 g Per NG tube Daily    potassium chloride 10%  40 mEq Per NG tube BID    psyllium husk (aspartame)  1 packet Per NG tube TID    sucralfate  1 g Per NG tube Q6H    vancomycin (VANCOCIN) IVPB  1,000 mg Intravenous Q12H    vitamin D  1,000 Units Per G Tube Daily        Objective:     Vital Signs (Most Recent):  Temp: 98.4 °F (36.9 °C) (06/16/20 0800)  Pulse: (!) 139 (06/16/20 1000)  Resp: (!) 43 (06/16/20 0302)  BP: 120/78 (06/16/20 1000)  SpO2: 96 % (06/16/20 1000) Vital Signs (24h Range):  Temp:  [98.1 °F (36.7 °C)-99 °F (37.2 °C)] 98.4 °F (36.9 °C)  Pulse:  [125-153] 139  Resp:  [37-43] 43  SpO2:  [94 %-100 %] 96 %  BP: ()/(66-89) 120/78  Arterial Line BP: ()/(63-96) 118/70     Weight: 60.1 kg (132 lb 7.9 oz)  Body mass index is 22.74 kg/m².    SpO2: 96 %  O2 Device (Oxygen Therapy): ventilator    Intake/Output - Last 3 Shifts       06/14 0700 - 06/15 0659  06/15 0700 - 06/16 0659 06/16 0700 - 06/17 0659    I.V. (mL/kg) 565.9 (9.4) 827 (13.8)     NG/GT 2780 2340 580    Total Intake(mL/kg) 3345.9 (55.7) 3167 (52.7) 580 (9.7)    Urine (mL/kg/hr) 3695 (2.6) 3195 (2.2) 290 (1.4)    Stool 0  0    Blood 5 2 2    Total Output 3700 3197 292    Net -354.2 -30 +288           Stool Occurrence 1 x  1 x          Lines/Drains/Airways     Peripherally Inserted Central Catheter Line            PICC Double Lumen 05/05/20 1707 right basilic 41 days          Central Venous Catheter Line                 ECMO Cannula 04/25/20 1120 right femoral vein 51 days         ECMO Cannula 04/25/20 1120 right internal jugular 51 days          Drain                 NG/OG Tube 05/03/20 1215 [place] sump;nasogastric 18 Fr. Left nostril 43 days         Urethral Catheter 05/28/20 1630 18 days          Airway                 Surgical Airway 05/05/20 1500 [place] Cuffed 41 days          Arterial Line                 Arterial Line 04/27/20 1100 Right Brachial 49 days          Peripheral Intravenous Line                 Peripheral IV - Single Lumen 06/12/20 0400 20 G Left Wrist 4 days         Peripheral IV - Single Lumen 06/14/20 0200 20 G Left Hand 2 days                Physical Exam    Constitutional: He is sedated, and intubated.   Head: Normocephalic   Cardiovascular: Tachycardia 130s   Pulmonary/Chest: intubated vent FiO2 50% PEEP 6, ECMO RPM 2800, Flow 2.5, sweep 1, oxygenator FiO2 21%  Skin: L neck cannula repositioned and secured, intact, will good placement on CXR. No change in flows at this time. No flashing. Clots pre and post-oxygenator.  Nursing note and vitals reviewed.    Significant Labs:  BMP:   Recent Labs   Lab 06/16/20  0806   *      K 4.0   CL 95   CO2 34*   BUN 46*   CREATININE 0.7   CALCIUM 10.2   MG 2.0     CBC:   Recent Labs   Lab 06/16/20  0806   WBC 14.31*   RBC 3.21*   HGB 9.2*   HCT 29.2*   *   MCV 91   MCH 28.7   MCHC 31.5*     LFTs:   Recent Labs   Lab  06/16/20  0806   ALT 24   AST 31   ALKPHOS 113   BILITOT 1.5*   PROT 6.7   ALBUMIN 3.2*     Significant Diagnostics:  Cxr: stable

## 2020-06-16 NOTE — SUBJECTIVE & OBJECTIVE
Interval History/Significant Events:   RIJ cannula migrated out 4 inches and adjusted back by Dr. Ayon. Vanc started.    Follow-up For: Procedure(s) (LRB):  CREATION, TRACHEOSTOMY  (N/A)    Post-Operative Day: 43 Days Post-Op    Objective:     Vital Signs (Most Recent):  Temp: 99 °F (37.2 °C) (06/16/20 0302)  Pulse: (!) 145 (06/16/20 0600)  Resp: (!) 43 (06/16/20 0302)  BP: 131/78 (06/16/20 0600)  SpO2: 97 % (06/16/20 0600) Vital Signs (24h Range):  Temp:  [98.1 °F (36.7 °C)-99 °F (37.2 °C)] 99 °F (37.2 °C)  Pulse:  [122-153] 145  Resp:  [37-43] 43  SpO2:  [94 %-100 %] 97 %  BP: ()/(66-86) 131/78  Arterial Line BP: ()/(64-96) 129/71     Weight: 60.1 kg (132 lb 7.9 oz)  Body mass index is 22.74 kg/m².      Intake/Output Summary (Last 24 hours) at 6/16/2020 0646  Last data filed at 6/16/2020 0600  Gross per 24 hour   Intake 3167 ml   Output 3197 ml   Net -30 ml       Physical Exam  Constitutional:       Appearance: He is well-developed and well-nourished.   HENT:      Head: Normocephalic and atraumatic.        Comments: Right IJ ECMO Cannula in placePulmonary:      Comments: Trach in place  Surgi-Cell / NuKnit packed surrounding Trach Fixture  No further active bleeding noted from Trach Site  Abdominal:      General: There is no distension.      Tenderness: There is no abdominal tenderness.   Musculoskeletal:      Comments: Right Femoral ECMO Cannula Site   Skin:     General: Skin is warm and dry.   Neurological:      Comments: Tracks  Withdraws to pain     Input from RN due to COVID-19    Vents:  Vent Mode: A/C (06/16/20 0104)  Ventilator Initiated: Yes (04/10/20 2247)  Set Rate: 30 BPM (06/16/20 0104)  Vt Set: 250 mL (06/06/20 0858)  Pressure Support: 20 cmH20 (06/06/20 0858)  PEEP/CPAP: 6 cmH20 (06/16/20 0104)  Oxygen Concentration (%): 50 (06/16/20 0600)  Peak Airway Pressure: 31 cmH2O (06/16/20 0104)  Plateau Pressure: 18 cmH20 (06/16/20 0104)  Total Ve: 11.4 mL (06/16/20 0104)  F/VT Ratio<105  (RSBI): 147.37 (06/16/20 0104)    Lines/Drains/Airways     Peripherally Inserted Central Catheter Line            PICC Double Lumen 05/05/20 1707 right basilic 41 days          Central Venous Catheter Line                 ECMO Cannula 04/25/20 1120 right femoral vein 51 days         ECMO Cannula 04/25/20 1120 right internal jugular 51 days          Drain                 NG/OG Tube 05/03/20 1215 [place] sump;nasogastric 18 Fr. Left nostril 43 days         Urethral Catheter 05/28/20 1630 18 days          Airway                 Surgical Airway 05/05/20 1500 [place] Cuffed 41 days          Arterial Line                 Arterial Line 04/27/20 1100 Right Brachial 49 days          Peripheral Intravenous Line                 Peripheral IV - Single Lumen 06/12/20 0400 20 G Left Wrist 4 days         Peripheral IV - Single Lumen 06/14/20 0200 20 G Left Hand 2 days                Significant Labs:    CBC/Anemia Profile:  Recent Labs   Lab 06/15/20  0200  06/15/20  1450 06/15/20  2000 06/15/20  2004 06/16/20  0200 06/16/20  0204   WBC 13.53*   < > 12.03 13.12*  --  15.54*  --    HGB 9.7*   < > 9.9* 9.2*  --  9.5*  --    HCT 30.2*   < > 31.6* 28.8* 27* 30.4* 29*      < > 155 137*  --  148*  --    MCV 90   < > 92 93  --  92  --    RDW 14.6*   < > 14.6* 14.8*  --  14.8*  --    FERRITIN 2,377*  --   --   --   --  2,174*  --     < > = values in this interval not displayed.        Chemistries:  Recent Labs   Lab 06/15/20  1450 06/15/20  2000 06/16/20  0200    139 142   K 4.4 3.9 4.3   CL 93* 93* 95   CO2 32* 33* 33*   BUN 47* 46* 46*   CREATININE 0.6 0.6 0.7   CALCIUM 10.2 9.9 10.2   ALBUMIN 3.2* 3.2* 3.3*   PROT 7.0 6.7 6.9   BILITOT 1.6* 1.7* 1.7*   ALKPHOS 113 105 112   ALT 23 24 25   AST 28 28 28   MG 2.1 2.0 1.9   PHOS 3.4 3.1 3.1       All pertinent labs within the past 24 hours have been reviewed.    Significant Imaging:  I have reviewed and interpreted all pertinent imaging results/findings within the past 24  hours.

## 2020-06-16 NOTE — PROGRESS NOTES
ECMO Specialists shift report    Date: 06/16/2020  ECMO Specialist:  Jorge Yeboah      Pump parameters:  RPM: 2800  Flow:  2.5  Sweep:  1  FiO2:  21     Oxygenator status:  Clots: no  Fibrin: no     Pressure trends:  P1: 101  P2: 82  Delta P: 19     Volume status:  Chugging noted - yes slightly  MAP:  90's  MD notified (name):       Anticoagulation:  ACT/aPTT/Xa parameters: 0.2-0.25  ACT/aPTT/Xa trends this shift: 0.18, 0.31     Cannula size / status / placement:  Arterial:   Venous 1: 23FR / RIJV / 6.5 cm from incision site to end of metal rings  Venous 2: 27FR / RFV / 12 cm from incision site to end of metal rings  Dual lumen: no     Additional Comments: Patient on remains on VV ECMO.  Was able to wean sweep gas to 1LPM @ 21% FiO2.  Urine output remains good.  100cc Albumin giving at the beginning of shift for swaying/chugging.  Will continue to monitor.

## 2020-06-17 NOTE — PLAN OF CARE
LONG called on call person for Dr. Ayon, spoke to Shabnam HOWELL concerning patient's letter for [employer] for extended medical parole.  LONG informed Shabnam HOWELL that the letter needs to be completed asap.  Shabnam states the Dr. Ayon is in a major operation today and she doesn't know when he will finish but she will pass the message on to him as soon as he is done.  LONG reiterated the importance of the letter and informed her the form letter is either with the nurse or in the chart.  Onel BROCK informed of above and instructed to call CM after letter filled out and signed.    2:40 PM  CM called patient's nurse, Urszula, to see if paperwork is ready for [employer] yet.  She states she texted Dr. Ayon about 10 minutes ago and is waiting for him to respond.  LONG gave contact number to Urszula to call CM when paperwork is complete.

## 2020-06-17 NOTE — PROGRESS NOTES
Wound care follow-up  The trach is clean, dry with HydroferaBlue Ready dressing placed between flange and skin.   The right nare is resolved.  The Left forearm has stable back eschar where a fluid filled raised area was observed.   The bilateral wrist are cathy towards the palms.  ECMO remains in process, no able to turn patient for assessment at this time.   Recommendations:  Arm boards under wrists to provide support, slow/prevent contraction.   Betadine solution painted on left forearm daily  Pressure prevention measures: ROHINI surface, heel protectors, minute turns to off-load pressure. Wedge/pillows to support patient.   DARRYL Benitez RN, CWCN  [id number]

## 2020-06-17 NOTE — PROGRESS NOTES
EC[facility] Specialists shift report    Date: 06/17/2020  ECMO Specialist:  Iesha Elke    Pump parameters:  RPM: 2800  Flow:  2.8  Sweep:  3  FiO2:  35    Oxygenator status:  Clots: yes sample port connecter post oxy  Fibrin: yes sample port connecter post oxy    Pressure trends:  P1: 90's  P2: 70's  Delta P: teens    Volume status:  Chugging noted no  CVP:   MAP:  80's - 100's   MD notified (name):  To Ayon     Anticoagulation:  ACT/aPTT/Xa parameters: Xa 0.2-0.25  ACT/aPTT/Xa trends this shift: 0.23, 0.26    Cannula size / status / placement:  Arterial:   Venous 1: RIJV 23 Fr @ 7  Venous 2: RFV 27 Fr @ 12  Dual lumen:      Additional Comments:    Pt maintained on VV ECMO. Increased sweep to 3 FIO2 35% per Dr. Ayon this morning. Pt had multiple episodes of bradycardia with hypertension and -200 this shift. Sedation given for episodes.

## 2020-06-17 NOTE — SUBJECTIVE & OBJECTIVE
Interval History/Significant Events:   Agitated overnight. Hypertensive/tachycardic.     Follow-up For: Procedure(s) (LRB):  CREATION, TRACHEOSTOMY  (N/A)    Post-Operative Day: 44 Days Post-Op    Objective:     Vital Signs (Most Recent):  Temp: 98.2 °F (36.8 °C) (06/17/20 0700)  Pulse: (!) 136 (06/17/20 0800)  Resp: (!) 44 (06/16/20 2200)  BP: 120/76 (06/17/20 0800)  SpO2: 97 % (06/17/20 0800) Vital Signs (24h Range):  Temp:  [97.7 °F (36.5 °C)-99.1 °F (37.3 °C)] 98.2 °F (36.8 °C)  Pulse:  [] 136  Resp:  [44] 44  SpO2:  [92 %-100 %] 97 %  BP: ()/(52-83) 120/76  Arterial Line BP: ()/(53-88) 134/74     Weight: 60.6 kg (133 lb 9.6 oz)  Body mass index is 22.93 kg/m².      Intake/Output Summary (Last 24 hours) at 6/17/2020 0832  Last data filed at 6/17/2020 0812  Gross per 24 hour   Intake 4728 ml   Output 3329 ml   Net 1399 ml       Physical Exam  Constitutional:       Appearance: He is well-developed and well-nourished.   HENT:      Head: Normocephalic and atraumatic.        Comments: Right IJ ECMO Cannula in placePulmonary:      Comments: Trach in place  Surgi-Cell / NuKnit packed surrounding Trach Fixture  No further active bleeding noted from Trach Site  Abdominal:      General: There is no distension.      Tenderness: There is no abdominal tenderness.   Musculoskeletal:      Comments: Right Femoral ECMO Cannula Site   Skin:     General: Skin is warm and dry.   Neurological:      Comments: does not Track  Withdraws to pain     Input from RN due to COVID-19    Vents:  Vent Mode: A/C (06/17/20 0616)  Ventilator Initiated: Yes (04/10/20 2247)  Set Rate: 30 BPM (06/17/20 0616)  Vt Set: 250 mL (06/06/20 0858)  Pressure Support: 20 cmH20 (06/06/20 0858)  PEEP/CPAP: 6 cmH20 (06/17/20 0616)  Oxygen Concentration (%): 50 (06/17/20 0800)  Peak Airway Pressure: 28 cmH2O (06/17/20 0616)  Plateau Pressure: 30 cmH20 (06/17/20 0616)  Total Ve: 5.52 mL (06/17/20 0616)  F/VT Ratio<105 (RSBI): 147.37 (06/16/20  0104)    Lines/Drains/Airways     Peripherally Inserted Central Catheter Line            PICC Double Lumen 05/05/20 1707 right basilic 42 days          Central Venous Catheter Line                 ECMO Cannula 04/25/20 1120 right femoral vein 52 days         ECMO Cannula 04/25/20 1120 right internal jugular 52 days          Drain                 NG/OG Tube 05/03/20 1215 [place] sump;nasogastric 18 Fr. Left nostril 44 days         Urethral Catheter 05/28/20 1630 19 days          Airway                 Surgical Airway 05/05/20 1500 Shiley Cuffed 42 days          Arterial Line                 Arterial Line 04/27/20 1100 Right Brachial 50 days          Peripheral Intravenous Line                 Peripheral IV - Single Lumen 06/14/20 0200 20 G Left Hand 3 days                Significant Labs:    CBC/Anemia Profile:  Recent Labs   Lab 06/16/20  0200  06/16/20  1344  06/16/20 2000 06/16/20  2317 06/17/20  0300 06/17/20  0304   WBC 15.54*   < > 15.38*  --  15.07*  --   --  13.63*  --    HGB 9.5*   < > 9.1*  --  8.6*  --   --  9.6*  --    HCT 30.4*   < > 28.6*   < > 27.6*   < > 30* 30.1* 31*   *   < > 136*  --  117*  --   --  109*  --    MCV 92   < > 91  --  94  --   --  90  --    RDW 14.8*   < > 15.0*  --  14.8*  --   --  15.6*  --    FERRITIN 2,174*  --   --   --   --   --   --  2,197*  --     < > = values in this interval not displayed.        Chemistries:  Recent Labs   Lab 06/16/20  1344 06/16/20 2000 06/17/20  0300    137 136   K 4.2 4.1 4.4   CL 93* 93* 93*   CO2 37* 32* 34*   BUN 44* 43* 39*   CREATININE 0.6 0.6 0.6   CALCIUM 10.0 10.1 9.5   ALBUMIN 3.2* 3.1* 3.0*   PROT 6.8 6.5 6.4   BILITOT 1.7* 1.5* 1.4*   ALKPHOS 118 115 111   ALT 26 24 25   AST 34 30 31   MG 2.0 1.9 2.5   PHOS 3.4 2.8 2.8       All pertinent labs within the past 24 hours have been reviewed.    Significant Imaging:  I have reviewed and interpreted all pertinent imaging results/findings within the past 24 hours.

## 2020-06-17 NOTE — PROGRESS NOTES
Updated Dr. Ayon on all overnight events, lab results and ABG results, Dilaudid PRN D/C'ed. Fentanyl 25 mcg reordered, Sweep increased to 3, FiO2 increased to 35%, Precedex gtt increased to o.4 mcg/kg/min and not to titrate up, vent settings changed to PIP of 20 from 22 and rate of 30 BPM. Xray ordered. Orders carried out.

## 2020-06-17 NOTE — PROGRESS NOTES
[facility] - Res[facility] ICU  Critical Care - Surgery  Progress Note    Patient Name: Ranjana Sanchez  MRN: [id number]  Admission Date: 4/10/2020  Hospital Length of Stay: 68 days  Code Status: Full Code  Attending Provider: Francis Ayon MD  Primary Care Provider: Primary Doctor No   Principal Problem: Acute respiratory disease due to COVID-19 virus    Subjective:     Hospital/ICU Course:  No notes on file    Interval History/Significant Events:   Agitated overnight. Hypertensive/tachycardic.     Follow-up For: Procedure(s) (LRB):  CREATION, TRACHEOSTOMY  (N/A)    Post-Operative Day: 44 Days Post-Op    Objective:     Vital Signs (Most Recent):  Temp: 98.2 °F (36.8 °C) (06/17/20 0700)  Pulse: (!) 136 (06/17/20 0800)  Resp: (!) 44 (06/16/20 2200)  BP: 120/76 (06/17/20 0800)  SpO2: 97 % (06/17/20 0800) Vital Signs (24h Range):  Temp:  [97.7 °F (36.5 °C)-99.1 °F (37.3 °C)] 98.2 °F (36.8 °C)  Pulse:  [] 136  Resp:  [44] 44  SpO2:  [92 %-100 %] 97 %  BP: ()/(52-83) 120/76  Arterial Line BP: ()/(53-88) 134/74     Weight: 60.6 kg (133 lb 9.6 oz)  Body mass index is 22.93 kg/m².      Intake/Output Summary (Last 24 hours) at 6/17/2020 0832  Last data filed at 6/17/2020 0812  Gross per 24 hour   Intake 4728 ml   Output 3329 ml   Net 1399 ml       Physical Exam  Constitutional:       Appearance: He is well-developed and well-nourished.   HENT:      Head: Normocephalic and atraumatic.        Comments: Right IJ ECMO Cannula in placePulmonary:      Comments: Trach in place  Surgi-Cell / NuKnit packed surrounding Trach Fixture  No further active bleeding noted from Trach Site  Abdominal:      General: There is no distension.      Tenderness: There is no abdominal tenderness.   Musculoskeletal:      Comments: Right Femoral ECMO Cannula Site   Skin:     General: Skin is warm and dry.   Neurological:      Comments: does not Track  Withdraws to pain     Input from RN due to COVID-19    Vents:  Vent  Mode: A/C (06/17/20 0616)  Ventilator Initiated: Yes (04/10/20 2247)  Set Rate: 30 BPM (06/17/20 0616)  Vt Set: 250 mL (06/06/20 0858)  Pressure Support: 20 cmH20 (06/06/20 0858)  PEEP/CPAP: 6 cmH20 (06/17/20 0616)  Oxygen Concentration (%): 50 (06/17/20 0800)  Peak Airway Pressure: 28 cmH2O (06/17/20 0616)  Plateau Pressure: 30 cmH20 (06/17/20 0616)  Total Ve: 5.52 mL (06/17/20 0616)  F/VT Ratio<105 (RSBI): 147.37 (06/16/20 0104)    Lines/Drains/Airways     Peripherally Inserted Central Catheter Line            PICC Double Lumen 05/05/20 1707 right basilic 42 days          Central Venous Catheter Line                 ECMO Cannula 04/25/20 1120 right femoral vein 52 days         ECMO Cannula 04/25/20 1120 right internal jugular 52 days          Drain                 NG/OG Tube 05/03/20 1215 [place] sump;nasogastric 18 Fr. Left nostril 44 days         Urethral Catheter 05/28/20 1630 19 days          Airway                 Surgical Airway 05/05/20 1500 [place] Cuffed 42 days          Arterial Line                 Arterial Line 04/27/20 1100 Right Brachial 50 days          Peripheral Intravenous Line                 Peripheral IV - Single Lumen 06/14/20 0200 20 G Left Hand 3 days                Significant Labs:    CBC/Anemia Profile:  Recent Labs   Lab 06/16/20  0200  06/16/20  1344  06/16/20 2000 06/16/20  2317 06/17/20  0300 06/17/20  0304   WBC 15.54*   < > 15.38*  --  15.07*  --   --  13.63*  --    HGB 9.5*   < > 9.1*  --  8.6*  --   --  9.6*  --    HCT 30.4*   < > 28.6*   < > 27.6*   < > 30* 30.1* 31*   *   < > 136*  --  117*  --   --  109*  --    MCV 92   < > 91  --  94  --   --  90  --    RDW 14.8*   < > 15.0*  --  14.8*  --   --  15.6*  --    FERRITIN 2,174*  --   --   --   --   --   --  2,197*  --     < > = values in this interval not displayed.        Chemistries:  Recent Labs   Lab 06/16/20  1344 06/16/20 2000 06/17/20  0300    137 136   K 4.2 4.1 4.4   CL 93* 93* 93*   CO2 37* 32* 34*   BUN  44* 43* 39*   CREATININE 0.6 0.6 0.6   CALCIUM 10.0 10.1 9.5   ALBUMIN 3.2* 3.1* 3.0*   PROT 6.8 6.5 6.4   BILITOT 1.7* 1.5* 1.4*   ALKPHOS 118 115 111   ALT 26 24 25   AST 34 30 31   MG 2.0 1.9 2.5   PHOS 3.4 2.8 2.8       All pertinent labs within the past 24 hours have been reviewed.    Significant Imaging:  I have reviewed and interpreted all pertinent imaging results/findings within the past 24 hours.    Assessment/Plan:     COVID-19 virus detected  Ranjana Sanchez is a 55 y.o. 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  This was switched to ETT on 4/11.  Cannulated on 4/25.    Neuro:  - Precedex for sedation  - 100 BID of seroquel  - Fentanyl patch  - Encephalopathy with poor neuro exam; still withdraws to pain  - CT on June 2 showing bilateral basal ganglia infarcts, which likely explains the neuro exam findings. His infarcts are sizeable, but extent of permanent deficits are unknown. The likelihood of meaningful recovery is low. Neuro consulted to prognosticate and agreed with poor neurological prognosis given bilateral basal ganglia CVAs    Pulm  AC/VC+ on the vent.  ECMO   Pump parameters:  See perfusionist documentaion  Decannulation per CT surgery  Overbreathing vent at rate of 45-50s with TVs 150-250    Vent Mode: A/C  Oxygen Concentration (%):  [50] 50  Resp Rate Total:  [23 br/min-56 br/min] 34 br/min  PEEP/CPAP:  [6 cmH20] 6 cmH20  Mean Airway Pressure:  [11 zkQ09-17 cmH20] 15 cmH20      Cardiac  Levo as needed  Tachy/HTN intermittently resolved with prn hydromorphone  MAP goal < 90  ASA 81  ECMO    FEN/GI  TFs at goal (50cc/hr)    Renal  UOP 95-200cc/hr  Lasix gtt at 2.5    Heme  Hep gtt, goal 0.2-0.25 to prevent another GI bleed  Has been at goal at this rate  - upper endoscopy 5/3 with clip placement     ID  WBC uptrending  vanc started  Dose per pharm    Endo:  Endocrine following and managing bg    Dispo: RICU  -Prognosis is  Grim  -family is reportedly trying to come in from [place] at some point           Critical secondary to Patient has a condition that poses threat to life and bodily function: Severe Respiratory Distress     Critical care was time spent personally by me on the following activities: development of treatment plan with patient or surrogate and bedside caregivers, discussions with consultants, evaluation of patient's response to treatment, examination of patient, ordering and performing treatments and interventions, ordering and review of laboratory studies, ordering and review of radiographic studies, pulse oximetry, re-evaluation of patient's condition.  This critical care time did not overlap with that of any other provider or involve time for any procedures.     Parker Benton MD  Critical Care - Surgery  [facility] - Res[facility] ICU

## 2020-06-17 NOTE — NURSING
Dr. Ariza notified that pt has increased roving of eyes noted. All other neuro exams remains the same. WCTM.

## 2020-06-17 NOTE — NURSING
All VSS throughout shift. Son called and updated on pt current condition. Dr. Ayon at bedside and updated on VS, gtts, I/O, ECMO, neuro status.     No changes made to ECMO today- Speed 2800, 3 SWEEP, 35% FiO2, flows 2.6-2.9. R IJ @6.5 cm, R fem @ 12cm. Both sites CDI.     Remains on AC/PC 50%, 6 PEEP, rate of 30. Gtts: lasix @ 2.5, precedex @0.4, heparin @ 800 u/hr.   TF @50 (goal) with minimal residuals. 400cc water bolus given when I/O net negative.   UOP 2125cc/shift. No BM this shift.     Several episodes of hypertension (SBP >200) and bradycardia (HR in 50's) as well as tachypnea (RR >45). 25 mcg of fentanyl given with each episode and VSS returned to stable.   Does not follow commands, opens eyes spontaneously but does not track. +corneal, +gag, +cough.     Labs Q6, accuchecks Q4-- SSI given PRN.     Wound care at bedside to assess left arm wound. See note for details. UEs remain contracted. Heel boots rotated and in place.

## 2020-06-17 NOTE — PROGRESS NOTES
Pt having zafar and hypertensive episodes starting @ 2230, HR would decreased from 140s to 70s, subsequently RR @ 60s, sats decreased from 100% to 90%, SBP increase from 130s to 200s, pt appearing very agitated, with eyes wide open and diaphragmatic breathing. 25 mcg of Fentanyl administered with some relief, and increase in Precedex gtt from 0.5 mcg/kg/min to 0.7 mcg/kg/min, however noted relief and improvement in VS noted to only last 10-15 mins. Pt noted to have another zafar/hypertensive episode, Fentanyl administered @ 2230, Dr. Grayson notified also. At this time 1 PRBC infusing per order, notified by ECMO specialist of noted line chatter and 100 mL IsoLyte administered by specialist. Per MD order 50 mcg Fentanyl administered next time pt ap[pears agitated with noted changes in VS @ 2250. ABG obtained @ 2317, pH 7.35, CO2 67.4, PO2 62. Dr. Grier and Dr. Grayson notified, per standing orders Sweep increased to 1 at this time by ECMO specfialist, 10 mins later notified by Dr. grier to increase the Sweep to 2.5 and ECMO FiO2 to 30%, orders carried out by ECMO specialist. Per Dr. Grayson Fentanyl IVP changed to Dilaudid 0.5 mg q1h PRN. At 2350 pt noted very agitated again, SBP 170s, RR 50s-60, 0.25 mg Dilaudid administered first, Precedex gtt decreased slowly also to keep pt calm and MAP >70. Pt tolerated 0.25 well, the other 0.25 mg administered 30 mins later when pt began coughing, with good response. Pt now on Precedex gtt @ 0.;4 mcg/kg/min,, MAP 73, makeda 100%, appears comfortable.

## 2020-06-17 NOTE — CARE UPDATE
BG goal 140-180    Remains in RICU. ECMO. Intubated and sedated. BG reasonably well controlled with scheduled insulin and prn correction scale.     Plan:  BG monitoring every 4 hours and low dose correction scale.   Continue novolog 5 units every 4 hours while on TF; hold if TF held or BG less than 100.     Discharge planning:TBD     Endocrine to continue to follow    ** Please call Endocrine for any BG related issues **

## 2020-06-17 NOTE — PROGRESS NOTES
Pharmacokinetic Initial Assessment: IV Vancomycin    Assessment/Plan:  - Vancomycin trough collected correctly today prior to 4th dose resulted as 20.9 ug/mL, supra therapeutic for goal trough 10-15 ug/mL.  - Decreasing scheduled vancomycin regimen from 1000 mg IV q12h to 750 mg IV q12h.  - Draw next trough on 6/19 @0830 prior to 4th dose of this new regimen.    Pharmacy will continue to follow and monitor vancomycin.      Please contact pharmacy at extension [phone] with any questions regarding this assessment.     Thank you for the consult,   He Wei     Patient brief summary:  Ranjana Sanchez is a 55 y.o. male initiated on antimicrobial therapy with IV Vancomycin for surgical prophylaxis.    Drug Allergies:   Review of patient's allergies indicates:  No Known Allergies    Actual Body Weight:   60.6 kg    Renal Function:   Estimated Creatinine Clearance: 113.7 mL/min (based on SCr of 0.6 mg/dL).,     Dialysis Method (if applicable):  N/A

## 2020-06-17 NOTE — NURSING
Dr. Arguello notified of antiXa 0.26 (goal 0.2-0.25). Orders placed to decrease heparin gtt from 900u/hr to 800u/hr. WCTM.

## 2020-06-17 NOTE — PROGRESS NOTES
ECMO Specialists shift report    Date: 06/17/2020  ECMO Specialist:  Ashley Francisco    Pump parameters:  RPM: 2800  Flow: 2.8  Sweep:  2.5  FiO2:  30    Oxygenator status:  Clots: few pre  Fibrin: some    Pressure trends:  P1: 96  P2: 80  Delta P: 16    Volume status:  Chugging noted? Yes chirpping  CVP:   MAP:  60-100s  MD notified (name):  Nany/Kenan    Anticoagulation:  ACT/aPTT/Xa parameters: .2-.25  ACT/aPTT/Xa trends this shift: .17-.25    Cannula size / status / placement:  Arterial:   Venous 1: RIJV 23F@7 cm measured  Venous 2: RFV 27F@ 12cm measured  Dual lumen:      Additional Comments: Clots in RFV distal close to R. Knee.  Pt. Very agitated.  50-60 BPM. Devan/hypertense.  Gave PRBCs and I pushed 100 Iso. Went up to 9 ml/hr heparin.  Dilaudid started.  Sweep increased for CO2 over 65 and ended up at 2.5 lpm.  Please see RNs notes and Nany aware of all.

## 2020-06-17 NOTE — ASSESSMENT & PLAN NOTE
Ranjana Sanchez is a 55 y.o. 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  This was switched to ETT on 4/11.  Cannulated on 4/25.    Neuro:  - Precedex for sedation  - 100 BID of seroquel  - Fentanyl patch  - Encephalopathy with poor neuro exam; still withdraws to pain  - CT on June 2 showing bilateral basal ganglia infarcts, which likely explains the neuro exam findings. His infarcts are sizeable, but extent of permanent deficits are unknown. The likelihood of meaningful recovery is low. Neuro consulted to prognosticate and agreed with poor neurological prognosis given bilateral basal ganglia CVAs    Pulm  AC/VC+ on the vent.  ECMO   Pump parameters:  See perfusionist documentaion  Decannulation per CT surgery  Overbreathing vent at rate of 45-50s with TVs 150-250    Vent Mode: A/C  Oxygen Concentration (%):  [50] 50  Resp Rate Total:  [23 br/min-56 br/min] 34 br/min  PEEP/CPAP:  [6 cmH20] 6 cmH20  Mean Airway Pressure:  [11 feD41-46 cmH20] 15 cmH20      Cardiac  Levo as needed  Tachy/HTN intermittently resolved with prn hydromorphone  MAP goal < 90  ASA 81  ECMO    FEN/GI  TFs at goal (50cc/hr)    Renal  UOP 95-200cc/hr  Lasix gtt at 2.5    Heme  Hep gtt, goal 0.2-0.25 to prevent another GI bleed  Has been at goal at this rate  - upper endoscopy 5/3 with clip placement     ID  WBC uptrending  vanc started  Dose per pharm    Endo:  Endocrine following and managing bg    Dispo: RICU  -Prognosis is Grim  -family is reportedly trying to come in from [place] at some point

## 2020-06-18 NOTE — ASSESSMENT & PLAN NOTE
Acute respiratory distress syndrome (ARDS) due to COVID-19 virus  - Crich switched to ETT on 4/11  - Monika weaned off 5/5  - Tracheostomy and bronch 5/5  - Severe thrombocytopenia resolved; transfuse for under 30K  - Aspirin 81 daily  - RPM at 2800; flows at 2.8  - Sweep at 1; CO2 goal is 59 or below  - Do not increase sweep for pCO2s 59 or below as long as patient is not acidotic, as these high pCO2s are respiratory compensation for alkalosis  - Oxygenator Fi at 35%; switched out 6/9  - Working toward ECMO wean     Tongue laceration      -ENT evaluated, laceration superficial      -resolved    Sedation  - Valium, seroquel, PRN Fentanyl     UGI Bleed  - Scoped by GI 5/3 with epinephrine injection and clip placement for an actively bleeding 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

## 2020-06-18 NOTE — NURSING
"      SICU PLAN OF CARE NOTE    Dx: Acute respiratory disease due to COVID-19 virus    Shift Events: 1 U PRBC, 1 U cryo; FiO2 on ECMO increased to 40% at beginning of shift; vent settings changed at end of shift by RT per Dr. Ayon's orders  10+ bradycardic/hypertensive/tachypneic episodes throughout shift- systolics as high as 215, HR as low as 51, RR as high as 62; episodes relieved with fentanyl pushes     Goals of Care: maintain current treatment, wean ECMO    Neuro: opens eyes spontaneously; does not follow commands; does not track; + corneal, gag and cough reflexes    Vital Signs: /79 (BP Location: Left arm, Patient Position: Lying)   Pulse (!) 130   Temp 98.3 °F (36.8 °C) (Oral)   Resp (!) 38   Ht 5' 4" (1.626 m)   Wt 62.4 kg (137 lb 9.1 oz)   SpO2 97%   BMI 23.61 kg/m²     Respiratory: Ventilator     Diet: Tube Feeds @ 50 (goal)    Gtts: Precedex @ 0.4, Lasix @ 2.5, Heparin @ 950    Urine Output: Urinary Catheter 2430 cc/shift    Labs/Accuchecks: labs q6 hours, accuchecks q4, SSI given per orders.    Skin: left forearm continuing to improve- JUANITA per wound care orders; UE remain contracted; heel boots rotated and in place    2 BM during shift     R IJ @ 6.5cm, R fem @ 12cm- both sites CDI    Son called and updated on POC. WCTM.        "

## 2020-06-18 NOTE — NURSING
Dr. Ayon called and updated on event shifts. Verbal orders received to ensure he is not on any beta blockers, return his vent settings to what they were 2-3 days ago and wean Fi)2 on ECMO back to 30-40% as tolerated.     Dr. Ayon filled out paperwork that has been requested by case management before going to the OR this AM and it was sent to the recipients in the note written by the  previously. Mathew has left for the day so unable to reach her to let her know.

## 2020-06-18 NOTE — PROGRESS NOTES
[facility] - Respiratory ICU  Cardiothoracic Surgery  Daily ECMO Progress Note    Patient Name: Ranjana Sanchez  MRN: [id number]  Admission Date: 4/10/2020  Hospital Length of Stay: 69 days  Code Status: Full Code   Attending Physician: Francis Ayon MD   Referring Provider: Francis Ayon MD  Principal Problem:Acute respiratory disease due to COVID-19 virus    Subjective:   Interval History:  No acute events overnight. Continues with vagal episodes and tachypnea. UOP 4.5L, net postivie 350mL in 24hrs. Hep gtt 900. Coag labs appear diluted this AM (low platelets, fibrinogen, aXa), will recheck. FiO2 increased to 40% and giving 1U pRBC this AM.    Medications:  Continuous Infusions:   dexmedetomidine (PRECEDEX) infusion 0.4 mcg/kg/hr (06/18/20 0800)    dextrose 10 % in water (D10W)      furosemide (LASIX) 2 mg/mL continuous infusion (non-titrating) 2.5 mg/hr (06/18/20 0800)    heparin (porcine) in 5 % dex 900 Units/hr (06/18/20 0800)    norepinephrine bitartrate-D5W Stopped (06/10/20 0500)     Scheduled Meds:   aspirin  81 mg Per NG tube Daily    fentaNYL  1 patch Transdermal Q72H    insulin aspart U-100  5 Units Subcutaneous Q24H    insulin aspart U-100  5 Units Subcutaneous Q24H    insulin aspart U-100  5 Units Subcutaneous Q24H    insulin aspart U-100  5 Units Subcutaneous Q24H    insulin aspart U-100  5 Units Subcutaneous Q24H    insulin aspart U-100  5 Units Subcutaneous Q24H    multivitamin liquid no.118  10 mL Per G Tube Daily    pantoprazole  40 mg Per NG tube BID    polyethylene glycol  17 g Per NG tube Daily    potassium chloride 10%  40 mEq Per NG tube BID    psyllium husk (aspartame)  1 packet Per NG tube TID    sucralfate  1 g Per NG tube Q6H    vancomycin (VANCOCIN) IVPB  750 mg Intravenous Q12H    vitamin D  1,000 Units Per G Tube Daily        Objective:     Vital Signs (Most Recent):  Temp: 98.2 °F (36.8 °C) (06/18/20 0700)  Pulse: (!) 145 (06/18/20 0800)  Resp:  (!) 38 (06/18/20 0100)  BP: 125/74 (06/18/20 0800)  SpO2: 99 % (06/18/20 0800) Vital Signs (24h Range):  Temp:  [98 °F (36.7 °C)-99 °F (37.2 °C)] 98.2 °F (36.8 °C)  Pulse:  [] 145  Resp:  [38-47] 38  SpO2:  [91 %-100 %] 99 %  BP: ()/(58-96) 125/74  Arterial Line BP: ()/() 130/73     Weight: 62.4 kg (137 lb 9.1 oz)  Body mass index is 23.61 kg/m².    SpO2: 99 %  O2 Device (Oxygen Therapy): ventilator    Intake/Output - Last 3 Shifts       06/16 0700 - 06/17 0659 06/17 0700 - 06/18 0659 06/18 0700 - 06/19 0659    I.V. (mL/kg) 1022 (16.9) 1395.9 (22.4)     Blood 301 500     NG/GT 3675 2905 100    Total Intake(mL/kg) 4998 (82.5) 4800.9 (76.9) 100 (1.6)    Urine (mL/kg/hr) 3290 (2.3) 4425 (3) 355 (3.5)    Stool 0 0     Blood 4 4 3    Total Output 3294 4429 358    Net +1704 +371.9 -258           Stool Occurrence 2 x 1 x           Lines/Drains/Airways     Peripherally Inserted Central Catheter Line            PICC Double Lumen 05/05/20 1707 right basilic 43 days          Central Venous Catheter Line                 ECMO Cannula 04/25/20 1120 right femoral vein 53 days         ECMO Cannula 04/25/20 1120 right internal jugular 53 days          Drain                 NG/OG Tube 05/03/20 1215 [place] sump;nasogastric 18 Fr. Left nostril 45 days         Urethral Catheter 05/28/20 1630 20 days          Airway                 Surgical Airway 05/05/20 1500 [place]iley Cuffed 43 days          Arterial Line                 Arterial Line 04/27/20 1100 Right Brachial 51 days          Peripheral Intravenous Line                 Peripheral IV - Single Lumen 06/17/20 1010 20 G Left Forearm less than 1 day                Physical Exam    Constitutional: He is sedated, and intubated.   Head: Normocephalic   Cardiovascular: Tachycardia 130s   Pulmonary/Chest: intubated vent FiO2 50% PEEP 6, ECMO RPM 2800, Flow 2.9, sweep 3, oxygenator FiO2 40%  Skin: L neck cannula repositioned and secured, intact, will good placement on  CXR. No change in flows at this time. No flashing. Clots pre and post-oxygenator.  Nursing note and vitals reviewed.    Significant Labs:  BMP:   Recent Labs   Lab 06/18/20  0011   *   *   K 4.4   CL 91*   CO2 33*   BUN 37*   CREATININE 0.6   CALCIUM 8.8   MG 1.9     CBC:   Recent Labs   Lab 06/18/20  0258  06/18/20  0802   WBC 11.25  --   --    RBC 3.04*  --   --    HGB 8.6*  --   --    HCT 27.8*   < > 29*   PLT 83*  --   --    MCV 91  --   --    MCH 28.3  --   --    MCHC 30.9*  --   --     < > = values in this interval not displayed.     LFTs:   Recent Labs   Lab 06/18/20  0011   ALT 21   AST 26   ALKPHOS 97   BILITOT 1.5*   PROT 6.3   ALBUMIN 3.3*     Significant Diagnostics:  Cxr: stable    ROS        Assessment/Plan:     Acute respiratory distress syndrome (ARDS) due to COVID-19 virus  - Crich switched to ETT on 4/11  - Monika weaned off 5/5  - Tracheostomy and bronch 5/5  - Severe thrombocytopenia resolved; transfuse for under 30K  - Aspirin 81 daily  - RPM at 2800; flows at 2.9  - Sweep at 1; CO2 goal is 59 or below  - Do not increase sweep for pCO2s 59 or below as long as patient is not acidotic, as these high pCO2s are respiratory compensation for alkalosis  - Oxygenator Fi at 40%; switched out 6/9  - Working toward ECMO wean     Tongue laceration      -ENT evaluated, laceration superficial      -resolved    Sedation  - Valium, seroquel, PRN Fentanyl     UGI Bleed  - Scoped by GI 5/3 with epinephrine injection and clip placement for an actively bleeding D2/D3 Diuelafoy lesion  - Recommend GI re-consultation if Hb continues to drift and further blood is aspirated from NGT or patient has bloody BM, etc  - Currently resolved    FEN/GI  - TF at 45, goal      - Lasix gtt 2.5  - FWB for hypernatremia 400mL q4     Anticoagulation      - Goal aXa 0.2-0.25 Q6hrs      - Hep gtt 900      - Redraw coag labs      - monitor LDH    Prophylaxis      - protonix q12 IV       - leukocytosis stable      - PICC placed  5/5; central line removed 5/6    Anne Miller MD  Cardiothoracic Surgery  [facility] - Res[facility] ICU      VV ECMO circuit checked and all parameters reviewed. Anticoagulation was managed and all cannulation exit sites along with review of labs and radiology studies performed. Speed and functioning of the pump along with oxygenator quality reviewed.  Patient doing well on VV ECMO support and continues to be very stable.  Nutritional requirements are being met.  Passive range of motion was carried out by myself.  Coordination of care between different teams which included the surgical ICU, nursing staff and the perfusion team was carried out.  All questions and concerns were addressed.

## 2020-06-18 NOTE — SUBJECTIVE & OBJECTIVE
Interval History:  No acute events overnight. Continues with vagal episodes and tachypnea. UOP 4.5L, net postivie 350mL in 24hrs. Hep gtt 900. Coag labs appear diluted this AM (low platelets, fibrinogen, aXa), will recheck. FiO2 increased to 40% and giving 1U pRBC this AM.    Medications:  Continuous Infusions:   dexmedetomidine (PRECEDEX) infusion 0.4 mcg/kg/hr (06/18/20 0800)    dextrose 10 % in water (D10W)      furosemide (LASIX) 2 mg/mL continuous infusion (non-titrating) 2.5 mg/hr (06/18/20 0800)    heparin (porcine) in 5 % dex 900 Units/hr (06/18/20 0800)    norepinephrine bitartrate-D5W Stopped (06/10/20 0500)     Scheduled Meds:   aspirin  81 mg Per NG tube Daily    fentaNYL  1 patch Transdermal Q72H    insulin aspart U-100  5 Units Subcutaneous Q24H    insulin aspart U-100  5 Units Subcutaneous Q24H    insulin aspart U-100  5 Units Subcutaneous Q24H    insulin aspart U-100  5 Units Subcutaneous Q24H    insulin aspart U-100  5 Units Subcutaneous Q24H    insulin aspart U-100  5 Units Subcutaneous Q24H    multivitamin liquid no.118  10 mL Per G Tube Daily    pantoprazole  40 mg Per NG tube BID    polyethylene glycol  17 g Per NG tube Daily    potassium chloride 10%  40 mEq Per NG tube BID    psyllium husk (aspartame)  1 packet Per NG tube TID    sucralfate  1 g Per NG tube Q6H    vancomycin (VANCOCIN) IVPB  750 mg Intravenous Q12H    vitamin D  1,000 Units Per G Tube Daily        Objective:     Vital Signs (Most Recent):  Temp: 98.2 °F (36.8 °C) (06/18/20 0700)  Pulse: (!) 145 (06/18/20 0800)  Resp: (!) 38 (06/18/20 0100)  BP: 125/74 (06/18/20 0800)  SpO2: 99 % (06/18/20 0800) Vital Signs (24h Range):  Temp:  [98 °F (36.7 °C)-99 °F (37.2 °C)] 98.2 °F (36.8 °C)  Pulse:  [] 145  Resp:  [38-47] 38  SpO2:  [91 %-100 %] 99 %  BP: ()/(58-96) 125/74  Arterial Line BP: ()/() 130/73     Weight: 62.4 kg (137 lb 9.1 oz)  Body mass index is 23.61 kg/m².    SpO2: 99 %  O2  Device (Oxygen Therapy): ventilator    Intake/Output - Last 3 Shifts       06/16 0700 - 06/17 0659 06/17 0700 - 06/18 0659 06/18 0700 - 06/19 0659    I.V. (mL/kg) 1022 (16.9) 1395.9 (22.4)     Blood 301 500     NG/GT 3675 2905 100    Total Intake(mL/kg) 4998 (82.5) 4800.9 (76.9) 100 (1.6)    Urine (mL/kg/hr) 3290 (2.3) 4425 (3) 355 (3.5)    Stool 0 0     Blood 4 4 3    Total Output 3294 4429 358    Net +1704 +371.9 -258           Stool Occurrence 2 x 1 x           Lines/Drains/Airways     Peripherally Inserted Central Catheter Line            PICC Double Lumen 05/05/20 1707 right basilic 43 days          Central Venous Catheter Line                 ECMO Cannula 04/25/20 1120 right femoral vein 53 days         ECMO Cannula 04/25/20 1120 right internal jugular 53 days          Drain                 NG/OG Tube 05/03/20 1215 [place] sump;nasogastric 18 Fr. Left nostril 45 days         Urethral Catheter 05/28/20 1630 20 days          Airway                 Surgical Airway 05/05/20 1500 [place] Cuffed 43 days          Arterial Line                 Arterial Line 04/27/20 1100 Right Brachial 51 days          Peripheral Intravenous Line                 Peripheral IV - Single Lumen 06/17/20 1010 20 G Left Forearm less than 1 day                Physical Exam    Constitutional: He is sedated, and intubated.   Head: Normocephalic   Cardiovascular: Tachycardia 130s   Pulmonary/Chest: intubated vent FiO2 50% PEEP 6, ECMO RPM 2800, Flow 2.9, sweep 3, oxygenator FiO2 40%  Skin: L neck cannula repositioned and secured, intact, will good placement on CXR. No change in flows at this time. No flashing. Clots pre and post-oxygenator.  Nursing note and vitals reviewed.    Significant Labs:  BMP:   Recent Labs   Lab 06/18/20  0011   *   *   K 4.4   CL 91*   CO2 33*   BUN 37*   CREATININE 0.6   CALCIUM 8.8   MG 1.9     CBC:   Recent Labs   Lab 06/18/20  0258  06/18/20  0802   WBC 11.25  --   --    RBC 3.04*  --   --    HGB 8.6*   --   --    HCT 27.8*   < > 29*   PLT 83*  --   --    MCV 91  --   --    MCH 28.3  --   --    MCHC 30.9*  --   --     < > = values in this interval not displayed.     LFTs:   Recent Labs   Lab 06/18/20  0011   ALT 21   AST 26   ALKPHOS 97   BILITOT 1.5*   PROT 6.3   ALBUMIN 3.3*     Significant Diagnostics:  Cxr: stable    ROS

## 2020-06-18 NOTE — PLAN OF CARE
Patient still intubated on ventilator with Precedex and Lasix drips, on ECMO and has a Trach and NGT.  Plan is to discharge to LTAC when medically ready.       06/18/20 1342   Discharge Reassessment   Assessment Type Discharge Planning Reassessment   Discharge Plan A Long-term acute care facility (LTAC)   Discharge Plan B Long-term acute care facility (LTAC)   DME Needed Upon Discharge  other (see comments)  (TBD)   Anticipated Discharge Disposition LTAC

## 2020-06-18 NOTE — NURSING
Pt having episode of zafar/variable HR with 's/70s. Pt appears agitated, eyes bulging, tachypnic rates 40-50 sats 89%. Notified Dr Ariza of condition, mentioned last night dilaudid helped significantly. MD said ok to give dilaudid.    Episode lasted from 2227 - 2235. Return to vitals: , /68 MAP 91, sats 96%, RR 38.

## 2020-06-18 NOTE — PROGRESS NOTES
ECMO Specialists shift report    Date: 06/18/2020  ECMO Specialist:  Lia Garciasler    Pump parameters:  RPM: 2800  Flow:  2.8  Sweep:  3  FiO2:  40    Oxygenator status:  Clots: few pre  Fibrin: scant within circuit    Pressure trends:  P1:   P2: 77- 81  Delta P: 19-20    Volume status:  Chugging noted - yes  MAP:  90's - 100's  MD notified (name):  Dr. Hernandez; Dr. Miller; Dr. Ayon    Anticoagulation:  ACT/aPTT/Xa parameters: 0.2-0.25  ACT/aPTT/Xa trends this shift: 0.2    Cannula size / status / placement:  Arterial:   Venous 1: 23FR / RIJV / 6.5 cm from incision site  Venous 2: 27FR / RFV / 12 cm from incision site  Dual lumen: no     Additional Comments:          Multiple episodes of tachycardia, hypertension then bradycardia & hypotension; pt breathing over vent with continuous tachypnia.  Flows slowly decreased as day progressed.  Pt aggitated throughout shift.  One unit PRBC's given followed by one unit cryo.  Mild chugging noted in ECMO circuit.  Hep increased slightly as per parameters.  BS were musical rhonchi bilaterally with tan secretions upon suctioning; BS were crepitus in nature & oxygenation ability decreasing.  Difficult to calm pt without more sedation.  VV ECMO maintained status quo; MD's aware.

## 2020-06-18 NOTE — PROGRESS NOTES
[facility] - Respiratory ICU  Critical Care - Surgery  Progress Note    Patient Name: Ranjana Sanchez  MRN: [id number]  Admission Date: 4/10/2020  Hospital Length of Stay: 69 days  Code Status: Full Code  Attending Provider: Francis Ayon MD  Primary Care Provider: Primary Doctor No   Principal Problem: Acute respiratory disease due to COVID-19 virus    Subjective:     Hospital/ICU Course:  No notes on file    Interval History/Significant Events:   Agitated overnight. Hypertensive/tachycardic.   Continues with vagal episodes and tachypnea  Probably some dilutional labs today     Follow-up For: Procedure(s) (LRB):  CREATION, TRACHEOSTOMY  (N/A)    Post-Operative Day: 44 Days Post-Op    Objective:     Vital Signs (Most Recent):  Temp: 98.2 °F (36.8 °C) (06/18/20 0700)  Pulse: (!) 141 (06/18/20 0845)  Resp: (!) 38 (06/18/20 0100)  BP: 125/74 (06/18/20 0800)  SpO2: 100 % (06/18/20 0845) Vital Signs (24h Range):  Temp:  [98 °F (36.7 °C)-99 °F (37.2 °C)] 98.2 °F (36.8 °C)  Pulse:  [] 141  Resp:  [38-47] 38  SpO2:  [91 %-100 %] 100 %  BP: ()/(58-96) 125/74  Arterial Line BP: ()/() 117/71     Weight: 62.4 kg (137 lb 9.1 oz)  Body mass index is 23.61 kg/m².      Intake/Output Summary (Last 24 hours) at 6/18/2020 0846  Last data filed at 6/18/2020 0800  Gross per 24 hour   Intake 4680.88 ml   Output 4535 ml   Net 145.88 ml       Physical Exam  Constitutional:       Appearance: He is well-developed and well-nourished.   HENT:      Head: Normocephalic and atraumatic.        Comments: Right IJ ECMO Cannula in placePulmonary:      Comments: Trach in place  Surgi-Cell / NuKnit packed surrounding Trach Fixture  No further active bleeding noted from Trach Site  Abdominal:      General: There is no distension.      Tenderness: There is no abdominal tenderness.   Musculoskeletal:      Comments: Right Femoral ECMO Cannula Site   Skin:     General: Skin is warm and dry.   Neurological:       Comments: does not Track  Withdraws to pain     Input from RN due to COVID-19    Vents:  Vent Mode: A/C (06/18/20 0127)  Ventilator Initiated: Yes (04/10/20 2247)  Set Rate: 30 BPM (06/18/20 0127)  Vt Set: 250 mL (06/06/20 0858)  Pressure Support: 20 cmH20 (06/06/20 0858)  PEEP/CPAP: 6 cmH20 (06/18/20 0127)  Oxygen Concentration (%): 50 (06/18/20 0845)  Peak Airway Pressure: 28 cmH2O (06/18/20 0127)  Plateau Pressure: 30 cmH20 (06/18/20 0127)  Total Ve: 6.37 mL (06/18/20 0127)  F/VT Ratio<105 (RSBI): 147.37 (06/16/20 0104)    Lines/Drains/Airways     Peripherally Inserted Central Catheter Line            PICC Double Lumen 05/05/20 1707 right basilic 43 days          Central Venous Catheter Line                 ECMO Cannula 04/25/20 1120 right femoral vein 53 days         ECMO Cannula 04/25/20 1120 right internal jugular 53 days          Drain                 NG/OG Tube 05/03/20 1215 [place] sump;nasogastric 18 Fr. Left nostril 45 days         Urethral Catheter 05/28/20 1630 20 days          Airway                 Surgical Airway 05/05/20 1500 Shiley Cuffed 43 days          Arterial Line                 Arterial Line 04/27/20 1100 Right Brachial 51 days          Peripheral Intravenous Line                 Peripheral IV - Single Lumen 06/17/20 1010 20 G Left Forearm less than 1 day                Significant Labs:    CBC/Anemia Profile:  Recent Labs   Lab 06/17/20  0300  06/17/20  1956  06/18/20  0258 06/18/20  0414 06/18/20  0415 06/18/20  0802 06/18/20  0810   WBC 13.63*   < > 13.79*  --  11.25  --   --   --  12.74*   HGB 9.6*   < > 9.5*  --  8.6*  --   --   --  9.0*   HCT 30.1*   < > 29.9*   < > 27.8*  --  29* 29* 29.2*   *   < > 92*  --  83*  --   --   --  84*   MCV 90   < > 91  --  91  --   --   --  91   RDW 15.6*   < > 15.4*  --  15.4*  --   --   --  15.4*   FERRITIN 2,197*  --   --   --   --  1,496*  --   --   --     < > = values in this interval not displayed.        Chemistries:  Recent Labs   Lab  06/17/20  1348 06/17/20 1956 06/18/20  0011   * 135* 131*   K 4.0 4.0 4.4   CL 90* 89* 91*   CO2 36* 36* 33*   BUN 38* 38* 37*   CREATININE 0.6 0.6 0.6   CALCIUM 9.4 9.3 8.8   ALBUMIN 3.0* 3.0* 3.3*   PROT 6.5 6.4 6.3   BILITOT 1.5* 1.5* 1.5*   ALKPHOS 110 104 97   ALT 26 24 21   AST 30 29 26   MG 2.2 2.1 1.9   PHOS 2.6* 2.9 2.6*       All pertinent labs within the past 24 hours have been reviewed.    Significant Imaging:  I have reviewed and interpreted all pertinent imaging results/findings within the past 24 hours.    Assessment/Plan:     COVID-19 virus detected  Ranjana Sanchez is a 55 y.o. 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  This was switched to ETT on 4/11.  Cannulated on 4/25.    Neuro:  - Precedex for sedation  - 100 BID of seroquel  - Fentanyl patch  - Encephalopathy with poor neuro exam; still withdraws to pain  - CT on June 2 showing bilateral basal ganglia infarcts, which likely explains the neuro exam findings. His infarcts are sizeable, but extent of permanent deficits are unknown. The likelihood of meaningful recovery is low. Neuro consulted to prognosticate and agreed with poor neurological prognosis given bilateral basal ganglia CVAs    Pulm  AC/VC+ on the vent.  ECMO   Pump parameters:  See perfusionist documentaion  Decannulation per CT surgery  Overbreathing vent at rate of 45-50s with TVs 150-250    Vent Mode: A/C  Oxygen Concentration (%):  [50] 50  Resp Rate Total:  [31 br/min-55 br/min] 39 br/min  PEEP/CPAP:  [6 cmH20] 6 cmH20  Mean Airway Pressure:  [14 lfX73-75 cmH20] 14 cmH20      Cardiac  Levo as needed  Tachy/HTN intermittently resolved with prn hydromorphone  MAP goal < 90  ASA 81  ECMO    FEN/GI  TFs at goal (50cc/hr)    Renal  UOP 95-200cc/hr  Lasix gtt at 2.5    Heme  Hep gtt, goal 0.2-0.25 to prevent another GI bleed  Has been at goal at this rate  - upper endoscopy 5/3 with clip placement      ID  WBC uptrending  vanc started  Dose per pharm    Endo:  Endocrine following and managing bg    Dispo: RICU  -Prognosis is Grim  -family is reportedly trying to come in from [place] at some point             Critical care was time spent personally by me on the following activities: development of treatment plan with patient or surrogate and bedside caregivers, discussions with consultants, evaluation of patient's response to treatment, examination of patient, ordering and performing treatments and interventions, ordering and review of laboratory studies, ordering and review of radiographic studies, pulse oximetry, re-evaluation of patient's condition.  This critical care time did not overlap with that of any other provider or involve time for any procedures.     Chad Paul MD  Critical Care - Surgery  [facility] - Respiratory ICU

## 2020-06-18 NOTE — SUBJECTIVE & OBJECTIVE
Interval History:  No acute events overnight. Sweep increased for increasing CO2 overnight with vagal episodes and tachypnea. UOP 3.3L, net postivie 1.6L in 24hrs. Hep gtt increased to 900, aXa on target.    Medications:  Continuous Infusions:   dexmedetomidine (PRECEDEX) infusion 0.4 mcg/kg/hr (06/18/20 0800)    dextrose 10 % in water (D10W)      furosemide (LASIX) 2 mg/mL continuous infusion (non-titrating) 2.5 mg/hr (06/18/20 0800)    heparin (porcine) in 5 % dex 900 Units/hr (06/18/20 0800)    norepinephrine bitartrate-D5W Stopped (06/10/20 0500)     Scheduled Meds:   aspirin  81 mg Per NG tube Daily    fentaNYL  1 patch Transdermal Q72H    insulin aspart U-100  5 Units Subcutaneous Q24H    insulin aspart U-100  5 Units Subcutaneous Q24H    insulin aspart U-100  5 Units Subcutaneous Q24H    insulin aspart U-100  5 Units Subcutaneous Q24H    insulin aspart U-100  5 Units Subcutaneous Q24H    insulin aspart U-100  5 Units Subcutaneous Q24H    multivitamin liquid no.118  10 mL Per G Tube Daily    pantoprazole  40 mg Per NG tube BID    polyethylene glycol  17 g Per NG tube Daily    potassium chloride 10%  40 mEq Per NG tube BID    psyllium husk (aspartame)  1 packet Per NG tube TID    sucralfate  1 g Per NG tube Q6H    vancomycin (VANCOCIN) IVPB  750 mg Intravenous Q12H    vitamin D  1,000 Units Per G Tube Daily        Objective:     Vital Signs (Most Recent):  Temp: 98.2 °F (36.8 °C) (06/18/20 0700)  Pulse: (!) 145 (06/18/20 0800)  Resp: (!) 38 (06/18/20 0100)  BP: 125/74 (06/18/20 0800)  SpO2: 99 % (06/18/20 0800) Vital Signs (24h Range):  Temp:  [98 °F (36.7 °C)-99 °F (37.2 °C)] 98.2 °F (36.8 °C)  Pulse:  [] 145  Resp:  [38-47] 38  SpO2:  [91 %-100 %] 99 %  BP: ()/(58-96) 125/74  Arterial Line BP: ()/() 130/73     Weight: 62.4 kg (137 lb 9.1 oz)  Body mass index is 23.61 kg/m².    SpO2: 99 %  O2 Device (Oxygen Therapy): ventilator    Intake/Output - Last 3 Shifts        06/16 0700 - 06/17 0659 06/17 0700 - 06/18 0659 06/18 0700 - 06/19 0659    I.V. (mL/kg) 1022 (16.9) 1395.9 (22.4)     Blood 301 500     NG/GT 3675 2905 100    Total Intake(mL/kg) 4998 (82.5) 4800.9 (76.9) 100 (1.6)    Urine (mL/kg/hr) 3290 (2.3) 4425 (3) 355 (3.7)    Stool 0 0     Blood 4 4 3    Total Output 3294 4429 358    Net +1704 +371.9 -258           Stool Occurrence 2 x 1 x           Lines/Drains/Airways     Peripherally Inserted Central Catheter Line            PICC Double Lumen 05/05/20 1707 right basilic 43 days          Central Venous Catheter Line                 ECMO Cannula 04/25/20 1120 right femoral vein 53 days         ECMO Cannula 04/25/20 1120 right internal jugular 53 days          Drain                 NG/OG Tube 05/03/20 1215 [place] sump;nasogastric 18 Fr. Left nostril 45 days         Urethral Catheter 05/28/20 1630 20 days          Airway                 Surgical Airway 05/05/20 1500 Shiley Cuffed 43 days          Arterial Line                 Arterial Line 04/27/20 1100 Right Brachial 51 days          Peripheral Intravenous Line                 Peripheral IV - Single Lumen 06/17/20 1010 20 G Left Forearm less than 1 day                Physical Exam    Constitutional: He is sedated, and intubated.   Head: Normocephalic   Cardiovascular: Tachycardia 130s   Pulmonary/Chest: intubated vent FiO2 50% PEEP 6, ECMO RPM 2800, Flow 2.8, sweep 3, oxygenator FiO2 35%  Skin: L neck cannula repositioned and secured, intact, will good placement on CXR. No change in flows at this time. No flashing. Clots pre and post-oxygenator.  Nursing note and vitals reviewed.    Significant Labs:  BMP:   Recent Labs   Lab 06/18/20  0011   *   *   K 4.4   CL 91*   CO2 33*   BUN 37*   CREATININE 0.6   CALCIUM 8.8   MG 1.9     CBC:   Recent Labs   Lab 06/18/20  0258  06/18/20  0802   WBC 11.25  --   --    RBC 3.04*  --   --    HGB 8.6*  --   --    HCT 27.8*   < > 29*   PLT 83*  --   --    MCV 91  --   --    MCH  28.3  --   --    MCHC 30.9*  --   --     < > = values in this interval not displayed.     LFTs:   Recent Labs   Lab 06/18/20  0011   ALT 21   AST 26   ALKPHOS 97   BILITOT 1.5*   PROT 6.3   ALBUMIN 3.3*     Significant Diagnostics:  Cxr: stable

## 2020-06-18 NOTE — CARE UPDATE
BG goal 140-180     Remains in RICU, bradycardia and agitation noted overnight. ECMO. Intubated and sedated. BG elevated overnight requiring correction scale but overall reasonably well controlled with scheduled insulin and prn correction scale.      Plan:  BG monitoring every 4 hours and low dose correction scale.   Continue novolog 5 units every 4 hours while on TF; hold if TF held or BG less than 100.      Discharge planning:TBD      Endocrine to continue to follow     ** Please call Endocrine for any BG related issues **

## 2020-06-18 NOTE — PROGRESS NOTES
[facility] - Respiratory ICU  Cardiothoracic Surgery  Daily ECMO Progress Note    Patient Name: Ranjana Sanchez  MRN: [id number]  Admission Date: 4/10/2020  Hospital Length of Stay: 69 days  Code Status: Full Code   Attending Physician: Francis Ayon MD   Referring Provider: Francis Ayon MD  Principal Problem:Acute respiratory disease due to COVID-19 virus    Subjective:   Interval History:  No acute events overnight. Sweep increased for increasing CO2 overnight with vagal episodes and tachypnea. UOP 3.3L, net postivie 1.6L in 24hrs. Hep gtt increased to 900, aXa on target.    Medications:  Continuous Infusions:   dexmedetomidine (PRECEDEX) infusion 0.4 mcg/kg/hr (06/18/20 0800)    dextrose 10 % in water (D10W)      furosemide (LASIX) 2 mg/mL continuous infusion (non-titrating) 2.5 mg/hr (06/18/20 0800)    heparin (porcine) in 5 % dex 900 Units/hr (06/18/20 0800)    norepinephrine bitartrate-D5W Stopped (06/10/20 0500)     Scheduled Meds:   aspirin  81 mg Per NG tube Daily    fentaNYL  1 patch Transdermal Q72H    insulin aspart U-100  5 Units Subcutaneous Q24H    insulin aspart U-100  5 Units Subcutaneous Q24H    insulin aspart U-100  5 Units Subcutaneous Q24H    insulin aspart U-100  5 Units Subcutaneous Q24H    insulin aspart U-100  5 Units Subcutaneous Q24H    insulin aspart U-100  5 Units Subcutaneous Q24H    multivitamin liquid no.118  10 mL Per G Tube Daily    pantoprazole  40 mg Per NG tube BID    polyethylene glycol  17 g Per NG tube Daily    potassium chloride 10%  40 mEq Per NG tube BID    psyllium husk (aspartame)  1 packet Per NG tube TID    sucralfate  1 g Per NG tube Q6H    vancomycin (VANCOCIN) IVPB  750 mg Intravenous Q12H    vitamin D  1,000 Units Per G Tube Daily        Objective:     Vital Signs (Most Recent):  Temp: 98.2 °F (36.8 °C) (06/18/20 0700)  Pulse: (!) 145 (06/18/20 0800)  Resp: (!) 38 (06/18/20 0100)  BP: 125/74 (06/18/20 0800)  SpO2: 99 %  (06/18/20 0800) Vital Signs (24h Range):  Temp:  [98 °F (36.7 °C)-99 °F (37.2 °C)] 98.2 °F (36.8 °C)  Pulse:  [] 145  Resp:  [38-47] 38  SpO2:  [91 %-100 %] 99 %  BP: ()/(58-96) 125/74  Arterial Line BP: ()/() 130/73     Weight: 62.4 kg (137 lb 9.1 oz)  Body mass index is 23.61 kg/m².    SpO2: 99 %  O2 Device (Oxygen Therapy): ventilator    Intake/Output - Last 3 Shifts       06/16 0700 - 06/17 0659 06/17 0700 - 06/18 0659 06/18 0700 - 06/19 0659    I.V. (mL/kg) 1022 (16.9) 1395.9 (22.4)     Blood 301 500     NG/GT 3675 2905 100    Total Intake(mL/kg) 4998 (82.5) 4800.9 (76.9) 100 (1.6)    Urine (mL/kg/hr) 3290 (2.3) 4425 (3) 355 (3.7)    Stool 0 0     Blood 4 4 3    Total Output 3294 4429 358    Net +1704 +371.9 -258           Stool Occurrence 2 x 1 x           Lines/Drains/Airways     Peripherally Inserted Central Catheter Line            PICC Double Lumen 05/05/20 1707 right basilic 43 days          Central Venous Catheter Line                 ECMO Cannula 04/25/20 1120 right femoral vein 53 days         ECMO Cannula 04/25/20 1120 right internal jugular 53 days          Drain                 NG/OG Tube 05/03/20 1215 [place] sump;nasogastric 18 Fr. Left nostril 45 days         Urethral Catheter 05/28/20 1630 20 days          Airway                 Surgical Airway 05/05/20 1500 Shiley Cuffed 43 days          Arterial Line                 Arterial Line 04/27/20 1100 Right Brachial 51 days          Peripheral Intravenous Line                 Peripheral IV - Single Lumen 06/17/20 1010 20 G Left Forearm less than 1 day                Physical Exam    Constitutional: He is sedated, and intubated.   Head: Normocephalic   Cardiovascular: Tachycardia 130s   Pulmonary/Chest: intubated vent FiO2 50% PEEP 6, ECMO RPM 2800, Flow 2.8, sweep 3, oxygenator FiO2 35%  Skin: L neck cannula repositioned and secured, intact, will good placement on CXR. No change in flows at this time. No flashing. Clots pre and  post-oxygenator.  Nursing note and vitals reviewed.    Significant Labs:  BMP:   Recent Labs   Lab 06/18/20  0011   *   *   K 4.4   CL 91*   CO2 33*   BUN 37*   CREATININE 0.6   CALCIUM 8.8   MG 1.9     CBC:   Recent Labs   Lab 06/18/20  0258  06/18/20  0802   WBC 11.25  --   --    RBC 3.04*  --   --    HGB 8.6*  --   --    HCT 27.8*   < > 29*   PLT 83*  --   --    MCV 91  --   --    MCH 28.3  --   --    MCHC 30.9*  --   --     < > = values in this interval not displayed.     LFTs:   Recent Labs   Lab 06/18/20  0011   ALT 21   AST 26   ALKPHOS 97   BILITOT 1.5*   PROT 6.3   ALBUMIN 3.3*     Significant Diagnostics:  Cxr: stable      Assessment/Plan:     Acute respiratory distress syndrome (ARDS) due to COVID-19 virus  - Crich switched to ETT on 4/11  - Monika weaned off 5/5  - Tracheostomy and bronch 5/5  - Severe thrombocytopenia resolved; transfuse for under 30K  - Aspirin 81 daily  - RPM at 2800; flows at 2.8  - Sweep at 1; CO2 goal is 59 or below  - Do not increase sweep for pCO2s 59 or below as long as patient is not acidotic, as these high pCO2s are respiratory compensation for alkalosis  - Oxygenator Fi at 35%; switched out 6/9  - Working toward ECMO wean     Tongue laceration      -ENT evaluated, laceration superficial      -resolved    Sedation  - Valium, seroquel, PRN Fentanyl     UGI Bleed  - Scoped by GI 5/3 with epinephrine injection and clip placement for an actively bleeding 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    Anne Miller MD  Cardiothoracic  Surgery  [facility] - Respiratory ICU      VV ECMO circuit checked and all parameters reviewed. Anticoagulation was managed and all cannulation exit sites along with review of labs and radiology studies performed. Speed and functioning of the pump along with oxygenator quality reviewed.  Patient is stable and well supported on VV ECMO.  Heparin has been turned down to 800 units an hour today.  Coordination of care was provided between different teams which consists of the surgical ICU team, perfusion team and nursing staff.  All questions and concerns were addressed.  Overall patient is very stable and increased mental increase in respiratory status has been observed over the last many weeks.  Periodic updates to the family and [employer] has been provided.  All questions and concerns of family have been addressed.

## 2020-06-18 NOTE — ASSESSMENT & PLAN NOTE
Acute respiratory distress syndrome (ARDS) due to COVID-19 virus  - Crich switched to ETT on 4/11  - Monika weaned off 5/5  - Tracheostomy and bronch 5/5  - Severe thrombocytopenia resolved; transfuse for under 30K  - Aspirin 81 daily  - RPM at 2800; flows at 2.9  - Sweep at 1; CO2 goal is 59 or below  - Do not increase sweep for pCO2s 59 or below as long as patient is not acidotic, as these high pCO2s are respiratory compensation for alkalosis  - Oxygenator Fi at 40%; switched out 6/9  - Working toward ECMO wean     Tongue laceration      -ENT evaluated, laceration superficial      -resolved    Sedation  - Valium, seroquel, PRN Fentanyl     UGI Bleed  - Scoped by GI 5/3 with epinephrine injection and clip placement for an actively bleeding D2/D3 Diuelafoy lesion  - Recommend GI re-consultation if Hb continues to drift and further blood is aspirated from NGT or patient has bloody BM, etc  - Currently resolved    FEN/GI  - TF at 45, goal      - Lasix gtt 2.5  - FWB for hypernatremia 400mL q4     Anticoagulation      - Goal aXa 0.2-0.25 Q6hrs      - Hep gtt 900      - Redraw coag labs      - monitor LDH    Prophylaxis      - protonix q12 IV       - leukocytosis stable      - PICC placed 5/5; central line removed 5/6

## 2020-06-18 NOTE — RESPIRATORY THERAPY
Dr. Ayon spoke with RN and made changes to vent settings. Per his order new vent settings are as documented.   Will continue to monitor

## 2020-06-18 NOTE — SUBJECTIVE & OBJECTIVE
Interval History/Significant Events:   Agitated overnight. Hypertensive/tachycardic.   Continues with vagal episodes and tachypnea  Probably some dilutional labs today     Follow-up For: Procedure(s) (LRB):  CREATION, TRACHEOSTOMY  (N/A)    Post-Operative Day: 44 Days Post-Op    Objective:     Vital Signs (Most Recent):  Temp: 98.2 °F (36.8 °C) (06/18/20 0700)  Pulse: (!) 141 (06/18/20 0845)  Resp: (!) 38 (06/18/20 0100)  BP: 125/74 (06/18/20 0800)  SpO2: 100 % (06/18/20 0845) Vital Signs (24h Range):  Temp:  [98 °F (36.7 °C)-99 °F (37.2 °C)] 98.2 °F (36.8 °C)  Pulse:  [] 141  Resp:  [38-47] 38  SpO2:  [91 %-100 %] 100 %  BP: ()/(58-96) 125/74  Arterial Line BP: ()/() 117/71     Weight: 62.4 kg (137 lb 9.1 oz)  Body mass index is 23.61 kg/m².      Intake/Output Summary (Last 24 hours) at 6/18/2020 0846  Last data filed at 6/18/2020 0800  Gross per 24 hour   Intake 4680.88 ml   Output 4535 ml   Net 145.88 ml       Physical Exam  Constitutional:       Appearance: He is well-developed and well-nourished.   HENT:      Head: Normocephalic and atraumatic.        Comments: Right IJ ECMO Cannula in placePulmonary:      Comments: Trach in place  Surgi-Cell / NuKnit packed surrounding Trach Fixture  No further active bleeding noted from Trach Site  Abdominal:      General: There is no distension.      Tenderness: There is no abdominal tenderness.   Musculoskeletal:      Comments: Right Femoral ECMO Cannula Site   Skin:     General: Skin is warm and dry.   Neurological:      Comments: does not Track  Withdraws to pain     Input from RN due to COVID-19    Vents:  Vent Mode: A/C (06/18/20 0127)  Ventilator Initiated: Yes (04/10/20 2247)  Set Rate: 30 BPM (06/18/20 0127)  Vt Set: 250 mL (06/06/20 0858)  Pressure Support: 20 cmH20 (06/06/20 0858)  PEEP/CPAP: 6 cmH20 (06/18/20 0127)  Oxygen Concentration (%): 50 (06/18/20 0845)  Peak Airway Pressure: 28 cmH2O (06/18/20 0127)  Plateau Pressure: 30 cmH20  (06/18/20 0127)  Total Ve: 6.37 mL (06/18/20 0127)  F/VT Ratio<105 (RSBI): 147.37 (06/16/20 0104)    Lines/Drains/Airways     Peripherally Inserted Central Catheter Line            PICC Double Lumen 05/05/20 1707 right basilic 43 days          Central Venous Catheter Line                 ECMO Cannula 04/25/20 1120 right femoral vein 53 days         ECMO Cannula 04/25/20 1120 right internal jugular 53 days          Drain                 NG/OG Tube 05/03/20 1215 [place] sump;nasogastric 18 Fr. Left nostril 45 days         Urethral Catheter 05/28/20 1630 20 days          Airway                 Surgical Airway 05/05/20 1500 Shiley Cuffed 43 days          Arterial Line                 Arterial Line 04/27/20 1100 Right Brachial 51 days          Peripheral Intravenous Line                 Peripheral IV - Single Lumen 06/17/20 1010 20 G Left Forearm less than 1 day                Significant Labs:    CBC/Anemia Profile:  Recent Labs   Lab 06/17/20  0300  06/17/20 1956 06/18/20  0258 06/18/20  0414 06/18/20  0415 06/18/20  0802 06/18/20  0810   WBC 13.63*   < > 13.79*  --  11.25  --   --   --  12.74*   HGB 9.6*   < > 9.5*  --  8.6*  --   --   --  9.0*   HCT 30.1*   < > 29.9*   < > 27.8*  --  29* 29* 29.2*   *   < > 92*  --  83*  --   --   --  84*   MCV 90   < > 91  --  91  --   --   --  91   RDW 15.6*   < > 15.4*  --  15.4*  --   --   --  15.4*   FERRITIN 2,197*  --   --   --   --  1,496*  --   --   --     < > = values in this interval not displayed.        Chemistries:  Recent Labs   Lab 06/17/20  1348 06/17/20 1956 06/18/20  0011   * 135* 131*   K 4.0 4.0 4.4   CL 90* 89* 91*   CO2 36* 36* 33*   BUN 38* 38* 37*   CREATININE 0.6 0.6 0.6   CALCIUM 9.4 9.3 8.8   ALBUMIN 3.0* 3.0* 3.3*   PROT 6.5 6.4 6.3   BILITOT 1.5* 1.5* 1.5*   ALKPHOS 110 104 97   ALT 26 24 21   AST 30 29 26   MG 2.2 2.1 1.9   PHOS 2.6* 2.9 2.6*       All pertinent labs within the past 24 hours have been reviewed.    Significant Imaging:  I  have reviewed and interpreted all pertinent imaging results/findings within the past 24 hours.

## 2020-06-18 NOTE — ASSESSMENT & PLAN NOTE
Ranjana Sanchez is a 55 y.o. 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  This was switched to ETT on 4/11.  Cannulated on 4/25.    Neuro:  - Precedex for sedation  - 100 BID of seroquel  - Fentanyl patch  - Encephalopathy with poor neuro exam; still withdraws to pain  - CT on June 2 showing bilateral basal ganglia infarcts, which likely explains the neuro exam findings. His infarcts are sizeable, but extent of permanent deficits are unknown. The likelihood of meaningful recovery is low. Neuro consulted to prognosticate and agreed with poor neurological prognosis given bilateral basal ganglia CVAs    Pulm  AC/VC+ on the vent.  ECMO   Pump parameters:  See perfusionist documentaion  Decannulation per CT surgery  Overbreathing vent at rate of 45-50s with TVs 150-250    Vent Mode: A/C  Oxygen Concentration (%):  [50] 50  Resp Rate Total:  [31 br/min-55 br/min] 39 br/min  PEEP/CPAP:  [6 cmH20] 6 cmH20  Mean Airway Pressure:  [14 cbC96-28 cmH20] 14 cmH20      Cardiac  Levo as needed  Tachy/HTN intermittently resolved with prn hydromorphone  MAP goal < 90  ASA 81  ECMO    FEN/GI  TFs at goal (50cc/hr)    Renal  UOP 95-200cc/hr  Lasix gtt at 2.5    Heme  Hep gtt, goal 0.2-0.25 to prevent another GI bleed  Has been at goal at this rate  - upper endoscopy 5/3 with clip placement     ID  WBC uptrending  vanc started  Dose per pharm    Endo:  Endocrine following and managing bg    Dispo: RICU  -Prognosis is Grim  -family is reportedly trying to come in from [place] at some point

## 2020-06-18 NOTE — NURSING
Dr. Ayon called and given update of overnight events. Verbal orders received to increase FiO2 on ECMO to 40% and given 1 U PRBC. WCTM.

## 2020-06-18 NOTE — PLAN OF CARE
Plan of care reviewed. Pt had several (4+) events last night which were periods of agitation accompanied by tachypnea (30-50 RR), bradycardia 55-79 bpm, and severe hypertension (200s systolic). Breathing labored. Episodes calmed after fentanyl and dilaudid administration. MD aware, see earlier RN note.     Pt currently stable, tachycardic in 130's, /66, sats 98% on vent. Settings 50% FiO2, PEEP 6. ECMO notable for chatter in lines, for which 500 mL albumin given. Sweep 3, 2800 rpm, 2.8-3 L/min flows. ABG's within range. Current gtts heparin at 900 units/hr, Precedex 0.4 mcg/kg/hr, and lasix 2.5 mg/hr. NG tube remains in place with tube feeds running at goal 50 mL/hr. -150 cc/hr. Skin checked and intact. See flowsheet for full assessment.

## 2020-06-18 NOTE — PLAN OF CARE
LONG emailed Nasrin Cordero with [facility] about patient's medical parole.  Waiting to be contacted back by Nasrin.  LONG then called patient's nurse, Urszula, who stated that she gave the form letter to Dr. Ayon, along with the email addresses that the letter is to be emailed to, yesterday.  She states Dr. Ayon stated he would give to his  to assist with this task.  Urszula will text Dr. Ayon to find out if the task is completed.    9:30 AM  LONG received phone call from Urszula asking for another copy of the form letter.  Urszula states that Dr. Ayon's  is on vacation and Urszula states she was going to type up the letter for Dr. Ayon so he can sign it when he comes out from surgery today.  LONG texted previous CM, Jennifer, to see if she has a copy of the form letter.  Jennifer states she thinks it's in her email and will send it to CM.  Will continue to follow.    10:30 AM  CM received copy of the form letter in email.  LONG then emailed letter to YOSEF Quiroz, since she is on the same unit as the nurse and she gave copy of the letter to patient's nurse, Urszula.      1:35 PM  CM spoke to Urszula who states she has typed up the letter for Dr. Ayon to sign and has copy waiting for him to get out of surgery for signature.

## 2020-06-18 NOTE — PROGRESS NOTES
ECMO Specialists shift report    Date: 06/18/2020  ECMO Specialist:  Ashley Francisco    Pump parameters:  RPM: 2800  Flow:  2.8-2.9  Sweep:  3  FiO2:  35    Oxygenator status:  Clots: few  Fibrin: some    Pressure trends:  P1: 94  P2: 76  Delta P: 18    Volume status:  Chugging noted? Yes  CVP:   MAP:  50-100s  MD notified (name): To    Anticoagulation:  ACT/aPTT/Xa parameters:.2-.25  ACT/aPTT/Xa trends this shift: .2-.14    Cannula size / status / placement:  Arterial:   Venous 1: RIJV 23F@ 7cm  Venous 2: RFV 27F@ 12cm  Dual lumen:      Additional Comments: Many episodes of Bradycardia and Hypertension. And prolonged episodes.  Dr. Ariza aware.  Albumin given. Some chattering controled with a 50 Iso push.  Dilaudid started.  Pt. Doing 50-60BPM.  See RNs notes.  Maintaining.

## 2020-06-18 NOTE — NURSING
1 U cryo given during downtime. Blood product checked with ANTOINETTE Salgado RN.   Blood bank called and reiterated OK to administer even though it is not registered to pt in the system.   Unit number [id number]  Product number [id number]  Exp date 6/18/20  Volume 77mL

## 2020-06-19 NOTE — CARE UPDATE
Results for JAYDA SCHMIDT (MRN [id number]) as of 6/19/2020 13:54   Ref. Range 6/19/2020 08:00   Hemoglobin Latest Ref Range: 14.0 - 18.0 g/dL 9.1 (L)   Hematocrit Latest Ref Range: 40.0 - 54.0 % 29.3 (L)   Platelets Latest Ref Range: 150 - 350 K/uL 67 (L)   Fibrinogen Latest Ref Range: 182 - 366 mg/dL 129 (L)   aPTT Latest Ref Range: 21.0 - 32.0 sec 32.3 (H)     Discussed with Dr. Ayon most recent labs at hemodynamics along with infusion rates and current ECMO settings/pt ABG.  See flowsheets for details

## 2020-06-19 NOTE — PROGRESS NOTES
ECMO Specialists shift report    Date: 06/19/2020  ECMO Specialist:  Iesha Elke    Pump parameters:  RPM: 2800  Flow:  2.8  Sweep:  3  FiO2:  40    Oxygenator status:  Clots: pre  Fibrin: pre and post    Pressure trends:  P1: 90's  P2: 70's  Delta P: teens    Volume status:  Chugging noted slightly   CVP:   MAP:    MD notified (name):  Nany    Anticoagulation:   ACT/aPTT/Xa parameters: Xa 0.20-0.25  ACT/aPTT/Xa trends this shift: 0.23, 0.23    Cannula size / status / placement:  Arterial:   Venous 1: RIJV 23 FR @ 6.5  Venous 2: RFV 27 FR @ 12  Dual lumen:      Additional Comments:    Pt maintained on VV ECMO. Sweep decreased to 3 for abg. Pt received pRBC's and cryo this shift. Flows dropped x3 for coughing or moving of right leg.

## 2020-06-19 NOTE — SUBJECTIVE & OBJECTIVE
Interval History/Significant Events:   No acute events. Remains tachycardic 130-150s.  Lasix gtt 0.5  Hep gtt 950, anti Xa this am 0.24    PUMP  RPM: 2800  Flow:  2.85  Sweep:  3.5  FiO2:  40      Follow-up For: Procedure(s) (LRB):  CREATION, TRACHEOSTOMY  (N/A)    Post-Operative Day: 45 Days Post-Op    Objective:     Vital Signs (Most Recent):  Temp: 98.3 °F (36.8 °C) (06/19/20 0300)  Pulse: (!) 135 (06/19/20 0615)  Resp: (!) 38 (06/18/20 0100)  BP: 106/63 (06/19/20 0600)  SpO2: 98 % (06/19/20 0615) Vital Signs (24h Range):  Temp:  [98.1 °F (36.7 °C)-98.7 °F (37.1 °C)] 98.3 °F (36.8 °C)  Pulse:  [] 135  SpO2:  [92 %-100 %] 98 %  BP: (106-160)/(63-82) 106/63  Arterial Line BP: ()/(51-83) 100/65     Weight: 58.5 kg (128 lb 15.5 oz)  Body mass index is 22.14 kg/m².      Intake/Output Summary (Last 24 hours) at 6/19/2020 0705  Last data filed at 6/19/2020 0600  Gross per 24 hour   Intake 3326.48 ml   Output 3938 ml   Net -611.52 ml       Physical Exam  Constitutional:       Appearance: He is well-developed and well-nourished.   HENT:      Head: Normocephalic and atraumatic.        Comments: Right IJ ECMO Cannula in placePulmonary:      Comments: Trach in place  Surgi-Cell / NuKnit packed surrounding Trach Fixture  No further active bleeding noted from Trach Site  Abdominal:      General: There is no distension.      Tenderness: There is no abdominal tenderness.   Musculoskeletal:      Comments: Right Femoral ECMO Cannula Site   Skin:     General: Skin is warm and dry.   Neurological:      Comments: does not Track  Withdraws to pain      Input from RN due to COVID-19      Vents:  Vent Mode: A/C (06/18/20 1051)  Ventilator Initiated: Yes (04/10/20 2247)  Set Rate: 20 BPM (06/18/20 2351)  Vt Set: 250 mL (06/06/20 0858)  Pressure Support: 20 cmH20 (06/06/20 0858)  PEEP/CPAP: 6 cmH20 (06/18/20 2351)  Oxygen Concentration (%): 50 (06/19/20 0615)  Peak Airway Pressure: 30 cmH2O (06/18/20 2351)  Plateau Pressure:  30 cmH20 (06/18/20 2351)  Total Ve: 9.82 mL (06/18/20 2351)  F/VT Ratio<105 (RSBI): 147.37 (06/16/20 0104)    Lines/Drains/Airways     Peripherally Inserted Central Catheter Line            PICC Double Lumen 05/05/20 1707 right basilic 44 days          Central Venous Catheter Line                 ECMO Cannula 04/25/20 1120 right femoral vein 54 days         ECMO Cannula 04/25/20 1120 right internal jugular 54 days          Drain                 NG/OG Tube 05/03/20 1215 [place] sump;nasogastric 18 Fr. Left nostril 46 days         Urethral Catheter 05/28/20 1630 21 days          Airway                 Surgical Airway 05/05/20 1500 Shiley Cuffed 44 days          Arterial Line                 Arterial Line 04/27/20 1100 Right Brachial 52 days          Peripheral Intravenous Line                 Peripheral IV - Single Lumen 06/17/20 1010 20 G Left Forearm 1 day                Significant Labs:    CBC/Anemia Profile:  Recent Labs   Lab 06/18/20  0414  06/18/20  1413  06/18/20  2000 06/18/20  2000 06/19/20  0204 06/19/20  0226   WBC  --    < > 13.17*  --  17.94*  --  13.36*  --    HGB  --    < > 10.3*  --  10.2*  --  9.4*  --    HCT  --    < > 32.0*   < > 32.2* 32* 29.7* 29*   PLT  --    < > 77*  --  78*  --  67*  --    MCV  --    < > 88  --  88  --  89  --    RDW  --    < > 16.0*  --  16.2*  --  16.0*  --    FERRITIN 1,496*  --   --   --   --   --  2,245*  --     < > = values in this interval not displayed.        Chemistries:  Recent Labs   Lab 06/18/20  1413 06/18/20  2000 06/19/20  0204    137 135*   K 4.3 4.0 4.6   CL 91* 91* 90*   CO2 36* 36* 34*   BUN 32* 33* 38*   CREATININE 0.6 0.6 0.6   CALCIUM 9.6 9.6 9.7   ALBUMIN 3.5 3.4* 3.3*   PROT 6.9 6.7 6.6   BILITOT 1.7* 1.7* 1.6*   ALKPHOS 105 107 102   ALT 22 24 23   AST 28 33 29   MG 2.0 2.0 1.9   PHOS 2.4* 3.4 2.5*       CMP:   Recent Labs   Lab 06/18/20  1413 06/18/20 2000 06/19/20  0204    137 135*   K 4.3 4.0 4.6   CL 91* 91* 90*   CO2 36* 36* 34*   GLU  105 176* 238*   BUN 32* 33* 38*   CREATININE 0.6 0.6 0.6   CALCIUM 9.6 9.6 9.7   PROT 6.9 6.7 6.6   ALBUMIN 3.5 3.4* 3.3*   BILITOT 1.7* 1.7* 1.6*   ALKPHOS 105 107 102   AST 28 33 29   ALT 22 24 23   ANIONGAP 9 10 11   EGFRNONAA >60.0 >60.0 >60.0     All pertinent labs within the past 24 hours have been reviewed.    Significant Imaging:  I have reviewed and interpreted all pertinent imaging results/findings within the past 24 hours.     CXR 6/19/2020

## 2020-06-19 NOTE — PROGRESS NOTES
ECMO Specialists shift report    Date: 06/19/2020  ECMO Specialist:  Devon Priest    Pump parameters:  RPM: 5900  Flow:  3.4-3.8  Sweep:  6  FiO2:  100  Oxygenator status:  Clots: yes  Fibrin: no    Volume status:  Chugging noted: no      Anticoagulation:  ACT/aPTT/Xa parameters: Ptt   ACT/aPTT/Xa trends this shift: 90    Cannula size / status / placement:    Dual lumen: 31 RIJ 14cm     Additional Comments:

## 2020-06-19 NOTE — PROGRESS NOTES
ECMO Specialists shift report    Date: 06/19/2020  ECMO Specialist:  Jorge Yeboah    Pump parameters:  RPM: 2800  Flow:  2.85  Sweep:  3.5  FiO2:  40     Oxygenator status:  Clots: few pre oxy  Fibrin: small within circuit     Pressure trends:  P1:   P2: 77- 81  Delta P: 19-20     Volume status:  Chugging noted - slight  MAP:  90's - 100's  MD notified (name):       Anticoagulation:  ACT/aPTT/Xa parameters: 0.2-0.25  ACT/aPTT/Xa trends this shift: 0.23     Cannula size / status / placement:  Arterial:   Venous 1: 23FR / RIJV / 6.5 cm from incision site  Venous 2: 27FR / RFV / 12 cm from incision site  Dual lumen: no     Additional Comments:  Patient remains on VV ECMO.  Increased sweep flow to 3.5LPM from 3LPM for sustained elevated CO2.  Urine output remains good.  Will continue to monitor.

## 2020-06-19 NOTE — SUBJECTIVE & OBJECTIVE
Interval History:  No acute events overnight. UOP 4L, net negative 240mL in 24hrs. Hep gtt 950.    Medications:  Continuous Infusions:   dexmedetomidine (PRECEDEX) infusion 0.5 mcg/kg/hr (06/19/20 1500)    dextrose 10 % in water (D10W)      furosemide (LASIX) 2 mg/mL continuous infusion (non-titrating) 2.5 mg/hr (06/19/20 1500)    heparin (porcine) in 5 % dex 950 Units/hr (06/19/20 1500)    norepinephrine bitartrate-D5W Stopped (06/10/20 0500)     Scheduled Meds:   fentaNYL  1 patch Transdermal Q72H    [START ON 6/20/2020] insulin aspart U-100  6 Units Subcutaneous Q24H    [START ON 6/20/2020] insulin aspart U-100  6 Units Subcutaneous Q24H    insulin aspart U-100  6 Units Subcutaneous Q24H    insulin aspart U-100  6 Units Subcutaneous Q24H    insulin aspart U-100  6 Units Subcutaneous Q24H    insulin aspart U-100  6 Units Subcutaneous Q24H    multivitamin liquid no.118  10 mL Per G Tube Daily    pantoprazole  40 mg Per NG tube BID    polyethylene glycol  17 g Per NG tube Daily    potassium chloride 10%  40 mEq Per NG tube BID    psyllium husk (aspartame)  1 packet Per NG tube TID    QUEtiapine  100 mg Per NG tube BID    sucralfate  1 g Per NG tube Q6H    vancomycin (VANCOCIN) IVPB  750 mg Intravenous Q12H    vitamin D  1,000 Units Per G Tube Daily        Objective:     Vital Signs (Most Recent):  Temp: 98.3 °F (36.8 °C) (06/19/20 1459)  Pulse: (!) 140 (06/19/20 1515)  Resp: (!) 36 (06/19/20 1459)  BP: (!) 97/59 (06/19/20 1300)  SpO2: 100 % (06/19/20 1515) Vital Signs (24h Range):  Temp:  [98.1 °F (36.7 °C)-98.7 °F (37.1 °C)] 98.3 °F (36.8 °C)  Pulse:  [] 140  Resp:  [35-38] 36  SpO2:  [94 %-100 %] 100 %  BP: ()/(59-79) 97/59  Arterial Line BP: ()/(51-83) 88/56     Weight: 58.5 kg (128 lb 15.5 oz)  Body mass index is 22.14 kg/m².    SpO2: 100 %  O2 Device (Oxygen Therapy): ventilator    Intake/Output - Last 3 Shifts       06/17 0700 - 06/18 0659 06/18 0700 - 06/19 0659 06/19  0700 - 06/20 0659    I.V. (mL/kg) 1395.9 (22.4) 1002.5 (17.1)     Blood 500 504 77    NG/GT 2905 1870 770    Total Intake(mL/kg) 4800.9 (76.9) 3376.5 (57.7) 847 (14.5)    Urine (mL/kg/hr) 4425 (3) 4110 (2.9) 1140 (2.3)    Drains   10    Stool 0 0     Blood 4 3 8    Total Output 4429 4113 1158    Net +371.9 -736.5 -311           Stool Occurrence 1 x 2 x           Lines/Drains/Airways     Peripherally Inserted Central Catheter Line            PICC Double Lumen 05/05/20 1707 right basilic 44 days          Central Venous Catheter Line                 ECMO Cannula 04/25/20 1120 right femoral vein 55 days         ECMO Cannula 04/25/20 1120 right internal jugular 55 days          Drain                 NG/OG Tube 05/03/20 1215 [place] sump;nasogastric 18 Fr. Left nostril 47 days         Urethral Catheter 05/28/20 1630 21 days          Airway                 Surgical Airway 05/05/20 1500 Shiley Cuffed 45 days          Arterial Line                 Arterial Line 04/27/20 1100 Right Brachial 53 days          Peripheral Intravenous Line                 Peripheral IV - Single Lumen 06/17/20 1010 20 G Left Forearm 2 days                Physical Exam    Constitutional: He is sedated, and intubated.   Head: Normocephalic   Cardiovascular: Tachycardia 130s   Pulmonary/Chest: intubated vent FiO2 50% PEEP 6, ECMO RPM 2800, Flow 2.9, sweep 3.5, oxygenator FiO2 40%  Skin: L neck cannula repositioned and secured, intact, will good placement on CXR. No change in flows at this time. No flashing. Clots pre and post-oxygenator.  Nursing note and vitals reviewed.    Significant Labs:  BMP:   Recent Labs   Lab 06/19/20  1428   *      K 4.7   CL 93*   CO2 35*   BUN 42*   CREATININE 0.7   CALCIUM 10.3   MG 2.1     CBC:   Recent Labs   Lab 06/19/20  1428   WBC 15.54*   RBC 3.15*   HGB 8.9*   HCT 27.2*   PLT 65*   MCV 86   MCH 28.3   MCHC 32.7     LFTs:   Recent Labs   Lab 06/19/20  1428   ALT 28   AST 36   ALKPHOS 122   BILITOT 1.6*    PROT 6.7   ALBUMIN 3.3*     Significant Diagnostics:  Cxr: stable

## 2020-06-19 NOTE — PROGRESS NOTES
Pharmacokinetic Assessment Follow Up: IV Vancomycin    Vancomycin Regimen Assessment & Plan:  - Vancomycin trough collected correctly today prior to 4th dose resulted as 18.9 ug/mL, within goal trough range 10-20 ug/mL.  - Continue vancomycin 750 mg IV q12h. Draw 48h surveillance trough on 6/21 @0830.    Drug levels (last 3 results):  Recent Labs   Lab Result Units 06/17/20  0744 06/18/20  0810 06/19/20  0738   Vancomycin-Trough ug/mL 20.9 20.5 18.9     Pharmacy will continue to follow and monitor vancomycin.    Please contact pharmacy at extension [phone] for questions regarding this assessment.    Thank you for the consult,   He Wei     Patient brief summary:  Ranjana Sanchez is a 55 y.o. male initiated on antimicrobial therapy with IV Vancomycin for surgical prophylaxis.    Drug Allergies:   Review of patient's allergies indicates:  No Known Allergies    Actual Body Weight:   58.5 kg    Renal Function:   Estimated Creatinine Clearance: 112.4 mL/min (based on SCr of 0.6 mg/dL).,     Dialysis Method (if applicable):  N/A

## 2020-06-19 NOTE — ASSESSMENT & PLAN NOTE
Ranjana Sanchez is a 55 y.o. 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  This was switched to ETT on 4/11.  Cannulated on 4/25.    Neuro:  - Precedex for sedation  - 100 BID of seroquel  - Fentanyl patch  - Encephalopathy with poor neuro exam; still withdraws to pain  - CT on June 2 showing bilateral basal ganglia infarcts, which likely explains the neuro exam findings. His infarcts are sizeable, but extent of permanent deficits are unknown. The likelihood of meaningful recovery is low. Neuro consulted to prognosticate and agreed with poor neurological prognosis given bilateral basal ganglia CVAs    Pulm  AC/VC+ on the vent.  ECMO   Pump parameters:  See perfusionist documentaion  Overbreathing vent at rate of 45-50s with TVs 150-250  Continuing discussions regarding decannulation    Vent Mode: A/C  Oxygen Concentration (%):  [50] 50  Resp Rate Total:  [25 br/min-63 br/min] 39 br/min  PEEP/CPAP:  [6 cmH20] 6 cmH20  Mean Airway Pressure:  [15 tfD88-60 cmH20] 15 cmH20      Cardiac  Levo as needed  Tachy/HTN intermittently resolved with prn hydromorphone  MAP goal < 90  ASA 81  ECMO -> working to decannulate    FEN/GI  TFs at goal (50cc/hr)    Renal  UOP 95-200cc/hr  Lasix gtt at 2.5    Heme  Hep gtt, goal 0.2-0.25 to prevent another GI bleed, being titrated appropriately  - upper endoscopy 5/3 with clip placement     ID  Continuing on vanc. Leukocytosis downtrending  Dose per pharm    Endo:  Endocrine following and managing bg    Dispo: RICU  -Prognosis is Grim  -family is reportedly trying to come in from [place] at some point

## 2020-06-19 NOTE — PLAN OF CARE
"      SICU PLAN OF CARE NOTE    Dx: Acute respiratory disease due to COVID-19 virus    Shift Events: Pt remains on VV ECMO speed 2800, flows 2.8, 40% & 3 of sweep, intubated AC 50%/5 PEEP. Multiple episodes of bradycardia noted this shift resolving without intervention usually during coughing spells.  Precedex infusing, pt does not follow commands, with no purposeful movement. See flowsheets for assessment details. ECMO speed 2800, 40% & 3.5 sweep.  Heparin gtt continued, AntiXa therapeutic. UO >100 ml/hr with Lasix gtt infusing. TF continued at goal rate 50cc/hr, minimum residuals; 2 BMs this shift.  Transfused 1 unit RBCs and 1 dose of cryo for h/h < 10/30 and fibrinogen <150.  Dr. Ayon updated son on current pt status and plan of care.    Goals of Care: Wean ECMO as tolerated      Vital Signs: BP 97/60   Pulse (!) 135   Temp 98.1 °F (36.7 °C) (Oral)   Resp (!) 42   Ht 5' 4" (1.626 m)   Wt 58.5 kg (128 lb 15.5 oz)   SpO2 98%   BMI 22.14 kg/m²     Respiratory: Ventilator ; VV ECMO     Diet: NPO and Tube Feeds    Gtts: Precedex, Lasix, and Heparin    Urine Output: Urinary Catheter 100-150 cc/hour    Drains: NG tube ; continuous TF @ 50 ml/hr       Labs/Accuchecks: q12h labs, Q6 hr antixa, q4h accuchecks    Skin: Wound care performed to L forearm wound, heel foams replaced, waffle mattress inflated, turned q2h, sacrum remains CDI      "

## 2020-06-19 NOTE — PLAN OF CARE
Pt remains intubated AC 50%/5 PEEP. Precedex infusing, pt does not follow commands or perform purposeful movement. See flowsheets. ECMO speed 2800, 40% & 3.5 sweep. Pt remains tachycardic -150s, one episode of bradycardia overnight noted, resolved w/ no intervention. Heparin and Lasix gtts infusing. Anti XA therapeutic. UO approximately 100cc/hr. TF at goal rate 50cc/hr, minimum residuals. See flowsheets for details.

## 2020-06-19 NOTE — CARE UPDATE
BG goal 140-180    Remains in RICU, NAEON. Intubated and sedated. ECMO. Heparin and lasix infusions.  BG at or above goal with scheduled insulin and prn correction scale.     Plan:  Increase novolog to 6 units every 4 hours while on TF; hold if TF held of less than 100.   BG monitoring every 4 hours and low dose correction scale.     Discharge planning: TBD     Endocrine to continue to follow    ** Please call Endocrine for any BG related issues

## 2020-06-19 NOTE — PROGRESS NOTES
[facility] - Respiratory ICU  Critical Care - Surgery  Progress Note    Patient Name: Ranjana Sanchez  MRN: [id number]  Admission Date: 4/10/2020  Hospital Length of Stay: 70 days  Code Status: Full Code  Attending Provider: Francis Ayon MD  Primary Care Provider: Primary Doctor No   Principal Problem: Acute respiratory disease due to COVID-19 virus    Subjective:     Hospital/ICU Course:  No notes on file    Interval History/Significant Events:   No acute events. Remains tachycardic 130-150s.  Lasix gtt 0.5  Hep gtt 950, anti Xa this am 0.24    PUMP  RPM: 2800  Flow:  2.85  Sweep:  3.5  FiO2:  40      Follow-up For: Procedure(s) (LRB):  CREATION, TRACHEOSTOMY  (N/A)    Post-Operative Day: 45 Days Post-Op    Objective:     Vital Signs (Most Recent):  Temp: 98.3 °F (36.8 °C) (06/19/20 0300)  Pulse: (!) 135 (06/19/20 0615)  Resp: (!) 38 (06/18/20 0100)  BP: 106/63 (06/19/20 0600)  SpO2: 98 % (06/19/20 0615) Vital Signs (24h Range):  Temp:  [98.1 °F (36.7 °C)-98.7 °F (37.1 °C)] 98.3 °F (36.8 °C)  Pulse:  [] 135  SpO2:  [92 %-100 %] 98 %  BP: (106-160)/(63-82) 106/63  Arterial Line BP: ()/(51-83) 100/65     Weight: 58.5 kg (128 lb 15.5 oz)  Body mass index is 22.14 kg/m².      Intake/Output Summary (Last 24 hours) at 6/19/2020 0705  Last data filed at 6/19/2020 0600  Gross per 24 hour   Intake 3326.48 ml   Output 3938 ml   Net -611.52 ml       Physical Exam  Constitutional:       Appearance: He is well-developed and well-nourished.   HENT:      Head: Normocephalic and atraumatic.        Comments: Right IJ ECMO Cannula in placePulmonary:      Comments: Trach in place  Surgi-Cell / NuKnit packed surrounding Trach Fixture  No further active bleeding noted from Trach Site  Abdominal:      General: There is no distension.      Tenderness: There is no abdominal tenderness.   Musculoskeletal:      Comments: Right Femoral ECMO Cannula Site   Skin:     General: Skin is warm and dry.   Neurological:       Comments: does not Track  Withdraws to pain      Input from RN due to COVID-19      Vents:  Vent Mode: A/C (06/18/20 2351)  Ventilator Initiated: Yes (04/10/20 2247)  Set Rate: 20 BPM (06/18/20 2351)  Vt Set: 250 mL (06/06/20 0858)  Pressure Support: 20 cmH20 (06/06/20 0858)  PEEP/CPAP: 6 cmH20 (06/18/20 2351)  Oxygen Concentration (%): 50 (06/19/20 0615)  Peak Airway Pressure: 30 cmH2O (06/18/20 2351)  Plateau Pressure: 30 cmH20 (06/18/20 2351)  Total Ve: 9.82 mL (06/18/20 2351)  F/VT Ratio<105 (RSBI): 147.37 (06/16/20 0104)    Lines/Drains/Airways     Peripherally Inserted Central Catheter Line            PICC Double Lumen 05/05/20 1707 right basilic 44 days          Central Venous Catheter Line                 ECMO Cannula 04/25/20 1120 right femoral vein 54 days         ECMO Cannula 04/25/20 1120 right internal jugular 54 days          Drain                 NG/OG Tube 05/03/20 1215 [place] sump;nasogastric 18 Fr. Left nostril 46 days         Urethral Catheter 05/28/20 1630 21 days          Airway                 Surgical Airway 05/05/20 1500 Shiley Cuffed 44 days          Arterial Line                 Arterial Line 04/27/20 1100 Right Brachial 52 days          Peripheral Intravenous Line                 Peripheral IV - Single Lumen 06/17/20 1010 20 G Left Forearm 1 day                Significant Labs:    CBC/Anemia Profile:  Recent Labs   Lab 06/18/20  0414  06/18/20  1413  06/18/20 2000 06/18/20 2000 06/19/20  0204 06/19/20  0226   WBC  --    < > 13.17*  --  17.94*  --  13.36*  --    HGB  --    < > 10.3*  --  10.2*  --  9.4*  --    HCT  --    < > 32.0*   < > 32.2* 32* 29.7* 29*   PLT  --    < > 77*  --  78*  --  67*  --    MCV  --    < > 88  --  88  --  89  --    RDW  --    < > 16.0*  --  16.2*  --  16.0*  --    FERRITIN 1,496*  --   --   --   --   --  2,245*  --     < > = values in this interval not displayed.        Chemistries:  Recent Labs   Lab 06/18/20  1413 06/18/20 2000 06/19/20  0204    137  135*   K 4.3 4.0 4.6   CL 91* 91* 90*   CO2 36* 36* 34*   BUN 32* 33* 38*   CREATININE 0.6 0.6 0.6   CALCIUM 9.6 9.6 9.7   ALBUMIN 3.5 3.4* 3.3*   PROT 6.9 6.7 6.6   BILITOT 1.7* 1.7* 1.6*   ALKPHOS 105 107 102   ALT 22 24 23   AST 28 33 29   MG 2.0 2.0 1.9   PHOS 2.4* 3.4 2.5*       CMP:   Recent Labs   Lab 06/18/20  1413 06/18/20 2000 06/19/20  0204    137 135*   K 4.3 4.0 4.6   CL 91* 91* 90*   CO2 36* 36* 34*    176* 238*   BUN 32* 33* 38*   CREATININE 0.6 0.6 0.6   CALCIUM 9.6 9.6 9.7   PROT 6.9 6.7 6.6   ALBUMIN 3.5 3.4* 3.3*   BILITOT 1.7* 1.7* 1.6*   ALKPHOS 105 107 102   AST 28 33 29   ALT 22 24 23   ANIONGAP 9 10 11   EGFRNONAA >60.0 >60.0 >60.0     All pertinent labs within the past 24 hours have been reviewed.    Significant Imaging:  I have reviewed and interpreted all pertinent imaging results/findings within the past 24 hours.     CXR 6/19/2020      Assessment/Plan:     COVID-19 virus detected  Ranjana Sanchez is a 55 y.o. 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  This was switched to ETT on 4/11.  Cannulated on 4/25.    Neuro:  - Precedex for sedation  - 100 BID of seroquel  - Fentanyl patch  - Encephalopathy with poor neuro exam; still withdraws to pain  - CT on June 2 showing bilateral basal ganglia infarcts, which likely explains the neuro exam findings. His infarcts are sizeable, but extent of permanent deficits are unknown. The likelihood of meaningful recovery is low. Neuro consulted to prognosticate and agreed with poor neurological prognosis given bilateral basal ganglia CVAs    Pulm  AC/VC+ on the vent.  ECMO   Pump parameters:  See perfusionist documentaion  Overbreathing vent at rate of 45-50s with TVs 150-250  Continuing discussions regarding decannulation    Vent Mode: A/C  Oxygen Concentration (%):  [50] 50  Resp Rate Total:  [25 br/min-63 br/min] 39 br/min  PEEP/CPAP:  [6 cmH20] 6 cmH20  Mean Airway  Pressure:  [15 atB16-72 cmH20] 15 cmH20      Cardiac  Levo as needed  Tachy/HTN intermittently resolved with prn hydromorphone  MAP goal < 90  ASA 81  ECMO -> working to decannulate    FEN/GI  TFs at goal (50cc/hr)    Renal  UOP 95-200cc/hr  Lasix gtt at 2.5    Heme  Hep gtt, goal 0.2-0.25 to prevent another GI bleed, being titrated appropriately  - upper endoscopy 5/3 with clip placement     ID  Continuing on vanc. Leukocytosis downtrending  Dose per pharm    Endo:  Endocrine following and managing bg    Dispo: RICU  -Prognosis is Grim  -family is reportedly trying to come in from [place] at some point           Critical secondary to Patient has a condition that poses threat to life and bodily function: COVID with respiratory failure on ECMO     Critical care was time spent personally by me on the following activities: development of treatment plan with patient or surrogate and bedside caregivers, discussions with consultants, evaluation of patient's response to treatment, examination of patient, ordering and performing treatments and interventions, ordering and review of laboratory studies, ordering and review of radiographic studies, pulse oximetry, re-evaluation of patient's condition.  This critical care time did not overlap with that of any other provider or involve time for any procedures.     Urszula Arguello MD  Critical Care - Surgery  [facility] - Respiratory ICU

## 2020-06-19 NOTE — ASSESSMENT & PLAN NOTE
Acute respiratory distress syndrome (ARDS) due to COVID-19 virus  - Crich switched to ETT on 4/11  - Monika weaned off 5/5  - Tracheostomy and bronch 5/5  - Severe thrombocytopenia resolved; transfuse for under 30K  - Aspirin 81 daily  - RPM at 2800; flows at 2.9  - Sweep at 1; CO2 goal is 59 or below  - Do not increase sweep for pCO2s 59 or below as long as patient is not acidotic, as these high pCO2s are respiratory compensation for alkalosis  - Oxygenator Fi at 40%; switched out 6/9     Tongue laceration      -ENT evaluated, laceration superficial      -resolved    Sedation  - Valium, seroquel, PRN Fentanyl     UGI Bleed  - Scoped by GI 5/3 with epinephrine injection and clip placement for an actively bleeding D2/D3 Diuelafoy lesion  - Recommend GI re-consultation if Hb continues to drift and further blood is aspirated from NGT or patient has bloody BM, etc  - Currently resolved    FEN/GI  - TF at 45, goal      - Lasix gtt 2.5  - FWB for hypernatremia 400mL q4     Anticoagulation      - Goal aXa 0.2-0.25 Q6hrs      - Hep gtt 950      - monitor LDH    Prophylaxis      - protonix q12 IV       - leukocytosis stable; on vancomycin      - PICC placed 5/5; central line removed 5/6

## 2020-06-19 NOTE — PROGRESS NOTES
[facility] ICU  Cardiothoracic Surgery  Daily ECMO Progress Note    Patient Name: Ranjana Sanchez  MRN: [id number]  Admission Date: 4/10/2020  Hospital Length of Stay: 70 days  Code Status: Full Code   Attending Physician: Francis Ayon MD   Referring Provider: Francis Ayon MD  Principal Problem:Acute respiratory disease due to COVID-19 virus    Subjective:   Interval History:  No acute events overnight. UOP 4L, net negative 240mL in 24hrs. Hep gtt 950.    Medications:  Continuous Infusions:   dexmedetomidine (PRECEDEX) infusion 0.5 mcg/kg/hr (06/19/20 1500)    dextrose 10 % in water (D10W)      furosemide (LASIX) 2 mg/mL continuous infusion (non-titrating) 2.5 mg/hr (06/19/20 1500)    heparin (porcine) in 5 % dex 950 Units/hr (06/19/20 1500)    norepinephrine bitartrate-D5W Stopped (06/10/20 0500)     Scheduled Meds:   fentaNYL  1 patch Transdermal Q72H    [START ON 6/20/2020] insulin aspart U-100  6 Units Subcutaneous Q24H    [START ON 6/20/2020] insulin aspart U-100  6 Units Subcutaneous Q24H    insulin aspart U-100  6 Units Subcutaneous Q24H    insulin aspart U-100  6 Units Subcutaneous Q24H    insulin aspart U-100  6 Units Subcutaneous Q24H    insulin aspart U-100  6 Units Subcutaneous Q24H    multivitamin liquid no.118  10 mL Per G Tube Daily    pantoprazole  40 mg Per NG tube BID    polyethylene glycol  17 g Per NG tube Daily    potassium chloride 10%  40 mEq Per NG tube BID    psyllium husk (aspartame)  1 packet Per NG tube TID    QUEtiapine  100 mg Per NG tube BID    sucralfate  1 g Per NG tube Q6H    vancomycin (VANCOCIN) IVPB  750 mg Intravenous Q12H    vitamin D  1,000 Units Per G Tube Daily        Objective:     Vital Signs (Most Recent):  Temp: 98.3 °F (36.8 °C) (06/19/20 1459)  Pulse: (!) 140 (06/19/20 1515)  Resp: (!) 36 (06/19/20 1459)  BP: (!) 97/59 (06/19/20 1300)  SpO2: 100 % (06/19/20 1515) Vital Signs (24h Range):  Temp:  [98.1 °F (36.7  °C)-98.7 °F (37.1 °C)] 98.3 °F (36.8 °C)  Pulse:  [] 140  Resp:  [35-38] 36  SpO2:  [94 %-100 %] 100 %  BP: ()/(59-79) 97/59  Arterial Line BP: ()/(51-83) 88/56     Weight: 58.5 kg (128 lb 15.5 oz)  Body mass index is 22.14 kg/m².    SpO2: 100 %  O2 Device (Oxygen Therapy): ventilator    Intake/Output - Last 3 Shifts       06/17 0700 - 06/18 0659 06/18 0700 - 06/19 0659 06/19 0700 - 06/20 0659    I.V. (mL/kg) 1395.9 (22.4) 1002.5 (17.1)     Blood 500 504 77    NG/GT 2905 1870 770    Total Intake(mL/kg) 4800.9 (76.9) 3376.5 (57.7) 847 (14.5)    Urine (mL/kg/hr) 4425 (3) 4110 (2.9) 1140 (2.3)    Drains   10    Stool 0 0     Blood 4 3 8    Total Output 4429 4113 1158    Net +371.9 -736.5 -311           Stool Occurrence 1 x 2 x           Lines/Drains/Airways     Peripherally Inserted Central Catheter Line            PICC Double Lumen 05/05/20 1707 right basilic 44 days          Central Venous Catheter Line                 ECMO Cannula 04/25/20 1120 right femoral vein 55 days         ECMO Cannula 04/25/20 1120 right internal jugular 55 days          Drain                 NG/OG Tube 05/03/20 1215 [place] sump;nasogastric 18 Fr. Left nostril 47 days         Urethral Catheter 05/28/20 1630 21 days          Airway                 Surgical Airway 05/05/20 1500 Shiley Cuffed 45 days          Arterial Line                 Arterial Line 04/27/20 1100 Right Brachial 53 days          Peripheral Intravenous Line                 Peripheral IV - Single Lumen 06/17/20 1010 20 G Left Forearm 2 days                Physical Exam    Constitutional: He is sedated, and intubated.   Head: Normocephalic   Cardiovascular: Tachycardia 130s   Pulmonary/Chest: intubated vent FiO2 50% PEEP 6, ECMO RPM 2800, Flow 2.9, sweep 3.5, oxygenator FiO2 40%  Skin: L neck cannula repositioned and secured, intact, will good placement on CXR. No change in flows at this time. No flashing. Clots pre and post-oxygenator.  Nursing note and  vitals reviewed.    Significant Labs:  BMP:   Recent Labs   Lab 06/19/20  1428   *      K 4.7   CL 93*   CO2 35*   BUN 42*   CREATININE 0.7   CALCIUM 10.3   MG 2.1     CBC:   Recent Labs   Lab 06/19/20  1428   WBC 15.54*   RBC 3.15*   HGB 8.9*   HCT 27.2*   PLT 65*   MCV 86   MCH 28.3   MCHC 32.7     LFTs:   Recent Labs   Lab 06/19/20  1428   ALT 28   AST 36   ALKPHOS 122   BILITOT 1.6*   PROT 6.7   ALBUMIN 3.3*     Significant Diagnostics:  Cxr: stable      Assessment/Plan:     Acute respiratory distress syndrome (ARDS) due to COVID-19 virus  - Crich switched to ETT on 4/11  - Monika weaned off 5/5  - Tracheostomy and bronch 5/5  - Severe thrombocytopenia resolved; transfuse for under 30K  - Aspirin 81 daily  - RPM at 2800; flows at 2.9  - Sweep at 1; CO2 goal is 59 or below  - Do not increase sweep for pCO2s 59 or below as long as patient is not acidotic, as these high pCO2s are respiratory compensation for alkalosis  - Oxygenator Fi at 40%; switched out 6/9     Tongue laceration      -ENT evaluated, laceration superficial      -resolved    Sedation  - Valium, seroquel, PRN Fentanyl     UGI Bleed  - Scoped by GI 5/3 with epinephrine injection and clip placement for an actively bleeding D2/D3 Diuelafoy lesion  - Recommend GI re-consultation if Hb continues to drift and further blood is aspirated from NGT or patient has bloody BM, etc  - Currently resolved    FEN/GI  - TF at 45, goal      - Lasix gtt 2.5  - FWB for hypernatremia 400mL q4     Anticoagulation      - Goal aXa 0.2-0.25 Q6hrs      - Hep gtt 950      - monitor LDH    Prophylaxis      - protonix q12 IV       - leukocytosis stable; on vancomycin      - PICC placed 5/5; central line removed 5/6    Anne Miller MD  Cardiothoracic Surgery  [facility]      VV ECMO circuit checked and all parameters reviewed. Anticoagulation was managed and all cannulation exit sites along with review of labs and radiology studies  performed. Speed and functioning of the pump along with oxygenator quality reviewed.  Had extensive talks performed with the family, [facility] team and other care members.  At this stage Mr. Sanchez appears to be doing same with no improvement in neurological status.  We continue to wean his ECMO support and it appears that potentially Mr. Vaughn may be weaned off VV ECMO support early next week.  Obviously significant things can change during this time however the team appears to be optimistic.

## 2020-06-20 NOTE — PROGRESS NOTES
[facility]  Cardiothoracic Surgery  Daily ECMO Progress Note    Patient Name: Ranjana Sanchez  MRN: [id number]  Admission Date: 4/10/2020  Hospital Length of Stay: 71 days  Code Status: Full Code   Attending Physician: Francis Ayon MD   Referring Provider: Francis Ayon MD  Principal Problem:Acute respiratory disease due to COVID-19 virus    Subjective:   Interval History:  Arterial line bleeding noted overnight, packed and re-dressed, appears to be slowing. Patient given 1 unit cryo, 1U pRBC, and 500mL albumin. UOP 2.9L, net positive 840mL in 24hrs. Hep gtt 850.    Medications:  Continuous Infusions:   dexmedetomidine (PRECEDEX) infusion 1 mcg/kg/hr (06/20/20 0909)    dextrose 10 % in water (D10W)      furosemide (LASIX) 2 mg/mL continuous infusion (non-titrating) 2.5 mg/hr (06/20/20 0909)    heparin (porcine) in 5 % dex 850 Units/hr (06/20/20 0909)    norepinephrine bitartrate-D5W Stopped (06/10/20 0500)     Scheduled Meds:   fentaNYL  1 patch Transdermal Q72H    insulin aspart U-100  6 Units Subcutaneous Q24H    insulin aspart U-100  6 Units Subcutaneous Q24H    insulin aspart U-100  6 Units Subcutaneous Q24H    insulin aspart U-100  6 Units Subcutaneous Q24H    insulin aspart U-100  6 Units Subcutaneous Q24H    insulin aspart U-100  6 Units Subcutaneous Q24H    multivitamin liquid no.118  10 mL Per G Tube Daily    pantoprazole  40 mg Per NG tube BID    polyethylene glycol  17 g Per NG tube Daily    potassium chloride 10%  40 mEq Per NG tube BID    psyllium husk (aspartame)  1 packet Per NG tube TID    QUEtiapine  100 mg Per NG tube BID    sucralfate  1 g Per NG tube Q6H    vancomycin (VANCOCIN) IVPB  750 mg Intravenous Q12H    vitamin D  1,000 Units Per G Tube Daily        Objective:     Vital Signs (Most Recent):  Temp: 98 °F (36.7 °C) (06/20/20 0900)  Pulse: (!) 129 (06/20/20 0900)  Resp: (!) 51 (06/20/20 0632)  BP: 97/60 (06/19/20 1330)  SpO2: 100 %  (06/20/20 0900) Vital Signs (24h Range):  Temp:  [98 °F (36.7 °C)-98.4 °F (36.9 °C)] 98 °F (36.7 °C)  Pulse:  [123-155] 129  Resp:  [30-51] 51  SpO2:  [94 %-100 %] 100 %  BP: ()/(59-79) 97/60  Arterial Line BP: ()/(44-81) 111/57     Weight: 56.9 kg (125 lb 7.1 oz)  Body mass index is 21.53 kg/m².    SpO2: 100 %  O2 Device (Oxygen Therapy): ventilator    Intake/Output - Last 3 Shifts       06/18 0700 - 06/19 0659 06/19 0700 - 06/20 0659 06/20 0700 - 06/21 0659    I.V. (mL/kg) 1002.5 (17.1) 1001.4 (17.6)     Blood 504 1021     NG/GT 1870 1780 150    Total Intake(mL/kg) 3376.5 (57.7) 3802.4 (66.8) 150 (2.6)    Urine (mL/kg/hr) 4110 (2.9) 3010 (2.2) 470 (3.3)    Drains  10     Stool 0 0     Blood 3 12 1    Total Output 4113 3032 471    Net -736.5 +770.4 -321           Stool Occurrence 2 x 4 x           Lines/Drains/Airways     Peripherally Inserted Central Catheter Line            PICC Double Lumen 05/05/20 1707 right basilic 45 days          Central Venous Catheter Line                 ECMO Cannula 04/25/20 1120 right femoral vein 55 days         ECMO Cannula 04/25/20 1120 right internal jugular 55 days          Drain                 NG/OG Tube 05/03/20 1215 [place] sump;nasogastric 18 Fr. Left nostril 47 days         Urethral Catheter 05/28/20 1630 22 days          Airway                 Surgical Airway 05/05/20 1500 Shiley Cuffed 45 days          Arterial Line                 Arterial Line 04/27/20 1100 Right Brachial 53 days          Peripheral Intravenous Line                 Peripheral IV - Single Lumen 06/17/20 1010 20 G Left Forearm 2 days                Physical Exam    Constitutional: He is sedated, and intubated.   Head: Normocephalic   Cardiovascular: Tachycardia 130s   Pulmonary/Chest: intubated vent FiO2 50% PEEP 6, ECMO RPM 2800, Flow 2.8, sweep 3, oxygenator FiO2 30%  Skin: L neck cannula repositioned and secured, intact, will good placement on CXR. No change in flows at this time. No flashing.  Clots pre and post-oxygenator.  Nursing note and vitals reviewed.    Significant Labs:  BMP:   Recent Labs   Lab 06/20/20 0425   *      K 4.1   CL 93*   CO2 34*   BUN 43*   CREATININE 0.6   CALCIUM 10.3   MG 2.0     CBC:   Recent Labs   Lab 06/20/20 0425 06/20/20  0815   WBC 14.64*  --    RBC 3.04*  --    HGB 8.6*  --    HCT 27.0* 25*   PLT 59*  --    MCV 89  --    MCH 28.3  --    MCHC 31.9*  --      LFTs:   Recent Labs   Lab 06/20/20 0425   ALT 27   AST 37   ALKPHOS 108   BILITOT 1.6*   PROT 6.6   ALBUMIN 3.6     Significant Diagnostics:  Cxr: none today      Assessment/Plan:     Acute respiratory distress syndrome (ARDS) due to COVID-19 virus  - Crich switched to ETT on 4/11  - Monika weaned off 5/5  - Tracheostomy and bronch 5/5  - Severe thrombocytopenia resolved; transfuse for under 30K  - Aspirin 81 daily  - RPM at 2800; flows at 2.9  - Sweep at 1; CO2 goal is 59 or below  - Do not increase sweep for pCO2s 59 or below as long as patient is not acidotic, as these high pCO2s are respiratory compensation for alkalosis  - Oxygenator Fi at 30%; switched out 6/9     Tongue laceration      -ENT evaluated, laceration superficial      -resolved    Sedation  - Valium, seroquel, PRN Fentanyl     UGI Bleed  - Scoped by GI 5/3 with epinephrine injection and clip placement for an actively bleeding 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    Anne Miller MD  Cardiothoracic Surgery  [facility]

## 2020-06-20 NOTE — PROGRESS NOTES
Spoke with Dr. Velasquez regarding fibrinogen of 156 and H&H 9.5/30.5. Otherwise patient is in stable condition. No new orders received at this time.

## 2020-06-20 NOTE — PROGRESS NOTES
ECMO Specialists shift report    Date: 06/20/2020  ECMO Specialist:  Cyrus Stefany    Pump parameters:  RPM:                          2800  Flow:                                 2.8  Sweep:                              3.0  FiO2:                               40    Oxygenator status:  Clots:                             Pre-Oxygenator  Fibrin:                             None observed    Pressure trends:  P1:                                   102  P2:                                     81  Delta P:                             21    Volume status:  Chugging noted?            Minor chugging but no intervention  CVP:                               Not being monitored at this time  MAP:                               60-70's   MD notified (name):       Dr. Torres/Qi    Anticoagulation:  ACT/aPTT/Xa parameters:       0.2-0.25  ACT/aPTT/Xa trends this shift: 0.23    Cannula size / status / placement:  Arterial:   Venous 1: 23 [place] @ RIJV  @   6.5 cm from insertion to end of metal coil  Venous 2: 27 [place] @ RFV  @ 12    cm from insertion site to first stitch  Dual lumen:      Additional Comments:    Patient has been stable on VV ECMO with only 500 ml of 5% Albumin given for chugging episode.  The right brachial A-line has been bleeding and the dressing has been changed twice for this shift.  The arm has been extended because it appears the bleeding is positional.  The sweep is at 3 lpm with the O2 @ 40%.   The speed of the motor is 2800 and resulting in a flow of 2.8 lpm.

## 2020-06-20 NOTE — ASSESSMENT & PLAN NOTE
Acute respiratory distress syndrome (ARDS) due to COVID-19 virus  - Crich switched to ETT on 4/11  - Monika weaned off 5/5  - Tracheostomy and bronch 5/5  - Severe thrombocytopenia resolved; transfuse for under 30K  - Aspirin 81 daily  - RPM at 2800; flows at 2.9  - Sweep at 1; CO2 goal is 59 or below  - Do not increase sweep for pCO2s 59 or below as long as patient is not acidotic, as these high pCO2s are respiratory compensation for alkalosis  - Oxygenator Fi at 30%; switched out 6/9     Tongue laceration      -ENT evaluated, laceration superficial      -resolved    Sedation  - Valium, seroquel, PRN Fentanyl     UGI Bleed  - Scoped by GI 5/3 with epinephrine injection and clip placement for an actively bleeding 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

## 2020-06-20 NOTE — PROGRESS NOTES
[facility]  Critical Care - Surgery  Progress Note    Patient Name: Ranjana Sanchez  MRN: [id number]  Admission Date: 4/10/2020  Hospital Length of Stay: 71 days  Code Status: Full Code  Attending Provider: Francis Ayon MD  Primary Care Provider: Primary Doctor No   Principal Problem: Acute respiratory disease due to COVID-19 virus    Subjective:     Hospital/ICU Course:  No notes on file    Interval History/Significant Events:   No acute events  Received 500 cc 5% albumin for line chugging around 330  Also got 1u pRBC and 1u cryo for h/h <10/30 and fibrinogen <150    Pump: RPM 2800, Flow 2.8, Sweep 3, FiO2 40%      Follow-up For: Procedure(s) (LRB):  CREATION, TRACHEOSTOMY  (N/A)    Post-Operative Day: 46 Days Post-Op    Objective:     Vital Signs (Most Recent):  Temp: 98.3 °F (36.8 °C) (06/20/20 0700)  Pulse: (!) 131 (06/20/20 0700)  Resp: (!) 51 (06/20/20 0632)  BP: 97/60 (06/19/20 1330)  SpO2: 100 % (06/20/20 0700) Vital Signs (24h Range):  Temp:  [98 °F (36.7 °C)-98.4 °F (36.9 °C)] 98.3 °F (36.8 °C)  Pulse:  [127-155] 131  Resp:  [30-51] 51  SpO2:  [94 %-100 %] 100 %  BP: ()/(59-79) 97/60  Arterial Line BP: ()/(48-81) 96/49     Weight: 56.9 kg (125 lb 7.1 oz)  Body mass index is 21.53 kg/m².      Intake/Output Summary (Last 24 hours) at 6/20/2020 0814  Last data filed at 6/20/2020 0800  Gross per 24 hour   Intake 3802.4 ml   Output 3083 ml   Net 719.4 ml       Physical Exam    Vents:  Vent Mode: A/C (06/20/20 0354)  Ventilator Initiated: Yes (04/10/20 2247)  Set Rate: 20 BPM (06/20/20 0354)  Vt Set: 250 mL (06/06/20 0858)  Pressure Support: 20 cmH20 (06/06/20 0858)  PEEP/CPAP: 6 cmH20 (06/20/20 0354)  Oxygen Concentration (%): 50 (06/20/20 0700)  Peak Airway Pressure: 31 cmH2O (06/20/20 0354)  Plateau Pressure: 30 cmH20 (06/20/20 0354)  Total Ve: 8.97 mL (06/20/20 0354)  F/VT Ratio<105 (RSBI): 213.54 (06/20/20 0354)    Lines/Drains/Airways     Peripherally Inserted  Central Catheter Line            PICC Double Lumen 05/05/20 1707 right basilic 45 days          Central Venous Catheter Line                 ECMO Cannula 04/25/20 1120 right femoral vein 55 days         ECMO Cannula 04/25/20 1120 right internal jugular 55 days          Drain                 NG/OG Tube 05/03/20 1215 [place] preetp;nasogastric 18 Fr. Left nostril 47 days         Urethral Catheter 05/28/20 1630 22 days          Airway                 Surgical Airway 05/05/20 1500 Shiley Cuffed 45 days          Arterial Line                 Arterial Line 04/27/20 1100 Right Brachial 53 days          Peripheral Intravenous Line                 Peripheral IV - Single Lumen 06/17/20 1010 20 G Left Forearm 2 days                Significant Labs:    CBC/Anemia Profile:  Recent Labs   Lab 06/19/20  0204  06/19/20  1428 06/19/20  1805 06/19/20  1954 06/20/20  0225 06/20/20  0425   WBC 13.36*   < > 15.54* 15.36*  --   --  14.64*   HGB 9.4*   < > 8.9* 9.9*  --   --  8.6*   HCT 29.7*   < > 27.2* 30.8* 29* 28* 27.0*   PLT 67*   < > 65* 62*  --   --  59*   MCV 89   < > 86 90  --   --  89   RDW 16.0*   < > 16.1* 15.6*  --   --  15.9*   FERRITIN 2,245*  --   --   --   --   --  2,226*    < > = values in this interval not displayed.        Chemistries:  Recent Labs   Lab 06/19/20  0800 06/19/20  1428 06/20/20  0425    138 139   K 4.3 4.7 4.1   CL 92* 93* 93*   CO2 35* 35* 34*   BUN 40* 42* 43*   CREATININE 0.6 0.7 0.6   CALCIUM 9.7 10.3 10.3   ALBUMIN 3.3* 3.3* 3.6   PROT 6.6 6.7 6.6   BILITOT 1.6* 1.6* 1.6*   ALKPHOS 106 122 108   ALT 24 28 27   AST 32 36 37   MG 1.9 2.1 2.0   PHOS 3.6 3.7 3.2       Coagulation:   Recent Labs   Lab 06/20/20  0425   INR 1.2   APTT 32.0     Lactic Acid: No results for input(s): LACTATE in the last 48 hours.  All pertinent labs within the past 24 hours have been reviewed.    Significant Imaging:  I have reviewed and interpreted all pertinent imaging results/findings within the past 24 hours.     CXR  6/20/20  FINDINGS:  Tracheostomy tube projects over the tracheal stripe with tip above the level of the clavicular heads.  Right-sided PICC line with tip projecting over the right atrium.  Enteric catheter crosses the diaphragm with side port projected over the proximal duodenum.  ECMO cannulas appear in grossly stable position.     Mediastinal structures are midline.  Hilar contours are not well evaluated.  Cardiac silhouette is stable in size.  Lung volumes are low but symmetric.  There has been interval development of worsening pulmonary parenchymal ground-glass attenuation and consolidation.  No pneumothorax or large pleural effusion.  No free air beneath the diaphragm.  No acute osseous abnormalities.  Degenerative changes of the spine noted.     Impression:     Worsening pulmonary parenchymal attenuation concerning for edema, hemorrhage, pneumonia or ARDS.      Assessment/Plan:     COVID-19 virus detected  Ranjana Sanchez is a 55 y.o. 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  This was switched to ETT on 4/11.  Cannulated on 4/25.    Neuro:  - Precedex for sedation  - 100 BID of seroquel  - Fentanyl patch  - Encephalopathy with poor neuro exam; still withdraws to pain  - CT on June 2 showing bilateral basal ganglia infarcts, which likely explains the neuro exam findings. His infarcts are sizeable, but extent of permanent deficits are unknown. The likelihood of meaningful recovery is low. Neuro consulted to prognosticate and agreed with poor neurological prognosis given bilateral basal ganglia CVAs    Pulm  AC/VC+ on the vent.  ECMO   Pump parameters:  See perfusionist documentaion  Overbreathing vent at rate of 45-50s with TVs 150-250  Continuing discussions regarding decannulation    Vent Mode: A/C  Oxygen Concentration (%):  [50] 50  Resp Rate Total:  [28 br/min-58 br/min] 55 br/min  PEEP/CPAP:  [6 cmH20] 6 cmH20  Mean Airway Pressure:   [14 yrT92-49 cmH20] 14 cmH20      Cardiac  Levo as needed  Tachy/HTN intermittently resolved with prn hydromorphone  MAP goal < 90  ASA 81  ECMO -> working to decannulate    FEN/GI  TFs at goal (50cc/hr)    Renal  UOP 95-200cc/hr  Lasix gtt at 2.5    Heme  Hep gtt, goal 0.2-0.25 to prevent another GI bleed, being titrated appropriately  - upper endoscopy 5/3 with clip placement     ID  Continuing on vanc. Leukocytosis downtrending  Dose per pharm    Endo:  Endocrine following and managing bg    Dispo: RICU  -Prognosis is Grim  -family is reportedly trying to come in from [place] at some point           Critical secondary to Patient has a condition that poses threat to life and bodily function: COVID, respiratory failure, on ECMO     Critical care was time spent personally by me on the following activities: development of treatment plan with patient or surrogate and bedside caregivers, discussions with consultants, evaluation of patient's response to treatment, examination of patient, ordering and performing treatments and interventions, ordering and review of laboratory studies, ordering and review of radiographic studies, pulse oximetry, re-evaluation of patient's condition.  This critical care time did not overlap with that of any other provider or involve time for any procedures.     Urszula Arguello MD  Critical Care - Surgery  [facility]

## 2020-06-20 NOTE — PROGRESS NOTES
ECMO Specialists shift report    Date: 06/20/2020  ECMO Specialist:  Lia Alvarez    Pump parameters:  RPM: 2800  Flow:  2.8 lpm  Sweep:  3  FiO2:  40    Oxygenator status:  Clots: yes; few on pre side  Fibrin: no    Pressure trends:  P1: 101  P2: 82  Delta P: 19    Volume status:  Chugging noted - yes  MAP:  60's to 90's  MD notified (name):  Dr. Miller/ Dr. Urbina    Anticoagulation:  ACT/aPTT/Xa parameters: Xa 0.2-0.25  ACT/aPTT/Xa trends this shift: 0.21    Cannula size / status / placement:  Arterial:   Venous 1: 23FR / RIJV / 6.5 cm from incision site  Venous 2: 27FR / RFV / 12 cm from incision site  Dual lumen: no     Additional Comments:    VV ECMO circuit maintained.  Chugging noted throughout day; pt received 1 unit (350 ml) PRBC's.  Rt brachial site for A-line bleeding occasionally; dressing changed; some oozing at femoral cannula site as well on rt leg.  BS are not improved; tan secretions still present; x-ray worsening.  Pt suffers episodic times of tachycardia, hypertension, desaturation & bradycardia. Urine output good. Cannulas remain well in place.  Settings increased to 70% FiO2 & 4 of Sweep due to consistent low SvO2 at 50-53% without change.

## 2020-06-20 NOTE — ASSESSMENT & PLAN NOTE
Ranjana Sanchez is a 55 y.o. 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  This was switched to ETT on 4/11.  Cannulated on 4/25.    Neuro:  - Precedex for sedation  - 100 BID of seroquel  - Fentanyl patch  - Encephalopathy with poor neuro exam; still withdraws to pain  - CT on June 2 showing bilateral basal ganglia infarcts, which likely explains the neuro exam findings. His infarcts are sizeable, but extent of permanent deficits are unknown. The likelihood of meaningful recovery is low. Neuro consulted to prognosticate and agreed with poor neurological prognosis given bilateral basal ganglia CVAs    Pulm  AC/VC+ on the vent.  ECMO   Pump parameters:  See perfusionist documentaion  Overbreathing vent at rate of 45-50s with TVs 150-250  Continuing discussions regarding decannulation    Vent Mode: A/C  Oxygen Concentration (%):  [50] 50  Resp Rate Total:  [28 br/min-58 br/min] 55 br/min  PEEP/CPAP:  [6 cmH20] 6 cmH20  Mean Airway Pressure:  [14 kuO47-71 cmH20] 14 cmH20      Cardiac  Levo as needed  Tachy/HTN intermittently resolved with prn hydromorphone  MAP goal < 90  ASA 81  ECMO -> working to decannulate    FEN/GI  TFs at goal (50cc/hr)    Renal  UOP 95-200cc/hr  Lasix gtt at 2.5    Heme  Hep gtt, goal 0.2-0.25 to prevent another GI bleed, being titrated appropriately  - upper endoscopy 5/3 with clip placement     ID  Continuing on vanc. Leukocytosis downtrending  Dose per pharm    Endo:  Endocrine following and managing bg    Dispo: RICU  -Prognosis is Grim  -family is reportedly trying to come in from [place] at some point

## 2020-06-20 NOTE — SUBJECTIVE & OBJECTIVE
Interval History:  Arterial line bleeding noted overnight, packed and re-dressed, appears to be slowing. Patient given 1 unit cryo, 1U pRBC, and 500mL albumin. UOP 2.9L, net positive 840mL in 24hrs. Hep gtt 850.    Medications:  Continuous Infusions:   dexmedetomidine (PRECEDEX) infusion 1 mcg/kg/hr (06/20/20 0909)    dextrose 10 % in water (D10W)      furosemide (LASIX) 2 mg/mL continuous infusion (non-titrating) 2.5 mg/hr (06/20/20 0909)    heparin (porcine) in 5 % dex 850 Units/hr (06/20/20 0909)    norepinephrine bitartrate-D5W Stopped (06/10/20 0500)     Scheduled Meds:   fentaNYL  1 patch Transdermal Q72H    insulin aspart U-100  6 Units Subcutaneous Q24H    insulin aspart U-100  6 Units Subcutaneous Q24H    insulin aspart U-100  6 Units Subcutaneous Q24H    insulin aspart U-100  6 Units Subcutaneous Q24H    insulin aspart U-100  6 Units Subcutaneous Q24H    insulin aspart U-100  6 Units Subcutaneous Q24H    multivitamin liquid no.118  10 mL Per G Tube Daily    pantoprazole  40 mg Per NG tube BID    polyethylene glycol  17 g Per NG tube Daily    potassium chloride 10%  40 mEq Per NG tube BID    psyllium husk (aspartame)  1 packet Per NG tube TID    QUEtiapine  100 mg Per NG tube BID    sucralfate  1 g Per NG tube Q6H    vancomycin (VANCOCIN) IVPB  750 mg Intravenous Q12H    vitamin D  1,000 Units Per G Tube Daily        Objective:     Vital Signs (Most Recent):  Temp: 98 °F (36.7 °C) (06/20/20 0900)  Pulse: (!) 129 (06/20/20 0900)  Resp: (!) 51 (06/20/20 0632)  BP: 97/60 (06/19/20 1330)  SpO2: 100 % (06/20/20 0900) Vital Signs (24h Range):  Temp:  [98 °F (36.7 °C)-98.4 °F (36.9 °C)] 98 °F (36.7 °C)  Pulse:  [123-155] 129  Resp:  [30-51] 51  SpO2:  [94 %-100 %] 100 %  BP: ()/(59-79) 97/60  Arterial Line BP: ()/(44-81) 111/57     Weight: 56.9 kg (125 lb 7.1 oz)  Body mass index is 21.53 kg/m².    SpO2: 100 %  O2 Device (Oxygen Therapy): ventilator    Intake/Output - Last 3 Shifts        06/18 0700 - 06/19 0659 06/19 0700 - 06/20 0659 06/20 0700 - 06/21 0659    I.V. (mL/kg) 1002.5 (17.1) 1001.4 (17.6)     Blood 504 1021     NG/GT 1870 1780 150    Total Intake(mL/kg) 3376.5 (57.7) 3802.4 (66.8) 150 (2.6)    Urine (mL/kg/hr) 4110 (2.9) 3010 (2.2) 470 (3.3)    Drains  10     Stool 0 0     Blood 3 12 1    Total Output 4113 3032 471    Net -736.5 +770.4 -321           Stool Occurrence 2 x 4 x           Lines/Drains/Airways     Peripherally Inserted Central Catheter Line            PICC Double Lumen 05/05/20 1707 right basilic 45 days          Central Venous Catheter Line                 ECMO Cannula 04/25/20 1120 right femoral vein 55 days         ECMO Cannula 04/25/20 1120 right internal jugular 55 days          Drain                 NG/OG Tube 05/03/20 1215 [place] sump;nasogastric 18 Fr. Left nostril 47 days         Urethral Catheter 05/28/20 1630 22 days          Airway                 Surgical Airway 05/05/20 1500 [place] Cuffed 45 days          Arterial Line                 Arterial Line 04/27/20 1100 Right Brachial 53 days          Peripheral Intravenous Line                 Peripheral IV - Single Lumen 06/17/20 1010 20 G Left Forearm 2 days                Physical Exam    Constitutional: He is sedated, and intubated.   Head: Normocephalic   Cardiovascular: Tachycardia 130s   Pulmonary/Chest: intubated vent FiO2 50% PEEP 6, ECMO RPM 2800, Flow 2.8, sweep 3, oxygenator FiO2 30%  Skin: L neck cannula repositioned and secured, intact, will good placement on CXR. No change in flows at this time. No flashing. Clots pre and post-oxygenator.  Nursing note and vitals reviewed.    Significant Labs:  BMP:   Recent Labs   Lab 06/20/20  0425   *      K 4.1   CL 93*   CO2 34*   BUN 43*   CREATININE 0.6   CALCIUM 10.3   MG 2.0     CBC:   Recent Labs   Lab 06/20/20  0425 06/20/20  0815   WBC 14.64*  --    RBC 3.04*  --    HGB 8.6*  --    HCT 27.0* 25*   PLT 59*  --    MCV 89  --    MCH 28.3  --     MCHC 31.9*  --      LFTs:   Recent Labs   Lab 06/20/20  0425   ALT 27   AST 37   ALKPHOS 108   BILITOT 1.6*   PROT 6.6   ALBUMIN 3.6     Significant Diagnostics:  Cxr: none today

## 2020-06-20 NOTE — SUBJECTIVE & OBJECTIVE
Interval History/Significant Events:   No acute events  Received 500 cc 5% albumin for line chugging around 330  Also got 1u pRBC and 1u cryo for h/h <10/30 and fibrinogen <150    Pump: RPM 2800, Flow 2.8, Sweep 3, FiO2 40%      Follow-up For: Procedure(s) (LRB):  CREATION, TRACHEOSTOMY  (N/A)    Post-Operative Day: 46 Days Post-Op    Objective:     Vital Signs (Most Recent):  Temp: 98.3 °F (36.8 °C) (06/20/20 0700)  Pulse: (!) 131 (06/20/20 0700)  Resp: (!) 51 (06/20/20 0632)  BP: 97/60 (06/19/20 1330)  SpO2: 100 % (06/20/20 0700) Vital Signs (24h Range):  Temp:  [98 °F (36.7 °C)-98.4 °F (36.9 °C)] 98.3 °F (36.8 °C)  Pulse:  [127-155] 131  Resp:  [30-51] 51  SpO2:  [94 %-100 %] 100 %  BP: ()/(59-79) 97/60  Arterial Line BP: ()/(48-81) 96/49     Weight: 56.9 kg (125 lb 7.1 oz)  Body mass index is 21.53 kg/m².      Intake/Output Summary (Last 24 hours) at 6/20/2020 0814  Last data filed at 6/20/2020 0800  Gross per 24 hour   Intake 3802.4 ml   Output 3083 ml   Net 719.4 ml       Physical Exam    Vents:  Vent Mode: A/C (06/20/20 0354)  Ventilator Initiated: Yes (04/10/20 2247)  Set Rate: 20 BPM (06/20/20 0354)  Vt Set: 250 mL (06/06/20 0858)  Pressure Support: 20 cmH20 (06/06/20 0858)  PEEP/CPAP: 6 cmH20 (06/20/20 0354)  Oxygen Concentration (%): 50 (06/20/20 0700)  Peak Airway Pressure: 31 cmH2O (06/20/20 0354)  Plateau Pressure: 30 cmH20 (06/20/20 0354)  Total Ve: 8.97 mL (06/20/20 0354)  F/VT Ratio<105 (RSBI): 213.54 (06/20/20 0354)    Lines/Drains/Airways     Peripherally Inserted Central Catheter Line            PICC Double Lumen 05/05/20 1707 right basilic 45 days          Central Venous Catheter Line                 ECMO Cannula 04/25/20 1120 right femoral vein 55 days         ECMO Cannula 04/25/20 1120 right internal jugular 55 days          Drain                 NG/OG Tube 05/03/20 1215 [place] sump;nasogastric 18 Fr. Left nostril 47 days         Urethral Catheter 05/28/20 1630 22 days           Airway                 Surgical Airway 05/05/20 1500 Shiley Cuffed 45 days          Arterial Line                 Arterial Line 04/27/20 1100 Right Brachial 53 days          Peripheral Intravenous Line                 Peripheral IV - Single Lumen 06/17/20 1010 20 G Left Forearm 2 days                Significant Labs:    CBC/Anemia Profile:  Recent Labs   Lab 06/19/20  0204  06/19/20  1428 06/19/20  1805 06/19/20  1954 06/20/20  0225 06/20/20  0425   WBC 13.36*   < > 15.54* 15.36*  --   --  14.64*   HGB 9.4*   < > 8.9* 9.9*  --   --  8.6*   HCT 29.7*   < > 27.2* 30.8* 29* 28* 27.0*   PLT 67*   < > 65* 62*  --   --  59*   MCV 89   < > 86 90  --   --  89   RDW 16.0*   < > 16.1* 15.6*  --   --  15.9*   FERRITIN 2,245*  --   --   --   --   --  2,226*    < > = values in this interval not displayed.        Chemistries:  Recent Labs   Lab 06/19/20  0800 06/19/20  1428 06/20/20  0425    138 139   K 4.3 4.7 4.1   CL 92* 93* 93*   CO2 35* 35* 34*   BUN 40* 42* 43*   CREATININE 0.6 0.7 0.6   CALCIUM 9.7 10.3 10.3   ALBUMIN 3.3* 3.3* 3.6   PROT 6.6 6.7 6.6   BILITOT 1.6* 1.6* 1.6*   ALKPHOS 106 122 108   ALT 24 28 27   AST 32 36 37   MG 1.9 2.1 2.0   PHOS 3.6 3.7 3.2       Coagulation:   Recent Labs   Lab 06/20/20  0425   INR 1.2   APTT 32.0     Lactic Acid: No results for input(s): LACTATE in the last 48 hours.  All pertinent labs within the past 24 hours have been reviewed.    Significant Imaging:  I have reviewed and interpreted all pertinent imaging results/findings within the past 24 hours.     CXR 6/20/20  FINDINGS:  Tracheostomy tube projects over the tracheal stripe with tip above the level of the clavicular heads.  Right-sided PICC line with tip projecting over the right atrium.  Enteric catheter crosses the diaphragm with side port projected over the proximal duodenum.  ECMO cannulas appear in grossly stable position.     Mediastinal structures are midline.  Hilar contours are not well evaluated.  Cardiac  silhouette is stable in size.  Lung volumes are low but symmetric.  There has been interval development of worsening pulmonary parenchymal ground-glass attenuation and consolidation.  No pneumothorax or large pleural effusion.  No free air beneath the diaphragm.  No acute osseous abnormalities.  Degenerative changes of the spine noted.     Impression:     Worsening pulmonary parenchymal attenuation concerning for edema, hemorrhage, pneumonia or ARDS.

## 2020-06-20 NOTE — PLAN OF CARE
Care explained to patient throughout night, patient with eyes open, withdraws to pain, but not following commands. Around 0330, ECMO low flow alarm with chugging, Albumin 5% 500 ml given. This morning 1u Cryo given for fibrinogen 153, and awaiting PRBC 1u from blood bank for H&H 8.6/27. MD aware of platelets 59 and trending down. Heparin weaned to 850 units/hr for anti-Xa 0.28. Lasix remains at 2.5 mg/hr. Precedex @ 0.04 mcg/kg/hr for comfort although patient remained tachypneic with RR 30-50.  Around 0200 severe profuse bleeding from arterial line requiring 2 dressing changes, with current dressing of quikclot at site. Right arm has been repositioned outward as bleeding seemed to lessen due to positioning.   Left nare OGT secured to right cheek due to nare breakdown.   Patient tolerated complete bed bath with shampoo and linen change. Patient had 1 BM overnight.   Current vent settings: ACPC 50%, PEEP 6, RR 20.  ECMO: 2800 RPM, 2.8 flow, sweep 3, 30%.

## 2020-06-20 NOTE — PROGRESS NOTES
Dr. Grayson made aware of ECMO low flow alarm with chugging. Albumin 5% 500 ml IV ordered. Also made aware of continuous severe profuse bleeding from arterial line site, currently with quikclot pressure dressing and arm has been repositioned.

## 2020-06-20 NOTE — PLAN OF CARE
Pt remains on VV ECMO speed 2800, flows 2.8, 40% & 3 of sweep, intubated AC 50%/6 PEEP. RR 30-50's. Patient tachycardic with zafar episodes. Blood pressure stable throughout shift. Precedex infusing for sedation.Heparin gtt continued, AntiXa therapeutic. Lasix gtt maintained. UO adequate. TF continued at goal rate 50cc/hr. Right femoral cannula site oozing. Dr. Villafuerte notified. Redressed with surgicel. Arterial line also oozing. Redressed with pressure dressing. One bowel movement this shift.

## 2020-06-20 NOTE — CARE UPDATE
BG goal 140-180     Remains in RICU, NAEON. Intubated and sedated. ECMO. Heparin and lasix infusions.  BG well controlled with scheduled insulin.   Plan:  continue novolog to 6 units every 4 hours while on TF; hold if TF held of less than 100.   BG monitoring every 4 hours and low dose correction scale.      Discharge planning: TBD      Endocrine to continue to follow     ** Please call Endocrine for any BG related issues

## 2020-06-20 NOTE — PROGRESS NOTES
Dressing on RFV cannula changed to halt slow bleeding. No movement of placement.  MD on service, Dr. Rufina Urbina, made aware.  Settings increased to 70% FiO2 & 4 of Sweep as pt is not oxygenating.  SvO2 staying in 50's.  Dressings on rt brachial A-line changed as well due to bleeding.  Pt cleaned w/o incident.

## 2020-06-21 NOTE — PLAN OF CARE
"                                                    SICU PLAN OF CARE NOTE     Dx: Acute respiratory disease due to COVID-19 virus     Shift Events: 1 PRBC admin.  Arterial line redressed w/ quick clot dsg due to profuse bleeding.  Dr. Shane aware.     Goals of Care: MAP 70-90     Vital Signs: BP (!) 115/64   Pulse (!) 142   Temp 98.4 (Oral)  Resp (!) 39   Ht 5' 4" (1.626 m)   Wt 59.8kg  SpO2 100%   BMI 23.01 kg/m²      Cardiac: Sinus Tach     Resp:  AC/PC 50% / PEEP 6, ECMO VV FiO2 40% / 3 Sweep, 2800 RPM & Flows 2.7-2.9     Neuro: Unresponsive; Pt will open eyes spontaneously but does not follow commands or track.  Patient withdraws to pain and does change facial expression to pain.     Gtts: Heparin @ 850 units/hr per anti-Xa (goal range of 0.2-0.25), Lasix @ 2.5 mg/hr, and Precedex @ 0.4 mcg/kg/min     Output: Urine Urinary Catheter >100cc/hr output ; BM x1 (dark liquid)     Diet: NPO and Tube Feeds (@ goal of 50 w/ 5cc residuals)     Labs/Accuchecks: Q12h labs, Q4h accuchecks     Skin: No new breakdown noted.  Iodine applied to left arm wound.  Arterial line dsg replaced due to bleeding.  RIJ cannula site dressed w/ tegaderm; cdi.  Right Femoral cannula site continues to leak; tegaderm intact.  Patient turned throughout shift and heel boots rotated.  "

## 2020-06-21 NOTE — CARE UPDATE
BG goal 140-180     Remains in RICU, NAEON. On vent sedated. ECMO. Heparin and lasix infusions.  BG well controlled with scheduled insulin.   Plan:  continue novolog  6 units every 4 hours while on TF; hold if TF held of less than 100.   BG monitoring every 4 hours and low dose correction scale.      Discharge planning: TBD      Endocrine to continue to follow     ** Please call Endocrine for any BG related issues

## 2020-06-21 NOTE — SUBJECTIVE & OBJECTIVE
Interval History:  NAEO, HR still 130s. UOP 3.5L, net negative 400mL in 24hrs. Hep gtt 850.  switched out.    Medications:  Continuous Infusions:   dexmedetomidine (PRECEDEX) infusion 0.8 mcg/kg/hr (06/21/20 0930)    dextrose 10 % in water (D10W)      furosemide (LASIX) 2 mg/mL continuous infusion (non-titrating) 2.5 mg/hr (06/21/20 0900)    heparin (porcine) in 5 % dex 850 Units/hr (06/21/20 0900)    norepinephrine bitartrate-D5W Stopped (06/10/20 0500)     Scheduled Meds:   fentaNYL  1 patch Transdermal Q72H    insulin aspart U-100  6 Units Subcutaneous Q24H    insulin aspart U-100  6 Units Subcutaneous Q24H    insulin aspart U-100  6 Units Subcutaneous Q24H    insulin aspart U-100  6 Units Subcutaneous Q24H    insulin aspart U-100  6 Units Subcutaneous Q24H    insulin aspart U-100  6 Units Subcutaneous Q24H    multivitamin liquid no.118  10 mL Per G Tube Daily    pantoprazole  40 mg Per NG tube BID    polyethylene glycol  17 g Per NG tube Daily    potassium chloride 10%  40 mEq Per NG tube BID    psyllium husk (aspartame)  1 packet Per NG tube TID    QUEtiapine  100 mg Per NG tube BID    sucralfate  1 g Per NG tube Q6H    vancomycin (VANCOCIN) IVPB  750 mg Intravenous Q12H    vitamin D  1,000 Units Per G Tube Daily        Objective:     Vital Signs (Most Recent):  Temp: 98 °F (36.7 °C) (06/21/20 0700)  Pulse: (!) 149 (06/21/20 1000)  Resp: (!) 51 (06/20/20 0632)  BP: (!) 90/52 (06/21/20 0428)  SpO2: 100 % (06/21/20 1000) Vital Signs (24h Range):  Temp:  [97.7 °F (36.5 °C)-98.5 °F (36.9 °C)] 98 °F (36.7 °C)  Pulse:  [121-152] 149  SpO2:  [97 %-100 %] 100 %  BP: (90)/(52) 90/52  Arterial Line BP: ()/() 102/63     Weight: 58 kg (127 lb 13.9 oz)  Body mass index is 21.95 kg/m².    SpO2: 100 %  O2 Device (Oxygen Therapy): ventilator    Intake/Output - Last 3 Shifts       06/19 0700 - 06/20 0659 06/20 0700 - 06/21 0659 06/21 0700 - 06/22 0659    I.V. (mL/kg) 1001.4 (17.6) 732  (12.6)     Blood 1021 665     NG/GT 1780 1480 300    IV Piggyback  250     Total Intake(mL/kg) 3802.4 (66.8) 3127 (53.9) 300 (5.2)    Urine (mL/kg/hr) 3010 (2.2) 3320 (2.4) 405 (2.2)    Drains 10      Stool 0 0     Blood 12 3 2    Total Output 3032 3323 407    Net +770.4 -196 -107           Stool Occurrence 4 x 2 x           Lines/Drains/Airways     Peripherally Inserted Central Catheter Line            PICC Double Lumen 05/05/20 1707 right basilic 46 days          Central Venous Catheter Line                 ECMO Cannula 04/25/20 1120 right femoral vein 56 days         ECMO Cannula 04/25/20 1120 right internal jugular 56 days          Drain                 NG/OG Tube 05/03/20 1215 [place] sump;nasogastric 18 Fr. Left nostril 48 days         Urethral Catheter 05/28/20 1630 23 days          Airway                 Surgical Airway 05/05/20 1500 Shiley Cuffed 46 days          Arterial Line                 Arterial Line 04/27/20 1100 Right Brachial 54 days          Peripheral Intravenous Line                 Peripheral IV - Single Lumen 06/21/20 0012 20 G Left Forearm less than 1 day                Physical Exam    Constitutional: He is sedated, and intubated.   Head: Normocephalic   Cardiovascular: Tachycardia 130s   Pulmonary/Chest: intubated vent FiO2 50% PEEP 6, ECMO RPM 2800, Flow 2.8, sweep 3, oxygenator FiO2 40%  Skin: L neck cannula repositioned and secured, intact, will good placement on CXR. No change in flows at this time. No flashing. Clots pre and post-oxygenator.  Nursing note and vitals reviewed.    Significant Labs:  BMP:   Recent Labs   Lab 06/21/20  0200   *      K 4.4   CL 95   CO2 36*   BUN 45*   CREATININE 0.6   CALCIUM 10.7*   MG 2.1     CBC:   Recent Labs   Lab 06/21/20  0200  06/21/20  0801   WBC 15.04*  --   --    RBC 3.20*  --   --    HGB 9.0*  --   --    HCT 28.8*   < > 29*   PLT 64*  --   --    MCV 90  --   --    MCH 28.1  --   --    MCHC 31.3*  --   --     < > = values in this  interval not displayed.     LFTs:   Recent Labs   Lab 06/21/20  0200   ALT 37   AST 42*   ALKPHOS 113   BILITOT 1.7*   PROT 6.9   ALBUMIN 3.6     Significant Diagnostics:  Cxr: none today

## 2020-06-21 NOTE — PROGRESS NOTES
[facility] - Respiratory ICU  Critical Care - Surgery  Progress Note    Patient Name: Ranjana Sanchez  MRN: [id number]  Admission Date: 4/10/2020  Hospital Length of Stay: 72 days  Code Status: Full Code  Attending Provider: Francis Ayon MD  Primary Care Provider: Primary Doctor No   Principal Problem: Acute respiratory disease due to COVID-19 virus    Subjective:     Hospital/ICU Course:  No notes on file    Interval History/Significant Events:   No significant clinical change  Received pPRBC, albumin, and cryo on 6/19    Follow-up For: Procedure(s) (LRB):  CREATION, TRACHEOSTOMY  (N/A)    Post-Operative Day: 47 Days Post-Op    Objective:     Vital Signs (Most Recent):  Temp: 98 °F (36.7 °C) (06/21/20 0700)  Pulse: (!) 136 (06/21/20 0700)  Resp: (!) 51 (06/20/20 0632)  BP: (!) 90/52 (06/21/20 0428)  SpO2: 100 % (06/21/20 0700) Vital Signs (24h Range):  Temp:  [97.7 °F (36.5 °C)-98.5 °F (36.9 °C)] 98 °F (36.7 °C)  Pulse:  [121-152] 136  SpO2:  [94 %-100 %] 100 %  BP: (90)/(52) 90/52  Arterial Line BP: ()/() 114/67     Weight: 58 kg (127 lb 13.9 oz)  Body mass index is 21.95 kg/m².      Intake/Output Summary (Last 24 hours) at 6/21/2020 0726  Last data filed at 6/21/2020 0700  Gross per 24 hour   Intake 3127 ml   Output 3533 ml   Net -406 ml       Physical Exam  Vitals signs reviewed.   Constitutional:       Appearance: He is well-developed.   HENT:      Head: Normocephalic and atraumatic.   Eyes:      Pupils: Pupils are equal, round, and reactive to light.   Neck:      Musculoskeletal: Normal range of motion.   Cardiovascular:      Comments: tachycardic  Pulmonary:      Comments: Trached, mechanically ventilated  On ECMO, cannulated in RIJ and fem vein  Abdominal:      General: There is no distension.      Palpations: Abdomen is soft.   Musculoskeletal: Normal range of motion.   Skin:     General: Skin is warm.   Neurological:      Comments: sedated         Vents:  Vent Mode: A/C  (06/21/20 0116)  Ventilator Initiated: Yes (04/10/20 2247)  Set Rate: 20 BPM (06/21/20 0116)  Vt Set: 250 mL (06/06/20 0858)  Pressure Support: 20 cmH20 (06/06/20 0858)  PEEP/CPAP: 6 cmH20 (06/21/20 0116)  Oxygen Concentration (%): 50 (06/21/20 0700)  Peak Airway Pressure: 31 cmH2O (06/21/20 0116)  Plateau Pressure: 30 cmH20 (06/21/20 0116)  Total Ve: 8.74 mL (06/21/20 0116)  F/VT Ratio<105 (RSBI): 213.54 (06/20/20 0354)    Lines/Drains/Airways     Peripherally Inserted Central Catheter Line            PICC Double Lumen 05/05/20 1707 right basilic 46 days          Central Venous Catheter Line                 ECMO Cannula 04/25/20 1120 right femoral vein 56 days         ECMO Cannula 04/25/20 1120 right internal jugular 56 days          Drain                 NG/OG Tube 05/03/20 1215 [place] sump;nasogastric 18 Fr. Left nostril 48 days         Urethral Catheter 05/28/20 1630 23 days          Airway                 Surgical Airway 05/05/20 1500 Shiley Cuffed 46 days          Arterial Line                 Arterial Line 04/27/20 1100 Right Brachial 54 days          Peripheral Intravenous Line                 Peripheral IV - Single Lumen 06/21/20 0012 20 G Left Forearm less than 1 day                Significant Labs:    CBC/Anemia Profile:  Recent Labs   Lab 06/20/20  0425  06/20/20  1459  06/20/20 2002 06/20/20 2002 06/21/20  0200 06/21/20  0200   WBC 14.64*  --  13.57*  --  14.92*  --  15.04*  --    HGB 8.6*  --  9.5*  --  9.2*  --  9.0*  --    HCT 27.0*   < > 30.5*   < > 29.6* 30* 28.8* 30*   PLT 59*  --  58*  --  63*  --  64*  --    MCV 89  --  91  --  91  --  90  --    RDW 15.9*  --  15.3*  --  15.3*  --  15.3*  --    FERRITIN 2,226*  --   --   --   --   --  2,362*  --     < > = values in this interval not displayed.        Chemistries:  Recent Labs   Lab 06/20/20  1459 06/20/20 2002 06/21/20  0200    142 143   K 4.2 4.1 4.4   CL 95 95 95   CO2 36* 36* 36*   BUN 44* 46* 45*   CREATININE 0.6 0.6 0.6   CALCIUM  10.7* 10.7* 10.7*   ALBUMIN 3.7 3.6 3.6   PROT 7.0 6.8 6.9   BILITOT 1.7* 1.7* 1.7*   ALKPHOS 111 112 113   ALT 36 35 37   AST 45* 41* 42*   MG 2.1 2.1 2.1   PHOS 4.3 3.9 3.8     Significant Imaging:  I have reviewed and interpreted all pertinent imaging results/findings within the past 24 hours.    Assessment/Plan:     COVID-19 virus detected  Ranjana Sanchez is a 55 y.o. 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  This was switched to ETT on 4/11.  Cannulated on 4/25.    Neuro:  - Precedex for sedation  - 100 BID of seroquel  - Fentanyl patch  - Encephalopathy with poor neuro exam; still withdraws to pain  - CT on June 2 showing bilateral basal ganglia infarcts, which likely explains the neuro exam findings. His infarcts are sizeable, but extent of permanent deficits are unknown. The likelihood of meaningful recovery is low. Neuro consulted to prognosticate and agreed with poor neurological prognosis given bilateral basal ganglia CVAs    Pulm  AC/VC+ on the vent.  ECMO   Pump parameters:  See perfusionist documentaion  Overbreathing vent at rate of 45-50s with TVs 150-250  Continuing discussions regarding decannulation    Vent Mode: A/C  Oxygen Concentration (%):  [50] 50  Resp Rate Total:  [26 br/min-55 br/min] 41 br/min  PEEP/CPAP:  [6 cmH20] 6 cmH20  Mean Airway Pressure:  [14 cmH20-15 cmH20] 15 cmH20      Cardiac  Levo as needed  Tachy/HTN intermittently resolved with prn hydromorphone  MAP goal < 90  ASA 81  ECMO -> working to decannulate    FEN/GI  TFs at goal (50cc/hr)    Renal  UOP 95-200cc/hr  Lasix gtt at 2.5    Heme  Hep gtt, goal 0.2-0.25 to prevent another GI bleed, being titrated appropriately  - upper endoscopy 5/3 with clip placement     ID  Continuing on vanc. Leukocytosis downtrending  Dose per pharm    Endo:  Endocrine following and managing bg    Dispo: RICU  -Goals of care meeting scheduled for Monday             Critical  care was time spent personally by me on the following activities: development of treatment plan with patient or surrogate and bedside caregivers, discussions with consultants, evaluation of patient's response to treatment, examination of patient, ordering and performing treatments and interventions, ordering and review of laboratory studies, ordering and review of radiographic studies, pulse oximetry, re-evaluation of patient's condition.  This critical care time did not overlap with that of any other provider or involve time for any procedures.     Zelalem Ariza MD  Critical Care - Surgery  [facility] - Res[facility] ICU

## 2020-06-21 NOTE — ASSESSMENT & PLAN NOTE
Ranjana Sanchez is a 55 y.o. 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  This was switched to ETT on 4/11.  Cannulated on 4/25.    Neuro:  - Precedex for sedation  - 100 BID of seroquel  - Fentanyl patch  - Encephalopathy with poor neuro exam; still withdraws to pain  - CT on June 2 showing bilateral basal ganglia infarcts, which likely explains the neuro exam findings. His infarcts are sizeable, but extent of permanent deficits are unknown. The likelihood of meaningful recovery is low. Neuro consulted to prognosticate and agreed with poor neurological prognosis given bilateral basal ganglia CVAs    Pulm  AC/VC+ on the vent.  ECMO   Pump parameters:  See perfusionist documentaion  Overbreathing vent at rate of 45-50s with TVs 150-250  Continuing discussions regarding decannulation    Vent Mode: A/C  Oxygen Concentration (%):  [50] 50  Resp Rate Total:  [26 br/min-55 br/min] 41 br/min  PEEP/CPAP:  [6 cmH20] 6 cmH20  Mean Airway Pressure:  [14 cmH20-15 cmH20] 15 cmH20      Cardiac  Levo as needed  Tachy/HTN intermittently resolved with prn hydromorphone  MAP goal < 90  ASA 81  ECMO -> working to decannulate    FEN/GI  TFs at goal (50cc/hr)    Renal  UOP 95-200cc/hr  Lasix gtt at 2.5    Heme  Hep gtt, goal 0.2-0.25 to prevent another GI bleed, being titrated appropriately  - upper endoscopy 5/3 with clip placement     ID  Continuing on vanc. Leukocytosis downtrending  Dose per pharm    Endo:  Endocrine following and managing bg    Dispo: RICU  -Goals of care meeting scheduled for Monday

## 2020-06-21 NOTE — SUBJECTIVE & OBJECTIVE
Interval History/Significant Events:   No significant clinical change  Received pPRBC, albumin, and cryo on 6/19    Follow-up For: Procedure(s) (LRB):  CREATION, TRACHEOSTOMY  (N/A)    Post-Operative Day: 47 Days Post-Op    Objective:     Vital Signs (Most Recent):  Temp: 98 °F (36.7 °C) (06/21/20 0700)  Pulse: (!) 136 (06/21/20 0700)  Resp: (!) 51 (06/20/20 0632)  BP: (!) 90/52 (06/21/20 0428)  SpO2: 100 % (06/21/20 0700) Vital Signs (24h Range):  Temp:  [97.7 °F (36.5 °C)-98.5 °F (36.9 °C)] 98 °F (36.7 °C)  Pulse:  [121-152] 136  SpO2:  [94 %-100 %] 100 %  BP: (90)/(52) 90/52  Arterial Line BP: ()/() 114/67     Weight: 58 kg (127 lb 13.9 oz)  Body mass index is 21.95 kg/m².      Intake/Output Summary (Last 24 hours) at 6/21/2020 0726  Last data filed at 6/21/2020 0700  Gross per 24 hour   Intake 3127 ml   Output 3533 ml   Net -406 ml       Physical Exam  Vitals signs reviewed.   Constitutional:       Appearance: He is well-developed.   HENT:      Head: Normocephalic and atraumatic.   Eyes:      Pupils: Pupils are equal, round, and reactive to light.   Neck:      Musculoskeletal: Normal range of motion.   Cardiovascular:      Comments: tachycardic  Pulmonary:      Comments: Trached, mechanically ventilated  On ECMO, cannulated in RIJ and fem vein  Abdominal:      General: There is no distension.      Palpations: Abdomen is soft.   Musculoskeletal: Normal range of motion.   Skin:     General: Skin is warm.   Neurological:      Comments: sedated         Vents:  Vent Mode: A/C (06/21/20 0116)  Ventilator Initiated: Yes (04/10/20 3206)  Set Rate: 20 BPM (06/21/20 0116)  Vt Set: 250 mL (06/06/20 0858)  Pressure Support: 20 cmH20 (06/06/20 0858)  PEEP/CPAP: 6 cmH20 (06/21/20 0116)  Oxygen Concentration (%): 50 (06/21/20 0700)  Peak Airway Pressure: 31 cmH2O (06/21/20 0116)  Plateau Pressure: 30 cmH20 (06/21/20 0116)  Total Ve: 8.74 mL (06/21/20 0116)  F/VT Ratio<105 (RSBI): 213.54 (06/20/20  0354)    Lines/Drains/Airways     Peripherally Inserted Central Catheter Line            PICC Double Lumen 05/05/20 1707 right basilic 46 days          Central Venous Catheter Line                 ECMO Cannula 04/25/20 1120 right femoral vein 56 days         ECMO Cannula 04/25/20 1120 right internal jugular 56 days          Drain                 NG/OG Tube 05/03/20 1215 [place] sump;nasogastric 18 Fr. Left nostril 48 days         Urethral Catheter 05/28/20 1630 23 days          Airway                 Surgical Airway 05/05/20 1500 Shiley Cuffed 46 days          Arterial Line                 Arterial Line 04/27/20 1100 Right Brachial 54 days          Peripheral Intravenous Line                 Peripheral IV - Single Lumen 06/21/20 0012 20 G Left Forearm less than 1 day                Significant Labs:    CBC/Anemia Profile:  Recent Labs   Lab 06/20/20  0425  06/20/20  1459  06/20/20 2002 06/20/20 2002 06/21/20  0200 06/21/20  0200   WBC 14.64*  --  13.57*  --  14.92*  --  15.04*  --    HGB 8.6*  --  9.5*  --  9.2*  --  9.0*  --    HCT 27.0*   < > 30.5*   < > 29.6* 30* 28.8* 30*   PLT 59*  --  58*  --  63*  --  64*  --    MCV 89  --  91  --  91  --  90  --    RDW 15.9*  --  15.3*  --  15.3*  --  15.3*  --    FERRITIN 2,226*  --   --   --   --   --  2,362*  --     < > = values in this interval not displayed.        Chemistries:  Recent Labs   Lab 06/20/20  1459 06/20/20 2002 06/21/20  0200    142 143   K 4.2 4.1 4.4   CL 95 95 95   CO2 36* 36* 36*   BUN 44* 46* 45*   CREATININE 0.6 0.6 0.6   CALCIUM 10.7* 10.7* 10.7*   ALBUMIN 3.7 3.6 3.6   PROT 7.0 6.8 6.9   BILITOT 1.7* 1.7* 1.7*   ALKPHOS 111 112 113   ALT 36 35 37   AST 45* 41* 42*   MG 2.1 2.1 2.1   PHOS 4.3 3.9 3.8     Significant Imaging:  I have reviewed and interpreted all pertinent imaging results/findings within the past 24 hours.

## 2020-06-21 NOTE — ASSESSMENT & PLAN NOTE
Acute respiratory distress syndrome (ARDS) due to COVID-19 virus  - Crich switched to ETT on 4/11  - Monika weaned off 5/5  - Tracheostomy and bronch 5/5  - Severe thrombocytopenia resolved; transfuse for under 30K  - Aspirin 81 daily  - RPM at 2800; flows at 2.9  - Sweep at 1; CO2 goal is 59 or below  - Do not increase sweep for pCO2s 59 or below as long as patient is not acidotic, as these high pCO2s are respiratory compensation for alkalosis  - Oxygenator Fi at 40%; switched out 6/9; wean FiO2 today     Tongue laceration      -ENT evaluated, laceration superficial      -resolved    Sedation  - Valium, seroquel, PRN Fentanyl     UGI Bleed  - Scoped by GI 5/3 with epinephrine injection and clip placement for an actively bleeding 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

## 2020-06-21 NOTE — PROGRESS NOTES
Notified Dr. Shane of Results for JAYDA SCHMIDT (MRN [id number]) as of 6/20/2020 21:03 and of profuse bleeding from arterial line after dsg change w/ quick clot.   Ref. Range 6/20/2020 20:02   Hemoglobin Latest Ref Range: 14.0 - 18.0 g/dL 9.2 (L)   Hematocrit Latest Ref Range: 40.0 - 54.0 % 29.6 (L)    No new orders. Will continue to monitor.

## 2020-06-21 NOTE — PROGRESS NOTES
[facility] - Respiratory ICU  Cardiothoracic Surgery  Progress Note    Patient Name: Ranjana Sanchez  MRN: [id number]  Admission Date: 4/10/2020  Hospital Length of Stay: 72 days  Code Status: Full Code   Attending Physician: Francis Ayon MD   Referring Provider: Francis Ayon MD  Principal Problem:Acute respiratory disease due to COVID-19 virus    Subjective:   Interval History:  NAEO, HR still 130s. UOP 3.5L, net negative 400mL in 24hrs. Hep gtt 850.  switched out.    Medications:  Continuous Infusions:   dexmedetomidine (PRECEDEX) infusion 0.8 mcg/kg/hr (06/21/20 0930)    dextrose 10 % in water (D10W)      furosemide (LASIX) 2 mg/mL continuous infusion (non-titrating) 2.5 mg/hr (06/21/20 0900)    heparin (porcine) in 5 % dex 850 Units/hr (06/21/20 0900)    norepinephrine bitartrate-D5W Stopped (06/10/20 0500)     Scheduled Meds:   fentaNYL  1 patch Transdermal Q72H    insulin aspart U-100  6 Units Subcutaneous Q24H    insulin aspart U-100  6 Units Subcutaneous Q24H    insulin aspart U-100  6 Units Subcutaneous Q24H    insulin aspart U-100  6 Units Subcutaneous Q24H    insulin aspart U-100  6 Units Subcutaneous Q24H    insulin aspart U-100  6 Units Subcutaneous Q24H    multivitamin liquid no.118  10 mL Per G Tube Daily    pantoprazole  40 mg Per NG tube BID    polyethylene glycol  17 g Per NG tube Daily    potassium chloride 10%  40 mEq Per NG tube BID    psyllium husk (aspartame)  1 packet Per NG tube TID    QUEtiapine  100 mg Per NG tube BID    sucralfate  1 g Per NG tube Q6H    vancomycin (VANCOCIN) IVPB  750 mg Intravenous Q12H    vitamin D  1,000 Units Per G Tube Daily        Objective:     Vital Signs (Most Recent):  Temp: 98 °F (36.7 °C) (06/21/20 0700)  Pulse: (!) 149 (06/21/20 1000)  Resp: (!) 51 (06/20/20 0632)  BP: (!) 90/52 (06/21/20 0428)  SpO2: 100 % (06/21/20 1000) Vital Signs (24h Range):  Temp:  [97.7 °F (36.5 °C)-98.5 °F (36.9 °C)] 98 °F (36.7  °C)  Pulse:  [121-152] 149  SpO2:  [97 %-100 %] 100 %  BP: (90)/(52) 90/52  Arterial Line BP: ()/() 102/63     Weight: 58 kg (127 lb 13.9 oz)  Body mass index is 21.95 kg/m².    SpO2: 100 %  O2 Device (Oxygen Therapy): ventilator    Intake/Output - Last 3 Shifts       06/19 0700 - 06/20 0659 06/20 0700 - 06/21 0659 06/21 0700 - 06/22 0659    I.V. (mL/kg) 1001.4 (17.6) 732 (12.6)     Blood 1021 665     NG/GT 1780 1480 300    IV Piggyback  250     Total Intake(mL/kg) 3802.4 (66.8) 3127 (53.9) 300 (5.2)    Urine (mL/kg/hr) 3010 (2.2) 3320 (2.4) 405 (2.2)    Drains 10      Stool 0 0     Blood 12 3 2    Total Output 3032 3323 407    Net +770.4 -196 -107           Stool Occurrence 4 x 2 x           Lines/Drains/Airways     Peripherally Inserted Central Catheter Line            PICC Double Lumen 05/05/20 1707 right basilic 46 days          Central Venous Catheter Line                 ECMO Cannula 04/25/20 1120 right femoral vein 56 days         ECMO Cannula 04/25/20 1120 right internal jugular 56 days          Drain                 NG/OG Tube 05/03/20 1215 [place] sump;nasogastric 18 Fr. Left nostril 48 days         Urethral Catheter 05/28/20 1630 23 days          Airway                 Surgical Airway 05/05/20 1500 Shiley Cuffed 46 days          Arterial Line                 Arterial Line 04/27/20 1100 Right Brachial 54 days          Peripheral Intravenous Line                 Peripheral IV - Single Lumen 06/21/20 0012 20 G Left Forearm less than 1 day                Physical Exam    Constitutional: He is sedated, and intubated.   Head: Normocephalic   Cardiovascular: Tachycardia 130s   Pulmonary/Chest: intubated vent FiO2 50% PEEP 6, ECMO RPM 2800, Flow 2.8, sweep 3, oxygenator FiO2 40%  Skin: L neck cannula repositioned and secured, intact, will good placement on CXR. No change in flows at this time. No flashing. Clots pre and post-oxygenator.  Nursing note and vitals reviewed.    Significant Labs:  BMP:    Recent Labs   Lab 06/21/20  0200   *      K 4.4   CL 95   CO2 36*   BUN 45*   CREATININE 0.6   CALCIUM 10.7*   MG 2.1     CBC:   Recent Labs   Lab 06/21/20  0200  06/21/20  0801   WBC 15.04*  --   --    RBC 3.20*  --   --    HGB 9.0*  --   --    HCT 28.8*   < > 29*   PLT 64*  --   --    MCV 90  --   --    MCH 28.1  --   --    MCHC 31.3*  --   --     < > = values in this interval not displayed.     LFTs:   Recent Labs   Lab 06/21/20  0200   ALT 37   AST 42*   ALKPHOS 113   BILITOT 1.7*   PROT 6.9   ALBUMIN 3.6     Significant Diagnostics:  Cxr: none today      Assessment/Plan:     Acute respiratory distress syndrome (ARDS) due to COVID-19 virus  - Crich switched to ETT on 4/11  - Monika weaned off 5/5  - Tracheostomy and bronch 5/5  - Severe thrombocytopenia resolved; transfuse for under 30K  - Aspirin 81 daily  - RPM at 2800; flows at 2.9  - Sweep at 1; CO2 goal is 59 or below  - Do not increase sweep for pCO2s 59 or below as long as patient is not acidotic, as these high pCO2s are respiratory compensation for alkalosis  - Oxygenator Fi at 40%; switched out 6/9; wean FiO2 today     Tongue laceration      -ENT evaluated, laceration superficial      -resolved    Sedation  - Valium, seroquel, PRN Fentanyl     UGI Bleed  - Scoped by GI 5/3 with epinephrine injection and clip placement for an actively bleeding 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    Anne Miller MD  Cardiothoracic Surgery  [facility] ICU

## 2020-06-21 NOTE — PROGRESS NOTES
Post oxy PaO2 54 on 70% with CDI reading mid to low 50's. Increased FIO2 to 100 CDI still reading 50's.  Knob of  seemed like it was loose. Connected oxygenator to O2 tank CDI then read 200's.  changed out and connected to oxygenator. Post oxy abg PaO2 after change out was 278.

## 2020-06-21 NOTE — PLAN OF CARE
Pt remains on VV ECMO speed 2800, flows 2.8, 40% & 3 of sweep, intubated AC 50%/6 PEEP. RR 30-50's. Patient tachycardic with -150's. Blood pressure stable throughout shift. Precedex infusing for sedation.Heparin gtt continued, AntiXa therapeutic. Lasix gtt maintained. UO adequate. TF continued at goal rate 50cc/hr. Right femoral cannula site oozing. Dr. Ariza aware. Arterial line also oozing. One unit of blood and one unit of cryo given this shift. Redressed with surgicel. One bowel movement this shift. Patient remains unresponsive. No new skin breakdown noted.

## 2020-06-21 NOTE — PROGRESS NOTES
Patient HR elevated, sustained in 140-150's. CBC drawn and checked with no difference after unit of blood was administered. Patient still oozing from arterial line site and right femoral cannula site. Spoke with Dr. Ariza. MD will come to bedside to place stitch in fem site. One unit of PRBCs ordered. Will redraw CBC after administering.

## 2020-06-21 NOTE — PROGRESS NOTES
ECMO Specialists shift report    Date: 06/21/2020  ECMO Specialist:  Ashley Francisco    Pump parameters:  RPM:2800  Flow:  2.8  Sweep:  4  FiO2:  70    Oxygenator status:  Clots: pre and post  Fibrin: pre and post    Pressure trends:  P1: 99  P2: 86  Delta P: 13    Volume status:  Chugging noted? no  CVP:   MAP:  70-90  MD notified (name):  Jean-Paul    Anticoagulation:  ACT/aPTT/Xa parameters: .2-.25  ACT/aPTT/Xa trends this shift: .24    Cannula size / status / placement:  Arterial:   Venous 1:RIJV 23F@6.5 cm   Venous 2: RFV 27F@ 12cm  Dual lumen:      Additional Comments: Bleeding from A-Line and RFV cannula.  Still doing 40-60 BPMs.  PRBCs given see RN notes.  Clots distal from RFV cannula close to knee.  Maintained the flow.

## 2020-06-22 NOTE — CARE UPDATE
BG goal 140-180     Remains in RICU, NAEON. On vent sedated. ECMO. Heparin and lasix infusions.  BG well controlled with scheduled insulin. 48 Days Post-Op.  Pepatmen intense VHP at 50 cc/hr.     Plan:  continue novolog  6 units every 4 hours while on TF; hold if TF held of less than 100.   BG monitoring every 4 hours and low dose correction scale.      Discharge planning: TBD      Endocrine to continue to follow     ** Please call Endocrine for any BG related issues

## 2020-06-22 NOTE — PROGRESS NOTES
Pharmacokinetic Assessment Follow Up: IV Vancomycin    Vancomycin serum concentration assessment(s):    The trough level was drawn correctly and can be used to guide therapy at this time. The measurement is above the desired definitive target range of 15 to 20 mcg/mL.    Vancomycin Regimen Plan:    Discontinue the scheduled vancomycin regimen and re-dose when the random level is less than 20 mcg/mL, next level to be drawn at 0700 on 6/22.    Drug levels (last 3 results):  Recent Labs   Lab Result Units 06/19/20  0738 06/21/20 2025   Vancomycin, Random ug/mL  --  25.5   Vancomycin-Trough ug/mL 18.9  --        Pharmacy will continue to follow and monitor vancomycin.    Please contact pharmacy at extension [phone] for questions regarding this assessment.    Thank you for the consult,   Elaina Spears       Patient brief summary:  Ranjana Sanchez is a 55 y.o. male initiated on antimicrobial therapy with IV Vancomycin for treatment of surgical prophylaxis     Drug Allergies:   Review of patient's allergies indicates:  No Known Allergies    Actual Body Weight:   58kg    Renal Function:   Estimated Creatinine Clearance: 95.5 mL/min (based on SCr of 0.7 mg/dL).,     Dialysis Method (if applicable):  N/A    CBC (last 72 hours):  Recent Labs   Lab Result Units 06/19/20  0204 06/19/20  0800 06/19/20  1428 06/19/20  1805 06/20/20  0425 06/20/20  1054 06/20/20  1459 06/20/20  2002 06/21/20  0200 06/21/20  1008 06/21/20  1454 06/21/20 2024   WBC K/uL 13.36* 14.86* 15.54* 15.36* 14.64*  --  13.57* 14.92* 15.04* 14.87* 14.20* 15.70*   Hemoglobin g/dL 9.4* 9.1* 8.9* 9.9* 8.6*  --  9.5* 9.2* 9.0* 9.1* 9.9* 9.7*   Hemoglobin, Free, Plasma mg/dL  --  20  --   --   --  30  --   --   --   --   --   --    Hematocrit % 29.7* 29.3* 27.2* 30.8* 27.0*  --  30.5* 29.6* 28.8* 29.2* 30.9* 30.1*   Platelets K/uL 67* 67* 65* 62* 59*  --  58* 63* 64* 60* 56* 65*   Gran% % 68.0 70.5 70.1 70.4 69.4  --  68.8 67.2 65.8 68.9 74.0* 78.0*    Lymph% % 14.4* 12.2* 14.0* 13.3* 11.7*  --  12.2* 14.3* 15.4* 14.7* 17.0* 14.0*   Mono% % 6.5 5.5 6.0 5.9 6.4  --  6.2 5.6 6.3 5.9 3.0* 2.0*   Eosinophil% % 7.0 7.3 5.5 5.5 7.1  --  8.2* 8.2* 7.5 6.3 6.0 5.0   Basophil% % 0.5 0.5 0.5 0.7 0.5  --  0.5 0.5 0.5 0.4 0.0 1.0   Differential Method  Automated Automated Automated Automated Automated  --  Automated Automated Automated Automated Manual Manual       Metabolic Panel (last 72 hours):  Recent Labs   Lab Result Units 06/19/20  0204 06/19/20  0800 06/19/20  1428 06/20/20  0425 06/20/20  1459 06/20/20  2002 06/21/20  0200 06/21/20  1454 06/21/20  2024   Sodium mmol/L 135* 138 138 139 142 142 143 143 141   Potassium mmol/L 4.6 4.3 4.7 4.1 4.2 4.1 4.4 4.7 4.7   Chloride mmol/L 90* 92* 93* 93* 95 95 95 98 96   CO2 mmol/L 34* 35* 35* 34* 36* 36* 36* 34* 34*   Glucose mg/dL 238* 211* 172* 166* 121* 192* 115* 154* 247*   BUN, Bld mg/dL 38* 40* 42* 43* 44* 46* 45* 57* 64*   Creatinine mg/dL 0.6 0.6 0.7 0.6 0.6 0.6 0.6 0.7 0.7   Albumin g/dL 3.3* 3.3* 3.3* 3.6 3.7 3.6 3.6 3.4* 3.4*   Total Bilirubin mg/dL 1.6* 1.6* 1.6* 1.6* 1.7* 1.7* 1.7* 1.9* 1.7*   Alkaline Phosphatase U/L 102 106 122 108 111 112 113 115 111   AST U/L 29 32 36 37 45* 41* 42* 35 34   ALT U/L 23 24 28 27 36 35 37 34 34   Magnesium mg/dL 1.9 1.9 2.1 2.0 2.1 2.1 2.1 2.2 2.2   Phosphorus mg/dL 2.5* 3.6 3.7 3.2 4.3 3.9 3.8 4.6* 4.8*       Vancomycin Administrations:  vancomycin given in the last 96 hours                   vancomycin 750 mg in dextrose 5 % 250 mL IVPB (ready to mix system) (mg) 750 mg New Bag 06/21/20 0823     750 mg New Bag 06/20/20 2010     750 mg New Bag  0909     750 mg New Bag 06/19/20 2034     750 mg New Bag  0900     750 mg New Bag 06/18/20 2045     750 mg New Bag  0918                Microbiologic Results:  Microbiology Results (last 7 days)     ** No results found for the last 168 hours. **

## 2020-06-22 NOTE — PROGRESS NOTES
ECMO Specialists shift report    Date: 06/22/2020  ECMO Specialist:  Lia Alvarez    Pump parameters:  RPM: 2700  Flow:  2.7  Sweep:  1  FiO2:  21    Oxygenator status:  Clots: pre & post oxy  Fibrin: none    Pressure trends:  P1: 102  P2: 82  Delta P: 20    Volume status:  Chugging noted - yes  MAP:  80-90's  MD notified (name):  Nany    Anticoagulation:  ACT/aPTT/Xa parameters: 0.2-0.25  ACT/aPTT/Xa trends this shift: 0.17    Cannula size / status / placement:  Arterial:   Venous 1: 23FR / RIJV / 6.5 cm  Venous 2: 27FR / RFV / 12 cm  Dual lumen: no     Additional Comments:        ECMO circuit maintained today; Dr. Ayon here @bedside today.  Weaning parameters provided.  No blood products needed today.

## 2020-06-22 NOTE — PROGRESS NOTES
[facility] - Respiratory ICU  Critical Care - Surgery  Progress Note    Patient Name: Ranjana Sanchez  MRN: [id number]  Admission Date: 4/10/2020  Hospital Length of Stay: 73 days  Code Status: Full Code  Attending Provider: Francis Ayon MD  Primary Care Provider: Primary Doctor No   Principal Problem: Acute respiratory disease due to COVID-19 virus    Subjective:     Hospital/ICU Course:  No notes on file    Interval History/Significant Events:   No significant clinical changes.  Received 2u pRBCs and 1u cryo yesterday.   Pump: RPM 2800, Flow 2.7, Sweep 1.5, FiO2 50  Hopefully having family meeting regarding GOC today    Follow-up For: Procedure(s) (LRB):  CREATION, TRACHEOSTOMY  (N/A)    Post-Operative Day: 48 Days Post-Op    Objective:     Vital Signs (Most Recent):  Temp: 97.6 °F (36.4 °C) (06/22/20 0400)  Pulse: (!) 131 (06/22/20 0645)  Resp: (!) 51 (06/20/20 0632)  BP: 130/76 (06/22/20 0630)  SpO2: 100 % (06/22/20 0645) Vital Signs (24h Range):  Temp:  [97.6 °F (36.4 °C)-98 °F (36.7 °C)] 97.6 °F (36.4 °C)  Pulse:  [123-155] 131  SpO2:  [98 %-100 %] 100 %  BP: ()/(53-97) 130/76  Arterial Line BP: ()/(58-79) 115/73     Weight: 60 kg (132 lb 4.4 oz)  Body mass index is 22.71 kg/m².      Intake/Output Summary (Last 24 hours) at 6/22/2020 0704  Last data filed at 6/22/2020 0600  Gross per 24 hour   Intake 2877 ml   Output 2197 ml   Net 680 ml       Physical Exam    Vents:  Vent Mode: A/C (06/22/20 0045)  Ventilator Initiated: Yes (04/10/20 2247)  Set Rate: 20 BPM (06/22/20 0045)  Vt Set: 250 mL (06/06/20 0858)  Pressure Support: 20 cmH20 (06/06/20 0858)  PEEP/CPAP: 6 cmH20 (06/22/20 0045)  Oxygen Concentration (%): 50 (06/22/20 0645)  Peak Airway Pressure: 32 cmH2O (06/22/20 0045)  Plateau Pressure: 30 cmH20 (06/22/20 0045)  Total Ve: 7.32 mL (06/22/20 0045)  F/VT Ratio<105 (RSBI): 213.54 (06/20/20 0354)    Lines/Drains/Airways     Peripherally Inserted Central Catheter Line             PICC Double Lumen 05/05/20 1707 right basilic 47 days          Central Venous Catheter Line                 ECMO Cannula 04/25/20 1120 right femoral vein 57 days         ECMO Cannula 04/25/20 1120 right internal jugular 57 days          Drain                 NG/OG Tube 05/03/20 1215 [place] sump;nasogastric 18 Fr. Left nostril 49 days         Urethral Catheter 05/28/20 1630 24 days          Airway                 Surgical Airway 05/05/20 1500 Shiley Cuffed 47 days          Arterial Line                 Arterial Line 04/27/20 1100 Right Brachial 55 days          Peripheral Intravenous Line                 Peripheral IV - Single Lumen 06/21/20 0012 20 G Left Forearm 1 day                Significant Labs:    CBC/Anemia Profile:  Recent Labs   Lab 06/21/20  0200  06/21/20  1454  06/21/20 2024 06/22/20 0213 06/22/20 0227   WBC 15.04*   < > 14.20*  --  15.70*  --  17.45*   HGB 9.0*   < > 9.9*  --  9.7*  --  8.9*   HCT 28.8*   < > 30.9*   < > 30.1* 27* 27.3*   PLT 64*   < > 56*  --  65*  --  72*   MCV 90   < > 88  --  88  --  86   RDW 15.3*   < > 16.5*  --  16.9*  --  17.1*   FERRITIN 2,362*  --   --   --   --   --  2,376*    < > = values in this interval not displayed.        Chemistries:  Recent Labs   Lab 06/21/20  1454 06/21/20 2024 06/22/20 0227    141 144   K 4.7 4.7 4.4   CL 98 96 98   CO2 34* 34* 33*   BUN 57* 64* 68*   CREATININE 0.7 0.7 0.7   CALCIUM 10.8* 10.7* 10.7*   ALBUMIN 3.4* 3.4* 3.5   PROT 6.6 6.7 6.8   BILITOT 1.9* 1.7* 1.8*   ALKPHOS 115 111 110   ALT 34 34 32   AST 35 34 33   MG 2.2 2.2 2.3   PHOS 4.6* 4.8* 4.6*       Cardiac Markers: No results for input(s): CKMB, TROPONINT, MYOGLOBIN in the last 48 hours.  All pertinent labs within the past 24 hours have been reviewed.    Significant Imaging:  I have reviewed and interpreted all pertinent imaging results/findings within the past 24 hours.     Assessment/Plan:     COVID-19 virus detected  Ranjana Sanchez is a 55 y.o. male that was a  cruise  who came in with acute respiratory distress that was criched in the ED.  This was switched to ETT on 4/11.  Cannulated on 4/25.    Neuro:  - Precedex for sedation  - 100 BID of seroquel  - Fentanyl patch  - Encephalopathy with poor neuro exam  - CT on June 2 showing bilateral basal ganglia infarcts, which likely explains the neuro exam findings. His infarcts are sizeable, but extent of permanent deficits are unknown. The likelihood of meaningful recovery is low. Neuro consulted to prognosticate and agreed with poor neurological prognosis given bilateral basal ganglia CVAs.     Pulm  AC/VC+ on the vent.  ECMO   Pump parameters:  See perfusionist documentaion  Continues to overbreath the vent. Respiratory rate of 45-50s with TVs 150-250  Continuing discussions regarding decannulation    Vent Mode: A/C  Oxygen Concentration (%):  [50] 50  Resp Rate Total:  [27 br/min-49 br/min] 32 br/min  PEEP/CPAP:  [6 cmH20] 6 cmH20  Mean Airway Pressure:  [14 qlZ28-99 cmH20] 14 cmH20      Cardiac  Levo as needed  Tachy/HTN intermittently resolved with prn hydromorphone  MAP goal < 90  ASA 81  ECMO -> working to decannulate    FEN/GI  TFs at goal (50cc/hr)    Renal  Lasix gtt at 2.5    Heme  Hep gtt, goal 0.2-0.25 to prevent another GI bleed, being titrated appropriately  - upper endoscopy 5/3 with clip placement     ID  Continuing on vanc  Dose per pharm    Endo:  Endocrine following and managing bg    Dispo: RICU  Goals of care meeting scheduled for Monday          Critical secondary to Patient has a condition that poses threat to life and bodily function: COVID, Respiratory failure on ECMO     Critical care was time spent personally by me on the following activities: development of treatment plan with patient or surrogate and bedside caregivers, discussions with consultants, evaluation of patient's response to treatment, examination of patient, ordering and performing treatments and interventions, ordering and  review of laboratory studies, ordering and review of radiographic studies, pulse oximetry, re-evaluation of patient's condition.  This critical care time did not overlap with that of any other provider or involve time for any procedures.     Urszula Arguello MD  Critical Care - Surgery  [facility] - Respiratory ICU

## 2020-06-22 NOTE — ASSESSMENT & PLAN NOTE
Ranjana Sanchez is a 55 y.o. 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  This was switched to ETT on 4/11.  Cannulated on 4/25.    Neuro:  - Precedex for sedation  - 100 BID of seroquel  - Fentanyl patch  - Encephalopathy with poor neuro exam  - CT on June 2 showing bilateral basal ganglia infarcts, which likely explains the neuro exam findings. His infarcts are sizeable, but extent of permanent deficits are unknown. The likelihood of meaningful recovery is low. Neuro consulted to prognosticate and agreed with poor neurological prognosis given bilateral basal ganglia CVAs.     Pulm  AC/VC+ on the vent.  ECMO   Pump parameters:  See perfusionist documentaion  Continues to overbreath the vent. Respiratory rate of 45-50s with TVs 150-250  Continuing discussions regarding decannulation    Vent Mode: A/C  Oxygen Concentration (%):  [50] 50  Resp Rate Total:  [27 br/min-49 br/min] 32 br/min  PEEP/CPAP:  [6 cmH20] 6 cmH20  Mean Airway Pressure:  [14 oaW47-45 cmH20] 14 cmH20      Cardiac  Levo as needed  Tachy/HTN intermittently resolved with prn hydromorphone  MAP goal < 90  ASA 81  ECMO -> working to decannulate    FEN/GI  TFs at goal (50cc/hr)    Renal  Lasix gtt at 2.5    Heme  Hep gtt, goal 0.2-0.25 to prevent another GI bleed, being titrated appropriately  - upper endoscopy 5/3 with clip placement     ID  Continuing on vanc  Dose per pharm    Endo:  Endocrine following and managing bg    Dispo: RICU  Goals of care meeting scheduled for Monday

## 2020-06-22 NOTE — PROGRESS NOTES
06/22/2020  Maikel Zepeda    Current provider:  Francis Ayon MD      I, Maikel Zepeda, rounded on Arianerisdaria Sanchez to ensure all mechanical assist device settings (IABP or VAD) were appropriate and all parameters were within limits.  I was able to ensure all back up equipment was present, the staff had no issues, and the Perfusion Department daily rounding was complete.    8:34 AM

## 2020-06-22 NOTE — NURSING
Notified CTS resident that BP on softer side, MAP 58-65 mmHg. Continues to bleed from art line. Orders received.

## 2020-06-22 NOTE — PLAN OF CARE
Patient afebrile, HR -150 bpm. Started low dose PO metoprolol per Dr. Ayon. BP WDL. Continues to grimace to pain when stimulated to upper extremities, withdraws to pain with BLE. Does not follow commands. Intermittently tracks. Cough/gag/corneal reflexes intact. Bleeding from arterial line site has slowed down. FiO2 increased to 70% on ventilator. ECMO @ 2700 rpm, flows ~2.7, sweep down to 1., 21% FiO2.     Patient turned q2h, heels elevated , wound care provided per orders. No new skin breakdown noted.     Son has been updated via phone of plans and did video chat with patient today.

## 2020-06-22 NOTE — PROGRESS NOTES
Pharmacokinetic Assessment Follow Up: IV Vancomycin    Vancomycin Regimen Assessment & Plan:  - Vancomycin level went from 25.5 --> 20.8 ug/mL over ~11 hours. Calculated half life ~37 hours.  - Continue holding scheduled regimen for now. Draw vancomycin level with morning labs tomorrow. Level should remain therapeutic at current rate of elimination.    Drug levels (last 3 results):  Recent Labs   Lab Result Units 06/21/20 2025 06/22/20  0737   Vancomycin, Random ug/mL 25.5 20.8     Pharmacy will continue to follow and monitor vancomycin.    Please contact pharmacy at extension [phone] for questions regarding this assessment.    Thank you for the consult,   He Wei     Patient brief summary:  Ranjana Sanchez is a 55 y.o. male initiated on antimicrobial therapy with IV Vancomycin for surgical prophylaxis.    The patient's current regimen is pulse dosing.    Drug Allergies:   Review of patient's allergies indicates:  No Known Allergies    Actual Body Weight:   58.5 kg    Renal Function:   Estimated Creatinine Clearance: 97.5 mL/min (based on SCr of 0.7 mg/dL).,     Dialysis Method (if applicable):  N/A

## 2020-06-22 NOTE — PLAN OF CARE
"                                             SICU PLAN OF CARE NOTE     Dx: Acute respiratory disease due to COVID-19 virus     Shift Events: 1 PRBC and 1 Cryo admin.  Arterial line redressed w/ quick clot dsg due to profuse bleeding; Dr. Ariza aware.     Goals of Care: MAP 70-90     Vital Signs: BP (!) 100/62   Pulse (!) 146   Temp 97.9 (Oral)  Resp (!) 42   Ht 5' 4" (1.626 m)   Wt 59.8kg  SpO2 100%   BMI 23.01 kg/m²      Cardiac: Sinus Tach     Resp:  AC/PC 50% / PEEP 6, ECMO VV FiO2 50% / 1.5 Sweep, 2800 RPM & Flow 2.7     Neuro: Unresponsive; Pt will open eyes spontaneously but does not follow commands or track.  Patient withdraws to pain and does change facial expression to pain.     Gtts: Heparin @ 700 units/hr per anti-Xa (goal range of 0.2-0.25), Lasix @ 2.5 mg/hr, and Precedex @ 0.4 mcg/kg/min     Output: Urine Urinary Catheter 75cc/hr output ; BM x1 (brown creamy)     Diet: NPO and Tube Feeds (@ goal of 50 w/ 5cc residuals)     Labs/Accuchecks: Q12h labs, Q4h accuchecks     Skin: No new breakdown noted.  Iodine applied to left arm wound.  Arterial line dsg replaced due to bleeding.  RIJ cannula site dressed w/ tegaderm; cdi.  Right Femoral cannula site continues to leak; Tegaderm replaced and intact.  Patient turned throughout shift and heel boots rotated.  "

## 2020-06-22 NOTE — PROGRESS NOTES
Notified Dr. Ariza of current resulting labs Results for JAYDA SCHMIDT (MRN [id number]) as of 6/21/2020 21:54   Ref. Range 6/21/2020 20:24   Hemoglobin Latest Ref Range: 14.0 - 18.0 g/dL 9.7 (L)   Hematocrit Latest Ref Range: 40.0 - 54.0 % 30.1 (L)   Results for JAYDA SCHMIDT (MRN [id number]) as of 6/21/2020 21:54   Ref. Range 6/21/2020 20:07   Fibrinogen Latest Ref Range: 182 - 366 mg/dL 131 (L)   Results for JAYDA SCHMIDT (MRN [id number]) as of 6/21/2020 21:54   Ref. Range 6/21/2020 20:07   Heparin Anti-Xa Latest Ref Range: 0.30 - 0.70 IU/mL 0.31     Orders for Cryo admin and to decrease Heparin gtt to 800 units/hr.  Also notified of patient tachycardic w/ -160bpm and Arterial Line site and Right Femoral Cannula site still remains bleeding.  No other orders as of now.  Will continue to monitor patient.

## 2020-06-22 NOTE — SUBJECTIVE & OBJECTIVE
Interval History/Significant Events:   No significant clinical changes.  Received 2u pRBCs and 1u cryo yesterday.   Pump: RPM 2800, Flow 2.7, Sweep 1.5, FiO2 50  Hopefully having family meeting regarding GOC today    Follow-up For: Procedure(s) (LRB):  CREATION, TRACHEOSTOMY  (N/A)    Post-Operative Day: 48 Days Post-Op    Objective:     Vital Signs (Most Recent):  Temp: 97.6 °F (36.4 °C) (06/22/20 0400)  Pulse: (!) 131 (06/22/20 0645)  Resp: (!) 51 (06/20/20 0632)  BP: 130/76 (06/22/20 0630)  SpO2: 100 % (06/22/20 0645) Vital Signs (24h Range):  Temp:  [97.6 °F (36.4 °C)-98 °F (36.7 °C)] 97.6 °F (36.4 °C)  Pulse:  [123-155] 131  SpO2:  [98 %-100 %] 100 %  BP: ()/(53-97) 130/76  Arterial Line BP: ()/(58-79) 115/73     Weight: 60 kg (132 lb 4.4 oz)  Body mass index is 22.71 kg/m².      Intake/Output Summary (Last 24 hours) at 6/22/2020 0704  Last data filed at 6/22/2020 0600  Gross per 24 hour   Intake 2877 ml   Output 2197 ml   Net 680 ml       Physical Exam    Vents:  Vent Mode: A/C (06/22/20 0045)  Ventilator Initiated: Yes (04/10/20 2247)  Set Rate: 20 BPM (06/22/20 0045)  Vt Set: 250 mL (06/06/20 0858)  Pressure Support: 20 cmH20 (06/06/20 0858)  PEEP/CPAP: 6 cmH20 (06/22/20 0045)  Oxygen Concentration (%): 50 (06/22/20 0645)  Peak Airway Pressure: 32 cmH2O (06/22/20 0045)  Plateau Pressure: 30 cmH20 (06/22/20 0045)  Total Ve: 7.32 mL (06/22/20 0045)  F/VT Ratio<105 (RSBI): 213.54 (06/20/20 0354)    Lines/Drains/Airways     Peripherally Inserted Central Catheter Line            PICC Double Lumen 05/05/20 1707 right basilic 47 days          Central Venous Catheter Line                 ECMO Cannula 04/25/20 1120 right femoral vein 57 days         ECMO Cannula 04/25/20 1120 right internal jugular 57 days          Drain                 NG/OG Tube 05/03/20 1215 [place] sump;nasogastric 18 Fr. Left nostril 49 days         Urethral Catheter 05/28/20 1630 24 days          Airway                 Surgical Airway  05/05/20 1500 [place] Cuffed 47 days          Arterial Line                 Arterial Line 04/27/20 1100 Right Brachial 55 days          Peripheral Intravenous Line                 Peripheral IV - Single Lumen 06/21/20 0012 20 G Left Forearm 1 day                Significant Labs:    CBC/Anemia Profile:  Recent Labs   Lab 06/21/20  0200  06/21/20  1454  06/21/20 2024 06/22/20 0213 06/22/20 0227   WBC 15.04*   < > 14.20*  --  15.70*  --  17.45*   HGB 9.0*   < > 9.9*  --  9.7*  --  8.9*   HCT 28.8*   < > 30.9*   < > 30.1* 27* 27.3*   PLT 64*   < > 56*  --  65*  --  72*   MCV 90   < > 88  --  88  --  86   RDW 15.3*   < > 16.5*  --  16.9*  --  17.1*   FERRITIN 2,362*  --   --   --   --   --  2,376*    < > = values in this interval not displayed.        Chemistries:  Recent Labs   Lab 06/21/20  1454 06/21/20 2024 06/22/20 0227    141 144   K 4.7 4.7 4.4   CL 98 96 98   CO2 34* 34* 33*   BUN 57* 64* 68*   CREATININE 0.7 0.7 0.7   CALCIUM 10.8* 10.7* 10.7*   ALBUMIN 3.4* 3.4* 3.5   PROT 6.6 6.7 6.8   BILITOT 1.9* 1.7* 1.8*   ALKPHOS 115 111 110   ALT 34 34 32   AST 35 34 33   MG 2.2 2.2 2.3   PHOS 4.6* 4.8* 4.6*       Cardiac Markers: No results for input(s): CKMB, TROPONINT, MYOGLOBIN in the last 48 hours.  All pertinent labs within the past 24 hours have been reviewed.    Significant Imaging:  I have reviewed and interpreted all pertinent imaging results/findings within the past 24 hours.

## 2020-06-22 NOTE — NURSING
Notified Dr. Ariza that R brachial art line continues to ooze. Has been redressed and surgicel applied. Sizeable clot noted around site, not disturbed; however, still leaking moderate amount of blood. Will continue to monitor.

## 2020-06-22 NOTE — PLAN OF CARE
Patient still intubated on ventilator with Precedex and Lasix drips, on ECMO and has a Trach and NGT.  Plan is to discharge to LTAC when medically ready.      06/22/20 1725   Discharge Reassessment   Assessment Type Discharge Planning Reassessment   Discharge Plan A Long-term acute care facility (LTAC)   Discharge Plan B Long-term acute care facility (LTAC)   Anticipated Discharge Disposition LTAC

## 2020-06-22 NOTE — PROGRESS NOTES
ECMO Specialists shift report    Date: 06/22/2020  ECMO Specialist:  Mckay Farooq    Pump parameters:  RPM: 2800  Flow:  2.7 lpm  Sweep:  1.5  FiO2:  50     Oxygenator status:  Clots: yes; few on pre/post side  Fibrin: no     Pressure trends:  P1: 107  P2: 82  Delta P: 19     Volume status:  Chugging noted - yes  MAP:  60's to 90's  MD notified (name):  Noy     Anticoagulation:  ACT/aPTT/Xa parameters: Xa 0.2-0.25  ACT/aPTT/Xa trends this shift: 0.31,0 .32     Cannula size / status / placement:  Arterial:   Venous 1: 23FR / RIJV / 6.5 cm from incision site  Venous 2: 27FR / RFV / 12 cm from incision site  Dual lumen: no       Additional Comments:  Gave one unit of PRBC and 1 unit of cryo.

## 2020-06-22 NOTE — PLAN OF CARE
Daily clinicals including progress note, CTS note and RT ECMO note faxed to Dr Tiwari with [facility] at [phone].

## 2020-06-23 NOTE — PROGRESS NOTES
[facility] - Respiratory ICU  Critical Care - Surgery  Progress Note    Patient Name: Ranjana Sanchez  MRN: [id number]  Admission Date: 4/10/2020  Hospital Length of Stay: 74 days  Code Status: Full Code  Attending Provider: Francis Ayon MD  Primary Care Provider: Primary Doctor No   Principal Problem: Acute respiratory disease due to COVID-19 virus    Subjective:     Hospital/ICU Course:  No notes on file    Interval History/Significant Events:   No significant clinical changes.  Received 1u pRBCs and 1u cryo yesterday.   Pump:  Flow 2.6, Sweep 1.0  Hopefully having family meeting regarding GOC today    Follow-up For: Procedure(s) (LRB):  CREATION, TRACHEOSTOMY  (N/A)    Post-Operative Day: 48 Days Post-Op    Objective:     Vital Signs (Most Recent):  Temp: 97.8 °F (36.6 °C) (06/23/20 0300)  Pulse: (!) 125 (06/23/20 0700)  Resp: (!) 51 (06/20/20 0632)  BP: 124/74 (06/23/20 0700)  SpO2: 100 % (06/23/20 0700) Vital Signs (24h Range):  Temp:  [97.6 °F (36.4 °C)-98.1 °F (36.7 °C)] 97.8 °F (36.6 °C)  Pulse:  [110-155] 125  SpO2:  [95 %-100 %] 100 %  BP: ()/(52-84) 124/74  Arterial Line BP: ()/(50-78) 97/62     Weight: 60.7 kg (133 lb 13.1 oz)  Body mass index is 22.97 kg/m².      Intake/Output Summary (Last 24 hours) at 6/23/2020 0807  Last data filed at 6/23/2020 0600  Gross per 24 hour   Intake 1798.5 ml   Output 2795 ml   Net -996.5 ml       Physical Exam  Vitals signs reviewed.   Constitutional:       Appearance: He is well-developed.   HENT:      Head: Normocephalic and atraumatic.   Eyes:      Pupils: Pupils are equal, round, and reactive to light.   Neck:      Musculoskeletal: Normal range of motion.   Cardiovascular:      Comments: tachycardic  Pulmonary:      Comments: Trached, mechanically ventilated  On ECMO, cannulated in RIJ and fem vein  Abdominal:      General: There is no distension.      Palpations: Abdomen is soft.   Musculoskeletal: Normal range of motion.   Skin:      General: Skin is warm.   Neurological:      Comments: sedated     Vents:  Vent Mode: A/C (06/23/20 0248)  Ventilator Initiated: Yes (04/10/20 2247)  Set Rate: 20 BPM (06/23/20 0248)  Vt Set: 250 mL (06/06/20 0858)  Pressure Support: 20 cmH20 (06/06/20 0858)  PEEP/CPAP: 6 cmH20 (06/23/20 0248)  Oxygen Concentration (%): 60 (06/23/20 0700)  Peak Airway Pressure: 30 cmH2O (06/23/20 0248)  Plateau Pressure: 30 cmH20 (06/23/20 0248)  Total Ve: 8.51 mL (06/23/20 0248)  F/VT Ratio<105 (RSBI): 213.54 (06/20/20 0354)    Lines/Drains/Airways     Peripherally Inserted Central Catheter Line            PICC Double Lumen 05/05/20 1707 right basilic 48 days          Central Venous Catheter Line                 ECMO Cannula 04/25/20 1120 right femoral vein 58 days         ECMO Cannula 04/25/20 1120 right internal jugular 58 days          Drain                 NG/OG Tube 05/03/20 1215 [place] sump;nasogastric 18 Fr. Left nostril 50 days         Urethral Catheter 05/28/20 1630 25 days          Airway                 Surgical Airway 05/05/20 1500 Shiley Cuffed 48 days          Arterial Line                 Arterial Line 04/27/20 1100 Right Brachial 56 days          Peripheral Intravenous Line                 Peripheral IV - Single Lumen 06/21/20 0012 20 G Left Forearm 2 days                Significant Labs:    CBC/Anemia Profile:  Recent Labs   Lab 06/22/20  0227  06/22/20  1552  06/22/20 2000 06/22/20 2016 06/23/20  0230 06/23/20  0751   WBC 17.45*   < > 15.89*  --  16.74*  --  16.79*  --    HGB 8.9*   < > 9.2*  --  8.8*  --  9.4*  --    HCT 27.3*   < > 27.8*   < > 27.5* 25* 30.5* 27*   PLT 72*   < > 68*  --  67*  --  69*  --    MCV 86   < > 88  --  91  --  92  --    RDW 17.1*   < > 16.3*  --  16.1*  --  15.7*  --    FERRITIN 2,376*  --   --   --   --   --  2,347*  --     < > = values in this interval not displayed.        Chemistries:  Recent Labs   Lab 06/22/20  1552 06/22/20 2000 06/23/20  0230   * 146* 149*   K 4.5 5.8*  4.3    105 106   CO2 33* 29 31*   BUN 73* 76* 74*   CREATININE 0.7 0.8 0.7   CALCIUM 10.3 10.6* 10.4   ALBUMIN 3.3* 3.2* 3.2*   PROT 6.5 6.4 6.6   BILITOT 1.7* 1.7* 1.8*   ALKPHOS 107 100 97   ALT 29 26 26   AST 30 30 30   MG 2.5 2.5 2.4   PHOS 5.0* 4.7* 4.9*       Cardiac Markers: No results for input(s): CKMB, TROPONINT, MYOGLOBIN in the last 48 hours.  All pertinent labs within the past 24 hours have been reviewed.    Significant Imaging:  I have reviewed and interpreted all pertinent imaging results/findings within the past 24 hours.     Assessment/Plan:     COVID-19 virus detected  Ranjana Sanchez is a 55 y.o. 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  This was switched to ETT on 4/11.  Cannulated on 4/25.    Neuro:  - Precedex for sedation  - Fentanyl patch  - Encephalopathy with poor neuro exam  - CT on June 2 showing bilateral basal ganglia infarcts, which likely explains the neuro exam findings. His infarcts are sizeable, but extent of permanent deficits are unknown. The likelihood of meaningful recovery is low. Neuro consulted to prognosticate and agreed with poor neurological prognosis given bilateral basal ganglia CVAs.     Pulm  AC/VC+ on the vent.  ECMO   Pump parameters:  See perfusionist documentaion  Continues to overbreath the vent. Respiratory rate of 45-50s with TVs 150-250  Continuing discussions regarding decannulation    Vent Mode: A/C  Oxygen Concentration (%):  [50-70] 60  Resp Rate Total:  [27 br/min-66 br/min] 32 br/min  PEEP/CPAP:  [6 cmH20] 6 cmH20  Mean Airway Pressure:  [15 jmF57-72 cmH20] 15 cmH20      Cardiac  Levo as needed  Tachy/HTN intermittently resolved with prn hydromorphone  MAP goal < 90  ASA 81  ECMO -> working to decannulate    FEN/GI  TFs at goal (50cc/hr)    Renal  Lasix gtt at 2.5    Heme  Hep gtt, goal 0.2-0.25 to prevent another GI bleed, being titrated appropriately  - upper endoscopy 5/3  with clip placement     ID  Continuing on vanc  Dose per pharm    Endo:  Endocrine following and managing bg    Dispo: RICU  Goals of care meeting pending         Critical care was time spent personally by me on the following activities: development of treatment plan with patient or surrogate and bedside caregivers, discussions with consultants, evaluation of patient's response to treatment, examination of patient, ordering and performing treatments and interventions, ordering and review of laboratory studies, ordering and review of radiographic studies, pulse oximetry, re-evaluation of patient's condition.  This critical care time did not overlap with that of any other provider or involve time for any procedures.     Chad Paul MD  Critical Care - Surgery  [facility] - Respiratory ICU

## 2020-06-23 NOTE — PROGRESS NOTES
Reviewed plan of care with Pt. Family did not call for update today. Pt remains on minimal vent settings and minimal ECMO settings. No issues with oxygenation today. Pt remains unresponsive, does not follow commands, opens eyes, appears distressed/agitated at times, moves head from side to side. Pt withdrawing from pain x4 extremities. Upper extremities very contracted, ROM performed by multiple staff members throughout the day. Lasix held per MD, restarted at end of shift. No issues with bleeding, 1 PRBC given per MD order. Labs followed closely. Pt repositioned q2 or more frequently to decrease pressure points, no new skin breakdown noted. UO excellent. Pt remains tachycardic, BP labile with periods of agitation and rest, maintaining > 95% all day on ECMO and vent settings. Will continue to monitor.

## 2020-06-23 NOTE — PROGRESS NOTES
Dr. Ariza updated on current VS trends, RR in 50's, Pt appears in distress. Labs reported to MD. New orders received and implemented.

## 2020-06-23 NOTE — PLAN OF CARE
Problem: Nutrition Impairment (Mechanical Ventilation, Invasive)  Goal: Optimal Nutrition Delivery  Outcome: Ongoing, Progressing  Intervention: Optimize Nutrition Delivery  Flowsheets (Taken 6/23/2020 1249)  Nutrition Support Management:   tube feeding formula adjusted   tube feeding rate decreased   weight trending reviewed   other (see comments)     Problem: Feeding Intolerance (Enteral Nutrition)  Goal: Feeding Tolerance  Outcome: Ongoing, Progressing  Intervention: Prevent and Manage Feeding Intolerance  Flowsheets (Taken 6/23/2020 1249)  Nutrition Support Management:   tube feeding formula adjusted   tube feeding rate decreased   weight trending reviewed   other (see comments)     Recommendations     1.) Current TF exceeding >125% of needs:               Change TF formula to better meet needs: Peptamen AF @ 50mL/hr to provide 1440 kcals, 91gm protein and 974mL of free water.               Hold if GRV >500mL.               TF to meet 105% EEN and 100% EPN.   2.) Continue vitamin D, MVI. Add ascorbic acid.   3.) Daily weights     Goals: Meet % EEN, EPN by RD f/u date  Nutrition Goal Status: progressing towards goal  Communication of RD Recs: (POC)

## 2020-06-23 NOTE — PROGRESS NOTES
"[facility] - Respiratory ICU  Adult Nutrition  Progress Note    SUMMARY       Recommendations    1.) Current TF exceeding >125% of needs:    Change TF formula to better meet needs: Peptamen AF @ 50mL/hr to provide 1440 kcals, 91gm protein and 974mL of free water.    Hold if GRV >500mL.    TF to meet 105% EEN and 100% EPN.   2.) Continue vitamin D, MVI. Add ascorbic acid.   3.) Daily weights    Goals: Meet % EEN, EPN by RD f/u date  Nutrition Goal Status: progressing towards goal  Communication of RD Recs: (POC)    Reason for Assessment    Reason For Assessment: RD follow-up  Diagnosis: (COVID-19)  Relevant Medical History: HTN  Interdisciplinary Rounds: did not attend  General Information Comments: Pt remains intubated, sedated, with TF infusing at 60mL/hr. TF tolerated well with 10mL GRV. Unable to review TF pump hx given COVID19+. Pt continues to remain on ECMO. No GI distress. Wt hx: Admit wt 63.5kg, CBW 60.7kg-wt stable. NFPE to be completed at later date. Pt does not meet malnutrition criteria at this time.   Nutrition Discharge Planning: Unable to determine at this time.    Nutrition Risk Screen    Nutrition Risk Screen: tube feeding or parenteral nutrition    Nutrition/Diet History    Food Allergies: NKFA  Factors Affecting Nutritional Intake: NPO, on mechanical ventilation    Anthropometrics    Temp: 98.4 °F (36.9 °C)  Height Method: Stated  Height: 5' 4" (162.6 cm)  Height (inches): 64 in  Weight Method: Bed Scale  Weight: 60.7 kg (133 lb 13.1 oz)  Weight (lb): 133.82 lb  Ideal Body Weight (IBW), Male: 130 lb  % Ideal Body Weight, Male (lb): 114.62 %  BMI (Calculated): 23  BMI Grade: 18.5-24.9 - normal  Usual Body Weight (UBW), kg: (JAMMIE)       Lab/Procedures/Meds    Pertinent Labs Reviewed: reviewed  Pertinent Labs Comments: Na 149, CO2 31, BUN 74, Phos 4.9, albumin 3.2, bili 1.8, CRP 45.8  Pertinent Medications Reviewed: reviewed  Pertinent Medications Comments: fentanyl, insulin, MVI, " protonix, miralax, psyllium husk, vancomycin, vitamin D, precedex, lasix, pressor x1    Estimated/Assessed Needs    Weight Used For Calorie Calculations: 60.7 kg (133 lb 13.1 oz)  Energy Calorie Requirements (kcal): 1371  Energy Need Method: [place]  Protein Requirements: 73-91(g/day)  Weight Used For Protein Calculations: 60.7 kg (133 lb 13.1 oz)(1.2-1.5 g/kg)  Fluid Requirements (mL): 1 mL/kcal or per MD  Estimated Fluid Requirement Method: RDA Method(or per MD)  RDA Method (mL): 1371  CHO Requirement: -      Nutrition Prescription Ordered    Current Diet Order: NPO  Current Nutrition Support Formula Ordered: Peptamen Intense VHP  Current Nutrition Support Rate Ordered: 60 (ml)  Current Nutrition Support Frequency Ordered: mL/hr    Evaluation of Received Nutrient/Fluid Intake    Enteral Calories (kcal): 1440  Enteral Protein (gm): 132  Enteral (Free Water) Fluid (mL): 1210  Other Calories (kcal): 0  Total Calories (kcal): 0  Total Calories (kcal/kg): 24  % Kcal Needs: 105  Total Protein (gm/kg): 2.2  % Protein Needs: 145(of upper needs)  I/O: -3.6L since 6/9  Energy Calories Required: meeting needs  Protein Required: exceeds needs  Fluid Required: other (see comments)(per MD)  Comments: LBM 6/22  Tolerance: tolerating  % Intake of Estimated Energy Needs: Other: 105% EEN and 145% EPN  % Meal Intake: NPO    Nutrition Risk    Level of Risk/Frequency of Follow-up: high(x2/week)     Assessment and Plan  Nutrition Problem  Excessive enteral nutrition infusion     Related to (etiology):   Infusion volume exceeding needs    Signs and Symptoms (as evidenced by):   >125% of EPN being provided by current TF formula and rate    Interventions(treatment strategy):  Collaboration of care with other providers  Referral of care  Enteral nutrition-Peptamen Intense VHP @ 60mL/hr  Vitamin Supplement Therapy-vitamin D, MVI    Nutrition Diagnosis Status:   New      Monitor and Evaluation    Food and Nutrient Intake: energy intake,  enteral nutrition intake  Food and Nutrient Adminstration: enteral and parenteral nutrition administration  Physical Activity and Function: (-)  Anthropometric Measurements: weight, weight change  Biochemical Data, Medical Tests and Procedures: electrolyte and renal panel, gastrointestinal profile, inflammatory profile  Nutrition-Focused Physical Findings: overall appearance     Malnutrition Assessment  NFPE not performed, patient has been screened for possible COVID-19 and has been placed on airborne and contact precautions. Patient is noted as being positive for COVID-19.    Nutrition Follow-Up    RD Follow-up?: Yes

## 2020-06-23 NOTE — CARE UPDATE
BG goal 140-180     Remains in RICU, NAEON. On vent sedated. ECMO. Heparin and lasix infusions.  BG well controlled with scheduled insulin.   Pepatmen intense VHP at 50 cc/hr.      Plan:  continue novolog  6 units every 4 hours while on TF; hold if TF held of less than 100.   BG monitoring every 4 hours and low dose correction scale.      Discharge planning: TBD      Endocrine to continue to follow     ** Please call Endocrine for any BG related issues

## 2020-06-23 NOTE — SUBJECTIVE & OBJECTIVE
Interval History/Significant Events:   No significant clinical changes.  Received 1u pRBCs and 1u cryo yesterday.   Pump:  Flow 2.6, Sweep 1.0  Hopefully having family meeting regarding GOC today    Follow-up For: Procedure(s) (LRB):  CREATION, TRACHEOSTOMY  (N/A)    Post-Operative Day: 48 Days Post-Op    Objective:     Vital Signs (Most Recent):  Temp: 97.8 °F (36.6 °C) (06/23/20 0300)  Pulse: (!) 125 (06/23/20 0700)  Resp: (!) 51 (06/20/20 0632)  BP: 124/74 (06/23/20 0700)  SpO2: 100 % (06/23/20 0700) Vital Signs (24h Range):  Temp:  [97.6 °F (36.4 °C)-98.1 °F (36.7 °C)] 97.8 °F (36.6 °C)  Pulse:  [110-155] 125  SpO2:  [95 %-100 %] 100 %  BP: ()/(52-84) 124/74  Arterial Line BP: ()/(50-78) 97/62     Weight: 60.7 kg (133 lb 13.1 oz)  Body mass index is 22.97 kg/m².      Intake/Output Summary (Last 24 hours) at 6/23/2020 0807  Last data filed at 6/23/2020 0600  Gross per 24 hour   Intake 1798.5 ml   Output 2795 ml   Net -996.5 ml       Physical Exam  Vitals signs reviewed.   Constitutional:       Appearance: He is well-developed.   HENT:      Head: Normocephalic and atraumatic.   Eyes:      Pupils: Pupils are equal, round, and reactive to light.   Neck:      Musculoskeletal: Normal range of motion.   Cardiovascular:      Comments: tachycardic  Pulmonary:      Comments: Trached, mechanically ventilated  On ECMO, cannulated in RIJ and fem vein  Abdominal:      General: There is no distension.      Palpations: Abdomen is soft.   Musculoskeletal: Normal range of motion.   Skin:     General: Skin is warm.   Neurological:      Comments: sedated     Vents:  Vent Mode: A/C (06/23/20 0248)  Ventilator Initiated: Yes (04/10/20 2247)  Set Rate: 20 BPM (06/23/20 0248)  Vt Set: 250 mL (06/06/20 0858)  Pressure Support: 20 cmH20 (06/06/20 0858)  PEEP/CPAP: 6 cmH20 (06/23/20 0248)  Oxygen Concentration (%): 60 (06/23/20 0700)  Peak Airway Pressure: 30 cmH2O (06/23/20 0248)  Plateau Pressure: 30 cmH20 (06/23/20  0248)  Total Ve: 8.51 mL (06/23/20 0248)  F/VT Ratio<105 (RSBI): 213.54 (06/20/20 0354)    Lines/Drains/Airways     Peripherally Inserted Central Catheter Line            PICC Double Lumen 05/05/20 1707 right basilic 48 days          Central Venous Catheter Line                 ECMO Cannula 04/25/20 1120 right femoral vein 58 days         ECMO Cannula 04/25/20 1120 right internal jugular 58 days          Drain                 NG/OG Tube 05/03/20 1215 [place] sump;nasogastric 18 Fr. Left nostril 50 days         Urethral Catheter 05/28/20 1630 25 days          Airway                 Surgical Airway 05/05/20 1500 Shiley Cuffed 48 days          Arterial Line                 Arterial Line 04/27/20 1100 Right Brachial 56 days          Peripheral Intravenous Line                 Peripheral IV - Single Lumen 06/21/20 0012 20 G Left Forearm 2 days                Significant Labs:    CBC/Anemia Profile:  Recent Labs   Lab 06/22/20 0227 06/22/20  1552 06/22/20 2000 06/22/20 2016 06/23/20  0230 06/23/20  0751   WBC 17.45*   < > 15.89*  --  16.74*  --  16.79*  --    HGB 8.9*   < > 9.2*  --  8.8*  --  9.4*  --    HCT 27.3*   < > 27.8*   < > 27.5* 25* 30.5* 27*   PLT 72*   < > 68*  --  67*  --  69*  --    MCV 86   < > 88  --  91  --  92  --    RDW 17.1*   < > 16.3*  --  16.1*  --  15.7*  --    FERRITIN 2,376*  --   --   --   --   --  2,347*  --     < > = values in this interval not displayed.        Chemistries:  Recent Labs   Lab 06/22/20 1552 06/22/20 2000 06/23/20 0230   * 146* 149*   K 4.5 5.8* 4.3    105 106   CO2 33* 29 31*   BUN 73* 76* 74*   CREATININE 0.7 0.8 0.7   CALCIUM 10.3 10.6* 10.4   ALBUMIN 3.3* 3.2* 3.2*   PROT 6.5 6.4 6.6   BILITOT 1.7* 1.7* 1.8*   ALKPHOS 107 100 97   ALT 29 26 26   AST 30 30 30   MG 2.5 2.5 2.4   PHOS 5.0* 4.7* 4.9*       Cardiac Markers: No results for input(s): CKMB, TROPONINT, MYOGLOBIN in the last 48 hours.  All pertinent labs within the past 24 hours have been  reviewed.    Significant Imaging:  I have reviewed and interpreted all pertinent imaging results/findings within the past 24 hours.

## 2020-06-23 NOTE — PROGRESS NOTES
[facility] - Respiratory ICU  Cardiothoracic Surgery  Progress Note    Patient Name: Ranjana Sanchez  MRN: [id number]  Admission Date: 4/10/2020  Hospital Length of Stay: 74 days  Code Status: Full Code   Attending Physician: Francis Ayon MD   Referring Provider: Francis Ayon MD  Principal Problem:Acute respiratory disease due to COVID-19 virus    Subjective:   Interval History:  NAEO, HR 130s. UOP 2.4L, net negative 1L in 24hrs. Hep gtt 850.    Medications:  Continuous Infusions:   dexmedetomidine (PRECEDEX) infusion 0.4 mcg/kg/hr (06/23/20 1000)    dextrose 10 % in water (D10W)      furosemide (LASIX) 2 mg/mL continuous infusion (non-titrating) Stopped (06/23/20 1000)    heparin (porcine) in 5 % dex 600 Units/hr (06/23/20 1000)    norepinephrine bitartrate-D5W Stopped (06/10/20 0500)     Scheduled Meds:   fentaNYL  1 patch Transdermal Q72H    insulin aspart U-100  6 Units Subcutaneous Q24H    insulin aspart U-100  6 Units Subcutaneous Q24H    insulin aspart U-100  6 Units Subcutaneous Q24H    insulin aspart U-100  6 Units Subcutaneous Q24H    insulin aspart U-100  6 Units Subcutaneous Q24H    insulin aspart U-100  6 Units Subcutaneous Q24H    metoprolol  12.5 mg Per NG tube BID    multivitamin liquid no.118  10 mL Per G Tube Daily    pantoprazole  40 mg Per NG tube BID    polyethylene glycol  17 g Per NG tube Daily    potassium chloride 10%  40 mEq Per NG tube BID    psyllium husk (aspartame)  1 packet Per NG tube TID    QUEtiapine  100 mg Per NG tube BID    sucralfate  1 g Per NG tube Q6H    vitamin D  1,000 Units Per G Tube Daily        Objective:     Vital Signs (Most Recent):  Temp: 98.3 °F (36.8 °C) (06/23/20 0700)  Pulse: (!) 132 (06/23/20 1000)  Resp: (!) 40 (06/23/20 1022)  BP: 130/77 (06/23/20 1000)  SpO2: 99 % (06/23/20 1000) Vital Signs (24h Range):  Temp:  [97.6 °F (36.4 °C)-98.3 °F (36.8 °C)] 98.3 °F (36.8 °C)  Pulse:  [118-155] 132  Resp:  [40] 40  SpO2:  [95  %-100 %] 99 %  BP: ()/(55-84) 130/77  Arterial Line BP: ()/(50-78) 121/69     Weight: 60.7 kg (133 lb 13.1 oz)  Body mass index is 22.97 kg/m².    SpO2: 99 %  O2 Device (Oxygen Therapy): ventilator    Intake/Output - Last 3 Shifts       06/21 0700 - 06/22 0659 06/22 0700 - 06/23 0659 06/23 0700 - 06/24 0659    I.V. (mL/kg) 825 (13.8) 476.5 (7.9)     Blood 772 72     NG/GT 1330 1400     IV Piggyback       Total Intake(mL/kg) 2927 (48.8) 1948.5 (32.1)     Urine (mL/kg/hr) 2405 (1.7) 3090 (2.1) 600 (2.3)    Stool 0 0     Blood 2 3 3    Total Output 2407 3093 603    Net +520 -1144.5 -603           Stool Occurrence 2 x 3 x           Lines/Drains/Airways     Peripherally Inserted Central Catheter Line            PICC Double Lumen 05/05/20 1707 right basilic 48 days          Central Venous Catheter Line                 ECMO Cannula 04/25/20 1120 right femoral vein 58 days         ECMO Cannula 04/25/20 1120 right internal jugular 58 days          Drain                 NG/OG Tube 05/03/20 1215 [place]eld sump;nasogastric 18 Fr. Left nostril 50 days         Urethral Catheter 05/28/20 1630 25 days          Airway                 Surgical Airway 05/05/20 1500 Shiley Cuffed 48 days          Arterial Line                 Arterial Line 04/27/20 1100 Right Brachial 57 days          Peripheral Intravenous Line                 Peripheral IV - Single Lumen 06/21/20 0012 20 G Left Forearm 2 days                Physical Exam    Constitutional: He is sedated, and intubated.   Head: Normocephalic   Cardiovascular: Tachycardia 130s   Pulmonary/Chest: intubated vent FiO2 60% PEEP 6, ECMO RPM 2800, Flow 2.7, sweep 1.5, oxygenator FiO2 51%  Skin: L neck cannula repositioned and secured, intact, will good placement on CXR. No change in flows at this time. No flashing. Clots pre and post-oxygenator.  Nursing note and vitals reviewed.    Significant Labs:  BMP:   Recent Labs   Lab 06/23/20  0230      *   K 4.3      CO2  31*   BUN 74*   CREATININE 0.7   CALCIUM 10.4   MG 2.4     CBC:   Recent Labs   Lab 06/23/20  0230 06/23/20  0751   WBC 16.79*  --    RBC 3.32*  --    HGB 9.4*  --    HCT 30.5* 27*   PLT 69*  --    MCV 92  --    MCH 28.3  --    MCHC 30.8*  --      LFTs:   Recent Labs   Lab 06/23/20  0230   ALT 26   AST 30   ALKPHOS 97   BILITOT 1.8*   PROT 6.6   ALBUMIN 3.2*     Significant Diagnostics:  Cxr: overall stable    Assessment/Plan:     Acute respiratory distress syndrome (ARDS) due to COVID-19 virus  - Crich switched to ETT on 4/11  - Monika weaned off 5/5  - Tracheostomy and bronch 5/5  - Thrombocytopenia resolved; transfuse for under 30K  - Aspirin 81 daily; metoprolol added  - RPM at 2800; flows at 2.7  - Sweep at 1.5; CO2 goal is 59 or below  - Do not increase sweep for pCO2s 59 or below as long as patient is not acidotic, as these high pCO2s are respiratory compensation for alkalosis  - Oxygenator Fi at 21%; switched out 6/9  - Weaning to minimal Fi and sweep settings today, working toward decannulation over night 24-48 hrs     Tongue laceration      -ENT evaluated, laceration superficial      -resolved    Sedation  - Valium, seroquel, PRN Fentanyl     UGI Bleed  - Scoped by GI 5/3 with epinephrine injection and clip placement for an actively bleeding 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    Anne Miller MD  Cardiothoracic Surgery  [facility] - Respiratory ICU    VV ECMO circuit checked and all parameters reviewed. Anticoagulation was managed and all cannulation exit sites  along with review of labs and radiology studies performed. Speed and functioning of the pump along with oxygenator quality reviewed.  Patient has been tolerating well and has been on a slow continue was wound of the VV ECMO circuit.  Slides have been made over the last few weeks.  Extensive discussions has been carried out between various team members which included the ICU team, perfusion and the nursing staff.  Periodically updates of patient condition has been provided to the family and the [employer] team.  Significant amount of time was spent in coordinating care between different team members.

## 2020-06-23 NOTE — PROGRESS NOTES
ECMO Specialists shift report    Date: 06/23/2020  ECMO Specialist:  Lia Alvarez    Pump parameters:  RPM: 2700  Flow:  2.7  Sweep:  1  FiO2:  21    Oxygenator status:  Clots: yes; pre & post  Fibrin: no    Pressure trends:  P1: 101  P2: 79  Delta P: 22    Volume status:  Chugging noted - yes  MAP:  80-90's  MD notified (name):  Nany/Angela/To    Anticoagulation:  ACT/aPTT/Xa parameters: Xa 0.2-0.25  ACT/aPTT/Xa trends this shift: Xa 0.19    Cannula size / status / placement:  Arterial:   Venous 1: 23FR / RIJV / 6.5 cm  Venous 2: 27FR / RFV / 12 cm  Dual lumen: no     Additional Comments:      Maintained VV ECMO today with minimal settings in place.  Mild chugging noted but flows remained stable.  Pt appeared aggitated today with episodes of tachycardia & tachypnia & arrythmias.  BS unchanged; tan secretions.  Pt received one unit PRBC's today.

## 2020-06-23 NOTE — PROGRESS NOTES
[facility] - Respiratory ICU  Cardiothoracic Surgery  Daily ECMO Progress Note    Patient Name: Ranjana Sanchez  MRN: [id number]  Admission Date: 4/10/2020  Hospital Length of Stay: 74 days  Code Status: Full Code   Attending Physician: Francis Ayon MD   Referring Provider: Francis Ayon MD  Principal Problem:Acute respiratory disease due to COVID-19 virus    Subjective:   Interval History:  NAEO, HR 130s. UOP 2.8L, net negative 1L in 24hrs. Hep gtt 750. Tolerated minimal ECMO settings overnight.    Medications:  Continuous Infusions:   dexmedetomidine (PRECEDEX) infusion 0.4 mcg/kg/hr (06/23/20 1000)    dextrose 10 % in water (D10W)      furosemide (LASIX) 2 mg/mL continuous infusion (non-titrating) Stopped (06/23/20 1000)    heparin (porcine) in 5 % dex 600 Units/hr (06/23/20 1000)    norepinephrine bitartrate-D5W Stopped (06/10/20 0500)     Scheduled Meds:   fentaNYL  1 patch Transdermal Q72H    insulin aspart U-100  6 Units Subcutaneous Q24H    insulin aspart U-100  6 Units Subcutaneous Q24H    insulin aspart U-100  6 Units Subcutaneous Q24H    insulin aspart U-100  6 Units Subcutaneous Q24H    insulin aspart U-100  6 Units Subcutaneous Q24H    insulin aspart U-100  6 Units Subcutaneous Q24H    metoprolol  12.5 mg Per NG tube BID    multivitamin liquid no.118  10 mL Per G Tube Daily    pantoprazole  40 mg Per NG tube BID    polyethylene glycol  17 g Per NG tube Daily    potassium chloride 10%  40 mEq Per NG tube BID    psyllium husk (aspartame)  1 packet Per NG tube TID    QUEtiapine  100 mg Per NG tube BID    sucralfate  1 g Per NG tube Q6H    vitamin D  1,000 Units Per G Tube Daily        Objective:     Vital Signs (Most Recent):  Temp: 98.3 °F (36.8 °C) (06/23/20 0700)  Pulse: (!) 132 (06/23/20 1000)  Resp: (!) 40 (06/23/20 1022)  BP: 130/77 (06/23/20 1000)  SpO2: 99 % (06/23/20 1000) Vital Signs (24h Range):  Temp:  [97.6 °F (36.4 °C)-98.3 °F (36.8 °C)] 98.3 °F (36.8  °C)  Pulse:  [118-155] 132  Resp:  [40] 40  SpO2:  [95 %-100 %] 99 %  BP: ()/(55-84) 130/77  Arterial Line BP: ()/(50-78) 121/69     Weight: 60.7 kg (133 lb 13.1 oz)  Body mass index is 22.97 kg/m².    SpO2: 99 %  O2 Device (Oxygen Therapy): ventilator    Intake/Output - Last 3 Shifts       06/21 0700 - 06/22 0659 06/22 0700 - 06/23 0659 06/23 0700 - 06/24 0659    I.V. (mL/kg) 825 (13.8) 476.5 (7.9)     Blood 772 72     NG/GT 1330 1400     IV Piggyback       Total Intake(mL/kg) 2927 (48.8) 1948.5 (32.1)     Urine (mL/kg/hr) 2405 (1.7) 3090 (2.1) 600 (2.4)    Stool 0 0     Blood 2 3 3    Total Output 2407 3093 603    Net +520 -1144.5 -603           Stool Occurrence 2 x 3 x           Lines/Drains/Airways     Peripherally Inserted Central Catheter Line            PICC Double Lumen 05/05/20 1707 right basilic 48 days          Central Venous Catheter Line                 ECMO Cannula 04/25/20 1120 right femoral vein 58 days         ECMO Cannula 04/25/20 1120 right internal jugular 58 days          Drain                 NG/OG Tube 05/03/20 1215 Howard sump;nasogastric 18 Fr. Left nostril 50 days         Urethral Catheter 05/28/20 1630 25 days          Airway                 Surgical Airway 05/05/20 1500 Shiley Cuffed 48 days          Arterial Line                 Arterial Line 04/27/20 1100 Right Brachial 57 days          Peripheral Intravenous Line                 Peripheral IV - Single Lumen 06/21/20 0012 20 G Left Forearm 2 days                Physical Exam    Constitutional: He is sedated, and intubated.   Head: Normocephalic   Cardiovascular: Tachycardia 130s   Pulmonary/Chest: intubated vent FiO2 60% PEEP 6, ECMO RPM 2700, Flow 2.6, sweep 1, oxygenator FiO2 21%  Skin: L neck cannula repositioned and secured, intact, will good placement on CXR. No change in flows at this time. No flashing. Clots pre and post-oxygenator.  Nursing note and vitals reviewed.    Significant Labs:  BMP:   Recent Labs   Lab  06/23/20  0230      *   K 4.3      CO2 31*   BUN 74*   CREATININE 0.7   CALCIUM 10.4   MG 2.4     CBC:   Recent Labs   Lab 06/23/20  0230 06/23/20  0751   WBC 16.79*  --    RBC 3.32*  --    HGB 9.4*  --    HCT 30.5* 27*   PLT 69*  --    MCV 92  --    MCH 28.3  --    MCHC 30.8*  --      LFTs:   Recent Labs   Lab 06/23/20  0230   ALT 26   AST 30   ALKPHOS 97   BILITOT 1.8*   PROT 6.6   ALBUMIN 3.2*     Significant Diagnostics:  Cxr: appears somewhat better aerated      Assessment/Plan:     Acute respiratory distress syndrome (ARDS) due to COVID-19 virus  - Crich switched to ETT on 4/11  - Monika weaned off 5/5  - Tracheostomy and bronch 5/5  - Thrombocytopenia resolved; transfuse for under 30K  - Aspirin 81 daily; metoprolol added  - RPM at 2700; flows at 2.6  - Sweep at 1; CO2 goal is 59 or below  - Do not increase sweep for pCO2s 59 or below as long as patient is not acidotic, as these high pCO2s are respiratory compensation for alkalosis  - Oxygenator Fi at 21%; switched out 6/9  - Will likely decannulate in next 24hrs provided patient continues to tolerate minimal Fi and sweep settings     Tongue laceration      -ENT evaluated, laceration superficial      -resolved    Sedation  - Valium, seroquel, PRN Fentanyl     UGI Bleed  - Scoped by GI 5/3 with epinephrine injection and clip placement for an actively bleeding D2/D3 Diuelafoy lesion  - Recommend GI re-consultation if Hb continues to drift and further blood is aspirated from NGT or patient has bloody BM, etc  - Currently resolved    FEN/GI  - TF at 45, goal      - Lasix gtt 2.5  - FWB for hypernatremia 400mL q4     Anticoagulation      - Goal aXa 0.2-0.25 Q6hrs      - Hep gtt 750      - monitor LDH    Prophylaxis      - protonix q12 IV       - leukocytosis stable; on vancomycin      - PICC placed 5/5; central line removed 5/6    Anne Miller MD  Cardiothoracic Surgery  [facility] - Res[facility] ICU    VV ECMO circuit checked and  all parameters reviewed. Anticoagulation was managed and all cannulation exit sites along with review of labs and radiology studies performed. Speed and functioning of the pump along with oxygenator quality reviewed.  Patient doing well of the from weaning respective of the VV ECMO with the improvement in gases as well as carbon dioxide removal.  Patient is on minimal sweep setting of 1.  On discussions been carried out with the family and different treatment team's about planning of vein drawing VV ECMO support once recovery of lungs to a stage where VV ECMO is no longer needed.  Extensive amount of time was spent in coordinating care between different team members which included the surgical ICU team, perfusion team and the nursing staff.  All questions and concerns of every team member was addressed.

## 2020-06-23 NOTE — ASSESSMENT & PLAN NOTE
Ranjana Sanchez is a 55 y.o. 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  This was switched to ETT on 4/11.  Cannulated on 4/25.    Neuro:  - Precedex for sedation  - Fentanyl patch  - Encephalopathy with poor neuro exam  - CT on June 2 showing bilateral basal ganglia infarcts, which likely explains the neuro exam findings. His infarcts are sizeable, but extent of permanent deficits are unknown. The likelihood of meaningful recovery is low. Neuro consulted to prognosticate and agreed with poor neurological prognosis given bilateral basal ganglia CVAs.     Pulm  AC/VC+ on the vent.  ECMO   Pump parameters:  See perfusionist documentaion  Continues to overbreath the vent. Respiratory rate of 45-50s with TVs 150-250  Continuing discussions regarding decannulation    Vent Mode: A/C  Oxygen Concentration (%):  [50-70] 60  Resp Rate Total:  [27 br/min-66 br/min] 32 br/min  PEEP/CPAP:  [6 cmH20] 6 cmH20  Mean Airway Pressure:  [15 tyH30-36 cmH20] 15 cmH20      Cardiac  Levo as needed  Tachy/HTN intermittently resolved with prn hydromorphone  MAP goal < 90  ASA 81  ECMO -> working to decannulate    FEN/GI  TFs at goal (50cc/hr)    Renal  Lasix gtt at 2.5    Heme  Hep gtt, goal 0.2-0.25 to prevent another GI bleed, being titrated appropriately  - upper endoscopy 5/3 with clip placement     ID  Continuing on vanc  Dose per pharm    Endo:  Endocrine following and managing bg    Dispo: RICU  Goals of care meeting pending

## 2020-06-23 NOTE — SUBJECTIVE & OBJECTIVE
Interval History:  NAEO, HR 130s. UOP 2.4L, net negative 1L in 24hrs. Hep gtt 850.    Medications:  Continuous Infusions:   dexmedetomidine (PRECEDEX) infusion 0.4 mcg/kg/hr (06/23/20 1000)    dextrose 10 % in water (D10W)      furosemide (LASIX) 2 mg/mL continuous infusion (non-titrating) Stopped (06/23/20 1000)    heparin (porcine) in 5 % dex 600 Units/hr (06/23/20 1000)    norepinephrine bitartrate-D5W Stopped (06/10/20 0500)     Scheduled Meds:   fentaNYL  1 patch Transdermal Q72H    insulin aspart U-100  6 Units Subcutaneous Q24H    insulin aspart U-100  6 Units Subcutaneous Q24H    insulin aspart U-100  6 Units Subcutaneous Q24H    insulin aspart U-100  6 Units Subcutaneous Q24H    insulin aspart U-100  6 Units Subcutaneous Q24H    insulin aspart U-100  6 Units Subcutaneous Q24H    metoprolol  12.5 mg Per NG tube BID    multivitamin liquid no.118  10 mL Per G Tube Daily    pantoprazole  40 mg Per NG tube BID    polyethylene glycol  17 g Per NG tube Daily    potassium chloride 10%  40 mEq Per NG tube BID    psyllium husk (aspartame)  1 packet Per NG tube TID    QUEtiapine  100 mg Per NG tube BID    sucralfate  1 g Per NG tube Q6H    vitamin D  1,000 Units Per G Tube Daily        Objective:     Vital Signs (Most Recent):  Temp: 98.3 °F (36.8 °C) (06/23/20 0700)  Pulse: (!) 132 (06/23/20 1000)  Resp: (!) 40 (06/23/20 1022)  BP: 130/77 (06/23/20 1000)  SpO2: 99 % (06/23/20 1000) Vital Signs (24h Range):  Temp:  [97.6 °F (36.4 °C)-98.3 °F (36.8 °C)] 98.3 °F (36.8 °C)  Pulse:  [118-155] 132  Resp:  [40] 40  SpO2:  [95 %-100 %] 99 %  BP: ()/(55-84) 130/77  Arterial Line BP: ()/(50-78) 121/69     Weight: 60.7 kg (133 lb 13.1 oz)  Body mass index is 22.97 kg/m².    SpO2: 99 %  O2 Device (Oxygen Therapy): ventilator    Intake/Output - Last 3 Shifts       06/21 0700 - 06/22 0659 06/22 0700 - 06/23 0659 06/23 0700 - 06/24 0659    I.V. (mL/kg) 825 (13.8) 476.5 (7.9)     Blood 772 72      NG/GT 1330 1400     IV Piggyback       Total Intake(mL/kg) 2927 (48.8) 1948.5 (32.1)     Urine (mL/kg/hr) 2405 (1.7) 3090 (2.1) 600 (2.3)    Stool 0 0     Blood 2 3 3    Total Output 2407 3093 603    Net +520 -1144.5 -603           Stool Occurrence 2 x 3 x           Lines/Drains/Airways     Peripherally Inserted Central Catheter Line            PICC Double Lumen 05/05/20 1707 right basilic 48 days          Central Venous Catheter Line                 ECMO Cannula 04/25/20 1120 right femoral vein 58 days         ECMO Cannula 04/25/20 1120 right internal jugular 58 days          Drain                 NG/OG Tube 05/03/20 1215 [place] sump;nasogastric 18 Fr. Left nostril 50 days         Urethral Catheter 05/28/20 1630 25 days          Airway                 Surgical Airway 05/05/20 1500 Shiley Cuffed 48 days          Arterial Line                 Arterial Line 04/27/20 1100 Right Brachial 57 days          Peripheral Intravenous Line                 Peripheral IV - Single Lumen 06/21/20 0012 20 G Left Forearm 2 days                Physical Exam    Constitutional: He is sedated, and intubated.   Head: Normocephalic   Cardiovascular: Tachycardia 130s   Pulmonary/Chest: intubated vent FiO2 60% PEEP 6, ECMO RPM 2800, Flow 2.7, sweep 1.5, oxygenator FiO2 51%  Skin: L neck cannula repositioned and secured, intact, will good placement on CXR. No change in flows at this time. No flashing. Clots pre and post-oxygenator.  Nursing note and vitals reviewed.    Significant Labs:  BMP:   Recent Labs   Lab 06/23/20  0230      *   K 4.3      CO2 31*   BUN 74*   CREATININE 0.7   CALCIUM 10.4   MG 2.4     CBC:   Recent Labs   Lab 06/23/20  0230 06/23/20  0751   WBC 16.79*  --    RBC 3.32*  --    HGB 9.4*  --    HCT 30.5* 27*   PLT 69*  --    MCV 92  --    MCH 28.3  --    MCHC 30.8*  --      LFTs:   Recent Labs   Lab 06/23/20  0230   ALT 26   AST 30   ALKPHOS 97   BILITOT 1.8*   PROT 6.6   ALBUMIN 3.2*     Significant  Diagnostics:  Cxr: overall stable

## 2020-06-23 NOTE — ASSESSMENT & PLAN NOTE
Acute respiratory distress syndrome (ARDS) due to COVID-19 virus  - Crich switched to ETT on 4/11  - Monika weaned off 5/5  - Tracheostomy and bronch 5/5  - Thrombocytopenia resolved; transfuse for under 30K  - Aspirin 81 daily; metoprolol added  - RPM at 2700; flows at 2.6  - Sweep at 1; CO2 goal is 59 or below  - Do not increase sweep for pCO2s 59 or below as long as patient is not acidotic, as these high pCO2s are respiratory compensation for alkalosis  - Oxygenator Fi at 21%; switched out 6/9  - Will likely decannulate in next 24hrs provided patient continues to tolerate minimal Fi and sweep settings     Tongue laceration      -ENT evaluated, laceration superficial      -resolved    Sedation  - Valium, seroquel, PRN Fentanyl     UGI Bleed  - Scoped by GI 5/3 with epinephrine injection and clip placement for an actively bleeding D2/D3 Diuelafoy lesion  - Recommend GI re-consultation if Hb continues to drift and further blood is aspirated from NGT or patient has bloody BM, etc  - Currently resolved    FEN/GI  - TF at 45, goal      - Lasix gtt 2.5  - FWB for hypernatremia 400mL q4     Anticoagulation      - Goal aXa 0.2-0.25 Q6hrs      - Hep gtt 750      - monitor LDH    Prophylaxis      - protonix q12 IV       - leukocytosis stable; on vancomycin      - PICC placed 5/5; central line removed 5/6

## 2020-06-23 NOTE — PROGRESS NOTES
Notified Dr. Shane of patient's recent lab results Results for JAYDA SCHMIDT (MRN [id number]) as of 6/22/2020 21:34   Ref. Range 6/22/2020 20:00   Hemoglobin Latest Ref Range: 14.0 - 18.0 g/dL 8.8 (L)   Hematocrit Latest Ref Range: 40.0 - 54.0 % 27.5 (L)   Results for JAYDA SCHMIDT (MRN [id number]) as of 6/22/2020 21:34   Ref. Range 6/22/2020 20:00   Potassium Latest Ref Range: 3.5 - 5.1 mmol/L 5.8 (H)   Orders for 1 PRBC admin, EKG, and CMP in 6hrs.  Orders implemented.  Will continue to monitor patient.

## 2020-06-23 NOTE — PROGRESS NOTES
Notified treatment team of dysthymia, bradycardia, RR of 60 with subsequent desaturations. Pt suctioned, VS returned to baseline after short time. Labs, ABG in process. Will continue to monitor.

## 2020-06-23 NOTE — PROGRESS NOTES
Pharmacokinetic Assessment Follow Up: IV Vancomycin    Vancomycin Regimen Assessment & Plan:  - Vancomycin level went from 20.8 --> 14.5 ug/mL over ~19 hours. Calculated half life remains ~37 hours. Patient specific vancomycin elimination rate much lower than population pharmacokinetics would predict. Will continue pulse dosing for now given prolonged half life.  - Administer vancomycin 750 mg IV x1 dose this morning, goal trough 10-15 ug/mL. Draw vancomycin level with morning labs tomorrow. Will re-dose as needed depending on level and renal function.    Drug levels (last 3 results):  Recent Labs   Lab Result Units 06/21/20 2025 06/22/20  0737 06/23/20  0230   Vancomycin, Random ug/mL 25.5 20.8 14.5     Pharmacy will continue to follow and monitor vancomycin.    Please contact pharmacy at extension [phone] for questions regarding this assessment.    Thank you for the consult,   He Wei     Patient brief summary:  Ranjana Sanchez is a 55 y.o. male initiated on antimicrobial therapy with IV Vancomycin for surgical prophylaxis.    The patient's current regimen is pulse dosing.    Drug Allergies:   Review of patient's allergies indicates:  No Known Allergies    Actual Body Weight:   58.5 kg    Renal Function:   Estimated Creatinine Clearance: 97.5 mL/min (based on SCr of 0.7 mg/dL).,     Dialysis Method (if applicable):  N/A

## 2020-06-23 NOTE — SUBJECTIVE & OBJECTIVE
Interval History:  NAEO, HR 130s. UOP 2.8L, net negative 1L in 24hrs. Hep gtt 750. Tolerated minimal ECMO settings overnight.    Medications:  Continuous Infusions:   dexmedetomidine (PRECEDEX) infusion 0.4 mcg/kg/hr (06/23/20 1000)    dextrose 10 % in water (D10W)      furosemide (LASIX) 2 mg/mL continuous infusion (non-titrating) Stopped (06/23/20 1000)    heparin (porcine) in 5 % dex 600 Units/hr (06/23/20 1000)    norepinephrine bitartrate-D5W Stopped (06/10/20 0500)     Scheduled Meds:   fentaNYL  1 patch Transdermal Q72H    insulin aspart U-100  6 Units Subcutaneous Q24H    insulin aspart U-100  6 Units Subcutaneous Q24H    insulin aspart U-100  6 Units Subcutaneous Q24H    insulin aspart U-100  6 Units Subcutaneous Q24H    insulin aspart U-100  6 Units Subcutaneous Q24H    insulin aspart U-100  6 Units Subcutaneous Q24H    metoprolol  12.5 mg Per NG tube BID    multivitamin liquid no.118  10 mL Per G Tube Daily    pantoprazole  40 mg Per NG tube BID    polyethylene glycol  17 g Per NG tube Daily    potassium chloride 10%  40 mEq Per NG tube BID    psyllium husk (aspartame)  1 packet Per NG tube TID    QUEtiapine  100 mg Per NG tube BID    sucralfate  1 g Per NG tube Q6H    vitamin D  1,000 Units Per G Tube Daily        Objective:     Vital Signs (Most Recent):  Temp: 98.3 °F (36.8 °C) (06/23/20 0700)  Pulse: (!) 132 (06/23/20 1000)  Resp: (!) 40 (06/23/20 1022)  BP: 130/77 (06/23/20 1000)  SpO2: 99 % (06/23/20 1000) Vital Signs (24h Range):  Temp:  [97.6 °F (36.4 °C)-98.3 °F (36.8 °C)] 98.3 °F (36.8 °C)  Pulse:  [118-155] 132  Resp:  [40] 40  SpO2:  [95 %-100 %] 99 %  BP: ()/(55-84) 130/77  Arterial Line BP: ()/(50-78) 121/69     Weight: 60.7 kg (133 lb 13.1 oz)  Body mass index is 22.97 kg/m².    SpO2: 99 %  O2 Device (Oxygen Therapy): ventilator    Intake/Output - Last 3 Shifts       06/21 0700 - 06/22 0659 06/22 0700 - 06/23 0659 06/23 0700 - 06/24 0659    I.V. (mL/kg) 825  (13.8) 476.5 (7.9)     Blood 772 72     NG/GT 1330 1400     IV Piggyback       Total Intake(mL/kg) 2927 (48.8) 1948.5 (32.1)     Urine (mL/kg/hr) 2405 (1.7) 3090 (2.1) 600 (2.4)    Stool 0 0     Blood 2 3 3    Total Output 2407 3093 603    Net +520 -1144.5 -603           Stool Occurrence 2 x 3 x           Lines/Drains/Airways     Peripherally Inserted Central Catheter Line            PICC Double Lumen 05/05/20 1707 right basilic 48 days          Central Venous Catheter Line                 ECMO Cannula 04/25/20 1120 right femoral vein 58 days         ECMO Cannula 04/25/20 1120 right internal jugular 58 days          Drain                 NG/OG Tube 05/03/20 1215 [place] sump;nasogastric 18 Fr. Left nostril 50 days         Urethral Catheter 05/28/20 1630 25 days          Airway                 Surgical Airway 05/05/20 1500 Shiley Cuffed 48 days          Arterial Line                 Arterial Line 04/27/20 1100 Right Brachial 57 days          Peripheral Intravenous Line                 Peripheral IV - Single Lumen 06/21/20 0012 20 G Left Forearm 2 days                Physical Exam    Constitutional: He is sedated, and intubated.   Head: Normocephalic   Cardiovascular: Tachycardia 130s   Pulmonary/Chest: intubated vent FiO2 60% PEEP 6, ECMO RPM 2700, Flow 2.6, sweep 1, oxygenator FiO2 21%  Skin: L neck cannula repositioned and secured, intact, will good placement on CXR. No change in flows at this time. No flashing. Clots pre and post-oxygenator.  Nursing note and vitals reviewed.    Significant Labs:  BMP:   Recent Labs   Lab 06/23/20  0230      *   K 4.3      CO2 31*   BUN 74*   CREATININE 0.7   CALCIUM 10.4   MG 2.4     CBC:   Recent Labs   Lab 06/23/20  0230 06/23/20  0751   WBC 16.79*  --    RBC 3.32*  --    HGB 9.4*  --    HCT 30.5* 27*   PLT 69*  --    MCV 92  --    MCH 28.3  --    MCHC 30.8*  --      LFTs:   Recent Labs   Lab 06/23/20  0230   ALT 26   AST 30   ALKPHOS 97   BILITOT 1.8*   PROT  6.6   ALBUMIN 3.2*     Significant Diagnostics:  Cxr: appears somewhat better aerated

## 2020-06-23 NOTE — ASSESSMENT & PLAN NOTE
Acute respiratory distress syndrome (ARDS) due to COVID-19 virus  - Crich switched to ETT on 4/11  - Monika weaned off 5/5  - Tracheostomy and bronch 5/5  - Thrombocytopenia resolved; transfuse for under 30K  - Aspirin 81 daily; metoprolol added  - RPM at 2800; flows at 2.7  - Sweep at 1.5; CO2 goal is 59 or below  - Do not increase sweep for pCO2s 59 or below as long as patient is not acidotic, as these high pCO2s are respiratory compensation for alkalosis  - Oxygenator Fi at 21%; switched out 6/9  - Weaning to minimal Fi and sweep settings today, working toward decannulation over night 24-48 hrs     Tongue laceration      -ENT evaluated, laceration superficial      -resolved    Sedation  - Valium, seroquel, PRN Fentanyl     UGI Bleed  - Scoped by GI 5/3 with epinephrine injection and clip placement for an actively bleeding 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

## 2020-06-23 NOTE — PROGRESS NOTES
Notified Dr. Shane of patient's most recent lab values Results for JAYDA SCHMIDT (MRN [id number]) as of 6/23/2020 05:03   Ref. Range 6/23/2020 02:30   Hemoglobin Latest Ref Range: 14.0 - 18.0 g/dL 9.4 (L)   Hematocrit Latest Ref Range: 40.0 - 54.0 % 30.5 (L)   Results for JAYDA SCHMIDT (MRN [id number]) as of 6/23/2020 05:03  Results for JAYDA SCHMIDT (MRN [id number]) as of 6/23/2020 05:03   Ref. Range 6/23/2020 02:30   Fibrinogen Latest Ref Range: 182 - 366 mg/dL 239   Results for JAYDA SCHMIDT (MRN [id number]) as of 6/23/2020 05:03   Ref. Range 6/23/2020 02:30   Heparin Anti-Xa Latest Ref Range: 0.30 - 0.70 IU/mL 0.28 (L)     Orders to decrease heparin to 600units/hr.  Orders implemented.  Will continue to monitor patient.

## 2020-06-23 NOTE — PLAN OF CARE
"SICU PLAN OF CARE NOTE     Dx: Acute respiratory disease due to COVID-19 virus     Shift Events: 1 PRBC and 1 Cryo admin.  Arterial line redressed w/ surgicel dsg due to bleeding; Dr. Shane aware, NG tube flushed back @ 0000 for potential OR today.     Goals of Care: MAP 70-90     Vital Signs: BP (!) 101/60   Pulse (!) 124   Temp 97.8 (Oral)  Resp (!) 42   Ht 5' 4" (1.626 m)   Wt 59.8kg  SpO2 100%   BMI 23.01 kg/m²      Cardiac: Sinus Tach     Resp:  AC/PC 65% / PEEP 6, ECMO VV FiO2 21% / 1 Sweep, 2700 RPM & Flow 2.6     Neuro: Patient will open eyes spontaneously but does not follow commands.  Patient withdraws to pain and does change facial expression to pain.  Patient also intermittently tracks.     Gtts: Heparin @ 700 units/hr per anti-Xa (goal range of 0.2-0.25), Lasix @ 2.5 mg/hr, and Precedex @ 0.4 mcg/kg/min     Output: Urine Urinary Catheter 75cc/hr output ; BM x1 (brown creamy)     Diet: NPO and Tube Feeds (@ goal of 50 w/ 10cc residuals)     Labs/Accuchecks: Q12h labs, Q4h accuchecks, Q6h gases     Skin: No new breakdown noted.  Iodine applied to left arm wound.  Arterial line dsg replaced due to bleeding.  RIJ cannula site dressed w/ tegaderm; cdi.  Right Femoral cannula site continues to leak; Tegaderm replaced and intact.  Patient turned throughout shift and heel boots rotated.  "

## 2020-06-24 NOTE — PROGRESS NOTES
Notified Dr. Alonzo of patient's most recent lab values Results for JAYDA SCHMIDT (MRN [id number]) as of 6/24/2020 00:04   Ref. Range 6/23/2020 20:00   Hemoglobin Latest Ref Range: 14.0 - 18.0 g/dL 9.4 (L)   Hematocrit Latest Ref Range: 40.0 - 54.0 % 29.9 (L)   Results for JAYDA SCHMIDT (MRN [id number]) as of 6/24/2020 00:04   Ref. Range 6/23/2020 20:00   Fibrinogen Latest Ref Range: 182 - 366 mg/dL 207     Orders for 1 unit PRBC and 1 unit Cryo.  Will continue to monitor patient and will admin pending blood products upon receiving.

## 2020-06-24 NOTE — PROGRESS NOTES
[facility] - Respiratory ICU  Critical Care - Surgery  Progress Note    Patient Name: Ranjana Sanchez  MRN: [id number]  Admission Date: 4/10/2020  Hospital Length of Stay: 75 days  Code Status: Full Code  Attending Provider: Francis Ayon MD  Primary Care Provider: Primary Doctor No   Principal Problem: Acute respiratory disease due to COVID-19 virus    Subjective:     Hospital/ICU Course:  No notes on file    Interval History/Significant Events:   Received 2u pRBC and 1u cryo overnight  No other major changes  Pump 2700, 2.5, sweep 1, FiO2 21%      Follow-up For: Procedure(s) (LRB):  CREATION, TRACHEOSTOMY  (N/A)    Post-Operative Day: 50 Days Post-Op    Objective:     Vital Signs (Most Recent):  Temp: 98.4 °F (36.9 °C) (06/24/20 0700)  Pulse: 106 (06/24/20 0915)  Resp: (!) 60 (06/24/20 0438)  BP: 98/69 (06/24/20 0900)  SpO2: 96 % (06/24/20 0915) Vital Signs (24h Range):  Temp:  [97.7 °F (36.5 °C)-98.4 °F (36.9 °C)] 98.4 °F (36.9 °C)  Pulse:  [106-142] 106  Resp:  [39-60] 60  SpO2:  [87 %-100 %] 96 %  BP: ()/(56-86) 98/69  Arterial Line BP: ()/(46-86) 85/47     Weight: 62 kg (136 lb 11 oz)  Body mass index is 23.46 kg/m².      Intake/Output Summary (Last 24 hours) at 6/24/2020 0942  Last data filed at 6/24/2020 0800  Gross per 24 hour   Intake 3353.05 ml   Output 2868 ml   Net 485.05 ml       Physical Exam    Vents:  Vent Mode: A/C (06/24/20 0840)  Ventilator Initiated: Yes (04/10/20 2247)  Set Rate: 20 BPM (06/24/20 0840)  Vt Set: 250 mL (06/06/20 0858)  Pressure Support: 20 cmH20 (06/06/20 0858)  PEEP/CPAP: 6 cmH20 (06/24/20 0840)  Oxygen Concentration (%): 50 (06/24/20 0915)  Peak Airway Pressure: 31 cmH2O (06/24/20 0840)  Plateau Pressure: 30 cmH20 (06/24/20 0840)  Total Ve: 8.07 mL (06/24/20 0840)  F/VT Ratio<105 (RSBI): 237.8 (06/23/20 1457)    Lines/Drains/Airways     Peripherally Inserted Central Catheter Line            PICC Double Lumen 05/05/20 1707 right basilic 49 days           Central Venous Catheter Line                 ECMO Cannula 04/25/20 1120 right femoral vein 59 days         ECMO Cannula 04/25/20 1120 right internal jugular 59 days          Drain                 NG/OG Tube 05/03/20 1215 [place] preetp;nasogastric 18 Fr. Left nostril 51 days         Urethral Catheter 05/28/20 1630 26 days          Airway                 Surgical Airway 05/05/20 1500 Shiley Cuffed 49 days          Arterial Line                 Arterial Line 04/27/20 1100 Right Brachial 57 days          Peripheral Intravenous Line                 Peripheral IV - Single Lumen 06/23/20 1425 18 G Anterior;Right Hand less than 1 day                Significant Labs:    CBC/Anemia Profile:  Recent Labs   Lab 06/23/20  0230  06/23/20  1427  06/23/20 2000 06/24/20  0212 06/24/20  0300 06/24/20  0804   WBC 16.79*  --  13.89*  --  15.05*  --   --  14.52*  --    HGB 9.4*  --  8.6*  --  9.4*  --   --  10.4*  --    HCT 30.5*   < > 26.8*   < > 29.9*   < > 30* 32.4* 29*   PLT 69*  --  70*  --  72*  --   --  71*  --    MCV 92  --  91  --  91  --   --  91  --    RDW 15.7*  --  15.9*  --  15.9*  --   --  15.7*  --    FERRITIN 2,347*  --   --   --   --   --   --  2,115*  --     < > = values in this interval not displayed.        Chemistries:  Recent Labs   Lab 06/23/20  1427 06/23/20 2000 06/24/20  0300   * 147* 151*   K 4.1 3.9 4.0    107 109   CO2 31* 31* 28   BUN 74* 70* 64*   CREATININE 0.7 0.7 0.7   CALCIUM 10.0 9.5 9.8   ALBUMIN 3.1* 3.0* 3.2*   PROT 6.4 6.2 6.5   BILITOT 1.8* 1.8* 2.1*   ALKPHOS 89 85 82   ALT 24 23 24   AST 31 28 34   MG 2.4 2.4 2.4   PHOS 4.2 3.7 3.7       Coagulation:   Recent Labs   Lab 06/24/20  0300   INR 1.1   APTT 27.2     All pertinent labs within the past 24 hours have been reviewed.    Significant Imaging:  I have reviewed and interpreted all pertinent imaging results/findings within the past 24 hours.     06/24/2020 CXR  No significant changes compared to prior      Assessment/Plan:      COVID-19 virus detected  Ranjana Sanchez is a 55 y.o. 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  This was switched to ETT on 4/11.  Cannulated on 4/25.    Neuro:  - Precedex for sedation  - Fentanyl patch  - Encephalopathy with poor neuro exam  - CT on June 2 showing bilateral basal ganglia infarcts, which likely explains the neuro exam findings. His infarcts are sizeable, but extent of permanent deficits are unknown. The likelihood of meaningful recovery is low. Neuro consulted to prognosticate and agreed with poor neurological prognosis given bilateral basal ganglia CVAs.     Pulm  AC/VC+ on the vent.  ECMO   Pump parameters:  See perfusionist documentaion  Continues to overbreath the vent with RR in 30-40s and low TVs <250.  Will attempt bedside decannulation today    Vent Mode: A/C  Oxygen Concentration (%):  [50-60] 50  Resp Rate Total:  [20 br/min-55 br/min] 36 br/min  PEEP/CPAP:  [6 cmH20] 6 cmH20  Mean Airway Pressure:  [15 [id number] cmH20] 15 cmH20      Cardiac  Levo as needed  Tachy/HTN intermittently resolved with prn hydromorphone  MAP goal < 90  ASA 81  ECMO -> working to decannulate, will attempt at bedside today    FEN/GI  TFs at goal (50cc/hr)    Renal  Lasix gtt at 2.5    Heme  Hep gtt, goal 0.2-0.25 to prevent another GI bleed, being titrated appropriately  - upper endoscopy 5/3 with clip placement     ID  Continuing on vanc, if decannulates, will discuss d/c  Dose per pharm    Endo:  Endocrine following and managing bg    Dispo: RICU        Critical secondary to Patient has a condition that poses threat to life and bodily function: COVID+, respiratory failure, ECMO     Critical care was time spent personally by me on the following activities: development of treatment plan with patient or surrogate and bedside caregivers, discussions with consultants, evaluation of patient's response to treatment, examination of patient,  ordering and performing treatments and interventions, ordering and review of laboratory studies, ordering and review of radiographic studies, pulse oximetry, re-evaluation of patient's condition.  This critical care time did not overlap with that of any other provider or involve time for any procedures.     Urszula Arguello MD  Critical Care - Surgery  [facility] - Respiratory ICU

## 2020-06-24 NOTE — OP NOTE
Date of service:  6/24/2020     Preop diagnosis:  1.  Severe COVID 19 infection   2.  VV ECMO support     Postop diagnosis:  Same     Operation:  1. Removal of VV ECMO- Decannulation of the referral VV ECMO after 60 days of support     Staff surgeon:  Francis Ayon  First assistants:  Jean Ariza, Urszula Arguello and Chad Palumbo  Anesthesia: local   Estimated blood loss:  10 cc     Key findings of the operation:  1.  Good hemostasis achieved.       Indication of operation:  This is a 54-year-old gentleman with severe COVID-19 infection and currently supported with VV ECMO.   over the last 60 days patient has gradually improved the recovery of tidal volumes.  This has resulted in minimal support on the VV ECMO.  Patient had been on minimal support for last 36 hours to ensure successful wean.  Extensive talks were done with the family and all questions and concerns were addressed.  We also discussed that if patient fails then there would be no further attempts made to provide VV ECMO support to this patient.       Operation:  This operation was done at the bedside in the ICU.  Area was prepped and draped in the usual sterile fashion.  Previously placed sutures to hold the cannula in place were cut and removed.  The ECMO flow was then turned down and the cannulas were clamped off.  In the meantime a circumferential stitch had been placed around the cannula in the neck and in the right groin.  The cannulas were then removed and the suture was tied.   Minimal bleeding was noted with good hemostasis.  Sterile dressing was applied.  Terminal count of needles sponges and instrument was found to be correct.  Patient tolerated the procedure very well and hemodynamics along with saturations on the monitor remained at 99%.     Medicare attestation:  I was present for all parts of the operation.

## 2020-06-24 NOTE — PROGRESS NOTES
ECMO Specialists shift report    Date: 06/24/2020  ECMO Specialist:  Mckay Farooq    Pump parameters:  RPM: 2700  Flow:  2.5  Sweep:  1  FiO2:  21     Oxygenator status:  Clots: yes; pre & post  Fibrin: no     Pressure trends:  P1: 101  P2: 79  Delta P: 22     Volume status:  Chugging noted - no  MAP:  70-90's  MD notified (name):  Noy     Anticoagulation:  ACT/aPTT/Xa parameters: Xa 0.2-0.25  ACT/aPTT/Xa trends this shift: Xa 0.24,0.21     Cannula size / status / placement:  Arterial:   Venous 1: 23FR / RIJV / 6.5 cm  Venous 2: 27FR / RFV / 12 cm  Dual lumen: no        Additional Comments:    Gave one unit of PRBC and one unit of cryo.

## 2020-06-24 NOTE — PROGRESS NOTES
Dr. Marcos & Dr. Ayon @ bedside evaluating pt.  Decision made to de-cannulate pt @ bedside.  Will transfuse 1u RBC before procedure.  Dr. Ayon reviewed plan w/pt & family.

## 2020-06-24 NOTE — PROGRESS NOTES
Dr. Ariza notified of pt tachypneic w/RR in 50s, HR >140, & MAP >90.  Pt appearing to be uncomfortable. O2 sats remain >92%.  Pt on precedex gtt @ 0.1mcg/kg/hr, non-titrating. Orders given for 25mcg fentanyl Q1 PRN.  Will continue to monitor.

## 2020-06-24 NOTE — PLAN OF CARE
"SICU PLAN OF CARE NOTE     Dx: Acute respiratory disease due to COVID-19 virus     Shift Events: 1 PRBC and 1 Cryo admin     Goals of Care: MAP 70-90     Vital Signs: BP (!) 116/65   Pulse (!) 134   Temp 98.2 (Oral)  Resp (!) 40   Ht 5' 4" (1.626 m)   Wt 59.8kg  SpO2 98%   BMI 23.01 kg/m²      Cardiac: Sinus Tach     Resp:  AC/PC 50% / PEEP 6, ECMO VV FiO2 21% / 1 Sweep, 2700 RPM & Flow 2.6     Neuro: Patient will open eyes spontaneously but does not follow commands.  Patient withdraws to pain and does change facial expression to pain.  Patient also intermittently tracks.     Gtts: Heparin @ 650 units/hr per anti-Xa (goal range of 0.2-0.25), Lasix @ 2.5 mg/hr, and Precedex @ 0.8 mcg/kg/min     Output: Urine Urinary Catheter >100cc/hr output ; BM x1 (brown creamy)     Diet: NPO and Tube Feeds (@ goal of 50 w/ 10cc residuals)     Labs/Accuchecks: Q12h labs, Q4h accuchecks, Q6h gases     Skin: No new breakdown noted.  Iodine applied to left arm wound.  Arterial line dsg replaced due to bleeding.  RIJ cannula site dressed w/ tegaderm; cdi.  Right Femoral cannula site continues to leak; Tegaderm dsg replaced and intact.  Patient turned throughout shift and heel boots rotated.  "

## 2020-06-24 NOTE — PROGRESS NOTES
Pharmacokinetic Assessment Follow Up: IV Vancomycin    Vancomycin Regimen Assessment & Plan:  - Vancomycin level collected this morning resulted as 13.1 ug/mL (~28h level). Will continue pulse dosing for now given prolonged half life.  - Administer vancomycin 750 mg IV x1 dose today, goal trough 10-15 ug/mL. Draw vancomycin level with morning labs tomorrow. Will re-dose as needed depending on level and renal function.    Drug levels (last 3 results):  Recent Labs   Lab Result Units 06/22/20  0737 06/23/20  0230 06/24/20  1116   Vancomycin, Random ug/mL 20.8 14.5 13.1     Pharmacy will continue to follow and monitor vancomycin.    Please contact pharmacy at extension [phone] for questions regarding this assessment.    Thank you for the consult,   He Wei     Patient brief summary:  Ranjana Sanchez is a 55 y.o. male initiated on antimicrobial therapy with IV Vancomycin for surgical prophylaxis.    The patient's current regimen is pulse dosing.    Drug Allergies:   Review of patient's allergies indicates:  No Known Allergies    Actual Body Weight:   58.5 kg    Renal Function:   Estimated Creatinine Clearance: 97.5 mL/min (based on SCr of 0.7 mg/dL).,     Dialysis Method (if applicable):  N/A

## 2020-06-24 NOTE — PLAN OF CARE
Pt currently on AC, 50%, & 5 PEEP. O2 sats >92%. Tachypneic w/RR in 40-50s.  Pt tachy w/HR currently maintaining in 140s.  Pt on precedex @ 0.1mcg/kg/min-non titrating & Lasix @ 2.5mg/hr.  Pt decannulated @ bedside. Heparin gtt d/c'd w/decannulation. Pt received 1uRBC.  UOP:1.3L/shift . Tf @ goal of 50cc/hr w/minimal residuals. Pt withdrawing to pain but no spontaneous movements noted. Family updated on plan of care by Dr. Ayon, all questions answered.

## 2020-06-24 NOTE — ASSESSMENT & PLAN NOTE
Ranjana Sanchez is a 55 y.o. 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  This was switched to ETT on 4/11.  Cannulated on 4/25.    Neuro:  - Precedex for sedation  - Fentanyl patch  - Encephalopathy with poor neuro exam  - CT on June 2 showing bilateral basal ganglia infarcts, which likely explains the neuro exam findings. His infarcts are sizeable, but extent of permanent deficits are unknown. The likelihood of meaningful recovery is low. Neuro consulted to prognosticate and agreed with poor neurological prognosis given bilateral basal ganglia CVAs.     Pulm  AC/VC+ on the vent.  ECMO   Pump parameters:  See perfusionist documentaion  Continues to overbreath the vent with RR in 30-40s and low TVs <250.  Will attempt bedside decannulation today    Vent Mode: A/C  Oxygen Concentration (%):  [50-60] 50  Resp Rate Total:  [20 br/min-55 br/min] 36 br/min  PEEP/CPAP:  [6 cmH20] 6 cmH20  Mean Airway Pressure:  [15 [id number] cmH20] 15 cmH20      Cardiac  Levo as needed  Tachy/HTN intermittently resolved with prn hydromorphone  MAP goal < 90  ASA 81  ECMO -> working to decannulate, will attempt at bedside today    FEN/GI  TFs at goal (50cc/hr)    Renal  Lasix gtt at 2.5    Heme  Hep gtt, goal 0.2-0.25 to prevent another GI bleed, being titrated appropriately  - upper endoscopy 5/3 with clip placement     ID  Continuing on vanc, if decannulates, will discuss d/c  Dose per pharm    Endo:  Endocrine following and managing bg    Dispo: RICU

## 2020-06-24 NOTE — CARE UPDATE
BG goal 140-180     Remains in RICU, NAEON. On vent sedated. ECMO. Heparin and lasix infusions.  BG fairly well controlled with scheduled insulin and correction scale.   Pepatmen intense VHP at 50 cc/hr.      Plan:  Continue Novolog 6 units every 4 hours while on TF; hold if TF held of less than 100  Low dose correction scale  BG monitoring every 4 hours     Discharge planning: TBD      Endocrine to continue to follow     ** Please call Endocrine for any BG related issues

## 2020-06-24 NOTE — PROGRESS NOTES
Pt decannulated @ bedside by Dr. Ayon & assisted by  Dr. Ariza, Dr. Arguello, & Dr. Paul.  Pt tolerated procedure well. VSS. Family to be updated by Dr. Ayon.

## 2020-06-25 PROBLEM — R00.0 TACHYCARDIA: Status: ACTIVE | Noted: 2020-01-01

## 2020-06-25 PROBLEM — K92.2 GIB (GASTROINTESTINAL BLEEDING): Status: ACTIVE | Noted: 2020-01-01

## 2020-06-25 PROBLEM — R57.9 SHOCK: Status: RESOLVED | Noted: 2020-01-01 | Resolved: 2020-01-01

## 2020-06-25 PROBLEM — I63.81 BASAL GANGLIA INFARCTION: Status: ACTIVE | Noted: 2020-01-01

## 2020-06-25 NOTE — PROGRESS NOTES
[facility] - Respiratory ICU  Critical Care - Surgery  Progress Note    Patient Name: Ranjana Sanchez  MRN: [id number]  Admission Date: 4/10/2020  Hospital Length of Stay: 76 days  Code Status: Full Code  Attending Provider: Francis Ayon MD  Primary Care Provider: Primary Doctor No   Principal Problem: Acute respiratory disease due to COVID-19 virus    Subjective:     Hospital/ICU Course:  No notes on file    Interval History/Significant Events:   Decannulated yesterday from VV ECMO  Tolerated well  CO2 up to 55 this AM  Driving pressure increased      Follow-up For: Procedure(s) (LRB):  CREATION, TRACHEOSTOMY  (N/A)    Post-Operative Day: 51 Days Post-Op    Objective:     Vital Signs (Most Recent):  Temp: 99.2 °F (37.3 °C) (06/25/20 0330)  Pulse: (!) 154 (06/25/20 0730)  Resp: (!) 50 (06/25/20 0504)  BP: 137/66 (06/25/20 0730)  SpO2: 98 % (06/25/20 0730) Vital Signs (24h Range):  Temp:  [97.6 °F (36.4 °C)-99.2 °F (37.3 °C)] 99.2 °F (37.3 °C)  Pulse:  [106-161] 154  Resp:  [38-51] 50  SpO2:  [94 %-100 %] 98 %  BP: ()/(55-93) 137/66  Arterial Line BP: ()/(47-86) 111/67     Weight: 58.5 kg (128 lb 15.5 oz)  Body mass index is 22.14 kg/m².      Intake/Output Summary (Last 24 hours) at 6/25/2020 0749  Last data filed at 6/25/2020 0700  Gross per 24 hour   Intake 3596 ml   Output 2798 ml   Net 798 ml       Physical Exam  Vitals signs reviewed.   Constitutional:       Appearance: He is well-developed.   HENT:      Head: Normocephalic and atraumatic.   Eyes:      Pupils: Pupils are equal, round, and reactive to light.   Neck:      Musculoskeletal: Normal range of motion.   Cardiovascular:      Comments: tachycardic  Pulmonary:      Comments: Trached, mechanically ventilated  ECMO intermittently removed  Abdominal:      General: There is no distension.      Palpations: Abdomen is soft.   Musculoskeletal: Normal range of motion.   Skin:     General: Skin is warm.   Neurological:      Comments:  sedated         Vents:  Vent Mode: A/C (06/25/20 0608)  Ventilator Initiated: Yes (04/10/20 2247)  Set Rate: 35 BPM (06/25/20 0608)  Vt Set: 250 mL (06/06/20 0858)  Pressure Support: 20 cmH20 (06/06/20 0858)  PEEP/CPAP: 5 cmH20 (06/25/20 0608)  Oxygen Concentration (%): 50 (06/25/20 0730)  Peak Airway Pressure: 40 cmH2O (06/25/20 0608)  Plateau Pressure: 33 cmH20 (06/25/20 0608)  Total Ve: 13 mL (06/25/20 0608)  F/VT Ratio<105 (RSBI): 182.88 (06/25/20 0012)    Lines/Drains/Airways     Peripherally Inserted Central Catheter Line            PICC Double Lumen 05/05/20 1707 right basilic 50 days          Drain                 NG/OG Tube 05/03/20 1215 [place] sump;nasogastric 18 Fr. Left nostril 52 days         Urethral Catheter 05/28/20 1630 27 days          Airway                 Surgical Airway 05/05/20 1500 Shiley Cuffed 50 days          Arterial Line                 Arterial Line 04/27/20 1100 Right Brachial 58 days          Peripheral Intravenous Line                 Peripheral IV - Single Lumen 06/23/20 1425 18 G Anterior;Right Hand 1 day                Significant Labs:    CBC/Anemia Profile:  Recent Labs   Lab 06/24/20  0300 06/24/20  0804 06/24/20  1542 06/25/20  0355   WBC 14.52*  --  14.64* 19.43*   HGB 10.4*  --  10.1* 9.6*   HCT 32.4* 29* 30.9* 30.6*   PLT 71*  --  60* 89*   MCV 91  --  90 92   RDW 15.7*  --  16.0* 15.9*   FERRITIN 2,115*  --   --  2,101*        Chemistries:  Recent Labs   Lab 06/24/20  0300 06/24/20  1542 06/25/20  0355   * 145 148*   K 4.0 3.8 4.4    106 106   CO2 28 31* 31*   BUN 64* 62* 64*   CREATININE 0.7 0.7 0.7   CALCIUM 9.8 9.2 9.4   ALBUMIN 3.2* 2.9* 3.0*   PROT 6.5 6.2 6.5   BILITOT 2.1* 1.7* 1.9*   ALKPHOS 82 80 93   ALT 24 22 23   AST 34 28 30   MG 2.4 2.1 2.2   PHOS 3.7 4.2 3.8       Significant Imaging:  I have reviewed and interpreted all pertinent imaging results/findings within the past 24 hours.    Assessment/Plan:     COVID-19 virus detected  Ranjana  Daniel is a 55 y.o. 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  This was switched to ETT on 4/11.  Cannulated on 4/25.    Neuro:  - Precedex for sedation  - Fentanyl patch  - Encephalopathy with poor neuro exam  - CT on June 2 showing bilateral basal ganglia infarcts, which likely explains the neuro exam findings. His infarcts are sizeable, but extent of permanent deficits are unknown. The likelihood of meaningful recovery is low. Neuro consulted to prognosticate and agreed with poor neurological prognosis given bilateral basal ganglia CVAs.     Pulm  AC/PC on the vent.  Continues to overbreath the vent with RR in 30-40s and low TVs <250.    Vent Mode: A/C  Oxygen Concentration (%):  [50] 50  Resp Rate Total:  [33 br/min-64 br/min] 61 br/min  PEEP/CPAP:  [5 cmH20-6 cmH20] 5 cmH20  Mean Airway Pressure:  [15 bgT37-20 cmH20] 24 cmH20      Cardiac  Levo as needed  Tachy/HTN intermittently resolved with prn hydromorphone  MAP goal < 60  ASA 81    FEN/GI  TFs at goal (50cc/hr)    Renal  Lasix gtt at 2.5    Heme  Hep gtt, goal 0.2-0.25 to prevent another GI bleed, being titrated appropriately  - upper endoscopy 5/3 with clip placement     ID  D/C Vanc    Endo:  Endocrine following and managing bg    Dispo: RICU, Transfer to medical team             Critical care was time spent personally by me on the following activities: development of treatment plan with patient or surrogate and bedside caregivers, discussions with consultants, evaluation of patient's response to treatment, examination of patient, ordering and performing treatments and interventions, ordering and review of laboratory studies, ordering and review of radiographic studies, pulse oximetry, re-evaluation of patient's condition.  This critical care time did not overlap with that of any other provider or involve time for any procedures.     Zelalem Ariza MD  Critical Care - Surgery  [facility] - Respiratory ICU

## 2020-06-25 NOTE — PROGRESS NOTES
[facility] - Respiratory ICU  Cardiothoracic Surgery  Daily ECMO Progress Note    Patient Name: Ranjana Sanchez  MRN: [id number]  Admission Date: 4/10/2020  Hospital Length of Stay: 76 days  Code Status: Full Code   Attending Physician: Jordi Goss MD   Referring Provider: Francis Ayon MD  Principal Problem:Acute respiratory disease due to COVID-19 virus    Subjective:     Interval History:  NAEO, HR 130s. Tidal volumes increasing to ~300s. Tolerated decannulation.    Medications:  Continuous Infusions:   dexmedetomidine (PRECEDEX) infusion 0.5 mcg/kg/hr (06/25/20 1700)    dextrose 10 % in water (D10W)      furosemide (LASIX) 2 mg/mL continuous infusion (non-titrating) 2.5 mg/hr (06/25/20 1700)    norepinephrine bitartrate-D5W Stopped (06/10/20 0500)     Scheduled Meds:   fentaNYL  1 patch Transdermal Q72H    heparin (porcine)  5,000 Units Subcutaneous Q8H    insulin aspart U-100  6 Units Subcutaneous Q24H    insulin aspart U-100  6 Units Subcutaneous Q24H    insulin aspart U-100  6 Units Subcutaneous Q24H    insulin aspart U-100  6 Units Subcutaneous Q24H    insulin aspart U-100  6 Units Subcutaneous Q24H    insulin aspart U-100  6 Units Subcutaneous Q24H    metoprolol  12.5 mg Per G Tube BID    multivitamin liquid no.118  10 mL Per G Tube Daily    pantoprazole  40 mg Per NG tube BID    polyethylene glycol  17 g Per NG tube Daily    potassium chloride 10%  40 mEq Per NG tube BID    psyllium husk (aspartame)  1 packet Per NG tube TID    QUEtiapine  200 mg Per NG tube BID    sucralfate  1 g Per NG tube Q6H    vitamin D  1,000 Units Per G Tube Daily        Objective:     Vital Signs (Most Recent):  Temp: 98.8 °F (37.1 °C) (06/25/20 1500)  Pulse: (!) 146 (06/25/20 1700)  Resp: (!) 60 (06/25/20 1226)  BP: 122/73 (06/25/20 1700)  SpO2: 98 % (06/25/20 1700) Vital Signs (24h Range):  Temp:  [98.3 °F (36.8 °C)-100 °F (37.8 °C)] 98.8 °F (37.1 °C)  Pulse:  [125-161] 146  Resp:   [40-64] 60  SpO2:  [94 %-100 %] 98 %  BP: ()/(53-93) 122/73  Arterial Line BP: ()/(44-86) 95/59     Weight: 58.5 kg (128 lb 15.5 oz)  Body mass index is 22.14 kg/m².    SpO2: 98 %  O2 Device (Oxygen Therapy): ventilator    Intake/Output - Last 3 Shifts       06/23 0700 - 06/24 0659 06/24 0700 - 06/25 0659 06/25 0700 - 06/26 0659    I.V. (mL/kg) 629.1 (10.1) 404 (6.9) 57 (1)    Blood 784 350     NG/GT 1590 2835 750    Total Intake(mL/kg) 3003.1 (48.4) 3589 (61.4) 807 (13.8)    Urine (mL/kg/hr) 3070 (2.1) 2915 (2.1) 1210 (2)    Stool 0 0     Blood 3 3     Total Output 3073 2918 1210    Net -70 +671 -403           Stool Occurrence 1 x 1 x           Lines/Drains/Airways     Peripherally Inserted Central Catheter Line            PICC Double Lumen 05/05/20 1707 right basilic 51 days          Drain                 NG/OG Tube 05/03/20 1215 [place] sump;nasogastric 18 Fr. Left nostril 53 days         Urethral Catheter 05/28/20 1630 28 days          Airway                 Surgical Airway 05/05/20 1500 Shiley Cuffed 51 days          Peripheral Intravenous Line                 Peripheral IV - Single Lumen 06/23/20 1425 18 G Anterior;Right Hand 2 days                Physical Exam    Constitutional: He is sedated, and intubated.   Head: Normocephalic   Cardiovascular: Tachycardia 130s   Pulmonary/Chest: intubated via trach.  Nursing note and vitals reviewed.    Significant Labs:  BMP:   Recent Labs   Lab 06/25/20  1532   *   *   K 4.7      CO2 32*   BUN 74*   CREATININE 0.8   CALCIUM 9.4   MG 2.3     CBC:   Recent Labs   Lab 06/25/20  1532   WBC 19.91*   RBC 3.20*   HGB 9.4*   HCT 30.5*   *   MCV 95   MCH 29.4   MCHC 30.8*     LFTs:   Recent Labs   Lab 06/25/20  1532   ALT 28   AST 32   ALKPHOS 108   BILITOT 1.6*   PROT 6.7   ALBUMIN 2.9*     Significant Diagnostics:  Cxr: overall stable      Assessment/Plan:     Acute respiratory distress syndrome (ARDS) due to COVID-19 virus  - Crich switched  to ETT on 4/11  - Monika weaned off 5/5  - Tracheostomy and bronch 5/5  - Thrombocytopenia resolved; transfuse for under 30K  - Aspirin 81 daily; metoprolol added  - Has tolerated decannulation of VV ECMO 6/24  - Will transfer patient to medical colleagues today      Tongue laceration      -ENT evaluated, laceration superficial      -resolved    Sedation  - Valium, seroquel, PRN Fentanyl     UGI Bleed  - Scoped by GI 5/3 with epinephrine injection and clip placement for an actively bleeding 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    Anne Miller MD  Cardiothoracic Surgery  [facility] - Respiratory ICU    Patient doing well of to be weaned from VV ECMO for last 60 days.  We will transition the care to pulmonary critical care team.  Dr. Marcos and myself were present at the bedside we had extensive discussions and we all agreed on the treatment plan.  Family was notified.  All questions and concerns moving forward will be answered by the pulmonary critical team as well as the management of the patient will be by the pulmonary critical team.  Thank you for involving me in the care of this gentleman.

## 2020-06-25 NOTE — SUBJECTIVE & OBJECTIVE
Interval History/Significant Events:   Decannulated yesterday from VV ECMO  Tolerated well  CO2 up to 55 this AM  Driving pressure increased      Follow-up For: Procedure(s) (LRB):  CREATION, TRACHEOSTOMY  (N/A)    Post-Operative Day: 51 Days Post-Op    Objective:     Vital Signs (Most Recent):  Temp: 99.2 °F (37.3 °C) (06/25/20 0330)  Pulse: (!) 154 (06/25/20 0730)  Resp: (!) 50 (06/25/20 0504)  BP: 137/66 (06/25/20 0730)  SpO2: 98 % (06/25/20 0730) Vital Signs (24h Range):  Temp:  [97.6 °F (36.4 °C)-99.2 °F (37.3 °C)] 99.2 °F (37.3 °C)  Pulse:  [106-161] 154  Resp:  [38-51] 50  SpO2:  [94 %-100 %] 98 %  BP: ()/(55-93) 137/66  Arterial Line BP: ()/(47-86) 111/67     Weight: 58.5 kg (128 lb 15.5 oz)  Body mass index is 22.14 kg/m².      Intake/Output Summary (Last 24 hours) at 6/25/2020 0749  Last data filed at 6/25/2020 0700  Gross per 24 hour   Intake 3596 ml   Output 2798 ml   Net 798 ml       Physical Exam  Vitals signs reviewed.   Constitutional:       Appearance: He is well-developed.   HENT:      Head: Normocephalic and atraumatic.   Eyes:      Pupils: Pupils are equal, round, and reactive to light.   Neck:      Musculoskeletal: Normal range of motion.   Cardiovascular:      Comments: tachycardic  Pulmonary:      Comments: Trached, mechanically ventilated  ECMO intermittently removed  Abdominal:      General: There is no distension.      Palpations: Abdomen is soft.   Musculoskeletal: Normal range of motion.   Skin:     General: Skin is warm.   Neurological:      Comments: sedated         Vents:  Vent Mode: A/C (06/25/20 0608)  Ventilator Initiated: Yes (04/10/20 9039)  Set Rate: 35 BPM (06/25/20 0608)  Vt Set: 250 mL (06/06/20 0858)  Pressure Support: 20 cmH20 (06/06/20 0858)  PEEP/CPAP: 5 cmH20 (06/25/20 0608)  Oxygen Concentration (%): 50 (06/25/20 0730)  Peak Airway Pressure: 40 cmH2O (06/25/20 0608)  Plateau Pressure: 33 cmH20 (06/25/20 0608)  Total Ve: 13 mL (06/25/20 0608)  F/VT Ratio<105  (RSBI): 182.88 (06/25/20 0012)    Lines/Drains/Airways     Peripherally Inserted Central Catheter Line            PICC Double Lumen 05/05/20 1707 right basilic 50 days          Drain                 NG/OG Tube 05/03/20 1215 [place] preetp;nasogastric 18 Fr. Left nostril 52 days         Urethral Catheter 05/28/20 1630 27 days          Airway                 Surgical Airway 05/05/20 1500 Shiley Cuffed 50 days          Arterial Line                 Arterial Line 04/27/20 1100 Right Brachial 58 days          Peripheral Intravenous Line                 Peripheral IV - Single Lumen 06/23/20 1425 18 G Anterior;Right Hand 1 day                Significant Labs:    CBC/Anemia Profile:  Recent Labs   Lab 06/24/20  0300 06/24/20  0804 06/24/20  1542 06/25/20  0355   WBC 14.52*  --  14.64* 19.43*   HGB 10.4*  --  10.1* 9.6*   HCT 32.4* 29* 30.9* 30.6*   PLT 71*  --  60* 89*   MCV 91  --  90 92   RDW 15.7*  --  16.0* 15.9*   FERRITIN 2,115*  --   --  2,101*        Chemistries:  Recent Labs   Lab 06/24/20  0300 06/24/20  1542 06/25/20  0355   * 145 148*   K 4.0 3.8 4.4    106 106   CO2 28 31* 31*   BUN 64* 62* 64*   CREATININE 0.7 0.7 0.7   CALCIUM 9.8 9.2 9.4   ALBUMIN 3.2* 2.9* 3.0*   PROT 6.5 6.2 6.5   BILITOT 2.1* 1.7* 1.9*   ALKPHOS 82 80 93   ALT 24 22 23   AST 34 28 30   MG 2.4 2.1 2.2   PHOS 3.7 4.2 3.8       Significant Imaging:  I have reviewed and interpreted all pertinent imaging results/findings within the past 24 hours.

## 2020-06-25 NOTE — PLAN OF CARE
Pt is trached, precedex continued at 0.1. Prn fentanyl as needed for comfort. Eyes open but no response to noxious stimuli. Continues to remain on ACPC 50% 5PEEP, Spo2 >95%. HR: 140s-150s,  scheduled metoprolol and prn labetalol administered. MAP within a goal of 70-90s. TF continued at 50cc/hr, water bolus every 4hrs. Accucheck every 4hrs, covered with sliding scale. Urine 100-150cc/hr. Airborne precautions maintained. No significant events overnight. Will continue to monitor the patient.

## 2020-06-25 NOTE — ASSESSMENT & PLAN NOTE
Acute respiratory distress syndrome (ARDS) due to COVID-19 virus  - Crich switched to ETT on 4/11  - Monika weaned off 5/5  - Tracheostomy and bronch 5/5  - Thrombocytopenia resolved; transfuse for under 30K  - Aspirin 81 daily; metoprolol added  - Has tolerated decannulation of VV ECMO 6/24  - Will transfer patient to medical colleagues today      Tongue laceration      -ENT evaluated, laceration superficial      -resolved    Sedation  - Valium, seroquel, PRN Fentanyl     UGI Bleed  - Scoped by GI 5/3 with epinephrine injection and clip placement for an actively bleeding 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

## 2020-06-25 NOTE — ASSESSMENT & PLAN NOTE
Acute respiratory distress syndrome (ARDS) due to COVID-19 virus  - Crich switched to ETT on 4/11  - Monika weaned off 5/5  - Tracheostomy and bronch 5/5  - Thrombocytopenia resolved; transfuse for under 30K  - Aspirin 81 daily; metoprolol added  - RPM at 2800; flows at 2.7  - Sweep at 1; CO2 goal is 59 or below  - Do not increase sweep for pCO2s 59 or below as long as patient is not acidotic, as these high pCO2s are respiratory compensation for alkalosis  - Oxygenator Fi at 21%; switched out 6/9  - Has toleratedminimal Fi and sweep ECMO settings for >24hrs, decannulation this morning     Tongue laceration      -ENT evaluated, laceration superficial      -resolved    Sedation  - Valium, seroquel, PRN Fentanyl     UGI Bleed  - Scoped by GI 5/3 with epinephrine injection and clip placement for an actively bleeding D2/D3 Diuelafoy lesion  - Recommend GI re-consultation if Hb continues to drift and further blood is aspirated from NGT or patient has bloody BM, etc  - Currently resolved    FEN/GI  - TF at 45, goal      - Lasix gtt 2.5  - FWB for hypernatremia 400mL q4     Anticoagulation      - Goal aXa 0.2-0.25 Q6hrs      - Hep gtt 650      - monitor LDH    Prophylaxis      - protonix q12 IV       - leukocytosis stable; on vancomycin      - PICC placed 5/5; central line removed 5/6

## 2020-06-25 NOTE — SUBJECTIVE & OBJECTIVE
Interval History/Significant Events: Patient decannulated, ECMO complete, remains intubated and sedated.    Review of Systems   Unable to perform ROS: Intubated     Objective:     Vital Signs (Most Recent):  Temp: 98.4 °F (36.9 °C) (06/25/20 0900)  Pulse: (!) 156 (06/25/20 1000)  Resp: (!) 64 (06/25/20 0926)  BP: 114/70 (06/25/20 1000)  SpO2: 98 % (06/25/20 1000) Vital Signs (24h Range):  Temp:  [97.6 °F (36.4 °C)-100 °F (37.8 °C)] 98.4 °F (36.9 °C)  Pulse:  [120-161] 156  Resp:  [38-64] 64  SpO2:  [94 %-100 %] 98 %  BP: ()/(55-93) 114/70  Arterial Line BP: ()/(49-86) 83/52   Weight: 58.5 kg (128 lb 15.5 oz)  Body mass index is 22.14 kg/m².      Intake/Output Summary (Last 24 hours) at 6/25/2020 1037  Last data filed at 6/25/2020 1000  Gross per 24 hour   Intake 3571 ml   Output 2800 ml   Net 771 ml       Physical Exam    Vents:  Vent Mode: A/C (06/25/20 0801)  Ventilator Initiated: Yes (04/10/20 2247)  Set Rate: 35 BPM (06/25/20 0801)  Vt Set: 250 mL (06/06/20 0858)  Pressure Support: 20 cmH20 (06/06/20 0858)  PEEP/CPAP: 5 cmH20 (06/25/20 0801)  Oxygen Concentration (%): 50 (06/25/20 1000)  Peak Airway Pressure: 40 cmH2O (06/25/20 0801)  Plateau Pressure: 33 cmH20 (06/25/20 0801)  Total Ve: 13.3 mL (06/25/20 0801)  F/VT Ratio<105 (RSBI): 182.88 (06/25/20 0012)  Lines/Drains/Airways     Peripherally Inserted Central Catheter Line            PICC Double Lumen 05/05/20 1707 right basilic 50 days          Drain                 NG/OG Tube 05/03/20 1215 [place] sump;nasogastric 18 Fr. Left nostril 52 days         Urethral Catheter 05/28/20 1630 27 days          Airway                 Surgical Airway 05/05/20 1500 [place] Cuffed 50 days          Arterial Line                 Arterial Line 04/27/20 1100 Right Brachial 58 days          Peripheral Intravenous Line                 Peripheral IV - Single Lumen 06/23/20 1425 18 G Anterior;Right Hand 1 day              Significant Labs:    CBC/Anemia Profile:  Recent  Labs   Lab 06/24/20  0300 06/24/20  0804 06/24/20  1542 06/25/20  0355   WBC 14.52*  --  14.64* 19.43*   HGB 10.4*  --  10.1* 9.6*   HCT 32.4* 29* 30.9* 30.6*   PLT 71*  --  60* 89*   MCV 91  --  90 92   RDW 15.7*  --  16.0* 15.9*   FERRITIN 2,115*  --   --  2,101*        Chemistries:  Recent Labs   Lab 06/24/20  0300 06/24/20  1542 06/25/20  0355   * 145 148*   K 4.0 3.8 4.4    106 106   CO2 28 31* 31*   BUN 64* 62* 64*   CREATININE 0.7 0.7 0.7   CALCIUM 9.8 9.2 9.4   ALBUMIN 3.2* 2.9* 3.0*   PROT 6.5 6.2 6.5   BILITOT 2.1* 1.7* 1.9*   ALKPHOS 82 80 93   ALT 24 22 23   AST 34 28 30   MG 2.4 2.1 2.2   PHOS 3.7 4.2 3.8       All pertinent labs within the past 24 hours have been reviewed.    Significant Imaging:  I have reviewed all pertinent imaging results/findings within the past 24 hours.

## 2020-06-25 NOTE — PLAN OF CARE
This is a transfer note for Mr. Sanchez.  Patient is being transferred to to medical ICU team under the care of Dr. Goss.  As we all know Mr. Sanchez is a 55-year-old gentleman who was admitted on 04/10/2020 to [facility] for COVID-19 infection.  Patient underwent an emergency cricoidectomy and intubation in the ER.  On 04/25 patient was evaluated for placement on VV ECMO for respiratory failure secondary to COVID-19 infection.  On VV ECMO support gradually patient started showing improvements in his respiratory status and tidal volumes progressively increased to enable him being weaned off from VV ECMO support..  Patient was weaned off from ECMO support on 06/24/2020.  However during this process we also noticed that the patient had a stroke and currently is not responding to verbal commands.  Patient opens eyes and tracks at his own wish however no purposeful movements can be ascertained from the patient.  Extensive notes from Neurology have been documented in the patient's medical record.  Extensive discussions had been carried out between the family members and periodic updates were provided.  Periodic updates was also provided to [employer] which is the employer of Mr. Berrios.  The family was also notified that if any further deterioration happens after eating of ECMO we would not be providing new ECMO support to this patient.  Everyone involved in the care is in agreement with the current plan.  Thank you very much for involving me in the care of this patient and we would transition the care to the medical team.  If you have any questions and concerns please do not hesitate to contact me directly.

## 2020-06-25 NOTE — PLAN OF CARE
"Dx: Acute respiratory disease due to COVID-19 virus    Neuro: Pt does not follow commands, move extremities, or withdraw to pain.     Vital Signs: /79   Pulse (!) 120   Temp 98.8 °F (37.1 °C) (Oral)   Resp (!) 44   Ht 5' 4" (1.626 m)   Wt 58.5 kg (128 lb 15.5 oz)   SpO2 96%   BMI 22.14 kg/m²     Diet: NPO and Tube Feeds    Gtts: Precedex and Lasix    Urine Output: Urinary Catheter 1100 cc/shift     Labs/Accuchecks: Daily labs reviewed. Accuchecks q4, sliding scale coverage given prn.     Skin: Wound care performed as ordered. Waffle mattress inflated. Pt turned q2. Heel boots in place.     Shift Events:T-max 99.3. MAPs >60. SpO2 >90 on AC/PC 50% / 5 PEEP. POC reviewed with pt and pt's son over the phone.            "

## 2020-06-25 NOTE — SUBJECTIVE & OBJECTIVE
Interval History:  NAEO, HR 130s. Tidal volumes increasing to ~300s. Tolerated decannulation.    Medications:  Continuous Infusions:   dexmedetomidine (PRECEDEX) infusion 0.5 mcg/kg/hr (06/25/20 1700)    dextrose 10 % in water (D10W)      furosemide (LASIX) 2 mg/mL continuous infusion (non-titrating) 2.5 mg/hr (06/25/20 1700)    norepinephrine bitartrate-D5W Stopped (06/10/20 0500)     Scheduled Meds:   fentaNYL  1 patch Transdermal Q72H    heparin (porcine)  5,000 Units Subcutaneous Q8H    insulin aspart U-100  6 Units Subcutaneous Q24H    insulin aspart U-100  6 Units Subcutaneous Q24H    insulin aspart U-100  6 Units Subcutaneous Q24H    insulin aspart U-100  6 Units Subcutaneous Q24H    insulin aspart U-100  6 Units Subcutaneous Q24H    insulin aspart U-100  6 Units Subcutaneous Q24H    metoprolol  12.5 mg Per G Tube BID    multivitamin liquid no.118  10 mL Per G Tube Daily    pantoprazole  40 mg Per NG tube BID    polyethylene glycol  17 g Per NG tube Daily    potassium chloride 10%  40 mEq Per NG tube BID    psyllium husk (aspartame)  1 packet Per NG tube TID    QUEtiapine  200 mg Per NG tube BID    sucralfate  1 g Per NG tube Q6H    vitamin D  1,000 Units Per G Tube Daily        Objective:     Vital Signs (Most Recent):  Temp: 98.8 °F (37.1 °C) (06/25/20 1500)  Pulse: (!) 146 (06/25/20 1700)  Resp: (!) 60 (06/25/20 1226)  BP: 122/73 (06/25/20 1700)  SpO2: 98 % (06/25/20 1700) Vital Signs (24h Range):  Temp:  [98.3 °F (36.8 °C)-100 °F (37.8 °C)] 98.8 °F (37.1 °C)  Pulse:  [125-161] 146  Resp:  [40-64] 60  SpO2:  [94 %-100 %] 98 %  BP: ()/(53-93) 122/73  Arterial Line BP: ()/(44-86) 95/59     Weight: 58.5 kg (128 lb 15.5 oz)  Body mass index is 22.14 kg/m².    SpO2: 98 %  O2 Device (Oxygen Therapy): ventilator    Intake/Output - Last 3 Shifts       06/23 0700 - 06/24 0659 06/24 0700 - 06/25 0659 06/25 0700 - 06/26 0659    I.V. (mL/kg) 629.1 (10.1) 404 (6.9) 57 (1)    Blood 784  350     NG/GT 1590 2835 750    Total Intake(mL/kg) 3003.1 (48.4) 3589 (61.4) 807 (13.8)    Urine (mL/kg/hr) 3070 (2.1) 2915 (2.1) 1210 (2)    Stool 0 0     Blood 3 3     Total Output 3073 2918 1210    Net -70 +671 -403           Stool Occurrence 1 x 1 x           Lines/Drains/Airways     Peripherally Inserted Central Catheter Line            PICC Double Lumen 05/05/20 1707 right basilic 51 days          Drain                 NG/OG Tube 05/03/20 1215 [place] sump;nasogastric 18 Fr. Left nostril 53 days         Urethral Catheter 05/28/20 1630 28 days          Airway                 Surgical Airway 05/05/20 1500 Shiley Cuffed 51 days          Peripheral Intravenous Line                 Peripheral IV - Single Lumen 06/23/20 1425 18 G Anterior;Right Hand 2 days                Physical Exam    Constitutional: He is sedated, and intubated.   Head: Normocephalic   Cardiovascular: Tachycardia 130s   Pulmonary/Chest: intubated via trach.  Nursing note and vitals reviewed.    Significant Labs:  BMP:   Recent Labs   Lab 06/25/20  1532   *   *   K 4.7      CO2 32*   BUN 74*   CREATININE 0.8   CALCIUM 9.4   MG 2.3     CBC:   Recent Labs   Lab 06/25/20  1532   WBC 19.91*   RBC 3.20*   HGB 9.4*   HCT 30.5*   *   MCV 95   MCH 29.4   MCHC 30.8*     LFTs:   Recent Labs   Lab 06/25/20  1532   ALT 28   AST 32   ALKPHOS 108   BILITOT 1.6*   PROT 6.7   ALBUMIN 2.9*     Significant Diagnostics:  Cxr: overall stable

## 2020-06-25 NOTE — ASSESSMENT & PLAN NOTE
Ranjana Sanchez is a 55 y.o. 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  This was switched to ETT on 4/11.  Cannulated on 4/25.    Neuro:  - Precedex for sedation  - Fentanyl patch  - Encephalopathy with poor neuro exam  - CT on June 2 showing bilateral basal ganglia infarcts, which likely explains the neuro exam findings. His infarcts are sizeable, but extent of permanent deficits are unknown. The likelihood of meaningful recovery is low. Neuro consulted to prognosticate and agreed with poor neurological prognosis given bilateral basal ganglia CVAs.     Pulm  AC/PC on the vent.  Continues to overbreath the vent with RR in 30-40s and low TVs <250.    Vent Mode: A/C  Oxygen Concentration (%):  [50] 50  Resp Rate Total:  [33 br/min-64 br/min] 61 br/min  PEEP/CPAP:  [5 cmH20-6 cmH20] 5 cmH20  Mean Airway Pressure:  [15 tcC25-37 cmH20] 24 cmH20      Cardiac  Levo as needed  Tachy/HTN intermittently resolved with prn hydromorphone  MAP goal < 60  ASA 81    FEN/GI  TFs at goal (50cc/hr)    Renal  Lasix gtt at 2.5    Heme  Hep gtt, goal 0.2-0.25 to prevent another GI bleed, being titrated appropriately  - upper endoscopy 5/3 with clip placement     ID  D/C Vanc    Endo:  Endocrine following and managing bg    Dispo: RICU, Transfer to medical team

## 2020-06-25 NOTE — PROGRESS NOTES
[facility] - Respiratory ICU  Cardiothoracic Surgery  Daily ECMO Progress Note    Patient Name: Rajnana Sanchez  MRN: [id number]  Admission Date: 4/10/2020  Hospital Length of Stay: 76 days  Code Status: Full Code   Attending Physician: Jordi Goss MD   Referring Provider: Francis Ayon MD  Principal Problem:Acute respiratory disease due to COVID-19 virus    Subjective:     Interval History:  NAEO, HR 130s. UOP 2.9L, net positive 80mL in 24hrs. Hep gtt 650.    Medications:  Continuous Infusions:   dexmedetomidine (PRECEDEX) infusion 0.5 mcg/kg/hr (06/25/20 1600)    dextrose 10 % in water (D10W)      furosemide (LASIX) 2 mg/mL continuous infusion (non-titrating) 2.5 mg/hr (06/25/20 1600)    norepinephrine bitartrate-D5W Stopped (06/10/20 0500)     Scheduled Meds:   fentaNYL  1 patch Transdermal Q72H    heparin (porcine)  5,000 Units Subcutaneous Q8H    insulin aspart U-100  6 Units Subcutaneous Q24H    insulin aspart U-100  6 Units Subcutaneous Q24H    insulin aspart U-100  6 Units Subcutaneous Q24H    insulin aspart U-100  6 Units Subcutaneous Q24H    insulin aspart U-100  6 Units Subcutaneous Q24H    insulin aspart U-100  6 Units Subcutaneous Q24H    metoprolol  12.5 mg Per G Tube BID    multivitamin liquid no.118  10 mL Per G Tube Daily    pantoprazole  40 mg Per NG tube BID    polyethylene glycol  17 g Per NG tube Daily    potassium chloride 10%  40 mEq Per NG tube BID    psyllium husk (aspartame)  1 packet Per NG tube TID    QUEtiapine  200 mg Per NG tube BID    sucralfate  1 g Per NG tube Q6H    vitamin D  1,000 Units Per G Tube Daily        Objective:     Vital Signs (Most Recent):  Temp: 98.8 °F (37.1 °C) (06/25/20 1500)  Pulse: (!) 148 (06/25/20 1645)  Resp: (!) 60 (06/25/20 1226)  BP: 114/67 (06/25/20 1630)  SpO2: 97 % (06/25/20 1645) Vital Signs (24h Range):  Temp:  [98.3 °F (36.8 °C)-100 °F (37.8 °C)] 98.8 °F (37.1 °C)  Pulse:  [125-161] 148  Resp:  [40-64]  60  SpO2:  [94 %-100 %] 97 %  BP: ()/(53-93) 114/67  Arterial Line BP: ()/(44-86) 95/59     Weight: 58.5 kg (128 lb 15.5 oz)  Body mass index is 22.14 kg/m².    SpO2: 97 %  O2 Device (Oxygen Therapy): ventilator    Intake/Output - Last 3 Shifts       06/23 0700 - 06/24 0659 06/24 0700 - 06/25 0659 06/25 0700 - 06/26 0659    I.V. (mL/kg) 629.1 (10.1) 404 (6.9) 57 (1)    Blood 784 350     NG/GT 1590 2835 750    Total Intake(mL/kg) 3003.1 (48.4) 3589 (61.4) 807 (13.8)    Urine (mL/kg/hr) 3070 (2.1) 2915 (2.1) 1210 (2)    Stool 0 0     Blood 3 3     Total Output 3073 2918 1210    Net -70 +671 -403           Stool Occurrence 1 x 1 x           Lines/Drains/Airways     Peripherally Inserted Central Catheter Line            PICC Double Lumen 05/05/20 1707 right basilic 51 days          Drain                 NG/OG Tube 05/03/20 1215 [place] sump;nasogastric 18 Fr. Left nostril 53 days         Urethral Catheter 05/28/20 1630 28 days          Airway                 Surgical Airway 05/05/20 1500 Shiley Cuffed 51 days          Peripheral Intravenous Line                 Peripheral IV - Single Lumen 06/23/20 1425 18 G Anterior;Right Hand 2 days                Physical Exam    Constitutional: He is sedated, and intubated.   Head: Normocephalic   Cardiovascular: Tachycardia 130s   Pulmonary/Chest: intubated vent FiO2 60% PEEP 6, ECMO RPM 2800, Flow 2.8, sweep 1, oxygenator FiO2 21%  Skin: L neck cannula repositioned and secured, intact, will good placement on CXR. No change in flows at this time. No flashing. Clots pre and post-oxygenator.  Nursing note and vitals reviewed.    Significant Labs:  BMP:   Recent Labs   Lab 06/25/20  1532   *   *   K 4.7      CO2 32*   BUN 74*   CREATININE 0.8   CALCIUM 9.4   MG 2.3     CBC:   Recent Labs   Lab 06/25/20  1532   WBC 19.91*   RBC 3.20*   HGB 9.4*   HCT 30.5*   *   MCV 95   MCH 29.4   MCHC 30.8*     LFTs:   Recent Labs   Lab 06/25/20  1532   ALT 28    AST 32   ALKPHOS 108   BILITOT 1.6*   PROT 6.7   ALBUMIN 2.9*     Significant Diagnostics:  Cxr: overall stable      Assessment/Plan:     Acute respiratory distress syndrome (ARDS) due to COVID-19 virus  - Crich switched to ETT on 4/11  - Monika weaned off 5/5  - Tracheostomy and bronch 5/5  - Thrombocytopenia resolved; transfuse for under 30K  - Aspirin 81 daily; metoprolol added  - RPM at 2800; flows at 2.7  - Sweep at 1; CO2 goal is 59 or below  - Do not increase sweep for pCO2s 59 or below as long as patient is not acidotic, as these high pCO2s are respiratory compensation for alkalosis  - Oxygenator Fi at 21%; switched out 6/9  - Has toleratedminimal Fi and sweep ECMO settings for >24hrs, decannulation this morning     Tongue laceration      -ENT evaluated, laceration superficial      -resolved    Sedation  - Valium, seroquel, PRN Fentanyl     UGI Bleed  - Scoped by GI 5/3 with epinephrine injection and clip placement for an actively bleeding D2/D3 Diuelafoy lesion  - Recommend GI re-consultation if Hb continues to drift and further blood is aspirated from NGT or patient has bloody BM, etc  - Currently resolved    FEN/GI  - TF at 45, goal      - Lasix gtt 2.5  - FWB for hypernatremia 400mL q4     Anticoagulation      - Goal aXa 0.2-0.25 Q6hrs      - Hep gtt 650      - monitor LDH    Prophylaxis      - protonix q12 IV       - leukocytosis stable; on vancomycin      - PICC placed 5/5; central line removed 5/6    Anne Miller MD  Cardiothoracic Surgery  [facility] - Respiratory ICU      VV ECMO circuit checked and all parameters reviewed. Anticoagulation was managed and all cannulation exit sites along with review of labs and radiology studies performed. Speed and functioning of the pump along with oxygenator quality reviewed.  Patient is being prepared for decannulation of the ECMO.  Patient has been on minimal were ECMO settings and has been tolerating it very well.  Family has also been notified  about the plan.  We are in communication with the respiratory ICU for transition of care once patient completely veins on from the VV ECMO.

## 2020-06-25 NOTE — CARE UPDATE
BG goal 140-180     Remains in RICU, NAEON. Decannulated yesterday - tolerated well. Heparin and lasix infusions.  BG fairly well controlled with scheduled insulin and correction scale.   Pepatmen intense VHP at 50 cc/hr.      Plan:  Continue Novolog 6 units every 4 hours while on TF; hold if TF held of less than 100  Low dose correction scale  BG monitoring every 4 hours     Discharge planning: TBD      Endocrine to continue to follow     ** Please call Endocrine for any BG related issues

## 2020-06-25 NOTE — SUBJECTIVE & OBJECTIVE
Interval History:  NAEO, HR 130s. UOP 2.9L, net positive 80mL in 24hrs. Hep gtt 650.    Medications:  Continuous Infusions:   dexmedetomidine (PRECEDEX) infusion 0.5 mcg/kg/hr (06/25/20 1600)    dextrose 10 % in water (D10W)      furosemide (LASIX) 2 mg/mL continuous infusion (non-titrating) 2.5 mg/hr (06/25/20 1600)    norepinephrine bitartrate-D5W Stopped (06/10/20 0500)     Scheduled Meds:   fentaNYL  1 patch Transdermal Q72H    heparin (porcine)  5,000 Units Subcutaneous Q8H    insulin aspart U-100  6 Units Subcutaneous Q24H    insulin aspart U-100  6 Units Subcutaneous Q24H    insulin aspart U-100  6 Units Subcutaneous Q24H    insulin aspart U-100  6 Units Subcutaneous Q24H    insulin aspart U-100  6 Units Subcutaneous Q24H    insulin aspart U-100  6 Units Subcutaneous Q24H    metoprolol  12.5 mg Per G Tube BID    multivitamin liquid no.118  10 mL Per G Tube Daily    pantoprazole  40 mg Per NG tube BID    polyethylene glycol  17 g Per NG tube Daily    potassium chloride 10%  40 mEq Per NG tube BID    psyllium husk (aspartame)  1 packet Per NG tube TID    QUEtiapine  200 mg Per NG tube BID    sucralfate  1 g Per NG tube Q6H    vitamin D  1,000 Units Per G Tube Daily        Objective:     Vital Signs (Most Recent):  Temp: 98.8 °F (37.1 °C) (06/25/20 1500)  Pulse: (!) 148 (06/25/20 1645)  Resp: (!) 60 (06/25/20 1226)  BP: 114/67 (06/25/20 1630)  SpO2: 97 % (06/25/20 1645) Vital Signs (24h Range):  Temp:  [98.3 °F (36.8 °C)-100 °F (37.8 °C)] 98.8 °F (37.1 °C)  Pulse:  [125-161] 148  Resp:  [40-64] 60  SpO2:  [94 %-100 %] 97 %  BP: ()/(53-93) 114/67  Arterial Line BP: ()/(44-86) 95/59     Weight: 58.5 kg (128 lb 15.5 oz)  Body mass index is 22.14 kg/m².    SpO2: 97 %  O2 Device (Oxygen Therapy): ventilator    Intake/Output - Last 3 Shifts       06/23 0700 - 06/24 0659 06/24 0700 - 06/25 0659 06/25 0700 - 06/26 0659    I.V. (mL/kg) 629.1 (10.1) 404 (6.9) 57 (1)    Blood 784 350      NG/GT 1590 2835 750    Total Intake(mL/kg) 3003.1 (48.4) 3589 (61.4) 807 (13.8)    Urine (mL/kg/hr) 3070 (2.1) 2915 (2.1) 1210 (2)    Stool 0 0     Blood 3 3     Total Output 3073 2918 1210    Net -70 +671 -403           Stool Occurrence 1 x 1 x           Lines/Drains/Airways     Peripherally Inserted Central Catheter Line            PICC Double Lumen 05/05/20 1707 right basilic 51 days          Drain                 NG/OG Tube 05/03/20 1215 [place] sump;nasogastric 18 Fr. Left nostril 53 days         Urethral Catheter 05/28/20 1630 28 days          Airway                 Surgical Airway 05/05/20 1500 Shiley Cuffed 51 days          Peripheral Intravenous Line                 Peripheral IV - Single Lumen 06/23/20 1425 18 G Anterior;Right Hand 2 days                Physical Exam    Constitutional: He is sedated, and intubated.   Head: Normocephalic   Cardiovascular: Tachycardia 130s   Pulmonary/Chest: intubated vent FiO2 60% PEEP 6, ECMO RPM 2800, Flow 2.8, sweep 1, oxygenator FiO2 21%  Skin: L neck cannula repositioned and secured, intact, will good placement on CXR. No change in flows at this time. No flashing. Clots pre and post-oxygenator.  Nursing note and vitals reviewed.    Significant Labs:  BMP:   Recent Labs   Lab 06/25/20  1532   *   *   K 4.7      CO2 32*   BUN 74*   CREATININE 0.8   CALCIUM 9.4   MG 2.3     CBC:   Recent Labs   Lab 06/25/20  1532   WBC 19.91*   RBC 3.20*   HGB 9.4*   HCT 30.5*   *   MCV 95   MCH 29.4   MCHC 30.8*     LFTs:   Recent Labs   Lab 06/25/20  1532   ALT 28   AST 32   ALKPHOS 108   BILITOT 1.6*   PROT 6.7   ALBUMIN 2.9*     Significant Diagnostics:  Cxr: overall stable

## 2020-06-26 PROBLEM — S41.102S ARM WOUND, LEFT, SEQUELA: Status: ACTIVE | Noted: 2020-01-01

## 2020-06-26 PROBLEM — Z78.9 ON ENTERAL NUTRITION: Status: RESOLVED | Noted: 2020-01-01 | Resolved: 2020-01-01

## 2020-06-26 PROBLEM — T38.0X5A ADRENAL CORTICAL STEROIDS CAUSING ADVERSE EFFECT IN THERAPEUTIC USE: Status: ACTIVE | Noted: 2020-01-01

## 2020-06-26 NOTE — ASSESSMENT & PLAN NOTE
Patient's prognosis remains poor despite efforts to optimize health.Son spoken with regarding GOC.  DNR changed after consulting CTS for ECMO. Consider palliative care.

## 2020-06-26 NOTE — PLAN OF CARE
Very eventful shift  Sats > 90% on AC/PC, 50%, 5 PEEP  Does not follow commands, no purposeful movement  HR 60-160s  Tmax 103.8  MAP > 65 maintained throughout majority of shift  Star @ 1 mcg/kg/min.  Epi @ 0.07 mcg/kg/min.  Vaso @ .04 units/min.  Fentanyl @ 100 mcg/hr.  Precedex @ 1 mcg/kg/hr.  Lasix @ 2.5 mg/hr.  D10 @ 50 cc/hr.  Uop average 100 cc/hr.  Unsuccessful arterial line attempt x8- anesthesia and critical care at bedside  Titrating per cuff   500 cc NS bolus  Unmeasured BM x1  Blood glucose monitored  Labs monitored per protocol  Plan to fly to [place] soon/ make comfort care?  POC reviewed c pt.  Team aware of all events and at bedside for majority of shift  See flowsheets for more details  Will continue to monitor

## 2020-06-26 NOTE — ASSESSMENT & PLAN NOTE
Respiratory distress   Acute hypoxemic respiratory failure    - Patient intubated with crich which was switched to ETT on 4/11  - previously proned with Monika  - remdesivir course completed.  - remains hypercapnic despite lowering TV to 4 mL/kg and increased RR  - Completed treatment with Plaquenil, Azithro, Methyprednisone for 5 days, completed.  - Consider adding dexamethasone course  - Strict I/O's and goal net neutral status to help optimize vent mechanics. Lasix gtt at 2.5  - Special isolation and aspiration precautions ordered per COVID protocol

## 2020-06-26 NOTE — NURSING
Team called c orders to stop precedex gtt.  See flowsheets for more details.  Will continue to monitor.

## 2020-06-26 NOTE — ASSESSMENT & PLAN NOTE
Patient's baseline since the initiation of ECMO has been tachycardic.    --metoprolol added back to regimen however patient remains hypotensive, adjust dosing accordingly. Consider cardioversion, however patient may not tolerate.

## 2020-06-26 NOTE — EICU
Rounding (Video Assessment):  No    Intervention Initiated From:  Bedside    Vicki Communicated with Bedside Nurse regarding:  Time-Out    Nurse Notified:  Yes    Doctor Notified:  Yes    Comments: elert noted for procedure time-out; arterial line placement.

## 2020-06-26 NOTE — PLAN OF CARE
06/26/20 1112   Discharge Reassessment   Assessment Type Discharge Planning Reassessment   Discharge Plan A Long-term acute care facility (LTAC)   Discharge Plan B Long-term acute care facility (LTAC)   DME Needed Upon Discharge  other (see comments)  (tbd)   Anticipated Discharge Disposition Long Term   Describe the patient's ability to walk at the present time. Does not walk or unable to take any steps at all   Number of comorbid conditions (as recorded on the chart) One       Patient not medically stable to discharge. Patient remains on vent & ECMO. Pox 94% on 50% vent. Temp 103.8 on 6/25/2020. Updated medical records faxed to [facility] Dr. Flor Tiwari (f) [phone] as requested.

## 2020-06-26 NOTE — ASSESSMENT & PLAN NOTE
EGD 5/3 showed Diuelafoy lesion    - ppi q12h and sucralfate continued  - currently on ASA and Heparin SQ, continue to closely monitor.

## 2020-06-26 NOTE — PROGRESS NOTES
Pharmacokinetic Initial Assessment: IV Vancomycin    Assessment/Plan:  - No need for vancomycin loading dose since level was therapeutic yesterday and with a prolonged half life.  - Re-initiate vancomycin as 750 mg IV q24h. Draw trough on 6/28 @0830 prior to 3rd dose of this regimen, goal 10-20 ug/mL.    Pharmacy will continue to follow and monitor vancomycin.      Please contact pharmacy at extension [phone] with any questions regarding this assessment.     Thank you for the consult,   He Wei     Patient brief summary:  Ranjana Sanchez is a 55 y.o. male initiated on antimicrobial therapy with IV Vancomycin for treatment of suspected lower respiratory infection    Drug Allergies:   Review of patient's allergies indicates:  No Known Allergies    Actual Body Weight:   61.4 kg    Renal Function:   Estimated Creatinine Clearance: 75.8 mL/min (based on SCr of 0.9 mg/dL).,     Dialysis Method (if applicable):  N/A

## 2020-06-26 NOTE — ASSESSMENT & PLAN NOTE
BG goal 140 - 180     Continue IV insulin infusion protocol  Requires intensive BG monitoring while on protocol    Plan to convert patient to transition IV insulin infusion once BG and IV insulin infusion rate stabilize    ** Please call Endocrine for any BG related issues **    Discharge planning: [facility]

## 2020-06-26 NOTE — NURSING
Team called to bedside d/t severe hypotension c david gtt maxed out, fever, decreased uop.  Orders received and implemented for fentanyl gtt, epi gtt, vaso gtt, 500 cc NS bolus, 12-lead EKG, art line placement.  See flowsheets for more details.  Will continue to monitor.

## 2020-06-26 NOTE — ASSESSMENT & PLAN NOTE
Altered mental status with poor neuro exam: multifactorial. Likely component of delirium from prolonged ICU stay and severe illness, possible component of neurologic manifestation of COVID-19, and stroke.    - Precedex for sedation  - Fentanyl patch  - Encephalopathy with poor neuro exam  - CT on June 2 showing bilateral basal ganglia infarcts, which likely explains the neuro exam findings. His infarcts are sizeable, but extent of permanent deficits are unknown. The likelihood of meaningful recovery is low. Neuro consulted to prognosticate and agreed with poor neurological prognosis given bilateral basal ganglia CVAs.

## 2020-06-26 NOTE — NURSING
Team notified of low H & H.  No new orders. See flowsheets for more details.  Will continue to monitor.

## 2020-06-26 NOTE — SUBJECTIVE & OBJECTIVE
"Interval HPI:   Overnight events: Remains in RICU, hypotension, bradycardia, low uop, and max david overnight.  BG markedly elevated overnight.  TF stopped overnight.  Hydrocortisone 100 mg IV q 8 hrs.   Eating:   NPO  Nausea: No  Hypoglycemia and intervention: No  Fever: No  TPN and/or TF: No - currently on hold    BP (!) 90/55   Pulse (!) 115   Temp 98.2 °F (36.8 °C) (Oral)   Resp (!) 44   Ht 5' 4" (1.626 m)   Wt 61.4 kg (135 lb 5.8 oz)   SpO2 (!) 91%   BMI 23.23 kg/m²     Labs Reviewed and Include    Recent Labs   Lab 06/26/20  0503   *   CALCIUM 8.3*   ALBUMIN 2.5*   PROT 6.2      K 5.3*   CO2 28      BUN 79*   CREATININE 0.9   ALKPHOS 117   ALT 31   AST 37   BILITOT 0.9     Lab Results   Component Value Date    WBC 22.95 (H) 06/26/2020    HGB 7.7 (L) 06/26/2020    HCT 26.0 (L) 06/26/2020    MCV 97 06/26/2020     (L) 06/26/2020     No results for input(s): TSH, FREET4 in the last 168 hours.  Lab Results   Component Value Date    HGBA1C 6.6 (H) 04/12/2020       Nutritional status:   Body mass index is 23.23 kg/m².  Lab Results   Component Value Date    ALBUMIN 2.5 (L) 06/26/2020    ALBUMIN 2.9 (L) 06/25/2020    ALBUMIN 3.0 (L) 06/25/2020     Lab Results   Component Value Date    PREALBUMIN 20 05/28/2020       Estimated Creatinine Clearance: 75.8 mL/min (based on SCr of 0.9 mg/dL).    Accu-Checks  Recent Labs     06/24/20  1546 06/24/20  1951 06/24/20  2357 06/25/20  0410 06/25/20  0910 06/25/20  1218 06/25/20  1524 06/25/20  1953 06/26/20  0002 06/26/20  0347   POCTGLUCOSE 250* 117* 171* 173* 182* 171* 194* 202* 264* 383*       Current Medications and/or Treatments Impacting Glycemic Control  Immunotherapy:    Immunosuppressants     None        Steroids:   Hormones (From admission, onward)    Start     Stop Route Frequency Ordered    06/26/20 0900  fludrocortisone tablet 100 mcg      -- Oral Daily 06/25/20 2139 06/25/20 6302  vasopressin (PITRESSIN) 0.2 Units/mL in dextrose 5 % " 100 mL infusion      -- IV Continuous 06/25/20 2139 06/25/20 2245  hydrocortisone sodium succinate injection 100 mg      -- IV Every 8 hours 06/25/20 2139        Pressors:    Autonomic Drugs (From admission, onward)    Start     Stop Route Frequency Ordered    06/25/20 2245  EPINEPHrine (ADRENALIN) 5 mg in sodium chloride 0.9% 250 mL infusion     Question Answer Comment   Titrate by: (in mcg/kg/min) 0.02    Titrate interval: (in minutes) 10    Titrate to maintain: (SBP or MAP or Cardiac Index) MAP    Greater than: (in mmHg) 65    Maximum dose: (in mcg/kg/min) 2        -- IV Continuous 06/25/20 2143 04/16/20 0915  rocuronium 10 mg/mL injection     Note to Pharmacy: Created by cabinet override    04/16 2129 04/16/20 0915        Hyperglycemia/Diabetes Medications:   Antihyperglycemics (From admission, onward)    Start     Stop Route Frequency Ordered    06/26/20 0745  insulin regular 100 Units in sodium chloride 0.9% 100 mL infusion     Question:  Insulin Rate Adjustment (DO NOT MODIFY ANSWER)  Answer:  [email or web address]    -- IV Continuous 06/26/20 0632

## 2020-06-26 NOTE — ASSESSMENT & PLAN NOTE
- HbA1c 6  - Home DM regimen:  Diet controlled  --Continue Novolog 6 units every 4 hours while on TF; hold if TF held of less than 100  --Low dose correction scale  --BG monitoring every 4 hours

## 2020-06-26 NOTE — PROGRESS NOTES
"[facility]  Adult Nutrition  Progress Note    SUMMARY       Recommendations  1. To better meet needs, recommend modifying TF formula to Peptamen AF at a goal rate of 50mL/hr - to provide 1440 kcal/day and 91g protein/day.   2. RD to monitor.    Goals: Meet % EEN, EPN by RD f/u date  Nutrition Goal Status: progressing towards goal  Communication of RD Recs: (POC)    Reason for Assessment  Reason For Assessment: RD follow-up  Diagnosis: (COVID-19)  Relevant Medical History: HTN  Interdisciplinary Rounds: did not attend  General Information Comments: Patient remains intubated, sedated. S/p VV-ECMO deccanulation 6/24. Was tolerating TF at goal rate, held overnight. Still unable to complete NFPE due to patient being positive for COVID-19.  Nutrition Discharge Planning: Unable to determine at this time.    Nutrition Risk Screen  Nutrition Risk Screen: tube feeding or parenteral nutrition    Nutrition/Diet History  Food Allergies: NKFA  Factors Affecting Nutritional Intake: NPO, on mechanical ventilation    Anthropometrics  Temp: 98.2 °F (36.8 °C)  Height Method: Stated  Height: 5' 4" (162.6 cm)  Height (inches): 64 in  Weight Method: Bed Scale  Weight: 61.4 kg (135 lb 5.8 oz)  Weight (lb): 135.36 lb  Ideal Body Weight (IBW), Male: 130 lb  % Ideal Body Weight, Male (lb): 114.62 %  BMI (Calculated): 23.2  BMI Grade: 18.5-24.9 - normal  Usual Body Weight (UBW), kg: (JAMMIE)    Lab/Procedures/Meds  Pertinent Labs Reviewed: reviewed  Pertinent Labs Comments: K 5.3, BUN 79, Glu 256, Phos 6.0, Alb 2.5, , Trig 230  Pertinent Medications Reviewed: reviewed  Pertinent Medications Comments: MVI, pantoprazole, psyllium, vitamin D, precedex, epinephrine, fentanyl, lasix, insulin drip, phenylephrine, vasopressin    Estimated/Assessed Needs  Weight Used For Calorie Calculations: 61.4 kg (135 lb 5.8 oz)  Energy Calorie Requirements (kcal): 8580-4268 kcal/day  Energy Need Method: " Kcal/kg(20-25)  Protein Requirements: 74-86 g/day(1.2-1.4 g/kg)  Weight Used For Protein Calculations: 61.4 kg (135 lb 5.8 oz)  Fluid Requirements (mL): 1 mL/kcal or per MD  Estimated Fluid Requirement Method: RDA Method  RDA Method (mL): 1228    Nutrition Prescription Ordered  Current Diet Order: NPO  Current Nutrition Support Formula Ordered: Peptamen Intense VHP  Current Nutrition Support Rate Ordered: 50 (ml)  Current Nutrition Support Frequency Ordered: mL/hr    Evaluation of Received Nutrient/Fluid Intake  Enteral Calories (kcal): 1200  Enteral Protein (gm): 110  Enteral (Free Water) Fluid (mL): 1008  % Kcal Needs: 98%  % Protein Needs: 128%  I/O: +1.4L x 24hrs, -12.7L since admit  Energy Calories Required: meeting needs  Protein Required: exceeds needs  Fluid Required: (per MD)  Comments: LBM 6/26  Tolerance: tolerating  % Intake of Estimated Energy Needs: 75 - 100 %  % Meal Intake: NPO    Nutrition Risk  Level of Risk/Frequency of Follow-up: low(1x/week)     Assessment and Plan  Nutrition Problem  Excessive enteral nutrition infusion      Related to (etiology):   Infusion volume exceeding needs     Signs and Symptoms (as evidenced by):   >125% of EPN being provided by current TF formula and rate     Interventions(treatment strategy):  Collaboration of care with other providers  Enteral nutrition     Nutrition Diagnosis Status:   Continues    Monitor and Evaluation  Food and Nutrient Intake: energy intake, enteral nutrition intake  Food and Nutrient Adminstration: enteral and parenteral nutrition administration  Anthropometric Measurements: weight, weight change  Biochemical Data, Medical Tests and Procedures: electrolyte and renal panel, gastrointestinal profile, inflammatory profile  Nutrition-Focused Physical Findings: overall appearance     Nutrition Follow-Up  RD Follow-up?: Yes

## 2020-06-26 NOTE — PLAN OF CARE
POC reviewed with pt at 1400. Pt is unresponsive thus unable to verbalize understanding. Neuro exam remained consistent through out the shift. Pt's heart rate ranged from 12 to 120 bpm. Helen Blunt NP aware. Metoprolol held. Defibrillators pads attached to pt. Epi @ 0.14, Star @ 0.5, Vaso @ 0.04 (nontitrating) to maintain hemodynamic stability. Vent settings: AC/PC 50% and 5 Peep. Pt has had a respiratory rate of 40/min through out the day. O2 remained above 90%. NGT still in place. Tube feeds held. 250ml water bolus x3 given. Torres exchanged.  U/O adequate. Lasix drip at 2.5. Insulin drip @ 6.3. Left arm has eschar ulcer formation.Urine and blood cultures obtained. Pt's normothermic through out the shift. Antibiotics started. Will continue to monitor. See flowsheets for full assessment and VS info.

## 2020-06-26 NOTE — H&P
[facility]  Critical Care Medicine  History & Physical    Patient Name: Ranjana Sanchez  MRN: [id number]  Admission Date: 4/10/2020  Hospital Length of Stay: 76 days  Code Status: Full Code  Attending Physician: Jordi Goss MD   Primary Care Provider: Primary Doctor No   Principal Problem: Acute respiratory disease due to COVID-19 virus    Subjective:     HPI:  Mr. LUANA Sanchez is a 54 y/o M with PMHx of HTN brought to ER via EMS for increasing SOB with known COVID-19 exposure. 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 They have not had travelers since 3/14. 2 days ago he started having fever and SOB. He was seen by the ship's physician and was found to have crackles in his bases bilaterally, but his oxygen saturation was in the 90's. He had a flu test which was negative. He was quarantined in a room and states that he got worse with increasing SOB. He was brought to the ER and found to be 78% on RA. He was started on NRB and initially was doing much better, but his tachypnea did not improve, so he was placed on BiPAP with oxygen saturation improvement.    Hospital/ICU Course:  Patient was admitted to the MICU after a difficult intubation in the ER s/p crich. COVID + with ARDS, sedated and paralyzed. Unable to prone secondary to crich. Left mainstem intubation which was pulled off, on NO and levophed. ET tube replaced on 4/11 family contacted in [place]. Proned on 4/12 tolerated well and continued to prone on 4/19. 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 He was on and off ketamine as well.  EEG ordered to rule out seizures as he also had seizure like activity that resolved with ativan. Currently changing sedation, was placed on phenobarbital which should start having effect 4/20. Patient proned x 10 (last on 4/22). Still not tolerating ventilator weaning.  Remains on sedation, vasopressors, and paralytic. ID now following patient for remdesivir administration; family aware and consented. Patient not significantly improving despite efforts. Son contacted, patient made DNR. Patient evaluated for ECMO on 4/24. Placed on ECMO on 4/25 and transferred to Dr Ayon's service with DNR rescinded.    Some physiologic improvement on VV ECMO. Oxygenation at goal, lactate clearing, hemodynamics at goal without vasoactive support currently. Tolerated wean of vent Fi and Monika thus far. Peeling back sedatives steadily with goal of neuro exam ASAP. Metabolic alkalosis correcting with encouragement from diamox. Patient was weaned off from ECMO support on 06/24/2020. However, during this process he has undergone a stroke and currently is not responding to verbal commands. The patient is able to open his eyes and track, however no purposeful movements can be ascertained from the patient. Extensive notes from neurology have been documented in the patient's medical record. Extensive discussions had been carried out between the family members and periodic updates were provided. Periodic updates were also provided to [employer] which is his employer. The family was also notified that if any further deterioration happens after completion of ECMO we would not be providing new ECMO support to this patient.    The patient was transferred to the MICU team on 6/25 after ECMO decannulation.     Interval History/Significant Events: Patient decannulated, ECMO complete, remains intubated and sedated.    Review of Systems   Unable to perform ROS: Intubated     Objective:     Vital Signs (Most Recent):  Temp: 98.4 °F (36.9 °C) (06/25/20 0900)  Pulse: (!) 156 (06/25/20 1000)  Resp: (!) 64 (06/25/20 0926)  BP: 114/70 (06/25/20 1000)  SpO2: 98 % (06/25/20 1000) Vital Signs (24h Range):  Temp:  [97.6 °F (36.4 °C)-100 °F (37.8 °C)] 98.4 °F (36.9 °C)  Pulse:  [120-161] 156  Resp:  [38-64] 64  SpO2:  [94  %-100 %] 98 %  BP: ()/(55-93) 114/70  Arterial Line BP: ()/(49-86) 83/52   Weight: 58.5 kg (128 lb 15.5 oz)  Body mass index is 22.14 kg/m².      Intake/Output Summary (Last 24 hours) at 6/25/2020 1037  Last data filed at 6/25/2020 1000  Gross per 24 hour   Intake 3571 ml   Output 2800 ml   Net 771 ml       Physical Exam    Vents:  Vent Mode: A/C (06/25/20 0801)  Ventilator Initiated: Yes (04/10/20 2247)  Set Rate: 35 BPM (06/25/20 0801)  Vt Set: 250 mL (06/06/20 0858)  Pressure Support: 20 cmH20 (06/06/20 0858)  PEEP/CPAP: 5 cmH20 (06/25/20 0801)  Oxygen Concentration (%): 50 (06/25/20 1000)  Peak Airway Pressure: 40 cmH2O (06/25/20 0801)  Plateau Pressure: 33 cmH20 (06/25/20 0801)  Total Ve: 13.3 mL (06/25/20 0801)  F/VT Ratio<105 (RSBI): 182.88 (06/25/20 0012)  Lines/Drains/Airways     Peripherally Inserted Central Catheter Line            PICC Double Lumen 05/05/20 1707 right basilic 50 days          Drain                 NG/OG Tube 05/03/20 1215 [place]ell sump;nasogastric 18 Fr. Left nostril 52 days         Urethral Catheter 05/28/20 1630 27 days          Airway                 Surgical Airway 05/05/20 1500 Shiley Cuffed 50 days          Arterial Line                 Arterial Line 04/27/20 1100 Right Brachial 58 days          Peripheral Intravenous Line                 Peripheral IV - Single Lumen 06/23/20 1425 18 G Anterior;Right Hand 1 day              Significant Labs:    CBC/Anemia Profile:  Recent Labs   Lab 06/24/20  0300 06/24/20  0804 06/24/20  1542 06/25/20  0355   WBC 14.52*  --  14.64* 19.43*   HGB 10.4*  --  10.1* 9.6*   HCT 32.4* 29* 30.9* 30.6*   PLT 71*  --  60* 89*   MCV 91  --  90 92   RDW 15.7*  --  16.0* 15.9*   FERRITIN 2,115*  --   --  2,101*        Chemistries:  Recent Labs   Lab 06/24/20  0300 06/24/20  1542 06/25/20  0355   * 145 148*   K 4.0 3.8 4.4    106 106   CO2 28 31* 31*   BUN 64* 62* 64*   CREATININE 0.7 0.7 0.7   CALCIUM 9.8 9.2 9.4   ALBUMIN 3.2* 2.9* 3.0*    PROT 6.5 6.2 6.5   BILITOT 2.1* 1.7* 1.9*   ALKPHOS 82 80 93   ALT 24 22 23   AST 34 28 30   MG 2.4 2.1 2.2   PHOS 3.7 4.2 3.8       All pertinent labs within the past 24 hours have been reviewed.    Significant Imaging:  I have reviewed all pertinent imaging results/findings within the past 24 hours.    Assessment/Plan:     Neuro  Basal ganglia infarction  Altered mental status with poor neuro exam: multifactorial. Likely component of delirium from prolonged ICU stay and severe illness, possible component of neurologic manifestation of COVID-19, and stroke.    - Precedex for sedation  - Fentanyl patch  - Encephalopathy with poor neuro exam  - CT on June 2 showing bilateral basal ganglia infarcts, which likely explains the neuro exam findings. His infarcts are sizeable, but extent of permanent deficits are unknown. The likelihood of meaningful recovery is low. Neuro consulted to prognosticate and agreed with poor neurological prognosis given bilateral basal ganglia CVAs.         Sedated  - Phenobarb D/Cd; Diazepam taper to off tomorrow; steadily reducing hydromorphone gtt; continues on propofol        Pulmonary  Acute hypoxemic respiratory failure  - Likely 2/2 to ARDS due to COVID-19  - ARDSNET Protocol  --see COVID-19    Cardiac/Vascular  Tachycardia  Patient's baseline since the initiation of ECMO has been tachycardic.    --metoprolol added back to regimen however patient remains hypotensive, adjust dosing accordingly. Consider cardioversion, however patient may not tolerate.      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    On enteral nutrition  Continue tube feeds    Other  Acute respiratory distress syndrome (ARDS) due to COVID-19 virus  - Patient intubated with crich which was switched to ETT on 4/11  - proning for 11 days (last day 4/23 with minimal improvement in condition  - nitric oxide initiated on 4/21 @ 20 PPM, increased to 40ppm on 4/24  - remains hypercapnic despite lowering TV to 4 mL/kg and increased RR  - Completed treatment with Plaquenil and Azithromycin  - Methyprednisone for 5 days, completed  - Daily CRP, d-dimer, ferritin, to trend inflammatory response.   - Strict I/O's and goal net neutral status to help optimize vent mechanics.   - Special isolation and aspiration precautions ordered.   -- q6h albuterol nebs ordered  - ID following giving remdesivir.  -optimizing fluid status.  - ECMO per CTS         Arm wound, right, sequela  --wound to RUE being seen by wound care. Thought to be 2/2 extravasation of medication through PIV. Continue to monitor. RUE with +1 palpable pulses.    Goals of care, counseling/discussion  Patient's prognosis remains poor despite efforts to optimize health.Son spoken with regarding GOC.  DNR changed after consulting CTS for ECMO. Consider palliative care.      COVID-19 virus detected  Respiratory distress   Acute hypoxemic respiratory failure    - Patient intubated with crich which was switched to ETT on 4/11  - previously proned with Monika  - remdesivir course completed.  - remains hypercapnic despite lowering TV to 4 mL/kg and increased RR  - Completed treatment with Plaquenil, Azithro, Methyprednisone for 5 days, completed.  - Consider adding dexamethasone course  - Strict I/O's and goal net neutral status to help optimize vent mechanics. Lasix gtt at 2.5  - Special isolation and aspiration precautions ordered per COVID protocol             Critical Care Daily Checklist:    A: Awake: RASS Goal/Actual Goal: RASS Goal: 0-->alert and  calm  Actual: [place] Agitation Sedation Scale (RASS): Restless   B: Spontaneous Breathing Trial Performed? Spon. Breathing Trial Initiated?: Not initiated (06/25/20 1329)   C: SAT & SBT Coordinated?  n/a                      D: Delirium: CAM-ICU Overall CAM-ICU: Negative   E: Early Mobility Performed? No   F: Feeding Goal: Goals: Meet % EEN, EPN by RD f/u date  Status: Nutrition Goal Status: progressing towards goal   Current Diet Order   No orders of the defined types were placed in this encounter.      AS: Analgesia/Sedation done   T: Thromboembolic Prophylaxis done   H: HOB > 300 Yes   U: Stress Ulcer Prophylaxis (if needed) done   G: Glucose Control q6h   B: Bowel Function Stool Occurrence: 1   I: Indwelling Catheter (Lines & Torres) Necessity done   D: De-escalation of Antimicrobials/Pharmacotherapies done    Plan for the day/ETD Cont course    Code Status:  Family/Goals of Care: Full Code  Cont course     Critical Care Time: 70 minutes  Critical secondary to Patient has a condition that poses threat to life and bodily function: patient remains intubated.     Critical care was time spent personally by me on the following activities: development of treatment plan with patient or surrogate and bedside caregivers, discussions with consultants, evaluation of patient's response to treatment, examination of patient, ordering and performing treatments and interventions, ordering and review of laboratory studies, ordering and review of radiographic studies, pulse oximetry, re-evaluation of patient's condition. This critical care time did not overlap with that of any other provider or involve time for any procedures.     Larry Martel NP  Critical Care Medicine  [facility]

## 2020-06-26 NOTE — CARE UPDATE
I personally spoke with the patient's son via Skype. All updates were given and all questions answered.

## 2020-06-26 NOTE — PROGRESS NOTES
"[facility]  Endocrinology  Progress Note    Admit Date: 4/10/2020     Reason for Consult: Management of T2DM, Hyperglycemia     Surgical Procedure and Date: tracheostomy 5/5/20    Diabetes diagnosis year: JAMMIE    Lab Results   Component Value Date    HGBA1C 6.6 (H) 04/12/2020       Home Diabetes Medications: none (per chart review)    How often checking glucose at home? JAMMIE  BG readings on regimen: JAMMIE  Hypoglycemia on the regimen? JAMMIE  Missed doses on regimen? JAMMIE    Diabetes Complications include:     JAMMIE    Complicating diabetes co morbidities:   none      HPI:   Patient is a 55 y.o. male with a diagnosis of DM2 and ARDS secondary to COVID-19.  [place] [facility] [place] with fever and SOB.  Lengthy and complex hospital stay noted requiring intubation, ECMO, and trach placement.  Patient not with severe ARDS (with poor lung compliance) and suspected anoxic brain injury.  Patient developed hyperglycemia secondary to TF.  Endocrinology consulted for DM/BG management.        Interval HPI:   Overnight events: Remains in RICU, hypotension, bradycardia, low uop, and max david overnight.  BG markedly elevated overnight.  TF stopped overnight.  Hydrocortisone 100 mg IV q 8 hrs.   Eating:   NPO  Nausea: No  Hypoglycemia and intervention: No  Fever: No  TPN and/or TF: No - currently on hold    BP (!) 90/55   Pulse (!) 115   Temp 98.2 °F (36.8 °C) (Oral)   Resp (!) 44   Ht 5' 4" (1.626 m)   Wt 61.4 kg (135 lb 5.8 oz)   SpO2 (!) 91%   BMI 23.23 kg/m²     Labs Reviewed and Include    Recent Labs   Lab 06/26/20  0503   *   CALCIUM 8.3*   ALBUMIN 2.5*   PROT 6.2      K 5.3*   CO2 28      BUN 79*   CREATININE 0.9   ALKPHOS 117   ALT 31   AST 37   BILITOT 0.9     Lab Results   Component Value Date    WBC 22.95 (H) 06/26/2020    HGB 7.7 (L) 06/26/2020    HCT 26.0 (L) 06/26/2020    MCV 97 06/26/2020     (L) 06/26/2020     No results for input(s): TSH, FREET4 in the last " 168 hours.  Lab Results   Component Value Date    HGBA1C 6.6 (H) 04/12/2020       Nutritional status:   Body mass index is 23.23 kg/m².  Lab Results   Component Value Date    ALBUMIN 2.5 (L) 06/26/2020    ALBUMIN 2.9 (L) 06/25/2020    ALBUMIN 3.0 (L) 06/25/2020     Lab Results   Component Value Date    PREALBUMIN 20 05/28/2020       Estimated Creatinine Clearance: 75.8 mL/min (based on SCr of 0.9 mg/dL).    Accu-Checks  Recent Labs     06/24/20  1546 06/24/20  1951 06/24/20  2357 06/25/20  0410 06/25/20  0910 06/25/20  1218 06/25/20  1524 06/25/20  1953 06/26/20  0002 06/26/20  0347   POCTGLUCOSE 250* 117* 171* 173* 182* 171* 194* 202* 264* 383*       Current Medications and/or Treatments Impacting Glycemic Control  Immunotherapy:    Immunosuppressants     None        Steroids:   Hormones (From admission, onward)    Start     Stop Route Frequency Ordered    06/26/20 0900  fludrocortisone tablet 100 mcg      -- Oral Daily 06/25/20 2139 06/25/20 2245  vasopressin (PITRESSIN) 0.2 Units/mL in dextrose 5 % 100 mL infusion      -- IV Continuous 06/25/20 2139 06/25/20 2245  hydrocortisone sodium succinate injection 100 mg      -- IV Every 8 hours 06/25/20 2139        Pressors:    Autonomic Drugs (From admission, onward)    Start     Stop Route Frequency Ordered    06/25/20 2245  EPINEPHrine (ADRENALIN) 5 mg in sodium chloride 0.9% 250 mL infusion     Question Answer Comment   Titrate by: (in mcg/kg/min) 0.02    Titrate interval: (in minutes) 10    Titrate to maintain: (SBP or MAP or Cardiac Index) MAP    Greater than: (in mmHg) 65    Maximum dose: (in mcg/kg/min) 2        -- IV Continuous 06/25/20 2143 04/16/20 0915  rocuronium 10 mg/mL injection     Note to Pharmacy: Created by cabinet override    04/16 2129 04/16/20 0915        Hyperglycemia/Diabetes Medications:   Antihyperglycemics (From admission, onward)    Start     Stop Route Frequency Ordered    06/26/20 0780  insulin regular 100 Units in sodium  chloride 0.9% 100 mL infusion     Question:  Insulin Rate Adjustment (DO NOT MODIFY ANSWER)  Answer:  [email or web address]    -- IV Continuous 06/26/20 0632          ASSESSMENT and PLAN    * Acute respiratory disease due to COVID-19 virus  Managed per primary team        Type 2 diabetes mellitus without complication, without long-term current use of insulin  BG goal 140 - 180     Continue IV insulin infusion protocol  Requires intensive BG monitoring while on protocol    Plan to convert patient to transition IV insulin infusion once BG and IV insulin infusion rate stabilize    ** Please call Endocrine for any BG related issues **    Discharge planning: TBD          On enteral nutrition  May cause BG excursions.         Discussed POC with bedside RN via telephone due to covid restrictions.    Harlan Denny NP  Endocrinology  [facility]

## 2020-06-26 NOTE — SUBJECTIVE & OBJECTIVE
Interval History/Significant Events: Febrile to 103.7 overnight. Started on 3 vasopressors. Worsening acidosis. Frequently bradys down to the 20's and recovers without intervention. Son updated via face time by Dr. Goss.      Review of Systems   Unable to perform ROS: Intubated     Objective:     Vital Signs (Most Recent):  Temp: 98.6 °F (37 °C) (06/26/20 0800)  Pulse: (!) 118 (06/26/20 1100)  Resp: (!) 44 (06/25/20 1807)  BP: (!) 100/57 (06/26/20 1100)  SpO2: 100 % (06/26/20 1100) Vital Signs (24h Range):  Temp:  [98.2 °F (36.8 °C)-103.8 °F (39.9 °C)] 98.6 °F (37 °C)  Pulse:  [] 118  Resp:  [44] 44  SpO2:  [90 %-100 %] 100 %  BP: ()/(17-84) 100/57  Arterial Line BP: (95)/(59-60) 95/59   Weight: 61.4 kg (135 lb 5.8 oz)  Body mass index is 23.23 kg/m².      Intake/Output Summary (Last 24 hours) at 6/26/2020 1353  Last data filed at 6/26/2020 1200  Gross per 24 hour   Intake 4457.45 ml   Output 2505 ml   Net 1952.45 ml       Physical Exam  Vitals signs reviewed.   Constitutional:       Appearance: He is well-developed. He is toxic-appearing.      Interventions: He is sedated and intubated.   HENT:      Head: Normocephalic and atraumatic.   Eyes:      Comments: Pinpoint pupils   Neck:      Musculoskeletal: Normal range of motion.      Trachea: No tracheal deviation.      Comments: Trach in place   Cardiovascular:      Heart sounds: Normal heart sounds.      Comments: Frequent episodes of nonsustained bradycardia.   Pulmonary:      Effort: He is intubated.      Breath sounds: Rhonchi present.      Comments: Poor lung compliance   Abdominal:      General: Bowel sounds are normal. There is no distension.      Palpations: Abdomen is soft.   Genitourinary:     Comments: Torres in place   Musculoskeletal: Normal range of motion.   Skin:     General: Skin is warm.      Comments: Necrotic tissue on left forearm    Neurological:      Mental Status: He is unresponsive.      Comments: Pinpoint pupils. Cough and corneal  reflexes intact. No response to pain X 4 extremities.          Vents:  Vent Mode: A/C (06/26/20 0947)  Ventilator Initiated: Yes (04/10/20 2247)  Set Rate: 35 BPM (06/26/20 0947)  Vt Set: 250 mL (06/06/20 0858)  Pressure Support: 20 cmH20 (06/06/20 0858)  PEEP/CPAP: 5 cmH20 (06/26/20 0947)  Oxygen Concentration (%): 50 (06/26/20 1100)  Peak Airway Pressure: 39 cmH2O (06/26/20 0947)  Plateau Pressure: 33 cmH20 (06/26/20 0947)  Total Ve: 8.37 mL (06/26/20 0947)  F/VT Ratio<105 (RSBI): 182.88 (06/25/20 0012)  Lines/Drains/Airways     Peripherally Inserted Central Catheter Line            PICC Double Lumen 05/05/20 1707 right basilic 51 days          Drain                 NG/OG Tube 05/03/20 1215 [place] sump;nasogastric 18 Fr. Left nostril 54 days         Urethral Catheter 06/26/20 0901 less than 1 day          Airway                 Surgical Airway 05/05/20 1500 Shiley Cuffed 51 days          Peripheral Intravenous Line                 Peripheral IV - Single Lumen 06/23/20 1425 18 G Anterior;Right Hand 2 days              Significant Labs:    CBC/Anemia Profile:  Recent Labs   Lab 06/25/20 0355 06/25/20  1532 06/26/20  0503   WBC 19.43* 19.91* 22.95*   HGB 9.6* 9.4* 7.7*   HCT 30.6* 30.5* 26.0*   PLT 89* 106* 130*   MCV 92 95 97   RDW 15.9* 15.9* 15.5*   FERRITIN 2,101*  --  2,691*        Chemistries:  Recent Labs   Lab 06/25/20  0355 06/25/20  1532 06/26/20  0503   * 147* 138   K 4.4 4.7 5.3*    105 101   CO2 31* 32* 28   BUN 64* 74* 79*   CREATININE 0.7 0.8 0.9   CALCIUM 9.4 9.4 8.3*   ALBUMIN 3.0* 2.9* 2.5*   PROT 6.5 6.7 6.2   BILITOT 1.9* 1.6* 0.9   ALKPHOS 93 108 117   ALT 23 28 31   AST 30 32 37   MG 2.2 2.3 2.3   PHOS 3.8 3.5 6.0*       All pertinent labs within the past 24 hours have been reviewed.    Significant Imaging:  I have reviewed all pertinent imaging results/findings within the past 24 hours.

## 2020-06-27 PROBLEM — R41.89 SEDATED: Status: RESOLVED | Noted: 2020-01-01 | Resolved: 2020-01-01

## 2020-06-27 PROBLEM — D62 ACUTE BLOOD LOSS ANEMIA: Status: RESOLVED | Noted: 2020-01-01 | Resolved: 2020-01-01

## 2020-06-27 NOTE — ASSESSMENT & PLAN NOTE
Patient's baseline since the initiation of ECMO has been tachycardic.    --previously on metoprolol, but unable to tolerate due to hypotension and bradycardic episodes.

## 2020-06-27 NOTE — ASSESSMENT & PLAN NOTE
- Difficult intubation requiring crich on admit; converted to trach 5/5  - previously proned with Monika  - remdesivir course completed.  - Completed treatment with Plaquenil, Azithro, Methyprednisone for 5 days, completed.  - Strict I/O's and goal net neutral status to help optimize vent mechanics.Continue Lasix gtt   - Special isolation and aspiration precautions ordered per COVID protocol  - Significant improvement in oxygenation, but remains with poor lung compliance

## 2020-06-27 NOTE — ASSESSMENT & PLAN NOTE
- Patient intubated with crich which was switched to ETT on 4/11  - previously proned with Monika  - remdesivir course completed.  - remains hypercapnic despite lowering TV to 4 mL/kg and increased RR  - Completed treatment with Plaquenil, Azithro, Methyprednisone for 5 days, completed.  - Strict I/O's and goal net neutral status to help optimize vent mechanics. Lasix gtt at 2.5  - Special isolation and aspiration precautions ordered per COVID protocol

## 2020-06-27 NOTE — SUBJECTIVE & OBJECTIVE
Interval History/Significant Events: No acute events overnight. Weaned to one pressor. Acidosis much improved.    Review of Systems   Unable to perform ROS: Acuity of condition     Objective:     Vital Signs (Most Recent):  Temp: 98.9 °F (37.2 °C) (06/27/20 1100)  Pulse: (!) 144 (06/27/20 1100)  Resp: (!) 38 (06/27/20 0700)  BP: (!) 100/51 (06/27/20 1100)  SpO2: (!) 88 % (06/27/20 1100) Vital Signs (24h Range):  Temp:  [97.7 °F (36.5 °C)-98.9 °F (37.2 °C)] 98.9 °F (37.2 °C)  Pulse:  [100-151] 144  Resp:  [34-47] 38  SpO2:  [88 %-100 %] 88 %  BP: ()/(51-70) 100/51   Weight: 61.4 kg (135 lb 5.8 oz)  Body mass index is 23.23 kg/m².      Intake/Output Summary (Last 24 hours) at 6/27/2020 1153  Last data filed at 6/27/2020 1100  Gross per 24 hour   Intake 3131.58 ml   Output 3515 ml   Net -383.42 ml       Physical Exam  Vitals signs reviewed.   Constitutional:       Appearance: He is well-developed. He is toxic-appearing.      Interventions: He is sedated and intubated.   HENT:      Head: Normocephalic and atraumatic.   Eyes:      Pupils: Pupils are equal, round, and reactive to light.   Neck:      Musculoskeletal: Normal range of motion.      Trachea: No tracheal deviation.      Comments: Trach in place   Cardiovascular:      Rate and Rhythm: Tachycardia present.      Pulses:           Radial pulses are 2+ on the right side and 2+ on the left side.        Dorsalis pedis pulses are 1+ on the right side and 1+ on the left side.      Heart sounds: Normal heart sounds.   Pulmonary:      Effort: He is intubated.      Breath sounds: No wheezing, rhonchi or rales.      Comments: Poor lung compliance   Abdominal:      General: Bowel sounds are normal. There is no distension.      Palpations: Abdomen is soft.   Genitourinary:     Comments: Torres in place   Musculoskeletal: Normal range of motion.   Skin:     General: Skin is warm.      Comments: Necrotic tissue on left forearm    Neurological:      Mental Status: He is  unresponsive.      Comments: PERRL. Opens eyes spontaneously, does not track. Cough and corneal reflexes intact. No response to pain X 4 extremities.          Vents:  Vent Mode: A/C (06/27/20 0846)  Ventilator Initiated: Yes (04/10/20 2247)  Set Rate: 35 BPM (06/27/20 0846)  Vt Set: 250 mL (06/06/20 0858)  Pressure Support: 20 cmH20 (06/06/20 0858)  PEEP/CPAP: 5 cmH20 (06/27/20 0846)  Oxygen Concentration (%): 40 (06/27/20 1100)  Peak Airway Pressure: 37 cmH2O (06/27/20 0846)  Plateau Pressure: 27 cmH20 (06/27/20 0846)  Total Ve: 10.7 mL (06/27/20 0846)  F/VT Ratio<105 (RSBI): 117.83 (06/27/20 0027)  Lines/Drains/Airways     Peripherally Inserted Central Catheter Line            PICC Double Lumen 05/05/20 1707 right basilic 52 days          Drain                 NG/OG Tube 05/03/20 1215 [place] sump;nasogastric 18 Fr. Left nostril 54 days         Urethral Catheter 06/26/20 0901 1 day          Airway                 Surgical Airway 05/05/20 1500 Shiley Cuffed 52 days          Peripheral Intravenous Line                 Peripheral IV - Single Lumen 06/23/20 1425 18 G Anterior;Right Hand 3 days              Significant Labs:    CBC/Anemia Profile:  Recent Labs   Lab 06/26/20  0503 06/26/20  1430 06/27/20  0305   WBC 22.95* 22.19* 23.30*  23.30*   HGB 7.7* 7.7* 7.3*  7.3*   HCT 26.0* 26.0* 23.8*  23.8*   * 129* 135*  135*   MCV 97 97 95  95   RDW 15.5* 14.8* 14.8*  14.8*   FERRITIN 2,691*  --   --         Chemistries:  Recent Labs   Lab 06/26/20  0503 06/26/20  1430 06/27/20  0305    138 141  141   K 5.3* 4.8 3.7  3.7    102 102  102   CO2 28 26 22*  22*   BUN 79* 73* 72*  72*   CREATININE 0.9 0.8 0.8  0.8   CALCIUM 8.3* 7.9* 8.9  8.9   ALBUMIN 2.5* 2.6* 2.6*  2.6*   PROT 6.2 6.6 6.1  6.1   BILITOT 0.9 1.0 1.4*  1.4*   ALKPHOS 117 110 96  96   ALT 31 35 32  32   AST 37 41* 33  33   MG 2.3 2.3 2.0  2.0   PHOS 6.0* 5.9* 3.7  3.7       All pertinent labs within the past 24 hours have  been reviewed.    Significant Imaging:  I have reviewed all pertinent imaging results/findings within the past 24 hours.

## 2020-06-27 NOTE — ASSESSMENT & PLAN NOTE
- HbA1c 6  - Home DM regimen:  Diet controlled  --continue insulin gtt  --frequent BG monitoring

## 2020-06-27 NOTE — ASSESSMENT & PLAN NOTE
Patient's prognosis remains poor despite efforts to optimize health. Son spoken with regarding GOC.  DNR changed after consulting CTS for ECMO. Per conversation with Dr. Goss on 6/26, no chest compressions. Given Mr. Sanchez's decline, he is too sick for travel back to [place]. Family urged to visit if possible; ongoing coordination with [employer] to help arrange travel.

## 2020-06-27 NOTE — ASSESSMENT & PLAN NOTE
Altered mental status with poor neuro exam: multifactorial. Likely component of delirium from prolonged ICU stay and severe illness, possible component of neurologic manifestation of COVID-19, and stroke.    - Precedex and fentanyl for sedation   - Fentanyl patch added in attempt to wean IV sedation   - Encephalopathy with poor neuro exam  - CT on June 2 showing bilateral basal ganglia infarcts, which likely explains the neuro exam findings. His infarcts are sizeable, but extent of permanent deficits are unknown. The likelihood of meaningful recovery is low. Neuro consulted to prognosticate and agreed with poor neurological prognosis given bilateral basal ganglia CVAs.

## 2020-06-27 NOTE — ASSESSMENT & PLAN NOTE
--wound to LUE being seen by wound care. Thought to be 2/2 extravasation of medication through PIV. Continue to monitor. RUE with +2 palpable pulses.

## 2020-06-27 NOTE — NURSING
Pt stable overnight. Neuro exam remains unchanged. HR began sustaining 140's overnight. Pt bradys down to the 40's when agitated. Vent settings unchanged. BM overnight. Fentanyl gtt @ 75 mcg/hr. Insulin @ 6.4 units/hr. Vaso @.04. Epi @ .16. Lasix @ 2.5.

## 2020-06-27 NOTE — CARE UPDATE
Patient's son, Da, updated via [employer]. All questions answered, all concerns addressed.      Helen Campbell, NP  Critical Care Medicine

## 2020-06-27 NOTE — ASSESSMENT & PLAN NOTE
Patient's prognosis remains poor despite efforts to optimize health. Son spoken with regarding GOC.  DNR changed after consulting CTS for ECMO. Per conversation with Dr. Goss on 6/26, no chest compressions. Given Mr. Sanchez's decline, he is too sick for travel back to [place]. Family urged to visit if possible; ongoing coordination with [employer] to help arrange travel.

## 2020-06-27 NOTE — PROGRESS NOTES
[facility] - Respiratory ICU  Critical Care Medicine  Progress Note    Patient Name: Ranjana Sanchez  MRN: [id number]  Admission Date: 4/10/2020  Hospital Length of Stay: 78 days  Code Status: Partial Code  Attending Provider: Emily Blount MD  Primary Care Provider: Primary Doctor No   Principal Problem: Acute respiratory disease due to COVID-19 virus    Subjective:     HPI:  Mr. LUANA Sanchez is a 56 y/o M with PMHx of HTN brought to ER via EMS for increasing SOB with known COVID-19 exposure. 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 They have not had travelers since 3/14. 2 days ago he started having fever and SOB. He was seen by the ship's physician and was found to have crackles in his bases bilaterally, but his oxygen saturation was in the 90's. He had a flu test which was negative. He was quarantined in a room and states that he got worse with increasing SOB. He was brought to the ER and found to be 78% on RA. He was started on NRB and initially was doing much better, but his tachypnea did not improve, so he was placed on BiPAP with oxygen saturation improvement.    Hospital/ICU Course:  Patient was admitted to the MICU after a difficult intubation in the ER s/p crich. COVID + with ARDS, sedated and paralyzed. Unable to prone secondary to crich. Left mainstem intubation which was pulled off, on NO and levophed. ET tube replaced on 4/11 family contacted in [place]. Proned on 4/12 tolerated well and continued to prone on 4/19. 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 He was on and off ketamine as well.  EEG ordered to rule out seizures as he also had seizure like activity that resolved with ativan. Currently changing sedation, was placed on phenobarbital which should start having effect 4/20. Patient proned x 10 (last on 4/22). Still not tolerating ventilator weaning. Remains on  sedation, vasopressors, and paralytic. ID now following patient for remdesivir administration; family aware and consented. Patient not significantly improving despite efforts. Son contacted, patient made DNR. Patient evaluated for ECMO on 4/24. Placed on ECMO on 4/25 and transferred to Dr Ayon's service with DNR rescinded.    Some physiologic improvement on VV ECMO. Oxygenation at goal, lactate clearing, hemodynamics at goal without vasoactive support currently. Tolerated wean of vent Fi and Monika thus far. Peeling back sedatives steadily with goal of neuro exam ASAP. Metabolic alkalosis correcting with encouragement from diamox. Patient was weaned off from ECMO support on 06/24/2020. However, during this process he has undergone a stroke and currently is not responding to verbal commands. The patient is able to open his eyes and track, however no purposeful movements can be ascertained from the patient. Extensive notes from neurology have been documented in the patient's medical record. Extensive discussions had been carried out between the family members and periodic updates were provided. Periodic updates were also provided to [email or web address] which is his employer. The family was also notified that if any further deterioration happens after completion of ECMO we would not be providing new ECMO support to this patient.    The patient was transferred to the MICU team on 6/25 after ECMO decannulation. Fever with worsening shock. Cultured and started on broad spectrum antibiotics. Frequent episodes of bradycardia to the 20's but recovers without intervention.     Interval History/Significant Events: No acute events overnight. Weaned to one pressor. Acidosis much improved.    Review of Systems   Unable to perform ROS: Acuity of condition     Objective:     Vital Signs (Most Recent):  Temp: 98.9 °F (37.2 °C) (06/27/20 1100)  Pulse: (!) 144 (06/27/20 1100)  Resp: (!) 38 (06/27/20 0700)  BP: (!) 100/51 (06/27/20  1100)  SpO2: (!) 88 % (06/27/20 1100) Vital Signs (24h Range):  Temp:  [97.7 °F (36.5 °C)-98.9 °F (37.2 °C)] 98.9 °F (37.2 °C)  Pulse:  [100-151] 144  Resp:  [34-47] 38  SpO2:  [88 %-100 %] 88 %  BP: ()/(51-70) 100/51   Weight: 61.4 kg (135 lb 5.8 oz)  Body mass index is 23.23 kg/m².      Intake/Output Summary (Last 24 hours) at 6/27/2020 1153  Last data filed at 6/27/2020 1100  Gross per 24 hour   Intake 3131.58 ml   Output 3515 ml   Net -383.42 ml       Physical Exam  Vitals signs reviewed.   Constitutional:       Appearance: He is well-developed. He is toxic-appearing.      Interventions: He is sedated and intubated.   HENT:      Head: Normocephalic and atraumatic.   Eyes:      Pupils: Pupils are equal, round, and reactive to light.   Neck:      Musculoskeletal: Normal range of motion.      Trachea: No tracheal deviation.      Comments: Trach in place   Cardiovascular:      Rate and Rhythm: Tachycardia present.      Pulses:           Radial pulses are 2+ on the right side and 2+ on the left side.        Dorsalis pedis pulses are 1+ on the right side and 1+ on the left side.      Heart sounds: Normal heart sounds.   Pulmonary:      Effort: He is intubated.      Breath sounds: No wheezing, rhonchi or rales.      Comments: Poor lung compliance   Abdominal:      General: Bowel sounds are normal. There is no distension.      Palpations: Abdomen is soft.   Genitourinary:     Comments: Torres in place   Musculoskeletal: Normal range of motion.   Skin:     General: Skin is warm.      Comments: Necrotic tissue on left forearm    Neurological:      Mental Status: He is unresponsive.      Comments: PERRL. Opens eyes spontaneously, does not track. Cough and corneal reflexes intact. No response to pain X 4 extremities.          Vents:  Vent Mode: A/C (06/27/20 0846)  Ventilator Initiated: Yes (04/10/20 2475)  Set Rate: 35 BPM (06/27/20 0846)  Vt Set: 250 mL (06/06/20 0858)  Pressure Support: 20 cmH20 (06/06/20  0858)  PEEP/CPAP: 5 cmH20 (06/27/20 0846)  Oxygen Concentration (%): 40 (06/27/20 1100)  Peak Airway Pressure: 37 cmH2O (06/27/20 0846)  Plateau Pressure: 27 cmH20 (06/27/20 0846)  Total Ve: 10.7 mL (06/27/20 0846)  F/VT Ratio<105 (RSBI): 117.83 (06/27/20 0027)  Lines/Drains/Airways     Peripherally Inserted Central Catheter Line            PICC Double Lumen 05/05/20 1707 right basilic 52 days          Drain                 NG/OG Tube 05/03/20 1215 [place] sump;nasogastric 18 Fr. Left nostril 54 days         Urethral Catheter 06/26/20 0901 1 day          Airway                 Surgical Airway 05/05/20 1500 Shiley Cuffed 52 days          Peripheral Intravenous Line                 Peripheral IV - Single Lumen 06/23/20 1425 18 G Anterior;Right Hand 3 days              Significant Labs:    CBC/Anemia Profile:  Recent Labs   Lab 06/26/20  0503 06/26/20  1430 06/27/20  0305   WBC 22.95* 22.19* 23.30*  23.30*   HGB 7.7* 7.7* 7.3*  7.3*   HCT 26.0* 26.0* 23.8*  23.8*   * 129* 135*  135*   MCV 97 97 95  95   RDW 15.5* 14.8* 14.8*  14.8*   FERRITIN 2,691*  --   --         Chemistries:  Recent Labs   Lab 06/26/20  0503 06/26/20  1430 06/27/20  0305    138 141  141   K 5.3* 4.8 3.7  3.7    102 102  102   CO2 28 26 22*  22*   BUN 79* 73* 72*  72*   CREATININE 0.9 0.8 0.8  0.8   CALCIUM 8.3* 7.9* 8.9  8.9   ALBUMIN 2.5* 2.6* 2.6*  2.6*   PROT 6.2 6.6 6.1  6.1   BILITOT 0.9 1.0 1.4*  1.4*   ALKPHOS 117 110 96  96   ALT 31 35 32  32   AST 37 41* 33  33   MG 2.3 2.3 2.0  2.0   PHOS 6.0* 5.9* 3.7  3.7       All pertinent labs within the past 24 hours have been reviewed.    Significant Imaging:  I have reviewed all pertinent imaging results/findings within the past 24 hours.      AB  Recent Labs   Lab 06/27/20  0846   PH 7.376   PO2 49*   PCO2 56.3*   HCO3 33.0*   BE 8     Assessment/Plan:     Neuro  Basal ganglia infarction  Altered mental status with poor neuro exam: multifactorial. Likely  component of delirium from prolonged ICU stay and severe illness, possible component of neurologic manifestation of COVID-19, and stroke.    - Precedex and fentanyl for sedation   - Fentanyl patch added in attempt to wean IV sedation   - Encephalopathy with poor neuro exam  - CT on June 2 showing bilateral basal ganglia infarcts, which likely explains the neuro exam findings. His infarcts are sizeable, but extent of permanent deficits are unknown. The likelihood of meaningful recovery is low. Neuro consulted to prognosticate and agreed with poor neurological prognosis given bilateral basal ganglia CVAs.    Pulmonary  Difficult intubation  S/p crich which was converted to trach    Acute hypoxemic respiratory failure  2/2 to ARDS due to COVID-19  - ARDSNET Protocol  --see COVID-19    Cardiac/Vascular  Tachycardia  Patient's baseline since the initiation of ECMO has been tachycardic.  --previously on metoprolol, but unable to tolerate due to hypotension and bradycardic episodes.    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    Other  * Acute respiratory disease due to COVID-19 virus  - Difficult intubation requiring crich on admit; converted to trach 5/5  - previously proned with Monika  - remdesivir course completed.  - Completed treatment with Plaquenil, Azithro, Methyprednisone for 5 days, completed.  - Strict I/O's and goal net neutral status to help optimize vent mechanics.Continue Lasix gtt   - Special isolation and aspiration precautions ordered per COVID protocol  - Significant improvement in oxygenation, but remains with poor lung compliance     Acute respiratory distress syndrome (ARDS)  due to COVID-19 virus  -See acute respiratory failure      Arm wound, left, sequela  --wound to LUE being seen by wound care. Thought to be 2/2 extravasation of medication through PIV. Continue to monitor. RUE with +2 palpable pulses.    Goals of care, counseling/discussion  Patient's prognosis remains poor despite efforts to optimize health. Son spoken with regarding GOC.  DNR changed after consulting CTS for ECMO. Per conversation with Dr. Goss on 6/26, no chest compressions. Given Mr. Sanchez's decline, he is too sick for travel back to [facility]. Family urged to visit if possible; ongoing coordination with [employer] to help arrange travel.     Shock  Suspect septic. Febrile to 103.6 with rising leukocytosis  --follow up blood and sputum cultures  --continue vanc and cefepime  --titrate norepi for systolic >100     COVID-19 virus detected  See ARDS    Plan discussed with Dr. Blount.    Critical Care Time: 70 minutes  Critical secondary to Patient has a condition that poses threat to life and bodily function: ARDS 2/2 COVID      Critical care was time spent personally by me on the following activities: development of treatment plan with patient or surrogate and bedside caregivers, discussions with consultants, evaluation of patient's response to treatment, examination of patient, ordering and performing treatments and interventions, ordering and review of laboratory studies, ordering and review of radiographic studies, pulse oximetry, re-evaluation of patient's condition. This critical care time did not overlap with that of any other provider or involve time for any procedures.     Helen Campbell NP  Critical Care Medicine  [facility] - Respiratory ICU

## 2020-06-27 NOTE — ASSESSMENT & PLAN NOTE
Suspect septic. Febrile to 103.6 with rising leukocytosis  --follow up blood and sputum cultures  --continue vanc and cefepime  --titrate norepi for systolic >100

## 2020-06-27 NOTE — PROGRESS NOTES
"[facility]  Endocrinology  Progress Note    Admit Date: 4/10/2020     Reason for Consult: Management of T2DM, Hyperglycemia     Surgical Procedure and Date: tracheostomy 5/5/20    Diabetes diagnosis year: JAMMIE    Lab Results   Component Value Date    HGBA1C 6.6 (H) 04/12/2020       Home Diabetes Medications: none (per chart review)    How often checking glucose at home? JAMMIE  BG readings on regimen: JAMMIE  Hypoglycemia on the regimen? JAMMIE  Missed doses on regimen? JAMMIE    Diabetes Complications include:     JAMMIE    Complicating diabetes co morbidities:   none      HPI:   Patient is a 55 y.o. male with a diagnosis of DM2 and ARDS secondary to COVID-19.  Admitted to [facility] on 4/10/20 with fever and SOB.  Lengthy and complex hospital stay noted requiring intubation, ECMO, and trach placement.  Patient not with severe ARDS (with poor lung compliance) and suspected anoxic brain injury.  Patient developed hyperglycemia secondary to TF.  Endocrinology consulted for DM/BG management.        Interval HPI:   Overnight events: Remains in RICU, both tachy and bradycardia noted overnight.  BG elevated overnight on intensive insulin protocol, rates ranging from 1-6.4 u/hr.  TF remains on hold.  Hydrocortisone 100 mg IV q 8 hrs.  On IV antibiotics.  Eating:   NPO  Nausea: No  Hypoglycemia and intervention: No  Fever: No  TPN and/or TF: No    BP (!) 108/51 (BP Location: Left arm, Patient Position: Lying)   Pulse (!) 138   Temp 98.6 °F (37 °C) (Oral)   Resp (!) 38   Ht 5' 4" (1.626 m)   Wt 61.4 kg (135 lb 5.8 oz)   SpO2 (!) 92%   BMI 23.23 kg/m²     Labs Reviewed and Include    Recent Labs   Lab 06/27/20  0305   *  311*   CALCIUM 8.9  8.9   ALBUMIN 2.6*  2.6*   PROT 6.1  6.1     141   K 3.7  3.7   CO2 22*  22*     102   BUN 72*  72*   CREATININE 0.8  0.8   ALKPHOS 96  96   ALT 32  32   AST 33  33   BILITOT 1.4*  1.4*     Lab Results   Component Value Date    WBC 23.30 (H) " 06/27/2020    WBC 23.30 (H) 06/27/2020    HGB 7.3 (L) 06/27/2020    HGB 7.3 (L) 06/27/2020    HCT 23.8 (L) 06/27/2020    HCT 23.8 (L) 06/27/2020    MCV 95 06/27/2020    MCV 95 06/27/2020     (L) 06/27/2020     (L) 06/27/2020     No results for input(s): TSH, FREET4 in the last 168 hours.  Lab Results   Component Value Date    HGBA1C 6.6 (H) 04/12/2020       Nutritional status:   Body mass index is 23.23 kg/m².  Lab Results   Component Value Date    ALBUMIN 2.6 (L) 06/27/2020    ALBUMIN 2.6 (L) 06/27/2020    ALBUMIN 2.6 (L) 06/26/2020     Lab Results   Component Value Date    PREALBUMIN 20 05/28/2020       Estimated Creatinine Clearance: 85.3 mL/min (based on SCr of 0.8 mg/dL).    Accu-Checks  Recent Labs     06/27/20  0108 06/27/20  0219 06/27/20  0303 06/27/20  0400 06/27/20  0505 06/27/20  0618 06/27/20  0707 06/27/20  0714 06/27/20  0803 06/27/20  0929   POCTGLUCOSE 287* 269* 319* 293* 268* 295* 205* 222* 185* 149*       Current Medications and/or Treatments Impacting Glycemic Control  Immunotherapy:    Immunosuppressants     None        Steroids:   Hormones (From admission, onward)    Start     Stop Route Frequency Ordered    06/26/20 0900  fludrocortisone tablet 100 mcg      -- Oral Daily 06/25/20 2139 06/25/20 2245  vasopressin (PITRESSIN) 0.2 Units/mL in dextrose 5 % 100 mL infusion      -- IV Continuous 06/25/20 2139 06/25/20 2245  hydrocortisone sodium succinate injection 100 mg      -- IV Every 8 hours 06/25/20 2139        Pressors:    Autonomic Drugs (From admission, onward)    Start     Stop Route Frequency Ordered    06/25/20 2245  EPINEPHrine (ADRENALIN) 5 mg in sodium chloride 0.9% 250 mL infusion     Question Answer Comment   Titrate by: (in mcg/kg/min) 0.02    Titrate interval: (in minutes) 10    Titrate to maintain: (SBP or MAP or Cardiac Index) MAP    Greater than: (in mmHg) 65    Maximum dose: (in mcg/kg/min) 2        -- IV Continuous 06/25/20 2143    04/16/20 0915  rocuronium  10 mg/mL injection     Note to Pharmacy: Created by cabinet override    04/16 2129 04/16/20 0915        Hyperglycemia/Diabetes Medications:   Antihyperglycemics (From admission, onward)    Start     Stop Route Frequency Ordered    06/26/20 0745  insulin regular 100 Units in sodium chloride 0.9% 100 mL infusion     Question:  Insulin Rate Adjustment (DO NOT MODIFY ANSWER)  Answer:  [email or web address] [id number]    -- IV Continuous 06/26/20 0632          ASSESSMENT and PLAN    * Acute respiratory disease due to COVID-19 virus  Managed per primary team        Type 2 diabetes mellitus without complication, without long-term current use of insulin  BG goal 140 - 180     Continue IV insulin infusion protocol  Requires intensive BG monitoring while on protocol    Plan to convert patient to transition IV insulin infusion once BG and IV insulin infusion rate stabilize    ** Please call Endocrine for any BG related issues **    Discharge planning: TBD          Adrenal cortical steroids causing adverse effect in therapeutic use  Steroid per primary team, may cause BG excursions.           Attempted to discuss POC with bedside RN via telephone due to covid restrictions, will try again later today.    Harlan Denny, LYNDA  Endocrinology  [facility]

## 2020-06-27 NOTE — SUBJECTIVE & OBJECTIVE
"Interval HPI:   Overnight events: Remains in RICU, both tachy and bradycardia noted overnight.  BG elevated overnight on intensive insulin protocol, rates ranging from 1-6.4 u/hr.  TF remains on hold.  Hydrocortisone 100 mg IV q 8 hrs.  On IV antibiotics.  Eating:   NPO  Nausea: No  Hypoglycemia and intervention: No  Fever: No  TPN and/or TF: No    BP (!) 108/51 (BP Location: Left arm, Patient Position: Lying)   Pulse (!) 138   Temp 98.6 °F (37 °C) (Oral)   Resp (!) 38   Ht 5' 4" (1.626 m)   Wt 61.4 kg (135 lb 5.8 oz)   SpO2 (!) 92%   BMI 23.23 kg/m²     Labs Reviewed and Include    Recent Labs   Lab 06/27/20  0305   *  311*   CALCIUM 8.9  8.9   ALBUMIN 2.6*  2.6*   PROT 6.1  6.1     141   K 3.7  3.7   CO2 22*  22*     102   BUN 72*  72*   CREATININE 0.8  0.8   ALKPHOS 96  96   ALT 32  32   AST 33  33   BILITOT 1.4*  1.4*     Lab Results   Component Value Date    WBC 23.30 (H) 06/27/2020    WBC 23.30 (H) 06/27/2020    HGB 7.3 (L) 06/27/2020    HGB 7.3 (L) 06/27/2020    HCT 23.8 (L) 06/27/2020    HCT 23.8 (L) 06/27/2020    MCV 95 06/27/2020    MCV 95 06/27/2020     (L) 06/27/2020     (L) 06/27/2020     No results for input(s): TSH, FREET4 in the last 168 hours.  Lab Results   Component Value Date    HGBA1C 6.6 (H) 04/12/2020       Nutritional status:   Body mass index is 23.23 kg/m².  Lab Results   Component Value Date    ALBUMIN 2.6 (L) 06/27/2020    ALBUMIN 2.6 (L) 06/27/2020    ALBUMIN 2.6 (L) 06/26/2020     Lab Results   Component Value Date    PREALBUMIN 20 05/28/2020       Estimated Creatinine Clearance: 85.3 mL/min (based on SCr of 0.8 mg/dL).    Accu-Checks  Recent Labs     06/27/20  0108 06/27/20  0219 06/27/20  0303 06/27/20  0400 06/27/20  0505 06/27/20  0618 06/27/20  0707 06/27/20  0714 06/27/20  0803 06/27/20  0929   POCTGLUCOSE 287* 269* 319* 293* 268* 295* 205* 222* 185* 149*       Current Medications and/or Treatments Impacting Glycemic " Control  Immunotherapy:    Immunosuppressants     None        Steroids:   Hormones (From admission, onward)    Start     Stop Route Frequency Ordered    06/26/20 0900  fludrocortisone tablet 100 mcg      -- Oral Daily 06/25/20 2139 06/25/20 2245  vasopressin (PITRESSIN) 0.2 Units/mL in dextrose 5 % 100 mL infusion      -- IV Continuous 06/25/20 2139 06/25/20 2245  hydrocortisone sodium succinate injection 100 mg      -- IV Every 8 hours 06/25/20 2139        Pressors:    Autonomic Drugs (From admission, onward)    Start     Stop Route Frequency Ordered    06/25/20 2245  EPINEPHrine (ADRENALIN) 5 mg in sodium chloride 0.9% 250 mL infusion     Question Answer Comment   Titrate by: (in mcg/kg/min) 0.02    Titrate interval: (in minutes) 10    Titrate to maintain: (SBP or MAP or Cardiac Index) MAP    Greater than: (in mmHg) 65    Maximum dose: (in mcg/kg/min) 2        -- IV Continuous 06/25/20 2143 04/16/20 0915  rocuronium 10 mg/mL injection     Note to Pharmacy: Created by cabinet override    04/16 2129 04/16/20 0915        Hyperglycemia/Diabetes Medications:   Antihyperglycemics (From admission, onward)    Start     Stop Route Frequency Ordered    06/26/20 0745  insulin regular 100 Units in sodium chloride 0.9% 100 mL infusion     Question:  Insulin Rate Adjustment (DO NOT MODIFY ANSWER)  Answer:  [email or web address] [id number]    -- IV Continuous 06/26/20 0632

## 2020-06-27 NOTE — PROGRESS NOTES
[facility]  Critical Care Medicine  Progress Note    Patient Name: Ranjana Sanchez  MRN: [id number]  Admission Date: 4/10/2020  Hospital Length of Stay: 77 days  Code Status: Partial Code  Attending Provider: Jordi Goss MD  Primary Care Provider: Primary Doctor No   Principal Problem: Acute respiratory disease due to COVID-19 virus    Subjective:     HPI:  Mr. LUANA Sanchez is a 56 y/o M with PMHx of HTN brought to ER via EMS for increasing SOB with known COVID-19 exposure. 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 They have not had travelers since 3/14. 2 days ago he started having fever and SOB. He was seen by the ship's physician and was found to have crackles in his bases bilaterally, but his oxygen saturation was in the 90's. He had a flu test which was negative. He was quarantined in a room and states that he got worse with increasing SOB. He was brought to the ER and found to be 78% on RA. He was started on NRB and initially was doing much better, but his tachypnea did not improve, so he was placed on BiPAP with oxygen saturation improvement.    Hospital/ICU Course:  Patient was admitted to the MICU after a difficult intubation in the ER s/p crich. COVID + with ARDS, sedated and paralyzed. Unable to prone secondary to crich. Left mainstem intubation which was pulled off, on NO and levophed. ET tube replaced on 4/11 family contacted in [place]. Proned on 4/12 tolerated well and continued to prone on 4/19. 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 He was on and off ketamine as well.  EEG ordered to rule out seizures as he also had seizure like activity that resolved with ativan. Currently changing sedation, was placed on phenobarbital which should start having effect 4/20. Patient proned x 10 (last on 4/22). Still not tolerating ventilator weaning. Remains  on sedation, vasopressors, and paralytic. ID now following patient for remdesivir administration; family aware and consented. Patient not significantly improving despite efforts. Son contacted, patient made DNR. Patient evaluated for ECMO on 4/24. Placed on ECMO on 4/25 and transferred to Dr Ayon's service with DNR rescinded.    Some physiologic improvement on VV ECMO. Oxygenation at goal, lactate clearing, hemodynamics at goal without vasoactive support currently. Tolerated wean of vent Fi and Monika thus far. Peeling back sedatives steadily with goal of neuro exam ASAP. Metabolic alkalosis correcting with encouragement from diamox. Patient was weaned off from ECMO support on 06/24/2020. However, during this process he has undergone a stroke and currently is not responding to verbal commands. The patient is able to open his eyes and track, however no purposeful movements can be ascertained from the patient. Extensive notes from neurology have been documented in the patient's medical record. Extensive discussions had been carried out between the family members and periodic updates were provided. Periodic updates were also provided to [employer] which is his employer. The family was also notified that if any further deterioration happens after completion of ECMO we would not be providing new ECMO support to this patient.    The patient was transferred to the MICU team on 6/25 after ECMO decannulation.    Interval History/Significant Events: Febrile to 103.7 overnight. Started on 3 vasopressors. Worsening acidosis. Frequently bradys down to the 20's and recovers without intervention. Son updated via face time by Dr. Goss.      Review of Systems   Unable to perform ROS: Intubated     Objective:     Vital Signs (Most Recent):  Temp: 98.6 °F (37 °C) (06/26/20 0800)  Pulse: (!) 118 (06/26/20 1100)  Resp: (!) 44 (06/25/20 1807)  BP: (!) 100/57 (06/26/20 1100)  SpO2: 100 % (06/26/20 1100) Vital Signs (24h Range):  Temp:   [98.2 °F (36.8 °C)-103.8 °F (39.9 °C)] 98.6 °F (37 °C)  Pulse:  [] 118  Resp:  [44] 44  SpO2:  [90 %-100 %] 100 %  BP: ()/(17-84) 100/57  Arterial Line BP: (95)/(59-60) 95/59   Weight: 61.4 kg (135 lb 5.8 oz)  Body mass index is 23.23 kg/m².      Intake/Output Summary (Last 24 hours) at 6/26/2020 1353  Last data filed at 6/26/2020 1200  Gross per 24 hour   Intake 4457.45 ml   Output 2505 ml   Net 1952.45 ml       Physical Exam  Vitals signs reviewed.   Constitutional:       Appearance: He is well-developed. He is toxic-appearing.      Interventions: He is sedated and intubated.   HENT:      Head: Normocephalic and atraumatic.   Eyes:      Comments: Pinpoint pupils   Neck:      Musculoskeletal: Normal range of motion.      Trachea: No tracheal deviation.      Comments: Trach in place   Cardiovascular:      Heart sounds: Normal heart sounds.      Comments: Frequent episodes of nonsustained bradycardia.   Pulmonary:      Effort: He is intubated.      Breath sounds: Rhonchi present.      Comments: Poor lung compliance   Abdominal:      General: Bowel sounds are normal. There is no distension.      Palpations: Abdomen is soft.   Genitourinary:     Comments: Torres in place   Musculoskeletal: Normal range of motion.   Skin:     General: Skin is warm.      Comments: Necrotic tissue on left forearm    Neurological:      Mental Status: He is unresponsive.      Comments: Pinpoint pupils. Cough and corneal reflexes intact. No response to pain X 4 extremities.          Vents:  Vent Mode: A/C (06/26/20 0947)  Ventilator Initiated: Yes (04/10/20 2247)  Set Rate: 35 BPM (06/26/20 0947)  Vt Set: 250 mL (06/06/20 0858)  Pressure Support: 20 cmH20 (06/06/20 0858)  PEEP/CPAP: 5 cmH20 (06/26/20 0947)  Oxygen Concentration (%): 50 (06/26/20 1100)  Peak Airway Pressure: 39 cmH2O (06/26/20 0947)  Plateau Pressure: 33 cmH20 (06/26/20 0947)  Total Ve: 8.37 mL (06/26/20 0947)  F/VT Ratio<105 (RSBI): 182.88 (06/25/20  0012)  Lines/Drains/Airways     Peripherally Inserted Central Catheter Line            PICC Double Lumen 05/05/20 1707 right basilic 51 days          Drain                 NG/OG Tube 05/03/20 1215 [place] sump;nasogastric 18 Fr. Left nostril 54 days         Urethral Catheter 06/26/20 0901 less than 1 day          Airway                 Surgical Airway 05/05/20 1500 [place] Cuffed 51 days          Peripheral Intravenous Line                 Peripheral IV - Single Lumen 06/23/20 1425 18 G Anterior;Right Hand 2 days              Significant Labs:    CBC/Anemia Profile:  Recent Labs   Lab 06/25/20  0355 06/25/20  1532 06/26/20  0503   WBC 19.43* 19.91* 22.95*   HGB 9.6* 9.4* 7.7*   HCT 30.6* 30.5* 26.0*   PLT 89* 106* 130*   MCV 92 95 97   RDW 15.9* 15.9* 15.5*   FERRITIN 2,101*  --  2,691*        Chemistries:  Recent Labs   Lab 06/25/20  0355 06/25/20  1532 06/26/20  0503   * 147* 138   K 4.4 4.7 5.3*    105 101   CO2 31* 32* 28   BUN 64* 74* 79*   CREATININE 0.7 0.8 0.9   CALCIUM 9.4 9.4 8.3*   ALBUMIN 3.0* 2.9* 2.5*   PROT 6.5 6.7 6.2   BILITOT 1.9* 1.6* 0.9   ALKPHOS 93 108 117   ALT 23 28 31   AST 30 32 37   MG 2.2 2.3 2.3   PHOS 3.8 3.5 6.0*       All pertinent labs within the past 24 hours have been reviewed.    Significant Imaging:  I have reviewed all pertinent imaging results/findings within the past 24 hours.      ABG  Recent Labs   Lab 06/26/20  0947   PH 7.093*   PO2 110*   PCO2 108.5*   HCO3 33.1*   BE 3     Assessment/Plan:     Neuro  Basal ganglia infarction  Altered mental status with poor neuro exam: multifactorial. Likely component of delirium from prolonged ICU stay and severe illness, possible component of neurologic manifestation of COVID-19, and stroke.    - Precedex for sedation  - Fentanyl patch  - Encephalopathy with poor neuro exam  - CT on June 2 showing bilateral basal ganglia infarcts, which likely explains the neuro exam findings. His infarcts are sizeable, but extent of permanent  deficits are unknown. The likelihood of meaningful recovery is low. Neuro consulted to prognosticate and agreed with poor neurological prognosis given bilateral basal ganglia CVAs.    Pulmonary  Acute hypoxemic respiratory failure  2/2 to ARDS due to COVID-19  - ARDSNET Protocol  --see COVID-19    Cardiac/Vascular  Tachycardia  Patient's baseline since the initiation of ECMO has been tachycardic.  --previously on metoprolol, but unable to tolerate due to hypotension and bradycardic episodes.    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    Other  * Acute respiratory disease due to COVID-19 virus  - Patient intubated with crich which was switched to ETT on 4/11  - previously proned with Monika  - remdesivir course completed.  - remains hypercapnic despite lowering TV to 4 mL/kg and increased RR  - Completed treatment with Plaquenil, Azithro, Methyprednisone for 5 days, completed.  - Strict I/O's and goal net neutral status to help optimize vent mechanics. Lasix gtt at 2.5  - Special isolation and aspiration precautions ordered per COVID protocol    Acute respiratory distress syndrome (ARDS) due to COVID-19 virus  -See acute respiratory failure      Arm wound, left, sequela  --wound to LUE being seen by wound care. Thought to be 2/2 extravasation of medication through PIV. Continue to monitor. RUE with +1 palpable pulses.    Goals of care, counseling/discussion  Patient's prognosis remains poor despite efforts to optimize health. Son spoken with regarding GOC.  DNR changed after consulting CTS for ECMO. Per conversation with Dr. Goss on 6/26, no chest compressions. Given Mr. Sanchez's decline, he is  too sick for travel back to [place]. Family urged to visit if possible; ongoing coordination with [employer] to help arrange travel.     COVID-19 virus detected  See ARDS    Plan discussed with Dr. Goss.     Critical Care Time: 85 minutes  Critical secondary to Patient has a condition that poses threat to life and bodily function: ARDS 2/2 COVID, Shock       Critical care was time spent personally by me on the following activities: development of treatment plan with patient or surrogate and bedside caregivers, discussions with consultants, evaluation of patient's response to treatment, examination of patient, ordering and performing treatments and interventions, ordering and review of laboratory studies, ordering and review of radiographic studies, pulse oximetry, re-evaluation of patient's condition. This critical care time did not overlap with that of any other provider or involve time for any procedures.     Helen Campbell NP  Critical Care Medicine  [facility]

## 2020-06-27 NOTE — ASSESSMENT & PLAN NOTE
--wound to LUE being seen by wound care. Thought to be 2/2 extravasation of medication through PIV. Continue to monitor. RUE with +1 palpable pulses.

## 2020-06-28 PROBLEM — J98.2 PNEUMOMEDIASTINUM: Status: ACTIVE | Noted: 2020-01-01

## 2020-06-28 PROBLEM — T38.0X5A ADRENAL CORTICAL STEROIDS CAUSING ADVERSE EFFECT IN THERAPEUTIC USE: Status: ACTIVE | Noted: 2020-01-01

## 2020-06-28 NOTE — PLAN OF CARE
"      RICU PLAN OF CARE NOTE    Dx: Acute respiratory disease due to COVID-19 virus    Shift Events: Levophed titrated. Epinephrine and vasopressin restarted. Electrolytes replaced. Precedex restarted.    Neuro: Unresponsive    Vital Signs: BP (!) 103/56   Pulse (!) 120   Temp 98.4 °F (36.9 °C) (Oral)   Resp (!) 38   Ht 5' 4" (1.626 m)   Wt 61.4 kg (135 lb 5.8 oz)   SpO2 95%   BMI 23.23 kg/m²     Respiratory: Ventilator AC/PC FiO2 of 50% with 5.0 of PEEP    Diet: NPO    Gtts: Fentanyl, Precedex, Insulin, Norepinephrine, Vasopressin, Epinephrine, and Lasix    Urine Output: Urinary Catheter 550 cc/shift of clear, yellow urine.    Drains: NG/OG Tube 15 cc /  shift of residual collected. NG tube clamped. No output collected.     Labs/Accuchecks:   - WBC: 16.75  - Hgb: 7.5  - Hct: 24.6  - Platelets: 163  - INR: 1.0  - aPTT: 26.5  - Na: 135  - K: 3.6  - BUN: 63  - Creatinine: 0.9  - Ca: 8.5  - Ca, Ion: 1.16  - Phos: 5.6  - Ma.3  - AST: 26  - ALT: 31       "

## 2020-06-28 NOTE — ASSESSMENT & PLAN NOTE
Worsening compliance noted. TV dropped from 280 to 150's. CXR with pneumomediastinum.  --no intervention warranted  --continue LPV; unable to effectively ventilate  --son updated

## 2020-06-28 NOTE — SUBJECTIVE & OBJECTIVE
"Interval HPI:   Overnight events: Remains in RICU, tachycardia noted overnight.  BG fairly well controlled overnight on intensive insulin protocol, rates ranging from 0.8-2.5 u/hr.  TF remains on hold.  Hydrocortisone 100 mg IV q 8 hrs.  On IV antibiotics.  Eating:   NPO  Nausea: No  Hypoglycemia and intervention: No  Fever: No  TPN and/or TF: No    BP 96/61   Pulse (!) 119   Temp 98.4 °F (36.9 °C) (Oral)   Resp (!) 38   Ht 5' 4" (1.626 m)   Wt 61.4 kg (135 lb 5.8 oz)   SpO2 (!) 90%   BMI 23.23 kg/m²     Labs Reviewed and Include    Recent Labs   Lab 06/28/20  0300   *   CALCIUM 8.5*   ALBUMIN 2.6*   PROT 6.3   *   K 3.6   CO2 30*   CL 95   BUN 63*   CREATININE 0.9   ALKPHOS 98   ALT 31   AST 26   BILITOT 1.0     Lab Results   Component Value Date    WBC 16.75 (H) 06/28/2020    HGB 7.5 (L) 06/28/2020    HCT 24.6 (L) 06/28/2020    MCV 97 06/28/2020     06/28/2020     No results for input(s): TSH, FREET4 in the last 168 hours.  Lab Results   Component Value Date    HGBA1C 6.6 (H) 04/12/2020       Nutritional status:   Body mass index is 23.23 kg/m².  Lab Results   Component Value Date    ALBUMIN 2.6 (L) 06/28/2020    ALBUMIN 2.6 (L) 06/27/2020    ALBUMIN 2.6 (L) 06/27/2020     Lab Results   Component Value Date    PREALBUMIN 20 05/28/2020       Estimated Creatinine Clearance: 75.8 mL/min (based on SCr of 0.9 mg/dL).    Accu-Checks  Recent Labs     06/27/20  2100 06/27/20  2206 06/27/20  2300 06/28/20  0006 06/28/20  0209 06/28/20  0307 06/28/20  0408 06/28/20  0504 06/28/20  0610 06/28/20  0705   POCTGLUCOSE 188* 175* 160* 164* 182* 307* 218* 212* 210* 190*       Current Medications and/or Treatments Impacting Glycemic Control  Immunotherapy:    Immunosuppressants     None        Steroids:   Hormones (From admission, onward)    Start     Stop Route Frequency Ordered    06/28/20 0630  vasopressin (PITRESSIN) 0.2 Units/mL in dextrose 5 % 100 mL infusion      -- IV Continuous 06/28/20 0536    " 06/26/20 0900  fludrocortisone tablet 100 mcg      -- Oral Daily 06/25/20 2139 06/25/20 2245  hydrocortisone sodium succinate injection 100 mg      -- IV Every 8 hours 06/25/20 2139        Pressors:    Autonomic Drugs (From admission, onward)    Start     Stop Route Frequency Ordered    06/27/20 1315  norepinephrine 4 mg in dextrose 5% 250 mL infusion (premix) (titrating)     Question Answer Comment   Titrate by: (in mcg/kg/min) 0.02    Titrate interval: (in minutes) 5    Titrate to maintain: (MAP or SBP) MAP    Greater than: (in mmHg) 100    Maximum dose: (in mcg/kg/min) 3        -- IV Continuous 06/27/20 1204    06/25/20 2245  EPINEPHrine (ADRENALIN) 5 mg in sodium chloride 0.9% 250 mL infusion     Question Answer Comment   Titrate by: (in mcg/kg/min) 0.02    Titrate interval: (in minutes) 10    Titrate to maintain: (SBP or MAP or Cardiac Index) MAP    Greater than: (in mmHg) 65    Maximum dose: (in mcg/kg/min) 2        -- IV Continuous 06/25/20 2143 04/16/20 0915  rocuronium 10 mg/mL injection     Note to Pharmacy: Created by cabinet override    04/16 2129 04/16/20 0915        Hyperglycemia/Diabetes Medications:   Antihyperglycemics (From admission, onward)    Start     Stop Route Frequency Ordered    06/26/20 0745  insulin regular 100 Units in sodium chloride 0.9% 100 mL infusion     Question:  Insulin Rate Adjustment (DO NOT MODIFY ANSWER)  Answer:  [email or web address] [id number]    -- IV Continuous 06/26/20 0632

## 2020-06-28 NOTE — PROGRESS NOTES
"[facility] Res[facility] ICU  Endocrinology  Progress Note    Admit Date: 4/10/2020     Reason for Consult: Management of T2DM, Hyperglycemia     Surgical Procedure and Date: tracheostomy 5/5/20    Diabetes diagnosis year: JAMMIE    Lab Results   Component Value Date    HGBA1C 6.6 (H) 04/12/2020       Home Diabetes Medications: none (per chart review)    How often checking glucose at home? JAMMIE  BG readings on regimen: JAMMIE  Hypoglycemia on the regimen? JAMMIE  Missed doses on regimen? JAMMIE    Diabetes Complications include:     JAMMIE    Complicating diabetes co morbidities:   none      HPI:   Patient is a 55 y.o. male with a diagnosis of DM2 and ARDS secondary to COVID-19.  Admitted to [facility] on 4/10/20 with fever and SOB.  Lengthy and complex hospital stay noted requiring intubation, ECMO, and trach placement.  Patient not with severe ARDS (with poor lung compliance) and suspected anoxic brain injury.  Patient developed hyperglycemia secondary to TF.  Endocrinology consulted for DM/BG management.        Interval HPI:   Overnight events: Remains in RICU, tachycardia noted overnight.  BG fairly well controlled overnight on intensive insulin protocol, rates ranging from 0.8-2.5 u/hr.  TF remains on hold.  Hydrocortisone 100 mg IV q 8 hrs.  On IV antibiotics.  Eating:   NPO  Nausea: No  Hypoglycemia and intervention: No  Fever: No  TPN and/or TF: No    BP 96/61   Pulse (!) 119   Temp 98.4 °F (36.9 °C) (Oral)   Resp (!) 38   Ht 5' 4" (1.626 m)   Wt 61.4 kg (135 lb 5.8 oz)   SpO2 (!) 90%   BMI 23.23 kg/m²     Labs Reviewed and Include    Recent Labs   Lab 06/28/20  0300   *   CALCIUM 8.5*   ALBUMIN 2.6*   PROT 6.3   *   K 3.6   CO2 30*   CL 95   BUN 63*   CREATININE 0.9   ALKPHOS 98   ALT 31   AST 26   BILITOT 1.0     Lab Results   Component Value Date    WBC 16.75 (H) 06/28/2020    HGB 7.5 (L) 06/28/2020    HCT 24.6 (L) 06/28/2020    MCV 97 06/28/2020     06/28/2020     No results for input(s): " TSH, FREET4 in the last 168 hours.  Lab Results   Component Value Date    HGBA1C 6.6 (H) 04/12/2020       Nutritional status:   Body mass index is 23.23 kg/m².  Lab Results   Component Value Date    ALBUMIN 2.6 (L) 06/28/2020    ALBUMIN 2.6 (L) 06/27/2020    ALBUMIN 2.6 (L) 06/27/2020     Lab Results   Component Value Date    PREALBUMIN 20 05/28/2020       Estimated Creatinine Clearance: 75.8 mL/min (based on SCr of 0.9 mg/dL).    Accu-Checks  Recent Labs     06/27/20  2100 06/27/20  2206 06/27/20  2300 06/28/20  0006 06/28/20  0209 06/28/20  0307 06/28/20  0408 06/28/20  0504 06/28/20  0610 06/28/20  0705   POCTGLUCOSE 188* 175* 160* 164* 182* 307* 218* 212* 210* 190*       Current Medications and/or Treatments Impacting Glycemic Control  Immunotherapy:    Immunosuppressants     None        Steroids:   Hormones (From admission, onward)    Start     Stop Route Frequency Ordered    06/28/20 0630  vasopressin (PITRESSIN) 0.2 Units/mL in dextrose 5 % 100 mL infusion      -- IV Continuous 06/28/20 0522    06/26/20 0900  fludrocortisone tablet 100 mcg      -- Oral Daily 06/25/20 2139    06/25/20 2245  hydrocortisone sodium succinate injection 100 mg      -- IV Every 8 hours 06/25/20 2139        Pressors:    Autonomic Drugs (From admission, onward)    Start     Stop Route Frequency Ordered    06/27/20 1315  norepinephrine 4 mg in dextrose 5% 250 mL infusion (premix) (titrating)     Question Answer Comment   Titrate by: (in mcg/kg/min) 0.02    Titrate interval: (in minutes) 5    Titrate to maintain: (MAP or SBP) MAP    Greater than: (in mmHg) 100    Maximum dose: (in mcg/kg/min) 3        -- IV Continuous 06/27/20 1204    06/25/20 2245  EPINEPHrine (ADRENALIN) 5 mg in sodium chloride 0.9% 250 mL infusion     Question Answer Comment   Titrate by: (in mcg/kg/min) 0.02    Titrate interval: (in minutes) 10    Titrate to maintain: (SBP or MAP or Cardiac Index) MAP    Greater than: (in mmHg) 65    Maximum dose: (in mcg/kg/min) 2         -- IV Continuous 06/25/20 2143 04/16/20 0915  rocuronium 10 mg/mL injection     Note to Pharmacy: Created by cabinet override    04/16 2129 04/16/20 0915        Hyperglycemia/Diabetes Medications:   Antihyperglycemics (From admission, onward)    Start     Stop Route Frequency Ordered    06/26/20 0745  insulin regular 100 Units in sodium chloride 0.9% 100 mL infusion     Question:  Insulin Rate Adjustment (DO NOT MODIFY ANSWER)  Answer:  [email or web address] [id number]    -- IV Continuous 06/26/20 0632          ASSESSMENT and PLAN    * Acute respiratory disease due to COVID-19 virus  Managed per primary team        Type 2 diabetes mellitus without complication, without long-term current use of insulin  BG goal 140 - 180     Continue IV insulin infusion protocol  Requires intensive BG monitoring while on protocol    Plan to convert patient to transition IV insulin infusion once BG and IV insulin infusion rate stabilize.  Still exhibiting fluctuations in insulin needs likely secondary to stress dose steroids.    ** Please call Endocrine for any BG related issues **    Discharge planning: TBD          Adrenal cortical steroids causing adverse effect in therapeutic use  Steroids per primary team, may cause BG excursions.         Attempted to discuss POC with bedside RN via telephone due to covid restrictions.    Harlan Denny NP  Endocrinology  [facility]

## 2020-06-28 NOTE — ASSESSMENT & PLAN NOTE
Patient's prognosis remains poor despite efforts to optimize health. Son spoken with regarding GOC.  DNR changed after consulting CTS for ECMO. Per conversation with Dr. Goss on 6/26, no chest compressions. Given Mr. Sanchez's decline, he is too sick for travel back to [place]. Family urged to visit if possible; ongoing coordination with [employer] to help arrange travel.

## 2020-06-28 NOTE — SUBJECTIVE & OBJECTIVE
Interval History/Significant Events: Acute worsening overnight, increased pressor support and worsening lung mechanics. Tidal volumes decreased to 150's down from 280 yesterday. CXR with pneumomediastinum. ABG with worsening acidosis pH 7.12, CO2 107. Unable to effectively ventilate. Family contacted; see Dr. Blount's note on discussion.       Review of Systems   Unable to perform ROS: Acuity of condition     Objective:     Vital Signs (Most Recent):  Temp: 97.5 °F (36.4 °C) (06/28/20 1100)  Pulse: (!) 122 (06/28/20 1300)  Resp: (!) 38 (06/27/20 0700)  BP: 98/62 (06/28/20 1300)  SpO2: 96 % (06/28/20 1300) Vital Signs (24h Range):  Temp:  [93.3 °F (34.1 °C)-98.7 °F (37.1 °C)] 97.5 °F (36.4 °C)  Pulse:  [118-142] 122  SpO2:  [89 %-100 %] 96 %  BP: ()/(50-66) 98/62   Weight: 61.4 kg (135 lb 5.8 oz)  Body mass index is 23.23 kg/m².      Intake/Output Summary (Last 24 hours) at 6/28/2020 1411  Last data filed at 6/28/2020 1200  Gross per 24 hour   Intake 2987.55 ml   Output 1050 ml   Net 1937.55 ml       Physical Exam  Vitals signs reviewed.   Constitutional:       Appearance: He is well-developed. He is toxic-appearing.      Interventions: He is sedated and intubated.   HENT:      Head: Normocephalic and atraumatic.   Eyes:      Pupils: Pupils are equal, round, and reactive to light.   Neck:      Musculoskeletal: Normal range of motion.      Trachea: No tracheal deviation.      Comments: Trach in place   Cardiovascular:      Rate and Rhythm: Tachycardia present.      Pulses:           Radial pulses are 2+ on the right side and 2+ on the left side.        Dorsalis pedis pulses are 1+ on the right side and 1+ on the left side.      Heart sounds: Normal heart sounds.   Pulmonary:      Effort: He is intubated.      Breath sounds: No wheezing, rhonchi or rales.      Comments: Poor lung compliance   Abdominal:      General: Bowel sounds are normal. There is no distension.      Palpations: Abdomen is soft.    Genitourinary:     Comments: Torres in place   Musculoskeletal: Normal range of motion.   Skin:     General: Skin is warm.      Comments: Necrotic tissue on left forearm    Neurological:      Mental Status: He is unresponsive.      Comments: PERRL.Cough reflexes intact. No response to pain X 4 extremities.          Vents:  Vent Mode: A/C (06/28/20 1258)  Ventilator Initiated: Yes (04/10/20 2247)  Set Rate: 35 BPM (06/28/20 1258)  Vt Set: 250 mL (06/06/20 0858)  Pressure Support: 20 cmH20 (06/06/20 0858)  PEEP/CPAP: 5 cmH20 (06/28/20 1258)  Oxygen Concentration (%): 50 (06/28/20 1300)  Peak Airway Pressure: 49 cmH2O (06/28/20 1258)  Plateau Pressure: 41 cmH20 (06/28/20 1258)  Total Ve: 6.25 mL (06/28/20 1258)  F/VT Ratio<105 (RSBI): 117.83 (06/27/20 0027)  Lines/Drains/Airways     Peripherally Inserted Central Catheter Line            PICC Double Lumen 05/05/20 1707 right basilic 53 days          Drain                 Urethral Catheter 06/26/20 0901 2 days         NG/OG Tube 06/27/20 1153 16 Fr. Right nostril 1 day          Airway                 Surgical Airway 05/05/20 1500 Shiley Cuffed 53 days          Peripheral Intravenous Line                 Peripheral IV - Single Lumen 06/23/20 1425 18 G Anterior;Right Hand 4 days              Significant Labs:    CBC/Anemia Profile:  Recent Labs   Lab 06/26/20  1430 06/27/20  0305 06/28/20  0300   WBC 22.19* 23.30*  23.30* 16.75*   HGB 7.7* 7.3*  7.3* 7.5*   HCT 26.0* 23.8*  23.8* 24.6*   * 135*  135* 163   MCV 97 95  95 97   RDW 14.8* 14.8*  14.8* 14.8*        Chemistries:  Recent Labs   Lab 06/26/20  1430 06/27/20  0305 06/28/20  0300    141  141 135*   K 4.8 3.7  3.7 3.6    102  102 95   CO2 26 22*  22* 30*   BUN 73* 72*  72* 63*   CREATININE 0.8 0.8  0.8 0.9   CALCIUM 7.9* 8.9  8.9 8.5*   ALBUMIN 2.6* 2.6*  2.6* 2.6*   PROT 6.6 6.1  6.1 6.3   BILITOT 1.0 1.4*  1.4* 1.0   ALKPHOS 110 96  96 98   ALT 35 32  32 31   AST 41* 33  33 26    MG 2.3 2.0  2.0 2.3   PHOS 5.9* 3.7  3.7 5.6*       All pertinent labs within the past 24 hours have been reviewed.    Significant Imaging:  I have reviewed all pertinent imaging results/findings within the past 24 hours.

## 2020-06-28 NOTE — ASSESSMENT & PLAN NOTE
Suspect septic. Febrile to 103.6 with rising leukocytosis  --follow up blood and sputum cultures  --continue vanc and cefepime  --Continue vaso and norepi for systolic >100

## 2020-06-28 NOTE — CARE UPDATE
MD notified of an increase of the titration of Levophed from .1 to .16. MD also notified of patient's HR sustaining 130-145 despite the administration of Fentanyl and Precedex. Orders given to start Vasopressin if the dose of Levophed is increased to .2 Will continue to monitor.

## 2020-06-28 NOTE — ASSESSMENT & PLAN NOTE
- Difficult intubation requiring crich on admit; converted to trach 5/5  - previously proned with Monika  - remdesivir course completed.  - Completed treatment with Plaquenil, Azithro, Methyprednisone for 5 days, completed.  - Strict I/O's and goal net neutral status to help optimize vent mechanics. Continue Lasix gtt   - Special isolation and aspiration precautions ordered per COVID protocol  - Significant improvement in oxygenation, but remains with poor lung compliance

## 2020-06-28 NOTE — PROGRESS NOTES
[facility] - Respiratory ICU  Critical Care Medicine  Progress Note    Patient Name: Ranjana Sanchez  MRN: [id number]  Admission Date: 4/10/2020  Hospital Length of Stay: 79 days  Code Status: DNR  Attending Provider: Emily Blount MD  Primary Care Provider: Primary Doctor No   Principal Problem: Acute respiratory disease due to COVID-19 virus    Subjective:     HPI:  Mr. LUANA Sanchez is a 54 y/o M with PMHx of HTN brought to ER via EMS for increasing SOB with known COVID-19 exposure. 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 They have not had travelers since 3/14. 2 days ago he started having fever and SOB. He was seen by the ship's physician and was found to have crackles in his bases bilaterally, but his oxygen saturation was in the 90's. He had a flu test which was negative. He was quarantined in a room and states that he got worse with increasing SOB. He was brought to the ER and found to be 78% on RA. He was started on NRB and initially was doing much better, but his tachypnea did not improve, so he was placed on BiPAP with oxygen saturation improvement.    Hospital/ICU Course:  Patient was admitted to the MICU after a difficult intubation in the ER s/p crich. COVID + with ARDS, sedated and paralyzed. Unable to prone secondary to crich. Left mainstem intubation which was pulled off, on NO and levophed. ET tube replaced on 4/11 family contacted in [place]. Proned on 4/12 tolerated well and continued to prone on 4/19. 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 He was on and off ketamine as well.  EEG ordered to rule out seizures as he also had seizure like activity that resolved with ativan. Currently changing sedation, was placed on phenobarbital which should start having effect 4/20. Patient proned x 10 (last on 4/22). Still not tolerating ventilator weaning. Remains on sedation,  vasopressors, and paralytic. ID now following patient for remdesivir administration; family aware and consented. Patient not significantly improving despite efforts. Son contacted, patient made DNR. Patient evaluated for ECMO on 4/24. Placed on ECMO on 4/25 and transferred to Dr Ayon's service with DNR rescinded.    Some physiologic improvement on VV ECMO. Oxygenation at goal, lactate clearing, hemodynamics at goal without vasoactive support currently. Tolerated wean of vent Fi and Monika thus far. Peeling back sedatives steadily with goal of neuro exam ASAP. Metabolic alkalosis correcting with encouragement from diamox. Patient was weaned off from ECMO support on 06/24/2020. However, during this process he has undergone a stroke and currently is not responding to verbal commands. The patient is able to open his eyes and track, however no purposeful movements can be ascertained from the patient. Extensive notes from neurology have been documented in the patient's medical record. Extensive discussions had been carried out between the family members and periodic updates were provided. Periodic updates were also provided to [employer] which is his employer. The family was also notified that if any further deterioration happens after completion of ECMO we would not be providing new ECMO support to this patient.    The patient was transferred to the MICU team on 6/25 after ECMO decannulation. Fever with worsening shock. Cultured and started on broad spectrum antibiotics. Frequent episodes of bradycardia to the 20's but recovers without intervention.     Interval History/Significant Events: Acute worsening overnight, increased pressor support and worsening lung mechanics. Tidal volumes decreased to 150's down from 280 yesterday. CXR with pneumomediastinum. ABG with worsening acidosis pH 7.12, CO2 107. Unable to effectively ventilate. Family contacted; see Dr. Blount's note on discussion.       Review of Systems   Unable  to perform ROS: Acuity of condition     Objective:     Vital Signs (Most Recent):  Temp: 97.5 °F (36.4 °C) (06/28/20 1100)  Pulse: (!) 122 (06/28/20 1300)  Resp: (!) 38 (06/27/20 0700)  BP: 98/62 (06/28/20 1300)  SpO2: 96 % (06/28/20 1300) Vital Signs (24h Range):  Temp:  [93.3 °F (34.1 °C)-98.7 °F (37.1 °C)] 97.5 °F (36.4 °C)  Pulse:  [118-142] 122  SpO2:  [89 %-100 %] 96 %  BP: ()/(50-66) 98/62   Weight: 61.4 kg (135 lb 5.8 oz)  Body mass index is 23.23 kg/m².      Intake/Output Summary (Last 24 hours) at 6/28/2020 1411  Last data filed at 6/28/2020 1200  Gross per 24 hour   Intake 2987.55 ml   Output 1050 ml   Net 1937.55 ml       Physical Exam  Vitals signs reviewed.   Constitutional:       Appearance: He is well-developed. He is toxic-appearing.      Interventions: He is sedated and intubated.   HENT:      Head: Normocephalic and atraumatic.   Eyes:      Pupils: Pupils are equal, round, and reactive to light.   Neck:      Musculoskeletal: Normal range of motion.      Trachea: No tracheal deviation.      Comments: Trach in place   Cardiovascular:      Rate and Rhythm: Tachycardia present.      Pulses:           Radial pulses are 2+ on the right side and 2+ on the left side.        Dorsalis pedis pulses are 1+ on the right side and 1+ on the left side.      Heart sounds: Normal heart sounds.   Pulmonary:      Effort: He is intubated.      Breath sounds: No wheezing, rhonchi or rales.      Comments: Poor lung compliance   Abdominal:      General: Bowel sounds are normal. There is no distension.      Palpations: Abdomen is soft.   Genitourinary:     Comments: Torres in place   Musculoskeletal: Normal range of motion.   Skin:     General: Skin is warm.      Comments: Necrotic tissue on left forearm    Neurological:      Mental Status: He is unresponsive.      Comments: PERRL.Cough reflexes intact. No response to pain X 4 extremities.          Vents:  Vent Mode: A/C (06/28/20 0007)  Ventilator Initiated: Yes  (04/10/20 2247)  Set Rate: 35 BPM (06/28/20 1258)  Vt Set: 250 mL (06/06/20 0858)  Pressure Support: 20 cmH20 (06/06/20 0858)  PEEP/CPAP: 5 cmH20 (06/28/20 1258)  Oxygen Concentration (%): 50 (06/28/20 1300)  Peak Airway Pressure: 49 cmH2O (06/28/20 1258)  Plateau Pressure: 41 cmH20 (06/28/20 1258)  Total Ve: 6.25 mL (06/28/20 1258)  F/VT Ratio<105 (RSBI): 117.83 (06/27/20 0027)  Lines/Drains/Airways     Peripherally Inserted Central Catheter Line            PICC Double Lumen 05/05/20 1707 right basilic 53 days          Drain                 Urethral Catheter 06/26/20 0901 2 days         NG/OG Tube 06/27/20 1153 16 Fr. Right nostril 1 day          Airway                 Surgical Airway 05/05/20 1500 Shiley Cuffed 53 days          Peripheral Intravenous Line                 Peripheral IV - Single Lumen 06/23/20 1425 18 G Anterior;Right Hand 4 days              Significant Labs:    CBC/Anemia Profile:  Recent Labs   Lab 06/26/20  1430 06/27/20  0305 06/28/20  0300   WBC 22.19* 23.30*  23.30* 16.75*   HGB 7.7* 7.3*  7.3* 7.5*   HCT 26.0* 23.8*  23.8* 24.6*   * 135*  135* 163   MCV 97 95  95 97   RDW 14.8* 14.8*  14.8* 14.8*        Chemistries:  Recent Labs   Lab 06/26/20  1430 06/27/20  0305 06/28/20  0300    141  141 135*   K 4.8 3.7  3.7 3.6    102  102 95   CO2 26 22*  22* 30*   BUN 73* 72*  72* 63*   CREATININE 0.8 0.8  0.8 0.9   CALCIUM 7.9* 8.9  8.9 8.5*   ALBUMIN 2.6* 2.6*  2.6* 2.6*   PROT 6.6 6.1  6.1 6.3   BILITOT 1.0 1.4*  1.4* 1.0   ALKPHOS 110 96  96 98   ALT 35 32  32 31   AST 41* 33  33 26   MG 2.3 2.0  2.0 2.3   PHOS 5.9* 3.7  3.7 5.6*       All pertinent labs within the past 24 hours have been reviewed.    Significant Imaging:  I have reviewed all pertinent imaging results/findings within the past 24 hours.      AB  Recent Labs   Lab 06/28/20  0859   PH 7.123*   PO2 66*   PCO2 107.2*   HCO3 35.1*   BE 6     Assessment/Plan:     Neuro  Basal ganglia  infarction  Altered mental status with poor neuro exam: multifactorial. Likely component of delirium from prolonged ICU stay and severe illness, possible component of neurologic manifestation of COVID-19, and stroke.  - Precedex and fentanyl for sedation   - Fentanyl patch added in attempt to wean IV sedation   - Encephalopathy with poor neuro exam  - CT on June 2 showing bilateral basal ganglia infarcts, which likely explains the neuro exam findings. His infarcts are sizeable, but extent of permanent deficits are unknown. The likelihood of meaningful recovery is low. Neuro consulted to prognosticate and agreed with poor neurological prognosis given bilateral basal ganglia CVAs.    Pulmonary  Pneumomediastinum  Worsening compliance noted. TV dropped from 280 to 150's. CXR with pneumomediastinum.  --no intervention warranted  --continue LPV; unable to effectively ventilate  --son updated    Difficult intubation  S/p crich which was converted to trach    Acute hypoxemic respiratory failure  2/2 to ARDS due to COVID-19  - ARDSNET Protocol  --see COVID-19    Cardiac/Vascular  Tachycardia  Patient's baseline since the initiation of ECMO has been tachycardic.  --previously on metoprolol, but unable to tolerate due to hypotension and bradycardic episodes.    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    Other  * Acute respiratory disease due to COVID-19 virus  - Difficult intubation requiring crich on admit; converted to trach 5/5  - previously proned with Monika  - remdesivir course completed.  - Completed treatment with Plaquenil, Azithro, Methyprednisone for 5 days, completed.  - Strict I/O's and  goal net neutral status to help optimize vent mechanics. Continue Lasix gtt   - Special isolation and aspiration precautions ordered per COVID protocol  - Significant improvement in oxygenation, but remains with poor lung compliance     Acute respiratory distress syndrome (ARDS) due to COVID-19 virus  -See acute respiratory failure      Arm wound, left, sequela  --wound to LUE being seen by wound care. Thought to be 2/2 extravasation of medication through PIV. Continue to monitor. RUE with +2 palpable pulses.    Goals of care, counseling/discussion  Patient's prognosis remains poor despite efforts to optimize health. Son spoken with regarding GOC.  DNR changed after consulting CTS for ECMO. Per conversation with Dr. Goss on 6/26, no chest compressions. Given Mr. Sanchez's decline, he is too sick for travel back to [place]. Family urged to visit if possible; ongoing coordination with [employer] to help arrange travel.     Shock  Suspect septic. Febrile to 103.6 with rising leukocytosis  --follow up blood and sputum cultures  --continue vanc and cefepime  --Continue vaso and norepi for systolic >100     COVID-19 virus detected  See ARDS    Plan discussed with Dr. Blount.    Critical Care Time: 70 minutes  Critical secondary to Patient has a condition that poses threat to life and bodily function: ARDS 2/2 COVID       Critical care was time spent personally by me on the following activities: development of treatment plan with patient or surrogate and bedside caregivers, discussions with consultants, evaluation of patient's response to treatment, examination of patient, ordering and performing treatments and interventions, ordering and review of laboratory studies, ordering and review of radiographic studies, pulse oximetry, re-evaluation of patient's condition. This critical care time did not overlap with that of any other provider or involve time for any procedures.     Helen Campbell NP  Critical Care  Medicine  [facility] - Respiratory ICU

## 2020-06-28 NOTE — PROGRESS NOTES
This morning patient had decline in Vt and change in peak and plateau pressures.  CXR confirms a pneumomediastinum.  Maximum lung volumes that we have been able to safely obtain is 160cc with trials of several different modes of ventilation.  Now with severe respiratory acidosis.      ABG  Recent Labs   Lab 06/28/20  0859   PH 7.123*   PO2 66*   PCO2 107.2*   HCO3 35.1*   BE 6     Spoke to son aD Sanchez via Skype with Helen Campbell NP and explained the consequences of this complication called barotrauma.  There is no treatment other than lung protective ventilation which has been performed since Mr. Sanchez's admission to [facility] ICU.  Because of his severe acidosis, his cardiovascular system is unstable with increasing vasopressor support.  We explained to Mr. Da Sanchez that we do not believe that his father will survive the next 24-48 hours and will die.  He voiced an understanding and was allowed to ask many questions that were answered to his satisfaction.  I offered to called their family friend who is a physician and resides in [place], he declined this offer.  Prior to speaking to family, I notified Dr. Ayon of this devastating complication who has been Mr Sanchez's physician for the majority of his hospitalization.  He agreed that there is no further intervention available and that he will die.    Due to this irreversible complication of ARDS and inability to ventilate this patient, will not escalate care.  Will continue current management.  Patient is DNR.    Critical care time discussing case with team, reviewing labs and assessing patient 20 minutes

## 2020-06-29 PROBLEM — J93.9 PNEUMOTHORAX: Status: ACTIVE | Noted: 2020-01-01

## 2020-06-29 NOTE — NURSING
Pt had brief episode of  Asystole, which resolved on its own.  CCM team at bedside for entire event. No new orders, pt is DNR.

## 2020-06-29 NOTE — PROGRESS NOTES
[facility] - Respiratory ICU  Critical Care Medicine  Progress Note    Patient Name: Ranjana Sanchez  MRN: [id number]  Admission Date: 4/10/2020  Hospital Length of Stay: 80 days  Code Status: DNR  Attending Provider: Bret Schmitz MD  Primary Care Provider: Primary Doctor No   Principal Problem: Acute respiratory disease due to COVID-19 virus    Subjective:     HPI:  Mr. LUANA Sanchez is a 56 y/o M with PMHx of HTN brought to ER via EMS for increasing SOB with known COVID-19 exposure. 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 They have not had travelers since 3/14. 2 days ago he started having fever and SOB. He was seen by the ship's physician and was found to have crackles in his bases bilaterally, but his oxygen saturation was in the 90's. He had a flu test which was negative. He was quarantined in a room and states that he got worse with increasing SOB. He was brought to the ER and found to be 78% on RA. He was started on NRB and initially was doing much better, but his tachypnea did not improve, so he was placed on BiPAP with oxygen saturation improvement.    Hospital/ICU Course:  Patient was admitted to the MICU after a difficult intubation in the ER s/p crich. COVID + with ARDS, sedated and paralyzed. Unable to prone secondary to crich. Left mainstem intubation which was pulled off, on NO and levophed. ET tube replaced on 4/11 family contacted in [place]. Proned on 4/12 tolerated well and continued to prone on 4/19. 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 He was on and off ketamine as well.  EEG ordered to rule out seizures as he also had seizure like activity that resolved with ativan. Currently changing sedation, was placed on phenobarbital which should start having effect 4/20. Patient proned x 10 (last on 4/22). Still not tolerating ventilator weaning. Remains on  sedation, vasopressors, and paralytic. ID now following patient for remdesivir administration; family aware and consented. Patient not significantly improving despite efforts. Son contacted, patient made DNR. Patient evaluated for ECMO on 4/24. Placed on ECMO on 4/25 and transferred to Dr Ayon's service with DNR rescinded.    Some physiologic improvement on VV ECMO. Oxygenation at goal, lactate clearing, hemodynamics at goal without vasoactive support currently. Tolerated wean of vent Fi and Monika thus far. Peeling back sedatives steadily with goal of neuro exam ASAP. Metabolic alkalosis correcting with encouragement from diamox. Patient was weaned off from ECMO support on 06/24/2020. However, during this process he has undergone a stroke and currently is not responding to verbal commands. The patient is able to open his eyes and track, however no purposeful movements can be ascertained from the patient. Extensive notes from neurology have been documented in the patient's medical record. Extensive discussions had been carried out between the family members and periodic updates were provided. Periodic updates were also provided to [employer] which is his employer. The family was also notified that if any further deterioration happens after completion of ECMO we would not be providing new ECMO support to this patient.    The patient was transferred to the MICU team on 6/25 after ECMO decannulation. Fever with worsening shock. Cultured and started on broad spectrum antibiotics. Frequent episodes of bradycardia to the 20's but recovers without intervention.     Interval History/Significant Events: Pneumothorax on CXR this morning. Improvement in ventilation noted. Discussion held with Dr. Schmitz, myself, and patient's son on risks and benefits of proceeding with chest tube placement despite overall poor prognosis and family asked us to proceed.      Review of Systems   Unable to perform ROS: Acuity of condition      Objective:     Vital Signs (Most Recent):  Temp: 98.2 °F (36.8 °C) (06/29/20 1500)  Pulse: (!) 126 (06/29/20 1706)  Resp: (!) 42 (06/29/20 1645)  BP: (!) 107/59 (06/29/20 1700)  SpO2: 96 % (06/29/20 1706) Vital Signs (24h Range):  Temp:  [98.2 °F (36.8 °C)-98.9 °F (37.2 °C)] 98.2 °F (36.8 °C)  Pulse:  [108-129] 126  Resp:  [16-42] 42  SpO2:  [96 %-100 %] 96 %  BP: (104-157)/(56-89) 107/59   Weight: 61.4 kg (135 lb 5.8 oz)  Body mass index is 23.23 kg/m².      Intake/Output Summary (Last 24 hours) at 6/29/2020 1738  Last data filed at 6/29/2020 1600  Gross per 24 hour   Intake 2118.12 ml   Output 5125 ml   Net -3006.88 ml       Physical Exam  Vitals signs reviewed.   Constitutional:       Appearance: He is well-developed. He is toxic-appearing.      Interventions: He is sedated and intubated.   HENT:      Head: Normocephalic and atraumatic.   Eyes:      Pupils: Pupils are equal, round, and reactive to light.   Neck:      Musculoskeletal: Normal range of motion.      Trachea: No tracheal deviation.      Comments: Trach in place   Cardiovascular:      Rate and Rhythm: Tachycardia present.      Pulses:           Radial pulses are 2+ on the right side and 2+ on the left side.        Dorsalis pedis pulses are 1+ on the right side and 1+ on the left side.      Heart sounds: Normal heart sounds.   Pulmonary:      Effort: He is intubated.      Breath sounds: Examination of the left-upper field reveals decreased breath sounds. Examination of the left-middle field reveals decreased breath sounds. Decreased breath sounds present. No wheezing, rhonchi or rales.      Comments: Poor lung compliance   Abdominal:      General: Bowel sounds are normal. There is no distension.      Palpations: Abdomen is soft.   Genitourinary:     Comments: Torres in place   Musculoskeletal: Normal range of motion.   Skin:     General: Skin is warm.      Comments: Necrotic tissue on left forearm    Neurological:      Mental Status: He is  unresponsive.      Comments: PERRL, spontaneously opens eyes but does not track. Cough reflexes intact. No response to pain X 4 extremities.          Vents:  Vent Mode: A/C (06/29/20 1706)  Ventilator Initiated: Yes (04/10/20 2247)  Set Rate: 35 BPM (06/29/20 1706)  Vt Set: 250 mL (06/06/20 0858)  Pressure Support: 20 cmH20 (06/06/20 0858)  PEEP/CPAP: 5 cmH20 (06/29/20 1706)  Oxygen Concentration (%): 50 (06/29/20 1706)  Peak Airway Pressure: 51 cmH2O (06/29/20 1706)  Plateau Pressure: 42 cmH20 (06/29/20 1706)  Total Ve: 11.6 mL (06/29/20 1706)  F/VT Ratio<105 (RSBI): 117.83 (06/27/20 0027)  Lines/Drains/Airways     Peripherally Inserted Central Catheter Line            PICC Double Lumen 05/05/20 1707 right basilic 55 days          Drain                 Urethral Catheter 06/26/20 0901 3 days         NG/OG Tube 06/27/20 1153 16 Fr. Right nostril 2 days          Airway                 Surgical Airway 05/05/20 1500 Shiley Cuffed 55 days          Peripheral Intravenous Line                 Peripheral IV - Single Lumen 06/23/20 1425 18 G Anterior;Right Hand 6 days         Peripheral IV - Single Lumen 06/28/20 20 G Anterior;Right Foot 1 day              Significant Labs:    CBC/Anemia Profile:  Recent Labs   Lab 06/28/20  0300 06/28/20  1441 06/29/20  0330   WBC 16.75* 14.01* 13.30*   HGB 7.5* 7.1* 7.3*   HCT 24.6* 24.2* 23.4*    160 190   MCV 97 98 93   RDW 14.8* 14.5 14.2        Chemistries:  Recent Labs   Lab 06/28/20  0300 06/28/20  1441 06/29/20  0330   * 129* 137   K 3.6 4.2 3.2*   CL 95 94* 95   CO2 30* 28 33*   BUN 63* 59* 63*   CREATININE 0.9 0.9 1.0   CALCIUM 8.5* 7.9* 8.2*   ALBUMIN 2.6* 2.3* 2.6*   PROT 6.3 5.9* 6.2   BILITOT 1.0 1.1* 1.2*   ALKPHOS 98 98 100   ALT 31 32 29   AST 26 31 22   MG 2.3 2.2 2.2   PHOS 5.6* 4.9* 3.1       All pertinent labs within the past 24 hours have been reviewed.    Significant Imaging:  I have reviewed all pertinent imaging results/findings within the past 24  "hours.      ABG  Recent Labs   Lab 06/29/20  0935   PH 7.349*   PO2 93   PCO2 72.2*   HCO3 39.7*   BE 14     Assessment/Plan:     Neuro  Basal ganglia infarction  CT head on 6/2 with bilateral basal ganglia infarcts consistent with global hypoxic ischemic injury 2/2 COVID-19    --neuro exam unchanged with intact cranial nerve reflexes and spontaneous eye opening only  --per neurology, "considering the extent of brain injury, he has very low likelihood of substantial further neurological improvement. His clinical picture is consistent with persistent vegetative state/unresponsive wakefulness syndrome"         Pulmonary  Pneumothorax  Noted on CXR 6/29 as a complication from severe ARDS 2/2 COVID  --will place chest tube    Pneumomediastinum  Worsening compliance noted. TV dropped from 280 to 150's. CXR with pneumomediastinum.  --no intervention warranted  --continue LPV; unable to effectively ventilate  --son updated    Difficult intubation  S/p crich which was converted to trach    Acute hypoxemic respiratory failure  2/2 to ARDS due to COVID-19  - ARDSNET Protocol  --see COVID-19    Cardiac/Vascular  Tachycardia  Patient's baseline since the initiation of ECMO has been tachycardic.  --previously on metoprolol, but unable to tolerate due to hypotension and bradycardic episodes.  --improving with decreasing pressor requirements    Endocrine  Type 2 diabetes mellitus without complication, without long-term current use of insulin  - HbA1c 6  - Home DM regimen:  Diet controlled  --continue insulin gtt  --frequent BG monitoring       GI  GIB (gastrointestinal bleeding)  EGD 5/3 showed Diuelafoy lesion  - ppi q12h and sucralfate continued  - currently on ASA and Heparin SQ, continue to closely monitor.    Other  * Acute respiratory disease due to COVID-19 virus  - Difficult intubation requiring crich on admit; converted to trach 5/5  - previously proned with Monika  - remdesivir course completed.  - Completed treatment with " Plaquenil, Azithro, Methyprednisone for 5 days  - Strict I/O's and goal net neutral status to help optimize vent mechanics. Continue Lasix gtt   - Special isolation and aspiration precautions ordered per COVID protocol  - Significant improvement in oxygenation, but remains with poor lung compliance   - complicated by pneumomediastinum and pneumothorax; will place chest tube      Acute respiratory distress syndrome (ARDS) due to COVID-19 virus  -See acute respiratory failure        Arm wound, left, sequela  --wound to LUE being seen by wound care. Thought to be 2/2 extravasation of medication through PIV. Continue to monitor. RUE with +2 palpable pulses.    Goals of care, counseling/discussion  Patient's prognosis remains poor despite efforts to optimize health. Son spoken with regarding GOC.  DNR changed after consulting CTS for ECMO. Per conversation with Dr. Goss on 6/26, no chest compressions. Given Mr. Sanchez's decline, he is too sick for travel back to [place]. Family urged to visit if possible; ongoing coordination with [employer] to help arrange travel.       Shock  Suspect septic. Febrile to 103.6 with rising leukocytosis  --follow up blood and sputum cultures  --continue cefepime; d/c vanc  --Continue norepi for systolic >100  --continue stress dose steroids  --Pressor requirement decreasing     COVID-19 virus detected  See ARDS    Plan discussed with Dr. Schmitz.   Son updated via skype 2X today. All questions answered, all concerns addressed.     Critical Care Time: 70 minutes  Critical secondary to Patient has a condition that poses threat to life and bodily function: ARDS 2/2 COVID      Critical care was time spent personally by me on the following activities: development of treatment plan with patient or surrogate and bedside caregivers, discussions with consultants, evaluation of patient's response to treatment, examination of patient, ordering and performing treatments and interventions,  ordering and review of laboratory studies, ordering and review of radiographic studies, pulse oximetry, re-evaluation of patient's condition. This critical care time did not overlap with that of any other provider or involve time for any procedures.     Helen Campbell NP  Critical Care Medicine  [facility] - Respiratory ICU

## 2020-06-29 NOTE — NURSING
Rec'd call from radiology MD, was notified that patient has new small L pneumothorax. Notified Helen with CCM, no new orders at this time.

## 2020-06-29 NOTE — PROGRESS NOTES
Therapy with vancomycin is complete and/or consult discontinued by provider.  Pharmacy will sign off, please re-consult as needed.    Daiana Vega, PharmD, BCCCP  [phone]

## 2020-06-29 NOTE — PROCEDURES
"Ranjana Sanchez is a 55 y.o. male patient.    Temp: 98.2 °F (36.8 °C) (20 1500)  Pulse: (!) 140 (20 1800)  Resp: (!) 41 (20 1800)  BP: (!) 176/96 (20 1800)  SpO2: 96 % (20 1800)  Weight: 61.4 kg (135 lb 5.8 oz) (20 0300)  Height: 5' 4" (162.6 cm) (20 0935)       Chest Tube Insertion    Date/Time: 2020 6:45 PM  Location procedure was performed: [facility] CRITICAL [facility]  Performed by: Moon Mccall MD  Authorized by: Moon Mccall MD   Assisting provider: Bret Schmitz MD  Consent Done: Yes  Consent: Verbal consent obtained.  Risks and benefits: risks, benefits and alternatives were discussed  Consent given by: father  Patient identity confirmed: , MRN and name  Time out: Immediately prior to procedure a "time out" was called to verify the correct patient, procedure, equipment, support staff and site/side marked as required.  Indications: pneumothorax  Patient sedated: no    Anesthesia:  Local Anesthetic: lidocaine 1% without epinephrine  Preparation: skin prepped with ChloraPrep  Placement location: left anterior  Scalpel size: 11  Ultrasound guidance: yes  Tension pneumothorax heard: no  Tube connected to: water seal  Drainage characteristics: serosanguinous  Drainage amount: 30 ml  Suture material: 0 silk  Dressing: 4x4 sterile gauze  Post-insertion x-ray findings: tube in good position  Patient tolerance: Patient tolerated the procedure well with no immediate complications  Complications: No  Specimens: No          Moon Mccall  2020  "

## 2020-06-29 NOTE — ASSESSMENT & PLAN NOTE
Patient's baseline since the initiation of ECMO has been tachycardic.  --previously on metoprolol, but unable to tolerate due to hypotension and bradycardic episodes.  --improving with decreasing pressor requirements

## 2020-06-29 NOTE — PLAN OF CARE
Updated medical records faxed to Dr. Flor Tiwari (f) [phone] with [employer] as requested.

## 2020-06-29 NOTE — SUBJECTIVE & OBJECTIVE
Interval History/Significant Events: Pneumothorax on CXR this morning. Improvement in ventilation noted. Discussion held with Dr. Schmitz, myself, and patient's son on risks and benefits of proceeding with chest tube placement despite overall poor prognosis and family asked us to proceed.      Review of Systems   Unable to perform ROS: Acuity of condition     Objective:     Vital Signs (Most Recent):  Temp: 98.2 °F (36.8 °C) (06/29/20 1500)  Pulse: (!) 126 (06/29/20 1706)  Resp: (!) 42 (06/29/20 1645)  BP: (!) 107/59 (06/29/20 1700)  SpO2: 96 % (06/29/20 1706) Vital Signs (24h Range):  Temp:  [98.2 °F (36.8 °C)-98.9 °F (37.2 °C)] 98.2 °F (36.8 °C)  Pulse:  [108-129] 126  Resp:  [16-42] 42  SpO2:  [96 %-100 %] 96 %  BP: (104-157)/(56-89) 107/59   Weight: 61.4 kg (135 lb 5.8 oz)  Body mass index is 23.23 kg/m².      Intake/Output Summary (Last 24 hours) at 6/29/2020 1738  Last data filed at 6/29/2020 1600  Gross per 24 hour   Intake 2118.12 ml   Output 5125 ml   Net -3006.88 ml       Physical Exam  Vitals signs reviewed.   Constitutional:       Appearance: He is well-developed. He is toxic-appearing.      Interventions: He is sedated and intubated.   HENT:      Head: Normocephalic and atraumatic.   Eyes:      Pupils: Pupils are equal, round, and reactive to light.   Neck:      Musculoskeletal: Normal range of motion.      Trachea: No tracheal deviation.      Comments: Trach in place   Cardiovascular:      Rate and Rhythm: Tachycardia present.      Pulses:           Radial pulses are 2+ on the right side and 2+ on the left side.        Dorsalis pedis pulses are 1+ on the right side and 1+ on the left side.      Heart sounds: Normal heart sounds.   Pulmonary:      Effort: He is intubated.      Breath sounds: Examination of the left-upper field reveals decreased breath sounds. Examination of the left-middle field reveals decreased breath sounds. Decreased breath sounds present. No wheezing, rhonchi or rales.       Comments: Poor lung compliance   Abdominal:      General: Bowel sounds are normal. There is no distension.      Palpations: Abdomen is soft.   Genitourinary:     Comments: Torres in place   Musculoskeletal: Normal range of motion.   Skin:     General: Skin is warm.      Comments: Necrotic tissue on left forearm    Neurological:      Mental Status: He is unresponsive.      Comments: PERRL, spontaneously opens eyes but does not track. Cough reflexes intact. No response to pain X 4 extremities.          Vents:  Vent Mode: A/C (06/29/20 1706)  Ventilator Initiated: Yes (04/10/20 2247)  Set Rate: 35 BPM (06/29/20 1706)  Vt Set: 250 mL (06/06/20 0858)  Pressure Support: 20 cmH20 (06/06/20 0858)  PEEP/CPAP: 5 cmH20 (06/29/20 1706)  Oxygen Concentration (%): 50 (06/29/20 1706)  Peak Airway Pressure: 51 cmH2O (06/29/20 1706)  Plateau Pressure: 42 cmH20 (06/29/20 1706)  Total Ve: 11.6 mL (06/29/20 1706)  F/VT Ratio<105 (RSBI): 117.83 (06/27/20 0027)  Lines/Drains/Airways     Peripherally Inserted Central Catheter Line            PICC Double Lumen 05/05/20 1707 right basilic 55 days          Drain                 Urethral Catheter 06/26/20 0901 3 days         NG/OG Tube 06/27/20 1153 16 Fr. Right nostril 2 days          Airway                 Surgical Airway 05/05/20 1500 Shiley Cuffed 55 days          Peripheral Intravenous Line                 Peripheral IV - Single Lumen 06/23/20 1425 18 G Anterior;Right Hand 6 days         Peripheral IV - Single Lumen 06/28/20 20 G Anterior;Right Foot 1 day              Significant Labs:    CBC/Anemia Profile:  Recent Labs   Lab 06/28/20  0300 06/28/20  1441 06/29/20  0330   WBC 16.75* 14.01* 13.30*   HGB 7.5* 7.1* 7.3*   HCT 24.6* 24.2* 23.4*    160 190   MCV 97 98 93   RDW 14.8* 14.5 14.2        Chemistries:  Recent Labs   Lab 06/28/20  0300 06/28/20  1441 06/29/20  0330   * 129* 137   K 3.6 4.2 3.2*   CL 95 94* 95   CO2 30* 28 33*   BUN 63* 59* 63*   CREATININE 0.9 0.9 1.0    CALCIUM 8.5* 7.9* 8.2*   ALBUMIN 2.6* 2.3* 2.6*   PROT 6.3 5.9* 6.2   BILITOT 1.0 1.1* 1.2*   ALKPHOS 98 98 100   ALT 31 32 29   AST 26 31 22   MG 2.3 2.2 2.2   PHOS 5.6* 4.9* 3.1       All pertinent labs within the past 24 hours have been reviewed.    Significant Imaging:  I have reviewed all pertinent imaging results/findings within the past 24 hours.

## 2020-06-29 NOTE — SUBJECTIVE & OBJECTIVE
"Interval HPI:   Overnight events: Remains in RICU, SHAKIRON.  BG fairly well controlled overnight on intensive insulin protocol, rates ranging from 0.5-2.1 u/hr.  TF remains on hold.  Hydrocortisone 100 mg IV q 8 hrs.  On IV antibiotics.  Eating:   NPO  Nausea: No  Hypoglycemia and intervention: No  Fever: No  TPN and/or TF: No - currently on hold    BP (!) 140/83   Pulse (!) 116   Temp 98.4 °F (36.9 °C) (Oral)   Resp 16   Ht 5' 4" (1.626 m)   Wt 61.4 kg (135 lb 5.8 oz)   SpO2 100%   BMI 23.23 kg/m²     Labs Reviewed and Include    Recent Labs   Lab 06/29/20  0330   *   CALCIUM 8.2*   ALBUMIN 2.6*   PROT 6.2      K 3.2*   CO2 33*   CL 95   BUN 63*   CREATININE 1.0   ALKPHOS 100   ALT 29   AST 22   BILITOT 1.2*     Lab Results   Component Value Date    WBC 13.30 (H) 06/29/2020    HGB 7.3 (L) 06/29/2020    HCT 23.4 (L) 06/29/2020    MCV 93 06/29/2020     06/29/2020     No results for input(s): TSH, FREET4 in the last 168 hours.  Lab Results   Component Value Date    HGBA1C 6.6 (H) 04/12/2020       Nutritional status:   Body mass index is 23.23 kg/m².  Lab Results   Component Value Date    ALBUMIN 2.6 (L) 06/29/2020    ALBUMIN 2.3 (L) 06/28/2020    ALBUMIN 2.6 (L) 06/28/2020     Lab Results   Component Value Date    PREALBUMIN 20 05/28/2020       Estimated Creatinine Clearance: 68.2 mL/min (based on SCr of 1 mg/dL).    Accu-Checks  Recent Labs     06/28/20  1954 06/28/20  2125 06/28/20  2343 06/29/20  0204 06/29/20  0336 06/29/20  0337 06/29/20  0534 06/29/20  0637 06/29/20  0722 06/29/20  0820   POCTGLUCOSE 177* 176* 183* 178* 294* 171* 154* 134* 126* 138*       Current Medications and/or Treatments Impacting Glycemic Control  Immunotherapy:    Immunosuppressants     None        Steroids:   Hormones (From admission, onward)    Start     Stop Route Frequency Ordered    06/26/20 0900  fludrocortisone tablet 100 mcg      -- Oral Daily 06/25/20 1429    06/25/20 0376  hydrocortisone sodium succinate " injection 100 mg      -- IV Every 8 hours 06/25/20 2139        Pressors:    Autonomic Drugs (From admission, onward)    Start     Stop Route Frequency Ordered    06/28/20 2200  norepinephrine 4 mg in dextrose 5% 250 mL infusion (premix) (titrating)     Question Answer Comment   Titrate by: (in mcg/kg/min) 0.02    Titrate interval: (in minutes) 5    Titrate to maintain: (MAP or SBP) MAP    Greater than: (in mmHg) 65    Maximum dose: (in mcg/kg/min) 3        -- IV Continuous 06/28/20 2159 04/16/20 0915  rocuronium 10 mg/mL injection     Note to Pharmacy: Created by cabinet override    04/16 2129 04/16/20 0915        Hyperglycemia/Diabetes Medications:   Antihyperglycemics (From admission, onward)    Start     Stop Route Frequency Ordered    06/26/20 0745  insulin regular 100 Units in sodium chloride 0.9% 100 mL infusion     Question:  Insulin Rate Adjustment (DO NOT MODIFY ANSWER)  Answer:  [email or web address]    -- IV Continuous 06/26/20 0632

## 2020-06-29 NOTE — PLAN OF CARE
"   06/29/20 0949   Discharge Reassessment   Assessment Type Discharge Planning Reassessment   Discharge Plan A Long-term acute care facility (LTAC)   Discharge Plan B Other  (death)       Per MD note, "Spoke to son Da Sanchez via Skype with Helen Campbell NP and explained the consequences of this complication called barotrauma. We explained to Mr. Da Sanchez that we do not believe that his father will survive the next 24-48 hours and will die.  He voiced an understanding and was allowed to ask many questions that were answered to his satisfaction. Prior to speaking to family, I notified Dr. Ayon of this devastating complication who has been Mr Sanchez's physician for the majority of his hospitalization.  He agreed that there is no further intervention available and that he will die. Due to this irreversible complication of ARDS and inability to ventilate this patient, will not escalate care.  Will continue current management.  Patient is DNR".    CM informed April Mackay ([id number]) w/[facility] of patient's status. Dr. Flor Tiwari (p) [phone] (f) [phone] with [employer] requested to be notified when the patient expires & stated that she will take over regarding post-mortum care. LONG informed LYNDA Campbell of above.     Will continue to follow.     "

## 2020-06-29 NOTE — ASSESSMENT & PLAN NOTE
BG goal 140 - 180     Discontinue IV insulin infusion protocol  Convert patient to transition IV insulin infusion at 1 u/hr  Low dose correction scale  BG monitoring every 4 hours while NPO    Will trend BG today on transition IV insulin infusion, low threshold for placing patient back on the intensive IV insulin protocol if BG unstable    ** Please call Endocrine for any BG related issues **    Discharge planning: URIEL

## 2020-06-29 NOTE — ASSESSMENT & PLAN NOTE
"CT head on 6/2 with bilateral basal ganglia infarcts consistent with global hypoxic ischemic injury 2/2 COVID-19    --neuro exam unchanged with intact cranial nerve reflexes and spontaneous eye opening only  --per neurology, "considering the extent of brain injury, he has very low likelihood of substantial further neurological improvement. His clinical picture is consistent with persistent vegetative state/unresponsive wakefulness syndrome"       "

## 2020-06-29 NOTE — ASSESSMENT & PLAN NOTE
- Difficult intubation requiring crich on admit; converted to trach 5/5  - previously proned with Monika  - remdesivir course completed.  - Completed treatment with Plaquenil, Azithro, Methyprednisone for 5 days  - Strict I/O's and goal net neutral status to help optimize vent mechanics. Continue Lasix gtt   - Special isolation and aspiration precautions ordered per COVID protocol  - Significant improvement in oxygenation, but remains with poor lung compliance   - complicated by pneumomediastinum and pneumothorax; will place chest tube

## 2020-06-29 NOTE — ASSESSMENT & PLAN NOTE
Patient's prognosis remains poor despite efforts to optimize health. Son spoken with regarding GOC.  DNR changed after consulting CTS for ECMO. Per conversation with Dr. Goss on 6/26, no chest compressions. Given Mr. Sanchez's decline, he is too sick for travel back to [place]. Family urged to visit if possible; ongoing coordination with [employer] to help arrange travel.

## 2020-06-29 NOTE — PROGRESS NOTES
"[facility] ICU  Endocrinology  Progress Note    Admit Date: 4/10/2020     Reason for Consult: Management of T2DM, Hyperglycemia     Surgical Procedure and Date: tracheostomy 5/5/20    Diabetes diagnosis year: JAMMIE    Lab Results   Component Value Date    HGBA1C 6.6 (H) 04/12/2020       Home Diabetes Medications: none (per chart review)    How often checking glucose at home? JAMMIE  BG readings on regimen: JAMMIE  Hypoglycemia on the regimen? JAMMIE  Missed doses on regimen? JAMMIE    Diabetes Complications include:     JAMMIE    Complicating diabetes co morbidities:   none      HPI:   Patient is a 55 y.o. male with a diagnosis of DM2 and ARDS secondary to COVID-19.  Admitted to [facility] on 4/10/20 with fever and SOB.  Lengthy and complex hospital stay noted requiring intubation, ECMO, and trach placement.  Patient not with severe ARDS (with poor lung compliance) and suspected anoxic brain injury.  Patient developed hyperglycemia secondary to TF.  Endocrinology consulted for DM/BG management.        Interval HPI:   Overnight events: Remains in RICU, NAEON.  BG fairly well controlled overnight on intensive insulin protocol, rates ranging from 0.5-2.1 u/hr.  TF remains on hold.  Hydrocortisone 100 mg IV q 8 hrs.  On IV antibiotics.  Eating:   NPO  Nausea: No  Hypoglycemia and intervention: No  Fever: No  TPN and/or TF: No - currently on hold    BP (!) 140/83   Pulse (!) 116   Temp 98.4 °F (36.9 °C) (Oral)   Resp 16   Ht 5' 4" (1.626 m)   Wt 61.4 kg (135 lb 5.8 oz)   SpO2 100%   BMI 23.23 kg/m²     Labs Reviewed and Include    Recent Labs   Lab 06/29/20  0330   *   CALCIUM 8.2*   ALBUMIN 2.6*   PROT 6.2      K 3.2*   CO2 33*   CL 95   BUN 63*   CREATININE 1.0   ALKPHOS 100   ALT 29   AST 22   BILITOT 1.2*     Lab Results   Component Value Date    WBC 13.30 (H) 06/29/2020    HGB 7.3 (L) 06/29/2020    HCT 23.4 (L) 06/29/2020    MCV 93 06/29/2020     06/29/2020     No results for input(s): " TSH, FREET4 in the last 168 hours.  Lab Results   Component Value Date    HGBA1C 6.6 (H) 04/12/2020       Nutritional status:   Body mass index is 23.23 kg/m².  Lab Results   Component Value Date    ALBUMIN 2.6 (L) 06/29/2020    ALBUMIN 2.3 (L) 06/28/2020    ALBUMIN 2.6 (L) 06/28/2020     Lab Results   Component Value Date    PREALBUMIN 20 05/28/2020       Estimated Creatinine Clearance: 68.2 mL/min (based on SCr of 1 mg/dL).    Accu-Checks  Recent Labs     06/28/20  1954 06/28/20  2125 06/28/20  2343 06/29/20  0204 06/29/20  0336 06/29/20  0337 06/29/20  0534 06/29/20  0637 06/29/20  0722 06/29/20  0820   POCTGLUCOSE 177* 176* 183* 178* 294* 171* 154* 134* 126* 138*       Current Medications and/or Treatments Impacting Glycemic Control  Immunotherapy:    Immunosuppressants     None        Steroids:   Hormones (From admission, onward)    Start     Stop Route Frequency Ordered    06/26/20 0900  fludrocortisone tablet 100 mcg      -- Oral Daily 06/25/20 2139 06/25/20 2245  hydrocortisone sodium succinate injection 100 mg      -- IV Every 8 hours 06/25/20 2139        Pressors:    Autonomic Drugs (From admission, onward)    Start     Stop Route Frequency Ordered    06/28/20 2200  norepinephrine 4 mg in dextrose 5% 250 mL infusion (premix) (titrating)     Question Answer Comment   Titrate by: (in mcg/kg/min) 0.02    Titrate interval: (in minutes) 5    Titrate to maintain: (MAP or SBP) MAP    Greater than: (in mmHg) 65    Maximum dose: (in mcg/kg/min) 3        -- IV Continuous 06/28/20 2159 04/16/20 0915  rocuronium 10 mg/mL injection     Note to Pharmacy: Created by cabinet override    04/16 2129 04/16/20 0915        Hyperglycemia/Diabetes Medications:   Antihyperglycemics (From admission, onward)    Start     Stop Route Frequency Ordered    06/26/20 0745  insulin regular 100 Units in sodium chloride 0.9% 100 mL infusion     Question:  Insulin Rate Adjustment (DO NOT MODIFY ANSWER)  Answer:   [email or web address]    -- IV Continuous 06/26/20 0632          ASSESSMENT and PLAN    * Acute respiratory disease due to COVID-19 virus  Managed per primary team        Type 2 diabetes mellitus without complication, without long-term current use of insulin  BG goal 140 - 180     Discontinue IV insulin infusion protocol  Convert patient to transition IV insulin infusion at 1 u/hr  Low dose correction scale  BG monitoring every 4 hours while NPO    Will trend BG today on transition IV insulin infusion, low threshold for placing patient back on the intensive IV insulin protocol if BG unstable    ** Please call Endocrine for any BG related issues **    Discharge planning: TBD          Adrenal cortical steroids causing adverse effect in therapeutic use  Steroids per primary team, may cause BG excursions.           Discussed POC with RN via telephone due to covid restrictions.    Harlan Denny NP  Endocrinology  [facility] - [facility]

## 2020-06-29 NOTE — NURSING
Dr. Mccall and Dr. Schmitz at bedside for chest tube placement. Time out performed.    1726: 50 mcg fentanyl bolus given from bag.  1754: 50 mcg fentanyl bolus given from bag.    1754: STAT CXR ordered.

## 2020-06-29 NOTE — ASSESSMENT & PLAN NOTE
Suspect septic. Febrile to 103.6 with rising leukocytosis  --follow up blood and sputum cultures  --continue cefepime; d/c vanc  --Continue norepi for systolic >100  --continue stress dose steroids  --Pressor requirement decreasing

## 2020-06-30 NOTE — ASSESSMENT & PLAN NOTE
Worsening compliance noted. TV dropped from 280 to 150's. CXR with pneumomediastinum.  --no intervention warranted  --continue LPV

## 2020-06-30 NOTE — SUBJECTIVE & OBJECTIVE
"Interval HPI:   Overnight events: Remains in RICU. NAEON. BG at or slightly above goal on IV insulin infusion at 0.6 u/hr. TF on homd. Hydrocortisone 100 mg every 8 hours; changing to 40 mg daily tomorrow.   Eating:   NPO  Nausea: No  Hypoglycemia and intervention: No  Fever: No  TPN and/or TF: No      /79   Pulse 109   Temp 98.6 °F (37 °C) (Axillary)   Resp (!) 41   Ht 5' 4" (1.626 m)   Wt 61.4 kg (135 lb 5.8 oz)   SpO2 100%   BMI 23.23 kg/m²     Labs Reviewed and Include    Recent Labs   Lab 06/30/20  0429   *   CALCIUM 8.6*   ALBUMIN 2.6*   PROT 6.5      K 2.9*   CO2 37*   CL 97   BUN 68*   CREATININE 0.9   ALKPHOS 111   ALT 25   AST 19   BILITOT 1.7*     Lab Results   Component Value Date    WBC 12.85 (H) 06/30/2020    HGB 8.1 (L) 06/30/2020    HCT 25.9 (L) 06/30/2020    MCV 93 06/30/2020     06/30/2020     No results for input(s): TSH, FREET4 in the last 168 hours.  Lab Results   Component Value Date    HGBA1C 6.6 (H) 04/12/2020       Nutritional status:   Body mass index is 23.23 kg/m².  Lab Results   Component Value Date    ALBUMIN 2.6 (L) 06/30/2020    ALBUMIN 2.6 (L) 06/29/2020    ALBUMIN 2.3 (L) 06/28/2020     Lab Results   Component Value Date    PREALBUMIN 20 05/28/2020       Estimated Creatinine Clearance: 75.8 mL/min (based on SCr of 0.9 mg/dL).    Accu-Checks  Recent Labs     06/29/20  0637 06/29/20  0722 06/29/20  0820 06/29/20  0908 06/29/20  1241 06/29/20  1600 06/29/20 2009 06/30/20  0012 06/30/20  0427 06/30/20  0812   POCTGLUCOSE 134* 126* 138* 163* 173* 111* 196* 197* 224* 135*       Current Medications and/or Treatments Impacting Glycemic Control  Immunotherapy:    Immunosuppressants     None        Steroids:   Hormones (From admission, onward)    Start     Stop Route Frequency Ordered    07/01/20 0900  hydrocortisone sodium succinate injection 40 mg      -- IV Daily 06/30/20 7623        Pressors:    Autonomic Drugs (From admission, onward)    Start     Stop " Route Frequency Ordered    06/28/20 2200  norepinephrine 4 mg in dextrose 5% 250 mL infusion (premix) (titrating)     Question Answer Comment   Titrate by: (in mcg/kg/min) 0.02    Titrate interval: (in minutes) 5    Titrate to maintain: (MAP or SBP) MAP    Greater than: (in mmHg) 65    Maximum dose: (in mcg/kg/min) 3        -- IV Continuous 06/28/20 2159 04/16/20 0915  rocuronium 10 mg/mL injection     Note to Pharmacy: Created by cabinet override    04/16 2129 04/16/20 0915        Hyperglycemia/Diabetes Medications:   Antihyperglycemics (From admission, onward)    Start     Stop Route Frequency Ordered    06/29/20 1015  insulin regular 100 Units in sodium chloride 0.9% 100 mL infusion      -- IV Continuous 06/29/20 0907    06/29/20 1007  insulin aspart U-100 pen 0-4 Units      -- SubQ As needed (PRN) 06/29/20 0907

## 2020-06-30 NOTE — SUBJECTIVE & OBJECTIVE
Interval History/Significant Events: S/p CT placement for spontaneous PTX with lung reexpansion noted on imaging. Tachycardia up to 166 noted this afternoon. EKG, troponin and metabolic panel ordered    Review of Systems   Unable to perform ROS: Intubated     Objective:     Vital Signs (Most Recent):  Temp: 99.4 °F (37.4 °C) (06/30/20 1500)  Pulse: (!) 146 (06/30/20 1515)  Resp: (!) 41 (06/29/20 1800)  BP: (!) 141/77 (06/30/20 1500)  SpO2: 95 % (06/30/20 1515) Vital Signs (24h Range):  Temp:  [97.8 °F (36.6 °C)-99.4 °F (37.4 °C)] 99.4 °F (37.4 °C)  Pulse:  [] 146  Resp:  [41-42] 41  SpO2:  [93 %-100 %] 95 %  BP: ()/(53-96) 141/77   Weight: 61.4 kg (135 lb 5.8 oz)  Body mass index is 23.23 kg/m².      Intake/Output Summary (Last 24 hours) at 6/30/2020 1609  Last data filed at 6/30/2020 1500  Gross per 24 hour   Intake 1823.11 ml   Output 4410 ml   Net -2586.89 ml       Physical Exam  Vitals signs reviewed.   Constitutional:       Appearance: He is well-developed. He is toxic-appearing.      Interventions: He is sedated and intubated.   HENT:      Head: Normocephalic and atraumatic.   Eyes:      Pupils: Pupils are equal, round, and reactive to light.   Neck:      Musculoskeletal: Normal range of motion.      Trachea: No tracheal deviation.      Comments: Trach in place   Cardiovascular:      Rate and Rhythm: Tachycardia present.      Pulses:           Radial pulses are 2+ on the right side and 2+ on the left side.        Dorsalis pedis pulses are 1+ on the right side and 1+ on the left side.      Heart sounds: Normal heart sounds.   Pulmonary:      Effort: He is intubated.      Breath sounds: No decreased breath sounds, wheezing, rhonchi or rales.   Abdominal:      General: Bowel sounds are normal. There is no distension.      Palpations: Abdomen is soft.   Genitourinary:     Comments: Torres in place   Musculoskeletal: Normal range of motion.   Skin:     General: Skin is warm.      Comments: Necrotic  tissue on left forearm    Neurological:      Mental Status: He is unresponsive.      Comments: PERRL, spontaneously opens eyes but does not track. Cough reflexes intact. No response to pain X 4 extremities.          Vents:  Vent Mode: A/C (06/30/20 1336)  Ventilator Initiated: Yes (04/10/20 2247)  Set Rate: 35 BPM (06/30/20 1336)  Vt Set: 250 mL (06/06/20 0858)  Pressure Support: 20 cmH20 (06/06/20 0858)  PEEP/CPAP: 5 cmH20 (06/30/20 1336)  Oxygen Concentration (%): 40 (06/30/20 1515)  Peak Airway Pressure: 48 cmH2O (06/30/20 1336)  Plateau Pressure: 32 cmH20 (06/30/20 1336)  Total Ve: 13.4 mL (06/30/20 1336)  F/VT Ratio<105 (RSBI): 117.83 (06/27/20 0027)  Lines/Drains/Airways     Peripherally Inserted Central Catheter Line            PICC Double Lumen 05/05/20 1707 right basilic 55 days          Drain                 Urethral Catheter 06/26/20 0901 4 days         NG/OG Tube 06/27/20 1153 16 Fr. Right nostril 3 days         Chest Tube 06/29/20 1816 1 Left First intercostal space less than 1 day          Airway                 Surgical Airway 05/05/20 1500 Shiley Cuffed 56 days          Peripheral Intravenous Line                 Peripheral IV - Single Lumen 06/23/20 1425 18 G Anterior;Right Hand 7 days         Peripheral IV - Single Lumen 06/28/20 20 G Anterior;Right Foot 2 days              Significant Labs:    CBC/Anemia Profile:  Recent Labs   Lab 06/29/20  0330 06/30/20  0429   WBC 13.30* 12.85*   HGB 7.3* 8.1*   HCT 23.4* 25.9*    261   MCV 93 93   RDW 14.2 14.5        Chemistries:  Recent Labs   Lab 06/29/20  0330 06/30/20  0429    144   K 3.2* 2.9*   CL 95 97   CO2 33* 37*   BUN 63* 68*   CREATININE 1.0 0.9   CALCIUM 8.2* 8.6*   ALBUMIN 2.6* 2.6*   PROT 6.2 6.5   BILITOT 1.2* 1.7*   ALKPHOS 100 111   ALT 29 25   AST 22 19   MG 2.2 2.2   PHOS 3.1 1.7*       All pertinent labs within the past 24 hours have been reviewed.    Significant Imaging:  I have reviewed all pertinent imaging  results/findings within the past 24 hours.

## 2020-06-30 NOTE — ASSESSMENT & PLAN NOTE
Noted on CXR 6/29 as a complication from severe ARDS 2/2 COVID  --s/p chest tube placed 06/29 with reexpansion on CXR

## 2020-06-30 NOTE — PLAN OF CARE
Updated medical records including neuro comments & neuro note on 6/9/2020 faxed to Dr. Flor Tiwari (f) [phone] with [employer] as requested.

## 2020-06-30 NOTE — ASSESSMENT & PLAN NOTE
BG goal 140 - 180       continue transition IV insulin infusion at 0.6 u/hr  Low dose correction scale  BG monitoring every 4 hours while NPO    ADDENDUM: increase insulin infusion to 0.8 u/hr.     ** Please call Endocrine for any BG related issues **    Discharge planning: TBD

## 2020-06-30 NOTE — ASSESSMENT & PLAN NOTE
Patient's prognosis remains poor despite efforts to optimize health. Son spoken with regarding GOC.  DNR changed after consulting CTS for ECMO. Per conversation with Dr. Goss on 6/26, no chest compressions. Given Mr. Sanchez's decline, he is too sick for travel back to [place]. Family urged to visit if possible; ongoing coordination with [employer] to help arrange travel.

## 2020-06-30 NOTE — PROGRESS NOTES
"[facility]  Endocrinology  Progress Note    Admit Date: 4/10/2020     Reason for Consult: Management of T2DM, Hyperglycemia     Surgical Procedure and Date: tracheostomy 5/5/20    Diabetes diagnosis year: JAMMIE    Lab Results   Component Value Date    HGBA1C 6.6 (H) 04/12/2020       Home Diabetes Medications: none (per chart review)    How often checking glucose at home? JAMMIE  BG readings on regimen: JAMMIE  Hypoglycemia on the regimen? JAMMIE  Missed doses on regimen? JAMMIE    Diabetes Complications include:     JAMMIE    Complicating diabetes co morbidities:   none      HPI:   Patient is a 55 y.o. male with a diagnosis of DM2 and ARDS secondary to COVID-19.  Admitted to [facility] on 4/10/20 with fever and SOB.  Lengthy and complex hospital stay noted requiring intubation, ECMO, and trach placement.  Patient not with severe ARDS (with poor lung compliance) and suspected anoxic brain injury.  Patient developed hyperglycemia secondary to TF.  Endocrinology consulted for DM/BG management.        Interval HPI:   Overnight events: Remains in RICU. NAEON. BG at or slightly above goal on IV insulin infusion at 0.6 u/hr. TF on homd. Hydrocortisone 100 mg every 8 hours; changing to 40 mg daily tomorrow.   Eating:   NPO  Nausea: No  Hypoglycemia and intervention: No  Fever: No  TPN and/or TF: No      /79   Pulse 109   Temp 98.6 °F (37 °C) (Axillary)   Resp (!) 41   Ht 5' 4" (1.626 m)   Wt 61.4 kg (135 lb 5.8 oz)   SpO2 100%   BMI 23.23 kg/m²     Labs Reviewed and Include    Recent Labs   Lab 06/30/20  0429   *   CALCIUM 8.6*   ALBUMIN 2.6*   PROT 6.5      K 2.9*   CO2 37*   CL 97   BUN 68*   CREATININE 0.9   ALKPHOS 111   ALT 25   AST 19   BILITOT 1.7*     Lab Results   Component Value Date    WBC 12.85 (H) 06/30/2020    HGB 8.1 (L) 06/30/2020    HCT 25.9 (L) 06/30/2020    MCV 93 06/30/2020     06/30/2020     No results for input(s): TSH, FREET4 in the last 168 hours.  Lab Results "   Component Value Date    HGBA1C 6.6 (H) 04/12/2020       Nutritional status:   Body mass index is 23.23 kg/m².  Lab Results   Component Value Date    ALBUMIN 2.6 (L) 06/30/2020    ALBUMIN 2.6 (L) 06/29/2020    ALBUMIN 2.3 (L) 06/28/2020     Lab Results   Component Value Date    PREALBUMIN 20 05/28/2020       Estimated Creatinine Clearance: 75.8 mL/min (based on SCr of 0.9 mg/dL).    Accu-Checks  Recent Labs     06/29/20  0637 06/29/20  0722 06/29/20  0820 06/29/20  0908 06/29/20  1241 06/29/20  1600 06/29/20  2009 06/30/20  0012 06/30/20  0427 06/30/20  0812   POCTGLUCOSE 134* 126* 138* 163* 173* 111* 196* 197* 224* 135*       Current Medications and/or Treatments Impacting Glycemic Control  Immunotherapy:    Immunosuppressants     None        Steroids:   Hormones (From admission, onward)    Start     Stop Route Frequency Ordered    07/01/20 0900  hydrocortisone sodium succinate injection 40 mg      -- IV Daily 06/30/20 0755        Pressors:    Autonomic Drugs (From admission, onward)    Start     Stop Route Frequency Ordered    06/28/20 2200  norepinephrine 4 mg in dextrose 5% 250 mL infusion (premix) (titrating)     Question Answer Comment   Titrate by: (in mcg/kg/min) 0.02    Titrate interval: (in minutes) 5    Titrate to maintain: (MAP or SBP) MAP    Greater than: (in mmHg) 65    Maximum dose: (in mcg/kg/min) 3        -- IV Continuous 06/28/20 2159 04/16/20 0915  rocuronium 10 mg/mL injection     Note to Pharmacy: Created by cabinet override    04/16 2129 04/16/20 0915        Hyperglycemia/Diabetes Medications:   Antihyperglycemics (From admission, onward)    Start     Stop Route Frequency Ordered    06/29/20 1015  insulin regular 100 Units in sodium chloride 0.9% 100 mL infusion      -- IV Continuous 06/29/20 0907    06/29/20 1007  insulin aspart U-100 pen 0-4 Units      -- SubQ As needed (PRN) 06/29/20 0907          ASSESSMENT and PLAN    * Acute respiratory disease due to COVID-19 virus  Managed per  primary team        Type 2 diabetes mellitus without complication, without long-term current use of insulin  BG goal 140 - 180       continue transition IV insulin infusion at 0.6 u/hr  Low dose correction scale  BG monitoring every 4 hours while NPO    ADDENDUM: increase insulin infusion to 0.8 u/hr.     ** Please call Endocrine for any BG related issues **    Discharge planning: TBD          Adrenal cortical steroids causing adverse effect in therapeutic use  Steroids per primary team, may cause BG excursions.             Lavonne Mg, NP  Endocrinology  [facility]

## 2020-06-30 NOTE — PLAN OF CARE
Patient with elevated HR, with critical team aware. Patient with stable BP. Patient on ventilator with fio2 40%, with o2sats 96%. Patient's son updated of plan of care for the day per Dr. Schmitz. LYNDA Francis updated throughout the day on patient's assessments, vital signs, ABGs, and lab results. Will continue to monitor patient.        Problem: Inability to Wean (Mechanical Ventilation, Invasive)  Goal: Mechanical Ventilation Liberation  Outcome: Ongoing, Progressing  Intervention: Promote Extubation and Mechanical Ventilation Liberation  Flowsheets (Taken 6/30/2020 1710)  Environmental Support: calm environment promoted     Problem: Nutrition Impairment (Mechanical Ventilation, Invasive)  Goal: Optimal Nutrition Delivery  Outcome: Ongoing, Progressing  Intervention: Optimize Nutrition Delivery  Flowsheets (Taken 6/30/2020 1710)  Nutrition Support Management: tube feeding initiated     Problem: Skin and Tissue Injury (Mechanical Ventilation, Invasive)  Goal: Absence of Device-Related Skin and Tissue Injury  Outcome: Ongoing, Progressing  Intervention: Maintain Skin and Tissue Health  Flowsheets (Taken 6/30/2020 1710)  Device Skin Pressure Protection: absorbent pad utilized/changed     Problem: Ventilator-Induced Lung Injury (Mechanical Ventilation, Invasive)  Goal: Absence of Ventilator-Induced Lung Injury  Outcome: Ongoing, Progressing  Intervention: Prevent Ventilator-Associated Pneumonia  Flowsheets (Taken 6/30/2020 1710)  Head of Bed (HOB): HOB at 30-45 degrees

## 2020-06-30 NOTE — PROGRESS NOTES
[facility] - Respiratory ICU  Critical Care Medicine  Progress Note    Patient Name: Ranjana Sanchez  MRN: [id number]  Admission Date: 4/10/2020  Hospital Length of Stay: 81 days  Code Status: DNR  Attending Provider: Bret Schmitz MD  Primary Care Provider: Primary Doctor No   Principal Problem: Acute respiratory disease due to COVID-19 virus    Subjective:     HPI:  Mr. LUANA Sanchez is a 56 y/o M with PMHx of HTN brought to ER via EMS for increasing SOB with known COVID-19 exposure. 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 They have not had travelers since 3/14. 2 days ago he started having fever and SOB. He was seen by the ship's physician and was found to have crackles in his bases bilaterally, but his oxygen saturation was in the 90's. He had a flu test which was negative. He was quarantined in a room and states that he got worse with increasing SOB. He was brought to the ER and found to be 78% on RA. He was started on NRB and initially was doing much better, but his tachypnea did not improve, so he was placed on BiPAP with oxygen saturation improvement.    Hospital/ICU Course:  Patient was admitted to the MICU after a difficult intubation in the ER s/p crich. COVID + with ARDS, sedated and paralyzed. Unable to prone secondary to crich. Left mainstem intubation which was pulled off, on NO and levophed. ET tube replaced on 4/11 family contacted in [place]. Proned on 4/12 tolerated well and continued to prone on 4/19. 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 He was on and off ketamine as well.  EEG ordered to rule out seizures as he also had seizure like activity that resolved with ativan. Currently changing sedation, was placed on phenobarbital which should start having effect 4/20. Patient proned x 10 (last on 4/22). Still not tolerating ventilator weaning. Remains on  sedation, vasopressors, and paralytic. ID now following patient for remdesivir administration; family aware and consented. Patient not significantly improving despite efforts. Son contacted, patient made DNR. Patient evaluated for ECMO on 4/24. Placed on ECMO on 4/25 and transferred to Dr Ayon's service with DNR rescinded.    Some physiologic improvement on VV ECMO. Oxygenation at goal, lactate clearing, hemodynamics at goal without vasoactive support currently. Tolerated wean of vent Fi and Monika thus far. Peeling back sedatives steadily with goal of neuro exam ASAP. Metabolic alkalosis correcting with encouragement from diamox. Patient was weaned off from ECMO support on 06/24/2020. However, during this process he has undergone a stroke and currently is not responding to verbal commands. The patient is able to open his eyes and track, however no purposeful movements can be ascertained from the patient. Extensive notes from neurology have been documented in the patient's medical record. Extensive discussions had been carried out between the family members and periodic updates were provided. Periodic updates were also provided to [employer] which is his employer. The family was also notified that if any further deterioration happens after completion of ECMO we would not be providing new ECMO support to this patient.    The patient was transferred to the MICU team on 6/25 after ECMO decannulation. Fever with worsening shock. Cultured and started on broad spectrum antibiotics. Frequent episodes of bradycardia to the 20's but recovers without intervention. Heart rate variability continues and on 06/30 he became tachycardic to 166. Labs and EKG ordered.    Interval History/Significant Events: S/p CT placement for spontaneous PTX with lung reexpansion noted on imaging. Tachycardia up to 166 noted this afternoon. EKG, troponin and metabolic panel ordered    Review of Systems   Unable to perform ROS: Intubated      Objective:     Vital Signs (Most Recent):  Temp: 99.4 °F (37.4 °C) (06/30/20 1500)  Pulse: (!) 146 (06/30/20 1515)  Resp: (!) 41 (06/29/20 1800)  BP: (!) 141/77 (06/30/20 1500)  SpO2: 95 % (06/30/20 1515) Vital Signs (24h Range):  Temp:  [97.8 °F (36.6 °C)-99.4 °F (37.4 °C)] 99.4 °F (37.4 °C)  Pulse:  [] 146  Resp:  [41-42] 41  SpO2:  [93 %-100 %] 95 %  BP: ()/(53-96) 141/77   Weight: 61.4 kg (135 lb 5.8 oz)  Body mass index is 23.23 kg/m².      Intake/Output Summary (Last 24 hours) at 6/30/2020 1609  Last data filed at 6/30/2020 1500  Gross per 24 hour   Intake 1823.11 ml   Output 4410 ml   Net -2586.89 ml       Physical Exam  Vitals signs reviewed.   Constitutional:       Appearance: He is well-developed. He is toxic-appearing.      Interventions: He is sedated and intubated.   HENT:      Head: Normocephalic and atraumatic.   Eyes:      Pupils: Pupils are equal, round, and reactive to light.   Neck:      Musculoskeletal: Normal range of motion.      Trachea: No tracheal deviation.      Comments: Trach in place   Cardiovascular:      Rate and Rhythm: Tachycardia present.      Pulses:           Radial pulses are 2+ on the right side and 2+ on the left side.        Dorsalis pedis pulses are 1+ on the right side and 1+ on the left side.      Heart sounds: Normal heart sounds.   Pulmonary:      Effort: He is intubated.      Breath sounds: No decreased breath sounds, wheezing, rhonchi or rales.   Abdominal:      General: Bowel sounds are normal. There is no distension.      Palpations: Abdomen is soft.   Genitourinary:     Comments: Torres in place   Musculoskeletal: Normal range of motion.   Skin:     General: Skin is warm.      Comments: Necrotic tissue on left forearm    Neurological:      Mental Status: He is unresponsive.      Comments: PERRL, spontaneously opens eyes but does not track. Cough reflexes intact. No response to pain X 4 extremities.          Vents:  Vent Mode: A/C (06/30/20  1336)  Ventilator Initiated: Yes (04/10/20 2247)  Set Rate: 35 BPM (06/30/20 1336)  Vt Set: 250 mL (06/06/20 0858)  Pressure Support: 20 cmH20 (06/06/20 0858)  PEEP/CPAP: 5 cmH20 (06/30/20 1336)  Oxygen Concentration (%): 40 (06/30/20 1515)  Peak Airway Pressure: 48 cmH2O (06/30/20 1336)  Plateau Pressure: 32 cmH20 (06/30/20 1336)  Total Ve: 13.4 mL (06/30/20 1336)  F/VT Ratio<105 (RSBI): 117.83 (06/27/20 0027)  Lines/Drains/Airways     Peripherally Inserted Central Catheter Line            PICC Double Lumen 05/05/20 1707 right basilic 55 days          Drain                 Urethral Catheter 06/26/20 0901 4 days         NG/OG Tube 06/27/20 1153 16 Fr. Right nostril 3 days         Chest Tube 06/29/20 1816 1 Left First intercostal space less than 1 day          Airway                 Surgical Airway 05/05/20 1500 Shiley Cuffed 56 days          Peripheral Intravenous Line                 Peripheral IV - Single Lumen 06/23/20 1425 18 G Anterior;Right Hand 7 days         Peripheral IV - Single Lumen 06/28/20 20 G Anterior;Right Foot 2 days              Significant Labs:    CBC/Anemia Profile:  Recent Labs   Lab 06/29/20  0330 06/30/20  0429   WBC 13.30* 12.85*   HGB 7.3* 8.1*   HCT 23.4* 25.9*    261   MCV 93 93   RDW 14.2 14.5        Chemistries:  Recent Labs   Lab 06/29/20  0330 06/30/20  0429    144   K 3.2* 2.9*   CL 95 97   CO2 33* 37*   BUN 63* 68*   CREATININE 1.0 0.9   CALCIUM 8.2* 8.6*   ALBUMIN 2.6* 2.6*   PROT 6.2 6.5   BILITOT 1.2* 1.7*   ALKPHOS 100 111   ALT 29 25   AST 22 19   MG 2.2 2.2   PHOS 3.1 1.7*       All pertinent labs within the past 24 hours have been reviewed.    Significant Imaging:  I have reviewed all pertinent imaging results/findings within the past 24 hours.      ABG  Recent Labs   Lab 06/30/20  0813   PH 7.414   PO2 122*   PCO2 65.6*   HCO3 41.9*   BE 17     Assessment/Plan:     Neuro  Basal ganglia infarction  CT head on 6/2 with bilateral basal ganglia infarcts consistent  "with global hypoxic ischemic injury 2/2 COVID-19    --neuro exam unchanged with intact cranial nerve reflexes and spontaneous eye opening only  --per neurology, "considering the extent of brain injury, he has very low likelihood of substantial further neurological improvement. 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 TV dropped from 280 to 150's. CXR with pneumomediastinum.  --no intervention warranted  --continue LPV    Difficult intubation  S/p crich which was converted to trach    Acute hypoxemic respiratory failure  2/2 to ARDS due to COVID-19  - ARDSNET Protocol  --see COVID-19    Cardiac/Vascular  Tachycardia  Patient's baseline since the initiation of ECMO has been tachycardic.  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    Other  * Acute respiratory disease due to COVID-19 virus  - Difficult intubation requiring crich on admit; converted to trach 5/5  - previously proned with Monika  - remdesivir course completed.  - Completed treatment with Plaquenil, Azithro, Methyprednisone for 5 days  - Strict I/O's and goal net neutral status to help optimize vent mechanics. Continue Lasix gtt   - Special isolation and aspiration precautions ordered per COVID protocol  - Significant improvement in oxygenation, but remains with poor lung compliance   - complicated by pneumomediastinum and pneumothorax " requiring CT placement      Acute respiratory distress syndrome (ARDS) due to COVID-19 virus  -See acute respiratory failure        Arm wound, left, sequela  --wound to LUE being seen by wound care. Thought to be 2/2 extravasation of medication through PIV. Continue to monitor. RUE with +2 palpable pulses.    Goals of care, counseling/discussion  Patient's prognosis remains poor despite efforts to optimize health. Son spoken with regarding GOC.  DNR changed after consulting CTS for ECMO. Per conversation with Dr. Goss on 6/26, no chest compressions. Given Mr. Sancehz's decline, he is too sick for travel back to [facility]. Family urged to visit if possible; ongoing coordination with [employer] to help arrange travel.       Shock  Suspect septic. Febrile to 103.6 with rising leukocytosis  --follow up blood and sputum cultures  --continue cefepime; d/c vanc  --off levophed    COVID-19 virus detected  See ARDS      Critical Care Time: 70 minutes  Critical secondary to Patient has a condition that poses threat to life and bodily function: ARDS      Critical care was time spent personally by me on the following activities: development of treatment plan with patient or surrogate and bedside caregivers, discussions with consultants, evaluation of patient's response to treatment, examination of patient, ordering and performing treatments and interventions, ordering and review of laboratory studies, ordering and review of radiographic studies, pulse oximetry, re-evaluation of patient's condition. This critical care time did not overlap with that of any other provider or involve time for any procedures.     Tito Espinoza NP  Critical Care Medicine  [facility] - Respiratory ICU

## 2020-06-30 NOTE — NURSING
Potasium 2.9. Oral replacements not stocked on floor. MD would like to replace 40 iv now and 40 PO when oral replacements are sent from pharmacy.

## 2020-06-30 NOTE — NURSING
2230-Attempted to reach xray tech for repeat cxr. No answer.    2300- no answer. Spoke with radiology department, xray tech in route

## 2020-06-30 NOTE — ASSESSMENT & PLAN NOTE
Suspect septic. Febrile to 103.6 with rising leukocytosis  --follow up blood and sputum cultures  --continue cefepime; d/c vanc  --off levophed

## 2020-06-30 NOTE — ASSESSMENT & PLAN NOTE
- Difficult intubation requiring crich on admit; converted to trach 5/5  - previously proned with Monika  - remdesivir course completed.  - Completed treatment with Plaquenil, Azithro, Methyprednisone for 5 days  - Strict I/O's and goal net neutral status to help optimize vent mechanics. Continue Lasix gtt   - Special isolation and aspiration precautions ordered per COVID protocol  - Significant improvement in oxygenation, but remains with poor lung compliance   - complicated by pneumomediastinum and pneumothorax requiring CT placement

## 2020-06-30 NOTE — ASSESSMENT & PLAN NOTE
EGD 5/3 showed Diuelafoy lesion  - Continue pantoprazole 40 mg BID and sucralfate   - currently on ASA and Heparin SQ, continue to closely monitor.

## 2020-07-01 PROBLEM — T38.0X5A ADRENAL CORTICAL STEROIDS CAUSING ADVERSE EFFECT IN THERAPEUTIC USE: Status: RESOLVED | Noted: 2020-01-01 | Resolved: 2020-01-01

## 2020-07-01 NOTE — SUBJECTIVE & OBJECTIVE
Interval History/Significant Events: no acute events overnight    Review of Systems   Unable to perform ROS: Intubated     Objective:     Vital Signs (Most Recent):  Temp: 98.1 °F (36.7 °C) (07/01/20 0805)  Pulse: (!) 126 (07/01/20 1005)  Resp: (!) 35 (07/01/20 0759)  BP: (!) 144/82 (07/01/20 1005)  SpO2: (!) 92 % (07/01/20 1005) Vital Signs (24h Range):  Temp:  [98.1 °F (36.7 °C)-99.4 °F (37.4 °C)] 98.1 °F (36.7 °C)  Pulse:  [103-160] 126  Resp:  [35-37] 35  SpO2:  [86 %-99 %] 92 %  BP: ()/(39-86) 144/82   Weight: 61.4 kg (135 lb 5.8 oz)  Body mass index is 23.23 kg/m².      Intake/Output Summary (Last 24 hours) at 7/1/2020 1140  Last data filed at 7/1/2020 1005  Gross per 24 hour   Intake 2690.1 ml   Output 1790 ml   Net 900.1 ml       Physical Exam  Vitals signs reviewed.   Constitutional:       Appearance: He is well-developed. He is toxic-appearing.      Interventions: He is sedated and intubated.   HENT:      Head: Normocephalic and atraumatic.   Eyes:      Pupils: Pupils are equal, round, and reactive to light.   Neck:      Musculoskeletal: Normal range of motion.      Trachea: No tracheal deviation.      Comments: Trach in place   Cardiovascular:      Rate and Rhythm: Tachycardia present.      Pulses:           Radial pulses are 2+ on the right side and 2+ on the left side.        Dorsalis pedis pulses are 1+ on the right side and 1+ on the left side.      Heart sounds: Normal heart sounds.   Pulmonary:      Effort: He is intubated.      Breath sounds: No decreased breath sounds, wheezing, rhonchi or rales.   Abdominal:      General: Bowel sounds are normal. There is no distension.      Palpations: Abdomen is soft.   Genitourinary:     Comments: Torres in place   Musculoskeletal: Normal range of motion.   Skin:     General: Skin is warm.      Comments: Necrotic tissue on left forearm    Neurological:      Mental Status: He is unresponsive.      Comments: PERRL, spontaneously opens eyes but does not  track. Cough reflexes intact. No response to pain X 4 extremities.          Vents:  Vent Mode: A/C (07/01/20 0759)  Ventilator Initiated: Yes (04/10/20 2247)  Set Rate: 35 BPM (07/01/20 0759)  Vt Set: 250 mL (06/06/20 0858)  Pressure Support: 20 cmH20 (06/06/20 0858)  PEEP/CPAP: 5 cmH20 (07/01/20 0759)  Oxygen Concentration (%): 40 (07/01/20 1005)  Peak Airway Pressure: 51 cmH2O (07/01/20 0759)  Plateau Pressure: 34 cmH20 (07/01/20 0759)  Total Ve: 12.2 mL (07/01/20 0759)  F/VT Ratio<105 (RSBI): (!) 103.86 (07/01/20 0759)  Lines/Drains/Airways     Peripherally Inserted Central Catheter Line            PICC Double Lumen 05/05/20 1707 right basilic 56 days          Drain                 Urethral Catheter 06/26/20 0901 5 days         NG/OG Tube 06/27/20 1153 16 Fr. Right nostril 3 days         Chest Tube 06/29/20 1816 1 Left First intercostal space 1 day          Airway                 Surgical Airway 05/05/20 1500 Shiley Cuffed 56 days          Peripheral Intravenous Line                 Peripheral IV - Single Lumen 06/28/20 20 G Anterior;Right Foot 3 days         Peripheral IV - Single Lumen 06/30/20 2300 20 G Left;Posterior Hand less than 1 day              Significant Labs:    CBC/Anemia Profile:  Recent Labs   Lab 06/30/20 0429 06/30/20  1624 07/01/20  0316   WBC 12.85* 19.18* 16.86*   HGB 8.1* 8.6* 8.1*   HCT 25.9* 27.5* 26.1*    306 298   MCV 93 93 92   RDW 14.5 14.6* 14.8*        Chemistries:  Recent Labs   Lab 06/30/20 0429 06/30/20  1624 07/01/20  0316    145 144   K 2.9* 3.1* 3.3*   CL 97 95 97   CO2 37* 37* 35*   BUN 68* 62* 56*   CREATININE 0.9 0.8 0.8   CALCIUM 8.6* 9.2 8.7   ALBUMIN 2.6*  --  2.4*   PROT 6.5  --  6.1   BILITOT 1.7*  --  2.1*   ALKPHOS 111  --  93   ALT 25  --  23   AST 19  --  23   MG 2.2 1.7 2.3   PHOS 1.7* 3.0 1.7*       All pertinent labs within the past 24 hours have been reviewed.    Significant Imaging:  I have reviewed all pertinent imaging results/findings within  the past 24 hours.

## 2020-07-01 NOTE — PLAN OF CARE
POC reviewed with pt and son at 1400. Son verbalized understanding via phone. Questions and concerns addressed. No acute events today. Will continue to monitor. See flowsheets for full assessment and VS info.     Insulin gtt @ 0.6cc/hr. Sliding scale given PRN.  Precedex & fentanyl gtts titrated for comfort.  Levo gtt on and off to maintain MAP>65.

## 2020-07-01 NOTE — SUBJECTIVE & OBJECTIVE
"Interval HPI:   Overnight events:  Remains in RICU. Bradycardia episode overnight per RN notes. On vent. Chest tube placement yesterday. BG reasonably well controlled on IV insulin infusion at 0.6 u/hr with prn correction scale.   Eating:   NPO  Nausea: No  Hypoglycemia and intervention: No  Fever: No  TPN and/or TF: peptamen intense at 10 cc/hr    /74   Pulse (!) 131   Temp 98.1 °F (36.7 °C) (Oral)   Resp (!) 35   Ht 5' 4" (1.626 m)   Wt 61.4 kg (135 lb 5.8 oz)   SpO2 98%   BMI 23.23 kg/m²     Labs Reviewed and Include    Recent Labs   Lab 07/01/20  0316   *   CALCIUM 8.7   ALBUMIN 2.4*   PROT 6.1      K 3.3*   CO2 35*   CL 97   BUN 56*   CREATININE 0.8   ALKPHOS 93   ALT 23   AST 23   BILITOT 2.1*     Lab Results   Component Value Date    WBC 16.86 (H) 07/01/2020    HGB 8.1 (L) 07/01/2020    HCT 26.1 (L) 07/01/2020    MCV 92 07/01/2020     07/01/2020     No results for input(s): TSH, FREET4 in the last 168 hours.  Lab Results   Component Value Date    HGBA1C 6.6 (H) 04/12/2020       Nutritional status:   Body mass index is 23.23 kg/m².  Lab Results   Component Value Date    ALBUMIN 2.4 (L) 07/01/2020    ALBUMIN 2.6 (L) 06/30/2020    ALBUMIN 2.6 (L) 06/29/2020     Lab Results   Component Value Date    PREALBUMIN 20 05/28/2020       Estimated Creatinine Clearance: 85.3 mL/min (based on SCr of 0.8 mg/dL).    Accu-Checks  Recent Labs     06/30/20  0012 06/30/20  0427 06/30/20  0812 06/30/20  1206 06/30/20  1615 06/30/20  1958 06/30/20 2000 06/30/20  2359 07/01/20  0404 07/01/20  0855   POCTGLUCOSE 197* 224* 135* 208* 245* 92 114* 142* 204* 167*       Current Medications and/or Treatments Impacting Glycemic Control  Immunotherapy:    Immunosuppressants     None        Steroids:   Hormones (From admission, onward)    None        Pressors:    Autonomic Drugs (From admission, onward)    Start     Stop Route Frequency Ordered    06/30/20 6126  norepinephrine 4 mg in dextrose 5% 250 mL " infusion (premix) (titrating)     Question Answer Comment   Titrate by: (in mcg/kg/min) 0.01    Titrate interval: (in minutes) 2    Titrate to maintain: (MAP or SBP) MAP    Greater than: (in mmHg) 60    Maximum dose: (in mcg/kg/min) 2        -- IV Continuous 06/30/20 2039 04/16/20 0915  rocuronium 10 mg/mL injection     Note to Pharmacy: Created by cabinet override    04/16 2129 04/16/20 0915        Hyperglycemia/Diabetes Medications:   Antihyperglycemics (From admission, onward)    Start     Stop Route Frequency Ordered    06/29/20 1015  insulin regular 100 Units in sodium chloride 0.9% 100 mL infusion      -- IV Continuous 06/29/20 0907    06/29/20 1007  insulin aspart U-100 pen 0-4 Units      -- SubQ As needed (PRN) 06/29/20 0907

## 2020-07-01 NOTE — ASSESSMENT & PLAN NOTE
Patient's prognosis remains poor despite efforts to optimize health. Son spoken with regarding GOC.  DNR changed after consulting CTS for ECMO. Per conversation with Dr. Goss on 6/26, no chest compressions. Given Mr. Sanchez's decline, he is too sick for travel back to [place]. Family urged to visit if possible; ongoing coordination with [employer] to help arrange travel hopefully around 7/15.

## 2020-07-01 NOTE — PROGRESS NOTES
[facility]  Critical Care Medicine  Progress Note    Patient Name: Ranjana Sanchez  MRN: [id number]  Admission Date: 4/10/2020  Hospital Length of Stay: 82 days  Code Status: DNR  Attending Provider: Bret Schmitz MD  Primary Care Provider: Primary Doctor No   Principal Problem: Acute respiratory disease due to COVID-19 virus    Subjective:     HPI:  Mr. LUANA Sanchez is a 56 y/o M with PMHx of HTN brought to ER via EMS for increasing SOB with known COVID-19 exposure. 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 They have not had travelers since 3/14. 2 days ago he started having fever and SOB. He was seen by the ship's physician and was found to have crackles in his bases bilaterally, but his oxygen saturation was in the 90's. He had a flu test which was negative. He was quarantined in a room and states that he got worse with increasing SOB. He was brought to the ER and found to be 78% on RA. He was started on NRB and initially was doing much better, but his tachypnea did not improve, so he was placed on BiPAP with oxygen saturation improvement.    Hospital/ICU Course:  Patient was admitted to the MICU after a difficult intubation in the ER s/p crich. COVID + with ARDS, sedated and paralyzed. Unable to prone secondary to crich. Left mainstem intubation which was pulled off, on NO and levophed. ET tube replaced on 4/11 family contacted in [place]. Proned on 4/12 tolerated well and continued to prone on 4/19. 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 He was on and off ketamine as well.  EEG ordered to rule out seizures as he also had seizure like activity that resolved with ativan. Currently changing sedation, was placed on phenobarbital which should start having effect 4/20. Patient proned x 10 (last on 4/22). Still not tolerating ventilator weaning. Remains on  sedation, vasopressors, and paralytic. ID now following patient for remdesivir administration; family aware and consented. Patient not significantly improving despite efforts. Son contacted, patient made DNR. Patient evaluated for ECMO on 4/24. Placed on ECMO on 4/25 and transferred to Dr Ayon's service with DNR rescinded.    Some physiologic improvement on VV ECMO. Oxygenation at goal, lactate clearing, hemodynamics at goal without vasoactive support currently. Tolerated wean of vent Fi and Monika thus far. Peeling back sedatives steadily with goal of neuro exam ASAP. Metabolic alkalosis correcting with encouragement from diamox. Patient was weaned off from ECMO support on 06/24/2020. However, during this process he has undergone a stroke and currently is not responding to verbal commands. The patient is able to open his eyes and track, however no purposeful movements can be ascertained from the patient. Extensive notes from neurology have been documented in the patient's medical record. Extensive discussions had been carried out between the family members and periodic updates were provided. Periodic updates were also provided to [employer] which is his employer. The family was also notified that if any further deterioration happens after completion of ECMO we would not be providing new ECMO support to this patient.    The patient was transferred to the MICU team on 6/25 after ECMO decannulation. Fever with worsening shock. Cultured and started on broad spectrum antibiotics. Frequent episodes of bradycardia to the 20's but recovers without intervention. Heart rate variability continues and on 06/30 he became tachycardic to 166. Labs and EKG ordered.    Interval History/Significant Events: no acute events overnight    Review of Systems   Unable to perform ROS: Intubated     Objective:     Vital Signs (Most Recent):  Temp: 98.1 °F (36.7 °C) (07/01/20 0805)  Pulse: (!) 126 (07/01/20 1005)  Resp: (!) 35 (07/01/20  0759)  BP: (!) 144/82 (07/01/20 1005)  SpO2: (!) 92 % (07/01/20 1005) Vital Signs (24h Range):  Temp:  [98.1 °F (36.7 °C)-99.4 °F (37.4 °C)] 98.1 °F (36.7 °C)  Pulse:  [103-160] 126  Resp:  [35-37] 35  SpO2:  [86 %-99 %] 92 %  BP: ()/(39-86) 144/82   Weight: 61.4 kg (135 lb 5.8 oz)  Body mass index is 23.23 kg/m².      Intake/Output Summary (Last 24 hours) at 7/1/2020 1140  Last data filed at 7/1/2020 1005  Gross per 24 hour   Intake 2690.1 ml   Output 1790 ml   Net 900.1 ml       Physical Exam  Vitals signs reviewed.   Constitutional:       Appearance: He is well-developed. He is toxic-appearing.      Interventions: He is sedated and intubated.   HENT:      Head: Normocephalic and atraumatic.   Eyes:      Pupils: Pupils are equal, round, and reactive to light.   Neck:      Musculoskeletal: Normal range of motion.      Trachea: No tracheal deviation.      Comments: Trach in place   Cardiovascular:      Rate and Rhythm: Tachycardia present.      Pulses:           Radial pulses are 2+ on the right side and 2+ on the left side.        Dorsalis pedis pulses are 1+ on the right side and 1+ on the left side.      Heart sounds: Normal heart sounds.   Pulmonary:      Effort: He is intubated.      Breath sounds: No decreased breath sounds, wheezing, rhonchi or rales.   Abdominal:      General: Bowel sounds are normal. There is no distension.      Palpations: Abdomen is soft.   Genitourinary:     Comments: Torres in place   Musculoskeletal: Normal range of motion.   Skin:     General: Skin is warm.      Comments: Necrotic tissue on left forearm    Neurological:      Mental Status: He is unresponsive.      Comments: PERRL, spontaneously opens eyes but does not track. Cough reflexes intact. No response to pain X 4 extremities.          Vents:  Vent Mode: A/C (07/01/20 0759)  Ventilator Initiated: Yes (04/10/20 3525)  Set Rate: 35 BPM (07/01/20 0759)  Vt Set: 250 mL (06/06/20 0858)  Pressure Support: 20 cmH20 (06/06/20  0858)  PEEP/CPAP: 5 cmH20 (07/01/20 0759)  Oxygen Concentration (%): 40 (07/01/20 1005)  Peak Airway Pressure: 51 cmH2O (07/01/20 0759)  Plateau Pressure: 34 cmH20 (07/01/20 0759)  Total Ve: 12.2 mL (07/01/20 0759)  F/VT Ratio<105 (RSBI): (!) 103.86 (07/01/20 0759)  Lines/Drains/Airways     Peripherally Inserted Central Catheter Line            PICC Double Lumen 05/05/20 1707 right basilic 56 days          Drain                 Urethral Catheter 06/26/20 0901 5 days         NG/OG Tube 06/27/20 1153 16 Fr. Right nostril 3 days         Chest Tube 06/29/20 1816 1 Left First intercostal space 1 day          Airway                 Surgical Airway 05/05/20 1500 Shiley Cuffed 56 days          Peripheral Intravenous Line                 Peripheral IV - Single Lumen 06/28/20 20 G Anterior;Right Foot 3 days         Peripheral IV - Single Lumen 06/30/20 2300 20 G Left;Posterior Hand less than 1 day              Significant Labs:    CBC/Anemia Profile:  Recent Labs   Lab 06/30/20  0429 06/30/20  1624 07/01/20  0316   WBC 12.85* 19.18* 16.86*   HGB 8.1* 8.6* 8.1*   HCT 25.9* 27.5* 26.1*    306 298   MCV 93 93 92   RDW 14.5 14.6* 14.8*        Chemistries:  Recent Labs   Lab 06/30/20  0429 06/30/20  1624 07/01/20  0316    145 144   K 2.9* 3.1* 3.3*   CL 97 95 97   CO2 37* 37* 35*   BUN 68* 62* 56*   CREATININE 0.9 0.8 0.8   CALCIUM 8.6* 9.2 8.7   ALBUMIN 2.6*  --  2.4*   PROT 6.5  --  6.1   BILITOT 1.7*  --  2.1*   ALKPHOS 111  --  93   ALT 25  --  23   AST 19  --  23   MG 2.2 1.7 2.3   PHOS 1.7* 3.0 1.7*       All pertinent labs within the past 24 hours have been reviewed.    Significant Imaging:  I have reviewed all pertinent imaging results/findings within the past 24 hours.      ABG  Recent Labs   Lab 06/30/20  0813   PH 7.414   PO2 122*   PCO2 65.6*   HCO3 41.9*   BE 17     Assessment/Plan:     Neuro  Basal ganglia infarction  CT head on 6/2 with bilateral basal ganglia infarcts consistent with global hypoxic  "ischemic injury 2/2 COVID-19    --neuro exam unchanged with intact cranial nerve reflexes and spontaneous eye opening only  --per neurology, "considering the extent of brain injury, he has very low likelihood of substantial further neurological improvement. 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 TV dropped from 280 to 150's. CXR with pneumomediastinum.  --no intervention warranted  --continue LPV    Difficult intubation  S/p crich which was converted to trach    Acute hypoxemic respiratory failure  2/2 to ARDS due to COVID-19  - ARDSNET Protocol  --see COVID-19    Cardiac/Vascular  Tachycardia  Patient's baseline since the initiation of ECMO has been tachycardic.  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    On enteral nutrition  Continue tube feeds    Other  * Acute respiratory disease due to COVID-19 virus  - Difficult intubation requiring crich on admit; converted to trach 5/5  - previously proned with Monika  - remdesivir course completed.  - Completed treatment with Plaquenil, Azithro, Methyprednisone for 5 days  - Strict I/O's and goal net neutral status to help optimize vent mechanics. Continue Lasix gtt   - Special isolation and aspiration precautions ordered per COVID protocol  - Significant improvement in oxygenation, but remains with poor lung compliance   - complicated by " pneumomediastinum and pneumothorax requiring CT placement      Acute respiratory distress syndrome (ARDS) due to COVID-19 virus  -See acute respiratory failure        Arm wound, left, sequela  --wound to LUE being seen by wound care. Thought to be 2/2 extravasation of medication through PIV. Continue to monitor. RUE with +2 palpable pulses.    Goals of care, counseling/discussion  Patient's prognosis remains poor despite efforts to optimize health. Son spoken with regarding GOC.  DNR changed after consulting CTS for ECMO. Per conversation with Dr. Goss on 6/26, no chest compressions. Given Mr. Sanchez's decline, he is too sick for travel back to [place]. Family urged to visit if possible; ongoing coordination with [employer] to help arrange travel hopefully around 7/15.       Shock  Suspect septic. Febrile to 103.6 with rising leukocytosis  --follow up blood and sputum cultures  --continue cefepime; d/c vanc  --off levophed    COVID-19 virus detected  See ARDS      Critical Care Time: 70 minutes  Critical secondary to Patient has a condition that poses threat to life and bodily function: Basal Ganglia infarction      Critical care was time spent personally by me on the following activities: development of treatment plan with patient or surrogate and bedside caregivers, discussions with consultants, evaluation of patient's response to treatment, examination of patient, ordering and performing treatments and interventions, ordering and review of laboratory studies, ordering and review of radiographic studies, pulse oximetry, re-evaluation of patient's condition. This critical care time did not overlap with that of any other provider or involve time for any procedures.     Tito Espinoza NP  Critical Care Medicine  [facility]

## 2020-07-01 NOTE — PLAN OF CARE
Updated medical records faxed to Dr. Flor Tiwari (f) [phone] with [employer] as requested.

## 2020-07-01 NOTE — NURSING
Pt had brief period of bradycardia HR 30s and o2 sat dropped to 85%. Pt came out of it without intervention. Leigh HOWELL made aware.

## 2020-07-01 NOTE — PROGRESS NOTES
"[facility]  Endocrinology  Progress Note    Admit Date: 4/10/2020     Reason for Consult: Management of T2DM, Hyperglycemia     Surgical Procedure and Date: tracheostomy 5/5/20    Diabetes diagnosis year: JAMMIE    Lab Results   Component Value Date    HGBA1C 6.6 (H) 04/12/2020       Home Diabetes Medications: none (per chart review)    How often checking glucose at home? JAMMIE  BG readings on regimen: JAMMIE  Hypoglycemia on the regimen? JAMMIE  Missed doses on regimen? JAMMIE    Diabetes Complications include:     JAMMIE    Complicating diabetes co morbidities:   none      HPI:   Patient is a 55 y.o. male with a diagnosis of DM2 and ARDS secondary to COVID-19.  [place] [facility] [place] with fever and SOB.  Lengthy and complex hospital stay noted requiring intubation, ECMO, and trach placement.  Patient not with severe ARDS (with poor lung compliance) and suspected anoxic brain injury.  Patient developed hyperglycemia secondary to TF.  Endocrinology consulted for DM/BG management.        Interval HPI:   Overnight events:  Remains in RICU. Bradycardia episode overnight per RN notes. On vent. Chest tube placement yesterday. BG reasonably well controlled on IV insulin infusion at 0.6 u/hr with prn correction scale.   Eating:   NPO  Nausea: No  Hypoglycemia and intervention: No  Fever: No  TPN and/or TF: peptamen intense at 10 cc/hr    /74   Pulse (!) 131   Temp 98.1 °F (36.7 °C) (Oral)   Resp (!) 35   Ht 5' 4" (1.626 m)   Wt 61.4 kg (135 lb 5.8 oz)   SpO2 98%   BMI 23.23 kg/m²     Labs Reviewed and Include    Recent Labs   Lab 07/01/20  0316   *   CALCIUM 8.7   ALBUMIN 2.4*   PROT 6.1      K 3.3*   CO2 35*   CL 97   BUN 56*   CREATININE 0.8   ALKPHOS 93   ALT 23   AST 23   BILITOT 2.1*     Lab Results   Component Value Date    WBC 16.86 (H) 07/01/2020    HGB 8.1 (L) 07/01/2020    HCT 26.1 (L) 07/01/2020    MCV 92 07/01/2020     07/01/2020     No results for input(s): " TSH, FREET4 in the last 168 hours.  Lab Results   Component Value Date    HGBA1C 6.6 (H) 04/12/2020       Nutritional status:   Body mass index is 23.23 kg/m².  Lab Results   Component Value Date    ALBUMIN 2.4 (L) 07/01/2020    ALBUMIN 2.6 (L) 06/30/2020    ALBUMIN 2.6 (L) 06/29/2020     Lab Results   Component Value Date    PREALBUMIN 20 05/28/2020       Estimated Creatinine Clearance: 85.3 mL/min (based on SCr of 0.8 mg/dL).    Accu-Checks  Recent Labs     06/30/20  0012 06/30/20  0427 06/30/20  0812 06/30/20  1206 06/30/20  1615 06/30/20  1958 06/30/20 2000 06/30/20  2359 07/01/20  0404 07/01/20  0855   POCTGLUCOSE 197* 224* 135* 208* 245* 92 114* 142* 204* 167*       Current Medications and/or Treatments Impacting Glycemic Control  Immunotherapy:    Immunosuppressants     None        Steroids:   Hormones (From admission, onward)    None        Pressors:    Autonomic Drugs (From admission, onward)    Start     Stop Route Frequency Ordered    06/30/20 2145  norepinephrine 4 mg in dextrose 5% 250 mL infusion (premix) (titrating)     Question Answer Comment   Titrate by: (in mcg/kg/min) 0.01    Titrate interval: (in minutes) 2    Titrate to maintain: (MAP or SBP) MAP    Greater than: (in mmHg) 60    Maximum dose: (in mcg/kg/min) 2        -- IV Continuous 06/30/20 2039 04/16/20 0915  rocuronium 10 mg/mL injection     Note to Pharmacy: Created by cabinet override    04/16 2129 04/16/20 0915        Hyperglycemia/Diabetes Medications:   Antihyperglycemics (From admission, onward)    Start     Stop Route Frequency Ordered    06/29/20 1015  insulin regular 100 Units in sodium chloride 0.9% 100 mL infusion      -- IV Continuous 06/29/20 0907    06/29/20 1007  insulin aspart U-100 pen 0-4 Units      -- SubQ As needed (PRN) 06/29/20 0907          ASSESSMENT and PLAN    * Acute respiratory disease due to COVID-19 virus  Managed per primary team        Type 2 diabetes mellitus without complication, without long-term  current use of insulin  BG goal 140 - 180       rewrite transition IV insulin infusion at 0.6 u/hr  Low dose correction scale  BG monitoring every 4 hours while NPO      ** Please call Endocrine for any BG related issues **    Discharge planning: TBD          On enteral nutrition  May cause BG excursions.   Rosalee LOPEZ     Adrenal cortical steroids causing adverse effect in therapeutic use-resolved as of 7/1/2020  Steroids per primary team, may cause BG excursions.             Lavonne Mg, NP  Endocrinology  [facility]

## 2020-07-01 NOTE — ASSESSMENT & PLAN NOTE
BG goal 140 - 180       rewrite transition IV insulin infusion at 0.6 u/hr  Low dose correction scale  BG monitoring every 4 hours while NPO      ** Please call Endocrine for any BG related issues **    Discharge planning: URIEL

## 2020-07-02 NOTE — PROGRESS NOTES
[facility]  Critical Care Medicine  Progress Note    Patient Name: Ranjana Sanchez  MRN: [id number]  Admission Date: 4/10/2020  Hospital Length of Stay: 83 days  Code Status: DNR  Attending Provider: Bret Schmitz MD  Primary Care Provider: Primary Doctor No   Principal Problem: Acute respiratory disease due to COVID-19 virus    Subjective:     HPI:  Mr. LUANA Sanchez is a 54 y/o M with PMHx of HTN brought to ER via EMS for increasing SOB with known COVID-19 exposure. 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 They have not had travelers since 3/14. 2 days ago he started having fever and SOB. He was seen by the ship's physician and was found to have crackles in his bases bilaterally, but his oxygen saturation was in the 90's. He had a flu test which was negative. He was quarantined in a room and states that he got worse with increasing SOB. He was brought to the ER and found to be 78% on RA. He was started on NRB and initially was doing much better, but his tachypnea did not improve, so he was placed on BiPAP with oxygen saturation improvement.    Hospital/ICU Course:  Patient was admitted to the MICU after a difficult intubation in the ER s/p crich. COVID + with ARDS, sedated and paralyzed. Unable to prone secondary to crich. Left mainstem intubation which was pulled off, on NO and levophed. ET tube replaced on 4/11 family contacted in [place]. Proned on 4/12 tolerated well and continued to prone on 4/19. 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 He was on and off ketamine as well.  EEG ordered to rule out seizures as he also had seizure like activity that resolved with ativan. Currently changing sedation, was placed on phenobarbital which should start having effect 4/20. Patient proned x 10 (last on 4/22). Still not tolerating ventilator weaning. Remains on  sedation, vasopressors, and paralytic. ID now following patient for remdesivir administration; family aware and consented. Patient not significantly improving despite efforts. Son contacted, patient made DNR. Patient evaluated for ECMO on 4/24. Placed on ECMO on 4/25 and transferred to Dr Ayon's service with DNR rescinded.    Some physiologic improvement on VV ECMO. Oxygenation at goal, lactate clearing, hemodynamics at goal without vasoactive support currently. Tolerated wean of vent Fi and Monika thus far. Peeling back sedatives steadily with goal of neuro exam ASAP. Metabolic alkalosis correcting with encouragement from diamox. Patient was weaned off from ECMO support on 06/24/2020. However, during this process he has undergone a stroke and currently is not responding to verbal commands. The patient is able to open his eyes and track, however no purposeful movements can be ascertained from the patient. Extensive notes from neurology have been documented in the patient's medical record. Extensive discussions had been carried out between the family members and periodic updates were provided. Periodic updates were also provided to [employer] which is his employer. The family was also notified that if any further deterioration happens after completion of ECMO we would not be providing new ECMO support to this patient.    The patient was transferred to the MICU team on 6/25 after ECMO decannulation. Fever with worsening shock. Cultured and started on broad spectrum antibiotics. Frequent episodes of bradycardia to the 20's but recovers without intervention. Heart rate variability continues and on 06/30 he became tachycardic to 166. Labs and EKG showed sinus tachy, restarted betablocker.    Interval History/Significant Events: no events overnight to note    Review of Systems   Unable to perform ROS: Acuity of condition     Objective:     Vital Signs (Most Recent):  Temp: 98.1 °F (36.7 °C) (07/02/20 1100)  Pulse: 87  (07/02/20 1600)  Resp: (!) 40 (07/01/20 1505)  BP: 103/60 (07/02/20 1600)  SpO2: 100 % (07/02/20 1600) Vital Signs (24h Range):  Temp:  [98.1 °F (36.7 °C)-98.9 °F (37.2 °C)] 98.1 °F (36.7 °C)  Pulse:  [] 87  SpO2:  [95 %-100 %] 100 %  BP: ()/(44-83) 103/60   Weight: 61.4 kg (135 lb 5.8 oz)  Body mass index is 23.23 kg/m².      Intake/Output Summary (Last 24 hours) at 7/2/2020 1624  Last data filed at 7/2/2020 1600  Gross per 24 hour   Intake 2620.75 ml   Output 1745 ml   Net 875.75 ml       Physical Exam  Vitals signs reviewed.   Constitutional:       Appearance: He is well-developed. He is toxic-appearing.      Interventions: He is sedated and intubated.   HENT:      Head: Normocephalic and atraumatic.   Eyes:      Pupils: Pupils are equal, round, and reactive to light.   Neck:      Musculoskeletal: Normal range of motion.      Trachea: No tracheal deviation.      Comments: Trach in place   Cardiovascular:      Rate and Rhythm: Tachycardia present.      Pulses:           Radial pulses are 2+ on the right side and 2+ on the left side.        Dorsalis pedis pulses are 1+ on the right side and 1+ on the left side.      Heart sounds: Normal heart sounds.   Pulmonary:      Effort: He is intubated.      Breath sounds: No decreased breath sounds, wheezing, rhonchi or rales.   Abdominal:      General: Bowel sounds are normal. There is no distension.      Palpations: Abdomen is soft.   Genitourinary:     Comments: Torres in place   Musculoskeletal: Normal range of motion.   Skin:     General: Skin is warm.      Comments: Necrotic tissue on left forearm    Neurological:      Mental Status: He is unresponsive.      Comments: PERRL, spontaneously opens eyes but does not track. Cough reflexes intact. No response to pain X 4 extremities.          Vents:  Vent Mode: A/C (07/02/20 1331)  Ventilator Initiated: Yes (04/10/20 8807)  Set Rate: 35 BPM (07/02/20 1331)  Vt Set: 250 mL (06/06/20 2116)  Pressure Support: 20  cmH20 (06/06/20 0858)  PEEP/CPAP: 5 cmH20 (07/02/20 1331)  Oxygen Concentration (%): 40 (07/02/20 1600)  Peak Airway Pressure: 48 cmH2O (07/02/20 1331)  Plateau Pressure: (S) 38 cmH20 (07/02/20 1331)  Total Ve: 15.2 mL (07/02/20 1331)  F/VT Ratio<105 (RSBI): 175.44 (07/01/20 1447)  Lines/Drains/Airways     Peripherally Inserted Central Catheter Line            PICC Double Lumen 05/05/20 1707 right basilic 57 days          Drain                 Urethral Catheter 06/26/20 0901 6 days         NG/OG Tube 06/27/20 1153 16 Fr. Right nostril 5 days         Chest Tube 06/29/20 1816 1 Left First intercostal space 2 days          Airway                 Surgical Airway 05/05/20 1500 Shiley Cuffed 58 days          Peripheral Intravenous Line                 Peripheral IV - Single Lumen 06/28/20 20 G Anterior;Right Foot 4 days         Peripheral IV - Single Lumen 06/30/20 2300 20 G Left;Posterior Hand 1 day              Significant Labs:    CBC/Anemia Profile:  Recent Labs   Lab 07/01/20  0316 07/02/20  0310   WBC 16.86* 19.18*   HGB 8.1* 7.9*   HCT 26.1* 25.8*    306   MCV 92 96   RDW 14.8* 15.7*        Chemistries:  Recent Labs   Lab 07/01/20  0316 07/02/20  0310    139   K 3.3* 3.9   CL 97 99   CO2 35* 32*   BUN 56* 39*   CREATININE 0.8 0.7   CALCIUM 8.7 8.4*   ALBUMIN 2.4* 2.4*   PROT 6.1 5.8*   BILITOT 2.1* 1.7*   ALKPHOS 93 82   ALT 23 20   AST 23 20   MG 2.3 2.1   PHOS 1.7* 2.2*       All pertinent labs within the past 24 hours have been reviewed.    Significant Imaging:  I have reviewed all pertinent imaging results/findings within the past 24 hours.      ABG  Recent Labs   Lab 06/30/20  0813   PH 7.414   PO2 122*   PCO2 65.6*   HCO3 41.9*   BE 17     Assessment/Plan:     Neuro  Basal ganglia infarction  CT head on 6/2 with bilateral basal ganglia infarcts consistent with global hypoxic ischemic injury 2/2 COVID-19    --neuro exam unchanged with intact cranial nerve reflexes and spontaneous eye opening  "only  --per neurology, "considering the extent of brain injury, he has very low likelihood of substantial further neurological improvement. 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 TV dropped from 280 to 150's. CXR with pneumomediastinum.  --no intervention warranted  --continue LPV    Difficult intubation  S/p crich which was converted to trach    Acute hypoxemic respiratory failure  2/2 to ARDS due to COVID-19  - ARDSNET Protocol  --see COVID-19    Cardiac/Vascular  Tachycardia  Patient's baseline since the initiation of ECMO has been tachycardic.  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    On enteral nutrition  Continue tube feeds    Other  * Acute respiratory disease due to COVID-19 virus  - Difficult intubation requiring crich on admit; converted to trach 5/5  - previously proned with Monika  - remdesivir course completed.  - Completed treatment with Plaquenil, Azithro, Methyprednisone for 5 days  - Strict I/O's and goal net neutral status to help optimize vent mechanics. Continue Lasix gtt   - Special isolation and aspiration precautions ordered per COVID protocol  - Significant improvement in oxygenation, but remains with poor lung compliance   - complicated by pneumomediastinum and pneumothorax requiring CT placement  - continue pressure support 35 to " maintain Vt of 250-300      Acute respiratory distress syndrome (ARDS) due to COVID-19 virus  -See acute respiratory failure        Arm wound, left, sequela  --wound to LUE being seen by wound care. Thought to be 2/2 extravasation of medication through PIV. Continue to monitor. RUE with +2 palpable pulses.    Goals of care, counseling/discussion  Patient's prognosis remains poor despite efforts to optimize health. Son spoken with regarding GOC.  DNR changed after consulting CTS for ECMO. Per conversation with Dr. Goss on 6/26, no chest compressions. Given Mr. Sanchez's decline, he is too sick for travel back to [place]. Family urged to visit if possible; ongoing coordination with [employer] to help arrange travel hopefully around 7/15.       Shock  Suspect septic. Febrile to 103.6 with rising leukocytosis  --follow up blood and sputum cultures  --complete 7 day course cefepime; d/c vanc  --off levophed    COVID-19 virus detected  See ARDS        Critical Care Time: 65 minutes  Critical secondary to Patient has a condition that poses threat to life and bodily function: ARDS      Critical care was time spent personally by me on the following activities: development of treatment plan with patient or surrogate and bedside caregivers, discussions with consultants, evaluation of patient's response to treatment, examination of patient, ordering and performing treatments and interventions, ordering and review of laboratory studies, ordering and review of radiographic studies, pulse oximetry, re-evaluation of patient's condition. This critical care time did not overlap with that of any other provider or involve time for any procedures.     Tito Espinoza NP  Critical Care Medicine  [facility]

## 2020-07-02 NOTE — PLAN OF CARE
07/02/20 0948   Discharge Reassessment   Assessment Type Discharge Planning Reassessment   Discharge Plan A Long-term acute care facility (LTAC)   Discharge Plan B Long-term acute care facility (LTAC)   DME Needed Upon Discharge  other (see comments)  (tbd)   Anticipated Discharge Disposition Long Term   Describe the patient's ability to walk at the present time. Does not walk or unable to take any steps at all   Number of comorbid conditions (as recorded on the chart) One   Post-Acute Status   Post-Acute Placement Status Awaiting Internal Medical Clearance     Patient not medically stable to discharge. Patient remains sedated & intubated. Endo continues to follow the pt. Pox 95% on 40% vent. Insulin gtt @ 0.6cc/hr. Sliding scale given PRN. Precedex & fentanyl gtts titrated for comfort. Levo gtt on and off to maintain MAP>65. Chest tube placed 6/29/2020 due to pneumomediastinum and pneumothorax. POC reviewed by RN with pt's son via phone. Son verbalized understanding. Will continue to follow.

## 2020-07-02 NOTE — SUBJECTIVE & OBJECTIVE
Interval History/Significant Events: no events overnight to note    Review of Systems   Unable to perform ROS: Acuity of condition     Objective:     Vital Signs (Most Recent):  Temp: 98.1 °F (36.7 °C) (07/02/20 1100)  Pulse: 87 (07/02/20 1600)  Resp: (!) 40 (07/01/20 1505)  BP: 103/60 (07/02/20 1600)  SpO2: 100 % (07/02/20 1600) Vital Signs (24h Range):  Temp:  [98.1 °F (36.7 °C)-98.9 °F (37.2 °C)] 98.1 °F (36.7 °C)  Pulse:  [] 87  SpO2:  [95 %-100 %] 100 %  BP: ()/(44-83) 103/60   Weight: 61.4 kg (135 lb 5.8 oz)  Body mass index is 23.23 kg/m².      Intake/Output Summary (Last 24 hours) at 7/2/2020 1624  Last data filed at 7/2/2020 1600  Gross per 24 hour   Intake 2620.75 ml   Output 1745 ml   Net 875.75 ml       Physical Exam  Vitals signs reviewed.   Constitutional:       Appearance: He is well-developed. He is toxic-appearing.      Interventions: He is sedated and intubated.   HENT:      Head: Normocephalic and atraumatic.   Eyes:      Pupils: Pupils are equal, round, and reactive to light.   Neck:      Musculoskeletal: Normal range of motion.      Trachea: No tracheal deviation.      Comments: Trach in place   Cardiovascular:      Rate and Rhythm: Tachycardia present.      Pulses:           Radial pulses are 2+ on the right side and 2+ on the left side.        Dorsalis pedis pulses are 1+ on the right side and 1+ on the left side.      Heart sounds: Normal heart sounds.   Pulmonary:      Effort: He is intubated.      Breath sounds: No decreased breath sounds, wheezing, rhonchi or rales.   Abdominal:      General: Bowel sounds are normal. There is no distension.      Palpations: Abdomen is soft.   Genitourinary:     Comments: Torres in place   Musculoskeletal: Normal range of motion.   Skin:     General: Skin is warm.      Comments: Necrotic tissue on left forearm    Neurological:      Mental Status: He is unresponsive.      Comments: PERRL, spontaneously opens eyes but does not track. Cough reflexes  intact. No response to pain X 4 extremities.          Vents:  Vent Mode: A/C (07/02/20 1331)  Ventilator Initiated: Yes (04/10/20 2247)  Set Rate: 35 BPM (07/02/20 1331)  Vt Set: 250 mL (06/06/20 0858)  Pressure Support: 20 cmH20 (06/06/20 0858)  PEEP/CPAP: 5 cmH20 (07/02/20 1331)  Oxygen Concentration (%): 40 (07/02/20 1600)  Peak Airway Pressure: 48 cmH2O (07/02/20 1331)  Plateau Pressure: (S) 38 cmH20 (07/02/20 1331)  Total Ve: 15.2 mL (07/02/20 1331)  F/VT Ratio<105 (RSBI): 175.44 (07/01/20 1447)  Lines/Drains/Airways     Peripherally Inserted Central Catheter Line            PICC Double Lumen 05/05/20 1707 right basilic 57 days          Drain                 Urethral Catheter 06/26/20 0901 6 days         NG/OG Tube 06/27/20 1153 16 Fr. Right nostril 5 days         Chest Tube 06/29/20 1816 1 Left First intercostal space 2 days          Airway                 Surgical Airway 05/05/20 1500 Shiley Cuffed 58 days          Peripheral Intravenous Line                 Peripheral IV - Single Lumen 06/28/20 20 G Anterior;Right Foot 4 days         Peripheral IV - Single Lumen 06/30/20 2300 20 G Left;Posterior Hand 1 day              Significant Labs:    CBC/Anemia Profile:  Recent Labs   Lab 07/01/20  0316 07/02/20  0310   WBC 16.86* 19.18*   HGB 8.1* 7.9*   HCT 26.1* 25.8*    306   MCV 92 96   RDW 14.8* 15.7*        Chemistries:  Recent Labs   Lab 07/01/20  0316 07/02/20  0310    139   K 3.3* 3.9   CL 97 99   CO2 35* 32*   BUN 56* 39*   CREATININE 0.8 0.7   CALCIUM 8.7 8.4*   ALBUMIN 2.4* 2.4*   PROT 6.1 5.8*   BILITOT 2.1* 1.7*   ALKPHOS 93 82   ALT 23 20   AST 23 20   MG 2.3 2.1   PHOS 1.7* 2.2*       All pertinent labs within the past 24 hours have been reviewed.    Significant Imaging:  I have reviewed all pertinent imaging results/findings within the past 24 hours.

## 2020-07-02 NOTE — ASSESSMENT & PLAN NOTE
BG goal 140 - 180       continue transition IV insulin infusion at 0.6 u/hr  Low dose correction scale  BG monitoring every 4 hours while NPO      ** Please call Endocrine for any BG related issues **    Discharge planning: URIEL

## 2020-07-02 NOTE — PLAN OF CARE
Recommendations    Recommendation:   1. Suggest modifying TF to better meet pt needs, to Peptamen AF @ 10 mL/hr increase to goal as tolerable of 50 mL/hr to provide pt with 1440 kcal, 91 g protein and 974 mL free water.    -Additonal water per MD. Hold for residuals >500 mL.   2. RD to monitor and follow up  Goals: Meet % EEN, EPN by RD f/u date  Nutrition Goal Status: goal not met  Communication of RD Recs: (POC)

## 2020-07-02 NOTE — PROGRESS NOTES
"[facility]  Endocrinology  Progress Note    Admit Date: 4/10/2020     Reason for Consult: Management of T2DM, Hyperglycemia     Surgical Procedure and Date: tracheostomy 5/5/20    Diabetes diagnosis year: JAMMIE    Lab Results   Component Value Date    HGBA1C 6.6 (H) 04/12/2020       Home Diabetes Medications: none (per chart review)    How often checking glucose at home? JAMMIE  BG readings on regimen: JAMMIE  Hypoglycemia on the regimen? JAMMIE  Missed doses on regimen? JAMMIE    Diabetes Complications include:     JAMMIE    Complicating diabetes co morbidities:   none      HPI:   Patient is a 55 y.o. male with a diagnosis of DM2 and ARDS secondary to COVID-19.  Admitted to [facility] on 4/10/20 with fever and SOB.  Lengthy and complex hospital stay noted requiring intubation, ECMO, and trach placement.  Patient not with severe ARDS (with poor lung compliance) and suspected anoxic brain injury.  Patient developed hyperglycemia secondary to TF.  Endocrinology consulted for DM/BG management.          Interval HPI:   Overnight events: Remains in RICU. Intubated and sedated. NAEON. Trickle TF.  BG reasonably well controlled on IV insulin infusion at 0.6 u/hr; required correction scale x1 overnight.   Eating:   NPO  Nausea: No  Hypoglycemia and intervention: No  Fever: No   TF: 10 cc/hr   /83 (BP Location: Left arm, Patient Position: Lying)   Pulse (!) 122   Temp 98.9 °F (37.2 °C) (Axillary)   Resp (!) 40   Ht 5' 4" (1.626 m)   Wt 61.4 kg (135 lb 5.8 oz)   SpO2 98%   BMI 23.23 kg/m²     Labs Reviewed and Include    Recent Labs   Lab 07/02/20  0310   *   CALCIUM 8.4*   ALBUMIN 2.4*   PROT 5.8*      K 3.9   CO2 32*   CL 99   BUN 39*   CREATININE 0.7   ALKPHOS 82   ALT 20   AST 20   BILITOT 1.7*     Lab Results   Component Value Date    WBC 19.18 (H) 07/02/2020    HGB 7.9 (L) 07/02/2020    HCT 25.8 (L) 07/02/2020    MCV 96 07/02/2020     07/02/2020     No results for input(s): TSH, " FREET4 in the last 168 hours.  Lab Results   Component Value Date    HGBA1C 6.6 (H) 04/12/2020       Nutritional status:   Body mass index is 23.23 kg/m².  Lab Results   Component Value Date    ALBUMIN 2.4 (L) 07/02/2020    ALBUMIN 2.4 (L) 07/01/2020    ALBUMIN 2.6 (L) 06/30/2020     Lab Results   Component Value Date    PREALBUMIN 20 05/28/2020       Estimated Creatinine Clearance: 97.5 mL/min (based on SCr of 0.7 mg/dL).    Accu-Checks  Recent Labs     06/30/20 2000 06/30/20  2359 07/01/20  0404 07/01/20  0855 07/01/20  1305 07/01/20  1615 07/01/20  2009 07/01/20  2309 07/02/20  0322 07/02/20  0808   POCTGLUCOSE 114* 142* 204* 167* 203* 153* 150* 162* 287* 152*       Current Medications and/or Treatments Impacting Glycemic Control  Immunotherapy:    Immunosuppressants     None        Steroids:   Hormones (From admission, onward)    None        Pressors:    Autonomic Drugs (From admission, onward)    Start     Stop Route Frequency Ordered    06/30/20 2145  norepinephrine 4 mg in dextrose 5% 250 mL infusion (premix) (titrating)     Question Answer Comment   Titrate by: (in mcg/kg/min) 0.01    Titrate interval: (in minutes) 2    Titrate to maintain: (MAP or SBP) MAP    Greater than: (in mmHg) 60    Maximum dose: (in mcg/kg/min) 2        -- IV Continuous 06/30/20 2039 04/16/20 0915  rocuronium 10 mg/mL injection     Note to Pharmacy: Created by cabinet override    04/16 2129 04/16/20 0915        Hyperglycemia/Diabetes Medications:   Antihyperglycemics (From admission, onward)    Start     Stop Route Frequency Ordered    06/29/20 1015  insulin regular 100 Units in sodium chloride 0.9% 100 mL infusion      -- IV Continuous 06/29/20 0907    06/29/20 1007  insulin aspart U-100 pen 0-4 Units      -- SubQ As needed (PRN) 06/29/20 0907          ASSESSMENT and PLAN    * Acute respiratory disease due to COVID-19 virus  Managed per primary team        Type 2 diabetes mellitus without complication, without long-term  current use of insulin  BG goal 140 - 180       continue transition IV insulin infusion at 0.6 u/hr  Low dose correction scale  BG monitoring every 4 hours while NPO      ** Please call Endocrine for any BG related issues **    Discharge planning: TBD          On enteral nutrition  May cause BG excursions.   Rosalee Mg, NP  Endocrinology  [facility]

## 2020-07-02 NOTE — PROGRESS NOTES
"[facility] Res[facility] ICU  Adult Nutrition  Progress Note    SUMMARY       Recommendations    Recommendation:   1. Suggest modifying TF to better meet pt needs, to Peptamen AF @ 10 mL/hr increase to goal as tolerable of 50 mL/hr to provide pt with 1440 kcal, 91 g protein and 974 mL free water.    -Additonal water per MD. Hold for residuals >500 mL.   2. RD to monitor and follow up  Goals: Meet % EEN, EPN by RD f/u date  Nutrition Goal Status: goal not met  Communication of RD Recs: (POC)    Reason for Assessment    Reason For Assessment: RD follow-up  Diagnosis: (COVID-19)  Relevant Medical History: HTN  Interdisciplinary Rounds: did not attend  General Information Comments: Pt continues intubated and sedated. Unable to reach RN. Pt TF restarted and running @ 10 mL/hr, tolerating well. Currently goal for TF is trickle feeds. Unsure of plan to increase. Wt stable since admit. NFPE not performed, patient has been screened for possible COVID-19 and has been placed on airborne and contact precautions. Patient is noted as being positive for COVID-19.  Nutrition Discharge Planning: Unable to determine at this time.    Nutrition Risk Screen    Nutrition Risk Screen: tube feeding or parenteral nutrition    Nutrition/Diet History    Food Allergies: NKFA  Factors Affecting Nutritional Intake: NPO, on mechanical ventilation    Anthropometrics    Temp: 98.1 °F (36.7 °C)  Height Method: Stated  Height: 5' 4" (162.6 cm)  Height (inches): 64 in  Weight Method: Bed Scale  Weight: 61.4 kg (135 lb 5.8 oz)  Weight (lb): 135.36 lb  Ideal Body Weight (IBW), Male: 130 lb  % Ideal Body Weight, Male (lb): 114.62 %  BMI (Calculated): 23.2  BMI Grade: 18.5-24.9 - normal  Usual Body Weight (UBW), kg: (JAMMIE)       Lab/Procedures/Meds    Pertinent Labs Reviewed: reviewed  Pertinent Labs Comments: BUN 39; Glucose 255; Ca 8.4; Phos 2.2  Pertinent Medications Reviewed: reviewed  Pertinent Medications Comments: Vitamin D; " metoprolol; cefepime; heparin; pantoprazole; MVI     Estimated/Assessed Needs    Weight Used For Calorie Calculations: 61.4 kg (135 lb 5.8 oz)  Energy Calorie Requirements (kcal): 1762 kcal/d  Energy Need Method: [facility]  Protein Requirements: 79-92 g/day  Weight Used For Protein Calculations: 61.4 kg (135 lb 5.8 oz)  Fluid Requirements (mL): 1 mL/kcal or per MD  Estimated Fluid Requirement Method: RDA Method  RDA Method (mL): 1762    Nutrition Prescription Ordered    Current Diet Order: NPO  Current Nutrition Support Formula Ordered: Peptamen Intense VHP  Current Nutrition Support Rate Ordered: 10 (ml)  Current Nutrition Support Frequency Ordered: mL/hr    Evaluation of Received Nutrient/Fluid Intake    Enteral Calories (kcal): 240  Enteral Protein (gm): 22  Enteral (Free Water) Fluid (mL): 202  % Kcal Needs: 13  % Protein Needs: 27  I/O: +1.8 L since admit  Energy Calories Required: not meeting needs  Protein Required: not meeting needs  Fluid Required: other (see comments)  Comments: LBM 7/2  Tolerance: tolerating  % Intake of Estimated Energy Needs: 0 - 25 %  % Meal Intake: NPO    Nutrition Risk    Level of Risk/Frequency of Follow-up: low(1x/week)     Assessment and Plan    Nutrition Problem  Inadequate energy intake     Related to (etiology):   Inability to consume sufficient energy      Signs and Symptoms (as evidenced by):   NPO     Interventions (treatment strategy):  Collaboration of nutrition care w/ other providers   Enteral Nutrition      Nutrition Diagnosis Status:   Continues     Monitor and Evaluation    Food and Nutrient Intake: energy intake, enteral nutrition intake  Food and Nutrient Adminstration: enteral and parenteral nutrition administration  Physical Activity and Function: (-)  Anthropometric Measurements: weight, weight change  Biochemical Data, Medical Tests and Procedures: electrolyte and renal panel, gastrointestinal profile, inflammatory profile  Nutrition-Focused Physical Findings:  overall appearance     Nutrition Follow-Up    RD Follow-up?: Yes

## 2020-07-02 NOTE — CARE UPDATE
1400: Updated son, Susana, via telephone regarding patient's clinical status. All questions answered and concerns addressed.    1845: Attempted to reach Dr. Stafford with [employer] via telephone to provide update on patient's clinical status. A message was left requesting a call back.      Tito Espinoza NP  Pulmonary Critical Care Medicine

## 2020-07-02 NOTE — PLAN OF CARE
Updated medical records faxed to Dr. Flor Tiwari (f) [phone] with [employer] as requested.

## 2020-07-02 NOTE — PLAN OF CARE
"      SICU PLAN OF CARE NOTE    Dx: Acute respiratory disease due to COVID-19 virus    Shift Events: levo and fentanyl gtts off     Goals of Care: MAP >65, comfort     Neuro: Unresponsive; opens eyes spontaneously but does not track, follow commands, or withdraw    Vital Signs: /60 (BP Location: Left arm, Patient Position: Lying)   Pulse 87   Temp 98.1 °F (36.7 °C) (Oral)   Resp (!) 40   Ht 5' 4" (1.626 m)   Wt 61.4 kg (135 lb 5.8 oz)   SpO2 100%   BMI 23.23 kg/m²     Respiratory: Ventilator    Diet: Tube Feeds    Gtts: insulin @0.4, precedex @1.2    Urine Output: Urinary Catheter ~1L cc/shift    Drains: Chest Tube, total output 70 cc /  shift    Labs/Accuchecks: accuchecks q4, no SSI needed.    Skin: see flowsheets for documentation on skin; no new breakdown noted        "

## 2020-07-02 NOTE — ASSESSMENT & PLAN NOTE
Patient's prognosis remains poor despite efforts to optimize health. Son spoken with regarding GOC.  DNR changed after consulting CTS for ECMO. Per conversation with Dr. Goss on 6/26, no chest compressions. Given Mr. Sanchez's decline, he is too sick for travel back to [place]. Family urged to visit if possible; ongoing coordination with [employer] to help arrange travel hopefully around 7/15.

## 2020-07-02 NOTE — ASSESSMENT & PLAN NOTE
Noted on CXR 6/29 as a complication from severe ARDS 2/2 COVID    --s/p chest tube placed 06/29 with reexpansion on CXR  --persistent air leak present

## 2020-07-02 NOTE — ASSESSMENT & PLAN NOTE
--HbA1c 6  --Home DM regimen:  Diet controlled  --continue insulin gtt  --frequent BG monitoring

## 2020-07-02 NOTE — ASSESSMENT & PLAN NOTE
Suspect septic. Febrile to 103.6 with rising leukocytosis  --follow up blood and sputum cultures  --complete 7 day course cefepime; d/c vanc  --off levophed

## 2020-07-02 NOTE — SUBJECTIVE & OBJECTIVE
"  Interval HPI:   Overnight events: Remains in RICU. Intubated and sedated. NAEON. Trickle TF.  BG reasonably well controlled on IV insulin infusion at 0.6 u/hr; required correction scale x1 overnight.   Eating:   NPO  Nausea: No  Hypoglycemia and intervention: No  Fever: No   TF: 10 cc/hr   /83 (BP Location: Left arm, Patient Position: Lying)   Pulse (!) 122   Temp 98.9 °F (37.2 °C) (Axillary)   Resp (!) 40   Ht 5' 4" (1.626 m)   Wt 61.4 kg (135 lb 5.8 oz)   SpO2 98%   BMI 23.23 kg/m²     Labs Reviewed and Include    Recent Labs   Lab 07/02/20  0310   *   CALCIUM 8.4*   ALBUMIN 2.4*   PROT 5.8*      K 3.9   CO2 32*   CL 99   BUN 39*   CREATININE 0.7   ALKPHOS 82   ALT 20   AST 20   BILITOT 1.7*     Lab Results   Component Value Date    WBC 19.18 (H) 07/02/2020    HGB 7.9 (L) 07/02/2020    HCT 25.8 (L) 07/02/2020    MCV 96 07/02/2020     07/02/2020     No results for input(s): TSH, FREET4 in the last 168 hours.  Lab Results   Component Value Date    HGBA1C 6.6 (H) 04/12/2020       Nutritional status:   Body mass index is 23.23 kg/m².  Lab Results   Component Value Date    ALBUMIN 2.4 (L) 07/02/2020    ALBUMIN 2.4 (L) 07/01/2020    ALBUMIN 2.6 (L) 06/30/2020     Lab Results   Component Value Date    PREALBUMIN 20 05/28/2020       Estimated Creatinine Clearance: 97.5 mL/min (based on SCr of 0.7 mg/dL).    Accu-Checks  Recent Labs     06/30/20 2000 06/30/20  2359 07/01/20  0404 07/01/20  0855 07/01/20  1305 07/01/20  1615 07/01/20 2009 07/01/20  2309 07/02/20  0322 07/02/20  0808   POCTGLUCOSE 114* 142* 204* 167* 203* 153* 150* 162* 287* 152*       Current Medications and/or Treatments Impacting Glycemic Control  Immunotherapy:    Immunosuppressants     None        Steroids:   Hormones (From admission, onward)    None        Pressors:    Autonomic Drugs (From admission, onward)    Start     Stop Route Frequency Ordered    06/30/20 6828  norepinephrine 4 mg in dextrose 5% 250 mL " infusion (premix) (titrating)     Question Answer Comment   Titrate by: (in mcg/kg/min) 0.01    Titrate interval: (in minutes) 2    Titrate to maintain: (MAP or SBP) MAP    Greater than: (in mmHg) 60    Maximum dose: (in mcg/kg/min) 2        -- IV Continuous 06/30/20 2039 04/16/20 0915  rocuronium 10 mg/mL injection     Note to Pharmacy: Created by cabinet override    04/16 2129 04/16/20 0915        Hyperglycemia/Diabetes Medications:   Antihyperglycemics (From admission, onward)    Start     Stop Route Frequency Ordered    06/29/20 1015  insulin regular 100 Units in sodium chloride 0.9% 100 mL infusion      -- IV Continuous 06/29/20 0907    06/29/20 1007  insulin aspart U-100 pen 0-4 Units      -- SubQ As needed (PRN) 06/29/20 0907

## 2020-07-02 NOTE — ASSESSMENT & PLAN NOTE
- Difficult intubation requiring crich on admit; converted to trach 5/5  - previously proned with Monika  - remdesivir course completed.  - Completed treatment with Plaquenil, Azithro, Methyprednisone for 5 days  - Strict I/O's and goal net neutral status to help optimize vent mechanics. Continue Lasix gtt   - Special isolation and aspiration precautions ordered per COVID protocol  - Significant improvement in oxygenation, but remains with poor lung compliance   - complicated by pneumomediastinum and pneumothorax requiring CT placement  - continue pressure support 35 to maintain Vt of 250-300

## 2020-07-03 NOTE — ASSESSMENT & PLAN NOTE
Patient's prognosis remains poor despite efforts to optimize health. Son spoken with regarding GOC.  DNR changed after consulting CTS for ECMO. Per conversation with Dr. Goss on 6/26, no chest compressions. Given Mr. Sanchez's decline, he is too sick for travel back to [place]. Family urged to visit if possible; ongoing coordination with [employer] to help arrange travel hopefully around 7/15.

## 2020-07-03 NOTE — PROGRESS NOTES
"[facility] Res[facility] ICU  Endocrinology  Progress Note    Admit Date: 4/10/2020     Reason for Consult: Management of T2DM, Hyperglycemia     Surgical Procedure and Date: tracheostomy 5/5/20    Diabetes diagnosis year: JAMMIE    Lab Results   Component Value Date    HGBA1C 6.6 (H) 04/12/2020       Home Diabetes Medications: none (per chart review)    How often checking glucose at home? JAMMIE  BG readings on regimen: JAMMIE  Hypoglycemia on the regimen? JAMMIE  Missed doses on regimen? JAMMIE    Diabetes Complications include:     JAMMIE    Complicating diabetes co morbidities:   none      HPI:   Patient is a 55 y.o. male with a diagnosis of DM2 and ARDS secondary to COVID-19.  Admitted to [facility] on 4/10/20 with fever and SOB.  Lengthy and complex hospital stay noted requiring intubation, ECMO, and trach placement.  Patient not with severe ARDS (with poor lung compliance) and suspected anoxic brain injury.  Patient developed hyperglycemia secondary to TF.  Endocrinology consulted for DM/BG management.        Interval HPI:   Overnight events: Remains in RICU. Trach on vent. NAEON. Insulin and precedex infusions, BG well controlled on IV insulin infusions at 0.4 u/hr.   Eating:   NPO  Nausea: No  Hypoglycemia and intervention: No  Fever: No  TPN and/or TF: trickle TF     BP (!) 142/78   Pulse 67   Temp 96.8 °F (36 °C) (Axillary)   Resp (!) 69   Ht 5' 4" (1.626 m)   Wt 61.4 kg (135 lb 5.8 oz)   SpO2 100%   BMI 23.23 kg/m²     Labs Reviewed and Include    Recent Labs   Lab 07/03/20  0529   *   CALCIUM 8.6*   ALBUMIN 2.2*   PROT 5.5*   *   K 3.5   CO2 33*      BUN 31*   CREATININE 0.6   ALKPHOS 72   ALT 17   AST 22   BILITOT 1.6*     Lab Results   Component Value Date    WBC 13.82 (H) 07/03/2020    HGB 7.6 (L) 07/03/2020    HCT 25.5 (L) 07/03/2020    MCV 96 07/03/2020     07/03/2020     No results for input(s): TSH, FREET4 in the last 168 hours.  Lab Results   Component Value Date    HGBA1C " 6.6 (H) 04/12/2020       Nutritional status:   Body mass index is 23.23 kg/m².  Lab Results   Component Value Date    ALBUMIN 2.2 (L) 07/03/2020    ALBUMIN 2.4 (L) 07/02/2020    ALBUMIN 2.4 (L) 07/01/2020     Lab Results   Component Value Date    PREALBUMIN 20 05/28/2020       Estimated Creatinine Clearance: 113.7 mL/min (based on SCr of 0.6 mg/dL).    Accu-Checks  Recent Labs     07/01/20  2009 07/01/20  2309 07/02/20  0322 07/02/20  0808 07/02/20  1155 07/02/20  1642 07/02/20  2117 07/03/20  0149 07/03/20  0500 07/03/20  0943   POCTGLUCOSE 150* 162* 287* 152* 156* 119* 189* 148* 150* 161*       Current Medications and/or Treatments Impacting Glycemic Control  Immunotherapy:    Immunosuppressants     None        Steroids:   Hormones (From admission, onward)    None        Pressors:    Autonomic Drugs (From admission, onward)    Start     Stop Route Frequency Ordered    06/30/20 2145  norepinephrine 4 mg in dextrose 5% 250 mL infusion (premix) (titrating)     Question Answer Comment   Titrate by: (in mcg/kg/min) 0.01    Titrate interval: (in minutes) 2    Titrate to maintain: (MAP or SBP) MAP    Greater than: (in mmHg) 60    Maximum dose: (in mcg/kg/min) 2        -- IV Continuous 06/30/20 2039 04/16/20 0915  rocuronium 10 mg/mL injection     Note to Pharmacy: Created by cabinet override    04/16 2129 04/16/20 0915        Hyperglycemia/Diabetes Medications:   Antihyperglycemics (From admission, onward)    Start     Stop Route Frequency Ordered    06/29/20 1015  insulin regular 100 Units in sodium chloride 0.9% 100 mL infusion      -- IV Continuous 06/29/20 0907    06/29/20 1007  insulin aspart U-100 pen 0-4 Units      -- SubQ As needed (PRN) 06/29/20 0907          ASSESSMENT and PLAN    * Acute respiratory disease due to COVID-19 virus  Managed per primary team        Type 2 diabetes mellitus without complication, without long-term current use of insulin  BG goal 140 - 180       rewrite transition IV insulin  infusion at 0.4 u/hr  Low dose correction scale  BG monitoring every 4 hours while NPO      ** Please call Endocrine for any BG related issues **    Discharge planning: TBD          On enteral nutrition  May cause BG excursions.   Rosalee Mg, NP  Endocrinology  [facility]

## 2020-07-03 NOTE — ASSESSMENT & PLAN NOTE
- Difficult intubation requiring crich on admit; converted to trach 5/5  - previously proned with Monika  - remdesivir course completed.  - Completed treatment with Plaquenil, Azithro, Methyprednisone for 5 days  - Special isolation and aspiration precautions ordered per COVID protocol  - Significant improvement in oxygenation, but remains with poor lung compliance   - complicated by pneumomediastinum and pneumothorax requiring CT placement  - continue pressure support 35 to maintain Vt of 250-300  - Lasix as needed to maintain euvolemic to net negative volume status

## 2020-07-03 NOTE — ASSESSMENT & PLAN NOTE
BG goal 140 - 180       rewrite transition IV insulin infusion at 0.4 u/hr  Low dose correction scale  BG monitoring every 4 hours while NPO      ** Please call Endocrine for any BG related issues **    Discharge planning: URIEL

## 2020-07-03 NOTE — ASSESSMENT & PLAN NOTE
Worsening compliance noted. TV dropped from 280 to 150's. CXR with pneumomediastinum.  --no intervention warranted, improved  --continue LPV

## 2020-07-03 NOTE — PLAN OF CARE
Updated medical records faxed to Dr. Flor Tiwari (f) [phone] with [employer] as requested.

## 2020-07-03 NOTE — PLAN OF CARE
POC reviewed with pt family at 1400. No acute events today. Neuro exam unchanged, bear hugger on for hypothermia, temp ~96. MAP >65 maintained. BP and HR labile with agitation, levo gtt still off. Agitation managed with precedex and fentanyl gtt. Air leak in chest tube, crit care team aware. Vent at 40/5, attempt to wean unsuccessful. TF @ 10. Insulin gtt at 0.4. Lytes replaced. Pt progressing toward goals. Will continue to monitor. See flowsheets for full assessment and VS info.

## 2020-07-03 NOTE — ASSESSMENT & PLAN NOTE
Concern for infection on 06/26 with cultures sent, no source identified  --blood and sputum cultures NGTD  --s/p 7 day course cefepime; d/c vanc  --currently off levophed

## 2020-07-03 NOTE — ASSESSMENT & PLAN NOTE
suspect 2/2 extravasation of medication through PIV.  --management per wound care  --RUE with +2 palpable pulses.

## 2020-07-03 NOTE — SUBJECTIVE & OBJECTIVE
Interval History/Significant Events: no events overnight to note    Review of Systems   Unable to perform ROS: Acuity of condition     Objective:     Vital Signs (Most Recent):  Temp: 96.9 °F (36.1 °C) (07/03/20 0705)  Pulse: 70 (07/03/20 1232)  Resp: (!) 44 (07/03/20 1232)  BP: 107/62 (07/03/20 1205)  SpO2: 100 % (07/03/20 1232) Vital Signs (24h Range):  Temp:  [96.8 °F (36 °C)-97.8 °F (36.6 °C)] 96.9 °F (36.1 °C)  Pulse:  [] 70  Resp:  [] 44  SpO2:  [93 %-100 %] 100 %  BP: ()/(58-88) 107/62   Weight: 61.4 kg (135 lb 5.8 oz)  Body mass index is 23.23 kg/m².      Intake/Output Summary (Last 24 hours) at 7/3/2020 1550  Last data filed at 7/3/2020 1205  Gross per 24 hour   Intake 3357.85 ml   Output 1695 ml   Net 1662.85 ml       Physical Exam  Vitals signs reviewed.   Constitutional:       Appearance: He is well-developed. He is toxic-appearing.      Interventions: He is sedated and intubated.   HENT:      Head: Normocephalic and atraumatic.   Eyes:      Pupils: Pupils are equal, round, and reactive to light.   Neck:      Musculoskeletal: Normal range of motion.      Trachea: No tracheal deviation.      Comments: Trach in place   Cardiovascular:      Rate and Rhythm: Tachycardia present.      Pulses:           Radial pulses are 2+ on the right side and 2+ on the left side.        Dorsalis pedis pulses are 1+ on the right side and 1+ on the left side.      Heart sounds: Normal heart sounds.   Pulmonary:      Effort: He is intubated.      Breath sounds: No decreased breath sounds, wheezing, rhonchi or rales.   Abdominal:      General: Bowel sounds are normal. There is no distension.      Palpations: Abdomen is soft.   Genitourinary:     Comments: Torres in place   Musculoskeletal: Normal range of motion.   Skin:     General: Skin is warm.      Comments: Necrotic tissue on left forearm    Neurological:      Mental Status: He is unresponsive.      Comments: PERRL, spontaneously opens eyes but does not  track. Cough reflexes intact. No response to pain X 4 extremities.          Vents:  Vent Mode: A/C (07/03/20 1232)  Ventilator Initiated: Yes (04/10/20 2247)  Set Rate: 35 BPM (07/03/20 1232)  Vt Set: 250 mL (06/06/20 0858)  Pressure Support: 20 cmH20 (06/06/20 0858)  PEEP/CPAP: 5 cmH20 (07/03/20 1232)  Oxygen Concentration (%): 40 (07/03/20 1232)  Peak Airway Pressure: 44 cmH2O (07/03/20 1232)  Plateau Pressure: 38 cmH20 (07/03/20 1232)  Total Ve: 8.42 mL (07/03/20 1232)  F/VT Ratio<105 (RSBI): 184.1 (07/03/20 1232)  Lines/Drains/Airways     Peripherally Inserted Central Catheter Line            PICC Double Lumen 05/05/20 1707 right basilic 58 days          Drain                 Urethral Catheter 06/26/20 0901 7 days         NG/OG Tube 06/27/20 1153 16 Fr. Right nostril 6 days         Chest Tube 06/29/20 1816 1 Left First intercostal space 3 days          Airway                 Surgical Airway 05/05/20 1500 Shiley Cuffed 59 days          Peripheral Intravenous Line                 Peripheral IV - Single Lumen 06/28/20 20 G Anterior;Right Foot 5 days         Peripheral IV - Single Lumen 06/30/20 2300 20 G Left;Posterior Hand 2 days              Significant Labs:    CBC/Anemia Profile:  Recent Labs   Lab 07/02/20  0310 07/03/20  0529   WBC 19.18* 13.82*   HGB 7.9* 7.6*   HCT 25.8* 25.5*    284   MCV 96 96   RDW 15.7* 15.5*        Chemistries:  Recent Labs   Lab 07/02/20  0310 07/03/20  0529    146*   K 3.9 3.5   CL 99 105   CO2 32* 33*   BUN 39* 31*   CREATININE 0.7 0.6   CALCIUM 8.4* 8.6*   ALBUMIN 2.4* 2.2*   PROT 5.8* 5.5*   BILITOT 1.7* 1.6*   ALKPHOS 82 72   ALT 20 17   AST 20 22   MG 2.1 2.1   PHOS 2.2* 2.6*       All pertinent labs within the past 24 hours have been reviewed.    Significant Imaging:  I have reviewed all pertinent imaging results/findings within the past 24 hours.

## 2020-07-03 NOTE — PROGRESS NOTES
[facility]  Critical Care Medicine  Progress Note    Patient Name: Ranjana Sanchez  MRN: [id number]  Admission Date: 4/10/2020  Hospital Length of Stay: 84 days  Code Status: DNR  Attending Provider: Bret Schmitz MD  Primary Care Provider: Primary Doctor No   Principal Problem: Acute respiratory disease due to COVID-19 virus    Subjective:     HPI:  Mr. LUANA Sanchez is a 56 y/o M with PMHx of HTN brought to ER via EMS for increasing SOB with known COVID-19 exposure. 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 They have not had travelers since 3/14. 2 days ago he started having fever and SOB. He was seen by the ship's physician and was found to have crackles in his bases bilaterally, but his oxygen saturation was in the 90's. He had a flu test which was negative. He was quarantined in a room and states that he got worse with increasing SOB. He was brought to the ER and found to be 78% on RA. He was started on NRB and initially was doing much better, but his tachypnea did not improve, so he was placed on BiPAP with oxygen saturation improvement.    Hospital/ICU Course:  Patient was admitted to the MICU after a difficult intubation in the ER s/p crich. COVID + with ARDS, sedated and paralyzed. Unable to prone secondary to crich. Left mainstem intubation which was pulled off, on NO and levophed. ET tube replaced on 4/11 family contacted in [place]. Proned on 4/12 tolerated well and continued to prone on 4/19. 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 He was on and off ketamine as well.  EEG ordered to rule out seizures as he also had seizure like activity that resolved with ativan. Currently changing sedation, was placed on phenobarbital which should start having effect 4/20. Patient proned x 10 (last on 4/22). Still not tolerating ventilator weaning. Remains on  sedation, vasopressors, and paralytic. ID now following patient for remdesivir administration; family aware and consented. Patient not significantly improving despite efforts. Son contacted, patient made DNR. Patient evaluated for ECMO on 4/24. Placed on ECMO on 4/25 and transferred to Dr Ayon's service with DNR rescinded.    Some physiologic improvement on VV ECMO. Oxygenation at goal, lactate clearing, hemodynamics at goal without vasoactive support currently. Tolerated wean of vent Fi and Monika thus far. Peeling back sedatives steadily with goal of neuro exam ASAP. Metabolic alkalosis correcting with encouragement from diamox. Patient was weaned off from ECMO support on 06/24/2020. However, during this process he has undergone a stroke and currently is not responding to verbal commands. The patient is able to open his eyes and track, however no purposeful movements can be ascertained from the patient. Extensive notes from neurology have been documented in the patient's medical record. Extensive discussions had been carried out between the family members and periodic updates were provided. Periodic updates were also provided to [employer] which is his employer. The family was also notified that if any further deterioration happens after completion of ECMO we would not be providing new ECMO support to this patient.    The patient was transferred to the MICU team on 6/25 after ECMO decannulation. Fever with worsening shock. Cultured and started on broad spectrum antibiotics. Frequent episodes of bradycardia to the 20's but recovers without intervention. Heart rate variability continues and on 06/30 he became tachycardic to 166. Labs and EKG showed sinus tachy, restarted betablocker.    Interval History/Significant Events: no events overnight to note    Review of Systems   Unable to perform ROS: Acuity of condition     Objective:     Vital Signs (Most Recent):  Temp: 96.9 °F (36.1 °C) (07/03/20 0705)  Pulse: 70  (07/03/20 1232)  Resp: (!) 44 (07/03/20 1232)  BP: 107/62 (07/03/20 1205)  SpO2: 100 % (07/03/20 1232) Vital Signs (24h Range):  Temp:  [96.8 °F (36 °C)-97.8 °F (36.6 °C)] 96.9 °F (36.1 °C)  Pulse:  [] 70  Resp:  [] 44  SpO2:  [93 %-100 %] 100 %  BP: ()/(58-88) 107/62   Weight: 61.4 kg (135 lb 5.8 oz)  Body mass index is 23.23 kg/m².      Intake/Output Summary (Last 24 hours) at 7/3/2020 1550  Last data filed at 7/3/2020 1205  Gross per 24 hour   Intake 3357.85 ml   Output 1695 ml   Net 1662.85 ml       Physical Exam  Vitals signs reviewed.   Constitutional:       Appearance: He is well-developed. He is toxic-appearing.      Interventions: He is sedated and intubated.   HENT:      Head: Normocephalic and atraumatic.   Eyes:      Pupils: Pupils are equal, round, and reactive to light.   Neck:      Musculoskeletal: Normal range of motion.      Trachea: No tracheal deviation.      Comments: Trach in place   Cardiovascular:      Rate and Rhythm: Tachycardia present.      Pulses:           Radial pulses are 2+ on the right side and 2+ on the left side.        Dorsalis pedis pulses are 1+ on the right side and 1+ on the left side.      Heart sounds: Normal heart sounds.   Pulmonary:      Effort: He is intubated.      Breath sounds: No decreased breath sounds, wheezing, rhonchi or rales.   Abdominal:      General: Bowel sounds are normal. There is no distension.      Palpations: Abdomen is soft.   Genitourinary:     Comments: Torres in place   Musculoskeletal: Normal range of motion.   Skin:     General: Skin is warm.      Comments: Necrotic tissue on left forearm    Neurological:      Mental Status: He is unresponsive.      Comments: PERRL, spontaneously opens eyes but does not track. Cough reflexes intact. No response to pain X 4 extremities.          Vents:  Vent Mode: A/C (07/03/20 1232)  Ventilator Initiated: Yes (04/10/20 0679)  Set Rate: 35 BPM (07/03/20 1232)  Vt Set: 250 mL (06/06/20  0858)  Pressure Support: 20 cmH20 (06/06/20 0858)  PEEP/CPAP: 5 cmH20 (07/03/20 1232)  Oxygen Concentration (%): 40 (07/03/20 1232)  Peak Airway Pressure: 44 cmH2O (07/03/20 1232)  Plateau Pressure: 38 cmH20 (07/03/20 1232)  Total Ve: 8.42 mL (07/03/20 1232)  F/VT Ratio<105 (RSBI): 184.1 (07/03/20 1232)  Lines/Drains/Airways     Peripherally Inserted Central Catheter Line            PICC Double Lumen 05/05/20 1707 right basilic 58 days          Drain                 Urethral Catheter 06/26/20 0901 7 days         NG/OG Tube 06/27/20 1153 16 Fr. Right nostril 6 days         Chest Tube 06/29/20 1816 1 Left First intercostal space 3 days          Airway                 Surgical Airway 05/05/20 1500 Shiley Cuffed 59 days          Peripheral Intravenous Line                 Peripheral IV - Single Lumen 06/28/20 20 G Anterior;Right Foot 5 days         Peripheral IV - Single Lumen 06/30/20 2300 20 G Left;Posterior Hand 2 days              Significant Labs:    CBC/Anemia Profile:  Recent Labs   Lab 07/02/20  0310 07/03/20  0529   WBC 19.18* 13.82*   HGB 7.9* 7.6*   HCT 25.8* 25.5*    284   MCV 96 96   RDW 15.7* 15.5*        Chemistries:  Recent Labs   Lab 07/02/20  0310 07/03/20  0529    146*   K 3.9 3.5   CL 99 105   CO2 32* 33*   BUN 39* 31*   CREATININE 0.7 0.6   CALCIUM 8.4* 8.6*   ALBUMIN 2.4* 2.2*   PROT 5.8* 5.5*   BILITOT 1.7* 1.6*   ALKPHOS 82 72   ALT 20 17   AST 20 22   MG 2.1 2.1   PHOS 2.2* 2.6*       All pertinent labs within the past 24 hours have been reviewed.    Significant Imaging:  I have reviewed all pertinent imaging results/findings within the past 24 hours.      ABG  Recent Labs   Lab 06/30/20  0813   PH 7.414   PO2 122*   PCO2 65.6*   HCO3 41.9*   BE 17     Assessment/Plan:     Neuro  Basal ganglia infarction  CT head on 6/2 with bilateral basal ganglia infarcts consistent with global hypoxic ischemic injury 2/2 COVID-19    --neuro exam unchanged with intact cranial nerve reflexes and  "spontaneous eye opening only  --per neurology, "considering the extent of brain injury, he has very low likelihood of substantial further neurological improvement. 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 TV dropped from 280 to 150's. CXR with pneumomediastinum.  --no intervention warranted, improved  --continue LPV    Difficult intubation  S/p crich which was converted to trach    Acute hypoxemic respiratory failure  2/2 to ARDS due to COVID-19  - ARDSNET Protocol  --see COVID-19    Cardiac/Vascular  Tachycardia  Patient's baseline since the initiation of ECMO has been tachycardic.  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    On enteral nutrition  Continue tube feeds    Other  * Acute respiratory disease due to COVID-19 virus  - Difficult intubation requiring crich on admit; converted to trach 5/5  - previously proned with Monika  - remdesivir course completed.  - Completed treatment with Plaquenil, Azithro, Methyprednisone for 5 days  - Special isolation and aspiration precautions ordered per COVID protocol  - Significant improvement in oxygenation, but remains with poor lung compliance   - complicated by pneumomediastinum and pneumothorax requiring CT placement  - continue pressure support 35 to maintain Vt of 250-300  - Lasix as needed to maintain euvolemic " to net negative volume status      Acute respiratory distress syndrome (ARDS) due to COVID-19 virus  --See acute respiratory failure        Arm wound, left, sequela  suspect 2/2 extravasation of medication through PIV.  --management per wound care  --RUE with +2 palpable pulses.    Goals of care, counseling/discussion  Patient's prognosis remains poor despite efforts to optimize health. Son spoken with regarding GOC.  DNR changed after consulting CTS for ECMO. Per conversation with Dr. Goss on 6/26, no chest compressions. Given Mr. Sanchez's decline, he is too sick for travel back to [place]. Family urged to visit if possible; ongoing coordination with [employer] to help arrange travel hopefully around 7/15.       Shock  Concern for infection on 06/26 with cultures sent, no source identified  --blood and sputum cultures NGTD  --s/p 7 day course cefepime; d/c vanc  --currently off levophed    COVID-19 virus detected  See ARDS        Critical Care Time: 60 minutes  Critical secondary to Patient has a condition that poses threat to life and bodily function: Basal Ganglia infarction      Critical care was time spent personally by me on the following activities: development of treatment plan with patient or surrogate and bedside caregivers, discussions with consultants, evaluation of patient's response to treatment, examination of patient, ordering and performing treatments and interventions, ordering and review of laboratory studies, ordering and review of radiographic studies, pulse oximetry, re-evaluation of patient's condition. This critical care time did not overlap with that of any other provider or involve time for any procedures.     Tito Espinoza NP  Critical Care Medicine  [facility]

## 2020-07-03 NOTE — SUBJECTIVE & OBJECTIVE
"Interval HPI:   Overnight events: Remains in RICU. Trach on vent. NAEON. Insulin and precedex infusions, BG well controlled on IV insulin infusions at 0.4 u/hr.   Eating:   NPO  Nausea: No  Hypoglycemia and intervention: No  Fever: No  TPN and/or TF: trickle TF     BP (!) 142/78   Pulse 67   Temp 96.8 °F (36 °C) (Axillary)   Resp (!) 69   Ht 5' 4" (1.626 m)   Wt 61.4 kg (135 lb 5.8 oz)   SpO2 100%   BMI 23.23 kg/m²     Labs Reviewed and Include    Recent Labs   Lab 07/03/20  0529   *   CALCIUM 8.6*   ALBUMIN 2.2*   PROT 5.5*   *   K 3.5   CO2 33*      BUN 31*   CREATININE 0.6   ALKPHOS 72   ALT 17   AST 22   BILITOT 1.6*     Lab Results   Component Value Date    WBC 13.82 (H) 07/03/2020    HGB 7.6 (L) 07/03/2020    HCT 25.5 (L) 07/03/2020    MCV 96 07/03/2020     07/03/2020     No results for input(s): TSH, FREET4 in the last 168 hours.  Lab Results   Component Value Date    HGBA1C 6.6 (H) 04/12/2020       Nutritional status:   Body mass index is 23.23 kg/m².  Lab Results   Component Value Date    ALBUMIN 2.2 (L) 07/03/2020    ALBUMIN 2.4 (L) 07/02/2020    ALBUMIN 2.4 (L) 07/01/2020     Lab Results   Component Value Date    PREALBUMIN 20 05/28/2020       Estimated Creatinine Clearance: 113.7 mL/min (based on SCr of 0.6 mg/dL).    Accu-Checks  Recent Labs     07/01/20  2009 07/01/20  2309 07/02/20  0322 07/02/20  0808 07/02/20  1155 07/02/20  1642 07/02/20  2117 07/03/20  0149 07/03/20  0500 07/03/20  0943   POCTGLUCOSE 150* 162* 287* 152* 156* 119* 189* 148* 150* 161*       Current Medications and/or Treatments Impacting Glycemic Control  Immunotherapy:    Immunosuppressants     None        Steroids:   Hormones (From admission, onward)    None        Pressors:    Autonomic Drugs (From admission, onward)    Start     Stop Route Frequency Ordered    06/30/20 1416  norepinephrine 4 mg in dextrose 5% 250 mL infusion (premix) (titrating)     Question Answer Comment   Titrate by: (in " mcg/kg/min) 0.01    Titrate interval: (in minutes) 2    Titrate to maintain: (MAP or SBP) MAP    Greater than: (in mmHg) 60    Maximum dose: (in mcg/kg/min) 2        -- IV Continuous 06/30/20 2039 04/16/20 0915  rocuronium 10 mg/mL injection     Note to Pharmacy: Created by cabinet override    04/16 2129 04/16/20 0915        Hyperglycemia/Diabetes Medications:   Antihyperglycemics (From admission, onward)    Start     Stop Route Frequency Ordered    06/29/20 1015  insulin regular 100 Units in sodium chloride 0.9% 100 mL infusion      -- IV Continuous 06/29/20 0907    06/29/20 1007  insulin aspart U-100 pen 0-4 Units      -- SubQ As needed (PRN) 06/29/20 0907

## 2020-07-04 NOTE — PROGRESS NOTES
"[facility]  Endocrinology  Progress Note    Admit Date: 4/10/2020     Reason for Consult: Management of T2DM, Hyperglycemia     Surgical Procedure and Date: tracheostomy 5/5/20    Diabetes diagnosis year: JAMMIE    Lab Results   Component Value Date    HGBA1C 6.6 (H) 04/12/2020       Home Diabetes Medications: none (per chart review)    How often checking glucose at home? JAMMIE  BG readings on regimen: JAMMIE  Hypoglycemia on the regimen? JAMMIE  Missed doses on regimen? JAMMIE    Diabetes Complications include:     JAMMIE    Complicating diabetes co morbidities:   none      HPI:   Patient is a 55 y.o. male with a diagnosis of DM2 and ARDS secondary to COVID-19.  [place] [facility] [place] with fever and SOB.  Lengthy and complex hospital stay noted requiring intubation, ECMO, and trach placement.  Patient not with severe ARDS (with poor lung compliance) and suspected anoxic brain injury.  Patient developed hyperglycemia secondary to TF.  Endocrinology consulted for DM/BG management.        Interval HPI:   Overnight events: Remains in RICU. NAEOn. Trach on vent. Trickle Tf. BG well controlled on IV insulin at 0.3 u/hr.   Eating:   NPO  Nausea: No  Hypoglycemia and intervention: No  Fever: No  TPN and/or TF: at 10 cc/hr    /85   Pulse 64   Temp 96 °F (35.6 °C) (Axillary)   Resp (!) 113   Ht 5' 4" (1.626 m)   Wt 61.4 kg (135 lb 5.8 oz)   SpO2 100%   BMI 23.23 kg/m²     Labs Reviewed and Include    Recent Labs   Lab 07/04/20  0230   *   CALCIUM 9.1   ALBUMIN 2.3*   PROT 5.9*      K 3.5   CO2 33*   CL 98   BUN 25*   CREATININE 0.6   ALKPHOS 71   ALT 17   AST 23   BILITOT 1.9*     Lab Results   Component Value Date    WBC 15.11 (H) 07/04/2020    HGB 7.8 (L) 07/04/2020    HCT 26.2 (L) 07/04/2020    MCV 95 07/04/2020     07/04/2020     No results for input(s): TSH, FREET4 in the last 168 hours.  Lab Results   Component Value Date    HGBA1C 6.6 (H) 04/12/2020       Nutritional " status:   Body mass index is 23.23 kg/m².  Lab Results   Component Value Date    ALBUMIN 2.3 (L) 07/04/2020    ALBUMIN 2.2 (L) 07/03/2020    ALBUMIN 2.4 (L) 07/02/2020     Lab Results   Component Value Date    PREALBUMIN 20 05/28/2020       Estimated Creatinine Clearance: 113.7 mL/min (based on SCr of 0.6 mg/dL).    Accu-Checks  Recent Labs     07/02/20  2117 07/03/20  0149 07/03/20  0500 07/03/20  0943 07/03/20  1421 07/03/20  1804 07/03/20  1837 07/03/20  2022 07/03/20  2333 07/04/20  0436   POCTGLUCOSE 189* 148* 150* 161* 144* 119* 120* 129* 162* 156*       Current Medications and/or Treatments Impacting Glycemic Control  Immunotherapy:    Immunosuppressants     None        Steroids:   Hormones (From admission, onward)    None        Pressors:    Autonomic Drugs (From admission, onward)    Start     Stop Route Frequency Ordered    06/30/20 2145  norepinephrine 4 mg in dextrose 5% 250 mL infusion (premix) (titrating)     Question Answer Comment   Titrate by: (in mcg/kg/min) 0.01    Titrate interval: (in minutes) 2    Titrate to maintain: (MAP or SBP) MAP    Greater than: (in mmHg) 60    Maximum dose: (in mcg/kg/min) 2        -- IV Continuous 06/30/20 2039 04/16/20 0915  rocuronium 10 mg/mL injection     Note to Pharmacy: Created by cabinet override    04/16 2129 04/16/20 0915        Hyperglycemia/Diabetes Medications:   Antihyperglycemics (From admission, onward)    Start     Stop Route Frequency Ordered    06/29/20 1015  insulin regular 100 Units in sodium chloride 0.9% 100 mL infusion      -- IV Continuous 06/29/20 0907    06/29/20 1007  insulin aspart U-100 pen 0-4 Units      -- SubQ As needed (PRN) 06/29/20 0907          ASSESSMENT and PLAN    * Acute respiratory disease due to COVID-19 virus  Managed per primary team        Type 2 diabetes mellitus without complication, without long-term current use of insulin  BG goal 140 - 180       rewrite transition IV insulin infusion at 0.3 u/hr  Low dose  correction scale  BG monitoring every 4 hours while NPO      ** Please call Endocrine for any BG related issues **    Discharge planning: TBD          On enteral nutrition  May cause BG excursions.   Rosalee Mg, NP  Endocrinology  [facility]

## 2020-07-04 NOTE — SUBJECTIVE & OBJECTIVE
"Interval HPI:   Overnight events: Remains in RICU. NAEOn. Trach on vent. Trickle Tf. BG well controlled on IV insulin at 0.3 u/hr.   Eating:   NPO  Nausea: No  Hypoglycemia and intervention: No  Fever: No  TPN and/or TF: at 10 cc/hr    /85   Pulse 64   Temp 96 °F (35.6 °C) (Axillary)   Resp (!) 113   Ht 5' 4" (1.626 m)   Wt 61.4 kg (135 lb 5.8 oz)   SpO2 100%   BMI 23.23 kg/m²     Labs Reviewed and Include    Recent Labs   Lab 07/04/20  0230   *   CALCIUM 9.1   ALBUMIN 2.3*   PROT 5.9*      K 3.5   CO2 33*   CL 98   BUN 25*   CREATININE 0.6   ALKPHOS 71   ALT 17   AST 23   BILITOT 1.9*     Lab Results   Component Value Date    WBC 15.11 (H) 07/04/2020    HGB 7.8 (L) 07/04/2020    HCT 26.2 (L) 07/04/2020    MCV 95 07/04/2020     07/04/2020     No results for input(s): TSH, FREET4 in the last 168 hours.  Lab Results   Component Value Date    HGBA1C 6.6 (H) 04/12/2020       Nutritional status:   Body mass index is 23.23 kg/m².  Lab Results   Component Value Date    ALBUMIN 2.3 (L) 07/04/2020    ALBUMIN 2.2 (L) 07/03/2020    ALBUMIN 2.4 (L) 07/02/2020     Lab Results   Component Value Date    PREALBUMIN 20 05/28/2020       Estimated Creatinine Clearance: 113.7 mL/min (based on SCr of 0.6 mg/dL).    Accu-Checks  Recent Labs     07/02/20  2117 07/03/20  0149 07/03/20  0500 07/03/20  0943 07/03/20  1421 07/03/20  1804 07/03/20  1837 07/03/20  2022 07/03/20  2333 07/04/20  0436   POCTGLUCOSE 189* 148* 150* 161* 144* 119* 120* 129* 162* 156*       Current Medications and/or Treatments Impacting Glycemic Control  Immunotherapy:    Immunosuppressants     None        Steroids:   Hormones (From admission, onward)    None        Pressors:    Autonomic Drugs (From admission, onward)    Start     Stop Route Frequency Ordered    06/30/20 5641  norepinephrine 4 mg in dextrose 5% 250 mL infusion (premix) (titrating)     Question Answer Comment   Titrate by: (in mcg/kg/min) 0.01    Titrate interval: (in " minutes) 2    Titrate to maintain: (MAP or SBP) MAP    Greater than: (in mmHg) 60    Maximum dose: (in mcg/kg/min) 2        -- IV Continuous 06/30/20 2039 04/16/20 0915  rocuronium 10 mg/mL injection     Note to Pharmacy: Created by cabinet override    04/16 2129 04/16/20 0915        Hyperglycemia/Diabetes Medications:   Antihyperglycemics (From admission, onward)    Start     Stop Route Frequency Ordered    06/29/20 1015  insulin regular 100 Units in sodium chloride 0.9% 100 mL infusion      -- IV Continuous 06/29/20 0907    06/29/20 1007  insulin aspart U-100 pen 0-4 Units      -- SubQ As needed (PRN) 06/29/20 0907

## 2020-07-04 NOTE — SUBJECTIVE & OBJECTIVE
Interval History/Significant Events: NAEON. Neuro exam unchanged. MAP >65 maintained while off levo. Vent at 40/5, attempt to wean unsuccessful.    Review of Systems   Unable to perform ROS: Acuity of condition     Objective:     Vital Signs (Most Recent):  Temp: 98.6 °F (37 °C) (07/04/20 0305)  Pulse: 109 (07/04/20 0305)  Resp: (!) 36 (07/04/20 0305)  BP: 136/74 (07/04/20 0305)  SpO2: 100 % (07/04/20 0305) Vital Signs (24h Range):  Temp:  [96.5 °F (35.8 °C)-98.8 °F (37.1 °C)] 98.6 °F (37 °C)  Pulse:  [] 109  Resp:  [] 36  SpO2:  [95 %-100 %] 100 %  BP: ()/(51-89) 136/74   Weight: 61.4 kg (135 lb 5.8 oz)  Body mass index is 23.23 kg/m².      Intake/Output Summary (Last 24 hours) at 7/4/2020 0552  Last data filed at 7/4/2020 0425  Gross per 24 hour   Intake 3191.8 ml   Output 2290 ml   Net 901.8 ml       Physical Exam  Vitals signs reviewed.   Constitutional:       Appearance: He is well-developed. He is toxic-appearing.      Interventions: He is sedated and intubated.   HENT:      Head: Normocephalic and atraumatic.   Eyes:      Pupils: Pupils are equal, round, and reactive to light.   Neck:      Musculoskeletal: Normal range of motion.      Trachea: No tracheal deviation.      Comments: Trach in place   Cardiovascular:      Rate and Rhythm: Tachycardia present.      Pulses:           Radial pulses are 2+ on the right side and 2+ on the left side.        Dorsalis pedis pulses are 1+ on the right side and 1+ on the left side.      Heart sounds: Normal heart sounds.   Pulmonary:      Effort: He is intubated.      Breath sounds: No decreased breath sounds, wheezing, rhonchi or rales.   Abdominal:      General: Bowel sounds are normal. There is no distension.      Palpations: Abdomen is soft.   Genitourinary:     Comments: Torres in place   Musculoskeletal: Normal range of motion.   Skin:     General: Skin is warm.      Comments: Necrotic tissue on left forearm    Neurological:      Mental Status: He is  unresponsive.      Comments: PERRL, spontaneously opens eyes but does not track. Cough reflexes intact. No response to pain X 4 extremities.          Vents:  Vent Mode: A/C (07/04/20 0050)  Ventilator Initiated: Yes (04/10/20 2247)  Set Rate: 35 BPM (07/04/20 0050)  Vt Set: 250 mL (06/06/20 0858)  Pressure Support: 20 cmH20 (06/06/20 0858)  PEEP/CPAP: 5 cmH20 (07/04/20 0050)  Oxygen Concentration (%): 40 (07/04/20 0305)  Peak Airway Pressure: 44 cmH2O (07/04/20 0050)  Plateau Pressure: 38 cmH20 (07/04/20 0050)  Total Ve: 7.55 mL (07/04/20 0050)  F/VT Ratio<105 (RSBI): 290.75 (07/04/20 0050)  Lines/Drains/Airways     Peripherally Inserted Central Catheter Line            PICC Double Lumen 05/05/20 1707 right basilic 59 days          Drain                 NG/OG Tube 06/27/20 1153 16 Fr. Right nostril 6 days         Chest Tube 06/29/20 1816 1 Left First intercostal space 4 days         Urethral Catheter 07/03/20 1827 16 Fr. less than 1 day          Airway                 Surgical Airway 05/05/20 1500 Shiley Cuffed 59 days          Peripheral Intravenous Line                 Peripheral IV - Single Lumen 07/03/20 1826 20 G Left;Posterior Wrist less than 1 day              Significant Labs:    CBC/Anemia Profile:  Recent Labs   Lab 07/03/20  0529 07/04/20  0230   WBC 13.82* 15.11*   HGB 7.6* 7.8*   HCT 25.5* 26.2*    306   MCV 96 95   RDW 15.5* 15.8*        Chemistries:  Recent Labs   Lab 07/03/20  0529 07/03/20  1651 07/04/20  0230   *  --  141   K 3.5  --  3.5     --  98   CO2 33*  --  33*   BUN 31*  --  25*   CREATININE 0.6  --  0.6   CALCIUM 8.6*  --  9.1   ALBUMIN 2.2*  --  2.3*   PROT 5.5*  --  5.9*   BILITOT 1.6*  --  1.9*   ALKPHOS 72  --  71   ALT 17  --  17   AST 22  --  23   MG 2.1  --  1.8   PHOS 2.6* 2.5* 3.3       All pertinent labs within the past 24 hours have been reviewed.    Significant Imaging:  I have reviewed all pertinent imaging results/findings within the past 24 hours.

## 2020-07-04 NOTE — PROGRESS NOTES
[facility]  Critical Care Medicine  Progress Note    Patient Name: Ranjana Sanchez  MRN: [id number]  Admission Date: 4/10/2020  Hospital Length of Stay: 85 days  Code Status: DNR  Attending Provider: Bret Schmitz MD  Primary Care Provider: Primary Doctor No   Principal Problem: Acute respiratory disease due to COVID-19 virus    Subjective:     HPI:  Mr. LUANA Sanchez is a 54 y/o M with PMHx of HTN brought to ER via EMS for increasing SOB with known COVID-19 exposure. 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 They have not had travelers since 3/14. 2 days ago he started having fever and SOB. He was seen by the ship's physician and was found to have crackles in his bases bilaterally, but his oxygen saturation was in the 90's. He had a flu test which was negative. He was quarantined in a room and states that he got worse with increasing SOB. He was brought to the ER and found to be 78% on RA. He was started on NRB and initially was doing much better, but his tachypnea did not improve, so he was placed on BiPAP with oxygen saturation improvement.    Hospital/ICU Course:  Patient was admitted to the MICU after a difficult intubation in the ER s/p crich. COVID + with ARDS, sedated and paralyzed. Unable to prone secondary to crich. Left mainstem intubation which was pulled off, on NO and levophed. ET tube replaced on 4/11 family contacted in [place]. Proned on 4/12 tolerated well and continued to prone on 4/19. 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 He was on and off ketamine as well.  EEG ordered to rule out seizures as he also had seizure like activity that resolved with ativan. Currently changing sedation, was placed on phenobarbital which should start having effect 4/20. Patient proned x 10 (last on 4/22). Still not tolerating ventilator weaning. Remains on  sedation, vasopressors, and paralytic. ID now following patient for remdesivir administration; family aware and consented. Patient not significantly improving despite efforts. Son contacted, patient made DNR. Patient evaluated for ECMO on 4/24. Placed on ECMO on 4/25 and transferred to Dr Ayon's service with DNR rescinded.    Some physiologic improvement on VV ECMO. Oxygenation at goal, lactate clearing, hemodynamics at goal without vasoactive support currently. Tolerated wean of vent Fi and Monika thus far. Peeling back sedatives steadily with goal of neuro exam ASAP. Metabolic alkalosis correcting with encouragement from diamox. Patient was weaned off from ECMO support on 06/24/2020. However, during this process he has undergone a stroke and currently is not responding to verbal commands. The patient is able to open his eyes and track, however no purposeful movements can be ascertained from the patient. Extensive notes from neurology have been documented in the patient's medical record. Extensive discussions had been carried out between the family members and periodic updates were provided. Periodic updates were also provided to [employer] which is his employer. The family was also notified that if any further deterioration happens after completion of ECMO we would not be providing new ECMO support to this patient.    The patient was transferred to the MICU team on 6/25 after ECMO decannulation. Fever with worsening shock. Cultured and started on broad spectrum antibiotics. Frequent episodes of bradycardia to the 20's but recovers without intervention. Heart rate variability continues and on 06/30 he became tachycardic to 166. Labs and EKG showed sinus tachy, restarted betablocker.    Interval History/Significant Events: NAEON. Neuro exam unchanged. MAP >65 maintained while off levo. Vent at 40/5, attempt to wean unsuccessful.    Review of Systems   Unable to perform ROS: Acuity of condition     Objective:      Vital Signs (Most Recent):  Temp: 98.6 °F (37 °C) (07/04/20 0305)  Pulse: 109 (07/04/20 0305)  Resp: (!) 36 (07/04/20 0305)  BP: 136/74 (07/04/20 0305)  SpO2: 100 % (07/04/20 0305) Vital Signs (24h Range):  Temp:  [96.5 °F (35.8 °C)-98.8 °F (37.1 °C)] 98.6 °F (37 °C)  Pulse:  [] 109  Resp:  [] 36  SpO2:  [95 %-100 %] 100 %  BP: ()/(51-89) 136/74   Weight: 61.4 kg (135 lb 5.8 oz)  Body mass index is 23.23 kg/m².      Intake/Output Summary (Last 24 hours) at 7/4/2020 0510  Last data filed at 7/4/2020 0425  Gross per 24 hour   Intake 3191.8 ml   Output 2290 ml   Net 901.8 ml       Physical Exam  Vitals signs reviewed.   Constitutional:       Appearance: He is well-developed. He is toxic-appearing.      Interventions: He is sedated and intubated.   HENT:      Head: Normocephalic and atraumatic.   Eyes:      Pupils: Pupils are equal, round, and reactive to light.   Neck:      Musculoskeletal: Normal range of motion.      Trachea: No tracheal deviation.      Comments: Trach in place   Cardiovascular:      Rate and Rhythm: Tachycardia present.      Pulses:           Radial pulses are 2+ on the right side and 2+ on the left side.        Dorsalis pedis pulses are 1+ on the right side and 1+ on the left side.      Heart sounds: Normal heart sounds.   Pulmonary:      Effort: He is intubated.      Breath sounds: No decreased breath sounds, wheezing, rhonchi or rales.   Abdominal:      General: Bowel sounds are normal. There is no distension.      Palpations: Abdomen is soft.   Genitourinary:     Comments: Torres in place   Musculoskeletal: Normal range of motion.   Skin:     General: Skin is warm.      Comments: Necrotic tissue on left forearm    Neurological:      Mental Status: He is unresponsive.      Comments: PERRL, spontaneously opens eyes but does not track. Cough reflexes intact. No response to pain X 4 extremities.          Vents:  Vent Mode: A/C (07/04/20 0050)  Ventilator Initiated: Yes (04/10/20  2247)  Set Rate: 35 BPM (07/04/20 0050)  Vt Set: 250 mL (06/06/20 0858)  Pressure Support: 20 cmH20 (06/06/20 0858)  PEEP/CPAP: 5 cmH20 (07/04/20 0050)  Oxygen Concentration (%): 40 (07/04/20 0305)  Peak Airway Pressure: 44 cmH2O (07/04/20 0050)  Plateau Pressure: 38 cmH20 (07/04/20 0050)  Total Ve: 7.55 mL (07/04/20 0050)  F/VT Ratio<105 (RSBI): 290.75 (07/04/20 0050)  Lines/Drains/Airways     Peripherally Inserted Central Catheter Line            PICC Double Lumen 05/05/20 1707 right basilic 59 days          Drain                 NG/OG Tube 06/27/20 1153 16 Fr. Right nostril 6 days         Chest Tube 06/29/20 1816 1 Left First intercostal space 4 days         Urethral Catheter 07/03/20 1827 16 Fr. less than 1 day          Airway                 Surgical Airway 05/05/20 1500 Shiley Cuffed 59 days          Peripheral Intravenous Line                 Peripheral IV - Single Lumen 07/03/20 1826 20 G Left;Posterior Wrist less than 1 day              Significant Labs:    CBC/Anemia Profile:  Recent Labs   Lab 07/03/20  0529 07/04/20  0230   WBC 13.82* 15.11*   HGB 7.6* 7.8*   HCT 25.5* 26.2*    306   MCV 96 95   RDW 15.5* 15.8*        Chemistries:  Recent Labs   Lab 07/03/20  0529 07/03/20  1651 07/04/20  0230   *  --  141   K 3.5  --  3.5     --  98   CO2 33*  --  33*   BUN 31*  --  25*   CREATININE 0.6  --  0.6   CALCIUM 8.6*  --  9.1   ALBUMIN 2.2*  --  2.3*   PROT 5.5*  --  5.9*   BILITOT 1.6*  --  1.9*   ALKPHOS 72  --  71   ALT 17  --  17   AST 22  --  23   MG 2.1  --  1.8   PHOS 2.6* 2.5* 3.3       All pertinent labs within the past 24 hours have been reviewed.    Significant Imaging:  I have reviewed all pertinent imaging results/findings within the past 24 hours.      ABG  Recent Labs   Lab 06/30/20  0813   PH 7.414   PO2 122*   PCO2 65.6*   HCO3 41.9*   BE 17     Assessment/Plan:     Neuro  Basal ganglia infarction  CT head on 6/2 with bilateral basal ganglia infarcts consistent with global  "hypoxic ischemic injury 2/2 COVID-19    --neuro exam unchanged with intact cranial nerve reflexes and spontaneous eye opening only  --per neurology, "considering the extent of brain injury, he has very low likelihood of substantial further neurological improvement. 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 TV dropped from 280 to 150's. CXR with pneumomediastinum.  --no intervention warranted, improved  --continue LPV    Difficult intubation  S/p crich which was converted to trach    Acute hypoxemic respiratory failure  2/2 to ARDS due to COVID-19  - ARDSNET Protocol  --see COVID-19    Cardiac/Vascular  Tachycardia  Patient's baseline since the initiation of ECMO has been tachycardic.  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    On enteral nutrition  Continue tube feeds    Other  * Acute respiratory disease due to COVID-19 virus  - Difficult intubation requiring crich on admit; converted to trach 5/5  - previously proned with Monika  - remdesivir course completed.  - Completed treatment with Plaquenil, Azithro, Methyprednisone for 5 days  - Special isolation and aspiration precautions ordered per COVID protocol  - Significant improvement in oxygenation, but remains with poor lung compliance   - complicated by pneumomediastinum and pneumothorax requiring CT " placement  - continue pressure support 35 to maintain Vt of 250-300  - Lasix as needed to maintain euvolemic to net negative volume status      Acute respiratory distress syndrome (ARDS) due to COVID-19 virus  --See acute respiratory failure        Arm wound, left, sequela  suspect 2/2 extravasation of medication through PIV.  --management per wound care  --RUE with +2 palpable pulses.    Goals of care, counseling/discussion  Patient's prognosis remains poor despite efforts to optimize health. Son spoken with regarding GOC.  DNR changed after consulting CTS for ECMO. Per conversation with Dr. Goss on 6/26, no chest compressions. Given Mr. Sanchez's decline, he is too sick for travel back to [place]. Family urged to visit if possible; ongoing coordination with [employer] to help arrange travel hopefully around 7/15.       Shock  Concern for infection on 06/26 with cultures sent, no source identified  --blood and sputum cultures NGTD  --s/p 7 day course cefepime; d/c vanc  --currently off levophed    COVID-19 virus detected  See ARDS         Critical care was time spent personally by me on the following activities: development of treatment plan with patient or surrogate and bedside caregivers, discussions with consultants, evaluation of patient's response to treatment, examination of patient, ordering and performing treatments and interventions, ordering and review of laboratory studies, ordering and review of radiographic studies, pulse oximetry, re-evaluation of patient's condition. This critical care time did not overlap with that of any other provider or involve time for any procedures.     Christine William MD  Critical Care Medicine  [facility]

## 2020-07-04 NOTE — PLAN OF CARE
POC reviewed with pt son via phone at 1400. Son verbalized understanding. Questions and concerns addressed. No acute events today. MAP > 65 maintained with levo gtt. Neuro exam unchanged. Hypothermic-normothermic, warm blankets applied. TF increased. Wound care done. Pt progressing toward goals. Will continue to monitor. See flowsheets for full assessment and VS info.

## 2020-07-04 NOTE — ASSESSMENT & PLAN NOTE
BG goal 140 - 180       rewrite transition IV insulin infusion at 0.3 u/hr  Low dose correction scale  BG monitoring every 4 hours while NPO      ** Please call Endocrine for any BG related issues **    Discharge planning: URIEL

## 2020-07-04 NOTE — NURSING
POC reviewed with patient. Neuro exam shows slight improvement. Pt moving BUE's spontaneously. Normothermia maintained without warming/cooling intervention. HR increase to 120's and SBP to 140's upon entry into room or slightest interaction with pt. Vent settings remain at 40% FiO2 and peep of 5. Chest tube has air leak, 40 cc total output. Peptamen intense @10 cc/hr. Levo gtt @.02-.04 mcg/kg/min to maintain MAPs >65. Fentanyl @200 mcg/hr. Insulin @.3 units/hr. Potassium 3.5 replaced.

## 2020-07-05 NOTE — PROGRESS NOTES
"[facility]  Endocrinology  Progress Note    Admit Date: 4/10/2020     Reason for Consult: Management of T2DM, Hyperglycemia     Surgical Procedure and Date: tracheostomy 5/5/20    Diabetes diagnosis year: JAMMIE    Lab Results   Component Value Date    HGBA1C 6.6 (H) 04/12/2020       Home Diabetes Medications: none (per chart review)    How often checking glucose at home? JAMMIE  BG readings on regimen: JAMMIE  Hypoglycemia on the regimen? JAMMIE  Missed doses on regimen? JAMMIE    Diabetes Complications include:     JAMMIE    Complicating diabetes co morbidities:   none      HPI:   Patient is a 55 y.o. male with a diagnosis of DM2 and ARDS secondary to COVID-19.  Admitted to [facility] on 4/10/20 with fever and SOB.  Lengthy and complex hospital stay noted requiring intubation, ECMO, and trach placement.  Patient not with severe ARDS (with poor lung compliance) and suspected anoxic brain injury.  Patient developed hyperglycemia secondary to TF.  Endocrinology consulted for DM/BG management.        Interval HPI:   Overnight events:  Remains in RICU. NAEON. Vent. BG well controlled with minimal insulin; infusion at 0.3 u/hr.   Eating:   NPO  Nausea: No  Hypoglycemia and intervention: No  Fever: No  TPN and/or TF: peptamen intense vhp at 40 cc/hr (goal)    /71   Pulse 105   Temp 98.2 °F (36.8 °C) (Oral)   Resp (!) 39   Ht 5' 4" (1.626 m)   Wt 61.4 kg (135 lb 5.8 oz)   SpO2 97%   BMI 23.23 kg/m²     Labs Reviewed and Include    Recent Labs   Lab 07/05/20  0300   *   CALCIUM 9.5   ALBUMIN 2.3*   PROT 6.1      K 4.0   CO2 30*   CL 98   BUN 18   CREATININE 0.5   ALKPHOS 67   ALT 16   AST 20   BILITOT 1.9*     Lab Results   Component Value Date    WBC 13.47 (H) 07/05/2020    HGB 7.8 (L) 07/05/2020    HCT 26.5 (L) 07/05/2020    MCV 96 07/05/2020     07/05/2020     No results for input(s): TSH, FREET4 in the last 168 hours.  Lab Results   Component Value Date    HGBA1C 6.6 (H) 04/12/2020 "       Nutritional status:   Body mass index is 23.23 kg/m².  Lab Results   Component Value Date    ALBUMIN 2.3 (L) 07/05/2020    ALBUMIN 2.3 (L) 07/04/2020    ALBUMIN 2.2 (L) 07/03/2020     Lab Results   Component Value Date    PREALBUMIN 20 05/28/2020       Estimated Creatinine Clearance: 136.5 mL/min (based on SCr of 0.5 mg/dL).    Accu-Checks  Recent Labs     07/03/20 2022 07/03/20  2333 07/04/20  0436 07/04/20  0840 07/04/20  1208 07/04/20  1721 07/04/20 2020 07/05/20  0006 07/05/20  0332 07/05/20  0906   POCTGLUCOSE 129* 162* 156* 148* 173* 115* 160* 153* 151* 176*       Current Medications and/or Treatments Impacting Glycemic Control  Immunotherapy:    Immunosuppressants     None        Steroids:   Hormones (From admission, onward)    None        Pressors:    Autonomic Drugs (From admission, onward)    Start     Stop Route Frequency Ordered    06/30/20 2145  norepinephrine 4 mg in dextrose 5% 250 mL infusion (premix) (titrating)     Question Answer Comment   Titrate by: (in mcg/kg/min) 0.01    Titrate interval: (in minutes) 2    Titrate to maintain: (MAP or SBP) MAP    Greater than: (in mmHg) 60    Maximum dose: (in mcg/kg/min) 2        -- IV Continuous 06/30/20 2039 04/16/20 0915  rocuronium 10 mg/mL injection     Note to Pharmacy: Created by cabinet override    04/16 2129 04/16/20 0915        Hyperglycemia/Diabetes Medications:   Antihyperglycemics (From admission, onward)    Start     Stop Route Frequency Ordered    06/29/20 1007  insulin aspart U-100 pen 0-4 Units      -- SubQ As needed (PRN) 06/29/20 0907          ASSESSMENT and PLAN    * Acute respiratory disease due to COVID-19 virus  Managed per primary team        Type 2 diabetes mellitus without complication, without long-term current use of insulin  BG goal 140 - 180       Discontinue IV insulin infusion given minimal requirements. Monitor for hyperglycemia off insulin infusion.   Low dose correction scale  BG monitoring every 4 hours while  NPO      ** Please call Endocrine for any BG related issues **    Discharge planning: TBD          On enteral nutrition  May cause BG excursions.   Rosalee gM, NP  Endocrinology  [facility]

## 2020-07-05 NOTE — PROGRESS NOTES
[facility] - Respiratory ICU  Critical Care Medicine  Progress Note    Patient Name: Ranjana Sanchez  MRN: [id number]  Admission Date: 4/10/2020  Hospital Length of Stay: 86 days  Code Status: DNR  Attending Provider: Bret Schmitz MD  Primary Care Provider: Primary Doctor No   Principal Problem: Acute respiratory disease due to COVID-19 virus    Subjective:     HPI:  Mr. LUANA Sanchez is a 54 y/o M with PMHx of HTN brought to ER via EMS for increasing SOB with known COVID-19 exposure. 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 They have not had travelers since 3/14. 2 days ago he started having fever and SOB. He was seen by the ship's physician and was found to have crackles in his bases bilaterally, but his oxygen saturation was in the 90's. He had a flu test which was negative. He was quarantined in a room and states that he got worse with increasing SOB. He was brought to the ER and found to be 78% on RA. He was started on NRB and initially was doing much better, but his tachypnea did not improve, so he was placed on BiPAP with oxygen saturation improvement.    Hospital/ICU Course:  Patient was admitted to the MICU after a difficult intubation in the ER s/p crich. COVID + with ARDS, sedated and paralyzed. Unable to prone secondary to crich. Left mainstem intubation which was pulled off, on NO and levophed. ET tube replaced on 4/11 family contacted in [place]. Proned on 4/12 tolerated well and continued to prone on 4/19. 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 He was on and off ketamine as well.  EEG ordered to rule out seizures as he also had seizure like activity that resolved with ativan. Currently changing sedation, was placed on phenobarbital which should start having effect 4/20. Patient proned x 10 (last on 4/22). Still not tolerating ventilator weaning. Remains on  sedation, vasopressors, and paralytic. ID now following patient for remdesivir administration; family aware and consented. Patient not significantly improving despite efforts. Son contacted, patient made DNR. Patient evaluated for ECMO on 4/24. Placed on ECMO on 4/25 and transferred to Dr Ayon's service with DNR rescinded.    Some physiologic improvement on VV ECMO. Oxygenation at goal, lactate clearing, hemodynamics at goal without vasoactive support currently. Tolerated wean of vent Fi and Monika thus far. Peeling back sedatives steadily with goal of neuro exam ASAP. Metabolic alkalosis correcting with encouragement from diamox. Patient was weaned off from ECMO support on 06/24/2020. However, during this process he has undergone a stroke and currently is not responding to verbal commands. The patient is able to open his eyes and track, however no purposeful movements can be ascertained from the patient. Extensive notes from neurology have been documented in the patient's medical record. Extensive discussions had been carried out between the family members and periodic updates were provided. Periodic updates were also provided to [email or web address] which is his employer. The family was also notified that if any further deterioration happens after completion of ECMO we would not be providing new ECMO support to this patient.    The patient was transferred to the MICU team on 6/25 after ECMO decannulation. Fever with worsening shock. Cultured and started on broad spectrum antibiotics. Frequent episodes of bradycardia to the 20's but recovers without intervention. Heart rate variability continues and on 06/30 he became tachycardic to 166. Labs and EKG showed sinus tachy, restarted betablocker.    Interval History/Significant Events: NAEON. On 0.04 levo. Titrating down as tolerated. Vent at 40/5.  Review of Systems   Unable to perform ROS: Acuity of condition     Objective:     Vital Signs (Most Recent):  Temp: 98.1 °F  (36.7 °C) (07/05/20 0305)  Pulse: 110 (07/05/20 0305)  Resp: (!) 40 (07/05/20 0305)  BP: 113/70 (07/05/20 0305)  SpO2: 98 % (07/05/20 0305) Vital Signs (24h Range):  Temp:  [96 °F (35.6 °C)-99.8 °F (37.7 °C)] 98.1 °F (36.7 °C)  Pulse:  [] 110  Resp:  [] 40  SpO2:  [96 %-100 %] 98 %  BP: ()/(57-85) 113/70   Weight: 61.4 kg (135 lb 5.8 oz)  Body mass index is 23.23 kg/m².      Intake/Output Summary (Last 24 hours) at 7/5/2020 0404  Last data filed at 7/5/2020 0305  Gross per 24 hour   Intake 4072.83 ml   Output 2430 ml   Net 1642.83 ml       Physical Exam  Vitals signs reviewed.   Constitutional:       Appearance: He is well-developed. He is toxic-appearing.      Interventions: He is sedated and intubated.   HENT:      Head: Normocephalic and atraumatic.   Eyes:      Pupils: Pupils are equal, round, and reactive to light.   Neck:      Musculoskeletal: Normal range of motion.      Trachea: No tracheal deviation.      Comments: Trach in place   Cardiovascular:      Rate and Rhythm: Tachycardia present.      Pulses:           Radial pulses are 2+ on the right side and 2+ on the left side.        Dorsalis pedis pulses are 1+ on the right side and 1+ on the left side.      Heart sounds: Normal heart sounds.   Pulmonary:      Effort: He is intubated.      Breath sounds: No decreased breath sounds, wheezing, rhonchi or rales.   Abdominal:      General: Bowel sounds are normal. There is no distension.      Palpations: Abdomen is soft.   Genitourinary:     Comments: Torres in place   Musculoskeletal: Normal range of motion.   Skin:     General: Skin is warm.      Comments: Necrotic tissue on left forearm    Neurological:      Mental Status: He is unresponsive.      Comments: PERRL, spontaneously opens eyes but does not track. Cough reflexes intact. No response to pain X 4 extremities.          Vents:  Vent Mode: A/C (07/05/20 0011)  Ventilator Initiated: Yes (04/10/20 2247)  Set Rate: 35 BPM (07/05/20  0011)  Vt Set: 250 mL (06/06/20 0858)  Pressure Support: 20 cmH20 (06/06/20 0858)  PEEP/CPAP: 5 cmH20 (07/05/20 0011)  Oxygen Concentration (%): 40 (07/05/20 0305)  Peak Airway Pressure: 42 cmH2O (07/05/20 0011)  Plateau Pressure: 33 cmH20 (07/05/20 0011)  Total Ve: 9.42 mL (07/05/20 0011)  F/VT Ratio<105 (RSBI): 148.7 (07/05/20 0011)  Lines/Drains/Airways     Peripherally Inserted Central Catheter Line            PICC Double Lumen 05/05/20 1707 right basilic 60 days          Drain                 NG/OG Tube 06/27/20 1153 16 Fr. Right nostril 7 days         Chest Tube 06/29/20 1816 1 Left First intercostal space 5 days         Urethral Catheter 07/03/20 1827 16 Fr. 1 day          Airway                 Surgical Airway 05/05/20 1500 Shiley Cuffed 60 days          Peripheral Intravenous Line                 Peripheral IV - Single Lumen 07/03/20 1826 20 G Left;Posterior Wrist 1 day              Significant Labs:    CBC/Anemia Profile:  Recent Labs   Lab 07/03/20  0529 07/04/20  0230 07/05/20  0300   WBC 13.82* 15.11* 13.47*   HGB 7.6* 7.8* 7.8*   HCT 25.5* 26.2* 26.5*    306 294   MCV 96 95 96   RDW 15.5* 15.8* 15.5*        Chemistries:  Recent Labs   Lab 07/03/20  0529 07/03/20  1651 07/04/20  0230 07/04/20  1250   *  --  141  --    K 3.5  --  3.5 3.7     --  98  --    CO2 33*  --  33*  --    BUN 31*  --  25*  --    CREATININE 0.6  --  0.6  --    CALCIUM 8.6*  --  9.1  --    ALBUMIN 2.2*  --  2.3*  --    PROT 5.5*  --  5.9*  --    BILITOT 1.6*  --  1.9*  --    ALKPHOS 72  --  71  --    ALT 17  --  17  --    AST 22  --  23  --    MG 2.1  --  1.8  --    PHOS 2.6* 2.5* 3.3  --        All pertinent labs within the past 24 hours have been reviewed.    Significant Imaging:  I have reviewed all pertinent imaging results/findings within the past 24 hours.      [facility]  Recent Labs   Lab 06/30/20  0813   PH 7.414   PO2 122*   PCO2 65.6*   HCO3 41.9*   BE 17     Assessment/Plan:     Neuro  Basal ganglia  "infarction  CT head on 6/2 with bilateral basal ganglia infarcts consistent with global hypoxic ischemic injury 2/2 COVID-19    --neuro exam unchanged with intact cranial nerve reflexes and spontaneous eye opening only  --per neurology, "considering the extent of brain injury, he has very low likelihood of substantial further neurological improvement. 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 TV dropped from 280 to 150's. CXR with pneumomediastinum.  --no intervention warranted, improved  --continue LPV    Difficult intubation  S/p crich which was converted to trach    Acute hypoxemic respiratory failure  2/2 to ARDS due to COVID-19  - ARDSNET Protocol  --see COVID-19    Cardiac/Vascular  Tachycardia  Patient's baseline since the initiation of ECMO has been tachycardic.  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    On enteral nutrition  Continue tube feeds    Other  * Acute respiratory disease due to COVID-19 virus  - Difficult intubation requiring crich on admit; converted to trach 5/5  - previously proned with Monika  - remdesivir course completed.  - Completed treatment with Plaquenil, Azithro, Methyprednisone for 5 days  - Special isolation and aspiration precautions ordered per COVID protocol  - Significant improvement in oxygenation, but remains with poor " lung compliance   - complicated by pneumomediastinum and pneumothorax requiring CT placement  - continue pressure support 35 to maintain Vt of 250-300  - Lasix as needed to maintain euvolemic to net negative volume status      Acute respiratory distress syndrome (ARDS) due to COVID-19 virus  --See acute respiratory failure        Arm wound, left, sequela  suspect 2/2 extravasation of medication through PIV.  --management per wound care  --RUE with +2 palpable pulses.    Goals of care, counseling/discussion  Patient's prognosis remains poor despite efforts to optimize health. Son spoken with regarding GOC.  DNR changed after consulting CTS for ECMO. Per conversation with Dr. Goss on 6/26, no chest compressions. Given Mr. Sanchez's decline, he is too sick for travel back to [place]. Family urged to visit if possible; ongoing coordination with [employer] to help arrange travel hopefully around 7/15.       Shock  Concern for infection on 06/26 with cultures sent, no source identified  --blood and sputum cultures NGTD  --s/p 7 day course cefepime; d/c vanc  --currently off levophed    COVID-19 virus detected  See ARDS       Critical care was time spent personally by me on the following activities: development of treatment plan with patient or surrogate and bedside caregivers, discussions with consultants, evaluation of patient's response to treatment, examination of patient, ordering and performing treatments and interventions, ordering and review of laboratory studies, ordering and review of radiographic studies, pulse oximetry, re-evaluation of patient's condition. This critical care time did not overlap with that of any other provider or involve time for any procedures.     Christine William MD  Critical Care Medicine  [facility] - Respiratory ICU

## 2020-07-05 NOTE — PLAN OF CARE
POC reviewed with pt at 1400. No acute events today. MAP > 65 maintained with levo, see MAR. Neuro exam unchanged. Afebrile. Propofol added for agitation/discomfort. TF at goal. Insulin drip discontinued. Lytes replaced. Pt progressing toward goals. Will continue to monitor. See flowsheets for full assessment and VS info.

## 2020-07-05 NOTE — ASSESSMENT & PLAN NOTE
BG goal 140 - 180       Discontinue IV insulin infusion given minimal requirements. Monitor for hyperglycemia off insulin infusion.   Low dose correction scale  BG monitoring every 4 hours while NPO      ** Please call Endocrine for any BG related issues **    Discharge planning: URIEL

## 2020-07-05 NOTE — NURSING
POC reviewed with patient at 0400. Neuro exam remains unchanged. No episodes of agitation overnight:) TMAX 99.8. Vent settings as follows: 40% FiO2 and peep of 5. Chest tube has air leak, 60 cc output for total shift. Peptamen now at goal, 40 cc/hr, tolerating well. Precedex @1.4 mcg/kg/hr. Insulin unchanged @.3 units/hr. Fentanyl @200 mcg/hr. Levo titrated .02-.1 to maintain MAPs >65. Phos and mag replaced. See flowheets for full assessment and VS info.

## 2020-07-05 NOTE — SUBJECTIVE & OBJECTIVE
Interval History/Significant Events: NAEON. On 0.04 levo. Titrating down as tolerated. Vent at 40/5.  Review of Systems   Unable to perform ROS: Acuity of condition     Objective:     Vital Signs (Most Recent):  Temp: 98.1 °F (36.7 °C) (07/05/20 0305)  Pulse: 110 (07/05/20 0305)  Resp: (!) 40 (07/05/20 0305)  BP: 113/70 (07/05/20 0305)  SpO2: 98 % (07/05/20 0305) Vital Signs (24h Range):  Temp:  [96 °F (35.6 °C)-99.8 °F (37.7 °C)] 98.1 °F (36.7 °C)  Pulse:  [] 110  Resp:  [] 40  SpO2:  [96 %-100 %] 98 %  BP: ()/(57-85) 113/70   Weight: 61.4 kg (135 lb 5.8 oz)  Body mass index is 23.23 kg/m².      Intake/Output Summary (Last 24 hours) at 7/5/2020 0404  Last data filed at 7/5/2020 0305  Gross per 24 hour   Intake 4072.83 ml   Output 2430 ml   Net 1642.83 ml       Physical Exam  Vitals signs reviewed.   Constitutional:       Appearance: He is well-developed. He is toxic-appearing.      Interventions: He is sedated and intubated.   HENT:      Head: Normocephalic and atraumatic.   Eyes:      Pupils: Pupils are equal, round, and reactive to light.   Neck:      Musculoskeletal: Normal range of motion.      Trachea: No tracheal deviation.      Comments: Trach in place   Cardiovascular:      Rate and Rhythm: Tachycardia present.      Pulses:           Radial pulses are 2+ on the right side and 2+ on the left side.        Dorsalis pedis pulses are 1+ on the right side and 1+ on the left side.      Heart sounds: Normal heart sounds.   Pulmonary:      Effort: He is intubated.      Breath sounds: No decreased breath sounds, wheezing, rhonchi or rales.   Abdominal:      General: Bowel sounds are normal. There is no distension.      Palpations: Abdomen is soft.   Genitourinary:     Comments: Torres in place   Musculoskeletal: Normal range of motion.   Skin:     General: Skin is warm.      Comments: Necrotic tissue on left forearm    Neurological:      Mental Status: He is unresponsive.      Comments: FESTUS  spontaneously opens eyes but does not track. Cough reflexes intact. No response to pain X 4 extremities.          Vents:  Vent Mode: A/C (07/05/20 0011)  Ventilator Initiated: Yes (04/10/20 2247)  Set Rate: 35 BPM (07/05/20 0011)  Vt Set: 250 mL (06/06/20 0858)  Pressure Support: 20 cmH20 (06/06/20 0858)  PEEP/CPAP: 5 cmH20 (07/05/20 0011)  Oxygen Concentration (%): 40 (07/05/20 0305)  Peak Airway Pressure: 42 cmH2O (07/05/20 0011)  Plateau Pressure: 33 cmH20 (07/05/20 0011)  Total Ve: 9.42 mL (07/05/20 0011)  F/VT Ratio<105 (RSBI): 148.7 (07/05/20 0011)  Lines/Drains/Airways     Peripherally Inserted Central Catheter Line            PICC Double Lumen 05/05/20 1707 right basilic 60 days          Drain                 NG/OG Tube 06/27/20 1153 16 Fr. Right nostril 7 days         Chest Tube 06/29/20 1816 1 Left First intercostal space 5 days         Urethral Catheter 07/03/20 1827 16 Fr. 1 day          Airway                 Surgical Airway 05/05/20 1500 Shiley Cuffed 60 days          Peripheral Intravenous Line                 Peripheral IV - Single Lumen 07/03/20 1826 20 G Left;Posterior Wrist 1 day              Significant Labs:    CBC/Anemia Profile:  Recent Labs   Lab 07/03/20  0529 07/04/20  0230 07/05/20  0300   WBC 13.82* 15.11* 13.47*   HGB 7.6* 7.8* 7.8*   HCT 25.5* 26.2* 26.5*    306 294   MCV 96 95 96   RDW 15.5* 15.8* 15.5*        Chemistries:  Recent Labs   Lab 07/03/20  0529 07/03/20  1651 07/04/20  0230 07/04/20  1250   *  --  141  --    K 3.5  --  3.5 3.7     --  98  --    CO2 33*  --  33*  --    BUN 31*  --  25*  --    CREATININE 0.6  --  0.6  --    CALCIUM 8.6*  --  9.1  --    ALBUMIN 2.2*  --  2.3*  --    PROT 5.5*  --  5.9*  --    BILITOT 1.6*  --  1.9*  --    ALKPHOS 72  --  71  --    ALT 17  --  17  --    AST 22  --  23  --    MG 2.1  --  1.8  --    PHOS 2.6* 2.5* 3.3  --        All pertinent labs within the past 24 hours have been reviewed.    Significant Imaging:  I have  reviewed all pertinent imaging results/findings within the past 24 hours.

## 2020-07-05 NOTE — SUBJECTIVE & OBJECTIVE
"Interval HPI:   Overnight events:  Remains in RICU. NAEON. Vent. BG well controlled with minimal insulin; infusion at 0.3 u/hr.   Eating:   NPO  Nausea: No  Hypoglycemia and intervention: No  Fever: No  TPN and/or TF: peptamen intense vhp at 40 cc/hr (goal)    /71   Pulse 105   Temp 98.2 °F (36.8 °C) (Oral)   Resp (!) 39   Ht 5' 4" (1.626 m)   Wt 61.4 kg (135 lb 5.8 oz)   SpO2 97%   BMI 23.23 kg/m²     Labs Reviewed and Include    Recent Labs   Lab 07/05/20  0300   *   CALCIUM 9.5   ALBUMIN 2.3*   PROT 6.1      K 4.0   CO2 30*   CL 98   BUN 18   CREATININE 0.5   ALKPHOS 67   ALT 16   AST 20   BILITOT 1.9*     Lab Results   Component Value Date    WBC 13.47 (H) 07/05/2020    HGB 7.8 (L) 07/05/2020    HCT 26.5 (L) 07/05/2020    MCV 96 07/05/2020     07/05/2020     No results for input(s): TSH, FREET4 in the last 168 hours.  Lab Results   Component Value Date    HGBA1C 6.6 (H) 04/12/2020       Nutritional status:   Body mass index is 23.23 kg/m².  Lab Results   Component Value Date    ALBUMIN 2.3 (L) 07/05/2020    ALBUMIN 2.3 (L) 07/04/2020    ALBUMIN 2.2 (L) 07/03/2020     Lab Results   Component Value Date    PREALBUMIN 20 05/28/2020       Estimated Creatinine Clearance: 136.5 mL/min (based on SCr of 0.5 mg/dL).    Accu-Checks  Recent Labs     07/03/20 2022 07/03/20  2333 07/04/20  0436 07/04/20  0840 07/04/20  1208 07/04/20  1721 07/04/20  2020 07/05/20  0006 07/05/20  0332 07/05/20  0906   POCTGLUCOSE 129* 162* 156* 148* 173* 115* 160* 153* 151* 176*       Current Medications and/or Treatments Impacting Glycemic Control  Immunotherapy:    Immunosuppressants     None        Steroids:   Hormones (From admission, onward)    None        Pressors:    Autonomic Drugs (From admission, onward)    Start     Stop Route Frequency Ordered    06/30/20 8589  norepinephrine 4 mg in dextrose 5% 250 mL infusion (premix) (titrating)     Question Answer Comment   Titrate by: (in mcg/kg/min) 0.01  "   Titrate interval: (in minutes) 2    Titrate to maintain: (MAP or SBP) MAP    Greater than: (in mmHg) 60    Maximum dose: (in mcg/kg/min) 2        -- IV Continuous 06/30/20 2039 04/16/20 0915  rocuronium 10 mg/mL injection     Note to Pharmacy: Created by cabinet override    04/16 2129 04/16/20 0915        Hyperglycemia/Diabetes Medications:   Antihyperglycemics (From admission, onward)    Start     Stop Route Frequency Ordered    06/29/20 1007  insulin aspart U-100 pen 0-4 Units      -- SubQ As needed (PRN) 06/29/20 0907

## 2020-07-06 NOTE — PROGRESS NOTES
[facility] Res[facility] ICU  Critical Care Medicine  Progress Note    Patient Name: Ranjana Sanchez  MRN: [id number]  Admission Date: 4/10/2020  Hospital Length of Stay: 87 days  Code Status: DNR  Attending Provider: Bandar Zambrano*  Primary Care Provider: Primary Doctor No   Principal Problem: Acute respiratory disease due to COVID-19 virus    Subjective:     HPI:  Mr. LUANA Sanchez is a 56 y/o M with PMHx of HTN brought to ER via EMS for increasing SOB with known COVID-19 exposure. 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 They have not had travelers since 3/14. 2 days ago he started having fever and SOB. He was seen by the ship's physician and was found to have crackles in his bases bilaterally, but his oxygen saturation was in the 90's. He had a flu test which was negative. He was quarantined in a room and states that he got worse with increasing SOB. He was brought to the ER and found to be 78% on RA. He was started on NRB and initially was doing much better, but his tachypnea did not improve, so he was placed on BiPAP with oxygen saturation improvement.    Hospital/ICU Course:  Patient was admitted to the MICU after a difficult intubation in the ER s/p crich. COVID + with ARDS, sedated and paralyzed. Unable to prone secondary to crich. Left mainstem intubation which was pulled off, on NO and levophed. ET tube replaced on 4/11 family contacted in [place]. Proned on 4/12 tolerated well and continued to prone on 4/19. 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 He was on and off ketamine as well.  EEG ordered to rule out seizures as he also had seizure like activity that resolved with ativan. Currently changing sedation, was placed on phenobarbital which should start having effect 4/20. Patient proned x 10 (last on 4/22). Still not tolerating ventilator weaning. Remains on  sedation, vasopressors, and paralytic. ID now following patient for remdesivir administration; family aware and consented. Patient not significantly improving despite efforts. Son contacted, patient made DNR. Patient evaluated for ECMO on 4/24. Placed on ECMO on 4/25 and transferred to Dr Ayon's service with DNR rescinded.    Some physiologic improvement on VV ECMO. Oxygenation at goal, lactate clearing, hemodynamics at goal without vasoactive support currently. Tolerated wean of vent Fi and Monika thus far. Peeling back sedatives steadily with goal of neuro exam ASAP. Metabolic alkalosis correcting with encouragement from diamox. Patient was weaned off from ECMO support on 06/24/2020. However, during this process he has undergone a stroke and currently is not responding to verbal commands. The patient is able to open his eyes and track, however no purposeful movements can be ascertained from the patient. Extensive notes from neurology have been documented in the patient's medical record. Extensive discussions had been carried out between the family members and periodic updates were provided. Periodic updates were also provided to [employer] which is his employer. The family was also notified that if any further deterioration happens after completion of ECMO we would not be providing new ECMO support to this patient.    The patient was transferred to the MICU team on 6/25 after ECMO decannulation. Fever with worsening shock. Cultured and started on broad spectrum antibiotics. Frequent episodes of bradycardia to the 20's but recovers without intervention. Heart rate variability continues and on 06/30 he became tachycardic to 166. Labs and EKG showed sinus tachy, restarted betablocker.    Interval History/Significant Events: NAEON.    Review of Systems   Unable to perform ROS: Acuity of condition     Objective:     Vital Signs (Most Recent):  Temp: 97 °F (36.1 °C) (07/06/20 0305)  Pulse: 102 (07/06/20 0405)  Resp:  (!) 38 (07/06/20 0405)  BP: (!) 107/58 (07/06/20 0405)  SpO2: 97 % (07/06/20 0405) Vital Signs (24h Range):  Temp:  [97 °F (36.1 °C)-98.2 °F (36.8 °C)] 97 °F (36.1 °C)  Pulse:  [] 102  Resp:  [35-79] 38  SpO2:  [92 %-100 %] 97 %  BP: ()/(52-85) 107/58   Weight: 61.4 kg (135 lb 5.8 oz)  Body mass index is 23.23 kg/m².      Intake/Output Summary (Last 24 hours) at 7/6/2020 0551  Last data filed at 7/6/2020 0405  Gross per 24 hour   Intake 4148.41 ml   Output 3835 ml   Net 313.41 ml       Physical Exam  Vitals signs reviewed.   Constitutional:       Appearance: He is well-developed. He is toxic-appearing.      Interventions: He is sedated and intubated.   HENT:      Head: Normocephalic and atraumatic.   Eyes:      Pupils: Pupils are equal, round, and reactive to light.   Neck:      Musculoskeletal: Normal range of motion.      Trachea: No tracheal deviation.      Comments: Trach in place   Cardiovascular:      Rate and Rhythm: Tachycardia present.      Pulses:           Radial pulses are 2+ on the right side and 2+ on the left side.        Dorsalis pedis pulses are 1+ on the right side and 1+ on the left side.      Heart sounds: Normal heart sounds.   Pulmonary:      Effort: He is intubated.      Breath sounds: No decreased breath sounds, wheezing, rhonchi or rales.   Abdominal:      General: Bowel sounds are normal. There is no distension.      Palpations: Abdomen is soft.   Genitourinary:     Comments: Torres in place   Musculoskeletal: Normal range of motion.   Skin:     General: Skin is warm.      Comments: Necrotic tissue on left forearm    Neurological:      Mental Status: He is unresponsive.      Comments: PERRL, spontaneously opens eyes but does not track. Cough reflexes intact. No response to pain X 4 extremities.          Vents:  Vent Mode: A/C (07/06/20 0151)  Ventilator Initiated: Yes (04/10/20 6360)  Set Rate: 35 BPM (07/06/20 0151)  Vt Set: 250 mL (06/06/20 0858)  Pressure Support: 20 cmH20  (06/06/20 0858)  PEEP/CPAP: 5 cmH20 (07/06/20 0151)  Oxygen Concentration (%): 40 (07/06/20 0405)  Peak Airway Pressure: 45 cmH2O (07/06/20 0151)  Plateau Pressure: 33 cmH20 (07/06/20 0151)  Total Ve: 7.61 mL (07/06/20 0151)  F/VT Ratio<105 (RSBI): 355.86 (07/06/20 0151)  Lines/Drains/Airways     Peripherally Inserted Central Catheter Line            PICC Double Lumen 05/05/20 1707 right basilic 61 days          Drain                 NG/OG Tube 06/27/20 1153 16 Fr. Right nostril 8 days         Chest Tube 06/29/20 1816 1 Left First intercostal space 6 days         Urethral Catheter 07/03/20 1827 16 Fr. 2 days          Airway                 Surgical Airway 05/05/20 1500 Shiley Cuffed 61 days          Peripheral Intravenous Line                 Peripheral IV - Single Lumen 07/03/20 1826 20 G Left;Posterior Wrist 2 days              Significant Labs:    CBC/Anemia Profile:  Recent Labs   Lab 07/05/20  0300 07/06/20  0400   WBC 13.47* 12.79*   HGB 7.8* 8.2*   HCT 26.5* 27.8*    267   MCV 96 98   RDW 15.5* 15.6*        Chemistries:  Recent Labs   Lab 07/04/20  1250 07/05/20  0300 07/05/20  1840 07/06/20  0400   NA  --  137  --  140   K 3.7 4.0  --  3.8   CL  --  98  --  101   CO2  --  30*  --  32*   BUN  --  18  --  18   CREATININE  --  0.5  --  0.5   CALCIUM  --  9.5  --  9.3   ALBUMIN  --  2.3*  --  2.2*   PROT  --  6.1  --  6.2   BILITOT  --  1.9*  --  1.6*   ALKPHOS  --  67  --  65   ALT  --  16  --  17   AST  --  20  --  21   MG  --  1.7 2.2 2.1   PHOS  --  2.4* 2.9 3.4       All pertinent labs within the past 24 hours have been reviewed.    Significant Imaging:  I have reviewed all pertinent imaging results/findings within the past 24 hours.      ABG  Recent Labs   Lab 06/30/20  0813   PH 7.414   PO2 122*   PCO2 65.6*   HCO3 41.9*   BE 17     Assessment/Plan:     Neuro  Basal ganglia infarction  CT head on 6/2 with bilateral basal ganglia infarcts consistent with global hypoxic ischemic injury 2/2  "COVID-19    --neuro exam unchanged with intact cranial nerve reflexes and spontaneous eye opening only  --per neurology, "considering the extent of brain injury, he has very low likelihood of substantial further neurological improvement. 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 TV dropped from 280 to 150's. CXR with pneumomediastinum.  --no intervention warranted, improved  --continue LPV    Difficult intubation  S/p crich which was converted to trach    Acute hypoxemic respiratory failure  2/2 to ARDS due to COVID-19  - ARDSNET Protocol  --see COVID-19    Cardiac/Vascular  Tachycardia  Patient's baseline since the initiation of ECMO has been tachycardic.  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    On enteral nutrition  Continue tube feeds    Other  * Acute respiratory disease due to COVID-19 virus  - Difficult intubation requiring crich on admit; converted to trach 5/5  - previously proned with Monika  - remdesivir course completed.  - Completed treatment with Plaquenil, Azithro, Methyprednisone for 5 days  - Special isolation and aspiration precautions ordered per COVID protocol  - Significant improvement in oxygenation, but remains with poor lung compliance   - complicated by pneumomediastinum and pneumothorax requiring CT placement  - continue pressure " support 35 to maintain Vt of 250-300  - Lasix as needed to maintain euvolemic to net negative volume status      Acute respiratory distress syndrome (ARDS) due to COVID-19 virus  --See acute respiratory failure        Arm wound, left, sequela  suspect 2/2 extravasation of medication through PIV.  --management per wound care  --RUE with +2 palpable pulses.    Goals of care, counseling/discussion  Patient's prognosis remains poor despite efforts to optimize health. Son spoken with regarding GOC.  DNR changed after consulting CTS for ECMO. Per conversation with Dr. Goss on 6/26, no chest compressions. Given Mr. Sanchez's decline, he is too sick for travel back to [facility]. Family urged to visit if possible; ongoing coordination with [employer] to help arrange travel hopefully around 7/15.       Shock  Concern for infection on 06/26 with cultures sent, no source identified  --blood and sputum cultures NGTD  --s/p 7 day course cefepime; d/c vanc  --currently off levophed    COVID-19 virus detected  See ARDS      Critical care was time spent personally by me on the following activities: development of treatment plan with patient or surrogate and bedside caregivers, discussions with consultants, evaluation of patient's response to treatment, examination of patient, ordering and performing treatments and interventions, ordering and review of laboratory studies, ordering and review of radiographic studies, pulse oximetry, re-evaluation of patient's condition. This critical care time did not overlap with that of any other provider or involve time for any procedures.     Christine William MD  Critical Care Medicine  [facility] Res[facility]

## 2020-07-06 NOTE — PROGRESS NOTES
"[facility] - Respiratory ICU  Adult Nutrition  Progress Note    SUMMARY       Recommendations    Recommendation:   1. Suggest modifying TF to Peptamen AF @ goal rate of 50 mL/hr to provide pt with 1440 kcal, 91 g protein and 974 mL free water.    -Additional water per MD. Hold for residuals >500 mL.   2. RD to monitor and follow up    Goals: Meet % EEN, EPN by RD f/u date  Nutrition Goal Status: progressing towards goal  Communication of RD Recs: (POC)    Reason for Assessment    Reason For Assessment: RD follow-up  Diagnosis: (COVID-19)  Relevant Medical History: HTN  Interdisciplinary Rounds: did not attend  General Information Comments: Pt remains intubated and sedated. TF continues and increased to goal rate of 40 mL/hr, pt tolerating well at this time. Wt stable. Wounds noted. Unable to assess for malnutrition, pt with no indications at this time. NFPE not performed, patient has been screened for possible COVID-19 and has been placed on airborne and contact precautions. Patient is noted as being positive for COVID-19.  Nutrition Discharge Planning: Unable to determine at this time.    Nutrition Risk Screen    Nutrition Risk Screen: tube feeding or parenteral nutrition    Nutrition/Diet History    Food Allergies: NKFA  Factors Affecting Nutritional Intake: NPO, on mechanical ventilation    Anthropometrics    Temp: 98.1 °F (36.7 °C)  Height Method: Stated  Height: 5' 4" (162.6 cm)  Height (inches): 64 in  Weight Method: Bed Scale  Weight: 61.4 kg (135 lb 5.8 oz)  Weight (lb): 135.36 lb  Ideal Body Weight (IBW), Male: 130 lb  % Ideal Body Weight, Male (lb): 114.62 %  BMI (Calculated): 23.2  BMI Grade: 18.5-24.9 - normal  Usual Body Weight (UBW), kg: (JAMMIE)       Lab/Procedures/Meds    Pertinent Labs Reviewed: reviewed  Pertinent Labs Comments: Glucose 170  Pertinent Medications Reviewed: reviewed  Pertinent Medications Comments: heparin; metoprolol; MVI; Vitamin D; polyethylene glycol "     Estimated/Assessed Needs    Weight Used For Calorie Calculations: 61.4 kg (135 lb 5.8 oz)  Energy Calorie Requirements (kcal): 1574 kcal/d  Energy Need Method: [facility]  Protein Requirements: 79-92 g/day  Weight Used For Protein Calculations: 61.4 kg (135 lb 5.8 oz)  Fluid Requirements (mL): 1 mL/kcal or per MD  Estimated Fluid Requirement Method: RDA Method  RDA Method (mL): 1574    Nutrition Prescription Ordered    Current Diet Order: NPO  Current Nutrition Support Formula Ordered: Peptamen Intense VHP  Current Nutrition Support Rate Ordered: 40 (ml)  Current Nutrition Support Frequency Ordered: mL/hr    Evaluation of Received Nutrient/Fluid Intake    Enteral Calories (kcal): 960  Enteral Protein (gm): 88  Enteral (Free Water) Fluid (mL): 806  Other Calories (kcal): 451(propofol)  % Kcal Needs: 89  % Protein Needs: 100  I/O: +5.6 L since admit  Energy Calories Required: not meeting needs  Protein Required: not meeting needs  Fluid Required: other (see comments)  Comments: LBM 7/2  Tolerance: tolerating  % Intake of Estimated Energy Needs: 75 - 100 %  % Meal Intake: NPO    Nutrition Risk    Level of Risk/Frequency of Follow-up: low(1x/week)     Assessment and Plan    Nutrition Problem  Inadequate energy intake     Related to (etiology):   Inability to consume sufficient energy      Signs and Symptoms (as evidenced by):   NPO; intubated on vent      Interventions (treatment strategy):  Collaboration of nutrition care w/ other providers   Enteral Nutrition      Nutrition Diagnosis Status:   Continues     Monitor and Evaluation    Food and Nutrient Intake: energy intake, enteral nutrition intake  Food and Nutrient Adminstration: enteral and parenteral nutrition administration  Physical Activity and Function: (-)  Anthropometric Measurements: weight, weight change  Biochemical Data, Medical Tests and Procedures: electrolyte and renal panel, gastrointestinal profile, inflammatory profile  Nutrition-Focused  Physical Findings: overall appearance     Nutrition Follow-Up    RD Follow-up?: Yes

## 2020-07-06 NOTE — CARE UPDATE
BG goal 140-180    Remains in RICU, NAEON. Vent. TF at goal of 40 cc/hr. Precedex, propofol, fentanyl, and levo infusions. BG slightly above goal overnight; required correction scale x2; subsequent BG at goal without insulin.     Plan:  BG monitoring every 4 hours and low dose correction scale.     Discharge planning: tbd     Endocrine to continue to follow    ** Please call Endocrine for any BG related issues **

## 2020-07-06 NOTE — NURSING
POC reviewed with pt family at 1400. Spoke with son, still unsure of visitation date. MAP > 65 maintained with levo gtt. Neuro exam unchanged. Vent at 40/5, tolerating well. TF at goal. Torres in place. Afebrile. Wound care done. Pt progressing toward goals. Will continue to monitor. See flowsheets for full assessment and VS info.

## 2020-07-06 NOTE — NURSING
POC reviewed with patient at 0400. Neuro exam remains unchanged. Hypothermic overnight. Sinus tach 110's -120's. Vent settings: 40% FiO2 and peep of 5. TF @40 cc/hr, tolerating well. Insulin gtt remained off, BG reached 233. Precedex gtt restarted @.4 mcg/kg/hr. Propofol @45 mcg/kg/min. Fentanyl @200 mcg/hr. Levo titrated .02-.18 to maintain MAPs >65, pressure very labile. See flowheets for full assessment and vitals.

## 2020-07-06 NOTE — CONSULTS
Wound care consulted for sacral area  PMH:   HTN, Covid 19 positive, SOB, on ECMO ~ 4/24-6/24/2020.   Assessment:  The unit nurse reports the patient has periods of being medically unstable- especially when turning.    The unit nurse reports a partial thickness skin loss (stage 2) over the sacral area ~ 2.2 cm in diameter. Wen-wound blanchable/pink.  A urinary catheter is in place, no fecal management system.   Left forearm- black eschar over left forearm beginning to lift.  Due to the poor medical prognosis, the skin may experience a decline and may exhibit changes consistent with skin changes at life's end.    Recommendations:  Triad ointment BID/prn over the sacral area. Triad ointment is an autolytic hydrophilic debridement agent that pulls fluid from beneath the wound bed and 'washes' the debrie off the wound bed as it promotes healing from below.  The wounds will generally look worse right before they get better- all the debrie/slough is soft and pulling away from the wound bed. A thin layer is better than a thick layer.  Left forearm- continue betadine daily  Nursing to continue pressure prevention measures.   Wound care will follow-up ken Benitez RN, [facility]  [id number]

## 2020-07-06 NOTE — PLAN OF CARE
Updated medical records faxed to Dr. Flor Tiwari (f) [phone] with [employer] as requested.

## 2020-07-06 NOTE — SUBJECTIVE & OBJECTIVE
Interval History/Significant Events: NAEON.    Review of Systems   Unable to perform ROS: Acuity of condition     Objective:     Vital Signs (Most Recent):  Temp: 97 °F (36.1 °C) (07/06/20 0305)  Pulse: 102 (07/06/20 0405)  Resp: (!) 38 (07/06/20 0405)  BP: (!) 107/58 (07/06/20 0405)  SpO2: 97 % (07/06/20 0405) Vital Signs (24h Range):  Temp:  [97 °F (36.1 °C)-98.2 °F (36.8 °C)] 97 °F (36.1 °C)  Pulse:  [] 102  Resp:  [35-79] 38  SpO2:  [92 %-100 %] 97 %  BP: ()/(52-85) 107/58   Weight: 61.4 kg (135 lb 5.8 oz)  Body mass index is 23.23 kg/m².      Intake/Output Summary (Last 24 hours) at 7/6/2020 0551  Last data filed at 7/6/2020 0405  Gross per 24 hour   Intake 4148.41 ml   Output 3835 ml   Net 313.41 ml       Physical Exam  Vitals signs reviewed.   Constitutional:       Appearance: He is well-developed. He is toxic-appearing.      Interventions: He is sedated and intubated.   HENT:      Head: Normocephalic and atraumatic.   Eyes:      Pupils: Pupils are equal, round, and reactive to light.   Neck:      Musculoskeletal: Normal range of motion.      Trachea: No tracheal deviation.      Comments: Trach in place   Cardiovascular:      Rate and Rhythm: Tachycardia present.      Pulses:           Radial pulses are 2+ on the right side and 2+ on the left side.        Dorsalis pedis pulses are 1+ on the right side and 1+ on the left side.      Heart sounds: Normal heart sounds.   Pulmonary:      Effort: He is intubated.      Breath sounds: No decreased breath sounds, wheezing, rhonchi or rales.   Abdominal:      General: Bowel sounds are normal. There is no distension.      Palpations: Abdomen is soft.   Genitourinary:     Comments: Torres in place   Musculoskeletal: Normal range of motion.   Skin:     General: Skin is warm.      Comments: Necrotic tissue on left forearm    Neurological:      Mental Status: He is unresponsive.      Comments: PERRL, spontaneously opens eyes but does not track. Cough reflexes  intact. No response to pain X 4 extremities.          Vents:  Vent Mode: A/C (07/06/20 0151)  Ventilator Initiated: Yes (04/10/20 2247)  Set Rate: 35 BPM (07/06/20 0151)  Vt Set: 250 mL (06/06/20 0858)  Pressure Support: 20 cmH20 (06/06/20 0858)  PEEP/CPAP: 5 cmH20 (07/06/20 0151)  Oxygen Concentration (%): 40 (07/06/20 0405)  Peak Airway Pressure: 45 cmH2O (07/06/20 0151)  Plateau Pressure: 33 cmH20 (07/06/20 0151)  Total Ve: 7.61 mL (07/06/20 0151)  F/VT Ratio<105 (RSBI): 355.86 (07/06/20 0151)  Lines/Drains/Airways     Peripherally Inserted Central Catheter Line            PICC Double Lumen 05/05/20 1707 right basilic 61 days          Drain                 NG/OG Tube 06/27/20 1153 16 Fr. Right nostril 8 days         Chest Tube 06/29/20 1816 1 Left First intercostal space 6 days         Urethral Catheter 07/03/20 1827 16 Fr. 2 days          Airway                 Surgical Airway 05/05/20 1500 Shiley Cuffed 61 days          Peripheral Intravenous Line                 Peripheral IV - Single Lumen 07/03/20 1826 20 G Left;Posterior Wrist 2 days              Significant Labs:    CBC/Anemia Profile:  Recent Labs   Lab 07/05/20  0300 07/06/20  0400   WBC 13.47* 12.79*   HGB 7.8* 8.2*   HCT 26.5* 27.8*    267   MCV 96 98   RDW 15.5* 15.6*        Chemistries:  Recent Labs   Lab 07/04/20  1250 07/05/20  0300 07/05/20  1840 07/06/20  0400   NA  --  137  --  140   K 3.7 4.0  --  3.8   CL  --  98  --  101   CO2  --  30*  --  32*   BUN  --  18  --  18   CREATININE  --  0.5  --  0.5   CALCIUM  --  9.5  --  9.3   ALBUMIN  --  2.3*  --  2.2*   PROT  --  6.1  --  6.2   BILITOT  --  1.9*  --  1.6*   ALKPHOS  --  67  --  65   ALT  --  16  --  17   AST  --  20  --  21   MG  --  1.7 2.2 2.1   PHOS  --  2.4* 2.9 3.4       All pertinent labs within the past 24 hours have been reviewed.    Significant Imaging:  I have reviewed all pertinent imaging results/findings within the past 24 hours.

## 2020-07-07 NOTE — PROGRESS NOTES
Son called for update. Notified son that critical care team will call him for update this afternoon per PA. Son also notified that patient has been transferred to room 60446

## 2020-07-07 NOTE — PLAN OF CARE
Pt remains on vent and sedated.  Vent settings: 50% FiO2 5 PEEP.  Pt does not follow commands, withdrawals from pain and moves BUE spontaneously.  Pt remains tachycardic in the 100-120s w/ bradycardic episodes (30-50s) (see notes).  MAPs > 65 on Levo at 0.1 mcg/kg/min.  Other gtts: Propofol at 35 mcg/kg/min, Precedex @ 0.8 mcg/kg/min, Fentanyl @ 200 mcg/hr.  TF @ goal of 40, w/ q4h water boluses.  Minimal residuals from NGT.  UOP adequate overnight (see I/O).  No BMs during shift.  Labs trended, no replacements needed.  POC reviewed with pt.      SKIN:  New skin breakdown noted on L ear during shift, LDA added.  Wound care to L forearm and sacrum per order, tolerated well.  Bed plugged in and surface working appropriately.  Waffle overlay inflated & in use.  Foam dressings in use.  Pt repositioned q2h as tolerated.  Will continue to monitor.

## 2020-07-07 NOTE — PLAN OF CARE
Updated medical records faxed to Dr. Flor Tiwari (f) [phone] with [employer] as requested.

## 2020-07-07 NOTE — PROGRESS NOTES
CC team notified of pt briefly pulseless in asystole on the cardiac monitor (approx: 30 seconds).  O2 sats decreased to 70s, on 40% FiO2 5 PEEP.  SBP in the 200s.  Pt regained pulse, given 100% O2 to regain O2 sats, Levo off at this time.  Discussed skyping the family d/t episode.  MD will discuss with team.  No new orders at this time, will continue to monitor

## 2020-07-07 NOTE — PROGRESS NOTES
Patient had brief episode of bradycardia on monitor. HR was 108, patient began having agonal respirations. HR then dropped to 54, then to 35 and stayed there for a few seconds. O2 sats dropped to 85. Patient's HR then increased to 108 and sats came up to 95. Witnessed by RN and Charge RN. Will notify team.

## 2020-07-07 NOTE — CARE UPDATE
BG goal 140-180     Remains in RICU, one episode of pulseless asystole noted. Vent. TF at goal of 40 cc/hr. Precedex, propofol, and fentanyl infusions. BG slightly above goal overnight without insulin.      Plan:    Continue low dose correction scale  BG monitoring every 4 hours while NPO     Discharge planning: TBD     Endocrine to continue to follow     ** Please call Endocrine for any BG related issues **

## 2020-07-07 NOTE — ASSESSMENT & PLAN NOTE
Intermittently requiring norepinephrine. Likely related to sedation or autonomic dysfunction for hypoxemic brain injury.     --Continue norepinephrine as needed to maintain MAP >65

## 2020-07-07 NOTE — PROGRESS NOTES
[facility] - ICU 16   Critical Care Medicine  Progress Note    Patient Name: Ranjana Sanchez  MRN: [id number]  Admission Date: 4/10/2020  Hospital Length of Stay: 88 days  Code Status: DNR  Attending Provider: Bandar Zambrano*  Primary Care Provider: Primary Doctor No   Principal Problem: Acute respiratory disease due to COVID-19 virus    Subjective:     HPI:  Mr. LUANA Sanchez is a 56 y/o M with PMHx of HTN brought to ER via EMS for increasing SOB with known COVID-19 exposure. 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 They have not had travelers since 3/14. 2 days ago he started having fever and SOB. He was seen by the ship's physician and was found to have crackles in his bases bilaterally, but his oxygen saturation was in the 90's. He had a flu test which was negative. He was quarantined in a room and states that he got worse with increasing SOB. He was brought to the ER and found to be 78% on RA. He was started on NRB and initially was doing much better, but his tachypnea did not improve, so he was placed on BiPAP with oxygen saturation improvement.    Hospital/ICU Course:  Patient was admitted to the MICU after a difficult intubation in the ER s/p crich. COVID + with ARDS, sedated and paralyzed. Unable to prone secondary to crich. Left mainstem intubation which was pulled off, on NO and levophed. ET tube replaced on 4/11 family contacted in [place]. Proned on 4/12 tolerated well and continued to prone on 4/19. 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 He was on and off ketamine as well.  EEG ordered to rule out seizures as he also had seizure like activity that resolved with ativan. Currently changing sedation, was placed on phenobarbital which should start having effect 4/20. Patient proned x 10 (last on 4/22). Still not tolerating ventilator weaning. Remains on sedation,  vasopressors, and paralytic. ID now following patient for remdesivir administration; family aware and consented. Patient not significantly improving despite efforts. Son contacted, patient made DNR. Patient evaluated for ECMO on 4/24. Placed on ECMO on 4/25 and transferred to Dr Ayon's service with DNR rescinded.    Some physiologic improvement on VV ECMO. Oxygenation at goal, lactate clearing, hemodynamics at goal without vasoactive support currently. Tolerated wean of vent Fi and Monika thus far. Peeling back sedatives steadily with goal of neuro exam ASAP. Metabolic alkalosis correcting with encouragement from diamox. Patient was weaned off from ECMO support on 06/24/2020. However, during this process he has undergone a stroke and currently is not responding to verbal commands. The patient is able to open his eyes and track, however no purposeful movements can be ascertained from the patient. Extensive notes from neurology have been documented in the patient's medical record. Extensive discussions had been carried out between the family members and periodic updates were provided. Periodic updates were also provided to [employer] which is his employer. The family was also notified that if any further deterioration happens after completion of ECMO we would not be providing new ECMO support to this patient.    The patient was transferred to the MICU team on 6/25 after ECMO decannulation. Fever with worsening shock. Cultured and started on broad spectrum antibiotics. Frequent episodes of bradycardia to the 20's but recovers without intervention. Heart rate variability continues and on 06/30 he became tachycardic to 166. Labs and EKG showed sinus tachy, restarted betablocker.    Interval History/Significant Events: No acute events overnight. Remains on fentanyl, precedex, and propofol for sedation. Intermittently requiring norepinephrine infusion to maintain blood pressure. Remains on mechanical ventilation on PC  with pressure support 35. Patient can pull tidal volumes of about 250cc.     Review of Systems   Unable to perform ROS: Intubated     Objective:     Vital Signs (Most Recent):  Temp: 97.9 °F (36.6 °C) (07/07/20 0701)  Pulse: 74 (07/07/20 1045)  Resp: (!) 35 (07/07/20 0720)  BP: 92/60 (07/07/20 1000)  SpO2: 97 % (07/07/20 1307) Vital Signs (24h Range):  Temp:  [97.9 °F (36.6 °C)-98.7 °F (37.1 °C)] 97.9 °F (36.6 °C)  Pulse:  [] 74  Resp:  [0-45] 35  SpO2:  [88 %-100 %] 97 %  BP: ()/(51-88) 92/60   Weight: 62 kg (136 lb 11 oz)  Body mass index is 23.46 kg/m².      Intake/Output Summary (Last 24 hours) at 7/7/2020 1347  Last data filed at 7/7/2020 1312  Gross per 24 hour   Intake 4655.78 ml   Output 3915 ml   Net 740.78 ml       Physical Exam  Constitutional:       Appearance: He is well-developed. He is sickly-appearing.      Interventions: He is sedated and intubated.   HENT:      Head: Normocephalic and atraumatic.   Eyes:      Pupils: Pupils are unequal.      Right eye: Pupil is round and reactive.      Left eye: Pupil is round and reactive.   Neck:      Thyroid: No thyromegaly.      Trachea: No tracheal deviation.   Cardiovascular:      Rate and Rhythm: Regular rhythm. Tachycardia present.      Heart sounds: No murmur. No friction rub. No gallop.    Pulmonary:      Effort: Tachypnea and accessory muscle usage present. He is intubated.      Breath sounds: No decreased breath sounds or wheezing.      Comments: Course breath sounds throughout all lung fields  Abdominal:      General: Bowel sounds are normal.      Palpations: Abdomen is soft.   Genitourinary:     Comments: Torres in place.   Musculoskeletal: Normal range of motion.   Skin:     General: Skin is warm and dry.   Neurological:      Mental Status: He is unresponsive.      Sensory: No sensory deficit.      Comments: Sedated. No spontaneous eye movement. No withdrawal to any extremities.          Vents:  Vent Mode: A/C (07/07/20 5431)  Ventilator  Initiated: Yes (04/10/20 2247)  Set Rate: 35 BPM (07/07/20 0720)  Vt Set: 250 mL (06/06/20 0858)  Pressure Support: 20 cmH20 (06/06/20 0858)  PEEP/CPAP: 5 cmH20 (07/07/20 0720)  Oxygen Concentration (%): 40 (07/07/20 1045)  Peak Airway Pressure: 43 cmH2O (07/07/20 0720)  Plateau Pressure: 13 cmH20 (07/07/20 0720)  Total Ve: 8.26 mL (07/07/20 0720)  F/VT Ratio<105 (RSBI): 136.72 (07/07/20 0720)  Lines/Drains/Airways     Peripherally Inserted Central Catheter Line            PICC Double Lumen 05/05/20 1707 right basilic 62 days          Drain                 NG/OG Tube 06/27/20 1153 16 Fr. Right nostril 10 days         Chest Tube 06/29/20 1816 1 Left First intercostal space 7 days         Urethral Catheter 07/03/20 1827 16 Fr. 3 days          Airway                 Surgical Airway 05/05/20 1500 Shiley Cuffed 62 days          Peripheral Intravenous Line                 Peripheral IV - Single Lumen 07/03/20 1826 20 G Left;Posterior Wrist 3 days              Significant Labs:    CBC/Anemia Profile:  Recent Labs   Lab 07/06/20  0400 07/07/20  0500   WBC 12.79* 13.80*   HGB 8.2* 7.8*   HCT 27.8* 26.6*    259   MCV 98 99*   RDW 15.6* 15.9*        Chemistries:  Recent Labs   Lab 07/05/20  1840 07/06/20  0400 07/07/20  0500   NA  --  140 140   K  --  3.8 3.8   CL  --  101 98   CO2  --  32* 37*   BUN  --  18 18   CREATININE  --  0.5 0.6   CALCIUM  --  9.3 9.6   ALBUMIN  --  2.2* 2.1*   PROT  --  6.2 6.2   BILITOT  --  1.6* 1.2*   ALKPHOS  --  65 61   ALT  --  17 15   AST  --  21 18   MG 2.2 2.1 1.9   PHOS 2.9 3.4 2.5*       All pertinent labs within the past 24 hours have been reviewed.    Significant Imaging:  I have reviewed and interpreted all pertinent imaging results/findings within the past 24 hours.      ABG  No results for input(s): PH, PO2, PCO2, HCO3, BE in the last 168 hours.  Assessment/Plan:     Neuro  Basal ganglia infarction  CT head on 6/2 with bilateral basal ganglia infarcts consistent with global  "hypoxic ischemic injury 2/2 COVID-19    --neuro exam unchanged with intact cranial nerve reflexes and spontaneous eye opening only  --per neurology, "considering the extent of brain injury, he has very low likelihood of substantial further neurological improvement. 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 TV dropped from 280 to 150's. CXR with pneumomediastinum.  --no intervention warranted, improved  --continue LPV    Difficult intubation  S/p crich which was converted to trach    Acute hypoxemic respiratory failure  2/2 to ARDS due to COVID-19  - ARDSNET Protocol  --see COVID-19    Cardiac/Vascular  Tachycardia  Patient's baseline since the initiation of ECMO has been tachycardic.  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  - Completed treatment with Plaquenil, Azithro, Methyprednisone for 5 days  - Special isolation and aspiration precautions ordered per COVID protocol  - Significant improvement in oxygenation, but remains with poor lung compliance   - complicated by pneumomediastinum and pneumothorax requiring CT placement  - continue pressure support 35 to maintain " "Vt of 250-300  - Lasix as needed to maintain euvolemic to net negative volume status      Acute respiratory distress syndrome (ARDS) due to COVID-19 virus  --See acute respiratory failure        Arm wound, left, sequela  suspect 2/2 extravasation of medication through PIV.  --management per wound care  --RUE with +2 palpable pulses.    Goals of care, counseling/discussion  Patient's prognosis remains poor despite efforts to optimize health. Son spoken with regarding GOC.  DNR changed after consulting CTS for ECMO. Per conversation with Dr. Goss on 6/26, no chest compressions. Given Mr. Sanchez's decline, he is too sick for travel back to [place]. Family urged to visit if possible; ongoing coordination with [employer] to help arrange travel hopefully around 7/15.     [place] not allowing international travel due to increase in coronavirus cases. Family may not be able to visit. Son is trying to discuss with someone in [place] about making an exception. Son reports the original goal was for family to visit with plans for withdrawal of life support. However, when asked about withdrawal of life support if family unable to visit, son wishes to continue aggressive care and give patient "more time."    --Consult palliative care for assistance      Shock  Intermittently requiring norepinephrine. Likely related to sedation or autonomic dysfunction for hypoxemic brain injury.     --Continue norepinephrine as needed to maintain MAP >65    COVID-19 virus detected  See ARDS       Critical Care Daily Checklist:    A: Awake: RASS Goal/Actual Goal: RASS Goal: 0-->alert and calm  Actual: [place] Agitation Sedation Scale (RASS): Deep sedation   B: Spontaneous Breathing Trial Performed? Spon. Breathing Trial Initiated?: Not initiated (07/07/20 0720)   C: SAT & SBT Coordinated?  N/A                      D: Delirium: CAM-ICU Overall CAM-ICU: Positive   E: Early Mobility Performed? Yes   F: Feeding Goal: Goals: Meet % EEN, EPN " by RD f/u date  Status: Nutrition Goal Status: progressing towards goal   Current Diet Order   No orders of the defined types were placed in this encounter.      AS: Analgesia/Sedation Fentanyl, propofol, precedex   T: Thromboembolic Prophylaxis heparin   H: HOB > 300 Yes   U: Stress Ulcer Prophylaxis (if needed) famotidine   G: Glucose Control At goal 140-180   B: Bowel Function Stool Occurrence: 0   I: Indwelling Catheter (Lines & Torres) Necessity Torres (3 days), PICC line (62 days), chest tube, PIV   D: De-escalation of Antimicrobials/Pharmacotherapies None    Plan for the day/ETD Supportive care    Code Status:  Family/Goals of Care: DNR       Case discussed with Dr. Zambrano.     Critical Care Time: 50 minutes  Critical secondary to Patient has a condition that poses threat to life and bodily function: acute hypoxemic respiratory failure requiring mechanical ventilation and shock requiring vasopressors.       Critical care was time spent personally by me on the following activities: development of treatment plan with patient or surrogate and bedside caregivers, discussions with consultants, evaluation of patient's response to treatment, examination of patient, ordering and performing treatments and interventions, ordering and review of laboratory studies, ordering and review of radiographic studies, pulse oximetry, re-evaluation of patient's condition. This critical care time did not overlap with that of any other provider or involve time for any procedures.     Faye Spence PA-C  Critical Care Medicine  [facility] - ICU 16 WT

## 2020-07-07 NOTE — NURSING TRANSFER
Nursing Transfer Note      7/7/2020     Transfer From: RICU 6099 > ICU [place] [id number]    Transfer via bed    Transfer with Ventilator & cardiac monitoring    Transported by RICU Transfer team    Medicines sent: Yes    Chart send with patient: Yes    Notified: son to be notified by MD later. No answer at this time    Patient reassessed at: 07/07/2020 at 1300 (date, time)    Upon arrival to floor: cardiac monitor applied, call bell in reach and bed in lowest position, wheels locked, Helen RN to continue to assume care of patient

## 2020-07-07 NOTE — ASSESSMENT & PLAN NOTE
"Patient's prognosis remains poor despite efforts to optimize health. Son spoken with regarding GOC.  DNR changed after consulting CTS for ECMO. Per conversation with Dr. Goss on 6/26, no chest compressions. Given Mr. Sanchez's decline, he is too sick for travel back to [place]. Family urged to visit if possible; ongoing coordination with [employer] to help arrange travel hopefully around 7/15.     [place] not allowing international travel due to increase in coronavirus cases. Family may not be able to visit. Son is trying to discuss with someone in [place] about making an exception. Son reports the original goal was for family to visit with plans for withdrawal of life support. However, when asked about withdrawal of life support if family unable to visit, son wishes to continue aggressive care and give patient "more time."    --Consult palliative care for assistance    "

## 2020-07-07 NOTE — PLAN OF CARE
07/06/20 2019   Discharge Reassessment   Assessment Type Discharge Planning Reassessment   Discharge Plan A Long-term acute care facility (LTAC)   Discharge Plan B Long-term acute care facility (LTAC)   DME Needed Upon Discharge  other (see comments)  (tbd)   Anticipated Discharge Disposition Long Term   Can the patient/caregiver answer the patient profile reliably? No, cognitively impaired  (pt intubated & sedated)   Describe the patient's ability to walk at the present time. Does not walk or unable to take any steps at all   Number of comorbid conditions (as recorded on the chart) One   Post-Acute Status   Post-Acute Authorization Placement   Post-Acute Placement Status Awaiting Internal Medical Clearance     Patient not medically stable to discharge. Patient remains sedated & intubated. Pox 92% on 40% vent. Staff in communication with patient's son regarding POC. Son still unsure of visitation date. Will continue to follow.

## 2020-07-07 NOTE — ASSESSMENT & PLAN NOTE
EGD 5/3 showed Diuelafoy lesion  - Continue pantoprazole 40 mg BID and sucralfate   - currently on ASA and Heparin SQ

## 2020-07-07 NOTE — SUBJECTIVE & OBJECTIVE
Interval History/Significant Events: No acute events overnight. Remains on fentanyl, precedex, and propofol for sedation. Intermittently requiring norepinephrine infusion to maintain blood pressure. Remains on mechanical ventilation on PC with pressure support 35. Patient can pull tidal volumes of about 250cc.     Review of Systems   Unable to perform ROS: Intubated     Objective:     Vital Signs (Most Recent):  Temp: 97.9 °F (36.6 °C) (07/07/20 0701)  Pulse: 74 (07/07/20 1045)  Resp: (!) 35 (07/07/20 0720)  BP: 92/60 (07/07/20 1000)  SpO2: 97 % (07/07/20 1307) Vital Signs (24h Range):  Temp:  [97.9 °F (36.6 °C)-98.7 °F (37.1 °C)] 97.9 °F (36.6 °C)  Pulse:  [] 74  Resp:  [0-45] 35  SpO2:  [88 %-100 %] 97 %  BP: ()/(51-88) 92/60   Weight: 62 kg (136 lb 11 oz)  Body mass index is 23.46 kg/m².      Intake/Output Summary (Last 24 hours) at 7/7/2020 1347  Last data filed at 7/7/2020 1312  Gross per 24 hour   Intake 4655.78 ml   Output 3915 ml   Net 740.78 ml       Physical Exam  Constitutional:       Appearance: He is well-developed. He is sickly-appearing.      Interventions: He is sedated and intubated.   HENT:      Head: Normocephalic and atraumatic.   Eyes:      Pupils: Pupils are unequal.      Right eye: Pupil is round and reactive.      Left eye: Pupil is round and reactive.   Neck:      Thyroid: No thyromegaly.      Trachea: No tracheal deviation.   Cardiovascular:      Rate and Rhythm: Regular rhythm. Tachycardia present.      Heart sounds: No murmur. No friction rub. No gallop.    Pulmonary:      Effort: Tachypnea and accessory muscle usage present. He is intubated.      Breath sounds: No decreased breath sounds or wheezing.      Comments: Course breath sounds throughout all lung fields  Abdominal:      General: Bowel sounds are normal.      Palpations: Abdomen is soft.   Genitourinary:     Comments: Torres in place.   Musculoskeletal: Normal range of motion.   Skin:     General: Skin is warm and dry.    Neurological:      Mental Status: He is unresponsive.      Sensory: No sensory deficit.      Comments: Sedated. No spontaneous eye movement. No withdrawal to any extremities.          Vents:  Vent Mode: A/C (07/07/20 0720)  Ventilator Initiated: Yes (04/10/20 2247)  Set Rate: 35 BPM (07/07/20 0720)  Vt Set: 250 mL (06/06/20 0858)  Pressure Support: 20 cmH20 (06/06/20 0858)  PEEP/CPAP: 5 cmH20 (07/07/20 0720)  Oxygen Concentration (%): 40 (07/07/20 1045)  Peak Airway Pressure: 43 cmH2O (07/07/20 0720)  Plateau Pressure: 13 cmH20 (07/07/20 0720)  Total Ve: 8.26 mL (07/07/20 0720)  F/VT Ratio<105 (RSBI): 136.72 (07/07/20 0720)  Lines/Drains/Airways     Peripherally Inserted Central Catheter Line            PICC Double Lumen 05/05/20 1707 right basilic 62 days          Drain                 NG/OG Tube 06/27/20 1153 16 Fr. Right nostril 10 days         Chest Tube 06/29/20 1816 1 Left First intercostal space 7 days         Urethral Catheter 07/03/20 1827 16 Fr. 3 days          Airway                 Surgical Airway 05/05/20 1500 Shiley Cuffed 62 days          Peripheral Intravenous Line                 Peripheral IV - Single Lumen 07/03/20 1826 20 G Left;Posterior Wrist 3 days              Significant Labs:    CBC/Anemia Profile:  Recent Labs   Lab 07/06/20  0400 07/07/20  0500   WBC 12.79* 13.80*   HGB 8.2* 7.8*   HCT 27.8* 26.6*    259   MCV 98 99*   RDW 15.6* 15.9*        Chemistries:  Recent Labs   Lab 07/05/20  1840 07/06/20  0400 07/07/20  0500   NA  --  140 140   K  --  3.8 3.8   CL  --  101 98   CO2  --  32* 37*   BUN  --  18 18   CREATININE  --  0.5 0.6   CALCIUM  --  9.3 9.6   ALBUMIN  --  2.2* 2.1*   PROT  --  6.2 6.2   BILITOT  --  1.6* 1.2*   ALKPHOS  --  65 61   ALT  --  17 15   AST  --  21 18   MG 2.2 2.1 1.9   PHOS 2.9 3.4 2.5*       All pertinent labs within the past 24 hours have been reviewed.    Significant Imaging:  I have reviewed and interpreted all pertinent imaging results/findings within  the past 24 hours.

## 2020-07-07 NOTE — PLAN OF CARE
LONG questioned the patient's nurse, Helen ([phone]), & PADILLA Geiger ([id number])  via phone regarding discharge plan. Fely stated that they were awaiting the family's arrival but it seems flights from [place] are being held until September. PA to contact the patient's son to discuss goals of care. LONG reminded both that Dr. Flor Tiwari ([phone], f [phone]) needs to be notified at time of death & will take over regarding post-mortem care. LONG requested that an [facility] MD call Dr. Stafford ([phone]) with an update. Will continue to follow.

## 2020-07-07 NOTE — CARE UPDATE
Updated son, Da, via skype regarding patient's clinical status. Please see GO section of progress note from 7/7/2020. Son currently in the hospital in [place].  All questions answered and concerns addressed.    Attempted to call Dr. Stafford with [employer] however no answer. Voicemail with call back number left.       Faye Spence PA-C  Critical Care Medicine  7/7/2020   3:13 PM

## 2020-07-08 NOTE — PLAN OF CARE
Updated medical records faxed to Dr. Flor Tiwari (f) [phone] with [employer] as requested.

## 2020-07-08 NOTE — PHYSICIAN QUERY
"PT Name: Ranjana Sanchez  MR #: [id number]     Physician Query Form - Documentation Clarification      CDS/: Nancy Spivey RN, CDS               Contact information: [email or web address]    This form is a permanent document in the medical record.     Query Date: July 8, 2020    By submitting this query, we are merely seeking further clarification of documentation. Please utilize your independent clinical judgment when addressing the question(s) below.    The Medical record reflects the following:    Supporting Clinical Findings Location in Medical Record     --Wound care consulted for right nare, upper throat and left arm        -The left arm is a fluid filled raised area, intact, moist        -The left arm- foam dressing 2 x week    --noted L arm with gray discolored skin and what appears to be absorbed blistering possibly from infiltration.  Affected skin is taunt and not spongy.     --assessment of left arm: dry stable area of eschar measuring 8 cm x 3 cm x 0.1 cm without any drainage noted. Border foams removed at this time with the recommendation given to pt's nurse for nursing to paint left arm with betadine daily and leave open to air    --Provider, please clarify/ provide the diagnosis related to the documentation of the L arm/LUE: "Spoke with wound care who has regularly been assessing the patient. Sppot to left arm was likely a result of blistering due to fluid overload and not pressure related. Once the blisters subsided, patient was left with scab to area. "     Wound Care C/S 4/16        Wound Care C/S 4/22        Wound Care c/s 6/10          Query response 6/15 (PADILLA Brennan)     --Arm wound, left, sequela         -wound to LUE being seen by wound care. Thought to be 2/2 extravasation of medication through PIV    --Skin:       -Comments: Necrotic tissue on left forearm  --Arm wound, left, sequela  -suspect 2/2 extravasation of medication through PIV.           -management per " wound care      --Left forearm- black eschar over left forearm beginning to lift.          -continue betadine daily   CCM Note 7/1        CCM Note 7/2->7/6            Wound Care Note 7/6                                                                            Doctor, Please specify diagnosis or diagnoses associated with above clinical findings.    Please clarify the L arm/L Forearm wound diagnosis:    Provider Use Only    [  ] Blistering due to fluid overload and not pressure related    [ x ] Necrosis of Blister/tissue due to fluid overload and not pressure related    [  ] Necrosis of blister/tissue secondary to PIV extravasation    [   ] Other diagnosis (please specify): __________________                                                                                                             [  ] Clinically Undetermined

## 2020-07-08 NOTE — PHYSICIAN QUERY
PT Name: Ranjana Sanchez  MR #: [id number]    Nutrition Clarification     CDS/: Nancy Spivey RN, CDS               Contact information: [email or web address]    This form is a permanent document in the medical record.     Query Date: July 8, 2020    By submitting this query, we are merely seeking further clarification of documentation.. Please utilize your independent clinical judgment when addressing the question(s) below.    The medical record contains the following:   Indicators  Supporting Clinical Findings Location in Medical Record   x % of Estimated Energy Intake over a time frame from p.o., TF, or TPN --% Intake of Estimated Energy Needs: 75 - 100 %  --Unable to assess for malnutrition criteria at this time.     --No indicators of malnutrition at this time. Still unable to complete NFPE due being positive for COVID-19.  --% Intake of Estimated Energy Needs: 50 - 75 %    --Unable to assess for malnutrition, pt with no indications at this time  --% Intake of Estimated Energy Needs: 75 - 100 %   RD Note 5/14      RD Note 6/16        RD Note 7/6    Weight Status over a time frame      Subcutaneous Fat and/or Muscle Loss      Fluid Accumulation or Edema     x Wt/BMI/Usual Body Weight Weight (lb): 139.99 lb->149.47lbs->132.5lbs->135.36lbs  BMI (Calculated): 24-->25.6-->22.7-->23.2   RD Note 4/13--> 5/13-->6/16-->7/6   x Delayed Wound Healing/Failure to Thrive -- Left cheek:  [ x  ] Deep Tissue Pressure Injury that transformed into a Decubitus (Pressure) Ulcer    -- L cheek DTI progressed to Stage 2 PI    --The unit nurse reports a partial thickness skin loss (stage 2) over the sacral area   --Left forearm- black eschar over left forearm beginning to lift.  --Due to the poor medical prognosis, the skin may experience a decline and may exhibit changes consistent with skin changes at life's end   Query response 6/15      Query response 6/17    Wound CAre C/S 7/6   x Acute or Chronic Illness  --Basal ganglia infarction  --global hypoxic ischemic injury   --Acute respiratory disease due to COVID-19 virus         -Difficult intubation requiring crich on admit; converted to trach 5/5  --COVID-19 virus detected  --Placed on ECMO on 4/25. weaned off from ECMO support on 06/24/2020   CCM Note 7/7    Medication     x Treatment --increase TF rate (of Isosource 1.5) to 50 mL/hr to provide 1800 kcals, 82 g of protein, 917 mL fluid.    --TF continues and increased to goal rate of 40 mL/hr, pt tolerating well at this time.    RD Note 4/13      RD Note 7/6   x Other Energy Calories Required: not meeting needs  Protein Required: not meeting needs    --Malnutrition.          -Continue tube feeds enterally    --He is sickly-appearing RD Note 4/13      CCM Note 6/17->6/21      CCM Note 7/7         AND / ASPEN Clinical Characteristics (October 2011)  A minimum of two characteristics is recommended for diagnosing either moderate or severe malnutrition   Mild Malnutrition Moderate Malnutrition Severe Malnutrition   Energy Intake from p.o., TF or TPN. < 75% intake of estimated energy needs for less than 7 days < 75% intake of estimated energy needs for greater than 7 days < 50% intake of estimated energy needs for > 5 days   Weight Loss 1-2% in 1 month  5% in 3 months  7.5% in 6 months  10% in 1 year 1-2 % in 1 week  5% in 1 month  7.5% in 3 months  10% in 6 months  20% in 1 year > 2% in 1 week  > 5% in 1 month  > 7.5% in 3 months  > 10% in 6 months  > 20% in 1 year   Physical Findings     None *Mild subcutaneous fat and/or muscle loss  *Mild fluid accumulation  *Stage II decubitus  *Surgical wound or non-healing wound *Mod/severe subcutaneous fat and/or muscle loss  *Mod/severe fluid accumulation  *Stage III or IV decubitus  *Non-healing surgical wound     Provider, please specify diagnosis or diagnoses associated with above clinical findings.      Please further clarify/specify the noted malnutrition diagnosis, in relation  to the above clinical indicators:      [  ] Mild Protein-Calorie Malnutrition     [x  ] Moderate Protein-Calorie Malnutrition     [  ] Malnutrition, Unspecified degree     [  ] Malnutrition ruled out     [  ] Cachexia     [  ] Other Nutritional Diagnosis (please specify): _______     [  ] Clinically Undetermined     Present on admission (POA) status:   [   ] Yes (Y)                          [  ] Clinically Undetermined (W)              [   ] No (N)                            [   ] Documentation insufficient to determine if condition is POA (U)     Please document in your progress notes daily for the duration of treatment until resolved and include in your discharge summary.

## 2020-07-08 NOTE — PROGRESS NOTES
Pharmacokinetic Initial Assessment: IV Vancomycin    Assessment/Plan:    -Vancomycin 1000 mg IV (16.1 mg/kg) Q12H for LRTI.  -Obtain a trough prior to the fourth dose on 7/9 @ 2200.   -Continue to monitor for any changes in renal function.   -ke=0.098 hrs  -T 1/2= 7 hrs     Pharmacy will continue to follow and monitor vancomycin.      Please contact pharmacy at extension [phone] with any questions regarding this assessment.     Thank you for the consult,   Cornell Tello       Patient brief summary:  Ranjana Sanchez is a 55 y.o. male initiated on antimicrobial therapy with IV Vancomycin for treatment of suspected lower respiratory infection    Drug Allergies:   Review of patient's allergies indicates:  No Known Allergies    Actual Body Weight:   62 kg    Renal Function:   Estimated Creatinine Clearance: 113.7 mL/min (based on SCr of 0.6 mg/dL).,     Dialysis Method (if applicable):  N/A    CBC (last 72 hours):  Recent Labs   Lab Result Units 07/06/20  0400 07/07/20  0500 07/08/20  0527   WBC K/uL 12.79* 13.80* 18.46*   Hemoglobin g/dL 8.2* 7.8* 7.6*   Hematocrit % 27.8* 26.6* 27.2*   Platelets K/uL 267 259 222   Gran% % 74.3* 75.4* 86.0*   Lymph% % 9.9* 10.6* 4.0*   Mono% % 5.3 4.9 4.3   Eosinophil% % 9.1* 7.4 4.0   Basophil% % 0.2 0.3 0.2   Differential Method  Automated Automated Automated       Metabolic Panel (last 72 hours):  Recent Labs   Lab Result Units 07/05/20  1840 07/06/20  0400 07/07/20  0500 07/08/20  0527   Sodium mmol/L  --  140 140 137   Potassium mmol/L  --  3.8 3.8 5.2*   Chloride mmol/L  --  101 98 97   CO2 mmol/L  --  32* 37* 31*   Glucose mg/dL  --  170* 186* 196*   BUN, Bld mg/dL  --  18 18 23*   Creatinine mg/dL  --  0.5 0.6 0.6   Albumin g/dL  --  2.2* 2.1* 2.1*   Total Bilirubin mg/dL  --  1.6* 1.2* 1.2*   Alkaline Phosphatase U/L  --  65 61 71   AST U/L  --  21 18 24   ALT U/L  --  17 15 18   Magnesium mg/dL 2.2 2.1 1.9 1.7   Phosphorus mg/dL 2.9 3.4 2.5* 3.2       Drug levels (last 3  results):  No results for input(s): VANCOMYCINRA, VANCOMYCINPE, VANCOMYCINTR in the last 72 hours.    Microbiologic Results:  Microbiology Results (last 7 days)     Procedure Component Value Units Date/Time    Blood culture [[id number]]     Order Status: No result Specimen: Blood     Blood culture [[id number]]     Order Status: No result Specimen: Blood     Culture, Respiratory with Gram Stain [[id number]]     Order Status: No result Specimen: Respiratory from Endotracheal Aspirate     Blood culture [[id number]] Collected: 06/26/20 0929    Order Status: Completed Specimen: Blood from Peripheral, Foot, Right Updated: 07/01/20 1212     Blood Culture, Routine No growth after 5 days.    Narrative:      Blood cultures from 2 different sites. 4 bottles total.  Please draw before starting antibiotics.    Blood culture [[id number]] Collected: 06/26/20 0934    Order Status: Completed Specimen: Blood from Peripheral, Foot, Left Updated: 07/01/20 1212     Blood Culture, Routine No growth after 5 days.    Narrative:      Blood cultures x 2 different sites. 4 bottles total. Please  draw cultures before administering antibiotics.

## 2020-07-08 NOTE — PLAN OF CARE
"[facility] - ICU 16 WT  Palliative Care       Patient Name: Ranjana Sanchez  MRN: [id number]  Admission Date: 4/10/2020  Hospital Length of Stay: 89 days  Code Status: DNR   Attending Provider: Bandar Zambrano*  Palliative Care Provider:  Rahul Mccartney MD  Primary Care Physician: Primary Doctor No  Principal Problem:Acute respiratory disease due to COVID-19 virus    This  is investigating the possibility of patient's family traveling from [facility] to [place]. This  called the [facility] in [place] ([phone]) which also services the [facility]. There was no answer and this  left a message inquiring about the possibility of an [place] traveling to the [place] There is also an option to send an email. This  sent and email to  [email or web address] stating:     "To whom it may concern,   I am inquiring about an [place] in urgent need of traveling to the [place] 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 The presence of this [place] National in the hospital with his family member is of dire importance.     Please advise.     Is urgent travel of an [place] to the [place] possible while travel restrictions are in place because of COVID-19?"    This  received a return email stating if the person has a valid [place] visa then tickets can be booked through [employer].     This  replied asking if the person did not have a [place] visa.     A reply was received stating the person would have to contact a [place] authority for a [place] visa.     [email or web address] provides information on obtaining a visa to travel to the [place] Patient's family will need to contact the  Consulate General in [facility] (Address: [facility], [facility], [facility] [address] Phone: [phone]) to inquire about obtaining a Visa.     Dakota" Bryant, [facility]  Palliative Medicine

## 2020-07-08 NOTE — PROGRESS NOTES
[facility] ICU 16   Critical Care Medicine  Progress Note    Patient Name: Ranjana Sanchez  MRN: [id number]  Admission Date: 4/10/2020  Hospital Length of Stay: 89 days  Code Status: DNR  Attending Provider: Bandar Zambrano*  Primary Care Provider: Primary Doctor No   Principal Problem: Acute respiratory disease due to COVID-19 virus    Subjective:     HPI:  Mr. LUANA Sanchez is a 56 y/o M with PMHx of HTN brought to ER via EMS for increasing SOB with known COVID-19 exposure. 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 They have not had travelers since 3/14. 2 days ago he started having fever and SOB. He was seen by the ship's physician and was found to have crackles in his bases bilaterally, but his oxygen saturation was in the 90's. He had a flu test which was negative. He was quarantined in a room and states that he got worse with increasing SOB. He was brought to the ER and found to be 78% on RA. He was started on NRB and initially was doing much better, but his tachypnea did not improve, so he was placed on BiPAP with oxygen saturation improvement.    Hospital/ICU Course:  Patient was admitted to the MICU after a difficult intubation in the ER s/p crich. COVID + with ARDS, sedated and paralyzed. Unable to prone secondary to crich. Left mainstem intubation which was pulled off, on NO and levophed. ET tube replaced on 4/11 family contacted in [place]. Proned on 4/12 tolerated well and continued to prone on 4/19. 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 He was on and off ketamine as well.  EEG ordered to rule out seizures as he also had seizure like activity that resolved with ativan. Currently changing sedation, was placed on phenobarbital which should start having effect 4/20. Patient proned x 10 (last on 4/22). Still not tolerating ventilator weaning. Remains on sedation,  vasopressors, and paralytic. ID now following patient for remdesivir administration; family aware and consented. Patient not significantly improving despite efforts. Son contacted, patient made DNR. Patient evaluated for ECMO on 4/24. Placed on ECMO on 4/25 and transferred to Dr Ayon's service with DNR rescinded.    Some physiologic improvement on VV ECMO. Oxygenation at goal, lactate clearing, hemodynamics at goal without vasoactive support currently. Tolerated wean of vent Fi and Monika thus far. Peeling back sedatives steadily with goal of neuro exam ASAP. Metabolic alkalosis correcting with encouragement from diamox. Patient was weaned off from ECMO support on 06/24/2020. However, during this process he has undergone a stroke and currently is not responding to verbal commands. The patient is able to open his eyes and track, however no purposeful movements can be ascertained from the patient. Extensive notes from neurology have been documented in the patient's medical record. Extensive discussions had been carried out between the family members and periodic updates were provided. Periodic updates were also provided to [employer] which is his employer. The family was also notified that if any further deterioration happens after completion of ECMO we would not be providing new ECMO support to this patient.    The patient was transferred to the MICU team on 6/25 after ECMO decannulation. Fever with worsening shock. Cultured and started on broad spectrum antibiotics. Frequent episodes of bradycardia to the 20's but recovers without intervention. Heart rate variability continues and on 06/30 he became tachycardic to 166. Labs and EKG showed sinus tachy, restarted betablocker.     Interval History/Significant Events: Patient hypothermic and febrile overnight as well as tachycardic into the 140s. CXR with worsening bilateral infiltrates. Patient remains on PS ventilation with PS of 35. Peaks 50 with plateaus 45. He  is only able to take tidal volume of 160-180 today with a respiratory rate of >40. Patient sedated on propofol, fentanyl, and precedex. Norepinephine requirements continue to vary.     Review of Systems   Unable to perform ROS: Intubated     Objective:     Vital Signs (Most Recent):  Temp: (!) 100.4 °F (38 °C) (07/08/20 0750)  Pulse: (!) 145 (07/08/20 0750)  Resp: (!) 35 (07/07/20 2000)  BP: 119/63 (07/08/20 0750)  SpO2: (!) 86 % (07/08/20 0750) Vital Signs (24h Range):  Temp:  [93.3 °F (34.1 °C)-100.4 °F (38 °C)] 100.4 °F (38 °C)  Pulse:  [] 145  Resp:  [35] 35  SpO2:  [86 %-100 %] 86 %  BP: ()/(49-74) 119/63   Weight: 62 kg (136 lb 11 oz)  Body mass index is 23.46 kg/m².      Intake/Output Summary (Last 24 hours) at 7/8/2020 0815  Last data filed at 7/8/2020 0600  Gross per 24 hour   Intake 5052.12 ml   Output 3041 ml   Net 2011.12 ml       Physical Exam  Constitutional:       Appearance: He is well-developed. He is sickly-appearing.      Interventions: He is sedated and intubated.   HENT:      Head: Normocephalic and atraumatic.   Eyes:      Pupils: Pupils are equal, round, and reactive to light.      Right eye: Pupil is round and reactive.      Left eye: Pupil is round and reactive.   Neck:      Thyroid: No thyromegaly.      Trachea: No tracheal deviation.   Cardiovascular:      Rate and Rhythm: Normal rate and regular rhythm.      Heart sounds: No murmur. No friction rub. No gallop.    Pulmonary:      Effort: Tachypnea and accessory muscle usage present. He is intubated.      Breath sounds: Examination of the right-upper field reveals decreased breath sounds. Examination of the right-middle field reveals decreased breath sounds. Examination of the right-lower field reveals decreased breath sounds. Decreased breath sounds present. No wheezing.      Comments: Course breath sounds throughout all lung fields  Abdominal:      General: Bowel sounds are normal.      Palpations: Abdomen is soft.    Genitourinary:     Comments: Torres in place.   Musculoskeletal: Normal range of motion.   Skin:     General: Skin is warm and dry.   Neurological:      Mental Status: He is unresponsive.      Sensory: No sensory deficit.      Comments: Sedated. Pupils sluggish bilaterally. Cough and gag reflex intact. Breathing over the ventilator.          Vents:  Vent Mode: A/C (07/08/20 0147)  Ventilator Initiated: Yes (04/10/20 2247)  Set Rate: 35 BPM (07/08/20 0147)  Vt Set: 250 mL (06/06/20 0858)  Pressure Support: 20 cmH20 (06/06/20 0858)  PEEP/CPAP: 5 cmH20 (07/08/20 0147)  Oxygen Concentration (%): 65 (07/08/20 0750)  Peak Airway Pressure: 47 cmH2O (07/08/20 0147)  Plateau Pressure: 13 cmH20 (07/08/20 0147)  Total Ve: 8.25 mL (07/08/20 0147)  F/VT Ratio<105 (RSBI): 184.21 (07/07/20 1528)  Lines/Drains/Airways     Peripherally Inserted Central Catheter Line            PICC Double Lumen 05/05/20 1707 right basilic 63 days          Drain                 NG/OG Tube 06/27/20 1153 16 Fr. Right nostril 10 days         Chest Tube 06/29/20 1816 1 Left First intercostal space 8 days         Urethral Catheter 07/03/20 1827 16 Fr. 4 days          Airway                 Surgical Airway 05/05/20 1500 Shiley Cuffed 63 days          Peripheral Intravenous Line                 Peripheral IV - Single Lumen 07/03/20 1826 20 G Left;Posterior Wrist 4 days              Significant Labs:    CBC/Anemia Profile:  Recent Labs   Lab 07/07/20  0500 07/08/20  0527   WBC 13.80* 18.46*   HGB 7.8* 7.6*   HCT 26.6* 27.2*    222   MCV 99* 102*   RDW 15.9* 16.2*        Chemistries:  Recent Labs   Lab 07/07/20  0500 07/08/20  0527    137   K 3.8 5.2*   CL 98 97   CO2 37* 31*   BUN 18 23*   CREATININE 0.6 0.6   CALCIUM 9.6 9.7   ALBUMIN 2.1* 2.1*   PROT 6.2 6.5   BILITOT 1.2* 1.2*   ALKPHOS 61 71   ALT 15 18   AST 18 24   MG 1.9 1.7   PHOS 2.5* 3.2       All pertinent labs within the past 24 hours have been reviewed.    Significant Imaging:  I  "have reviewed and interpreted all pertinent imaging results/findings within the past 24 hours.      ABG  No results for input(s): PH, PO2, PCO2, HCO3, BE in the last 168 hours.  Assessment/Plan:     Neuro  Basal ganglia infarction  CT head on 6/2 with bilateral basal ganglia infarcts consistent with global hypoxic ischemic injury 2/2 COVID-19    --neuro exam unchanged with intact cranial nerve reflexes and spontaneous eye opening only  --per neurology, "considering the extent of brain injury, he has very low likelihood of substantial further neurological improvement. 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 TV dropped from 280 to 150's. CXR with pneumomediastinum.  --no intervention warranted, improved  --continue LPV    Difficult intubation  S/p crich which was converted to trach    Acute hypoxemic respiratory failure  2/2 to ARDS due to COVID-19  - ARDSNET Protocol  --see COVID-19    Cardiac/Vascular  Tachycardia  Patient's baseline since the initiation of ECMO has been tachycardic.  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  - Completed treatment with Plaquenil, " "Azithro, Methyprednisone for 5 days  - Special isolation and aspiration precautions ordered per COVID protocol  - Significant improvement in oxygenation, but remains with poor lung compliance   - complicated by pneumomediastinum and pneumothorax requiring CT placement  - continue pressure support 35 to maintain Vt of 250-300  - Lasix as needed to maintain euvolemic to net negative volume status      Acute respiratory distress syndrome (ARDS) due to COVID-19 virus  --See acute respiratory failure        Arm wound, left, sequela  suspect 2/2 extravasation of medication through PIV.  --management per wound care  --RUE with +2 palpable pulses.    Goals of care, counseling/discussion  Patient's prognosis remains poor despite efforts to optimize health. Son spoken with regarding GOC.  DNR changed after consulting CTS for ECMO. Per conversation with Dr. Goss on 6/26, no chest compressions. Given Mr. Sanchez's decline, he is too sick for travel back to [place]. Family urged to visit if possible; ongoing coordination with [employer] to help arrange travel hopefully around 7/15.     [place] not allowing international travel due to increase in coronavirus cases. Family not be able to visit. Son is trying to discuss with someone in [place] about making an exception. Son reports the original goal was for family to visit with plans for withdrawal of life support. However, when asked about withdrawal of life support if family unable to visit, son wishes to continue aggressive care and give patient "more time."     Patient acutely worse on 7/8. GOC discussion held with son and Dr. Mccartney with palliative. We expressed concern that patient is actively dying despite everything we are doing for him and expressed the likelihood of family visiting is very low. Son stated understanding and appropriately upset. No escalation of care at this point. Continue all current treatments at this time however we will not add additional " vasopressors.        Shock  Intermittently requiring norepinephrine. Likely related to sedation or autonomic dysfunction for hypoxemic brain injury. Worsening vasopressor requirements on 7/8 concerning for new underlying infection.     --Repeat septic work up pending  --Broad spectrum abx initiated  --Continue norepinephrine to maintain MAP >65  --Will not add another vasopressor    COVID-19 virus detected  See ARDS       Critical Care Daily Checklist:    A: Awake: RASS Goal/Actual Goal: RASS Goal: 0-->alert and calm  Actual: [place] Agitation Sedation Scale (RASS): Deep sedation   B: Spontaneous Breathing Trial Performed? Spon. Breathing Trial Initiated?: Not initiated (07/07/20 0720)   C: SAT & SBT Coordinated?  N/A                      D: Delirium: CAM-ICU Overall CAM-ICU: Positive   E: Early Mobility Performed? Yes   F: Feeding Goal: Goals: Meet % EEN, EPN by RD f/u date  Status: Nutrition Goal Status: progressing towards goal   Current Diet Order   No orders of the defined types were placed in this encounter.      AS: Analgesia/Sedation Fentanyl, propofol, precedex   T: Thromboembolic Prophylaxis heparin   H: HOB > 300 Yes   U: Stress Ulcer Prophylaxis (if needed) PPI   G: Glucose Control Managed per endocrine   B: Bowel Function Stool Occurrence: 0   I: Indwelling Catheter (Lines & Parks) Necessity NG, PICC, parks   D: De-escalation of Antimicrobials/Pharmacotherapies Broadened today    Plan for the day/ETD Supportive care    Code Status:  Family/Goals of Care: DNR  Ongoing     Discussed with Dr. Zambrano. Updated son, Da, via skype. All questions answered and concerns addressed.     Critical Care Time: 60 minutes  Critical secondary to Patient has a condition that poses threat to life and bodily function: acute hypoxemic respiratory failure requiring mechanical ventilation.       Critical care was time spent personally by me on the following activities: development of treatment plan with patient  or surrogate and bedside caregivers, discussions with consultants, evaluation of patient's response to treatment, examination of patient, ordering and performing treatments and interventions, ordering and review of laboratory studies, ordering and review of radiographic studies, pulse oximetry, re-evaluation of patient's condition. This critical care time did not overlap with that of any other provider or involve time for any procedures.     Faye Spence PA-C  Critical Care Medicine  [facility] ICU [facility]

## 2020-07-08 NOTE — PROGRESS NOTES
Notified PADILLA Hooks, with critical care, BG greater than 400 after recheck, BMP sent. Ordered to give scheduled novolog with max prn coverage novolog.  No additional orders, will continue to monitor.

## 2020-07-08 NOTE — PROGRESS NOTES
"[facility] ICU [facility]  Endocrinology  Progress Note    Admit Date: 4/10/2020     Reason for Consult: Management of T2DM, Hyperglycemia     Surgical Procedure and Date: tracheostomy 5/5/20    Diabetes diagnosis year: JAMMIE    Lab Results   Component Value Date    HGBA1C 6.6 (H) 04/12/2020       Home Diabetes Medications: none (per chart review)    How often checking glucose at home? JAMMIE  BG readings on regimen: JAMMIE  Hypoglycemia on the regimen? JAMMIE  Missed doses on regimen? JAMMIE    Diabetes Complications include:     JAMMIE    Complicating diabetes co morbidities:   none      HPI:   Patient is a 55 y.o. male with a diagnosis of DM2 and ARDS secondary to COVID-19.  [facility]20 with fever and SOB.  Lengthy and complex hospital stay noted requiring intubation, ECMO, and trach placement.  Patient not with severe ARDS (with poor lung compliance) and suspected anoxic brain injury.  Patient developed hyperglycemia secondary to TF.  Endocrinology consulted for DM/BG management.        Interval HPI:   Overnight events: Remains in RICU, hypothermia noted overnight. Vent. BG mildly elevated overnight requiring correction scale insulin.  Eating:   NPO  Nausea: No  Hypoglycemia and intervention: No  Fever: No  TPN and/or TF: Yes  TF and rate: Peptamen Intense VHP at 50 cc/hr (goal)      BP (!) 104/56   Pulse (!) 145   Temp (!) 100.4 °F (38 °C)   Resp (!) 35   Ht 5' 4" (1.626 m)   Wt 62 kg (136 lb 11 oz)   SpO2 (!) 92%   BMI 23.46 kg/m²     Labs Reviewed and Include    Recent Labs   Lab 07/08/20  0527   *   CALCIUM 9.7   ALBUMIN 2.1*   PROT 6.5      K 5.2*   CO2 31*   CL 97   BUN 23*   CREATININE 0.6   ALKPHOS 71   ALT 18   AST 24   BILITOT 1.2*     Lab Results   Component Value Date    WBC 18.46 (H) 07/08/2020    HGB 7.6 (L) 07/08/2020    HCT 27.2 (L) 07/08/2020     (H) 07/08/2020     07/08/2020     No results for input(s): TSH, FREET4 in the last 168 hours.  Lab Results "   Component Value Date    HGBA1C 6.6 (H) 04/12/2020       Nutritional status:   Body mass index is 23.46 kg/m².  Lab Results   Component Value Date    ALBUMIN 2.1 (L) 07/08/2020    ALBUMIN 2.1 (L) 07/07/2020    ALBUMIN 2.2 (L) 07/06/2020     Lab Results   Component Value Date    PREALBUMIN 20 05/28/2020       Estimated Creatinine Clearance: 113.7 mL/min (based on SCr of 0.6 mg/dL).    Accu-Checks  Recent Labs     07/06/20  0422 07/06/20  0901 07/06/20  1236 07/06/20  1727 07/06/20  2037 07/06/20  2354 07/07/20  0429 07/07/20  1020 07/07/20  2030 07/08/20  0042   POCTGLUCOSE 245* 163* 181* 178* 185* 211* 197* 213* 200* 214*       Current Medications and/or Treatments Impacting Glycemic Control  Immunotherapy:    Immunosuppressants     None        Steroids:   Hormones (From admission, onward)    None        Pressors:    Autonomic Drugs (From admission, onward)    Start     Stop Route Frequency Ordered    07/07/20 2100  midodrine tablet 10 mg      -- PER G TUBE 3 times daily 07/07/20 1849 07/07/20 0952  EPINEPHrine (ADRENALIN) 0.1 mg/mL injection     Note to Pharmacy: Created by cabinet override    07/07 2159 07/07/20 0952    06/30/20 2145  norepinephrine 4 mg in dextrose 5% 250 mL infusion (premix) (titrating)     Question Answer Comment   Titrate by: (in mcg/kg/min) 0.01    Titrate interval: (in minutes) 2    Titrate to maintain: (MAP or SBP) MAP    Greater than: (in mmHg) 60    Maximum dose: (in mcg/kg/min) 2        -- IV Continuous 06/30/20 2039 04/16/20 0915  rocuronium 10 mg/mL injection     Note to Pharmacy: Created by cabinet override    04/16 2129 04/16/20 0915        Hyperglycemia/Diabetes Medications:   Antihyperglycemics (From admission, onward)    Start     Stop Route Frequency Ordered    07/09/20 0400  insulin aspart U-100 pen 2 Units      -- SubQ Every 24 hours (non-standard times) 07/08/20 0758    07/09/20 0000  insulin aspart U-100 pen 2 Units      -- SubQ Every 24 hours (non-standard times)  07/08/20 0758    07/08/20 2000  insulin aspart U-100 pen 2 Units      -- SubQ Every 24 hours (non-standard times) 07/08/20 0758    07/08/20 1600  insulin aspart U-100 pen 2 Units      -- SubQ Every 24 hours (non-standard times) 07/08/20 0758    07/08/20 1200  insulin aspart U-100 pen 2 Units      -- SubQ Every 24 hours (non-standard times) 07/08/20 0758    07/08/20 0858  insulin aspart U-100 pen 0-5 Units      -- SubQ Every 4 hours PRN 07/08/20 0758 07/08/20 0800  insulin aspart U-100 pen 2 Units      -- SubQ Every 24 hours (non-standard times) 07/08/20 0758          ASSESSMENT and PLAN    * Acute respiratory disease due to COVID-19 virus  Managed per primary team        Type 2 diabetes mellitus without complication, without long-term current use of insulin  BG goal 140 - 180     Start Novolog 2 units every 4 hours while on TF, hold if TF held/discontinued or BG < 100  Low dose correction scale  BG monitoring every 4 hours while NPO    ** Please call Endocrine for any BG related issues **    Discharge planning: TBD          On enteral nutrition  May cause BG excursions      Discussed POC with RN via telephone due to covid restrictions.    Harlan Denny NP  Endocrinology  [facility]

## 2020-07-08 NOTE — SUBJECTIVE & OBJECTIVE
"Interval HPI:   Overnight events: Remains in RICU, hypothermia noted overnight. Vent. BG mildly elevated overnight requiring correction scale insulin.  Eating:   NPO  Nausea: No  Hypoglycemia and intervention: No  Fever: No  TPN and/or TF: Yes  TF and rate: Peptamen Intense VHP at 50 cc/hr (goal)      BP (!) 104/56   Pulse (!) 145   Temp (!) 100.4 °F (38 °C)   Resp (!) 35   Ht 5' 4" (1.626 m)   Wt 62 kg (136 lb 11 oz)   SpO2 (!) 92%   BMI 23.46 kg/m²     Labs Reviewed and Include    Recent Labs   Lab 07/08/20  0527   *   CALCIUM 9.7   ALBUMIN 2.1*   PROT 6.5      K 5.2*   CO2 31*   CL 97   BUN 23*   CREATININE 0.6   ALKPHOS 71   ALT 18   AST 24   BILITOT 1.2*     Lab Results   Component Value Date    WBC 18.46 (H) 07/08/2020    HGB 7.6 (L) 07/08/2020    HCT 27.2 (L) 07/08/2020     (H) 07/08/2020     07/08/2020     No results for input(s): TSH, FREET4 in the last 168 hours.  Lab Results   Component Value Date    HGBA1C 6.6 (H) 04/12/2020       Nutritional status:   Body mass index is 23.46 kg/m².  Lab Results   Component Value Date    ALBUMIN 2.1 (L) 07/08/2020    ALBUMIN 2.1 (L) 07/07/2020    ALBUMIN 2.2 (L) 07/06/2020     Lab Results   Component Value Date    PREALBUMIN 20 05/28/2020       Estimated Creatinine Clearance: 113.7 mL/min (based on SCr of 0.6 mg/dL).    Accu-Checks  Recent Labs     07/06/20  0422 07/06/20  0901 07/06/20  1236 07/06/20  1727 07/06/20  2037 07/06/20  2354 07/07/20  0429 07/07/20  1020 07/07/20  2030 07/08/20  0042   POCTGLUCOSE 245* 163* 181* 178* 185* 211* 197* 213* 200* 214*       Current Medications and/or Treatments Impacting Glycemic Control  Immunotherapy:    Immunosuppressants     None        Steroids:   Hormones (From admission, onward)    None        Pressors:    Autonomic Drugs (From admission, onward)    Start     Stop Route Frequency Ordered    07/07/20 2100  midodrine tablet 10 mg      -- PER G TUBE 3 times daily 07/07/20 1759    07/07/20 " 0952  EPINEPHrine (ADRENALIN) 0.1 mg/mL injection     Note to Pharmacy: Created by echoechoinet override    07/07 2159 07/07/20 0952 06/30/20 2145  norepinephrine 4 mg in dextrose 5% 250 mL infusion (premix) (titrating)     Question Answer Comment   Titrate by: (in mcg/kg/min) 0.01    Titrate interval: (in minutes) 2    Titrate to maintain: (MAP or SBP) MAP    Greater than: (in mmHg) 60    Maximum dose: (in mcg/kg/min) 2        -- IV Continuous 06/30/20 2039 04/16/20 0915  rocuronium 10 mg/mL injection     Note to Pharmacy: Created by Accountablet override    04/16 2129 04/16/20 0915        Hyperglycemia/Diabetes Medications:   Antihyperglycemics (From admission, onward)    Start     Stop Route Frequency Ordered    07/09/20 0400  insulin aspart U-100 pen 2 Units      -- SubQ Every 24 hours (non-standard times) 07/08/20 0758    07/09/20 0000  insulin aspart U-100 pen 2 Units      -- SubQ Every 24 hours (non-standard times) 07/08/20 0758    07/08/20 2000  insulin aspart U-100 pen 2 Units      -- SubQ Every 24 hours (non-standard times) 07/08/20 0758    07/08/20 1600  insulin aspart U-100 pen 2 Units      -- SubQ Every 24 hours (non-standard times) 07/08/20 0758    07/08/20 1200  insulin aspart U-100 pen 2 Units      -- SubQ Every 24 hours (non-standard times) 07/08/20 0758    07/08/20 0858  insulin aspart U-100 pen 0-5 Units      -- SubQ Every 4 hours PRN 07/08/20 0758    07/08/20 0800  insulin aspart U-100 pen 2 Units      -- SubQ Every 24 hours (non-standard times) 07/08/20 0758

## 2020-07-08 NOTE — NURSING
Notified PADILLA Hooks with critical care of pt increased levo requirements, now at .9 mcg/kg/min, per Dr. Zambrano with Critical Care, discussed during rounds will not escalate pressors, pt is DNR, will have skype conference with pt son and Palliative care this afternoon to discuss POC. TV remain 130s-200s on vent settings, SPO2 stable.  No additional orders.  Will continue to monitor.

## 2020-07-08 NOTE — ASSESSMENT & PLAN NOTE
"Patient's prognosis remains poor despite efforts to optimize health. Son spoken with regarding GOC.  DNR changed after consulting CTS for ECMO. Per conversation with Dr. Goss on 6/26, no chest compressions. Given Mr. Sanchez's decline, he is too sick for travel back to [place]. Family urged to visit if possible; ongoing coordination with [employer] to help arrange travel hopefully around 7/15.     [place] not allowing international travel due to increase in coronavirus cases. Family not be able to visit. Son is trying to discuss with someone in [place] about making an exception. Son reports the original goal was for family to visit with plans for withdrawal of life support. However, when asked about withdrawal of life support if family unable to visit, son wishes to continue aggressive care and give patient "more time."     Patient acutely worse on 7/8. GOC discussion held with son and Dr. Mccartney with palliative. We expressed concern that patient is actively dying despite everything we are doing for him and expressed the likelihood of family visiting is very low. Son stated understanding and appropriately upset. No escalation of care at this point. Continue all current treatments at this time however we will not add additional vasopressors.      "

## 2020-07-08 NOTE — ASSESSMENT & PLAN NOTE
Intermittently requiring norepinephrine. Likely related to sedation or autonomic dysfunction for hypoxemic brain injury. Worsening vasopressor requirements on 7/8 concerning for new underlying infection.     --Repeat septic work up pending  --Broad spectrum abx initiated  --Continue norepinephrine to maintain MAP >65  --Will not add another vasopressor

## 2020-07-08 NOTE — SUBJECTIVE & OBJECTIVE
Interval History/Significant Events: Patient hypothermic and febrile overnight as well as tachycardic into the 140s. CXR with worsening bilateral infiltrates. Patient remains on PS ventilation with PS of 35. Peaks 50 with plateaus 45. He is only able to take tidal volume of 160-180 today with a respiratory rate of >40. Patient sedated on propofol, fentanyl, and precedex. Norepinephine requirements continue to vary.     Review of Systems   Unable to perform ROS: Intubated     Objective:     Vital Signs (Most Recent):  Temp: (!) 100.4 °F (38 °C) (07/08/20 0750)  Pulse: (!) 145 (07/08/20 0750)  Resp: (!) 35 (07/07/20 2000)  BP: 119/63 (07/08/20 0750)  SpO2: (!) 86 % (07/08/20 0750) Vital Signs (24h Range):  Temp:  [93.3 °F (34.1 °C)-100.4 °F (38 °C)] 100.4 °F (38 °C)  Pulse:  [] 145  Resp:  [35] 35  SpO2:  [86 %-100 %] 86 %  BP: ()/(49-74) 119/63   Weight: 62 kg (136 lb 11 oz)  Body mass index is 23.46 kg/m².      Intake/Output Summary (Last 24 hours) at 7/8/2020 0815  Last data filed at 7/8/2020 0600  Gross per 24 hour   Intake 5052.12 ml   Output 3041 ml   Net 2011.12 ml       Physical Exam  Constitutional:       Appearance: He is well-developed. He is sickly-appearing.      Interventions: He is sedated and intubated.   HENT:      Head: Normocephalic and atraumatic.   Eyes:      Pupils: Pupils are equal, round, and reactive to light.      Right eye: Pupil is round and reactive.      Left eye: Pupil is round and reactive.   Neck:      Thyroid: No thyromegaly.      Trachea: No tracheal deviation.   Cardiovascular:      Rate and Rhythm: Normal rate and regular rhythm.      Heart sounds: No murmur. No friction rub. No gallop.    Pulmonary:      Effort: Tachypnea and accessory muscle usage present. He is intubated.      Breath sounds: Examination of the right-upper field reveals decreased breath sounds. Examination of the right-middle field reveals decreased breath sounds. Examination of the right-lower field  reveals decreased breath sounds. Decreased breath sounds present. No wheezing.      Comments: Course breath sounds throughout all lung fields  Abdominal:      General: Bowel sounds are normal.      Palpations: Abdomen is soft.   Genitourinary:     Comments: Torres in place.   Musculoskeletal: Normal range of motion.   Skin:     General: Skin is warm and dry.   Neurological:      Mental Status: He is unresponsive.      Sensory: No sensory deficit.      Comments: Sedated. Pupils sluggish bilaterally. Cough and gag reflex intact. Breathing over the ventilator.          Vents:  Vent Mode: A/C (07/08/20 0147)  Ventilator Initiated: Yes (04/10/20 2247)  Set Rate: 35 BPM (07/08/20 0147)  Vt Set: 250 mL (06/06/20 0858)  Pressure Support: 20 cmH20 (06/06/20 0858)  PEEP/CPAP: 5 cmH20 (07/08/20 0147)  Oxygen Concentration (%): 65 (07/08/20 0750)  Peak Airway Pressure: 47 cmH2O (07/08/20 0147)  Plateau Pressure: 13 cmH20 (07/08/20 0147)  Total Ve: 8.25 mL (07/08/20 0147)  F/VT Ratio<105 (RSBI): 184.21 (07/07/20 1528)  Lines/Drains/Airways     Peripherally Inserted Central Catheter Line            PICC Double Lumen 05/05/20 1707 right basilic 63 days          Drain                 NG/OG Tube 06/27/20 1153 16 Fr. Right nostril 10 days         Chest Tube 06/29/20 1816 1 Left First intercostal space 8 days         Urethral Catheter 07/03/20 1827 16 Fr. 4 days          Airway                 Surgical Airway 05/05/20 1500 Shiley Cuffed 63 days          Peripheral Intravenous Line                 Peripheral IV - Single Lumen 07/03/20 1826 20 G Left;Posterior Wrist 4 days              Significant Labs:    CBC/Anemia Profile:  Recent Labs   Lab 07/07/20  0500 07/08/20  0527   WBC 13.80* 18.46*   HGB 7.8* 7.6*   HCT 26.6* 27.2*    222   MCV 99* 102*   RDW 15.9* 16.2*        Chemistries:  Recent Labs   Lab 07/07/20  0500 07/08/20  0527    137   K 3.8 5.2*   CL 98 97   CO2 37* 31*   BUN 18 23*   CREATININE 0.6 0.6   CALCIUM 9.6  9.7   ALBUMIN 2.1* 2.1*   PROT 6.2 6.5   BILITOT 1.2* 1.2*   ALKPHOS 61 71   ALT 15 18   AST 18 24   MG 1.9 1.7   PHOS 2.5* 3.2       All pertinent labs within the past 24 hours have been reviewed.    Significant Imaging:  I have reviewed and interpreted all pertinent imaging results/findings within the past 24 hours.

## 2020-07-08 NOTE — PHYSICIAN QUERY
PT Name: Ranjana Sanchez  MR #: [id number]    Consultant Diagnosis Clarification     CDS/: Nancy Spivey RN, CDS             Contact information: [email or web address]  This form is a permanent document in the medical record.    Query Date: July 8, 2020      By submitting this query, we are merely seeking further clarification of documentation.  Please utilize your independent clinical judgment when addressing the question(s) below.    The Medical Record reflects the following:    Clinical Information Location in Medical Record     --Wound care consulted for sacral area. The unit nurse reports the patient has periods of being medically unstable- especially when turning                    -The unit nurse reports a partial thickness skin loss (stage 2) over the sacral area ~ 2.2 cm in diameter. Wen-wound blanchable/pink  --Recommendations:         -Triad ointment BID/prn over the sacral area.    --Wound care to L forearm and sacrum per order, tolerated well.          -Waffle overlay inflated & in use   Wound Care C/S 7/6              Nursing POC Note 7/7       Please clarify/confirm the Consultants diagnosis of __partial thickness skin loss (stage 2) over the sacral area ______:     [ x ] Diagnosis ruled in   [  ] Diagnosis ruled out   [  ] Other diagnosis (please specify): _____________________________   [  ] Clinically undetermined     Present on admission (POA) status:   [   ] Yes (Y)                          [  ] Clinically Undetermined (W)  [  x ] No (N)                            [   ] Documentation insufficient to determine if condition is POA (U)

## 2020-07-08 NOTE — ASSESSMENT & PLAN NOTE
BG goal 140 - 180     Start Novolog 2 units every 4 hours while on TF, hold if TF held/discontinued or BG < 100  Low dose correction scale  BG monitoring every 4 hours while NPO    ** Please call Endocrine for any BG related issues **    Discharge planning: TBD

## 2020-07-09 PROBLEM — G93.49 ENCEPHALOPATHY CHRONIC: Status: ACTIVE | Noted: 2020-01-01

## 2020-07-09 PROBLEM — R53.81 DEBILITY: Status: ACTIVE | Noted: 2020-01-01

## 2020-07-09 PROBLEM — Z51.5 PALLIATIVE CARE ENCOUNTER: Status: ACTIVE | Noted: 2020-01-01

## 2020-07-09 NOTE — ASSESSMENT & PLAN NOTE
BG goal 140 - 180     Increase Novolog to 6 units every 4 hours while on TF, hold if TF held/discontinued or BG < 100  Low dose correction scale  BG monitoring every 4 hours while NPO    ** Please call Endocrine for any BG related issues **    Discharge planning: TBD

## 2020-07-09 NOTE — HPI
Per pulm notes and review of chart as pt unable to provide history:   Mr. LUANA Sanchez is a 54 y/o M with PMHx of HTN brought to ER via EMS for increasing SOB with known COVID-19 exposure. 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 They have not had travelers since 3/14. 2 days ago he started having fever and SOB. He was seen by the ship's physician and was found to have crackles in his bases bilaterally, but his oxygen saturation was in the 90's. He had a flu test which was negative. He was quarantined in a room and states that he got worse with increasing SOB. He was brought to the ER and found to be 78% on RA. He was started on NRB and initially was doing much better, but his tachypnea did not improve, so he was placed on BiPAP with oxygen saturation improvement.     Hospital/ICU Course:  Patient was admitted to the MICU after a difficult intubation in the ER s/p crich. COVID + with ARDS, sedated and paralyzed. Unable to prone secondary to crich. Left mainstem intubation which was pulled off, on NO and levophed. ET tube replaced on 4/11 family contacted in [place]. Proned on 4/12 tolerated well and continued to prone on 4/19. 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 He was on and off ketamine as well.  EEG ordered to rule out seizures as he also had seizure like activity that resolved with ativan. Currently changing sedation, was placed on phenobarbital which should start having effect 4/20. Patient proned x 10 (last on 4/22). Still not tolerating ventilator weaning. Remains on sedation, vasopressors, and paralytic. ID now following patient for remdesivir administration; family aware and consented. Patient not significantly improving despite efforts. Son contacted, patient made DNR. Patient evaluated for ECMO on 4/24. Placed on ECMO on 4/25 and transferred to Dr Ayon's service with DNR  rescinded.     Some physiologic improvement on VV ECMO. Oxygenation at goal, lactate clearing, hemodynamics at goal without vasoactive support currently. Tolerated wean of vent Fi and Monika thus far. Peeling back sedatives steadily with goal of neuro exam ASAP. Metabolic alkalosis correcting with encouragement from diamox. Patient was weaned off from ECMO support on 06/24/2020. However, during this process he has undergone a stroke and currently is not responding to verbal commands. The patient is able to open his eyes and track, however no purposeful movements can be ascertained from the patient. Extensive notes from neurology have been documented in the patient's medical record. Extensive discussions had been carried out between the family members and periodic updates were provided. Periodic updates were also provided to [employer] which is his employer. The family was also notified that if any further deterioration happens after completion of ECMO we would not be providing new ECMO support to this patient.     The patient was transferred to the MICU team on 6/25 after ECMO decannulation. Fever with worsening shock. Cultured and started on broad spectrum antibiotics. Frequent episodes of bradycardia to the 20's but recovers without intervention. Heart rate variability continues and on 06/30 he became tachycardic to 166. Labs and EKG showed sinus tachy, restarted betablocker.      Interval History/Significant Events: Patient hypothermic and febrile overnight as well as tachycardic into the 140s. CXR with worsening bilateral infiltrates. Patient remains on PS ventilation with PS of 35. Peaks 50 with plateaus 45. He is only able to take tidal volume of 160-180 today with a respiratory rate of >40. Patient sedated on propofol, fentanyl, and precedex. Norepinephine requirements continue to vary.      In this setting, palliative medicine was consulted to help with medical decision making and aid in the formation of  goals of care.

## 2020-07-09 NOTE — PLAN OF CARE
Updated medical records faxed to Dr. Flor Tiwari (f) [phone] with [employer] as requested.

## 2020-07-09 NOTE — PROGRESS NOTES
"[facility] - ICU 16 WT  Endocrinology  Progress Note    Admit Date: 4/10/2020     Reason for Consult: Management of T2DM, Hyperglycemia     Surgical Procedure and Date: tracheostomy 5/5/20    Diabetes diagnosis year: JAMMIE    Lab Results   Component Value Date    HGBA1C 6.6 (H) 04/12/2020       Home Diabetes Medications: none (per chart review)    How often checking glucose at home? JAMMIE  BG readings on regimen: JAMMIE  Hypoglycemia on the regimen? JAMMIE  Missed doses on regimen? JAMMIE    Diabetes Complications include:     JAMMIE    Complicating diabetes co morbidities:   none      HPI:   Patient is a 55 y.o. male with a diagnosis of DM2 and ARDS secondary to COVID-19.  [place] [facility] [place] with fever and SOB.  Lengthy and complex hospital stay noted requiring intubation, ECMO, and trach placement.  Patient not with severe ARDS (with poor lung compliance) and suspected anoxic brain injury.  Patient developed hyperglycemia secondary to TF.  Endocrinology consulted for DM/BG management.        Interval HPI:   Overnight events: Remains in RICU, remains criticially ill on vent. BG markedly elevated overnight on current insulin regimen.  TF infusing at goal.  Eating:   NPO  Nausea: No  Hypoglycemia and intervention: No  Fever: Yes - 100.4  TPN and/or TF: Yes  TF and rate: Peptamen Intense VHP at 50 cc/hr (goal)      /62   Pulse (!) 118   Temp 98.2 °F (36.8 °C)   Resp (!) 35   Ht 5' 4" (1.626 m)   Wt 65 kg (143 lb 4.8 oz)   SpO2 97%   BMI 24.60 kg/m²     Labs Reviewed and Include    Recent Labs   Lab 07/09/20  0536   *   CALCIUM 9.5   ALBUMIN 2.0*   PROT 6.9   *   K 6.0*   CO2 24   CL 89*   BUN 31*   CREATININE 0.9   ALKPHOS 123   ALT 52*   AST 54*   BILITOT 1.9*     Lab Results   Component Value Date    WBC 16.36 (H) 07/09/2020    HGB 7.4 (L) 07/09/2020    HCT 25.6 (L) 07/09/2020     (H) 07/09/2020     07/09/2020     No results for input(s): TSH, FREET4 in the last 168 " hours.  Lab Results   Component Value Date    HGBA1C 6.6 (H) 04/12/2020       Nutritional status:   Body mass index is 24.6 kg/m².  Lab Results   Component Value Date    ALBUMIN 2.0 (L) 07/09/2020    ALBUMIN 2.1 (L) 07/08/2020    ALBUMIN 2.1 (L) 07/07/2020     Lab Results   Component Value Date    PREALBUMIN 20 05/28/2020       Estimated Creatinine Clearance: 75.8 mL/min (based on SCr of 0.9 mg/dL).    Accu-Checks  Recent Labs     07/07/20  1020 07/07/20  2030 07/08/20  0042 07/08/20  0843 07/08/20  1121 07/08/20  1718 07/08/20  1721 07/08/20  2023 07/09/20  0016 07/09/20  0352   POCTGLUCOSE 213* 200* 214* 179* 185* 388* 401* 336* 293* 313*       Current Medications and/or Treatments Impacting Glycemic Control  Immunotherapy:    Immunosuppressants     None        Steroids:   Hormones (From admission, onward)    None        Pressors:    Autonomic Drugs (From admission, onward)    Start     Stop Route Frequency Ordered    07/09/20 0745  norepinephrine 32 mg in sodium chloride 0.9% 250 mL infusion     Question Answer Comment   Titrate by: (in mcg/kg/min) 0.01    Titrate interval: (in minutes) 2    Titrate to maintain: (MAP or SBP) MAP    Greater than: (in mmHg) 60    Maximum dose: (in mcg/kg/min) 1        -- IV Continuous 07/09/20 0736    07/07/20 2100  midodrine tablet 10 mg      -- PER G TUBE 3 times daily 07/07/20 1849    07/07/20 0952  EPINEPHrine (ADRENALIN) 0.1 mg/mL injection     Note to Pharmacy: Created by cabinet override    07/07 2159 07/07/20 0952    04/16/20 0915  rocuronium 10 mg/mL injection     Note to Pharmacy: Created by cabinet override    04/16 2129 04/16/20 0915        Hyperglycemia/Diabetes Medications:   Antihyperglycemics (From admission, onward)    Start     Stop Route Frequency Ordered    07/09/20 2000  insulin aspart U-100 pen 4 Units      -- SubQ Every 24 hours (non-standard times) 07/08/20 2205 07/09/20 1600  insulin aspart U-100 pen 4 Units      -- SubQ Every 24 hours (non-standard  times) 07/08/20 2205 07/09/20 1200  insulin aspart U-100 pen 4 Units      -- SubQ Every 24 hours (non-standard times) 07/08/20 2205 07/09/20 0800  insulin aspart U-100 pen 4 Units      -- SubQ Every 24 hours (non-standard times) 07/08/20 2205 07/09/20 0400  insulin aspart U-100 pen 4 Units      -- SubQ Every 24 hours (non-standard times) 07/08/20 2205 07/09/20 0000  insulin aspart U-100 pen 4 Units      -- SubQ Every 24 hours (non-standard times) 07/08/20 2205 07/08/20 0858  insulin aspart U-100 pen 0-5 Units      -- SubQ Every 4 hours PRN 07/08/20 0758          ASSESSMENT and PLAN    * Acute respiratory disease due to COVID-19 virus  Managed per primary team        Type 2 diabetes mellitus without complication, without long-term current use of insulin  BG goal 140 - 180     Increase Novolog to 6 units every 4 hours while on TF, hold if TF held/discontinued or BG < 100  Low dose correction scale  BG monitoring every 4 hours while NPO    ** Please call Endocrine for any BG related issues **    Discharge planning: TBD          On enteral nutrition  May cause BG excursions      Discussed POC with bedside RN via telephone due to covid restrictions.    Harlan Denny, LYNDA  Endocrinology  [facility] - ICU [facility] WT

## 2020-07-09 NOTE — SUBJECTIVE & OBJECTIVE
Interval History/Significant Events: Remains on high norepinephrine support to maintain blood pressure. Patient requiring 60%, 8 PEEP on PC with PS of 35 with tidal volumes around 180 cc breathing around 40 times a minutes. Peak 50, Plateau pressures 45. Decreasing UOP with electrolyte derangement this AM. Remains sedated on propofol, fentanyl and precedex infusion.     Review of Systems   Unable to perform ROS: Intubated     Objective:     Vital Signs (Most Recent):  Temp: 98.2 °F (36.8 °C) (07/09/20 0800)  Pulse: (!) 116 (07/09/20 0800)  Resp: (!) 35 (07/09/20 0054)  BP: 105/66 (07/09/20 0800)  SpO2: 97 % (07/09/20 0800) Vital Signs (24h Range):  Temp:  [95 °F (35 °C)-99.5 °F (37.5 °C)] 98.2 °F (36.8 °C)  Pulse:  [114-144] 116  Resp:  [35] 35  SpO2:  [83 %-99 %] 97 %  BP: ()/(42-70) 105/66   Weight: 65 kg (143 lb 4.8 oz)  Body mass index is 24.6 kg/m².      Intake/Output Summary (Last 24 hours) at 7/9/2020 0833  Last data filed at 7/9/2020 0800  Gross per 24 hour   Intake 6401.29 ml   Output 897 ml   Net 5504.29 ml       Physical Exam  Constitutional:       Appearance: He is well-developed. He is sickly-appearing.      Interventions: He is sedated and intubated.   HENT:      Head: Normocephalic and atraumatic.   Eyes:      Pupils: Pupils are equal, round, and reactive to light.      Right eye: Pupil is round and reactive.      Left eye: Pupil is round and reactive.   Neck:      Thyroid: No thyromegaly.      Trachea: No tracheal deviation.   Cardiovascular:      Rate and Rhythm: Normal rate and regular rhythm.      Heart sounds: No murmur. No friction rub. No gallop.    Pulmonary:      Effort: Tachypnea and accessory muscle usage present. He is intubated.      Breath sounds: Decreased air movement present. No wheezing.      Comments: Course breath sounds throughout all lung fields  Abdominal:      General: Bowel sounds are normal.      Palpations: Abdomen is soft.   Genitourinary:     Comments: Torres in  place.   Musculoskeletal: Normal range of motion.   Skin:     General: Skin is warm and dry.   Neurological:      Mental Status: He is unresponsive.      Sensory: No sensory deficit.      Comments: Sedated. Pupils sluggish bilaterally. Cough and gag reflex intact. Breathing over the ventilator.          Vents:  Vent Mode: A/C (07/09/20 0730)  Ventilator Initiated: Yes (04/10/20 2247)  Set Rate: 35 BPM (07/09/20 0730)  Vt Set: 250 mL (06/06/20 0858)  Pressure Support: 20 cmH20 (06/06/20 0858)  PEEP/CPAP: 8 cmH20 (07/09/20 0730)  Oxygen Concentration (%): 65 (07/09/20 0800)  Peak Airway Pressure: 50 cmH2O (07/09/20 0730)  Plateau Pressure: 40 cmH20 (07/09/20 0730)  Total Ve: 6.57 mL (07/09/20 0730)  F/VT Ratio<105 (RSBI): 190.22 (07/09/20 0054)  Lines/Drains/Airways     Peripherally Inserted Central Catheter Line            PICC Double Lumen 05/05/20 1707 right basilic 64 days          Drain                 NG/OG Tube 06/27/20 1153 16 Fr. Right nostril 11 days         Chest Tube 06/29/20 1816 1 Left First intercostal space 9 days         Urethral Catheter 07/08/20 0730 16 Fr. 1 day          Airway                 Surgical Airway 05/05/20 1500 Shiley Cuffed 64 days          Peripheral Intravenous Line                 Peripheral IV - Single Lumen 07/03/20 1826 20 G Left;Posterior Wrist 5 days              Significant Labs:    CBC/Anemia Profile:  Recent Labs   Lab 07/08/20  0527 07/09/20  0536   WBC 18.46* 16.36*   HGB 7.6* 7.4*   HCT 27.2* 25.6*    205   * 101*   RDW 16.2* 15.4*        Chemistries:  Recent Labs   Lab 07/08/20  0527 07/08/20  1633 07/09/20  0536    130* 123*   K 5.2* 5.3* 6.0*   CL 97 92* 89*   CO2 31* 31* 24   BUN 23* 28* 31*   CREATININE 0.6 0.8 0.9   CALCIUM 9.7 9.7 9.5   ALBUMIN 2.1*  --  2.0*   PROT 6.5  --  6.9   BILITOT 1.2*  --  1.9*   ALKPHOS 71  --  123   ALT 18  --  52*   AST 24  --  54*   MG 1.7 2.7* 2.6   PHOS 3.2  --  4.7*       All pertinent labs within the past 24  hours have been reviewed.    Significant Imaging:  I have reviewed and interpreted all pertinent imaging results/findings within the past 24 hours.

## 2020-07-09 NOTE — ASSESSMENT & PLAN NOTE
Intermittently requiring norepinephrine. Likely related to sedation or autonomic dysfunction for hypoxemic brain injury. Worsening vasopressor requirements on 7/8 concerning for new underlying infection.     --Repeat septic work up NGTD  --Broad spectrum abx initiated  --Continue norepinephrine to maintain MAP >65  --Will not add another vasopressor

## 2020-07-09 NOTE — ASSESSMENT & PLAN NOTE
56 yo male with COVID19+, prolonged hospital course, MSOF and dysfunction with poor prognosis.    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[place];  with 2 children    4) Medicolegal: no LW; wife is surrogate DM; at her request most of the communication has gone through her oldest son; team and family members have been FaceTiming to allow virtual face to face; extensive options have been sifted through to try to connect the pt to family though given pandemic and travel bans, the family cannot make it to visit    5) Spiritual: unknown; discussed with son if there were any cultural or Confucianist practices we should be aware of and how it may affect his care and end of life experience; he will talk with his mother    6) Goals of care/discussion:  Along with pulm/cc (PADILLA Ozuna) we addressed the pt's son via FaceTime and provided clinical updates but also shared our worry that the pt is dying despite all optimal medical therapies.  He was able to express and understanding of the situation and expressed appropriate sadness.  Given the current situation and the inability to visit due to travel restrictions, we asked how we could best provide for the family to make this the highest quality EOL experience.  The family will call in and FaceTime with him overnight.     The son agreed to no further escalation of life prolonging measures and is aware that we will make recommendations to stop once family has had the opportunity to speak with him.  We will follow up with family daily at 2pm and will remain available to support in any way possible.    He expressed thanks for all of the care his father has received.    7) Plan:   -ongoing continued supportive care without escalation of additional burdensome and invasive life prolonging measures  -family will Facetime to be able to see  him  -Family to notify team of cultural or Buddhism considerations as pt approaches the end of life  - Will follow up with son daily at 2 pm each day along with pulm/cc    Thank you for the opportunity to care for this patient and family.     Please call with questions.     Rahul Mccartney MD  Palliative Medicine  [phone]

## 2020-07-09 NOTE — SUBJECTIVE & OBJECTIVE
"Interval HPI:   Overnight events: Remains in RICU, remains criticially ill on vent. BG markedly elevated overnight on current insulin regimen.  TF infusing at goal.  Eating:   NPO  Nausea: No  Hypoglycemia and intervention: No  Fever: Yes - 100.4  TPN and/or TF: Yes  TF and rate: Peptamen Intense VHP at 50 cc/hr (goal)      /62   Pulse (!) 118   Temp 98.2 °F (36.8 °C)   Resp (!) 35   Ht 5' 4" (1.626 m)   Wt 65 kg (143 lb 4.8 oz)   SpO2 97%   BMI 24.60 kg/m²     Labs Reviewed and Include    Recent Labs   Lab 07/09/20  0536   *   CALCIUM 9.5   ALBUMIN 2.0*   PROT 6.9   *   K 6.0*   CO2 24   CL 89*   BUN 31*   CREATININE 0.9   ALKPHOS 123   ALT 52*   AST 54*   BILITOT 1.9*     Lab Results   Component Value Date    WBC 16.36 (H) 07/09/2020    HGB 7.4 (L) 07/09/2020    HCT 25.6 (L) 07/09/2020     (H) 07/09/2020     07/09/2020     No results for input(s): TSH, FREET4 in the last 168 hours.  Lab Results   Component Value Date    HGBA1C 6.6 (H) 04/12/2020       Nutritional status:   Body mass index is 24.6 kg/m².  Lab Results   Component Value Date    ALBUMIN 2.0 (L) 07/09/2020    ALBUMIN 2.1 (L) 07/08/2020    ALBUMIN 2.1 (L) 07/07/2020     Lab Results   Component Value Date    PREALBUMIN 20 05/28/2020       Estimated Creatinine Clearance: 75.8 mL/min (based on SCr of 0.9 mg/dL).    Accu-Checks  Recent Labs     07/07/20  1020 07/07/20  2030 07/08/20  0042 07/08/20  0843 07/08/20  1121 07/08/20  1718 07/08/20  1721 07/08/20  2023 07/09/20  0016 07/09/20  0352   POCTGLUCOSE 213* 200* 214* 179* 185* 388* 401* 336* 293* 313*       Current Medications and/or Treatments Impacting Glycemic Control  Immunotherapy:    Immunosuppressants     None        Steroids:   Hormones (From admission, onward)    None        Pressors:    Autonomic Drugs (From admission, onward)    Start     Stop Route Frequency Ordered    07/09/20 9612  norepinephrine 32 mg in sodium chloride 0.9% 250 mL infusion   "   Question Answer Comment   Titrate by: (in mcg/kg/min) 0.01    Titrate interval: (in minutes) 2    Titrate to maintain: (MAP or SBP) MAP    Greater than: (in mmHg) 60    Maximum dose: (in mcg/kg/min) 1        -- IV Continuous 07/09/20 0736    07/07/20 2100  midodrine tablet 10 mg      -- PER G TUBE 3 times daily 07/07/20 1849    07/07/20 0952  EPINEPHrine (ADRENALIN) 0.1 mg/mL injection     Note to Pharmacy: Created by cabinet override    07/07 2159 07/07/20 0952    04/16/20 0915  rocuronium 10 mg/mL injection     Note to Pharmacy: Created by cabinet override    04/16 2129 04/16/20 0915        Hyperglycemia/Diabetes Medications:   Antihyperglycemics (From admission, onward)    Start     Stop Route Frequency Ordered    07/09/20 2000  insulin aspart U-100 pen 4 Units      -- SubQ Every 24 hours (non-standard times) 07/08/20 2205 07/09/20 1600  insulin aspart U-100 pen 4 Units      -- SubQ Every 24 hours (non-standard times) 07/08/20 2205 07/09/20 1200  insulin aspart U-100 pen 4 Units      -- SubQ Every 24 hours (non-standard times) 07/08/20 2205 07/09/20 0800  insulin aspart U-100 pen 4 Units      -- SubQ Every 24 hours (non-standard times) 07/08/20 2205 07/09/20 0400  insulin aspart U-100 pen 4 Units      -- SubQ Every 24 hours (non-standard times) 07/08/20 2205 07/09/20 0000  insulin aspart U-100 pen 4 Units      -- SubQ Every 24 hours (non-standard times) 07/08/20 2205 07/08/20 0858  insulin aspart U-100 pen 0-5 Units      -- SubQ Every 4 hours PRN 07/08/20 0759

## 2020-07-09 NOTE — ASSESSMENT & PLAN NOTE
Patient's baseline since the initiation of ECMO has been tachycardic.  --Discontinue metoprolol due to increasing vasopressor requirements  --Continue to monitor

## 2020-07-09 NOTE — PROGRESS NOTES
Pharmacokinetic Assessment Follow Up: IV Vancomycin  ·     Drug levels (last 3 results):  No results for input(s): VANCOMYCINRA, VANCOMYCINPE, VANCOMYCINTR, VANCOTROUGH in the last 72 hours.    Pharmacy will continue to follow and monitor vancomycin.    Please contact pharmacy at extension *** for questions regarding this assessment.    Thank you for the consult,   Emilia Caldwell       Patient brief summary:  Ranjana Sanchez is a 55 y.o. male initiated on antimicrobial therapy with IV Vancomycin for treatment of {choose one:86914}    The patient's current regimen is ***    Drug Allergies:   Review of patient's allergies indicates:  No Known Allergies    Actual Body Weight:   ***    Renal Function:   Estimated Creatinine Clearance: 75.8 mL/min (based on SCr of 0.9 mg/dL).,     Dialysis Method (if applicable):  {choose a dialysis method:75191}    CBC (last 72 hours):  Recent Labs   Lab Result Units 07/07/20  0500 07/08/20  0527 07/09/20  0536   WBC K/uL 13.80* 18.46* 16.36*   Hemoglobin g/dL 7.8* 7.6* 7.4*   Hematocrit % 26.6* 27.2* 25.6*   Platelets K/uL 259 222 205   Gran% % 75.4* 86.0* 83.5*   Lymph% % 10.6* 4.0* 5.1*   Mono% % 4.9 4.3 5.2   Eosinophil% % 7.4 4.0 2.2   Basophil% % 0.3 0.2 0.1   Differential Method  Automated Automated Automated       Metabolic Panel (last 72 hours):  Recent Labs   Lab Result Units 07/07/20  0500 07/08/20  0527 07/08/20  1225 07/08/20  1633 07/09/20  0536 07/09/20  0830   Sodium mmol/L 140 137  --  130* 123*  --    Potassium mmol/L 3.8 5.2*  --  5.3* 6.0*  --    Chloride mmol/L 98 97  --  92* 89*  --    CO2 mmol/L 37* 31*  --  31* 24  --    Glucose mg/dL 186* 196*  --  383* 268*  --    Glucose, UA   --   --  Negative  --   --   --    BUN, Bld mg/dL 18 23*  --  28* 31*  --    Creatinine mg/dL 0.6 0.6  --  0.8 0.9  --    Albumin g/dL 2.1* 2.1*  --   --  2.0*  --    Total Bilirubin mg/dL 1.2* 1.2*  --   --  1.9*  --    Alkaline Phosphatase U/L 61 71  --   --  123  --    AST U/L 18  24  --   --  54*  --    ALT U/L 15 18  --   --  52*  --    Magnesium mg/dL 1.9 1.7  --  2.7* 2.6 2.4   Phosphorus mg/dL 2.5* 3.2  --   --  4.7*  --        Vancomycin Administrations:  vancomycin given in the last 96 hours                   vancomycin in dextrose 5 % 1 gram/250 mL IVPB 1,000 mg (mg) 1,000 mg New Bag 07/09/20 0618     1,000 mg New Bag 07/08/20 1346                Microbiologic Results:  Microbiology Results (last 7 days)     Procedure Component Value Units Date/Time    Blood culture [[id number]] Collected: 07/08/20 1223    Order Status: Completed Specimen: Blood from Peripheral, Antecubital, Left Updated: 07/09/20 1412     Blood Culture, Routine No Growth to date      No Growth to date    Blood culture [[id number]] Collected: 07/08/20 1120    Order Status: Completed Specimen: Blood from Peripheral, Upper Arm, Left Updated: 07/09/20 1213     Blood Culture, Routine No Growth to date      No Growth to date    Culture, Respiratory with Gram Stain [[id number]] Collected: 07/08/20 1300    Order Status: Completed Specimen: Respiratory from Endotracheal Aspirate Updated: 07/09/20 0901     Respiratory Culture Normal respiratory samia     Gram Stain (Respiratory) <10 epithelial cells per low power field.     Gram Stain (Respiratory) Rare WBC's     Gram Stain (Respiratory) No organisms seen    Narrative:      Mini-BAL. Before antibiotics.

## 2020-07-09 NOTE — NURSING
Team made aware that pt weight gain from 62kg to 65kg. No add on labs to be ordered per team. Pt so far has a net plus intake of 5.6L. Minmal UOP on this shift of about 200cc.

## 2020-07-09 NOTE — CONSULTS
[facility] - ICU 16 WT  Palliative Medicine  Consult Note    Patient Name: Ranjana Sanchez  MRN: [id number]  Admission Date: 4/10/2020  Hospital Length of Stay: 90 days  Code Status: DNR   Attending Provider: Bandar Zambrano*  Consulting Provider: Rahul Mccartney MD  Primary Care Physician: Primary Doctor No  Principal Problem:Acute respiratory disease due to COVID-19 virus    Patient information was obtained from relative(s) and past medical records.      Inpatient consult to Palliative Care  Consult performed by: Rahul Mccartney MD  Consult ordered by: Faye Spence PA-C        Assessment/Plan:     Palliative care encounter  54 yo male with COVID19+, prolonged hospital course, MSOF and dysfunction with poor prognosis.    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[place];  with 2 children    4) Medicolegal: no LW; wife is surrogate DM; at her request most of the communication has gone through her oldest son; team and family members have been FaceTiming to allow virtual face to face; extensive options have been sifted through to try to connect the pt to family though given pandemic and travel bans, the family cannot make it to visit    5) Spiritual: unknown; discussed with son if there were any cultural or Sabianism practices we should be aware of and how it may affect his care and end of life experience; he will talk with his mother    6) Goals of care/discussion:  Along with pulm/cc (PADILLA Ozuna) we addressed the pt's son via FaceTime and provided clinical updates but also shared our worry that the pt is dying despite all optimal medical therapies.  He was able to express and understanding of the situation and expressed appropriate sadness.  Given the current situation and the inability to visit due to travel restrictions, we asked how  we could best provide for the family to make this the highest quality EOL experience.  The family will call in and FaceTime with him overnight.     The son agreed to no further escalation of life prolonging measures and is aware that we will make recommendations to stop once family has had the opportunity to speak with him.  We will follow up with family daily at 2pm and will remain available to support in any way possible.    He expressed thanks for all of the care his father has received.    7) Plan:   -ongoing continued supportive care without escalation of additional burdensome and invasive life prolonging measures  -family will Facetime to be able to see him  -Family to notify team of cultural or Alevism considerations as pt approaches the end of life  - Will follow up with son daily at 2 pm each day along with pulm/cc    Thank you for the opportunity to care for this patient and family.     Please call with questions.     Rahul Mccartney MD  Palliative Medicine  [phone]          Thank you for your consult. I will follow-up with patient. Please contact us if you have any additional questions.    Subjective:     HPI:   Per pulm notes and review of chart as pt unable to provide history:   Mr. LUANA Sanchez is a 56 y/o M with PMHx of HTN brought to ER via EMS for increasing SOB with known COVID-19 exposure. 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 They have not had travelers since 3/14. 2 days ago he started having fever and SOB. He was seen by the ship's physician and was found to have crackles in his bases bilaterally, but his oxygen saturation was in the 90's. He had a flu test which was negative. He was quarantined in a room and states that he got worse with increasing SOB. He was brought to the ER and found to be 78% on RA. He was started on NRB and initially was doing much better, but his tachypnea did not improve, so he was placed on BiPAP with oxygen saturation improvement.     Hospital/ICU Course:  Patient  was admitted to the MICU after a difficult intubation in the ER s/p crich. COVID + with ARDS, sedated and paralyzed. Unable to prone secondary to crich. Left mainstem intubation which was pulled off, on NO and levophed. ET tube replaced on 4/11 family contacted in [place]. Proned on 4/12 tolerated well and continued to prone on 4/19. 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 He was on and off ketamine as well.  EEG ordered to rule out seizures as he also had seizure like activity that resolved with ativan. Currently changing sedation, was placed on phenobarbital which should start having effect 4/20. Patient proned x 10 (last on 4/22). Still not tolerating ventilator weaning. Remains on sedation, vasopressors, and paralytic. ID now following patient for remdesivir administration; family aware and consented. Patient not significantly improving despite efforts. Son contacted, patient made DNR. Patient evaluated for ECMO on 4/24. Placed on ECMO on 4/25 and transferred to Dr Ayon's service with DNR rescinded.     Some physiologic improvement on VV ECMO. Oxygenation at goal, lactate clearing, hemodynamics at goal without vasoactive support currently. Tolerated wean of vent Fi and Monika thus far. Peeling back sedatives steadily with goal of neuro exam ASAP. Metabolic alkalosis correcting with encouragement from diamox. Patient was weaned off from ECMO support on 06/24/2020. However, during this process he has undergone a stroke and currently is not responding to verbal commands. The patient is able to open his eyes and track, however no purposeful movements can be ascertained from the patient. Extensive notes from neurology have been documented in the patient's medical record. Extensive discussions had been carried out between the family members and periodic updates were provided. Periodic updates were  also provided to [employer] which is his employer. The family was also notified that if any further deterioration happens after completion of ECMO we would not be providing new ECMO support to this patient.     The patient was transferred to the MICU team on 6/25 after ECMO decannulation. Fever with worsening shock. Cultured and started on broad spectrum antibiotics. Frequent episodes of bradycardia to the 20's but recovers without intervention. Heart rate variability continues and on 06/30 he became tachycardic to 166. Labs and EKG showed sinus tachy, restarted betablocker.      Interval History/Significant Events: Patient hypothermic and febrile overnight as well as tachycardic into the 140s. CXR with worsening bilateral infiltrates. Patient remains on PS ventilation with PS of 35. Peaks 50 with plateaus 45. He is only able to take tidal volume of 160-180 today with a respiratory rate of >40. Patient sedated on propofol, fentanyl, and precedex. Norepinephine requirements continue to vary.      In this setting, palliative medicine was consulted to help with medical decision making and aid in the formation of goals of care.       Hospital Course:  No notes on file    Interval History: discussed at length with pulm/cc; son by facetime    Past Medical History:   Diagnosis Date    COVID-19 virus detected 4/10/2020    Diabetes mellitus     Hypertension        Past Surgical History:   Procedure Laterality Date    ESOPHAGOGASTRODUODENOSCOPY N/A 5/3/2020    Procedure: EGD (ESOPHAGOGASTRODUODENOSCOPY);  Surgeon: Wilman Smalls MD;  Location: [address]);  Service: Endoscopy;  Laterality: N/A;    TRACHEOSTOMY N/A 5/5/2020    Procedure: CREATION, TRACHEOSTOMY ;  Surgeon: Naseem Alexandra MD;  Location: [address];  Service: General;  Laterality: N/A;       Review of patient's allergies indicates:  No Known Allergies    Medications:  Continuous Infusions:   dexmedetomidine (PRECEDEX) infusion 1.1  mcg/kg/hr (07/09/20 0500)    dextrose 10 % in water (D10W)      fentanyl 200 mcg/hr (07/09/20 0500)    norepinephrine bitartrate-D5W 0.8 mcg/kg/min (07/09/20 0500)    propofoL 40 mcg/kg/min (07/09/20 0500)     Scheduled Meds:   heparin (porcine)  5,000 Units Subcutaneous Q8H    insulin aspart U-100  4 Units Subcutaneous Q24H    insulin aspart U-100  4 Units Subcutaneous Q24H    insulin aspart U-100  4 Units Subcutaneous Q24H    insulin aspart U-100  4 Units Subcutaneous Q24H    insulin aspart U-100  4 Units Subcutaneous Q24H    insulin aspart U-100  4 Units Subcutaneous Q24H    midodrine  10 mg Per G Tube TID    multivitamin liquid no.118  10 mL Per G Tube Daily    pantoprazole  40 mg Per NG tube BID    piperacillin-tazobactam 4.5 g in dextrose 5 % 100 mL IVPB (ready to mix system)  4.5 g Intravenous Q8H    polyethylene glycol  17 g Per NG tube Daily    senna-docusate 8.6-50 mg  1 tablet Per NG tube BID    sucralfate  1 g Per NG tube Q6H    vancomycin (VANCOCIN) IVPB  1,000 mg Intravenous Q12H    vitamin D  1,000 Units Per G Tube Daily     PRN Meds:acetaminophen, artificial tears, dextrose 10 % in water (D10W), dextrose 50%, fentaNYL, glucagon (human recombinant), glucose, glucose, insulin aspart U-100, labetalol, magnesium sulfate IVPB, magnesium sulfate IVPB, potassium chloride, potassium chloride, potassium chloride, potassium, sodium phosphates, potassium, sodium phosphates, potassium, sodium phosphates, sodium chloride 0.9%, Pharmacy to dose Vancomycin consult **AND** vancomycin - pharmacy to dose    Family History     None        Tobacco Use    Smoking status: Never Smoker   Substance and Sexual Activity    Alcohol use: Not on file    Drug use: Not on file    Sexual activity: Not on file       Review of Systems   Unable to perform ROS: Acuity of condition     Objective:     Vital Signs (Most Recent):  Temp: 98.1 °F (36.7 °C) (07/09/20 0500)  Pulse: (!) 124 (07/09/20 0500)  Resp: (!) 35  (07/09/20 0054)  BP: (!) 93/52 (07/09/20 0500)  SpO2: 95 % (07/09/20 0500) Vital Signs (24h Range):  Temp:  [95 °F (35 °C)-100.4 °F (38 °C)] 98.1 °F (36.7 °C)  Pulse:  [114-145] 124  Resp:  [35] 35  SpO2:  [83 %-99 %] 95 %  BP: ()/(42-70) 93/52     Weight: 65 kg (143 lb 4.8 oz)  Body mass index is 24.6 kg/m².    Physical Exam  Constitutional:       Appearance: He is well-developed. He is sickly-appearing.      Interventions: He is sedated and intubated.   HENT:      Head: Normocephalic and atraumatic.   Eyes:      Pupils: Pupils are equal, round, and reactive to light.      Right eye: Pupil is round and reactive.      Left eye: Pupil is round and reactive.   Neck:      Thyroid: No thyromegaly.      Trachea: No tracheal deviation.   Cardiovascular:      Rate and Rhythm: Normal rate and regular rhythm.      Heart sounds: No murmur. No friction rub. No gallop.    Pulmonary:      Effort: Tachypnea and accessory muscle usage present. He is intubated.      Breath sounds: Examination of the right-upper field reveals decreased breath sounds. Examination of the right-middle field reveals decreased breath sounds. Examination of the right-lower field reveals decreased breath sounds. Decreased breath sounds present. No wheezing.      Comments: Course breath sounds throughout all lung fields  Abdominal:      General: Bowel sounds are normal.      Palpations: Abdomen is soft.   Genitourinary:     Comments: Torres in place.   Musculoskeletal: Normal range of motion.   Skin:     General: Skin is warm and dry.   Neurological:      Mental Status: He is unresponsive.      Sensory: No sensory deficit.      Comments: Sedated. Pupils sluggish bilaterally. Cough and gag reflex intact. Breathing over the ventilator.       Advance Care Planning   No LW  DNR  Parrish Sanchez Son [phone]     prefix for [employer], lives in [facility]            Significant Labs: All pertinent labs within the past 24 hours have been  reviewed.  CBC:   Recent Labs   Lab 07/08/20  0527   WBC 18.46*   HGB 7.6*   HCT 27.2*   *        BMP:  Recent Labs   Lab 07/08/20  1633   *   *   K 5.3*   CL 92*   CO2 31*   BUN 28*   CREATININE 0.8   CALCIUM 9.7   MG 2.7*     LFT:  Lab Results   Component Value Date    AST 24 07/08/2020    ALKPHOS 71 07/08/2020    BILITOT 1.2 (H) 07/08/2020     Albumin:   Albumin   Date Value Ref Range Status   07/08/2020 2.1 (L) 3.5 - 5.2 g/dL Final     Protein:   Total Protein   Date Value Ref Range Status   07/08/2020 6.5 6.0 - 8.4 g/dL Final     Lactic acid:   Lab Results   Component Value Date    LACTATE 0.8 07/08/2020    LACTATE 1.2 06/30/2020       Significant Imaging: I have reviewed all pertinent imaging results/findings within the past 24 hours.      > 50% of 80 min visit spent in chart review, face to face discussion of goals of care,  symptom assessment, coordination of care and emotional support.    Rahul Mccartney MD  Palliative Medicine  [facility] - ICU 16 WT

## 2020-07-09 NOTE — PROGRESS NOTES
Notified PADILLA Hooks, with Critical Care pt UOP decreased drastically to approximately 3-5 mL/hr. No orders received, expected d/t pt clinical condition.

## 2020-07-09 NOTE — PROGRESS NOTES
[facility] - ICU 16   Critical Care Medicine  Progress Note    Patient Name: Ranjana Sanchez  MRN: [id number]  Admission Date: 4/10/2020  Hospital Length of Stay: 90 days  Code Status: DNR  Attending Provider: Bandar Zambrano*  Primary Care Provider: Primary Doctor No   Principal Problem: Acute respiratory disease due to COVID-19 virus    Subjective:     HPI:  Mr. LUANA Sanchez is a 54 y/o M with PMHx of HTN brought to ER via EMS for increasing SOB with known COVID-19 exposure. 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 They have not had travelers since 3/14. 2 days ago he started having fever and SOB. He was seen by the ship's physician and was found to have crackles in his bases bilaterally, but his oxygen saturation was in the 90's. He had a flu test which was negative. He was quarantined in a room and states that he got worse with increasing SOB. He was brought to the ER and found to be 78% on RA. He was started on NRB and initially was doing much better, but his tachypnea did not improve, so he was placed on BiPAP with oxygen saturation improvement.    Hospital/ICU Course:  Patient was admitted to the MICU after a difficult intubation in the ER s/p crich. COVID + with ARDS, sedated and paralyzed. Unable to prone secondary to crich. Left mainstem intubation which was pulled off, on NO and levophed. ET tube replaced on 4/11 family contacted in [place]. Proned on 4/12 tolerated well and continued to prone on 4/19. 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 He was on and off ketamine as well.  EEG ordered to rule out seizures as he also had seizure like activity that resolved with ativan. Currently changing sedation, was placed on phenobarbital which should start having effect 4/20. Patient proned x 10 (last on 4/22). Still not tolerating ventilator weaning. Remains on sedation,  vasopressors, and paralytic. ID now following patient for remdesivir administration; family aware and consented. Patient not significantly improving despite efforts. Son contacted, patient made DNR. Patient evaluated for ECMO on 4/24. Placed on ECMO on 4/25 and transferred to Dr Ayon's service with DNR rescinded.    Some physiologic improvement on VV ECMO. Oxygenation at goal, lactate clearing, hemodynamics at goal without vasoactive support currently. Tolerated wean of vent Fi and Monika thus far. Peeling back sedatives steadily with goal of neuro exam ASAP. Metabolic alkalosis correcting with encouragement from diamox. Patient was weaned off from ECMO support on 06/24/2020. However, during this process he has undergone a stroke and currently is not responding to verbal commands. The patient is able to open his eyes and track, however no purposeful movements can be ascertained from the patient. Extensive notes from neurology have been documented in the patient's medical record. Extensive discussions had been carried out between the family members and periodic updates were provided. Periodic updates were also provided to [email or web address] which is his employer. The family was also notified that if any further deterioration happens after completion of ECMO we would not be providing new ECMO support to this patient.    The patient was transferred to the MICU team on 6/25 after ECMO decannulation. Fever with worsening shock. Cultured and started on broad spectrum antibiotics. Frequent episodes of bradycardia to the 20's but recovers without intervention. Heart rate variability ( Up to 160 at times) continues and intermittently requiring beta blocker. Acute worsening on 7/7 with new fevers concerning for new underlying infection. Vasopressor requirements drastically increasing. Patient with very poor lung compliance and CXR with worsening bilateral infiltrates. Consulted palliative care for assistance with GOC discussion.  Family unable to travel from [place] to visit due to flight restrictions in place by their home country. Patient too unstable to travel back to [place] either via boat or plane. Discussed with son that patient is dying despite everything we are doing. He states understanding. Plan to not escalate care but continue management for now.     Interval History/Significant Events: Remains on high norepinephrine support to maintain blood pressure. Patient requiring 60%, 8 PEEP on PC with PS of 35 with tidal volumes around 180 cc breathing around 40 times a minutes. Peak 50, Plateau pressures 45. Decreasing UOP with electrolyte derangement this AM. Remains sedated on propofol, fentanyl and precedex infusion.     Review of Systems   Unable to perform ROS: Intubated     Objective:     Vital Signs (Most Recent):  Temp: 98.2 °F (36.8 °C) (07/09/20 0800)  Pulse: (!) 116 (07/09/20 0800)  Resp: (!) 35 (07/09/20 0054)  BP: 105/66 (07/09/20 0800)  SpO2: 97 % (07/09/20 0800) Vital Signs (24h Range):  Temp:  [95 °F (35 °C)-99.5 °F (37.5 °C)] 98.2 °F (36.8 °C)  Pulse:  [114-144] 116  Resp:  [35] 35  SpO2:  [83 %-99 %] 97 %  BP: ()/(42-70) 105/66   Weight: 65 kg (143 lb 4.8 oz)  Body mass index is 24.6 kg/m².      Intake/Output Summary (Last 24 hours) at 7/9/2020 0833  Last data filed at 7/9/2020 0800  Gross per 24 hour   Intake 6401.29 ml   Output 897 ml   Net 5504.29 ml       Physical Exam  Constitutional:       Appearance: He is well-developed. He is sickly-appearing.      Interventions: He is sedated and intubated.   HENT:      Head: Normocephalic and atraumatic.   Eyes:      Pupils: Pupils are equal, round, and reactive to light.      Right eye: Pupil is round and reactive.      Left eye: Pupil is round and reactive.   Neck:      Thyroid: No thyromegaly.      Trachea: No tracheal deviation.   Cardiovascular:      Rate and Rhythm: Normal rate and regular rhythm.      Heart sounds: No murmur. No friction rub. No gallop.     Pulmonary:      Effort: Tachypnea and accessory muscle usage present. He is intubated.      Breath sounds: Decreased air movement present. No wheezing.      Comments: Course breath sounds throughout all lung fields  Abdominal:      General: Bowel sounds are normal.      Palpations: Abdomen is soft.   Genitourinary:     Comments: Torres in place.   Musculoskeletal: Normal range of motion.   Skin:     General: Skin is warm and dry.   Neurological:      Mental Status: He is unresponsive.      Sensory: No sensory deficit.      Comments: Sedated. Pupils sluggish bilaterally. Cough and gag reflex intact. Breathing over the ventilator.          Vents:  Vent Mode: A/C (07/09/20 0730)  Ventilator Initiated: Yes (04/10/20 2247)  Set Rate: 35 BPM (07/09/20 0730)  Vt Set: 250 mL (06/06/20 0858)  Pressure Support: 20 cmH20 (06/06/20 0858)  PEEP/CPAP: 8 cmH20 (07/09/20 0730)  Oxygen Concentration (%): 65 (07/09/20 0800)  Peak Airway Pressure: 50 cmH2O (07/09/20 0730)  Plateau Pressure: 40 cmH20 (07/09/20 0730)  Total Ve: 6.57 mL (07/09/20 0730)  F/VT Ratio<105 (RSBI): 190.22 (07/09/20 0054)  Lines/Drains/Airways     Peripherally Inserted Central Catheter Line            PICC Double Lumen 05/05/20 1707 right basilic 64 days          Drain                 NG/OG Tube 06/27/20 1153 16 Fr. Right nostril 11 days         Chest Tube 06/29/20 1816 1 Left First intercostal space 9 days         Urethral Catheter 07/08/20 0730 16 Fr. 1 day          Airway                 Surgical Airway 05/05/20 1500 Shiley Cuffed 64 days          Peripheral Intravenous Line                 Peripheral IV - Single Lumen 07/03/20 1826 20 G Left;Posterior Wrist 5 days              Significant Labs:    CBC/Anemia Profile:  Recent Labs   Lab 07/08/20  0527 07/09/20  0536   WBC 18.46* 16.36*   HGB 7.6* 7.4*   HCT 27.2* 25.6*    205   * 101*   RDW 16.2* 15.4*        Chemistries:  Recent Labs   Lab 07/08/20  0527 07/08/20  1633 07/09/20  0536     "130* 123*   K 5.2* 5.3* 6.0*   CL 97 92* 89*   CO2 31* 31* 24   BUN 23* 28* 31*   CREATININE 0.6 0.8 0.9   CALCIUM 9.7 9.7 9.5   ALBUMIN 2.1*  --  2.0*   PROT 6.5  --  6.9   BILITOT 1.2*  --  1.9*   ALKPHOS 71  --  123   ALT 18  --  52*   AST 24  --  54*   MG 1.7 2.7* 2.6   PHOS 3.2  --  4.7*       All pertinent labs within the past 24 hours have been reviewed.    Significant Imaging:  I have reviewed and interpreted all pertinent imaging results/findings within the past 24 hours.      ABG  No results for input(s): PH, PO2, PCO2, HCO3, BE in the last 168 hours.  Assessment/Plan:     Neuro  Basal ganglia infarction  CT head on 6/2 with bilateral basal ganglia infarcts consistent with global hypoxic ischemic injury 2/2 COVID-19    --neuro exam unchanged with intact cranial nerve reflexes and spontaneous eye opening only  --per neurology, "considering the extent of brain injury, he has very low likelihood of substantial further neurological improvement. 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 TV dropped from 280 to 150's. CXR with pneumomediastinum.  --no intervention warranted, improved  --continue LPV    Difficult intubation  S/p crich which was converted to trach    Acute hypoxemic respiratory failure  2/2 to ARDS due to COVID-19  - ARDSNET Protocol  --see COVID-19    Cardiac/Vascular  Tachycardia  Patient's baseline since the initiation of ECMO has been tachycardic.  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 At goal.     Other  * Acute respiratory disease due to COVID-19 virus  - Difficult intubation requiring crich on admit; converted to trach 5/5. Patient received 60 days of ECMO  - Completed treatment with Plaquenil, Azithro, Methyprednisone, Remdesivir  - VERY poor lung compliance complicated by pneumomediastinum and pneumothorax requiring CT placement  - continue pressure support 35 to maintain Vt of 180  - Lasix as needed to maintain euvolemic to net negative volume status  - Continue abx for possible new underlying PNA      Acute respiratory distress syndrome (ARDS) due to COVID-19 virus  --See acute respiratory failure        Arm wound, left, sequela  suspect 2/2 extravasation of medication through PIV.  --management per wound care  --RUE with +2 palpable pulses.    Goals of care, counseling/discussion  Patient's prognosis remains poor despite efforts to optimize health. Son spoken with regarding GOC.  DNR changed after consulting CTS for ECMO. Per conversation with Dr. Goss on 6/26, no chest compressions. Given Mr. Sanchez's decline, he is too sick for travel back to [place]. Family urged to visit if possible; ongoing coordination with [employer] to help arrange travel hopefully around 7/15.     [place] not allowing international travel due to increase in coronavirus cases. Family not be able to visit. Son is trying to discuss with someone in [place] about making an exception. Son reports the original goal was for family to visit with plans for withdrawal of life support. However, when asked about withdrawal of life support if family unable to visit, son wishes to continue aggressive care and give patient "more time."     Patient acutely worse on 7/8. GOC discussion held with son and Dr. Mccartney " with palliative. We expressed concern that patient is actively dying despite everything we are doing for him and expressed the likelihood of family visiting is very low. Son stated understanding and appropriately upset. No escalation of care at this point. Continue all current treatments at this time however we will not add additional vasopressors.        Shock  Intermittently requiring norepinephrine. Likely related to sedation or autonomic dysfunction for hypoxemic brain injury. Worsening vasopressor requirements on 7/8 concerning for new underlying infection.     --Repeat septic work up NGTD  --Broad spectrum abx initiated  --Continue norepinephrine to maintain MAP >65  --Will not add another vasopressor    COVID-19 virus detected  See ARDS       Critical Care Daily Checklist:    A: Awake: RASS Goal/Actual Goal: RASS Goal: 0-->alert and calm  Actual: [place] Agitation Sedation Scale (RASS): Unarousable   B: Spontaneous Breathing Trial Performed? Spon. Breathing Trial Initiated?: Not initiated (07/07/20 0720)   C: SAT & SBT Coordinated?  N/A                      D: Delirium: CAM-ICU Overall CAM-ICU: Positive   E: Early Mobility Performed? Yes   F: Feeding Goal: Goals: Meet % EEN, EPN by RD f/u date  Status: Nutrition Goal Status: progressing towards goal   Current Diet Order   No orders of the defined types were placed in this encounter.      AS: Analgesia/Sedation Fentanyl, propofol, precedex   T: Thromboembolic Prophylaxis heparin   H: HOB > 300 Yes   U: Stress Ulcer Prophylaxis (if needed) famotidine   G: Glucose Control Managed per endocrine   B: Bowel Function Stool Occurrence: 0   I: Indwelling Catheter (Lines & Parks) Necessity NG tube, parks, PIV, PICC line   D: De-escalation of Antimicrobials/Pharmacotherapies Vancomycin, zosyn    Plan for the day/ETD Supportive care    Code Status:  Family/Goals of Care: DNR       Discussed with Dr. Zambrano.     Critical Care Time: 40 minutes  Critical  secondary to Patient has a condition that poses threat to life and bodily function: acute hypoxemic respiratory failure requiring mechanical ventilation.       Critical care was time spent personally by me on the following activities: development of treatment plan with patient or surrogate and bedside caregivers, discussions with consultants, evaluation of patient's response to treatment, examination of patient, ordering and performing treatments and interventions, ordering and review of laboratory studies, ordering and review of radiographic studies, pulse oximetry, re-evaluation of patient's condition. This critical care time did not overlap with that of any other provider or involve time for any procedures.     Faye Spence PA-C  Critical Care Medicine  [facility] - ICU 16 WT

## 2020-07-09 NOTE — ASSESSMENT & PLAN NOTE
- Difficult intubation requiring crich on admit; converted to trach 5/5. Patient received 60 days of ECMO  - Completed treatment with Plaquenil, Azithro, Methyprednisone, Remdesivir  - VERY poor lung compliance complicated by pneumomediastinum and pneumothorax requiring CT placement  - continue pressure support 35 to maintain Vt of 180  - Lasix as needed to maintain euvolemic to net negative volume status  - Continue abx for possible new underlying PNA

## 2020-07-09 NOTE — SUBJECTIVE & OBJECTIVE
Interval History: discussed at length with pulm/cc; son by nora    Past Medical History:   Diagnosis Date    COVID-19 virus detected 4/10/2020    Diabetes mellitus     Hypertension        Past Surgical History:   Procedure Laterality Date    ESOPHAGOGASTRODUODENOSCOPY N/A 5/3/2020    Procedure: EGD (ESOPHAGOGASTRODUODENOSCOPY);  Surgeon: Wilman Smalls MD;  Location: [facility] (2ND FLR);  Service: Endoscopy;  Laterality: N/A;    TRACHEOSTOMY N/A 5/5/2020    Procedure: CREATION, TRACHEOSTOMY ;  Surgeon: Naseem Alexandra MD;  [facility];  Service: General;  Laterality: N/A;       Review of patient's allergies indicates:  No Known Allergies    Medications:  Continuous Infusions:   dexmedetomidine (PRECEDEX) infusion 1.1 mcg/kg/hr (07/09/20 0500)    dextrose 10 % in water (D10W)      fentanyl 200 mcg/hr (07/09/20 0500)    norepinephrine bitartrate-D5W 0.8 mcg/kg/min (07/09/20 0500)    propofoL 40 mcg/kg/min (07/09/20 0500)     Scheduled Meds:   heparin (porcine)  5,000 Units Subcutaneous Q8H    insulin aspart U-100  4 Units Subcutaneous Q24H    insulin aspart U-100  4 Units Subcutaneous Q24H    insulin aspart U-100  4 Units Subcutaneous Q24H    insulin aspart U-100  4 Units Subcutaneous Q24H    insulin aspart U-100  4 Units Subcutaneous Q24H    insulin aspart U-100  4 Units Subcutaneous Q24H    midodrine  10 mg Per G Tube TID    multivitamin liquid no.118  10 mL Per G Tube Daily    pantoprazole  40 mg Per NG tube BID    piperacillin-tazobactam 4.5 g in dextrose 5 % 100 mL IVPB (ready to mix system)  4.5 g Intravenous Q8H    polyethylene glycol  17 g Per NG tube Daily    senna-docusate 8.6-50 mg  1 tablet Per NG tube BID    sucralfate  1 g Per NG tube Q6H    vancomycin (VANCOCIN) IVPB  1,000 mg Intravenous Q12H    vitamin D  1,000 Units Per G Tube Daily     PRN Meds:acetaminophen, artificial tears, dextrose 10 % in water (D10W), dextrose 50%, fentaNYL, glucagon (human  recombinant), glucose, glucose, insulin aspart U-100, labetalol, magnesium sulfate IVPB, magnesium sulfate IVPB, potassium chloride, potassium chloride, potassium chloride, potassium, sodium phosphates, potassium, sodium phosphates, potassium, sodium phosphates, sodium chloride 0.9%, Pharmacy to dose Vancomycin consult **AND** vancomycin - pharmacy to dose    Family History     None        Tobacco Use    Smoking status: Never Smoker   Substance and Sexual Activity    Alcohol use: Not on file    Drug use: Not on file    Sexual activity: Not on file       Review of Systems   Unable to perform ROS: Acuity of condition     Objective:     Vital Signs (Most Recent):  Temp: 98.1 °F (36.7 °C) (07/09/20 0500)  Pulse: (!) 124 (07/09/20 0500)  Resp: (!) 35 (07/09/20 0054)  BP: (!) 93/52 (07/09/20 0500)  SpO2: 95 % (07/09/20 0500) Vital Signs (24h Range):  Temp:  [95 °F (35 °C)-100.4 °F (38 °C)] 98.1 °F (36.7 °C)  Pulse:  [114-145] 124  Resp:  [35] 35  SpO2:  [83 %-99 %] 95 %  BP: ()/(42-70) 93/52     Weight: 65 kg (143 lb 4.8 oz)  Body mass index is 24.6 kg/m².    Physical Exam  Constitutional:       Appearance: He is well-developed. He is sickly-appearing.      Interventions: He is sedated and intubated.   HENT:      Head: Normocephalic and atraumatic.   Eyes:      Pupils: Pupils are equal, round, and reactive to light.      Right eye: Pupil is round and reactive.      Left eye: Pupil is round and reactive.   Neck:      Thyroid: No thyromegaly.      Trachea: No tracheal deviation.   Cardiovascular:      Rate and Rhythm: Normal rate and regular rhythm.      Heart sounds: No murmur. No friction rub. No gallop.    Pulmonary:      Effort: Tachypnea and accessory muscle usage present. He is intubated.      Breath sounds: Examination of the right-upper field reveals decreased breath sounds. Examination of the right-middle field reveals decreased breath sounds. Examination of the right-lower field reveals decreased breath  sounds. Decreased breath sounds present. No wheezing.      Comments: Course breath sounds throughout all lung fields  Abdominal:      General: Bowel sounds are normal.      Palpations: Abdomen is soft.   Genitourinary:     Comments: Torres in place.   Musculoskeletal: Normal range of motion.   Skin:     General: Skin is warm and dry.   Neurological:      Mental Status: He is unresponsive.      Sensory: No sensory deficit.      Comments: Sedated. Pupils sluggish bilaterally. Cough and gag reflex intact. Breathing over the ventilator.       Advance Care Planning   No LW  DNR  Parrish Sanchez Son [phone]     prefix for international call 91, lives in [facility]            Significant Labs: All pertinent labs within the past 24 hours have been reviewed.  CBC:   Recent Labs   Lab 07/08/20  0527   WBC 18.46*   HGB 7.6*   HCT 27.2*   *        BMP:  Recent Labs   Lab 07/08/20  1633   *   *   K 5.3*   CL 92*   CO2 31*   BUN 28*   CREATININE 0.8   CALCIUM 9.7   MG 2.7*     LFT:  Lab Results   Component Value Date    AST 24 07/08/2020    ALKPHOS 71 07/08/2020    BILITOT 1.2 (H) 07/08/2020     Albumin:   Albumin   Date Value Ref Range Status   07/08/2020 2.1 (L) 3.5 - 5.2 g/dL Final     Protein:   Total Protein   Date Value Ref Range Status   07/08/2020 6.5 6.0 - 8.4 g/dL Final     Lactic acid:   Lab Results   Component Value Date    LACTATE 0.8 07/08/2020    LACTATE 1.2 06/30/2020       Significant Imaging: I have reviewed all pertinent imaging results/findings within the past 24 hours.

## 2020-07-10 NOTE — PLAN OF CARE
07/10/20 0707   Final Note   Assessment Type Final Discharge Note     7/10/2020  Patient  on 6/10/2020. Patient's son, Da Sanchez & Dr. Flor Tiwari (p [phone]) w/[facility] notified by staff. Discharge summary/Death Note faxed to Dr. Tiwari (f [phone]).    2020 0758  Email requesting information for the patient's death certificate from Zari Camarillo w/[facility] [employer] forwarded to Zari Earl with [facility].     2020 1020  CM was informed by Zari Earl that all forms/information has been completed & that [facility] will  the patient's body today.

## 2020-07-10 NOTE — NURSING
[facility]  contacted, awaiting call back from investigator to rule out 's case and release of body.

## 2020-07-10 NOTE — CARE UPDATE
"Updated son, Da, via Skype regarding patient's clinical status. Expressed concern that patient is worse and appears to be actively dying. Patient states understanding but says he is "hoping for a miracle." I asked if his mother or other brother would like to video chat but son refuses. Son denies any spiritual or Yazidi practices to be initiated during this time. Will call son if anything acutely changes.     Of note, son is in the hospital and not able to be with his mother or other brother due to covid restrictions in [place].       Faye Spence PA-C  Critical Care Medicine  7/9/2020   9:02 PM            "

## 2020-07-10 NOTE — NURSING
TOD: 0300. Pronounced by Dr. Ruthann Aponte MD. No apical pulse noted or palpable pulses present. RRT at the bedside to remove pt from vent. Team states will notify family. Charge RN, House Supervisor and  aware of pt death. Team made aware of notes to contact [employer] the pt's employer and family physician of pt death.

## 2020-07-10 NOTE — SIGNIFICANT EVENT
Notified son, Parrish (contact #[phone]), of patient's passing due to ARDS from COVID-19 and condolences were offered to family. Also contacted Dr. Flor Tiwari with [facility] (contact # [phone]) who stated that she would contact the company so that  arrangements could begin.    Nissa Eng  Critical Care Medicine

## 2020-07-10 NOTE — SIGNIFICANT EVENT
Death Note    Called to bedside by patient's nurse. Nursing supervisor notified.  has been called and is also at bedside.    Patient is not responding to verbal or tactile stimuli. Patient does not have a papillary or corneal reflex. His pupils are fixed and dilated. No heart or breath sounds on auscultation. No respirations. No palpable pulses.     Time of death: 03:00      Cause of Death: ARDS secondary to COVID-19    MD [email or web address]

## 2020-07-10 NOTE — NURSING
I have reviewed the day shift PA's note and have also reviewed the pt's labs over the past 24hrs. This morning Na+ was 120 with no correction. This RN will not administer ordered q4 250cc water boluses for risk of SLP while on propofol. Will administer 500cc 0.9% NS bolus.

## 2020-07-10 NOTE — DISCHARGE SUMMARY
Death Note  Critical Care Medicine      Admit Date: 4/10/2020    Date of Death: 07/10/2020    Time of Death: 03:00    Attending Physician: Bandar Zambrano*    Principal Diagnoses: Acute respiratory disease due to COVID-19 virus    Preliminary Cause of Death: Acute respiratory disease due to COVID-19 virus    Secondary Diagnoses:   Active Hospital Problems    Diagnosis  POA    *Acute respiratory disease due to COVID-19 virus [U07.1, J06.9]  Yes    Palliative care encounter [Z51.5]  Not Applicable    Debility [R53.81]  Unknown    Pneumothorax [J93.9]  No    Pneumomediastinum [J98.2]  No    Basal ganglia infarction [I63.9]  No    GIB (gastrointestinal bleeding) [K92.2]  No    On enteral nutrition [Z78.9]  No    Acute respiratory distress syndrome (ARDS) due to COVID-19 virus [U07.1, J80]  Yes    Arm wound, left, sequela [S41.102S]  Not Applicable    Goals of care, counseling/discussion [Z71.89]  Not Applicable    Tachycardia [R00.0]  No    Type 2 diabetes mellitus without complication, without long-term current use of insulin [E11.9]  Yes    Difficult intubation [T88.4XXA]  Yes    Acute hypoxemic respiratory failure [J96.01]  Yes    Shock [R57.9]  No    COVID-19 virus detected [U07.1]  Yes      Resolved Hospital Problems    Diagnosis Date Resolved POA    Adrenal cortical steroids causing adverse effect in therapeutic use [T38.0X5A] 2020 No    Acute blood loss anemia [D62] 2020 No    Shock, unspecified [R57.9] 2020 No    Hypokalemia [E87.6] 2020 Unknown    Hypophosphatemia [E83.39] 2020 Unknown    Hypernatremia [E87.0] 2020 Unknown    Nonspecific abnormal electrocardiogram (ECG) (EKG) [R94.31] 2020 No    Sedated [R41.89] 2020 Yes    Respiratory distress [R06.03] 2020 Yes    Hypertension [I10] 2020 Yes        Discharged Condition:     HPI:  Mr. LUANA Sanchez is a 54 y/o M with PMHx of HTN brought to ER via EMS for  increasing SOB with known COVID-19 exposure. 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 They have not had travelers since 3/14. 2 days ago he started having fever and SOB. He was seen by the ship's physician and was found to have crackles in his bases bilaterally, but his oxygen saturation was in the 90's. He had a flu test which was negative. He was quarantined in a room and states that he got worse with increasing SOB. He was brought to the ER and found to be 78% on RA. He was started on NRB and initially was doing much better, but his tachypnea did not improve, so he was placed on BiPAP with oxygen saturation improvement.    Hospital/ICU Course:  Patient was admitted to the MICU after a difficult intubation in the ER s/p crich. COVID + with ARDS, sedated and paralyzed. Unable to prone secondary to crich. Left mainstem intubation which was pulled off, on NO and levophed. ET tube replaced on 4/11 family contacted in [place]. Proned on 4/12 tolerated well and continued to prone on 4/19. 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 He was on and off ketamine as well.  EEG ordered to rule out seizures as he also had seizure like activity that resolved with ativan. Currently changing sedation, was placed on phenobarbital which should start having effect 4/20. Patient proned x 10 (last on 4/22). Still not tolerating ventilator weaning. Remains on sedation, vasopressors, and paralytic. ID now following patient for remdesivir administration; family aware and consented. Patient not significantly improving despite efforts. Son contacted, patient made DNR. Patient evaluated for ECMO on 4/24. Placed on ECMO on 4/25 and transferred to Dr Ayon's service with DNR rescinded.    Some physiologic improvement on VV ECMO. Oxygenation at goal, lactate clearing, hemodynamics at goal without vasoactive support currently.  Tolerated wean of vent Fi and Monika thus far. Peeling back sedatives steadily with goal of neuro exam ASAP. Metabolic alkalosis correcting with encouragement from diamox. Patient was weaned off from ECMO support on 06/24/2020. However, during this process he has undergone a stroke and currently is not responding to verbal commands. The patient is able to open his eyes and track, however no purposeful movements can be ascertained from the patient. Extensive notes from neurology have been documented in the patient's medical record. Extensive discussions had been carried out between the family members and periodic updates were provided. Periodic updates were also provided to [employer] which is his employer. The family was also notified that if any further deterioration happens after completion of ECMO we would not be providing new ECMO support to this patient.    The patient was transferred to the MICU team on 6/25 after ECMO decannulation. Fever with worsening shock. Cultured and started on broad spectrum antibiotics. Frequent episodes of bradycardia to the 20's but recovers without intervention. Heart rate variability ( Up to 160 at times) continues and intermittently requiring beta blocker. Acute worsening on 7/7 with new fevers concerning for new underlying infection. Vasopressor requirements drastically increasing. Patient with very poor lung compliance and CXR with worsening bilateral infiltrates. Consulted palliative care for assistance with GOC discussion. Family unable to travel from [place] to visit due to flight restrictions in place by their home country. Patient too unstable to travel back to [place] either via boat or plane. Discussed with son that patient is dying despite everything we are doing. He states understanding. Plan to not escalate care but continue management for now.     7/9 patient continued to have clinical decompensation. Poor pulmonary compliance made it very difficult to ventilate the  patient. Urine output started to drop off and he had increasing pressor requirements. Pt's son was contacted via skype and notified that patient is actively dying. Early am of 7/10 the patient went into cardiac arrest and was subsequently pronounced dead.     Leigh Cody NP  Critical Care Medicine

## 2020-07-10 NOTE — PROGRESS NOTES
RT & RN @ beside during the time of patient passing. MD Fay arrived @ bedside and declared time of death @ 0300. RT then disconnected ventilator from pt's trach & turned off the ventilator. RT placed a piece of tape over trach to close off airway, due to pt having COVID.   Dede RT

## 2020-07-13 LAB
BACTERIA BLD CULT: NORMAL
BACTERIA BLD CULT: NORMAL

## 2021-04-09 NOTE — NURSING
[facility] was made aware on their rounds that patient is bradycardic with HR of low 50's and hypertensive with SBP of 183. Precedex was put on hold. Propofol, Dilaudid and levophed drips were titrated accordingly. Will continue to monitor.   <<----- Click to add NO significant Past Surgical History

## 2021-12-26 NOTE — ASSESSMENT & PLAN NOTE
Acute respiratory distress syndrome (ARDS) due to COVID-19 virus  - Crich switched to ETT on 4/11  - Monika weaned off 5/5  - Tracheostomy and bronch 5/5  - Thrombocytopenia resolved; transfuse for under 30K  - Aspirin 81 daily  - Metoprolol 25 BID  - RPM at 2800; flows low 3s  - Sweep at 8.5; CO2 goal 40-50s if not acidotic on ABG (respiratory compensation for alkalosis)  - Oxygenator Fi at 65%; wean to 60 today  - Vent Fi 50%, PEEP 12     Tongue laceration      -ENT evaluated, laceration superficial      -recs: bite block vs paralysis, will monitor    Sedation  - Valium, seroquel, PRN Fentanyl     UGI Bleed  - Scoped by GI 5/3 with epinephrine injection and clip placement for an actively bleeding D2/D3 Diuelafoy lesion    FEN/GI  - TF at 45, goal      - Lasix gtt stopped 5/12  - FWB for hypernatremia 250mL q6     Anticoagulation      - Goal aXa 0.25 - 0.3 Q6hrs      - monitor LDH    Prophylaxis      - protonix q12 IV       - abx for ECMO; WBC stable on Vanc/Zosyn      - PICC placed 5/5; central line removed 5/6   26-Dec-2021 12:25

## 2022-02-21 NOTE — SUBJECTIVE & OBJECTIVE
Interval History/Significant Events:   Received 2u pRBC and 1u cryo overnight  No other major changes  Pump 2700, 2.5, sweep 1, FiO2 21%      Follow-up For: Procedure(s) (LRB):  CREATION, TRACHEOSTOMY  (N/A)    Post-Operative Day: 50 Days Post-Op    Objective:     Vital Signs (Most Recent):  Temp: 98.4 °F (36.9 °C) (06/24/20 0700)  Pulse: 106 (06/24/20 0915)  Resp: (!) 60 (06/24/20 0438)  BP: 98/69 (06/24/20 0900)  SpO2: 96 % (06/24/20 0915) Vital Signs (24h Range):  Temp:  [97.7 °F (36.5 °C)-98.4 °F (36.9 °C)] 98.4 °F (36.9 °C)  Pulse:  [106-142] 106  Resp:  [39-60] 60  SpO2:  [87 %-100 %] 96 %  BP: ()/(56-86) 98/69  Arterial Line BP: ()/(46-86) 85/47     Weight: 62 kg (136 lb 11 oz)  Body mass index is 23.46 kg/m².      Intake/Output Summary (Last 24 hours) at 6/24/2020 0942  Last data filed at 6/24/2020 0800  Gross per 24 hour   Intake 3353.05 ml   Output 2868 ml   Net 485.05 ml       Physical Exam    Vents:  Vent Mode: A/C (06/24/20 0840)  Ventilator Initiated: Yes (04/10/20 2247)  Set Rate: 20 BPM (06/24/20 0840)  Vt Set: 250 mL (06/06/20 0858)  Pressure Support: 20 cmH20 (06/06/20 0858)  PEEP/CPAP: 6 cmH20 (06/24/20 0840)  Oxygen Concentration (%): 50 (06/24/20 0915)  Peak Airway Pressure: 31 cmH2O (06/24/20 0840)  Plateau Pressure: 30 cmH20 (06/24/20 0840)  Total Ve: 8.07 mL (06/24/20 0840)  F/VT Ratio<105 (RSBI): 237.8 (06/23/20 1457)    Lines/Drains/Airways     Peripherally Inserted Central Catheter Line            PICC Double Lumen 05/05/20 1707 right basilic 49 days          Central Venous Catheter Line                 ECMO Cannula 04/25/20 1120 right femoral vein 59 days         ECMO Cannula 04/25/20 1120 right internal jugular 59 days          Drain                 NG/OG Tube 05/03/20 1215 Montgomery sump;nasogastric 18 Fr. Left nostril 51 days         Urethral Catheter 05/28/20 1630 26 days          Airway                 Surgical Airway 05/05/20 1500 Shiley Cuffed 49 days          Arterial Line  Pt arrived in pacu per cart with Dr. Ang. Pt drowsy, arousable, noted with steri-strips to abdomen, dry and intact. IVF LR.                   Arterial Line 04/27/20 1100 Right Brachial 57 days          Peripheral Intravenous Line                 Peripheral IV - Single Lumen 06/23/20 1425 18 G Anterior;Right Hand less than 1 day                Significant Labs:    CBC/Anemia Profile:  Recent Labs   Lab 06/23/20  0230  06/23/20  1427  06/23/20 2000 06/24/20  0212 06/24/20  0300 06/24/20  0804   WBC 16.79*  --  13.89*  --  15.05*  --   --  14.52*  --    HGB 9.4*  --  8.6*  --  9.4*  --   --  10.4*  --    HCT 30.5*   < > 26.8*   < > 29.9*   < > 30* 32.4* 29*   PLT 69*  --  70*  --  72*  --   --  71*  --    MCV 92  --  91  --  91  --   --  91  --    RDW 15.7*  --  15.9*  --  15.9*  --   --  15.7*  --    FERRITIN 2,347*  --   --   --   --   --   --  2,115*  --     < > = values in this interval not displayed.        Chemistries:  Recent Labs   Lab 06/23/20  1427 06/23/20 2000 06/24/20  0300   * 147* 151*   K 4.1 3.9 4.0    107 109   CO2 31* 31* 28   BUN 74* 70* 64*   CREATININE 0.7 0.7 0.7   CALCIUM 10.0 9.5 9.8   ALBUMIN 3.1* 3.0* 3.2*   PROT 6.4 6.2 6.5   BILITOT 1.8* 1.8* 2.1*   ALKPHOS 89 85 82   ALT 24 23 24   AST 31 28 34   MG 2.4 2.4 2.4   PHOS 4.2 3.7 3.7       Coagulation:   Recent Labs   Lab 06/24/20  0300   INR 1.1   APTT 27.2     All pertinent labs within the past 24 hours have been reviewed.    Significant Imaging:  I have reviewed and interpreted all pertinent imaging results/findings within the past 24 hours.     06/24/2020 CXR  No significant changes compared to prior

## 2022-08-03 NOTE — ASSESSMENT & PLAN NOTE
Patient with HR in 40s to mid 50's and sometimes in the 130-140   Bradycardia suspect likely due to hypothermia (T 93.6F) vs. Sedative medications (Precedex) vs. Sepsis  EKG with HR 50s, normal axis, normal intervals, ? ST elevation 1 & AVL (2mm)  ECHO 4/16: EF 50%, mild RV dysfunction  Troponin < 0.06 wnl    Plan  - Apply Heating pads and warming blanket when cold   - Wean off sedation and paralytics as tolerated  - F/u repeat blood cultures (4/18: NGTD)  - EKG (4/18) with no ST elevation or T wave prominence seen in earlier EKGs  - Continue Heparin gtt, ASA 81mg & Atorvastatin 40mg  - T4 normal, TSH slightly low  - Cardiology consulted and suggest treatment for hypothermia and no other interventions  - considering withdrawal from alcohol as patient responds well to ativan     Drysol Pregnancy And Lactation Text: This medication is considered safe during pregnancy and breast feeding.

## 2023-01-18 NOTE — PROGRESS NOTES
ECMO Specialists shift report    Date: 04/30/2020  ECMO Specialist:  Maikel Deluna    Pump parameters:  RPM: 3800   Flow:  5.22  Sweep:  8.5  FiO2:  100%    Oxygenator status:  Clots: pre oxy  Fibrin: pre oxy    Pressure trends:  P1: 172-176  P2: 138-142  Delta P: 32-36     Volume status:  Chugging noted some  CVP: 2-5  MAP: 70-95  MD notified (name):  Nany    Anticoagulation:  ACT/aPTT/Xa parameters: 0.25- 0.30  ACT/aPTT/Xa trends this shift: .24-0.4    Cannula size / status / placement:  Arterial:   Venous 1: RIJV 23 Fr@4cm  Venous 2: RFV Fr@10cm  Dual lumen:      Additional Comments:    We gave one units of platelets, one unit of PRBC, oxygenantion was much better when we paralyzed to put in the biteblock. SaO2 went from 90 to 99% We made two adjustments to the heparin rate.              Yes

## 2023-12-28 NOTE — ASSESSMENT & PLAN NOTE
No protocol for requested medication     Medication: ambien  Last office visit date: 11/27/2023  Upcoming 02/27/2023  Csa02/20/23  Urine 03/03/2023  Pharmacy: [id number] - [place], [place] - [address]    Order pended, routed to clinician for review.    Patient's baseline since the initiation of ECMO has been tachycardic.  --previously on metoprolol, but unable to tolerate due to hypotension and bradycardic episodes.

## 2024-01-22 NOTE — SUBJECTIVE & OBJECTIVE
Interval History:  UOP 4.2L, net negative 180mL in 24hrs. Sweep to 4 and oxygenator FIO2 to 40%. Hep gtt ongoing at 700, aXa 0.24.    Medications:  Continuous Infusions:   dexmedetomidine (PRECEDEX) infusion 0.1 mcg/kg/hr (06/03/20 1500)    furosemide (LASIX) 2 mg/mL continuous infusion (non-titrating) 2.5 mg/hr (06/03/20 1500)    heparin (porcine) in 5 % dex 700 Units/hr (06/03/20 1500)    nicardipine Stopped (06/01/20 2200)    norepinephrine bitartrate-D5W Stopped (06/02/20 1500)     Scheduled Meds:   aspirin  81 mg Per NG tube Daily    chlorhexidine  15 mL Mouth/Throat BID    fentaNYL  1 patch Transdermal Q72H    labetalol  300 mg Per G Tube BID    multivitamin liquid no.118  10 mL Per G Tube Daily    pantoprazole  40 mg Per NG tube BID    piperacillin-tazobactam (ZOSYN) IVPB  4.5 g Intravenous Q8H    polyethylene glycol  17 g Per NG tube Daily    potassium chloride 10%  40 mEq Per NG tube BID    psyllium husk (aspartame)  1 packet Per NG tube TID    QUEtiapine  50 mg Per NG tube QHS    sucralfate  1 g Per NG tube Q6H    vitamin D  1,000 Units Per G Tube Daily        Objective:     Vital Signs (Most Recent):  Temp: 99 °F (37.2 °C) (06/03/20 0900)  Pulse: (!) 113 (06/03/20 1600)  Resp: (!) 22 (06/03/20 1500)  BP: 120/65 (06/02/20 1900)  SpO2: 99 % (06/03/20 1600) Vital Signs (24h Range):  Temp:  [98.1 °F (36.7 °C)-99 °F (37.2 °C)] 99 °F (37.2 °C)  Pulse:  [] 113  Resp:  [] 22  SpO2:  [93 %-100 %] 99 %  BP: (120)/(65) 120/65  Arterial Line BP: ()/() 136/75     Weight: 59.8 kg (131 lb 13.4 oz)  Body mass index is 22.63 kg/m².    SpO2: 99 %  O2 Device (Oxygen Therapy): ventilator    Intake/Output - Last 3 Shifts       06/01 0700 - 06/02 0659 06/02 0700 - 06/03 0659 06/03 0700 - 06/04 0659    I.V. (mL/kg) 895.5 (15.4) 534 (8.9) 88.7 (1.5)    Blood  350     NG/GT 2880 2880 1005    IV Piggyback 200 250 100    Total Intake(mL/kg) 3975.5 (68.3) 4014 (67.1) 1193.7 (20)    Urine  Lm for wife to call back, please have a nurse triage this. Was it a stray, did it have shots?    (mL/kg/hr) 4364 (3.1) 4175 (2.9) 1225 (2)    Stool  0     Blood 2  2    Total Output 4366 4175 1227    Net -390.6 -161 -33.4           Stool Occurrence  2 x           Lines/Drains/Airways     Peripherally Inserted Central Catheter Line            PICC Double Lumen 05/05/20 1707 right basilic 28 days          Central Venous Catheter Line                 ECMO Cannula 04/25/20 1120 right femoral vein 39 days         ECMO Cannula 04/25/20 1120 right internal jugular 39 days          Drain                 NG/OG Tube 05/03/20 1215 Marcus sump;nasogastric 18 Fr. Left nostril 31 days         Urethral Catheter 05/28/20 1630 6 days          Airway                 Surgical Airway 05/05/20 1500 Shiley Cuffed 29 days          Arterial Line                 Arterial Line 04/27/20 1100 Right Brachial 37 days          Peripheral Intravenous Line                 Peripheral IV - Single Lumen 05/31/20 0220 20 G Right Wrist 3 days         Peripheral IV - Single Lumen 05/31/20 1205 20 G Left Other 3 days                Physical Exam    Constitutional: He is sedated, and intubated.   Head: Normocephalic   Cardiovascular: Tachycardia 100s   Pulmonary/Chest: intubated vent FiO2 50% PEEP 5, ECMO RPM 2600, Flow 2.7, sweep 4, oxygenator FiO2 40%  Skin: L neck and L groin cannulas intact, no flashing, clots pre and post-oxygenator, sutures intact and secure with 2 new sutures to upper (return) cannula.  Nursing note and vitals reviewed.    Significant Labs:  BMP:   Recent Labs   Lab 06/03/20  1400   *   *   K 4.2      CO2 30*   BUN 64*   CREATININE 1.0   CALCIUM 9.8   MG 2.9*     CBC:   Recent Labs   Lab 06/03/20  1400 06/03/20  1441   WBC 13.42*  --    RBC 3.51*  --    HGB 10.6*  --    HCT 33.4* 31*   *  --    MCV 95  --    MCH 30.2  --    MCHC 31.7*  --      LFTs:   Recent Labs   Lab 06/03/20  1400   ALT 45*   AST 33   ALKPHOS 174*   BILITOT 1.3*   PROT 7.2   ALBUMIN 3.6     Significant Diagnostics:  Cxr: stable    Ct  Head Without Contrast 6/2/2020  -symmetrical hypoattenuation bilobed basal ganglia concerning for edema as well as poor gray-white matter differentiation cerebral hemispheres diffusely concerning for profound hypoxic anoxic encephalopathy  -no evidence for acute intracranial hemorrhage    Ct Chest Without Contrast 6/2/2020  -small left pneumothorax with no mediastinal shift and minimal loss of left lung volume  -no right pneumothorax, pneumomediastinum or pneumoperitoneum  -widespread largely homogeneous ground-glass disease throughout all lobes as well as tubular and varicoid bronchiectasis in this patient with history of COVID-19 pneumonia maintained on ECMO  -fine reticular and reticulonodular pattern is evident in the periphery of the lungs consistent with interstitial lung disease  -findings are not typical for pulmonary edema due to elevated pulmonary venous pressures and/or abnormal capillary permeability

## 2024-04-01 NOTE — ASSESSMENT & PLAN NOTE
Patient with HR in 40s to mid 50's and sometimes in the 130-140   Bradycardia suspect likely due to hypothermia (T 93.6F) vs. Sedative medications (Precedex) vs. Sepsis  EKG with HR 50s, normal axis, normal intervals, ? ST elevation 1 & AVL (2mm)  ECHO 4/16: EF 50%, mild RV dysfunction  Troponin < 0.06 wnl    Plan  - Apply Heating pads and warming blanket when cold   - Wean off sedation and paralytics as tolerated  - F/u repeat blood cultures (4/18: NGTD)  - EKG (4/18) with no ST elevation or T wave prominence seen in earlier EKGs  - Continue Heparin gtt, ASA 81mg & Atorvastatin 40mg  - T4 normal, TSH slightly low  - Cardiology consulted and suggest treatment for hypothermia and no other interventions  - considering withdrawal from alcohol as patient responds well to ativan     never true

## 2024-04-08 NOTE — PROGRESS NOTES
Notified Dr. Alston of repeat AntiXa 0.32. Orders to decrease Heparin gtt to 800 units/hr. Will carry out.   Pain - Patient was clinically upgraded due to pain.

## 2024-11-17 NOTE — NURSING
Patient was d/cd after osteo and amputation by podiatry , on keflex upon discharge  SBP now in the 60s. Levo maxed at 1, Propofol titrated to 30, precedex at 0.9 and fentanyl at 200. Given current BP trend pt may pass dring this shift. Cardiac rhythm change to junctional rhythm with a rate in the 80s. Will update team once pressures worsens.

## 2024-12-30 NOTE — PROGRESS NOTES
[facility] - Respiratory ICU  Critical Care - Surgery  Progress Note    Patient Name: Ranjana Sanchez  MRN: [id number]  Admission Date: 4/10/2020  Hospital Length of Stay: 55 days  Code Status: Full Code  Attending Provider: Francis Ayon MD  Primary Care Provider: Primary Doctor No   Principal Problem: Acute respiratory disease due to COVID-19 virus    Subjective:       Interval History/Significant Events:   NAEON. UOP 2.7L, net negative 160mL in 24hrs.Precedex .1, lasix 2.5mg/hr, heparin 600. Anti xa .23        Follow-up For: Procedure(s) (LRB):  CREATION, TRACHEOSTOMY  (N/A)    Post-Operative Day: 28 Days Post-Op    Objective:     Vital Signs (Most Recent):  Temp: 98.4 °F (36.9 °C) (06/04/20 0300)  Pulse: (!) 121 (06/04/20 0600)  Resp: (!) 28 (06/04/20 0600)  BP: (!) 165/76 (06/04/20 0600)  SpO2: 100 % (06/04/20 0600) Vital Signs (24h Range):  Temp:  [98.4 °F (36.9 °C)-99.4 °F (37.4 °C)] 98.4 °F (36.9 °C)  Pulse:  [] 121  Resp:  [17-71] 28  SpO2:  [96 %-100 %] 100 %  BP: (107-165)/(59-89) 165/76  Arterial Line BP: ()/() 104/60     Weight: 59 kg (130 lb 1.1 oz)  Body mass index is 22.33 kg/m².      Intake/Output Summary (Last 24 hours) at 6/4/2020 0615  Last data filed at 6/4/2020 0600  Gross per 24 hour   Intake 2617.2 ml   Output 2773 ml   Net -155.8 ml       Physical Exam   Constitutional: He appears well-developed and well-nourished.   HENT:   Head: Normocephalic and atraumatic.   NGT with tube feeds   Eyes: No scleral icterus.   Opens eyes and blinks spontaneously   Neck:   trachostomy in place  See settings below   Cardiovascular:   Intermittently tachycardic   Genitourinary:   Genitourinary Comments: Torres in place   Neurological:   Sedated. Withdraws lower extremities to pain, does not withdraw upper extremities. Corneal, cough, and gag reflex intact.    Skin: Skin is warm and dry. He is not diaphoretic.   Nursing note and vitals reviewed.      Vents:  Vent Mode: A/C  (06/04/20 0123)  Ventilator Initiated: Yes (04/10/20 2247)  Set Rate: 12 BPM (06/04/20 0123)  Vt Set: 200 mL (05/18/20 1244)  PEEP/CPAP: 6 cmH20 (06/04/20 0123)  Oxygen Concentration (%): 50 (06/04/20 0600)  Peak Airway Pressure: 35 cmH2O (06/04/20 0123)  Plateau Pressure: 19 cmH20 (06/04/20 0123)  Total Ve: 9.98 mL (06/04/20 0123)  F/VT Ratio<105 (RSBI): 109.93 (06/04/20 0123)    Lines/Drains/Airways     Peripherally Inserted Central Catheter Line            PICC Double Lumen 05/05/20 1707 right basilic 29 days          Central Venous Catheter Line                 ECMO Cannula 04/25/20 1120 right femoral vein 39 days         ECMO Cannula 04/25/20 1120 right internal jugular 39 days          Drain                 NG/OG Tube 05/03/20 1215 [place] sump;nasogastric 18 Fr. Left nostril 31 days         Urethral Catheter 05/28/20 1630 6 days          Airway                 Surgical Airway 05/05/20 1500 [place] Cuffed 29 days          Arterial Line                 Arterial Line 04/27/20 1100 Right Brachial 37 days          Peripheral Intravenous Line                 Peripheral IV - Single Lumen 05/31/20 0220 20 G Right Wrist 4 days         Peripheral IV - Single Lumen 05/31/20 1205 20 G Left Other 3 days                Significant Labs:    CBC/Anemia Profile:  Recent Labs   Lab 06/03/20  0200  06/03/20  1400  06/03/20 2000 06/03/20 2014 06/04/20  0214 06/04/20  0400   WBC 13.50*   < > 13.42*  --  13.74*  --   --  12.17   HGB 10.6*   < > 10.6*  --  10.4*  --   --  9.7*   HCT 32.7*   < > 33.4*   < > 32.5* 30* 30* 31.0*   *   < > 103*  --  102*  --   --  101*   MCV 95   < > 95  --  95  --   --  96   RDW 14.0   < > 14.0  --  14.1  --   --  14.1   FERRITIN 1,553*  --   --   --   --   --   --  1,540*    < > = values in this interval not displayed.        Chemistries:  Recent Labs   Lab 06/03/20  1400 06/03/20 2000 06/04/20  0400   * 149* 148*   K 4.2 4.0 3.5    108 108   CO2 30* 28 27   BUN 64* 67* 68*    CREATININE 1.0 0.9 0.9   CALCIUM 9.8 9.9 9.5   ALBUMIN 3.6 3.7 3.4*   PROT 7.2 7.2 6.9   BILITOT 1.3* 1.4* 1.2*   ALKPHOS 174* 170* 149*   ALT 45* 43 41   AST 33 33 30   MG 2.9* 2.6 2.6   PHOS 3.4 3.9 3.7       CMP:   Recent Labs   Lab 06/03/20  1400 06/03/20 2000 06/04/20  0400   * 149* 148*   K 4.2 4.0 3.5    108 108   CO2 30* 28 27   * 199* 214*   BUN 64* 67* 68*   CREATININE 1.0 0.9 0.9   CALCIUM 9.8 9.9 9.5   PROT 7.2 7.2 6.9   ALBUMIN 3.6 3.7 3.4*   BILITOT 1.3* 1.4* 1.2*   ALKPHOS 174* 170* 149*   AST 33 33 30   ALT 45* 43 41   ANIONGAP 11 13 13   EGFRNONAA >60.0 >60.0 >60.0     Coagulation:   Recent Labs   Lab 06/04/20  0400   INR 1.1   APTT 29.8     Lactic Acid: No results for input(s): LACTATE in the last 48 hours.  Respiratory Culture: No results for input(s): GSRESP, RESPIRATORYC in the last 48 hours.  Troponin: No results for input(s): TROPONINI in the last 48 hours.  All pertinent labs within the past 24 hours have been reviewed.    Significant Imaging:  I have reviewed and interpreted all pertinent imaging results/findings within the past 24 hours.         Assessment/Plan:     COVID-19 virus detected  Ranjana Sanchez is a 55 y.o. 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  This was switched to ETT on 4/11.  Cannulated on 4/25.    Neuro:  Precedex .1  5 of valium  100 BID of seroquel  Fentanyl patch  Encephalopathy with poor neuro exam  - CT on June 2 showing bilateral basal ganglia infarcts, which explains the neuro exam findings. His infarcts are sizeable, and will cause permanent deficits, the extent of which is unknown. The likelihood of meaningful recovery is low. Neuro consulted to prognosticate, recommending MRI.       Pulm  AC/VC+ on the vent.  Small pneumo on cxr, subclinical.  Sweep of 4 today, 2600 RPM    Will need some more time on ECMO, not pulling enough volume on the vent    Cardiac  Cardene gtt as  needed, currently off    GI  TFs at goal    Renal  Making urine, diuresing agressively  Lasix gtt at 2.5    Heme  Hep gtt at 600, goal 0.25-0.3 to prevent another GI bleed  Has been at goal at this rate    ID  WBC stable 12.7 on Zosyn      Dispo: Continued ICU care for oxygenation/ventilation requirements                 Michael Barton MD  Critical Care - Surgery  [facility] - Respiratory ICU     [facility]

## (undated) DEVICE — LUBRICANT SURGILUBE 2 OZ

## (undated) DEVICE — SUT SILK 2-0 SH 18IN BLACK

## (undated) DEVICE — TRAY MINOR GEN SURG

## (undated) DEVICE — SUT PROLENE 2-0 30 SH

## (undated) DEVICE — SUT 3-0 12-18IN SILK

## (undated) DEVICE — ELECTRODE REM PLYHSV RETURN 9

## (undated) DEVICE — CHLORAPREP 10.5 ML APPLICATOR

## (undated) DEVICE — SEE MEDLINE ITEM 146417

## (undated) DEVICE — SUT PROLENE 0-36 V-7

## (undated) DEVICE — SEE MEDLINE ITEM 157117

## (undated) DEVICE — GOWN SURGICAL X-LARGE

## (undated) DEVICE — SEE MEDLINE ITEM 152622

## (undated) DEVICE — DRAPE THYROID WITH ARMBOARD

## (undated) DEVICE — NDL N SERIES MICRO-DISSECTION

## (undated) DEVICE — BLADE SURG CARBON STEEL SZ11

## (undated) DEVICE — SPONGE IV DRAIN 4X4 STERILE